# Patient Record
Sex: MALE | Race: WHITE | NOT HISPANIC OR LATINO | Employment: OTHER | URBAN - METROPOLITAN AREA
[De-identification: names, ages, dates, MRNs, and addresses within clinical notes are randomized per-mention and may not be internally consistent; named-entity substitution may affect disease eponyms.]

---

## 2018-12-10 ENCOUNTER — TELEPHONE (OUTPATIENT)
Dept: FAMILY MEDICINE | Facility: CLINIC | Age: 53
End: 2018-12-10

## 2018-12-10 NOTE — TELEPHONE ENCOUNTER
----- Message from Rosa Wilson sent at 12/10/2018  1:09 PM CST -----  Contact: Franklin. Mother  Type: Needs Medical Advice    Who Called:  Franklin-mother  Best Call Back Number: [phone]  Additional Information: Would like a call back about her son getting in earlier  Because of his condition would like to speak to a Nurse. Thanks.

## 2019-01-16 ENCOUNTER — LAB VISIT (OUTPATIENT)
Dept: LAB | Facility: HOSPITAL | Age: 54
End: 2019-01-16
Attending: PSYCHIATRY & NEUROLOGY
Payer: MEDICARE

## 2019-01-16 DIAGNOSIS — Z51.81 ENCOUNTER FOR THERAPEUTIC DRUG MONITORING: ICD-10-CM

## 2019-01-16 DIAGNOSIS — Z79.899 NEED FOR PROPHYLACTIC CHEMOTHERAPY: Primary | ICD-10-CM

## 2019-01-16 LAB
ALBUMIN SERPL BCP-MCNC: 3.6 G/DL
ALP SERPL-CCNC: 130 U/L
ALT SERPL W/O P-5'-P-CCNC: 13 U/L
ANION GAP SERPL CALC-SCNC: 8 MMOL/L
AST SERPL-CCNC: 19 U/L
BASOPHILS # BLD AUTO: 0.1 K/UL
BASOPHILS NFR BLD: 0.9 %
BILIRUB SERPL-MCNC: 0.3 MG/DL
BUN SERPL-MCNC: 6 MG/DL
CALCIUM SERPL-MCNC: 9.1 MG/DL
CHLORIDE SERPL-SCNC: 100 MMOL/L
CHOLEST SERPL-MCNC: 186 MG/DL
CHOLEST/HDLC SERPL: 4.9 {RATIO}
CO2 SERPL-SCNC: 28 MMOL/L
CREAT SERPL-MCNC: 0.8 MG/DL
DIFFERENTIAL METHOD: ABNORMAL
EOSINOPHIL # BLD AUTO: 0.4 K/UL
EOSINOPHIL NFR BLD: 3.4 %
ERYTHROCYTE [DISTWIDTH] IN BLOOD BY AUTOMATED COUNT: 17.2 %
EST. GFR  (AFRICAN AMERICAN): >60 ML/MIN/1.73 M^2
EST. GFR  (NON AFRICAN AMERICAN): >60 ML/MIN/1.73 M^2
ESTIMATED AVG GLUCOSE: 114 MG/DL
GLUCOSE SERPL-MCNC: 97 MG/DL
HBA1C MFR BLD HPLC: 5.6 %
HCT VFR BLD AUTO: 39.9 %
HDLC SERPL-MCNC: 38 MG/DL
HDLC SERPL: 20.4 %
HGB BLD-MCNC: 12.9 G/DL
IMM GRANULOCYTES # BLD AUTO: 0.04 K/UL
IMM GRANULOCYTES NFR BLD AUTO: 0.4 %
LDLC SERPL CALC-MCNC: 127.4 MG/DL
LYMPHOCYTES # BLD AUTO: 2.8 K/UL
LYMPHOCYTES NFR BLD: 25.9 %
MCH RBC QN AUTO: 27.9 PG
MCHC RBC AUTO-ENTMCNC: 32.3 G/DL
MCV RBC AUTO: 86 FL
MONOCYTES # BLD AUTO: 1 K/UL
MONOCYTES NFR BLD: 9.3 %
NEUTROPHILS # BLD AUTO: 6.4 K/UL
NEUTROPHILS NFR BLD: 60.1 %
NONHDLC SERPL-MCNC: 148 MG/DL
NRBC BLD-RTO: 0 /100 WBC
PLATELET # BLD AUTO: 508 K/UL
PMV BLD AUTO: 9.4 FL
POTASSIUM SERPL-SCNC: 4.5 MMOL/L
PROT SERPL-MCNC: 8 G/DL
RBC # BLD AUTO: 4.63 M/UL
SODIUM SERPL-SCNC: 136 MMOL/L
TRIGL SERPL-MCNC: 103 MG/DL
TSH SERPL DL<=0.005 MIU/L-ACNC: 2.87 UIU/ML
WBC # BLD AUTO: 10.67 K/UL

## 2019-01-16 PROCEDURE — 80183 DRUG SCRN QUANT OXCARBAZEPIN: CPT

## 2019-01-16 PROCEDURE — 80061 LIPID PANEL: CPT

## 2019-01-16 PROCEDURE — 84443 ASSAY THYROID STIM HORMONE: CPT

## 2019-01-16 PROCEDURE — 83036 HEMOGLOBIN GLYCOSYLATED A1C: CPT

## 2019-01-16 PROCEDURE — 80053 COMPREHEN METABOLIC PANEL: CPT

## 2019-01-16 PROCEDURE — 85025 COMPLETE CBC W/AUTO DIFF WBC: CPT

## 2019-01-16 PROCEDURE — 36415 COLL VENOUS BLD VENIPUNCTURE: CPT | Mod: PO

## 2019-01-19 LAB — OXCARBAZEPINE METABOLITE: 19 MCG/ML

## 2019-02-07 ENCOUNTER — OFFICE VISIT (OUTPATIENT)
Dept: FAMILY MEDICINE | Facility: CLINIC | Age: 54
End: 2019-02-07
Payer: MEDICARE

## 2019-02-07 VITALS
WEIGHT: 273.81 LBS | HEART RATE: 111 BPM | OXYGEN SATURATION: 96 % | DIASTOLIC BLOOD PRESSURE: 80 MMHG | TEMPERATURE: 98 F | SYSTOLIC BLOOD PRESSURE: 136 MMHG | BODY MASS INDEX: 37.09 KG/M2 | HEIGHT: 72 IN

## 2019-02-07 DIAGNOSIS — R35.0 URINARY FREQUENCY: ICD-10-CM

## 2019-02-07 DIAGNOSIS — F20.9 SCHIZOPHRENIA, UNSPECIFIED TYPE: Primary | ICD-10-CM

## 2019-02-07 DIAGNOSIS — G47.33 OSA ON CPAP: ICD-10-CM

## 2019-02-07 DIAGNOSIS — D49.89: ICD-10-CM

## 2019-02-07 DIAGNOSIS — I25.10 CORONARY ARTERY DISEASE, ANGINA PRESENCE UNSPECIFIED, UNSPECIFIED VESSEL OR LESION TYPE, UNSPECIFIED WHETHER NATIVE OR TRANSPLANTED HEART: ICD-10-CM

## 2019-02-07 DIAGNOSIS — Z23 NEED FOR PNEUMOCOCCAL VACCINATION: ICD-10-CM

## 2019-02-07 DIAGNOSIS — R79.89 ELEVATED PLATELET COUNT: ICD-10-CM

## 2019-02-07 DIAGNOSIS — Z79.899 HIGH RISK MEDICATIONS (NOT ANTICOAGULANTS) LONG-TERM USE: ICD-10-CM

## 2019-02-07 DIAGNOSIS — J44.9 CHRONIC OBSTRUCTIVE PULMONARY DISEASE, UNSPECIFIED COPD TYPE: ICD-10-CM

## 2019-02-07 PROCEDURE — G0008 FLU VACCINE (QUAD) GREATER THAN OR EQUAL TO 3YO PRESERVATIVE FREE IM: ICD-10-PCS | Mod: HCNC,S$GLB,, | Performed by: FAMILY MEDICINE

## 2019-02-07 PROCEDURE — 3008F BODY MASS INDEX DOCD: CPT | Mod: HCNC,CPTII,S$GLB, | Performed by: FAMILY MEDICINE

## 2019-02-07 PROCEDURE — G0008 ADMIN INFLUENZA VIRUS VAC: HCPCS | Mod: HCNC,S$GLB,, | Performed by: FAMILY MEDICINE

## 2019-02-07 PROCEDURE — 90686 IIV4 VACC NO PRSV 0.5 ML IM: CPT | Mod: HCNC,S$GLB,, | Performed by: FAMILY MEDICINE

## 2019-02-07 PROCEDURE — 99999 PR PBB SHADOW E&M-EST. PATIENT-LVL IV: CPT | Mod: PBBFAC,HCNC,, | Performed by: FAMILY MEDICINE

## 2019-02-07 PROCEDURE — G0009 PNEUMOCOCCAL POLYSACCHARIDE VACCINE 23-VALENT =>2YO SQ IM: ICD-10-PCS | Mod: HCNC,S$GLB,, | Performed by: FAMILY MEDICINE

## 2019-02-07 PROCEDURE — 3008F PR BODY MASS INDEX (BMI) DOCUMENTED: ICD-10-PCS | Mod: HCNC,CPTII,S$GLB, | Performed by: FAMILY MEDICINE

## 2019-02-07 PROCEDURE — 90732 PNEUMOCOCCAL POLYSACCHARIDE VACCINE 23-VALENT =>2YO SQ IM: ICD-10-PCS | Mod: HCNC,S$GLB,, | Performed by: FAMILY MEDICINE

## 2019-02-07 PROCEDURE — 99204 OFFICE O/P NEW MOD 45 MIN: CPT | Mod: 25,HCNC,S$GLB, | Performed by: FAMILY MEDICINE

## 2019-02-07 PROCEDURE — 90732 PPSV23 VACC 2 YRS+ SUBQ/IM: CPT | Mod: HCNC,S$GLB,, | Performed by: FAMILY MEDICINE

## 2019-02-07 PROCEDURE — 90686 FLU VACCINE (QUAD) GREATER THAN OR EQUAL TO 3YO PRESERVATIVE FREE IM: ICD-10-PCS | Mod: HCNC,S$GLB,, | Performed by: FAMILY MEDICINE

## 2019-02-07 PROCEDURE — 99999 PR PBB SHADOW E&M-EST. PATIENT-LVL IV: ICD-10-PCS | Mod: PBBFAC,HCNC,, | Performed by: FAMILY MEDICINE

## 2019-02-07 PROCEDURE — G0009 ADMIN PNEUMOCOCCAL VACCINE: HCPCS | Mod: HCNC,S$GLB,, | Performed by: FAMILY MEDICINE

## 2019-02-07 PROCEDURE — 99204 PR OFFICE/OUTPT VISIT, NEW, LEVL IV, 45-59 MIN: ICD-10-PCS | Mod: 25,HCNC,S$GLB, | Performed by: FAMILY MEDICINE

## 2019-02-07 RX ORDER — NITROGLYCERIN 0.4 MG/1
0.4 TABLET SUBLINGUAL EVERY 5 MIN PRN
Qty: 9 TABLET | Refills: 3 | Status: SHIPPED | OUTPATIENT
Start: 2019-02-07 | End: 2020-01-14 | Stop reason: SDUPTHER

## 2019-02-07 RX ORDER — METOPROLOL SUCCINATE 25 MG/1
25 TABLET, EXTENDED RELEASE ORAL DAILY
Qty: 90 TABLET | Refills: 1 | Status: SHIPPED | OUTPATIENT
Start: 2019-02-07 | End: 2019-08-21 | Stop reason: SDUPTHER

## 2019-02-07 NOTE — PROGRESS NOTES
Subjective:       Patient ID: Ryan Crane is a 53 y.o. male.    Chief Complaint: Establish Care      Ryan Crane is in the office to establish care. Accompanied by his mom. Moved to the area from [place].     HPI  Medical history difficult to elicit. No records available. Pt states that mental health currently under care of community provider. He's not totally clear on all of his current medications.  Past Medical History:   Diagnosis Date    Acute angina     COPD (chronic obstructive pulmonary disease)     Hypertension     MI, old     PIPER on CPAP     Schizophrenia     Stroke      No medical records at time of visit. Pt reported hx is disjointed, mentions medications for BP, joint pain, mood, thyroid.     Current Outpatient Medications:     naproxen (NAPROSYN) 500 MG tablet, Take 1 tablet (500 mg total) by mouth 2 (two) times daily as needed (pain)., Disp: 15 tablet, Rfl: 0    naproxen sodium (ANAPROX) 550 MG tablet, Take 1 tablet (550 mg total) by mouth 2 (two) times daily with meals., Disp: 20 tablet, Rfl: 0    neomycin-polymyxin-hydrocortisone (CORTISPORIN) 3.5-10,000-1 mg/mL-unit/mL-% otic suspension, Place 4 drops into both ears 3 (three) times daily., Disp: 10 mL, Rfl: 0    The 10-year ASCVD risk score (Coloma DC Jr., et al., 2013) is: 10.8%    Values used to calculate the score:      Age: 53 years      Sex: Male      Is Non- : No      Diabetic: No      Tobacco smoker: Yes      Systolic Blood Pressure: 136 mmHg      Is BP treated: No      HDL Cholesterol: 38 mg/dL      Total Cholesterol: 186 mg/dL   Resume asa. Records release re: statin.    Lab Results   Component Value Date    HGBA1C 5.6 01/16/2019     Lab Results   Component Value Date    LDLCALC 127.4 01/16/2019    CREATININE 0.8 01/16/2019   Labs 1/2019 rev. Discussed repeat cbc with iron/tibc.     Review of Systems   HENT: Negative for congestion and postnasal drip.    Respiratory: Positive for cough. Negative  for shortness of breath.    Cardiovascular: Negative for chest pain (occ) and leg swelling.   Gastrointestinal: Negative for blood in stool, constipation and diarrhea.        No gerd c/o.   Genitourinary: Positive for frequency. Negative for difficulty urinating and hematuria.   Musculoskeletal: Negative for arthralgias and myalgias.   Neurological: Negative for dizziness and headaches.   Psychiatric/Behavioral: Positive for dysphoric mood. Negative for sleep disturbance.       Objective:      Physical Exam   Constitutional: He is oriented to person, place, and time. He appears well-developed and well-nourished.   HENT:   Head: Normocephalic and atraumatic.   Mouth/Throat: Oropharynx is clear and moist.   Eyes: Conjunctivae and EOM are normal. Pupils are equal, round, and reactive to light. No scleral icterus.   Neck: Normal range of motion. Neck supple. No thyromegaly present.   Cardiovascular: Normal rate, regular rhythm and normal heart sounds.   No murmur heard.  Pulmonary/Chest: Effort normal and breath sounds normal. No respiratory distress. He has no wheezes. He has no rales.   Abdominal: Soft. There is no guarding.   Exam limited by habitus.   Musculoskeletal: Normal range of motion. He exhibits no edema.   Neurological: He is alert and oriented to person, place, and time. No cranial nerve deficit.   Skin: Skin is warm and dry.   Psychiatric: He has a normal mood and affect.   Nursing note and vitals reviewed.        Screening recommendations appropriate to age and health status were reviewed.    Assessment & Plan:    Schizophrenia, unspecified type  -     Ambulatory referral to Psychiatry  Patient established with outside provider. He's aware that they'll have to confirm provider availability with ins, and cont f/u and meds in the interim.  Chronic obstructive pulmonary disease, unspecified COPD type  -     Ambulatory referral to Pulmonology  Records release pending. Pt recalls inhalers, but no specific  type.  PIPER on CPAP  Not using as it's in his mom's attic. Recommended he (clean) and resume.  Coronary artery disease, angina presence unspecified, unspecified vessel or lesion type, unspecified whether native or transplanted heart  -     Ambulatory referral to Cardiology  Records release prior. Resume toprol and asa.   Tumor, foot  -     Ambulatory referral to Podiatry  For review.  Elevated platelet count  -     CBC auto differential; Future; Expected date: 02/07/2019  -     Ferritin; Future; Expected date: 02/07/2019  -     Iron and TIBC; Future; Expected date: 02/07/2019  Incidental finding on most recent study. Recheck.  Urinary frequency  -     Prostate Specific Antigen, Diagnostic; Future; Expected date: 02/07/2019  Records release pending.  High risk medications (not anticoagulants) long-term use  -     Vitamin B12; Future; Expected date: 02/07/2019  -     Folate; Future; Expected date: 02/07/2019  Need for pneumococcal vaccination  -     (In Office Administered) Pneumococcal Polysaccharide Vaccine (23 Valent) (SQ/IM)  Other orders  -     metoprolol succinate (TOPROL-XL) 25 MG 24 hr tablet; Take 1 tablet (25 mg total) by mouth once daily.  Dispense: 90 tablet; Refill: 1  -     nitroGLYCERIN (NITROSTAT) 0.4 MG SL tablet; Place 1 tablet (0.4 mg total) under the tongue every 5 (five) minutes as needed for Chest pain.  Dispense: 9 tablet; Refill: 3  -     Influenza - Quadrivalent (3 years & older) (PF)

## 2019-02-08 DIAGNOSIS — Z12.11 COLON CANCER SCREENING: ICD-10-CM

## 2019-02-25 ENCOUNTER — OFFICE VISIT (OUTPATIENT)
Dept: PODIATRY | Facility: CLINIC | Age: 54
End: 2019-02-25
Payer: MEDICARE

## 2019-02-25 VITALS
HEIGHT: 72 IN | WEIGHT: 273.81 LBS | DIASTOLIC BLOOD PRESSURE: 99 MMHG | HEART RATE: 81 BPM | SYSTOLIC BLOOD PRESSURE: 192 MMHG | BODY MASS INDEX: 37.09 KG/M2

## 2019-02-25 DIAGNOSIS — R23.4 FISSURE IN SKIN OF FOOT: Primary | ICD-10-CM

## 2019-02-25 DIAGNOSIS — T81.30XA DEHISCENCE OF WOUND: ICD-10-CM

## 2019-02-25 DIAGNOSIS — M79.671 INTRACTABLE RIGHT HEEL PAIN: ICD-10-CM

## 2019-02-25 PROCEDURE — 99203 OFFICE O/P NEW LOW 30 MIN: CPT | Mod: HCNC,S$GLB,, | Performed by: PODIATRIST

## 2019-02-25 PROCEDURE — 3008F BODY MASS INDEX DOCD: CPT | Mod: HCNC,CPTII,S$GLB, | Performed by: PODIATRIST

## 2019-02-25 PROCEDURE — 99203 PR OFFICE/OUTPT VISIT, NEW, LEVL III, 30-44 MIN: ICD-10-PCS | Mod: HCNC,S$GLB,, | Performed by: PODIATRIST

## 2019-02-25 PROCEDURE — 99999 PR PBB SHADOW E&M-EST. PATIENT-LVL III: ICD-10-PCS | Mod: PBBFAC,HCNC,, | Performed by: PODIATRIST

## 2019-02-25 PROCEDURE — 99999 PR PBB SHADOW E&M-EST. PATIENT-LVL III: CPT | Mod: PBBFAC,HCNC,, | Performed by: PODIATRIST

## 2019-02-25 PROCEDURE — 3008F PR BODY MASS INDEX (BMI) DOCUMENTED: ICD-10-PCS | Mod: HCNC,CPTII,S$GLB, | Performed by: PODIATRIST

## 2019-02-25 NOTE — LETTER
March 8, 2019      Laura Becerril MD  [address]  [place] [address]           [facility]  [address] [facility] [address] [place] [address]  Phone: [phone]          Patient: Ryan Crane   MR Number: [id number]   YOB: 1965   Date of Visit: 2/25/2019       Dear Dr. Laura Becerril:    Thank you for referring Ryan Crane to me for evaluation. Attached you will find relevant portions of my assessment and plan of care.    If you have questions, please do not hesitate to call me. I look forward to following Ryan Crane along with you.    Sincerely,    Eloy Forbes  CC:  No Recipients    If you would like to receive this communication electronically, please contact [email or web address] or [phone] to request more information on [employer] Link access.    For providers and/or their staff who would like to refer a patient to [facility], please contact us through our one-stop-shop provider referral line, [facility], at [phone].    If you feel you have received this communication in error or would no longer like to receive these types of communications, please e-mail [email or web address]

## 2019-03-04 ENCOUNTER — OFFICE VISIT (OUTPATIENT)
Dept: PODIATRY | Facility: CLINIC | Age: 54
End: 2019-03-04
Payer: MEDICARE

## 2019-03-04 VITALS
SYSTOLIC BLOOD PRESSURE: 153 MMHG | BODY MASS INDEX: 37.09 KG/M2 | WEIGHT: 273.81 LBS | DIASTOLIC BLOOD PRESSURE: 87 MMHG | HEART RATE: 82 BPM | HEIGHT: 72 IN

## 2019-03-04 DIAGNOSIS — M79.671 INTRACTABLE RIGHT HEEL PAIN: ICD-10-CM

## 2019-03-04 DIAGNOSIS — T81.30XA DEHISCENCE OF WOUND: ICD-10-CM

## 2019-03-04 DIAGNOSIS — R23.4 FISSURE IN SKIN OF FOOT: Primary | ICD-10-CM

## 2019-03-04 PROCEDURE — 99213 PR OFFICE/OUTPT VISIT, EST, LEVL III, 20-29 MIN: ICD-10-PCS | Mod: 25,HCNC,S$GLB, | Performed by: PODIATRIST

## 2019-03-04 PROCEDURE — 97597 PR DEBRIDEMENT OPEN WOUND 20 SQ CM<: ICD-10-PCS | Mod: HCNC,S$GLB,, | Performed by: PODIATRIST

## 2019-03-04 PROCEDURE — 99999 PR PBB SHADOW E&M-EST. PATIENT-LVL III: ICD-10-PCS | Mod: PBBFAC,HCNC,, | Performed by: PODIATRIST

## 2019-03-04 PROCEDURE — 3008F PR BODY MASS INDEX (BMI) DOCUMENTED: ICD-10-PCS | Mod: HCNC,CPTII,S$GLB, | Performed by: PODIATRIST

## 2019-03-04 PROCEDURE — 99999 PR PBB SHADOW E&M-EST. PATIENT-LVL III: CPT | Mod: PBBFAC,HCNC,, | Performed by: PODIATRIST

## 2019-03-04 PROCEDURE — 3008F BODY MASS INDEX DOCD: CPT | Mod: HCNC,CPTII,S$GLB, | Performed by: PODIATRIST

## 2019-03-04 PROCEDURE — 99213 OFFICE O/P EST LOW 20 MIN: CPT | Mod: 25,HCNC,S$GLB, | Performed by: PODIATRIST

## 2019-03-04 PROCEDURE — 97597 DBRDMT OPN WND 1ST 20 CM/<: CPT | Mod: HCNC,S$GLB,, | Performed by: PODIATRIST

## 2019-03-04 RX ORDER — TRAMADOL HYDROCHLORIDE 50 MG/1
50 TABLET ORAL EVERY 8 HOURS PRN
Qty: 90 TABLET | Refills: 0 | Status: SHIPPED | OUTPATIENT
Start: 2019-03-04 | End: 2019-04-03

## 2019-03-18 ENCOUNTER — TELEPHONE (OUTPATIENT)
Dept: PODIATRY | Facility: CLINIC | Age: 54
End: 2019-03-18

## 2019-03-18 ENCOUNTER — OFFICE VISIT (OUTPATIENT)
Dept: PODIATRY | Facility: CLINIC | Age: 54
End: 2019-03-18
Payer: MEDICARE

## 2019-03-18 VITALS
BODY MASS INDEX: 38.58 KG/M2 | WEIGHT: 275.56 LBS | HEART RATE: 96 BPM | HEIGHT: 71 IN | DIASTOLIC BLOOD PRESSURE: 83 MMHG | SYSTOLIC BLOOD PRESSURE: 149 MMHG

## 2019-03-18 DIAGNOSIS — R23.4 FISSURE IN SKIN OF FOOT: ICD-10-CM

## 2019-03-18 DIAGNOSIS — M79.671 PAIN OF RIGHT HEEL: ICD-10-CM

## 2019-03-18 DIAGNOSIS — T81.30XA DEHISCENCE OF WOUND: Primary | ICD-10-CM

## 2019-03-18 PROCEDURE — 99213 OFFICE O/P EST LOW 20 MIN: CPT | Mod: 25,HCNC,S$GLB, | Performed by: PODIATRIST

## 2019-03-18 PROCEDURE — 97597 PR DEBRIDEMENT OPEN WOUND 20 SQ CM<: ICD-10-PCS | Mod: HCNC,S$GLB,, | Performed by: PODIATRIST

## 2019-03-18 PROCEDURE — 97597 DBRDMT OPN WND 1ST 20 CM/<: CPT | Mod: HCNC,S$GLB,, | Performed by: PODIATRIST

## 2019-03-18 PROCEDURE — 99213 PR OFFICE/OUTPT VISIT, EST, LEVL III, 20-29 MIN: ICD-10-PCS | Mod: 25,HCNC,S$GLB, | Performed by: PODIATRIST

## 2019-03-18 PROCEDURE — 3008F BODY MASS INDEX DOCD: CPT | Mod: HCNC,CPTII,S$GLB, | Performed by: PODIATRIST

## 2019-03-18 PROCEDURE — 3008F PR BODY MASS INDEX (BMI) DOCUMENTED: ICD-10-PCS | Mod: HCNC,CPTII,S$GLB, | Performed by: PODIATRIST

## 2019-03-18 PROCEDURE — 99999 PR PBB SHADOW E&M-EST. PATIENT-LVL III: CPT | Mod: PBBFAC,HCNC,, | Performed by: PODIATRIST

## 2019-03-18 PROCEDURE — 99999 PR PBB SHADOW E&M-EST. PATIENT-LVL III: ICD-10-PCS | Mod: PBBFAC,HCNC,, | Performed by: PODIATRIST

## 2019-03-18 NOTE — TELEPHONE ENCOUNTER
"Patient requesting a prescription for a "downloading" boot.  Stated that he can come pick it up when ready.  "

## 2019-03-18 NOTE — PROGRESS NOTES
Subjective:      Patient ID: Ryan Crane is a 53 y.o. male.    Chief Complaint: Foot Ulcer (right foot wound care  PCP  Laura Becerril  2/7/19)      HPI:  Ryan Crane is a 53 y.o. male who presents to clinic with a chief complaint of right heel fissure wound.  Patient reports no longer having pain since taking tramadol unless he steps the wrong way.  He states that he lost his original prescription for his offloading shoe and requests another prescription for it.  He relates having called the MRI facility in [place] and was told that he can take of his results at any time but has not made the trip to get them.  He also informs of increased walking and decreased wound care since his last visit.  Patient denies any pain in his heel today.  Patient denies any other pedal complaints at this time.    PCP:  Laura Becerril MD  Date last seen:  2/7/19    Review of Systems   Constitutional: Negative for appetite change, fever, chills, fatigue and unexpected weight change.   Cardiovascular: Negative for chest pain, claudication, cyanosis, and leg swelling.  Musculoskeletal: Negative for back pain, arthritis, joint pain, joint swelling, myalgias, and stiffness.   Skin: Negative for nail bed changes, discoloration, rash, itching, suspicious lesion, and unusual hair distribution.  Positive for poor wound healing.  Neurological: Negative for loss of balance, sensory change, paresthesias, and numbness.   Hematological: Negative for adenopathy, bleeding, and bruising easily.   Psychiatric/Behavioral: The patient is not nervous/anxious.  Negative for altered mental status.    Hemoglobin A1C   Date Value Ref Range Status   01/16/2019 5.6 4.0 - 5.6 % Final     Comment:     ADA Screening Guidelines:  5.7-6.4%  Consistent with prediabetes  >or=6.5%  Consistent with diabetes  High levels of fetal hemoglobin interfere with the HbA1C  assay. Heterozygous hemoglobin variants (HbS, HgC, etc)do  not significantly interfere  "with this assay.   However, presence of multiple variants may affect accuracy.         Past Medical History:   Diagnosis Date    Acute angina     COPD (chronic obstructive pulmonary disease)     Hypertension     MI, old     PIPER on CPAP     Schizophrenia     Stroke      Past Surgical History:   Procedure Laterality Date    APPENDECTOMY      SHOULDER ARTHROSCOPY Left      History reviewed. No pertinent family history.  Social History     Socioeconomic History    Marital status:      Spouse name: None    Number of children: None    Years of education: None    Highest education level: None   Social Needs    Financial resource strain: None    Food insecurity - worry: None    Food insecurity - inability: None    Transportation needs - medical: None    Transportation needs - non-medical: None   Occupational History    None   Tobacco Use    Smoking status: Current Every Day Smoker     Packs/day: 1.00     Years: 45.00     Pack years: 45.00    Smokeless tobacco: Never Used   Substance and Sexual Activity    Alcohol use: No     Frequency: Never    Drug use: Yes     Types: Marijuana    Sexual activity: None   Other Topics Concern    None   Social History Narrative    None           Objective:        BP (!) 149/83 (BP Location: Right arm, Patient Position: Sitting)   Pulse 96   Ht 5' 11" (1.803 m)   Wt 125 kg (275 lb 9.2 oz)   BMI 38.43 kg/m²     Physical Exam   Constitutional: Patient is oriented to person, place, and time. Patient appears well-developed and well-nourished. No acute distress.     Psychiatric: Patient has a normal mood and affect. Patient's speech is normal and behavior is normal. Judgment is normal. Cognition and memory are normal.     Right pedal exam was performed today.  Vascular: Vascular: Pedal pulses palpable 2/4 DP & PT.  CFT is = 3 seconds to the hallux.  Skin temperature is warm to cool proximal tibia to distal toes without localized increase in calor noted.  No " erythema, edema, or ecchymosis noted to the foot or ankle.  Hair growth present distally to the LE.     Musculoskeletal: Ankle and pedal joint ROM are decreased. Ankle joint dorsiflexion is restricted with the knee extended and flexed per Silfverskiold exam.  Muscle strength is 5/5 for all LE muscle groups tested.  No palpable soft tissue mass appreciable to the right plantar heel.    Neurological:  Epicritic sensation is grossly Intact to the foot.    [place] STR is negative to right lower extremity.   Tenderness to palpation of right plantar heel fissures/wounds.     Dermatological: Toenails 1-5 right are WNL in length and thickness.  Webspaces 1-4 right are clean, dry, and intact.  Skin turgor is supple.   Plantar lateral right heel fissure wound is healed.  Fissure wound noted to the plantar medial right heel demonstrates involuted skin edges with hyperkeratotic skin edges measuring 1.8 cm x 0.2 cm x 0.4 cm.  Wounds are macerated without dot signs of infection present with wound bases 100% fibrotic extending down into the dermis.     Nursing note and vitals reviewed.        Assessment:       Encounter Diagnoses   Name Primary?    Dehiscence of wound - Right Foot Yes    Fissure in skin of foot - Right Foot     Pain of right heel - Right Foot          Plan:       Ryan was seen today for foot ulcer.    Diagnoses and all orders for this visit:    Dehiscence of wound - Right Foot  -     ORTHOTIC DEVICE (DME)    Fissure in skin of foot - Right Foot  -     ORTHOTIC DEVICE (DME)    Pain of right heel - Right Foot  -     ORTHOTIC DEVICE (DME)      I counseled the patient on his conditions, their implications and medical management.    - Reviewed with patient wound care instructions and need for further imaging if he does not bring medical records to next visit.    - Electronic prescription for a Darco heel offloading wedge shoe was given to patient along with list of medical suppliers in the area.    - Advised  patient to reduce ambulation still even though pain has significantly improved with tramadol.            - With patient's permission, cleansed right plantar heel wound with normal saline and performed sharp, selective debridement of devitalized, hyperkeratotic tissue and biofilm, < 20 cm sq., extending down to the level of dermis via 3 mm dermal curette to patient's tolerance without incident.  Applied betadine, patted area dry, applied large felt offloading pad with antibiotic cream an aperture, and covered with Aquacel foam dressing.    Patient was given the following recommendations and instructions:  Patient Instructions   - Change dressing every 2-3 days, clean heel fissures with betadine, pat area dry, apply felt offloading pad, apply antibiotic and antifungal creams in aperture, and cover with foam bandage.    - Don't get dressing soiled or wet.    - Elevate foot as much as possible throughout the day.    - Wear supportive/accommodative shoes at all times when ambulating.    - Rest/reduce ambulation to minimal activities of daily living.    - Take over-the-counter anti-inflammatories as needed and tramadol as prescribed for pain.    - Bring prescription for Darco heel offloading wedge shoe to medical supplier and wear shoe at all times when ambulating.    - Acquire MRI results and any other documents regarding foot condition and bring them to next appointment.    - Notify clinic immediately of any new or worsening conditions.    - Follow up in 2 weeks for wound care.          Li Kathleen DPM        Dictation was performed using M*Modal Fluency.  Transcription errors may be present.

## 2019-03-19 NOTE — TELEPHONE ENCOUNTER
Per Dr Kathleen:  Patient informed that the prescription that was given to him yesterday should be sufficient.  Tried explaining to patient that if the place he went to has  paperwork explaining that they need something else to please have them send it, but patient broke in - stated ok and hung up on me.        Dr Kathleen shown the shoe we have and it is not the one needed.    Li Kathleen, KEEGAN Del Castillo LPN   Caller: Unspecified (Yesterday,  5:25 PM)             Please have him return for nursing visit to get heel offloading shoe.  Thank you!

## 2019-03-21 ENCOUNTER — OFFICE VISIT (OUTPATIENT)
Dept: SPINE | Facility: CLINIC | Age: 54
End: 2019-03-21
Payer: MEDICARE

## 2019-03-21 VITALS
SYSTOLIC BLOOD PRESSURE: 179 MMHG | WEIGHT: 275.56 LBS | HEIGHT: 71 IN | HEART RATE: 78 BPM | DIASTOLIC BLOOD PRESSURE: 88 MMHG | BODY MASS INDEX: 38.58 KG/M2

## 2019-03-21 DIAGNOSIS — E66.9 OBESITY, UNSPECIFIED CLASSIFICATION, UNSPECIFIED OBESITY TYPE, UNSPECIFIED WHETHER SERIOUS COMORBIDITY PRESENT: ICD-10-CM

## 2019-03-21 DIAGNOSIS — M54.50 ACUTE MIDLINE LOW BACK PAIN WITHOUT SCIATICA: Primary | ICD-10-CM

## 2019-03-21 PROBLEM — T81.30XA DEHISCENCE OF WOUND: Status: ACTIVE | Noted: 2019-03-21

## 2019-03-21 PROBLEM — M79.671 INTRACTABLE RIGHT HEEL PAIN: Status: ACTIVE | Noted: 2019-03-21

## 2019-03-21 PROBLEM — R23.4 FISSURE IN SKIN OF FOOT: Status: ACTIVE | Noted: 2019-03-21

## 2019-03-21 PROCEDURE — 99203 OFFICE O/P NEW LOW 30 MIN: CPT | Mod: HCNC,S$GLB,, | Performed by: PHYSICIAN ASSISTANT

## 2019-03-21 PROCEDURE — 3008F BODY MASS INDEX DOCD: CPT | Mod: HCNC,CPTII,S$GLB, | Performed by: PHYSICIAN ASSISTANT

## 2019-03-21 PROCEDURE — 3008F PR BODY MASS INDEX (BMI) DOCUMENTED: ICD-10-PCS | Mod: HCNC,CPTII,S$GLB, | Performed by: PHYSICIAN ASSISTANT

## 2019-03-21 PROCEDURE — 99999 PR PBB SHADOW E&M-EST. PATIENT-LVL III: CPT | Mod: PBBFAC,HCNC,, | Performed by: PHYSICIAN ASSISTANT

## 2019-03-21 PROCEDURE — 99203 PR OFFICE/OUTPT VISIT, NEW, LEVL III, 30-44 MIN: ICD-10-PCS | Mod: HCNC,S$GLB,, | Performed by: PHYSICIAN ASSISTANT

## 2019-03-21 PROCEDURE — 99999 PR PBB SHADOW E&M-EST. PATIENT-LVL III: ICD-10-PCS | Mod: PBBFAC,HCNC,, | Performed by: PHYSICIAN ASSISTANT

## 2019-03-21 RX ORDER — METHOCARBAMOL 750 MG/1
750 TABLET, FILM COATED ORAL EVERY 8 HOURS PRN
Qty: 40 TABLET | Refills: 0 | Status: SHIPPED | OUTPATIENT
Start: 2019-03-21 | End: 2019-05-07

## 2019-03-22 NOTE — PROGRESS NOTES
"Subjective:      Patient ID: Ryan Crane is a 53 y.o. male.    Chief Complaint: Foot Ulcer (right foot   PCP  Laura Becerril  2/7/19)      HPI:  Ryan Crane is a 53 y.o. male who presents to clinic with a chief complaint of wounds on the bottom of his right heel.  Patient reports having no reduction pain with local wound care and admits to not taking over-the-counter pain medications/anti-inflammatories.  He is requesting narcotics for his pain.  Patient also reports not getting offloading shoe or medical records.  He still describes heel pain as "hurt" and rates his pain as an 8/10 on the pain scale.  Patient denies any other pedal complaints at this time.    PCP:  Laura Becerril MD  Date last seen:  2/7/19    Review of Systems   Constitutional: Negative for appetite change, fever, chills, fatigue and unexpected weight change.   Cardiovascular: Negative for chest pain, claudication, cyanosis, and leg swelling.  Musculoskeletal: Negative for back pain, arthritis, joint pain, joint swelling, myalgias, and stiffness.   Skin: Negative for nail bed changes, discoloration, rash, itching, suspicious lesion, and unusual hair distribution.  Positive for poor wound healing.  Neurological: Negative for loss of balance, sensory change, paresthesias, and numbness.  Positive for pain.  Hematological: Negative for adenopathy, bleeding, and bruising easily.   Psychiatric/Behavioral: The patient is not nervous/anxious.  Negative for altered mental status.    Hemoglobin A1C   Date Value Ref Range Status   01/16/2019 5.6 4.0 - 5.6 % Final     Comment:     ADA Screening Guidelines:  5.7-6.4%  Consistent with prediabetes  >or=6.5%  Consistent with diabetes  High levels of fetal hemoglobin interfere with the HbA1C  assay. Heterozygous hemoglobin variants (HbS, HgC, etc)do  not significantly interfere with this assay.   However, presence of multiple variants may affect accuracy.         Past Medical History:   Diagnosis Date "    Acute angina     COPD (chronic obstructive pulmonary disease)     Hypertension     MI, old     PIPER on CPAP     Schizophrenia     Stroke      Past Surgical History:   Procedure Laterality Date    APPENDECTOMY      SHOULDER ARTHROSCOPY Left      History reviewed. No pertinent family history.  Social History     Socioeconomic History    Marital status:      Spouse name: None    Number of children: None    Years of education: None    Highest education level: None   Social Needs    Financial resource strain: None    Food insecurity - worry: None    Food insecurity - inability: None    Transportation needs - medical: None    Transportation needs - non-medical: None   Occupational History    None   Tobacco Use    Smoking status: Current Every Day Smoker     Packs/day: 1.00     Years: 45.00     Pack years: 45.00    Smokeless tobacco: Never Used   Substance and Sexual Activity    Alcohol use: No     Frequency: Never    Drug use: Yes     Types: Marijuana    Sexual activity: None   Other Topics Concern    None   Social History Narrative    None           Objective:        BP (!) 153/87 (BP Location: Right arm, Patient Position: Sitting)   Pulse 82   Ht 6' (1.829 m)   Wt 124.2 kg (273 lb 13 oz)   BMI 37.14 kg/m²     Physical Exam   Constitutional: Patient is oriented to person, place, and time. Patient appears well-developed and well-nourished. No acute distress.     Psychiatric: Patient has a normal mood and affect. Patient's speech is normal and behavior is normal. Judgment is normal. Cognition and memory are normal.     Right pedal exam was performed today.  Vascular: Pedal pulses palpable 2/4 DP & PT.  CFT is = 3 seconds to the hallux.  Skin temperature is warm to cool proximal tibia to distal toes without localized increase in calor noted.  No erythema, edema, or ecchymosis noted to the foot or ankle.  Hair growth present distally to the LE.     Musculoskeletal: Ankle and pedal joint  ROM are decreased. Ankle joint dorsiflexion is restricted with the knee extended and flexed per Silfverskiold exam.  Muscle strength is 5/5 for all LE muscle groups tested.  No palpable soft tissue mass appreciable to the right plantar heel.    Neurological:  Epicritic sensation is grossly Intact to the foot.   Achilles DTR and [place] STR are Intact to right lower extremity.   Tenderness to palpation of right plantar heel fissures/wounds.    Dermatological: Toenails 1-5 right are WNL in length and thickness.  Webspaces 1-4 right are clean, dry, and intact.  Skin turgor is supple.  Fissure wounds noted to the plantar right heel demonstrating involuted skin edges with hyperkeratotic skin edges with lateral wound in T shape measuring approximately 0.6 cm x 0.9 cm x 0.4 cm and medial wound in linear oblique orientation measuring 1.5 cm x 0.3 cm x 0.4 cm.  Wounds are macerated without dot signs of infection present with wound bases 100% fibrotic extending down into the dermis.  No malodor noted to the wounds.    Nursing note and vitals reviewed.        Assessment:       Encounter Diagnoses   Name Primary?    Fissure in skin of foot - Right Foot Yes    Dehiscence of wound - Right Foot     Intractable right heel pain - Right Foot          Plan:       Ryan was seen today for foot ulcer.    Diagnoses and all orders for this visit:    Fissure in skin of foot - Right Foot  -     traMADol (ULTRAM) 50 mg tablet; Take 1 tablet (50 mg total) by mouth every 8 (eight) hours as needed for Pain.    Dehiscence of wound - Right Foot    Intractable right heel pain - Right Foot  -     traMADol (ULTRAM) 50 mg tablet; Take 1 tablet (50 mg total) by mouth every 8 (eight) hours as needed for Pain.      I counseled the patient on his conditions, their implications and medical management.    - Reviewed with patient proper foot care, supportive, accommodative shoe gear, and PRICE therapy.    - With patient's permission, cleansed right  plantar heel wounds with normal saline and performed sharp, selective debridement of devitalized, hyperkeratotic tissue and biofilm, < 20 cm sq., extending down to the level of dermis via 3 mm dermal curette to patient's tolerance without incident.  Applied betadine, patted area dry, applied large felt offloading pad with antibiotic cream an aperture, and covered with Aquacel foam dressing.    - Advised patient to get Darco heel offloading wedge shoe.    - Patient defers imaging studies as he reports they have already been done and doesn't want to repeat them when he can get the results for next visit.    - Informed patient that I will not prescribe stronger narcotics for a soft tissue condition but would refer him to pain management or defer pain medication treatment to primary care if he felt he needed them.  He deferred referral to pain management and was amenable to tramadol prescription.    Patient was given the following recommendations and instructions:  Patient Instructions   - Change dressing every 2-3 days, clean heel fissures with betadine, pat area dry, apply felt offloading pad, apply antibiotic and antifungal creams in aperture, and cover with foam bandage.    - Don't get dressing soiled or wet.    - Elevate foot as much as possible throughout the day.    - Wear supportive/accommodative shoes at all times when ambulating.    - Rest/reduce ambulation to minimal activities of daily living.    - Take over-the-counter anti-inflammatories as needed and tramadol as prescribed for pain.    - Bring prescription for Darco heel offloading wedge shoe to medical supplier and wear shoe at all times when ambulating.    - Acquire MRI results and any other documents regarding foot condition and bring them to next appointment.    - Notify clinic immediately of any new or worsening conditions.    - Follow up in 2 weeks for wound care.          Li Kathleen DPM        Dictation was performed using M*Modal Fluency.   Transcription errors may be present.

## 2019-03-22 NOTE — PATIENT INSTRUCTIONS
- Change dressing every 2-3 days, clean heel fissures with betadine, pat area dry, apply felt offloading pad, apply antibiotic and antifungal creams in aperture, and cover with foam bandage.    - Don't get dressing soiled or wet.    - Elevate foot as much as possible throughout the day.    - Wear supportive/accommodative shoes at all times when ambulating.    - Rest/reduce ambulation to minimal activities of daily living.    - Take over-the-counter anti-inflammatories as needed and tramadol as prescribed for pain.    - Bring prescription for Darco heel offloading wedge shoe to medical supplier and wear shoe at all times when ambulating.    - Acquire MRI results and any other documents regarding foot condition and bring them to next appointment.    - Notify clinic immediately of any new or worsening conditions.    - Follow up in 2 weeks for wound care.

## 2019-03-22 NOTE — PATIENT INSTRUCTIONS
- Change dressing every 2-3 days, clean heel fissures with betadine, pat area dry, apply felt offloading pad, apply antibiotic and antifungal creams in aperture, and cover with foam bandage.    - Don't get dressing soiled or wet.    - Elevate foot as much as possible throughout the day.    - Wear supportive/accommodative shoes at all times when ambulating.    - Rest/reduce ambulation to minimal activities of daily living.    - Take over-the-counter anti-inflammatories as needed for pain.    - Bring prescription for Darco heel offloading wedge shoe to medical supplier and wear shoe at all times when ambulating.    - Acquire MRI results and any other documents regarding foot condition and bring them to next appointment.    - Notify clinic immediately of any new or worsening conditions.    - Follow up in 1 week for wound care.

## 2019-03-22 NOTE — PROGRESS NOTES
Neurosurgery History & Physical    Patient ID: Ryan Crane is a 53 y.o. male.    Chief Complaint   Patient presents with    Low-back Pain     He has had low back pain for 3 weeks. Pain is constant. Pain goes from his mid back down to both buttocks. Nothing helps the pain. Movement makes the pain worse.       Review of Systems   Constitutional: Negative for activity change, chills, fatigue and unexpected weight change.   HENT: Negative for hearing loss, tinnitus, trouble swallowing and voice change.    Eyes: Negative for visual disturbance.   Respiratory: Negative for apnea, chest tightness and shortness of breath.    Cardiovascular: Negative for chest pain and palpitations.   Gastrointestinal: Negative for abdominal pain, constipation, diarrhea, nausea and vomiting.   Genitourinary: Negative for difficulty urinating, dysuria and frequency.   Musculoskeletal: Positive for back pain. Negative for gait problem, neck pain and neck stiffness.   Skin: Negative for wound.   Neurological: Negative for dizziness, tremors, seizures, facial asymmetry, speech difficulty, weakness, light-headedness, numbness and headaches.   Psychiatric/Behavioral: Negative for confusion and decreased concentration.       Past Medical History:   Diagnosis Date    Acute angina     COPD (chronic obstructive pulmonary disease)     Hypertension     MI, old     PIPER on CPAP     Schizophrenia     Stroke      Social History     Socioeconomic History    Marital status:      Spouse name: Not on file    Number of children: Not on file    Years of education: Not on file    Highest education level: Not on file   Social Needs    Financial resource strain: Not on file    Food insecurity - worry: Not on file    Food insecurity - inability: Not on file    Transportation needs - medical: Not on file    Transportation needs - non-medical: Not on file   Occupational History    Not on file   Tobacco Use    Smoking status: Current  "Every Day Smoker     Packs/day: 1.00     Years: 45.00     Pack years: 45.00    Smokeless tobacco: Never Used   Substance and Sexual Activity    Alcohol use: No     Frequency: Never    Drug use: Yes     Types: Marijuana    Sexual activity: Not on file   Other Topics Concern    Not on file   Social History Narrative    Not on file     No family history on file.  Review of patient's allergies indicates:   Allergen Reactions    Alcohol     Keflex [cephalexin]        Current Outpatient Medications:     DULoxetine (CYMBALTA) 60 MG capsule, Take 60 mg by mouth 2 (two) times daily., Disp: , Rfl:     ketorolac (TORADOL) 10 mg tablet, Take 1 tablet (10 mg total) by mouth before meals as needed for Pain., Disp: 15 tablet, Rfl: 0    methocarbamol (ROBAXIN) 750 MG Tab, Take 1 tablet (750 mg total) by mouth every 8 (eight) hours as needed (muscle spasms)., Disp: 40 tablet, Rfl: 0    metoprolol succinate (TOPROL-XL) 25 MG 24 hr tablet, Take 1 tablet (25 mg total) by mouth once daily., Disp: 90 tablet, Rfl: 1    nitroGLYCERIN (NITROSTAT) 0.4 MG SL tablet, Place 1 tablet (0.4 mg total) under the tongue every 5 (five) minutes as needed for Chest pain., Disp: 9 tablet, Rfl: 3    OXcarbazepine (TRILEPTAL) 600 MG Tab, Take 600 mg by mouth 3 (three) times daily., Disp: , Rfl:     traMADol (ULTRAM) 50 mg tablet, Take 1 tablet (50 mg total) by mouth every 8 (eight) hours as needed for Pain., Disp: 90 tablet, Rfl: 0    ziprasidone (GEODON) 80 MG capsule, Take 160 mg by mouth once daily. At night, Disp: , Rfl:     neomycin-polymyxin-hydrocortisone (CORTISPORIN) 3.5-10,000-1 mg/mL-unit/mL-% otic suspension, Place 4 drops into both ears 3 (three) times daily., Disp: 10 mL, Rfl: 0    Vitals:    03/21/19 1336   BP: (!) 179/88   BP Location: Right arm   Patient Position: Sitting   BP Method: Large (Automatic)   Pulse: 78   Weight: 125 kg (275 lb 9.2 oz)   Height: 5' 11" (1.803 m)       Physical Exam   Constitutional: He is " oriented to person, place, and time. He appears well-developed and well-nourished.   HENT:   Head: Normocephalic and atraumatic.   Eyes: Pupils are equal, round, and reactive to light.   Neck: Normal range of motion. Neck supple.   Cardiovascular: Normal rate.   Pulmonary/Chest: Effort normal.   Abdominal: He exhibits no distension.   Musculoskeletal: Normal range of motion. He exhibits no edema.   Neurological: He is alert and oriented to person, place, and time. He has a normal Finger-Nose-Finger Test, a normal Heel to Shin Test, a normal Romberg Test and a normal Tandem Gait Test. Gait normal.   Reflex Scores:       Tricep reflexes are 2+ on the right side and 2+ on the left side.       Bicep reflexes are 2+ on the right side and 2+ on the left side.       Brachioradialis reflexes are 2+ on the right side and 2+ on the left side.       Patellar reflexes are 2+ on the right side and 2+ on the left side.       Achilles reflexes are 2+ on the right side and 2+ on the left side.  Skin: Skin is warm and dry.   Psychiatric: He has a normal mood and affect. His speech is normal and behavior is normal. Judgment and thought content normal.   Nursing note and vitals reviewed.      Neurologic Exam     Mental Status   Oriented to person, place, and time.   Oriented to person.   Oriented to place.   Oriented to time.   Follows 3 step commands.   Attention: normal. Concentration: normal.   Speech: speech is normal   Level of consciousness: alert  Knowledge: consistent with education.   Able to name object. Able to read. Able to repeat. Able to write. Normal comprehension.     Cranial Nerves     CN II   Visual acuity: normal  Right visual field deficit: none  Left visual field deficit: none     CN III, IV, VI   Pupils are equal, round, and reactive to light.  Right pupil: Size: 3 mm. Shape: regular. Reactivity: brisk. Consensual response: intact.   Left pupil: Size: 3 mm. Shape: regular. Reactivity: brisk. Consensual response:  intact.   CN III: no CN III palsy  CN VI: no CN VI palsy  Nystagmus: none   Diplopia: none  Ophthalmoparesis: none  Conjugate gaze: present    CN V   Right facial sensation deficit: none  Left facial sensation deficit: none    CN VII   Right facial weakness: none  Left facial weakness: none    CN VIII   Hearing: intact    CN IX, X   CN IX normal.   CN X normal.     CN XI   Right sternocleidomastoid strength: normal  Left sternocleidomastoid strength: normal  Right trapezius strength: normal  Left trapezius strength: normal    CN XII   Fasciculations: absent  Tongue deviation: none    Motor Exam   Muscle bulk: normal  Overall muscle tone: normal  Right arm pronator drift: absent  Left arm pronator drift: absent    Strength   Right neck flexion: 5/5  Left neck flexion: 5/5  Right neck extension: 5/5  Left neck extension: 5/5  Right deltoid: 5/5  Left deltoid: 5/5  Right biceps: 5/5  Left biceps: 5/5  Right triceps: 5/5  Left triceps: 5/5  Right wrist flexion: 5/5  Left wrist flexion: 5/5  Right wrist extension: 5/5  Left wrist extension: 5/5  Right interossei: 5/5  Left interossei: 5/5  Right abdominals: 5/5  Left abdominals: 5/5  Right iliopsoas: 5/5  Left iliopsoas: 5/5  Right quadriceps: 5/5  Left quadriceps: 5/5  Right hamstrin/5  Left hamstrin/5  Right glutei: 5/5  Left glutei: 5/5  Right anterior tibial: 5/5  Left anterior tibial: 5/5  Right posterior tibial: 5/5  Left posterior tibial: 5/5  Right peroneal: 5/5  Left peroneal: 5/5  Right gastroc: 5/5  Left gastroc: 5/5    Sensory Exam   Right arm light touch: normal  Left arm light touch: normal  Right leg light touch: normal  Left leg light touch: normal  Right arm vibration: normal  Left arm vibration: normal  Right arm pinprick: normal  Left arm pinprick: normal    Gait, Coordination, and Reflexes     Gait  Gait: normal    Coordination   Romberg: negative  Finger to nose coordination: normal  Heel to shin coordination: normal  Tandem walking  coordination: normal    Tremor   Resting tremor: absent  Intention tremor: absent  Action tremor: absent    Reflexes   Right brachioradialis: 2+  Left brachioradialis: 2+  Right biceps: 2+  Left biceps: 2+  Right triceps: 2+  Left triceps: 2+  Right patellar: 2+  Left patellar: 2+  Right achilles: 2+  Left achilles: 2+  Right Long: absent  Left Long: absent  Right ankle clonus: absent  Left ankle clonus: absent      Provider dictation:  53-year-old  female smoker with COPD, hypertension, schizophrenia is self-referred for evaluation of back pain.  He has a history of intermittent lower back pain over the last 30 years with each episode lasting approximately 1 week at a time.  He bent over to tie his shoe approximately 2-3 weeks ago and developed pain immediately.  Pain is felt along the axis of the entire lumbar region midline.  He occasionally has bilateral buttock pain.  He denies radicular leg pain, numbness, tingling.  Symptoms increased with movement.  He is taking Robaxin ago these powder for pain control.  He has not had physical therapy or steroid injection.  Oswestry score: 64%.  PHQ:  22.    He is an obese male.  He has 5/5 strength, 2+ muscle stretch reflexes, no sensory deficits.    He has not had any imaging.    Mr. Davis has acute exacerbation of chronic intermittent back pain without radiculopathy.  Pain is most likely myofascial and influenced by his obesity as well as sedentary lifestyle.  He has had some foot pain which keeps him sedentary.  I recommend physical therapy and we will refer him to [facility].  I am also prescribing robaxin to help with spasms.  Follow up if no improvement.    Visit Diagnosis:  Acute midline low back pain without sciatica  -     methocarbamol (ROBAXIN) 750 MG Tab; Take 1 tablet (750 mg total) by mouth every 8 (eight) hours as needed (muscle spasms).  Dispense: 40 tablet; Refill: 0  -     Ambulatory Referral to Physical/Occupational  Therapy    Obesity, unspecified classification, unspecified obesity type, unspecified whether serious comorbidity present  -     methocarbamol (ROBAXIN) 750 MG Tab; Take 1 tablet (750 mg total) by mouth every 8 (eight) hours as needed (muscle spasms).  Dispense: 40 tablet; Refill: 0  -     Ambulatory Referral to Physical/Occupational Therapy        Total time spent counseling greater than fifty percent of total visit time.  Counseling included discussion regarding imaging findings, diagnosis possibilities, treatment options, risks and benefits.   The patient had many questions regarding the options and long-term effects.

## 2019-03-22 NOTE — PROGRESS NOTES
"Subjective:      Patient ID: Ryan Crane is a 53 y.o. male.    Chief Complaint: Foot Problem (tumor  right bottom foot  PCP  Laura Becerril  2/7/19)      HPI:  Ryan Crane is a 53 y.o. male who presents to clinic with a chief complaint of wounds on the bottom of his right heel.  Patient reports having had a tumor removed on the bottom is foot with subsequent wound dehiscence.  He states that procedure was done when he lived in [place].  Patient relates he has lived here for over a year and a half that procedure took place about 2 years ago. He informs of having an MRI repeated at a facility in [place] several months ago due to concerned that tumor may have recurred but did not bring any documentation regarding such within to visit today.  Patient describes pain as "hurt" and rates it as 8/10 on the pain scale.  The only treatment he has formed was reducing ambulation and walking on his forefoot when possible.  He denies taking any over-the-counter pain medications or performing any local wound care to the heel wounds.  Patient denies any other pedal complaints at this time.    PCP:  Laura Becerril MD  Date last seen:  2/7/19    Review of Systems   Constitutional: Negative for appetite change, fever, chills, fatigue and unexpected weight change.   Respiratory: Negative for cough, wheezing, and shortness of breath.   Cardiovascular: Negative for chest pain, claudication, cyanosis, and leg swelling.  Endocrine:  Negative for intolerance to cold, intolerance to heat, polydipsia, polyphagia, and polyuria.    Gastrointestinal: Negative for nausea, vomiting, diarrhea, and constipation.   Musculoskeletal: Negative for back pain, arthritis, joint pain, joint swelling, myalgias, and stiffness.   Skin: Negative for nail bed changes, discoloration, rash, itching, suspicious lesion, and unusual hair distribution.  Positive for poor wound healing.  Neurological: Negative for loss of balance, sensory change, " paresthesias, and numbness.  Positive for pain.  Hematological: Negative for adenopathy, bleeding, and bruising easily.   Psychiatric/Behavioral: The patient is not nervous/anxious.  Negative for altered mental status.    Hemoglobin A1C   Date Value Ref Range Status   01/16/2019 5.6 4.0 - 5.6 % Final     Comment:     ADA Screening Guidelines:  5.7-6.4%  Consistent with prediabetes  >or=6.5%  Consistent with diabetes  High levels of fetal hemoglobin interfere with the HbA1C  assay. Heterozygous hemoglobin variants (HbS, HgC, etc)do  not significantly interfere with this assay.   However, presence of multiple variants may affect accuracy.         Past Medical History:   Diagnosis Date    Acute angina     COPD (chronic obstructive pulmonary disease)     Hypertension     MI, old     PIPER on CPAP     Schizophrenia     Stroke      Past Surgical History:   Procedure Laterality Date    APPENDECTOMY      SHOULDER ARTHROSCOPY Left      History reviewed. No pertinent family history.  Social History     Socioeconomic History    Marital status:      Spouse name: None    Number of children: None    Years of education: None    Highest education level: None   Social Needs    Financial resource strain: None    Food insecurity - worry: None    Food insecurity - inability: None    Transportation needs - medical: None    Transportation needs - non-medical: None   Occupational History    None   Tobacco Use    Smoking status: Current Every Day Smoker     Packs/day: 1.00     Years: 45.00     Pack years: 45.00    Smokeless tobacco: Never Used   Substance and Sexual Activity    Alcohol use: No     Frequency: Never    Drug use: Yes     Types: Marijuana    Sexual activity: None   Other Topics Concern    None   Social History Narrative    None           Objective:        BP (!) 192/99 (BP Location: Right arm, Patient Position: Sitting)   Pulse 81   Ht 6' (1.829 m)   Wt 124.2 kg (273 lb 13 oz)   BMI 37.14  kg/m²     Physical Exam   Constitutional: Patient is oriented to person, place, and time. Patient appears well-developed and well-nourished. No acute distress.     Psychiatric: Patient has a normal mood and affect. Patient's speech is normal and behavior is normal. Judgment is normal. Cognition and memory are normal.     Right pedal exam was performed today.  Vascular: Pedal pulses palpable 2/4 DP & PT.  CFT is = 3 seconds to the hallux.  Skin temperature is warm to cool proximal tibia to distal toes without localized increase in calor noted.  No erythema, edema, or ecchymosis noted to the foot or ankle.  Hair growth present distally to the LE.     Musculoskeletal: Ankle joint ROM is decreased. Subtalar joint ROM is decreased.  Midtarsal joint ROM is decreased.  1st ray ROM is within normal limits.  1st  MTPJ ROM is decreased.  Ankle joint dorsiflexion is restricted with the knee extended and flexed per [place] exam.    Muscle strength is 5/5 for all LE muscle groups tested.  No palpable soft tissue mass appreciable to the right plantar heel.    Neurological: Protective sensation is intact to 10/10 sites on the right foot via [place] monofilament.    Proprioception is Intact to the foot.  Vibratory sensation is Intact to the foot.   Light touch sensation is Intact to the foot.   Achilles DTR and [place]add[place] STR are Intact to right lower extremity.   Negative Babinski sign right lower extremity.  Negative clonus sign right lower extremity.  Tenderness to palpation of right plantar heel fissures/wounds.    Dermatological: Toenails 1-5 right are WNL in length and thickness.  Webspaces 1-4 right are clean, dry, and intact.  Skin turgor is supple.  Fissure wounds noted to the plantar right heel demonstrating involuted skin edges with hyperkeratotic skin edges with lateral wound in T shape measuring approximately 2.0 cm x 2.3 cm x 0.6 cm and medial wound in linear oblique orientation measuring 2.4 cm x 0.4 cm  x 0.6 cm.  Wounds are macerated without dot signs of infection present.  Fungal malodor noted to the foot.    Nursing note and vitals reviewed.        Assessment:       Encounter Diagnoses   Name Primary?    Fissure in skin of foot - Right Foot Yes    Dehiscence of wound - Right Foot     Intractable right heel pain - Right Foot          Plan:       Ryan was seen today for foot problem.    Diagnoses and all orders for this visit:    Fissure in skin of foot - Right Foot    Dehiscence of wound - Right Foot    Intractable right heel pain - Right Foot      I counseled the patient on his conditions, their implications and medical management.    - Educated patient on proper foot care, supportive, accommodative shoe gear, and PRICE therapy.    - With patient's permission, cleansed right plantar heel wounds with normal saline and performed sharp, selective debridement of devitalized, hyperkeratotic tissue and biofilm, < 20 cm sq., extending down to the level of dermis via 3 mm dermal curette to patient's tolerance without incident.  Applied betadine, patted area dry, applied large felt offloading pad with antibiotic cream an aperture, and covered with Aquacel foam dressing.    - Prescribed Darco heel offloading wedge shoe (handwritten prescription per patient's request).    Patient was given the following recommendations and instructions:  Patient Instructions   - Change dressing every 2-3 days, clean heel fissures with betadine, pat area dry, apply felt offloading pad, apply antibiotic and antifungal creams in aperture, and cover with foam bandage.    - Don't get dressing soiled or wet.    - Elevate foot as much as possible throughout the day.    - Wear supportive/accommodative shoes at all times when ambulating.    - Rest/reduce ambulation to minimal activities of daily living.    - Take over-the-counter anti-inflammatories as needed for pain.    - Bring prescription for Darco heel offloading wedge shoe to medical  supplier and wear shoe at all times when ambulating.    - Acquire MRI results and any other documents regarding foot condition and bring them to next appointment.    - Notify clinic immediately of any new or worsening conditions.    - Follow up in 1 week for wound care.          Li Kathleen DPM        Dictation was performed using M*Modal Fluency.  Transcription errors may be present.

## 2019-03-29 ENCOUNTER — CLINICAL SUPPORT (OUTPATIENT)
Dept: REHABILITATION | Facility: HOSPITAL | Age: 54
End: 2019-03-29
Payer: MEDICARE

## 2019-03-29 DIAGNOSIS — S39.012D LUMBAR STRAIN, SUBSEQUENT ENCOUNTER: ICD-10-CM

## 2019-03-29 PROCEDURE — 97161 PT EVAL LOW COMPLEX 20 MIN: CPT | Mod: HCNC,PO

## 2019-03-29 PROCEDURE — G8991 OTHER PT/OT GOAL STATUS: HCPCS | Mod: CK,HCNC,PO

## 2019-03-29 PROCEDURE — 97110 THERAPEUTIC EXERCISES: CPT | Mod: HCNC,PO

## 2019-03-29 PROCEDURE — G8990 OTHER PT/OT CURRENT STATUS: HCPCS | Mod: CK,HCNC,PO

## 2019-03-30 PROBLEM — S39.012D LUMBAR STRAIN, SUBSEQUENT ENCOUNTER: Status: ACTIVE | Noted: 2019-03-30

## 2019-03-31 NOTE — PLAN OF CARE
"[facility]  Physical Therapy Initial Evaluation    Name: Ryan Crane  Clinic Number: [id number]    Therapy Diagnosis:   Encounter Diagnosis   Name Primary?    Lumbar strain, subsequent encounter      Physician: Nora Nicholas PA-C    Physician Orders: PT Eval and Treat   Medical Diagnosis from Referral: M54.5 (ICD-10-CM) - Acute midline low back pain without sciatica E66.9 (ICD-10-CM) - Obesity, unspecified classification, unspecified obesity type, unspecified whether serious comorbidity present   Evaluation Date: 3/29/2019  Authorization Period Expiration: 03/28/2020  Plan of Care Expiration: 04/26/19  Visit # / Visits authorized: 1 / 12    Time In: 1100  Time Out: 1150  Total Billable Time: 45 minutes    Precautions:Chronic ulcer @ RIGHT heel from removal of cancer 3 years ago,  Acute angina, COPD, HNT, Schizophrenia,     Subjective   Date of onset: Early March, 28[address]  History of current condition - Ryan reports: Mid-line lumbar / lower thoracic back pain began after bending over to tie his shoes. He states that "nothing helps" pain and bending forward increases pain.   Pt denies long tract signs     Past Medical History:   Diagnosis Date    Acute angina     COPD (chronic obstructive pulmonary disease)     Hypertension     MI, old     PIPER on CPAP     Schizophrenia     Stroke      Ryan Crane  has a past surgical history that includes Appendectomy and Shoulder arthroscopy (Left).    Ryan has a current medication list which includes the following prescription(s): duloxetine, ketorolac, methocarbamol, metoprolol succinate, neomycin-polymyxin-hydrocortisone, nitroglycerin, oxcarbazepine, tramadol, and ziprasidone.    Review of patient's allergies indicates:   Allergen Reactions    Alcohol     Keflex [cephalexin]         Imaging, none:     Prior Therapy: None  Occupation: Retired   Prior Level of Function: Pt on disability due, limited by heel " "wound  Current Level of Function: Difficulty rising from sitting, bending , stairs (17 steps at his home)    Pain:  Current 9/10, worst 10/10, best 6/10   Location: bilateral back   Description: Stabbing  Aggravating Factors: Bending, Lifting and Getting out of bed/chair  Easing Factors: nothing    Pts goals: Resolve back pain    Objective     Posture: Slight EXTENSION bias with nearly level pelvis    ROM:   Lumbar    ACTIVE    Flexion 80 degrees "Hurts"   Extension 10 degrees "Hurts"   Side bend 30 degrees bilaterally No pain     Strength:     5/5 throughout LE's    Gait/Balance: No deficit in gait    Neuro: Intact gross touch sensation and equal reflexes,(+1)    Special Tests: Slight EXTENSION bias "feels OK"    Palpation: Unremarkable for spasm      CMS Impairment/Limitation/Restriction for FOTO Lumbar Survey    Therapist reviewed FOTO scores for Ryan Crane on 3/29/2019.   FOTO documents entered into [employer] - see Media section.    Limitation Score: 52%  Category: Carrying    Current : CK = at least 40% but < 60% impaired, limited or restricted  Goal: CK = at least 40% but < 60% impaired, limited or restricted         TREATMENT   Treatment Time In: 1115  Treatment Time Out: 1145  Total Treatment time separate from Evaluation: 30 minutes    Ryan received therapeutic exercises to develop strength, ROM, posture and core stabilization for 30 minutes including:  LE stretch  Trunk EXTENSION on machine @10 & 20# 2 x 10 each  UE pull down and pull back @ 7# 2 x 10 each  High bike (seat # 12) x 5 minutes  Trunk FLEXION with straight back bend, sit to stand     Home Exercises and Patient Education Provided  Education provided:   - Sleep position in side-lying and sitting in straight back chair with hips higher than knees    Written Home Exercises Provided: deferred.  Exercises were reviewed and Ryan was able to demonstrate them prior to the end of the session.  Ryan demonstrated good  understanding of the " education provided.     See EMR under deferred by patient for exercises provided 3/29/2019.    Assessment   Ryan is a 53 y.o. male referred to outpatient Physical Therapy with a medical diagnosis of M54.5 (ICD-10-CM) - Acute midline low back pain without sciatica E66.9 (ICD-10-CM) - Obesity, unspecified classification, unspecified obesity type, unspecified whether serious comorbidity present   . Pt presents with mid-line lumbar pain    Pt prognosis is Excellent.   Pt will benefit from skilled outpatient Physical Therapy to address the deficits stated above and in the chart below, provide pt/family education, and to maximize pt's level of independence.     Plan of care discussed with patient: Yes  Pt's spiritual, cultural and educational needs considered and patient is agreeable to the plan of care and goals as stated below:     Anticipated Barriers for therapy: None    Medical Necessity is demonstrated by the following  History  Co-morbidities and personal factors that may impact the plan of care Co-morbidities:   history of cancer and HTN    Personal Factors:   coping style     low   Examination  Body Structures and Functions, activity limitations and participation restrictions that may impact the plan of care Body Regions:   back  lower extremities    Body Systems:    ROM  strength    Participation Restrictions:   Unable to use leg press machine due to heel ulcer    Activity limitations:   Learning and applying knowledge  no deficits    General Tasks and Commands  no deficits    Communication  no deficits    Mobility  lifting and carrying objects    Self care  no deficits    Domestic Life  doing house work (cleaning house, washing dishes, laundry)    Interactions/Relationships  no deficits    Life Areas  no deficits    Community and Social Life  no deficits         low   Clinical Presentation stable and uncomplicated low   Decision Making/ Complexity Score: low     Goals:  Short Term Goals: 2 weeks   Improve  posture   Increase postural awareness and body mechanics  Decrease pain to 3-5/10   Long Term Goals: 4 weeks   Return to ADL with proper body mechanics in ADL   Pain @ 0-3/10      Plan   Plan of care Certification: 3/29/2019 to 04/26/19.    Outpatient Physical Therapy 3 times weekly for 4 weeks to include the following interventions: Neuromuscular Re-ed, Therapeutic Activites, Therapeutic Exercise and Ultrasound.     Saulo Adams, PT

## 2019-04-01 ENCOUNTER — OFFICE VISIT (OUTPATIENT)
Dept: PODIATRY | Facility: CLINIC | Age: 54
End: 2019-04-01
Payer: MEDICARE

## 2019-04-01 VITALS
HEART RATE: 75 BPM | DIASTOLIC BLOOD PRESSURE: 80 MMHG | WEIGHT: 275.56 LBS | SYSTOLIC BLOOD PRESSURE: 155 MMHG | HEIGHT: 71 IN | BODY MASS INDEX: 38.58 KG/M2

## 2019-04-01 DIAGNOSIS — R23.4 FISSURE IN SKIN OF FOOT: ICD-10-CM

## 2019-04-01 DIAGNOSIS — M79.671 INTRACTABLE RIGHT HEEL PAIN: Primary | ICD-10-CM

## 2019-04-01 DIAGNOSIS — T81.30XA DEHISCENCE OF WOUND: ICD-10-CM

## 2019-04-01 PROCEDURE — 97597 PR DEBRIDEMENT OPEN WOUND 20 SQ CM<: ICD-10-PCS | Mod: HCNC,S$GLB,, | Performed by: PODIATRIST

## 2019-04-01 PROCEDURE — 99213 OFFICE O/P EST LOW 20 MIN: CPT | Mod: 25,HCNC,S$GLB, | Performed by: PODIATRIST

## 2019-04-01 PROCEDURE — 99999 PR PBB SHADOW E&M-EST. PATIENT-LVL III: ICD-10-PCS | Mod: PBBFAC,HCNC,, | Performed by: PODIATRIST

## 2019-04-01 PROCEDURE — 97597 DBRDMT OPN WND 1ST 20 CM/<: CPT | Mod: HCNC,S$GLB,, | Performed by: PODIATRIST

## 2019-04-01 PROCEDURE — 3008F PR BODY MASS INDEX (BMI) DOCUMENTED: ICD-10-PCS | Mod: HCNC,CPTII,S$GLB, | Performed by: PODIATRIST

## 2019-04-01 PROCEDURE — 3008F BODY MASS INDEX DOCD: CPT | Mod: HCNC,CPTII,S$GLB, | Performed by: PODIATRIST

## 2019-04-01 PROCEDURE — 99213 PR OFFICE/OUTPT VISIT, EST, LEVL III, 20-29 MIN: ICD-10-PCS | Mod: 25,HCNC,S$GLB, | Performed by: PODIATRIST

## 2019-04-01 PROCEDURE — 99999 PR PBB SHADOW E&M-EST. PATIENT-LVL III: CPT | Mod: PBBFAC,HCNC,, | Performed by: PODIATRIST

## 2019-04-01 NOTE — PROGRESS NOTES
Subjective:      Patient ID: Ryan Crane is a 53 y.o. male.    Chief Complaint: Follow-up (right foot ulcer  PCP  Laura Becerril  2/7/19)      HPI:  Ryan Crane is a 53 y.o. male who presents to clinic with a chief complaint of right heel fissure wound.  Patient reports pain as 8/10 on the pain scale.  He states that he has not taken the time to  the MRI results or get the offloading shoe.  He requests referral to pain management.  Patient is admittedly non-compliant.  Patient denies any other pedal complaints at this time.    PCP:  Laura Becerril MD  Date last seen:  2/7/19    Review of Systems   Constitutional: Negative for appetite change, fever, chills, fatigue and unexpected weight change.   Cardiovascular: Negative for chest pain, claudication, cyanosis, and leg swelling.  Musculoskeletal: Negative for back pain, arthritis, joint pain, joint swelling, myalgias, and stiffness.   Skin: Negative for nail bed changes, discoloration, rash, itching, suspicious lesion, and unusual hair distribution.  Positive for poor wound healing.  Neurological: Negative for loss of balance, sensory change, paresthesias, and numbness.   Hematological: Negative for adenopathy, bleeding, and bruising easily.   Psychiatric/Behavioral: The patient is not nervous/anxious.  Negative for altered mental status.    Hemoglobin A1C   Date Value Ref Range Status   01/16/2019 5.6 4.0 - 5.6 % Final     Comment:     ADA Screening Guidelines:  5.7-6.4%  Consistent with prediabetes  >or=6.5%  Consistent with diabetes  High levels of fetal hemoglobin interfere with the HbA1C  assay. Heterozygous hemoglobin variants (HbS, HgC, etc)do  not significantly interfere with this assay.   However, presence of multiple variants may affect accuracy.         Past Medical History:   Diagnosis Date    Acute angina     COPD (chronic obstructive pulmonary disease)     Hypertension     MI, old     PIPER on CPAP     Schizophrenia      "Stroke      Past Surgical History:   Procedure Laterality Date    APPENDECTOMY      SHOULDER ARTHROSCOPY Left      History reviewed. No pertinent family history.  Social History     Socioeconomic History    Marital status:      Spouse name: Not on file    Number of children: Not on file    Years of education: Not on file    Highest education level: Not on file   Occupational History    Not on file   Social Needs    Financial resource strain: Not on file    Food insecurity:     Worry: Not on file     Inability: Not on file    Transportation needs:     Medical: Not on file     Non-medical: Not on file   Tobacco Use    Smoking status: Current Every Day Smoker     Packs/day: 1.00     Years: 45.00     Pack years: 45.00    Smokeless tobacco: Never Used   Substance and Sexual Activity    Alcohol use: No     Frequency: Never    Drug use: Yes     Types: Marijuana    Sexual activity: Not on file   Lifestyle    Physical activity:     Days per week: Not on file     Minutes per session: Not on file    Stress: Not on file   Relationships    Social connections:     Talks on phone: Not on file     Gets together: Not on file     Attends Buddhist service: Not on file     Active member of club or organization: Not on file     Attends meetings of clubs or organizations: Not on file     Relationship status: Not on file   Other Topics Concern    Not on file   Social History Narrative    Not on file           Objective:        BP (!) 155/80 (BP Location: Left arm, Patient Position: Sitting)   Pulse 75   Ht 5' 11" (1.803 m)   Wt 125 kg (275 lb 9.2 oz)   BMI 38.43 kg/m²     Physical Exam   Constitutional: Patient is oriented to person, place, and time. Patient appears well-developed and well-nourished. No acute distress.     Psychiatric: Patient has a normal mood and affect. Patient's speech is normal and behavior is normal. Judgment is normal. Cognition and memory are normal.     Right pedal exam was " performed today.  Vascular: Vascular: Pedal pulses palpable 2/4 DP & PT.  CFT is = 3 seconds to the hallux.  Skin temperature is warm to cool proximal tibia to distal toes without localized increase in calor noted.  No erythema, edema, or ecchymosis noted to the foot or ankle.  Hair growth present distally to the LE.     Musculoskeletal: Ankle and pedal joint ROM are decreased. Ankle joint dorsiflexion is restricted with the knee extended and flexed per [place] exam.  Muscle strength is 5/5 for all LE muscle groups tested.  No palpable soft tissue mass appreciable to the right plantar heel.    Neurological:  Epicritic sensation is grossly Intact to the foot.    [place] STR is negative to right lower extremity.   Tenderness to palpation of right plantar heel fissures/wounds.     Dermatological: Toenails 1-5 right are WNL in length and thickness.  Webspaces 1-4 right are clean, dry, and intact.  Skin turgor is supple.   Plantar lateral right heel fissure wound is healed.  Fissure wound noted to the plantar medial right heel demonstrates involuted skin edges with hyperkeratotic skin edges measuring 0.9 cm x 0.2 cm x 0.3 cm.  Wounds are macerated without dot signs of infection present with wound bases 100% fibrotic extending down into the dermis.     Nursing note and vitals reviewed.        Assessment:       Encounter Diagnoses   Name Primary?    Intractable right heel pain - Right Foot Yes    Dehiscence of wound - Right Foot     Fissure in skin of foot - Right Foot          Plan:       Ryan was seen today for follow-up.    Diagnoses and all orders for this visit:    Intractable right heel pain - Right Foot  -     Ambulatory Referral to Pain Clinic    Dehiscence of wound - Right Foot    Fissure in skin of foot - Right Foot      I counseled the patient on his conditions, their implications and medical management.    - Reiterated to patient need for compliance with wound care, obtaining MRI records, and  getting Darco heel offloading wedge shoe.    - Advised patient to reduce ambulation still even though pain has significantly improved with tramadol.          - With patient's permission, cleansed right plantar heel wound with normal saline and performed sharp, selective debridement of devitalized, hyperkeratotic tissue and biofilm, < 20 cm sq., extending down to the level of dermis via 3 mm dermal curette to patient's tolerance without incident.  Applied betadine, patted area dry, applied large felt offloading pad with antibiotic cream an aperture, and covered with Aquacel foam dressing.    - Per patient's request, referred patient to pain management.  Will no longer prescribe pain medicine to patient as he exhibits drug seeking/addictive behavior.    Patient was given the following recommendations and instructions:  Patient Instructions   - Change dressing every 2-3 days, clean heel fissures with betadine, pat area dry, apply felt offloading pad, apply antibiotic and antifungal creams in aperture, and cover with foam bandage.    - Don't get dressing soiled or wet.    - Elevate foot as much as possible throughout the day.    - Wear supportive/accommodative shoes at all times when ambulating.    - Rest/reduce ambulation to minimal activities of daily living.    - Take over-the-counter anti-inflammatories as needed and tramadol as prescribed for pain.    - Bring prescription for Darco heel offloading wedge shoe to medical supplier and wear shoe at all times when ambulating.    - Acquire MRI results and any other documents regarding foot condition and bring them to next appointment.    - Schedule appointment with pain management.    - Notify clinic immediately of any new or worsening conditions.    - Follow up in 2 weeks for wound care.          Li Kathleen DPM        Dictation was performed using M*Modal Fluency.  Transcription errors may be present.

## 2019-04-07 NOTE — PATIENT INSTRUCTIONS
- Change dressing every 2-3 days, clean heel fissures with betadine, pat area dry, apply felt offloading pad, apply antibiotic and antifungal creams in aperture, and cover with foam bandage.    - Don't get dressing soiled or wet.    - Elevate foot as much as possible throughout the day.    - Wear supportive/accommodative shoes at all times when ambulating.    - Rest/reduce ambulation to minimal activities of daily living.    - Take over-the-counter anti-inflammatories as needed and tramadol as prescribed for pain.    - Bring prescription for Darco heel offloading wedge shoe to medical supplier and wear shoe at all times when ambulating.    - Acquire MRI results and any other documents regarding foot condition and bring them to next appointment.    - Schedule appointment with pain management.    - Notify clinic immediately of any new or worsening conditions.    - Follow up in 2 weeks for wound care.

## 2019-04-15 ENCOUNTER — OFFICE VISIT (OUTPATIENT)
Dept: PODIATRY | Facility: CLINIC | Age: 54
End: 2019-04-15
Payer: MEDICARE

## 2019-04-15 VITALS — HEIGHT: 71 IN | BODY MASS INDEX: 38.5 KG/M2 | RESPIRATION RATE: 20 BRPM | WEIGHT: 275 LBS

## 2019-04-15 DIAGNOSIS — T81.30XA DEHISCENCE OF WOUND: ICD-10-CM

## 2019-04-15 DIAGNOSIS — M79.671 INTRACTABLE RIGHT HEEL PAIN: Primary | ICD-10-CM

## 2019-04-15 DIAGNOSIS — R23.4 FISSURE IN SKIN OF FOOT: ICD-10-CM

## 2019-04-15 PROCEDURE — 99213 PR OFFICE/OUTPT VISIT, EST, LEVL III, 20-29 MIN: ICD-10-PCS | Mod: HCNC,25,S$GLB, | Performed by: PODIATRIST

## 2019-04-15 PROCEDURE — 97597 PR DEBRIDEMENT OPEN WOUND 20 SQ CM<: ICD-10-PCS | Mod: HCNC,S$GLB,, | Performed by: PODIATRIST

## 2019-04-15 PROCEDURE — 3008F PR BODY MASS INDEX (BMI) DOCUMENTED: ICD-10-PCS | Mod: HCNC,CPTII,S$GLB, | Performed by: PODIATRIST

## 2019-04-15 PROCEDURE — 99999 PR PBB SHADOW E&M-EST. PATIENT-LVL III: CPT | Mod: PBBFAC,HCNC,, | Performed by: PODIATRIST

## 2019-04-15 PROCEDURE — 97597 DBRDMT OPN WND 1ST 20 CM/<: CPT | Mod: HCNC,S$GLB,, | Performed by: PODIATRIST

## 2019-04-15 PROCEDURE — 99999 PR PBB SHADOW E&M-EST. PATIENT-LVL III: ICD-10-PCS | Mod: PBBFAC,HCNC,, | Performed by: PODIATRIST

## 2019-04-15 PROCEDURE — 99213 OFFICE O/P EST LOW 20 MIN: CPT | Mod: HCNC,25,S$GLB, | Performed by: PODIATRIST

## 2019-04-15 PROCEDURE — 3008F BODY MASS INDEX DOCD: CPT | Mod: HCNC,CPTII,S$GLB, | Performed by: PODIATRIST

## 2019-04-18 ENCOUNTER — TELEPHONE (OUTPATIENT)
Dept: PODIATRY | Facility: CLINIC | Age: 54
End: 2019-04-18

## 2019-04-18 NOTE — TELEPHONE ENCOUNTER
----- Message from Pooja Kendrick sent at 4/18/2019  1:44 PM CDT -----  Contact: Victoria with [employer]  Victoria with [employer] is calling regarding Darco offloading heel. Victoria is sending over a order for a walking boot she would like the doctor to sign for due to the insurance will not cover the Darco offloading heel. Please call Victoria at [phone]. Thanks!

## 2019-04-22 ENCOUNTER — OFFICE VISIT (OUTPATIENT)
Dept: PODIATRY | Facility: CLINIC | Age: 54
End: 2019-04-22
Payer: MEDICARE

## 2019-04-22 VITALS
SYSTOLIC BLOOD PRESSURE: 147 MMHG | WEIGHT: 274.94 LBS | HEART RATE: 77 BPM | HEIGHT: 71 IN | BODY MASS INDEX: 38.49 KG/M2 | DIASTOLIC BLOOD PRESSURE: 86 MMHG

## 2019-04-22 DIAGNOSIS — R23.4 FISSURE IN SKIN OF FOOT: ICD-10-CM

## 2019-04-22 DIAGNOSIS — M79.671 INTRACTABLE RIGHT HEEL PAIN: ICD-10-CM

## 2019-04-22 DIAGNOSIS — T81.30XA DEHISCENCE OF WOUND: ICD-10-CM

## 2019-04-22 DIAGNOSIS — R68.89 SUSPECTED SOFT TISSUE INFECTION: ICD-10-CM

## 2019-04-22 PROCEDURE — 13160 SEC CLSR SURG WND/DEHSN XTN: CPT | Mod: HCNC,RT,S$GLB, | Performed by: PODIATRIST

## 2019-04-22 PROCEDURE — 99999 PR PBB SHADOW E&M-EST. PATIENT-LVL III: ICD-10-PCS | Mod: PBBFAC,HCNC,, | Performed by: PODIATRIST

## 2019-04-22 PROCEDURE — 99213 PR OFFICE/OUTPT VISIT, EST, LEVL III, 20-29 MIN: ICD-10-PCS | Mod: HCNC,25,S$GLB, | Performed by: PODIATRIST

## 2019-04-22 PROCEDURE — 87070 CULTURE OTHR SPECIMN AEROBIC: CPT | Mod: HCNC

## 2019-04-22 PROCEDURE — 87075 CULTR BACTERIA EXCEPT BLOOD: CPT | Mod: HCNC

## 2019-04-22 PROCEDURE — 99213 OFFICE O/P EST LOW 20 MIN: CPT | Mod: HCNC,25,S$GLB, | Performed by: PODIATRIST

## 2019-04-22 PROCEDURE — 3008F BODY MASS INDEX DOCD: CPT | Mod: HCNC,CPTII,S$GLB, | Performed by: PODIATRIST

## 2019-04-22 PROCEDURE — 99999 PR PBB SHADOW E&M-EST. PATIENT-LVL III: CPT | Mod: PBBFAC,HCNC,, | Performed by: PODIATRIST

## 2019-04-22 PROCEDURE — 3008F PR BODY MASS INDEX (BMI) DOCUMENTED: ICD-10-PCS | Mod: HCNC,CPTII,S$GLB, | Performed by: PODIATRIST

## 2019-04-22 PROCEDURE — 13160 PR SECD CLOS SURG WND EXTEN/COMPLIC: ICD-10-PCS | Mod: HCNC,RT,S$GLB, | Performed by: PODIATRIST

## 2019-04-22 RX ORDER — SULFAMETHOXAZOLE AND TRIMETHOPRIM 800; 160 MG/1; MG/1
1 TABLET ORAL 2 TIMES DAILY
Qty: 28 TABLET | Refills: 0 | Status: SHIPPED | OUTPATIENT
Start: 2019-04-22 | End: 2019-04-29

## 2019-04-22 RX ORDER — TRAMADOL HYDROCHLORIDE 50 MG/1
50 TABLET ORAL EVERY 8 HOURS PRN
Qty: 90 TABLET | Refills: 0 | Status: SHIPPED | OUTPATIENT
Start: 2019-04-22 | End: 2019-05-22

## 2019-04-22 NOTE — PATIENT INSTRUCTIONS
- Keep dressing clean, dry, and intact.  Do not change or get dressing soiled or wet.    - Elevate foot as much as possible throughout the day.    - Rest/reduce ambulation to minimal activities of daily living.    - Take over-the-counter anti-inflammatories as needed and tramadol as prescribed for pain.    - Wear Darco heel offloading wedge shoe at all times when ambulating.    - Schedule appointment with pain management.    - Notify clinic immediately of any new or worsening conditions or if dressing becomes soiled or wet.    - Follow up in 1 week for wound care.

## 2019-04-22 NOTE — PROGRESS NOTES
Subjective:      Patient ID: Ryan Crane is a 53 y.o. male.    Chief Complaint: Foot Problem (revision of scar/wound right foot/heel   PCP Laura Becerril  2/7/19)      HPI:  Ryan Crane is a 53 y.o. male who presents to clinic with a chief complaint of right heel fissure wound.  Patient reports pain as 8/10 on the pain scale.  He states that he has brought the MRI results but has not gotten the offloading shoe.  He admits to missing his appointment with pain management.  Patient is admittedly non-compliant.  Patient denies any other pedal complaints at this time.    PCP:  Laura Becerril MD  Date last seen:  2/7/19    Review of Systems   Constitutional: Negative for appetite change, fever, chills, fatigue and unexpected weight change.   Cardiovascular: Negative for chest pain, claudication, cyanosis, and leg swelling.  Musculoskeletal: Negative for back pain, arthritis, joint pain, joint swelling, myalgias, and stiffness.   Skin: Negative for nail bed changes, discoloration, rash, itching, suspicious lesion, and unusual hair distribution.  Positive for poor wound healing.  Neurological: Negative for loss of balance, sensory change, paresthesias, and numbness.   Hematological: Negative for adenopathy, bleeding, and bruising easily.   Psychiatric/Behavioral: The patient is not nervous/anxious.  Negative for altered mental status.    Hemoglobin A1C   Date Value Ref Range Status   01/16/2019 5.6 4.0 - 5.6 % Final     Comment:     ADA Screening Guidelines:  5.7-6.4%  Consistent with prediabetes  >or=6.5%  Consistent with diabetes  High levels of fetal hemoglobin interfere with the HbA1C  assay. Heterozygous hemoglobin variants (HbS, HgC, etc)do  not significantly interfere with this assay.   However, presence of multiple variants may affect accuracy.         Past Medical History:   Diagnosis Date    Acute angina     COPD (chronic obstructive pulmonary disease)     Hypertension     MI, old     PIPER on  "CPAP     Schizophrenia     Stroke      Past Surgical History:   Procedure Laterality Date    APPENDECTOMY      SHOULDER ARTHROSCOPY Left      History reviewed. No pertinent family history.  Social History     Socioeconomic History    Marital status:      Spouse name: Not on file    Number of children: Not on file    Years of education: Not on file    Highest education level: Not on file   Occupational History    Not on file   Social Needs    Financial resource strain: Not on file    Food insecurity:     Worry: Not on file     Inability: Not on file    Transportation needs:     Medical: Not on file     Non-medical: Not on file   Tobacco Use    Smoking status: Current Every Day Smoker     Packs/day: 1.00     Years: 45.00     Pack years: 45.00    Smokeless tobacco: Never Used   Substance and Sexual Activity    Alcohol use: No     Frequency: Never    Drug use: Yes     Types: Marijuana    Sexual activity: Not on file   Lifestyle    Physical activity:     Days per week: Not on file     Minutes per session: Not on file    Stress: Not on file   Relationships    Social connections:     Talks on phone: Not on file     Gets together: Not on file     Attends Mu-[employer] service: Not on file     Active member of club or organization: Not on file     Attends meetings of clubs or organizations: Not on file     Relationship status: Not on file   Other Topics Concern    Not on file   Social History Narrative    Not on file           Objective:        BP (!) 147/86 (BP Location: Left arm, Patient Position: Sitting)   Pulse 77   Ht 5' 11" (1.803 m)   Wt 124.7 kg (274 lb 14.6 oz)   BMI 38.34 kg/m²     Physical Exam   Constitutional: Patient is oriented to person, place, and time. Patient appears well-developed and well-nourished. No acute distress.     Psychiatric: Patient has a normal mood and affect. Patient's speech is normal and behavior is normal. Judgment is normal. Cognition and memory are normal. "     Right pedal exam was performed today.  Vascular: Vascular: Pedal pulses palpable 2/4 DP & PT.  CFT is = 3 seconds to the hallux.  Skin temperature is warm to cool proximal tibia to distal toes without localized increase in calor noted.  No erythema, edema, or ecchymosis noted to the foot or ankle.  Hair growth present distally to the LE.     Musculoskeletal: Ankle and pedal joint ROM are decreased. Ankle joint dorsiflexion is restricted with the knee extended and flexed per Silfverskiold exam.  Muscle strength is 5/5 for all LE muscle groups tested.  No palpable soft tissue mass appreciable to the right plantar heel.    Neurological:  Epicritic sensation is grossly Intact to the foot.    Oppenheim STR is negative to right lower extremity.   Tenderness to palpation of right plantar heel fissure/wound.     Dermatological: Toenails 1-5 right are WNL in length and thickness.  Webspaces 1-4 right are clean, dry, and intact.  Skin turgor is supple.   Plantar lateral right heel fissure wound is healed.  Fissure wound noted to the plantar medial right heel demonstrates involuted skin edges with hyperkeratotic skin edges measuring 1.7 cm x 0.3 cm x 0.6 cm.  Wound is macerated without dot signs of infection present with wound bases 100% fibrotic extending down into the dermis.     Nursing note and vitals reviewed.        Assessment:       Encounter Diagnoses   Name Primary?    Intractable right heel pain - Right Foot     Dehiscence of wound - Right Foot     Fissure in skin of foot - Right Foot     Suspected soft tissue infection          Plan:       Ryan was seen today for foot problem.    Diagnoses and all orders for this visit:    Intractable right heel pain - Right Foot  -     sulfamethoxazole-trimethoprim 800-160mg (BACTRIM DS) 800-160 mg Tab; Take 1 tablet by mouth 2 (two) times daily. for 14 days  -     Aerobic culture  -     Culture, Anaerobic  -     traMADol (ULTRAM) 50 mg tablet; Take 1 tablet (50 mg  total) by mouth every 8 (eight) hours as needed for Pain.    Dehiscence of wound - Right Foot  -     sulfamethoxazole-trimethoprim 800-160mg (BACTRIM DS) 800-160 mg Tab; Take 1 tablet by mouth 2 (two) times daily. for 14 days  -     Aerobic culture  -     Culture, Anaerobic  -     traMADol (ULTRAM) 50 mg tablet; Take 1 tablet (50 mg total) by mouth every 8 (eight) hours as needed for Pain.    Fissure in skin of foot - Right Foot  -     sulfamethoxazole-trimethoprim 800-160mg (BACTRIM DS) 800-160 mg Tab; Take 1 tablet by mouth 2 (two) times daily. for 14 days  -     Aerobic culture  -     Culture, Anaerobic  -     traMADol (ULTRAM) 50 mg tablet; Take 1 tablet (50 mg total) by mouth every 8 (eight) hours as needed for Pain.    Suspected soft tissue infection  -     Aerobic culture  -     Culture, Anaerobic  -     traMADol (ULTRAM) 50 mg tablet; Take 1 tablet (50 mg total) by mouth every 8 (eight) hours as needed for Pain.      I counseled the patient on his conditions, their implications and medical management.    - Reviewed long discussion with patient regarding the procedure in detail. Patient understands all risks, potential complications, and alternatives, including, but not limited to those listed on the consent form. All questions were answered. No guarantees given or implied as to outcome. Informed verbal and written consent was obtained. Consent forms read, signed, witnessed. Patient ready for surgery.    - cleansed right plantar heel with hibiclens, patted area dry with sterile 4x4 gauze, applied topical refrigerant, administered 9 cc of lidocaine with epinephrine intradermally and deep to heel wound, in a peripheral block fashion.  Cleansed the foot again with hibiclens, patted area dry, excised wound in elliptical fashion and removed wound borders from surgical field en toto but did not sent for further evaluation per patient's request, obtained wound swab culture, flushed wound with copious amounts of  normal saline, patted area dry, obtained dermal and epidermal closure via reapproximating skin edges with 3-0 monocryl with 7 sutures in a deep horizontal mattress technique with resultant wound length measuring 2.0 cm.    - Reiterated to patient need for compliance with keeping dressing clean, dry, and intact and wearing Darco heel offloading wedge shoe at all times when ambulating.    - Advised patient to reduce ambulation still even though pain has significantly improved with tramadol.    Patient was given the following recommendations and instructions:  Patient Instructions   - Keep dressing clean, dry, and intact.  Do not change or get dressing soiled or wet.    - Elevate foot as much as possible throughout the day.    - Rest/reduce ambulation to minimal activities of daily living.    - Take over-the-counter anti-inflammatories as needed and tramadol as prescribed for pain.    - Wear Darco heel offloading wedge shoe at all times when ambulating.    - Schedule appointment with pain management.    - Notify clinic immediately of any new or worsening conditions or if dressing becomes soiled or wet.    - Follow up in 1 week for wound care.        Li Kathleen DPM        Dictation was performed using M*Modal Fluency.  Transcription errors may be present.

## 2019-04-22 NOTE — PATIENT INSTRUCTIONS
- Change dressing every 2-3 days, clean heel fissures with betadine, pat area dry, apply felt offloading pad, apply antibiotic and antifungal creams in aperture, and cover with foam bandage.    - Don't get dressing soiled or wet.    - Elevate foot as much as possible throughout the day.    - Wear supportive/accommodative shoes at all times when ambulating.    - Rest/reduce ambulation to minimal activities of daily living.    - Take over-the-counter anti-inflammatories as needed and tramadol as prescribed for pain.    - Bring prescription for Darco heel offloading wedge shoe to medical supplier and wear shoe at all times when ambulating.    - Get heel offloading wedge healing shoe.    - Schedule appointment with pain management.    - Notify clinic immediately of any new or worsening conditions.    - Follow up in 1 week for scar revision and wound closure.

## 2019-04-22 NOTE — PROGRESS NOTES
Subjective:      Patient ID: Ryan Crane is a 53 y.o. male.    Chief Complaint: Wound Care (right foot wound care /// PCP: Jone seen 2/7/19 No DM)      HPI:  Ryan Crane is a 53 y.o. male who presents to clinic with a chief complaint of right heel fissure wound.  Patient reports pain as 8/10 on the pain scale.  He states that he has brought the MRI results but has not gotten the offloading shoe.  He admits to missing his appointment with pain management.  Patient is admittedly non-compliant.  Patient denies any other pedal complaints at this time.    PCP:  Laura Becerril MD  Date last seen:  2/7/19    Review of Systems   Constitutional: Negative for appetite change, fever, chills, fatigue and unexpected weight change.   Cardiovascular: Negative for chest pain, claudication, cyanosis, and leg swelling.  Musculoskeletal: Negative for back pain, arthritis, joint pain, joint swelling, myalgias, and stiffness.   Skin: Negative for nail bed changes, discoloration, rash, itching, suspicious lesion, and unusual hair distribution.  Positive for poor wound healing.  Neurological: Negative for loss of balance, sensory change, paresthesias, and numbness.   Hematological: Negative for adenopathy, bleeding, and bruising easily.   Psychiatric/Behavioral: The patient is not nervous/anxious.  Negative for altered mental status.    Hemoglobin A1C   Date Value Ref Range Status   01/16/2019 5.6 4.0 - 5.6 % Final     Comment:     ADA Screening Guidelines:  5.7-6.4%  Consistent with prediabetes  >or=6.5%  Consistent with diabetes  High levels of fetal hemoglobin interfere with the HbA1C  assay. Heterozygous hemoglobin variants (HbS, HgC, etc)do  not significantly interfere with this assay.   However, presence of multiple variants may affect accuracy.         Past Medical History:   Diagnosis Date    Acute angina     COPD (chronic obstructive pulmonary disease)     Hypertension     MI, old     PIPER on CPAP      "Schizophrenia     Stroke      Past Surgical History:   Procedure Laterality Date    APPENDECTOMY      SHOULDER ARTHROSCOPY Left      History reviewed. No pertinent family history.  Social History     Socioeconomic History    Marital status:      Spouse name: Not on file    Number of children: Not on file    Years of education: Not on file    Highest education level: Not on file   Occupational History    Not on file   Social Needs    Financial resource strain: Not on file    Food insecurity:     Worry: Not on file     Inability: Not on file    Transportation needs:     Medical: Not on file     Non-medical: Not on file   Tobacco Use    Smoking status: Current Every Day Smoker     Packs/day: 1.00     Years: 45.00     Pack years: 45.00    Smokeless tobacco: Never Used   Substance and Sexual Activity    Alcohol use: No     Frequency: Never    Drug use: Yes     Types: Marijuana    Sexual activity: Not on file   Lifestyle    Physical activity:     Days per week: Not on file     Minutes per session: Not on file    Stress: Not on file   Relationships    Social connections:     Talks on phone: Not on file     Gets together: Not on file     Attends Christianity service: Not on file     Active member of club or organization: Not on file     Attends meetings of clubs or organizations: Not on file     Relationship status: Not on file   Other Topics Concern    Not on file   Social History Narrative    Not on file           Objective:        Resp 20   Ht 5' 11" (1.803 m)   Wt 124.7 kg (275 lb)   BMI 38.35 kg/m²     Physical Exam   Constitutional: Patient is oriented to person, place, and time. Patient appears well-developed and well-nourished. No acute distress.     Psychiatric: Patient has a normal mood and affect. Patient's speech is normal and behavior is normal. Judgment is normal. Cognition and memory are normal.     Right pedal exam was performed today.  Vascular: Vascular: Pedal pulses palpable " 2/4 DP & PT.  CFT is = 3 seconds to the hallux.  Skin temperature is warm to cool proximal tibia to distal toes without localized increase in calor noted.  No erythema, edema, or ecchymosis noted to the foot or ankle.  Hair growth present distally to the LE.     Musculoskeletal: Ankle and pedal joint ROM are decreased. Ankle joint dorsiflexion is restricted with the knee extended and flexed per Silf[place]old exam.  Muscle strength is 5/5 for all LE muscle groups tested.  No palpable soft tissue mass appreciable to the right plantar heel.    Neurological:  Epicritic sensation is grossly Intact to the foot.    Oppenheim STR is negative to right lower extremity.   Tenderness to palpation of right plantar heel fissure/wound.     Dermatological: Toenails 1-5 right are WNL in length and thickness.  Webspaces 1-4 right are clean, dry, and intact.  Skin turgor is supple.   Plantar lateral right heel fissure wound is healed.  Fissure wound noted to the plantar medial right heel demonstrates involuted skin edges with hyperkeratotic skin edges measuring 0.9 cm x 0.4 cm x 0.7 cm.  Wound is macerated without dot signs of infection present with wound bases 100% fibrotic extending down into the dermis.     Nursing note and vitals reviewed.        Assessment:       Encounter Diagnoses   Name Primary?    Intractable right heel pain - Right Foot Yes    Dehiscence of wound - Right Foot     Fissure in skin of foot - Right Foot          Plan:       Ryan was seen today for wound care.    Diagnoses and all orders for this visit:    Intractable right heel pain - Right Foot    Dehiscence of wound - Right Foot    Fissure in skin of foot - Right Foot      I counseled the patient on his conditions, their implications and medical management.    - Reviewed with patient MRI results from 8/3/18 - no lesions seen.    - Reiterated to patient need for compliance with wound care and getting Darco heel offloading wedge shoe.    - Advised patient  to reduce ambulation still even though pain has significantly improved with tramadol.          - With patient's permission, cleansed right plantar heel wound with normal saline and performed sharp, selective debridement of devitalized, hyperkeratotic tissue and biofilm, < 20 cm sq., extending down to the level of dermis via 3 mm dermal curette to patient's tolerance without incident.  Applied betadine, patted area dry, applied large felt offloading pad with antibiotic cream an aperture, and covered with Aquacel foam dressing.          - Per patient's request, scheduled patient for wound revision/closure but advised him that procedure would not be performed if he hasn't obtained offloading shoe as instructed prior to next visit.    Patient was given the following recommendations and instructions:  Patient Instructions   - Change dressing every 2-3 days, clean heel fissures with betadine, pat area dry, apply felt offloading pad, apply antibiotic and antifungal creams in aperture, and cover with foam bandage.    - Don't get dressing soiled or wet.    - Elevate foot as much as possible throughout the day.    - Wear supportive/accommodative shoes at all times when ambulating.    - Rest/reduce ambulation to minimal activities of daily living.    - Take over-the-counter anti-inflammatories as needed and tramadol as prescribed for pain.    - Bring prescription for Darco heel offloading wedge shoe to medical supplier and wear shoe at all times when ambulating.    - Get heel offloading wedge healing shoe.    - Schedule appointment with pain management.    - Notify clinic immediately of any new or worsening conditions.    - Follow up in 1 week for scar revision and wound closure.          Li Kathleen DPM        Dictation was performed using M*Modal Fluency.  Transcription errors may be present.

## 2019-04-25 LAB — BACTERIA SPEC AEROBE CULT: NORMAL

## 2019-04-29 ENCOUNTER — OFFICE VISIT (OUTPATIENT)
Dept: PODIATRY | Facility: CLINIC | Age: 54
End: 2019-04-29
Payer: MEDICARE

## 2019-04-29 VITALS
WEIGHT: 274 LBS | DIASTOLIC BLOOD PRESSURE: 76 MMHG | SYSTOLIC BLOOD PRESSURE: 147 MMHG | HEIGHT: 71 IN | HEART RATE: 76 BPM | BODY MASS INDEX: 38.36 KG/M2

## 2019-04-29 DIAGNOSIS — M79.671 INTRACTABLE RIGHT HEEL PAIN: ICD-10-CM

## 2019-04-29 DIAGNOSIS — Z98.890 POSTOPERATIVE STATE: Primary | ICD-10-CM

## 2019-04-29 DIAGNOSIS — T81.30XA DEHISCENCE OF WOUND: ICD-10-CM

## 2019-04-29 LAB — BACTERIA SPEC ANAEROBE CULT: NORMAL

## 2019-04-29 PROCEDURE — 99024 POSTOP FOLLOW-UP VISIT: CPT | Mod: HCNC,S$GLB,, | Performed by: PODIATRIST

## 2019-04-29 PROCEDURE — 99999 PR PBB SHADOW E&M-EST. PATIENT-LVL III: CPT | Mod: PBBFAC,HCNC,, | Performed by: PODIATRIST

## 2019-04-29 PROCEDURE — 99024 PR POST-OP FOLLOW-UP VISIT: ICD-10-PCS | Mod: HCNC,S$GLB,, | Performed by: PODIATRIST

## 2019-04-29 PROCEDURE — 99999 PR PBB SHADOW E&M-EST. PATIENT-LVL III: ICD-10-PCS | Mod: PBBFAC,HCNC,, | Performed by: PODIATRIST

## 2019-04-29 RX ORDER — BUSPIRONE HYDROCHLORIDE 5 MG/1
TABLET ORAL
Refills: 4 | COMMUNITY
Start: 2019-04-26 | End: 2019-11-09 | Stop reason: CLARIF

## 2019-05-05 PROBLEM — Z98.890 POSTOPERATIVE STATE: Status: ACTIVE | Noted: 2019-05-05

## 2019-05-06 NOTE — PROGRESS NOTES
Subjective:      Patient ID: Ryan Crane is a 53 y.o. male.    Chief Complaint: Follow-up (right foot revision/ PCP: Dr Laura Becerril 2/7/19)      HPI:  yRan Crane is a 53 y.o. male who presents to clinic with a chief complaint of right heel fissure wound.  Patient reports pain as 8/10 on the pain scale.  He states that he has been wearing the offloading shoe.  He admits to rescheduling his appointment with pain management to a different doctor in an attempt to get narcotics.  Patient is states that he had to take off the coban as it became too tight and applied an ACE wrap.  Patient denies any other pedal complaints at this time.    PCP:  Laura Becerril MD  Date last seen:  2/7/19    Review of Systems   Constitutional: Negative for appetite change, fever, chills, fatigue and unexpected weight change.   Cardiovascular: Negative for chest pain, claudication, cyanosis, and leg swelling.  Musculoskeletal: Negative for back pain, arthritis, joint pain, joint swelling, myalgias, and stiffness.   Skin: Negative for nail bed changes, discoloration, rash, itching, suspicious lesion, and unusual hair distribution.  Positive for poor wound healing.  Neurological: Negative for loss of balance, sensory change, paresthesias, and numbness.   Hematological: Negative for adenopathy, bleeding, and bruising easily.   Psychiatric/Behavioral: The patient is not nervous/anxious.  Negative for altered mental status.    Hemoglobin A1C   Date Value Ref Range Status   01/16/2019 5.6 4.0 - 5.6 % Final     Comment:     ADA Screening Guidelines:  5.7-6.4%  Consistent with prediabetes  >or=6.5%  Consistent with diabetes  High levels of fetal hemoglobin interfere with the HbA1C  assay. Heterozygous hemoglobin variants (HbS, HgC, etc)do  not significantly interfere with this assay.   However, presence of multiple variants may affect accuracy.         Past Medical History:   Diagnosis Date    Acute angina     COPD (chronic  "obstructive pulmonary disease)     Hypertension     MI, old     PIPER on CPAP     Schizophrenia     Stroke      Past Surgical History:   Procedure Laterality Date    APPENDECTOMY      SHOULDER ARTHROSCOPY Left      History reviewed. No pertinent family history.  Social History     Socioeconomic History    Marital status:      Spouse name: Not on file    Number of children: Not on file    Years of education: Not on file    Highest education level: Not on file   Occupational History    Not on file   Social Needs    Financial resource strain: Not on file    Food insecurity:     Worry: Not on file     Inability: Not on file    Transportation needs:     Medical: Not on file     Non-medical: Not on file   Tobacco Use    Smoking status: Current Every Day Smoker     Packs/day: 1.00     Years: 45.00     Pack years: 45.00    Smokeless tobacco: Never Used   Substance and Sexual Activity    Alcohol use: No     Frequency: Never    Drug use: Yes     Types: Marijuana    Sexual activity: Not on file   Lifestyle    Physical activity:     Days per week: Not on file     Minutes per session: Not on file    Stress: Not on file   Relationships    Social connections:     Talks on phone: Not on file     Gets together: Not on file     Attends [employer] service: Not on file     Active member of club or organization: Not on file     Attends meetings of clubs or organizations: Not on file     Relationship status: Not on file   Other Topics Concern    Not on file   Social History Narrative    Not on file           Objective:        BP (!) 147/76   Pulse 76   Ht 5' 11" (1.803 m)   Wt 124.3 kg (274 lb)   BMI 38.22 kg/m²     Physical Exam   Constitutional: Patient is oriented to person, place, and time. Patient appears well-developed and well-nourished. No acute distress.     Psychiatric: Patient has a normal mood and affect. Patient's speech is normal and behavior is normal. Judgment is normal. Cognition and " memory are normal.     Right pedal exam was performed today.  Vascular: Vascular: Pedal pulses palpable 2/4 DP & PT.  CFT is = 3 seconds to the hallux.  Skin temperature is warm to cool proximal tibia to distal toes without localized increase in calor noted.  No erythema, edema, or ecchymosis noted to the foot or ankle.  Hair growth present distally to the LE.     Musculoskeletal: Ankle and pedal joint ROM are decreased. Ankle joint dorsiflexion is restricted with the knee extended and flexed per Silfverskiold exam.  Muscle strength is 5/5 for all LE muscle groups tested.  No palpable soft tissue mass appreciable to the right plantar heel.    Neurological:  Epicritic sensation is grossly Intact to the foot.    Oppenheim STR is negative to right lower extremity.   Tenderness to palpation of right plantar heel fissure/wound.     Dermatological: Toenails 1-5 right are WNL in length and thickness.  Webspaces 1-4 right are clean, dry, and intact.  Skin turgor is supple.   Plantar right heel surgical site noted to have sutures intact with skin edges well approximated but not intact.  No dot signs of infection appreciable.     Nursing note and vitals reviewed.        Assessment:       Encounter Diagnoses   Name Primary?    Postoperative state - Right Foot Yes    Dehiscence of wound - Right Foot     Intractable right heel pain - Right Foot          Plan:       Ryan was seen today for follow-up.    Diagnoses and all orders for this visit:    Postoperative state - Right Foot    Dehiscence of wound - Right Foot    Intractable right heel pain - Right Foot      I counseled the patient on his conditions, their implications and medical management.    - Reviewed with patient importance of reduced weight-bearing with no weight on right heel and keeping dressing clean, dry, and intact.  Patient verbalized all understanding.    - Cleansed right plantar heel with betadine, patted area dry with sterile 4x4 gauze, applied topical  antibiotic cream, fabricated and applied felt offloading pad, and covered foot with kerlix and ACE wrap.    Patient was given the following recommendations and instructions:  Patient Instructions   - Keep dressing clean, dry, and intact.    - Notify clinic if any new or worsening condition arises.    - Follow up in 1 week for dressing change.        Li Kathleen DPM        Dictation was performed using M*Modal Fluency.  Transcription errors may be present.

## 2019-05-06 NOTE — PATIENT INSTRUCTIONS
- Keep dressing clean, dry, and intact.    - Notify clinic if any new or worsening condition arises.    - Follow up in 1 week for dressing change.

## 2019-05-07 ENCOUNTER — OFFICE VISIT (OUTPATIENT)
Dept: PODIATRY | Facility: CLINIC | Age: 54
End: 2019-05-07
Payer: MEDICARE

## 2019-05-07 VITALS
DIASTOLIC BLOOD PRESSURE: 84 MMHG | WEIGHT: 277.56 LBS | RESPIRATION RATE: 14 BRPM | HEART RATE: 83 BPM | SYSTOLIC BLOOD PRESSURE: 145 MMHG | BODY MASS INDEX: 38.86 KG/M2 | HEIGHT: 71 IN

## 2019-05-07 DIAGNOSIS — Z98.890 POSTOPERATIVE STATE: Primary | ICD-10-CM

## 2019-05-07 DIAGNOSIS — M79.671 INTRACTABLE RIGHT HEEL PAIN: ICD-10-CM

## 2019-05-07 PROCEDURE — 99024 PR POST-OP FOLLOW-UP VISIT: ICD-10-PCS | Mod: HCNC,S$GLB,, | Performed by: PODIATRIST

## 2019-05-07 PROCEDURE — 99999 PR PBB SHADOW E&M-EST. PATIENT-LVL III: ICD-10-PCS | Mod: PBBFAC,HCNC,, | Performed by: PODIATRIST

## 2019-05-07 PROCEDURE — 99024 POSTOP FOLLOW-UP VISIT: CPT | Mod: HCNC,S$GLB,, | Performed by: PODIATRIST

## 2019-05-07 PROCEDURE — 99999 PR PBB SHADOW E&M-EST. PATIENT-LVL III: CPT | Mod: PBBFAC,HCNC,, | Performed by: PODIATRIST

## 2019-05-13 ENCOUNTER — OFFICE VISIT (OUTPATIENT)
Dept: PAIN MEDICINE | Facility: CLINIC | Age: 54
End: 2019-05-13
Payer: MEDICARE

## 2019-05-13 VITALS
BODY MASS INDEX: 38.78 KG/M2 | DIASTOLIC BLOOD PRESSURE: 76 MMHG | WEIGHT: 277 LBS | HEIGHT: 71 IN | SYSTOLIC BLOOD PRESSURE: 162 MMHG | HEART RATE: 82 BPM

## 2019-05-13 DIAGNOSIS — M79.671 INTRACTABLE RIGHT HEEL PAIN: Primary | ICD-10-CM

## 2019-05-13 PROCEDURE — 3008F PR BODY MASS INDEX (BMI) DOCUMENTED: ICD-10-PCS | Mod: HCNC,CPTII,S$GLB, | Performed by: ANESTHESIOLOGY

## 2019-05-13 PROCEDURE — 99999 PR PBB SHADOW E&M-EST. PATIENT-LVL III: ICD-10-PCS | Mod: PBBFAC,HCNC,, | Performed by: ANESTHESIOLOGY

## 2019-05-13 PROCEDURE — 99202 OFFICE O/P NEW SF 15 MIN: CPT | Mod: HCNC,S$GLB,, | Performed by: ANESTHESIOLOGY

## 2019-05-13 PROCEDURE — 3008F BODY MASS INDEX DOCD: CPT | Mod: HCNC,CPTII,S$GLB, | Performed by: ANESTHESIOLOGY

## 2019-05-13 PROCEDURE — 99202 PR OFFICE/OUTPT VISIT, NEW, LEVL II, 15-29 MIN: ICD-10-PCS | Mod: HCNC,S$GLB,, | Performed by: ANESTHESIOLOGY

## 2019-05-13 PROCEDURE — 99999 PR PBB SHADOW E&M-EST. PATIENT-LVL III: CPT | Mod: PBBFAC,HCNC,, | Performed by: ANESTHESIOLOGY

## 2019-05-13 NOTE — PROGRESS NOTES
[facility] New Patient Evaluation    Referred by: Dr. jeronimo  Reason for referral: foot pain    CC:   Chief Complaint   Patient presents with    Foot Pain     right      No flowsheet data found.    HPI:   Ryan Crane is a 53 y.o. male who complains of foot pain    Onset: 3 years  Inciting Event: right heel fissure wound  Progression: since onset, pain is stable  Current Pain Score: 8/10  Timing: constant  Quality: Sharp  Radiation: no  Associated numbness or weakness: no weakness  Exacerbated by: nothing in particular  Allievated by: nothing  Is Pain Level Acceptable?: No    Previous Therapies:  PT/OT:   HEP:   Interventions:   Surgery:  Medications:   - NSAIDS:   - MSK Relaxants:   - TCAs:   - SNRIs:   - Topicals:   - Anticonvulsants:  - Opioids:     Current Pain Medications:  1. Tramadol 50mg po q8h prn    History:    Current Outpatient Medications:     busPIRone (BUSPAR) 5 MG Tab, TK 1 T PO TID, Disp: , Rfl: 4    metoprolol succinate (TOPROL-XL) 25 MG 24 hr tablet, Take 1 tablet (25 mg total) by mouth once daily., Disp: 90 tablet, Rfl: 1    nitroGLYCERIN (NITROSTAT) 0.4 MG SL tablet, Place 1 tablet (0.4 mg total) under the tongue every 5 (five) minutes as needed for Chest pain., Disp: 9 tablet, Rfl: 3    OXcarbazepine (TRILEPTAL) 600 MG Tab, Take 600 mg by mouth 3 (three) times daily., Disp: , Rfl:     traMADol (ULTRAM) 50 mg tablet, Take 1 tablet (50 mg total) by mouth every 8 (eight) hours as needed for Pain., Disp: 90 tablet, Rfl: 0    ziprasidone (GEODON) 80 MG capsule, Take 160 mg by mouth once daily. At night, Disp: , Rfl:     Past Medical History:   Diagnosis Date    Acute angina     COPD (chronic obstructive pulmonary disease)     Hypertension     MI, old     PIPER on CPAP     Schizophrenia     Stroke        Past Surgical History:   Procedure Laterality Date    APPENDECTOMY      SHOULDER ARTHROSCOPY Left        History reviewed. No pertinent family history.    Social History  "    Socioeconomic History    Marital status:      Spouse name: Not on file    Number of children: Not on file    Years of education: Not on file    Highest education level: Not on file   Occupational History    Not on file   Social Needs    Financial resource strain: Not on file    Food insecurity:     Worry: Not on file     Inability: Not on file    Transportation needs:     Medical: Not on file     Non-medical: Not on file   Tobacco Use    Smoking status: Current Every Day Smoker     Packs/day: 1.00     Years: 45.00     Pack years: 45.00    Smokeless tobacco: Never Used   Substance and Sexual Activity    Alcohol use: No     Frequency: Never    Drug use: Yes     Types: Marijuana    Sexual activity: Not on file   Lifestyle    Physical activity:     Days per week: Not on file     Minutes per session: Not on file    Stress: Not on file   Relationships    Social connections:     Talks on phone: Not on file     Gets together: Not on file     Attends Hoahaoism service: Not on file     Active member of club or organization: Not on file     Attends meetings of clubs or organizations: Not on file     Relationship status: Not on file   Other Topics Concern    Not on file   Social History Narrative    Not on file       Review of patient's allergies indicates:   Allergen Reactions    Alcohol     Keflex [cephalexin]        Review of Systems:  General ROS: negative for - fever  Cardiovascular ROS: negative for - chest pain  Dermatological ROS: negative for rash    Physical Exam:  Vitals:    05/13/19 1334   BP: (!) 162/76   Pulse: 82   Weight: 125.6 kg (277 lb)   Height: 5' 11" (1.803 m)   PainSc:   8   PainLoc: Foot     Body mass index is 38.63 kg/m².     Gen: NAD  Gait: gait intact  Psych:  Mood appropriate for given condition  HEENT: eyes anicteric   GI: Abd soft  CV: RRR  Lungs: breathing unlabored   Skin: intact    Imaging:  none    Labs:  BMP  Lab Results   Component Value Date     " 01/16/2019    K 4.5 01/16/2019     01/16/2019    CO2 28 01/16/2019    BUN 6 01/16/2019    CREATININE 0.8 01/16/2019    CALCIUM 9.1 01/16/2019    ANIONGAP 8 01/16/2019    ESTGFRAFRICA >60.0 01/16/2019    EGFRNONAA >60.0 01/16/2019     Lab Results   Component Value Date    ALT 13 01/16/2019    AST 19 01/16/2019    ALKPHOS 130 01/16/2019    BILITOT 0.3 01/16/2019       Assessment:  Problem List Items Addressed This Visit        Orthopedic    Intractable right heel pain - Primary          Treatment Plan:  53 y.o. year old male with PMH COPD, PIPER on CPAP presents to the office with right heel pain.  He has had pain for the past 3 years secondary to right heel fissure wound.  He follow with Dr. Frannie tijerina who has been taking care of his wound.  He is currently taking tramadol 50mg po q8h prn and he says that it is not working to control his pain.  Per note review, on 4/7/19 'patient is admittedly non-compliant' presumably to treatment plan.  On that day also concern for 'patient as he exhibits drug seeking/addictive behavior'.  I discussed with patient that opioids are not indicated for chronic, long term pain control as the risks of dependence, addiction, medication interactions, oversedation, and respiratory depression outweigh any benefit.  He told me that he already has breathing problems, so I told him that's exactly why they are a poor choice for chronic use.  He says he has tried gabapentin but that he can't tolerate it.  He has tried NSAIDs in the past.  He has not tried lyrica.  He has not tried amitriptyline.  He can certainly trial either lyrica or amitriptyline as adjunctive medication to help with his pain.    : Reviewed and consistent with medication use as prescribed.    Orlando Dumont M.D.  Interventional Pain Medicine / Anesthesiology

## 2019-05-13 NOTE — LETTER
May 13, 2019      Li Kathleen DPM  [address] [facility] [address] [place] [address]           [facility]  [address] [facility] [address] [place] [address]  Phone: [phone]  Fax: [phone]          Patient: Ryan Crane   MR Number: [id number]   YOB: 1965   Date of Visit: 5/13/2019       Dear Dr. Li Kathleen:    Thank you for referring Ryan Crane to me for evaluation. Attached you will find relevant portions of my assessment and plan of care.    If you have questions, please do not hesitate to call me. I look forward to following Ryan Crane along with you.    Sincerely,    Orlando Dumont MD    Enclosure  CC:  No Recipients    If you would like to receive this communication electronically, please contact [email or web address] or [phone] to request more information on [employer] access.    For providers and/or their staff who would like to refer a patient to [facility], please contact us through our one-stop-shop provider referral line, [facility], at [phone].    If you feel you have received this communication in error or would no longer like to receive these types of communications, please e-mail [email or web address]

## 2019-05-14 ENCOUNTER — OFFICE VISIT (OUTPATIENT)
Dept: PODIATRY | Facility: CLINIC | Age: 54
End: 2019-05-14
Payer: MEDICARE

## 2019-05-14 VITALS
HEIGHT: 71 IN | DIASTOLIC BLOOD PRESSURE: 74 MMHG | RESPIRATION RATE: 13 BRPM | HEART RATE: 80 BPM | SYSTOLIC BLOOD PRESSURE: 126 MMHG | WEIGHT: 275.56 LBS | BODY MASS INDEX: 38.58 KG/M2

## 2019-05-14 DIAGNOSIS — R46.0 POOR HYGIENE: ICD-10-CM

## 2019-05-14 DIAGNOSIS — R23.4 FISSURE IN SKIN OF FOOT: Primary | ICD-10-CM

## 2019-05-14 DIAGNOSIS — M79.671 PAIN OF RIGHT HEEL: ICD-10-CM

## 2019-05-14 DIAGNOSIS — R68.89 SUSPECTED SOFT TISSUE INFECTION: ICD-10-CM

## 2019-05-14 DIAGNOSIS — B35.3 TINEA PEDIS OF RIGHT FOOT: ICD-10-CM

## 2019-05-14 PROCEDURE — 97597 PR DEBRIDEMENT OPEN WOUND 20 SQ CM<: ICD-10-PCS | Mod: 79,HCNC,S$GLB, | Performed by: PODIATRIST

## 2019-05-14 PROCEDURE — 97597 DBRDMT OPN WND 1ST 20 CM/<: CPT | Mod: 79,HCNC,S$GLB, | Performed by: PODIATRIST

## 2019-05-14 PROCEDURE — 99024 PR POST-OP FOLLOW-UP VISIT: ICD-10-PCS | Mod: HCNC,S$GLB,, | Performed by: PODIATRIST

## 2019-05-14 PROCEDURE — 3008F PR BODY MASS INDEX (BMI) DOCUMENTED: ICD-10-PCS | Mod: HCNC,CPTII,S$GLB, | Performed by: PODIATRIST

## 2019-05-14 PROCEDURE — 99999 PR PBB SHADOW E&M-EST. PATIENT-LVL III: CPT | Mod: PBBFAC,HCNC,, | Performed by: PODIATRIST

## 2019-05-14 PROCEDURE — 99024 POSTOP FOLLOW-UP VISIT: CPT | Mod: HCNC,S$GLB,, | Performed by: PODIATRIST

## 2019-05-14 PROCEDURE — 3008F BODY MASS INDEX DOCD: CPT | Mod: HCNC,CPTII,S$GLB, | Performed by: PODIATRIST

## 2019-05-14 PROCEDURE — 99999 PR PBB SHADOW E&M-EST. PATIENT-LVL III: ICD-10-PCS | Mod: PBBFAC,HCNC,, | Performed by: PODIATRIST

## 2019-05-14 RX ORDER — TERBINAFINE HYDROCHLORIDE 250 MG/1
250 TABLET ORAL DAILY
Qty: 30 TABLET | Refills: 0 | Status: SHIPPED | OUTPATIENT
Start: 2019-05-14 | End: 2019-06-13

## 2019-05-14 NOTE — PROGRESS NOTES
"Subjective:      Patient ID: Ryan Crane is a 53 y.o. male.    Chief Complaint: Post-op Evaluation (right foot )      HPI:  Ryan Crane is a 53 y.o. male who presents to clinic with a chief complaint of right heel fissure wound.  Patient reports pain as 8/10 on the pain scale.  He states that he has been wearing the offloading shoe.  He admits seeing pain management but wanting a doctor that writes for hard narcotics and is looking for another physician.  He is not interested in Dr. Dumont's recommendations.  He admittedly has been weight-bearing excessively and has been going out to places on dates dancing.  He continues to claim excessive pain but doesn't flinch during debridements and continues to inhibit his own healing with "painful" weight-bearing.  He is overtly drug-seeking with suspicion of distributing due to patient informing he has at least 3 phones and his daughter having 6 phones.  Patient denies any other pedal complaints at this time.    PCP:  Laura Becerril MD  Date last seen:  2/7/19    Review of Systems   Constitutional: Negative for appetite change, fever, chills, fatigue and unexpected weight change.   Cardiovascular: Negative for chest pain, claudication, cyanosis, and leg swelling.  Musculoskeletal: Negative for back pain, arthritis, joint pain, joint swelling, myalgias, and stiffness.   Skin: Negative for nail bed changes, discoloration, rash, itching, suspicious lesion, and unusual hair distribution.  Positive for poor wound healing.  Neurological: Negative for loss of balance, sensory change, paresthesias, and numbness. Positive for pain.  Hematological: Negative for adenopathy, bleeding, and bruising easily.   Psychiatric/Behavioral: The patient is not nervous/anxious.  Negative for altered mental status.    Hemoglobin A1C   Date Value Ref Range Status   01/16/2019 5.6 4.0 - 5.6 % Final     Comment:     ADA Screening Guidelines:  5.7-6.4%  Consistent with " prediabetes  >or=6.5%  Consistent with diabetes  High levels of fetal hemoglobin interfere with the HbA1C  assay. Heterozygous hemoglobin variants (HbS, HgC, etc)do  not significantly interfere with this assay.   However, presence of multiple variants may affect accuracy.         Past Medical History:   Diagnosis Date    Acute angina     COPD (chronic obstructive pulmonary disease)     Hypertension     MI, old     PIPER on CPAP     Schizophrenia     Stroke      Past Surgical History:   Procedure Laterality Date    APPENDECTOMY      SHOULDER ARTHROSCOPY Left      History reviewed. No pertinent family history.  Social History     Socioeconomic History    Marital status:      Spouse name: Not on file    Number of children: Not on file    Years of education: Not on file    Highest education level: Not on file   Occupational History    Not on file   Social Needs    Financial resource strain: Not on file    Food insecurity:     Worry: Not on file     Inability: Not on file    Transportation needs:     Medical: Not on file     Non-medical: Not on file   Tobacco Use    Smoking status: Current Every Day Smoker     Packs/day: 1.00     Years: 45.00     Pack years: 45.00    Smokeless tobacco: Never Used   Substance and Sexual Activity    Alcohol use: No     Frequency: Never    Drug use: Yes     Types: Marijuana    Sexual activity: Not on file   Lifestyle    Physical activity:     Days per week: Not on file     Minutes per session: Not on file    Stress: Not on file   Relationships    Social connections:     Talks on phone: Not on file     Gets together: Not on file     Attends Yazidi service: Not on file     Active member of club or organization: Not on file     Attends meetings of clubs or organizations: Not on file     Relationship status: Not on file   Other Topics Concern    Not on file   Social History Narrative    Not on file           Objective:        /74   Pulse 80   Resp 13   " Ht 5' 11" (1.803 m)   Wt 125 kg (275 lb 9.2 oz)   BMI 38.43 kg/m²     Physical Exam   Constitutional: Patient is oriented to person, place, and time. Patient appears well-developed and well-nourished. No acute distress.   Severe poor hygiene.    Psychiatric: Patient has a normal mood and affect. Patient's speech is normal and behavior is normal. Judgment is normal. Cognition and memory are normal. Exhibits drug-seeking behavior.    Right pedal exam was performed today.  Vascular: Vascular: Pedal pulses palpable 2/4 DP & PT.  CFT is = 3 seconds to the hallux.  Skin temperature is warm to cool proximal tibia to distal toes without localized increase in calor noted.  +1/4 pitting edema noted to the LE.  No ecchymosis noted to the foot or ankle.  Hair growth present distally to the LE.     Musculoskeletal: Ankle and pedal joint ROM are decreased. Ankle joint dorsiflexion is restricted with the knee extended and flexed per Silfverskiold exam.  Muscle strength is 5/5 for all LE muscle groups tested.  No palpable soft tissue mass appreciable to the right plantar heel.    Neurological:  Epicritic sensation is grossly Intact to the foot.    [place] STR is negative to right lower extremity.   Tenderness to palpation of right plantar heel surgical site and reopened wound.     Dermatological: Toenails 1-5 right are WNL in length and thickness.  Webspaces 1-4 right are clean, dry, and intact.  Skin turgor is supple.   Plantar right heel surgical site noted to have sutures popped with skin edges well approximated but not coapted.  Minimal gapping at surgical site noted.  Plantar lateral heel wound has reopened as well measuring 0.3 cm x 0.2 cm x 0.2 cm extending down to dermis with 100% fibrotic wound base covered in biofilm with macerated, hyperkeratotic rim.  Erythema and edema with dot fungal/spoiled milk malodor noted to RLE.    Nursing note and vitals reviewed.        Assessment:       Encounter Diagnoses   Name " Primary?    Fissure in skin of foot - Right Foot Yes    Suspected soft tissue infection     Tinea pedis of right foot     Pain of right heel - Right Foot     Poor hygiene          Plan:       Ryan was seen today for post-op evaluation.    Diagnoses and all orders for this visit:    Fissure in skin of foot - Right Foot  -     terbinafine HCl (LAMISIL) 250 mg tablet; Take 1 tablet (250 mg total) by mouth once daily.    Suspected soft tissue infection  -     terbinafine HCl (LAMISIL) 250 mg tablet; Take 1 tablet (250 mg total) by mouth once daily.    Tinea pedis of right foot  -     terbinafine HCl (LAMISIL) 250 mg tablet; Take 1 tablet (250 mg total) by mouth once daily.    Pain of right heel - Right Foot  -     terbinafine HCl (LAMISIL) 250 mg tablet; Take 1 tablet (250 mg total) by mouth once daily.    Poor hygiene      I counseled the patient on his conditions, their implications and medical management.    - Reviewed with patient importance of reduced weight-bearing with no weight on right heel, wearing offloading heel shoe at all times when ambulating, and keeping dressing clean, dry, and intact.  Patient verbalized all understanding.    - Recommended cast or other assistive ambulatory device to reduce weight-bearing on heel.  Patient refused.    - With patient's permission, cleansed right plantar heel with betadine, patted area dry with sterile 4x4 gauze, performed sharp, selective debridement of devitalized tissue and biofilm from plantar lateral heel wound, < 20 cm sq., extending down to the level of dermis via 3 mm dermal curette to patient's tolerance without incident, removed sutures from surgical wound, applied topical antibiotic cream, fabricated and applied felt offloading pad, and covered foot with kerlix and ACE wrap.    Patient was given the following recommendations and instructions:  Patient Instructions   - Keep dressing clean, dry, and intact.    - Notify clinic if any new or worsening  condition arises.    - Follow up in 1 week for dressing change.        Li Kathleen DPM        Dictation was performed using M*Modal Fluency.  Transcription errors may be present.

## 2019-05-20 NOTE — PATIENT INSTRUCTIONS
- Keep dressing clean, dry, and intact.    - Notify clinic if any new or worsening condition arises.    - Follow up in 1 week for dressing change and possible suture removal.

## 2019-05-20 NOTE — PROGRESS NOTES
Subjective:      Patient ID: Ryan Crane is a 53 y.o. male.    Chief Complaint: Post-op Evaluation      HPI:  Ryan Crane is a 53 y.o. male who presents to clinic with a chief complaint of right heel fissure wound.  Patient reports pain as 8/10 on the pain scale.  He states that he has been wearing the offloading shoe.  He admits to rescheduling his appointment with pain management to a different doctor in an attempt to get narcotics.  Patient relates no problems with previous dressing but admits to walking on it more.  Patient denies any other pedal complaints at this time.    PCP:  Laura Becerril MD  Date last seen:  2/7/19    Review of Systems   Constitutional: Negative for appetite change, fever, chills, fatigue and unexpected weight change.   Cardiovascular: Negative for chest pain, claudication, cyanosis, and leg swelling.  Musculoskeletal: Negative for back pain, arthritis, joint pain, joint swelling, myalgias, and stiffness.   Skin: Negative for nail bed changes, discoloration, rash, itching, suspicious lesion, and unusual hair distribution.  Positive for poor wound healing.  Neurological: Negative for loss of balance, sensory change, paresthesias, and numbness.   Hematological: Negative for adenopathy, bleeding, and bruising easily.   Psychiatric/Behavioral: The patient is not nervous/anxious.  Negative for altered mental status.    Hemoglobin A1C   Date Value Ref Range Status   01/16/2019 5.6 4.0 - 5.6 % Final     Comment:     ADA Screening Guidelines:  5.7-6.4%  Consistent with prediabetes  >or=6.5%  Consistent with diabetes  High levels of fetal hemoglobin interfere with the HbA1C  assay. Heterozygous hemoglobin variants (HbS, HgC, etc)do  not significantly interfere with this assay.   However, presence of multiple variants may affect accuracy.         Past Medical History:   Diagnosis Date    Acute angina     COPD (chronic obstructive pulmonary disease)     Hypertension     MI, old  "    PIPER on CPAP     Schizophrenia     Stroke      Past Surgical History:   Procedure Laterality Date    APPENDECTOMY      SHOULDER ARTHROSCOPY Left      History reviewed. No pertinent family history.  Social History     Socioeconomic History    Marital status:      Spouse name: Not on file    Number of children: Not on file    Years of education: Not on file    Highest education level: Not on file   Occupational History    Not on file   Social Needs    Financial resource strain: Not on file    Food insecurity:     Worry: Not on file     Inability: Not on file    Transportation needs:     Medical: Not on file     Non-medical: Not on file   Tobacco Use    Smoking status: Current Every Day Smoker     Packs/day: 1.00     Years: 45.00     Pack years: 45.00    Smokeless tobacco: Never Used   Substance and Sexual Activity    Alcohol use: No     Frequency: Never    Drug use: Yes     Types: Marijuana    Sexual activity: Not on file   Lifestyle    Physical activity:     Days per week: Not on file     Minutes per session: Not on file    Stress: Not on file   Relationships    Social connections:     Talks on phone: Not on file     Gets together: Not on file     Attends Confucianism service: Not on file     Active member of club or organization: Not on file     Attends meetings of clubs or organizations: Not on file     Relationship status: Not on file   Other Topics Concern    Not on file   Social History Narrative    Not on file           Objective:        BP (!) 145/84   Pulse 83   Resp 14   Ht 5' 11" (1.803 m)   Wt 125.9 kg (277 lb 9 oz)   BMI 38.71 kg/m²     Physical Exam   Constitutional: Patient is oriented to person, place, and time. Patient appears well-developed and well-nourished. No acute distress.     Psychiatric: Patient has a normal mood and affect. Patient's speech is normal and behavior is normal. Judgment is normal. Cognition and memory are normal.     Right pedal exam was " performed today.  Vascular: Vascular: Pedal pulses palpable 2/4 DP & PT.  CFT is = 3 seconds to the hallux.  Skin temperature is warm to cool proximal tibia to distal toes without localized increase in calor noted.  No erythema, edema, or ecchymosis noted to the foot or ankle.  Hair growth present distally to the LE.     Musculoskeletal: Ankle and pedal joint ROM are decreased. Ankle joint dorsiflexion is restricted with the knee extended and flexed per Silfverskiold exam.  Muscle strength is 5/5 for all LE muscle groups tested.  No palpable soft tissue mass appreciable to the right plantar heel.    Neurological:  Epicritic sensation is grossly Intact to the foot.    [place] STR is negative to right lower extremity.   Tenderness to palpation of right plantar heel fissure/wound.     Dermatological: Toenails 1-5 right are WNL in length and thickness.  Webspaces 1-4 right are clean, dry, and intact.  Skin turgor is supple.   Plantar right heel surgical site noted to have sutures intact with skin edges well approximated but not intact.  No dot signs of infection appreciable.     Nursing note and vitals reviewed.        Assessment:       Encounter Diagnoses   Name Primary?    Postoperative state - Right Foot Yes    Intractable right heel pain - Right Foot          Plan:       Ryan was seen today for post-op evaluation.    Diagnoses and all orders for this visit:    Postoperative state - Right Foot    Intractable right heel pain - Right Foot      I counseled the patient on his conditions, their implications and medical management.    - Reviewed with patient importance of reduced weight-bearing with no weight on right heel and keeping dressing clean, dry, and intact.  Patient verbalized all understanding.    - Cleansed right plantar heel with betadine, patted area dry with sterile 4x4 gauze, applied topical antibiotic cream, fabricated and applied felt offloading pad, and covered foot with kerlix and ACE  wrap.    Patient was given the following recommendations and instructions:  Patient Instructions   - Keep dressing clean, dry, and intact.    - Notify clinic if any new or worsening condition arises.    - Follow up in 1 week for dressing change and possible suture removal.        Li Kathleen DPM        Dictation was performed using M*Modal Fluency.  Transcription errors may be present.

## 2019-05-21 ENCOUNTER — OFFICE VISIT (OUTPATIENT)
Dept: PODIATRY | Facility: CLINIC | Age: 54
End: 2019-05-21
Payer: MEDICARE

## 2019-05-21 VITALS — BODY MASS INDEX: 38.5 KG/M2 | RESPIRATION RATE: 20 BRPM | WEIGHT: 275 LBS | HEIGHT: 71 IN

## 2019-05-21 DIAGNOSIS — B35.3 TINEA PEDIS OF RIGHT FOOT: ICD-10-CM

## 2019-05-21 DIAGNOSIS — T81.31XA DEHISCENCE OF SURGICAL WOUND, INITIAL ENCOUNTER: Primary | ICD-10-CM

## 2019-05-21 DIAGNOSIS — R23.4 FISSURE IN SKIN OF FOOT: ICD-10-CM

## 2019-05-21 DIAGNOSIS — M79.671 PAIN OF RIGHT HEEL: ICD-10-CM

## 2019-05-21 DIAGNOSIS — R68.89 SUSPECTED SOFT TISSUE INFECTION: ICD-10-CM

## 2019-05-21 PROCEDURE — 3008F PR BODY MASS INDEX (BMI) DOCUMENTED: ICD-10-PCS | Mod: HCNC,CPTII,S$GLB, | Performed by: PODIATRIST

## 2019-05-21 PROCEDURE — 3008F BODY MASS INDEX DOCD: CPT | Mod: HCNC,CPTII,S$GLB, | Performed by: PODIATRIST

## 2019-05-21 PROCEDURE — 99999 PR PBB SHADOW E&M-EST. PATIENT-LVL III: CPT | Mod: PBBFAC,HCNC,, | Performed by: PODIATRIST

## 2019-05-21 PROCEDURE — 97597 PR DEBRIDEMENT OPEN WOUND 20 SQ CM<: ICD-10-PCS | Mod: HCNC,79,S$GLB, | Performed by: PODIATRIST

## 2019-05-21 PROCEDURE — 99999 PR PBB SHADOW E&M-EST. PATIENT-LVL III: ICD-10-PCS | Mod: PBBFAC,HCNC,, | Performed by: PODIATRIST

## 2019-05-21 PROCEDURE — 97597 DBRDMT OPN WND 1ST 20 CM/<: CPT | Mod: HCNC,79,S$GLB, | Performed by: PODIATRIST

## 2019-05-21 PROCEDURE — 99213 OFFICE O/P EST LOW 20 MIN: CPT | Mod: HCNC,25,S$GLB, | Performed by: PODIATRIST

## 2019-05-21 PROCEDURE — 99213 PR OFFICE/OUTPT VISIT, EST, LEVL III, 20-29 MIN: ICD-10-PCS | Mod: HCNC,25,S$GLB, | Performed by: PODIATRIST

## 2019-05-21 NOTE — PATIENT INSTRUCTIONS
- Keep right foot dressing clean, dry, and intact.    - Take medication as prescribed along with probiotics as directed.    - Notify clinic if any new or worsening condition arises.    - Follow up in 1 week for wound care.

## 2019-05-21 NOTE — PROGRESS NOTES
Subjective:      Patient ID: Ryan Crane is a 53 y.o. male.    Chief Complaint: Post-op Evaluation (3 week possible suture removal today) and Other Misc (PCP: Jone seen 2/7/19)      HPI:  Ryan Crane is a 53 y.o. male who presents to clinic with a chief complaint of right heel fissure wound.  Patient reports pain as 8/10 on the pain scale.  He states that he has been wearing the offloading shoe and staying off his foot as much as possible but did walk around the store a lot while grocery shopping for him and his mother over the weekend.  He relates taking oral antifungal once daily as prescribed.  Patient denies any other pedal complaints at this time.    PCP:  Laura Becerril MD  Date last seen:  2/7/19    Review of Systems   Constitutional: Negative for appetite change, fever, chills, fatigue and unexpected weight change.   Cardiovascular: Negative for chest pain, claudication, cyanosis, and leg swelling.  Musculoskeletal: Negative for back pain, arthritis, joint pain, joint swelling, myalgias, and stiffness.   Skin: Negative for nail bed changes, discoloration, rash, itching, suspicious lesion, and unusual hair distribution.  Positive for poor wound healing.  Neurological: Negative for loss of balance, sensory change, paresthesias, and numbness.   Hematological: Negative for adenopathy, bleeding, and bruising easily.   Psychiatric/Behavioral: The patient is not nervous/anxious.  Negative for altered mental status.    Hemoglobin A1C   Date Value Ref Range Status   01/16/2019 5.6 4.0 - 5.6 % Final     Comment:     ADA Screening Guidelines:  5.7-6.4%  Consistent with prediabetes  >or=6.5%  Consistent with diabetes  High levels of fetal hemoglobin interfere with the HbA1C  assay. Heterozygous hemoglobin variants (HbS, HgC, etc)do  not significantly interfere with this assay.   However, presence of multiple variants may affect accuracy.         Past Medical History:   Diagnosis Date    Acute angina   "   COPD (chronic obstructive pulmonary disease)     Hypertension     MI, old     PIPER on CPAP     Schizophrenia     Stroke      Past Surgical History:   Procedure Laterality Date    APPENDECTOMY      SHOULDER ARTHROSCOPY Left      History reviewed. No pertinent family history.  Social History     Socioeconomic History    Marital status:      Spouse name: Not on file    Number of children: Not on file    Years of education: Not on file    Highest education level: Not on file   Occupational History    Not on file   Social Needs    Financial resource strain: Not on file    Food insecurity:     Worry: Not on file     Inability: Not on file    Transportation needs:     Medical: Not on file     Non-medical: Not on file   Tobacco Use    Smoking status: Current Every Day Smoker     Packs/day: 1.00     Years: 45.00     Pack years: 45.00    Smokeless tobacco: Never Used   Substance and Sexual Activity    Alcohol use: No     Frequency: Never    Drug use: Yes     Types: Marijuana    Sexual activity: Not on file   Lifestyle    Physical activity:     Days per week: Not on file     Minutes per session: Not on file    Stress: Not on file   Relationships    Social connections:     Talks on phone: Not on file     Gets together: Not on file     Attends Uats[employer] service: Not on file     Active member of club or organization: Not on file     Attends meetings of clubs or organizations: Not on file     Relationship status: Not on file   Other Topics Concern    Not on file   Social History Narrative    Not on file           Objective:        Resp 20   Ht 5' 11" (1.803 m)   Wt 124.7 kg (275 lb)   BMI 38.35 kg/m²     Physical Exam   Constitutional: Patient is oriented to person, place, and time. Patient appears well-developed and well-nourished. No acute distress.  Severe lack of personal hygiene with pungent malodor.    Psychiatric: Patient has a normal mood and affect. Patient's speech is normal and " behavior is normal. Judgment is normal. Cognition and memory are normal.     Right pedal exam was performed today.  Vascular: Vascular: Pedal pulses palpable 2/4 DP & PT.  CFT is = 3 seconds to the hallux.  Skin temperature is warm to cool proximal tibia to distal toes without localized increase in calor noted.  No erythema, edema, or ecchymosis noted to the foot or ankle.  Hair growth present distally to the LE.     Musculoskeletal: Ankle and pedal joint ROM are decreased. Ankle joint dorsiflexion is restricted with the knee extended and flexed per Silfverskiold exam.  Muscle strength is 5/5 for all LE muscle groups tested.  No palpable soft tissue mass appreciable to the right plantar heel.    Neurological:  Epicritic sensation is grossly Intact to the foot.    Oppenheim STR is negative to right lower extremity.   Tenderness to palpation of right plantar heel fissure/wound.     Dermatological: Toenails 1-5 right are WNL in length and thickness.  Webspaces 1-4 right are clean, dry, and intact.  Skin turgor is supple.   Plantar medial right heel surgical site noted to have dehisced, measures 0.2 cm x 1.1 cm x 0.3 cm, and has 100% fibrotic wound base extending down to dermis without drainage but is macerated.  Plantar lateral right heel wound is superficial extending down to superficial dermis with 100% fibrotic wound base, measuring 0.2 cm x 0.3 cm x 0.2 cm with maceration present without drainage.  No dot signs of bacterial infection appreciable.     Nursing note and vitals reviewed.        Assessment:       Encounter Diagnoses   Name Primary?    Dehiscence of surgical wound, initial encounter - Right Foot Yes    Fissure in skin of foot - Right Foot     Suspected soft tissue infection - Right Foot     Tinea pedis of right foot - Right Foot     Pain of right heel - Right Foot          Plan:       Ryan was seen today for post-op evaluation and other misc.    Diagnoses and all orders for this  visit:    Dehiscence of surgical wound, initial encounter - Right Foot    Fissure in skin of foot - Right Foot    Suspected soft tissue infection - Right Foot    Tinea pedis of right foot - Right Foot    Pain of right heel - Right Foot      I counseled the patient on his conditions, their implications and medical management.    - Reviewed with patient importance of reduced weight-bearing with no weight on right heel and keeping dressing clean, dry, and intact.  He is to continue using heel offloading shoe.  Patient verbalized all understanding.    - Advised patient to continue taking oral antifungal as prescribed.    - Strong suspicion for non compliance due to continued wound breakdown and severe malodor.    - With patient's permission, cleansed right plantar heel wounds with normal saline, performed sharp, selective debridement of fibrotic tissue down to the level of dermis, < 20 cm sq., via 3 mm dermal curette, applied Tabitha and Aquacel foam bandage, and covered foot with kerlix and ACE wrap.    Patient was given the following recommendations and instructions:  Patient Instructions   - Keep right foot dressing clean, dry, and intact.    - Take medication as prescribed along with probiotics as directed.    - Notify clinic if any new or worsening condition arises.    - Follow up in 1 week for wound care.        Li Kathleen DPM        Dictation was performed using M*Modal Fluency.  Transcription errors may be present.

## 2019-05-26 PROBLEM — R46.0 POOR HYGIENE: Status: ACTIVE | Noted: 2019-05-26

## 2019-06-03 ENCOUNTER — OFFICE VISIT (OUTPATIENT)
Dept: PODIATRY | Facility: CLINIC | Age: 54
End: 2019-06-03
Payer: MEDICARE

## 2019-06-03 VITALS
HEIGHT: 71 IN | HEART RATE: 77 BPM | BODY MASS INDEX: 38.49 KG/M2 | SYSTOLIC BLOOD PRESSURE: 132 MMHG | WEIGHT: 274.94 LBS | DIASTOLIC BLOOD PRESSURE: 72 MMHG

## 2019-06-03 DIAGNOSIS — L98.8 SKIN MACERATION: ICD-10-CM

## 2019-06-03 DIAGNOSIS — M79.671 PAIN OF RIGHT HEEL: ICD-10-CM

## 2019-06-03 DIAGNOSIS — B35.3 TINEA PEDIS OF RIGHT FOOT: ICD-10-CM

## 2019-06-03 DIAGNOSIS — T81.31XA DEHISCENCE OF SURGICAL WOUND, INITIAL ENCOUNTER: Primary | ICD-10-CM

## 2019-06-03 DIAGNOSIS — R68.89 SUSPECTED SOFT TISSUE INFECTION: ICD-10-CM

## 2019-06-03 PROCEDURE — 3008F BODY MASS INDEX DOCD: CPT | Mod: HCNC,CPTII,S$GLB, | Performed by: PODIATRIST

## 2019-06-03 PROCEDURE — 99024 PR POST-OP FOLLOW-UP VISIT: ICD-10-PCS | Mod: HCNC,S$GLB,, | Performed by: PODIATRIST

## 2019-06-03 PROCEDURE — 99999 PR PBB SHADOW E&M-EST. PATIENT-LVL III: ICD-10-PCS | Mod: PBBFAC,HCNC,, | Performed by: PODIATRIST

## 2019-06-03 PROCEDURE — 3008F PR BODY MASS INDEX (BMI) DOCUMENTED: ICD-10-PCS | Mod: HCNC,CPTII,S$GLB, | Performed by: PODIATRIST

## 2019-06-03 PROCEDURE — 87075 CULTR BACTERIA EXCEPT BLOOD: CPT | Mod: HCNC

## 2019-06-03 PROCEDURE — 87070 CULTURE OTHR SPECIMN AEROBIC: CPT | Mod: HCNC

## 2019-06-03 PROCEDURE — 99999 PR PBB SHADOW E&M-EST. PATIENT-LVL III: CPT | Mod: PBBFAC,HCNC,, | Performed by: PODIATRIST

## 2019-06-03 PROCEDURE — 99024 POSTOP FOLLOW-UP VISIT: CPT | Mod: HCNC,S$GLB,, | Performed by: PODIATRIST

## 2019-06-03 PROCEDURE — 87076 CULTURE ANAEROBE IDENT EACH: CPT | Mod: HCNC

## 2019-06-03 RX ORDER — TRAMADOL HYDROCHLORIDE 50 MG/1
50 TABLET ORAL EVERY 8 HOURS PRN
Qty: 90 TABLET | Refills: 0 | Status: SHIPPED | OUTPATIENT
Start: 2019-06-03 | End: 2019-07-03

## 2019-06-03 NOTE — PROGRESS NOTES
Subjective:      Patient ID: Ryan Crane is a 53 y.o. male.    Chief Complaint: Post-op Evaluation (Right foot (heel))      HPI:  Ryan Crane is a 53 y.o. male who presents to clinic with a chief complaint of right heel fissure wound.  Patient reports pain as 8/10 on the pain scale.  He states that he has been wearing the offloading shoe and staying off his foot as much as possible.  He reports not making his appointment last week due to his truck breaking down.  He states that his bandage came off a couple of days after his missed appointment but has been covering it with a bandaid while taking showers yet it was still getting wet.  He relates taking oral antifungal once daily as prescribed.  Patient denies any other pedal complaints at this time.    PCP:  Laura Becerril MD  Date last seen:  2/7/19    Review of Systems   Constitutional: Negative for appetite change, fever, chills, fatigue and unexpected weight change.   Cardiovascular: Negative for chest pain, claudication, cyanosis, and leg swelling.  Musculoskeletal: Negative for back pain, arthritis, joint pain, joint swelling, myalgias, and stiffness.   Skin: Negative for nail bed changes, discoloration, rash, itching, suspicious lesion, and unusual hair distribution.  Positive for poor wound healing.  Neurological: Negative for loss of balance, sensory change, paresthesias, and numbness.   Hematological: Negative for adenopathy, bleeding, and bruising easily.   Psychiatric/Behavioral: The patient is not nervous/anxious.  Negative for altered mental status.    Hemoglobin A1C   Date Value Ref Range Status   01/16/2019 5.6 4.0 - 5.6 % Final     Comment:     ADA Screening Guidelines:  5.7-6.4%  Consistent with prediabetes  >or=6.5%  Consistent with diabetes  High levels of fetal hemoglobin interfere with the HbA1C  assay. Heterozygous hemoglobin variants (HbS, HgC, etc)do  not significantly interfere with this assay.   However, presence of  "multiple variants may affect accuracy.         Past Medical History:   Diagnosis Date    Acute angina     COPD (chronic obstructive pulmonary disease)     Hypertension     MI, old     PIPER on CPAP     Schizophrenia     Stroke      Past Surgical History:   Procedure Laterality Date    APPENDECTOMY      SHOULDER ARTHROSCOPY Left      History reviewed. No pertinent family history.  Social History     Socioeconomic History    Marital status:      Spouse name: Not on file    Number of children: Not on file    Years of education: Not on file    Highest education level: Not on file   Occupational History    Not on file   Social Needs    Financial resource strain: Not on file    Food insecurity:     Worry: Not on file     Inability: Not on file    Transportation needs:     Medical: Not on file     Non-medical: Not on file   Tobacco Use    Smoking status: Current Every Day Smoker     Packs/day: 1.00     Years: 45.00     Pack years: 45.00    Smokeless tobacco: Never Used   Substance and Sexual Activity    Alcohol use: No     Frequency: Never    Drug use: Yes     Types: Marijuana    Sexual activity: Not on file   Lifestyle    Physical activity:     Days per week: Not on file     Minutes per session: Not on file    Stress: Not on file   Relationships    Social connections:     Talks on phone: Not on file     Gets together: Not on file     Attends Anabaptist service: Not on file     Active member of club or organization: Not on file     Attends meetings of clubs or organizations: Not on file     Relationship status: Not on file   Other Topics Concern    Not on file   Social History Narrative    Not on file           Objective:        /72 (BP Location: Left arm, Patient Position: Sitting, BP Method: Large (Automatic))   Pulse 77   Ht 5' 11" (1.803 m)   Wt 124.7 kg (274 lb 14.6 oz)   BMI 38.34 kg/m²     Physical Exam   Constitutional: Patient is oriented to person, place, and time. Patient " appears well-developed and well-nourished. No acute distress.  Severe lack of personal hygiene with pungent malodor.    Psychiatric: Patient has a normal mood and affect. Patient's speech is normal and behavior is normal. Judgment is normal. Cognition and memory are normal.     Right pedal exam was performed today.  Vascular: Vascular: Pedal pulses palpable 2/4 DP & PT.  CFT is = 3 seconds to the hallux.  Skin temperature is warm to cool proximal tibia to distal toes without localized increase in calor noted.  Localized erythema and edema noted periwound plantar heel.  No ecchymosis noted to the foot or ankle.  Hair growth present distally to the LE.     Musculoskeletal: Ankle and pedal joint ROM are decreased. Ankle joint dorsiflexion is restricted with the knee extended and flexed per Silfverskiold exam.  Muscle strength is 5/5 for all LE muscle groups tested.  No palpable soft tissue mass appreciable to the right plantar heel.    Neurological:  Epicritic sensation is grossly Intact to the foot.    O[place]heim STR is negative to right lower extremity.   Tenderness to palpation of right plantar heel fissure/wound.     Dermatological: Toenails 1-5 right are WNL in length and thickness.  Webspaces 1-4 right are clean, dry, and intact.  Skin turgor is supple.   Plantar medial right heel surgical site noted to have dehisced, measures 2.3 cm x 0.4 cm x 0.6 cm, and has 100% fibrotic wound base extending down to dermis with minimal purulent drainage and is macerated.  Plantar lateral right heel wound has healed.       Nursing note and vitals reviewed.        Assessment:       Encounter Diagnoses   Name Primary?    Dehiscence of surgical wound, initial encounter - Right Foot Yes    Suspected soft tissue infection - Right Foot     Tinea pedis of right foot - Right Foot     Skin maceration - Right Foot     Pain of right heel - Right Foot          Plan:       Ryan was seen today for post-op evaluation.    Diagnoses and  all orders for this visit:    Dehiscence of surgical wound, initial encounter - Right Foot  -     traMADol (ULTRAM) 50 mg tablet; Take 1 tablet (50 mg total) by mouth every 8 (eight) hours as needed for Pain.  -     Aerobic culture  -     Culture, Anaerobic    Suspected soft tissue infection - Right Foot  -     traMADol (ULTRAM) 50 mg tablet; Take 1 tablet (50 mg total) by mouth every 8 (eight) hours as needed for Pain.  -     Aerobic culture  -     Culture, Anaerobic    Tinea pedis of right foot - Right Foot  -     traMADol (ULTRAM) 50 mg tablet; Take 1 tablet (50 mg total) by mouth every 8 (eight) hours as needed for Pain.  -     Aerobic culture  -     Culture, Anaerobic    Skin maceration - Right Foot  -     traMADol (ULTRAM) 50 mg tablet; Take 1 tablet (50 mg total) by mouth every 8 (eight) hours as needed for Pain.  -     Aerobic culture  -     Culture, Anaerobic    Pain of right heel - Right Foot  -     traMADol (ULTRAM) 50 mg tablet; Take 1 tablet (50 mg total) by mouth every 8 (eight) hours as needed for Pain.  -     Aerobic culture  -     Culture, Anaerobic      I counseled the patient on his conditions, their implications and medical management.    - Reviewed with patient importance of reduced weight-bearing with no weight on right heel and keeping dressing clean, dry, and intact.  He is to continue using heel offloading shoe.  Patient verbalized all understanding.    - Advised patient to continue taking oral antifungal as prescribed.    - Strong suspicion for non compliance due to continued wound breakdown and severe malodor.    - With patient's permission, obtained wound culture of drainage, cleansed right plantar heel wound with normal saline, performed mechanical debridement of biofilm down to the level of dermis, < 20 cm sq., via saline soaked gauze, applied Tabitha and Aquacel foam bandage, and covered foot with kerlix and ACE wrap.    - Will notify patient of culture results.    Patient was given the  following recommendations and instructions:  Patient Instructions   - Keep right foot dressing clean, dry, and intact.    - Take medication as prescribed along with probiotics as directed.    - Notify clinic if any new or worsening condition arises.    - Follow up in 1 week for wound care.          Li Kathleen DPM        Dictation was performed using M*Modal Fluency.  Transcription errors may be present.

## 2019-06-06 LAB — BACTERIA SPEC AEROBE CULT: NORMAL

## 2019-06-10 ENCOUNTER — OFFICE VISIT (OUTPATIENT)
Dept: PODIATRY | Facility: CLINIC | Age: 54
End: 2019-06-10
Payer: MEDICARE

## 2019-06-10 VITALS
SYSTOLIC BLOOD PRESSURE: 154 MMHG | BODY MASS INDEX: 38.7 KG/M2 | DIASTOLIC BLOOD PRESSURE: 76 MMHG | WEIGHT: 276.44 LBS | HEIGHT: 71 IN | HEART RATE: 80 BPM

## 2019-06-10 DIAGNOSIS — M79.671 PAIN OF RIGHT HEEL: ICD-10-CM

## 2019-06-10 DIAGNOSIS — R68.89 SUSPECTED SOFT TISSUE INFECTION: ICD-10-CM

## 2019-06-10 DIAGNOSIS — L98.8 SKIN MACERATION: ICD-10-CM

## 2019-06-10 DIAGNOSIS — R46.0 POOR HYGIENE: ICD-10-CM

## 2019-06-10 DIAGNOSIS — B35.3 TINEA PEDIS OF RIGHT FOOT: ICD-10-CM

## 2019-06-10 DIAGNOSIS — T81.31XA DEHISCENCE OF SURGICAL WOUND, INITIAL ENCOUNTER: Primary | ICD-10-CM

## 2019-06-10 LAB — BACTERIA SPEC ANAEROBE CULT: NORMAL

## 2019-06-10 PROCEDURE — 3008F BODY MASS INDEX DOCD: CPT | Mod: HCNC,CPTII,S$GLB, | Performed by: PODIATRIST

## 2019-06-10 PROCEDURE — 99999 PR PBB SHADOW E&M-EST. PATIENT-LVL III: ICD-10-PCS | Mod: PBBFAC,HCNC,, | Performed by: PODIATRIST

## 2019-06-10 PROCEDURE — 99213 OFFICE O/P EST LOW 20 MIN: CPT | Mod: HCNC,24,S$GLB, | Performed by: PODIATRIST

## 2019-06-10 PROCEDURE — 99213 PR OFFICE/OUTPT VISIT, EST, LEVL III, 20-29 MIN: ICD-10-PCS | Mod: HCNC,24,S$GLB, | Performed by: PODIATRIST

## 2019-06-10 PROCEDURE — 99999 PR PBB SHADOW E&M-EST. PATIENT-LVL III: CPT | Mod: PBBFAC,HCNC,, | Performed by: PODIATRIST

## 2019-06-10 PROCEDURE — 3008F PR BODY MASS INDEX (BMI) DOCUMENTED: ICD-10-PCS | Mod: HCNC,CPTII,S$GLB, | Performed by: PODIATRIST

## 2019-06-10 NOTE — PROGRESS NOTES
Subjective:      Patient ID: Ryan Crane is a 53 y.o. male.    Chief Complaint: Wound Check (1 wk f/u R ft wound,  PCP Dr Becerril 2/2019)      HPI:  Ryan Crane is a 53 y.o. male who presents to clinic with a chief complaint of right heel fissure wound.  Patient reports pain as 7/10 on the pain scale.  He states that he has been wearing the offloading shoe and staying off his foot as much as possible.  He reports keeping his dressing on since last visit.  Patient denies any other pedal complaints at this time.    PCP:  Laura Becerril MD  Date last seen:  2/7/19    Review of Systems   Constitutional: Negative for appetite change, fever, chills, fatigue and unexpected weight change.   Cardiovascular: Negative for chest pain, claudication, cyanosis, and leg swelling.  Musculoskeletal: Negative for back pain, arthritis, joint pain, joint swelling, myalgias, and stiffness.   Skin: Negative for nail bed changes, discoloration, rash, itching, suspicious lesion, and unusual hair distribution.  Positive for poor wound healing.  Neurological: Negative for loss of balance, sensory change, paresthesias, and numbness.   Hematological: Negative for adenopathy, bleeding, and bruising easily.   Psychiatric/Behavioral: The patient is not nervous/anxious.  Negative for altered mental status.    Hemoglobin A1C   Date Value Ref Range Status   01/16/2019 5.6 4.0 - 5.6 % Final     Comment:     ADA Screening Guidelines:  5.7-6.4%  Consistent with prediabetes  >or=6.5%  Consistent with diabetes  High levels of fetal hemoglobin interfere with the HbA1C  assay. Heterozygous hemoglobin variants (HbS, HgC, etc)do  not significantly interfere with this assay.   However, presence of multiple variants may affect accuracy.         Past Medical History:   Diagnosis Date    Acute angina     COPD (chronic obstructive pulmonary disease)     Hypertension     MI, old     PIPER on CPAP     Schizophrenia     Stroke      Past Surgical  "History:   Procedure Laterality Date    APPENDECTOMY      SHOULDER ARTHROSCOPY Left      History reviewed. No pertinent family history.  Social History     Socioeconomic History    Marital status:      Spouse name: Not on file    Number of children: Not on file    Years of education: Not on file    Highest education level: Not on file   Occupational History    Not on file   Social Needs    Financial resource strain: Not on file    Food insecurity:     Worry: Not on file     Inability: Not on file    Transportation needs:     Medical: Not on file     Non-medical: Not on file   Tobacco Use    Smoking status: Current Every Day Smoker     Packs/day: 1.00     Years: 45.00     Pack years: 45.00    Smokeless tobacco: Never Used   Substance and Sexual Activity    Alcohol use: No     Frequency: Never    Drug use: Yes     Types: Marijuana    Sexual activity: Not on file   Lifestyle    Physical activity:     Days per week: Not on file     Minutes per session: Not on file    Stress: Not on file   Relationships    Social connections:     Talks on phone: Not on file     Gets together: Not on file     Attends Tenriism service: Not on file     Active member of club or organization: Not on file     Attends meetings of clubs or organizations: Not on file     Relationship status: Not on file   Other Topics Concern    Not on file   Social History Narrative    Not on file           Objective:        BP (!) 154/76   Pulse 80   Ht 5' 11" (1.803 m)   Wt 125.4 kg (276 lb 7.3 oz)   BMI 38.56 kg/m²     Physical Exam   Constitutional: Patient is oriented to person, place, and time. Patient appears well-developed and well-nourished. No acute distress.  Severe lack of personal hygiene with pungent malodor.    Psychiatric: Patient has a normal mood and affect. Patient's speech is normal and behavior is normal. Judgment is normal. Cognition and memory are normal.     Right pedal exam was performed today.  Vascular: " Vascular: Pedal pulses palpable 2/4 DP & PT.  CFT is = 3 seconds to the hallux.  Skin temperature is warm to cool proximal tibia to distal toes without localized increase in calor noted.  Localized erythema and edema noted periwound plantar heel.  No ecchymosis noted to the foot or ankle.  Hair growth present distally to the LE.     Musculoskeletal: Ankle and pedal joint ROM are decreased. Ankle joint dorsiflexion is restricted with the knee extended and flexed per Silfverskiold exam.  Muscle strength is 5/5 for all LE muscle groups tested.  No palpable soft tissue mass appreciable to the right plantar heel.    Neurological:  Epicritic sensation is grossly Intact to the foot.    [place] STR is negative to right lower extremity.   Tenderness to palpation of right plantar heel fissure/wound.     Dermatological: Toenails 1-5 right are WNL in length and thickness.  Webspaces 1-4 right are clean, dry, and intact.  Skin turgor is supple.   Plantar medial right heel surgical site noted to have dehisced, measures 0.6 cm x 0.4 cm x 0.4 cm, and has 10% fibrotic and 90% granular wound base extending down to dermis with minimal serous drainage and is macerated.  Severe, dot malodor present.  Plantar lateral right heel wound has healed.       Nursing note and vitals reviewed.        Assessment:       Encounter Diagnoses   Name Primary?    Dehiscence of surgical wound, initial encounter - Right Foot Yes    Suspected soft tissue infection - Right Foot     Tinea pedis of right foot - Right Foot     Skin maceration - Right Foot     Pain of right heel - Right Foot     Poor hygiene          Plan:       Ryan was seen today for wound check.    Diagnoses and all orders for this visit:    Dehiscence of surgical wound, initial encounter - Right Foot    Suspected soft tissue infection - Right Foot    Tinea pedis of right foot - Right Foot    Skin maceration - Right Foot    Pain of right heel - Right Foot    Poor hygiene      I  counseled the patient on his conditions, their implications and medical management.    - Reviewed with patient importance of reduced weight-bearing with no weight on right heel and keeping dressing clean, dry, and intact.  He is to continue using heel offloading shoe.  Patient verbalized all understanding.    - Advised patient to continue taking oral antifungal as prescribed.    - Strong suspicion for non compliance due to continued wound breakdown and severe malodor.    - With patient's permission, obtained wound culture of drainage, cleansed right plantar heel wound with normal saline, performed mechanical debridement of biofilm down to the level of dermis, < 20 cm sq., via saline soaked gauze, applied Tabitha and Aquacel foam bandage, and covered foot with kerlix and ACE wrap.    Patient was given the following recommendations and instructions:  Patient Instructions   - Keep right foot dressing clean, dry, and intact.    - Take medication as prescribed along with probiotics as directed.    - Notify clinic if any new or worsening condition arises.    - Follow up in 1 week for wound care.          Li Kathleen DPM        Dictation was performed using M*Modal Fluency.  Transcription errors may be present.

## 2019-06-18 ENCOUNTER — OFFICE VISIT (OUTPATIENT)
Dept: PODIATRY | Facility: CLINIC | Age: 54
End: 2019-06-18
Payer: MEDICARE

## 2019-06-18 VITALS
SYSTOLIC BLOOD PRESSURE: 153 MMHG | HEIGHT: 71 IN | WEIGHT: 276.44 LBS | BODY MASS INDEX: 38.7 KG/M2 | HEART RATE: 87 BPM | DIASTOLIC BLOOD PRESSURE: 86 MMHG

## 2019-06-18 DIAGNOSIS — B35.3 TINEA PEDIS OF RIGHT FOOT: ICD-10-CM

## 2019-06-18 DIAGNOSIS — L98.8 SKIN MACERATION: ICD-10-CM

## 2019-06-18 DIAGNOSIS — R68.89 SUSPECTED SOFT TISSUE INFECTION: ICD-10-CM

## 2019-06-18 DIAGNOSIS — T81.31XA DEHISCENCE OF SURGICAL WOUND, INITIAL ENCOUNTER: Primary | ICD-10-CM

## 2019-06-18 DIAGNOSIS — M79.671 PAIN OF RIGHT HEEL: ICD-10-CM

## 2019-06-18 PROCEDURE — 99999 PR PBB SHADOW E&M-EST. PATIENT-LVL III: ICD-10-PCS | Mod: PBBFAC,HCNC,, | Performed by: PODIATRIST

## 2019-06-18 PROCEDURE — 97597 DBRDMT OPN WND 1ST 20 CM/<: CPT | Mod: 79,HCNC,S$GLB, | Performed by: PODIATRIST

## 2019-06-18 PROCEDURE — 3008F PR BODY MASS INDEX (BMI) DOCUMENTED: ICD-10-PCS | Mod: HCNC,CPTII,S$GLB, | Performed by: PODIATRIST

## 2019-06-18 PROCEDURE — 99999 PR PBB SHADOW E&M-EST. PATIENT-LVL III: CPT | Mod: PBBFAC,HCNC,, | Performed by: PODIATRIST

## 2019-06-18 PROCEDURE — 99213 OFFICE O/P EST LOW 20 MIN: CPT | Mod: HCNC,25,S$GLB, | Performed by: PODIATRIST

## 2019-06-18 PROCEDURE — 99213 PR OFFICE/OUTPT VISIT, EST, LEVL III, 20-29 MIN: ICD-10-PCS | Mod: HCNC,25,S$GLB, | Performed by: PODIATRIST

## 2019-06-18 PROCEDURE — 3008F BODY MASS INDEX DOCD: CPT | Mod: HCNC,CPTII,S$GLB, | Performed by: PODIATRIST

## 2019-06-18 PROCEDURE — 97597 PR DEBRIDEMENT OPEN WOUND 20 SQ CM<: ICD-10-PCS | Mod: 79,HCNC,S$GLB, | Performed by: PODIATRIST

## 2019-06-18 NOTE — PATIENT INSTRUCTIONS
- Keep right foot dressing clean, dry, and intact.    - Wear offloading shoe on right foot at all times when ambulating.    - Take probiotics as directed.    - Notify clinic if any new or worsening condition arises.    - Follow up in 2 weeks for wound care.

## 2019-07-01 NOTE — PROGRESS NOTES
Subjective:      Patient ID: Ryan Crane is a 53 y.o. male.    Chief Complaint: Follow-up (1 wk wound ck - right heel) and Other Misc (PCP:  Dr Becerril 2/2019)      HPI:  Ryan Crane is a 53 y.o. male who presents to clinic with a chief complaint of right heel fissure wound.  Patient reports pain as 7/10 on the pain scale.  He states that he has been wearing the offloading shoe and staying off his foot as much as possible.  He reports keeping his dressing on since last visit.  He denies any changes since last visit.   Patient denies any other pedal complaints at this time.    PCP:  Laura Becerril MD  Date last seen:  2/7/19    Review of Systems   Constitutional: Negative for appetite change, fever, chills, fatigue and unexpected weight change.   Cardiovascular: Negative for chest pain, claudication, cyanosis, and leg swelling.  Musculoskeletal: Negative for back pain, arthritis, joint pain, joint swelling, myalgias, and stiffness.   Skin: Negative for nail bed changes, discoloration, rash, itching, suspicious lesion, and unusual hair distribution.  Positive for poor wound healing.  Neurological: Negative for loss of balance, sensory change, paresthesias, and numbness.   Hematological: Negative for adenopathy, bleeding, and bruising easily.   Psychiatric/Behavioral: The patient is not nervous/anxious.  Negative for altered mental status.    Hemoglobin A1C   Date Value Ref Range Status   01/16/2019 5.6 4.0 - 5.6 % Final     Comment:     ADA Screening Guidelines:  5.7-6.4%  Consistent with prediabetes  >or=6.5%  Consistent with diabetes  High levels of fetal hemoglobin interfere with the HbA1C  assay. Heterozygous hemoglobin variants (HbS, HgC, etc)do  not significantly interfere with this assay.   However, presence of multiple variants may affect accuracy.         Past Medical History:   Diagnosis Date    Acute angina     COPD (chronic obstructive pulmonary disease)     Hypertension     MI, old   "   PIPER on CPAP     Schizophrenia     Stroke      Past Surgical History:   Procedure Laterality Date    APPENDECTOMY      SHOULDER ARTHROSCOPY Left      History reviewed. No pertinent family history.  Social History     Socioeconomic History    Marital status:      Spouse name: Not on file    Number of children: Not on file    Years of education: Not on file    Highest education level: Not on file   Occupational History    Not on file   Social Needs    Financial resource strain: Not on file    Food insecurity:     Worry: Not on file     Inability: Not on file    Transportation needs:     Medical: Not on file     Non-medical: Not on file   Tobacco Use    Smoking status: Current Every Day Smoker     Packs/day: 1.00     Years: 45.00     Pack years: 45.00    Smokeless tobacco: Never Used   Substance and Sexual Activity    Alcohol use: No     Frequency: Never    Drug use: Yes     Types: Marijuana    Sexual activity: Not on file   Lifestyle    Physical activity:     Days per week: Not on file     Minutes per session: Not on file    Stress: Not on file   Relationships    Social connections:     Talks on phone: Not on file     Gets together: Not on file     Attends Gnosticism service: Not on file     Active member of club or organization: Not on file     Attends meetings of clubs or organizations: Not on file     Relationship status: Not on file   Other Topics Concern    Not on file   Social History Narrative    Not on file           Objective:        BP (!) 153/86 Comment: Pt advised to contact/see PCP w/in 2-4 wks  Pulse 87   Ht 5' 11" (1.803 m)   Wt 125.4 kg (276 lb 7.3 oz)   BMI 38.56 kg/m²     Physical Exam   Constitutional: Patient is oriented to person, place, and time. Patient appears well-developed and well-nourished. No acute distress.  Severe lack of personal hygiene with pungent malodor.    Psychiatric: Patient has a normal mood and affect. Patient's speech is normal and behavior " is normal. Judgment is normal. Cognition and memory are normal.     Right pedal exam was performed today.  Vascular: Vascular: Pedal pulses palpable 2/4 DP & PT.  CFT is = 3 seconds to the hallux.  Skin temperature is warm to cool proximal tibia to distal toes without localized increase in calor noted.  Localized erythema and edema noted periwound plantar heel.  No ecchymosis noted to the foot or ankle.  Hair growth present distally to the LE.     Musculoskeletal: Ankle and pedal joint ROM are decreased. Ankle joint dorsiflexion is restricted with the knee extended and flexed per Silfverskiold exam.  Muscle strength is 5/5 for all LE muscle groups tested.  No palpable soft tissue mass appreciable to the right plantar heel.    Neurological:  Epicritic sensation is grossly Intact to the foot.    Oppenheim STR is negative to right lower extremity.   Tenderness to palpation of right plantar heel fissure/wound.     Dermatological: Toenails 1-5 right are WNL in length and thickness.  Webspaces 1-4 right are clean, dry, and intact.  Skin turgor is supple.   Plantar medial right heel surgical site noted to have dehisced, measures 2.3 cm x 1.9 cm x 0.2 cm, and has 10% fibrotic and 90% granular wound base extending down to dermis with minimal serous drainage and is macerated.  Severe, dot malodor present.  Plantar lateral right heel wound has healed.       Nursing note and vitals reviewed.        Assessment:       Encounter Diagnoses   Name Primary?    Dehiscence of surgical wound, initial encounter - Right Foot Yes    Suspected soft tissue infection - Right Foot     Tinea pedis of right foot - Right Foot     Skin maceration - Right Foot     Pain of right heel - Right Foot          Plan:       Ryan was seen today for follow-up and other misc.    Diagnoses and all orders for this visit:    Dehiscence of surgical wound, initial encounter - Right Foot    Suspected soft tissue infection - Right Foot    Tinea pedis of  right foot - Right Foot    Skin maceration - Right Foot    Pain of right heel - Right Foot      I counseled the patient on his conditions, their implications and medical management.    - Reviewed with patient importance of reduced weight-bearing with no weight on right heel and keeping dressing clean, dry, and intact.  He is to continue using heel offloading shoe.  Patient verbalized all understanding.    - Advised patient to continue taking oral antifungal as prescribed.    - Strong suspicion for non compliance due to continued wound breakdown and severe malodor.    - With patient's permission, obtained wound culture of drainage, cleansed right plantar heel wound with normal saline, performed mechanical debridement of biofilm down to the level of dermis, < 20 cm sq., via saline soaked gauze, applied Tabitha and Aquacel foam bandage, and covered foot with kerlix and ACE wrap.    - Consider subsequent revision in the OR at next visit.    Patient was given the following recommendations and instructions:  Patient Instructions   - Keep right foot dressing clean, dry, and intact.    - Wear offloading shoe on right foot at all times when ambulating.    - Take probiotics as directed.    - Notify clinic if any new or worsening condition arises.    - Follow up in 2 weeks for wound care.          Li Kathleen DPM        Dictation was performed using M*Modal Fluency.  Transcription errors may be present.

## 2019-07-02 ENCOUNTER — OFFICE VISIT (OUTPATIENT)
Dept: PODIATRY | Facility: CLINIC | Age: 54
End: 2019-07-02
Payer: MEDICARE

## 2019-07-02 VITALS
WEIGHT: 265.19 LBS | HEIGHT: 71 IN | BODY MASS INDEX: 37.13 KG/M2 | DIASTOLIC BLOOD PRESSURE: 81 MMHG | SYSTOLIC BLOOD PRESSURE: 138 MMHG | HEART RATE: 83 BPM

## 2019-07-02 DIAGNOSIS — T81.31XD DEHISCENCE OF SURGICAL WOUND, SUBSEQUENT ENCOUNTER: Primary | ICD-10-CM

## 2019-07-02 DIAGNOSIS — R46.0 POOR HYGIENE: ICD-10-CM

## 2019-07-02 DIAGNOSIS — M79.671 PAIN OF RIGHT HEEL: ICD-10-CM

## 2019-07-02 PROCEDURE — 3008F PR BODY MASS INDEX (BMI) DOCUMENTED: ICD-10-PCS | Mod: HCNC,CPTII,S$GLB, | Performed by: PODIATRIST

## 2019-07-02 PROCEDURE — 3008F BODY MASS INDEX DOCD: CPT | Mod: HCNC,CPTII,S$GLB, | Performed by: PODIATRIST

## 2019-07-02 PROCEDURE — 99999 PR PBB SHADOW E&M-EST. PATIENT-LVL III: CPT | Mod: PBBFAC,HCNC,, | Performed by: PODIATRIST

## 2019-07-02 PROCEDURE — 99024 PR POST-OP FOLLOW-UP VISIT: ICD-10-PCS | Mod: HCNC,S$GLB,, | Performed by: PODIATRIST

## 2019-07-02 PROCEDURE — 99999 PR PBB SHADOW E&M-EST. PATIENT-LVL III: ICD-10-PCS | Mod: PBBFAC,HCNC,, | Performed by: PODIATRIST

## 2019-07-02 PROCEDURE — 99024 POSTOP FOLLOW-UP VISIT: CPT | Mod: HCNC,S$GLB,, | Performed by: PODIATRIST

## 2019-07-15 NOTE — PATIENT INSTRUCTIONS
- Keep right foot dressing clean, dry, and intact.  If dressing becomes soiled or wet, remove dressing, cleanse with betadine, and cover with occlusive bandaid.    - Wear offloading shoe on right foot at all times when ambulating.    - Take probiotics as directed.    - Notify clinic if any new or worsening condition arises.    - Follow up in 2 weeks for wound care.

## 2019-07-15 NOTE — PROGRESS NOTES
Subjective:      Patient ID: Ryan Crane is a 53 y.o. male.    Chief Complaint: Wound Check (2wk recheck PCP Dr Becerril 2/2019 A1C 5.6 1/2019)      HPI:  Ryan Crane is a 53 y.o. male who presents to clinic with a chief complaint of right heel fissure wound.  Patient reports pain as 7/10 on the pain scale.  He states that he has been wearing the offloading shoe and staying off his foot as much as possible.  He reports keeping his dressing on since last visit.  Patient is accompanied by his mother today, whom relates helping her son take better care of himself and his wound to prevent further breakdown and need for surgery.  She informs he has been noncompliant with care recommendations.  He denies any changes since last visit.   Patient denies any other pedal complaints at this time.    PCP:  Laura Becerril MD  Date last seen:  2/7/19    Review of Systems   Constitutional: Negative for appetite change, fever, chills, fatigue and unexpected weight change.   Cardiovascular: Negative for chest pain, claudication, cyanosis, and leg swelling.  Musculoskeletal: Negative for back pain, arthritis, joint pain, joint swelling, myalgias, and stiffness.   Skin: Negative for nail bed changes, discoloration, rash, itching, suspicious lesion, and unusual hair distribution.  Positive for poor wound healing.  Neurological: Negative for loss of balance, sensory change, paresthesias, and numbness.   Hematological: Negative for adenopathy, bleeding, and bruising easily.   Psychiatric/Behavioral: The patient is not nervous/anxious.  Negative for altered mental status.    Hemoglobin A1C   Date Value Ref Range Status   01/16/2019 5.6 4.0 - 5.6 % Final     Comment:     ADA Screening Guidelines:  5.7-6.4%  Consistent with prediabetes  >or=6.5%  Consistent with diabetes  High levels of fetal hemoglobin interfere with the HbA1C  assay. Heterozygous hemoglobin variants (HbS, HgC, etc)do  not significantly interfere with this  "assay.   However, presence of multiple variants may affect accuracy.         Past Medical History:   Diagnosis Date    Acute angina     COPD (chronic obstructive pulmonary disease)     Hypertension     MI, old     PIPER on CPAP     Schizophrenia     Stroke      Past Surgical History:   Procedure Laterality Date    APPENDECTOMY      SHOULDER ARTHROSCOPY Left      History reviewed. No pertinent family history.  Social History     Socioeconomic History    Marital status:      Spouse name: Not on file    Number of children: Not on file    Years of education: Not on file    Highest education level: Not on file   Occupational History    Not on file   Social Needs    Financial resource strain: Not on file    Food insecurity:     Worry: Not on file     Inability: Not on file    Transportation needs:     Medical: Not on file     Non-medical: Not on file   Tobacco Use    Smoking status: Current Every Day Smoker     Packs/day: 1.00     Years: 45.00     Pack years: 45.00    Smokeless tobacco: Never Used   Substance and Sexual Activity    Alcohol use: No     Frequency: Never    Drug use: Yes     Types: Marijuana    Sexual activity: Not on file   Lifestyle    Physical activity:     Days per week: Not on file     Minutes per session: Not on file    Stress: Not on file   Relationships    Social connections:     Talks on phone: Not on file     Gets together: Not on file     Attends Catholic service: Not on file     Active member of club or organization: Not on file     Attends meetings of clubs or organizations: Not on file     Relationship status: Not on file   Other Topics Concern    Not on file   Social History Narrative    Not on file           Objective:        /81   Pulse 83   Ht 5' 11" (1.803 m)   Wt 120.3 kg (265 lb 3.4 oz)   BMI 36.99 kg/m²     Physical Exam   Constitutional: Patient is oriented to person, place, and time. Patient appears well-developed and well-nourished. No acute " distress.  Poor personal hygiene - improved some since last visit.    Psychiatric: Patient has a normal mood and affect. Patient's speech is normal and behavior is normal. Judgment is normal. Cognition and memory are normal.     Right pedal exam was performed today.  Vascular: Vascular: Pedal pulses palpable 2/4 DP & PT.  CFT is = 3 seconds to the hallux.  Skin temperature is warm to cool proximal tibia to distal toes without localized increase in calor noted.  Localized erythema and edema noted periwound plantar heel.  No ecchymosis noted to the foot or ankle.  Hair growth present distally to the LE.     Musculoskeletal: Ankle and pedal joint ROM are decreased. Ankle joint dorsiflexion is restricted with the knee extended and flexed per Silfverskiold exam.  Muscle strength is 5/5 for all LE muscle groups tested.  No palpable soft tissue mass appreciable to the right plantar heel.    Neurological:  Epicritic sensation is grossly Intact to the foot.    Oppenheim STR is negative to right lower extremity.   Tenderness to palpation of right plantar heel fissure/wound.     Dermatological: Toenails 1-5 right are WNL in length and thickness.  Webspaces 1-4 right are clean, dry, and intact.  Skin turgor is supple.   Plantar medial right heel surgical site noted to have dehisced, measures 0.9 cm x 0.1 cm x 0.2 cm, and has 30% fibrotic and 70% granular wound base extending down to dermis with minimal serous drainage and is macerated.  Plantar lateral right heel wound has healed.       Nursing note and vitals reviewed.        Assessment:       Encounter Diagnoses   Name Primary?    Dehiscence of surgical wound, subsequent encounter Yes    Pain of right heel - Right Foot     Poor hygiene          Plan:       Ryan was seen today for wound check.    Diagnoses and all orders for this visit:    Dehiscence of surgical wound, subsequent encounter    Pain of right heel - Right Foot    Poor hygiene      I counseled the patient on  his conditions, their implications and medical management.    - Reviewed with patient importance of reduced weight-bearing with no weight on right heel and keeping dressing clean, dry, and intact.  He is to continue using heel offloading shoe.  Patient verbalized all understanding.    - Advised patient to continue taking medications as prescribed.      - With patient's permission, cleansed right plantar heel wound with normal saline, performed sharp, selective debridement of devitalized fibrotic tissue and biofilm down to the level of dermis, < 20 cm sq., via 3 mm dermal curette, applied antibiotic cream and Aquacel foam bandage, and covered foot with kerlix and ACE wrap.    Patient was given the following recommendations and instructions:  Patient Instructions   - Keep right foot dressing clean, dry, and intact.  If dressing becomes soiled or wet, remove dressing, cleanse with betadine, and cover with occlusive bandaid.    - Wear offloading shoe on right foot at all times when ambulating.    - Take probiotics as directed.    - Notify clinic if any new or worsening condition arises.    - Follow up in 2 weeks for wound care.          Li Kathleen DPM        Dictation was performed using M*Modal Fluency.  Transcription errors may be present.

## 2019-07-16 ENCOUNTER — OFFICE VISIT (OUTPATIENT)
Dept: PODIATRY | Facility: CLINIC | Age: 54
End: 2019-07-16
Payer: MEDICARE

## 2019-07-16 VITALS
WEIGHT: 265.19 LBS | HEIGHT: 71 IN | HEART RATE: 77 BPM | DIASTOLIC BLOOD PRESSURE: 74 MMHG | BODY MASS INDEX: 37.13 KG/M2 | SYSTOLIC BLOOD PRESSURE: 135 MMHG

## 2019-07-16 DIAGNOSIS — M79.671 PAIN OF RIGHT HEEL: ICD-10-CM

## 2019-07-16 DIAGNOSIS — R23.4 FISSURE IN SKIN OF FOOT: Primary | ICD-10-CM

## 2019-07-16 PROCEDURE — 99999 PR PBB SHADOW E&M-EST. PATIENT-LVL III: CPT | Mod: PBBFAC,HCNC,, | Performed by: PODIATRIST

## 2019-07-16 PROCEDURE — 3008F PR BODY MASS INDEX (BMI) DOCUMENTED: ICD-10-PCS | Mod: HCNC,CPTII,S$GLB, | Performed by: PODIATRIST

## 2019-07-16 PROCEDURE — 99999 PR PBB SHADOW E&M-EST. PATIENT-LVL III: ICD-10-PCS | Mod: PBBFAC,HCNC,, | Performed by: PODIATRIST

## 2019-07-16 PROCEDURE — 99024 POSTOP FOLLOW-UP VISIT: CPT | Mod: HCNC,S$GLB,, | Performed by: PODIATRIST

## 2019-07-16 PROCEDURE — 3008F BODY MASS INDEX DOCD: CPT | Mod: HCNC,CPTII,S$GLB, | Performed by: PODIATRIST

## 2019-07-16 PROCEDURE — 99024 PR POST-OP FOLLOW-UP VISIT: ICD-10-PCS | Mod: HCNC,S$GLB,, | Performed by: PODIATRIST

## 2019-07-16 NOTE — PATIENT INSTRUCTIONS
- Cleanse heel with betadine, pat area dry, and apply steri-strips daily for the next 2 weeks.    - Notify clinic if any new or worsening condition arises.    - Follow up in 2 weeks for heel fissure.

## 2019-07-22 NOTE — PROGRESS NOTES
Subjective:      Patient ID: Ryan Crane is a 53 y.o. male.    Chief Complaint: Heel Pain (2wk re check R heel, PCP Dr Becerril 2/2019 A1C 5.6 1/2019)      HPI:  Ryan Crane is a 53 y.o. male who presents to clinic with a chief complaint of right heel fissure wound.  Patient reports pain as 7/10 on the pain scale.  He states that he has been wearing the offloading shoe and staying off his foot as much as possible.  He reports keeping his dressing on since last visit.  He denies any changes since last visit.   Patient denies any other pedal complaints at this time.     PCP:  Laura Becerril MD  Date last seen:  2/7/19     Review of Systems   Constitutional: Negative for appetite change, fever, chills, fatigue and unexpected weight change.   Cardiovascular: Negative for chest pain, claudication, cyanosis, and leg swelling.  Musculoskeletal: Negative for back pain, arthritis, joint pain, joint swelling, myalgias, and stiffness.   Skin: Negative for nail bed changes, discoloration, rash, itching, suspicious lesion, and unusual hair distribution.  Positive for poor wound healing.  Neurological: Negative for loss of balance, sensory change, paresthesias, and numbness.   Hematological: Negative for adenopathy, bleeding, and bruising easily.   Psychiatric/Behavioral: The patient is not nervous/anxious.  Negative for altered mental status.    Hemoglobin A1C   Date Value Ref Range Status   01/16/2019 5.6 4.0 - 5.6 % Final     Comment:     ADA Screening Guidelines:  5.7-6.4%  Consistent with prediabetes  >or=6.5%  Consistent with diabetes  High levels of fetal hemoglobin interfere with the HbA1C  assay. Heterozygous hemoglobin variants (HbS, HgC, etc)do  not significantly interfere with this assay.   However, presence of multiple variants may affect accuracy.         Past Medical History:   Diagnosis Date    Acute angina     COPD (chronic obstructive pulmonary disease)     Hypertension     MI, old     PIPER on  "CPAP     Schizophrenia     Stroke      Past Surgical History:   Procedure Laterality Date    APPENDECTOMY      SHOULDER ARTHROSCOPY Left      History reviewed. No pertinent family history.  Social History     Socioeconomic History    Marital status:      Spouse name: Not on file    Number of children: Not on file    Years of education: Not on file    Highest education level: Not on file   Occupational History    Not on file   Social Needs    Financial resource strain: Not on file    Food insecurity:     Worry: Not on file     Inability: Not on file    Transportation needs:     Medical: Not on file     Non-medical: Not on file   Tobacco Use    Smoking status: Current Every Day Smoker     Packs/day: 1.00     Years: 45.00     Pack years: 45.00    Smokeless tobacco: Never Used   Substance and Sexual Activity    Alcohol use: No     Frequency: Never    Drug use: Yes     Types: Marijuana    Sexual activity: Not on file   Lifestyle    Physical activity:     Days per week: Not on file     Minutes per session: Not on file    Stress: Not on file   Relationships    Social connections:     Talks on phone: Not on file     Gets together: Not on file     Attends Yarsani service: Not on file     Active member of club or organization: Not on file     Attends meetings of clubs or organizations: Not on file     Relationship status: Not on file   Other Topics Concern    Not on file   Social History Narrative    Not on file           Objective:        /74   Pulse 77   Ht 5' 11" (1.803 m)   Wt 120.3 kg (265 lb 3.4 oz)   BMI 36.99 kg/m²     Physical Exam   Constitutional: Patient is oriented to person, place, and time. Patient appears well-developed and well-nourished. No acute distress.     Psychiatric: Patient has a normal mood and affect. Patient's speech is normal and behavior is normal. Judgment is normal. Cognition and memory are normal.     Right pedal exam was performed today.  Vascular: " Vascular: Pedal pulses palpable 2/4 DP & PT.  CFT is = 3 seconds to the hallux.  Skin temperature is warm to cool proximal tibia to distal toes without localized increase in calor noted.  Localized erythema and edema noted periwound plantar heel.  No ecchymosis noted to the foot or ankle.  Hair growth present distally to the LE.     Musculoskeletal: Ankle and pedal joint ROM are decreased. Ankle joint dorsiflexion is restricted with the knee extended and flexed per Silfverskiold exam.  Muscle strength is 5/5 for all LE muscle groups tested.  No palpable soft tissue mass appreciable to the right plantar heel.    Neurological:  Epicritic sensation is grossly Intact to the foot.    Oppenheim STR is negative to right lower extremity.   Tenderness to palpation of right plantar heel fissure/wound.     Dermatological: Toenails 1-5 right are WNL in length and thickness.  Webspaces 1-4 right are clean, dry, and intact.  Skin turgor is supple.   Plantar right heel wounds are healed with superficial fissure at previous medial wound site.    Nursing note and vitals reviewed.          Assessment:       Encounter Diagnoses   Name Primary?    Fissure in skin of foot - Right Foot Yes    Pain of right heel - Right Foot          Plan:       Ryan was seen today for heel pain.    Diagnoses and all orders for this visit:    Fissure in skin of foot - Right Foot    Pain of right heel - Right Foot      I counseled the patient on his conditions, their implications and medical management.    - No debridement performed.  Fissure cleansed with betadine.  Mastisol and steri-strips applied to reduce risk of recurrent breakdown and covered with Aquacel foam.    Patient was given the following recommendations and instructions:  Patient Instructions   - Cleanse heel with betadine, pat area dry, and apply steri-strips daily for the next 2 weeks.    - Notify clinic if any new or worsening condition arises.    - Follow up in 2 weeks for heel  fissuchanning.        Li Kathleen DPM        Dictation was performed using M*Modal Fluency.  Transcription errors may be present.

## 2019-07-30 ENCOUNTER — OFFICE VISIT (OUTPATIENT)
Dept: PODIATRY | Facility: CLINIC | Age: 54
End: 2019-07-30
Payer: MEDICARE

## 2019-07-30 VITALS
WEIGHT: 265 LBS | BODY MASS INDEX: 37.1 KG/M2 | HEART RATE: 91 BPM | HEIGHT: 71 IN | DIASTOLIC BLOOD PRESSURE: 85 MMHG | SYSTOLIC BLOOD PRESSURE: 140 MMHG

## 2019-07-30 DIAGNOSIS — R46.0 POOR HYGIENE: ICD-10-CM

## 2019-07-30 DIAGNOSIS — R23.4 FISSURE IN SKIN OF FOOT: Primary | ICD-10-CM

## 2019-07-30 DIAGNOSIS — M79.671 PAIN OF RIGHT HEEL: ICD-10-CM

## 2019-07-30 PROCEDURE — 99999 PR PBB SHADOW E&M-EST. PATIENT-LVL III: ICD-10-PCS | Mod: PBBFAC,HCNC,, | Performed by: PODIATRIST

## 2019-07-30 PROCEDURE — 3008F PR BODY MASS INDEX (BMI) DOCUMENTED: ICD-10-PCS | Mod: HCNC,CPTII,S$GLB, | Performed by: PODIATRIST

## 2019-07-30 PROCEDURE — 99213 OFFICE O/P EST LOW 20 MIN: CPT | Mod: HCNC,S$GLB,, | Performed by: PODIATRIST

## 2019-07-30 PROCEDURE — 99213 PR OFFICE/OUTPT VISIT, EST, LEVL III, 20-29 MIN: ICD-10-PCS | Mod: HCNC,S$GLB,, | Performed by: PODIATRIST

## 2019-07-30 PROCEDURE — 3008F BODY MASS INDEX DOCD: CPT | Mod: HCNC,CPTII,S$GLB, | Performed by: PODIATRIST

## 2019-07-30 PROCEDURE — 99999 PR PBB SHADOW E&M-EST. PATIENT-LVL III: CPT | Mod: PBBFAC,HCNC,, | Performed by: PODIATRIST

## 2019-08-11 NOTE — PROGRESS NOTES
Subjective:      Patient ID: Ryan Crane is a 53 y.o. male.    Chief Complaint: Wound Check (2wk f/u R heel wound, PCP DR Becerril 2/2019 A1c 5.6 1/2019)      HPI:  Ryan Crane is a 53 y.o. male who presents to clinic with a chief complaint of right heel fissure wound.  Patient reports pain as 7/10 on the pain scale.  He states that he has been wearing the offloading shoe and staying off his foot as much as possible.  He reports getting foot wet a lot but changing dressing when able with one account being the next day after getting foot wet.  He denies any changes since last visit.   Patient denies any other pedal complaints at this time.     PCP:  Laura Becerril MD  Date last seen:  2/7/19     Review of Systems   Constitutional: Negative for appetite change, fever, chills, fatigue and unexpected weight change.   Cardiovascular: Negative for chest pain, claudication, cyanosis, and leg swelling.  Musculoskeletal: Negative for back pain, arthritis, joint pain, joint swelling, myalgias, and stiffness.   Skin: Negative for nail bed changes, discoloration, rash, itching, suspicious lesion, and unusual hair distribution.  Positive for poor wound healing.  Neurological: Negative for loss of balance, sensory change, paresthesias, and numbness.  Positive for pain.  Hematological: Negative for adenopathy, bleeding, and bruising easily.   Psychiatric/Behavioral: The patient is not nervous/anxious.  Negative for altered mental status.    Hemoglobin A1C   Date Value Ref Range Status   01/16/2019 5.6 4.0 - 5.6 % Final     Comment:     ADA Screening Guidelines:  5.7-6.4%  Consistent with prediabetes  >or=6.5%  Consistent with diabetes  High levels of fetal hemoglobin interfere with the HbA1C  assay. Heterozygous hemoglobin variants (HbS, HgC, etc)do  not significantly interfere with this assay.   However, presence of multiple variants may affect accuracy.         Past Medical History:   Diagnosis Date    Acute  "angina     COPD (chronic obstructive pulmonary disease)     Hypertension     MI, old     PIPER on CPAP     Schizophrenia     Stroke      Past Surgical History:   Procedure Laterality Date    APPENDECTOMY      SHOULDER ARTHROSCOPY Left      History reviewed. No pertinent family history.  Social History     Socioeconomic History    Marital status:      Spouse name: Not on file    Number of children: Not on file    Years of education: Not on file    Highest education level: Not on file   Occupational History    Not on file   Social Needs    Financial resource strain: Not on file    Food insecurity:     Worry: Not on file     Inability: Not on file    Transportation needs:     Medical: Not on file     Non-medical: Not on file   Tobacco Use    Smoking status: Current Every Day Smoker     Packs/day: 1.00     Years: 45.00     Pack years: 45.00    Smokeless tobacco: Never Used   Substance and Sexual Activity    Alcohol use: No     Frequency: Never    Drug use: Yes     Types: Marijuana    Sexual activity: Not on file   Lifestyle    Physical activity:     Days per week: Not on file     Minutes per session: Not on file    Stress: Not on file   Relationships    Social connections:     Talks on phone: Not on file     Gets together: Not on file     Attends Scientologist service: Not on file     Active member of club or organization: Not on file     Attends meetings of clubs or organizations: Not on file     Relationship status: Not on file   Other Topics Concern    Not on file   Social History Narrative    Not on file           Objective:        BP (!) 140/85   Pulse 91   Ht 5' 11" (1.803 m)   Wt 120.2 kg (265 lb)   BMI 36.96 kg/m²     Physical Exam   Constitutional: Patient is oriented to person, place, and time. Patient appears well-developed and well-nourished.  Strong malodor noted from patient due to lack of personal hygiene.  No acute distress.     Psychiatric: Patient has a normal mood and " affect. Patient's speech is normal and behavior is normal. Judgment is normal. Cognition and memory are normal.     Right pedal exam was performed today.  Vascular: Vascular: Pedal pulses palpable 2/4 DP & PT.  CFT is = 3 seconds to the hallux.  Skin temperature is warm to cool proximal tibia to distal toes without localized increase in calor noted.  Localized erythema and edema noted periwound plantar heel.  No ecchymosis noted to the foot or ankle.  Hair growth present distally to the LE.     Musculoskeletal: Ankle and pedal joint ROM are decreased. Ankle joint dorsiflexion is restricted with the knee extended and flexed per Silfverskiold exam.  Muscle strength is 5/5 for all LE muscle groups tested.  No palpable soft tissue mass appreciable to the right plantar heel.    Neurological:  Epicritic sensation is grossly Intact to the foot.    Oppenheim STR is negative to right lower extremity.   Tenderness to palpation of right plantar heel fissure.     Dermatological: Toenails 1-5 right are WNL in length and thickness.  Webspaces 1-4 right are clean, dry, and intact.  Skin turgor is supple.   Plantar right heel fissure has reopened, measuring 0.2 cm x 0.4 cm x 0.5 cm deep with macerated skin edges without dot signs of infection present.    Nursing note and vitals reviewed.          Assessment:       Encounter Diagnoses   Name Primary?    Fissure in skin of foot - Right Foot Yes    Pain of right heel - Right Foot     Poor hygiene          Plan:       Ryan was seen today for wound check.    Diagnoses and all orders for this visit:    Fissure in skin of foot - Right Foot    Pain of right heel - Right Foot    Poor hygiene      I counseled the patient on his conditions, their implications and medical management.    - No debridement performed.  Fissure cleansed with betadine.  Mastisol and steri-strips applied to reduce risk of recurrent breakdown and covered with Aquacel foam.    Patient was given the following  recommendations and instructions:  Patient Instructions   - After shower or whenever dressing becomes soiled or wet, remove dressing, cleanse heel with betadine, pat area dry, and apply steri-strips daily for the next 2 weeks.    - Notify clinic if any new or worsening condition arises.    - Follow up in 2 weeks for heel fissure.        Li Kathleen DPM        Dictation was performed using M*Modal Fluency.  Transcription errors may be present.

## 2019-08-12 NOTE — PATIENT INSTRUCTIONS
- After shower or whenever dressing becomes soiled or wet, remove dressing, cleanse heel with betadine, pat area dry, and apply steri-strips daily for the next 2 weeks.    - Notify clinic if any new or worsening condition arises.    - Follow up in 2 weeks for heel fissure.

## 2019-08-13 ENCOUNTER — OFFICE VISIT (OUTPATIENT)
Dept: PODIATRY | Facility: CLINIC | Age: 54
End: 2019-08-13
Payer: MEDICARE

## 2019-08-13 VITALS
WEIGHT: 265 LBS | HEART RATE: 86 BPM | DIASTOLIC BLOOD PRESSURE: 84 MMHG | SYSTOLIC BLOOD PRESSURE: 151 MMHG | HEIGHT: 71 IN | BODY MASS INDEX: 37.1 KG/M2

## 2019-08-13 DIAGNOSIS — R46.0 POOR HYGIENE: ICD-10-CM

## 2019-08-13 DIAGNOSIS — R23.4 FISSURE IN SKIN OF FOOT: Primary | ICD-10-CM

## 2019-08-13 DIAGNOSIS — M79.671 PAIN OF RIGHT HEEL: ICD-10-CM

## 2019-08-13 PROCEDURE — 97597 PR DEBRIDEMENT OPEN WOUND 20 SQ CM<: ICD-10-PCS | Mod: HCNC,S$GLB,, | Performed by: PODIATRIST

## 2019-08-13 PROCEDURE — 3008F PR BODY MASS INDEX (BMI) DOCUMENTED: ICD-10-PCS | Mod: HCNC,CPTII,S$GLB, | Performed by: PODIATRIST

## 2019-08-13 PROCEDURE — 99213 PR OFFICE/OUTPT VISIT, EST, LEVL III, 20-29 MIN: ICD-10-PCS | Mod: 25,HCNC,S$GLB, | Performed by: PODIATRIST

## 2019-08-13 PROCEDURE — 99213 OFFICE O/P EST LOW 20 MIN: CPT | Mod: 25,HCNC,S$GLB, | Performed by: PODIATRIST

## 2019-08-13 PROCEDURE — 3008F BODY MASS INDEX DOCD: CPT | Mod: HCNC,CPTII,S$GLB, | Performed by: PODIATRIST

## 2019-08-13 PROCEDURE — 99999 PR PBB SHADOW E&M-EST. PATIENT-LVL III: ICD-10-PCS | Mod: PBBFAC,HCNC,, | Performed by: PODIATRIST

## 2019-08-13 PROCEDURE — 97597 DBRDMT OPN WND 1ST 20 CM/<: CPT | Mod: HCNC,S$GLB,, | Performed by: PODIATRIST

## 2019-08-13 PROCEDURE — 99999 PR PBB SHADOW E&M-EST. PATIENT-LVL III: CPT | Mod: PBBFAC,HCNC,, | Performed by: PODIATRIST

## 2019-08-17 NOTE — PROGRESS NOTES
Subjective:      Patient ID: Ryan Crane is a 54 y.o. male.    Chief Complaint: Wound Check (2wk Dr Becerril  5.6 1/2019)      HPI:  Ryan Crane is a 53 y.o. male who presents to clinic with a chief complaint of right heel fissure wound.  Patient reports pain as 7/10 on the pain scale.  He states that he has been wearing the offloading shoe.  He has been weight-bearing and getting dressing wet without regard to treatment recommendations.  He denies any changes since last visit.   Patient denies any other pedal complaints at this time.     PCP:  Laura Becerril MD  Date last seen:  2/7/19     Review of Systems   Constitutional: Negative for appetite change, fever, chills, fatigue and unexpected weight change.   Cardiovascular: Negative for chest pain, claudication, cyanosis, and leg swelling.  Musculoskeletal: Negative for back pain, arthritis, joint pain, joint swelling, myalgias, and stiffness.   Skin: Negative for nail bed changes, discoloration, rash, itching, suspicious lesion, and unusual hair distribution.  Positive for poor wound healing.  Neurological: Negative for loss of balance, sensory change, paresthesias, and numbness.  Positive for pain.  Hematological: Negative for adenopathy, bleeding, and bruising easily.   Psychiatric/Behavioral: The patient is not nervous/anxious.  Negative for altered mental status.    Hemoglobin A1C   Date Value Ref Range Status   01/16/2019 5.6 4.0 - 5.6 % Final     Comment:     ADA Screening Guidelines:  5.7-6.4%  Consistent with prediabetes  >or=6.5%  Consistent with diabetes  High levels of fetal hemoglobin interfere with the HbA1C  assay. Heterozygous hemoglobin variants (HbS, HgC, etc)do  not significantly interfere with this assay.   However, presence of multiple variants may affect accuracy.         Past Medical History:   Diagnosis Date    Acute angina     COPD (chronic obstructive pulmonary disease)     Hypertension     MI, old     PIPER on CPAP      "Schizophrenia     Stroke      Past Surgical History:   Procedure Laterality Date    APPENDECTOMY      SHOULDER ARTHROSCOPY Left      History reviewed. No pertinent family history.  Social History     Socioeconomic History    Marital status:      Spouse name: Not on file    Number of children: Not on file    Years of education: Not on file    Highest education level: Not on file   Occupational History    Not on file   Social Needs    Financial resource strain: Not on file    Food insecurity:     Worry: Not on file     Inability: Not on file    Transportation needs:     Medical: Not on file     Non-medical: Not on file   Tobacco Use    Smoking status: Current Every Day Smoker     Packs/day: 1.00     Years: 45.00     Pack years: 45.00    Smokeless tobacco: Never Used   Substance and Sexual Activity    Alcohol use: No     Frequency: Never    Drug use: Yes     Types: Marijuana    Sexual activity: Not on file   Lifestyle    Physical activity:     Days per week: Not on file     Minutes per session: Not on file    Stress: Not on file   Relationships    Social connections:     Talks on phone: Not on file     Gets together: Not on file     Attends Mandaen service: Not on file     Active member of club or organization: Not on file     Attends meetings of clubs or organizations: Not on file     Relationship status: Not on file   Other Topics Concern    Not on file   Social History Narrative    Not on file           Objective:        BP (!) 151/84   Pulse 86   Ht 5' 11" (1.803 m)   Wt 120.2 kg (265 lb)   BMI 36.96 kg/m²     Physical Exam   Constitutional: Patient is oriented to person, place, and time. Patient appears well-developed and well-nourished.  Strong malodor noted from patient due to lack of personal hygiene.  No acute distress.     Psychiatric: Patient has a normal mood and affect. Patient's speech is normal and behavior is normal. Judgment is normal. Cognition and memory are normal. "     Right pedal exam was performed today.  Vascular: Vascular: Pedal pulses palpable 2/4 DP & PT.  CFT is = 3 seconds to the hallux.  Skin temperature is warm to cool proximal tibia to distal toes without localized increase in calor noted.  Localized erythema and edema noted periwound plantar heel.  No ecchymosis noted to the foot or ankle.  Hair growth present distally to the LE.     Musculoskeletal: Ankle and pedal joint ROM are decreased. Ankle joint dorsiflexion is restricted with the knee extended and flexed per Silfverskiold exam.  Muscle strength is 5/5 for all LE muscle groups tested.  No palpable soft tissue mass appreciable to the right plantar heel.    Neurological:  Epicritic sensation is grossly Intact to the foot.    Oppenheim STR is negative to right lower extremity.   Tenderness to palpation of right plantar heel fissure.     Dermatological: Toenails 1-5 right are WNL in length and thickness.  Webspaces 1-4 right are clean, dry, and intact.  Skin turgor is supple.   Plantar right heel fissure has reopened, measuring 0.2 cm x 0.4 cm x 0.4 cm deep with macerated skin edges without dot signs of infection present.    Nursing note and vitals reviewed.          Assessment:       Encounter Diagnoses   Name Primary?    Fissure in skin of foot - Right Foot Yes    Pain of right heel - Right Foot     Poor hygiene          Plan:       Ryan was seen today for wound check.    Diagnoses and all orders for this visit:    Fissure in skin of foot - Right Foot    Pain of right heel - Right Foot    Poor hygiene      I counseled the patient on his conditions, their implications and medical management.    - With patient's permission, cleansed right plantar heel wound with normal saline and performed sharp, selective debridement of devitalized tissue and biofilm, < 20 cm sq., extending down to the level of dermis via 3 mm dermal curette to patient's tolerance without incident.  Placed Tabitha within fissure.  Applied  mastisol and steri-strips to reduce risk of recurrent breakdown and covered with Aquacel foam.    Patient was given the following recommendations and instructions:  Patient Instructions   - Keep dressing clean, dry, and intact.    - Notify clinic if any new or worsening condition arises.    - Follow up in 2 weeks for heel fissure.        Li Kathleen DPM        Dictation was performed using M*Modal Fluency.  Transcription errors may be present.

## 2019-08-21 RX ORDER — METOPROLOL SUCCINATE 25 MG/1
TABLET, EXTENDED RELEASE ORAL
Qty: 90 TABLET | Refills: 1 | Status: SHIPPED | OUTPATIENT
Start: 2019-08-21 | End: 2019-11-09 | Stop reason: SDUPTHER

## 2019-08-26 NOTE — PATIENT INSTRUCTIONS
- Keep dressing clean, dry, and intact.    - Notify clinic if any new or worsening condition arises.    - Follow up in 2 weeks for heel fissure.

## 2019-08-27 ENCOUNTER — OFFICE VISIT (OUTPATIENT)
Dept: PODIATRY | Facility: CLINIC | Age: 54
End: 2019-08-27
Payer: MEDICARE

## 2019-08-27 ENCOUNTER — TELEPHONE (OUTPATIENT)
Dept: PODIATRY | Facility: CLINIC | Age: 54
End: 2019-08-27

## 2019-08-27 VITALS — BODY MASS INDEX: 36.96 KG/M2 | HEIGHT: 71 IN | SYSTOLIC BLOOD PRESSURE: 149 MMHG | DIASTOLIC BLOOD PRESSURE: 83 MMHG

## 2019-08-27 DIAGNOSIS — R46.0 POOR HYGIENE: ICD-10-CM

## 2019-08-27 DIAGNOSIS — R23.4 FISSURE IN SKIN OF FOOT: Primary | ICD-10-CM

## 2019-08-27 DIAGNOSIS — M79.671 PAIN OF RIGHT HEEL: ICD-10-CM

## 2019-08-27 PROCEDURE — 3008F BODY MASS INDEX DOCD: CPT | Mod: HCNC,CPTII,S$GLB, | Performed by: PODIATRIST

## 2019-08-27 PROCEDURE — 99999 PR PBB SHADOW E&M-EST. PATIENT-LVL IV: CPT | Mod: PBBFAC,HCNC,, | Performed by: PODIATRIST

## 2019-08-27 PROCEDURE — 3008F PR BODY MASS INDEX (BMI) DOCUMENTED: ICD-10-PCS | Mod: HCNC,CPTII,S$GLB, | Performed by: PODIATRIST

## 2019-08-27 PROCEDURE — 99214 OFFICE O/P EST MOD 30 MIN: CPT | Mod: 57,HCNC,S$GLB, | Performed by: PODIATRIST

## 2019-08-27 PROCEDURE — 99999 PR PBB SHADOW E&M-EST. PATIENT-LVL IV: ICD-10-PCS | Mod: PBBFAC,HCNC,, | Performed by: PODIATRIST

## 2019-08-27 PROCEDURE — 99214 PR OFFICE/OUTPT VISIT, EST, LEVL IV, 30-39 MIN: ICD-10-PCS | Mod: 57,HCNC,S$GLB, | Performed by: PODIATRIST

## 2019-08-27 NOTE — TELEPHONE ENCOUNTER
----- Message from Karen Caruso sent at 8/27/2019  4:47 PM CDT -----  Contact: self  Type:  Patient Returning Call    Who Called:pt  Who Left Message for Patient:n/a  Does the patient know what this is regarding?:no  Would the patient rather a call back or a response via [facility]? Call back  Best Call Back Number:[phone]  Additional Information: none    Thanks,  Karen Caruso

## 2019-08-27 NOTE — PATIENT INSTRUCTIONS
- Change bandage daily after shower by removing bandage, cleansing area with betadine or hibiclens, patting area dry, and applying occlusive bandaid.    - Obtain history and physical for medical clearance prior to surgery.    - Stop taking aspirin at least 3 days prior to surgery.    - Bring CAM boot with you for surgery.    - Need someone with you on day of surgery to drive you home as you will not be able to feel your foot.

## 2019-08-28 ENCOUNTER — TELEPHONE (OUTPATIENT)
Dept: FAMILY MEDICINE | Facility: CLINIC | Age: 54
End: 2019-08-28

## 2019-08-28 NOTE — TELEPHONE ENCOUNTER
----- Message from Ana Lora sent at 8/28/2019 12:43 PM CDT -----  Contact: pt [phone]  Patient called he needs to have a pre op appt he is scheduled to have surgery 9/9/19 on his Right foot. The next appt for you was 9/10/19 will you be able to fit him in sooner.

## 2019-08-29 ENCOUNTER — OFFICE VISIT (OUTPATIENT)
Dept: FAMILY MEDICINE | Facility: CLINIC | Age: 54
End: 2019-08-29
Payer: MEDICARE

## 2019-08-29 ENCOUNTER — TELEPHONE (OUTPATIENT)
Dept: PODIATRY | Facility: CLINIC | Age: 54
End: 2019-08-29

## 2019-08-29 ENCOUNTER — LAB VISIT (OUTPATIENT)
Dept: LAB | Facility: HOSPITAL | Age: 54
End: 2019-08-29
Attending: FAMILY MEDICINE
Payer: MEDICARE

## 2019-08-29 VITALS
TEMPERATURE: 99 F | HEIGHT: 71 IN | RESPIRATION RATE: 19 BRPM | WEIGHT: 259.56 LBS | DIASTOLIC BLOOD PRESSURE: 72 MMHG | OXYGEN SATURATION: 97 % | HEART RATE: 87 BPM | BODY MASS INDEX: 36.34 KG/M2 | SYSTOLIC BLOOD PRESSURE: 132 MMHG

## 2019-08-29 DIAGNOSIS — Z79.899 ENCOUNTER FOR LONG-TERM (CURRENT) USE OF HIGH-RISK MEDICATION: ICD-10-CM

## 2019-08-29 DIAGNOSIS — R35.0 URINARY FREQUENCY: ICD-10-CM

## 2019-08-29 DIAGNOSIS — Z01.818 PREOP EXAMINATION: Primary | ICD-10-CM

## 2019-08-29 DIAGNOSIS — Z12.11 SPECIAL SCREENING FOR MALIGNANT NEOPLASMS, COLON: ICD-10-CM

## 2019-08-29 DIAGNOSIS — J44.9 CHRONIC OBSTRUCTIVE PULMONARY DISEASE, UNSPECIFIED COPD TYPE: ICD-10-CM

## 2019-08-29 DIAGNOSIS — F20.9 SCHIZOPHRENIA, UNSPECIFIED TYPE: ICD-10-CM

## 2019-08-29 DIAGNOSIS — G89.29 CHRONIC NONINTRACTABLE HEADACHE, UNSPECIFIED HEADACHE TYPE: ICD-10-CM

## 2019-08-29 DIAGNOSIS — E66.9 OBESITY, UNSPECIFIED CLASSIFICATION, UNSPECIFIED OBESITY TYPE, UNSPECIFIED WHETHER SERIOUS COMORBIDITY PRESENT: ICD-10-CM

## 2019-08-29 DIAGNOSIS — R51.9 CHRONIC NONINTRACTABLE HEADACHE, UNSPECIFIED HEADACHE TYPE: ICD-10-CM

## 2019-08-29 DIAGNOSIS — Z00.00 ROUTINE HEALTH MAINTENANCE: ICD-10-CM

## 2019-08-29 DIAGNOSIS — R79.89 ELEVATED PLATELET COUNT: ICD-10-CM

## 2019-08-29 DIAGNOSIS — Z79.899 HIGH RISK MEDICATIONS (NOT ANTICOAGULANTS) LONG-TERM USE: ICD-10-CM

## 2019-08-29 LAB
ALBUMIN SERPL BCP-MCNC: 3.8 G/DL (ref 3.5–5.2)
ALP SERPL-CCNC: 103 U/L (ref 55–135)
ALT SERPL W/O P-5'-P-CCNC: 10 U/L (ref 10–44)
ANION GAP SERPL CALC-SCNC: 10 MMOL/L (ref 8–16)
AST SERPL-CCNC: 15 U/L (ref 10–40)
BASOPHILS # BLD AUTO: 0.06 K/UL (ref 0–0.2)
BASOPHILS NFR BLD: 0.6 % (ref 0–1.9)
BILIRUB SERPL-MCNC: 0.3 MG/DL (ref 0.1–1)
BUN SERPL-MCNC: 3 MG/DL (ref 6–20)
CALCIUM SERPL-MCNC: 9.1 MG/DL (ref 8.7–10.5)
CHLORIDE SERPL-SCNC: 92 MMOL/L (ref 95–110)
CHOLEST SERPL-MCNC: 142 MG/DL (ref 120–199)
CHOLEST/HDLC SERPL: 3.9 {RATIO} (ref 2–5)
CO2 SERPL-SCNC: 27 MMOL/L (ref 23–29)
COMPLEXED PSA SERPL-MCNC: 2 NG/ML (ref 0–4)
CREAT SERPL-MCNC: 0.8 MG/DL (ref 0.5–1.4)
DIFFERENTIAL METHOD: ABNORMAL
EOSINOPHIL # BLD AUTO: 0.1 K/UL (ref 0–0.5)
EOSINOPHIL NFR BLD: 1.1 % (ref 0–8)
ERYTHROCYTE [DISTWIDTH] IN BLOOD BY AUTOMATED COUNT: 19.5 % (ref 11.5–14.5)
EST. GFR  (AFRICAN AMERICAN): >60 ML/MIN/1.73 M^2
EST. GFR  (NON AFRICAN AMERICAN): >60 ML/MIN/1.73 M^2
ESTIMATED AVG GLUCOSE: 126 MG/DL (ref 68–131)
FERRITIN SERPL-MCNC: 8 NG/ML (ref 20–300)
FOLATE SERPL-MCNC: 4.3 NG/ML (ref 4–24)
GLUCOSE SERPL-MCNC: 112 MG/DL (ref 70–110)
HBA1C MFR BLD HPLC: 6 % (ref 4–5.6)
HCT VFR BLD AUTO: 37.4 % (ref 40–54)
HDLC SERPL-MCNC: 36 MG/DL (ref 40–75)
HDLC SERPL: 25.4 % (ref 20–50)
HGB BLD-MCNC: 12.2 G/DL (ref 14–18)
IMM GRANULOCYTES # BLD AUTO: 0.05 K/UL (ref 0–0.04)
IMM GRANULOCYTES NFR BLD AUTO: 0.5 % (ref 0–0.5)
IRON SERPL-MCNC: 16 UG/DL (ref 45–160)
LDLC SERPL CALC-MCNC: 89.4 MG/DL (ref 63–159)
LYMPHOCYTES # BLD AUTO: 2.4 K/UL (ref 1–4.8)
LYMPHOCYTES NFR BLD: 22.8 % (ref 18–48)
MCH RBC QN AUTO: 25.2 PG (ref 27–31)
MCHC RBC AUTO-ENTMCNC: 32.6 G/DL (ref 32–36)
MCV RBC AUTO: 77 FL (ref 82–98)
MONOCYTES # BLD AUTO: 0.8 K/UL (ref 0.3–1)
MONOCYTES NFR BLD: 7.6 % (ref 4–15)
NEUTROPHILS # BLD AUTO: 7.2 K/UL (ref 1.8–7.7)
NEUTROPHILS NFR BLD: 67.4 % (ref 38–73)
NONHDLC SERPL-MCNC: 106 MG/DL
NRBC BLD-RTO: 0 /100 WBC
PLATELET # BLD AUTO: 528 K/UL (ref 150–350)
PMV BLD AUTO: 9.8 FL (ref 9.2–12.9)
POTASSIUM SERPL-SCNC: 3.6 MMOL/L (ref 3.5–5.1)
PROT SERPL-MCNC: 7.5 G/DL (ref 6–8.4)
RBC # BLD AUTO: 4.85 M/UL (ref 4.6–6.2)
SATURATED IRON: 4 % (ref 20–50)
SODIUM SERPL-SCNC: 129 MMOL/L (ref 136–145)
TOTAL IRON BINDING CAPACITY: 451 UG/DL (ref 250–450)
TRANSFERRIN SERPL-MCNC: 305 MG/DL (ref 200–375)
TRIGL SERPL-MCNC: 83 MG/DL (ref 30–150)
TSH SERPL DL<=0.005 MIU/L-ACNC: 1.31 UIU/ML (ref 0.4–4)
VIT B12 SERPL-MCNC: <146 PG/ML (ref 210–950)
WBC # BLD AUTO: 10.68 K/UL (ref 3.9–12.7)

## 2019-08-29 PROCEDURE — 80183 DRUG SCRN QUANT OXCARBAZEPIN: CPT | Mod: HCNC

## 2019-08-29 PROCEDURE — 82746 ASSAY OF FOLIC ACID SERUM: CPT | Mod: HCNC

## 2019-08-29 PROCEDURE — 80061 LIPID PANEL: CPT | Mod: HCNC

## 2019-08-29 PROCEDURE — 82607 VITAMIN B-12: CPT | Mod: HCNC

## 2019-08-29 PROCEDURE — 99999 PR PBB SHADOW E&M-EST. PATIENT-LVL V: ICD-10-PCS | Mod: PBBFAC,HCNC,, | Performed by: FAMILY MEDICINE

## 2019-08-29 PROCEDURE — 3008F BODY MASS INDEX DOCD: CPT | Mod: HCNC,CPTII,S$GLB, | Performed by: FAMILY MEDICINE

## 2019-08-29 PROCEDURE — 85025 COMPLETE CBC W/AUTO DIFF WBC: CPT | Mod: HCNC

## 2019-08-29 PROCEDURE — 83540 ASSAY OF IRON: CPT | Mod: HCNC

## 2019-08-29 PROCEDURE — 3008F PR BODY MASS INDEX (BMI) DOCUMENTED: ICD-10-PCS | Mod: HCNC,CPTII,S$GLB, | Performed by: FAMILY MEDICINE

## 2019-08-29 PROCEDURE — 84443 ASSAY THYROID STIM HORMONE: CPT | Mod: HCNC

## 2019-08-29 PROCEDURE — 82728 ASSAY OF FERRITIN: CPT | Mod: HCNC

## 2019-08-29 PROCEDURE — 99999 PR PBB SHADOW E&M-EST. PATIENT-LVL V: CPT | Mod: PBBFAC,HCNC,, | Performed by: FAMILY MEDICINE

## 2019-08-29 PROCEDURE — 99214 PR OFFICE/OUTPT VISIT, EST, LEVL IV, 30-39 MIN: ICD-10-PCS | Mod: HCNC,S$GLB,, | Performed by: FAMILY MEDICINE

## 2019-08-29 PROCEDURE — 84153 ASSAY OF PSA TOTAL: CPT | Mod: HCNC

## 2019-08-29 PROCEDURE — 83036 HEMOGLOBIN GLYCOSYLATED A1C: CPT | Mod: HCNC

## 2019-08-29 PROCEDURE — 99214 OFFICE O/P EST MOD 30 MIN: CPT | Mod: HCNC,S$GLB,, | Performed by: FAMILY MEDICINE

## 2019-08-29 PROCEDURE — 80053 COMPREHEN METABOLIC PANEL: CPT | Mod: HCNC

## 2019-08-29 RX ORDER — TADALAFIL 10 MG/1
10 TABLET ORAL DAILY PRN
Qty: 30 TABLET | Refills: 0 | Status: SHIPPED | OUTPATIENT
Start: 2019-08-29 | End: 2019-12-13

## 2019-08-29 RX ORDER — METHOCARBAMOL 500 MG/1
500 TABLET, FILM COATED ORAL EVERY 8 HOURS PRN
Qty: 30 TABLET | Refills: 0 | Status: SHIPPED | OUTPATIENT
Start: 2019-08-29 | End: 2019-12-13

## 2019-08-29 RX ORDER — ASPIRIN 81 MG/1
81 TABLET ORAL DAILY
COMMUNITY
End: 2019-11-09 | Stop reason: CLARIF

## 2019-08-29 NOTE — TELEPHONE ENCOUNTER
Pt notified that message received from PCP. Cardio appt already scheduled. Pt advised if not cleared he would be notified, but until then to keep current schedule date. Pt verbalized understanding.

## 2019-08-29 NOTE — PROGRESS NOTES
Subjective:       Patient ID: Ryan Crane is a 54 y.o. male.    Chief Complaint: Pre-op Exam      Ryan Crane is in the office for preop exam. Scheduled sept 9 for R foot surgery with Dr Kathleen.     [facility]  Medical hx reviewed. No updates.  Past Medical History:   Diagnosis Date    Acute angina     COPD (chronic obstructive pulmonary disease)     Hypertension     MI, old     PIPER on CPAP     Schizophrenia     Stroke      Requests refill of medication for his HA. Currently taking goodys twice daily for HA relief. Occipital HA, near-daily, reports pain ~7-9/10. No diurnal variation. May wake up with HA.   Recalls that before moving to the area, he would see cards at least once/year for known CAD. States that he had a blockage that was not significant enough for stenting.    Has taken 2 ntg over the past 6mos since LOV. None within the past 2 mos.  Requests refill of cialis for prn. Aware of avoidance with concurrent use of ntg.    Current Outpatient Medications:     busPIRone (BUSPAR) 5 MG Tab, TK 1 T PO TID, Disp: , Rfl: 4    DULoxetine (CYMBALTA) 30 MG capsule, Take 30 mg by mouth once daily., Disp: , Rfl:     metoprolol succinate (TOPROL-XL) 25 MG 24 hr tablet, TAKE 1 TABLET(25 MG) BY MOUTH EVERY DAY, Disp: 90 tablet, Rfl: 1    nitroGLYCERIN (NITROSTAT) 0.4 MG SL tablet, Place 1 tablet (0.4 mg total) under the tongue every 5 (five) minutes as needed for Chest pain., Disp: 9 tablet, Rfl: 3    OXcarbazepine (TRILEPTAL) 600 MG Tab, Take 600 mg by mouth 3 (three) times daily., Disp: , Rfl:     ziprasidone (GEODON) 80 MG capsule, Take 160 mg by mouth once daily. At night, Disp: , Rfl:     The 10-year ASCVD risk score (Lilli DC Jr., et al., 2013) is: 10.8%    Values used to calculate the score:      Age: 54 years      Sex: Male      Is Non- : No      Diabetic: No      Tobacco smoker: Yes      Systolic Blood Pressure: 132 mmHg      Is BP treated: No      HDL Cholesterol: 38  mg/dL      Total Cholesterol: 186 mg/dL     Lab Results   Component Value Date    HGBA1C 5.6 01/16/2019     Lab Results   Component Value Date    LDLCALC 127.4 01/16/2019    CREATININE 0.8 01/16/2019   labs 2019 rev.    Review of Systems   Constitutional: Negative for fatigue and unexpected weight change.   HENT: Negative for congestion and postnasal drip.    Eyes: Negative for photophobia and visual disturbance.   Respiratory: Positive for cough. Negative for shortness of breath.    Cardiovascular: Negative for chest pain (occ) and leg swelling.   Gastrointestinal: Negative for blood in stool, constipation and diarrhea.        No gerd c/o.   Genitourinary: Positive for frequency. Negative for difficulty urinating and hematuria.   Musculoskeletal: Positive for arthralgias (generalized arthritis) and myalgias (foot pain).   Neurological: Positive for headaches. Negative for dizziness, tremors and light-headedness.   Psychiatric/Behavioral: Negative for dysphoric mood and sleep disturbance.           Objective:      Physical Exam   Constitutional: He is oriented to person, place, and time. He appears well-developed and well-nourished.   HENT:   Head: Normocephalic and atraumatic.   Mouth/Throat: Oropharynx is clear and moist.   Eyes: Pupils are equal, round, and reactive to light. Conjunctivae and EOM are normal. No scleral icterus.   Neck: Normal range of motion. Neck supple. No thyromegaly present.   Cardiovascular: Normal rate and regular rhythm.   Pulmonary/Chest: Effort normal and breath sounds normal. No respiratory distress. He has no wheezes. He has no rales.   Abdominal: Soft. There is no guarding.   Exam limited by habitus.   Musculoskeletal: Normal range of motion. He exhibits no edema.   R foot in dressing with boot   Neurological: He is alert and oriented to person, place, and time. No cranial nerve deficit.   Skin: Skin is warm and dry.   Psychiatric: His affect is blunt. His speech is delayed. He is  slowed.   Unkempt    Nursing note and vitals reviewed.          Screening recommendations appropriate to age and health status were reviewed.    Assessment & Plan:    Preop examination  -     Ambulatory referral to Optometry  -     Ambulatory referral to Cardiology  Update routine lab, incl orders from outside psych.  Pt note due for eye exam.  By hx, pt reports CAD with blockages of unknown %. He recalls last eval 1 year ago. Will need records from prev phys and cards for preop clearance.  Advised pt on importance of tobacco cessation in light of nonhealing wound concerns. He expressed understanding.  Obesity, unspecified classification, unspecified obesity type, unspecified whether serious comorbidity present  -     HEMOGLOBIN A1C; Future; Expected date: 08/29/2019  -     TSH; Future; Expected date: 08/29/2019  -     LIPID PANEL; Future; Expected date: 08/29/2019  -     CBC W/ AUTO DIFFERENTIAL; Future; Expected date: 08/29/2019  -     COMPREHENSIVE METABOLIC PANEL; Future; Expected date: 08/29/2019  -     OXCARBAZEPINE METABOLITE (MHC); Future; Expected date: 08/29/2019  Encouraged healthy eating, exercise as able.  Chronic obstructive pulmonary disease, unspecified COPD type  -     Ambulatory referral to Pulmonology  Pt notes relatively asymp, requests f/u with pulm.   Schizophrenia, unspecified type  -     HEMOGLOBIN A1C; Future; Expected date: 08/29/2019  -     TSH; Future; Expected date: 08/29/2019  -     LIPID PANEL; Future; Expected date: 08/29/2019  -     CBC W/ AUTO DIFFERENTIAL; Future; Expected date: 08/29/2019  -     COMPREHENSIVE METABOLIC PANEL; Future; Expected date: 08/29/2019  -     OXCARBAZEPINE METABOLITE (MHC); Future; Expected date: 08/29/2019  Per outside psych.  Special screening for malignant neoplasms, colon  -     Fecal Immunochemical Test (iFOBT); Future; Expected date: 08/29/2019  -     HEMOGLOBIN A1C; Future; Expected date: 08/29/2019  -     TSH; Future; Expected date: 08/29/2019  -      LIPID PANEL; Future; Expected date: 08/29/2019  -     CBC W/ AUTO DIFFERENTIAL; Future; Expected date: 08/29/2019  -     COMPREHENSIVE METABOLIC PANEL; Future; Expected date: 08/29/2019  -     OXCARBAZEPINE METABOLITE (MHC); Future; Expected date: 08/29/2019    Chronic nonintractable headache, unspecified headache type  -     methocarbamol (ROBAXIN) 500 MG Tab; Take 1 tablet (500 mg total) by mouth every 8 (eight) hours as needed (muscle spasms).  Dispense: 30 tablet; Refill: 0  Recommend he stop asa/goodys/etc at least 7 days prior to planned procedure. Trial robaxin in lieu of otcs for his HA. Update eye exam. Review further at f/u.  Routine health maintenance  -     tadalafil (CIALIS) 10 MG tablet; Take 1 tablet (10 mg total) by mouth daily as needed for Erectile Dysfunction.  Dispense: 30 tablet; Refill: 0  Side effects and precautions of medication use reviewed with patient, expressed understanding. No questions or concerns.   Encounter for long-term (current) use of high-risk medication  -     HEMOGLOBIN A1C; Future; Expected date: 08/29/2019  -     TSH; Future; Expected date: 08/29/2019  -     LIPID PANEL; Future; Expected date: 08/29/2019  -     CBC W/ AUTO DIFFERENTIAL; Future; Expected date: 08/29/2019  -     COMPREHENSIVE METABOLIC PANEL; Future; Expected date: 08/29/2019  -     OXCARBAZEPINE METABOLITE (MHC); Future; Expected date: 08/29/2019

## 2019-08-29 NOTE — TELEPHONE ENCOUNTER
----- Message from Laura Becerril MD sent at 8/29/2019 12:52 PM CDT -----  Pt seen for preop. Will needs cards clearance as he reports CAD hx. Thanks, LSE

## 2019-08-30 ENCOUNTER — LAB VISIT (OUTPATIENT)
Dept: LAB | Facility: HOSPITAL | Age: 54
End: 2019-08-30
Attending: FAMILY MEDICINE
Payer: MEDICARE

## 2019-08-30 DIAGNOSIS — Z12.11 SPECIAL SCREENING FOR MALIGNANT NEOPLASMS, COLON: ICD-10-CM

## 2019-08-30 PROCEDURE — 82274 ASSAY TEST FOR BLOOD FECAL: CPT | Mod: HCNC

## 2019-08-31 LAB — OXCARBAZEPINE METABOLITE: 23 MCG/ML (ref 3–35)

## 2019-09-04 ENCOUNTER — OFFICE VISIT (OUTPATIENT)
Dept: CARDIOLOGY | Facility: CLINIC | Age: 54
End: 2019-09-04
Payer: MEDICARE

## 2019-09-04 VITALS
SYSTOLIC BLOOD PRESSURE: 148 MMHG | HEART RATE: 77 BPM | BODY MASS INDEX: 36.48 KG/M2 | HEIGHT: 71 IN | DIASTOLIC BLOOD PRESSURE: 82 MMHG | WEIGHT: 260.56 LBS

## 2019-09-04 DIAGNOSIS — J44.9 CHRONIC OBSTRUCTIVE PULMONARY DISEASE, UNSPECIFIED COPD TYPE: ICD-10-CM

## 2019-09-04 DIAGNOSIS — F20.9 SCHIZOPHRENIA, UNSPECIFIED TYPE: ICD-10-CM

## 2019-09-04 DIAGNOSIS — G47.33 OSA ON CPAP: ICD-10-CM

## 2019-09-04 DIAGNOSIS — Z01.810 PREOP CARDIOVASCULAR EXAM: ICD-10-CM

## 2019-09-04 DIAGNOSIS — I25.10 CAD IN NATIVE ARTERY: ICD-10-CM

## 2019-09-04 DIAGNOSIS — T81.30XA DEHISCENCE OF WOUND: ICD-10-CM

## 2019-09-04 PROCEDURE — 3008F BODY MASS INDEX DOCD: CPT | Mod: HCNC,CPTII,S$GLB, | Performed by: INTERNAL MEDICINE

## 2019-09-04 PROCEDURE — 3008F PR BODY MASS INDEX (BMI) DOCUMENTED: ICD-10-PCS | Mod: HCNC,CPTII,S$GLB, | Performed by: INTERNAL MEDICINE

## 2019-09-04 PROCEDURE — 99499 RISK ADDL DX/OHS AUDIT: ICD-10-PCS | Mod: HCNC,S$GLB,, | Performed by: INTERNAL MEDICINE

## 2019-09-04 PROCEDURE — 99499 UNLISTED E&M SERVICE: CPT | Mod: HCNC,S$GLB,, | Performed by: INTERNAL MEDICINE

## 2019-09-04 PROCEDURE — 99999 PR PBB SHADOW E&M-EST. PATIENT-LVL III: CPT | Mod: PBBFAC,HCNC,, | Performed by: INTERNAL MEDICINE

## 2019-09-04 PROCEDURE — 99204 OFFICE O/P NEW MOD 45 MIN: CPT | Mod: HCNC,S$GLB,, | Performed by: INTERNAL MEDICINE

## 2019-09-04 PROCEDURE — 99204 PR OFFICE/OUTPT VISIT, NEW, LEVL IV, 45-59 MIN: ICD-10-PCS | Mod: HCNC,S$GLB,, | Performed by: INTERNAL MEDICINE

## 2019-09-04 PROCEDURE — 93000 EKG 12-LEAD: ICD-10-PCS | Mod: HCNC,S$GLB,, | Performed by: INTERNAL MEDICINE

## 2019-09-04 PROCEDURE — 99999 PR PBB SHADOW E&M-EST. PATIENT-LVL III: ICD-10-PCS | Mod: PBBFAC,HCNC,, | Performed by: INTERNAL MEDICINE

## 2019-09-04 PROCEDURE — 93000 ELECTROCARDIOGRAM COMPLETE: CPT | Mod: HCNC,S$GLB,, | Performed by: INTERNAL MEDICINE

## 2019-09-04 NOTE — PROGRESS NOTES
"Subjective:    Patient ID:  Ryan Crane is a 54 y.o. male who presents for evaluation of Hypertension; Hyperlipidemia; Peripheral Vascular Disease (cardiac clearance right foot surgery); COPD; Cerebrovascular Accident; and Schizophrenia      Pt here for pre-op evaluation for foot surgery. 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 He has been seeing a cardiologist in [facility] since 2005. He assumed he had had a heart attack but on close questioning he says he was never told that. Apparently he went to the hospital with chest pains and had an angiogram showing some blockage but not significant enough to require intervention. He has had subsequent normal stress tests and says had a 2nd angiogram in the "teens" showing moderate 60% blockages - again no need for intervention. Says last nuclear stress was ok about 1 1/2 years ago. He has not had any recent chest pains. He has COPD and continues to smoke.      Review of Systems   Constitution: Negative for weight gain and weight loss.   HENT: Negative.    Eyes: Negative.    Cardiovascular: Negative for chest pain, claudication, cyanosis, irregular heartbeat, leg swelling, near-syncope, orthopnea (no PND), palpitations and syncope.   Respiratory: Positive for shortness of breath. Negative for cough, hemoptysis and snoring.    Endocrine: Negative.    Skin: Negative.    Musculoskeletal: Negative for joint pain, muscle cramps, muscle weakness and myalgias.   Gastrointestinal: Negative for diarrhea, hematemesis, nausea and vomiting.   Genitourinary: Negative.    Neurological: Negative for dizziness, focal weakness, light-headedness, loss of balance, numbness, paresthesias and seizures.   Psychiatric/Behavioral: Negative.         Objective:    Physical Exam   Constitutional: He is oriented to person, place, and time. He appears well-developed and well-nourished. " "  HENT:   Mouth/Throat: Oropharynx is clear and moist.   Eyes: Pupils are equal, round, and reactive to light.   Neck: Normal range of motion. No thyromegaly present.   Cardiovascular: Normal rate, regular rhythm, S1 normal, S2 normal, normal heart sounds, intact distal pulses and normal pulses.  No extrasystoles are present. PMI is not displaced. Exam reveals no friction rub.   No murmur heard.  Pulmonary/Chest: Effort normal and breath sounds normal. He has no wheezes. He has no rales. He exhibits no tenderness.   Abdominal: Soft. Bowel sounds are normal. He exhibits no distension and no mass. There is no tenderness.   Musculoskeletal: Normal range of motion. He exhibits no edema.   Neurological: He is alert and oriented to person, place, and time.   Skin: Skin is warm and dry.   Vitals reviewed.      Test(s) Reviewed  I have reviewed the following in detail:  [] Stress test   [] Angiography   [] Echocardiogram   [x] Labs   [x] Other:  ECG       Assessment:       1. Preop cardiovascular exam    2. CAD in native artery - moderate 3V 60% by Pt history; last angiogram in the "teens"    3. PIPER on CPAP    4. Chronic obstructive pulmonary disease, unspecified COPD type    5. Schizophrenia, unspecified type    6. Dehiscence of wound - Right Foot         Plan:       Pt with moderate CAD by his history of previous angiograms. ECG unremarkable.  Will check Echo for overall LV function  Will request records from previous cardiologist  Would not consider Pt at high risk for outpatient surgical procedure on foot.    "

## 2019-09-04 NOTE — LETTER
September 4, 2019      Laura Becerril MD  [address]  [place] [address]           [facility]  [address] [facility] [address] [place] [address]  Phone: [phone]          Patient: Ryan Crane   MR Number: [id number]   YOB: 1965   Date of Visit: 9/4/2019       Dear Dr. Laura Becerril:    Thank you for referring Ryan Crane to me for evaluation. Attached you will find relevant portions of my assessment and plan of care.    If you have questions, please do not hesitate to call me. I look forward to following Ryan Crane along with you.    Sincerely,    Huan Merritt MD    Enclosure  CC:  No Recipients    If you would like to receive this communication electronically, please contact [email or web address] or [phone] to request more information on [employer] access.    For providers and/or their staff who would like to refer a patient to [facility], please contact us through our one-stop-shop provider referral line, Cleo Estrada, at [phone].    If you feel you have received this communication in error or would no longer like to receive these types of communications, please e-mail [email or web address]

## 2019-09-05 ENCOUNTER — CLINICAL SUPPORT (OUTPATIENT)
Dept: CARDIOLOGY | Facility: CLINIC | Age: 54
End: 2019-09-05
Attending: INTERNAL MEDICINE
Payer: MEDICARE

## 2019-09-05 VITALS — WEIGHT: 260 LBS | BODY MASS INDEX: 36.4 KG/M2 | HEIGHT: 71 IN

## 2019-09-05 DIAGNOSIS — I25.10 CAD IN NATIVE ARTERY: ICD-10-CM

## 2019-09-05 DIAGNOSIS — T81.30XA DEHISCENCE OF WOUND: ICD-10-CM

## 2019-09-05 DIAGNOSIS — J44.9 CHRONIC OBSTRUCTIVE PULMONARY DISEASE, UNSPECIFIED COPD TYPE: ICD-10-CM

## 2019-09-05 DIAGNOSIS — F20.9 SCHIZOPHRENIA, UNSPECIFIED TYPE: ICD-10-CM

## 2019-09-05 DIAGNOSIS — Z01.810 PREOP CARDIOVASCULAR EXAM: ICD-10-CM

## 2019-09-05 DIAGNOSIS — G47.33 OSA ON CPAP: ICD-10-CM

## 2019-09-05 LAB — HEMOCCULT STL QL IA: NEGATIVE

## 2019-09-05 PROCEDURE — 99999 PR PBB SHADOW E&M-EST. PATIENT-LVL I: ICD-10-PCS | Mod: PBBFAC,HCNC,,

## 2019-09-05 PROCEDURE — 93306 TTE W/DOPPLER COMPLETE: CPT | Mod: HCNC,S$GLB,, | Performed by: INTERNAL MEDICINE

## 2019-09-05 PROCEDURE — 99999 PR PBB SHADOW E&M-EST. PATIENT-LVL I: CPT | Mod: PBBFAC,HCNC,,

## 2019-09-05 PROCEDURE — 93306 ECHO (CUPID ONLY): ICD-10-PCS | Mod: HCNC,S$GLB,, | Performed by: INTERNAL MEDICINE

## 2019-09-06 ENCOUNTER — TELEPHONE (OUTPATIENT)
Dept: FAMILY MEDICINE | Facility: CLINIC | Age: 54
End: 2019-09-06

## 2019-09-06 ENCOUNTER — ANESTHESIA EVENT (OUTPATIENT)
Dept: SURGERY | Facility: HOSPITAL | Age: 54
End: 2019-09-06
Payer: MEDICARE

## 2019-09-06 ENCOUNTER — TELEPHONE (OUTPATIENT)
Dept: SURGERY | Facility: HOSPITAL | Age: 54
End: 2019-09-06

## 2019-09-06 ENCOUNTER — TELEPHONE (OUTPATIENT)
Dept: CARDIOLOGY | Facility: CLINIC | Age: 54
End: 2019-09-06

## 2019-09-06 DIAGNOSIS — R71.8 MICROCYTOSIS: ICD-10-CM

## 2019-09-06 DIAGNOSIS — R80.9 PROTEINURIA, UNSPECIFIED TYPE: ICD-10-CM

## 2019-09-06 DIAGNOSIS — D75.839 THROMBOCYTOSIS: ICD-10-CM

## 2019-09-06 DIAGNOSIS — D64.9 ANEMIA, UNSPECIFIED TYPE: Primary | ICD-10-CM

## 2019-09-06 DIAGNOSIS — E53.8 B12 DEFICIENCY: ICD-10-CM

## 2019-09-06 LAB
ASCENDING AORTA: 3.09 CM
AV MEAN GRADIENT: 21 MMHG
AV PEAK GRADIENT: 36 MMHG
BSA FOR ECHO PROCEDURE: 2.43 M2
CV ECHO LV RWT: 0.46 CM
DOP CALC AO PEAK VEL: 3.02 M/S
DOP CALC AO VTI: 52.44 CM
DOP CALC LVOT AREA: 4.6 CM2
DOP CALC LVOT DIAMETER: 2.43 CM
E WAVE DECELERATION TIME: 358.28 MSEC
E/A RATIO: 0.64
E/E' RATIO: 22.8 M/S
ECHO LV POSTERIOR WALL: 1.23 CM (ref 0.6–1.1)
FRACTIONAL SHORTENING: 44 % (ref 28–44)
INTERVENTRICULAR SEPTUM: 1.33 CM (ref 0.6–1.1)
IVRT: 0.12 MSEC
LA MAJOR: 5.87 CM
LA MINOR: 5.7 CM
LA WIDTH: 4.05 CM
LEFT ATRIUM SIZE: 4.95 CM
LEFT ATRIUM VOLUME INDEX: 41.8 ML/M2
LEFT ATRIUM VOLUME: 98.56 CM3
LEFT INTERNAL DIMENSION IN SYSTOLE: 3.01 CM (ref 2.1–4)
LEFT VENTRICLE DIASTOLIC VOLUME INDEX: 58.36 ML/M2
LEFT VENTRICLE DIASTOLIC VOLUME: 137.6 ML
LEFT VENTRICLE MASS INDEX: 121 G/M2
LEFT VENTRICLE SYSTOLIC VOLUME INDEX: 15 ML/M2
LEFT VENTRICLE SYSTOLIC VOLUME: 35.43 ML
LEFT VENTRICULAR INTERNAL DIMENSION IN DIASTOLE: 5.34 CM (ref 3.5–6)
LEFT VENTRICULAR MASS: 284.12 G
LV LATERAL E/E' RATIO: 22.8 M/S
LV SEPTAL E/E' RATIO: 22.8 M/S
MV PEAK A VEL: 1.79 M/S
MV PEAK E VEL: 1.14 M/S
PISA TR MAX VEL: 2.41 M/S
PULM VEIN S/D RATIO: 1.77
PV PEAK D VEL: 0.39 M/S
PV PEAK S VEL: 0.69 M/S
RA MAJOR: 3.95 CM
RA PRESSURE: 3 MMHG
RA WIDTH: 2.64 CM
RIGHT VENTRICULAR END-DIASTOLIC DIMENSION: 3.74 CM
SINUS: 3.38 CM
STJ: 3.05 CM
TDI LATERAL: 0.05 M/S
TDI SEPTAL: 0.05 M/S
TDI: 0.05 M/S
TR MAX PG: 23 MMHG
TRICUSPID ANNULAR PLANE SYSTOLIC EXCURSION: 2.52 CM
TV REST PULMONARY ARTERY PRESSURE: 26 MMHG

## 2019-09-06 RX ORDER — CYANOCOBALAMIN 1000 UG/ML
1000 INJECTION, SOLUTION INTRAMUSCULAR; SUBCUTANEOUS WEEKLY
Status: CANCELLED | OUTPATIENT
Start: 2019-09-06

## 2019-09-06 RX ORDER — FERROUS GLUCONATE 324(37.5)
324 TABLET ORAL 2 TIMES DAILY WITH MEALS
Qty: 60 TABLET | Refills: 2 | COMMUNITY
Start: 2019-09-06 | End: 2019-12-13

## 2019-09-06 NOTE — TELEPHONE ENCOUNTER
Labs reviewed:  Iron deficiency - start iron supplement (rx) daily.  Anemia with elevated platelets - likely related to above, but will plan recheck with hematology.  B12 deficiency - start B12 1000mcg injections weekly x 1mo, then recheck lab. Also, start oral b12 (otc) 1000mcg daily.  Urine with elev protein. Repeat study.

## 2019-09-06 NOTE — TELEPHONE ENCOUNTER
Spoke with pt, advised on test results, aware of medication start, pt would like to have nurse give weekly b12 injection X 1month, will send rx to pharmacy  Pt has an appt 9-12 with PCP to further discuss

## 2019-09-06 NOTE — TELEPHONE ENCOUNTER
----- Message from Rene Sam sent at 9/6/2019  3:52 PM CDT -----  Contact: patient   Type:  Patient Returning Call    Who Called: patient  Who Left Message for Patient:  Ginger   Does the patient know what this is regarding?:  n/a  Best Call Back Number:  [phone] (home)

## 2019-09-06 NOTE — TELEPHONE ENCOUNTER
When doing pre op call with patient, he is currently taking 81 mg aspirin daily. He states he was not instructed to stop this medicine in preparation for this surgery. If this will affect his procedure, please contact the patient. Thank you

## 2019-09-06 NOTE — TELEPHONE ENCOUNTER
Called patient left message that he is cleared for surgery but will still need to request his previous cardiology records and have a follow up to discuss echo.

## 2019-09-06 NOTE — TELEPHONE ENCOUNTER
----- Message from Cara Cline sent at 9/6/2019  2:07 PM CDT -----  Type:  Patient Returning Call    Who Called:  Patient  Who Left Message for Patient:  Ginger Wood  Does the patient know what this is regarding?: NA  Best Call Back Number:  [phone]  Additional Information:

## 2019-09-09 ENCOUNTER — ANESTHESIA (OUTPATIENT)
Dept: SURGERY | Facility: HOSPITAL | Age: 54
End: 2019-09-09
Payer: MEDICARE

## 2019-09-09 ENCOUNTER — HOSPITAL ENCOUNTER (OUTPATIENT)
Facility: HOSPITAL | Age: 54
Discharge: HOME | End: 2019-09-09
Attending: PODIATRIST | Admitting: PODIATRIST
Payer: MEDICARE

## 2019-09-09 DIAGNOSIS — R23.4 FISSURE IN SKIN OF FOOT: Primary | ICD-10-CM

## 2019-09-09 DIAGNOSIS — M79.671 PAIN OF RIGHT HEEL: ICD-10-CM

## 2019-09-09 DIAGNOSIS — R46.0 POOR HYGIENE: ICD-10-CM

## 2019-09-09 PROCEDURE — 12041 INTMD RPR N-HF/GENIT 2.5CM/<: CPT | Mod: HCNC,,, | Performed by: PODIATRIST

## 2019-09-09 PROCEDURE — 64447 NJX AA&/STRD FEMORAL NRV IMG: CPT | Mod: 59,RT,, | Performed by: ANESTHESIOLOGY

## 2019-09-09 PROCEDURE — 36000706: Mod: HCNC,PO | Performed by: PODIATRIST

## 2019-09-09 PROCEDURE — 37000009 HC ANESTHESIA EA ADD 15 MINS: Mod: HCNC,PO | Performed by: PODIATRIST

## 2019-09-09 PROCEDURE — 27200750 HC INSULATED NEEDLE/ STIMUPLEX: Mod: HCNC,PO | Performed by: ANESTHESIOLOGY

## 2019-09-09 PROCEDURE — 76942 PR U/S GUIDANCE FOR NEEDLE GUIDANCE: ICD-10-PCS | Mod: 26,,, | Performed by: ANESTHESIOLOGY

## 2019-09-09 PROCEDURE — 25000003 PHARM REV CODE 250: Mod: HCNC,PO | Performed by: NURSE ANESTHETIST, CERTIFIED REGISTERED

## 2019-09-09 PROCEDURE — 63600175 PHARM REV CODE 636 W HCPCS: Mod: HCNC,PO | Performed by: ANESTHESIOLOGY

## 2019-09-09 PROCEDURE — 88305 TISSUE SPECIMEN TO PATHOLOGY - SURGERY: ICD-10-PCS | Mod: 26,HCNC,, | Performed by: PATHOLOGY

## 2019-09-09 PROCEDURE — 64447 NJX AA&/STRD FEMORAL NRV IMG: CPT | Mod: HCNC,PO,RT | Performed by: ANESTHESIOLOGY

## 2019-09-09 PROCEDURE — D9220A PRA ANESTHESIA: Mod: HCNC,ANES,, | Performed by: ANESTHESIOLOGY

## 2019-09-09 PROCEDURE — 63600175 PHARM REV CODE 636 W HCPCS: Mod: HCNC,PO | Performed by: NURSE ANESTHETIST, CERTIFIED REGISTERED

## 2019-09-09 PROCEDURE — 64450 NJX AA&/STRD OTHER PN/BRANCH: CPT | Mod: 59,RT,, | Performed by: ANESTHESIOLOGY

## 2019-09-09 PROCEDURE — 71000033 HC RECOVERY, INTIAL HOUR: Mod: HCNC,PO | Performed by: PODIATRIST

## 2019-09-09 PROCEDURE — 37000008 HC ANESTHESIA 1ST 15 MINUTES: Mod: HCNC,PO | Performed by: PODIATRIST

## 2019-09-09 PROCEDURE — 64447 PR NERVE BLOCK INJ, ANES/STEROID, FEMORAL, INCL IMAG GUIDANCE: ICD-10-PCS | Mod: 59,RT,, | Performed by: ANESTHESIOLOGY

## 2019-09-09 PROCEDURE — 12041 PR LAYR CLOS WND REST BODY <2.5 CM: ICD-10-PCS | Mod: HCNC,,, | Performed by: PODIATRIST

## 2019-09-09 PROCEDURE — 36000707: Mod: HCNC,PO | Performed by: PODIATRIST

## 2019-09-09 PROCEDURE — 25000003 PHARM REV CODE 250: Mod: HCNC,PO | Performed by: PODIATRIST

## 2019-09-09 PROCEDURE — 71000015 HC POSTOP RECOV 1ST HR: Mod: HCNC,PO | Performed by: PODIATRIST

## 2019-09-09 PROCEDURE — D9220A PRA ANESTHESIA: ICD-10-PCS | Mod: HCNC,ANES,, | Performed by: ANESTHESIOLOGY

## 2019-09-09 PROCEDURE — 00400 ANES INTEGUMENTARY SYS NOS: CPT | Mod: HCNC,PO | Performed by: PODIATRIST

## 2019-09-09 PROCEDURE — 76942 ECHO GUIDE FOR BIOPSY: CPT | Mod: 26,,, | Performed by: ANESTHESIOLOGY

## 2019-09-09 PROCEDURE — 88305 TISSUE EXAM BY PATHOLOGIST: CPT | Mod: HCNC | Performed by: PATHOLOGY

## 2019-09-09 PROCEDURE — S0020 INJECTION, BUPIVICAINE HYDRO: HCPCS | Mod: HCNC,PO | Performed by: PODIATRIST

## 2019-09-09 PROCEDURE — 64450 PR NERVE BLOCK INJ, ANES/STEROID, OTHER PERIPHERAL: ICD-10-PCS | Mod: 59,RT,, | Performed by: ANESTHESIOLOGY

## 2019-09-09 RX ORDER — LIDOCAINE HYDROCHLORIDE 20 MG/ML
INJECTION, SOLUTION EPIDURAL; INFILTRATION; INTRACAUDAL; PERINEURAL
Status: DISCONTINUED | OUTPATIENT
Start: 2019-09-09 | End: 2019-09-09 | Stop reason: HOSPADM

## 2019-09-09 RX ORDER — FENTANYL CITRATE 50 UG/ML
25 INJECTION, SOLUTION INTRAMUSCULAR; INTRAVENOUS EVERY 5 MIN PRN
Status: DISCONTINUED | OUTPATIENT
Start: 2019-09-09 | End: 2019-09-09 | Stop reason: HOSPADM

## 2019-09-09 RX ORDER — MIDAZOLAM HYDROCHLORIDE 1 MG/ML
0.5 INJECTION INTRAMUSCULAR; INTRAVENOUS
Status: DISCONTINUED | OUTPATIENT
Start: 2019-09-09 | End: 2019-09-09 | Stop reason: HOSPADM

## 2019-09-09 RX ORDER — SODIUM CHLORIDE, SODIUM LACTATE, POTASSIUM CHLORIDE, CALCIUM CHLORIDE 600; 310; 30; 20 MG/100ML; MG/100ML; MG/100ML; MG/100ML
INJECTION, SOLUTION INTRAVENOUS CONTINUOUS
Status: DISCONTINUED | OUTPATIENT
Start: 2019-09-09 | End: 2019-09-09 | Stop reason: HOSPADM

## 2019-09-09 RX ORDER — HYDROMORPHONE HYDROCHLORIDE 2 MG/ML
0.2 INJECTION, SOLUTION INTRAMUSCULAR; INTRAVENOUS; SUBCUTANEOUS EVERY 5 MIN PRN
Status: DISCONTINUED | OUTPATIENT
Start: 2019-09-09 | End: 2019-09-09 | Stop reason: HOSPADM

## 2019-09-09 RX ORDER — ROPIVACAINE HYDROCHLORIDE 5 MG/ML
INJECTION, SOLUTION EPIDURAL; INFILTRATION; PERINEURAL
Status: COMPLETED | OUTPATIENT
Start: 2019-09-09 | End: 2019-09-09

## 2019-09-09 RX ORDER — LIDOCAINE HYDROCHLORIDE 10 MG/ML
INJECTION, SOLUTION INTRAVENOUS
Status: DISCONTINUED | OUTPATIENT
Start: 2019-09-09 | End: 2019-09-09

## 2019-09-09 RX ORDER — SODIUM CHLORIDE 0.9 G/100ML
IRRIGANT IRRIGATION
Status: DISCONTINUED | OUTPATIENT
Start: 2019-09-09 | End: 2019-09-09 | Stop reason: HOSPADM

## 2019-09-09 RX ORDER — DEXAMETHASONE SODIUM PHOSPHATE 4 MG/ML
INJECTION, SOLUTION INTRA-ARTICULAR; INTRALESIONAL; INTRAMUSCULAR; INTRAVENOUS; SOFT TISSUE
Status: DISCONTINUED | OUTPATIENT
Start: 2019-09-09 | End: 2019-09-09

## 2019-09-09 RX ORDER — OXYCODONE HYDROCHLORIDE 5 MG/1
5 TABLET ORAL
Status: DISCONTINUED | OUTPATIENT
Start: 2019-09-09 | End: 2019-09-09 | Stop reason: HOSPADM

## 2019-09-09 RX ORDER — MIDAZOLAM HYDROCHLORIDE 1 MG/ML
INJECTION, SOLUTION INTRAMUSCULAR; INTRAVENOUS
Status: DISCONTINUED | OUTPATIENT
Start: 2019-09-09 | End: 2019-09-09

## 2019-09-09 RX ORDER — BUPIVACAINE HYDROCHLORIDE 5 MG/ML
INJECTION, SOLUTION EPIDURAL; INTRACAUDAL
Status: DISCONTINUED | OUTPATIENT
Start: 2019-09-09 | End: 2019-09-09 | Stop reason: HOSPADM

## 2019-09-09 RX ORDER — BACITRACIN 50000 [IU]/1
INJECTION, POWDER, FOR SOLUTION INTRAMUSCULAR
Status: DISCONTINUED | OUTPATIENT
Start: 2019-09-09 | End: 2019-09-09 | Stop reason: HOSPADM

## 2019-09-09 RX ORDER — LIDOCAINE HYDROCHLORIDE 10 MG/ML
1 INJECTION, SOLUTION EPIDURAL; INFILTRATION; INTRACAUDAL; PERINEURAL ONCE
Status: DISCONTINUED | OUTPATIENT
Start: 2019-09-09 | End: 2019-09-09 | Stop reason: HOSPADM

## 2019-09-09 RX ORDER — IBUPROFEN 600 MG/1
600 TABLET ORAL EVERY 6 HOURS PRN
Status: DISCONTINUED | OUTPATIENT
Start: 2019-09-09 | End: 2019-09-09 | Stop reason: HOSPADM

## 2019-09-09 RX ORDER — BACITRACIN ZINC 500 UNIT/G
OINTMENT (GRAM) TOPICAL
Status: DISCONTINUED | OUTPATIENT
Start: 2019-09-09 | End: 2019-09-09 | Stop reason: HOSPADM

## 2019-09-09 RX ORDER — HYDROCODONE BITARTRATE AND ACETAMINOPHEN 5; 325 MG/1; MG/1
1 TABLET ORAL EVERY 4 HOURS PRN
Status: DISCONTINUED | OUTPATIENT
Start: 2019-09-09 | End: 2019-09-09 | Stop reason: HOSPADM

## 2019-09-09 RX ORDER — PROPOFOL 10 MG/ML
VIAL (ML) INTRAVENOUS CONTINUOUS PRN
Status: DISCONTINUED | OUTPATIENT
Start: 2019-09-09 | End: 2019-09-09

## 2019-09-09 RX ORDER — PROPOFOL 10 MG/ML
VIAL (ML) INTRAVENOUS
Status: DISCONTINUED | OUTPATIENT
Start: 2019-09-09 | End: 2019-09-09

## 2019-09-09 RX ORDER — ONDANSETRON 8 MG/1
8 TABLET, ORALLY DISINTEGRATING ORAL EVERY 8 HOURS PRN
Status: DISCONTINUED | OUTPATIENT
Start: 2019-09-09 | End: 2019-09-09 | Stop reason: HOSPADM

## 2019-09-09 RX ORDER — ACETAMINOPHEN 10 MG/ML
INJECTION, SOLUTION INTRAVENOUS
Status: DISCONTINUED | OUTPATIENT
Start: 2019-09-09 | End: 2019-09-09

## 2019-09-09 RX ORDER — ONDANSETRON 2 MG/ML
INJECTION INTRAMUSCULAR; INTRAVENOUS
Status: DISCONTINUED | OUTPATIENT
Start: 2019-09-09 | End: 2019-09-09

## 2019-09-09 RX ORDER — FENTANYL CITRATE 50 UG/ML
INJECTION, SOLUTION INTRAMUSCULAR; INTRAVENOUS
Status: DISCONTINUED | OUTPATIENT
Start: 2019-09-09 | End: 2019-09-09

## 2019-09-09 RX ADMIN — PROPOFOL 40 MG: 10 INJECTION, EMULSION INTRAVENOUS at 12:09

## 2019-09-09 RX ADMIN — MIDAZOLAM HYDROCHLORIDE 2 MG: 1 INJECTION, SOLUTION INTRAMUSCULAR; INTRAVENOUS at 12:09

## 2019-09-09 RX ADMIN — ROPIVACAINE HYDROCHLORIDE 25 ML: 5 INJECTION, SOLUTION EPIDURAL; INFILTRATION; PERINEURAL at 11:09

## 2019-09-09 RX ADMIN — LIDOCAINE HYDROCHLORIDE 50 MG: 10 INJECTION, SOLUTION INTRAVENOUS at 12:09

## 2019-09-09 RX ADMIN — MIDAZOLAM HYDROCHLORIDE 1 MG: 1 INJECTION, SOLUTION INTRAMUSCULAR; INTRAVENOUS at 11:09

## 2019-09-09 RX ADMIN — SODIUM CHLORIDE, SODIUM LACTATE, POTASSIUM CHLORIDE, AND CALCIUM CHLORIDE: .6; .31; .03; .02 INJECTION, SOLUTION INTRAVENOUS at 11:09

## 2019-09-09 RX ADMIN — FENTANYL CITRATE 50 MCG: 50 INJECTION INTRAMUSCULAR; INTRAVENOUS at 11:09

## 2019-09-09 RX ADMIN — ACETAMINOPHEN 1000 MG: 10 INJECTION, SOLUTION INTRAVENOUS at 01:09

## 2019-09-09 RX ADMIN — ONDANSETRON 4 MG: 2 INJECTION, SOLUTION INTRAMUSCULAR; INTRAVENOUS at 01:09

## 2019-09-09 RX ADMIN — PROPOFOL 30 MG: 10 INJECTION, EMULSION INTRAVENOUS at 01:09

## 2019-09-09 RX ADMIN — FENTANYL CITRATE 50 MCG: 50 INJECTION, SOLUTION INTRAMUSCULAR; INTRAVENOUS at 01:09

## 2019-09-09 RX ADMIN — PROPOFOL 75 MCG/KG/MIN: 10 INJECTION, EMULSION INTRAVENOUS at 12:09

## 2019-09-09 RX ADMIN — FENTANYL CITRATE 50 MCG: 50 INJECTION, SOLUTION INTRAMUSCULAR; INTRAVENOUS at 12:09

## 2019-09-09 RX ADMIN — DEXAMETHASONE SODIUM PHOSPHATE 8 MG: 4 INJECTION, SOLUTION INTRAMUSCULAR; INTRAVENOUS at 11:09

## 2019-09-09 RX ADMIN — ROPIVACAINE HYDROCHLORIDE 15 ML: 5 INJECTION, SOLUTION EPIDURAL; INFILTRATION; PERINEURAL at 11:09

## 2019-09-09 NOTE — DISCHARGE INSTRUCTIONS
FOOT SURGERY  After surgery:    DOS:   Keep leg elevated for first 48-72 hours.   Keep ice pack on the foot for 48-72 hours   Ice on for 25 minutes of each hour while awake   Keep dressing clean and dry   Advanced diet as tolerated.    Check circulation frequently in toes by pressing down on toenail. Nail should turn white and then pink when released.   May walk on foot to go to the bathroom only   Wear surgical shoe. May remove shoe to sleep.   Resume home medications_________________    DONT:   Do not remove your dressing   Do not get dressing wet.   No driving for 48 hours or while taking narcotic pain medications   DO NOT TAKE ADDITIONAL TYLENOL/ACETAMINOPHEN WHILE TAKING NARCOTIC PAIN MEDICATION THAT CONTAINS TYLENOL/ACETAMINOPHEN.    CALL PHYSICIAN FOR:   Pain, burning, or numbness of the toes not relieved by elevation of the leg.   Pale or cold toes; bluish nail beds.   Redness, swelling, or bleeding.   Fever> 101   Drainage (pus) from the puncture sites   Pain unrelieved by pain medication  KEEP SCHEDULED APPOINTMENT  FOR EMERGENCIES CONTACT ________________AT___________________        Discharge Instructions: After Your Surgery  Youve just had surgery. During surgery, you were given medicine called anesthesia to keep you relaxed and free of pain. After surgery, you may have some pain or nausea. This is common. Here are some tips for feeling better and getting well after surgery.     Stay on schedule with your medicine.   Going home  Your healthcare provider will show you how to take care of yourself when you go home. He or she will also answer your questions. Have an adult family member or friend drive you home. For the first 24 hours after your surgery:  · Do not drive or use heavy equipment.  · Do not make important decisions or sign legal papers.  · Do not drink alcohol.  · Have someone stay with you, if needed. He or she can watch for problems and help keep you safe.  Be sure  to go to all follow-up visits with your healthcare provider. And rest after your surgery for as long as your healthcare provider tells you to.  Coping with pain  If you have pain after surgery, pain medicine will help you feel better. Take it as told, before pain becomes severe. Also, ask your healthcare provider or pharmacist about other ways to control pain. This might be with heat, ice, or relaxation. And follow any other instructions your surgeon or nurse gives you.  Tips for taking pain medicine  To get the best relief possible, remember these points:  · Pain medicines can upset your stomach. Taking them with a little food may help.  · Most pain relievers taken by mouth need at least 20 to 30 minutes to start to work.  · Taking medicine on a schedule can help you remember to take it. Try to time your medicine so that you can take it before starting an activity. This might be before you get dressed, go for a walk, or sit down for dinner.  · Constipation is a common side effect of pain medicines. Call your healthcare provider before taking any medicines such as laxatives or stool softeners to help ease constipation. Also ask if you should skip any foods. Drinking lots of fluids and eating foods such as fruits and vegetables that are high in fiber can also help. Remember, do not take laxatives unless your surgeon has prescribed them.  · Drinking alcohol and taking pain medicine can cause dizziness and slow your breathing. It can even be deadly. Do not drink alcohol while taking pain medicine.  · Pain medicine can make you react more slowly to things. Do not drive or run machinery while taking pain medicine.  Your healthcare provider may tell you to take acetaminophen to help ease your pain. Ask him or her how much you are supposed to take each day. Acetaminophen or other pain relievers may interact with your prescription medicines or other over-the-counter (OTC) medicines. Some prescription medicines have  acetaminophen and other ingredients. Using both prescription and OTC acetaminophen for pain can cause you to overdose. Read the labels on your OTC medicines with care. This will help you to clearly know the list of ingredients, how much to take, and any warnings. It may also help you not take too much acetaminophen. If you have questions or do not understand the information, ask your pharmacist or healthcare provider to explain it to you before you take the OTC medicine.  Managing nausea  Some people have an upset stomach after surgery. This is often because of anesthesia, pain, or pain medicine, or the stress of surgery. These tips will help you handle nausea and eat healthy foods as you get better. If you were on a special food plan before surgery, ask your healthcare provider if you should follow it while you get better. These tips may help:  · Do not push yourself to eat. Your body will tell you when to eat and how much.  · Start off with clear liquids and soup. They are easier to digest.  · Next try semi-solid foods, such as mashed potatoes, applesauce, and gelatin, as you feel ready.  · Slowly move to solid foods. Dont eat fatty, rich, or spicy foods at first.  · Do not force yourself to have 3 large meals a day. Instead eat smaller amounts more often.  · Take pain medicines with a small amount of solid food, such as crackers or toast, to avoid nausea.     Call your surgeon if  · You still have pain an hour after taking medicine. The medicine may not be strong enough.  · You feel too sleepy, dizzy, or groggy. The medicine may be too strong.  · You have side effects like nausea, vomiting, or skin changes, such as rash, itching, or hives.       If you have obstructive sleep apnea  You were given anesthesia medicine during surgery to keep you comfortable and free of pain. After surgery, you may have more apnea spells because of this medicine and other medicines you were given. The spells may last longer than  usual.   At home:  · Keep using the continuous positive airway pressure (CPAP) device when you sleep. Unless your healthcare provider tells you not to, use it when you sleep, day or night. CPAP is a common device used to treat obstructive sleep apnea.  · Talk with your provider before taking any pain medicine, muscle relaxants, or sedatives. Your provider will tell you about the possible dangers of taking these medicines.  Date Last Reviewed: 12/1/2016 © 2000-2017 [employer]. [address] [place] [id number]. All rights reserved. This information is not intended as a substitute for professional medical care. Always follow your healthcare professional's instructions.

## 2019-09-09 NOTE — PROGRESS NOTES
Subjective:      Patient ID: Ryan Crane is a 54 y.o. male.    Chief Complaint: Wound Check (2wk re check Dr Becerril 2/2019 5.6 1/2019)      HPI:  Ryan Crane is a 53 y.o. male who presents to clinic with a chief complaint of right heel fissure wound.  Patient reports pain as 7/10 on the pain scale.  He states that he has been wearing the offloading shoe.  He still has been weight-bearing and getting dressing wet without regard to treatment recommendations.  He denies any changes since last visit but wants to get wound surgically repaired.   Patient denies any other pedal complaints at this time.     PCP:  Laura Becerril MD  Date last seen:  8/29/19     Review of Systems   Constitutional: Negative for appetite change, fever, chills, fatigue and unexpected weight change.   Cardiovascular: Negative for chest pain, claudication, cyanosis, and leg swelling.  Musculoskeletal: Negative for back pain, arthritis, joint pain, joint swelling, myalgias, and stiffness.   Skin: Negative for nail bed changes, discoloration, rash, itching, suspicious lesion, and unusual hair distribution.  Positive for poor wound healing.  Neurological: Negative for loss of balance, sensory change, paresthesias, and numbness.  Positive for pain.  Hematological: Negative for adenopathy, bleeding, and bruising easily.   Psychiatric/Behavioral: The patient is not nervous/anxious.  Negative for altered mental status.    Hemoglobin A1C   Date Value Ref Range Status   08/29/2019 6.0 (H) 4.0 - 5.6 % Final     Comment:     ADA Screening Guidelines:  5.7-6.4%  Consistent with prediabetes  >or=6.5%  Consistent with diabetes  High levels of fetal hemoglobin interfere with the HbA1C  assay. Heterozygous hemoglobin variants (HbS, HgC, etc)do  not significantly interfere with this assay.   However, presence of multiple variants may affect accuracy.     01/16/2019 5.6 4.0 - 5.6 % Final     Comment:     ADA Screening Guidelines:  5.7-6.4%   Consistent with prediabetes  >or=6.5%  Consistent with diabetes  High levels of fetal hemoglobin interfere with the HbA1C  assay. Heterozygous hemoglobin variants (HbS, HgC, etc)do  not significantly interfere with this assay.   However, presence of multiple variants may affect accuracy.         Past Medical History:   Diagnosis Date    Acute angina     Asthma     COPD (chronic obstructive pulmonary disease)     Hypertension     MI, old     PIPER on CPAP     Schizophrenia     Stroke      Past Surgical History:   Procedure Laterality Date    APPENDECTOMY      SHOULDER ARTHROSCOPY Left      History reviewed. No pertinent family history.  Social History     Socioeconomic History    Marital status:      Spouse name: Not on file    Number of children: Not on file    Years of education: Not on file    Highest education level: Not on file   Occupational History    Not on file   Social Needs    Financial resource strain: Not on file    Food insecurity:     Worry: Not on file     Inability: Not on file    Transportation needs:     Medical: Not on file     Non-medical: Not on file   Tobacco Use    Smoking status: Current Every Day Smoker     Packs/day: 1.00     Years: 45.00     Pack years: 45.00    Smokeless tobacco: Never Used   Substance and Sexual Activity    Alcohol use: No     Frequency: Never    Drug use: Yes     Types: Marijuana    Sexual activity: Not on file   Lifestyle    Physical activity:     Days per week: Not on file     Minutes per session: Not on file    Stress: Not on file   Relationships    Social connections:     Talks on phone: Not on file     Gets together: Not on file     Attends Je[employer]'s witness service: Not on file     Active member of club or organization: Not on file     Attends meetings of clubs or organizations: Not on file     Relationship status: Not on file   Other Topics Concern    Not on file   Social History Narrative    Not on file           Objective:        BP (!)  "149/83    5' 11" (1.803 m)   BMI 36.96 kg/m²     Physical Exam   Constitutional: Patient is oriented to person, place, and time. Patient appears well-developed and well-nourished.  Strong malodor noted from patient due to lack of personal hygiene.  No acute distress.     Psychiatric: Patient has a normal mood and affect. Patient's speech is normal and behavior is normal. Judgment is normal. Cognition and memory are normal.     Right pedal exam was performed today.  Vascular: Vascular: Pedal pulses palpable 2/4 DP & PT.  CFT is = 3 seconds to the hallux.  Skin temperature is warm to cool proximal tibia to distal toes without localized increase in calor noted.  Localized erythema and edema noted periwound plantar heel.  No ecchymosis noted to the foot or ankle.  Hair growth present distally to the LE.     Musculoskeletal: Ankle and pedal joint ROM are decreased. Ankle joint dorsiflexion is restricted with the knee extended and flexed per Silfverskiold exam.  Muscle strength is 5/5 for all LE muscle groups tested.  No palpable soft tissue mass appreciable to the right plantar heel.    Neurological:  Epicritic sensation is grossly Intact to the foot.    Oppenheim STR is negative to right lower extremity.   Tenderness to palpation of right plantar heel fissure.     Dermatological: Toenails 1-5 right are WNL in length and thickness.  Webspaces 1-4 right are clean, dry, and intact.  Skin turgor is supple.   Plantar right heel fissure has enlarged, measuring 1.2 cm x 0.4 cm x 1.0 cm deep with macerated skin edges without dot signs of infection present.    Nursing note and vitals reviewed.        Assessment:       Encounter Diagnoses   Name Primary?    Fissure in skin of foot - Right Foot Yes    Pain of right heel - Right Foot     Poor hygiene          Plan:       Ryan was seen today for wound check.    Diagnoses and all orders for this visit:    Fissure in skin of foot - Right Foot  -     Case Request Operating " Room: CLOSURE, WOUND    Pain of right heel - Right Foot  -     Case Request Operating Room: CLOSURE, WOUND    Poor hygiene  -     Case Request Operating Room: CLOSURE, WOUND      I counseled the patient on his conditions, their implications and medical management.    - With patient's permission, cleansed right plantar heel wound with normal saline and performed sharp, selective debridement of devitalized tissue and biofilm, < 20 cm sq., extending down to the level of dermis via 3 mm dermal curette to patient's tolerance without incident.  Applied betadine and covered with large bandaid.    - Long discussion with patient regarding wound revision with primary closure of right heel wound in detail performed. Patient verbalized understanding all risks, potential complications, and alternatives, including, but not limited to those listed on the consent form. All questions were answered. No guarantees given or implied as to outcome. Informed verbal and written consent was obtained. Consent forms read, signed, witnessed. Patient to stop taking aspirin at least 3 days prior to surgery and to obtain medical clearance for surgery.    Patient was given the following recommendations and instructions:  Patient Instructions   - Change bandage daily after shower by removing bandage, cleansing area with betadine or hibiclens, patting area dry, and applying occlusive bandaid.    - Obtain history and physical for medical clearance prior to surgery.    - Stop taking aspirin at least 3 days prior to surgery.    - Bring CAM boot with you for surgery.    - Need someone with you on day of surgery to drive you home as you will not be able to feel your foot.        Li Kathleen DPM        Dictation was performed using M*Modal Fluency.  Transcription errors may be present.

## 2019-09-09 NOTE — OP NOTE
Surgeon: Li Kathleen DPM  Assist: none  Pre op Dx: Fissure in skin of right foot, Pain in right foot, poor hygiene  Post op Dx: same  Procedure: Wound revision with closure  Anesthesia: MAC w/ local   Hemostasis: right ankle tourniquet  EBL: 1 cc  Material: 3-0 vicryl, 3-0 nylon  Injections: 16 cc of 1:1 mix of 2% lidocaine plain and 0.5% marcaine plain  Implants: none  Pathology: thick dermal fibrosis  Complications: new wound plantar laterally, reopening of previously healed plantar central wound connecting to main plantar medial wound  Condition: VSS, NAD, A&Ox3    Operative Report:  Patient was seen in the pre-operative setting resting comfortably on stretcher.  We reviewed planned procedure with pertinent risks and benefits along with alternative conservative and surgical treatment options with no 100% guarantees made or implied.  Patient verbalized all understanding of the discussed above and consented to wound revision with closure of right foot wound.  Written consent was obtained.  Patient's operative side was marked with an X and verified with the patient as the correct limb for planned operation.    Patient was brought back into the operating room and placed on the operating table.  Following initiation of MAC anesthesia, nursing applied ankle tourniquet and prepped and draped the right foot and ankle in the usual aseptic fashion.  After foot was scrubbed, new wound noted to the plantar lateral foot with appearance of abrasion measuring 0.4 cm x 0.2 cm x 0.1 cm and will be referred to now as the plantar lateral wound.  Additionally, previously healed wound lateral to the large plantar medial wound was noted to have reopened measuring 0.6 cm x 0.1 cm x 0.3 cm and will be referred to now as the plantar central wound.  Medial wound measures 1.3 cm x 0.4 cm x 1.0 cm with skin flap present.  When surgeon ready, room was silenced and timeout was performed and agreed upon.  Extremity was elevated for 90  seconds prior to tourniquet inflation.      Attention was directed to the plantar right heel where local anesthesia was administered consisting of 16 cc of 1:1 mix of 2% lidocaine plain and 0.5% marcaine plain intradermally and subcutaneously deep to plantar central and medial wounds.  Anesthesia was tested.  Elliptical incision was placed incasing medial wound.  Thick dermal fibrosis was noted.  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  Wound edges of the plantar central wound were revised.  Plantar central and medial wounds were noted communicate.  Undermining of communicating wounds was achieved via tenotomy scissors to obtain enough laxity to reapproximate skin edges.  Plantar central and medial wounds were flushed with copious amounts of bacitracin polymixin B saline solution.  Layered closure was achieved via 3-0 vicryl for subcutaneous tissue and 3-0 nylon for skin.  Foot was cleansed and dried.  Wounds were dressed with bacitracin and xeroform including plantar lateral wound.  Outer dressing was applied consisting of 4 x 4 gauze, kerlix, cast padding, and ACE wrap.  Tourniquet was deflated, and prompt hyperemic response was appreciable to all 5 toes.  Right foot was placed in CAM boot.  Patient tolerated procedure well.    Once patient ready per anesthesiology, patient was transported to PACU with VSS and NVS intact.  Patient was given instructions to be limited weight-bearing to the right foot with reduced ADLs and to keep dressing clean, dry, and intact.  He was given written post-operative instructions and discharged to home per anesthesia protocol with follow up appointment in 2 weeks for dressing change and possible suture removal.        Li Kathleen DPM

## 2019-09-09 NOTE — ANESTHESIA PROCEDURE NOTES
Peripheral Block    Patient location during procedure: pre-op   Block not for primary anesthetic.  Reason for block: at surgeon's request and post-op pain management   Post-op Pain Location: Right foot  Start time: 9/9/2019 11:43 AM  Timeout: 9/9/2019 11:42 AM   End time: 9/9/2019 11:48 AM    Staffing  Authorizing Provider: Sheela Nuno MD  Performing Provider: Sheela Nuno MD    Preanesthetic Checklist  Completed: patient identified, site marked, surgical consent, pre-op evaluation, timeout performed, IV checked, risks and benefits discussed and monitors and equipment checked  Peripheral Block  Patient position: supine  Prep: ChloraPrep  Patient monitoring: heart rate, cardiac monitor, continuous pulse ox, continuous capnometry and frequent blood pressure checks  Block type: popliteal  Laterality: right  Injection technique: single shot  Needle  Needle type: Stimuplex   Needle gauge: 21 G  Needle length: 4 in  Needle localization: anatomical landmarks and ultrasound guidance   -ultrasound image captured on disc.  Assessment  Injection assessment: negative aspiration, negative parasthesia and local visualized surrounding nerve  Paresthesia pain: none  Heart rate change: no  Slow fractionated injection: yes  Additional Notes  VSS.  [facility] RN monitoring vitals throughout procedure.  Patient tolerated procedure well.     Decadron 8 mg

## 2019-09-09 NOTE — ANESTHESIA PROCEDURE NOTES
Peripheral Block    Patient location during procedure: pre-op   Block not for primary anesthetic.  Reason for block: at surgeon's request and post-op pain management   Post-op Pain Location: Right foot  Start time: 9/9/2019 11:50 AM  Timeout: 9/9/2019 11:49 AM   End time: 9/9/2019 11:52 AM    Staffing  Authorizing Provider: Sheela Nuno MD  Performing Provider: Sheela Nuno MD    Preanesthetic Checklist  Completed: patient identified, site marked, surgical consent, pre-op evaluation, timeout performed, IV checked, risks and benefits discussed and monitors and equipment checked  Peripheral Block  Patient position: supine  Prep: ChloraPrep  Patient monitoring: heart rate, cardiac monitor, continuous pulse ox, continuous capnometry and frequent blood pressure checks  Block type: adductor canal  Laterality: right  Injection technique: single shot  Needle  Needle type: Stimuplex   Needle gauge: 21 G  Needle length: 4 in  Needle localization: anatomical landmarks and ultrasound guidance   -ultrasound image captured on disc.  Assessment  Injection assessment: negative aspiration, negative parasthesia and local visualized surrounding nerve  Paresthesia pain: none  Heart rate change: no  Slow fractionated injection: yes  Additional Notes  VSS.  [facility] RN monitoring vitals throughout procedure.  Patient tolerated procedure well.

## 2019-09-09 NOTE — ANESTHESIA PREPROCEDURE EVALUATION
09/09/2019  Ryan Crane is a 54 y.o., male.    Anesthesia Evaluation    I have reviewed the Patient Summary Reports.    I have reviewed the Nursing Notes.      Review of Systems  Anesthesia Hx:  No problems with previous Anesthesia    Social:  Smoker    Cardiovascular:   Hypertension Past MI CAD   Angina    Pulmonary:   COPD, moderate Asthma Sleep Apnea    Neurological:   CVA Neuromuscular Disease,    Psych:   Psychiatric History          Physical Exam  General:  Well nourished, Obesity    Airway/Jaw/Neck:  Airway Findings: Mouth Opening: Normal Tongue: Normal  General Airway Assessment: Adult  Mallampati: II  TM Distance: Normal, at least 6 cm  Jaw/Neck Findings:  Neck ROM: Normal ROM     Eyes/Ears/Nose:  Eyes/Ears/Nose Findings:    Dental:  Dental Findings: Periodontal disease, Severe   Chest/Lungs:  Chest/Lungs Findings: Normal Respiratory Rate     Heart/Vascular:  Heart Findings: Rate: Normal  Rhythm: Regular Rhythm        Mental Status:  Mental Status Findings:  Cooperative, Alert and Oriented         Anesthesia Plan  Type of Anesthesia, risks & benefits discussed:  Anesthesia Type:  regional  Patient's Preference: regional  Intra-op Monitoring Plan: standard ASA monitors  Intra-op Monitoring Plan Comments:   Post Op Pain Control Plan: multimodal analgesia, peripheral nerve block and per primary service following discharge from PACU  Post Op Pain Control Plan Comments:   Induction:    Beta Blocker:  Patient is on a Beta-Blocker and has received one dose within the past 24 hours (No further documentation required).       Informed Consent: Patient understands risks and agrees with Anesthesia plan.  Questions answered. Anesthesia consent signed with patient.  ASA Score: 4     Day of Surgery Review of History & Physical:    H&P update referred to the surgeon.         Ready For Surgery From Anesthesia  Perspective.

## 2019-09-09 NOTE — H&P (VIEW-ONLY)
Subjective:      Patient ID: Ryan Crane is a 54 y.o. male.    Chief Complaint: Wound Check (2wk re check Dr Becerril 2/2019 5.6 1/2019)      HPI:  Ryan Crane is a 53 y.o. male who presents to clinic with a chief complaint of right heel fissure wound.  Patient reports pain as 7/10 on the pain scale.  He states that he has been wearing the offloading shoe.  He still has been weight-bearing and getting dressing wet without regard to treatment recommendations.  He denies any changes since last visit but wants to get wound surgically repaired.   Patient denies any other pedal complaints at this time.     PCP:  Laura Becerril MD  Date last seen:  8/29/19     Review of Systems   Constitutional: Negative for appetite change, fever, chills, fatigue and unexpected weight change.   Cardiovascular: Negative for chest pain, claudication, cyanosis, and leg swelling.  Musculoskeletal: Negative for back pain, arthritis, joint pain, joint swelling, myalgias, and stiffness.   Skin: Negative for nail bed changes, discoloration, rash, itching, suspicious lesion, and unusual hair distribution.  Positive for poor wound healing.  Neurological: Negative for loss of balance, sensory change, paresthesias, and numbness.  Positive for pain.  Hematological: Negative for adenopathy, bleeding, and bruising easily.   Psychiatric/Behavioral: The patient is not nervous/anxious.  Negative for altered mental status.    Hemoglobin A1C   Date Value Ref Range Status   08/29/2019 6.0 (H) 4.0 - 5.6 % Final     Comment:     ADA Screening Guidelines:  5.7-6.4%  Consistent with prediabetes  >or=6.5%  Consistent with diabetes  High levels of fetal hemoglobin interfere with the HbA1C  assay. Heterozygous hemoglobin variants (HbS, HgC, etc)do  not significantly interfere with this assay.   However, presence of multiple variants may affect accuracy.     01/16/2019 5.6 4.0 - 5.6 % Final     Comment:     ADA Screening Guidelines:  5.7-6.4%   Consistent with prediabetes  >or=6.5%  Consistent with diabetes  High levels of fetal hemoglobin interfere with the HbA1C  assay. Heterozygous hemoglobin variants (HbS, HgC, etc)do  not significantly interfere with this assay.   However, presence of multiple variants may affect accuracy.         Past Medical History:   Diagnosis Date    Acute angina     Asthma     COPD (chronic obstructive pulmonary disease)     Hypertension     MI, old     PIPER on CPAP     Schizophrenia     Stroke      Past Surgical History:   Procedure Laterality Date    APPENDECTOMY      SHOULDER ARTHROSCOPY Left      History reviewed. No pertinent family history.  Social History     Socioeconomic History    Marital status:      Spouse name: Not on file    Number of children: Not on file    Years of education: Not on file    Highest education level: Not on file   Occupational History    Not on file   Social Needs    Financial resource strain: Not on file    Food insecurity:     Worry: Not on file     Inability: Not on file    Transportation needs:     Medical: Not on file     Non-medical: Not on file   Tobacco Use    Smoking status: Current Every Day Smoker     Packs/day: 1.00     Years: 45.00     Pack years: 45.00    Smokeless tobacco: Never Used   Substance and Sexual Activity    Alcohol use: No     Frequency: Never    Drug use: Yes     Types: Marijuana    Sexual activity: Not on file   Lifestyle    Physical activity:     Days per week: Not on file     Minutes per session: Not on file    Stress: Not on file   Relationships    Social connections:     Talks on phone: Not on file     Gets together: Not on file     Attends Orthodoxy service: Not on file     Active member of club or organization: Not on file     Attends meetings of clubs or organizations: Not on file     Relationship status: Not on file   Other Topics Concern    Not on file   Social History Narrative    Not on file           Objective:        BP (!)  "149/83    5' 11" (1.803 m)   BMI 36.96 kg/m²     Physical Exam   Constitutional: Patient is oriented to person, place, and time. Patient appears well-developed and well-nourished.  Strong malodor noted from patient due to lack of personal hygiene.  No acute distress.     Psychiatric: Patient has a normal mood and affect. Patient's speech is normal and behavior is normal. Judgment is normal. Cognition and memory are normal.     Right pedal exam was performed today.  Vascular: Vascular: Pedal pulses palpable 2/4 DP & PT.  CFT is = 3 seconds to the hallux.  Skin temperature is warm to cool proximal tibia to distal toes without localized increase in calor noted.  Localized erythema and edema noted periwound plantar heel.  No ecchymosis noted to the foot or ankle.  Hair growth present distally to the LE.     Musculoskeletal: Ankle and pedal joint ROM are decreased. Ankle joint dorsiflexion is restricted with the knee extended and flexed per Silfverskiold exam.  Muscle strength is 5/5 for all LE muscle groups tested.  No palpable soft tissue mass appreciable to the right plantar heel.    Neurological:  Epicritic sensation is grossly Intact to the foot.    [place] STR is negative to right lower extremity.   Tenderness to palpation of right plantar heel fissure.     Dermatological: Toenails 1-5 right are WNL in length and thickness.  Webspaces 1-4 right are clean, dry, and intact.  Skin turgor is supple.   Plantar right heel fissure has enlarged, measuring 1.2 cm x 0.4 cm x 1.0 cm deep with macerated skin edges without dot signs of infection present.    Nursing note and vitals reviewed.        Assessment:       Encounter Diagnoses   Name Primary?    Fissure in skin of foot - Right Foot Yes    Pain of right heel - Right Foot     Poor hygiene          Plan:       Ryan was seen today for wound check.    Diagnoses and all orders for this visit:    Fissure in skin of foot - Right Foot  -     Case Request Operating " Room: CLOSURE, WOUND    Pain of right heel - Right Foot  -     Case Request Operating Room: CLOSURE, WOUND    Poor hygiene  -     Case Request Operating Room: CLOSURE, WOUND      I counseled the patient on his conditions, their implications and medical management.    - With patient's permission, cleansed right plantar heel wound with normal saline and performed sharp, selective debridement of devitalized tissue and biofilm, < 20 cm sq., extending down to the level of dermis via 3 mm dermal curette to patient's tolerance without incident.  Applied betadine and covered with large bandaid.    - Long discussion with patient regarding wound revision with primary closure of right heel wound in detail performed. Patient verbalized understanding all risks, potential complications, and alternatives, including, but not limited to those listed on the consent form. All questions were answered. No guarantees given or implied as to outcome. Informed verbal and written consent was obtained. Consent forms read, signed, witnessed. Patient to stop taking aspirin at least 3 days prior to surgery and to obtain medical clearance for surgery.    Patient was given the following recommendations and instructions:  Patient Instructions   - Change bandage daily after shower by removing bandage, cleansing area with betadine or hibiclens, patting area dry, and applying occlusive bandaid.    - Obtain history and physical for medical clearance prior to surgery.    - Stop taking aspirin at least 3 days prior to surgery.    - Bring CAM boot with you for surgery.    - Need someone with you on day of surgery to drive you home as you will not be able to feel your foot.        Li Kathleen DPM        Dictation was performed using M*Modal Fluency.  Transcription errors may be present.

## 2019-09-09 NOTE — PLAN OF CARE
ADULT CRUTCHES OBTAINED AND PROVIDED PER VERBAL TELEPHONE ORDER DR. BOYD TO ASSOCIATE RN, JYOTSNA.  SET AT 5'10''.  PT INSTRUCTED ON USAGE.  ABLE TO DEMONSTRATE USE.    ORTHO BOOT APPLIED TO RIGHT FOOT PER ORDER PRIOR TO DISCHARGE

## 2019-09-09 NOTE — TRANSFER OF CARE
"Anesthesia Transfer of Care Note    Patient: Ryan Crane    Procedure(s) Performed: Procedure(s) (LRB):  CLOSURE, WOUND (Right)    Patient location: PACU    Anesthesia Type: MAC    Transport from OR: Transported from OR on 2-3 L/min O2 by NC with adequate spontaneous ventilation    Post pain: adequate analgesia    Post assessment: no apparent anesthetic complications and tolerated procedure well    Post vital signs: stable    Level of consciousness: awake, alert and oriented    Nausea/Vomiting: no nausea/vomiting    Complications: none    Transfer of care protocol was followed      Last vitals:   Visit Vitals  /63 (BP Location: Right arm, Patient Position: Lying)   Pulse 70   Temp 36.1 °C (97 °F) (Skin)   Resp 16   Ht 5' 11" (1.803 m)   Wt 117.9 kg (260 lb)   SpO2 97%   BMI 36.26 kg/m²     "

## 2019-09-09 NOTE — PLAN OF CARE
"Pt. States unable to remove nipple rings and "earnestine radha ring". Cauterization risks explained to pt. Dr Nuno also spoke to pt. Re: risks. Pt. Verbalized understanding and stated they cannot come out.  "

## 2019-09-09 NOTE — BRIEF OP NOTE
[facility]  Brief Operative Note     SUMMARY     Surgery Date: 9/9/2019     Surgeon(s) and Role:     * Li Kathleen DPM - Primary    Assisting Surgeon: None    Pre-op Diagnosis:  Fissure in skin of foot [R23.4]  Pain of right heel [M79.671]  Poor hygiene [R46.0]    Post-op Diagnosis:  Post-Op Diagnosis Codes:     * Fissure in skin of foot [R23.4]     * Pain of right heel [M79.671]     * Poor hygiene [R46.0]    Procedure(s) (LRB):  CLOSURE, WOUND (Right)    Anesthesia: Local MAC    Description of the findings of the procedure: Thick dermal fibrosis present underlying open wound and tracking of previous lateral ulcer that has busted open with large ulcer medially.    Findings/Key Components: Hair present in wound.    Estimated Blood Loss: 1 mL; No values recorded between 9/9/2019  1:07 PM and 9/9/2019  1:53 PM *         Specimens:   Specimen (12h ago, onward)    Start     Ordered    09/09/19 1326  Specimen to Pathology - Surgery  Once     Comments:  Pre-op Diagnosis: Fissure in skin of foot [R23.4]Pain of right heel [M79.671]Poor hygiene [R46.0]Post-op Diagnosis: sameProcedure(s):CLOSURE, WOUND Number of specimens: 1Name of specimens: 1.NON-HEALING WOUND RIGHT FOOT     Start Status     09/09/19 1326 Collected (09/09/19 1329) Order ID: [id number]       09/09/19 1326          Discharge Note    SUMMARY     Admit Date: 9/9/2019    Discharge Date and Time:  09/09/2019 1:55 PM    Hospital Course (synopsis of major diagnoses, care, treatment, and services provided during the course of the hospital stay): Patient admitted for surgery for wound revision with primary closure right heel. Patient consented for surgery and agreed to marked limb as correct limb.  Wounds have busted open after surgical prep.  Local anesthestic infiltrated in periwound region of plantar heel.  Excised wound edges for both the medial and lateral ulcers including thick dermal fibrosis, which was sent to pathology for further  evaluation due to history of tumor in heel.  Flushed wounds with copious amounts of antibiotic saline solution.  Obtained layered closure and dressed wounds with abx ointment, non adherent and DSD.  Prompt hyperemic response noted to all 5 toes of the right foot after tourniquet deflated.  Patient tolerated procedure well.    Final Diagnosis: Post-Op Diagnosis Codes:     * Fissure in skin of foot [R23.4]     * Pain of right heel [M79.671]     * Poor hygiene [R46.0]    Disposition: Home or Self Care    Follow Up/Patient Instructions:     Medications:  Reconciled Home Medications:      Medication List      ASK your doctor about these medications    aspirin 81 MG EC tablet  Commonly known as:  ECOTRIN  Take 81 mg by mouth once daily.     busPIRone 5 MG Tab  Commonly known as:  BUSPAR  TK 1 T PO TID     DULoxetine 30 MG capsule  Commonly known as:  CYMBALTA  Take 30 mg by mouth once daily.     ferrous gluconate 324 mg (37.5 mg iron) Tab tablet  Take 1 tablet (324 mg total) by mouth 2 (two) times daily with meals.     methocarbamol 500 MG Tab  Commonly known as:  ROBAXIN  Take 1 tablet (500 mg total) by mouth every 8 (eight) hours as needed (muscle spasms).     metoprolol succinate 25 MG 24 hr tablet  Commonly known as:  TOPROL-XL  TAKE 1 TABLET(25 MG) BY MOUTH EVERY DAY     nitroGLYCERIN 0.4 MG SL tablet  Commonly known as:  NITROSTAT  Place 1 tablet (0.4 mg total) under the tongue every 5 (five) minutes as needed for Chest pain.     OXcarbazepine 600 MG Tab  Commonly known as:  TRILEPTAL  Take 600 mg by mouth 3 (three) times daily.     tadalafil 10 MG tablet  Commonly known as:  CIALIS  Take 1 tablet (10 mg total) by mouth daily as needed for Erectile Dysfunction.     ziprasidone 80 MG capsule  Commonly known as:  GEODON  Take 160 mg by mouth once daily. At night          Discharge Procedure Orders   Diet general     Keep surgical extremity elevated     Ice to affected area   Order Comments: Ice behind right knee no  more than 20 minutes/hour     Lifting restrictions   Order Comments: No lifting more than 20 lbs.     Other restrictions (specify):   Order Comments: Keep dressing clean, dry, and intact.     Call MD for:  temperature >100.4     Call MD for:  persistent nausea and vomiting     Call MD for:  severe uncontrolled pain     Call MD for:  difficulty breathing, headache or visual disturbances     Call MD for:  redness, tenderness, or signs of infection (pain, swelling, redness, odor or green/yellow discharge around incision site)     Call MD for:  hives     Call MD for:  persistent dizziness or light-headedness     Leave dressing on - Keep it clean, dry, and intact until clinic visit     Weight bearing restrictions (specify)   Order Comments: Reduced weight-bearing to right foot.     Follow-up Information     Li Kathleen DPM. Go in 2 weeks.    Specialty:  Podiatry  Why:  For suture removal, For wound re-check  Contact information:  [address] [facility] [address]  [place] [address]  [phone]

## 2019-09-10 VITALS
OXYGEN SATURATION: 97 % | WEIGHT: 260 LBS | HEART RATE: 70 BPM | TEMPERATURE: 98 F | HEIGHT: 71 IN | DIASTOLIC BLOOD PRESSURE: 78 MMHG | BODY MASS INDEX: 36.4 KG/M2 | RESPIRATION RATE: 18 BRPM | SYSTOLIC BLOOD PRESSURE: 160 MMHG

## 2019-09-10 NOTE — ANESTHESIA POSTPROCEDURE EVALUATION
Anesthesia Post Evaluation    Patient: Ryan Crane    Procedure(s) Performed: Procedure(s) (LRB):  CLOSURE, WOUND (Right)    Final Anesthesia Type: regional  Patient location during evaluation: PACU  Patient participation: Yes- Able to Participate  Level of consciousness: awake and alert, oriented and awake  Post-procedure vital signs: reviewed and stable  Pain management: adequate  Airway patency: patent  PONV status at discharge: No PONV  Anesthetic complications: no      Cardiovascular status: blood pressure returned to baseline and hemodynamically stable  Respiratory status: unassisted, spontaneous ventilation and room air  Hydration status: euvolemic  Follow-up not needed.          Vitals Value Taken Time   /78 9/9/2019  2:30 PM   Temp 36.7 °C (98 °F) 9/9/2019  1:45 PM   Pulse 70 9/9/2019  2:30 PM   Resp 18 9/9/2019  2:30 PM   SpO2 97 % 9/9/2019  2:30 PM         Event Time     Out of Recovery 14:35:00          Pain/Hai Score: Pain Rating Prior to Med Admin: 7 (9/9/2019 11:43 AM)  Hai Score: 10 (9/9/2019  2:30 PM)

## 2019-09-11 ENCOUNTER — TELEPHONE (OUTPATIENT)
Dept: PODIATRY | Facility: CLINIC | Age: 54
End: 2019-09-11

## 2019-09-11 ENCOUNTER — TELEPHONE (OUTPATIENT)
Dept: FAMILY MEDICINE | Facility: CLINIC | Age: 54
End: 2019-09-11

## 2019-09-11 DIAGNOSIS — G89.18 ACUTE POST-OPERATIVE PAIN: Primary | ICD-10-CM

## 2019-09-11 RX ORDER — TRAMADOL HYDROCHLORIDE 50 MG/1
50 TABLET ORAL EVERY 6 HOURS PRN
Qty: 20 TABLET | Refills: 0 | Status: SHIPPED | OUTPATIENT
Start: 2019-09-11 | End: 2019-09-16 | Stop reason: SDUPTHER

## 2019-09-11 NOTE — TELEPHONE ENCOUNTER
Spoke with pt, advised b12 will be given at the clinic,  Advised iron is OTC, voiced understanding

## 2019-09-11 NOTE — TELEPHONE ENCOUNTER
Patient called stating the nerve block from surgery has worn off and he is having severe pain in his foot. Please advise.

## 2019-09-11 NOTE — TELEPHONE ENCOUNTER
----- Message from Nora Massey sent at 9/11/2019 10:37 AM CDT -----  Contact: patient  Type: Needs Medical Advice    Who Called:  patient  Best Call Back Number:   Additional Information: Requesting a call back from Nurse, regarding Rx refill for B12 shot and Iron Pills

## 2019-09-12 ENCOUNTER — OFFICE VISIT (OUTPATIENT)
Dept: FAMILY MEDICINE | Facility: CLINIC | Age: 54
End: 2019-09-12
Payer: MEDICARE

## 2019-09-12 VITALS
BODY MASS INDEX: 36.4 KG/M2 | DIASTOLIC BLOOD PRESSURE: 78 MMHG | TEMPERATURE: 98 F | HEART RATE: 94 BPM | SYSTOLIC BLOOD PRESSURE: 128 MMHG | WEIGHT: 260 LBS | HEIGHT: 71 IN

## 2019-09-12 DIAGNOSIS — R73.03 PREDIABETES: Primary | ICD-10-CM

## 2019-09-12 DIAGNOSIS — E53.8 B12 DEFICIENCY: ICD-10-CM

## 2019-09-12 PROCEDURE — 3008F BODY MASS INDEX DOCD: CPT | Mod: HCNC,CPTII,S$GLB, | Performed by: FAMILY MEDICINE

## 2019-09-12 PROCEDURE — 99213 PR OFFICE/OUTPT VISIT, EST, LEVL III, 20-29 MIN: ICD-10-PCS | Mod: HCNC,25,S$GLB, | Performed by: FAMILY MEDICINE

## 2019-09-12 PROCEDURE — 99999 PR PBB SHADOW E&M-EST. PATIENT-LVL III: CPT | Mod: PBBFAC,HCNC,, | Performed by: FAMILY MEDICINE

## 2019-09-12 PROCEDURE — 99999 PR PBB SHADOW E&M-EST. PATIENT-LVL III: ICD-10-PCS | Mod: PBBFAC,HCNC,, | Performed by: FAMILY MEDICINE

## 2019-09-12 PROCEDURE — 99213 OFFICE O/P EST LOW 20 MIN: CPT | Mod: HCNC,25,S$GLB, | Performed by: FAMILY MEDICINE

## 2019-09-12 PROCEDURE — 96372 THER/PROPH/DIAG INJ SC/IM: CPT | Mod: HCNC,S$GLB,, | Performed by: FAMILY MEDICINE

## 2019-09-12 PROCEDURE — 3008F PR BODY MASS INDEX (BMI) DOCUMENTED: ICD-10-PCS | Mod: HCNC,CPTII,S$GLB, | Performed by: FAMILY MEDICINE

## 2019-09-12 PROCEDURE — 96372 PR INJECTION,THERAP/PROPH/DIAG2ST, IM OR SUBCUT: ICD-10-PCS | Mod: HCNC,S$GLB,, | Performed by: FAMILY MEDICINE

## 2019-09-12 RX ORDER — CYANOCOBALAMIN 1000 UG/ML
100 INJECTION, SOLUTION INTRAMUSCULAR; SUBCUTANEOUS ONCE
Status: COMPLETED | OUTPATIENT
Start: 2019-09-12 | End: 2019-09-12

## 2019-09-12 RX ORDER — METFORMIN HYDROCHLORIDE 500 MG/1
500 TABLET, EXTENDED RELEASE ORAL
Qty: 90 TABLET | Refills: 0 | Status: SHIPPED | OUTPATIENT
Start: 2019-09-12 | End: 2019-12-08 | Stop reason: SDUPTHER

## 2019-09-12 RX ADMIN — CYANOCOBALAMIN 100 MCG: 1000 INJECTION, SOLUTION INTRAMUSCULAR; SUBCUTANEOUS at 07:09

## 2019-09-12 NOTE — PROGRESS NOTES
"Subjective:       Patient ID: Ryan Crane is a 54 y.o. male.    Chief Complaint: Diabetes and Injections (b12)    HPI  Patient in the office for review.  He was told by his psychiatrist to come in and discuss elev BS. Denies sx of hypoglycemia.   He is very tired today - s/p foot surg.   Also, needs B12 inj.     Review of Systems:  Review of Systems   Constitutional: Positive for fatigue.   HENT: Negative for congestion and postnasal drip.    Respiratory: Positive for cough. Negative for shortness of breath.    Cardiovascular: Negative for chest pain (occ) and leg swelling.   Gastrointestinal: Negative for blood in stool, constipation and diarrhea.        No gerd c/o.   Genitourinary: Negative for difficulty urinating, frequency and hematuria.   Musculoskeletal: Negative for arthralgias and myalgias.   Neurological: Negative for dizziness and headaches.   Psychiatric/Behavioral: Positive for dysphoric mood. Negative for sleep disturbance.       Objective:     Vitals:    09/12/19 0703   BP: 128/78   Pulse: 94   Temp: 98.3 °F (36.8 °C)   TempSrc: Oral   Weight: 117.9 kg (260 lb)   Height: 5' 11" (1.803 m)          Physical Exam   Constitutional: He is oriented to person, place, and time. He appears well-developed and well-nourished. He is easily aroused. No distress.   HENT:   Head: Normocephalic and atraumatic.   Eyes: Conjunctivae are normal. Right eye exhibits no discharge. Left eye exhibits no discharge. No scleral icterus.   Cardiovascular: Normal rate.   Pulmonary/Chest: Effort normal. No respiratory distress.   Neurological: He is oriented to person, place, and time and easily aroused. No cranial nerve deficit.   Skin: Skin is warm and dry.   Psychiatric: He has a normal mood and affect. His speech is delayed. He is slowed.   Nursing note and vitals reviewed.        Assessment & Plan:  Prediabetes  -     metFORMIN (GLUCOPHAGE-XR) 500 MG 24 hr tablet; Take 1 tablet (500 mg total) by mouth daily with " breakfast.  Dispense: 90 tablet; Refill: 0  -     Comprehensive metabolic panel; Future; Expected date: 09/12/2019  -     Hemoglobin A1c; Future; Expected date: 09/12/2019  Side effects and precautions of medication use reviewed with patient, expressed understanding. No questions or concerns.   His mom is diabetic. Discussed allowing her to help with his diet as she manages meal prep.  B12 deficiency  -     cyanocobalamin injection 100 mcg  inj weekly x 4 weeks, then update lab.

## 2019-09-16 ENCOUNTER — OFFICE VISIT (OUTPATIENT)
Dept: PODIATRY | Facility: CLINIC | Age: 54
End: 2019-09-16
Payer: MEDICARE

## 2019-09-16 ENCOUNTER — HOSPITAL ENCOUNTER (OUTPATIENT)
Dept: RADIOLOGY | Facility: HOSPITAL | Age: 54
Discharge: HOME | End: 2019-09-16
Attending: PODIATRIST
Payer: MEDICARE

## 2019-09-16 ENCOUNTER — TELEPHONE (OUTPATIENT)
Dept: PODIATRY | Facility: CLINIC | Age: 54
End: 2019-09-16

## 2019-09-16 ENCOUNTER — TELEPHONE (OUTPATIENT)
Dept: HEMATOLOGY/ONCOLOGY | Facility: CLINIC | Age: 54
End: 2019-09-16

## 2019-09-16 VITALS — HEIGHT: 71 IN | WEIGHT: 260 LBS | BODY MASS INDEX: 36.4 KG/M2

## 2019-09-16 DIAGNOSIS — S99.921A INJURY OF RIGHT FOOT, INITIAL ENCOUNTER: Primary | ICD-10-CM

## 2019-09-16 DIAGNOSIS — G89.18 ACUTE POST-OPERATIVE PAIN: ICD-10-CM

## 2019-09-16 DIAGNOSIS — S99.921A INJURY OF RIGHT FOOT, INITIAL ENCOUNTER: ICD-10-CM

## 2019-09-16 PROCEDURE — 99999 PR PBB SHADOW E&M-EST. PATIENT-LVL III: CPT | Mod: PBBFAC,HCNC,, | Performed by: PODIATRIST

## 2019-09-16 PROCEDURE — 73630 XR FOOT COMPLETE 3 VIEW RIGHT: ICD-10-PCS | Mod: 26,HCNC,RT, | Performed by: RADIOLOGY

## 2019-09-16 PROCEDURE — 99024 POSTOP FOLLOW-UP VISIT: CPT | Mod: HCNC,S$GLB,, | Performed by: PODIATRIST

## 2019-09-16 PROCEDURE — 99024 PR POST-OP FOLLOW-UP VISIT: ICD-10-PCS | Mod: HCNC,S$GLB,, | Performed by: PODIATRIST

## 2019-09-16 PROCEDURE — 73630 X-RAY EXAM OF FOOT: CPT | Mod: TC,HCNC,PO,RT

## 2019-09-16 PROCEDURE — 73630 X-RAY EXAM OF FOOT: CPT | Mod: 26,HCNC,RT, | Performed by: RADIOLOGY

## 2019-09-16 PROCEDURE — 99999 PR PBB SHADOW E&M-EST. PATIENT-LVL III: ICD-10-PCS | Mod: PBBFAC,HCNC,, | Performed by: PODIATRIST

## 2019-09-16 RX ORDER — TRAMADOL HYDROCHLORIDE 50 MG/1
50 TABLET ORAL EVERY 4 HOURS PRN
Qty: 30 TABLET | Refills: 0 | Status: SHIPPED | OUTPATIENT
Start: 2019-09-16 | End: 2019-09-21

## 2019-09-16 NOTE — TELEPHONE ENCOUNTER
----- Message from Analisa Cornelius sent at 9/16/2019  2:58 PM CDT -----  Type:  RX Refill Request    Who Called:  Self   Refill or New Rx:    RX Name and Strength:  Tramadol   How is the patient currently taking it? (ex. 1XDay):  Is this a 30 day or 90 day RX:    Preferred Pharmacy with phone number:  [facility] on file  Local or Mail Order:  Local   Ordering Provider:  Dr Frannie Tapia Call Back Number:  [phone]   Additional Information:  Patient requesting new rx  tramadol for foot pain.

## 2019-09-16 NOTE — PATIENT INSTRUCTIONS
- Don't bear weight on right foot.    - Use crutches for ambulatory assistance.    - Wear CAM boot at all times when ambulating to prevent further trauma.    - Rest/reduce ambulation.    - Ice behind knee for no more than 20 minutes/hour to reduce pain and swelling.    - Keep dressing clean, dry, and intact.    - Elevate foot above the level of the knee as much as possible throughout the day to reduce pain and swelling.    - Take medications as prescribed.    - Notify clinic if any new or worsening condition arises.

## 2019-09-16 NOTE — TELEPHONE ENCOUNTER
----- Message from Brenda Soto sent at 9/16/2019  4:25 PM CDT -----  Contact: patient  Type:  Patient Returning Call    Who Called:  patient  Who Left Message for Patient:  jagjit  Does the patient know what this is regarding?:  yes  Best Call Back Number:  [phone]    Additional Information:  n/a

## 2019-09-17 ENCOUNTER — TELEPHONE (OUTPATIENT)
Dept: PODIATRY | Facility: CLINIC | Age: 54
End: 2019-09-17

## 2019-09-17 NOTE — TELEPHONE ENCOUNTER
----- Message from Rene Sam sent at 9/17/2019  9:33 AM CDT -----  Contact: patient   Type:  Patient Returning Call    Who Called: patient   Who Left Message for Patient:  Diana   Does the patient know what this is regarding?:  yes  Best Call Back Number:  [phone] (home)

## 2019-09-17 NOTE — TELEPHONE ENCOUNTER
----- Message from Latha Catalan sent at 9/17/2019  8:44 AM CDT -----  Contact: Patient  Type:  Patient Returning Call    Who Called:  Patient  Who Left Message for Patient:  Diana  Does the patient know what this is regarding?: maybe Xrays   Best Call Back Number:    Additional Information:  Call to pod. No answer.

## 2019-09-17 NOTE — TELEPHONE ENCOUNTER
Patient given results and he verbalized understanding.    KEEGAN Jacobsen Staff             Please call the patient regarding his result.  No fractures seen.  Diagnosis is soft tissue contusion.  Continue current treatment plan.

## 2019-09-19 ENCOUNTER — CLINICAL SUPPORT (OUTPATIENT)
Dept: FAMILY MEDICINE | Facility: CLINIC | Age: 54
End: 2019-09-19
Payer: MEDICARE

## 2019-09-19 ENCOUNTER — TELEPHONE (OUTPATIENT)
Dept: FAMILY MEDICINE | Facility: CLINIC | Age: 54
End: 2019-09-19

## 2019-09-19 DIAGNOSIS — E53.8 B12 DEFICIENCY: Primary | ICD-10-CM

## 2019-09-19 PROCEDURE — 96372 PR INJECTION,THERAP/PROPH/DIAG2ST, IM OR SUBCUT: ICD-10-PCS | Mod: HCNC,S$GLB,, | Performed by: FAMILY MEDICINE

## 2019-09-19 PROCEDURE — 99499 UNLISTED E&M SERVICE: CPT | Mod: HCNC,S$GLB,, | Performed by: FAMILY MEDICINE

## 2019-09-19 PROCEDURE — 96372 THER/PROPH/DIAG INJ SC/IM: CPT | Mod: HCNC,S$GLB,, | Performed by: FAMILY MEDICINE

## 2019-09-19 PROCEDURE — 99499 NO LOS: ICD-10-PCS | Mod: HCNC,S$GLB,, | Performed by: FAMILY MEDICINE

## 2019-09-19 RX ORDER — CYANOCOBALAMIN 1000 UG/ML
1000 INJECTION, SOLUTION INTRAMUSCULAR; SUBCUTANEOUS
Status: SHIPPED | OUTPATIENT
Start: 2019-09-19 | End: 2019-10-17

## 2019-09-19 RX ADMIN — CYANOCOBALAMIN 1000 MCG: 1000 INJECTION, SOLUTION INTRAMUSCULAR; SUBCUTANEOUS at 09:09

## 2019-09-19 NOTE — TELEPHONE ENCOUNTER
"Pt here for B12 injection #2.   Last clinic noted states  " cyanocobalamin injection 100 mcg  inj weekly x 4 weeks, then update lab."    Please confirm dose and place order for b12 x 3. Also, please place order for injection given on 9/12/19 that was originally put in as 100mcg and we will document correctly. Pt is scheduled for last 2 doses on 9/26/19 and 10/3/19.  "

## 2019-09-23 NOTE — PROGRESS NOTES
Pt here for B12 IM injection. Time out verified. Vitamin B12 1000 mcg administered L deltoid. Pt tolerated well.

## 2019-09-23 NOTE — PROGRESS NOTES
Subjective:      Patient ID: Ryan Crane is a 54 y.o. male.    Chief Complaint: Other Misc (post op Dr Becerril 9/2019 6.0 8/2019 )    HPI:  Ryan Crane is a 53 y.o. male who presents to clinic with a chief complaint of right heel wound s/p wound revision with closure.  Patient reports pain as 9/10 on the pain scale.  He states that he has been following postoperative recommendations.  He relates experiencing pain once nerve block wore off a couple days postoperatively is concerned that he may have injured his foot when he fell in PACU while getting dressed.  He denies getting the bandage wet or changing it, although the bandage is disheveled.  Patient denies any other pedal complaints at this time.    PCP:  Laura Becerril MD  Date last seen:  8/29/19    Review of Systems   Constitutional: Negative for appetite change, fever, chills, fatigue and unexpected weight change.   Cardiovascular: Negative for chest pain, claudication, cyanosis, and leg swelling.  Musculoskeletal: Negative for back pain, arthritis, joint pain, joint swelling, myalgias, and stiffness.   Skin: Negative for nail bed changes, discoloration, rash, itching, suspicious lesion, and unusual hair distribution.  Positive for poor wound healing.  Neurological: Negative for loss of balance, sensory change, paresthesias, and numbness.  Positive for pain.  Hematological: Negative for adenopathy, bleeding, and bruising easily.   Psychiatric/Behavioral: The patient is not nervous/anxious.  Negative for altered mental status.    Hemoglobin A1C   Date Value Ref Range Status   08/29/2019 6.0 (H) 4.0 - 5.6 % Final     Comment:     ADA Screening Guidelines:  5.7-6.4%  Consistent with prediabetes  >or=6.5%  Consistent with diabetes  High levels of fetal hemoglobin interfere with the HbA1C  assay. Heterozygous hemoglobin variants (HbS, HgC, etc)do  not significantly interfere with this assay.   However, presence of multiple variants may affect  accuracy.     01/16/2019 5.6 4.0 - 5.6 % Final     Comment:     ADA Screening Guidelines:  5.7-6.4%  Consistent with prediabetes  >or=6.5%  Consistent with diabetes  High levels of fetal hemoglobin interfere with the HbA1C  assay. Heterozygous hemoglobin variants (HbS, HgC, etc)do  not significantly interfere with this assay.   However, presence of multiple variants may affect accuracy.         Past Medical History:   Diagnosis Date    Acute angina     Asthma     COPD (chronic obstructive pulmonary disease)     Hypertension     MI, old     PIPER on CPAP     Schizophrenia     Stroke      Past Surgical History:   Procedure Laterality Date    APPENDECTOMY      CLOSURE, WOUND Right 9/9/2019    Performed by Li Kathleen D[facility] at [facility]    SHOULDER ARTHROSCOPY Left      History reviewed. No pertinent family history.  Social History     Socioeconomic History    Marital status:      Spouse name: Not on file    Number of children: Not on file    Years of education: Not on file    Highest education level: Not on file   Occupational History    Not on file   Social Needs    Financial resource strain: Not on file    Food insecurity:     Worry: Not on file     Inability: Not on file    Transportation needs:     Medical: Not on file     Non-medical: Not on file   Tobacco Use    Smoking status: Current Every Day Smoker     Packs/day: 1.00     Years: 45.00     Pack years: 45.00    Smokeless tobacco: Never Used   Substance and Sexual Activity    Alcohol use: No     Frequency: Never    Drug use: Yes     Types: Marijuana    Sexual activity: Not on file   Lifestyle    Physical activity:     Days per week: Not on file     Minutes per session: Not on file    Stress: Not on file   Relationships    Social connections:     Talks on phone: Not on file     Gets together: Not on file     Attends Muslim service: Not on file     Active member of club or organization: Not on file     Attends meetings of clubs  "or organizations: Not on file     Relationship status: Not on file   Other Topics Concern    Not on file   Social History Narrative    Not on file           Objective:        Ht 5' 11" (1.803 m)   Wt 117.9 kg (260 lb)   BMI 36.26 kg/m²     Physical Exam   Constitutional: Patient is oriented to person, place, and time. Patient appears well-developed and well-nourished.  Strong malodor noted from patient due to lack of personal hygiene.  No acute distress.     Psychiatric: Patient has a normal mood and affect. Patient's speech is normal and behavior is normal. Judgment is normal. Cognition and memory are normal.     Right pedal exam was performed today.  Vascular: Vascular: Pedal pulses palpable 2/4 DP & PT.  CFT is = 3 seconds to the hallux.  Skin temperature is warm to cool proximal tibia to distal toes without localized increase in calor noted.  Localized erythema and edema noted periwound plantar heel.  Ecchymosis noted to the toes and distal forefoot dorsally.  Hair growth present distally to the LE.     Musculoskeletal: Ankle and pedal joint ROM are decreased. Ankle joint dorsiflexion is restricted with the knee extended and flexed per Silfverskiold exam.  Muscle strength is 5/5 for all LE muscle groups tested.  No palpable soft tissue mass appreciable to the right plantar heel.    Neurological:  Epicritic sensation is grossly Intact to the foot.    Oppenheim STR is negative to right lower extremity.   Tenderness to palpation of right plantar heel fissure.     Dermatological: Toenails 1-5 right are WNL in length and thickness.  Webspaces 1-4 right are clean, dry, and intact.  Skin turgor is supple.   Plantar right heel surgical wound edges appear well approximated with sutures intact but not well coapted.  Bandages soak with sanguinous drainage without dto signs of infection noted to the bandage or wound.    Nursing note and vitals reviewed.        Assessment:       Encounter Diagnoses   Name Primary?    " Injury of right foot, initial encounter Yes    Acute post-operative pain          Plan:       Ryan was seen today for other misc.    Diagnoses and all orders for this visit:    Injury of right foot, initial encounter  -     X-Ray Foot Complete Right; Future  -     traMADol (ULTRAM) 50 mg tablet; Take 1 tablet (50 mg total) by mouth every 4 (four) hours as needed for Pain (acute post-op pain).    Acute post-operative pain  -     X-Ray Foot Complete Right; Future  -     traMADol (ULTRAM) 50 mg tablet; Take 1 tablet (50 mg total) by mouth every 4 (four) hours as needed for Pain (acute post-op pain).      I counseled the patient on his conditions, their implications and medical management.    - With patient's permission, cleansed right plantar heel wound with normal saline and performed dressing change by applying betadine and covered with Aquacel foam and Tubigrip stocking.    - Ordered and reviewed xrays with patient.  No fractures seen.  Swelling and bruising appear to be from injury with resolving contusion.    Patient was given the following recommendations and instructions:  Patient Instructions   - Don't bear weight on right foot.    - Use crutches for ambulatory assistance.    - Wear CAM boot at all times when ambulating to prevent further trauma.    - Rest/reduce ambulation.    - Ice behind knee for no more than 20 minutes/hour to reduce pain and swelling.    - Keep dressing clean, dry, and intact.    - Elevate foot above the level of the knee as much as possible throughout the day to reduce pain and swelling.    - Take medications as prescribed.    - Notify clinic if any new or worsening condition arises.        Li Kathleen DPM        Dictation was performed using M*Modal Fluency.  Transcription errors may be present.

## 2019-09-26 ENCOUNTER — CLINICAL SUPPORT (OUTPATIENT)
Dept: FAMILY MEDICINE | Facility: CLINIC | Age: 54
End: 2019-09-26
Payer: MEDICARE

## 2019-09-26 PROCEDURE — 96372 THER/PROPH/DIAG INJ SC/IM: CPT | Mod: HCNC,S$GLB,, | Performed by: FAMILY MEDICINE

## 2019-09-26 PROCEDURE — 96372 PR INJECTION,THERAP/PROPH/DIAG2ST, IM OR SUBCUT: ICD-10-PCS | Mod: HCNC,S$GLB,, | Performed by: FAMILY MEDICINE

## 2019-09-26 RX ADMIN — CYANOCOBALAMIN 1000 MCG: 1000 INJECTION, SOLUTION INTRAMUSCULAR; SUBCUTANEOUS at 08:09

## 2019-09-26 NOTE — PROGRESS NOTES
2 pt identifiers used, pt presented to clinic for B12 inj  Verified by Hector Mendez LPN  Site cleansed with alcohol prep pad and 2x2 gauze.   Inj administered, band-aid applied.   Pt tolerated well.

## 2019-09-30 ENCOUNTER — OFFICE VISIT (OUTPATIENT)
Dept: PODIATRY | Facility: CLINIC | Age: 54
End: 2019-09-30
Payer: MEDICARE

## 2019-09-30 VITALS — HEIGHT: 71 IN | WEIGHT: 260 LBS | BODY MASS INDEX: 36.4 KG/M2

## 2019-09-30 DIAGNOSIS — R46.0 POOR HYGIENE: ICD-10-CM

## 2019-09-30 DIAGNOSIS — G89.18 ACUTE POST-OPERATIVE PAIN: ICD-10-CM

## 2019-09-30 DIAGNOSIS — T81.31XA DEHISCENCE OF SURGICAL WOUND, INITIAL ENCOUNTER: ICD-10-CM

## 2019-09-30 DIAGNOSIS — S99.921S INJURY OF RIGHT FOOT, SEQUELA: Primary | ICD-10-CM

## 2019-09-30 DIAGNOSIS — M79.671 PAIN OF RIGHT HEEL: ICD-10-CM

## 2019-09-30 PROCEDURE — 99024 PR POST-OP FOLLOW-UP VISIT: ICD-10-PCS | Mod: HCNC,S$GLB,, | Performed by: PODIATRIST

## 2019-09-30 PROCEDURE — 99999 PR PBB SHADOW E&M-EST. PATIENT-LVL III: CPT | Mod: PBBFAC,HCNC,, | Performed by: PODIATRIST

## 2019-09-30 PROCEDURE — 99999 PR PBB SHADOW E&M-EST. PATIENT-LVL III: ICD-10-PCS | Mod: PBBFAC,HCNC,, | Performed by: PODIATRIST

## 2019-09-30 PROCEDURE — 99024 POSTOP FOLLOW-UP VISIT: CPT | Mod: HCNC,S$GLB,, | Performed by: PODIATRIST

## 2019-10-03 ENCOUNTER — TELEPHONE (OUTPATIENT)
Dept: HEMATOLOGY/ONCOLOGY | Facility: CLINIC | Age: 54
End: 2019-10-03

## 2019-10-03 ENCOUNTER — CLINICAL SUPPORT (OUTPATIENT)
Dept: FAMILY MEDICINE | Facility: CLINIC | Age: 54
End: 2019-10-03
Payer: MEDICARE

## 2019-10-03 DIAGNOSIS — D50.0 ANEMIA DUE TO CHRONIC BLOOD LOSS: Primary | ICD-10-CM

## 2019-10-03 DIAGNOSIS — E53.8 B12 DEFICIENCY: Primary | ICD-10-CM

## 2019-10-03 PROCEDURE — 96372 THER/PROPH/DIAG INJ SC/IM: CPT | Mod: HCNC,S$GLB,, | Performed by: FAMILY MEDICINE

## 2019-10-03 PROCEDURE — 96372 PR INJECTION,THERAP/PROPH/DIAG2ST, IM OR SUBCUT: ICD-10-PCS | Mod: HCNC,S$GLB,, | Performed by: FAMILY MEDICINE

## 2019-10-03 RX ADMIN — CYANOCOBALAMIN 1000 MCG: 1000 INJECTION, SOLUTION INTRAMUSCULAR; SUBCUTANEOUS at 08:10

## 2019-10-03 NOTE — TELEPHONE ENCOUNTER
Spoke with patient to request that he arrive 1 hour earlier than his appointment for labs to be drawn on the 1st floor in infusion center.  Patient agreed to the lab appointment.  Patient verbalized understanding and appreciation for times and dates of his appointments.

## 2019-10-07 NOTE — PROGRESS NOTES
Subjective:      Patient ID: Ryan Crane is a 54 y.o. male.    Chief Complaint: Follow-up (post op dr Becerril 2019 6.0 8/2019 )    HPI:  Ryan Crane is a 53 y.o. male who presents to clinic with a chief complaint of right heel wound s/p wound revision with closure.  Patient reports pain as 8/10 on the pain scale.  He states that he has been following postoperative recommendations.  He relates falling yesterday and experiencing severe increase in pain to his right foot.  He informs that his mother took down his dressing to evaluate his pain and removed 1 stitch.  Patient denies any other pedal complaints at this time.    PCP:  Laura Becerril MD  Date last seen:  9/12/19    Review of Systems   Constitutional: Negative for appetite change, fever, chills, fatigue and unexpected weight change.   Cardiovascular: Negative for chest pain, claudication, cyanosis, and leg swelling.  Musculoskeletal: Negative for back pain, arthritis, joint pain, joint swelling, myalgias, and stiffness.   Skin: Negative for nail bed changes, discoloration, rash, itching, suspicious lesion, and unusual hair distribution.  Positive for poor wound healing.  Neurological: Negative for loss of balance, sensory change, paresthesias, and numbness.  Positive for pain.  Hematological: Negative for adenopathy, bleeding, and bruising easily.   Psychiatric/Behavioral: The patient is not nervous/anxious.  Negative for altered mental status.    Hemoglobin A1C   Date Value Ref Range Status   08/29/2019 6.0 (H) 4.0 - 5.6 % Final     Comment:     ADA Screening Guidelines:  5.7-6.4%  Consistent with prediabetes  >or=6.5%  Consistent with diabetes  High levels of fetal hemoglobin interfere with the HbA1C  assay. Heterozygous hemoglobin variants (HbS, HgC, etc)do  not significantly interfere with this assay.   However, presence of multiple variants may affect accuracy.     01/16/2019 5.6 4.0 - 5.6 % Final     Comment:     ADA Screening  Guidelines:  5.7-6.4%  Consistent with prediabetes  >or=6.5%  Consistent with diabetes  High levels of fetal hemoglobin interfere with the HbA1C  assay. Heterozygous hemoglobin variants (HbS, HgC, etc)do  not significantly interfere with this assay.   However, presence of multiple variants may affect accuracy.         Past Medical History:   Diagnosis Date    Acute angina     Asthma     COPD (chronic obstructive pulmonary disease)     Hypertension     MI, old     PIPER on CPAP     Schizophrenia     Stroke      Past Surgical History:   Procedure Laterality Date    APPENDECTOMY      CLOSURE OF WOUND Right 9/9/2019    Procedure: CLOSURE, WOUND;  Surgeon: Li Kathleen DPM;  Location: [facility];  Service: Podiatry;  Laterality: Right;    SHOULDER ARTHROSCOPY Left      History reviewed. No pertinent family history.  Social History     Socioeconomic History    Marital status:      Spouse name: Not on file    Number of children: Not on file    Years of education: Not on file    Highest education level: Not on file   Occupational History    Not on file   Social Needs    Financial resource strain: Not on file    Food insecurity:     Worry: Not on file     Inability: Not on file    Transportation needs:     Medical: Not on file     Non-medical: Not on file   Tobacco Use    Smoking status: Current Every Day Smoker     Packs/day: 1.00     Years: 45.00     Pack years: 45.00    Smokeless tobacco: Never Used   Substance and Sexual Activity    Alcohol use: No     Frequency: Never    Drug use: Yes     Types: Marijuana    Sexual activity: Not on file   Lifestyle    Physical activity:     Days per week: Not on file     Minutes per session: Not on file    Stress: Not on file   Relationships    Social connections:     Talks on phone: Not on file     Gets together: Not on file     Attends Zoroastrian service: Not on file     Active member of club or organization: Not on file     Attends meetings of clubs or  "organizations: Not on file     Relationship status: Not on file   Other Topics Concern    Not on file   Social History Narrative    Not on file           Objective:        Ht 5' 11" (1.803 m)   Wt 117.9 kg (260 lb)   BMI 36.26 kg/m²     Physical Exam   Constitutional: Patient is oriented to person, place, and time. Patient appears well-developed and well-nourished.  Strong malodor noted from patient due to lack of personal hygiene.  No acute distress.     Psychiatric: Patient has a normal mood and affect. Patient's speech is normal and behavior is normal. Judgment is normal. Cognition and memory are normal.     Right pedal exam was performed today.  Vascular: Vascular: Pedal pulses palpable 2/4 DP & PT.  CFT is = 3 seconds to the hallux.  Skin temperature is warm to cool proximal tibia to distal toes without localized increase in calor noted.  Localized erythema and edema noted periwound plantar heel.  Ecchymosis noted to the toes and distal forefoot dorsally.  Hair growth present distally to the LE.     Musculoskeletal: Ankle and pedal joint ROM are decreased. Ankle joint dorsiflexion is restricted with the knee extended and flexed per Silfverskiold exam.  Muscle strength is 5/5 for all LE muscle groups tested.  No palpable soft tissue mass appreciable to the right plantar heel.    Neurological:  Epicritic sensation is grossly Intact to the foot.    Oppenheim STR is negative to right lower extremity.   Tenderness to palpation of right plantar heel wound.     Dermatological: Toenails 1-5 right are WNL in length and thickness.  Webspaces 1-4 right are clean, dry, and intact.  Skin turgor is supple.   Plantar right heel surgical wound edges appear well approximated with sutures intact but central wound is not well coapted.  Central wound is open, measuring 0.5 cm x 0.2 cm x 0.3 cm deep with vicryl suture protruding through dehisced surgical wound without dot signs of infection present.    Nursing note and vitals " reviewed.        Assessment:       Encounter Diagnoses   Name Primary?    Injury of right foot, sequela Yes    Dehiscence of surgical wound, initial encounter - Right Foot     Acute post-operative pain     Pain of right heel - Right Foot     Poor hygiene          Plan:       Ryan was seen today for follow-up.    Diagnoses and all orders for this visit:    Injury of right foot, sequela    Dehiscence of surgical wound, initial encounter - Right Foot    Acute post-operative pain    Pain of right heel - Right Foot    Poor hygiene      I counseled the patient on his conditions, their implications and medical management.    - With patient's permission, cleansed right plantar heel wound with normal saline, performed suture removal of all remaining sutures, changed dressing by applying betadine and covered with Aquacel foam and Tubigrip stocking.    Patient was given the following recommendations and instructions:  Patient Instructions   - Keep dressing clean, dry, and intact.    - Notify clinic if any new or worsening condition arises.    - Follow up in 2 weeks for heel fissure.        Li Kathleen DPM        Dictation was performed using M*Modal Fluency.  Transcription errors may be present.

## 2019-10-09 ENCOUNTER — INITIAL CONSULT (OUTPATIENT)
Dept: HEMATOLOGY/ONCOLOGY | Facility: CLINIC | Age: 54
End: 2019-10-09
Payer: MEDICARE

## 2019-10-09 ENCOUNTER — LAB VISIT (OUTPATIENT)
Dept: LAB | Facility: HOSPITAL | Age: 54
End: 2019-10-09
Attending: INTERNAL MEDICINE
Payer: MEDICARE

## 2019-10-09 VITALS
HEIGHT: 71 IN | TEMPERATURE: 99 F | WEIGHT: 260.81 LBS | SYSTOLIC BLOOD PRESSURE: 141 MMHG | BODY MASS INDEX: 36.51 KG/M2 | DIASTOLIC BLOOD PRESSURE: 78 MMHG | RESPIRATION RATE: 20 BRPM | HEART RATE: 67 BPM | OXYGEN SATURATION: 97 %

## 2019-10-09 DIAGNOSIS — D50.0 ANEMIA DUE TO CHRONIC BLOOD LOSS: ICD-10-CM

## 2019-10-09 DIAGNOSIS — I10 ESSENTIAL HYPERTENSION: ICD-10-CM

## 2019-10-09 DIAGNOSIS — Z71.9 ENCOUNTER FOR CONSULTATION: ICD-10-CM

## 2019-10-09 DIAGNOSIS — D50.9 MICROCYTIC ANEMIA: ICD-10-CM

## 2019-10-09 LAB
ALBUMIN SERPL BCP-MCNC: 4.4 G/DL (ref 3.5–5.2)
ALP SERPL-CCNC: 96 U/L (ref 38–145)
ALT SERPL W/O P-5'-P-CCNC: 13 U/L (ref 10–44)
ANION GAP SERPL CALC-SCNC: 11 MMOL/L (ref 8–16)
AST SERPL-CCNC: 21 U/L (ref 17–59)
BASOPHILS # BLD AUTO: 0.12 K/UL (ref 0–0.2)
BASOPHILS NFR BLD: 1 % (ref 0–1.9)
BILIRUB SERPL-MCNC: 0.3 MG/DL (ref 0.2–1.3)
BUN SERPL-MCNC: 5 MG/DL (ref 9–21)
CALCIUM SERPL-MCNC: 8.8 MG/DL (ref 8.4–10.2)
CHLORIDE SERPL-SCNC: 95 MMOL/L (ref 95–110)
CO2 SERPL-SCNC: 28 MMOL/L (ref 22–31)
CREAT SERPL-MCNC: 0.64 MG/DL (ref 0.5–1.4)
DIFFERENTIAL METHOD: ABNORMAL
EOSINOPHIL # BLD AUTO: 0.3 K/UL (ref 0–0.5)
EOSINOPHIL NFR BLD: 2.2 % (ref 0–8)
ERYTHROCYTE [DISTWIDTH] IN BLOOD BY AUTOMATED COUNT: 18.3 % (ref 11.5–14.5)
EST. GFR  (AFRICAN AMERICAN): >60 ML/MIN/1.73 M^2
EST. GFR  (NON AFRICAN AMERICAN): >60 ML/MIN/1.73 M^2
FERRITIN SERPL-MCNC: 7 NG/ML (ref 18–464)
GLUCOSE SERPL-MCNC: 108 MG/DL (ref 70–110)
HCT VFR BLD AUTO: 36.3 % (ref 40–54)
HGB BLD-MCNC: 11.8 G/DL (ref 14–18)
IMM GRANULOCYTES # BLD AUTO: 0.04 K/UL (ref 0–0.04)
IMM GRANULOCYTES NFR BLD AUTO: 0.3 % (ref 0–0.5)
IRON SATN MFR SERPL: 8 % (ref 20–50)
IRON SERPL-MCNC: 34 UG/DL (ref 45–160)
LYMPHOCYTES # BLD AUTO: 3.3 K/UL (ref 1–4.8)
LYMPHOCYTES NFR BLD: 26.5 % (ref 18–48)
MCH RBC QN AUTO: 25.4 PG (ref 27–31)
MCHC RBC AUTO-ENTMCNC: 32.5 G/DL (ref 32–36)
MCV RBC AUTO: 78 FL (ref 82–98)
MONOCYTES # BLD AUTO: 1.3 K/UL (ref 0.3–1)
MONOCYTES NFR BLD: 10.7 % (ref 4–15)
NEUTROPHILS # BLD AUTO: 7.3 K/UL (ref 1.8–7.7)
NEUTROPHILS NFR BLD: 59.3 % (ref 38–73)
NRBC BLD-RTO: 0 /100 WBC
PLATELET # BLD AUTO: 451 K/UL (ref 150–350)
PMV BLD AUTO: 9.5 FL (ref 9.2–12.9)
POTASSIUM SERPL-SCNC: 3.5 MMOL/L (ref 3.5–5.1)
PROT SERPL-MCNC: 7.7 G/DL (ref 6–8.4)
RBC # BLD AUTO: 4.65 M/UL (ref 4.6–6.2)
SODIUM SERPL-SCNC: 134 MMOL/L (ref 136–145)
TOTAL IRON BINDING CAPACITY: 418 UG/DL (ref 261–462)
WBC # BLD AUTO: 12.29 K/UL (ref 3.9–12.7)

## 2019-10-09 PROCEDURE — 85025 COMPLETE CBC W/AUTO DIFF WBC: CPT | Mod: PN

## 2019-10-09 PROCEDURE — 83540 ASSAY OF IRON: CPT

## 2019-10-09 PROCEDURE — 36415 COLL VENOUS BLD VENIPUNCTURE: CPT | Mod: HCNC,PN

## 2019-10-09 PROCEDURE — 99499 UNLISTED E&M SERVICE: CPT | Mod: HCNC,S$GLB,, | Performed by: INTERNAL MEDICINE

## 2019-10-09 PROCEDURE — 99499 RISK ADDL DX/OHS AUDIT: ICD-10-PCS | Mod: HCNC,S$GLB,, | Performed by: INTERNAL MEDICINE

## 2019-10-09 PROCEDURE — 82728 ASSAY OF FERRITIN: CPT

## 2019-10-09 PROCEDURE — 80053 COMPREHEN METABOLIC PANEL: CPT

## 2019-10-09 PROCEDURE — 99204 PR OFFICE/OUTPT VISIT, NEW, LEVL IV, 45-59 MIN: ICD-10-PCS | Mod: HCNC,S$GLB,, | Performed by: INTERNAL MEDICINE

## 2019-10-09 PROCEDURE — 99999 PR PBB SHADOW E&M-EST. PATIENT-LVL III: CPT | Mod: PBBFAC,HCNC,, | Performed by: INTERNAL MEDICINE

## 2019-10-09 PROCEDURE — 82728 ASSAY OF FERRITIN: CPT | Mod: PN

## 2019-10-09 PROCEDURE — 3008F BODY MASS INDEX DOCD: CPT | Mod: HCNC,CPTII,S$GLB, | Performed by: INTERNAL MEDICINE

## 2019-10-09 PROCEDURE — 3008F PR BODY MASS INDEX (BMI) DOCUMENTED: ICD-10-PCS | Mod: HCNC,CPTII,S$GLB, | Performed by: INTERNAL MEDICINE

## 2019-10-09 PROCEDURE — 99999 PR PBB SHADOW E&M-EST. PATIENT-LVL III: ICD-10-PCS | Mod: PBBFAC,HCNC,, | Performed by: INTERNAL MEDICINE

## 2019-10-09 PROCEDURE — 85025 COMPLETE CBC W/AUTO DIFF WBC: CPT

## 2019-10-09 PROCEDURE — 80053 COMPREHEN METABOLIC PANEL: CPT | Mod: PN

## 2019-10-09 PROCEDURE — 83540 ASSAY OF IRON: CPT | Mod: PN

## 2019-10-09 PROCEDURE — 99204 OFFICE O/P NEW MOD 45 MIN: CPT | Mod: HCNC,S$GLB,, | Performed by: INTERNAL MEDICINE

## 2019-10-09 RX ORDER — LANOLIN ALCOHOL/MO/W.PET/CERES
1000 CREAM (GRAM) TOPICAL DAILY
COMMUNITY
End: 2020-08-24

## 2019-10-09 NOTE — LETTER
October 9, 2019      Laura Becerril MD  [address]  [facility] [address]           [facility] [place] [address] [facility] [address]  [facility] [address]  Phone: [phone]  Fax: [phone]          Patient: Ryan Crane   MR Number: [id number]   YOB: 1965   Date of Visit: 10/9/2019       Dear Dr. Laura Becerril:    Thank you for referring Ryan Crane to me for evaluation. Attached you will find relevant portions of my assessment and plan of care.    If you have questions, please do not hesitate to call me. I look forward to following Ryan Crane along with you.    Sincerely,    Franki Becerril RN    Enclosure  CC:  No Recipients    If you would like to receive this communication electronically, please contact [email or web address] or [phone] to request more information on [employer] Link access.    For providers and/or their staff who would like to refer a patient to [facility], please contact us through our one-stop-shop provider referral line, [facility], at [phone].    If you feel you have received this communication in error or would no longer like to receive these types of communications, please e-mail [email or web address]

## 2019-10-09 NOTE — PROGRESS NOTES
HPI      54 years old male referred to [facility] for evaluation of microcytic anemia and thrombocytosis.  Patient complaining of fatigue malaise.  Patient recently started vitamin B12 as well as iron supplement by primary care physicians.    Patient denies any family history of blood disorders.  Patient is active smoker.    Patient denies chest pain shortness breath.  Patient denies abdominal pain nausea vomiting or diarrhea.    Past Medical History:   Diagnosis Date    Acute angina     Asthma     COPD (chronic obstructive pulmonary disease)     Hypertension     MI, old     PIPER on CPAP     Schizophrenia     Stroke      Social History     Socioeconomic History    Marital status:      Spouse name: Not on file    Number of children: Not on file    Years of education: Not on file    Highest education level: Not on file   Occupational History    Not on file   Social Needs    Financial resource strain: Not on file    Food insecurity:     Worry: Not on file     Inability: Not on file    Transportation needs:     Medical: Not on file     Non-medical: Not on file   Tobacco Use    Smoking status: Current Every Day Smoker     Packs/day: 1.00     Years: 45.00     Pack years: 45.00    Smokeless tobacco: Never Used   Substance and Sexual Activity    Alcohol use: No     Frequency: Never    Drug use: Yes     Types: Marijuana    Sexual activity: Not on file   Lifestyle    Physical activity:     Days per week: Not on file     Minutes per session: Not on file    Stress: Not on file   Relationships    Social connections:     Talks on phone: Not on file     Gets together: Not on file     Attends Adventism service: Not on file     Active member of club or organization: Not on file     Attends meetings of clubs or organizations: Not on file     Relationship status: Not on file   Other Topics Concern    Not on file   Social History Narrative    Not on file         Subjective      Review of  Systems   Constitutional:  Fatigue  HENT: Negative for mouth sores.   Eyes: Negative for visual disturbance.   Respiratory: Negative for cough and shortness of breath.   Cardiovascular: Negative for chest pain.   Gastrointestinal: Negative for diarrhea.   Genitourinary: Negative for frequency.   Musculoskeletal: Negative for back pain.   Skin: Negative for rash.   Neurological: Negative for headaches.   Hematological: Negative for adenopathy.   Psychiatric/Behavioral: The patient is not nervous/anxious.   All other systems reviewed and are negative.     Objective    Physical Exam   Vitals:    10/09/19 1056   BP: (!) 141/78   Pulse: 67   Resp: 20   Temp: 98.7 °F (37.1 °C)         Constitutional:  Awake alert  HENT:  Normocephalic atraumatic pupils equal round reactive to light extraocular muscle intact  Cardiovascular:  Normal rate   Pulmonary/Chest:  Clear to auscultate   Abdominal:  Soft nontender nondistended bowel sounds x4   Musculoskeletal: Normal range of motion. Gait is normal  No clubbing, cyanosis     Lymphadenopathy:  No evidence lymphadenopathy  Neurological:  Cranial nerves 2-12 grossly intact sensorimotor grossly intact no focal deficit  Skin:  Warm dry rash no lesions   Psychiatric:  Normal affect  Vitals reviewed.     CMP  Sodium   Date Value Ref Range Status   08/29/2019 129 (L) 136 - 145 mmol/L Final     Potassium   Date Value Ref Range Status   08/29/2019 3.6 3.5 - 5.1 mmol/L Final     Chloride   Date Value Ref Range Status   08/29/2019 92 (L) 95 - 110 mmol/L Final     CO2   Date Value Ref Range Status   08/29/2019 27 23 - 29 mmol/L Final     Glucose   Date Value Ref Range Status   08/29/2019 112 (H) 70 - 110 mg/dL Final     BUN, Bld   Date Value Ref Range Status   08/29/2019 3 (L) 6 - 20 mg/dL Final     Creatinine   Date Value Ref Range Status   08/29/2019 0.8 0.5 - 1.4 mg/dL Final     Calcium   Date Value Ref Range Status   08/29/2019 9.1 8.7 - 10.5 mg/dL Final     Total Protein   Date Value Ref  Range Status   08/29/2019 7.5 6.0 - 8.4 g/dL Final     Albumin   Date Value Ref Range Status   08/29/2019 3.8 3.5 - 5.2 g/dL Final     Total Bilirubin   Date Value Ref Range Status   08/29/2019 0.3 0.1 - 1.0 mg/dL Final     Comment:     For infants and newborns, interpretation of results should be based  on gestational age, weight and in agreement with clinical  observations.  Premature Infant recommended reference ranges:  Up to 24 hours.............<8.0 mg/dL  Up to 48 hours............<12.0 mg/dL  3-5 days..................<15.0 mg/dL  6-29 days.................<15.0 mg/dL       Alkaline Phosphatase   Date Value Ref Range Status   08/29/2019 103 55 - 135 U/L Final     AST   Date Value Ref Range Status   08/29/2019 15 10 - 40 U/L Final     ALT   Date Value Ref Range Status   08/29/2019 10 10 - 44 U/L Final     Anion Gap   Date Value Ref Range Status   08/29/2019 10 8 - 16 mmol/L Final     eGFR if    Date Value Ref Range Status   08/29/2019 >60.0 >60 mL/min/1.73 m^2 Final     eGFR if non    Date Value Ref Range Status   08/29/2019 >60.0 >60 mL/min/1.73 m^2 Final     Comment:     Calculation used to obtain the estimated glomerular filtration  rate (eGFR) is the CKD-EPI equation.        Lab Results   Component Value Date    WBC 10.68 08/29/2019    HGB 12.2 (L) 08/29/2019    HCT 37.4 (L) 08/29/2019    MCV 77 (L) 08/29/2019     (H) 08/29/2019         Assessment    Iron deficiency    Microcytosis anemia    Today's iron blood work has been pending    Tobacco use    Hypertension    Plan    RTC 2 weeks with above results    Hypertension follow-up primary care    Tobacco use patient wishes to stop using tobacco or followed by primary care physician's    GI referral for scope        There are no diagnoses linked to this encounter.

## 2019-10-10 ENCOUNTER — OFFICE VISIT (OUTPATIENT)
Dept: CARDIOLOGY | Facility: CLINIC | Age: 54
End: 2019-10-10
Payer: MEDICARE

## 2019-10-10 VITALS
WEIGHT: 258.38 LBS | HEIGHT: 71 IN | HEART RATE: 98 BPM | SYSTOLIC BLOOD PRESSURE: 138 MMHG | BODY MASS INDEX: 36.17 KG/M2 | DIASTOLIC BLOOD PRESSURE: 82 MMHG

## 2019-10-10 DIAGNOSIS — J44.9 CHRONIC OBSTRUCTIVE PULMONARY DISEASE, UNSPECIFIED COPD TYPE: ICD-10-CM

## 2019-10-10 DIAGNOSIS — I10 ESSENTIAL HYPERTENSION: ICD-10-CM

## 2019-10-10 DIAGNOSIS — I25.10 CAD IN NATIVE ARTERY: ICD-10-CM

## 2019-10-10 PROCEDURE — 99214 OFFICE O/P EST MOD 30 MIN: CPT | Mod: HCNC,S$GLB,, | Performed by: INTERNAL MEDICINE

## 2019-10-10 PROCEDURE — 99214 PR OFFICE/OUTPT VISIT, EST, LEVL IV, 30-39 MIN: ICD-10-PCS | Mod: HCNC,S$GLB,, | Performed by: INTERNAL MEDICINE

## 2019-10-10 PROCEDURE — 3008F PR BODY MASS INDEX (BMI) DOCUMENTED: ICD-10-PCS | Mod: HCNC,CPTII,S$GLB, | Performed by: INTERNAL MEDICINE

## 2019-10-10 PROCEDURE — 99499 RISK ADDL DX/OHS AUDIT: ICD-10-PCS | Mod: HCNC,S$GLB,, | Performed by: INTERNAL MEDICINE

## 2019-10-10 PROCEDURE — 3008F BODY MASS INDEX DOCD: CPT | Mod: HCNC,CPTII,S$GLB, | Performed by: INTERNAL MEDICINE

## 2019-10-10 PROCEDURE — 99999 PR PBB SHADOW E&M-EST. PATIENT-LVL III: CPT | Mod: PBBFAC,HCNC,, | Performed by: INTERNAL MEDICINE

## 2019-10-10 PROCEDURE — 99499 UNLISTED E&M SERVICE: CPT | Mod: HCNC,S$GLB,, | Performed by: INTERNAL MEDICINE

## 2019-10-10 PROCEDURE — 99999 PR PBB SHADOW E&M-EST. PATIENT-LVL III: ICD-10-PCS | Mod: PBBFAC,HCNC,, | Performed by: INTERNAL MEDICINE

## 2019-10-10 NOTE — PROGRESS NOTES
"Subjective:    Patient ID:  Ryan Crane is a 54 y.o. male who presents for follow-up of Coronary Artery Disease (follow up); Shortness of Breath; COPD; and Hypertension      Pt seen last month for pre-op evaluation for foot surgery. 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 He has been seeing a cardiologist in [facility] since 2005. He assumed he had had a heart attack but on close questioning he says he was never told that. Apparently he went to the hospital with chest pains and had an angiogram showing some blockage but not significant enough to require intervention. He has had subsequent normal stress tests and says had a 2nd angiogram in the "teens" showing moderate 60% blockages - again no need for intervention. He had his surgery and dis well from cardiac point. We ordered Echo to evaluate overall LV function. It showed very hyperdynamic EF >80% with elevated flow velocities in LVOT and across the AV most likely due to increased flow from the hyperdynamic state.       Review of Systems   Cardiovascular: Negative for syncope.   Respiratory: Positive for shortness of breath.    Musculoskeletal:        Foot pain from the surgical procedure        Objective:    Physical Exam   Constitutional: He is oriented to person, place, and time. He appears well-developed and well-nourished.   HENT:   Mouth/Throat: Oropharynx is clear and moist.   Eyes: Pupils are equal, round, and reactive to light.   Neck: Normal range of motion. No thyromegaly present.   Cardiovascular: Normal rate, regular rhythm, S1 normal, S2 normal, intact distal pulses and normal pulses.  No extrasystoles are present. PMI is not displaced. Exam reveals no friction rub.   Murmur heard.   Medium-pitched systolic murmur is present with a grade of 1/6 at the upper right sternal border and upper left sternal border.  Pulmonary/Chest: Effort normal. " "He has decreased breath sounds. He has no wheezes. He has no rales. He exhibits no tenderness.   Abdominal: Soft. Bowel sounds are normal. He exhibits no distension and no mass. There is no tenderness.   Musculoskeletal: Normal range of motion. He exhibits no edema.   Neurological: He is alert and oriented to person, place, and time.   Skin: Skin is warm and dry.   Vitals reviewed.        Assessment:       1. CAD in native artery - moderate 3V 60% by Pt history; last angiogram in the "teens"    2. Essential hypertension    3. Chronic obstructive pulmonary disease, unspecified COPD type         Plan:       We discussed his Echo findings  Will plan on repeat Echo in 3-4 months  Will try to get his old cardiology records  "

## 2019-10-11 NOTE — PROGRESS NOTES
Patient, Ryan Crane (MRN #[id number]), presented with a recorded BMI of 36.37 kg/m^2 and a documented comorbidity(s):  - Hypertension  to which the severe obesity is a contributing factor. This is consistent with the definition of severe obesity (BMI 35.0-39.9) with comorbidity (ICD-10 E66.01, Z68.35). The patient's severe obesity was monitored, evaluated, addressed and/or treated. This addendum to the medical record is made on 10/11/2019.

## 2019-10-14 ENCOUNTER — OFFICE VISIT (OUTPATIENT)
Dept: PODIATRY | Facility: CLINIC | Age: 54
End: 2019-10-14
Attending: PODIATRIST
Payer: MEDICARE

## 2019-10-14 ENCOUNTER — HOSPITAL ENCOUNTER (OUTPATIENT)
Dept: RADIOLOGY | Facility: HOSPITAL | Age: 54
Discharge: HOME | End: 2019-10-14
Attending: PODIATRIST
Payer: MEDICARE

## 2019-10-14 VITALS
HEART RATE: 86 BPM | DIASTOLIC BLOOD PRESSURE: 85 MMHG | BODY MASS INDEX: 36.12 KG/M2 | HEIGHT: 71 IN | SYSTOLIC BLOOD PRESSURE: 155 MMHG | WEIGHT: 258 LBS

## 2019-10-14 DIAGNOSIS — Z98.890 POSTOPERATIVE STATE: ICD-10-CM

## 2019-10-14 DIAGNOSIS — T81.31XA DEHISCENCE OF SURGICAL WOUND, INITIAL ENCOUNTER: ICD-10-CM

## 2019-10-14 DIAGNOSIS — R68.89 SUSPECTED SOFT TISSUE INFECTION: ICD-10-CM

## 2019-10-14 DIAGNOSIS — M79.671 PAIN OF RIGHT HEEL: ICD-10-CM

## 2019-10-14 DIAGNOSIS — S99.921S INJURY OF RIGHT FOOT, SEQUELA: Primary | ICD-10-CM

## 2019-10-14 DIAGNOSIS — S99.921S INJURY OF RIGHT FOOT, SEQUELA: ICD-10-CM

## 2019-10-14 PROCEDURE — 73630 X-RAY EXAM OF FOOT: CPT | Mod: TC,HCNC,PO,RT

## 2019-10-14 PROCEDURE — 73630 X-RAY EXAM OF FOOT: CPT | Mod: 26,HCNC,RT, | Performed by: RADIOLOGY

## 2019-10-14 PROCEDURE — 87070 CULTURE OTHR SPECIMN AEROBIC: CPT | Mod: HCNC

## 2019-10-14 PROCEDURE — 99213 OFFICE O/P EST LOW 20 MIN: CPT | Mod: HCNC,S$GLB,, | Performed by: PODIATRIST

## 2019-10-14 PROCEDURE — 87077 CULTURE AEROBIC IDENTIFY: CPT | Mod: HCNC

## 2019-10-14 PROCEDURE — 99999 PR PBB SHADOW E&M-EST. PATIENT-LVL III: ICD-10-PCS | Mod: PBBFAC,HCNC,, | Performed by: PODIATRIST

## 2019-10-14 PROCEDURE — 99999 PR PBB SHADOW E&M-EST. PATIENT-LVL III: CPT | Mod: PBBFAC,HCNC,, | Performed by: PODIATRIST

## 2019-10-14 PROCEDURE — 87186 SC STD MICRODIL/AGAR DIL: CPT | Mod: HCNC

## 2019-10-14 PROCEDURE — 3008F PR BODY MASS INDEX (BMI) DOCUMENTED: ICD-10-PCS | Mod: HCNC,CPTII,S$GLB, | Performed by: PODIATRIST

## 2019-10-14 PROCEDURE — 99213 PR OFFICE/OUTPT VISIT, EST, LEVL III, 20-29 MIN: ICD-10-PCS | Mod: HCNC,S$GLB,, | Performed by: PODIATRIST

## 2019-10-14 PROCEDURE — 87075 CULTR BACTERIA EXCEPT BLOOD: CPT | Mod: HCNC

## 2019-10-14 PROCEDURE — 3008F BODY MASS INDEX DOCD: CPT | Mod: HCNC,CPTII,S$GLB, | Performed by: PODIATRIST

## 2019-10-14 PROCEDURE — 73630 XR FOOT COMPLETE 3 VIEW RIGHT: ICD-10-PCS | Mod: 26,HCNC,RT, | Performed by: RADIOLOGY

## 2019-10-14 PROCEDURE — 87184 SC STD DISK METHOD PER PLATE: CPT | Mod: HCNC

## 2019-10-14 RX ORDER — TRAMADOL HYDROCHLORIDE 50 MG/1
50 TABLET ORAL EVERY 6 HOURS
Qty: 120 TABLET | Refills: 0 | Status: ON HOLD | OUTPATIENT
Start: 2019-10-14 | End: 2019-11-11 | Stop reason: HOSPADM

## 2019-10-14 NOTE — PATIENT INSTRUCTIONS
- Keep dressing clean, dry, and intact.    - Notify clinic if any new or worsening condition arises.    - Follow up in 1 week for wound care.

## 2019-10-16 ENCOUNTER — OFFICE VISIT (OUTPATIENT)
Dept: HEMATOLOGY/ONCOLOGY | Facility: CLINIC | Age: 54
End: 2019-10-16
Payer: MEDICARE

## 2019-10-16 VITALS
WEIGHT: 257.25 LBS | SYSTOLIC BLOOD PRESSURE: 167 MMHG | OXYGEN SATURATION: 97 % | HEIGHT: 65 IN | TEMPERATURE: 98 F | HEART RATE: 100 BPM | DIASTOLIC BLOOD PRESSURE: 78 MMHG | RESPIRATION RATE: 18 BRPM | BODY MASS INDEX: 42.86 KG/M2

## 2019-10-16 DIAGNOSIS — D50.9 MICROCYTIC ANEMIA: Primary | ICD-10-CM

## 2019-10-16 DIAGNOSIS — R46.0 POOR HYGIENE: ICD-10-CM

## 2019-10-16 DIAGNOSIS — F20.9 SCHIZOPHRENIA, UNSPECIFIED TYPE: ICD-10-CM

## 2019-10-16 DIAGNOSIS — D50.9 IRON DEFICIENCY ANEMIA, UNSPECIFIED IRON DEFICIENCY ANEMIA TYPE: Primary | ICD-10-CM

## 2019-10-16 DIAGNOSIS — I10 ESSENTIAL HYPERTENSION: ICD-10-CM

## 2019-10-16 PROCEDURE — 3008F PR BODY MASS INDEX (BMI) DOCUMENTED: ICD-10-PCS | Mod: HCNC,CPTII,S$GLB, | Performed by: INTERNAL MEDICINE

## 2019-10-16 PROCEDURE — 99999 PR PBB SHADOW E&M-EST. PATIENT-LVL IV: ICD-10-PCS | Mod: PBBFAC,HCNC,, | Performed by: INTERNAL MEDICINE

## 2019-10-16 PROCEDURE — 99214 OFFICE O/P EST MOD 30 MIN: CPT | Mod: HCNC,S$GLB,, | Performed by: INTERNAL MEDICINE

## 2019-10-16 PROCEDURE — 3008F BODY MASS INDEX DOCD: CPT | Mod: HCNC,CPTII,S$GLB, | Performed by: INTERNAL MEDICINE

## 2019-10-16 PROCEDURE — 99499 UNLISTED E&M SERVICE: CPT | Mod: HCNC,S$GLB,, | Performed by: INTERNAL MEDICINE

## 2019-10-16 PROCEDURE — 99999 PR PBB SHADOW E&M-EST. PATIENT-LVL IV: CPT | Mod: PBBFAC,HCNC,, | Performed by: INTERNAL MEDICINE

## 2019-10-16 PROCEDURE — 99499 RISK ADDL DX/OHS AUDIT: ICD-10-PCS | Mod: HCNC,S$GLB,, | Performed by: INTERNAL MEDICINE

## 2019-10-16 PROCEDURE — 99214 PR OFFICE/OUTPT VISIT, EST, LEVL IV, 30-39 MIN: ICD-10-PCS | Mod: HCNC,S$GLB,, | Performed by: INTERNAL MEDICINE

## 2019-10-16 NOTE — PROGRESS NOTES
HPI      54 years old male referred to H[facility]ology Clinic for evaluation of microcytic anemia and thrombocytosis.  Patient complaining of fatigue malaise.  Patient recently started vitamin B12 as well as iron supplement by primary care physicians.    Patient denies any family history of blood disorders.  Patient is active smoker.    10/16/2019:  Patient here to follow-up to review blood work.  No complaints.  Patient states that he is taking iron pill every day empty stomach with orange juice since last visit.  Latest blood work obtained on 10/09/2019 shortly after last visit.    Past Medical History:   Diagnosis Date    Acute angina     Asthma     COPD (chronic obstructive pulmonary disease)     Hypertension     MI, old     PIPER on CPAP     Schizophrenia     Stroke      Social History     Socioeconomic History    Marital status:      Spouse name: Not on file    Number of children: Not on file    Years of education: Not on file    Highest education level: Not on file   Occupational History    Not on file   Social Needs    Financial resource strain: Not on file    Food insecurity:     Worry: Not on file     Inability: Not on file    Transportation needs:     Medical: Not on file     Non-medical: Not on file   Tobacco Use    Smoking status: Current Every Day Smoker     Packs/day: 1.00     Years: 45.00     Pack years: 45.00    Smokeless tobacco: Never Used   Substance and Sexual Activity    Alcohol use: No     Frequency: Never    Drug use: Not Currently     Types: Marijuana    Sexual activity: Yes     Partners: Female   Lifestyle    Physical activity:     Days per week: Not on file     Minutes per session: Not on file    Stress: Not on file   Relationships    Social connections:     Talks on phone: Not on file     Gets together: Not on file     Attends Mandaen service: Not on file     Active member of club or organization: Not on file     Attends meetings of clubs or organizations:  Not on file     Relationship status: Not on file   Other Topics Concern    Not on file   Social History Narrative    Not on file         Subjective      Review of Systems   Constitutional:  Fatigue  HENT: Negative for mouth sores.   Eyes: Negative for visual disturbance.   Respiratory: Negative for cough and shortness of breath.   Cardiovascular: Negative for chest pain.   Gastrointestinal: Negative for diarrhea.   Genitourinary: Negative for frequency.   Musculoskeletal: Negative for back pain.   Skin: Negative for rash.   Neurological: Negative for headaches.   Hematological: Negative for adenopathy.   Psychiatric/Behavioral: The patient is not nervous/anxious.   All other systems reviewed and are negative.     Objective    Physical Exam     Vitals:    10/16/19 0812   BP: (!) 167/78   Pulse: (!) 111   Resp: 18   Temp: 98.3 °F (36.8 °C)         Constitutional:  Awake alert  HENT:  Normocephalic atraumatic pupils equal round reactive to light extraocular muscle intact  Cardiovascular:  Tachycardia   Pulmonary/Chest:  Clear to auscultate   Abdominal:  Soft nontender nondistended bowel sounds x4   Musculoskeletal: Normal range of motion. Gait is normal  No clubbing, cyanosis     Lymphadenopathy:  No evidence lymphadenopathy  Neurological:  Cranial nerves 2-12 grossly intact sensorimotor grossly intact no focal deficit  Skin:  Warm dry rash no lesions   Psychiatric:  Normal affect  Vitals reviewed.     CMP  Sodium   Date Value Ref Range Status   10/09/2019 134 (L) 136 - 145 mmol/L Final     Potassium   Date Value Ref Range Status   10/09/2019 3.5 3.5 - 5.1 mmol/L Final     Chloride   Date Value Ref Range Status   10/09/2019 95 95 - 110 mmol/L Final     CO2   Date Value Ref Range Status   10/09/2019 28 22 - 31 mmol/L Final     Glucose   Date Value Ref Range Status   10/09/2019 108 70 - 110 mg/dL Final     Comment:     The ADA recommends the following guidelines for fasting glucose:  Normal:       less than 100  mg/dL  Prediabetes:  100 mg/dL to 125 mg/dL  Diabetes:     126 mg/dL or higher       BUN, Bld   Date Value Ref Range Status   10/09/2019 5 (L) 9 - 21 mg/dL Final     Creatinine   Date Value Ref Range Status   10/09/2019 0.64 0.50 - 1.40 mg/dL Final     Calcium   Date Value Ref Range Status   10/09/2019 8.8 8.4 - 10.2 mg/dL Final     Total Protein   Date Value Ref Range Status   10/09/2019 7.7 6.0 - 8.4 g/dL Final     Albumin   Date Value Ref Range Status   10/09/2019 4.4 3.5 - 5.2 g/dL Final     Total Bilirubin   Date Value Ref Range Status   10/09/2019 0.3 0.2 - 1.3 mg/dL Final     Alkaline Phosphatase   Date Value Ref Range Status   10/09/2019 96 38 - 145 U/L Final     AST   Date Value Ref Range Status   10/09/2019 21 17 - 59 U/L Final     ALT   Date Value Ref Range Status   10/09/2019 13 10 - 44 U/L Final     Anion Gap   Date Value Ref Range Status   10/09/2019 11 8 - 16 mmol/L Final     eGFR if    Date Value Ref Range Status   10/09/2019 >60 >60 mL/min/1.73 m^2 Final     eGFR if non    Date Value Ref Range Status   10/09/2019 >60 >60 mL/min/1.73 m^2 Final     Comment:     Calculation used to obtain the estimated glomerular filtration  rate (eGFR) is the CKD-EPI equation.        Lab Results   Component Value Date    WBC 12.29 10/09/2019    HGB 11.8 (L) 10/09/2019    HCT 36.3 (L) 10/09/2019    MCV 78 (L) 10/09/2019     (H) 10/09/2019     10/09/2019:  Ferritin 7, iron 34, TIBC 418, iron saturation 8%      Assessment    Iron deficiency - on oral iron supplements once daily.  Patient states that he suffered mild side effect from oral iron supplement such as nausea.  But tolerable.    Microcytosis     Tobacco use    Hypertension    No overt bleeding, patient has not seen by GI    Plan    Continue Oral iron supplement daily is recommend, if unable to tolerate oral iron supplement will consider IV iron replacement.-will recheck iron status on next visit 4 weeks from now.    Patient  is also taking metformin but there is no documentation of diabetes.  Will check vitamin B12 levels.    Hypertension follow-up primary care    Recheck heart rate - 100heart rate patient had caffeine this morning    Tobacco use patient wishes to stop using tobacco or followed by primary care physician's    GI referral for scope        There are no diagnoses linked to this encounter.

## 2019-10-18 DIAGNOSIS — M79.671 PAIN OF RIGHT HEEL: ICD-10-CM

## 2019-10-18 DIAGNOSIS — L08.9 SOFT TISSUE INFECTION OF FOOT: Primary | ICD-10-CM

## 2019-10-18 DIAGNOSIS — T81.31XD DEHISCENCE OF SURGICAL WOUND, SUBSEQUENT ENCOUNTER: ICD-10-CM

## 2019-10-18 LAB
BACTERIA SPEC AEROBE CULT: ABNORMAL
BACTERIA SPEC ANAEROBE CULT: NORMAL

## 2019-10-18 RX ORDER — CIPROFLOXACIN 500 MG/1
500 TABLET ORAL EVERY 12 HOURS
Qty: 28 TABLET | Refills: 0 | Status: SHIPPED | OUTPATIENT
Start: 2019-10-18 | End: 2019-11-01

## 2019-10-21 ENCOUNTER — OFFICE VISIT (OUTPATIENT)
Dept: PODIATRY | Facility: CLINIC | Age: 54
End: 2019-10-21
Payer: MEDICARE

## 2019-10-21 VITALS
HEIGHT: 65 IN | BODY MASS INDEX: 42.82 KG/M2 | DIASTOLIC BLOOD PRESSURE: 90 MMHG | SYSTOLIC BLOOD PRESSURE: 185 MMHG | HEART RATE: 86 BPM | WEIGHT: 257 LBS

## 2019-10-21 DIAGNOSIS — T81.31XD DEHISCENCE OF SURGICAL WOUND, SUBSEQUENT ENCOUNTER: Primary | ICD-10-CM

## 2019-10-21 DIAGNOSIS — L08.9 SOFT TISSUE INFECTION OF FOOT: ICD-10-CM

## 2019-10-21 DIAGNOSIS — R46.0 POOR HYGIENE: ICD-10-CM

## 2019-10-21 DIAGNOSIS — M79.671 PAIN OF RIGHT HEEL: ICD-10-CM

## 2019-10-21 PROBLEM — Z98.890 POSTOPERATIVE STATE: Status: ACTIVE | Noted: 2019-10-21

## 2019-10-21 PROCEDURE — 97597 DBRDMT OPN WND 1ST 20 CM/<: CPT | Mod: HCNC,S$GLB,, | Performed by: PODIATRIST

## 2019-10-21 PROCEDURE — 3008F PR BODY MASS INDEX (BMI) DOCUMENTED: ICD-10-PCS | Mod: HCNC,CPTII,S$GLB, | Performed by: PODIATRIST

## 2019-10-21 PROCEDURE — 99213 OFFICE O/P EST LOW 20 MIN: CPT | Mod: 25,HCNC,S$GLB, | Performed by: PODIATRIST

## 2019-10-21 PROCEDURE — 99999 PR PBB SHADOW E&M-EST. PATIENT-LVL III: ICD-10-PCS | Mod: PBBFAC,HCNC,, | Performed by: PODIATRIST

## 2019-10-21 PROCEDURE — 99213 PR OFFICE/OUTPT VISIT, EST, LEVL III, 20-29 MIN: ICD-10-PCS | Mod: 25,HCNC,S$GLB, | Performed by: PODIATRIST

## 2019-10-21 PROCEDURE — 97597 PR DEBRIDEMENT OPEN WOUND 20 SQ CM<: ICD-10-PCS | Mod: HCNC,S$GLB,, | Performed by: PODIATRIST

## 2019-10-21 PROCEDURE — 3008F BODY MASS INDEX DOCD: CPT | Mod: HCNC,CPTII,S$GLB, | Performed by: PODIATRIST

## 2019-10-21 PROCEDURE — 99999 PR PBB SHADOW E&M-EST. PATIENT-LVL III: CPT | Mod: PBBFAC,HCNC,, | Performed by: PODIATRIST

## 2019-10-21 NOTE — PROGRESS NOTES
Subjective:      Patient ID: Ryan Crane is a 54 y.o. male.    Chief Complaint: Wound Check (Dr Becerril 2019 6.0 8/2019)    HPI:  Ryan Crane is a 53 y.o. male who presents to clinic with a chief complaint of right heel wound s/p wound revision with closure.  Patient reports pain as 9/10 on the pain scale.  He states that he has been following postoperative recommendations.  Patient denies any other pedal complaints at this time.    PCP:  Laura Becerril MD  Date last seen:  9/12/19    Review of Systems   Constitutional: Negative for appetite change, fever, chills, fatigue and unexpected weight change.   Cardiovascular: Negative for chest pain, claudication, cyanosis, and leg swelling.  Musculoskeletal: Negative for back pain, arthritis, joint pain, joint swelling, myalgias, and stiffness.   Skin: Negative for nail bed changes, discoloration, rash, itching, suspicious lesion, and unusual hair distribution.  Positive for poor wound healing.  Neurological: Negative for loss of balance, sensory change, paresthesias, and numbness.  Positive for pain.  Hematological: Negative for adenopathy, bleeding, and bruising easily.   Psychiatric/Behavioral: The patient is not nervous/anxious.  Negative for altered mental status.    Hemoglobin A1C   Date Value Ref Range Status   08/29/2019 6.0 (H) 4.0 - 5.6 % Final     Comment:     ADA Screening Guidelines:  5.7-6.4%  Consistent with prediabetes  >or=6.5%  Consistent with diabetes  High levels of fetal hemoglobin interfere with the HbA1C  assay. Heterozygous hemoglobin variants (HbS, HgC, etc)do  not significantly interfere with this assay.   However, presence of multiple variants may affect accuracy.     01/16/2019 5.6 4.0 - 5.6 % Final     Comment:     ADA Screening Guidelines:  5.7-6.4%  Consistent with prediabetes  >or=6.5%  Consistent with diabetes  High levels of fetal hemoglobin interfere with the HbA1C  assay. Heterozygous hemoglobin variants (HbS, HgC,  etc)do  not significantly interfere with this assay.   However, presence of multiple variants may affect accuracy.         Past Medical History:   Diagnosis Date    Acute angina     Asthma     COPD (chronic obstructive pulmonary disease)     Hypertension     MI, old     PIPER on CPAP     Schizophrenia     Stroke      Past Surgical History:   Procedure Laterality Date    APPENDECTOMY      CLOSURE OF WOUND Right 9/9/2019    Procedure: CLOSURE, WOUND;  Surgeon: Li Kathleen DPM;  Location: [facility];  Service: Podiatry;  Laterality: Right;    SHOULDER ARTHROSCOPY Left      Family History   Problem Relation Age of Onset    Melanoma Mother     Diabetes Mother     Hypertension Mother     Stroke Father     Diabetes Father     Hypertension Father     Colon cancer Father     Cervical cancer Sister     Cirrhosis Brother     Hypertension Brother     Lung cancer Maternal Grandmother     Prostate cancer Maternal Grandfather      Social History     Socioeconomic History    Marital status:      Spouse name: Not on file    Number of children: Not on file    Years of education: Not on file    Highest education level: Not on file   Occupational History    Not on file   Social Needs    Financial resource strain: Not on file    Food insecurity:     Worry: Not on file     Inability: Not on file    Transportation needs:     Medical: Not on file     Non-medical: Not on file   Tobacco Use    Smoking status: Current Every Day Smoker     Packs/day: 1.00     Years: 45.00     Pack years: 45.00    Smokeless tobacco: Never Used   Substance and Sexual Activity    Alcohol use: No     Frequency: Never    Drug use: Not Currently     Types: Marijuana    Sexual activity: Yes     Partners: Female   Lifestyle    Physical activity:     Days per week: Not on file     Minutes per session: Not on file    Stress: Not on file   Relationships    Social connections:     Talks on phone: Not on file     Gets together:  "Not on file     Attends Shinto service: Not on file     Active member of club or organization: Not on file     Attends meetings of clubs or organizations: Not on file     Relationship status: Not on file   Other Topics Concern    Not on file   Social History Narrative    Not on file           Objective:        BP (!) 185/90   Pulse 86   Ht 5' 5" (1.651 m)   Wt 116.6 kg (257 lb)   BMI 42.77 kg/m²     Physical Exam   Constitutional: Patient is oriented to person, place, and time. Patient appears well-developed and well-nourished.  Strong malodor noted from patient due to lack of personal hygiene.  No acute distress.     Psychiatric: Patient has a normal mood and affect. Patient's speech is normal and behavior is normal. Judgment is normal. Cognition and memory are normal.     Right pedal exam was performed today.  Vascular: Vascular: Pedal pulses palpable 2/4 DP & PT.  CFT is = 3 seconds to the hallux.  Skin temperature is warm to cool proximal tibia to distal toes without localized increase in calor noted.  Localized erythema and edema noted periwound plantar heel.  Ecchymosis noted to the toes and distal forefoot dorsally.  Hair growth present distally to the LE.     Musculoskeletal: Ankle and pedal joint ROM are decreased. Ankle joint dorsiflexion is restricted with the knee extended and flexed per Silfverskiold exam.  Muscle strength is 5/5 for all LE muscle groups tested.  No palpable soft tissue mass appreciable to the right plantar heel.    Neurological:  Epicritic sensation is grossly Intact to the foot.    [place] STR is negative to right lower extremity.   Tenderness to palpation of right plantar heel wound.     Dermatological: Toenails 1-5 right are WNL in length and thickness.  Webspaces 1-4 right are clean, dry, and intact.  Skin turgor is supple.   Plantar right heel surgical wound has dehisced with sutures busted and protruding through wound with wound base measuring 1.5 cm x 0.4 cm x 0.2 cm " extending down to deep dermis without dot signs of infection present.  See images below.            Nursing note and vitals reviewed.        Assessment:       Encounter Diagnoses   Name Primary?    Dehiscence of surgical wound, subsequent encounter Yes    Soft tissue infection of foot     Pain of right heel - Right Foot     Poor hygiene          Plan:       Ryan was seen today for wound check.    Diagnoses and all orders for this visit:    Dehiscence of surgical wound, subsequent encounter    Soft tissue infection of foot    Pain of right heel - Right Foot    Poor hygiene      I counseled the patient on his conditions, their implications and medical management.    - With patient's permission, cleansed right plantar heel wound with Betadine and performed sharp, selective debridement of devitalized tissue and biofilm, < 20 cm sq., extending down to the level of dermis via 3 mm dermal curette to patient's tolerance without incident.  Applied offloading aperture pad and MediHoney.  Covered with Aquacel foam and Tubigrip stocking.  Instructed patient to repeat wound care as performed today every 2 days.    - Educated patient on proper diabetic foot care and supportive, accommodative shoe gear.    Patient was given the following recommendations and instructions:  Patient Instructions   - Perform wound care daily after shower/bath and whenever dressing becomes soiled or wet via removing dressing, cleansing with betadine, patting area dry, applying aperture pad and MediHoney, and covering with bandage.    - Take medications as prescribed.    - Notify clinic if any new or worsening condition arises.    - Follow up in 1 week for wound care.        Li Kathleen DPM        Dictation was performed using M*Modal Fluency.  Transcription errors may be present.

## 2019-10-21 NOTE — PROGRESS NOTES
Subjective:      Patient ID: Ryan Crane is a 54 y.o. male.    Chief Complaint: Wound Check (2 wk R foot, busted open on Saturday,  Dr Becerril 9/2019 6.0 8/2019 )    HPI:  Ryan Crane is a 53 y.o. male who presents to clinic with a chief complaint of right heel wound s/p wound revision with closure.  Patient reports pain as 9/10 on the pain scale.  He states that he has been following postoperative recommendations.  Patient denies any other pedal complaints at this time.    PCP:  Laura Becerril MD  Date last seen:  9/12/19    Review of Systems   Constitutional: Negative for appetite change, fever, chills, fatigue and unexpected weight change.   Cardiovascular: Negative for chest pain, claudication, cyanosis, and leg swelling.  Musculoskeletal: Negative for back pain, arthritis, joint pain, joint swelling, myalgias, and stiffness.   Skin: Negative for nail bed changes, discoloration, rash, itching, suspicious lesion, and unusual hair distribution.  Positive for poor wound healing.  Neurological: Negative for loss of balance, sensory change, paresthesias, and numbness.  Positive for pain.  Hematological: Negative for adenopathy, bleeding, and bruising easily.   Psychiatric/Behavioral: The patient is not nervous/anxious.  Negative for altered mental status.    Hemoglobin A1C   Date Value Ref Range Status   08/29/2019 6.0 (H) 4.0 - 5.6 % Final     Comment:     ADA Screening Guidelines:  5.7-6.4%  Consistent with prediabetes  >or=6.5%  Consistent with diabetes  High levels of fetal hemoglobin interfere with the HbA1C  assay. Heterozygous hemoglobin variants (HbS, HgC, etc)do  not significantly interfere with this assay.   However, presence of multiple variants may affect accuracy.     01/16/2019 5.6 4.0 - 5.6 % Final     Comment:     ADA Screening Guidelines:  5.7-6.4%  Consistent with prediabetes  >or=6.5%  Consistent with diabetes  High levels of fetal hemoglobin interfere with the HbA1C  assay.  Heterozygous hemoglobin variants (HbS, HgC, etc)do  not significantly interfere with this assay.   However, presence of multiple variants may affect accuracy.         Past Medical History:   Diagnosis Date    Acute angina     Asthma     COPD (chronic obstructive pulmonary disease)     Hypertension     MI, old     PIPER on CPAP     Schizophrenia     Stroke      Past Surgical History:   Procedure Laterality Date    APPENDECTOMY      CLOSURE OF WOUND Right 9/9/2019    Procedure: CLOSURE, WOUND;  Surgeon: Li Kathleen DPM;  Location[facility];  Service: Podiatry;  Laterality: Right;    SHOULDER ARTHROSCOPY Left      Family History   Problem Relation Age of Onset    Melanoma Mother     Diabetes Mother     Hypertension Mother     Stroke Father     Diabetes Father     Hypertension Father     Colon cancer Father     Cervical cancer Sister     Cirrhosis Brother     Hypertension Brother     Lung cancer Maternal Grandmother     Prostate cancer Maternal Grandfather      Social History     Socioeconomic History    Marital status:      Spouse name: Not on file    Number of children: Not on file    Years of education: Not on file    Highest education level: Not on file   Occupational History    Not on file   Social Needs    Financial resource strain: Not on file    Food insecurity:     Worry: Not on file     Inability: Not on file    Transportation needs:     Medical: Not on file     Non-medical: Not on file   Tobacco Use    Smoking status: Current Every Day Smoker     Packs/day: 1.00     Years: 45.00     Pack years: 45.00    Smokeless tobacco: Never Used   Substance and Sexual Activity    Alcohol use: No     Frequency: Never    Drug use: Not Currently     Types: Marijuana    Sexual activity: Yes     Partners: Female   Lifestyle    Physical activity:     Days per week: Not on file     Minutes per session: Not on file    Stress: Not on file   Relationships    Social connections:      "Talks on phone: Not on file     Gets together: Not on file     Attends Jewish service: Not on file     Active member of club or organization: Not on file     Attends meetings of clubs or organizations: Not on file     Relationship status: Not on file   Other Topics Concern    Not on file   Social History Narrative    Not on file           Objective:        BP (!) 155/85   Pulse 86   Ht 5' 11" (1.803 m)   Wt 117 kg (258 lb)   BMI 35.98 kg/m²     Physical Exam   Constitutional: Patient is oriented to person, place, and time. Patient appears well-developed and well-nourished.  Strong malodor noted from patient due to lack of personal hygiene.  No acute distress.     Psychiatric: Patient has a normal mood and affect. Patient's speech is normal and behavior is normal. Judgment is normal. Cognition and memory are normal.     Right pedal exam was performed today.  Vascular: Vascular: Pedal pulses palpable 2/4 DP & PT.  CFT is = 3 seconds to the hallux.  Skin temperature is warm to cool proximal tibia to distal toes without localized increase in calor noted.  Localized erythema and edema noted periwound plantar heel.  Ecchymosis noted to the toes and distal forefoot dorsally.  Hair growth present distally to the LE.     Musculoskeletal: Ankle and pedal joint ROM are decreased. Ankle joint dorsiflexion is restricted with the knee extended and flexed per Silfverskiold exam.  Muscle strength is 5/5 for all LE muscle groups tested.  No palpable soft tissue mass appreciable to the right plantar heel.    Neurological:  Epicritic sensation is grossly Intact to the foot.    Oppenheim STR is negative to right lower extremity.   Tenderness to palpation of right plantar heel wound.     Dermatological: Toenails 1-5 right are WNL in length and thickness.  Webspaces 1-4 right are clean, dry, and intact.  Skin turgor is supple.   Plantar right heel surgical wound edges appear well approximated with sutures intact but central " wound is not well coapted.  Central wound is open, measuring 1.8 cm x 1.9 cm x 0.3 cm deep with vicryl suture protruding through dehisced surgical wound without dot signs of infection present.    Nursing note and vitals reviewed.        Assessment:       Encounter Diagnoses   Name Primary?    Injury of right foot, sequela Yes    Suspected soft tissue infection - Right Foot     Dehiscence of surgical wound, initial encounter - Right Foot     Pain of right heel - Right Foot     Postoperative state - Right Foot          Plan:       Ryan was seen today for wound check.    Diagnoses and all orders for this visit:    Injury of right foot, sequela  -     traMADol (ULTRAM) 50 mg tablet; Take 1 tablet (50 mg total) by mouth every 6 (six) hours.  -     X-Ray Foot Complete Right; Future  -     Aerobic culture  -     Culture, Anaerobic    Suspected soft tissue infection - Right Foot  -     traMADol (ULTRAM) 50 mg tablet; Take 1 tablet (50 mg total) by mouth every 6 (six) hours.  -     X-Ray Foot Complete Right; Future  -     Aerobic culture  -     Culture, Anaerobic    Dehiscence of surgical wound, initial encounter - Right Foot  -     traMADol (ULTRAM) 50 mg tablet; Take 1 tablet (50 mg total) by mouth every 6 (six) hours.  -     X-Ray Foot Complete Right; Future  -     Aerobic culture  -     Culture, Anaerobic    Pain of right heel - Right Foot  -     traMADol (ULTRAM) 50 mg tablet; Take 1 tablet (50 mg total) by mouth every 6 (six) hours.  -     X-Ray Foot Complete Right; Future  -     Aerobic culture  -     Culture, Anaerobic    Postoperative state - Right Foot  -     traMADol (ULTRAM) 50 mg tablet; Take 1 tablet (50 mg total) by mouth every 6 (six) hours.  -     X-Ray Foot Complete Right; Future  -     Aerobic culture  -     Culture, Anaerobic      I counseled the patient on his conditions, their implications and medical management.    - With patient's permission, cleansed right plantar heel wound with normal  saline, obtained wound culture, changed dressing by applying betadine and covered with Aquacel foam and Tubigrip stocking.    Patient was given the following recommendations and instructions:  Patient Instructions   - Keep dressing clean, dry, and intact.    - Notify clinic if any new or worsening condition arises.    - Follow up in 1 week for wound care.        Li Kathleen DPM        Dictation was performed using M*Modal Fluency.  Transcription errors may be present.

## 2019-10-22 NOTE — PROGRESS NOTES
Patient, Ryan Crane (MRN #[id number]), presented with a recorded BMI of 42.81 kg/m^2 consistent with the definition of morbid obesity (ICD-10 E66.01). The patient's morbid obesity was monitored, evaluated, addressed and/or treated. This addendum to the medical record is made on 10/22/2019.

## 2019-10-28 ENCOUNTER — OFFICE VISIT (OUTPATIENT)
Dept: GASTROENTEROLOGY | Facility: CLINIC | Age: 54
End: 2019-10-28
Payer: MEDICARE

## 2019-10-28 VITALS
WEIGHT: 257.5 LBS | SYSTOLIC BLOOD PRESSURE: 157 MMHG | DIASTOLIC BLOOD PRESSURE: 91 MMHG | BODY MASS INDEX: 42.9 KG/M2 | RESPIRATION RATE: 18 BRPM | HEART RATE: 86 BPM | HEIGHT: 65 IN

## 2019-10-28 DIAGNOSIS — R79.89 LOW VITAMIN B12 LEVEL: ICD-10-CM

## 2019-10-28 DIAGNOSIS — R03.0 ELEVATED BLOOD PRESSURE READING: ICD-10-CM

## 2019-10-28 DIAGNOSIS — D50.9 IRON DEFICIENCY ANEMIA, UNSPECIFIED IRON DEFICIENCY ANEMIA TYPE: Primary | ICD-10-CM

## 2019-10-28 DIAGNOSIS — K92.1 BLACK STOOL: ICD-10-CM

## 2019-10-28 DIAGNOSIS — R07.9 NONSPECIFIC CHEST PAIN: ICD-10-CM

## 2019-10-28 DIAGNOSIS — Z80.0 FAMILY HISTORY OF COLON CANCER: ICD-10-CM

## 2019-10-28 PROBLEM — L08.9 SOFT TISSUE INFECTION OF FOOT: Status: ACTIVE | Noted: 2019-10-28

## 2019-10-28 PROBLEM — T81.31XD: Status: ACTIVE | Noted: 2019-05-21

## 2019-10-28 PROCEDURE — 99214 PR OFFICE/OUTPT VISIT, EST, LEVL IV, 30-39 MIN: ICD-10-PCS | Mod: HCNC,S$GLB,, | Performed by: NURSE PRACTITIONER

## 2019-10-28 PROCEDURE — 99214 OFFICE O/P EST MOD 30 MIN: CPT | Mod: HCNC,S$GLB,, | Performed by: NURSE PRACTITIONER

## 2019-10-28 PROCEDURE — 99999 PR PBB SHADOW E&M-EST. PATIENT-LVL IV: ICD-10-PCS | Mod: PBBFAC,HCNC,, | Performed by: NURSE PRACTITIONER

## 2019-10-28 PROCEDURE — 3008F PR BODY MASS INDEX (BMI) DOCUMENTED: ICD-10-PCS | Mod: HCNC,CPTII,S$GLB, | Performed by: NURSE PRACTITIONER

## 2019-10-28 PROCEDURE — 99999 PR PBB SHADOW E&M-EST. PATIENT-LVL IV: CPT | Mod: PBBFAC,HCNC,, | Performed by: NURSE PRACTITIONER

## 2019-10-28 PROCEDURE — 3008F BODY MASS INDEX DOCD: CPT | Mod: HCNC,CPTII,S$GLB, | Performed by: NURSE PRACTITIONER

## 2019-10-28 NOTE — PATIENT INSTRUCTIONS
- Perform wound care daily after shower/bath and whenever dressing becomes soiled or wet via removing dressing, cleansing with betadine, patting area dry, applying aperture pad and MediHoney, and covering with bandage.    - Take medications as prescribed.    - Notify clinic if any new or worsening condition arises.    - Follow up in 1 week for wound care.

## 2019-10-28 NOTE — LETTER
October 28, 2019      Lex Kathleen MD  [address] [facility]  [address]  [facility] [address]           [facility]  [address] [place] [address]  Phone: [phone]          Patient: Ryan Crane   MR Number: [id number]   YOB: 1965   Date of Visit: 10/28/2019       Dear Dr. Lex Kathleen:    Thank you for referring Ryan Crane to me for evaluation. Attached you will find relevant portions of my assessment and plan of care.    If you have questions, please do not hesitate to call me. I look forward to following Ryan Crane along with you.    Sincerely,    Kaylyn Antunez, [facility]    Enclosure  CC:  No Recipients    If you would like to receive this communication electronically, please contact [email or web address] or [phone] to request more information on [facility] access.    For providers and/or their staff who would like to refer a patient to [facility], please contact us through our one-stop-shop provider referral line, Cleo Estrada, at [phone].    If you feel you have received this communication in error or would no longer like to receive these types of communications, please e-mail [email or web address]

## 2019-10-28 NOTE — PATIENT INSTRUCTIONS

## 2019-10-28 NOTE — PROGRESS NOTES
Subjective:       Patient ID: Ryan Crane is a 54 y.o. male Body mass index is 42.85 kg/m².    Chief Complaint: Anemia    This patient is new to me.  Referring Provider: Dr. Lex Kathleen for iron deficiency anemia.     Patient is here with a family member, whom assisted with history.    Anemia   Presents for initial (initial to me; patient has been seeing PCP & hematology, Dr. Kathleen, for it; anemia noted at least since 1/2019 when reviewing blood work) visit. Symptoms include weight loss (trying to lose weight with dieting (watching what he is eating); lost about 15-20 lbs since ~3/2019; reports been trying to lose weight for the past 2 years since he moved to [place] from [place]). There has been no abdominal pain, anorexia, bruising/bleeding easily, fever or malaise/fatigue. Signs of blood loss that are present include melena (black stool since he started taking iron pills). Signs of blood loss that are not present include hematemesis and hematochezia. Past treatments include oral vitamin B12 and oral iron supplements. Past medical history includes cancer (reports right foot surgery was to remove a tumor), heart failure and recent surgery (2 right foot surgeries (last was 3 months ago)). There is no history of chronic liver disease, chronic renal disease, clotting disorder, hypothyroidism, inflammatory bowel disease or recent illness. (History of COPD & smoker) Procedure history includes FOBT (8/30/19 iFOBT negative). There is no past history of colonoscopy or EGD.     Review of Systems   Constitutional: Positive for weight loss (trying to lose weight with dieting (watching what he is eating); lost about 15-20 lbs since ~3/2019; reports been trying to lose weight for the past 2 years since he moved to [place] from [place]). Negative for appetite change, chills, fatigue, fever and malaise/fatigue.   HENT: Negative for sore throat and trouble swallowing.    Respiratory: Negative for cough,  choking and shortness of breath.    Cardiovascular: Positive for chest pain (occasional; chronic intermittent since he had a heart attack; seeing cardiologist; denies currently).   Gastrointestinal: Positive for melena (black stool since he started taking iron pills). Negative for abdominal pain, anal bleeding, anorexia, blood in stool, constipation, diarrhea, hematemesis, hematochezia, nausea, rectal pain and vomiting.   Genitourinary: Negative for difficulty urinating, dysuria, flank pain and hematuria.   Neurological: Negative for weakness.   Hematological: Does not bruise/bleed easily.       Past Medical History:   Diagnosis Date    Acute angina     Asthma     COPD (chronic obstructive pulmonary disease)     Hypertension     MI, old     PIPER on CPAP     Schizophrenia     Stroke      Past Surgical History:   Procedure Laterality Date    APPENDECTOMY      CLOSURE OF WOUND Right 9/9/2019    Procedure: CLOSURE, WOUND;  Surgeon: Li Kathleen DPM;  Location: [facility];  Service: Podiatry;  Laterality: Right;    SHOULDER ARTHROSCOPY Left      Family History   Problem Relation Age of Onset    Melanoma Mother     Diabetes Mother     Hypertension Mother     Stroke Father     Diabetes Father     Hypertension Father     Colon cancer Father         unsure of age of diagnosis    Cervical cancer Sister     Cirrhosis Brother     Hypertension Brother     Lung cancer Maternal Grandmother     Prostate cancer Maternal Grandfather     Crohn's disease Neg Hx     Ulcerative colitis Neg Hx     Stomach cancer Neg Hx     Esophageal cancer Neg Hx      Wt Readings from Last 30 Encounters:   10/28/19 116.8 kg (257 lb 8 oz)   10/21/19 116.6 kg (257 lb)   10/16/19 116.7 kg (257 lb 4.4 oz)   10/14/19 117 kg (258 lb)   10/10/19 117.2 kg (258 lb 6.1 oz)   10/09/19 118.3 kg (260 lb 12.9 oz)   09/30/19 117.9 kg (260 lb)   09/16/19 117.9 kg (260 lb)   09/12/19 117.9 kg (260 lb)   09/09/19 117.9 kg (260 lb)   09/05/19  117.9 kg (260 lb)   09/04/19 118.2 kg (260 lb 9.3 oz)   08/29/19 117.8 kg (259 lb 9.5 oz)   08/13/19 120.2 kg (265 lb)   07/30/19 120.2 kg (265 lb)   07/16/19 120.3 kg (265 lb 3.4 oz)   07/02/19 120.3 kg (265 lb 3.4 oz)   06/18/19 125.4 kg (276 lb 7.3 oz)   06/10/19 125.4 kg (276 lb 7.3 oz)   06/03/19 124.7 kg (274 lb 14.6 oz)   05/21/19 124.7 kg (275 lb)   05/14/19 125 kg (275 lb 9.2 oz)   05/13/19 125.6 kg (277 lb)   05/07/19 125.9 kg (277 lb 9 oz)   04/29/19 124.3 kg (274 lb)   04/22/19 124.7 kg (274 lb 14.6 oz)   04/15/19 124.7 kg (275 lb)   04/01/19 125 kg (275 lb 9.2 oz)   03/21/19 125 kg (275 lb 9.2 oz)   03/18/19 125 kg (275 lb 9.2 oz)     Lab Results   Component Value Date    WBC 12.29 10/09/2019    HGB 11.8 (L) 10/09/2019    HCT 36.3 (L) 10/09/2019    MCV 78 (L) 10/09/2019     (H) 10/09/2019     Lab Results   Component Value Date    IRON 34 (L) 10/09/2019    TIBC 418 10/09/2019    FERRITIN 7 (L) 10/09/2019     Lab Results   Component Value Date    WJKXMSTR14 <146 (L) 08/29/2019     Lab Results   Component Value Date    FOLATE 4.3 08/29/2019     CMP  Sodium   Date Value Ref Range Status   10/09/2019 134 (L) 136 - 145 mmol/L Final     Potassium   Date Value Ref Range Status   10/09/2019 3.5 3.5 - 5.1 mmol/L Final     Chloride   Date Value Ref Range Status   10/09/2019 95 95 - 110 mmol/L Final     CO2   Date Value Ref Range Status   10/09/2019 28 22 - 31 mmol/L Final     Glucose   Date Value Ref Range Status   10/09/2019 108 70 - 110 mg/dL Final     Comment:     The ADA recommends the following guidelines for fasting glucose:  Normal:       less than 100 mg/dL  Prediabetes:  100 mg/dL to 125 mg/dL  Diabetes:     126 mg/dL or higher       BUN, Bld   Date Value Ref Range Status   10/09/2019 5 (L) 9 - 21 mg/dL Final     Creatinine   Date Value Ref Range Status   10/09/2019 0.64 0.50 - 1.40 mg/dL Final     Calcium   Date Value Ref Range Status   10/09/2019 8.8 8.4 - 10.2 mg/dL Final     Total Protein    Date Value Ref Range Status   10/09/2019 7.7 6.0 - 8.4 g/dL Final     Albumin   Date Value Ref Range Status   10/09/2019 4.4 3.5 - 5.2 g/dL Final     Total Bilirubin   Date Value Ref Range Status   10/09/2019 0.3 0.2 - 1.3 mg/dL Final     Alkaline Phosphatase   Date Value Ref Range Status   10/09/2019 96 38 - 145 U/L Final     AST   Date Value Ref Range Status   10/09/2019 21 17 - 59 U/L Final     ALT   Date Value Ref Range Status   10/09/2019 13 10 - 44 U/L Final     Anion Gap   Date Value Ref Range Status   10/09/2019 11 8 - 16 mmol/L Final     eGFR if    Date Value Ref Range Status   10/09/2019 >60 >60 mL/min/1.73 m^2 Final     eGFR if non    Date Value Ref Range Status   10/09/2019 >60 >60 mL/min/1.73 m^2 Final     Comment:     Calculation used to obtain the estimated glomerular filtration  rate (eGFR) is the CKD-EPI equation.        Lab Results   Component Value Date    TSH 1.307 08/29/2019     Objective:      Physical Exam   Constitutional: He is oriented to person, place, and time. He appears well-developed and well-nourished. No distress.   HENT:   Mouth/Throat: Oropharynx is clear and moist and mucous membranes are normal. No oral lesions. No oropharyngeal exudate.   Eyes: Pupils are equal, round, and reactive to light. Conjunctivae are normal. No scleral icterus.   Pulmonary/Chest: Effort normal and breath sounds normal. No respiratory distress. He has no wheezes.   Abdominal: Soft. Normal appearance and bowel sounds are normal. He exhibits no distension, no abdominal bruit and no mass. There is no tenderness. There is no rigidity, no rebound, no guarding, no tenderness at McBurney's point and negative Swanson's sign.   Neurological: He is alert and oriented to person, place, and time.   Skin: Skin is warm and dry. No rash noted. He is not diaphoretic. No erythema. No pallor.   Non-jaundiced   Psychiatric: He has a normal mood and affect. His behavior is normal. Judgment  and thought content normal.   Nursing note and vitals reviewed.      Assessment:       1. Iron deficiency anemia, unspecified iron deficiency anemia type    2. Low vitamin B12 level    3. Family history of colon cancer    4. Black stool    5. Nonspecific chest pain    6. Elevated blood pressure reading        Plan:       Iron deficiency anemia, unspecified iron deficiency anemia type  - schedule EGD, discussed procedure with patient, including risks and benefits, patient verbalized understanding  - schedule Colonoscopy, discussed procedure with the patient, including risks and benefits, patient verbalized understanding  - discussed with patient the different ways that anemia occurs: blood loss (such as from the gi tract), the body is not making enough, or the body is breaking down the rbcs too quickly; recommend EGD and colonoscopy to further evaluate gi tract for possible blood loss and pending results of endoscopies, possible UGI with Small Bowel Follow Through/video capsule study  -follow-up with PCP and hematology, Dr. Kathleen, for continued evaluation and management    Low vitamin B12 level  Recommend follow-up with Primary Care Provider & hematology for continued evaluation and management.    Family history of colon cancer  - schedule Colonoscopy, discussed procedure with the patient, including risks and benefits, patient verbalized understanding    Black stool  - schedule EGD, discussed procedure with patient, including risks and benefits, patient verbalized understanding  - may be related to iron supplement use  - avoid/minimize use of NSAIDs- since they can cause GI upset, bleeding and/or ulcers. If NSAID must be taken, recommend take with food.    Nonspecific chest pain  - follow-up with PCP &/or cardiologist for continued evaluation and management  - if experiencing symptoms of headache, chest pain, shortness of breath, and/or blurred vision, recommend going to ER for further evaluation and  management    Elevated blood pressure reading  - requested staff to repeat BP  - instructed patient to monitor blood pressure at home and follow-up with PCP &/or cardiologist  - If blood pressure remains elevated and/or experiencing symptoms of headache, chest pain, shortness of breath, and/or blurred vision, recommend going to ER for further evaluation and management    Follow up in about 1 month (around 11/28/2019), or if symptoms worsen or fail to improve.      If no improvement in symptoms or symptoms worsen, call/follow-up at clinic or go to ER.

## 2019-10-31 ENCOUNTER — TELEPHONE (OUTPATIENT)
Dept: GASTROENTEROLOGY | Facility: CLINIC | Age: 54
End: 2019-10-31

## 2019-11-06 ENCOUNTER — LAB VISIT (OUTPATIENT)
Dept: LAB | Facility: HOSPITAL | Age: 54
End: 2019-11-06
Attending: INTERNAL MEDICINE
Payer: MEDICARE

## 2019-11-06 DIAGNOSIS — D50.9 MICROCYTIC ANEMIA: ICD-10-CM

## 2019-11-06 LAB
FERRITIN SERPL-MCNC: 51 NG/ML (ref 20–300)
IRON SERPL-MCNC: 256 UG/DL (ref 45–160)
PATH REV BLD -IMP: NORMAL
SATURATED IRON: 64 % (ref 20–50)
TOTAL IRON BINDING CAPACITY: 397 UG/DL (ref 250–450)
TRANSFERRIN SERPL-MCNC: 268 MG/DL (ref 200–375)
VIT B12 SERPL-MCNC: 351 PG/ML (ref 210–950)

## 2019-11-06 PROCEDURE — 36415 COLL VENOUS BLD VENIPUNCTURE: CPT | Mod: HCNC,PN

## 2019-11-06 PROCEDURE — 85025 COMPLETE CBC W/AUTO DIFF WBC: CPT | Mod: HCNC

## 2019-11-06 PROCEDURE — 82607 VITAMIN B-12: CPT | Mod: HCNC

## 2019-11-06 PROCEDURE — 83540 ASSAY OF IRON: CPT | Mod: HCNC

## 2019-11-06 PROCEDURE — 82728 ASSAY OF FERRITIN: CPT | Mod: HCNC

## 2019-11-07 ENCOUNTER — OFFICE VISIT (OUTPATIENT)
Dept: PODIATRY | Facility: CLINIC | Age: 54
End: 2019-11-07
Payer: MEDICARE

## 2019-11-07 VITALS
BODY MASS INDEX: 42.82 KG/M2 | DIASTOLIC BLOOD PRESSURE: 90 MMHG | HEIGHT: 65 IN | HEART RATE: 77 BPM | SYSTOLIC BLOOD PRESSURE: 174 MMHG | WEIGHT: 257 LBS

## 2019-11-07 DIAGNOSIS — M79.671 PAIN OF RIGHT HEEL: ICD-10-CM

## 2019-11-07 DIAGNOSIS — T81.31XD DEHISCENCE OF SURGICAL WOUND, SUBSEQUENT ENCOUNTER: Primary | ICD-10-CM

## 2019-11-07 DIAGNOSIS — R46.0 POOR HYGIENE: ICD-10-CM

## 2019-11-07 LAB
ANISOCYTOSIS BLD QL SMEAR: SLIGHT
BASOPHILS # BLD AUTO: 0.11 K/UL (ref 0–0.2)
BASOPHILS NFR BLD: 0.9 % (ref 0–1.9)
DIFFERENTIAL METHOD: ABNORMAL
EOSINOPHIL # BLD AUTO: 0.2 K/UL (ref 0–0.5)
EOSINOPHIL NFR BLD: 1.9 % (ref 0–8)
ERYTHROCYTE [DISTWIDTH] IN BLOOD BY AUTOMATED COUNT: 21.3 % (ref 11.5–14.5)
HCT VFR BLD AUTO: 40.3 % (ref 40–54)
HGB BLD-MCNC: 13.4 G/DL (ref 14–18)
IMM GRANULOCYTES # BLD AUTO: ABNORMAL K/UL (ref 0–0.04)
IMM GRANULOCYTES NFR BLD AUTO: ABNORMAL % (ref 0–0.5)
LYMPHOCYTES # BLD AUTO: 2.6 K/UL (ref 1–4.8)
LYMPHOCYTES NFR BLD: 21.7 % (ref 18–48)
MCH RBC QN AUTO: 26.8 PG (ref 27–31)
MCHC RBC AUTO-ENTMCNC: 33.3 G/DL (ref 32–36)
MCV RBC AUTO: 81 FL (ref 82–98)
MONOCYTES # BLD AUTO: 1.6 K/UL (ref 0.3–1)
MONOCYTES NFR BLD: 12.9 % (ref 4–15)
NEUTROPHILS # BLD AUTO: 7.6 K/UL (ref 1.8–7.7)
NEUTROPHILS NFR BLD: 62.6 % (ref 38–73)
NRBC BLD-RTO: ABNORMAL /100 WBC
OVALOCYTES BLD QL SMEAR: ABNORMAL
PLATELET # BLD AUTO: 438 K/UL (ref 150–350)
PLATELET BLD QL SMEAR: ABNORMAL
PMV BLD AUTO: 10.9 FL (ref 9.2–12.9)
POIKILOCYTOSIS BLD QL SMEAR: SLIGHT
RBC # BLD AUTO: 5 M/UL (ref 4.6–6.2)
WBC # BLD AUTO: 12.14 K/UL (ref 3.9–12.7)

## 2019-11-07 PROCEDURE — 99999 PR PBB SHADOW E&M-EST. PATIENT-LVL III: ICD-10-PCS | Mod: PBBFAC,HCNC,, | Performed by: PODIATRIST

## 2019-11-07 PROCEDURE — 99213 OFFICE O/P EST LOW 20 MIN: CPT | Mod: HCNC,S$GLB,, | Performed by: PODIATRIST

## 2019-11-07 PROCEDURE — 99999 PR PBB SHADOW E&M-EST. PATIENT-LVL III: CPT | Mod: PBBFAC,HCNC,, | Performed by: PODIATRIST

## 2019-11-07 PROCEDURE — 99213 PR OFFICE/OUTPT VISIT, EST, LEVL III, 20-29 MIN: ICD-10-PCS | Mod: HCNC,S$GLB,, | Performed by: PODIATRIST

## 2019-11-07 PROCEDURE — 3008F BODY MASS INDEX DOCD: CPT | Mod: HCNC,CPTII,S$GLB, | Performed by: PODIATRIST

## 2019-11-07 PROCEDURE — 3008F PR BODY MASS INDEX (BMI) DOCUMENTED: ICD-10-PCS | Mod: HCNC,CPTII,S$GLB, | Performed by: PODIATRIST

## 2019-11-09 PROBLEM — N50.89 SCROTAL ERYTHEMA: Status: ACTIVE | Noted: 2019-11-09

## 2019-11-10 PROBLEM — N45.2 ORCHITIS: Status: ACTIVE | Noted: 2019-11-09

## 2019-11-10 PROBLEM — F17.200 CURRENT SMOKER ON SOME DAYS: Status: ACTIVE | Noted: 2019-11-10

## 2019-11-10 PROBLEM — E87.1 HYPONATREMIA: Status: ACTIVE | Noted: 2019-11-10

## 2019-11-11 PROBLEM — B37.2 CANDIDAL INTERTRIGO: Status: ACTIVE | Noted: 2019-11-11

## 2019-11-12 ENCOUNTER — TELEPHONE (OUTPATIENT)
Dept: HEMATOLOGY/ONCOLOGY | Facility: CLINIC | Age: 54
End: 2019-11-12

## 2019-11-12 NOTE — TELEPHONE ENCOUNTER
Attempted to contact pt regarding message to cancel appt for tomorrow. Appt cancelled per request. Left message with contact number.

## 2019-11-12 NOTE — TELEPHONE ENCOUNTER
----- Message from Pooja Kendrick sent at 11/12/2019  9:51 AM CST -----  Contact: sself  Type:  Sooner Apoointment Request    Caller is requesting a sooner appointment.  Caller declined first available appointment listed below.  Caller will not accept being placed on the waitlist and is requesting a message be sent to doctor.    Name of Caller:  self  When is the first available appointment?  11/13/19   Symptoms:  anemia  Best Call Back Number:  [phone] (home)   Additional Information:  Patient needs to reschedule appointment scheduled for tomorrow 11/13/19 at 8:20. Please call patient. Thanks!

## 2019-11-12 NOTE — TELEPHONE ENCOUNTER
----- Message from Danae Sanchez sent at 11/12/2019  2:24 PM CST -----  Contact: self  Patient needs to reschedule his appt for tomorrow at 8AM, call back at [phone].  Thanks

## 2019-11-13 NOTE — TELEPHONE ENCOUNTER
Patient requested a new appointment to next week related to transportation issues.  An appointment was made for 11/20/19 at 8:40.  Patient verbalized understanding and appreciation for his new appt.

## 2019-11-15 ENCOUNTER — OFFICE VISIT (OUTPATIENT)
Dept: FAMILY MEDICINE | Facility: CLINIC | Age: 54
End: 2019-11-15
Payer: MEDICARE

## 2019-11-15 VITALS
TEMPERATURE: 98 F | OXYGEN SATURATION: 99 % | WEIGHT: 261.44 LBS | RESPIRATION RATE: 18 BRPM | DIASTOLIC BLOOD PRESSURE: 84 MMHG | SYSTOLIC BLOOD PRESSURE: 136 MMHG | BODY MASS INDEX: 36.6 KG/M2 | HEIGHT: 71 IN | HEART RATE: 75 BPM

## 2019-11-15 DIAGNOSIS — R09.81 CHRONIC NASAL CONGESTION: ICD-10-CM

## 2019-11-15 DIAGNOSIS — N50.89 SCROTAL ERYTHEMA: Primary | ICD-10-CM

## 2019-11-15 DIAGNOSIS — N45.2 ORCHITIS: ICD-10-CM

## 2019-11-15 DIAGNOSIS — J44.9 CHRONIC OBSTRUCTIVE PULMONARY DISEASE, UNSPECIFIED COPD TYPE: ICD-10-CM

## 2019-11-15 PROBLEM — R68.89 SUSPECTED SOFT TISSUE INFECTION: Status: RESOLVED | Noted: 2019-05-21 | Resolved: 2019-11-15

## 2019-11-15 PROBLEM — L08.9 SOFT TISSUE INFECTION OF FOOT: Status: RESOLVED | Noted: 2019-10-28 | Resolved: 2019-11-15

## 2019-11-15 PROBLEM — L98.8 SKIN MACERATION: Status: RESOLVED | Noted: 2019-06-03 | Resolved: 2019-11-15

## 2019-11-15 PROBLEM — E87.1 HYPONATREMIA: Status: RESOLVED | Noted: 2019-11-10 | Resolved: 2019-11-15

## 2019-11-15 PROBLEM — Z71.9 ENCOUNTER FOR CONSULTATION: Status: RESOLVED | Noted: 2019-10-09 | Resolved: 2019-11-15

## 2019-11-15 PROBLEM — Z98.890 POSTOPERATIVE STATE: Status: RESOLVED | Noted: 2019-10-21 | Resolved: 2019-11-15

## 2019-11-15 PROBLEM — F17.200 CURRENT SMOKER ON SOME DAYS: Status: RESOLVED | Noted: 2019-11-10 | Resolved: 2019-11-15

## 2019-11-15 PROBLEM — G89.18 ACUTE POST-OPERATIVE PAIN: Status: RESOLVED | Noted: 2019-09-16 | Resolved: 2019-11-15

## 2019-11-15 PROBLEM — S39.012D LUMBAR STRAIN, SUBSEQUENT ENCOUNTER: Status: RESOLVED | Noted: 2019-03-30 | Resolved: 2019-11-15

## 2019-11-15 PROCEDURE — 99999 PR PBB SHADOW E&M-EST. PATIENT-LVL III: ICD-10-PCS | Mod: PBBFAC,HCNC,, | Performed by: FAMILY MEDICINE

## 2019-11-15 PROCEDURE — 3008F PR BODY MASS INDEX (BMI) DOCUMENTED: ICD-10-PCS | Mod: HCNC,CPTII,S$GLB, | Performed by: FAMILY MEDICINE

## 2019-11-15 PROCEDURE — 99214 OFFICE O/P EST MOD 30 MIN: CPT | Mod: HCNC,S$GLB,, | Performed by: FAMILY MEDICINE

## 2019-11-15 PROCEDURE — 99214 PR OFFICE/OUTPT VISIT, EST, LEVL IV, 30-39 MIN: ICD-10-PCS | Mod: HCNC,S$GLB,, | Performed by: FAMILY MEDICINE

## 2019-11-15 PROCEDURE — 99999 PR PBB SHADOW E&M-EST. PATIENT-LVL III: CPT | Mod: PBBFAC,HCNC,, | Performed by: FAMILY MEDICINE

## 2019-11-15 PROCEDURE — 3008F BODY MASS INDEX DOCD: CPT | Mod: HCNC,CPTII,S$GLB, | Performed by: FAMILY MEDICINE

## 2019-11-15 RX ORDER — EPINEPHRINE 0.3 MG/.3ML
1 INJECTION SUBCUTANEOUS ONCE
Qty: 0.3 ML | Refills: 0 | Status: SHIPPED | OUTPATIENT
Start: 2019-11-15 | End: 2019-12-13 | Stop reason: CLARIF

## 2019-11-15 RX ORDER — LEVOFLOXACIN 750 MG/1
750 TABLET ORAL DAILY
Qty: 5 TABLET | Refills: 0 | Status: SHIPPED | OUTPATIENT
Start: 2019-11-15 | End: 2019-12-13

## 2019-11-15 NOTE — PROGRESS NOTES
Subjective:       Patient ID: Ryan Crane is a 54 y.o. male.    Chief Complaint: Hospital Follow Up      Ryan Crane is in the office for hosp f/u.     [facility]  Hospital records reviewed.   Pt with inpatient f/u and abx per ID for scrotal erythema of unk etiology. Labs were wnl. bcx remained negative. Pt to complete levaquin tomorrow for 5 days outpt. Does not yet have f/u with urology and notes that he also needs to schedule with pulm.  Past Medical History:   Diagnosis Date    Acute angina     Asthma     COPD (chronic obstructive pulmonary disease)     Hypertension     MI, old     PIPER on CPAP     Schizophrenia     Stroke      Having some nasal congestion.    Current Outpatient Medications:     acetaminophen (TYLENOL) 325 MG tablet, Take 2 tablets (650 mg total) by mouth every 4 (four) hours as needed., Disp: , Rfl: 0    aspirin (ECOTRIN) 325 MG EC tablet, Take 325 mg by mouth once daily., Disp: , Rfl:     busPIRone (BUSPAR) 10 MG tablet, Take 10 mg by mouth 3 (three) times daily., Disp: , Rfl:     cyanocobalamin (VITAMIN B-12) 1000 MCG tablet, Take 1,000 mcg by mouth. , Disp: , Rfl:     DULoxetine (CYMBALTA) 30 MG capsule, Take 30 mg by mouth once daily., Disp: , Rfl:     ferrous gluconate 324 mg (37.5 mg iron) Tab tablet, Take 1 tablet (324 mg total) by mouth 2 (two) times daily with meals., Disp: 60 tablet, Rfl: 2    levoFLOXacin (LEVAQUIN) 750 MG tablet, Take 1 tablet (750 mg total) by mouth once daily., Disp: 5 tablet, Rfl: 0    metFORMIN (GLUCOPHAGE-XR) 500 MG 24 hr tablet, Take 1 tablet (500 mg total) by mouth daily with breakfast., Disp: 90 tablet, Rfl: 0    metoprolol succinate (TOPROL-XL) 25 MG 24 hr tablet, Take 25 mg by mouth once daily., Disp: , Rfl:     miconazole (MICOTIN) 2 % cream, Apply topically 2 (two) times daily. Apply to skin folds in groin/ anal area. for 7 days, Disp: , Rfl: 0    nitroGLYCERIN (NITROSTAT) 0.4 MG SL tablet, Place 1 tablet (0.4 mg total) under  the tongue every 5 (five) minutes as needed for Chest pain., Disp: 9 tablet, Rfl: 3    OXcarbazepine (TRILEPTAL) 600 MG Tab, Take 600 mg by mouth 3 (three) times daily., Disp: , Rfl:     polyethylene glycol (GLYCOLAX) 17 gram PwPk, Take 17 g by mouth once daily., Disp: 30 each, Rfl: 0    tadalafil (CIALIS) 10 MG tablet, Take 1 tablet (10 mg total) by mouth daily as needed for Erectile Dysfunction., Disp: 30 tablet, Rfl: 0    ziprasidone (GEODON) 80 MG capsule, Take 160 mg by mouth every evening. , Disp: , Rfl:     methocarbamol (ROBAXIN) 500 MG Tab, Take 1 tablet (500 mg total) by mouth every 8 (eight) hours as needed (muscle spasms). (Patient not taking: Reported on 11/15/2019), Disp: 30 tablet, Rfl: 0    The 10-year ASCVD risk score (Lilli SHAVER Jr., et al., 2013) is: 10%    Values used to calculate the score:      Age: 54 years      Sex: Male      Is Non- : No      Diabetic: No      Tobacco smoker: Yes      Systolic Blood Pressure: 136 mmHg      Is BP treated: Yes      HDL Cholesterol: 36 mg/dL      Total Cholesterol: 142 mg/dL     Lab Results   Component Value Date    HGBA1C 6.0 (H) 08/29/2019    HGBA1C 5.6 01/16/2019     Lab Results   Component Value Date    LDLCALC 89.4 08/29/2019    CREATININE 0.62 11/11/2019   hosp labs reviewed.    Review of Systems   Constitutional: Negative for fatigue and fever.   HENT: Positive for congestion and rhinorrhea. Negative for postnasal drip.    Respiratory: Positive for cough. Negative for shortness of breath.    Cardiovascular: Negative for chest pain (occ) and leg swelling.   Gastrointestinal: Negative for blood in stool, constipation and diarrhea.        No gerd c/o.   Genitourinary: Positive for scrotal swelling. Negative for difficulty urinating, discharge, dysuria, frequency, genital sores, hematuria, penile pain, penile swelling and testicular pain.   Musculoskeletal: Negative for arthralgias and myalgias.   Neurological: Negative for  dizziness and headaches.   Psychiatric/Behavioral: Positive for dysphoric mood. Negative for sleep disturbance.           Objective:      Physical Exam   Constitutional: He is oriented to person, place, and time. He appears well-developed and well-nourished. No distress.   HENT:   Head: Normocephalic and atraumatic.   Mouth/Throat: Oropharynx is clear and moist.   Eyes: Conjunctivae are normal. Right eye exhibits no discharge. Left eye exhibits no discharge. No scleral icterus.   Neck: Normal range of motion. Neck supple. No thyromegaly present.   Cardiovascular: Normal rate and regular rhythm.   Pulmonary/Chest: Effort normal and breath sounds normal. No respiratory distress.   Genitourinary: Right testis shows no swelling. Left testis shows swelling.   Genitourinary Comments: Large penile piercing and tattoos noted.   L scrotal erythema without tenderness or fluctuance.   Musculoskeletal: Normal range of motion. He exhibits no edema.   Neurological: He is alert and oriented to person, place, and time. No cranial nerve deficit.   Skin: Skin is warm and dry.   Psychiatric: He has a normal mood and affect. His behavior is normal.   Nursing note and vitals reviewed.          Screening recommendations appropriate to age and health status were reviewed.    Assessment & Plan:    Scrotal erythema  -     levoFLOXacin (LEVAQUIN) 750 MG tablet; Take 1 tablet (750 mg total) by mouth once daily.  Dispense: 5 tablet; Refill: 0  Extend abx an additional 5 days given persistent erythema and swelling noted on exam today. Schedule f/u with uro for review.  Chronic nasal congestion  Reviewed otcs - claritin and flonase.   Chronic obstructive pulmonary disease, unspecified COPD type  -     fluticasone-umeclidin-vilanter (TRELEGY ELLIPTA) 100-62.5-25 mcg DsDv; Inhale 1 puff into the lungs once daily.  Dispense: 60 each; Refill: 11  -     ipratropium-albuterol (COMBIVENT)  mcg/actuation inhaler; Inhale 1 puff into the lungs every  6 (six) hours as needed for Wheezing or Shortness of Breath. Rescue  Dispense: 4 g; Refill: 11  Restart inhalers for copd. Schedule f/u with pulm. Reviewed inhalers with pt.  Orchitis  -     levoFLOXacin (LEVAQUIN) 750 MG tablet; Take 1 tablet (750 mg total) by mouth once daily.  Dispense: 5 tablet; Refill: 0    Other orders  -     EPINEPHrine (EPIPEN) 0.3 mg/0.3 mL AtIn; Inject 0.3 mLs (0.3 mg total) into the muscle once. for 1 dose  Dispense: 0.3 mL; Refill: 0  Pt requests refill.

## 2019-11-18 DIAGNOSIS — M79.671 PAIN OF RIGHT HEEL: ICD-10-CM

## 2019-11-18 DIAGNOSIS — B35.3 TINEA PEDIS OF RIGHT FOOT: ICD-10-CM

## 2019-11-18 DIAGNOSIS — T81.31XA DEHISCENCE OF SURGICAL WOUND, INITIAL ENCOUNTER: ICD-10-CM

## 2019-11-18 DIAGNOSIS — L98.8 SKIN MACERATION: ICD-10-CM

## 2019-11-18 DIAGNOSIS — R68.89 SUSPECTED SOFT TISSUE INFECTION: ICD-10-CM

## 2019-11-18 RX ORDER — TRAMADOL HYDROCHLORIDE 50 MG/1
TABLET ORAL
Qty: 90 TABLET | Refills: 0 | OUTPATIENT
Start: 2019-11-18

## 2019-11-18 NOTE — PROGRESS NOTES
Subjective:      Patient ID: Ryan Crane is a 54 y.o. male.    Chief Complaint: Wound Check (2wk Dr Becerril 9/2019 6.0 8/2019 )    HPI:  Ryan Crane is a 53 y.o. male who presents to clinic with a chief complaint of right heel wound s/p wound revision with closure.  Patient reports pain as 9/10 on the pain scale.  He states that he has been following postoperative recommendations.  Patient denies any other pedal complaints at this time.    PCP:  Laura Becerril MD  Date last seen:  9/12/19    Review of Systems   Constitutional: Negative for appetite change, fever, chills, fatigue and unexpected weight change.   Cardiovascular: Negative for chest pain, claudication, cyanosis, and leg swelling.  Musculoskeletal: Negative for back pain, arthritis, joint pain, joint swelling, myalgias, and stiffness.   Skin: Negative for nail bed changes, discoloration, rash, itching, suspicious lesion, and unusual hair distribution.  Positive for poor wound healing.  Neurological: Negative for loss of balance, sensory change, paresthesias, and numbness.  Positive for pain.  Hematological: Negative for adenopathy, bleeding, and bruising easily.   Psychiatric/Behavioral: The patient is not nervous/anxious.  Negative for altered mental status.    Hemoglobin A1C   Date Value Ref Range Status   08/29/2019 6.0 (H) 4.0 - 5.6 % Final     Comment:     ADA Screening Guidelines:  5.7-6.4%  Consistent with prediabetes  >or=6.5%  Consistent with diabetes  High levels of fetal hemoglobin interfere with the HbA1C  assay. Heterozygous hemoglobin variants (HbS, HgC, etc)do  not significantly interfere with this assay.   However, presence of multiple variants may affect accuracy.     01/16/2019 5.6 4.0 - 5.6 % Final     Comment:     ADA Screening Guidelines:  5.7-6.4%  Consistent with prediabetes  >or=6.5%  Consistent with diabetes  High levels of fetal hemoglobin interfere with the HbA1C  assay. Heterozygous hemoglobin variants (HbS,  HgC, etc)do  not significantly interfere with this assay.   However, presence of multiple variants may affect accuracy.         Past Medical History:   Diagnosis Date    Acute angina     Asthma     COPD (chronic obstructive pulmonary disease)     Hypertension     MI, old     PIPER on CPAP     Schizophrenia     Stroke      Past Surgical History:   Procedure Laterality Date    APPENDECTOMY      CLOSURE OF WOUND Right 9/9/2019    Procedure: CLOSURE, WOUND;  Surgeon: Li Kathleen DPM;  Location: [facility];  Service: Podiatry;  Laterality: Right;    SHOULDER ARTHROSCOPY Left      Family History   Problem Relation Age of Onset    Melanoma Mother     Diabetes Mother     Hypertension Mother     Stroke Father     Diabetes Father     Hypertension Father     Colon cancer Father         unsure of age of diagnosis    Cervical cancer Sister     Cirrhosis Brother     Hypertension Brother     Lung cancer Maternal Grandmother     Prostate cancer Maternal Grandfather     Crohn's disease Neg Hx     Ulcerative colitis Neg Hx     Stomach cancer Neg Hx     Esophageal cancer Neg Hx      Social History     Socioeconomic History    Marital status:      Spouse name: Not on file    Number of children: Not on file    Years of education: Not on file    Highest education level: Not on file   Occupational History    Not on file   Social Needs    Financial resource strain: Not on file    Food insecurity:     Worry: Not on file     Inability: Not on file    Transportation needs:     Medical: Not on file     Non-medical: Not on file   Tobacco Use    Smoking status: Current Every Day Smoker     Packs/day: 1.00     Years: 45.00     Pack years: 45.00    Smokeless tobacco: Never Used   Substance and Sexual Activity    Alcohol use: No     Frequency: Never    Drug use: Not Currently     Types: Marijuana    Sexual activity: Yes     Partners: Female   Lifestyle    Physical activity:     Days per week: Not on  "file     Minutes per session: Not on file    Stress: Not on file   Relationships    Social connections:     Talks on phone: Not on file     Gets together: Not on file     Attends [employer] service: Not on file     Active member of club or organization: Not on file     Attends meetings of clubs or organizations: Not on file     Relationship status: Not on file   Other Topics Concern    Not on file   Social History Narrative    Not on file           Objective:        BP (!) 174/90   Pulse 77   Ht 5' 5" (1.651 m)   Wt 116.6 kg (257 lb)   BMI 42.77 kg/m²     Physical Exam   Constitutional: Patient is oriented to person, place, and time. Patient appears well-developed and well-nourished.  Strong malodor noted from patient due to lack of personal hygiene.  No acute distress.     Psychiatric: Patient has a normal mood and affect. Patient's speech is normal and behavior is normal. Judgment is normal. Cognition and memory are normal.     Right pedal exam was performed today.  Vascular: Vascular: Pedal pulses palpable 2/4 DP & PT.  CFT is = 3 seconds to the hallux.  Skin temperature is warm to cool proximal tibia to distal toes without localized increase in calor noted.  Localized erythema and edema noted periwound plantar heel.  Ecchymosis noted to the toes and distal forefoot dorsally.  Hair growth present distally to the LE.     Musculoskeletal: Ankle and pedal joint ROM are decreased. Ankle joint dorsiflexion is restricted with the knee extended and flexed per Silfverskiold exam.  Muscle strength is 5/5 for all LE muscle groups tested.  No palpable soft tissue mass appreciable to the right plantar heel.    Neurological:  Epicritic sensation is grossly Intact to the foot.    [place] STR is negative to right lower extremity.   Tenderness to palpation of right plantar heel wound.     Dermatological: Toenails 1-5 right are WNL in length and thickness.  Webspaces 1-4 right are clean, dry, and intact.  Skin turgor is " supple.   Plantar right heel surgical wound measures 0.2 cm x 014 cm x 0.1 cm extending down to superficial dermis without dot signs of infection present.      Nursing note and vitals reviewed.        Assessment:       Encounter Diagnoses   Name Primary?    Dehiscence of surgical wound, subsequent encounter Yes    Pain of right heel - Right Foot     Poor hygiene          Plan:       Ryan was seen today for wound check.    Diagnoses and all orders for this visit:    Dehiscence of surgical wound, subsequent encounter    Pain of right heel - Right Foot    Poor hygiene      I counseled the patient on his conditions, their implications and medical management.    - With patient's permission, cleansed right plantar heel wound with Betadine and applied offloading aperture pad and MediHoney.  Covered with Aquacel foam and Tubigrip stocking.  Instructed patient to repeat wound care as performed today every 2 days.    - Educated patient on proper diabetic foot care and supportive, accommodative shoe gear.    Patient was given the following recommendations and instructions:  Patient Instructions   - Perform wound care daily after shower/bath and whenever dressing becomes soiled or wet via removing dressing, cleansing with betadine, patting area dry, applying aperture pad and MediHoney, and covering with bandage.    - Take medications as prescribed.    - Notify clinic if any new or worsening condition arises.        Li Kathleen DPM        Dictation was performed using M*Modal Fluency.  Transcription errors may be present.

## 2019-11-18 NOTE — PATIENT INSTRUCTIONS
- Perform wound care daily after shower/bath and whenever dressing becomes soiled or wet via removing dressing, cleansing with betadine, patting area dry, applying aperture pad and MediHoney, and covering with bandage.    - Take medications as prescribed.    - Notify clinic if any new or worsening condition arises.

## 2019-11-20 ENCOUNTER — TELEPHONE (OUTPATIENT)
Dept: HEMATOLOGY/ONCOLOGY | Facility: CLINIC | Age: 54
End: 2019-11-20

## 2019-11-20 NOTE — TELEPHONE ENCOUNTER
Spoke with pt. Pt's appt r/s to 11/29/19 @ 0900 with Dr Kathleen. Pt has a stuffy nose and no fever per Pt. Pt notified to let office know if he starts with fever or gets worse. Pt verbalized understanding.

## 2019-11-20 NOTE — TELEPHONE ENCOUNTER
----- Message from Lavern Hess sent at 11/20/2019  8:26 AM CST -----  Contact: Ryan bhatti  Type: Needs Medical Advice    Who Called:  Ryan Tapia Call Back Number: [phone]  Additional Information: Pls call pt back to reschedule his appt.

## 2019-11-21 ENCOUNTER — OFFICE VISIT (OUTPATIENT)
Dept: PODIATRY | Facility: CLINIC | Age: 54
End: 2019-11-21
Payer: MEDICARE

## 2019-11-21 VITALS
HEIGHT: 71 IN | HEART RATE: 81 BPM | BODY MASS INDEX: 36.54 KG/M2 | WEIGHT: 261 LBS | SYSTOLIC BLOOD PRESSURE: 174 MMHG | DIASTOLIC BLOOD PRESSURE: 54 MMHG

## 2019-11-21 DIAGNOSIS — I73.9 PVD (PERIPHERAL VASCULAR DISEASE): ICD-10-CM

## 2019-11-21 DIAGNOSIS — M21.42 PES PLANUS OF BOTH FEET: ICD-10-CM

## 2019-11-21 DIAGNOSIS — B35.1 ONYCHOMYCOSIS DUE TO DERMATOPHYTE: Primary | ICD-10-CM

## 2019-11-21 DIAGNOSIS — E11.49 TYPE II DIABETES MELLITUS WITH NEUROLOGICAL MANIFESTATIONS: ICD-10-CM

## 2019-11-21 DIAGNOSIS — Z86.31 HX OF DIABETIC FOOT ULCER: ICD-10-CM

## 2019-11-21 DIAGNOSIS — M21.6X1 EQUINUS DEFORMITY OF BOTH FEET: ICD-10-CM

## 2019-11-21 DIAGNOSIS — M21.6X2 EQUINUS DEFORMITY OF BOTH FEET: ICD-10-CM

## 2019-11-21 DIAGNOSIS — M20.41 HAMMER TOES OF BOTH FEET: ICD-10-CM

## 2019-11-21 DIAGNOSIS — M79.671 INTRACTABLE RIGHT HEEL PAIN: ICD-10-CM

## 2019-11-21 DIAGNOSIS — M20.42 HAMMER TOES OF BOTH FEET: ICD-10-CM

## 2019-11-21 DIAGNOSIS — M21.41 PES PLANUS OF BOTH FEET: ICD-10-CM

## 2019-11-21 DIAGNOSIS — E08.42 DIABETIC POLYNEUROPATHY ASSOCIATED WITH DIABETES MELLITUS DUE TO UNDERLYING CONDITION: ICD-10-CM

## 2019-11-21 PROBLEM — M21.40 FLAT FOOT: Status: ACTIVE | Noted: 2019-11-21

## 2019-11-21 PROCEDURE — 99999 PR PBB SHADOW E&M-EST. PATIENT-LVL III: CPT | Mod: PBBFAC,HCNC,, | Performed by: PODIATRIST

## 2019-11-21 PROCEDURE — 3008F BODY MASS INDEX DOCD: CPT | Mod: HCNC,CPTII,S$GLB, | Performed by: PODIATRIST

## 2019-11-21 PROCEDURE — 99999 PR PBB SHADOW E&M-EST. PATIENT-LVL III: ICD-10-PCS | Mod: PBBFAC,HCNC,, | Performed by: PODIATRIST

## 2019-11-21 PROCEDURE — 3008F PR BODY MASS INDEX (BMI) DOCUMENTED: ICD-10-PCS | Mod: HCNC,CPTII,S$GLB, | Performed by: PODIATRIST

## 2019-11-21 PROCEDURE — 3044F PR MOST RECENT HEMOGLOBIN A1C LEVEL <7.0%: ICD-10-PCS | Mod: HCNC,CPTII,S$GLB, | Performed by: PODIATRIST

## 2019-11-21 PROCEDURE — 11721 DEBRIDE NAIL 6 OR MORE: CPT | Mod: Q9,HCNC,S$GLB, | Performed by: PODIATRIST

## 2019-11-21 PROCEDURE — 3044F HG A1C LEVEL LT 7.0%: CPT | Mod: HCNC,CPTII,S$GLB, | Performed by: PODIATRIST

## 2019-11-21 PROCEDURE — 99499 RISK ADDL DX/OHS AUDIT: ICD-10-PCS | Mod: HCNC,S$GLB,, | Performed by: PODIATRIST

## 2019-11-21 PROCEDURE — 99214 OFFICE O/P EST MOD 30 MIN: CPT | Mod: 25,HCNC,S$GLB, | Performed by: PODIATRIST

## 2019-11-21 PROCEDURE — 99214 PR OFFICE/OUTPT VISIT, EST, LEVL IV, 30-39 MIN: ICD-10-PCS | Mod: 25,HCNC,S$GLB, | Performed by: PODIATRIST

## 2019-11-21 PROCEDURE — 11721 PR DEBRIDEMENT OF NAILS, 6 OR MORE: ICD-10-PCS | Mod: Q9,HCNC,S$GLB, | Performed by: PODIATRIST

## 2019-11-21 PROCEDURE — 99499 UNLISTED E&M SERVICE: CPT | Mod: HCNC,S$GLB,, | Performed by: PODIATRIST

## 2019-11-26 NOTE — PATIENT INSTRUCTIONS
- Apply aquacel foam bandage to right heel.  Change every 3 days.    - Check feet daily for wounds and areas of irritation.    - Keep regularly scheduled appointments with primary care, ophthalmology, and podiatry.    - Maintain blood glucose control via taking medications as prescribed, diabetic diet, and exercise.    - Apply lotion to feet and ankles daily but not between toes.    - Keep feet clean and dry, especially between toes.    - Elevate feet as much as possible throughout the day.    - Wear supportive/accommodative shoes at all times when ambulating.    - Supplement with alpha lipoic acid and vitamin B complex to reduce and repair nerve damage.    - Take medications as prescribed.    - Notify clinic if any new or worsening condition arises.

## 2019-11-26 NOTE — PROGRESS NOTES
Subjective:      Patient ID: Ryan Crane is a 54 y.o. male.    Chief Complaint: Wound Check (Dr Becerril 2019 6.0 8/2019 )    HPI:  Ryan Crane is a 53 y.o. male who presents to clinic with a chief complaint of right heel wound s/p wound revision with closure.  Patient reports pain is 7/10 on the pain scale.  He states that he has been following postoperative recommendations until last night when he busted his surgical shoe - requesting new surgical shoe.  He does inform of recent hospitalization for orchitis.  Patient denies any other pedal complaints at this time.    PCP:  Laura Becerril MD  Date last seen:  11/15/19    Review of Systems   Constitutional: Negative for appetite change, fever, chills, fatigue and unexpected weight change.   Cardiovascular: Negative for chest pain, claudication, cyanosis.  Positive for leg swelling.  Musculoskeletal: Negative for back pain, arthritis, joint pain, joint swelling, myalgias, and stiffness.   Skin: Negative for nail bed changes, discoloration, rash, itching, suspicious lesion, and unusual hair distribution.  Positive for poor wound healing.  Neurological: Negative for loss of balance, sensory change, paresthesias, and numbness.  Positive for pain.  Hematological: Negative for adenopathy, bleeding, and bruising easily.   Psychiatric/Behavioral: The patient is not nervous/anxious.  Negative for altered mental status.    Hemoglobin A1C   Date Value Ref Range Status   08/29/2019 6.0 (H) 4.0 - 5.6 % Final     Comment:     ADA Screening Guidelines:  5.7-6.4%  Consistent with prediabetes  >or=6.5%  Consistent with diabetes  High levels of fetal hemoglobin interfere with the HbA1C  assay. Heterozygous hemoglobin variants (HbS, HgC, etc)do  not significantly interfere with this assay.   However, presence of multiple variants may affect accuracy.     01/16/2019 5.6 4.0 - 5.6 % Final     Comment:     ADA Screening Guidelines:  5.7-6.4%  Consistent with  prediabetes  >or=6.5%  Consistent with diabetes  High levels of fetal hemoglobin interfere with the HbA1C  assay. Heterozygous hemoglobin variants (HbS, HgC, etc)do  not significantly interfere with this assay.   However, presence of multiple variants may affect accuracy.         Past Medical History:   Diagnosis Date    Acute angina     Asthma     COPD (chronic obstructive pulmonary disease)     Hypertension     MI, old     PIPER on CPAP     Schizophrenia     Stroke     Type II diabetes mellitus with neurological manifestations 11/21/2019     Past Surgical History:   Procedure Laterality Date    APPENDECTOMY      CLOSURE OF WOUND Right 9/9/2019    Procedure: CLOSURE, WOUND;  Surgeon: Li Kathleen DPM;  Location: [facility];  Service: Podiatry;  Laterality: Right;    SHOULDER ARTHROSCOPY Left      Family History   Problem Relation Age of Onset    Melanoma Mother     Diabetes Mother     Hypertension Mother     Stroke Father     Diabetes Father     Hypertension Father     Colon cancer Father         unsure of age of diagnosis    Cervical cancer Sister     Cirrhosis Brother     Hypertension Brother     Lung cancer Maternal Grandmother     Prostate cancer Maternal Grandfather     Crohn's disease Neg Hx     Ulcerative colitis Neg Hx     Stomach cancer Neg Hx     Esophageal cancer Neg Hx      Social History     Socioeconomic History    Marital status:      Spouse name: Not on file    Number of children: Not on file    Years of education: Not on file    Highest education level: Not on file   Occupational History    Not on file   Social Needs    Financial resource strain: Not on file    Food insecurity:     Worry: Not on file     Inability: Not on file    Transportation needs:     Medical: Not on file     Non-medical: Not on file   Tobacco Use    Smoking status: Current Every Day Smoker     Packs/day: 1.00     Years: 45.00     Pack years: 45.00    Smokeless tobacco: Never Used  "  Substance and Sexual Activity    Alcohol use: No     Frequency: Never    Drug use: Not Currently     Types: Marijuana    Sexual activity: Yes     Partners: Female   Lifestyle    Physical activity:     Days per week: Not on file     Minutes per session: Not on file    Stress: Not on file   Relationships    Social connections:     Talks on phone: Not on file     Gets together: Not on file     Attends Mosque service: Not on file     Active member of club or organization: Not on file     Attends meetings of clubs or organizations: Not on file     Relationship status: Not on file   Other Topics Concern    Not on file   Social History Narrative    Not on file           Objective:        BP (!) 174/54   Pulse 81   Ht 5' 11" (1.803 m)   Wt 118.4 kg (261 lb)   BMI 36.40 kg/m²     Physical Exam   Constitutional: Patient is oriented to person, place, and time. Patient appears well-developed and well-nourished.  Strong malodor noted from patient due to lack of personal hygiene.  No acute distress.     Psychiatric: Patient has a normal mood and affect. Patient's speech is normal and behavior is normal. Judgment is normal. Cognition and memory are normal.     Bilateral pedal exam was performed today.  Vascular: Vascular: Pedal pulses palpable 2/4 DP & PT.  CFT is = 3 seconds to the hallux.  Skin temperature is warm to cool proximal tibia to distal toes without localized increase in calor noted.  Localized erythema, ecchymosis, and edema noted to right plantar heel without residual ulceration.  Hair growth present distally to the LE.     Musculoskeletal: Ankle and pedal joint ROM are decreased. Ankle joint dorsiflexion is restricted with the knee extended and flexed per Silfverskiold exam.  Muscle strength is 5/5 for all LE muscle groups tested.  Flat foot type present.  Flexion contracture of lesser digits noted.    Neurological:  Epicritic sensation is slightly dimiished to the foot.    [place] STR is " negative to the lower extremity.   Tenderness to palpation of right plantar heel noted.     Dermatological: Toenails 1-5 bilateral are elongated and distally thickened, dystrophic, brittle, discolored, and mycotic with subungal debris present.  Webspaces 1-4 bilateral are clean, dry, and intact.  Skin turgor is increased.  Skin texture is dry and flaky.   Previous plantar right heel is healed with hematoma formation appreciable.      Nursing note and vitals reviewed.        Assessment:       Encounter Diagnoses   Name Primary?    Onychomycosis due to dermatophyte Yes    Intractable right heel pain - Right Foot     Hx of diabetic foot ulcer     Equinus deformity of both feet     Hammer toes of both feet     Pes planus of both feet     Type II diabetes mellitus with neurological manifestations     Diabetic polyneuropathy associated with diabetes mellitus due to underlying condition     PVD (peripheral vascular disease)          Plan:       Ryan was seen today for wound check.    Diagnoses and all orders for this visit:    Onychomycosis due to dermatophyte    Intractable right heel pain - Right Foot  -     Ambulatory Referral to Pain Clinic    Hx of diabetic foot ulcer  -     DIABETIC SHOES FOR HOME USE  -     Ambulatory Referral to Pain Clinic    Equinus deformity of both feet  -     DIABETIC SHOES FOR HOME USE    Hammer toes of both feet  -     DIABETIC SHOES FOR HOME USE    Pes planus of both feet  -     DIABETIC SHOES FOR HOME USE    Type II diabetes mellitus with neurological manifestations  -     DIABETIC SHOES FOR HOME USE  -     Ambulatory Referral to Pain Clinic    Diabetic polyneuropathy associated with diabetes mellitus due to underlying condition  -     DIABETIC SHOES FOR HOME USE  -     Ambulatory Referral to Pain Clinic    PVD (peripheral vascular disease)  -     DIABETIC SHOES FOR HOME USE      I counseled the patient on his conditions, their implications and medical management.    - Shoe  inspection. Diabetic Foot Education. Patient reminded of the importance of good nutrition and blood sugar control to help prevent podiatric complications of diabetes. Patient instructed on proper foot hygeine. We discussed wearing proper shoe gear, daily foot inspections, never walking without protective shoe gear, never putting sharp instruments to feet, routine podiatric nail visits every 2-3 months.     - With the patient's permission, toenails 1, 2, 3, 4, and 5 of the right foot and 1, 2, 3, 4, and 5 of the left foot were debrided in length and thickness via nail nippers and electric  to patient's tolerance without incident.    - With patient's permission, cleansed right heel with alcohol swab and applied aquacel foam bandage.    - Fit and dispensed to patient one surgical shoe to be worn on the right foot at all times when ambulating until able to get diabetic shoes.    Patient was given the following recommendations and instructions:  Patient Instructions   - Apply aquacel foam bandage to right heel.  Change every 3 days.    - Check feet daily for wounds and areas of irritation.    - Keep regularly scheduled appointments with primary care, ophthalmology, and podiatry.    - Maintain blood glucose control via taking medications as prescribed, diabetic diet, and exercise.    - Apply lotion to feet and ankles daily but not between toes.    - Keep feet clean and dry, especially between toes.    - Elevate feet as much as possible throughout the day.    - Wear supportive/accommodative shoes at all times when ambulating.    - Supplement with alpha lipoic acid and vitamin B complex to reduce and repair nerve damage.    - Take medications as prescribed.    - Notify clinic if any new or worsening condition arises.        Li Kathleen DPM        Dictation was performed using M*Modal Fluency.  Transcription errors may be present.

## 2019-12-03 ENCOUNTER — TELEPHONE (OUTPATIENT)
Dept: FAMILY MEDICINE | Facility: CLINIC | Age: 54
End: 2019-12-03

## 2019-12-03 NOTE — TELEPHONE ENCOUNTER
----- Message from Margy Ennis sent at 12/3/2019  2:36 PM CST -----  Contact: Elena([facility])  Says she faxed over a form for Diabetic Shoes 11/27/19    Wanted to make sure it was received or should she fax it again    Needs it filled out by 12/6/19 and sent back to her    Please advise, can be reached at [phone]

## 2019-12-08 DIAGNOSIS — R73.03 PREDIABETES: ICD-10-CM

## 2019-12-08 NOTE — PROGRESS NOTES
HPI      54 years old male referred to Hematology Clinic for evaluation of microcytic anemia and thrombocytosis.  Patient had fatigue and malaise was started on vitamin and iron supplement by primary care doctor.    Patient denies any family history of blood disorders.  Patient is active smoker.    10/16/2019:  Patient here to follow-up to review blood work.  No complaints.  Patient states that he is taking iron pill every day empty stomach with orange juice since last visit.  Latest blood work obtained on 10/09/2019 shortly after last visit.    12/09/2019:  Hematology follow-up.  No complaints.    Past Medical History:   Diagnosis Date    Acute angina     Asthma     COPD (chronic obstructive pulmonary disease)     Hypertension     MI, old     PIPER on CPAP     Schizophrenia     Stroke     Type II diabetes mellitus with neurological manifestations 11/21/2019     Social History     Socioeconomic History    Marital status:      Spouse name: Not on file    Number of children: Not on file    Years of education: Not on file    Highest education level: Not on file   Occupational History    Not on file   Social Needs    Financial resource strain: Not on file    Food insecurity:     Worry: Not on file     Inability: Not on file    Transportation needs:     Medical: Not on file     Non-medical: Not on file   Tobacco Use    Smoking status: Current Every Day Smoker     Packs/day: 1.00     Years: 45.00     Pack years: 45.00    Smokeless tobacco: Never Used   Substance and Sexual Activity    Alcohol use: No     Frequency: Never    Drug use: Not Currently     Types: Marijuana    Sexual activity: Yes     Partners: Female   Lifestyle    Physical activity:     Days per week: Not on file     Minutes per session: Not on file    Stress: Not on file   Relationships    Social connections:     Talks on phone: Not on file     Gets together: Not on file     Attends Quaker service: Not on file     Active  member of club or organization: Not on file     Attends meetings of clubs or organizations: Not on file     Relationship status: Not on file   Other Topics Concern    Not on file   Social History Narrative    Not on file       Objective    Physical Exam     Vitals:    12/09/19 0839   BP: (!) 198/90   Pulse:    Resp:    Temp:          Constitutional:  Awake alert  HENT:  Normocephalic atraumatic pupils equal round reactive to light extraocular muscle intact  Cardiovascular:  Tachycardia   Pulmonary/Chest:  Clear to auscultate   Abdominal:  Soft nontender nondistended bowel sounds x4   Musculoskeletal: Normal range of motion. Gait is normal  No clubbing, cyanosis     Lymphadenopathy:  No evidence lymphadenopathy  Neurological:  Cranial nerves 2-12 grossly intact sensorimotor grossly intact no focal deficit  Skin:  Warm dry rash no lesions   Psychiatric:  Normal affect  Vitals reviewed.     CMP  Sodium   Date Value Ref Range Status   11/11/2019 135 (L) 136 - 145 mmol/L Final     Potassium   Date Value Ref Range Status   11/11/2019 3.7 3.5 - 5.1 mmol/L Final     Chloride   Date Value Ref Range Status   11/11/2019 101 95 - 110 mmol/L Final     CO2   Date Value Ref Range Status   11/11/2019 24 22 - 31 mmol/L Final     Glucose   Date Value Ref Range Status   11/11/2019 94 70 - 110 mg/dL Final     Comment:     The ADA recommends the following guidelines for fasting glucose:  Normal:       less than 100 mg/dL  Prediabetes:  100 mg/dL to 125 mg/dL  Diabetes:     126 mg/dL or higher       BUN, Bld   Date Value Ref Range Status   11/11/2019 5 (L) 9 - 21 mg/dL Final     Creatinine   Date Value Ref Range Status   11/11/2019 0.62 0.50 - 1.40 mg/dL Final     Calcium   Date Value Ref Range Status   11/11/2019 8.7 8.4 - 10.2 mg/dL Final     Total Protein   Date Value Ref Range Status   11/09/2019 7.8 6.0 - 8.4 g/dL Final     Albumin   Date Value Ref Range Status   11/09/2019 4.3 3.5 - 5.2 g/dL Final     Total Bilirubin   Date Value  Ref Range Status   11/09/2019 0.4 0.2 - 1.3 mg/dL Final     Alkaline Phosphatase   Date Value Ref Range Status   11/09/2019 85 38 - 145 U/L Final     AST   Date Value Ref Range Status   11/09/2019 20 17 - 59 U/L Final     ALT   Date Value Ref Range Status   11/09/2019 18 10 - 44 U/L Final     Anion Gap   Date Value Ref Range Status   11/11/2019 10 8 - 16 mmol/L Final     eGFR if    Date Value Ref Range Status   11/11/2019 >60 >60 mL/min/1.73 m^2 Final     eGFR if non    Date Value Ref Range Status   11/11/2019 >60 >60 mL/min/1.73 m^2 Final     Comment:     Calculation used to obtain the estimated glomerular filtration  rate (eGFR) is the CKD-EPI equation.        Lab Results   Component Value Date    WBC 12.27 11/11/2019    HGB 12.6 (L) 11/11/2019    HCT 38.7 (L) 11/11/2019    MCV 81 (L) 11/11/2019     (H) 11/11/2019     10/09/2019:  Ferritin 7, iron 34, TIBC 418, iron saturation 8%    11/06/2019:  Iron 256, TIBC 397, iron saturation 64% transferrin 268, ferritin 51.      Assessment    Iron deficiency - on oral iron supplements once daily.  Normalization of iron 11/06/2019.  Iron panel.    Tobacco use    Hypertension uncontrolled    Denies overt bleeding, patient has not seen by GI    Plan    Discontinue iron.  Recheck iron panel 8 weeks from now.    Adequate vitamin B12 level continue monitor    Hypertension follow-up primary care will recheck BP before he discharge today.  I have offered patient to have emergency room visit to control blood pressure patient declined.  Patient states that he will rather talk to his primary care the heart doctors about it.  Patient will make phone call today to discuss medication regarding his blood pressure.    Tobacco use patient wishes to stop using tobacco or followed by primary care physician's    Primary care physician to follow all other medical events.      There are no diagnoses linked to this encounter.

## 2019-12-09 ENCOUNTER — OFFICE VISIT (OUTPATIENT)
Dept: PODIATRY | Facility: CLINIC | Age: 54
End: 2019-12-09
Payer: MEDICARE

## 2019-12-09 ENCOUNTER — OFFICE VISIT (OUTPATIENT)
Dept: HEMATOLOGY/ONCOLOGY | Facility: CLINIC | Age: 54
End: 2019-12-09
Payer: MEDICARE

## 2019-12-09 VITALS
HEART RATE: 79 BPM | OXYGEN SATURATION: 98 % | DIASTOLIC BLOOD PRESSURE: 88 MMHG | TEMPERATURE: 98 F | BODY MASS INDEX: 35.94 KG/M2 | WEIGHT: 256.75 LBS | SYSTOLIC BLOOD PRESSURE: 186 MMHG | HEIGHT: 71 IN | RESPIRATION RATE: 20 BRPM

## 2019-12-09 VITALS
BODY MASS INDEX: 35.84 KG/M2 | SYSTOLIC BLOOD PRESSURE: 169 MMHG | HEIGHT: 71 IN | HEART RATE: 78 BPM | DIASTOLIC BLOOD PRESSURE: 83 MMHG | WEIGHT: 256 LBS

## 2019-12-09 DIAGNOSIS — M21.41 PES PLANUS OF BOTH FEET: ICD-10-CM

## 2019-12-09 DIAGNOSIS — D50.9 MICROCYTIC ANEMIA: ICD-10-CM

## 2019-12-09 DIAGNOSIS — D50.9 IRON DEFICIENCY ANEMIA, UNSPECIFIED IRON DEFICIENCY ANEMIA TYPE: Primary | ICD-10-CM

## 2019-12-09 DIAGNOSIS — M21.6X1 EQUINUS DEFORMITY OF BOTH FEET: ICD-10-CM

## 2019-12-09 DIAGNOSIS — E11.49 TYPE II DIABETES MELLITUS WITH NEUROLOGICAL MANIFESTATIONS: ICD-10-CM

## 2019-12-09 DIAGNOSIS — I73.9 PVD (PERIPHERAL VASCULAR DISEASE): ICD-10-CM

## 2019-12-09 DIAGNOSIS — M21.42 PES PLANUS OF BOTH FEET: ICD-10-CM

## 2019-12-09 DIAGNOSIS — M20.41 HAMMER TOES OF BOTH FEET: ICD-10-CM

## 2019-12-09 DIAGNOSIS — Z86.31 HX OF DIABETIC FOOT ULCER: Primary | ICD-10-CM

## 2019-12-09 DIAGNOSIS — M21.6X2 EQUINUS DEFORMITY OF BOTH FEET: ICD-10-CM

## 2019-12-09 DIAGNOSIS — E08.42 DIABETIC POLYNEUROPATHY ASSOCIATED WITH DIABETES MELLITUS DUE TO UNDERLYING CONDITION: ICD-10-CM

## 2019-12-09 DIAGNOSIS — M79.671 INTRACTABLE RIGHT HEEL PAIN: ICD-10-CM

## 2019-12-09 DIAGNOSIS — M20.42 HAMMER TOES OF BOTH FEET: ICD-10-CM

## 2019-12-09 DIAGNOSIS — I10 ESSENTIAL HYPERTENSION: ICD-10-CM

## 2019-12-09 PROCEDURE — 99213 PR OFFICE/OUTPT VISIT, EST, LEVL III, 20-29 MIN: ICD-10-PCS | Mod: HCNC,S$GLB,, | Performed by: PODIATRIST

## 2019-12-09 PROCEDURE — 3008F BODY MASS INDEX DOCD: CPT | Mod: HCNC,CPTII,S$GLB, | Performed by: INTERNAL MEDICINE

## 2019-12-09 PROCEDURE — 3044F HG A1C LEVEL LT 7.0%: CPT | Mod: HCNC,CPTII,S$GLB, | Performed by: PODIATRIST

## 2019-12-09 PROCEDURE — 3008F PR BODY MASS INDEX (BMI) DOCUMENTED: ICD-10-PCS | Mod: HCNC,CPTII,S$GLB, | Performed by: INTERNAL MEDICINE

## 2019-12-09 PROCEDURE — 99999 PR PBB SHADOW E&M-EST. PATIENT-LVL III: ICD-10-PCS | Mod: PBBFAC,HCNC,, | Performed by: PODIATRIST

## 2019-12-09 PROCEDURE — 99999 PR PBB SHADOW E&M-EST. PATIENT-LVL IV: ICD-10-PCS | Mod: PBBFAC,HCNC,, | Performed by: INTERNAL MEDICINE

## 2019-12-09 PROCEDURE — 99214 PR OFFICE/OUTPT VISIT, EST, LEVL IV, 30-39 MIN: ICD-10-PCS | Mod: HCNC,S$GLB,, | Performed by: INTERNAL MEDICINE

## 2019-12-09 PROCEDURE — 99213 OFFICE O/P EST LOW 20 MIN: CPT | Mod: HCNC,S$GLB,, | Performed by: PODIATRIST

## 2019-12-09 PROCEDURE — 3008F BODY MASS INDEX DOCD: CPT | Mod: HCNC,CPTII,S$GLB, | Performed by: PODIATRIST

## 2019-12-09 PROCEDURE — 3044F PR MOST RECENT HEMOGLOBIN A1C LEVEL <7.0%: ICD-10-PCS | Mod: HCNC,CPTII,S$GLB, | Performed by: PODIATRIST

## 2019-12-09 PROCEDURE — 99214 OFFICE O/P EST MOD 30 MIN: CPT | Mod: HCNC,S$GLB,, | Performed by: INTERNAL MEDICINE

## 2019-12-09 PROCEDURE — 3008F PR BODY MASS INDEX (BMI) DOCUMENTED: ICD-10-PCS | Mod: HCNC,CPTII,S$GLB, | Performed by: PODIATRIST

## 2019-12-09 PROCEDURE — 99999 PR PBB SHADOW E&M-EST. PATIENT-LVL III: CPT | Mod: PBBFAC,HCNC,, | Performed by: PODIATRIST

## 2019-12-09 PROCEDURE — 99999 PR PBB SHADOW E&M-EST. PATIENT-LVL IV: CPT | Mod: PBBFAC,HCNC,, | Performed by: INTERNAL MEDICINE

## 2019-12-09 RX ORDER — METFORMIN HYDROCHLORIDE 500 MG/1
TABLET, EXTENDED RELEASE ORAL
Qty: 90 TABLET | Refills: 0 | Status: SHIPPED | OUTPATIENT
Start: 2019-12-09 | End: 2020-03-11

## 2019-12-10 ENCOUNTER — ANESTHESIA EVENT (OUTPATIENT)
Dept: ENDOSCOPY | Facility: HOSPITAL | Age: 54
End: 2019-12-10
Payer: MEDICARE

## 2019-12-10 ENCOUNTER — ANESTHESIA (OUTPATIENT)
Dept: ENDOSCOPY | Facility: HOSPITAL | Age: 54
End: 2019-12-10
Payer: MEDICARE

## 2019-12-10 ENCOUNTER — HOSPITAL ENCOUNTER (OUTPATIENT)
Facility: HOSPITAL | Age: 54
Discharge: HOME | End: 2019-12-10
Attending: INTERNAL MEDICINE | Admitting: INTERNAL MEDICINE
Payer: MEDICARE

## 2019-12-10 DIAGNOSIS — D50.9 IDA (IRON DEFICIENCY ANEMIA): ICD-10-CM

## 2019-12-10 LAB — GLUCOSE SERPL-MCNC: 103 MG/DL (ref 70–110)

## 2019-12-10 PROCEDURE — D9220A PRA ANESTHESIA: ICD-10-PCS | Mod: HCNC,CRNA,, | Performed by: NURSE ANESTHETIST, CERTIFIED REGISTERED

## 2019-12-10 PROCEDURE — D9220A PRA ANESTHESIA: Mod: HCNC,ANES,, | Performed by: ANESTHESIOLOGY

## 2019-12-10 PROCEDURE — 45385 COLONOSCOPY W/LESION REMOVAL: CPT | Mod: HCNC,,, | Performed by: INTERNAL MEDICINE

## 2019-12-10 PROCEDURE — 25000003 PHARM REV CODE 250: Mod: HCNC,PO | Performed by: NURSE ANESTHETIST, CERTIFIED REGISTERED

## 2019-12-10 PROCEDURE — 63600175 PHARM REV CODE 636 W HCPCS: Mod: HCNC,PO | Performed by: INTERNAL MEDICINE

## 2019-12-10 PROCEDURE — 37000009 HC ANESTHESIA EA ADD 15 MINS: Mod: HCNC,PO | Performed by: INTERNAL MEDICINE

## 2019-12-10 PROCEDURE — 00813 ANES UPR LWR GI NDSC PX: CPT | Mod: HCNC,PO | Performed by: INTERNAL MEDICINE

## 2019-12-10 PROCEDURE — 63600175 PHARM REV CODE 636 W HCPCS: Mod: HCNC,PO | Performed by: NURSE ANESTHETIST, CERTIFIED REGISTERED

## 2019-12-10 PROCEDURE — 27201089 HC SNARE, DISP (ANY): Mod: HCNC,PO | Performed by: INTERNAL MEDICINE

## 2019-12-10 PROCEDURE — 88305 TISSUE EXAM BY PATHOLOGIST: CPT | Mod: HCNC | Performed by: PATHOLOGY

## 2019-12-10 PROCEDURE — D9220A PRA ANESTHESIA: Mod: HCNC,CRNA,, | Performed by: NURSE ANESTHETIST, CERTIFIED REGISTERED

## 2019-12-10 PROCEDURE — 88305 TISSUE EXAM BY PATHOLOGIST: ICD-10-PCS | Mod: 26,HCNC,, | Performed by: PATHOLOGY

## 2019-12-10 PROCEDURE — 43239 PR EGD, FLEX, W/BIOPSY, SGL/MULTI: ICD-10-PCS | Mod: HCNC,51,, | Performed by: INTERNAL MEDICINE

## 2019-12-10 PROCEDURE — 43239 EGD BIOPSY SINGLE/MULTIPLE: CPT | Mod: HCNC,51,, | Performed by: INTERNAL MEDICINE

## 2019-12-10 PROCEDURE — 88305 TISSUE EXAM BY PATHOLOGIST: CPT | Mod: 26,HCNC,, | Performed by: PATHOLOGY

## 2019-12-10 PROCEDURE — D9220A PRA ANESTHESIA: ICD-10-PCS | Mod: HCNC,ANES,, | Performed by: ANESTHESIOLOGY

## 2019-12-10 PROCEDURE — 45385 COLONOSCOPY W/LESION REMOVAL: CPT | Mod: HCNC,PO | Performed by: INTERNAL MEDICINE

## 2019-12-10 PROCEDURE — 45385 PR COLONOSCOPY,REMV LESN,SNARE: ICD-10-PCS | Mod: HCNC,,, | Performed by: INTERNAL MEDICINE

## 2019-12-10 PROCEDURE — 37000008 HC ANESTHESIA 1ST 15 MINUTES: Mod: HCNC,PO | Performed by: INTERNAL MEDICINE

## 2019-12-10 PROCEDURE — 43239 EGD BIOPSY SINGLE/MULTIPLE: CPT | Mod: HCNC,PO | Performed by: INTERNAL MEDICINE

## 2019-12-10 PROCEDURE — 27201012 HC FORCEPS, HOT/COLD, DISP: Mod: HCNC,PO | Performed by: INTERNAL MEDICINE

## 2019-12-10 RX ORDER — KETAMINE HYDROCHLORIDE 100 MG/ML
INJECTION, SOLUTION INTRAMUSCULAR; INTRAVENOUS
Status: DISCONTINUED | OUTPATIENT
Start: 2019-12-10 | End: 2019-12-10

## 2019-12-10 RX ORDER — LIDOCAINE HCL/PF 100 MG/5ML
SYRINGE (ML) INTRAVENOUS
Status: DISCONTINUED | OUTPATIENT
Start: 2019-12-10 | End: 2019-12-10

## 2019-12-10 RX ORDER — SODIUM CHLORIDE 0.9 % (FLUSH) 0.9 %
10 SYRINGE (ML) INJECTION
Status: DISCONTINUED | OUTPATIENT
Start: 2019-12-10 | End: 2019-12-10 | Stop reason: HOSPADM

## 2019-12-10 RX ORDER — SODIUM CHLORIDE, SODIUM LACTATE, POTASSIUM CHLORIDE, CALCIUM CHLORIDE 600; 310; 30; 20 MG/100ML; MG/100ML; MG/100ML; MG/100ML
INJECTION, SOLUTION INTRAVENOUS CONTINUOUS
Status: DISCONTINUED | OUTPATIENT
Start: 2019-12-10 | End: 2019-12-10 | Stop reason: HOSPADM

## 2019-12-10 RX ORDER — PROPOFOL 10 MG/ML
VIAL (ML) INTRAVENOUS
Status: DISCONTINUED | OUTPATIENT
Start: 2019-12-10 | End: 2019-12-10

## 2019-12-10 RX ADMIN — KETAMINE HYDROCHLORIDE 25 MG: 100 INJECTION, SOLUTION, CONCENTRATE INTRAMUSCULAR; INTRAVENOUS at 11:12

## 2019-12-10 RX ADMIN — PROPOFOL 50 MG: 10 INJECTION, EMULSION INTRAVENOUS at 11:12

## 2019-12-10 RX ADMIN — SODIUM CHLORIDE, SODIUM LACTATE, POTASSIUM CHLORIDE, AND CALCIUM CHLORIDE: .6; .31; .03; .02 INJECTION, SOLUTION INTRAVENOUS at 10:12

## 2019-12-10 RX ADMIN — PROPOFOL 30 MG: 10 INJECTION, EMULSION INTRAVENOUS at 11:12

## 2019-12-10 RX ADMIN — LIDOCAINE HYDROCHLORIDE 100 MG: 20 INJECTION, SOLUTION INTRAVENOUS at 11:12

## 2019-12-10 RX ADMIN — PROPOFOL 150 MG: 10 INJECTION, EMULSION INTRAVENOUS at 11:12

## 2019-12-10 NOTE — PLAN OF CARE
Discharge instructions reviewed with pt and family. Understanding verbalized. No complaints of pain reported. Pt able to tolerate po intake. To be transported to car by RN.

## 2019-12-10 NOTE — ANESTHESIA PREPROCEDURE EVALUATION
"                                                                                                             12/10/2019  Ryan Crane is a 54 y.o., male.    Anesthesia Evaluation    I have reviewed the Patient Summary Reports.    I have reviewed the Nursing Notes.      Review of Systems  Anesthesia Hx:  No problems with previous Anesthesia    Social:  Smoker    Cardiovascular:   Hypertension, well controlled Past MI CAD asymptomatic  Hx of "heart attack in the 90's".  Stable at present   Pulmonary:   COPD, moderate Sleep Apnea    Endocrine:   Diabetes, well controlled, type 2    Psych:   Psychiatric History Hx of schizophrenia         Physical Exam  General:  Obesity    Airway/Jaw/Neck:  Airway Findings: Mallampati: II                Anesthesia Plan  Type of Anesthesia, risks & benefits discussed:  Anesthesia Type:  general  Patient's Preference:   Intra-op Monitoring Plan:   Intra-op Monitoring Plan Comments:   Post Op Pain Control Plan:   Post Op Pain Control Plan Comments:   Induction:   IV  Beta Blocker:  Patient is not currently on a Beta-Blocker (No further documentation required).       Informed Consent: Patient understands risks and agrees with Anesthesia plan.  Questions answered. Anesthesia consent signed with patient.  ASA Score: 3     Day of Surgery Review of History & Physical:    H&P update referred to the surgeon.         Ready For Surgery From Anesthesia Perspective.       "

## 2019-12-10 NOTE — [employer] PATIENT INSTRUCTIONS
Discharge Summary/Instructions after an Endoscopic Procedure  Patient Name: Ryan Crane  Patient MRN: [id number]  Patient YOB: 1965  Tuesday, December 10, 2019  Ryan Lucas MD  RESTRICTIONS:  During your procedure today, you received medications for sedation.  These   medications may affect your judgment, balance and coordination.  Therefore,   for 24 hours, you have the following restrictions:   - DO NOT drive a car, operate machinery, make legal/financial decisions,   sign important papers or drink alcohol.    ACTIVITY:  Today: no heavy lifting, straining or running due to procedural   sedation/anesthesia.  The following day: return to full activity including work.  DIET:  Eat and drink normally unless instructed otherwise.     TREATMENT FOR COMMON SIDE EFFECTS:  - Mild abdominal pain, nausea, belching, bloating or excessive gas:  rest,   eat lightly and use a heating pad.  - Sore Throat: treat with throat lozenges and/or gargle with warm salt   water.  - Because air was used during the procedure, expelling large amounts of air   from your rectum or belching is normal.  - If a bowel prep was taken, you may not have a bowel movement for 1-3 days.    This is normal.  SYMPTOMS TO WATCH FOR AND REPORT TO YOUR PHYSICIAN:  1. Abdominal pain or bloating, other than gas cramps.  2. Chest pain.  3. Back pain.  4. Signs of infection such as: chills or fever occurring within 24 hours   after the procedure.  5. Rectal bleeding, which would show as bright red, maroon, or black stools.   (A tablespoon of blood from the rectum is not serious, especially if   hemorrhoids are present.)  6. Vomiting.  7. Weakness or dizziness.  GO DIRECTLY TO THE NEAREST EMERGENCY ROOM IF YOU HAVE ANY OF THE FOLLOWING:      Difficulty breathing              Chills and/or fever over 101 F   Persistent vomiting and/or vomiting blood   Severe abdominal pain   Severe chest pain   Black, tarry stools   Bleeding- more than one  tablespoon   Any other symptom or condition that you feel may need urgent attention  Your doctor recommends these additional instructions:  If any biopsies were taken, your doctors clinic will contact you in 1 to 2   weeks with any results.  We are waiting for your pathology results.   Continue your present medications.   You are being discharged to home.  For questions, problems or results please call your physician - Ryan Lucas MD at Work:  [phone].  EMERGENCY PHONE NUMBER: [phone], LAB RESULTS: [phone]  IF A COMPLICATION OR EMERGENCY SITUATION ARISES AND YOU ARE UNABLE TO REACH   YOUR PHYSICIAN - GO DIRECTLY TO THE EMERGENCY ROOM.  ___________________________________________  Nurse Signature  ___________________________________________  Patient/Designated Responsible Party Signature  Ryan Lucas MD  12/10/2019 11:33:46 AM  This report has been verified and signed electronically.  [employer]

## 2019-12-10 NOTE — PR[employer]ATION PATIENT INSTRUCTIONS
Discharge Summary/Instructions after an Endoscopic Procedure  Patient Name: Ryan Crane  Patient MRN: [id number]  Patient YOB: 1965  Tuesday, December 10, 2019  Ryan Lucas MD  RESTRICTIONS:  During your procedure today, you received medications for sedation.  These   medications may affect your judgment, balance and coordination.  Therefore,   for 24 hours, you have the following restrictions:   - DO NOT drive a car, operate machinery, make legal/financial decisions,   sign important papers or drink alcohol.    ACTIVITY:  Today: no heavy lifting, straining or running due to procedural   sedation/anesthesia.  The following day: return to full activity including work.  DIET:  Eat and drink normally unless instructed otherwise.     TREATMENT FOR COMMON SIDE EFFECTS:  - Mild abdominal pain, nausea, belching, bloating or excessive gas:  rest,   eat lightly and use a heating pad.  - Sore Throat: treat with throat lozenges and/or gargle with warm salt   water.  - Because air was used during the procedure, expelling large amounts of air   from your rectum or belching is normal.  - If a bowel prep was taken, you may not have a bowel movement for 1-3 days.    This is normal.  SYMPTOMS TO WATCH FOR AND REPORT TO YOUR PHYSICIAN:  1. Abdominal pain or bloating, other than gas cramps.  2. Chest pain.  3. Back pain.  4. Signs of infection such as: chills or fever occurring within 24 hours   after the procedure.  5. Rectal bleeding, which would show as bright red, maroon, or black stools.   (A tablespoon of blood from the rectum is not serious, especially if   hemorrhoids are present.)  6. Vomiting.  7. Weakness or dizziness.  GO DIRECTLY TO THE NEAREST EMERGENCY ROOM IF YOU HAVE ANY OF THE FOLLOWING:      Difficulty breathing              Chills and/or fever over 101 F   Persistent vomiting and/or vomiting blood   Severe abdominal pain   Severe chest pain   Black, tarry stools   Bleeding- more than one  tablespoon   Any other symptom or condition that you feel may need urgent attention  Your doctor recommends these additional instructions:  If any biopsies were taken, your doctors clinic will contact you in 1 to 2   weeks with any results.  We are waiting for your pathology results.   Your physician has recommended a repeat colonoscopy in three years for   surveillance based on pathology results.   You are being discharged to home.  For questions, problems or results please call your physician - Ryan Lucas MD at Work:  [phone].  EMERGENCY PHONE NUMBER: [phone], LAB RESULTS: [phone]  IF A COMPLICATION OR EMERGENCY SITUATION ARISES AND YOU ARE UNABLE TO REACH   YOUR PHYSICIAN - GO DIRECTLY TO THE EMERGENCY ROOM.  ___________________________________________  Nurse Signature  ___________________________________________  Patient/Designated Responsible Party Signature  Ryan Lucas MD  12/10/2019 12:04:45 PM  This report has been verified and signed electronically.  [employer]

## 2019-12-10 NOTE — TRANSFER OF CARE
"Anesthesia Transfer of Care Note    Patient: Ryan Crane    Procedure(s) Performed: Procedure(s) (LRB):  EGD (ESOPHAGOGASTRODUODENOSCOPY) (N/A)  COLONOSCOPY (N/A)    Patient location: PACU    Anesthesia Type: general    Transport from OR: Transported from OR on room air with adequate spontaneous ventilation    Post pain: adequate analgesia    Post assessment: no apparent anesthetic complications and tolerated procedure well    Post vital signs: stable    Level of consciousness: awake    Nausea/Vomiting: no nausea/vomiting    Complications: none    Transfer of care protocol was followed      Last vitals:   Visit Vitals  BP (!) 168/81 (BP Location: Right arm, Patient Position: Sitting)   Pulse 86   Temp 36.9 °C (98.4 °F) (Skin)   Resp 18   Ht 5' 11" (1.803 m)   Wt 116.1 kg (256 lb)   SpO2 97%   BMI 35.70 kg/m²     "

## 2019-12-10 NOTE — H&P
History & Physical - Short Stay  Gastroenterology      SUBJECTIVE:     Procedure: Colonoscopy and EGD    Chief Complaint/Indication for Procedure: Iron Deficiency Anemia    PTA Medications   Medication Sig    aspirin (ECOTRIN) 325 MG EC tablet Take 325 mg by mouth once daily.    busPIRone (BUSPAR) 10 MG tablet Take 10 mg by mouth 3 (three) times daily.    cyanocobalamin (VITAMIN B-12) 1000 MCG tablet Take 1,000 mcg by mouth.     DULoxetine (CYMBALTA) 30 MG capsule Take 30 mg by mouth once daily.    fluticasone-umeclidin-vilanter (TRELEGY ELLIPTA) 100-62.5-25 mcg DsDv Inhale 1 puff into the lungs once daily.    ipratropium-albuterol (COMBIVENT)  mcg/actuation inhaler Inhale 1 puff into the lungs every 6 (six) hours as needed for Wheezing or Shortness of Breath. Rescue    metFORMIN (GLUCOPHAGE-XR) 500 MG 24 hr tablet TAKE 1 TABLET(500 MG) BY MOUTH DAILY WITH BREAKFAST    metoprolol succinate (TOPROL-XL) 25 MG 24 hr tablet Take 25 mg by mouth once daily.    miconazole (MICOTIN) 2 % cream Apply topically 2 (two) times daily. Apply to skin folds in groin/ anal area. for 7 days    nitroGLYCERIN (NITROSTAT) 0.4 MG SL tablet Place 1 tablet (0.4 mg total) under the tongue every 5 (five) minutes as needed for Chest pain.    OXcarbazepine (TRILEPTAL) 600 MG Tab Take 600 mg by mouth 3 (three) times daily.    ziprasidone (GEODON) 80 MG capsule Take 160 mg by mouth every evening.     acetaminophen (TYLENOL) 325 MG tablet Take 2 tablets (650 mg total) by mouth every 4 (four) hours as needed. (Patient not taking: Reported on 12/9/2019)    EPINEPHrine (EPIPEN) 0.3 mg/0.3 mL AtIn Inject 0.3 mLs (0.3 mg total) into the muscle once. for 1 dose    ferrous gluconate 324 mg (37.5 mg iron) Tab tablet Take 1 tablet (324 mg total) by mouth 2 (two) times daily with meals. (Patient not taking: Reported on 12/9/2019)    levoFLOXacin (LEVAQUIN) 750 MG tablet Take 1 tablet (750 mg total) by mouth once daily. (Patient not  taking: Reported on 12/9/2019)    methocarbamol (ROBAXIN) 500 MG Tab Take 1 tablet (500 mg total) by mouth every 8 (eight) hours as needed (muscle spasms). (Patient not taking: Reported on 12/9/2019)    polyethylene glycol (GLYCOLAX) 17 gram PwPk Take 17 g by mouth once daily. (Patient not taking: Reported on 12/9/2019)    tadalafil (CIALIS) 10 MG tablet Take 1 tablet (10 mg total) by mouth daily as needed for Erectile Dysfunction.       Review of patient's allergies indicates:   Allergen Reactions    Alcohol     Keflex [cephalexin]         Past Medical History:   Diagnosis Date    Acute angina     Asthma     COPD (chronic obstructive pulmonary disease)     Hypertension     MI, old     PIPER on CPAP     Schizophrenia     Stroke     Type II diabetes mellitus with neurological manifestations 11/21/2019     Past Surgical History:   Procedure Laterality Date    APPENDECTOMY      CLOSURE OF WOUND Right 9/9/2019    Procedure: CLOSURE, WOUND;  Surgeon: Li Kathleen DPM;  Location: [facility] OR;  Service: Podiatry;  Laterality: Right;    SHOULDER ARTHROSCOPY Left      Family History   Problem Relation Age of Onset    Melanoma Mother     Diabetes Mother     Hypertension Mother     Stroke Father     Diabetes Father     Hypertension Father     Colon cancer Father         unsure of age of diagnosis    Cancer Father         colon cancer    Cervical cancer Sister     Cirrhosis Brother     Hypertension Brother     Lung cancer Maternal Grandmother     Prostate cancer Maternal Grandfather     Crohn's disease Neg Hx     Ulcerative colitis Neg Hx     Stomach cancer Neg Hx     Esophageal cancer Neg Hx      Social History     Tobacco Use    Smoking status: Current Every Day Smoker     Packs/day: 1.00     Years: 45.00     Pack years: 45.00    Smokeless tobacco: Never Used   Substance Use Topics    Alcohol use: No     Frequency: Never    Drug use: Not Currently     Types: Marijuana         OBJECTIVE:      Vital Signs (Most Recent)  Temp: 98.4 °F (36.9 °C) (12/10/19 1035)  Pulse: 86 (12/10/19 1035)  Resp: 18 (12/10/19 1035)  BP: (!) 168/81 (12/10/19 1035)  SpO2: 97 % (12/10/19 1035)    Physical Exam:                                                       GENERAL:  Comfortable, in no acute distress.                                 HEENT EXAM:  Nonicteric.  No adenopathy.  Oropharynx is clear.               NECK:  Supple.                                                               LUNGS:  Clear.                                                               CARDIAC:  Regular rate and rhythm.  S1, S2.  No murmur.                      ABDOMEN:  Soft, positive bowel sounds, nontender.  No hepatosplenomegaly or masses.  No rebound or guarding.                                             EXTREMITIES:  No edema.     MENTAL STATUS:  Normal, alert and oriented.      ASSESSMENT/PLAN:     Assessment: Iron Deficiency Anemia    Plan: Colonoscopy and EGD    Anesthesia Plan: General    ASA Grade: ASA 2 - Patient with mild systemic disease with no functional limitations    MALLAMPATI SCORE:  I (soft palate, uvula, fauces, and tonsillar pillars visible)

## 2019-12-10 NOTE — ANESTHESIA POSTPROCEDURE EVALUATION
Anesthesia Post Evaluation    Patient: Ryan Crane    Procedure(s) Performed: Procedure(s) (LRB):  EGD (ESOPHAGOGASTRODUODENOSCOPY) (N/A)  COLONOSCOPY (N/A)    Final Anesthesia Type: general    Patient location during evaluation: PACU  Patient participation: Yes- Able to Participate  Level of consciousness: awake and alert, oriented and awake  Post-procedure vital signs: reviewed and stable  Pain management: adequate  Airway patency: patent    PONV status at discharge: No PONV  Anesthetic complications: no      Cardiovascular status: blood pressure returned to baseline and hemodynamically stable  Respiratory status: unassisted, spontaneous ventilation and room air  Hydration status: euvolemic  Follow-up not needed.          Vitals Value Taken Time   /74 12/10/2019 12:30 PM   Temp 36.7 °C (98.1 °F) 12/10/2019 12:10 PM   Pulse 88 12/10/2019 12:30 PM   Resp 16 12/10/2019 12:30 PM   SpO2 95 % 12/10/2019 12:30 PM         Event Time     Out of Recovery 12:42:00          Pain/Hai Score: Hai Score: 10 (12/10/2019 12:42 PM)

## 2019-12-10 NOTE — OP NOTE
Discharge Note  Short Stay      SUMMARY     Admit Date: 12/10/2019    Attending Physician: Ryan Lucas MD     Discharge Physician: Ryan Lucas MD    Discharge Date: 12/10/2019 12:06 PM    Final Diagnosis: Iron deficiency anemia, unspecified iron deficiency anemia type [D50.9]  Family hx of colon cancer [Z80.0]    Disposition: HOME OR SELF CARE    Patient Instructions:   Current Discharge Medication List      CONTINUE these medications which have NOT CHANGED    Details   aspirin (ECOTRIN) 325 MG EC tablet Take 325 mg by mouth once daily.      busPIRone (BUSPAR) 10 MG tablet Take 10 mg by mouth 3 (three) times daily.      cyanocobalamin (VITAMIN B-12) 1000 MCG tablet Take 1,000 mcg by mouth.       DULoxetine (CYMBALTA) 30 MG capsule Take 30 mg by mouth once daily.      fluticasone-umeclidin-vilanter (TRELEGY ELLIPTA) 100-62.5-25 mcg DsDv Inhale 1 puff into the lungs once daily.  Qty: 60 each, Refills: 11    Associated Diagnoses: Chronic obstructive pulmonary disease, unspecified COPD type      ipratropium-albuterol (COMBIVENT)  mcg/actuation inhaler Inhale 1 puff into the lungs every 6 (six) hours as needed for Wheezing or Shortness of Breath. Rescue  Qty: 4 g, Refills: 11    Associated Diagnoses: Chronic obstructive pulmonary disease, unspecified COPD type      metFORMIN (GLUCOPHAGE-XR) 500 MG 24 hr tablet TAKE 1 TABLET(500 MG) BY MOUTH DAILY WITH BREAKFAST  Qty: 90 tablet, Refills: 0    Associated Diagnoses: Prediabetes      metoprolol succinate (TOPROL-XL) 25 MG 24 hr tablet Take 25 mg by mouth once daily.      miconazole (MICOTIN) 2 % cream Apply topically 2 (two) times daily. Apply to skin folds in groin/ anal area. for 7 days  Refills: 0      nitroGLYCERIN (NITROSTAT) 0.4 MG SL tablet Place 1 tablet (0.4 mg total) under the tongue every 5 (five) minutes as needed for Chest pain.  Qty: 9 tablet, Refills: 3      OXcarbazepine (TRILEPTAL) 600 MG Tab Take 600 mg by mouth 3 (three) times daily.       ziprasidone (GEODON) 80 MG capsule Take 160 mg by mouth every evening.       acetaminophen (TYLENOL) 325 MG tablet Take 2 tablets (650 mg total) by mouth every 4 (four) hours as needed.  Refills: 0      EPINEPHrine (EPIPEN) 0.3 mg/0.3 mL AtIn Inject 0.3 mLs (0.3 mg total) into the muscle once. for 1 dose  Qty: 0.3 mL, Refills: 0      ferrous gluconate 324 mg (37.5 mg iron) Tab tablet Take 1 tablet (324 mg total) by mouth 2 (two) times daily with meals.  Qty: 60 tablet, Refills: 2      levoFLOXacin (LEVAQUIN) 750 MG tablet Take 1 tablet (750 mg total) by mouth once daily.  Qty: 5 tablet, Refills: 0    Associated Diagnoses: Scrotal erythema; Orchitis      methocarbamol (ROBAXIN) 500 MG Tab Take 1 tablet (500 mg total) by mouth every 8 (eight) hours as needed (muscle spasms).  Qty: 30 tablet, Refills: 0    Associated Diagnoses: Chronic nonintractable headache, unspecified headache type      polyethylene glycol (GLYCOLAX) 17 gram PwPk Take 17 g by mouth once daily.  Qty: 30 each, Refills: 0      tadalafil (CIALIS) 10 MG tablet Take 1 tablet (10 mg total) by mouth daily as needed for Erectile Dysfunction.  Qty: 30 tablet, Refills: 0    Associated Diagnoses: Routine health maintenance             Discharge Procedure Orders (must include Diet, Follow-up, Activity)    Follow Up:  Follow up with PCP as previously scheduled  Resume routine diet.  Activity as tolerated.    No driving day of procedure.

## 2019-12-11 VITALS
HEART RATE: 88 BPM | TEMPERATURE: 98 F | BODY MASS INDEX: 35.84 KG/M2 | SYSTOLIC BLOOD PRESSURE: 137 MMHG | DIASTOLIC BLOOD PRESSURE: 74 MMHG | RESPIRATION RATE: 16 BRPM | OXYGEN SATURATION: 95 % | HEIGHT: 71 IN | WEIGHT: 256 LBS

## 2019-12-13 ENCOUNTER — TELEPHONE (OUTPATIENT)
Dept: GASTROENTEROLOGY | Facility: CLINIC | Age: 54
End: 2019-12-13

## 2019-12-13 ENCOUNTER — NURSE TRIAGE (OUTPATIENT)
Dept: ADMINISTRATIVE | Facility: CLINIC | Age: 54
End: 2019-12-13

## 2019-12-13 PROBLEM — K92.2 GASTROINTESTINAL HEMORRHAGE: Status: ACTIVE | Noted: 2019-12-13

## 2019-12-13 PROBLEM — K92.2 ACUTE LOWER GI BLEEDING: Status: ACTIVE | Noted: 2019-12-13

## 2019-12-13 NOTE — TELEPHONE ENCOUNTER
----- Message from Princess SAEED Bennett sent at 12/13/2019  8:57 AM CST -----  Contact: Patient  Type: Needs Medical Advice    Who Called:  Patient  Best Call Back Number:   Additional Information: Requesting a call back in regards to patient had 2 bowels movements this moring and they were nothing but blood. Please call to advise.

## 2019-12-13 NOTE — TELEPHONE ENCOUNTER
Had two bm's this am and nothing but pure blood and large blood clots, colonoscopy on Tuesday.  Had a regular bm yesterday.    Reason for Disposition   SEVERE rectal bleeding (large blood clots; on and off, or constant bleeding)    Additional Information   Negative: Passed out (i.e., fainted, collapsed and was not responding)   Negative: Shock suspected (e.g., cold/pale/clammy skin, too weak to stand, low BP, rapid pulse)   Negative: Vomiting red blood or black (coffee ground) material   Negative: Sounds like a life-threatening emergency to the triager   Negative: Diarrhea is the main symptom   Negative: Rectal symptoms   Negative: SEVERE dizziness (e.g., unable to stand, requires support to walk, feels like passing out now)   Negative: MODERATE rectal bleeding (small blood clots, passing blood without stool, or toilet water turns red) more than once a day   Negative: Bloody, black, or tarry bowel movements   Negative: High-risk adult (e.g., prior surgery on aorta, abdominal aortic aneurysm)   Negative: Rectal foreign body (inserted or swallowed)   Negative: SEVERE abdominal pain (e.g., excruciating)   Negative: Constant abdominal pain lasting > 2 hours   Negative: Pale skin (pallor) of new onset or worsening   Negative: Patient sounds very sick or weak to the triager    Protocols used: RECTAL BLEEDING-A-OH

## 2019-12-14 PROBLEM — F17.200 TOBACCO USE DISORDER: Status: ACTIVE | Noted: 2019-12-14

## 2019-12-14 PROBLEM — D62 ACUTE BLOOD LOSS ANEMIA: Status: ACTIVE | Noted: 2019-12-14

## 2019-12-16 NOTE — PROGRESS NOTES
Subjective:      Patient ID: Ryan Crane is a 54 y.o. male.    Chief Complaint: Wound Check (Dr becerril 2019 6.0 8/2019 )    HPI:  Ryan Crane is a 53 y.o. male who presents to clinic with a chief complaint of right heel wound s/p wound revision with closure.  Patient reports pain is 7/10 on the pain scale.  He states that he has been following postoperative recommendations.  He reports of busting a hole in the toe box of his left sneaker and is planning on getting a new pair shoes after today's visit.  Patient denies any other pedal complaints at this time.    PCP:  Laura Becerril MD  Date last seen:  11/15/19    Review of Systems   Constitutional: Negative for appetite change, fever, chills, fatigue and unexpected weight change.   Cardiovascular: Negative for chest pain, claudication, cyanosis.  Positive for leg swelling.  Musculoskeletal: Negative for back pain, arthritis, joint pain, joint swelling, myalgias, and stiffness.   Skin: Negative for nail bed changes, discoloration, rash, itching, suspicious lesion, and unusual hair distribution.  Positive for poor wound healing.  Neurological: Negative for loss of balance, sensory change, paresthesias, and numbness.  Positive for pain.  Hematological: Negative for adenopathy, bleeding, and bruising easily.   Psychiatric/Behavioral: The patient is not nervous/anxious.  Negative for altered mental status.    Hemoglobin A1C   Date Value Ref Range Status   12/13/2019 5.7 (H) 0.0 - 5.6 % Final     Comment:     Reference Interval:  5.0 - 5.6 Normal   5.7 - 6.4 High Risk   > 6.5 Diabetic    Hgb A1c results are standardized based on the (NGSP) National   Glycohemoglobin Standardization Program.    Hemoglobin A1C levels are related to mean serum/plasma glucose   during the preceding 2-3 months.        08/29/2019 6.0 (H) 4.0 - 5.6 % Final     Comment:     ADA Screening Guidelines:  5.7-6.4%  Consistent with prediabetes  >or=6.5%  Consistent with diabetes  High  levels of fetal hemoglobin interfere with the HbA1C  assay. Heterozygous hemoglobin variants (HbS, HgC, etc)do  not significantly interfere with this assay.   However, presence of multiple variants may affect accuracy.     01/16/2019 5.6 4.0 - 5.6 % Final     Comment:     ADA Screening Guidelines:  5.7-6.4%  Consistent with prediabetes  >or=6.5%  Consistent with diabetes  High levels of fetal hemoglobin interfere with the HbA1C  assay. Heterozygous hemoglobin variants (HbS, HgC, etc)do  not significantly interfere with this assay.   However, presence of multiple variants may affect accuracy.         Past Medical History:   Diagnosis Date    Acute angina     Asthma     COPD (chronic obstructive pulmonary disease)     Hypertension     MI, old     PIPER on CPAP     Schizophrenia     Stroke     Type II diabetes mellitus with neurological manifestations 11/21/2019     Past Surgical History:   Procedure Laterality Date    APPENDECTOMY      CLOSURE OF WOUND Right 9/9/2019    Procedure: CLOSURE, WOUND;  Surgeon: Li Kathleen DPM;  Location: [facility] OR;  Service: Podiatry;  Laterality: Right;    COLONOSCOPY N/A 12/10/2019    Procedure: COLONOSCOPY;  Surgeon: Ryan Lucas MD;  Location: [facility] ENDO;  Service: Endoscopy;  Laterality: N/A;    ESOPHAGOGASTRODUODENOSCOPY N/A 12/10/2019    Procedure: EGD (ESOPHAGOGASTRODUODENOSCOPY);  Surgeon: Ryan Lucas MD;  Location: [facility] ENDO;  Service: Endoscopy;  Laterality: N/A;    SHOULDER ARTHROSCOPY Left      Family History   Problem Relation Age of Onset    Melanoma Mother     Diabetes Mother     Hypertension Mother     Stroke Father     Diabetes Father     Hypertension Father     Colon cancer Father         unsure of age of diagnosis    Cancer Father         colon cancer    Cervical cancer Sister     Cirrhosis Brother     Hypertension Brother     Lung cancer Maternal Grandmother     Prostate cancer Maternal Grandfather     Crohn's disease Neg Hx   "   Ulcerative colitis Neg Hx     Stomach cancer Neg Hx     Esophageal cancer Neg Hx      Social History     Socioeconomic History    Marital status:      Spouse name: Not on file    Number of children: Not on file    Years of education: Not on file    Highest education level: Not on file   Occupational History    Not on file   Social Needs    Financial resource strain: Not on file    Food insecurity:     Worry: Not on file     Inability: Not on file    Transportation needs:     Medical: Not on file     Non-medical: Not on file   Tobacco Use    Smoking status: Current Every Day Smoker     Packs/day: 1.00     Years: 45.00     Pack years: 45.00    Smokeless tobacco: Never Used   Substance and Sexual Activity    Alcohol use: No     Frequency: Never    Drug use: Not Currently     Types: Marijuana    Sexual activity: Yes     Partners: Female   Lifestyle    Physical activity:     Days per week: Not on file     Minutes per session: Not on file    Stress: Not on file   Relationships    Social connections:     Talks on phone: Not on file     Gets together: Not on file     Attends Jain service: Not on file     Active member of club or organization: Not on file     Attends meetings of clubs or organizations: Not on file     Relationship status: Not on file   Other Topics Concern    Not on file   Social History Narrative    Not on file           Objective:        BP (!) 169/83   Pulse 78   Ht 5' 11" (1.803 m)   Wt 116.1 kg (256 lb)   BMI 35.70 kg/m²     Physical Exam   Constitutional: Patient is oriented to person, place, and time. Patient appears well-developed and well-nourished.  Strong malodor noted from patient due to lack of personal hygiene.  No acute distress.     Psychiatric: Patient has a normal mood and affect. Patient's speech is normal and behavior is normal. Judgment is normal. Cognition and memory are normal.     Right pedal exam was performed today.  Vascular: Vascular: Pedal " pulses palpable 2/4 DP & PT.  CFT is = 3 seconds to the hallux.  Skin temperature is warm to cool proximal tibia to distal toes without localized increase in calor noted.  No erythema, ecchymosis, and edema noted to right plantar heel.  Hair growth present distally to the LE.     Musculoskeletal: Ankle and pedal joints ROM are decreased. Ankle joint dorsiflexion is restricted with the knee extended and flexed per Silfverskiold exam.  Muscle strength is 5/5 for all LE muscle groups tested.  Flat foot type present.  Flexion contracture of lesser digits noted.    Neurological:  Epicritic sensation is slightly dimiished to the foot.    [place] STR is negative to the lower extremity.   Tenderness to palpation of right plantar heel noted.     Dermatological: Toenails 1-5 are elongated and distally thickened, dystrophic, brittle, discolored, and mycotic with subungal debris present.  Webspaces 1-4 are clean, dry, and intact.  Skin turgor is increased.  Skin texture is dry and flaky.   Previous plantar right heel is healed without residual hematoma formation appreciable.      Nursing note and vitals reviewed.        Assessment:       Encounter Diagnoses   Name Primary?    Hx of diabetic foot ulcer Yes    Equinus deformity of both feet     Hammer toes of both feet     Pes planus of both feet     Intractable right heel pain - Right Foot     Diabetic polyneuropathy associated with diabetes mellitus due to underlying condition     Type II diabetes mellitus with neurological manifestations     PVD (peripheral vascular disease)          Plan:       Ryan was seen today for wound check.    Diagnoses and all orders for this visit:    Hx of diabetic foot ulcer    Equinus deformity of both feet    Hammer toes of both feet    Pes planus of both feet    Intractable right heel pain - Right Foot    Diabetic polyneuropathy associated with diabetes mellitus due to underlying condition    Type II diabetes mellitus with  neurological manifestations    PVD (peripheral vascular disease)      I counseled the patient on his conditions, their implications and medical management.    - Educated patient on proper diabetic foot care and supportive, accommodative shoe gear.    - With patient's permission, cleansed right heel with alcohol swab and applied aquacel foam bandage.  Instructed patient to continue applying Aquacel foam or Acosta foam to right heel for protection until he is able to get his diabetic shoes.    - Advised patient to transition to diabetic shoes once he receives them.    Patient was given the following recommendations and instructions:  Patient Instructions   - Apply aquacel foam bandage to right heel.  Change every 3 days.    - Check feet daily for wounds and areas of irritation.    - Keep regularly scheduled appointments with primary care, ophthalmology, and podiatry.    - Maintain blood glucose control via taking medications as prescribed, diabetic diet, and exercise.    - Apply lotion to feet and ankles daily but not between toes.    - Keep feet clean and dry, especially between toes.    - Elevate feet as much as possible throughout the day.    - Wear supportive/accommodative shoes at all times when ambulating.    - Supplement with alpha lipoic acid and vitamin B complex to reduce and repair nerve damage.    - Take medications as prescribed.    - Notify clinic if any new or worsening condition arises.        Li Kathleen DPM        Dictation was performed using M*Modal Fluency.  Transcription errors may be present.

## 2019-12-18 ENCOUNTER — TELEPHONE (OUTPATIENT)
Dept: PAIN MEDICINE | Facility: CLINIC | Age: 54
End: 2019-12-18

## 2019-12-18 LAB
COMMENT: NORMAL
FINAL PATHOLOGIC DIAGNOSIS: NORMAL
GROSS: NORMAL

## 2019-12-18 NOTE — TELEPHONE ENCOUNTER
Spoke with pt about the pain in his foot. Pt states he was in the hospital over the weekend and went for a hospital f/u this morning. The Dr he saw (Dr. LEATHA Aranda) wrote him a prescription for lyrica, tramadol and one more but he didn't know the name. Pt states his foot is ok for now and he does not need a prescription for pain at this time.   Pt can not take gabapentin because it make him sick to his stomach.   Pt was told to contact the office if the needed anything else. Pt verbalized understanding.

## 2019-12-18 NOTE — TELEPHONE ENCOUNTER
Spoke with patient and explained that he does not need to be seen in clinic with Dr Dumont as his recommendations from previous appointment have not changed. Patient states that he does not want narcotics for his pain. Advised patient that a message will be sent to Dr Kathleen and he should follow up with her for further eval.

## 2019-12-20 DIAGNOSIS — E11.9 TYPE 2 DIABETES MELLITUS WITHOUT COMPLICATION: ICD-10-CM

## 2019-12-20 DIAGNOSIS — E11.9 TYPE 2 DIABETES MELLITUS WITHOUT COMPLICATION, UNSPECIFIED WHETHER LONG TERM INSULIN USE: ICD-10-CM

## 2019-12-31 ENCOUNTER — PATIENT OUTREACH (OUTPATIENT)
Dept: ADMINISTRATIVE | Facility: HOSPITAL | Age: 54
End: 2019-12-31

## 2019-12-31 NOTE — LETTER
December 31, 2019    Ryan Crane  [address] [facility] [address]             [facility]  [address]  [facility] [address]  Phone: [phone] Dear Mr. Crane:    [facility] is committed to your overall health.  To help you get the most out of each of your visits, we will review your information to make sure you are up to date on all of your recommended tests and/or procedures.      Dr. Laura Becerril MD has found that your chart shows you may be due for     Health Maintenance Due   Topic    Foot Exam     Eye Exam     Urine Microalbumin     High Dose Statin        If you have had any of the above done at another facility, please bring the records or information with you so that your record at [facility] will be complete.  If you would like to schedule any of these, please contact me.    If you are currently taking medication, please bring it with you to your appointment for review.        MD Aretha Chaudhry LPN Clinical Care Coordinator  [facility]  [address] [facility] [address]  [place] [address]  P: [phone]  F: [phone]

## 2020-01-09 ENCOUNTER — OFFICE VISIT (OUTPATIENT)
Dept: PODIATRY | Facility: CLINIC | Age: 55
End: 2020-01-09
Payer: MEDICARE

## 2020-01-09 VITALS — WEIGHT: 254 LBS | BODY MASS INDEX: 35.56 KG/M2 | HEIGHT: 71 IN

## 2020-01-09 DIAGNOSIS — M79.671 PAIN OF RIGHT HEEL: ICD-10-CM

## 2020-01-09 DIAGNOSIS — I73.9 PVD (PERIPHERAL VASCULAR DISEASE): ICD-10-CM

## 2020-01-09 DIAGNOSIS — M21.42 PES PLANUS OF BOTH FEET: ICD-10-CM

## 2020-01-09 DIAGNOSIS — M21.6X1 EQUINUS DEFORMITY OF BOTH FEET: ICD-10-CM

## 2020-01-09 DIAGNOSIS — M20.41 HAMMER TOES OF BOTH FEET: ICD-10-CM

## 2020-01-09 DIAGNOSIS — M20.42 HAMMER TOES OF BOTH FEET: ICD-10-CM

## 2020-01-09 DIAGNOSIS — M21.41 PES PLANUS OF BOTH FEET: ICD-10-CM

## 2020-01-09 DIAGNOSIS — M21.6X2 EQUINUS DEFORMITY OF BOTH FEET: ICD-10-CM

## 2020-01-09 DIAGNOSIS — E11.49 TYPE II DIABETES MELLITUS WITH NEUROLOGICAL MANIFESTATIONS: ICD-10-CM

## 2020-01-09 DIAGNOSIS — Z86.31 HX OF DIABETIC FOOT ULCER: ICD-10-CM

## 2020-01-09 DIAGNOSIS — R46.0 POOR HYGIENE: ICD-10-CM

## 2020-01-09 DIAGNOSIS — E08.42 DIABETIC POLYNEUROPATHY ASSOCIATED WITH DIABETES MELLITUS DUE TO UNDERLYING CONDITION: ICD-10-CM

## 2020-01-09 DIAGNOSIS — B35.1 ONYCHOMYCOSIS DUE TO DERMATOPHYTE: Primary | ICD-10-CM

## 2020-01-09 PROCEDURE — 3008F PR BODY MASS INDEX (BMI) DOCUMENTED: ICD-10-PCS | Mod: HCNC,CPTII,S$GLB, | Performed by: PODIATRIST

## 2020-01-09 PROCEDURE — 99213 OFFICE O/P EST LOW 20 MIN: CPT | Mod: 25,HCNC,S$GLB, | Performed by: PODIATRIST

## 2020-01-09 PROCEDURE — 11721 PR DEBRIDEMENT OF NAILS, 6 OR MORE: ICD-10-PCS | Mod: Q9,HCNC,S$GLB, | Performed by: PODIATRIST

## 2020-01-09 PROCEDURE — 99999 PR PBB SHADOW E&M-EST. PATIENT-LVL III: CPT | Mod: PBBFAC,HCNC,, | Performed by: PODIATRIST

## 2020-01-09 PROCEDURE — 3008F BODY MASS INDEX DOCD: CPT | Mod: HCNC,CPTII,S$GLB, | Performed by: PODIATRIST

## 2020-01-09 PROCEDURE — 11721 DEBRIDE NAIL 6 OR MORE: CPT | Mod: Q9,HCNC,S$GLB, | Performed by: PODIATRIST

## 2020-01-09 PROCEDURE — 99499 RISK ADDL DX/OHS AUDIT: ICD-10-PCS | Mod: HCNC,S$GLB,, | Performed by: PODIATRIST

## 2020-01-09 PROCEDURE — 3044F HG A1C LEVEL LT 7.0%: CPT | Mod: HCNC,CPTII,S$GLB, | Performed by: PODIATRIST

## 2020-01-09 PROCEDURE — 99499 UNLISTED E&M SERVICE: CPT | Mod: HCNC,S$GLB,, | Performed by: PODIATRIST

## 2020-01-09 PROCEDURE — 3044F PR MOST RECENT HEMOGLOBIN A1C LEVEL <7.0%: ICD-10-PCS | Mod: HCNC,CPTII,S$GLB, | Performed by: PODIATRIST

## 2020-01-09 PROCEDURE — 99213 PR OFFICE/OUTPT VISIT, EST, LEVL III, 20-29 MIN: ICD-10-PCS | Mod: 25,HCNC,S$GLB, | Performed by: PODIATRIST

## 2020-01-09 PROCEDURE — 99999 PR PBB SHADOW E&M-EST. PATIENT-LVL III: ICD-10-PCS | Mod: PBBFAC,HCNC,, | Performed by: PODIATRIST

## 2020-01-14 ENCOUNTER — OFFICE VISIT (OUTPATIENT)
Dept: FAMILY MEDICINE | Facility: CLINIC | Age: 55
End: 2020-01-14
Payer: MEDICARE

## 2020-01-14 VITALS
HEART RATE: 71 BPM | HEIGHT: 71 IN | DIASTOLIC BLOOD PRESSURE: 86 MMHG | RESPIRATION RATE: 19 BRPM | BODY MASS INDEX: 36.37 KG/M2 | SYSTOLIC BLOOD PRESSURE: 138 MMHG | OXYGEN SATURATION: 99 % | WEIGHT: 259.81 LBS | TEMPERATURE: 98 F

## 2020-01-14 DIAGNOSIS — K92.2 ACUTE LOWER GI BLEEDING: ICD-10-CM

## 2020-01-14 DIAGNOSIS — E11.49 TYPE II DIABETES MELLITUS WITH NEUROLOGICAL MANIFESTATIONS: ICD-10-CM

## 2020-01-14 DIAGNOSIS — F20.9 SCHIZOPHRENIA, UNSPECIFIED TYPE: ICD-10-CM

## 2020-01-14 DIAGNOSIS — J44.9 CHRONIC OBSTRUCTIVE PULMONARY DISEASE, UNSPECIFIED COPD TYPE: ICD-10-CM

## 2020-01-14 DIAGNOSIS — I25.10 CAD IN NATIVE ARTERY: ICD-10-CM

## 2020-01-14 DIAGNOSIS — D64.9 ANEMIA, UNSPECIFIED TYPE: Primary | ICD-10-CM

## 2020-01-14 PROCEDURE — 99999 PR PBB SHADOW E&M-EST. PATIENT-LVL III: ICD-10-PCS | Mod: PBBFAC,HCNC,, | Performed by: FAMILY MEDICINE

## 2020-01-14 PROCEDURE — 3008F BODY MASS INDEX DOCD: CPT | Mod: HCNC,CPTII,S$GLB, | Performed by: FAMILY MEDICINE

## 2020-01-14 PROCEDURE — 99214 PR OFFICE/OUTPT VISIT, EST, LEVL IV, 30-39 MIN: ICD-10-PCS | Mod: HCNC,S$GLB,, | Performed by: FAMILY MEDICINE

## 2020-01-14 PROCEDURE — 99999 PR PBB SHADOW E&M-EST. PATIENT-LVL III: CPT | Mod: PBBFAC,HCNC,, | Performed by: FAMILY MEDICINE

## 2020-01-14 PROCEDURE — 99214 OFFICE O/P EST MOD 30 MIN: CPT | Mod: HCNC,S$GLB,, | Performed by: FAMILY MEDICINE

## 2020-01-14 PROCEDURE — 3044F PR MOST RECENT HEMOGLOBIN A1C LEVEL <7.0%: ICD-10-PCS | Mod: HCNC,CPTII,S$GLB, | Performed by: FAMILY MEDICINE

## 2020-01-14 PROCEDURE — 3079F DIAST BP 80-89 MM HG: CPT | Mod: HCNC,CPTII,S$GLB, | Performed by: FAMILY MEDICINE

## 2020-01-14 PROCEDURE — 3075F PR MOST RECENT SYSTOLIC BLOOD PRESS GE 130-139MM HG: ICD-10-PCS | Mod: HCNC,CPTII,S$GLB, | Performed by: FAMILY MEDICINE

## 2020-01-14 PROCEDURE — 3044F HG A1C LEVEL LT 7.0%: CPT | Mod: HCNC,CPTII,S$GLB, | Performed by: FAMILY MEDICINE

## 2020-01-14 PROCEDURE — 3075F SYST BP GE 130 - 139MM HG: CPT | Mod: HCNC,CPTII,S$GLB, | Performed by: FAMILY MEDICINE

## 2020-01-14 PROCEDURE — 99499 RISK ADDL DX/OHS AUDIT: ICD-10-PCS | Mod: HCNC,S$GLB,, | Performed by: FAMILY MEDICINE

## 2020-01-14 PROCEDURE — 99499 UNLISTED E&M SERVICE: CPT | Mod: HCNC,S$GLB,, | Performed by: FAMILY MEDICINE

## 2020-01-14 PROCEDURE — 3008F PR BODY MASS INDEX (BMI) DOCUMENTED: ICD-10-PCS | Mod: HCNC,CPTII,S$GLB, | Performed by: FAMILY MEDICINE

## 2020-01-14 PROCEDURE — 3079F PR MOST RECENT DIASTOLIC BLOOD PRESSURE 80-89 MM HG: ICD-10-PCS | Mod: HCNC,CPTII,S$GLB, | Performed by: FAMILY MEDICINE

## 2020-01-14 RX ORDER — NITROGLYCERIN 0.4 MG/1
0.4 TABLET SUBLINGUAL EVERY 5 MIN PRN
Qty: 9 TABLET | Refills: 3 | Status: SHIPPED | OUTPATIENT
Start: 2020-01-14 | End: 2020-12-11 | Stop reason: SDUPTHER

## 2020-01-14 RX ORDER — ALBUTEROL SULFATE 90 UG/1
2 AEROSOL, METERED RESPIRATORY (INHALATION) EVERY 6 HOURS PRN
Qty: 18 G | Refills: 11 | Status: SHIPPED | OUTPATIENT
Start: 2020-01-14 | End: 2021-06-03 | Stop reason: SDUPTHER

## 2020-01-14 NOTE — PROGRESS NOTES
Subjective:       Patient ID: Ryan Crane is a 54 y.o. male.    Chief Complaint: Hospital Follow Up      Ryan Crane is in the office for hospital f/u.    HPI  Had hosp admission for GI bleed following egd/cscope. Noted for multiple ulcers that were clipped. Hemostasis achieved. Transfusion not needed. Now on normal diet, no issues with bowels. No abdominal pain, cramping, or gerd.  Past Medical History:   Diagnosis Date    Acute angina     Asthma     COPD (chronic obstructive pulmonary disease)     Hypertension     MI, old     PIPER on CPAP     Schizophrenia     Stroke     Type II diabetes mellitus with neurological manifestations 11/21/2019     He reports that his copd is ok on current regimen. Has had issues navigating the inhalers, and we discussed bringing them to clinic or pharmacy for teaching.   Chart reviewed. Patient has not yet seen urology for review of scrotal swelling. Reports asymp today.      Current Outpatient Medications:     aspirin (ECOTRIN) 325 MG EC tablet, Take 1 tablet (325 mg total) by mouth once daily., Disp: , Rfl: 0    busPIRone (BUSPAR) 15 MG tablet, Take 15 mg by mouth 3 (three) times daily. , Disp: , Rfl:     cyanocobalamin (VITAMIN B-12) 1000 MCG tablet, Take 1,000 mcg by mouth once daily. , Disp: , Rfl:     DULoxetine 60 mg CDRS, Take 60 mg by mouth once daily. , Disp: , Rfl:     EPINEPHrine 0.3 mg/0.3 mL Syrg, Inject 0.3 mg into the muscle as needed for Anaphylaxis., Disp: , Rfl:     fluticasone-umeclidin-vilanter (TRELEGY ELLIPTA) 100-62.5-25 mcg DsDv, Inhale 1 puff into the lungs once daily., Disp: 60 each, Rfl: 11    ipratropium-albuterol (COMBIVENT)  mcg/actuation inhaler, Inhale 1 puff into the lungs every 6 (six) hours as needed for Wheezing or Shortness of Breath. Rescue, Disp: 4 g, Rfl: 11    metFORMIN (GLUCOPHAGE-XR) 500 MG 24 hr tablet, TAKE 1 TABLET(500 MG) BY MOUTH DAILY WITH BREAKFAST (Patient taking differently: Take 500 mg by mouth  every morning. ), Disp: 90 tablet, Rfl: 0    metoprolol succinate (TOPROL-XL) 50 MG 24 hr tablet, Take 1 tablet (50 mg total) by mouth once daily., Disp: 90 tablet, Rfl: 3    miconazole NITRATE 2 % (MICOTIN) 2 % top powder, Apply 1 g topically once daily., Disp: , Rfl:     nicotine (NICODERM CQ) 14 mg/24 hr, Place 1 patch onto the skin once daily., Disp: 30 patch, Rfl: 0    nicotine (NICODERM CQ) 21 mg/24 hr, Place 1 patch onto the skin once daily., Disp: 28 patch, Rfl: 3    nitroGLYCERIN (NITROSTAT) 0.4 MG SL tablet, Place 1 tablet (0.4 mg total) under the tongue every 5 (five) minutes as needed for Chest pain., Disp: 9 tablet, Rfl: 3    OXcarbazepine (TRILEPTAL) 600 MG Tab, Take 600 mg by mouth 3 (three) times daily., Disp: , Rfl:     pregabalin (LYRICA) 50 MG capsule, Take 1 capsule (50 mg total) by mouth 3 (three) times daily., Disp: 90 capsule, Rfl: 6    traMADol (ULTRAM) 50 mg tablet, Take 1 tablet (50 mg total) by mouth every 6 (six) hours., Disp: 40 tablet, Rfl: 0    vit B complex 100 combo no.2 (B-100 COMPLEX) 100 mg TbSR, Take 1 tablet by mouth once daily., Disp: , Rfl:     ziprasidone (GEODON) 80 MG capsule, Take 160 mg by mouth every evening. , Disp: , Rfl:     The 10-year ASCVD risk score (Lilli SIVAKUMAR Jr., et al., 2013) is: 18.8%    Values used to calculate the score:      Age: 54 years      Sex: Male      Is Non- : No      Diabetic: Yes      Tobacco smoker: Yes      Systolic Blood Pressure: 138 mmHg      Is BP treated: Yes      HDL Cholesterol: 36 mg/dL      Total Cholesterol: 142 mg/dL     Lab Results   Component Value Date    HGBA1C 5.7 (H) 12/13/2019    HGBA1C 6.0 (H) 08/29/2019    HGBA1C 5.6 01/16/2019     Lab Results   Component Value Date    LDLCALC 89.4 08/29/2019    CREATININE 0.56 12/14/2019   labs 2019 rev.    Review of Systems   Constitutional: Negative for fatigue and fever.   HENT: Negative for congestion, postnasal drip and rhinorrhea.    Respiratory:  Positive for cough. Negative for shortness of breath.    Cardiovascular: Negative for chest pain (occ) and leg swelling.        Uses nitro prn.    Gastrointestinal: Negative for blood in stool, constipation and diarrhea.        No gerd c/o.   Genitourinary: Negative for difficulty urinating, scrotal swelling and testicular pain.   Musculoskeletal: Negative for arthralgias and myalgias.   Neurological: Negative for dizziness and headaches.   Psychiatric/Behavioral: Positive for dysphoric mood. Negative for sleep disturbance.           Objective:      Physical Exam   Constitutional: He is oriented to person, place, and time. He appears well-developed and well-nourished. No distress.   HENT:   Head: Normocephalic and atraumatic.   Eyes: Conjunctivae are normal. Right eye exhibits no discharge. Left eye exhibits no discharge. No scleral icterus.   Cardiovascular: Normal rate and regular rhythm.   Pulmonary/Chest: Effort normal and breath sounds normal. No respiratory distress.   Neurological: He is alert and oriented to person, place, and time. No cranial nerve deficit.   Skin: Skin is warm and dry.   Psychiatric: He has a normal mood and affect. His behavior is normal.   Nursing note and vitals reviewed.          Screening recommendations appropriate to age and health status were reviewed.    Assessment & Plan:    Anemia, unspecified type  -     CBC Without Differential; Future; Expected date: 01/14/2020  -     Comprehensive metabolic panel; Future; Expected date: 01/14/2020  Improved during hosp stay. Will recheck for monitoring, 3mos. Pt notes no current blood loss vis.  Type II diabetes mellitus with neurological manifestations  Stable.  Schizophrenia, unspecified type  Per psych, pt states doing well.  Acute lower GI bleeding  Appears resolved. Keep f/u with GI for monitoring.   Chronic obstructive pulmonary disease, unspecified COPD type  -     albuterol (VENTOLIN HFA) 90 mcg/actuation inhaler; Inhale 2 puffs into  "the lungs every 6 (six) hours as needed for Wheezing. Rescue  Dispense: 18 g; Refill: 11  Cont trelegy, pt requests albuterol instead of combivent. Schedule f/u with pulm to review as he reports he is overdue.  CAD in native artery - moderate 3V 60% by Pt history; last angiogram in the "teens"  -     nitroGLYCERIN (NITROSTAT) 0.4 MG SL tablet; Place 1 tablet (0.4 mg total) under the tongue every 5 (five) minutes as needed for Chest pain.  Dispense: 9 tablet; Refill: 3  Upcoming review with cards/lecce. Of note, patient has used a few tablets of ntg since LOV. Discussed importance of sx monitoring, early tx, notification to cards if continues before f/u. Pt expressed understanding.    "

## 2020-01-20 NOTE — PATIENT INSTRUCTIONS
- Schedule appointment for diabetic shoes and inserts.    - Check feet daily for wounds and areas of irritation.    - Keep regularly scheduled appointments with primary care, ophthalmology, and podiatry.    - Maintain blood glucose control via taking medications as prescribed, diabetic diet, and exercise.    - Apply lotion to feet and ankles daily but not between toes.    - Keep feet clean and dry, especially between toes.    - Elevate feet as much as possible throughout the day.    - Wear supportive/accommodative shoes at all times when ambulating.    - Supplement with alpha lipoic acid and vitamin B complex to reduce and repair nerve damage.    - Take medications as prescribed.    - Notify clinic if any new or worsening condition arises.

## 2020-01-20 NOTE — PROGRESS NOTES
Subjective:      Patient ID: Ryan Crane is a 54 y.o. male.    Chief Complaint: Nail Care (Dr Becerril 2019 5.7 12/2019 ) and Foot Pain (R foot )    HPI:  Ryan is a 54 y.o. male who presents to the clinic for evaluation and treatment of high risk feet. Ryan has a past medical history of Acute angina, Asthma, COPD (chronic obstructive pulmonary disease), Hypertension, MI, old, PIPER on CPAP, Schizophrenia, Stroke, and Type II diabetes mellitus with neurological manifestations (11/21/2019). The patient's chief complaint is long, thick toenails and right heel pain. This patient has documented high risk feet requiring routine maintenance secondary to diabetes mellitis and those secondary complications of diabetes, as mentioned..  He reports not getting diabetic shoes yet as he doesn't want to pay for them and have his insurance reimburse him later; however, he states he has bought 2 pairs of $150 sneakers, which is more than what he claims the medical supply company is requiring him to pay up front for his diabetic shoes and inserts.  He relates having right heel pain, which he rates as 3/10 on the pain scale but denies checking to see if there is still a wound.  He informs of getting engaged over the holidays.  Patient denies any other pedal complaints at this time.    PCP: Laura Becerril MD    Date Last Seen by PCP: 11/15/19    Current shoe gear:  Affected Foot: Casual shoes     Unaffected Foot: Casual shoes    Review of Systems   Constitutional: Negative for appetite change, fever, chills, fatigue and unexpected weight change.   Cardiovascular: Negative for chest pain, claudication, cyanosis.  Positive for leg swelling.  Musculoskeletal: Negative for back pain, arthritis, joint pain, joint swelling, myalgias, and stiffness.   Skin: Negative for rash, itching, suspicious lesion, poor wound healing, unusual hair distribution.  Positive for nail bed changes, discoloration,.  Neurological: Negative for loss of  balance, sensory change, paresthesias, and numbness.  Positive for pain.  Hematological: Negative for adenopathy, bleeding, and bruising easily.   Psychiatric/Behavioral: The patient is not nervous/anxious.  Negative for altered mental status.    Hemoglobin A1C   Date Value Ref Range Status   12/13/2019 5.7 (H) 0.0 - 5.6 % Final     Comment:     Reference Interval:  5.0 - 5.6 Normal   5.7 - 6.4 High Risk   > 6.5 Diabetic    Hgb A1c results are standardized based on the (NGSP) National   Glycohemoglobin Standardization Program.    Hemoglobin A1C levels are related to mean serum/plasma glucose   during the preceding 2-3 months.        08/29/2019 6.0 (H) 4.0 - 5.6 % Final     Comment:     ADA Screening Guidelines:  5.7-6.4%  Consistent with prediabetes  >or=6.5%  Consistent with diabetes  High levels of fetal hemoglobin interfere with the HbA1C  assay. Heterozygous hemoglobin variants (HbS, HgC, etc)do  not significantly interfere with this assay.   However, presence of multiple variants may affect accuracy.     01/16/2019 5.6 4.0 - 5.6 % Final     Comment:     ADA Screening Guidelines:  5.7-6.4%  Consistent with prediabetes  >or=6.5%  Consistent with diabetes  High levels of fetal hemoglobin interfere with the HbA1C  assay. Heterozygous hemoglobin variants (HbS, HgC, etc)do  not significantly interfere with this assay.   However, presence of multiple variants may affect accuracy.         Past Medical History:   Diagnosis Date    Acute angina     Asthma     COPD (chronic obstructive pulmonary disease)     Hypertension     MI, old     PIPER on CPAP     Schizophrenia     Stroke     Type II diabetes mellitus with neurological manifestations 11/21/2019     Past Surgical History:   Procedure Laterality Date    APPENDECTOMY      CLOSURE OF WOUND Right 9/9/2019    Procedure: CLOSURE, WOUND;  Surgeon: Li Kathleen DPM;  Location[facility];  Service: Podiatry;  Laterality: Right;    COLONOSCOPY N/A 12/10/2019     Procedure: COLONOSCOPY;  Surgeon: Ryan Lucas MD;  Location: [facility];  Service: Endoscopy;  Laterality: N/A;    COLONOSCOPY N/A 12/13/2019    Procedure: COLONOSCOPY;  Surgeon: Ryan Lucas MD;  Location: [facility];  Service: Endoscopy;  Laterality: N/A;    ESOPHAGOGASTRODUODENOSCOPY N/A 12/10/2019    Procedure: EGD (ESOPHAGOGASTRODUODENOSCOPY);  Surgeon: Ryan Lucas MD;  Location: [facility];  Service: Endoscopy;  Laterality: N/A;    SHOULDER ARTHROSCOPY Left      Family History   Problem Relation Age of Onset    Melanoma Mother     Diabetes Mother     Hypertension Mother     Stroke Father     Diabetes Father     Hypertension Father     Colon cancer Father         unsure of age of diagnosis    Cancer Father         colon cancer    Cervical cancer Sister     Cirrhosis Brother     Hypertension Brother     Lung cancer Maternal Grandmother     Prostate cancer Maternal Grandfather     Crohn's disease Neg Hx     Ulcerative colitis Neg Hx     Stomach cancer Neg Hx     Esophageal cancer Neg Hx      Social History     Socioeconomic History    Marital status:      Spouse name: Not on file    Number of children: Not on file    Years of education: Not on file    Highest education level: Not on file   Occupational History    Not on file   Social Needs    Financial resource strain: Not on file    Food insecurity:     Worry: Not on file     Inability: Not on file    Transportation needs:     Medical: Not on file     Non-medical: Not on file   Tobacco Use    Smoking status: Current Every Day Smoker     Packs/day: 1.00     Years: 45.00     Pack years: 45.00    Smokeless tobacco: Never Used   Substance and Sexual Activity    Alcohol use: No     Frequency: Never    Drug use: Not Currently     Types: Marijuana    Sexual activity: Yes     Partners: Female   Lifestyle    Physical activity:     Days per week: Not on file     Minutes per session: Not on file    Stress: Not  "on file   Relationships    Social connections:     Talks on phone: Not on file     Gets together: Not on file     Attends Alevism service: Not on file     Active member of club or organization: Not on file     Attends meetings of clubs or organizations: Not on file     Relationship status: Not on file   Other Topics Concern    Not on file   Social History Narrative    Not on file           Objective:        Ht 5' 11" (1.803 m)   Wt 115.2 kg (254 lb)   BMI 35.43 kg/m²     Physical Exam   Constitutional: Patient is oriented to person, place, and time. Patient appears well-developed and well-nourished.  Strong malodor noted from patient due to lack of personal hygiene.  No acute distress.     Psychiatric: Patient has a normal mood and affect. Patient's speech is normal and behavior is normal. Judgment is normal. Cognition and memory are normal.     Bilateral pedal exam was performed today.  Vascular: Vascular: Pedal pulses palpable 2/4 DP & PT.  CFT is = 3 seconds to the hallux.  Skin temperature is warm to cool proximal tibia to distal toes without localized increase in calor noted.  No erythema and ecchymosis noted to the LE.  Nonpitting edema noted to the LE.  Hair growth present distally to the LE.     Musculoskeletal: Ankle and pedal joints ROM are decreased. Ankle joint dorsiflexion is restricted with the knee extended and flexed per Silfverskiold exam.  Muscle strength is 5/5 for all LE muscle groups tested.  Flat foot type present.  Flexion contracture of lesser digits noted.    Neurological:  Epicritic sensation is slightly dimiished to the foot.  Chaddock STR is intact to the LE.  Tenderness to palpation of right plantar heel noted.     Dermatological: Toenails 1-5 are elongated and distally thickened, dystrophic, brittle, discolored, and mycotic with subungal debris present.  Webspaces 1-4 are intact with dot and fungal debris present.  Skin turgor is increased.  Skin texture is dry and flaky.   " Previous plantar right heel is healed.      Nursing note and vitals reviewed.        Assessment:       Encounter Diagnoses   Name Primary?    Onychomycosis due to dermatophyte Yes    Pain of right heel     Hammer toes of both feet     Equinus deformity of both feet     Pes planus of both feet     Hx of diabetic foot ulcer     Diabetic polyneuropathy associated with diabetes mellitus due to underlying condition     Type II diabetes mellitus with neurological manifestations     PVD (peripheral vascular disease)     Poor hygiene          Plan:       Ryan was seen today for nail care and foot pain.    Diagnoses and all orders for this visit:    Onychomycosis due to dermatophyte    Pain of right heel    Hammer toes of both feet    Equinus deformity of both feet    Pes planus of both feet    Hx of diabetic foot ulcer    Diabetic polyneuropathy associated with diabetes mellitus due to underlying condition    Type II diabetes mellitus with neurological manifestations    PVD (peripheral vascular disease)    Poor hygiene      I counseled the patient on his conditions, their implications and medical management.    - Shoe inspection. Diabetic Foot Education. Patient reminded of the importance of good nutrition and blood sugar control to help prevent podiatric complications of diabetes. Patient instructed on proper foot hygeine. We discussed wearing proper shoe gear, daily foot inspections, never walking without protective shoe gear, never putting sharp instruments to feet, routine podiatric nail visits every 2-3 months.     - With the patient's permission, toenails 1, 2, 3, 4, and 5 of the right foot and 1, 2, 3, 4, and 5 of the left foot were debrided in length and thickness via nail nippers and electric  to patient's tolerance without incident.  Cleansed B/L foot with cleansing cloths to remove sloughing skin, dot debris, and fungal debris.    - Advised patient to schedule appointment for diabetic shoes  and inserts.    Patient was given the following recommendations and instructions:  Patient Instructions   - Schedule appointment for diabetic shoes and inserts.    - Check feet daily for wounds and areas of irritation.    - Keep regularly scheduled appointments with primary care, ophthalmology, and podiatry.    - Maintain blood glucose control via taking medications as prescribed, diabetic diet, and exercise.    - Apply lotion to feet and ankles daily but not between toes.    - Keep feet clean and dry, especially between toes.    - Elevate feet as much as possible throughout the day.    - Wear supportive/accommodative shoes at all times when ambulating.    - Supplement with alpha lipoic acid and vitamin B complex to reduce and repair nerve damage.    - Take medications as prescribed.    - Notify clinic if any new or worsening condition arises.        Li Kathleen DPM        Dictation was performed using M*Modal Fluency.  Transcription errors may be present.

## 2020-01-20 NOTE — PROGRESS NOTES
Patient, Ryan Crane (MRN #[id number]), presented with a recorded BMI of 35.43 kg/m^2 and a documented comorbidity(s):  - Diabetes Mellitus Type 2  to which the severe obesity is a contributing factor. This is consistent with the definition of severe obesity (BMI 35.0-39.9) with comorbidity (ICD-10 E66.01, Z68.35). The patient's severe obesity was monitored, evaluated, addressed and/or treated. This addendum to the medical record is made on 01/20/2020.

## 2020-01-28 ENCOUNTER — PATIENT OUTREACH (OUTPATIENT)
Dept: ADMINISTRATIVE | Facility: OTHER | Age: 55
End: 2020-01-28

## 2020-01-30 ENCOUNTER — OFFICE VISIT (OUTPATIENT)
Dept: UROLOGY | Facility: CLINIC | Age: 55
End: 2020-01-30
Payer: MEDICARE

## 2020-01-30 VITALS
HEIGHT: 71 IN | WEIGHT: 265 LBS | BODY MASS INDEX: 37.1 KG/M2 | DIASTOLIC BLOOD PRESSURE: 82 MMHG | HEART RATE: 84 BPM | SYSTOLIC BLOOD PRESSURE: 159 MMHG

## 2020-01-30 DIAGNOSIS — E87.70: ICD-10-CM

## 2020-01-30 DIAGNOSIS — N52.01 ERECTILE DYSFUNCTION DUE TO ARTERIAL INSUFFICIENCY: Primary | ICD-10-CM

## 2020-01-30 PROCEDURE — 3079F PR MOST RECENT DIASTOLIC BLOOD PRESSURE 80-89 MM HG: ICD-10-PCS | Mod: HCNC,CPTII,S$GLB, | Performed by: UROLOGY

## 2020-01-30 PROCEDURE — 3077F SYST BP >= 140 MM HG: CPT | Mod: HCNC,CPTII,S$GLB, | Performed by: UROLOGY

## 2020-01-30 PROCEDURE — 99999 PR PBB SHADOW E&M-EST. PATIENT-LVL III: ICD-10-PCS | Mod: PBBFAC,HCNC,, | Performed by: UROLOGY

## 2020-01-30 PROCEDURE — 99204 PR OFFICE/OUTPT VISIT, NEW, LEVL IV, 45-59 MIN: ICD-10-PCS | Mod: HCNC,S$GLB,, | Performed by: UROLOGY

## 2020-01-30 PROCEDURE — 99999 PR PBB SHADOW E&M-EST. PATIENT-LVL III: CPT | Mod: PBBFAC,HCNC,, | Performed by: UROLOGY

## 2020-01-30 PROCEDURE — 99204 OFFICE O/P NEW MOD 45 MIN: CPT | Mod: HCNC,S$GLB,, | Performed by: UROLOGY

## 2020-01-30 PROCEDURE — 3079F DIAST BP 80-89 MM HG: CPT | Mod: HCNC,CPTII,S$GLB, | Performed by: UROLOGY

## 2020-01-30 PROCEDURE — 3077F PR MOST RECENT SYSTOLIC BLOOD PRESSURE >= 140 MM HG: ICD-10-PCS | Mod: HCNC,CPTII,S$GLB, | Performed by: UROLOGY

## 2020-01-30 PROCEDURE — 3008F BODY MASS INDEX DOCD: CPT | Mod: HCNC,CPTII,S$GLB, | Performed by: UROLOGY

## 2020-01-30 PROCEDURE — 3008F PR BODY MASS INDEX (BMI) DOCUMENTED: ICD-10-PCS | Mod: HCNC,CPTII,S$GLB, | Performed by: UROLOGY

## 2020-01-30 NOTE — PROGRESS NOTES
[facility] [place]  1 30 20    Cc erectile dysfunction    Age 54, says his sexual function is poor, and needs treatment.     Also has nocturia x 3-6, and voids often during the day. Pt comes in holding a portable container with a straw coming out of it. He says he fills it in the morning at 6:45 with diet coke and drinks it all day, and at 5 pm it is empty and he fills it again, but this second time with tap water. This second filling lasts him for the evening and through the night. He says he tries to remain hydrated. Says he has been scolded before by physicians for drinking so much fluid daily (6 liters).    In nov 2019 had a pain in the testicles and went to the ER, there they told him he had an infection and kept him 3 days on iv antibiotics. Now he feels fine with that.     No urgency or incontinence.     PMH    Surgical:  has a past surgical history that includes Appendectomy; Shoulder arthroscopy (Left); Closure of wound (Right, 9/9/2019); Esophagogastroduodenoscopy (N/A, 12/10/2019); Colonoscopy (N/A, 12/10/2019); and Colonoscopy (N/A, 12/13/2019). penile perforation (piercing)    Medical:  has a past medical history of Acute angina, Asthma, COPD (chronic obstructive pulmonary disease), Hypertension, MI, old, PIPER on CPAP, Schizophrenia, Stroke, and Type II diabetes mellitus with neurological manifestations. smoker.    Familial: no fh of renal disease    Social: pt , planning to get  soon. Is disabled from psychiatric disorder. Is from [place], but moved to [place] and lived there 13 years, but in 2018 he returned here. boasts of having 70 tattoos and multiple piercings in his body. Smoker.    Meds:   Current Outpatient Medications on File Prior to Visit   Medication Sig Dispense Refill    albuterol (VENTOLIN HFA) 90 mcg/actuation inhaler Inhale 2 puffs into the lungs every 18 g 11    aspirin (ECOTRIN) 325 MG EC tablet Take 1 tablet (325 mg total) by mo  0    busPIRone (BUSPAR) 15  MG tablet Take 15 mg by mouth 3 (th      cyanocobalamin (VITAMIN B-12) 1000 MCG tablet Take 1,000 mcg by mouth once daily.       DULoxetine 60 mg CDRS Take 60        EPINEPHrine 0.3 mg/0.3  Inject 0.3 mg into the       fluticasone-umeclidin-vilanter (TRELEGY ELLIPTA) 1 Inhale 1 puff into the lungs once daily. 60 each 11    metFORMIN (GLUCOPHAGE-XR) 500 MG 24 hr tablet TAKE 1 TABLET(500 MG) BY MO 90 tablet 0    metoprolol succinate (TOPROL-XL) 50 MG 24 Take 1 tablet (50 mg total) by mouth once  90 tablet 3    nicotine (NICODERM CQ) 21 mg/24 hr Place 1 patch onto the skin once daily. 28 patch 3    OXcarbazepine (TRILEPTAL) 600 MG Tab Take 600 mg by mouth 3 (th      pregabalin (LYRICA) 50 MG capsule Take 1 capsule (50 mg total) by mout. 90 capsule 6    traMADol (ULTRAM) 50 mg tablet TAKE 1 TABLET(50 MG) BY M 40 tablet 1    vit B complex 100 combo no.2 (B-100 COMPLEX) 100  Take 1 tablet by mouth once daily.      ziprasidone (GEODON) 80  Take 160       ipratropium-albuterol (COMBIVENT)  mcg/actuation inhaler Inhale 1 puff into the lungs every 6 (six) hours  4 g 11    miconazole NITRATE 2 % (MICOTIN) 2 % top powder Apply 1 g topically once daily.      nitroGLYCERIN (NITROSTAT Place 1 tablet  9 tablet 3     REVIEW OF SYSTEMS  GENERAL: Has headaches, no dizzy spells.   HEENT: vision preserved. Sinuses: No complaints.   CARDIOPULMONARY: has swelling of the legs; occasional chest pain. Has shortness of breath from asthma.   GASTROINTESTINAL: has heartburn. Denies diarrhea; denies constipation, no blood or mucus in stools.   GENITOURINARY: Denies dysuria, bleeding or incontinence.   MUSCULOSKELETAL: has arthritic complaints such as pain or stiffness.   PSYCHIATRIC: has history of depression and anxiety.   ENDOCRINOLOGIC: No reports of sweating, cold or heat intolerance. .   NEUROLOGICAL: No dizziness, syncope, paralysis  LYMPHATICS: No enlarged nodes. No history of splenectomy.  ==    Pt alert, oriented,  "no distress  Body with multiple piercings and multiple tattoos.   HEENT: wnl.  Neck: supple, no JVD, no lymphadenopathy  Chest: CV NSR  Lungs: normal chest expansion, no labored breathing  Abdomen flat, nontender, no organomegaly, no masses.  No hernias  Penis circumcised, portrays a tattoo on one side representing a ruler; on the opposite side has a tattoo that reads "your name". There is a large foreign body represented by a circular piercing. The stainless steel object is a ring that penetrates the penis at the meatus and comes out ventrally through a purposeful urethrocutaneous fistula.   Testes nl, epi nl, scrotum nl  VELMA: declines  Extremities: some bilateral edema, peripheral pulses nl  Neuro: preserved    IMP    Erectile dysfunction. Giving him PDE5 inhibitors. Pt removes the stainless steel ring during lovemaking.  Urinary frequency, nocturia. Overhydration.         "

## 2020-01-30 NOTE — LETTER
January 30, 2020      Laura Becerril MD  [address]  [place] [address]           [facility]  [address] [place] [address]  Phone: [phone]  Fax: [phone]          Patient: Ryan Crane   MR Number: [id number]   YOB: 1965   Date of Visit: 1/30/2020       Dear Dr. Laura Becerril:    Thank you for referring Ryan Crane to me for evaluation. Attached you will find relevant portions of my assessment and plan of care.    If you have questions, please do not hesitate to call me. I look forward to following Ryan Crane along with you.    Sincerely,    Kuldip Elias MD    Enclosure  CC:  No Recipients    If you would like to receive this communication electronically, please contact [email or web address] or [phone] to request more information on [facility] [employer] access.    For providers and/or their staff who would like to refer a patient to [facility], please contact us through our one-stop-shop provider referral line, [facility], at [phone].    If you feel you have received this communication in error or would no longer like to receive these types of communications, please e-mail [email or web address]

## 2020-02-03 ENCOUNTER — LAB VISIT (OUTPATIENT)
Dept: LAB | Facility: HOSPITAL | Age: 55
End: 2020-02-03
Payer: MEDICARE

## 2020-02-03 ENCOUNTER — PATIENT OUTREACH (OUTPATIENT)
Dept: ADMINISTRATIVE | Facility: HOSPITAL | Age: 55
End: 2020-02-03

## 2020-02-03 DIAGNOSIS — D50.9 IRON DEFICIENCY ANEMIA, UNSPECIFIED IRON DEFICIENCY ANEMIA TYPE: ICD-10-CM

## 2020-02-03 LAB
BASOPHILS # BLD AUTO: 0.07 K/UL (ref 0–0.2)
BASOPHILS NFR BLD: 0.5 % (ref 0–1.9)
DIFFERENTIAL METHOD: ABNORMAL
EOSINOPHIL # BLD AUTO: 0.2 K/UL (ref 0–0.5)
EOSINOPHIL NFR BLD: 1.5 % (ref 0–8)
ERYTHROCYTE [DISTWIDTH] IN BLOOD BY AUTOMATED COUNT: 16.5 % (ref 11.5–14.5)
HCT VFR BLD AUTO: 28.8 % (ref 40–54)
HGB BLD-MCNC: 9.4 G/DL (ref 14–18)
LYMPHOCYTES # BLD AUTO: 2.6 K/UL (ref 1–4.8)
LYMPHOCYTES NFR BLD: 18.5 % (ref 18–48)
MCH RBC QN AUTO: 24.1 PG (ref 27–31)
MCHC RBC AUTO-ENTMCNC: 32.6 G/DL (ref 32–36)
MCV RBC AUTO: 74 FL (ref 82–98)
MONOCYTES # BLD AUTO: 1.7 K/UL (ref 0.3–1)
MONOCYTES NFR BLD: 11.8 % (ref 4–15)
NEUTROPHILS # BLD AUTO: 9.5 K/UL (ref 1.8–7.7)
NEUTROPHILS NFR BLD: 67.7 % (ref 38–73)
PLATELET # BLD AUTO: 482 K/UL (ref 150–350)
PMV BLD AUTO: 11.2 FL (ref 9.2–12.9)
RBC # BLD AUTO: 3.9 M/UL (ref 4.6–6.2)
WBC # BLD AUTO: 14.08 K/UL (ref 3.9–12.7)

## 2020-02-03 PROCEDURE — 82728 ASSAY OF FERRITIN: CPT | Mod: HCNC

## 2020-02-03 PROCEDURE — 36415 COLL VENOUS BLD VENIPUNCTURE: CPT | Mod: HCNC,PN

## 2020-02-03 PROCEDURE — 83540 ASSAY OF IRON: CPT | Mod: HCNC

## 2020-02-03 PROCEDURE — 85025 COMPLETE CBC W/AUTO DIFF WBC: CPT | Mod: HCNC,PO

## 2020-02-04 LAB
FERRITIN SERPL-MCNC: 9 NG/ML (ref 20–300)
IRON SERPL-MCNC: 25 UG/DL (ref 45–160)
SATURATED IRON: 6 % (ref 20–50)
TOTAL IRON BINDING CAPACITY: 454 UG/DL (ref 250–450)
TRANSFERRIN SERPL-MCNC: 307 MG/DL (ref 200–375)

## 2020-02-05 ENCOUNTER — PATIENT MESSAGE (OUTPATIENT)
Dept: HEMATOLOGY/ONCOLOGY | Facility: CLINIC | Age: 55
End: 2020-02-05

## 2020-02-05 ENCOUNTER — TELEPHONE (OUTPATIENT)
Dept: CARDIOLOGY | Facility: CLINIC | Age: 55
End: 2020-02-05

## 2020-02-05 ENCOUNTER — HOSPITAL ENCOUNTER (OUTPATIENT)
Dept: RADIOLOGY | Facility: HOSPITAL | Age: 55
Discharge: HOME | End: 2020-02-05
Attending: INTERNAL MEDICINE
Payer: MEDICARE

## 2020-02-05 ENCOUNTER — OFFICE VISIT (OUTPATIENT)
Dept: HEMATOLOGY/ONCOLOGY | Facility: CLINIC | Age: 55
End: 2020-02-05
Payer: MEDICARE

## 2020-02-05 VITALS
HEIGHT: 71 IN | SYSTOLIC BLOOD PRESSURE: 140 MMHG | DIASTOLIC BLOOD PRESSURE: 74 MMHG | WEIGHT: 257.19 LBS | RESPIRATION RATE: 20 BRPM | TEMPERATURE: 98 F | BODY MASS INDEX: 36.01 KG/M2 | HEART RATE: 81 BPM

## 2020-02-05 DIAGNOSIS — F20.9 SCHIZOPHRENIA, UNSPECIFIED TYPE: ICD-10-CM

## 2020-02-05 DIAGNOSIS — I10 ESSENTIAL HYPERTENSION: ICD-10-CM

## 2020-02-05 DIAGNOSIS — D50.9 MICROCYTIC ANEMIA: ICD-10-CM

## 2020-02-05 DIAGNOSIS — J44.9 CHRONIC OBSTRUCTIVE PULMONARY DISEASE, UNSPECIFIED COPD TYPE: ICD-10-CM

## 2020-02-05 DIAGNOSIS — R46.0 POOR HYGIENE: ICD-10-CM

## 2020-02-05 DIAGNOSIS — D50.9 IRON DEFICIENCY ANEMIA, UNSPECIFIED IRON DEFICIENCY ANEMIA TYPE: Primary | ICD-10-CM

## 2020-02-05 PROCEDURE — 3078F PR MOST RECENT DIASTOLIC BLOOD PRESSURE < 80 MM HG: ICD-10-PCS | Mod: HCNC,CPTII,S$GLB, | Performed by: INTERNAL MEDICINE

## 2020-02-05 PROCEDURE — 3078F DIAST BP <80 MM HG: CPT | Mod: HCNC,CPTII,S$GLB, | Performed by: INTERNAL MEDICINE

## 2020-02-05 PROCEDURE — 99214 PR OFFICE/OUTPT VISIT, EST, LEVL IV, 30-39 MIN: ICD-10-PCS | Mod: HCNC,S$GLB,, | Performed by: INTERNAL MEDICINE

## 2020-02-05 PROCEDURE — 99214 OFFICE O/P EST MOD 30 MIN: CPT | Mod: HCNC,S$GLB,, | Performed by: INTERNAL MEDICINE

## 2020-02-05 PROCEDURE — 71046 X-RAY EXAM CHEST 2 VIEWS: CPT | Mod: 26,HCNC,, | Performed by: RADIOLOGY

## 2020-02-05 PROCEDURE — 99999 PR PBB SHADOW E&M-EST. PATIENT-LVL III: ICD-10-PCS | Mod: PBBFAC,HCNC,, | Performed by: INTERNAL MEDICINE

## 2020-02-05 PROCEDURE — 99999 PR PBB SHADOW E&M-EST. PATIENT-LVL III: CPT | Mod: PBBFAC,HCNC,, | Performed by: INTERNAL MEDICINE

## 2020-02-05 PROCEDURE — 3077F PR MOST RECENT SYSTOLIC BLOOD PRESSURE >= 140 MM HG: ICD-10-PCS | Mod: HCNC,CPTII,S$GLB, | Performed by: INTERNAL MEDICINE

## 2020-02-05 PROCEDURE — 3008F BODY MASS INDEX DOCD: CPT | Mod: HCNC,CPTII,S$GLB, | Performed by: INTERNAL MEDICINE

## 2020-02-05 PROCEDURE — 71046 X-RAY EXAM CHEST 2 VIEWS: CPT | Mod: TC,HCNC,FY,PO

## 2020-02-05 PROCEDURE — 71046 XR CHEST PA AND LATERAL: ICD-10-PCS | Mod: 26,HCNC,, | Performed by: RADIOLOGY

## 2020-02-05 PROCEDURE — 3077F SYST BP >= 140 MM HG: CPT | Mod: HCNC,CPTII,S$GLB, | Performed by: INTERNAL MEDICINE

## 2020-02-05 PROCEDURE — 3008F PR BODY MASS INDEX (BMI) DOCUMENTED: ICD-10-PCS | Mod: HCNC,CPTII,S$GLB, | Performed by: INTERNAL MEDICINE

## 2020-02-05 NOTE — PROGRESS NOTES
HPI      54 years old male referred to Hematology Clinic for evaluation of microcytic anemia and thrombocytosis.  Patient had fatigue and malaise was started on vitamin and iron supplement by primary care doctor.    Patient denies any family history of blood disorders.  Patient is active smoker.    10/16/2019:  Patient here to follow-up to review blood work.  No complaints.  Patient states that he is taking iron pill every day empty stomach with orange juice since last visit.  Latest blood work obtained on 10/09/2019 shortly after last visit.    December patient had a colonoscopy notice of active ulceration in bleeding which was controlled by hemostasis clip.  Specimen sent for analysis.    02/05/2020:  No acute events      Past Medical History:   Diagnosis Date    Acute angina     Asthma     COPD (chronic obstructive pulmonary disease)     Hypertension     MI, old     PIPER on CPAP     Schizophrenia     Smoker unmotivated to quit     Stroke 2005    Type II diabetes mellitus with neurological manifestations 11/21/2019     Social History     Socioeconomic History    Marital status:      Spouse name: Not on file    Number of children: Not on file    Years of education: Not on file    Highest education level: Not on file   Occupational History    Occupation: disabled (mental)     Comment: since 2000   Social Needs    Financial resource strain: Not on file    Food insecurity:     Worry: Not on file     Inability: Not on file    Transportation needs:     Medical: Not on file     Non-medical: Not on file   Tobacco Use    Smoking status: Current Every Day Smoker     Packs/day: 1.00     Years: 45.00     Pack years: 45.00    Smokeless tobacco: Never Used   Substance and Sexual Activity    Alcohol use: No     Frequency: Never    Drug use: Not Currently     Types: Marijuana    Sexual activity: Yes     Partners: Female   Lifestyle    Physical activity:     Days per week: Not on file     Minutes  per session: Not on file    Stress: Not on file   Relationships    Social connections:     Talks on phone: Not on file     Gets together: Not on file     Attends Mu-ism service: Not on file     Active member of club or organization: Not on file     Attends meetings of clubs or organizations: Not on file     Relationship status: Not on file   Other Topics Concern    Not on file   Social History Narrative    Not on file       Objective    Physical Exam     Vitals:    02/05/20 0900   BP: (!) 140/74   Pulse: 81   Resp: 20   Temp: 98.3 °F (36.8 °C)         Constitutional:  Awake alert  HENT:  Normocephalic atraumatic pupils equal round reactive to light extraocular muscle intact  Cardiovascular:  Tachycardia   Pulmonary/Chest:  Clear to auscultate   Abdominal:  Soft nontender nondistended bowel sounds x4   Musculoskeletal: Normal range of motion. Gait is normal  No clubbing, cyanosis     Lymphadenopathy:  No evidence lymphadenopathy  Neurological:  Cranial nerves 2-12 grossly intact sensorimotor grossly intact no focal deficit  Skin:  Warm dry rash no lesions   Psychiatric:  Normal affect  Vitals reviewed.     CMP  Sodium   Date Value Ref Range Status   12/14/2019 128 (L) 136 - 145 mmol/L Final     Potassium   Date Value Ref Range Status   12/14/2019 3.5 3.5 - 5.1 mmol/L Final     Chloride   Date Value Ref Range Status   12/14/2019 92 (L) 95 - 110 mmol/L Final     CO2   Date Value Ref Range Status   12/14/2019 29 22 - 31 mmol/L Final     Glucose   Date Value Ref Range Status   12/14/2019 96 70 - 110 mg/dL Final     Comment:     The ADA recommends the following guidelines for fasting glucose:  Normal:       less than 100 mg/dL  Prediabetes:  100 mg/dL to 125 mg/dL  Diabetes:     126 mg/dL or higher       BUN, Bld   Date Value Ref Range Status   12/14/2019 3 (L) 9 - 21 mg/dL Final     Creatinine   Date Value Ref Range Status   12/14/2019 0.56 0.50 - 1.40 mg/dL Final     Calcium   Date Value Ref Range Status    12/14/2019 8.2 (L) 8.4 - 10.2 mg/dL Final     Total Protein   Date Value Ref Range Status   12/13/2019 7.3 6.0 - 8.4 g/dL Final     Albumin   Date Value Ref Range Status   12/13/2019 4.1 3.5 - 5.2 g/dL Final     Total Bilirubin   Date Value Ref Range Status   12/13/2019 0.3 0.2 - 1.3 mg/dL Final     Alkaline Phosphatase   Date Value Ref Range Status   12/13/2019 77 38 - 145 U/L Final     AST   Date Value Ref Range Status   12/13/2019 19 17 - 59 U/L Final     ALT   Date Value Ref Range Status   12/13/2019 19 10 - 44 U/L Final     Anion Gap   Date Value Ref Range Status   12/14/2019 7 (L) 8 - 16 mmol/L Final     eGFR if    Date Value Ref Range Status   12/14/2019 >60 >60 mL/min/1.73 m^2 Final     eGFR if non    Date Value Ref Range Status   12/14/2019 >60 >60 mL/min/1.73 m^2 Final     Comment:     Calculation used to obtain the estimated glomerular filtration  rate (eGFR) is the CKD-EPI equation.        Lab Results   Component Value Date    WBC 14.08 (H) 02/03/2020    HGB 9.4 (L) 02/03/2020    HCT 28.8 (L) 02/03/2020    MCV 74 (L) 02/03/2020     (H) 02/03/2020     10/09/2019:  Ferritin 7, iron 34, TIBC 418, iron saturation 8%    11/06/2019:  Iron 256, TIBC 397, iron saturation 64% transferrin 268, ferritin 51.    02/03/2020:  Iron 25, TIBC 454, transferrin 307, iron saturation 6.  Ferritin 9    Assessment    Relapse of iron deficiency anemia.    Leukocytosis likely reactive    12/13/2019 patient had colonoscopy which showed active bleeding near hepato flexure a hemostatic clip will placed.  Follow-up 3 years was recommended at the time    Plan    Will per for IV iron replacement Venofer 200 mg IV with premedication of Tylenol and Benadryl weekly x4.  Patient will return to hematology clinic approximately 5 weeks from now for repeat studies.    Adequate vitamin B12 level continue monitor    Hypertension     Tobacco use patient wishes to stop using tobacco or followed by primary  care physician's    Primary care physician to follow all other medical events.    GI to follow    M*Modal dictations      There are no diagnoses linked to this encounter.

## 2020-02-05 NOTE — TELEPHONE ENCOUNTER
----- Message from Brenda Soto sent at 2/5/2020 11:06 AM CST -----  Contact: Dominique/Daughter  Type: Needs Medical Advice    Who Called:  Dominique  Best Call Back Number: [phone]  Additional Information: Patient needs help scheduling echo before 2/10/20 at there apt.  Please call to advise. Dominique is heading up to the office now to try and speak with nurse.  Thanks!

## 2020-02-07 ENCOUNTER — PATIENT MESSAGE (OUTPATIENT)
Dept: HEMATOLOGY/ONCOLOGY | Facility: CLINIC | Age: 55
End: 2020-02-07

## 2020-02-10 ENCOUNTER — TELEPHONE (OUTPATIENT)
Dept: FAMILY MEDICINE | Facility: CLINIC | Age: 55
End: 2020-02-10

## 2020-02-10 NOTE — TELEPHONE ENCOUNTER
----- Message from Rakel Gudino sent at 2/10/2020  9:30 AM CST -----  Contact: Pt  Type:  Sooner Apoointment Request    Caller is requesting a sooner appointment.  Caller declined first available appointment listed below.  Caller will not accept being placed on the waitlist and is requesting a message be sent to doctor.    Name of Caller: Pt  When is the first available appointment?  3/2  Symptoms:  Ear swollen, pt stated that it hurts really bad and con not put any pressure on it.  Best Call Back Number:    Additional Information:  Pt did not want to schedule later appt for 3/2 bc he wants to be seen sooner bc of pain. Please call back and assist. Thank you.

## 2020-02-10 NOTE — TELEPHONE ENCOUNTER
Spoke with patient and he states that he is having ear pain and it is swollen unable to get qtip in ear. Offered sooner appt. States he does not have ride. Scheduled for Wed.

## 2020-02-12 ENCOUNTER — PATIENT MESSAGE (OUTPATIENT)
Dept: HEMATOLOGY/ONCOLOGY | Facility: CLINIC | Age: 55
End: 2020-02-12

## 2020-02-12 ENCOUNTER — OFFICE VISIT (OUTPATIENT)
Dept: FAMILY MEDICINE | Facility: CLINIC | Age: 55
End: 2020-02-12
Payer: MEDICARE

## 2020-02-12 VITALS
SYSTOLIC BLOOD PRESSURE: 138 MMHG | RESPIRATION RATE: 18 BRPM | OXYGEN SATURATION: 95 % | HEIGHT: 71 IN | BODY MASS INDEX: 36.17 KG/M2 | WEIGHT: 258.38 LBS | HEART RATE: 72 BPM | DIASTOLIC BLOOD PRESSURE: 76 MMHG | TEMPERATURE: 98 F

## 2020-02-12 DIAGNOSIS — H60.391 ACUTE INFECTIVE OTITIS EXTERNA, RIGHT: Primary | ICD-10-CM

## 2020-02-12 DIAGNOSIS — D64.9 ANEMIA, UNSPECIFIED TYPE: ICD-10-CM

## 2020-02-12 DIAGNOSIS — H53.9 VISION CHANGES: ICD-10-CM

## 2020-02-12 DIAGNOSIS — E08.42 DIABETIC POLYNEUROPATHY ASSOCIATED WITH DIABETES MELLITUS DUE TO UNDERLYING CONDITION: ICD-10-CM

## 2020-02-12 PROCEDURE — 99999 PR PBB SHADOW E&M-EST. PATIENT-LVL IV: CPT | Mod: PBBFAC,HCNC,, | Performed by: FAMILY MEDICINE

## 2020-02-12 PROCEDURE — 99214 OFFICE O/P EST MOD 30 MIN: CPT | Mod: HCNC,S$GLB,, | Performed by: FAMILY MEDICINE

## 2020-02-12 PROCEDURE — 3008F PR BODY MASS INDEX (BMI) DOCUMENTED: ICD-10-PCS | Mod: HCNC,CPTII,S$GLB, | Performed by: FAMILY MEDICINE

## 2020-02-12 PROCEDURE — 3044F PR MOST RECENT HEMOGLOBIN A1C LEVEL <7.0%: ICD-10-PCS | Mod: HCNC,CPTII,S$GLB, | Performed by: FAMILY MEDICINE

## 2020-02-12 PROCEDURE — 3078F DIAST BP <80 MM HG: CPT | Mod: HCNC,CPTII,S$GLB, | Performed by: FAMILY MEDICINE

## 2020-02-12 PROCEDURE — 3008F BODY MASS INDEX DOCD: CPT | Mod: HCNC,CPTII,S$GLB, | Performed by: FAMILY MEDICINE

## 2020-02-12 PROCEDURE — 99214 PR OFFICE/OUTPT VISIT, EST, LEVL IV, 30-39 MIN: ICD-10-PCS | Mod: HCNC,S$GLB,, | Performed by: FAMILY MEDICINE

## 2020-02-12 PROCEDURE — 3044F HG A1C LEVEL LT 7.0%: CPT | Mod: HCNC,CPTII,S$GLB, | Performed by: FAMILY MEDICINE

## 2020-02-12 PROCEDURE — 99999 PR PBB SHADOW E&M-EST. PATIENT-LVL IV: ICD-10-PCS | Mod: PBBFAC,HCNC,, | Performed by: FAMILY MEDICINE

## 2020-02-12 PROCEDURE — 3078F PR MOST RECENT DIASTOLIC BLOOD PRESSURE < 80 MM HG: ICD-10-PCS | Mod: HCNC,CPTII,S$GLB, | Performed by: FAMILY MEDICINE

## 2020-02-12 PROCEDURE — 99499 UNLISTED E&M SERVICE: CPT | Mod: HCNC,S$GLB,, | Performed by: FAMILY MEDICINE

## 2020-02-12 PROCEDURE — 99499 RISK ADDL DX/OHS AUDIT: ICD-10-PCS | Mod: HCNC,S$GLB,, | Performed by: FAMILY MEDICINE

## 2020-02-12 PROCEDURE — 3075F SYST BP GE 130 - 139MM HG: CPT | Mod: HCNC,CPTII,S$GLB, | Performed by: FAMILY MEDICINE

## 2020-02-12 PROCEDURE — 3075F PR MOST RECENT SYSTOLIC BLOOD PRESS GE 130-139MM HG: ICD-10-PCS | Mod: HCNC,CPTII,S$GLB, | Performed by: FAMILY MEDICINE

## 2020-02-12 RX ORDER — CIPROFLOXACIN 500 MG/1
500 TABLET ORAL EVERY 12 HOURS
Qty: 10 TABLET | Refills: 0 | Status: SHIPPED | OUTPATIENT
Start: 2020-02-12 | End: 2020-02-24

## 2020-02-12 NOTE — PROGRESS NOTES
"Subjective:       Patient ID: Ryan Crane is a 54 y.o. male.    Chief Complaint: Otalgia (right ear)    HPI  Patient with c/o 3 weeks increasing R ear pain. Afebrile. +drainage, blood and pus from R ear. Saw pulm yesterday who started him on ciprodex drops. Pt notes some relief associated, but still ear is quite tender.  Otherwise feeling well. pulm reviewed/updated his copd meds.  Discussed due for eye exam this year.   No pedal concerns.     Review of Systems:  Review of Systems   Constitutional: Negative for fatigue and fever.   HENT: Positive for ear discharge and ear pain. Negative for congestion, postnasal drip, rhinorrhea, sinus pressure and sore throat.    Respiratory: Positive for cough. Negative for shortness of breath.    Cardiovascular: Negative for chest pain (occ) and leg swelling.        Uses nitro prn.    Gastrointestinal: Negative for blood in stool, constipation and diarrhea.        No gerd c/o.   Genitourinary: Negative for difficulty urinating, scrotal swelling and testicular pain.   Musculoskeletal: Negative for arthralgias and myalgias.   Neurological: Negative for dizziness and headaches.   Psychiatric/Behavioral: Positive for dysphoric mood. Negative for sleep disturbance.       Objective:     Vitals:    02/12/20 1112   BP: 138/76   BP Location: Right arm   Patient Position: Sitting   BP Method: X-Large (Manual)   Pulse: 72   Resp: 18   Temp: 98 °F (36.7 °C)   TempSrc: Oral   SpO2: 95%   Weight: 117.2 kg (258 lb 6.1 oz)   Height: 5' 11" (1.803 m)          Physical Exam   Constitutional: He is oriented to person, place, and time. He appears well-developed and well-nourished. No distress.   HENT:   Head: Normocephalic and atraumatic.   Right Ear: There is tenderness. No drainage. No foreign bodies. A middle ear effusion is present.   Left Ear: Tympanic membrane and ear canal normal. No drainage or tenderness. No foreign bodies.  No middle ear effusion.   Eyes: Conjunctivae are normal. " Right eye exhibits no discharge. Left eye exhibits no discharge. No scleral icterus.   Cardiovascular: Normal rate.   Pulmonary/Chest: Effort normal. No respiratory distress.   Neurological: He is alert and oriented to person, place, and time. No cranial nerve deficit.   Skin: Skin is warm and dry.   Psychiatric: He has a normal mood and affect. His behavior is normal.   Nursing note and vitals reviewed.        Assessment & Plan:  Acute infective otitis externa, right  -     ciprofloxacin HCl (CIPRO) 500 MG tablet; Take 1 tablet (500 mg total) by mouth every 12 (twelve) hours.  Dispense: 10 tablet; Refill: 0  cipro x 5 days. Recall, pt previous had extended course and tolerated without issue. Discussed cont ear drops and reviewed keeping ear dry. ENT review for persistence.   Vision changes  -     Ambulatory referral/consult to Optometry; Future; Expected date: 02/19/2020

## 2020-02-12 NOTE — TELEPHONE ENCOUNTER
Spoke with pt's daughter. She requested a later time on the day her dad has his appt. Pt changed to the afternoon per request.

## 2020-02-13 ENCOUNTER — CLINICAL SUPPORT (OUTPATIENT)
Dept: CARDIOLOGY | Facility: CLINIC | Age: 55
End: 2020-02-13
Attending: INTERNAL MEDICINE
Payer: MEDICARE

## 2020-02-13 VITALS — WEIGHT: 258 LBS | BODY MASS INDEX: 36.12 KG/M2 | HEIGHT: 71 IN

## 2020-02-13 DIAGNOSIS — I25.10 CAD IN NATIVE ARTERY: ICD-10-CM

## 2020-02-13 DIAGNOSIS — J44.9 CHRONIC OBSTRUCTIVE PULMONARY DISEASE, UNSPECIFIED COPD TYPE: ICD-10-CM

## 2020-02-13 DIAGNOSIS — I10 ESSENTIAL HYPERTENSION: ICD-10-CM

## 2020-02-13 PROCEDURE — 93306 ECHO (CUPID ONLY): ICD-10-PCS | Mod: HCNC,S$GLB,, | Performed by: INTERNAL MEDICINE

## 2020-02-13 PROCEDURE — 99999 PR PBB SHADOW E&M-EST. PATIENT-LVL II: CPT | Mod: PBBFAC,HCNC,,

## 2020-02-13 PROCEDURE — 93306 TTE W/DOPPLER COMPLETE: CPT | Mod: HCNC,S$GLB,, | Performed by: INTERNAL MEDICINE

## 2020-02-13 PROCEDURE — 99999 PR PBB SHADOW E&M-EST. PATIENT-LVL II: ICD-10-PCS | Mod: PBBFAC,HCNC,,

## 2020-02-14 ENCOUNTER — INFUSION (OUTPATIENT)
Dept: INFUSION THERAPY | Facility: HOSPITAL | Age: 55
End: 2020-02-14
Attending: INTERNAL MEDICINE
Payer: MEDICARE

## 2020-02-14 ENCOUNTER — PATIENT OUTREACH (OUTPATIENT)
Dept: ADMINISTRATIVE | Facility: OTHER | Age: 55
End: 2020-02-14

## 2020-02-14 VITALS
TEMPERATURE: 98 F | HEART RATE: 71 BPM | BODY MASS INDEX: 36.22 KG/M2 | RESPIRATION RATE: 18 BRPM | WEIGHT: 259.69 LBS | DIASTOLIC BLOOD PRESSURE: 88 MMHG | SYSTOLIC BLOOD PRESSURE: 148 MMHG

## 2020-02-14 DIAGNOSIS — D62 ACUTE BLOOD LOSS ANEMIA: Primary | ICD-10-CM

## 2020-02-14 LAB
ASCENDING AORTA: 2.73 CM
AV INDEX (PROSTH): 0.56
AV MEAN GRADIENT: 17 MMHG
AV PEAK GRADIENT: 34 MMHG
AV VALVE AREA: 2.12 CM2
AV VELOCITY RATIO: 0.53
BSA FOR ECHO PROCEDURE: 2.42 M2
CV ECHO LV RWT: 0.56 CM
DOP CALC AO PEAK VEL: 2.92 M/S
DOP CALC AO VTI: 53.8 CM
DOP CALC LVOT AREA: 3.8 CM2
DOP CALC LVOT DIAMETER: 2.2 CM
DOP CALC LVOT PEAK VEL: 1.54 M/S
DOP CALC LVOT STROKE VOLUME: 114.21 CM3
DOP CALCLVOT PEAK VEL VTI: 30.06 CM
E WAVE DECELERATION TIME: 249.01 MSEC
E/A RATIO: 1.01
E/E' RATIO: 29.8 M/S
ECHO LV POSTERIOR WALL: 1.51 CM (ref 0.6–1.1)
FRACTIONAL SHORTENING: 38 % (ref 28–44)
INTERVENTRICULAR SEPTUM: 1.46 CM (ref 0.6–1.1)
IVRT: 0.08 MSEC
LA MAJOR: 4.89 CM
LA MINOR: 5.79 CM
LA WIDTH: 3.67 CM
LEFT ATRIUM SIZE: 5.27 CM
LEFT ATRIUM VOLUME INDEX: 37.1 ML/M2
LEFT ATRIUM VOLUME: 87.16 CM3
LEFT INTERNAL DIMENSION IN SYSTOLE: 3.35 CM (ref 2.1–4)
LEFT VENTRICLE DIASTOLIC VOLUME INDEX: 59.36 ML/M2
LEFT VENTRICLE DIASTOLIC VOLUME: 139.5 ML
LEFT VENTRICLE MASS INDEX: 151 G/M2
LEFT VENTRICLE SYSTOLIC VOLUME INDEX: 19.5 ML/M2
LEFT VENTRICLE SYSTOLIC VOLUME: 45.91 ML
LEFT VENTRICULAR INTERNAL DIMENSION IN DIASTOLE: 5.37 CM (ref 3.5–6)
LEFT VENTRICULAR MASS: 354.4 G
LV LATERAL E/E' RATIO: 29.8 M/S
LV SEPTAL E/E' RATIO: 29.8 M/S
MV PEAK A VEL: 1.47 M/S
MV PEAK E VEL: 1.49 M/S
PISA TR MAX VEL: 2.65 M/S
PULM VEIN S/D RATIO: 1.43
PV PEAK D VEL: 0.53 M/S
PV PEAK S VEL: 0.76 M/S
RA MAJOR: 4.18 CM
RA PRESSURE: 3 MMHG
RA WIDTH: 3.4 CM
RIGHT VENTRICULAR END-DIASTOLIC DIMENSION: 3.44 CM
RV TISSUE DOPPLER FREE WALL SYSTOLIC VELOCITY 1 (APICAL 4 CHAMBER VIEW): 16.3 CM/S
SINUS: 3.25 CM
STJ: 2.64 CM
TDI LATERAL: 0.05 M/S
TDI SEPTAL: 0.05 M/S
TDI: 0.05 M/S
TR MAX PG: 28 MMHG
TRICUSPID ANNULAR PLANE SYSTOLIC EXCURSION: 2.65 CM
TV REST PULMONARY ARTERY PRESSURE: 31 MMHG

## 2020-02-14 PROCEDURE — 25000003 PHARM REV CODE 250: Mod: HCNC,PN | Performed by: INTERNAL MEDICINE

## 2020-02-14 PROCEDURE — 63600175 PHARM REV CODE 636 W HCPCS: Mod: HCNC,PN | Performed by: INTERNAL MEDICINE

## 2020-02-14 PROCEDURE — 96365 THER/PROPH/DIAG IV INF INIT: CPT | Mod: HCNC,PN

## 2020-02-14 RX ORDER — DIPHENHYDRAMINE HCL 25 MG
25 CAPSULE ORAL
Status: CANCELLED
Start: 2020-02-21

## 2020-02-14 RX ORDER — ACETAMINOPHEN 325 MG/1
650 TABLET ORAL
Status: CANCELLED
Start: 2020-02-21

## 2020-02-14 RX ORDER — HEPARIN 100 UNIT/ML
500 SYRINGE INTRAVENOUS
Status: CANCELLED | OUTPATIENT
Start: 2020-02-14

## 2020-02-14 RX ORDER — ACETAMINOPHEN 325 MG/1
650 TABLET ORAL
Status: COMPLETED | OUTPATIENT
Start: 2020-02-14 | End: 2020-02-14

## 2020-02-14 RX ORDER — SODIUM CHLORIDE 0.9 % (FLUSH) 0.9 %
10 SYRINGE (ML) INJECTION
Status: DISCONTINUED | OUTPATIENT
Start: 2020-02-14 | End: 2020-02-14 | Stop reason: HOSPADM

## 2020-02-14 RX ORDER — DIPHENHYDRAMINE HCL 25 MG
25 CAPSULE ORAL
Status: CANCELLED
Start: 2020-02-14

## 2020-02-14 RX ORDER — ACETAMINOPHEN 325 MG/1
650 TABLET ORAL
Status: CANCELLED
Start: 2020-02-14

## 2020-02-14 RX ORDER — DIPHENHYDRAMINE HCL 25 MG
25 CAPSULE ORAL
Status: COMPLETED | OUTPATIENT
Start: 2020-02-14 | End: 2020-02-14

## 2020-02-14 RX ORDER — SODIUM CHLORIDE 0.9 % (FLUSH) 0.9 %
10 SYRINGE (ML) INJECTION
Status: CANCELLED | OUTPATIENT
Start: 2020-02-21

## 2020-02-14 RX ORDER — HEPARIN 100 UNIT/ML
500 SYRINGE INTRAVENOUS
Status: CANCELLED | OUTPATIENT
Start: 2020-02-21

## 2020-02-14 RX ORDER — SODIUM CHLORIDE 0.9 % (FLUSH) 0.9 %
10 SYRINGE (ML) INJECTION
Status: CANCELLED | OUTPATIENT
Start: 2020-02-14

## 2020-02-14 RX ADMIN — SODIUM CHLORIDE: 900 INJECTION, SOLUTION INTRAVENOUS at 04:02

## 2020-02-14 RX ADMIN — ACETAMINOPHEN 650 MG: 325 TABLET, FILM COATED ORAL at 04:02

## 2020-02-14 RX ADMIN — IRON SUCROSE 200 MG: 20 INJECTION, SOLUTION INTRAVENOUS at 04:02

## 2020-02-14 RX ADMIN — DIPHENHYDRAMINE HYDROCHLORIDE 25 MG: 25 CAPSULE ORAL at 04:02

## 2020-02-17 ENCOUNTER — OFFICE VISIT (OUTPATIENT)
Dept: CARDIOLOGY | Facility: CLINIC | Age: 55
End: 2020-02-17
Payer: MEDICARE

## 2020-02-17 VITALS
WEIGHT: 258.81 LBS | HEART RATE: 94 BPM | HEIGHT: 71 IN | SYSTOLIC BLOOD PRESSURE: 132 MMHG | BODY MASS INDEX: 36.23 KG/M2 | DIASTOLIC BLOOD PRESSURE: 74 MMHG

## 2020-02-17 DIAGNOSIS — J44.9 CHRONIC OBSTRUCTIVE PULMONARY DISEASE, UNSPECIFIED COPD TYPE: ICD-10-CM

## 2020-02-17 DIAGNOSIS — F17.200 TOBACCO USE DISORDER: ICD-10-CM

## 2020-02-17 DIAGNOSIS — I25.10 CAD IN NATIVE ARTERY: Primary | ICD-10-CM

## 2020-02-17 DIAGNOSIS — I73.9 PVD (PERIPHERAL VASCULAR DISEASE): ICD-10-CM

## 2020-02-17 DIAGNOSIS — I10 ESSENTIAL HYPERTENSION: ICD-10-CM

## 2020-02-17 PROCEDURE — 3008F BODY MASS INDEX DOCD: CPT | Mod: HCNC,CPTII,S$GLB, | Performed by: INTERNAL MEDICINE

## 2020-02-17 PROCEDURE — 3078F PR MOST RECENT DIASTOLIC BLOOD PRESSURE < 80 MM HG: ICD-10-PCS | Mod: HCNC,CPTII,S$GLB, | Performed by: INTERNAL MEDICINE

## 2020-02-17 PROCEDURE — 3008F PR BODY MASS INDEX (BMI) DOCUMENTED: ICD-10-PCS | Mod: HCNC,CPTII,S$GLB, | Performed by: INTERNAL MEDICINE

## 2020-02-17 PROCEDURE — 99214 PR OFFICE/OUTPT VISIT, EST, LEVL IV, 30-39 MIN: ICD-10-PCS | Mod: HCNC,S$GLB,, | Performed by: INTERNAL MEDICINE

## 2020-02-17 PROCEDURE — 3078F DIAST BP <80 MM HG: CPT | Mod: HCNC,CPTII,S$GLB, | Performed by: INTERNAL MEDICINE

## 2020-02-17 PROCEDURE — 99999 PR PBB SHADOW E&M-EST. PATIENT-LVL III: CPT | Mod: PBBFAC,HCNC,, | Performed by: INTERNAL MEDICINE

## 2020-02-17 PROCEDURE — 99214 OFFICE O/P EST MOD 30 MIN: CPT | Mod: HCNC,S$GLB,, | Performed by: INTERNAL MEDICINE

## 2020-02-17 PROCEDURE — 3075F PR MOST RECENT SYSTOLIC BLOOD PRESS GE 130-139MM HG: ICD-10-PCS | Mod: HCNC,CPTII,S$GLB, | Performed by: INTERNAL MEDICINE

## 2020-02-17 PROCEDURE — 3075F SYST BP GE 130 - 139MM HG: CPT | Mod: HCNC,CPTII,S$GLB, | Performed by: INTERNAL MEDICINE

## 2020-02-17 PROCEDURE — 99999 PR PBB SHADOW E&M-EST. PATIENT-LVL III: ICD-10-PCS | Mod: PBBFAC,HCNC,, | Performed by: INTERNAL MEDICINE

## 2020-02-17 NOTE — PROGRESS NOTES
Subjective:    Patient ID:  Ryan Crane is a 54 y.o. male who presents for follow-up of Coronary Artery Disease (3 mo f/u with echo); Hypertension; Hyperlipidemia; and COPD      Pt here for 4 months f/u. Since last visit he has no new cardiac complaints. Breathing about the same. Continues to smoke. No chest pains.      Review of Systems   Constitution: Negative for weight gain and weight loss.   HENT: Negative.    Eyes: Negative.    Cardiovascular: Negative for chest pain, claudication, cyanosis, irregular heartbeat, leg swelling, near-syncope, orthopnea (no PND), palpitations and syncope.   Respiratory: Positive for shortness of breath. Negative for cough, hemoptysis and snoring.    Endocrine: Negative.    Skin: Negative.    Musculoskeletal: Negative for joint pain, muscle cramps, muscle weakness and myalgias.   Gastrointestinal: Negative for diarrhea, hematemesis, nausea and vomiting.   Genitourinary: Negative.    Neurological: Negative for dizziness, focal weakness, light-headedness, loss of balance, numbness, paresthesias and seizures.   Psychiatric/Behavioral: Negative.         Objective:    Physical Exam   Constitutional: He is oriented to person, place, and time. He appears well-developed and well-nourished.   HENT:   Mouth/Throat: Oropharynx is clear and moist.   Eyes: Pupils are equal, round, and reactive to light.   Neck: Normal range of motion. No thyromegaly present.   Cardiovascular: Normal rate, regular rhythm, S1 normal, S2 normal, intact distal pulses and normal pulses.  No extrasystoles are present. PMI is not displaced. Exam reveals no friction rub.   Murmur heard.   Medium-pitched systolic murmur is present with a grade of 1/6 at the upper right sternal border.  Pulmonary/Chest: Effort normal. He has decreased breath sounds. He has no wheezes. He has no rales. He exhibits no tenderness.   Abdominal: Soft. Bowel sounds are normal. He exhibits no distension and no mass. There is no  "tenderness.   Musculoskeletal: Normal range of motion. He exhibits no edema.   Neurological: He is alert and oriented to person, place, and time.   Skin: Skin is warm and dry.   Vitals reviewed.      Test(s) Reviewed  I have reviewed the following in detail:  [] Stress test   [] Angiography   [x] Echocardiogram   [] Labs   [] Other:         Assessment:       1. CAD in native artery - moderate 3V 60% by Pt history; last angiogram in the "teens"    2. Essential hypertension    3. PVD (peripheral vascular disease)    4. Chronic obstructive pulmonary disease, unspecified COPD type    5. Tobacco use disorder         Plan:       Pt appears stable  Continue present Rx  We discussed that his echo shows no detrimental change from previous  F/u 1 year with repeat Echo  "

## 2020-02-20 NOTE — TELEPHONE ENCOUNTER
----- Message from Rekha Burgess sent at 2/20/2020  4:22 PM CST -----  Contact: Patient  Type:  RX Refill Request    Who Called:  Patient  Refill or New Rx:  New RX  RX Name and Strength:  traMADol (ULTRAM) 50 mg tablet  How is the patient currently taking it? (ex. 1XDay):  TAKE 1 TABLET(50 MG) BY MOUTH EVERY 6 HOURS  Is this a 30 day or 90 day RX:  40 tablet  Preferred Pharmacy with phone number:    [id number] [address]  [address] [facility] [address]  Phone: [phone] Fax: [phone]  Local or Mail Order:  local  Ordering Provider:  Joesph Mendez MD  Best Call Back Number:  [phone]

## 2020-02-21 ENCOUNTER — INFUSION (OUTPATIENT)
Dept: INFUSION THERAPY | Facility: HOSPITAL | Age: 55
End: 2020-02-21
Attending: INTERNAL MEDICINE
Payer: MEDICARE

## 2020-02-21 ENCOUNTER — PATIENT MESSAGE (OUTPATIENT)
Dept: HEMATOLOGY/ONCOLOGY | Facility: CLINIC | Age: 55
End: 2020-02-21

## 2020-02-21 VITALS
RESPIRATION RATE: 18 BRPM | WEIGHT: 260.13 LBS | SYSTOLIC BLOOD PRESSURE: 137 MMHG | BODY MASS INDEX: 36.42 KG/M2 | HEART RATE: 80 BPM | HEIGHT: 71 IN | TEMPERATURE: 98 F | DIASTOLIC BLOOD PRESSURE: 72 MMHG | OXYGEN SATURATION: 99 %

## 2020-02-21 DIAGNOSIS — D62 ACUTE BLOOD LOSS ANEMIA: Primary | ICD-10-CM

## 2020-02-21 PROCEDURE — 96365 THER/PROPH/DIAG IV INF INIT: CPT | Mod: HCNC,PN

## 2020-02-21 PROCEDURE — 63600175 PHARM REV CODE 636 W HCPCS: Mod: HCNC,PN | Performed by: INTERNAL MEDICINE

## 2020-02-21 PROCEDURE — A4216 STERILE WATER/SALINE, 10 ML: HCPCS | Mod: HCNC,PN | Performed by: INTERNAL MEDICINE

## 2020-02-21 PROCEDURE — 25000003 PHARM REV CODE 250: Mod: HCNC,PN | Performed by: INTERNAL MEDICINE

## 2020-02-21 RX ORDER — DIPHENHYDRAMINE HCL 25 MG
25 CAPSULE ORAL
Status: CANCELLED
Start: 2020-02-28

## 2020-02-21 RX ORDER — ACETAMINOPHEN 325 MG/1
650 TABLET ORAL
Status: CANCELLED
Start: 2020-02-28

## 2020-02-21 RX ORDER — HEPARIN 100 UNIT/ML
500 SYRINGE INTRAVENOUS
Status: CANCELLED | OUTPATIENT
Start: 2020-02-28

## 2020-02-21 RX ORDER — ACETAMINOPHEN 325 MG/1
650 TABLET ORAL
Status: COMPLETED | OUTPATIENT
Start: 2020-02-21 | End: 2020-02-21

## 2020-02-21 RX ORDER — DIPHENHYDRAMINE HCL 25 MG
25 CAPSULE ORAL
Status: COMPLETED | OUTPATIENT
Start: 2020-02-21 | End: 2020-02-21

## 2020-02-21 RX ORDER — TRAMADOL HYDROCHLORIDE 50 MG/1
50 TABLET ORAL EVERY 6 HOURS
Refills: 0 | OUTPATIENT
Start: 2020-02-21

## 2020-02-21 RX ORDER — SODIUM CHLORIDE 0.9 % (FLUSH) 0.9 %
10 SYRINGE (ML) INJECTION
Status: CANCELLED | OUTPATIENT
Start: 2020-02-28

## 2020-02-21 RX ORDER — SODIUM CHLORIDE 0.9 % (FLUSH) 0.9 %
10 SYRINGE (ML) INJECTION
Status: DISCONTINUED | OUTPATIENT
Start: 2020-02-21 | End: 2020-02-21 | Stop reason: HOSPADM

## 2020-02-21 RX ADMIN — Medication 10 ML: at 03:02

## 2020-02-21 RX ADMIN — SODIUM CHLORIDE: 900 INJECTION, SOLUTION INTRAVENOUS at 03:02

## 2020-02-21 RX ADMIN — DIPHENHYDRAMINE HYDROCHLORIDE 25 MG: 25 CAPSULE ORAL at 04:02

## 2020-02-21 RX ADMIN — IRON SUCROSE 200 MG: 20 INJECTION, SOLUTION INTRAVENOUS at 04:02

## 2020-02-21 RX ADMIN — ACETAMINOPHEN 650 MG: 325 TABLET, FILM COATED ORAL at 04:02

## 2020-02-21 NOTE — PLAN OF CARE
Problem: Fall Injury Risk  Goal: Absence of Fall and Fall-Related Injury  Intervention: Promote Injury-Free Environment  Flowsheets (Taken 2/21/2020 1600)  Safety Promotion/Fall Prevention: assistive device/personal item within reach; instructed to call staff for mobility  Environmental Safety Modification: room near unit station; clutter free environment maintained     Problem: Adult Inpatient Plan of Care  Goal: Patient-Specific Goal (Individualization)  Outcome: Ongoing, Progressing  Flowsheets (Taken 2/21/2020 1600)  Individualized Care Needs: recliner,warm blanket, snacks, tv  Anxieties, Fears or Concerns: none  Patient-Specific Goals (Include Timeframe): infection prevention during treatment

## 2020-02-21 NOTE — PLAN OF CARE
Problem: Adult Inpatient Plan of Care  Goal: Plan of Care Review  2/21/2020 1752 by Martita Vidales, RN  Outcome: Ongoing, Progressing  Flowsheets (Taken 2/21/2020 1740)  Plan of Care Reviewed With: patient   Pt tolerated his Venofer infusion well, NAD. Pt education reinforced on potential side effects, what to expect and when to call the physician. Pt given a schedule and reviewed, pt verbalized understanding. Pt ambulated out of the clinic without difficulty accompanied by his daughter.

## 2020-02-24 ENCOUNTER — PATIENT MESSAGE (OUTPATIENT)
Dept: FAMILY MEDICINE | Facility: CLINIC | Age: 55
End: 2020-02-24

## 2020-02-24 ENCOUNTER — OFFICE VISIT (OUTPATIENT)
Dept: FAMILY MEDICINE | Facility: CLINIC | Age: 55
End: 2020-02-24
Payer: MEDICARE

## 2020-02-24 VITALS
OXYGEN SATURATION: 99 % | DIASTOLIC BLOOD PRESSURE: 78 MMHG | WEIGHT: 261.44 LBS | HEART RATE: 66 BPM | SYSTOLIC BLOOD PRESSURE: 142 MMHG | TEMPERATURE: 98 F | HEIGHT: 71 IN | BODY MASS INDEX: 36.6 KG/M2

## 2020-02-24 DIAGNOSIS — L02.419 ABSCESS, AXILLA: Primary | ICD-10-CM

## 2020-02-24 PROCEDURE — 99999 PR PBB SHADOW E&M-EST. PATIENT-LVL III: ICD-10-PCS | Mod: PBBFAC,HCNC,, | Performed by: FAMILY MEDICINE

## 2020-02-24 PROCEDURE — 3078F PR MOST RECENT DIASTOLIC BLOOD PRESSURE < 80 MM HG: ICD-10-PCS | Mod: HCNC,CPTII,S$GLB, | Performed by: FAMILY MEDICINE

## 2020-02-24 PROCEDURE — 99213 PR OFFICE/OUTPT VISIT, EST, LEVL III, 20-29 MIN: ICD-10-PCS | Mod: HCNC,S$GLB,, | Performed by: FAMILY MEDICINE

## 2020-02-24 PROCEDURE — 99999 PR PBB SHADOW E&M-EST. PATIENT-LVL III: CPT | Mod: PBBFAC,HCNC,, | Performed by: FAMILY MEDICINE

## 2020-02-24 PROCEDURE — 3077F SYST BP >= 140 MM HG: CPT | Mod: HCNC,CPTII,S$GLB, | Performed by: FAMILY MEDICINE

## 2020-02-24 PROCEDURE — 99213 OFFICE O/P EST LOW 20 MIN: CPT | Mod: HCNC,S$GLB,, | Performed by: FAMILY MEDICINE

## 2020-02-24 PROCEDURE — 3008F BODY MASS INDEX DOCD: CPT | Mod: HCNC,CPTII,S$GLB, | Performed by: FAMILY MEDICINE

## 2020-02-24 PROCEDURE — 3008F PR BODY MASS INDEX (BMI) DOCUMENTED: ICD-10-PCS | Mod: HCNC,CPTII,S$GLB, | Performed by: FAMILY MEDICINE

## 2020-02-24 PROCEDURE — 3078F DIAST BP <80 MM HG: CPT | Mod: HCNC,CPTII,S$GLB, | Performed by: FAMILY MEDICINE

## 2020-02-24 PROCEDURE — 3077F PR MOST RECENT SYSTOLIC BLOOD PRESSURE >= 140 MM HG: ICD-10-PCS | Mod: HCNC,CPTII,S$GLB, | Performed by: FAMILY MEDICINE

## 2020-02-24 RX ORDER — TRAMADOL HYDROCHLORIDE 50 MG/1
TABLET ORAL
Qty: 40 TABLET | Refills: 1 | Status: SHIPPED | OUTPATIENT
Start: 2020-02-24 | End: 2020-05-05 | Stop reason: SDUPTHER

## 2020-02-24 RX ORDER — SULFAMETHOXAZOLE AND TRIMETHOPRIM 800; 160 MG/1; MG/1
1 TABLET ORAL 2 TIMES DAILY
Qty: 20 TABLET | Refills: 0 | Status: SHIPPED | OUTPATIENT
Start: 2020-02-24 | End: 2020-06-29

## 2020-02-24 RX ORDER — CHLORHEXIDINE GLUCONATE 40 MG/ML
SOLUTION TOPICAL
Qty: 200 ML | Refills: 0 | COMMUNITY
Start: 2020-02-24 | End: 2020-08-24

## 2020-02-24 RX ORDER — DULOXETIN HYDROCHLORIDE 60 MG/1
60 CAPSULE, DELAYED RELEASE ORAL DAILY
COMMUNITY
Start: 2020-02-22 | End: 2021-12-29

## 2020-02-24 NOTE — PROGRESS NOTES
"Subjective:       Patient ID: Ryan Crane is a 54 y.o. male.    Chief Complaint: staph infection (painful infection under both arm pits with drainage)    HPI  Patient with c/o red, painful knot under R arm, with similar, but smaller lesions under the L arm. Afebrile. Notes that the R arm is starting to have some drainage, thick yellow. Areas are tender to touch, but do not limit arm ROM. He requests refill of tramadol to use prn pain.    Review of Systems:  Review of Systems   Constitutional: Negative for activity change and unexpected weight change.   HENT: Negative for hearing loss, rhinorrhea and trouble swallowing.    Eyes: Negative for discharge and visual disturbance.   Respiratory: Negative for chest tightness and wheezing.    Cardiovascular: Negative for chest pain and palpitations.   Gastrointestinal: Negative for blood in stool, constipation, diarrhea and vomiting.   Endocrine: Negative for polydipsia and polyuria.   Genitourinary: Negative for difficulty urinating, hematuria and urgency.   Musculoskeletal: Negative for arthralgias, joint swelling and neck pain.   Skin: Positive for wound. Negative for rash.   Neurological: Negative for weakness and headaches.   Psychiatric/Behavioral: Negative for confusion and dysphoric mood.       Objective:     Vitals:    02/24/20 1107   BP: (!) 142/78   BP Location: Right arm   Patient Position: Sitting   BP Method: Large (Manual)   Pulse: 66   Temp: 97.7 °F (36.5 °C)   TempSrc: Oral   SpO2: 99%   Weight: 118.6 kg (261 lb 7.5 oz)   Height: 5' 11" (1.803 m)          Physical Exam   Constitutional: He is oriented to person, place, and time. He appears well-developed and well-nourished. No distress.   HENT:   Head: Normocephalic and atraumatic.   Eyes: Conjunctivae are normal. Right eye exhibits no discharge. Left eye exhibits no discharge. No scleral icterus.   Cardiovascular: Normal rate.   Pulmonary/Chest: Effort normal. No respiratory distress. "   Neurological: He is alert and oriented to person, place, and time. No cranial nerve deficit.   Skin: Skin is warm and dry.        Psychiatric: He has a normal mood and affect. His behavior is normal.   Nursing note and vitals reviewed.        Assessment & Plan:  Abscess, axilla  -     sulfamethoxazole-trimethoprim 800-160mg (BACTRIM DS) 800-160 mg Tab; Take 1 tablet by mouth 2 (two) times daily.  Dispense: 20 tablet; Refill: 0  -     traMADol (ULTRAM) 50 mg tablet; TAKE 1 TABLET(50 MG) BY MOUTH EVERY 6 HOURS  Dispense: 40 tablet; Refill: 1  -     chlorhexidine (HIBICLENS) 4 % external liquid; Apply topically twice a week.  Dispense: 200 mL; Refill: 0    Side effects and precautions of medication use reviewed with patient, expressed understanding. No questions or concerns.   Discussed with patient, low threshold for ER review given bilateral occurrence and size of R lesion. Start bactrim today with hibiclens scrubs and warm compresses.   Of note, no concern for sepsis at this time as afebrile, not tachy, stable vitals.

## 2020-02-26 ENCOUNTER — HOSPITAL ENCOUNTER (OUTPATIENT)
Dept: RADIOLOGY | Facility: HOSPITAL | Age: 55
Discharge: HOME | End: 2020-02-26
Attending: INTERNAL MEDICINE
Payer: MEDICARE

## 2020-02-26 DIAGNOSIS — R06.02 SHORTNESS OF BREATH: ICD-10-CM

## 2020-02-26 PROCEDURE — 71250 CT THORAX DX C-: CPT | Mod: TC,HCNC,PO

## 2020-02-26 PROCEDURE — 71250 CT THORAX DX C-: CPT | Mod: 26,HCNC,, | Performed by: RADIOLOGY

## 2020-02-26 PROCEDURE — 71250 CT CHEST WITHOUT CONTRAST: ICD-10-PCS | Mod: 26,HCNC,, | Performed by: RADIOLOGY

## 2020-02-28 ENCOUNTER — INFUSION (OUTPATIENT)
Dept: INFUSION THERAPY | Facility: HOSPITAL | Age: 55
End: 2020-02-28
Attending: INTERNAL MEDICINE
Payer: MEDICARE

## 2020-02-28 VITALS
BODY MASS INDEX: 36.42 KG/M2 | HEART RATE: 71 BPM | DIASTOLIC BLOOD PRESSURE: 70 MMHG | RESPIRATION RATE: 19 BRPM | TEMPERATURE: 98 F | WEIGHT: 260.13 LBS | SYSTOLIC BLOOD PRESSURE: 147 MMHG | HEIGHT: 71 IN

## 2020-02-28 DIAGNOSIS — D62 ACUTE BLOOD LOSS ANEMIA: Primary | ICD-10-CM

## 2020-02-28 PROCEDURE — 96365 THER/PROPH/DIAG IV INF INIT: CPT | Mod: HCNC,PN

## 2020-02-28 PROCEDURE — 63600175 PHARM REV CODE 636 W HCPCS: Mod: HCNC,PN | Performed by: INTERNAL MEDICINE

## 2020-02-28 PROCEDURE — A4216 STERILE WATER/SALINE, 10 ML: HCPCS | Mod: HCNC,PN | Performed by: INTERNAL MEDICINE

## 2020-02-28 PROCEDURE — 25000003 PHARM REV CODE 250: Mod: HCNC,PN | Performed by: INTERNAL MEDICINE

## 2020-02-28 RX ORDER — HEPARIN 100 UNIT/ML
500 SYRINGE INTRAVENOUS
Status: CANCELLED | OUTPATIENT
Start: 2020-03-06

## 2020-02-28 RX ORDER — DIPHENHYDRAMINE HCL 25 MG
25 CAPSULE ORAL
Status: CANCELLED
Start: 2020-03-06

## 2020-02-28 RX ORDER — ACETAMINOPHEN 325 MG/1
650 TABLET ORAL
Status: COMPLETED | OUTPATIENT
Start: 2020-02-28 | End: 2020-02-28

## 2020-02-28 RX ORDER — ACETAMINOPHEN 325 MG/1
650 TABLET ORAL
Status: CANCELLED
Start: 2020-03-06

## 2020-02-28 RX ORDER — DIPHENHYDRAMINE HCL 25 MG
25 CAPSULE ORAL
Status: COMPLETED | OUTPATIENT
Start: 2020-02-28 | End: 2020-02-28

## 2020-02-28 RX ORDER — SODIUM CHLORIDE 0.9 % (FLUSH) 0.9 %
10 SYRINGE (ML) INJECTION
Status: DISCONTINUED | OUTPATIENT
Start: 2020-02-28 | End: 2020-02-28 | Stop reason: HOSPADM

## 2020-02-28 RX ORDER — SODIUM CHLORIDE 0.9 % (FLUSH) 0.9 %
10 SYRINGE (ML) INJECTION
Status: CANCELLED | OUTPATIENT
Start: 2020-03-06

## 2020-02-28 RX ADMIN — ACETAMINOPHEN 650 MG: 325 TABLET, FILM COATED ORAL at 03:02

## 2020-02-28 RX ADMIN — DIPHENHYDRAMINE HYDROCHLORIDE 25 MG: 25 CAPSULE ORAL at 03:02

## 2020-02-28 RX ADMIN — SODIUM CHLORIDE: 9 INJECTION, SOLUTION INTRAVENOUS at 03:02

## 2020-02-28 RX ADMIN — IRON SUCROSE 200 MG: 20 INJECTION, SOLUTION INTRAVENOUS at 04:02

## 2020-02-28 RX ADMIN — Medication 10 ML: at 03:02

## 2020-02-28 NOTE — PLAN OF CARE
Problem: Fall Injury Risk  Goal: Absence of Fall and Fall-Related Injury  Outcome: Ongoing, Progressing  Intervention: Identify and Manage Contributors to Fall Injury Risk  Flowsheets (Taken 2/28/2020 1551)  Self-Care Promotion: independence encouraged     Pt in this shift for venofer. Pt c/o pain to his right foot and fatigue. All questions and concerns addressed at this time. Will continue to monitor.

## 2020-02-28 NOTE — PLAN OF CARE
Problem: Adult Inpatient Plan of Care  Goal: Plan of Care Review  Outcome: Ongoing, Progressing     Pt tolerated infusion well this shift. VSS. Pt is safe for discharge.

## 2020-03-01 ENCOUNTER — PATIENT MESSAGE (OUTPATIENT)
Dept: FAMILY MEDICINE | Facility: CLINIC | Age: 55
End: 2020-03-01

## 2020-03-02 NOTE — TELEPHONE ENCOUNTER
They look slightly improved. Schedule with derm re: folliculitis. Continue abx. Let me know when derm appt scheduled as he'll need to continue abx to that point.

## 2020-03-06 ENCOUNTER — INFUSION (OUTPATIENT)
Dept: INFUSION THERAPY | Facility: HOSPITAL | Age: 55
End: 2020-03-06
Attending: INTERNAL MEDICINE
Payer: MEDICARE

## 2020-03-06 VITALS
BODY MASS INDEX: 36.39 KG/M2 | HEIGHT: 71 IN | TEMPERATURE: 98 F | DIASTOLIC BLOOD PRESSURE: 67 MMHG | SYSTOLIC BLOOD PRESSURE: 138 MMHG | HEART RATE: 70 BPM | RESPIRATION RATE: 16 BRPM | WEIGHT: 259.94 LBS

## 2020-03-06 DIAGNOSIS — D62 ACUTE BLOOD LOSS ANEMIA: Primary | ICD-10-CM

## 2020-03-06 PROCEDURE — 25000003 PHARM REV CODE 250: Mod: HCNC,PN | Performed by: INTERNAL MEDICINE

## 2020-03-06 PROCEDURE — 96365 THER/PROPH/DIAG IV INF INIT: CPT | Mod: HCNC,PN

## 2020-03-06 PROCEDURE — 63600175 PHARM REV CODE 636 W HCPCS: Mod: HCNC,PN | Performed by: INTERNAL MEDICINE

## 2020-03-06 RX ORDER — DIPHENHYDRAMINE HCL 25 MG
25 CAPSULE ORAL
Status: COMPLETED | OUTPATIENT
Start: 2020-03-06 | End: 2020-03-06

## 2020-03-06 RX ORDER — ACETAMINOPHEN 325 MG/1
650 TABLET ORAL
Status: COMPLETED | OUTPATIENT
Start: 2020-03-06 | End: 2020-03-06

## 2020-03-06 RX ORDER — ACETAMINOPHEN 325 MG/1
650 TABLET ORAL
Status: CANCELLED
Start: 2020-03-13

## 2020-03-06 RX ORDER — SODIUM CHLORIDE 0.9 % (FLUSH) 0.9 %
10 SYRINGE (ML) INJECTION
Status: CANCELLED | OUTPATIENT
Start: 2020-03-13

## 2020-03-06 RX ORDER — DIPHENHYDRAMINE HCL 25 MG
25 CAPSULE ORAL
Status: CANCELLED
Start: 2020-03-13

## 2020-03-06 RX ORDER — HEPARIN 100 UNIT/ML
500 SYRINGE INTRAVENOUS
Status: CANCELLED | OUTPATIENT
Start: 2020-03-13

## 2020-03-06 RX ADMIN — SODIUM CHLORIDE: 9 INJECTION, SOLUTION INTRAVENOUS at 03:03

## 2020-03-06 RX ADMIN — ACETAMINOPHEN 650 MG: 325 TABLET, FILM COATED ORAL at 03:03

## 2020-03-06 RX ADMIN — DIPHENHYDRAMINE HYDROCHLORIDE 25 MG: 25 CAPSULE ORAL at 03:03

## 2020-03-06 RX ADMIN — IRON SUCROSE 200 MG: 20 INJECTION, SOLUTION INTRAVENOUS at 04:03

## 2020-03-07 NOTE — PLAN OF CARE
Pt tolerated 4th Venofer infuson well.  Pt stayed for 25 minutes for observation after completion of infusion.  No s/s of infusion reaction noted.  Instructed to call MD with any problems.

## 2020-03-09 ENCOUNTER — LAB VISIT (OUTPATIENT)
Dept: LAB | Facility: HOSPITAL | Age: 55
End: 2020-03-09
Attending: INTERNAL MEDICINE
Payer: MEDICARE

## 2020-03-09 DIAGNOSIS — R46.0 POOR HYGIENE: ICD-10-CM

## 2020-03-09 DIAGNOSIS — F20.9 SCHIZOPHRENIA, UNSPECIFIED TYPE: ICD-10-CM

## 2020-03-09 DIAGNOSIS — D50.9 IRON DEFICIENCY ANEMIA, UNSPECIFIED IRON DEFICIENCY ANEMIA TYPE: ICD-10-CM

## 2020-03-09 DIAGNOSIS — D50.9 MICROCYTIC ANEMIA: ICD-10-CM

## 2020-03-09 DIAGNOSIS — I10 ESSENTIAL HYPERTENSION: ICD-10-CM

## 2020-03-09 LAB
ALBUMIN SERPL BCP-MCNC: 3.5 G/DL (ref 3.5–5.2)
ALP SERPL-CCNC: 111 U/L (ref 55–135)
ALT SERPL W/O P-5'-P-CCNC: 11 U/L (ref 10–44)
ANION GAP SERPL CALC-SCNC: 8 MMOL/L (ref 8–16)
AST SERPL-CCNC: 14 U/L (ref 10–40)
BASOPHILS # BLD AUTO: 0.09 K/UL (ref 0–0.2)
BASOPHILS NFR BLD: 0.7 % (ref 0–1.9)
BILIRUB SERPL-MCNC: 0.2 MG/DL (ref 0.1–1)
BUN SERPL-MCNC: 10 MG/DL (ref 6–20)
CALCIUM SERPL-MCNC: 8.7 MG/DL (ref 8.7–10.5)
CHLORIDE SERPL-SCNC: 91 MMOL/L (ref 95–110)
CO2 SERPL-SCNC: 26 MMOL/L (ref 23–29)
CREAT SERPL-MCNC: 0.8 MG/DL (ref 0.5–1.4)
DIFFERENTIAL METHOD: ABNORMAL
EOSINOPHIL # BLD AUTO: 0.3 K/UL (ref 0–0.5)
EOSINOPHIL NFR BLD: 2.4 % (ref 0–8)
ERYTHROCYTE [DISTWIDTH] IN BLOOD BY AUTOMATED COUNT: 20.9 % (ref 11.5–14.5)
EST. GFR  (AFRICAN AMERICAN): >60 ML/MIN/1.73 M^2
EST. GFR  (NON AFRICAN AMERICAN): >60 ML/MIN/1.73 M^2
FERRITIN SERPL-MCNC: 188 NG/ML (ref 20–300)
GLUCOSE SERPL-MCNC: 104 MG/DL (ref 70–110)
HCT VFR BLD AUTO: 33.4 % (ref 40–54)
HGB BLD-MCNC: 11 G/DL (ref 14–18)
IRON SERPL-MCNC: 46 UG/DL (ref 45–160)
LYMPHOCYTES # BLD AUTO: 3.1 K/UL (ref 1–4.8)
LYMPHOCYTES NFR BLD: 24.3 % (ref 18–48)
MCH RBC QN AUTO: 24 PG (ref 27–31)
MCHC RBC AUTO-ENTMCNC: 32.9 G/DL (ref 32–36)
MCV RBC AUTO: 73 FL (ref 82–98)
MONOCYTES # BLD AUTO: 1.4 K/UL (ref 0.3–1)
MONOCYTES NFR BLD: 10.8 % (ref 4–15)
NEUTROPHILS # BLD AUTO: 7.9 K/UL (ref 1.8–7.7)
NEUTROPHILS NFR BLD: 61.8 % (ref 38–73)
PLATELET # BLD AUTO: 541 K/UL (ref 150–350)
PMV BLD AUTO: 10.3 FL (ref 9.2–12.9)
POTASSIUM SERPL-SCNC: 4.4 MMOL/L (ref 3.5–5.1)
PROT SERPL-MCNC: 7.2 G/DL (ref 6–8.4)
RBC # BLD AUTO: 4.59 M/UL (ref 4.6–6.2)
SATURATED IRON: 11 % (ref 20–50)
SODIUM SERPL-SCNC: 125 MMOL/L (ref 136–145)
TOTAL IRON BINDING CAPACITY: 404 UG/DL (ref 250–450)
TRANSFERRIN SERPL-MCNC: 273 MG/DL (ref 200–375)
WBC # BLD AUTO: 12.76 K/UL (ref 3.9–12.7)

## 2020-03-09 PROCEDURE — 80053 COMPREHEN METABOLIC PANEL: CPT | Mod: HCNC

## 2020-03-09 PROCEDURE — 85025 COMPLETE CBC W/AUTO DIFF WBC: CPT | Mod: HCNC,PO

## 2020-03-09 PROCEDURE — 82728 ASSAY OF FERRITIN: CPT | Mod: HCNC

## 2020-03-09 PROCEDURE — 83540 ASSAY OF IRON: CPT | Mod: HCNC

## 2020-03-09 PROCEDURE — 36415 COLL VENOUS BLD VENIPUNCTURE: CPT | Mod: HCNC,PN

## 2020-03-11 ENCOUNTER — OFFICE VISIT (OUTPATIENT)
Dept: HEMATOLOGY/ONCOLOGY | Facility: CLINIC | Age: 55
End: 2020-03-11
Payer: MEDICARE

## 2020-03-11 ENCOUNTER — PATIENT MESSAGE (OUTPATIENT)
Dept: HEMATOLOGY/ONCOLOGY | Facility: CLINIC | Age: 55
End: 2020-03-11

## 2020-03-11 VITALS
OXYGEN SATURATION: 98 % | BODY MASS INDEX: 36.73 KG/M2 | WEIGHT: 262.38 LBS | HEART RATE: 76 BPM | TEMPERATURE: 98 F | DIASTOLIC BLOOD PRESSURE: 82 MMHG | SYSTOLIC BLOOD PRESSURE: 162 MMHG | RESPIRATION RATE: 22 BRPM | HEIGHT: 71 IN

## 2020-03-11 DIAGNOSIS — D50.9 MICROCYTIC ANEMIA: ICD-10-CM

## 2020-03-11 DIAGNOSIS — R46.0 POOR HYGIENE: ICD-10-CM

## 2020-03-11 DIAGNOSIS — I10 ESSENTIAL HYPERTENSION: ICD-10-CM

## 2020-03-11 DIAGNOSIS — D50.9 IRON DEFICIENCY ANEMIA, UNSPECIFIED IRON DEFICIENCY ANEMIA TYPE: Primary | ICD-10-CM

## 2020-03-11 DIAGNOSIS — F20.9 SCHIZOPHRENIA, UNSPECIFIED TYPE: ICD-10-CM

## 2020-03-11 DIAGNOSIS — R73.03 PREDIABETES: ICD-10-CM

## 2020-03-11 DIAGNOSIS — F17.200 TOBACCO USE DISORDER: ICD-10-CM

## 2020-03-11 PROCEDURE — 3079F PR MOST RECENT DIASTOLIC BLOOD PRESSURE 80-89 MM HG: ICD-10-PCS | Mod: HCNC,CPTII,S$GLB, | Performed by: INTERNAL MEDICINE

## 2020-03-11 PROCEDURE — 3077F PR MOST RECENT SYSTOLIC BLOOD PRESSURE >= 140 MM HG: ICD-10-PCS | Mod: HCNC,CPTII,S$GLB, | Performed by: INTERNAL MEDICINE

## 2020-03-11 PROCEDURE — 99214 OFFICE O/P EST MOD 30 MIN: CPT | Mod: HCNC,S$GLB,, | Performed by: INTERNAL MEDICINE

## 2020-03-11 PROCEDURE — 99999 PR PBB SHADOW E&M-EST. PATIENT-LVL III: CPT | Mod: PBBFAC,HCNC,, | Performed by: INTERNAL MEDICINE

## 2020-03-11 PROCEDURE — 99214 PR OFFICE/OUTPT VISIT, EST, LEVL IV, 30-39 MIN: ICD-10-PCS | Mod: HCNC,S$GLB,, | Performed by: INTERNAL MEDICINE

## 2020-03-11 PROCEDURE — 3008F PR BODY MASS INDEX (BMI) DOCUMENTED: ICD-10-PCS | Mod: HCNC,CPTII,S$GLB, | Performed by: INTERNAL MEDICINE

## 2020-03-11 PROCEDURE — 99999 PR PBB SHADOW E&M-EST. PATIENT-LVL III: ICD-10-PCS | Mod: PBBFAC,HCNC,, | Performed by: INTERNAL MEDICINE

## 2020-03-11 PROCEDURE — 3077F SYST BP >= 140 MM HG: CPT | Mod: HCNC,CPTII,S$GLB, | Performed by: INTERNAL MEDICINE

## 2020-03-11 PROCEDURE — 3079F DIAST BP 80-89 MM HG: CPT | Mod: HCNC,CPTII,S$GLB, | Performed by: INTERNAL MEDICINE

## 2020-03-11 PROCEDURE — 3008F BODY MASS INDEX DOCD: CPT | Mod: HCNC,CPTII,S$GLB, | Performed by: INTERNAL MEDICINE

## 2020-03-11 RX ORDER — METFORMIN HYDROCHLORIDE 500 MG/1
TABLET, EXTENDED RELEASE ORAL
Qty: 90 TABLET | Refills: 1 | Status: SHIPPED | OUTPATIENT
Start: 2020-03-11 | End: 2020-09-10 | Stop reason: SDUPTHER

## 2020-03-11 NOTE — PROGRESS NOTES
HPI      54 years old male referred to [facility] for evaluation of microcytic anemia and thrombocytosis.  Patient had fatigue and malaise was started on vitamin and iron supplement by primary care doctor.    Patient denies any family history of blood disorders.  Patient is active smoker.    10/16/2019:  Patient here to follow-up to review blood work.  No complaints.  Patient states that he is taking iron pill every day empty stomach with orange juice since last visit.  Latest blood work obtained on 10/09/2019 shortly after last visit.    December patient had a colonoscopy notice of active ulceration in bleeding which was controlled by hemostasis clip.  Specimen sent for analysis.    02/05/2020:  No acute events    03/11/2020:  No acute events.  Patient here follow-up with IV iron infusion response    Past Medical History:   Diagnosis Date    Acute angina     Asthma     COPD (chronic obstructive pulmonary disease)     Hypertension     MI, old     PIPER on CPAP     Schizophrenia     Smoker unmotivated to quit     Stroke 2005    Type II diabetes mellitus with neurological manifestations 11/21/2019     Social History     Socioeconomic History    Marital status:      Spouse name: Not on file    Number of children: Not on file    Years of education: Not on file    Highest education level: Not on file   Occupational History    Occupation: disabled (mental)     Comment: since 2000   Social Needs    Financial resource strain: Not hard at all    Food insecurity:     Worry: Never true     Inability: Never true    Transportation needs:     Medical: Yes     Non-medical: No   Tobacco Use    Smoking status: Current Every Day Smoker     Packs/day: 1.00     Years: 45.00     Pack years: 45.00     Types: Cigarettes    Smokeless tobacco: Never Used    Tobacco comment: Age Started 14    Substance and Sexual Activity    Alcohol use: No     Frequency: Never     Drinks per session: Patient refused      Binge frequency: Never    Drug use: Not Currently     Types: Marijuana    Sexual activity: Yes     Partners: Female   Lifestyle    Physical activity:     Days per week: 1 day     Minutes per session: 10 min    Stress: To some extent   Relationships    Social connections:     Talks on phone: More than three times a week     Gets together: More than three times a week     Attends Faith service: Not on file     Active member of club or organization: Patient refused     Attends meetings of clubs or organizations: Patient refused     Relationship status:    Other Topics Concern    Not on file   Social History Narrative    Not on file       Objective    Physical Exam     Vitals:    03/11/20 1301   BP: (!) 162/82   Pulse: 76   Resp: (!) 22   Temp: 98.3 °F (36.8 °C)         Constitutional:  Awake alert  HENT:  Normocephalic atraumatic pupils equal round reactive to light extraocular muscle intact  Cardiovascular:  Tachycardia   Pulmonary/Chest:  Clear to auscultate   Abdominal:  Soft nontender nondistended bowel sounds x4   Musculoskeletal: Normal range of motion. Gait is normal  No clubbing, cyanosis     Lymphadenopathy:  No evidence lymphadenopathy  Neurological:  Cranial nerves 2-12 grossly intact sensorimotor grossly intact no focal deficit  Skin:  Warm dry rash no lesions   Psychiatric:  Normal affect  Vitals reviewed.     CMP  Sodium   Date Value Ref Range Status   03/09/2020 125 (L) 136 - 145 mmol/L Final     Potassium   Date Value Ref Range Status   03/09/2020 4.4 3.5 - 5.1 mmol/L Final     Chloride   Date Value Ref Range Status   03/09/2020 91 (L) 95 - 110 mmol/L Final     CO2   Date Value Ref Range Status   03/09/2020 26 23 - 29 mmol/L Final     Glucose   Date Value Ref Range Status   03/09/2020 104 70 - 110 mg/dL Final     BUN, Bld   Date Value Ref Range Status   03/09/2020 10 6 - 20 mg/dL Final     Creatinine   Date Value Ref Range Status   03/09/2020 0.8 0.5 - 1.4 mg/dL Final     Calcium    Date Value Ref Range Status   03/09/2020 8.7 8.7 - 10.5 mg/dL Final     Total Protein   Date Value Ref Range Status   03/09/2020 7.2 6.0 - 8.4 g/dL Final     Albumin   Date Value Ref Range Status   03/09/2020 3.5 3.5 - 5.2 g/dL Final     Total Bilirubin   Date Value Ref Range Status   03/09/2020 0.2 0.1 - 1.0 mg/dL Final     Comment:     For infants and newborns, interpretation of results should be based  on gestational age, weight and in agreement with clinical  observations.  Premature Infant recommended reference ranges:  Up to 24 hours.............<8.0 mg/dL  Up to 48 hours............<12.0 mg/dL  3-5 days..................<15.0 mg/dL  6-29 days.................<15.0 mg/dL       Alkaline Phosphatase   Date Value Ref Range Status   03/09/2020 111 55 - 135 U/L Final     AST   Date Value Ref Range Status   03/09/2020 14 10 - 40 U/L Final     ALT   Date Value Ref Range Status   03/09/2020 11 10 - 44 U/L Final     Anion Gap   Date Value Ref Range Status   03/09/2020 8 8 - 16 mmol/L Final     eGFR if    Date Value Ref Range Status   03/09/2020 >60.0 >60 mL/min/1.73 m^2 Final     eGFR if non    Date Value Ref Range Status   03/09/2020 >60.0 >60 mL/min/1.73 m^2 Final     Comment:     Calculation used to obtain the estimated glomerular filtration  rate (eGFR) is the CKD-EPI equation.        Lab Results   Component Value Date    WBC 12.76 (H) 03/09/2020    HGB 11.0 (L) 03/09/2020    HCT 33.4 (L) 03/09/2020    MCV 73 (L) 03/09/2020     (H) 03/09/2020     10/09/2019:  Ferritin 7, iron 34, TIBC 418, iron saturation 8%    11/06/2019:  Iron 256, TIBC 397, iron saturation 64% transferrin 268, ferritin 51.    02/03/2020:  Iron 25, TIBC 454, transferrin 307, iron saturation 6.  Ferritin 9    03/09/2020:  Ferritin 188, iron 46, transferrin 273, TIBC 404, iron saturation 11%.    Assessment    Relapse of iron deficiency anemia.    Leukocytosis likely reactive    12/13/2019 patient had  colonoscopy which showed active bleeding near hepato flexure a hemostatic clip will placed.  Follow-up 3 years was recommended at the time    Thrombocytosis reactive    Plan    Plan to complete IV iron replacement Venofer 200 mg IV with premedication of Tylenol and Benadryl weekly  then repeat iron panel thereafter.  RTC 6 weeks     Adequate vitamin B12 level continue monitor    Hypertension follow PCP    Tobacco use patient wishes to stop using tobacco or followed by primary care physician's    Primary care physician to follow all other medical events.    M*Modal dictations      There are no diagnoses linked to this encounter.

## 2020-03-13 ENCOUNTER — INFUSION (OUTPATIENT)
Dept: INFUSION THERAPY | Facility: HOSPITAL | Age: 55
End: 2020-03-13
Attending: INTERNAL MEDICINE
Payer: MEDICARE

## 2020-03-13 VITALS
BODY MASS INDEX: 36.73 KG/M2 | SYSTOLIC BLOOD PRESSURE: 145 MMHG | DIASTOLIC BLOOD PRESSURE: 84 MMHG | HEART RATE: 72 BPM | TEMPERATURE: 98 F | RESPIRATION RATE: 16 BRPM | WEIGHT: 262.38 LBS | HEIGHT: 71 IN

## 2020-03-13 DIAGNOSIS — D62 ACUTE BLOOD LOSS ANEMIA: Primary | ICD-10-CM

## 2020-03-13 PROCEDURE — 63600175 PHARM REV CODE 636 W HCPCS: Mod: HCNC,PN | Performed by: INTERNAL MEDICINE

## 2020-03-13 PROCEDURE — 25000003 PHARM REV CODE 250: Mod: HCNC,PN | Performed by: INTERNAL MEDICINE

## 2020-03-13 PROCEDURE — 96365 THER/PROPH/DIAG IV INF INIT: CPT | Mod: HCNC,PN

## 2020-03-13 RX ORDER — ACETAMINOPHEN 325 MG/1
650 TABLET ORAL
Status: COMPLETED | OUTPATIENT
Start: 2020-03-13 | End: 2020-03-13

## 2020-03-13 RX ORDER — ACETAMINOPHEN 325 MG/1
650 TABLET ORAL
Status: CANCELLED
Start: 2020-03-20

## 2020-03-13 RX ORDER — SODIUM CHLORIDE 0.9 % (FLUSH) 0.9 %
10 SYRINGE (ML) INJECTION
Status: CANCELLED | OUTPATIENT
Start: 2020-03-20

## 2020-03-13 RX ORDER — DIPHENHYDRAMINE HCL 25 MG
25 CAPSULE ORAL
Status: CANCELLED
Start: 2020-03-20

## 2020-03-13 RX ORDER — HEPARIN 100 UNIT/ML
500 SYRINGE INTRAVENOUS
Status: CANCELLED | OUTPATIENT
Start: 2020-03-20

## 2020-03-13 RX ORDER — DIPHENHYDRAMINE HCL 25 MG
25 CAPSULE ORAL
Status: COMPLETED | OUTPATIENT
Start: 2020-03-13 | End: 2020-03-13

## 2020-03-13 RX ADMIN — IRON SUCROSE 200 MG: 20 INJECTION, SOLUTION INTRAVENOUS at 04:03

## 2020-03-13 RX ADMIN — ACETAMINOPHEN 650 MG: 325 TABLET, FILM COATED ORAL at 03:03

## 2020-03-13 RX ADMIN — SODIUM CHLORIDE: 9 INJECTION, SOLUTION INTRAVENOUS at 03:03

## 2020-03-13 RX ADMIN — DIPHENHYDRAMINE HYDROCHLORIDE 25 MG: 25 CAPSULE ORAL at 03:03

## 2020-03-13 NOTE — PLAN OF CARE
Pt tolerated Venofer infusion well.  Pt stayed for observation for 25 minutes.  No s/s of infusion reaction noted.  Instructed to call MD with any problems.

## 2020-03-19 ENCOUNTER — PATIENT MESSAGE (OUTPATIENT)
Dept: FAMILY MEDICINE | Facility: CLINIC | Age: 55
End: 2020-03-19

## 2020-03-20 ENCOUNTER — INFUSION (OUTPATIENT)
Dept: INFUSION THERAPY | Facility: HOSPITAL | Age: 55
End: 2020-03-20
Attending: INTERNAL MEDICINE
Payer: MEDICARE

## 2020-03-20 ENCOUNTER — PATIENT MESSAGE (OUTPATIENT)
Dept: HEMATOLOGY/ONCOLOGY | Facility: CLINIC | Age: 55
End: 2020-03-20

## 2020-03-20 ENCOUNTER — PATIENT MESSAGE (OUTPATIENT)
Dept: PAIN MEDICINE | Facility: CLINIC | Age: 55
End: 2020-03-20

## 2020-03-20 VITALS
TEMPERATURE: 98 F | HEART RATE: 92 BPM | SYSTOLIC BLOOD PRESSURE: 126 MMHG | DIASTOLIC BLOOD PRESSURE: 73 MMHG | RESPIRATION RATE: 18 BRPM | BODY MASS INDEX: 36.73 KG/M2 | WEIGHT: 262.38 LBS | HEIGHT: 71 IN

## 2020-03-20 DIAGNOSIS — D62 ACUTE BLOOD LOSS ANEMIA: Primary | ICD-10-CM

## 2020-03-20 PROCEDURE — A4216 STERILE WATER/SALINE, 10 ML: HCPCS | Mod: HCNC,PN | Performed by: INTERNAL MEDICINE

## 2020-03-20 PROCEDURE — 96365 THER/PROPH/DIAG IV INF INIT: CPT | Mod: HCNC,PN

## 2020-03-20 PROCEDURE — 63600175 PHARM REV CODE 636 W HCPCS: Mod: HCNC,PN | Performed by: INTERNAL MEDICINE

## 2020-03-20 PROCEDURE — 25000003 PHARM REV CODE 250: Mod: HCNC,PN | Performed by: INTERNAL MEDICINE

## 2020-03-20 RX ORDER — DIPHENHYDRAMINE HCL 25 MG
25 CAPSULE ORAL
Status: COMPLETED | OUTPATIENT
Start: 2020-03-20 | End: 2020-03-20

## 2020-03-20 RX ORDER — SODIUM CHLORIDE 0.9 % (FLUSH) 0.9 %
10 SYRINGE (ML) INJECTION
Status: DISCONTINUED | OUTPATIENT
Start: 2020-03-20 | End: 2020-03-20 | Stop reason: HOSPADM

## 2020-03-20 RX ORDER — DIPHENHYDRAMINE HCL 25 MG
25 CAPSULE ORAL
Status: CANCELLED
Start: 2020-03-27

## 2020-03-20 RX ORDER — SODIUM CHLORIDE 0.9 % (FLUSH) 0.9 %
10 SYRINGE (ML) INJECTION
Status: CANCELLED | OUTPATIENT
Start: 2020-03-27

## 2020-03-20 RX ORDER — ACETAMINOPHEN 325 MG/1
650 TABLET ORAL
Status: CANCELLED
Start: 2020-03-27

## 2020-03-20 RX ORDER — ACETAMINOPHEN 325 MG/1
650 TABLET ORAL
Status: COMPLETED | OUTPATIENT
Start: 2020-03-20 | End: 2020-03-20

## 2020-03-20 RX ORDER — HEPARIN 100 UNIT/ML
500 SYRINGE INTRAVENOUS
Status: CANCELLED | OUTPATIENT
Start: 2020-03-27

## 2020-03-20 RX ADMIN — Medication 10 ML: at 05:03

## 2020-03-20 RX ADMIN — DIPHENHYDRAMINE HYDROCHLORIDE 25 MG: 25 CAPSULE ORAL at 04:03

## 2020-03-20 RX ADMIN — IRON SUCROSE 200 MG: 20 INJECTION, SOLUTION INTRAVENOUS at 04:03

## 2020-03-20 RX ADMIN — ACETAMINOPHEN 650 MG: 325 TABLET, FILM COATED ORAL at 04:03

## 2020-03-20 RX ADMIN — SODIUM CHLORIDE: 9 INJECTION, SOLUTION INTRAVENOUS at 04:03

## 2020-03-20 NOTE — PLAN OF CARE
Problem: Adult Inpatient Plan of Care  Goal: Patient-Specific Goal (Individualization)  Outcome: Ongoing, Progressing  Flowsheets (Taken 3/20/2020 1700)  Individualized Care Needs: snacks, TV  Anxieties, Fears or Concerns: none  Patient-Specific Goals (Include Timeframe): infection prevention during treatment     Problem: Fatigue  Goal: Improved Activity Tolerance  Outcome: Ongoing, Progressing  Intervention: Promote Energy Conservation  Flowsheets (Taken 3/20/2020 1700)  Fatigue Management: frequent rest breaks encouraged  Sleep/Rest Enhancement: regular sleep/rest pattern promoted  Activity Management: ambulated in noel - L4;up in chair - L3

## 2020-03-20 NOTE — PLAN OF CARE
Problem: Adult Inpatient Plan of Care  Goal: Plan of Care Review  Outcome: Ongoing, Progressing  Flowsheets (Taken 3/20/2020 2231)  Plan of Care Reviewed With: patient   Pt tolerated Venofer infusion well.  No adverse reaction noted.  Pt education reinforced on potential side effects, what to expect, and when to call physician; we also reviewed patient's schedule and understanding verbalized.    IV flushed with NS and D/C per protocol.  Patient ambulated out of clinic in no acute distress.

## 2020-03-27 ENCOUNTER — INFUSION (OUTPATIENT)
Dept: INFUSION THERAPY | Facility: HOSPITAL | Age: 55
End: 2020-03-27
Attending: INTERNAL MEDICINE
Payer: MEDICARE

## 2020-03-27 VITALS
DIASTOLIC BLOOD PRESSURE: 76 MMHG | WEIGHT: 262.56 LBS | HEART RATE: 74 BPM | RESPIRATION RATE: 18 BRPM | BODY MASS INDEX: 36.76 KG/M2 | SYSTOLIC BLOOD PRESSURE: 138 MMHG | HEIGHT: 71 IN | TEMPERATURE: 98 F

## 2020-03-27 DIAGNOSIS — D62 ACUTE BLOOD LOSS ANEMIA: Primary | ICD-10-CM

## 2020-03-27 PROCEDURE — A4216 STERILE WATER/SALINE, 10 ML: HCPCS | Mod: HCNC,PN | Performed by: INTERNAL MEDICINE

## 2020-03-27 PROCEDURE — 63600175 PHARM REV CODE 636 W HCPCS: Mod: HCNC,PN | Performed by: INTERNAL MEDICINE

## 2020-03-27 PROCEDURE — 25000003 PHARM REV CODE 250: Mod: HCNC,PN | Performed by: INTERNAL MEDICINE

## 2020-03-27 PROCEDURE — 96365 THER/PROPH/DIAG IV INF INIT: CPT | Mod: HCNC,PN

## 2020-03-27 RX ORDER — SODIUM CHLORIDE 0.9 % (FLUSH) 0.9 %
10 SYRINGE (ML) INJECTION
Status: CANCELLED | OUTPATIENT
Start: 2020-04-03

## 2020-03-27 RX ORDER — HEPARIN 100 UNIT/ML
500 SYRINGE INTRAVENOUS
Status: DISCONTINUED | OUTPATIENT
Start: 2020-03-27 | End: 2020-03-27 | Stop reason: HOSPADM

## 2020-03-27 RX ORDER — HEPARIN 100 UNIT/ML
500 SYRINGE INTRAVENOUS
Status: CANCELLED | OUTPATIENT
Start: 2020-04-03

## 2020-03-27 RX ORDER — ACETAMINOPHEN 325 MG/1
650 TABLET ORAL
Status: COMPLETED | OUTPATIENT
Start: 2020-03-27 | End: 2020-03-27

## 2020-03-27 RX ORDER — ACETAMINOPHEN 325 MG/1
650 TABLET ORAL
Status: CANCELLED
Start: 2020-04-03

## 2020-03-27 RX ORDER — SODIUM CHLORIDE 0.9 % (FLUSH) 0.9 %
10 SYRINGE (ML) INJECTION
Status: DISCONTINUED | OUTPATIENT
Start: 2020-03-27 | End: 2020-03-27 | Stop reason: HOSPADM

## 2020-03-27 RX ORDER — DIPHENHYDRAMINE HCL 25 MG
25 CAPSULE ORAL
Status: COMPLETED | OUTPATIENT
Start: 2020-03-27 | End: 2020-03-27

## 2020-03-27 RX ORDER — DIPHENHYDRAMINE HCL 25 MG
25 CAPSULE ORAL
Status: CANCELLED
Start: 2020-04-03

## 2020-03-27 RX ADMIN — DIPHENHYDRAMINE HYDROCHLORIDE 25 MG: 25 CAPSULE ORAL at 11:03

## 2020-03-27 RX ADMIN — ACETAMINOPHEN 650 MG: 325 TABLET, FILM COATED ORAL at 11:03

## 2020-03-27 RX ADMIN — Medication 10 ML: at 11:03

## 2020-03-27 RX ADMIN — IRON SUCROSE 200 MG: 20 INJECTION, SOLUTION INTRAVENOUS at 11:03

## 2020-03-27 RX ADMIN — SODIUM CHLORIDE: 9 INJECTION, SOLUTION INTRAVENOUS at 11:03

## 2020-03-27 NOTE — PLAN OF CARE
Tolerated Venofer well, VSS, schedule reviewed with pt, ambulated from infusion center, no distress noted

## 2020-04-03 ENCOUNTER — INFUSION (OUTPATIENT)
Dept: INFUSION THERAPY | Facility: HOSPITAL | Age: 55
End: 2020-04-03
Attending: INTERNAL MEDICINE
Payer: MEDICARE

## 2020-04-03 VITALS
RESPIRATION RATE: 18 BRPM | DIASTOLIC BLOOD PRESSURE: 80 MMHG | TEMPERATURE: 98 F | SYSTOLIC BLOOD PRESSURE: 175 MMHG | HEART RATE: 75 BPM

## 2020-04-03 DIAGNOSIS — D62 ACUTE BLOOD LOSS ANEMIA: Primary | ICD-10-CM

## 2020-04-03 PROCEDURE — 25000003 PHARM REV CODE 250: Mod: HCNC,PN | Performed by: INTERNAL MEDICINE

## 2020-04-03 PROCEDURE — 63600175 PHARM REV CODE 636 W HCPCS: Mod: HCNC,PN | Performed by: INTERNAL MEDICINE

## 2020-04-03 PROCEDURE — 96365 THER/PROPH/DIAG IV INF INIT: CPT | Mod: HCNC,PN

## 2020-04-03 RX ORDER — SODIUM CHLORIDE 0.9 % (FLUSH) 0.9 %
10 SYRINGE (ML) INJECTION
Status: CANCELLED | OUTPATIENT
Start: 2020-04-10

## 2020-04-03 RX ORDER — HEPARIN 100 UNIT/ML
500 SYRINGE INTRAVENOUS
Status: CANCELLED | OUTPATIENT
Start: 2020-04-10

## 2020-04-03 RX ORDER — ACETAMINOPHEN 325 MG/1
650 TABLET ORAL
Status: COMPLETED | OUTPATIENT
Start: 2020-04-03 | End: 2020-04-03

## 2020-04-03 RX ORDER — SODIUM CHLORIDE 0.9 % (FLUSH) 0.9 %
10 SYRINGE (ML) INJECTION
Status: DISCONTINUED | OUTPATIENT
Start: 2020-04-03 | End: 2020-04-03 | Stop reason: HOSPADM

## 2020-04-03 RX ORDER — DIPHENHYDRAMINE HCL 25 MG
25 CAPSULE ORAL
Status: CANCELLED
Start: 2020-04-10

## 2020-04-03 RX ORDER — DIPHENHYDRAMINE HCL 25 MG
25 CAPSULE ORAL
Status: COMPLETED | OUTPATIENT
Start: 2020-04-03 | End: 2020-04-03

## 2020-04-03 RX ORDER — ACETAMINOPHEN 325 MG/1
650 TABLET ORAL
Status: CANCELLED
Start: 2020-04-10

## 2020-04-03 RX ORDER — HEPARIN 100 UNIT/ML
500 SYRINGE INTRAVENOUS
Status: DISCONTINUED | OUTPATIENT
Start: 2020-04-03 | End: 2020-04-03 | Stop reason: HOSPADM

## 2020-04-03 RX ADMIN — SODIUM CHLORIDE: 9 INJECTION, SOLUTION INTRAVENOUS at 10:04

## 2020-04-03 RX ADMIN — ACETAMINOPHEN 650 MG: 325 TABLET, FILM COATED ORAL at 10:04

## 2020-04-03 RX ADMIN — DIPHENHYDRAMINE HYDROCHLORIDE 25 MG: 25 CAPSULE ORAL at 10:04

## 2020-04-03 RX ADMIN — IRON SUCROSE 200 MG: 20 INJECTION, SOLUTION INTRAVENOUS at 10:04

## 2020-04-06 ENCOUNTER — PATIENT MESSAGE (OUTPATIENT)
Dept: HEMATOLOGY/ONCOLOGY | Facility: CLINIC | Age: 55
End: 2020-04-06

## 2020-05-05 ENCOUNTER — PATIENT MESSAGE (OUTPATIENT)
Dept: FAMILY MEDICINE | Facility: CLINIC | Age: 55
End: 2020-05-05

## 2020-05-05 DIAGNOSIS — L02.419 ABSCESS, AXILLA: ICD-10-CM

## 2020-05-06 ENCOUNTER — PATIENT MESSAGE (OUTPATIENT)
Dept: ADMINISTRATIVE | Facility: HOSPITAL | Age: 55
End: 2020-05-06

## 2020-05-06 RX ORDER — TRAMADOL HYDROCHLORIDE 50 MG/1
TABLET ORAL
Qty: 40 TABLET | Refills: 1 | Status: SHIPPED | OUTPATIENT
Start: 2020-05-06 | End: 2020-09-30

## 2020-06-01 RX ORDER — EPINEPHRINE 0.3 MG/.3ML
INJECTION SUBCUTANEOUS
Qty: 2 EACH | Refills: 0 | Status: SHIPPED | OUTPATIENT
Start: 2020-06-01 | End: 2020-10-23

## 2020-06-02 ENCOUNTER — LAB VISIT (OUTPATIENT)
Dept: LAB | Facility: HOSPITAL | Age: 55
End: 2020-06-02
Attending: INTERNAL MEDICINE
Payer: MEDICARE

## 2020-06-02 DIAGNOSIS — D50.9 IRON DEFICIENCY ANEMIA, UNSPECIFIED IRON DEFICIENCY ANEMIA TYPE: ICD-10-CM

## 2020-06-02 DIAGNOSIS — R46.0 POOR HYGIENE: ICD-10-CM

## 2020-06-02 DIAGNOSIS — D50.9 MICROCYTIC ANEMIA: ICD-10-CM

## 2020-06-02 DIAGNOSIS — F20.9 SCHIZOPHRENIA, UNSPECIFIED TYPE: ICD-10-CM

## 2020-06-02 DIAGNOSIS — I10 ESSENTIAL HYPERTENSION: ICD-10-CM

## 2020-06-02 LAB
ALBUMIN SERPL BCP-MCNC: 3.8 G/DL (ref 3.5–5.2)
ALP SERPL-CCNC: 118 U/L (ref 55–135)
ALT SERPL W/O P-5'-P-CCNC: 24 U/L (ref 10–44)
ANION GAP SERPL CALC-SCNC: 9 MMOL/L (ref 8–16)
AST SERPL-CCNC: 26 U/L (ref 10–40)
BASOPHILS # BLD AUTO: 0.06 K/UL (ref 0–0.2)
BASOPHILS NFR BLD: 0.5 % (ref 0–1.9)
BILIRUB SERPL-MCNC: 0.2 MG/DL (ref 0.1–1)
BUN SERPL-MCNC: 7 MG/DL (ref 6–20)
CALCIUM SERPL-MCNC: 9.1 MG/DL (ref 8.7–10.5)
CHLORIDE SERPL-SCNC: 96 MMOL/L (ref 95–110)
CO2 SERPL-SCNC: 28 MMOL/L (ref 23–29)
CREAT SERPL-MCNC: 0.8 MG/DL (ref 0.5–1.4)
DIFFERENTIAL METHOD: ABNORMAL
EOSINOPHIL # BLD AUTO: 0.3 K/UL (ref 0–0.5)
EOSINOPHIL NFR BLD: 2.7 % (ref 0–8)
ERYTHROCYTE [DISTWIDTH] IN BLOOD BY AUTOMATED COUNT: 17.5 % (ref 11.5–14.5)
EST. GFR  (AFRICAN AMERICAN): >60 ML/MIN/1.73 M^2
EST. GFR  (NON AFRICAN AMERICAN): >60 ML/MIN/1.73 M^2
GLUCOSE SERPL-MCNC: 100 MG/DL (ref 70–110)
HCT VFR BLD AUTO: 39.7 % (ref 40–54)
HGB BLD-MCNC: 13.7 G/DL (ref 14–18)
LYMPHOCYTES # BLD AUTO: 3.3 K/UL (ref 1–4.8)
LYMPHOCYTES NFR BLD: 28.3 % (ref 18–48)
MCH RBC QN AUTO: 27.2 PG (ref 27–31)
MCHC RBC AUTO-ENTMCNC: 34.5 G/DL (ref 32–36)
MCV RBC AUTO: 79 FL (ref 82–98)
MONOCYTES # BLD AUTO: 1.4 K/UL (ref 0.3–1)
MONOCYTES NFR BLD: 12.3 % (ref 4–15)
NEUTROPHILS # BLD AUTO: 6.5 K/UL (ref 1.8–7.7)
NEUTROPHILS NFR BLD: 56.2 % (ref 38–73)
PLATELET # BLD AUTO: 428 K/UL (ref 150–350)
PMV BLD AUTO: 10.5 FL (ref 9.2–12.9)
POTASSIUM SERPL-SCNC: 4.4 MMOL/L (ref 3.5–5.1)
PROT SERPL-MCNC: 7.3 G/DL (ref 6–8.4)
RBC # BLD AUTO: 5.04 M/UL (ref 4.6–6.2)
SODIUM SERPL-SCNC: 133 MMOL/L (ref 136–145)
WBC # BLD AUTO: 11.64 K/UL (ref 3.9–12.7)

## 2020-06-02 PROCEDURE — 36415 COLL VENOUS BLD VENIPUNCTURE: CPT | Mod: HCNC,PN

## 2020-06-02 PROCEDURE — 83540 ASSAY OF IRON: CPT | Mod: HCNC

## 2020-06-02 PROCEDURE — 85025 COMPLETE CBC W/AUTO DIFF WBC: CPT | Mod: HCNC,PO

## 2020-06-02 PROCEDURE — 80053 COMPREHEN METABOLIC PANEL: CPT | Mod: HCNC,PO

## 2020-06-02 PROCEDURE — 82728 ASSAY OF FERRITIN: CPT | Mod: HCNC

## 2020-06-03 LAB
FERRITIN SERPL-MCNC: 21 NG/ML (ref 20–300)
IRON SERPL-MCNC: 32 UG/DL (ref 45–160)
SATURATED IRON: 8 % (ref 20–50)
TOTAL IRON BINDING CAPACITY: 406 UG/DL (ref 250–450)
TRANSFERRIN SERPL-MCNC: 274 MG/DL (ref 200–375)

## 2020-06-05 ENCOUNTER — OFFICE VISIT (OUTPATIENT)
Dept: HEMATOLOGY/ONCOLOGY | Facility: CLINIC | Age: 55
End: 2020-06-05
Payer: MEDICARE

## 2020-06-05 DIAGNOSIS — F20.9 SCHIZOPHRENIA, UNSPECIFIED TYPE: ICD-10-CM

## 2020-06-05 DIAGNOSIS — D50.9 MICROCYTIC ANEMIA: ICD-10-CM

## 2020-06-05 DIAGNOSIS — I10 ESSENTIAL HYPERTENSION: ICD-10-CM

## 2020-06-05 DIAGNOSIS — D50.9 IRON DEFICIENCY ANEMIA, UNSPECIFIED IRON DEFICIENCY ANEMIA TYPE: Primary | ICD-10-CM

## 2020-06-05 DIAGNOSIS — F17.200 TOBACCO USE DISORDER: ICD-10-CM

## 2020-06-05 PROCEDURE — 99214 PR OFFICE/OUTPT VISIT, EST, LEVL IV, 30-39 MIN: ICD-10-PCS | Mod: HCNC,95,, | Performed by: INTERNAL MEDICINE

## 2020-06-05 PROCEDURE — 99214 OFFICE O/P EST MOD 30 MIN: CPT | Mod: HCNC,95,, | Performed by: INTERNAL MEDICINE

## 2020-06-05 NOTE — PROGRESS NOTES
HPI      54 years old male referred to [facility] for evaluation of microcytic anemia and thrombocytosis.  Patient had fatigue and malaise was started on vitamin and iron supplement by primary care doctor.    Patient denies any family history of blood disorders.  Patient is active smoker.    10/16/2019:  Patient here to follow-up to review blood work.  No complaints.  Patient states that he is taking iron pill every day empty stomach with orange juice since last visit.  Latest blood work obtained on 10/09/2019 shortly after last visit.    December patient had a colonoscopy notice of active ulceration in bleeding which was controlled by hemostasis clip.  Specimen sent for analysis.    02/05/2020:  No acute events    03/11/2020:  No acute events.  Patient here follow-up with IV iron infusion response\    06/05/2020:  Follow-up iron deficiency anemia    Past Medical History:   Diagnosis Date    Acute angina     Asthma     COPD (chronic obstructive pulmonary disease)     Hypertension     MI, old     PIPER on CPAP     Schizophrenia     Smoker unmotivated to quit     Stroke 2005    Type II diabetes mellitus with neurological manifestations 11/21/2019     Social History     Socioeconomic History    Marital status:      Spouse name: Not on file    Number of children: Not on file    Years of education: Not on file    Highest education level: Not on file   Occupational History    Occupation: disabled (mental)     Comment: since 2000   Social Needs    Financial resource strain: Not hard at all    Food insecurity:     Worry: Never true     Inability: Never true    Transportation needs:     Medical: Yes     Non-medical: No   Tobacco Use    Smoking status: Current Every Day Smoker     Packs/day: 1.00     Years: 45.00     Pack years: 45.00     Types: Cigarettes    Smokeless tobacco: Never Used    Tobacco comment: Age Started 14    Substance and Sexual Activity    Alcohol use: No     Frequency:  Never     Drinks per session: Patient refused     Binge frequency: Never    Drug use: Not Currently     Types: Marijuana    Sexual activity: Yes     Partners: Female   Lifestyle    Physical activity:     Days per week: 1 day     Minutes per session: 10 min    Stress: To some extent   Relationships    Social connections:     Talks on phone: More than three times a week     Gets together: More than three times a week     Attends Mandaeism service: Not on file     Active member of club or organization: Patient refused     Attends meetings of clubs or organizations: Patient refused     Relationship status:    Other Topics Concern    Not on file   Social History Narrative    Not on file       Objective    Physical Exam     Audio video virtual visit    Awake alert no acute distress  Normal speech pattern no respiratory distress  Coordination is intact  No rash no lesions  Normal affect    CMP  Sodium   Date Value Ref Range Status   06/02/2020 133 (L) 136 - 145 mmol/L Final     Potassium   Date Value Ref Range Status   06/02/2020 4.4 3.5 - 5.1 mmol/L Final     Chloride   Date Value Ref Range Status   06/02/2020 96 95 - 110 mmol/L Final     CO2   Date Value Ref Range Status   06/02/2020 28 23 - 29 mmol/L Final     Glucose   Date Value Ref Range Status   06/02/2020 100 70 - 110 mg/dL Final     BUN, Bld   Date Value Ref Range Status   06/02/2020 7 6 - 20 mg/dL Final     Creatinine   Date Value Ref Range Status   06/02/2020 0.8 0.5 - 1.4 mg/dL Final     Calcium   Date Value Ref Range Status   06/02/2020 9.1 8.7 - 10.5 mg/dL Final     Total Protein   Date Value Ref Range Status   06/02/2020 7.3 6.0 - 8.4 g/dL Final     Albumin   Date Value Ref Range Status   06/02/2020 3.8 3.5 - 5.2 g/dL Final     Total Bilirubin   Date Value Ref Range Status   06/02/2020 0.2 0.1 - 1.0 mg/dL Final     Comment:     For infants and newborns, interpretation of results should be based  on gestational age, weight and in agreement with  clinical  observations.  Premature Infant recommended reference ranges:  Up to 24 hours.............<8.0 mg/dL  Up to 48 hours............<12.0 mg/dL  3-5 days..................<15.0 mg/dL  6-29 days.................<15.0 mg/dL       Alkaline Phosphatase   Date Value Ref Range Status   06/02/2020 118 55 - 135 U/L Final     AST   Date Value Ref Range Status   06/02/2020 26 10 - 40 U/L Final     ALT   Date Value Ref Range Status   06/02/2020 24 10 - 44 U/L Final     Anion Gap   Date Value Ref Range Status   06/02/2020 9 8 - 16 mmol/L Final     eGFR if    Date Value Ref Range Status   06/02/2020 >60 >60 mL/min/1.73 m^2 Final     eGFR if non    Date Value Ref Range Status   06/02/2020 >60 >60 mL/min/1.73 m^2 Final     Comment:     Calculation used to obtain the estimated glomerular filtration  rate (eGFR) is the CKD-EPI equation.        Lab Results   Component Value Date    WBC 11.64 06/02/2020    HGB 13.7 (L) 06/02/2020    HCT 39.7 (L) 06/02/2020    MCV 79 (L) 06/02/2020     (H) 06/02/2020     10/09/2019:  Ferritin 7, iron 34, TIBC 418, iron saturation 8%    11/06/2019:  Iron 256, TIBC 397, iron saturation 64% transferrin 268, ferritin 51.    02/03/2020:  Iron 25, TIBC 454, transferrin 307, iron saturation 6.  Ferritin 9    03/09/2020:  Ferritin 188, iron 46, transferrin 273, TIBC 404, iron saturation 11%.    Assessment    Iron deficiency without anemia  > I have instructed patient to start taking oral iron supplement.  Sixty-five elemental iron 1 tab p.o. b.i.d..  I have instructed patient to take iron tablets on empty stomach with orange juice.  Should he is develop any adverse side effect for iron pill such as constipation I have instructed patient to take stool softener to help with elevated symptoms.  Patient voiced understanding.  > I will recheck iron panel 2 months from now with CBC ferritin iron panel.  > should serum iron does not respond to oral treatment will repeat IV  iron treatments    Patient had EGD colonoscopy December 2019.  > GI doctor Dr Lucas  > recommended 3 years follow-up for surveillance.  Patient will follow-up GI accordingly.    Thrombocytosis reactive  > continue monitor    Hypertension tobacco  > primary follow-up    M*Modal dictations      There are no diagnoses linked to this encounter.

## 2020-06-24 ENCOUNTER — OFFICE VISIT (OUTPATIENT)
Dept: OPHTHALMOLOGY | Facility: CLINIC | Age: 55
End: 2020-06-24
Payer: MEDICARE

## 2020-06-24 DIAGNOSIS — E11.9 DIABETES MELLITUS TYPE 2 WITHOUT RETINOPATHY: Primary | ICD-10-CM

## 2020-06-24 DIAGNOSIS — H53.9 VISION CHANGES: ICD-10-CM

## 2020-06-24 DIAGNOSIS — H25.13 AGE-RELATED NUCLEAR CATARACT OF BOTH EYES: ICD-10-CM

## 2020-06-24 DIAGNOSIS — H52.7 REFRACTIVE ERROR: ICD-10-CM

## 2020-06-24 PROCEDURE — 99999 PR PBB SHADOW E&M-EST. PATIENT-LVL IV: CPT | Mod: PBBFAC,HCNC,, | Performed by: OPHTHALMOLOGY

## 2020-06-24 PROCEDURE — 99999 PR PBB SHADOW E&M-EST. PATIENT-LVL IV: ICD-10-PCS | Mod: PBBFAC,HCNC,, | Performed by: OPHTHALMOLOGY

## 2020-06-24 PROCEDURE — 92015 PR REFRACTION: ICD-10-PCS | Mod: HCNC,S$GLB,, | Performed by: OPHTHALMOLOGY

## 2020-06-24 PROCEDURE — 92004 COMPRE OPH EXAM NEW PT 1/>: CPT | Mod: HCNC,S$GLB,, | Performed by: OPHTHALMOLOGY

## 2020-06-24 PROCEDURE — 92004 PR EYE EXAM, NEW PATIENT,COMPREHESV: ICD-10-PCS | Mod: HCNC,S$GLB,, | Performed by: OPHTHALMOLOGY

## 2020-06-24 PROCEDURE — 92015 DETERMINE REFRACTIVE STATE: CPT | Mod: HCNC,S$GLB,, | Performed by: OPHTHALMOLOGY

## 2020-06-24 PROCEDURE — 99499 RISK ADDL DX/OHS AUDIT: ICD-10-PCS | Mod: HCNC,S$GLB,, | Performed by: OPHTHALMOLOGY

## 2020-06-24 PROCEDURE — 99499 UNLISTED E&M SERVICE: CPT | Mod: HCNC,S$GLB,, | Performed by: OPHTHALMOLOGY

## 2020-06-24 NOTE — PATIENT INSTRUCTIONS
Diabetic Retinopathy: Evaluating Your Eyes     Your eye healthcare provider examines your eyes with a slit lamp.   Diabetic retinopathy is a condition that happens when diabetes damages blood vessels in the rear of the eye. It can lead to vision loss. To help catch it early, have a complete dilated eye exam at least once a year. During the exam, the eye healthcare provider will review your medical history, examine your eyes, and check your vision.  Women who are pregnant and have pre-existing type 1 or type 2 diabetes have an increased risk of retinopathy. Women with diabetes should have an eye exam before pregnancy or in the first trimester. They should continue to be monitored every trimester and for 1 year after delivery, depending on the severity of the retinopathy.  Your medical history  Your eye healthcare provider may ask about the following:  · Your diabetes type, history, treatments (such as insulin), and how you monitor your blood sugar level  · Your familys health, including whether any relative has had diabetes or diabetic retinopathy  · Any diseases, surgeries, or other medical procedures youve had  · Any medicines, herbs, or supplements you use, including those you buy over the counter  · Any problems with your vision you may be having, such as blurred or double vision, problems seeing at night, or flashes or floaters   Your eye exam  Your eye healthcare provider uses an eye chart and other tools to check your vision. Then he or she examines your eyes for signs of disease. You are given eye drops to widen (dilate) your pupils. You may have one or more of the following tests:  · Tonometry to measure fluid pressure inside the eye.  · Slit lamp exam to allow the healthcare provider to view the structures of your eye.  · Ultrasound to create an image of the eye using sound waves. Ultrasound may be used if blood is found in the clear gel that fills the eye (vitreous).  · Ocular coherence tomography  (OCT) to create an image of the retina using light waves. This shows if there is fluid leaking into certain parts of the eye. It can also measure the thickness of the retina.  Fluorescein angiography  This test may be done to check the health of the inside lining of the eye (retina). It also checks the tiny blood vessels (capillaries) that carry blood to the retina. During the test:  · Photographs are taken of the retina.  · A dye is then injected into the bloodstream through the arm or hand. The dye travels to the capillaries in the eye.  · More photographs are taken of the retina. The dye causes the capillaries to stand out on the photographs.  You may feel brief nausea during the procedure. For a few hours after the test, your skin, eyes, and urine may appear yellow. Talk with your healthcare provider for more information about this test.   Date Last Reviewed: 6/1/2016  © 1649-4789 [employer]. [address] [place] [address], [place] [address]. All rights reserved. This information is not intended as a substitute for professional medical care. Always follow your healthcare professional's instructions.

## 2020-06-24 NOTE — LETTER
June 24, 2020      Laura Becerril MD  [address]  [place] [address]           [facility]  [address] [place] [address]  Phone: [phone]  Fax: [phone]          Patient: Ryan Crane   MR Number: [id number]   YOB: 1965   Date of Visit: 6/24/2020       Dear Dr. Laura Becerril:    Thank you for referring Ryan Crane to me for evaluation. Attached you will find relevant portions of my assessment and plan of care.    If you have questions, please do not hesitate to call me. I look forward to following Ryan Crane along with you.    Sincerely,    Arnulfo Prince Jr., MD    Enclosure  CC:  No Recipients    If you would like to receive this communication electronically, please contact [email or web address] or [phone] to request more information on [facility] access.    For providers and/or their staff who would like to refer a patient to [facility], please contact us through our one-stop-shop provider referral line, [facility], at [phone].    If you feel you have received this communication in error or would no longer like to receive these types of communications, please e-mail [email or web address]

## 2020-06-24 NOTE — PROGRESS NOTES
HPI     Diabetic Eye Exam      Additional comments: DM eye exam              Comments     DLE: x 3 yrs    Pt states lost rx gls x 3 yrs and only uses OTC readers at this time.   Vision blurry at dist and near. No floaters or flashes.     LBSL: 109 x 1 day fasting  Hemoglobin A1C       Date                     Value               Ref Range             Status                12/13/2019               5.7 (H)             0.0 - 5.6 %           Final                 08/29/2019               6.0 (H)             4.0 - 5.6 %           Final                 01/16/2019               5.6                 4.0 - 5.6 %           Final                      Last edited by Cheryle Quintana on 6/24/2020  8:12 AM. (History)        ROS     Negative for: Constitutional, Gastrointestinal, Neurological, Skin,   Genitourinary, Musculoskeletal, HENT, Endocrine, Cardiovascular, Eyes,   Respiratory, Psychiatric, Allergic/Imm, Heme/Lymph    Last edited by Arnulfo Prince Jr., MD on 6/24/2020  8:47 AM. (History)        Assessment /Plan     For exam results, see Encounter Report.    Diabetes mellitus type 2 without retinopathy  -no retinopathy seen on DFE today  -continue good blood pressure and glucose control  -F/U for yearly DFE  Vision changes  -     Ambulatory referral/consult to Optometry    Age-related nuclear cataract of both eyes  -no decrease in ADLS, no significant glare, and functionality is satisfactory  -counseled on what to expect if the cataracts worsening and how it can effect the vision  -continue to monitor and if vision changes patient can call for another appointment    Refractive error    Rx for glasses given to patient

## 2020-08-04 ENCOUNTER — PATIENT OUTREACH (OUTPATIENT)
Dept: ADMINISTRATIVE | Facility: OTHER | Age: 55
End: 2020-08-04

## 2020-08-04 ENCOUNTER — LAB VISIT (OUTPATIENT)
Dept: LAB | Facility: HOSPITAL | Age: 55
End: 2020-08-04
Attending: INTERNAL MEDICINE
Payer: MEDICARE

## 2020-08-04 DIAGNOSIS — R80.9 PROTEINURIA, UNSPECIFIED TYPE: ICD-10-CM

## 2020-08-04 DIAGNOSIS — D50.9 MICROCYTIC ANEMIA: ICD-10-CM

## 2020-08-04 DIAGNOSIS — D64.9 ANEMIA, UNSPECIFIED TYPE: ICD-10-CM

## 2020-08-04 DIAGNOSIS — D75.839 THROMBOCYTOSIS: ICD-10-CM

## 2020-08-04 DIAGNOSIS — F20.9 SCHIZOPHRENIA, UNSPECIFIED TYPE: ICD-10-CM

## 2020-08-04 DIAGNOSIS — E53.8 B12 DEFICIENCY: ICD-10-CM

## 2020-08-04 DIAGNOSIS — D50.9 IRON DEFICIENCY ANEMIA, UNSPECIFIED IRON DEFICIENCY ANEMIA TYPE: ICD-10-CM

## 2020-08-04 DIAGNOSIS — I10 ESSENTIAL HYPERTENSION: ICD-10-CM

## 2020-08-04 DIAGNOSIS — F17.200 TOBACCO USE DISORDER: ICD-10-CM

## 2020-08-04 DIAGNOSIS — R73.03 PREDIABETES: ICD-10-CM

## 2020-08-04 DIAGNOSIS — R71.8 MICROCYTOSIS: ICD-10-CM

## 2020-08-04 LAB
ALBUMIN SERPL BCP-MCNC: 3.6 G/DL (ref 3.5–5.2)
ALP SERPL-CCNC: 116 U/L (ref 55–135)
ALT SERPL W/O P-5'-P-CCNC: 15 U/L (ref 10–44)
ANION GAP SERPL CALC-SCNC: 9 MMOL/L (ref 8–16)
ANISOCYTOSIS BLD QL SMEAR: SLIGHT
AST SERPL-CCNC: 16 U/L (ref 10–40)
BASOPHILS # BLD AUTO: ABNORMAL K/UL (ref 0–0.2)
BASOPHILS NFR BLD: 0 % (ref 0–1.9)
BILIRUB SERPL-MCNC: 0.2 MG/DL (ref 0.1–1)
BUN SERPL-MCNC: 12 MG/DL (ref 6–20)
CALCIUM SERPL-MCNC: 8.8 MG/DL (ref 8.7–10.5)
CHLORIDE SERPL-SCNC: 100 MMOL/L (ref 95–110)
CO2 SERPL-SCNC: 26 MMOL/L (ref 23–29)
CREAT SERPL-MCNC: 0.9 MG/DL (ref 0.5–1.4)
DIFFERENTIAL METHOD: ABNORMAL
EOSINOPHIL # BLD AUTO: ABNORMAL K/UL (ref 0–0.5)
EOSINOPHIL NFR BLD: 2 % (ref 0–8)
ERYTHROCYTE [DISTWIDTH] IN BLOOD BY AUTOMATED COUNT: 15.5 % (ref 11.5–14.5)
EST. GFR  (AFRICAN AMERICAN): >60 ML/MIN/1.73 M^2
EST. GFR  (NON AFRICAN AMERICAN): >60 ML/MIN/1.73 M^2
FERRITIN SERPL-MCNC: 16 NG/ML (ref 20–300)
GLUCOSE SERPL-MCNC: 89 MG/DL (ref 70–110)
HCT VFR BLD AUTO: 39.6 % (ref 40–54)
HGB BLD-MCNC: 13.6 G/DL (ref 14–18)
IRON SERPL-MCNC: 27 UG/DL (ref 45–160)
LYMPHOCYTES # BLD AUTO: ABNORMAL K/UL (ref 1–4.8)
LYMPHOCYTES NFR BLD: 21 % (ref 18–48)
MCH RBC QN AUTO: 28.3 PG (ref 27–31)
MCHC RBC AUTO-ENTMCNC: 34.3 G/DL (ref 32–36)
MCV RBC AUTO: 83 FL (ref 82–98)
MONOCYTES # BLD AUTO: ABNORMAL K/UL (ref 0.3–1)
MONOCYTES NFR BLD: 8 % (ref 4–15)
NEUTROPHILS # BLD AUTO: ABNORMAL K/UL (ref 1.8–7.7)
NEUTROPHILS NFR BLD: 67 % (ref 38–73)
NEUTS BAND NFR BLD MANUAL: 2 %
PLATELET # BLD AUTO: 430 K/UL (ref 150–350)
PLATELET BLD QL SMEAR: ABNORMAL
PMV BLD AUTO: 10.8 FL (ref 9.2–12.9)
POTASSIUM SERPL-SCNC: 4 MMOL/L (ref 3.5–5.1)
PROT SERPL-MCNC: 7.3 G/DL (ref 6–8.4)
RBC # BLD AUTO: 4.8 M/UL (ref 4.6–6.2)
SATURATED IRON: 7 % (ref 20–50)
SODIUM SERPL-SCNC: 135 MMOL/L (ref 136–145)
TOTAL IRON BINDING CAPACITY: 410 UG/DL (ref 250–450)
TRANSFERRIN SERPL-MCNC: 277 MG/DL (ref 200–375)
WBC # BLD AUTO: 19.8 K/UL (ref 3.9–12.7)

## 2020-08-04 PROCEDURE — 36415 COLL VENOUS BLD VENIPUNCTURE: CPT | Mod: HCNC,PN

## 2020-08-04 PROCEDURE — 85027 COMPLETE CBC AUTOMATED: CPT | Mod: HCNC,PO

## 2020-08-04 PROCEDURE — 82728 ASSAY OF FERRITIN: CPT | Mod: HCNC

## 2020-08-04 PROCEDURE — 85007 BL SMEAR W/DIFF WBC COUNT: CPT | Mod: HCNC,PO

## 2020-08-04 PROCEDURE — 83540 ASSAY OF IRON: CPT | Mod: HCNC

## 2020-08-04 PROCEDURE — 80053 COMPREHEN METABOLIC PANEL: CPT | Mod: HCNC

## 2020-08-04 NOTE — PROGRESS NOTES
Requested updates within Care Everywhere.  Patient's chart was reviewed for overdue DAWN topics.  Immunizations reconciled.    Hemoglobin A1c linked.

## 2020-08-06 NOTE — PROGRESS NOTES
HPI      54 years old male referred to [facility] for evaluation of microcytic anemia and thrombocytosis.  Patient had fatigue and malaise was started on vitamin and iron supplement by primary care doctor.    Patient denies any family history of blood disorders.  Patient is active smoker.    10/16/2019:  Patient here to follow-up to review blood work.  No complaints.  Patient states that he is taking iron pill every day empty stomach with orange juice since last visit.  Latest blood work obtained on 10/09/2019 shortly after last visit.    December patient had a colonoscopy notice of active ulceration in bleeding which was controlled by hemostasis clip.  Specimen sent for analysis.    02/05/2020:  No acute events    03/11/2020:  No acute events.  Patient here follow-up with IV iron infusion response\    06/05/2020:  Follow-up iron deficiency anemia    Past Medical History:   Diagnosis Date    Acute angina     Asthma     COPD (chronic obstructive pulmonary disease)     Hypertension     MI, old     PIPER on CPAP     Schizophrenia     Smoker unmotivated to quit     Stroke 2005    Type II diabetes mellitus with neurological manifestations 11/21/2019     Social History     Socioeconomic History    Marital status:      Spouse name: Not on file    Number of children: Not on file    Years of education: Not on file    Highest education level: Not on file   Occupational History    Occupation: disabled (mental)     Comment: since 2000   Social Needs    Financial resource strain: Not hard at all    Food insecurity     Worry: Never true     Inability: Never true    Transportation needs     Medical: Yes     Non-medical: No   Tobacco Use    Smoking status: Current Every Day Smoker     Packs/day: 1.00     Years: 45.00     Pack years: 45.00     Types: Cigarettes    Smokeless tobacco: Never Used    Tobacco comment: Age Started 14    Substance and Sexual Activity    Alcohol use: No     Frequency:  Never     Drinks per session: Patient refused     Binge frequency: Never    Drug use: Not Currently     Types: Marijuana    Sexual activity: Yes     Partners: Female   Lifestyle    Physical activity     Days per week: 1 day     Minutes per session: 10 min    Stress: To some extent   Relationships    Social connections     Talks on phone: More than three times a week     Gets together: More than three times a week     Attends Zoroastrian service: Not on file     Active member of club or organization: Patient refused     Attends meetings of clubs or organizations: Patient refused     Relationship status:    Other Topics Concern    Not on file   Social History Narrative    Not on file       Objective    Physical Exam     Vitals:    08/07/20 0814   BP: (!) 187/84   Pulse: 73   Resp: 17   Temp: 97.4 °F (36.3 °C)       Awake alert no acute distress  Normocephalic atraumatic  Regular rate  No respiratory distress  Soft nontender nondistended bowel sounds 4  Nonfocal  Distal pulses intact  No rash no lesions  Normal affect      CMP  Sodium   Date Value Ref Range Status   08/04/2020 135 (L) 136 - 145 mmol/L Final     Potassium   Date Value Ref Range Status   08/04/2020 4.0 3.5 - 5.1 mmol/L Final     Chloride   Date Value Ref Range Status   08/04/2020 100 95 - 110 mmol/L Final     CO2   Date Value Ref Range Status   08/04/2020 26 23 - 29 mmol/L Final     Glucose   Date Value Ref Range Status   08/04/2020 89 70 - 110 mg/dL Final     BUN, Bld   Date Value Ref Range Status   08/04/2020 12 6 - 20 mg/dL Final     Creatinine   Date Value Ref Range Status   08/04/2020 0.9 0.5 - 1.4 mg/dL Final     Calcium   Date Value Ref Range Status   08/04/2020 8.8 8.7 - 10.5 mg/dL Final     Total Protein   Date Value Ref Range Status   08/04/2020 7.3 6.0 - 8.4 g/dL Final     Albumin   Date Value Ref Range Status   08/04/2020 3.6 3.5 - 5.2 g/dL Final     Total Bilirubin   Date Value Ref Range Status   08/04/2020 0.2 0.1 - 1.0 mg/dL  Final     Comment:     For infants and newborns, interpretation of results should be based  on gestational age, weight and in agreement with clinical  observations.  Premature Infant recommended reference ranges:  Up to 24 hours.............<8.0 mg/dL  Up to 48 hours............<12.0 mg/dL  3-5 days..................<15.0 mg/dL  6-29 days.................<15.0 mg/dL       Alkaline Phosphatase   Date Value Ref Range Status   08/04/2020 116 55 - 135 U/L Final     AST   Date Value Ref Range Status   08/04/2020 16 10 - 40 U/L Final     ALT   Date Value Ref Range Status   08/04/2020 15 10 - 44 U/L Final     Anion Gap   Date Value Ref Range Status   08/04/2020 9 8 - 16 mmol/L Final     eGFR if    Date Value Ref Range Status   08/04/2020 >60.0 >60 mL/min/1.73 m^2 Final     eGFR if non    Date Value Ref Range Status   08/04/2020 >60.0 >60 mL/min/1.73 m^2 Final     Comment:     Calculation used to obtain the estimated glomerular filtration  rate (eGFR) is the CKD-EPI equation.        Lab Results   Component Value Date    WBC 19.80 (H) 08/04/2020    HGB 13.6 (L) 08/04/2020    HCT 39.6 (L) 08/04/2020    MCV 83 08/04/2020     (H) 08/04/2020     10/09/2019:  Ferritin 7, iron 34, TIBC 418, iron saturation 8%    11/06/2019:  Iron 256, TIBC 397, iron saturation 64% transferrin 268, ferritin 51.    02/03/2020:  Iron 25, TIBC 454, transferrin 307, iron saturation 6.  Ferritin 9    03/09/2020:  Ferritin 188, iron 46, transferrin 273, TIBC 404, iron saturation 11%.    Assessment    Iron deficiency without anemia  > I have instructed patient to start taking oral iron supplement.  Sixty-five elemental iron 1 tab p.o. b.i.d..   > iron panel 2 months from now with CBC ferritin iron panel.  > should serum iron does not respond to oral treatment will repeat IV iron treatments    Elevated WBC  > repeat CBC cmp   > LDH  > peripheral blood smear  > leukemia lymphoma flow  > BCRABL  > bone marrow  CT-guided    Patient had EGD colonoscopy December 2019.  > GI doctor Dr Lucas  > recommended 3 years follow-up for surveillance.  Patient will follow-up GI accordingly.    Thrombocytosis reactive  > continue monitor    Hypertension tobacco  > primary follow-up  > patient states that he does not want to go to ED or urgent care.  He will call Cardiology or primary care doctor regarding the medication modification or adjustment.    Schizophrenia  > follow-up psych    poor hygiene      M*Modal dictations      There are no diagnoses linked to this encounter.

## 2020-08-07 ENCOUNTER — OFFICE VISIT (OUTPATIENT)
Dept: HEMATOLOGY/ONCOLOGY | Facility: CLINIC | Age: 55
End: 2020-08-07
Payer: MEDICARE

## 2020-08-07 VITALS
DIASTOLIC BLOOD PRESSURE: 84 MMHG | WEIGHT: 264.69 LBS | HEART RATE: 73 BPM | SYSTOLIC BLOOD PRESSURE: 187 MMHG | HEIGHT: 71 IN | TEMPERATURE: 97 F | BODY MASS INDEX: 37.06 KG/M2 | RESPIRATION RATE: 17 BRPM | OXYGEN SATURATION: 96 %

## 2020-08-07 DIAGNOSIS — R46.0 POOR HYGIENE: ICD-10-CM

## 2020-08-07 DIAGNOSIS — D72.829 LEUKOCYTOSIS, UNSPECIFIED TYPE: Primary | ICD-10-CM

## 2020-08-07 DIAGNOSIS — I10 ESSENTIAL HYPERTENSION: ICD-10-CM

## 2020-08-07 DIAGNOSIS — F20.9 SCHIZOPHRENIA, UNSPECIFIED TYPE: ICD-10-CM

## 2020-08-07 PROCEDURE — 3079F PR MOST RECENT DIASTOLIC BLOOD PRESSURE 80-89 MM HG: ICD-10-PCS | Mod: HCNC,CPTII,S$GLB, | Performed by: INTERNAL MEDICINE

## 2020-08-07 PROCEDURE — 99999 PR PBB SHADOW E&M-EST. PATIENT-LVL IV: ICD-10-PCS | Mod: PBBFAC,HCNC,, | Performed by: INTERNAL MEDICINE

## 2020-08-07 PROCEDURE — 3008F BODY MASS INDEX DOCD: CPT | Mod: HCNC,CPTII,S$GLB, | Performed by: INTERNAL MEDICINE

## 2020-08-07 PROCEDURE — 3077F PR MOST RECENT SYSTOLIC BLOOD PRESSURE >= 140 MM HG: ICD-10-PCS | Mod: HCNC,CPTII,S$GLB, | Performed by: INTERNAL MEDICINE

## 2020-08-07 PROCEDURE — 99999 PR PBB SHADOW E&M-EST. PATIENT-LVL IV: CPT | Mod: PBBFAC,HCNC,, | Performed by: INTERNAL MEDICINE

## 2020-08-07 PROCEDURE — 99214 OFFICE O/P EST MOD 30 MIN: CPT | Mod: HCNC,S$GLB,, | Performed by: INTERNAL MEDICINE

## 2020-08-07 PROCEDURE — 99499 RISK ADDL DX/OHS AUDIT: ICD-10-PCS | Mod: HCNC,S$GLB,, | Performed by: INTERNAL MEDICINE

## 2020-08-07 PROCEDURE — 99499 UNLISTED E&M SERVICE: CPT | Mod: HCNC,S$GLB,, | Performed by: INTERNAL MEDICINE

## 2020-08-07 PROCEDURE — 3008F PR BODY MASS INDEX (BMI) DOCUMENTED: ICD-10-PCS | Mod: HCNC,CPTII,S$GLB, | Performed by: INTERNAL MEDICINE

## 2020-08-07 PROCEDURE — 3079F DIAST BP 80-89 MM HG: CPT | Mod: HCNC,CPTII,S$GLB, | Performed by: INTERNAL MEDICINE

## 2020-08-07 PROCEDURE — 3077F SYST BP >= 140 MM HG: CPT | Mod: HCNC,CPTII,S$GLB, | Performed by: INTERNAL MEDICINE

## 2020-08-07 PROCEDURE — 99214 PR OFFICE/OUTPT VISIT, EST, LEVL IV, 30-39 MIN: ICD-10-PCS | Mod: HCNC,S$GLB,, | Performed by: INTERNAL MEDICINE

## 2020-08-10 ENCOUNTER — TELEPHONE (OUTPATIENT)
Dept: HEMATOLOGY/ONCOLOGY | Facility: CLINIC | Age: 55
End: 2020-08-10

## 2020-08-10 DIAGNOSIS — D72.829 LEUKOCYTOSIS, UNSPECIFIED TYPE: Primary | ICD-10-CM

## 2020-08-10 DIAGNOSIS — Z03.818 ENCNTR FOR OBS FOR SUSP EXPSR TO OTH BIOLG AGENTS RULED OUT: Primary | ICD-10-CM

## 2020-08-10 NOTE — TELEPHONE ENCOUNTER
Spoke with pt to notify pt that he is scheduled for 8/21/20 for his bmbx and asked if pt could come the clinic to sign his consent on Wednesday 8/12/20 and get all the other information need. Pt verbalized he would come by the clinic. Pt portal message also sent to pt. Pt verbalizied understanding.

## 2020-08-12 ENCOUNTER — PATIENT OUTREACH (OUTPATIENT)
Dept: ADMINISTRATIVE | Facility: HOSPITAL | Age: 55
End: 2020-08-12

## 2020-08-12 NOTE — PROGRESS NOTES
Health Maintenance Due   Topic Date Due    Hepatitis C Screening  1967    Foot Exam  1977    Urine Microalbumin  1977    High Dose Statin  1988    Shingles Vaccine (1 of 2) 2017    Hemoglobin A1c  2020       Chart review completed 2020.  Care Everywhere updates requested and reviewed.  Immunizations reconciled. Media reports reviewed.  Duplicate HM overrides and  orders removed.  Overdue HM topic chart audit and/or requested.  Overdue lab testing linked to upcoming lab appointments if applies.

## 2020-08-13 ENCOUNTER — OFFICE VISIT (OUTPATIENT)
Dept: PODIATRY | Facility: CLINIC | Age: 55
End: 2020-08-13
Payer: MEDICARE

## 2020-08-13 VITALS — WEIGHT: 264 LBS | BODY MASS INDEX: 36.96 KG/M2 | HEIGHT: 71 IN

## 2020-08-13 DIAGNOSIS — M21.41 PES PLANUS OF BOTH FEET: ICD-10-CM

## 2020-08-13 DIAGNOSIS — M20.42 HAMMER TOES OF BOTH FEET: ICD-10-CM

## 2020-08-13 DIAGNOSIS — E11.49 TYPE II DIABETES MELLITUS WITH NEUROLOGICAL MANIFESTATIONS: ICD-10-CM

## 2020-08-13 DIAGNOSIS — Z86.31 HX OF DIABETIC FOOT ULCER: ICD-10-CM

## 2020-08-13 DIAGNOSIS — E08.42 DIABETIC POLYNEUROPATHY ASSOCIATED WITH DIABETES MELLITUS DUE TO UNDERLYING CONDITION: ICD-10-CM

## 2020-08-13 DIAGNOSIS — M21.6X1 EQUINUS DEFORMITY OF BOTH FEET: ICD-10-CM

## 2020-08-13 DIAGNOSIS — M21.6X2 EQUINUS DEFORMITY OF BOTH FEET: ICD-10-CM

## 2020-08-13 DIAGNOSIS — M21.42 PES PLANUS OF BOTH FEET: ICD-10-CM

## 2020-08-13 DIAGNOSIS — M20.41 HAMMER TOES OF BOTH FEET: ICD-10-CM

## 2020-08-13 DIAGNOSIS — I73.9 PVD (PERIPHERAL VASCULAR DISEASE): ICD-10-CM

## 2020-08-13 DIAGNOSIS — B35.1 ONYCHOMYCOSIS DUE TO DERMATOPHYTE: Primary | ICD-10-CM

## 2020-08-13 PROCEDURE — 99999 PR PBB SHADOW E&M-EST. PATIENT-LVL III: ICD-10-PCS | Mod: PBBFAC,HCNC,, | Performed by: PODIATRIST

## 2020-08-13 PROCEDURE — 3008F BODY MASS INDEX DOCD: CPT | Mod: HCNC,CPTII,S$GLB, | Performed by: PODIATRIST

## 2020-08-13 PROCEDURE — 3008F PR BODY MASS INDEX (BMI) DOCUMENTED: ICD-10-PCS | Mod: HCNC,CPTII,S$GLB, | Performed by: PODIATRIST

## 2020-08-13 PROCEDURE — 99999 PR PBB SHADOW E&M-EST. PATIENT-LVL III: CPT | Mod: PBBFAC,HCNC,, | Performed by: PODIATRIST

## 2020-08-13 PROCEDURE — 3044F PR MOST RECENT HEMOGLOBIN A1C LEVEL <7.0%: ICD-10-PCS | Mod: HCNC,CPTII,S$GLB, | Performed by: PODIATRIST

## 2020-08-13 PROCEDURE — 99214 PR OFFICE/OUTPT VISIT, EST, LEVL IV, 30-39 MIN: ICD-10-PCS | Mod: 25,HCNC,S$GLB, | Performed by: PODIATRIST

## 2020-08-13 PROCEDURE — 11721 PR DEBRIDEMENT OF NAILS, 6 OR MORE: ICD-10-PCS | Mod: Q9,HCNC,S$GLB, | Performed by: PODIATRIST

## 2020-08-13 PROCEDURE — 3044F HG A1C LEVEL LT 7.0%: CPT | Mod: HCNC,CPTII,S$GLB, | Performed by: PODIATRIST

## 2020-08-13 PROCEDURE — 99214 OFFICE O/P EST MOD 30 MIN: CPT | Mod: 25,HCNC,S$GLB, | Performed by: PODIATRIST

## 2020-08-13 PROCEDURE — 11721 DEBRIDE NAIL 6 OR MORE: CPT | Mod: Q9,HCNC,S$GLB, | Performed by: PODIATRIST

## 2020-08-18 ENCOUNTER — LAB VISIT (OUTPATIENT)
Dept: FAMILY MEDICINE | Facility: CLINIC | Age: 55
End: 2020-08-18
Payer: MEDICARE

## 2020-08-18 DIAGNOSIS — Z03.818 ENCNTR FOR OBS FOR SUSP EXPSR TO OTH BIOLG AGENTS RULED OUT: ICD-10-CM

## 2020-08-18 PROCEDURE — U0003 INFECTIOUS AGENT DETECTION BY NUCLEIC ACID (DNA OR RNA); SEVERE ACUTE RESPIRATORY SYNDROME CORONAVIRUS 2 (SARS-COV-2) (CORONAVIRUS DISEASE [COVID-19]), AMPLIFIED PROBE TECHNIQUE, MAKING USE OF HIGH THROUGHPUT TECHNOLOGIES AS DESCRIBED BY CMS-2020-01-R: HCPCS | Mod: HCNC

## 2020-08-19 ENCOUNTER — ANESTHESIA EVENT (OUTPATIENT)
Dept: SURGERY | Facility: HOSPITAL | Age: 55
End: 2020-08-19
Payer: MEDICARE

## 2020-08-19 ENCOUNTER — TELEPHONE (OUTPATIENT)
Dept: HEMATOLOGY/ONCOLOGY | Facility: CLINIC | Age: 55
End: 2020-08-19

## 2020-08-19 LAB — SARS-COV-2 RNA RESP QL NAA+PROBE: NOT DETECTED

## 2020-08-19 NOTE — TELEPHONE ENCOUNTER
Spoke with pt. Pt did not understand his lab result for his Covid testing. Pt was notified it was non-detected which means that he was negative. Pt verbalized understanding.

## 2020-08-19 NOTE — TELEPHONE ENCOUNTER
----- Message from Lavern Hess sent at 8/19/2020  9:33 AM CDT -----  Regarding: covid results  Contact: Ryan bhatti  Type: Needs Medical Advice  Who Called:  Ryan  Symptoms (please be specific):  pt doesn't understand his results/says positive and negative no symptoms  Best Call Back Number: [phone]  Additional Information: Pls call pt to adv

## 2020-08-20 NOTE — CONSULTS
HPI      54 years old male referred to [facility]ology Clinic for evaluation of microcytic anemia and thrombocytosis.  Patient had fatigue and malaise was started on vitamin and iron supplement by primary care doctor.    Leukocytosis relapsed reactive versus others      Past Medical History:   Diagnosis Date    Acute angina     Asthma     COPD (chronic obstructive pulmonary disease)     Diabetes mellitus     Digestive disorder     ulcers    General anesthetics causing adverse effect in therapeutic use     Hypertension     MI, old     PIPER on CPAP     Schizophrenia     Smoker unmotivated to quit     Stroke 2005    Thyroid disease     took medicine in the past    Type II diabetes mellitus with neurological manifestations 11/21/2019     Social History     Socioeconomic History    Marital status:      Spouse name: Not on file    Number of children: Not on file    Years of education: Not on file    Highest education level: Not on file   Occupational History    Occupation: disabled (mental)     Comment: since 2000   Social Needs    Financial resource strain: Not hard at all    Food insecurity     Worry: Never true     Inability: Never true    Transportation needs     Medical: Yes     Non-medical: No   Tobacco Use    Smoking status: Current Every Day Smoker     Packs/day: 1.00     Years: 45.00     Pack years: 45.00     Types: Cigarettes    Smokeless tobacco: Never Used    Tobacco comment: Age Started 14    Substance and Sexual Activity    Alcohol use: No     Frequency: Never     Drinks per session: Patient refused     Binge frequency: Never    Drug use: Not Currently     Types: Marijuana    Sexual activity: Yes     Partners: Female   Lifestyle    Physical activity     Days per week: 1 day     Minutes per session: 10 min    Stress: To some extent   Relationships    Social connections     Talks on phone: More than three times a week     Gets together: More than three times a week      Attends Pentecostalism service: Not on file     Active member of club or organization: Patient refused     Attends meetings of clubs or organizations: Patient refused     Relationship status:    Other Topics Concern    Not on file   Social History Narrative    Not on file       Objective    Physical Exam       Awake alert no acute distress  Normocephalic atraumatic  Regular rate  No respiratory distress  Soft nontender nondistended bowel sounds 4  Nonfocal  Distal pulses intact  No rash no lesions  Normal affect      CMP  Sodium   Date Value Ref Range Status   08/04/2020 135 (L) 136 - 145 mmol/L Final     Potassium   Date Value Ref Range Status   08/04/2020 4.0 3.5 - 5.1 mmol/L Final     Chloride   Date Value Ref Range Status   08/04/2020 100 95 - 110 mmol/L Final     CO2   Date Value Ref Range Status   08/04/2020 26 23 - 29 mmol/L Final     Glucose   Date Value Ref Range Status   08/04/2020 89 70 - 110 mg/dL Final     BUN, Bld   Date Value Ref Range Status   08/04/2020 12 6 - 20 mg/dL Final     Creatinine   Date Value Ref Range Status   08/04/2020 0.9 0.5 - 1.4 mg/dL Final     Calcium   Date Value Ref Range Status   08/04/2020 8.8 8.7 - 10.5 mg/dL Final     Total Protein   Date Value Ref Range Status   08/04/2020 7.3 6.0 - 8.4 g/dL Final     Albumin   Date Value Ref Range Status   08/04/2020 3.6 3.5 - 5.2 g/dL Final     Total Bilirubin   Date Value Ref Range Status   08/04/2020 0.2 0.1 - 1.0 mg/dL Final     Comment:     For infants and newborns, interpretation of results should be based  on gestational age, weight and in agreement with clinical  observations.  Premature Infant recommended reference ranges:  Up to 24 hours.............<8.0 mg/dL  Up to 48 hours............<12.0 mg/dL  3-5 days..................<15.0 mg/dL  6-29 days.................<15.0 mg/dL       Alkaline Phosphatase   Date Value Ref Range Status   08/04/2020 116 55 - 135 U/L Final     AST   Date Value Ref Range Status   08/04/2020 16 10 - 40  U/L Final     ALT   Date Value Ref Range Status   08/04/2020 15 10 - 44 U/L Final     Anion Gap   Date Value Ref Range Status   08/04/2020 9 8 - 16 mmol/L Final     eGFR if    Date Value Ref Range Status   08/04/2020 >60.0 >60 mL/min/1.73 m^2 Final     eGFR if non    Date Value Ref Range Status   08/04/2020 >60.0 >60 mL/min/1.73 m^2 Final     Comment:     Calculation used to obtain the estimated glomerular filtration  rate (eGFR) is the CKD-EPI equation.        Lab Results   Component Value Date    WBC 19.80 (H) 08/04/2020    HGB 13.6 (L) 08/04/2020    HCT 39.6 (L) 08/04/2020    MCV 83 08/04/2020     (H) 08/04/2020       Assessment    Iron deficiency without anemia  > currently oral iron supplement  > prior GI follow-up colonoscopy completed 12/2019  > follow-up clinic.  Anticipating IV iron replacement    Elevated WBC reactive suspected however occult malignancy has not been rule  > Bone marrow biopsy   > additional blood workup collected results pending    Hypertension tobacco  > primary follow-up  > patient states that he does not want to go to ED or urgent care.  He will call Cardiology or primary care doctor regarding the medication modification or adjustment.    Schizophrenia  > follow-up psych      M*Modal dictations      There are no diagnoses linked to this encounter.

## 2020-08-21 ENCOUNTER — ANESTHESIA (OUTPATIENT)
Dept: SURGERY | Facility: HOSPITAL | Age: 55
End: 2020-08-21
Payer: MEDICARE

## 2020-08-21 ENCOUNTER — HOSPITAL ENCOUNTER (OUTPATIENT)
Facility: HOSPITAL | Age: 55
Discharge: HOME | End: 2020-08-21
Attending: INTERNAL MEDICINE | Admitting: INTERNAL MEDICINE
Payer: MEDICARE

## 2020-08-21 DIAGNOSIS — D72.829 LEUKOCYTOSIS: ICD-10-CM

## 2020-08-21 DIAGNOSIS — D72.829 LEUKOCYTOSIS, UNSPECIFIED TYPE: Primary | ICD-10-CM

## 2020-08-21 PROCEDURE — 88341 IMHCHEM/IMCYTCHM EA ADD ANTB: CPT | Mod: HCNC | Performed by: PATHOLOGY

## 2020-08-21 PROCEDURE — 88365 INSITU HYBRIDIZATION (FISH): CPT | Mod: 26,HCNC,, | Performed by: PATHOLOGY

## 2020-08-21 PROCEDURE — 88189 FLOWCYTOMETRY/READ 16 & >: CPT | Mod: HCNC,,, | Performed by: PATHOLOGY

## 2020-08-21 PROCEDURE — 37000009 HC ANESTHESIA EA ADD 15 MINS: Mod: HCNC,PO | Performed by: INTERNAL MEDICINE

## 2020-08-21 PROCEDURE — 25000003 PHARM REV CODE 250: Mod: HCNC,PO | Performed by: INTERNAL MEDICINE

## 2020-08-21 PROCEDURE — 88342 IMHCHEM/IMCYTCHM 1ST ANTB: CPT | Mod: HCNC,59 | Performed by: PATHOLOGY

## 2020-08-21 PROCEDURE — 88365 PR  TISSUE HYBRIDIZATION: ICD-10-PCS | Mod: 26,HCNC,, | Performed by: PATHOLOGY

## 2020-08-21 PROCEDURE — 85097 PR  BONE MARROW,SMEAR INTERPRETATION: ICD-10-PCS | Mod: HCNC,,, | Performed by: PATHOLOGY

## 2020-08-21 PROCEDURE — 88299 UNLISTED CYTOGENETIC STUDY: CPT | Mod: HCNC

## 2020-08-21 PROCEDURE — 88313 PR  SPECIAL STAINS,GROUP II: ICD-10-PCS | Mod: 26,HCNC,, | Performed by: PATHOLOGY

## 2020-08-21 PROCEDURE — 38220 DX BONE MARROW ASPIRATIONS: CPT | Mod: HCNC,LT,, | Performed by: INTERNAL MEDICINE

## 2020-08-21 PROCEDURE — 30000890 MAYO MISCELLANEOUS TEST (REFLEX): Mod: HCNC

## 2020-08-21 PROCEDURE — 88189 PR  FLOWCYTOMETRY/READ, 16 & > MARKERS: ICD-10-PCS | Mod: HCNC,,, | Performed by: PATHOLOGY

## 2020-08-21 PROCEDURE — D9220A PRA ANESTHESIA: Mod: HCNC,ANES,, | Performed by: ANESTHESIOLOGY

## 2020-08-21 PROCEDURE — 88237 TISSUE CULTURE BONE MARROW: CPT | Mod: HCNC

## 2020-08-21 PROCEDURE — 88342 CHG IMMUNOCYTOCHEMISTRY: ICD-10-PCS | Mod: 26,59,HCNC, | Performed by: PATHOLOGY

## 2020-08-21 PROCEDURE — 85097 BONE MARROW INTERPRETATION: CPT | Mod: HCNC,,, | Performed by: PATHOLOGY

## 2020-08-21 PROCEDURE — 25000003 PHARM REV CODE 250: Mod: HCNC,PO | Performed by: NURSE ANESTHETIST, CERTIFIED REGISTERED

## 2020-08-21 PROCEDURE — 88313 SPECIAL STAINS GROUP 2: CPT | Mod: 26,HCNC,, | Performed by: PATHOLOGY

## 2020-08-21 PROCEDURE — 88305 TISSUE EXAM BY PATHOLOGIST: CPT | Mod: HCNC | Performed by: PATHOLOGY

## 2020-08-21 PROCEDURE — 71000015 HC POSTOP RECOV 1ST HR: Mod: HCNC,PO | Performed by: INTERNAL MEDICINE

## 2020-08-21 PROCEDURE — 88305 TISSUE EXAM BY PATHOLOGIST: CPT | Mod: 26,HCNC,, | Performed by: PATHOLOGY

## 2020-08-21 PROCEDURE — 36000704 HC OR TIME LEV I 1ST 15 MIN: Mod: HCNC,PO | Performed by: INTERNAL MEDICINE

## 2020-08-21 PROCEDURE — 71000033 HC RECOVERY, INTIAL HOUR: Mod: HCNC,PO | Performed by: INTERNAL MEDICINE

## 2020-08-21 PROCEDURE — 63600175 PHARM REV CODE 636 W HCPCS: Mod: HCNC,PO | Performed by: ANESTHESIOLOGY

## 2020-08-21 PROCEDURE — 37000008 HC ANESTHESIA 1ST 15 MINUTES: Mod: HCNC,PO | Performed by: INTERNAL MEDICINE

## 2020-08-21 PROCEDURE — 88365 INSITU HYBRIDIZATION (FISH): CPT | Mod: HCNC | Performed by: PATHOLOGY

## 2020-08-21 PROCEDURE — 81420 FETAL CHRMOML ANEUPLOIDY: CPT | Mod: HCNC

## 2020-08-21 PROCEDURE — 88184 FLOWCYTOMETRY/ TC 1 MARKER: CPT | Mod: HCNC | Performed by: PATHOLOGY

## 2020-08-21 PROCEDURE — 88341 PR IHC OR ICC EACH ADD'L SINGLE ANTIBODY  STAINPR: ICD-10-PCS | Mod: 26,HCNC,, | Performed by: PATHOLOGY

## 2020-08-21 PROCEDURE — 63600175 PHARM REV CODE 636 W HCPCS: Mod: HCNC,PO | Performed by: NURSE ANESTHETIST, CERTIFIED REGISTERED

## 2020-08-21 PROCEDURE — 88313 SPECIAL STAINS GROUP 2: CPT | Mod: 59,HCNC | Performed by: PATHOLOGY

## 2020-08-21 PROCEDURE — 88342 IMHCHEM/IMCYTCHM 1ST ANTB: CPT | Mod: 26,59,HCNC, | Performed by: PATHOLOGY

## 2020-08-21 PROCEDURE — 88305 TISSUE EXAM BY PATHOLOGIST: ICD-10-PCS | Mod: 26,HCNC,, | Performed by: PATHOLOGY

## 2020-08-21 PROCEDURE — 88185 FLOWCYTOMETRY/TC ADD-ON: CPT | Mod: 59,HCNC | Performed by: PATHOLOGY

## 2020-08-21 PROCEDURE — 88364 INSITU HYBRIDIZATION (FISH): CPT | Mod: HCNC | Performed by: PATHOLOGY

## 2020-08-21 PROCEDURE — D9220A PRA ANESTHESIA: Mod: HCNC,CRNA,, | Performed by: NURSE ANESTHETIST, CERTIFIED REGISTERED

## 2020-08-21 PROCEDURE — D9220A PRA ANESTHESIA: ICD-10-PCS | Mod: HCNC,ANES,, | Performed by: ANESTHESIOLOGY

## 2020-08-21 PROCEDURE — D9220A PRA ANESTHESIA: ICD-10-PCS | Mod: HCNC,CRNA,, | Performed by: NURSE ANESTHETIST, CERTIFIED REGISTERED

## 2020-08-21 PROCEDURE — 38220 PR BONE MARROW ASPIRATION(S); DIAGNOSTIC: ICD-10-PCS | Mod: HCNC,LT,, | Performed by: INTERNAL MEDICINE

## 2020-08-21 PROCEDURE — 36000705 HC OR TIME LEV I EA ADD 15 MIN: Mod: HCNC,PO | Performed by: INTERNAL MEDICINE

## 2020-08-21 PROCEDURE — 88341 IMHCHEM/IMCYTCHM EA ADD ANTB: CPT | Mod: 26,HCNC,, | Performed by: PATHOLOGY

## 2020-08-21 RX ORDER — PROPOFOL 10 MG/ML
VIAL (ML) INTRAVENOUS
Status: DISCONTINUED | OUTPATIENT
Start: 2020-08-21 | End: 2020-08-21

## 2020-08-21 RX ORDER — PROPOFOL 10 MG/ML
VIAL (ML) INTRAVENOUS CONTINUOUS PRN
Status: DISCONTINUED | OUTPATIENT
Start: 2020-08-21 | End: 2020-08-21

## 2020-08-21 RX ORDER — SODIUM CHLORIDE, SODIUM LACTATE, POTASSIUM CHLORIDE, CALCIUM CHLORIDE 600; 310; 30; 20 MG/100ML; MG/100ML; MG/100ML; MG/100ML
INJECTION, SOLUTION INTRAVENOUS CONTINUOUS
Status: DISCONTINUED | OUTPATIENT
Start: 2020-08-21 | End: 2022-10-17

## 2020-08-21 RX ORDER — FENTANYL CITRATE 50 UG/ML
INJECTION, SOLUTION INTRAMUSCULAR; INTRAVENOUS
Status: DISCONTINUED | OUTPATIENT
Start: 2020-08-21 | End: 2020-08-21

## 2020-08-21 RX ORDER — LIDOCAINE HYDROCHLORIDE 10 MG/ML
1 INJECTION, SOLUTION EPIDURAL; INFILTRATION; INTRACAUDAL; PERINEURAL ONCE
Status: DISCONTINUED | OUTPATIENT
Start: 2020-08-21 | End: 2022-10-18

## 2020-08-21 RX ORDER — LIDOCAINE HYDROCHLORIDE 10 MG/ML
INJECTION, SOLUTION EPIDURAL; INFILTRATION; INTRACAUDAL; PERINEURAL
Status: DISCONTINUED | OUTPATIENT
Start: 2020-08-21 | End: 2020-08-21 | Stop reason: HOSPADM

## 2020-08-21 RX ORDER — LABETALOL HYDROCHLORIDE 5 MG/ML
INJECTION, SOLUTION INTRAVENOUS
Status: DISCONTINUED | OUTPATIENT
Start: 2020-08-21 | End: 2020-08-21

## 2020-08-21 RX ORDER — MIDAZOLAM HYDROCHLORIDE 1 MG/ML
INJECTION, SOLUTION INTRAMUSCULAR; INTRAVENOUS
Status: DISCONTINUED | OUTPATIENT
Start: 2020-08-21 | End: 2020-08-21

## 2020-08-21 RX ORDER — LIDOCAINE HYDROCHLORIDE 20 MG/ML
INJECTION INTRAVENOUS
Status: DISCONTINUED | OUTPATIENT
Start: 2020-08-21 | End: 2020-08-21

## 2020-08-21 RX ADMIN — LABETALOL HYDROCHLORIDE 5 MG: 5 INJECTION, SOLUTION INTRAVENOUS at 07:08

## 2020-08-21 RX ADMIN — PROPOFOL 50 MCG/KG/MIN: 10 INJECTION, EMULSION INTRAVENOUS at 07:08

## 2020-08-21 RX ADMIN — MIDAZOLAM 2 MG: 1 INJECTION INTRAMUSCULAR; INTRAVENOUS at 07:08

## 2020-08-21 RX ADMIN — FENTANYL CITRATE 50 MCG: 50 INJECTION, SOLUTION INTRAMUSCULAR; INTRAVENOUS at 07:08

## 2020-08-21 RX ADMIN — PROPOFOL 90 MG: 10 INJECTION, EMULSION INTRAVENOUS at 07:08

## 2020-08-21 RX ADMIN — LIDOCAINE HYDROCHLORIDE 75 MG: 20 INJECTION, SOLUTION INTRAVENOUS at 07:08

## 2020-08-21 RX ADMIN — SODIUM CHLORIDE, SODIUM LACTATE, POTASSIUM CHLORIDE, AND CALCIUM CHLORIDE: .6; .31; .03; .02 INJECTION, SOLUTION INTRAVENOUS at 06:08

## 2020-08-21 NOTE — TRANSFER OF CARE
"Anesthesia Transfer of Care Note    Patient: Ryan Crane    Procedure(s) Performed: Procedure(s) (LRB):  ASPIRATION, BONE MARROW (Left)    Patient location: PACU    Anesthesia Type: MAC    Transport from OR: Transported from OR on room air with adequate spontaneous ventilation    Post pain: adequate analgesia    Post assessment: no apparent anesthetic complications    Post vital signs: stable    Level of consciousness: awake    Nausea/Vomiting: no nausea/vomiting    Complications: none    Transfer of care protocol was followed      Last vitals:   Visit Vitals  BP (!) 175/81 (BP Location: Left arm, Patient Position: Lying)   Pulse 82   Temp 36.8 °C (98.2 °F) (Skin)   Resp 16   Ht 5' 11" (1.803 m)   Wt 120.2 kg (265 lb)   SpO2 (!) 93%   BMI 36.96 kg/m²     "

## 2020-08-21 NOTE — ANESTHESIA POSTPROCEDURE EVALUATION
Anesthesia Post Evaluation    Patient: Ryan Crane    Procedure(s) Performed: Procedure(s) (LRB):  ASPIRATION, BONE MARROW (Left)    Final Anesthesia Type: MAC    Patient location during evaluation: PACU  Patient participation: Yes- Able to Participate  Level of consciousness: awake and alert, oriented and awake  Post-procedure vital signs: reviewed and stable  Pain management: adequate  Airway patency: patent    PONV status at discharge: No PONV  Anesthetic complications: no      Cardiovascular status: blood pressure returned to baseline and hemodynamically stable  Respiratory status: unassisted, spontaneous ventilation and room air  Hydration status: euvolemic  Follow-up not needed.          Vitals Value Taken Time   /74 08/21/20 0800   Temp 36.8 °C (98.2 °F) 08/21/20 0734   Pulse 80 08/21/20 0800   Resp 17 08/21/20 0800   SpO2 97 % 08/21/20 0800         Event Time   Out of Recovery 08:03:23         Pain/Hai Score: Hai Score: 10 (8/21/2020  7:58 AM)

## 2020-08-21 NOTE — PROCEDURES
54 years old male leukocytosis    Bone marrow biopsy    54 years old male leukocytosis here for bone marrow biopsy.  Consent obtained.     Patient was laid in prone position.  Left biopsy site identified.  Patient was prepped in a surgical fashion and local lidocaine 4 cc was applied.      Biopsy needle placed left iliac crest.  Aspirate obtained for cytometry analysis.      Then biopsy needle was placed again in the left iliac crest for bone marrow biopsy specimen.  Needle was able to anchor into the iliac crest however biopsy reach even needle unable to capture any specimens.  Three different has attempt has been made without success.  Patient is blood pressure rising during the procedure.  Procedure was stopped.  Patient was transferred back to recovery room.  Estimated blood loss 2 cc.    Aftercare instruction given to patient and family members.  For next 24 hr keep the biopsy site clean and dry.  After was may wash with valencia.  Patient may take Tylenol for any discomfort.  Patient will follow up with me 1 week for cytometry results.  Additional blood work has been placed for patient in clinic.  Will review those results.  He then will determine repeat bone marrow biopsy is warranted.

## 2020-08-21 NOTE — DISCHARGE SUMMARY
HPI      54 years old male referred to [facility]ology Clinic for evaluation of microcytic anemia and thrombocytosis.  Patient had fatigue and malaise was started on vitamin and iron supplement by primary care doctor.    Patient denies any family history of blood disorders.  Patient is active smoker.    10/16/2019:  Patient here to follow-up to review blood work.  No complaints.  Patient states that he is taking iron pill every day empty stomach with orange juice since last visit.  Latest blood work obtained on 10/09/2019 shortly after last visit.    Colonoscopy in December shows active ulceration in bleeding which was controlled by hemostasis clip.  Specimen sent for analysis.    Past Medical History:   Diagnosis Date    Acute angina     Asthma     COPD (chronic obstructive pulmonary disease)     Diabetes mellitus     Digestive disorder     ulcers    General anesthetics causing adverse effect in therapeutic use     Hypertension     MI, old     PIPER on CPAP     Schizophrenia     Smoker unmotivated to quit     Stroke 2005    Thyroid disease     took medicine in the past    Type II diabetes mellitus with neurological manifestations 11/21/2019     Social History     Socioeconomic History    Marital status:      Spouse name: Not on file    Number of children: Not on file    Years of education: Not on file    Highest education level: Not on file   Occupational History    Occupation: disabled (mental)     Comment: since 2000   Social Needs    Financial resource strain: Not hard at all    Food insecurity     Worry: Never true     Inability: Never true    Transportation needs     Medical: Yes     Non-medical: No   Tobacco Use    Smoking status: Current Every Day Smoker     Packs/day: 1.00     Years: 45.00     Pack years: 45.00     Types: Cigarettes    Smokeless tobacco: Never Used    Tobacco comment: Age Started 14    Substance and Sexual Activity    Alcohol use: No     Frequency: Never      Drinks per session: Patient refused     Binge frequency: Never    Drug use: Not Currently     Types: Marijuana    Sexual activity: Yes     Partners: Female   Lifestyle    Physical activity     Days per week: 1 day     Minutes per session: 10 min    Stress: To some extent   Relationships    Social connections     Talks on phone: More than three times a week     Gets together: More than three times a week     Attends U[employer] service: Not on file     Active member of club or organization: Patient refused     Attends meetings of clubs or organizations: Patient refused     Relationship status:    Other Topics Concern    Not on file   Social History Narrative    Not on file       Objective    Physical Exam     Vitals:    08/21/20 0634   BP: (!) 195/90   Pulse:    Resp:    Temp:      Awake alert  Normal respiratory effort  Regular rate  Move all 4 extremities  No focal deficit  No rash no lesions  Soft nontender nondistended   deferred      CMP  Sodium   Date Value Ref Range Status   08/04/2020 135 (L) 136 - 145 mmol/L Final     Potassium   Date Value Ref Range Status   08/04/2020 4.0 3.5 - 5.1 mmol/L Final     Chloride   Date Value Ref Range Status   08/04/2020 100 95 - 110 mmol/L Final     CO2   Date Value Ref Range Status   08/04/2020 26 23 - 29 mmol/L Final     Glucose   Date Value Ref Range Status   08/04/2020 89 70 - 110 mg/dL Final     BUN, Bld   Date Value Ref Range Status   08/04/2020 12 6 - 20 mg/dL Final     Creatinine   Date Value Ref Range Status   08/04/2020 0.9 0.5 - 1.4 mg/dL Final     Calcium   Date Value Ref Range Status   08/04/2020 8.8 8.7 - 10.5 mg/dL Final     Total Protein   Date Value Ref Range Status   08/04/2020 7.3 6.0 - 8.4 g/dL Final     Albumin   Date Value Ref Range Status   08/04/2020 3.6 3.5 - 5.2 g/dL Final     Total Bilirubin   Date Value Ref Range Status   08/04/2020 0.2 0.1 - 1.0 mg/dL Final     Comment:     For infants and newborns, interpretation of results should be  based  on gestational age, weight and in agreement with clinical  observations.  Premature Infant recommended reference ranges:  Up to 24 hours.............<8.0 mg/dL  Up to 48 hours............<12.0 mg/dL  3-5 days..................<15.0 mg/dL  6-29 days.................<15.0 mg/dL       Alkaline Phosphatase   Date Value Ref Range Status   08/04/2020 116 55 - 135 U/L Final     AST   Date Value Ref Range Status   08/04/2020 16 10 - 40 U/L Final     ALT   Date Value Ref Range Status   08/04/2020 15 10 - 44 U/L Final     Anion Gap   Date Value Ref Range Status   08/04/2020 9 8 - 16 mmol/L Final     eGFR if    Date Value Ref Range Status   08/04/2020 >60.0 >60 mL/min/1.73 m^2 Final     eGFR if non    Date Value Ref Range Status   08/04/2020 >60.0 >60 mL/min/1.73 m^2 Final     Comment:     Calculation used to obtain the estimated glomerular filtration  rate (eGFR) is the CKD-EPI equation.        Lab Results   Component Value Date    WBC 19.80 (H) 08/04/2020    HGB 13.6 (L) 08/04/2020    HCT 39.6 (L) 08/04/2020    MCV 83 08/04/2020     (H) 08/04/2020     Assessment    Iron deficiency without anemia  > for oral iron supplements.  65 mg tablet p.o. daily  > will follow-up with iron panel out patient       Elevated WBC status post bone marrow biopsy  > leukemia lymphoma flow  > BCRABL  > completed bone marrow biopsy.  Unfortunately only able to obtain a fluid aspirate.  three different passes made for bone marrow specimen unfortunately specimen retrieved a needle unable to capture any specimens.  Due to rising blood pressure during the biopsy procedure was stop.  No other events.  Patient was transferred back to recover room doing very well.  Will wait for flow cytometry results.  May require CT-guided bone marrow biopsy in near future.  will discuss with patient on follow-up clinic visit.    Prior documentation of Schizophrenia  > follow-up psych

## 2020-08-21 NOTE — DISCHARGE INSTRUCTIONS
BONE MARROW ASPIRATION    DO'S   Minimal activity and light meals for 24 hours.   Keep operative site clean and dry for 24 hours.   May remove dressing then shower in 24 hours.   May take Tylenol for discomfort.    Resume all home medications.    DON'T   No driving for 24 hours or while taking narcotic pain medication.     CALL PHYSICIAN FOR:   Bleeding or any signs of infection (redness, draining pus or warm to touch).   Fever greater than 101.   Persistent pain not relieved by pain medications.    RETURN APPOINTMENT AS INSTRUCTED.  CALL [phone]  TO CONTACT DOCTOR

## 2020-08-21 NOTE — ANESTHESIA PREPROCEDURE EVALUATION
"                                                                                                             08/21/2020  Ryan Crane is a 54 y.o., male.    Pre-op Assessment    I have reviewed the Patient Summary Reports.     I have reviewed the Nursing Notes.       Review of Systems  Anesthesia Hx:  No problems with previous Anesthesia    Social:  Smoker    Cardiovascular:   Hypertension, well controlled Past MI CAD asymptomatic  Hx of "heart attack in the 90's".  Stable at present   Pulmonary:   COPD, moderate Sleep Apnea    Hepatic/GI:   GIB s/p colonoscopy with polyp removal   Neurological:   CVA, no residual symptoms    Endocrine:   Diabetes, well controlled, type 2    Psych:   Psychiatric History Hx of schizophrenia         Physical Exam  General:  Obesity    Airway/Jaw/Neck:  Airway Findings: Mouth Opening: Normal Tongue: Normal  General Airway Assessment: Adult  Mallampati: II  Improves to II with phonation.  TM Distance: Normal, at least 6 cm  Jaw/Neck Findings:  Neck ROM: Normal ROM     Eyes/Ears/Nose:  Eyes/Ears/Nose Findings:    Dental:  Dental Findings: In tact   Chest/Lungs:  Chest/Lungs Findings: Normal Respiratory Rate     Heart/Vascular:  Heart Findings: Rate: Normal        Mental Status:  Mental Status Findings:  Cooperative, Alert and Oriented         Anesthesia Plan  Type of Anesthesia, risks & benefits discussed:  Anesthesia Type:  general  Patient's Preference:   Intra-op Monitoring Plan:   Intra-op Monitoring Plan Comments:   Post Op Pain Control Plan:   Post Op Pain Control Plan Comments:   Induction:   IV  Beta Blocker:  Patient is not currently on a Beta-Blocker (No further documentation required).       Informed Consent: Patient understands risks and agrees with Anesthesia plan.  Questions answered. Anesthesia consent signed with patient.  ASA Score: 4     Day of Surgery Review of History & Physical:    H&P update referred to the surgeon.         Ready For Surgery From Anesthesia " Perspective.

## 2020-08-24 ENCOUNTER — OFFICE VISIT (OUTPATIENT)
Dept: FAMILY MEDICINE | Facility: CLINIC | Age: 55
End: 2020-08-24
Payer: MEDICARE

## 2020-08-24 VITALS
SYSTOLIC BLOOD PRESSURE: 154 MMHG | HEIGHT: 71 IN | DIASTOLIC BLOOD PRESSURE: 88 MMHG | WEIGHT: 260.69 LBS | HEART RATE: 104 BPM | OXYGEN SATURATION: 98 % | BODY MASS INDEX: 36.5 KG/M2 | TEMPERATURE: 97 F

## 2020-08-24 VITALS
HEART RATE: 80 BPM | RESPIRATION RATE: 17 BRPM | SYSTOLIC BLOOD PRESSURE: 165 MMHG | TEMPERATURE: 98 F | HEIGHT: 71 IN | OXYGEN SATURATION: 97 % | DIASTOLIC BLOOD PRESSURE: 74 MMHG | BODY MASS INDEX: 37.1 KG/M2 | WEIGHT: 265 LBS

## 2020-08-24 DIAGNOSIS — E11.49 TYPE II DIABETES MELLITUS WITH NEUROLOGICAL MANIFESTATIONS: ICD-10-CM

## 2020-08-24 DIAGNOSIS — Z00.00 ROUTINE HEALTH MAINTENANCE: ICD-10-CM

## 2020-08-24 DIAGNOSIS — I10 ESSENTIAL HYPERTENSION: Primary | ICD-10-CM

## 2020-08-24 DIAGNOSIS — R68.89 ABNORMAL FINDING OF HEAD: ICD-10-CM

## 2020-08-24 DIAGNOSIS — D49.89: ICD-10-CM

## 2020-08-24 PROCEDURE — 99214 OFFICE O/P EST MOD 30 MIN: CPT | Mod: HCNC,S$GLB,, | Performed by: FAMILY MEDICINE

## 2020-08-24 PROCEDURE — 99999 PR PBB SHADOW E&M-EST. PATIENT-LVL V: CPT | Mod: PBBFAC,HCNC,, | Performed by: FAMILY MEDICINE

## 2020-08-24 PROCEDURE — 99999 PR PBB SHADOW E&M-EST. PATIENT-LVL V: ICD-10-PCS | Mod: PBBFAC,HCNC,, | Performed by: FAMILY MEDICINE

## 2020-08-24 PROCEDURE — 3079F DIAST BP 80-89 MM HG: CPT | Mod: HCNC,CPTII,S$GLB, | Performed by: FAMILY MEDICINE

## 2020-08-24 PROCEDURE — 3077F SYST BP >= 140 MM HG: CPT | Mod: HCNC,CPTII,S$GLB, | Performed by: FAMILY MEDICINE

## 2020-08-24 PROCEDURE — 3044F HG A1C LEVEL LT 7.0%: CPT | Mod: HCNC,CPTII,S$GLB, | Performed by: FAMILY MEDICINE

## 2020-08-24 PROCEDURE — 99214 PR OFFICE/OUTPT VISIT, EST, LEVL IV, 30-39 MIN: ICD-10-PCS | Mod: HCNC,S$GLB,, | Performed by: FAMILY MEDICINE

## 2020-08-24 PROCEDURE — 3008F BODY MASS INDEX DOCD: CPT | Mod: HCNC,CPTII,S$GLB, | Performed by: FAMILY MEDICINE

## 2020-08-24 PROCEDURE — 3079F PR MOST RECENT DIASTOLIC BLOOD PRESSURE 80-89 MM HG: ICD-10-PCS | Mod: HCNC,CPTII,S$GLB, | Performed by: FAMILY MEDICINE

## 2020-08-24 PROCEDURE — 3008F PR BODY MASS INDEX (BMI) DOCUMENTED: ICD-10-PCS | Mod: HCNC,CPTII,S$GLB, | Performed by: FAMILY MEDICINE

## 2020-08-24 PROCEDURE — 3044F PR MOST RECENT HEMOGLOBIN A1C LEVEL <7.0%: ICD-10-PCS | Mod: HCNC,CPTII,S$GLB, | Performed by: FAMILY MEDICINE

## 2020-08-24 PROCEDURE — 3077F PR MOST RECENT SYSTOLIC BLOOD PRESSURE >= 140 MM HG: ICD-10-PCS | Mod: HCNC,CPTII,S$GLB, | Performed by: FAMILY MEDICINE

## 2020-08-24 RX ORDER — AMLODIPINE AND VALSARTAN 5; 160 MG/1; MG/1
1 TABLET ORAL DAILY
Qty: 90 TABLET | Refills: 0 | Status: SHIPPED | OUTPATIENT
Start: 2020-08-24 | End: 2020-11-20 | Stop reason: SDUPTHER

## 2020-08-24 NOTE — PROGRESS NOTES
"Subjective:       Patient ID: Ryan Crane is a 55 y.o. male.    Chief Complaint: Hypertension (Pt c/o having high blood pressure and headaches ) and Foot Pain (chronic right foot pain )    HPI  Patient in clinic for review. Historical details difficult to elicit. Records have been requested for more detail.  Notes elevated pressures on home readings, consistent with current in clinic.  Reports HA with elevated pressures, more frontal/eyes.   Also, R foot pain, requests review with pain mgmt re: issues noted prev. States issues with neuropathy, etc.  Also, he recalls he has not established with neurology since moving here re: abnormal findings on previous imaging. Recalls that he was having at least annual imaging for f/u. No previous neuro records on file, will request.     Review of Systems:  Review of Systems   Constitutional: Positive for fatigue.   HENT: Negative for congestion and postnasal drip.    Respiratory: Negative for cough and shortness of breath.    Cardiovascular: Negative for chest pain (occ) and leg swelling.   Gastrointestinal: Negative for blood in stool, constipation and diarrhea.        No gerd c/o.   Genitourinary: Negative for difficulty urinating, frequency and hematuria.   Musculoskeletal: Negative for arthralgias and myalgias.   Neurological: Positive for headaches. Negative for dizziness.   Psychiatric/Behavioral: Positive for dysphoric mood. Negative for sleep disturbance.       Objective:     Vitals:    08/24/20 1019   BP: (!) 160/90   Pulse: 104   Temp: 97 °F (36.1 °C)   TempSrc: Temporal   SpO2: 98%   Weight: 118.2 kg (260 lb 11.1 oz)   Height: 5' 11" (1.803 m)          Physical Exam  Vitals signs and nursing note reviewed.   Constitutional:       General: He is not in acute distress.     Appearance: He is well-developed. He is obese.   HENT:      Head: Normocephalic and atraumatic.   Eyes:      General: No scleral icterus.        Right eye: No discharge.         Left eye: No " discharge.      Conjunctiva/sclera: Conjunctivae normal.   Neck:      Musculoskeletal: Neck supple.      Vascular: No carotid bruit.   Cardiovascular:      Rate and Rhythm: Normal rate and regular rhythm.   Pulmonary:      Effort: Pulmonary effort is normal. No respiratory distress.      Breath sounds: Normal breath sounds.   Skin:     General: Skin is warm and dry.   Neurological:      General: No focal deficit present.      Mental Status: He is alert and oriented to person, place, and time.      Cranial Nerves: No cranial nerve deficit.   Psychiatric:         Mood and Affect: Mood normal.         Behavior: Behavior normal.           Assessment & Plan:  Essential hypertension  Comments:  start exforge daily, discussed home log/goals, dash diet encouraged; bp recheck 2 weeks  Orders:  -     amlodipine-valsartan (EXFORGE) 5-160 mg per tablet; Take 1 tablet by mouth once daily.  Dispense: 90 tablet; Refill: 0    Abnormal finding of head  Comments:  pt reported issues noted on prev imaging  Orders:  -     CT Head Without Contrast; Future; Expected date: 08/24/2020  -     Ambulatory referral/consult to Neurology; Future; Expected date: 08/31/2020    Type II diabetes mellitus with neurological manifestations  -     Hemoglobin A1C; Future; Expected date: 08/24/2020  -     Comprehensive metabolic panel; Future; Expected date: 08/24/2020  -     Microalbumin/creatinine urine ratio; Future  -     Urinalysis; Future  -     Lipid Panel; Future; Expected date: 08/24/2020    Routine health maintenance  -     Hepatitis C Antibody; Future; Expected date: 08/24/2020    Tumor, foot  -     Ambulatory referral/consult to Pain Clinic; Future; Expected date: 08/31/2020

## 2020-08-24 NOTE — PROGRESS NOTES
Subjective:      Patient ID: Ryan Crane is a 55 y.o. male.    Chief Complaint: Nail Care    HPI:  Ryan is a 55 y.o. male who presents to the clinic for evaluation and treatment of high risk feet. Ryan has a past medical history of Acute angina, Asthma, COPD (chronic obstructive pulmonary disease), Diabetes mellitus, Digestive disorder, General anesthetics causing adverse effect in therapeutic use, Hypertension, MI, old, PIPER on CPAP, Schizophrenia, Smoker unmotivated to quit, Stroke (2005), Thyroid disease, and Type II diabetes mellitus with neurological manifestations (11/21/2019). The patient's chief complaint is long, thick toenails and right heel pain. This patient has documented high risk feet requiring routine maintenance secondary to diabetes mellitis and those secondary complications of diabetes, as mentioned..  He reports not getting diabetic shoes yet as he doesn't want to pay for them and have his insurance reimburse him later; however, he states he has bought 2 pairs of $150 sneakers, which is more than what he claims the medical supply company is requiring him to pay up front for his diabetic shoes and inserts.  He relates having right heel pain, which he rates as 3/10 on the pain scale but denies checking to see if there is still a wound.  He informs of getting engaged over the holidays.  Patient denies any other pedal complaints at this time.    PCP: Laura Becerril MD    Date Last Seen by PCP: 2/24/20    Current shoe gear:  Affected Foot: Casual shoes     Unaffected Foot: Casual shoes    Review of Systems   Constitutional: Negative for appetite change, fever, chills, fatigue and unexpected weight change.   Respiratory: Negative for cough, wheezing, shortness of breath.  Cardiovascular: Negative for chest pain, claudication, cyanosis.  Positive for leg swelling.  Musculoskeletal: Negative for back pain, arthritis, joint pain, joint swelling, myalgias, and stiffness.   Skin: Negative for  rash, itching, suspicious lesion, poor wound healing, unusual hair distribution.  Positive for nail bed changes, discoloration,.  Neurological: Negative for loss of balance, sensory change, paresthesias, and numbness.  Positive for pain.  Hematological: Negative for adenopathy, bleeding, and bruising easily.   Psychiatric/Behavioral: The patient is not nervous/anxious.  Negative for altered mental status.    Hemoglobin A1C   Date Value Ref Range Status   12/13/2019 5.7 (H) 0.0 - 5.6 % Final     Comment:     Reference Interval:  5.0 - 5.6 Normal   5.7 - 6.4 High Risk   > 6.5 Diabetic    Hgb A1c results are standardized based on the (NGSP) National   Glycohemoglobin Standardization Program.    Hemoglobin A1C levels are related to mean serum/plasma glucose   during the preceding 2-3 months.        08/29/2019 6.0 (H) 4.0 - 5.6 % Final     Comment:     ADA Screening Guidelines:  5.7-6.4%  Consistent with prediabetes  >or=6.5%  Consistent with diabetes  High levels of fetal hemoglobin interfere with the HbA1C  assay. Heterozygous hemoglobin variants (HbS, HgC, etc)do  not significantly interfere with this assay.   However, presence of multiple variants may affect accuracy.     01/16/2019 5.6 4.0 - 5.6 % Final     Comment:     ADA Screening Guidelines:  5.7-6.4%  Consistent with prediabetes  >or=6.5%  Consistent with diabetes  High levels of fetal hemoglobin interfere with the HbA1C  assay. Heterozygous hemoglobin variants (HbS, HgC, etc)do  not significantly interfere with this assay.   However, presence of multiple variants may affect accuracy.         Past Medical History:   Diagnosis Date    Acute angina     Asthma     COPD (chronic obstructive pulmonary disease)     Diabetes mellitus     Digestive disorder     ulcers    General anesthetics causing adverse effect in therapeutic use     Hypertension     MI, old     PIPER on CPAP     Schizophrenia     Smoker unmotivated to quit     Stroke 2005    Thyroid  disease     took medicine in the past    Type II diabetes mellitus with neurological manifestations 11/21/2019     Past Surgical History:   Procedure Laterality Date    APPENDECTOMY      CLOSURE OF WOUND Right 9/9/2019    Procedure: CLOSURE, WOUND;  Surgeon: Li Kathleen DPM;  Location: [facility] OR;  Service: Podiatry;  Laterality: Right;    COLONOSCOPY N/A 12/10/2019    Procedure: COLONOSCOPY;  Surgeon: Ryan Lucas MD;  Location: [facility] ENDO;  Service: Endoscopy;  Laterality: N/A;    COLONOSCOPY N/A 12/13/2019    Procedure: COLONOSCOPY;  Surgeon: Ryan Lucas MD;  Location: [facility] ENDO;  Service: Endoscopy;  Laterality: N/A;    ESOPHAGOGASTRODUODENOSCOPY N/A 12/10/2019    Procedure: EGD (ESOPHAGOGASTRODUODENOSCOPY);  Surgeon: Ryan Lucas MD;  Location: [facility] ENDO;  Service: Endoscopy;  Laterality: N/A;    SHOULDER ARTHROSCOPY Left      Family History   Problem Relation Age of Onset    Melanoma Mother     Diabetes Mother     Hypertension Mother     Stroke Father     Diabetes Father     Hypertension Father     Colon cancer Father         unsure of age of diagnosis    Cancer Father         colon cancer    Cervical cancer Sister     Cirrhosis Brother     Hypertension Brother     Lung cancer Maternal Grandmother     Prostate cancer Maternal Grandfather     Crohn's disease Neg Hx     Ulcerative colitis Neg Hx     Stomach cancer Neg Hx     Esophageal cancer Neg Hx     Retinal detachment Neg Hx     Strabismus Neg Hx     Macular degeneration Neg Hx     Glaucoma Neg Hx      Social History     Socioeconomic History    Marital status:      Spouse name: Not on file    Number of children: Not on file    Years of education: Not on file    Highest education level: Not on file   Occupational History    Occupation: disabled (mental)     Comment: since 2000   Social Needs    Financial resource strain: Not hard at all    Food insecurity     Worry: Never true     Inability:  "Never true    Transportation needs     Medical: Yes     Non-medical: No   Tobacco Use    Smoking status: Current Every Day Smoker     Packs/day: 1.00     Years: 45.00     Pack years: 45.00     Types: Cigarettes    Smokeless tobacco: Never Used    Tobacco comment: Age Started 14    Substance and Sexual Activity    Alcohol use: No     Frequency: Never     Drinks per session: Patient refused     Binge frequency: Never    Drug use: Not Currently     Types: Marijuana    Sexual activity: Yes     Partners: Female   Lifestyle    Physical activity     Days per week: 1 day     Minutes per session: 10 min    Stress: To some extent   Relationships    Social connections     Talks on phone: More than three times a week     Gets together: More than three times a week     Attends [employer] service: Not on file     Active member of club or organization: Patient refused     Attends meetings of clubs or organizations: Patient refused     Relationship status:    Other Topics Concern    Not on file   Social History Narrative    Not on file           Objective:        Ht 5' 11" (1.803 m)   Wt 119.7 kg (264 lb)   BMI 36.82 kg/m²     Physical Exam   Constitutional: Patient is oriented to person, place, and time. Patient appears well-developed and well-nourished.  Strong malodor noted from patient due to lack of personal hygiene.  No acute distress.     Psychiatric: Patient has a normal mood and affect. Patient's speech is normal and behavior is normal. Judgment is normal. Cognition and memory are normal.     Bilateral pedal exam was performed today.  Vascular: Vascular: Pedal pulses palpable 2/4 DP & PT.  CFT is = 3 seconds to the hallux.  Skin temperature is warm to cool proximal tibia to distal toes without localized increase in calor noted.  No erythema and ecchymosis noted to the LE.  Nonpitting edema noted to the LE.  Hair growth present distally to the LE.     Musculoskeletal: Ankle and pedal joints " ROM are decreased. Ankle joint dorsiflexion is restricted with the knee extended and flexed per Silfverskiold exam.  Muscle strength is 5/5 for all LE muscle groups tested.  Flat foot type present.  Flexion contracture of lesser digits noted.    Neurological:  Epicritic sensation is slightly dimiished to the foot.  Chaddock STR is intact to the LE.  Tenderness to palpation of right plantar heel noted.     Dermatological: Toenails 1-5 are elongated and distally thickened, dystrophic, brittle, discolored, and mycotic with subungal debris present.  Webspaces 1-4 are intact with dot and fungal debris present.  Skin turgor is increased.  Skin texture is dry and flaky.   Previous plantar right heel is healed.      Nursing note and vitals reviewed.        Assessment:       Encounter Diagnoses   Name Primary?    Onychomycosis due to dermatophyte Yes    Hammer toes of both feet     Equinus deformity of both feet     Pes planus of both feet     Hx of diabetic foot ulcer     Diabetic polyneuropathy associated with diabetes mellitus due to underlying condition     Type II diabetes mellitus with neurological manifestations     PVD (peripheral vascular disease)          Plan:       Ryan was seen today for nail care.    Diagnoses and all orders for this visit:    Onychomycosis due to dermatophyte    Hammer toes of both feet  -     DIABETIC SHOES FOR HOME USE    Equinus deformity of both feet  -     DIABETIC SHOES FOR HOME USE    Pes planus of both feet  -     DIABETIC SHOES FOR HOME USE    Hx of diabetic foot ulcer  -     DIABETIC SHOES FOR HOME USE    Diabetic polyneuropathy associated with diabetes mellitus due to underlying condition  -     DIABETIC SHOES FOR HOME USE    Type II diabetes mellitus with neurological manifestations  -     DIABETIC SHOES FOR HOME USE    PVD (peripheral vascular disease)  -     DIABETIC SHOES FOR HOME USE      I counseled the patient on his conditions, their implications and medical  management.    - Shoe inspection. Diabetic Foot Education. Patient reminded of the importance of good nutrition and blood sugar control to help prevent podiatric complications of diabetes. Patient instructed on proper foot hygeine. We discussed wearing proper shoe gear, daily foot inspections, never walking without protective shoe gear, never putting sharp instruments to feet, routine podiatric nail visits every 2-3 months.     - With the patient's permission, toenails 1, 2, 3, 4, and 5 of the right foot and 1, 2, 3, 4, and 5 of the left foot were debrided in length and thickness via nail nippers and electric  to patient's tolerance without incident.  Cleansed B/L foot with cleansing cloths to remove sloughing skin, dot debris, and fungal debris.    - Advised patient to schedule appointment for diabetic shoes and inserts.    Patient was given the following recommendations and instructions:  Patient Instructions   - Check feet daily for wounds and areas of irritation.    - Keep regularly scheduled appointments with primary care, ophthalmology, and podiatry.    - Maintain blood glucose control via taking medications as prescribed, diabetic diet, and exercise.    - Apply moisturizing cream to feet and ankles daily but not between toes.  Look for Cera Ve Moisturizing Cream.    - Keep feet clean and dry, especially between toes.    - Elevate feet as much as possible throughout the day.    - Wear supportive/accommodative shoes at all times when ambulating.    - Supplement with alpha lipoic acid (600 mg once daily by mouth) and vitamin B complex (as directed per package insert) to reduce and repair nerve damage.    - Notify clinic immediately of any new or worsening conditions.          Li Kathleen DPM        Dictation was performed using M*Modal Fluency.  Transcription errors may be present.

## 2020-08-24 NOTE — PATIENT INSTRUCTIONS
- Check feet daily for wounds and areas of irritation.    - Keep regularly scheduled appointments with primary care, ophthalmology, and podiatry.    - Maintain blood glucose control via taking medications as prescribed, diabetic diet, and exercise.    - Apply moisturizing cream to feet and ankles daily but not between toes.  Look for Cera Ve Moisturizing Cream.    - Keep feet clean and dry, especially between toes.    - Elevate feet as much as possible throughout the day.    - Wear supportive/accommodative shoes at all times when ambulating.    - Supplement with alpha lipoic acid (600 mg once daily by mouth) and vitamin B complex (as directed per package insert) to reduce and repair nerve damage.    - Notify clinic immediately of any new or worsening conditions.

## 2020-08-25 LAB
BODY SITE - BONE MARROW: NORMAL
CLINICAL DIAGNOSIS - BONE MARROW: NORMAL
FLOW CYTOMETRY ANTIBODIES ANALYZED - BONE MARROW: NORMAL
FLOW CYTOMETRY COMMENT - BONE MARROW: NORMAL
FLOW CYTOMETRY INTERPRETATION - BONE MARROW: NORMAL

## 2020-08-31 LAB
CHROM BANDING METHOD: NORMAL
CHROMOSOME ANALYSIS BM ADDITIONAL INFORMATION: NORMAL
CHROMOSOME ANALYSIS BM RELEASED BY: NORMAL
CHROMOSOME ANALYSIS BM RESULT SUMMARY: NORMAL
CLINICAL CYTOGENETICIST REVIEW: NORMAL
KARYOTYP MAR: NORMAL
REASON FOR REFERRAL (NARRATIVE): NORMAL
REF LAB TEST METHOD: NORMAL
SPECIMEN SOURCE: NORMAL
SPECIMEN: NORMAL

## 2020-09-01 ENCOUNTER — PATIENT MESSAGE (OUTPATIENT)
Dept: HEMATOLOGY/ONCOLOGY | Facility: CLINIC | Age: 55
End: 2020-09-01

## 2020-09-02 ENCOUNTER — TELEPHONE (OUTPATIENT)
Dept: HEMATOLOGY/ONCOLOGY | Facility: CLINIC | Age: 55
End: 2020-09-02

## 2020-09-02 NOTE — TELEPHONE ENCOUNTER
Attempted to contact pt to notify him that his appt was r/s to the 1:20 time on Friday per Dr Kathleen. Left message with contact number.

## 2020-09-03 ENCOUNTER — TELEPHONE (OUTPATIENT)
Dept: HEMATOLOGY/ONCOLOGY | Facility: CLINIC | Age: 55
End: 2020-09-03

## 2020-09-03 NOTE — TELEPHONE ENCOUNTER
----- Message from Rodri RUIZ Frisard sent at 9/3/2020  2:33 PM CDT -----  Contact: Carlee/Dominique Zuniga IM sent to office.  Dominique called in and stated she was returning call to office from yesterday because she had bad reception.  Dominique's call back number is [phone]

## 2020-09-04 ENCOUNTER — OFFICE VISIT (OUTPATIENT)
Dept: HEMATOLOGY/ONCOLOGY | Facility: CLINIC | Age: 55
End: 2020-09-04
Payer: MEDICARE

## 2020-09-04 ENCOUNTER — LAB VISIT (OUTPATIENT)
Dept: LAB | Facility: HOSPITAL | Age: 55
End: 2020-09-04
Attending: INTERNAL MEDICINE
Payer: MEDICARE

## 2020-09-04 VITALS
HEART RATE: 81 BPM | HEIGHT: 71 IN | BODY MASS INDEX: 36.82 KG/M2 | TEMPERATURE: 97 F | DIASTOLIC BLOOD PRESSURE: 72 MMHG | WEIGHT: 263 LBS | SYSTOLIC BLOOD PRESSURE: 137 MMHG | OXYGEN SATURATION: 98 %

## 2020-09-04 DIAGNOSIS — D50.0 IRON DEFICIENCY ANEMIA DUE TO CHRONIC BLOOD LOSS: ICD-10-CM

## 2020-09-04 DIAGNOSIS — D72.829 LEUKOCYTOSIS, UNSPECIFIED TYPE: ICD-10-CM

## 2020-09-04 DIAGNOSIS — D50.0 IRON DEFICIENCY ANEMIA DUE TO CHRONIC BLOOD LOSS: Primary | ICD-10-CM

## 2020-09-04 DIAGNOSIS — E11.49 TYPE II DIABETES MELLITUS WITH NEUROLOGICAL MANIFESTATIONS: ICD-10-CM

## 2020-09-04 DIAGNOSIS — I10 ESSENTIAL HYPERTENSION: ICD-10-CM

## 2020-09-04 LAB
ALBUMIN SERPL BCP-MCNC: 4.3 G/DL (ref 3.5–5.2)
ALP SERPL-CCNC: 115 U/L (ref 38–145)
ALT SERPL W/O P-5'-P-CCNC: 17 U/L (ref 0–50)
ANION GAP SERPL CALC-SCNC: 5 MMOL/L (ref 8–16)
AST SERPL-CCNC: 23 U/L (ref 17–59)
BASOPHILS # BLD AUTO: 0.13 K/UL (ref 0–0.2)
BASOPHILS NFR BLD: 1 % (ref 0–1.9)
BILIRUB SERPL-MCNC: 0.4 MG/DL (ref 0.2–1.3)
BUN SERPL-MCNC: 10 MG/DL (ref 9–21)
CALCIUM SERPL-MCNC: 9.5 MG/DL (ref 8.4–10.2)
CHLORIDE SERPL-SCNC: 103 MMOL/L (ref 95–110)
CO2 SERPL-SCNC: 27 MMOL/L (ref 22–31)
CREAT SERPL-MCNC: 0.96 MG/DL (ref 0.5–1.4)
DIFFERENTIAL METHOD: ABNORMAL
EOSINOPHIL # BLD AUTO: 0.3 K/UL (ref 0–0.5)
EOSINOPHIL NFR BLD: 2.2 % (ref 0–8)
ERYTHROCYTE [DISTWIDTH] IN BLOOD BY AUTOMATED COUNT: 14.9 % (ref 11.5–14.5)
EST. GFR  (AFRICAN AMERICAN): >60 ML/MIN/1.73 M^2
EST. GFR  (NON AFRICAN AMERICAN): >60 ML/MIN/1.73 M^2
FERRITIN SERPL-MCNC: 14 NG/ML (ref 18–464)
GLUCOSE SERPL-MCNC: 103 MG/DL (ref 70–110)
HCT VFR BLD AUTO: 42.9 % (ref 40–54)
HGB BLD-MCNC: 14.3 G/DL (ref 14–18)
IMM GRANULOCYTES # BLD AUTO: 0.05 K/UL (ref 0–0.04)
IMM GRANULOCYTES NFR BLD AUTO: 0.4 % (ref 0–0.5)
IRON SATN MFR SERPL: 20 % (ref 20–50)
IRON SERPL-MCNC: 74 UG/DL (ref 45–160)
LYMPHOCYTES # BLD AUTO: 3 K/UL (ref 1–4.8)
LYMPHOCYTES NFR BLD: 22.5 % (ref 18–48)
MCH RBC QN AUTO: 28.5 PG (ref 27–31)
MCHC RBC AUTO-ENTMCNC: 33.3 G/DL (ref 32–36)
MCV RBC AUTO: 86 FL (ref 82–98)
MONOCYTES # BLD AUTO: 1.5 K/UL (ref 0.3–1)
MONOCYTES NFR BLD: 11 % (ref 4–15)
NEUTROPHILS # BLD AUTO: 8.5 K/UL (ref 1.8–7.7)
NEUTROPHILS NFR BLD: 62.9 % (ref 38–73)
NRBC BLD-RTO: 0 /100 WBC
PATH REV BLD -IMP: NORMAL
PLATELET # BLD AUTO: 532 K/UL (ref 150–350)
PMV BLD AUTO: 8.9 FL (ref 9.2–12.9)
POTASSIUM SERPL-SCNC: 4.4 MMOL/L (ref 3.5–5.1)
PROT SERPL-MCNC: 8.2 G/DL (ref 6–8.4)
RBC # BLD AUTO: 5.01 M/UL (ref 4.6–6.2)
SODIUM SERPL-SCNC: 135 MMOL/L (ref 136–145)
TOTAL IRON BINDING CAPACITY: 374 UG/DL (ref 261–462)
WBC # BLD AUTO: 13.46 K/UL (ref 3.9–12.7)

## 2020-09-04 PROCEDURE — 3044F HG A1C LEVEL LT 7.0%: CPT | Mod: HCNC,CPTII,S$GLB, | Performed by: INTERNAL MEDICINE

## 2020-09-04 PROCEDURE — 99214 OFFICE O/P EST MOD 30 MIN: CPT | Mod: HCNC,S$GLB,, | Performed by: INTERNAL MEDICINE

## 2020-09-04 PROCEDURE — 3044F PR MOST RECENT HEMOGLOBIN A1C LEVEL <7.0%: ICD-10-PCS | Mod: HCNC,CPTII,S$GLB, | Performed by: INTERNAL MEDICINE

## 2020-09-04 PROCEDURE — 80053 COMPREHEN METABOLIC PANEL: CPT | Mod: PN

## 2020-09-04 PROCEDURE — 82728 ASSAY OF FERRITIN: CPT

## 2020-09-04 PROCEDURE — 3008F PR BODY MASS INDEX (BMI) DOCUMENTED: ICD-10-PCS | Mod: HCNC,CPTII,S$GLB, | Performed by: INTERNAL MEDICINE

## 2020-09-04 PROCEDURE — 3075F SYST BP GE 130 - 139MM HG: CPT | Mod: HCNC,CPTII,S$GLB, | Performed by: INTERNAL MEDICINE

## 2020-09-04 PROCEDURE — 3078F DIAST BP <80 MM HG: CPT | Mod: HCNC,CPTII,S$GLB, | Performed by: INTERNAL MEDICINE

## 2020-09-04 PROCEDURE — 83540 ASSAY OF IRON: CPT | Mod: PN

## 2020-09-04 PROCEDURE — 36415 COLL VENOUS BLD VENIPUNCTURE: CPT | Mod: HCNC,PN

## 2020-09-04 PROCEDURE — 82728 ASSAY OF FERRITIN: CPT | Mod: PN

## 2020-09-04 PROCEDURE — 99999 PR PBB SHADOW E&M-EST. PATIENT-LVL IV: ICD-10-PCS | Mod: PBBFAC,HCNC,, | Performed by: INTERNAL MEDICINE

## 2020-09-04 PROCEDURE — 85025 COMPLETE CBC W/AUTO DIFF WBC: CPT | Mod: PN

## 2020-09-04 PROCEDURE — 3008F BODY MASS INDEX DOCD: CPT | Mod: HCNC,CPTII,S$GLB, | Performed by: INTERNAL MEDICINE

## 2020-09-04 PROCEDURE — 85025 COMPLETE CBC W/AUTO DIFF WBC: CPT

## 2020-09-04 PROCEDURE — 80053 COMPREHEN METABOLIC PANEL: CPT

## 2020-09-04 PROCEDURE — 99999 PR PBB SHADOW E&M-EST. PATIENT-LVL IV: CPT | Mod: PBBFAC,HCNC,, | Performed by: INTERNAL MEDICINE

## 2020-09-04 PROCEDURE — 3075F PR MOST RECENT SYSTOLIC BLOOD PRESS GE 130-139MM HG: ICD-10-PCS | Mod: HCNC,CPTII,S$GLB, | Performed by: INTERNAL MEDICINE

## 2020-09-04 PROCEDURE — 3078F PR MOST RECENT DIASTOLIC BLOOD PRESSURE < 80 MM HG: ICD-10-PCS | Mod: HCNC,CPTII,S$GLB, | Performed by: INTERNAL MEDICINE

## 2020-09-04 PROCEDURE — 83540 ASSAY OF IRON: CPT

## 2020-09-04 PROCEDURE — 99214 PR OFFICE/OUTPT VISIT, EST, LEVL IV, 30-39 MIN: ICD-10-PCS | Mod: HCNC,S$GLB,, | Performed by: INTERNAL MEDICINE

## 2020-09-04 NOTE — PROGRESS NOTES
HPI      55 years old male follow [facility] anemia and thrombocytosis.  Patient had fatigue and malaise was started on vitamin and iron supplement by primary care doctor.    Patient denies any family history of blood disorders.  Patient is active smoker.    Recent hematology follow-up patient has elevated WBC.    Bone marrow biopsy aspirate did not review any malignancies.        Past Medical History:   Diagnosis Date    Acute angina     Asthma     COPD (chronic obstructive pulmonary disease)     Diabetes mellitus     Digestive disorder     ulcers    General anesthetics causing adverse effect in therapeutic use     Hypertension     MI, old     PIPER on CPAP     Schizophrenia     Smoker unmotivated to quit     Stroke 2005    Thyroid disease     took medicine in the past    Type II diabetes mellitus with neurological manifestations 11/21/2019     Social History     Socioeconomic History    Marital status:      Spouse name: Not on file    Number of children: Not on file    Years of education: Not on file    Highest education level: Not on file   Occupational History    Occupation: disabled (mental)     Comment: since 2000   Social Needs    Financial resource strain: Not hard at all    Food insecurity     Worry: Never true     Inability: Never true    Transportation needs     Medical: Yes     Non-medical: No   Tobacco Use    Smoking status: Current Every Day Smoker     Packs/day: 1.00     Years: 45.00     Pack years: 45.00     Types: Cigarettes    Smokeless tobacco: Never Used    Tobacco comment: Age Started 14    Substance and Sexual Activity    Alcohol use: No     Frequency: Never     Drinks per session: Patient refused     Binge frequency: Never    Drug use: Not Currently     Types: Marijuana    Sexual activity: Yes     Partners: Female   Lifestyle    Physical activity     Days per week: 1 day     Minutes per session: 10 min    Stress: To some extent   Relationships     Social connections     Talks on phone: More than three times a week     Gets together: More than three times a week     Attends Orthodox service: Not on file     Active member of club or organization: Patient refused     Attends meetings of clubs or organizations: Patient refused     Relationship status:    Other Topics Concern    Not on file   Social History Narrative    Not on file       Objective    Physical Exam     There were no vitals filed for this visit.    Awake alert no acute distress  Normocephalic atraumatic  Regular rate  No respiratory distress  Soft nontender nondistended bowel sounds 4  Nonfocal  Distal pulses intact  No rash no lesions  Normal affect      CMP  Sodium   Date Value Ref Range Status   08/04/2020 135 (L) 136 - 145 mmol/L Final     Potassium   Date Value Ref Range Status   08/04/2020 4.0 3.5 - 5.1 mmol/L Final     Chloride   Date Value Ref Range Status   08/04/2020 100 95 - 110 mmol/L Final     CO2   Date Value Ref Range Status   08/04/2020 26 23 - 29 mmol/L Final     Glucose   Date Value Ref Range Status   08/04/2020 89 70 - 110 mg/dL Final     BUN, Bld   Date Value Ref Range Status   08/04/2020 12 6 - 20 mg/dL Final     Creatinine   Date Value Ref Range Status   08/04/2020 0.9 0.5 - 1.4 mg/dL Final     Calcium   Date Value Ref Range Status   08/04/2020 8.8 8.7 - 10.5 mg/dL Final     Total Protein   Date Value Ref Range Status   08/04/2020 7.3 6.0 - 8.4 g/dL Final     Albumin   Date Value Ref Range Status   08/04/2020 3.6 3.5 - 5.2 g/dL Final     Total Bilirubin   Date Value Ref Range Status   08/04/2020 0.2 0.1 - 1.0 mg/dL Final     Comment:     For infants and newborns, interpretation of results should be based  on gestational age, weight and in agreement with clinical  observations.  Premature Infant recommended reference ranges:  Up to 24 hours.............<8.0 mg/dL  Up to 48 hours............<12.0 mg/dL  3-5 days..................<15.0 mg/dL  6-29  days.................<15.0 mg/dL       Alkaline Phosphatase   Date Value Ref Range Status   08/04/2020 116 55 - 135 U/L Final     AST   Date Value Ref Range Status   08/04/2020 16 10 - 40 U/L Final     ALT   Date Value Ref Range Status   08/04/2020 15 10 - 44 U/L Final     Anion Gap   Date Value Ref Range Status   08/04/2020 9 8 - 16 mmol/L Final     eGFR if    Date Value Ref Range Status   08/04/2020 >60.0 >60 mL/min/1.73 m^2 Final     eGFR if non    Date Value Ref Range Status   08/04/2020 >60.0 >60 mL/min/1.73 m^2 Final     Comment:     Calculation used to obtain the estimated glomerular filtration  rate (eGFR) is the CKD-EPI equation.        Lab Results   Component Value Date    WBC 19.80 (H) 08/04/2020    HGB 13.6 (L) 08/04/2020    HCT 39.6 (L) 08/04/2020    MCV 83 08/04/2020     (H) 08/04/2020     10/09/2019:  Ferritin 7, iron 34, TIBC 418, iron saturation 8%    11/06/2019:  Iron 256, TIBC 397, iron saturation 64% transferrin 268, ferritin 51.    02/03/2020:  Iron 25, TIBC 454, transferrin 307, iron saturation 6.  Ferritin 9    03/09/2020:  Ferritin 188, iron 46, transferrin 273, TIBC 404, iron saturation 11%.    Assessment    Iron deficiency without anemia  > oral iron supplement.  Sixty-five elemental iron 1 tab p.o. daily  > repeat iron panel in 4 weeks.      Elevated WBC  > repeat CBC cmp today  > if CBC not improved consider repeat bone marrow biopsy.    Patient had EGD colonoscopy December 2019.  > GI doctor Dr Lucas  > recommended 3 years follow-up for surveillance.  Patient will follow-up GI accordingly.    Thrombocytosis reactive  > continue monitor    Hypertension tobacco  > primary follow-up  > patient states that he does not want to go to ED or urgent care.  He will call Cardiology or primary care doctor regarding the medication modification or adjustment.    Schizophrenia  > follow-up psych        M*Modal dictations      There are no diagnoses linked to  this encounter.

## 2020-09-12 LAB — PATH REV BLD -IMP: NORMAL

## 2020-09-18 ENCOUNTER — OFFICE VISIT (OUTPATIENT)
Dept: HEMATOLOGY/ONCOLOGY | Facility: CLINIC | Age: 55
End: 2020-09-18
Payer: MEDICARE

## 2020-09-18 VITALS
SYSTOLIC BLOOD PRESSURE: 164 MMHG | DIASTOLIC BLOOD PRESSURE: 67 MMHG | BODY MASS INDEX: 37.31 KG/M2 | WEIGHT: 267.5 LBS | TEMPERATURE: 98 F | OXYGEN SATURATION: 97 % | HEART RATE: 78 BPM

## 2020-09-18 DIAGNOSIS — D72.823 LEUKEMOID REACTION: ICD-10-CM

## 2020-09-18 DIAGNOSIS — F20.9 SCHIZOPHRENIA, UNSPECIFIED TYPE: Primary | ICD-10-CM

## 2020-09-18 DIAGNOSIS — E61.1 IRON DEFICIENCY: ICD-10-CM

## 2020-09-18 DIAGNOSIS — I10 ESSENTIAL HYPERTENSION: ICD-10-CM

## 2020-09-18 DIAGNOSIS — R46.0 POOR HYGIENE: ICD-10-CM

## 2020-09-18 DIAGNOSIS — E11.49 TYPE II DIABETES MELLITUS WITH NEUROLOGICAL MANIFESTATIONS: ICD-10-CM

## 2020-09-18 LAB — MAYO MISCELLANEOUS RESULT (REF): NORMAL

## 2020-09-18 PROCEDURE — 3078F DIAST BP <80 MM HG: CPT | Mod: HCNC,CPTII,S$GLB, | Performed by: INTERNAL MEDICINE

## 2020-09-18 PROCEDURE — 3077F PR MOST RECENT SYSTOLIC BLOOD PRESSURE >= 140 MM HG: ICD-10-PCS | Mod: HCNC,CPTII,S$GLB, | Performed by: INTERNAL MEDICINE

## 2020-09-18 PROCEDURE — 3008F BODY MASS INDEX DOCD: CPT | Mod: HCNC,CPTII,S$GLB, | Performed by: INTERNAL MEDICINE

## 2020-09-18 PROCEDURE — 99999 PR PBB SHADOW E&M-EST. PATIENT-LVL IV: CPT | Mod: PBBFAC,HCNC,, | Performed by: INTERNAL MEDICINE

## 2020-09-18 PROCEDURE — 99999 PR PBB SHADOW E&M-EST. PATIENT-LVL IV: ICD-10-PCS | Mod: PBBFAC,HCNC,, | Performed by: INTERNAL MEDICINE

## 2020-09-18 PROCEDURE — 3008F PR BODY MASS INDEX (BMI) DOCUMENTED: ICD-10-PCS | Mod: HCNC,CPTII,S$GLB, | Performed by: INTERNAL MEDICINE

## 2020-09-18 PROCEDURE — 3044F HG A1C LEVEL LT 7.0%: CPT | Mod: HCNC,CPTII,S$GLB, | Performed by: INTERNAL MEDICINE

## 2020-09-18 PROCEDURE — 3077F SYST BP >= 140 MM HG: CPT | Mod: HCNC,CPTII,S$GLB, | Performed by: INTERNAL MEDICINE

## 2020-09-18 PROCEDURE — 99214 PR OFFICE/OUTPT VISIT, EST, LEVL IV, 30-39 MIN: ICD-10-PCS | Mod: HCNC,S$GLB,, | Performed by: INTERNAL MEDICINE

## 2020-09-18 PROCEDURE — 3078F PR MOST RECENT DIASTOLIC BLOOD PRESSURE < 80 MM HG: ICD-10-PCS | Mod: HCNC,CPTII,S$GLB, | Performed by: INTERNAL MEDICINE

## 2020-09-18 PROCEDURE — 3044F PR MOST RECENT HEMOGLOBIN A1C LEVEL <7.0%: ICD-10-PCS | Mod: HCNC,CPTII,S$GLB, | Performed by: INTERNAL MEDICINE

## 2020-09-18 PROCEDURE — 99214 OFFICE O/P EST MOD 30 MIN: CPT | Mod: HCNC,S$GLB,, | Performed by: INTERNAL MEDICINE

## 2020-09-18 NOTE — PROGRESS NOTES
HPI      55 years old male follow [facility] anemia and thrombocytosis.  Patient had fatigue and malaise was started on vitamin and iron supplement by primary care doctor.    Patient denies any family history of blood disorders.  Patient is active smoker.    Recent hematology follow-up patient has elevated WBC.    Bone marrow biopsy aspirate did not review any malignancies.        Past Medical History:   Diagnosis Date    Acute angina     Asthma     COPD (chronic obstructive pulmonary disease)     Diabetes mellitus     Digestive disorder     ulcers    General anesthetics causing adverse effect in therapeutic use     Hypertension     MI, old     PIPER on CPAP     Schizophrenia     Smoker unmotivated to quit     Stroke 2005    Thyroid disease     took medicine in the past    Type II diabetes mellitus with neurological manifestations 11/21/2019     Social History     Socioeconomic History    Marital status:      Spouse name: Not on file    Number of children: Not on file    Years of education: Not on file    Highest education level: Not on file   Occupational History    Occupation: disabled (mental)     Comment: since 2000   Social Needs    Financial resource strain: Not hard at all    Food insecurity     Worry: Never true     Inability: Never true    Transportation needs     Medical: Yes     Non-medical: No   Tobacco Use    Smoking status: Current Every Day Smoker     Packs/day: 1.00     Years: 45.00     Pack years: 45.00     Types: Cigarettes    Smokeless tobacco: Never Used    Tobacco comment: Age Started 14    Substance and Sexual Activity    Alcohol use: No     Frequency: Never     Drinks per session: Patient refused     Binge frequency: Never    Drug use: Not Currently     Types: Marijuana    Sexual activity: Yes     Partners: Female   Lifestyle    Physical activity     Days per week: 1 day     Minutes per session: 10 min    Stress: To some extent   Relationships     Social connections     Talks on phone: More than three times a week     Gets together: More than three times a week     Attends Spiritism service: Not on file     Active member of club or organization: Patient refused     Attends meetings of clubs or organizations: Patient refused     Relationship status:    Other Topics Concern    Not on file   Social History Narrative    Not on file       Objective    Physical Exam     Vitals:    09/18/20 1520   BP: (!) 164/67   Pulse: 78   Temp: 98.1 °F (36.7 °C)       Awake alert no acute distress  Normocephalic atraumatic  Regular rate  No respiratory distress  Soft nontender nondistended bowel sounds 4  Nonfocal  Distal pulses intact  No rash no lesions  Normal affect      CMP  Sodium   Date Value Ref Range Status   09/04/2020 135 (L) 136 - 145 mmol/L Final     Potassium   Date Value Ref Range Status   09/04/2020 4.4 3.5 - 5.1 mmol/L Final     Chloride   Date Value Ref Range Status   09/04/2020 103 95 - 110 mmol/L Final     CO2   Date Value Ref Range Status   09/04/2020 27 22 - 31 mmol/L Final     Glucose   Date Value Ref Range Status   09/04/2020 103 70 - 110 mg/dL Final     Comment:     The ADA recommends the following guidelines for fasting glucose:  Normal:       less than 100 mg/dL  Prediabetes:  100 mg/dL to 125 mg/dL  Diabetes:     126 mg/dL or higher       BUN, Bld   Date Value Ref Range Status   09/04/2020 10 9 - 21 mg/dL Final     Creatinine   Date Value Ref Range Status   09/04/2020 0.96 0.50 - 1.40 mg/dL Final     Calcium   Date Value Ref Range Status   09/04/2020 9.5 8.4 - 10.2 mg/dL Final     Total Protein   Date Value Ref Range Status   09/04/2020 8.2 6.0 - 8.4 g/dL Final     Albumin   Date Value Ref Range Status   09/04/2020 4.3 3.5 - 5.2 g/dL Final     Total Bilirubin   Date Value Ref Range Status   09/04/2020 0.4 0.2 - 1.3 mg/dL Final     Alkaline Phosphatase   Date Value Ref Range Status   09/04/2020 115 38 - 145 U/L Final     AST   Date Value Ref  Range Status   09/04/2020 23 17 - 59 U/L Final     ALT   Date Value Ref Range Status   09/04/2020 17 0 - 50 U/L Final     Anion Gap   Date Value Ref Range Status   09/04/2020 5 (L) 8 - 16 mmol/L Final     eGFR if    Date Value Ref Range Status   09/04/2020 >60 >60 mL/min/1.73 m^2 Final     eGFR if non    Date Value Ref Range Status   09/04/2020 >60 >60 mL/min/1.73 m^2 Final     Comment:     Calculation used to obtain the estimated glomerular filtration  rate (eGFR) is the CKD-EPI equation.        Lab Results   Component Value Date    WBC 13.46 (H) 09/04/2020    HGB 14.3 09/04/2020    HCT 42.9 09/04/2020    MCV 86 09/04/2020     (H) 09/04/2020     10/09/2019:  Ferritin 7, iron 34, TIBC 418, iron saturation 8%    11/06/2019:  Iron 256, TIBC 397, iron saturation 64% transferrin 268, ferritin 51.    02/03/2020:  Iron 25, TIBC 454, transferrin 307, iron saturation 6.  Ferritin 9    03/09/2020:  Ferritin 188, iron 46, transferrin 273, TIBC 404, iron saturation 11%.    09/04/2020:  Ferritin 14, iron 74, TIBC 374, iron saturation 20%.      Assessment    Iron deficiency without anemia  > oral iron supplement.  Sixty-five elemental iron 1 tab p.o. daily will consider continue on oral iron supplement.  After completion of repeat iron study if iron improved to normal may discontinue on oral iron supplements.  > repeat iron panel in 4 weeks.      Elevated WBC reactive suspected - improving  > continue monitor.    Patient had EGD colonoscopy December 2019.  > GI doctor Dr Lucas  > recommended 3 years follow-up for surveillance.  Patient will follow-up GI accordingly.    Thrombocytosis reactive  > continue monitor    Hypertension tobacco  > primary follow-up    Schizophrenia  > follow-up psych      Diabetes  > Follow-up primary care physician      M*Modal dictations      There are no diagnoses linked to this encounter.

## 2020-09-21 LAB
COMMENT: NORMAL
FINAL PATHOLOGIC DIAGNOSIS: NORMAL
GROSS: NORMAL
Lab: NORMAL
MICROSCOPIC EXAM: NORMAL
SUPPLEMENTAL DIAGNOSIS: NORMAL

## 2020-09-23 ENCOUNTER — PATIENT OUTREACH (OUTPATIENT)
Dept: ADMINISTRATIVE | Facility: OTHER | Age: 55
End: 2020-09-23

## 2020-09-23 NOTE — PROGRESS NOTES
Health Maintenance Due   Topic Date Due    Shingles Vaccine (1 of 2) 08/22/2017    Hemoglobin A1c  06/13/2020    Influenza Vaccine (1) 08/01/2020     Updates were requested from care everywhere.  Chart was reviewed for overdue Proactive [facility] Encounters ([facility]) topics (CRS, Breast Cancer Screening, Eye exam)  Health Maintenance has been updated.  LINKS immunization registry triggered.  Immunizations were reconciled.  Scheduled HgA1c.

## 2020-09-24 ENCOUNTER — OFFICE VISIT (OUTPATIENT)
Dept: PAIN MEDICINE | Facility: CLINIC | Age: 55
End: 2020-09-24
Payer: MEDICARE

## 2020-09-24 VITALS
RESPIRATION RATE: 20 BRPM | WEIGHT: 264.69 LBS | TEMPERATURE: 98 F | DIASTOLIC BLOOD PRESSURE: 70 MMHG | BODY MASS INDEX: 37.06 KG/M2 | HEIGHT: 71 IN | SYSTOLIC BLOOD PRESSURE: 133 MMHG | OXYGEN SATURATION: 96 % | HEART RATE: 90 BPM

## 2020-09-24 DIAGNOSIS — D49.89: ICD-10-CM

## 2020-09-24 DIAGNOSIS — Z02.89 PAIN MANAGEMENT CONTRACT SIGNED: Primary | ICD-10-CM

## 2020-09-24 PROCEDURE — 3078F DIAST BP <80 MM HG: CPT | Mod: HCNC,CPTII,S$GLB, | Performed by: ANESTHESIOLOGY

## 2020-09-24 PROCEDURE — 99999 PR PBB SHADOW E&M-EST. PATIENT-LVL V: ICD-10-PCS | Mod: PBBFAC,HCNC,, | Performed by: ANESTHESIOLOGY

## 2020-09-24 PROCEDURE — 3008F BODY MASS INDEX DOCD: CPT | Mod: HCNC,CPTII,S$GLB, | Performed by: ANESTHESIOLOGY

## 2020-09-24 PROCEDURE — 3078F PR MOST RECENT DIASTOLIC BLOOD PRESSURE < 80 MM HG: ICD-10-PCS | Mod: HCNC,CPTII,S$GLB, | Performed by: ANESTHESIOLOGY

## 2020-09-24 PROCEDURE — 3008F PR BODY MASS INDEX (BMI) DOCUMENTED: ICD-10-PCS | Mod: HCNC,CPTII,S$GLB, | Performed by: ANESTHESIOLOGY

## 2020-09-24 PROCEDURE — 3075F PR MOST RECENT SYSTOLIC BLOOD PRESS GE 130-139MM HG: ICD-10-PCS | Mod: HCNC,CPTII,S$GLB, | Performed by: ANESTHESIOLOGY

## 2020-09-24 PROCEDURE — 99999 PR PBB SHADOW E&M-EST. PATIENT-LVL V: CPT | Mod: PBBFAC,HCNC,, | Performed by: ANESTHESIOLOGY

## 2020-09-24 PROCEDURE — 80307 DRUG TEST PRSMV CHEM ANLYZR: CPT | Mod: HCNC

## 2020-09-24 PROCEDURE — 99214 PR OFFICE/OUTPT VISIT, EST, LEVL IV, 30-39 MIN: ICD-10-PCS | Mod: HCNC,S$GLB,, | Performed by: ANESTHESIOLOGY

## 2020-09-24 PROCEDURE — 99214 OFFICE O/P EST MOD 30 MIN: CPT | Mod: HCNC,S$GLB,, | Performed by: ANESTHESIOLOGY

## 2020-09-24 PROCEDURE — 3075F SYST BP GE 130 - 139MM HG: CPT | Mod: HCNC,CPTII,S$GLB, | Performed by: ANESTHESIOLOGY

## 2020-09-24 NOTE — PROGRESS NOTES
[facility] Follow Up Evaluation    Referred by: Dr. Kathleen  Reason for referral: foot pain    CC:   Chief Complaint   Patient presents with    Establish Care    Foot Pain      Last 3 PDI Scores 9/24/2020   Pain Disability Index (PDI) 20     Interval HPI 9/24/20: Mr. Crane returns to the office for follow up.  Today he continues to have pain over the bottom of the right heal.  He says it initially began a few years ago when he had a tumor removed by a doctor in [place].  Following that he has had chronic pain of the left heel.  Today his pain is 5/10, constant, throbbing, burning.  His pain is worse with standing and walking and relieved with elevation.      HPI:   Ryan Crane is a 55 y.o. male who complains of foot pain    Onset: 3 years  Inciting Event: right heel fissure wound  Progression: since onset, pain is stable  Current Pain Score: 8/10  Timing: constant  Quality: Sharp  Radiation: no  Associated numbness or weakness: no weakness  Exacerbated by: nothing in particular  Allievated by: nothing  Is Pain Level Acceptable?: No    Previous Therapies:  PT/OT:   HEP:   Interventions:   Surgery:  Medications:   - NSAIDS:   - MSK Relaxants:   - TCAs:   - SNRIs:   - Topicals:   - Anticonvulsants:  - Opioids:     Current Pain Medications:  1. Tramadol 50mg po q8h prn    History:    Current Outpatient Medications:     albuterol (VENTOLIN HFA) 90 mcg/actuation inhaler, Inhale 2 puffs into the lungs every 6 (six) hours as needed for Wheezing. Rescue, Disp: 18 g, Rfl: 11    amlodipine-valsartan (EXFORGE) 5-160 mg per tablet, Take 1 tablet by mouth once daily., Disp: 90 tablet, Rfl: 0    aspirin (ECOTRIN) 325 MG EC tablet, Take 1 tablet (325 mg total) by mouth once daily., Disp: , Rfl: 0    busPIRone (BUSPAR) 15 MG tablet, Take 15 mg by mouth 3 (three) times daily. , Disp: , Rfl:     DULoxetine (CYMBALTA) 60 MG capsule, Take 60 mg by mouth once daily., Disp: , Rfl:     EPINEPHrine (EPIPEN) 0.3  mg/0.3 mL AtIn, INJECT 0.3MLS INTO THE MUSCLE ONCE, Disp: 2 each, Rfl: 0    fluticasone-umeclidin-vilanter (TRELEGY ELLIPTA) 100-62.5-25 mcg DsDv, Inhale 1 puff into the lungs once daily., Disp: 30 each, Rfl: 5    metFORMIN (GLUCOPHAGE-XR) 500 MG ER 24hr tablet, TAKE 1 TABLET(500 MG) BY MOUTH DAILY WITH BREAKFAST, Disp: 90 tablet, Rfl: 1    metoprolol succinate (TOPROL-XL) 50 MG 24 hr tablet, Take 1 tablet (50 mg total) by mouth once daily., Disp: 90 tablet, Rfl: 3    nitroGLYCERIN (NITROSTAT) 0.4 MG SL tablet, Place 1 tablet (0.4 mg total) under the tongue every 5 (five) minutes as needed for Chest pain., Disp: 9 tablet, Rfl: 3    OXcarbazepine (TRILEPTAL) 600 MG Tab, Take 600 mg by mouth 3 (three) times daily., Disp: , Rfl:     pregabalin (LYRICA) 50 MG capsule, TAKE 1 CAPSULE(50 MG) BY MOUTH THREE TIMES DAILY, Disp: 90 capsule, Rfl: 3    tadalafiL (CIALIS) 20 MG Tab, Take 1 tablet (20 mg total) by mouth prior to sexual activity, not to exceed more than 1 tablet in 72 hours., Disp: 10 tablet, Rfl: 11    traMADoL (ULTRAM) 50 mg tablet, TAKE 1 TABLET(50 MG) BY MOUTH EVERY 6 HOURS, Disp: 40 tablet, Rfl: 1    varenicline (CHANTIX STARTING MONTH BOX) 0.5 mg (11)- 1 mg (42) tablet, Take one 0.5mg tab by mouth once daily X3 days,then increase to one 0.5mg tab twice daily X4 days,then increase to one 1mg tab twice daily, Disp: 1 Package, Rfl: 0    varenicline (CHANTIX) 1 mg Tab, Take 1 tablet (1 mg total) by mouth 2 (two) times daily., Disp: 60 tablet, Rfl: 4    ziprasidone (GEODON) 80 MG capsule, Take 160 mg by mouth every evening. , Disp: , Rfl:   No current facility-administered medications for this visit.     Facility-Administered Medications Ordered in Other Visits:     lactated ringers infusion, , Intravenous, Continuous, Sheela Nuno MD, Last Rate: 10 mL/hr at 08/21/20 0655    lidocaine (PF) 10 mg/ml (1%) injection 10 mg, 1 mL, Intradermal, Once, Sheela Nuno MD    Past Medical History:    Diagnosis Date    Acute angina     Asthma     COPD (chronic obstructive pulmonary disease)     Diabetes mellitus     Digestive disorder     ulcers    General anesthetics causing adverse effect in therapeutic use     Hypertension     MI, old     PIPER on CPAP     Schizophrenia     Smoker unmotivated to quit     Stroke 2005    Thyroid disease     took medicine in the past    Type II diabetes mellitus with neurological manifestations 11/21/2019       Past Surgical History:   Procedure Laterality Date    APPENDECTOMY      BONE MARROW ASPIRATION Left 8/21/2020    Procedure: ASPIRATION, BONE MARROW;  Surgeon: Lex Kathleen MD;  Location: [facility] OR;  Service: Oncology;  Laterality: Left;    CLOSURE OF WOUND Right 9/9/2019    Procedure: CLOSURE, WOUND;  Surgeon: Li Kathleen DPM;  Location: [facility] OR;  Service: Podiatry;  Laterality: Right;    COLONOSCOPY N/A 12/10/2019    Procedure: COLONOSCOPY;  Surgeon: Ryan Lucas MD;  Location: [facility];  Service: Endoscopy;  Laterality: N/A;    COLONOSCOPY N/A 12/13/2019    Procedure: COLONOSCOPY;  Surgeon: Ryan Lucas MD;  [facility];  Service: Endoscopy;  Laterality: N/A;    ESOPHAGOGASTRODUODENOSCOPY N/A 12/10/2019    Procedure: EGD (ESOPHAGOGASTRODUODENOSCOPY);  Surgeon: Ryan Lucas MD;  Location: [facility];  Service: Endoscopy;  Laterality: N/A;    SHOULDER ARTHROSCOPY Left        Family History   Problem Relation Age of Onset    Melanoma Mother     Diabetes Mother     Hypertension Mother     Stroke Father     Diabetes Father     Hypertension Father     Colon cancer Father         unsure of age of diagnosis    Cancer Father         colon cancer    Cervical cancer Sister     Cirrhosis Brother     Hypertension Brother     Lung cancer Maternal Grandmother     Prostate cancer Maternal Grandfather     Crohn's disease Neg Hx     Ulcerative colitis Neg Hx     Stomach cancer Neg Hx     Esophageal cancer Neg Hx      Retinal detachment Neg Hx     Strabismus Neg Hx     Macular degeneration Neg Hx     Glaucoma Neg Hx        Social History     Socioeconomic History    Marital status:      Spouse name: Not on file    Number of children: Not on file    Years of education: Not on file    Highest education level: Not on file   Occupational History    Occupation: disabled (mental)     Comment: since 2000   Social Needs    Financial resource strain: Not hard at all    Food insecurity     Worry: Never true     Inability: Never true    Transportation needs     Medical: Yes     Non-medical: No   Tobacco Use    Smoking status: Current Every Day Smoker     Packs/day: 1.00     Years: 45.00     Pack years: 45.00     Types: Cigarettes    Smokeless tobacco: Never Used    Tobacco comment: Age Started 14    Substance and Sexual Activity    Alcohol use: No     Frequency: Never     Drinks per session: Patient refused     Binge frequency: Never    Drug use: Not Currently     Types: Marijuana    Sexual activity: Yes     Partners: Female   Lifestyle    Physical activity     Days per week: 1 day     Minutes per session: 10 min    Stress: To some extent   Relationships    Social connections     Talks on phone: More than three times a week     Gets together: More than three times a week     Attends Bahai service: Not on file     Active member of club or organization: Patient refused     Attends meetings of clubs or organizations: Patient refused     Relationship status:    Other Topics Concern    Not on file   Social History Narrative    Not on file       Review of patient's allergies indicates:   Allergen Reactions    Alcohol Anaphylaxis    Keflex [cephalexin]        Review of Systems:  General ROS: negative for - fever  Cardiovascular ROS: negative for - chest pain  Dermatological ROS: negative for rash    Physical Exam:  Vitals:    09/24/20 1456   BP: 133/70   Pulse: 90   Resp: 20   Temp: 98 °F (36.7 °C)  "  TempSrc: Temporal   SpO2: 96%   Weight: 120.1 kg (264 lb 10.6 oz)   Height: 5' 11" (1.803 m)   PainSc:   6   PainLoc: Foot     Body mass index is 36.91 kg/m².     Gen: NAD  Gait: gait intact  Psych:  Mood appropriate for given condition  HEENT: eyes anicteric   GI: Abd soft  CV: RRR  Lungs: breathing unlabored   Skin: intact, he doesn't appear to have any allodynia or hyperalgesia over the bottom of his foot.  No color change or swelling.  He has tenderness over an eschar and says his previous surgery was over that area.    Imaging:  Xray right foot 10/14/19  FINDINGS:  No acute fracture.  Dorsal and plantar calcaneal spurs.  No malalignment.  Minimal degenerative change 1st MTP joint.    Labs:  BMP  Lab Results   Component Value Date     (L) 09/04/2020    K 4.4 09/04/2020     09/04/2020    CO2 27 09/04/2020    BUN 10 09/04/2020    CREATININE 0.96 09/04/2020    CALCIUM 9.5 09/04/2020    ANIONGAP 5 (L) 09/04/2020    ESTGFRAFRICA >60 09/04/2020    EGFRNONAA >60 09/04/2020     Lab Results   Component Value Date    ALT 17 09/04/2020    AST 23 09/04/2020    ALKPHOS 115 09/04/2020    BILITOT 0.4 09/04/2020       Assessment:  Problem List Items Addressed This Visit     None      Visit Diagnoses     Tumor, foot        Relevant Orders    Ambulatory referral/consult to Orthopedics          Treatment Plan:    55 y.o. year old male with PMH COPD, PIPER on CPAP presents to the office with right heel pain.     9/24/20 - Mr. Crane returns to the office for follow up.  Today he continues to have pain over the bottom of the right heal.  He says it initially began a few years ago when he had a tumor removed by a doctor in [place].  Following that he has had chronic pain of the left heel.  Today his pain is 5/10, constant, throbbing, burning.  His pain is worse with standing and walking and relieved with elevation.  On exam he doesn't appear to have any allodynia or hyperalgesia over the bottom of his foot.  No color " change or swelling.  He has tenderness over an eschar and says his previous surgery was over that area.  I don't see evidence of CRPS.   I previously saw him in 2019 and he hadn't been complainant with podiatry treatment plan.  There was also concerns for drug seeking behavior.  I have reviewed his  today and it appears he has been complaint with prescribed medications.  He also has started lyrica, something he was hesitant to do in the past, and feels like it has somewhat improved his pain.  He says recently he has required 1 tramadol per day that brings his pain down from a 5/10 to a 2/10 and allows him to have increased activity while on these medications.  The patient appears to be meeting the goals of opioid therapy and is dependent on them for functionality and can not perform ADLS without them.  I've agreed to resume his tramadol 1 tab per day prn for his pain.  The risks were discussed including tolerance, addiction, overdose, over-sedation, drug interactions, liver damage, hormone imbalance, over-sedation, respiratory depression, opioid-induced hyperalgesia, and even death.  He will sign a pain contract and UDS today.  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  He will follow up in 1 month.     : Reviewed and consistent with medication use as prescribed.    Orlando Dumont M.D.  Interventional Pain Medicine / Anesthesiology

## 2020-09-24 NOTE — LETTER
September 28, 2020      Laura Becerril MD  [address]  [place] [address]           [facility]  [address] [place] [address]  Phone: [phone]  Fax: [phone]          Patient: Ryan Crane   MR Number: [id number]   YOB: 1965   Date of Visit: 9/24/2020       Dear Dr. Laura Becerril:    Thank you for referring Ryan Crane to me for evaluation. Attached you will find relevant portions of my assessment and plan of care.    If you have questions, please do not hesitate to call me. I look forward to following Ryan Crane along with you.    Sincerely,    Orlando Dumont MD    Enclosure  CC:  No Recipients    If you would like to receive this communication electronically, please contact [email or web address] or [phone] to request more information on [employer] Link access.    For providers and/or their staff who would like to refer a patient to [facility], please contact us through our one-stop-shop provider referral line, [facility], [place], at [phone].    If you feel you have received this communication in error or would no longer like to receive these types of communications, please e-mail [email or web address]

## 2020-09-25 ENCOUNTER — LAB VISIT (OUTPATIENT)
Dept: LAB | Facility: HOSPITAL | Age: 55
End: 2020-09-25
Attending: PSYCHIATRY & NEUROLOGY
Payer: MEDICARE

## 2020-09-25 DIAGNOSIS — Z79.899 ENCOUNTER FOR LONG-TERM (CURRENT) USE OF OTHER MEDICATIONS: Primary | ICD-10-CM

## 2020-09-25 LAB
BASOPHILS # BLD AUTO: 0.15 K/UL (ref 0–0.2)
BASOPHILS NFR BLD: 1.1 % (ref 0–1.9)
DIFFERENTIAL METHOD: ABNORMAL
EOSINOPHIL # BLD AUTO: 0.3 K/UL (ref 0–0.5)
EOSINOPHIL NFR BLD: 2.1 % (ref 0–8)
ERYTHROCYTE [DISTWIDTH] IN BLOOD BY AUTOMATED COUNT: 14.5 % (ref 11.5–14.5)
HCT VFR BLD AUTO: 43.5 % (ref 40–54)
HGB BLD-MCNC: 13.7 G/DL (ref 14–18)
IMM GRANULOCYTES # BLD AUTO: 0.05 K/UL (ref 0–0.04)
IMM GRANULOCYTES NFR BLD AUTO: 0.4 % (ref 0–0.5)
LYMPHOCYTES # BLD AUTO: 3.4 K/UL (ref 1–4.8)
LYMPHOCYTES NFR BLD: 24.3 % (ref 18–48)
MCH RBC QN AUTO: 27.6 PG (ref 27–31)
MCHC RBC AUTO-ENTMCNC: 31.5 G/DL (ref 32–36)
MCV RBC AUTO: 88 FL (ref 82–98)
MONOCYTES # BLD AUTO: 1.1 K/UL (ref 0.3–1)
MONOCYTES NFR BLD: 7.5 % (ref 4–15)
NEUTROPHILS # BLD AUTO: 9.1 K/UL (ref 1.8–7.7)
NEUTROPHILS NFR BLD: 64.6 % (ref 38–73)
NRBC BLD-RTO: 0 /100 WBC
PLATELET # BLD AUTO: 453 K/UL (ref 150–350)
PMV BLD AUTO: 10.2 FL (ref 9.2–12.9)
RBC # BLD AUTO: 4.96 M/UL (ref 4.6–6.2)
WBC # BLD AUTO: 14.01 K/UL (ref 3.9–12.7)

## 2020-09-25 PROCEDURE — 84443 ASSAY THYROID STIM HORMONE: CPT | Mod: HCNC

## 2020-09-25 PROCEDURE — 80061 LIPID PANEL: CPT | Mod: HCNC

## 2020-09-25 PROCEDURE — 80053 COMPREHEN METABOLIC PANEL: CPT | Mod: HCNC

## 2020-09-25 PROCEDURE — 83036 HEMOGLOBIN GLYCOSYLATED A1C: CPT | Mod: HCNC

## 2020-09-25 PROCEDURE — 36415 COLL VENOUS BLD VENIPUNCTURE: CPT | Mod: HCNC,PN

## 2020-09-25 PROCEDURE — 85025 COMPLETE CBC W/AUTO DIFF WBC: CPT | Mod: HCNC

## 2020-09-25 PROCEDURE — 80156 ASSAY CARBAMAZEPINE TOTAL: CPT | Mod: HCNC

## 2020-09-26 LAB
ALBUMIN SERPL BCP-MCNC: 3.8 G/DL (ref 3.5–5.2)
ALP SERPL-CCNC: 106 U/L (ref 55–135)
ALT SERPL W/O P-5'-P-CCNC: 16 U/L (ref 10–44)
ANION GAP SERPL CALC-SCNC: 11 MMOL/L (ref 8–16)
AST SERPL-CCNC: 18 U/L (ref 10–40)
BILIRUB SERPL-MCNC: 0.3 MG/DL (ref 0.1–1)
BUN SERPL-MCNC: 13 MG/DL (ref 6–20)
CALCIUM SERPL-MCNC: 8.9 MG/DL (ref 8.7–10.5)
CARBAMAZEPINE SERPL-MCNC: <1.9 UG/ML (ref 4–12)
CHLORIDE SERPL-SCNC: 99 MMOL/L (ref 95–110)
CHOLEST SERPL-MCNC: 162 MG/DL (ref 120–199)
CHOLEST/HDLC SERPL: 4.3 {RATIO} (ref 2–5)
CO2 SERPL-SCNC: 24 MMOL/L (ref 23–29)
CREAT SERPL-MCNC: 1 MG/DL (ref 0.5–1.4)
EST. GFR  (AFRICAN AMERICAN): >60 ML/MIN/1.73 M^2
EST. GFR  (NON AFRICAN AMERICAN): >60 ML/MIN/1.73 M^2
ESTIMATED AVG GLUCOSE: 108 MG/DL (ref 68–131)
GLUCOSE SERPL-MCNC: 97 MG/DL (ref 70–110)
HBA1C MFR BLD HPLC: 5.4 % (ref 4–5.6)
HDLC SERPL-MCNC: 38 MG/DL (ref 40–75)
HDLC SERPL: 23.5 % (ref 20–50)
LDLC SERPL CALC-MCNC: 101.8 MG/DL (ref 63–159)
NONHDLC SERPL-MCNC: 124 MG/DL
POTASSIUM SERPL-SCNC: 3.8 MMOL/L (ref 3.5–5.1)
PROT SERPL-MCNC: 7.6 G/DL (ref 6–8.4)
SODIUM SERPL-SCNC: 134 MMOL/L (ref 136–145)
TRIGL SERPL-MCNC: 111 MG/DL (ref 30–150)
TSH SERPL DL<=0.005 MIU/L-ACNC: 0.78 UIU/ML (ref 0.4–4)

## 2020-09-29 ENCOUNTER — PATIENT MESSAGE (OUTPATIENT)
Dept: OTHER | Facility: OTHER | Age: 55
End: 2020-09-29

## 2020-09-30 ENCOUNTER — TELEPHONE (OUTPATIENT)
Dept: PAIN MEDICINE | Facility: CLINIC | Age: 55
End: 2020-09-30

## 2020-09-30 DIAGNOSIS — M79.671 CHRONIC PAIN IN RIGHT FOOT: Primary | ICD-10-CM

## 2020-09-30 DIAGNOSIS — G89.29 CHRONIC PAIN IN RIGHT FOOT: Primary | ICD-10-CM

## 2020-09-30 DIAGNOSIS — M79.671 PAIN OF RIGHT HEEL: Primary | ICD-10-CM

## 2020-09-30 LAB

## 2020-09-30 RX ORDER — TRAMADOL HYDROCHLORIDE 50 MG/1
50 TABLET ORAL
Qty: 30 TABLET | Refills: 0 | Status: SHIPPED | OUTPATIENT
Start: 2020-09-30 | End: 2022-01-12 | Stop reason: CLARIF

## 2020-10-06 ENCOUNTER — OFFICE VISIT (OUTPATIENT)
Dept: ORTHOPEDICS | Facility: CLINIC | Age: 55
End: 2020-10-06
Payer: MEDICARE

## 2020-10-06 ENCOUNTER — HOSPITAL ENCOUNTER (OUTPATIENT)
Dept: RADIOLOGY | Facility: HOSPITAL | Age: 55
Discharge: HOME | End: 2020-10-06
Attending: ORTHOPAEDIC SURGERY
Payer: MEDICARE

## 2020-10-06 VITALS
BODY MASS INDEX: 37.04 KG/M2 | DIASTOLIC BLOOD PRESSURE: 70 MMHG | HEART RATE: 62 BPM | SYSTOLIC BLOOD PRESSURE: 149 MMHG | HEIGHT: 71 IN | WEIGHT: 264.56 LBS

## 2020-10-06 DIAGNOSIS — D49.89: ICD-10-CM

## 2020-10-06 DIAGNOSIS — M79.671 PAIN OF RIGHT HEEL: ICD-10-CM

## 2020-10-06 PROCEDURE — 99999 PR PBB SHADOW E&M-EST. PATIENT-LVL IV: CPT | Mod: PBBFAC,HCNC,, | Performed by: ORTHOPAEDIC SURGERY

## 2020-10-06 PROCEDURE — 73630 XR FOOT COMPLETE 3 VIEW RIGHT: ICD-10-PCS | Mod: 26,HCNC,RT, | Performed by: RADIOLOGY

## 2020-10-06 PROCEDURE — 3008F BODY MASS INDEX DOCD: CPT | Mod: HCNC,CPTII,S$GLB, | Performed by: ORTHOPAEDIC SURGERY

## 2020-10-06 PROCEDURE — 73650 X-RAY EXAM OF HEEL: CPT | Mod: TC,HCNC,PO,RT

## 2020-10-06 PROCEDURE — 99999 PR PBB SHADOW E&M-EST. PATIENT-LVL IV: ICD-10-PCS | Mod: PBBFAC,HCNC,, | Performed by: ORTHOPAEDIC SURGERY

## 2020-10-06 PROCEDURE — 99204 OFFICE O/P NEW MOD 45 MIN: CPT | Mod: HCNC,S$GLB,, | Performed by: ORTHOPAEDIC SURGERY

## 2020-10-06 PROCEDURE — 3078F PR MOST RECENT DIASTOLIC BLOOD PRESSURE < 80 MM HG: ICD-10-PCS | Mod: HCNC,CPTII,S$GLB, | Performed by: ORTHOPAEDIC SURGERY

## 2020-10-06 PROCEDURE — 3077F SYST BP >= 140 MM HG: CPT | Mod: HCNC,CPTII,S$GLB, | Performed by: ORTHOPAEDIC SURGERY

## 2020-10-06 PROCEDURE — 73650 X-RAY EXAM OF HEEL: CPT | Mod: 26,59,HCNC,RT | Performed by: RADIOLOGY

## 2020-10-06 PROCEDURE — 3008F PR BODY MASS INDEX (BMI) DOCUMENTED: ICD-10-PCS | Mod: HCNC,CPTII,S$GLB, | Performed by: ORTHOPAEDIC SURGERY

## 2020-10-06 PROCEDURE — 73650 XR CALCANEUS 2 VIEW RIGHT: ICD-10-PCS | Mod: 26,59,HCNC,RT | Performed by: RADIOLOGY

## 2020-10-06 PROCEDURE — 73630 X-RAY EXAM OF FOOT: CPT | Mod: 26,HCNC,RT, | Performed by: RADIOLOGY

## 2020-10-06 PROCEDURE — 73630 X-RAY EXAM OF FOOT: CPT | Mod: TC,HCNC,PO,RT

## 2020-10-06 PROCEDURE — 99204 PR OFFICE/OUTPT VISIT, NEW, LEVL IV, 45-59 MIN: ICD-10-PCS | Mod: HCNC,S$GLB,, | Performed by: ORTHOPAEDIC SURGERY

## 2020-10-06 PROCEDURE — 3078F DIAST BP <80 MM HG: CPT | Mod: HCNC,CPTII,S$GLB, | Performed by: ORTHOPAEDIC SURGERY

## 2020-10-06 PROCEDURE — 3077F PR MOST RECENT SYSTOLIC BLOOD PRESSURE >= 140 MM HG: ICD-10-PCS | Mod: HCNC,CPTII,S$GLB, | Performed by: ORTHOPAEDIC SURGERY

## 2020-10-06 NOTE — LETTER
October 6, 2020      Orlando Dumont MD  [address] [facility] [address] [place] [address]           [facility] [place]  [address] [place] [address]  Phone: [phone]          Patient: Ryan Crane   MR Number: [id number]   YOB: 1965   Date of Visit: 10/6/2020       Dear Dr. Orlando Dumont:    Thank you for referring Ryan Crane to me for evaluation. Attached you will find relevant portions of my assessment and plan of care.    If you have questions, please do not hesitate to call me. I look forward to following Ryan Crane along with you.    Sincerely,    Jean-Pierre Allen MD    Enclosure  CC:  No Recipients    If you would like to receive this communication electronically, please contact [email or web address] or [phone] to request more information on [employer] Link access.    For providers and/or their staff who would like to refer a patient to [facility], please contact us through our one-stop-shop provider referral line, [facility] , at [phone].    If you feel you have received this communication in error or would no longer like to receive these types of communications, please e-mail [email or web address]

## 2020-10-06 NOTE — PROGRESS NOTES
"HPI: Ryan Crane is a 55 y.o. male who was referred to me by Dr. Dumont and was seen in consultation today for a second opinion regarding chronic heel pain. He has multiple medical problems. Including schizophrenia, type 2 diabetes mellitus with peripheral neuropathy, tobacco abuse, and hypertension. His most recent HbA1c was 5.4. He is disabled due to mental illness.   He has surgery for what sounds like a plantar fibroma in [place] about 4 years ago and c/o persistent pain since then. He had surgery with Dr. Frannie dillon podiatrist for a right heel wound debridement of infected ulcer 12/2019. She has been following him and treating him for his high risk feet.   He rates his pain as 5/10 today.     PAST MEDICAL/SURGICAL/FAMILY/SOCIAL/ HISTORY: REVIEWED    ALLERGIES/MEDICATIONS: REVIEWED       Review of Systems:     Constitution: Negative.   HEENT: Negative.   Eyes: Negative.   Cardiovascular: Negative.   Respiratory: Negative.   Endocrine: Negative.   Hematologic/Lymphatic: Negative.   Skin: Negative.   Musculoskeletal: Positive for right heel pain   Gastrointestinal: Negative.   Genitourinary: Negative.   Neurological: Negative.   Psychiatric/Behavioral: Negative.   Allergic/Immunologic: Negative.       PHYSICAL EXAM:  Vitals:    10/06/20 0947   BP: (!) 149/70   Pulse: 62     Ht Readings from Last 1 Encounters:   10/06/20 5' 11" (1.803 m)     Wt Readings from Last 1 Encounters:   10/06/20 120 kg (264 lb 8.8 oz)         GENERAL: Well developed, well nourished, no acute distress. Poor hygiene.   SKIN: Skin is intact. No atrophy, abrasions or lesions are noted.   Neurological: Normal mental status. Appropriate and conversant. Alert and oriented x 3.  GAIT: Walks with a non-antalgic gait.    Right lower extremity compared with LLE:  1+ dorsalis pedis pulse.  Capillary refill < 3 seconds.  Normal range of motion tibiotalar and subtalar joints. Tinea pedis. 5/5 strength EHL, FHL, tibialis anterior, " gastrocsoleus, tibialis posterior and peroneals. Sensation to light touch intact sural, saphenous, superficial peroneal and deep peroneal nerves. No swelling, ecchymosis or deformity. No lymphadenopathy, no masses or tumors palpated.  He has a small callus at the posterior  Plantar heel and two small scars from previous surgery which are mildly tender to palpation. non-tender to palpation   at the calcaneus itself. No evidence of charcot arthropathy.     XRAYS:   2 views of right calcaneus obtained and reviewed today reveal No evidence of fractures or dislocations. No evidence of bone mass or tumor.      ASSESSMENT: Chronic right foot pain  Scar tissue right foot.     PLAN: I reviewed his extensive medical record regarding his foot. I explained to the patient that this is scar tissue in the heel and I do not see anything further that can be done surgically. I recommend he keep his f/u with Dr. Kathleen.

## 2020-10-23 ENCOUNTER — LAB VISIT (OUTPATIENT)
Dept: LAB | Facility: HOSPITAL | Age: 55
End: 2020-10-23
Attending: PEDIATRICS
Payer: MEDICARE

## 2020-10-23 ENCOUNTER — OFFICE VISIT (OUTPATIENT)
Dept: HEMATOLOGY/ONCOLOGY | Facility: CLINIC | Age: 55
End: 2020-10-23
Payer: MEDICARE

## 2020-10-23 VITALS
WEIGHT: 264.56 LBS | HEART RATE: 72 BPM | HEIGHT: 71 IN | DIASTOLIC BLOOD PRESSURE: 78 MMHG | TEMPERATURE: 98 F | SYSTOLIC BLOOD PRESSURE: 153 MMHG | BODY MASS INDEX: 37.04 KG/M2

## 2020-10-23 DIAGNOSIS — I10 ESSENTIAL HYPERTENSION: ICD-10-CM

## 2020-10-23 DIAGNOSIS — D72.823 LEUKEMOID REACTION: ICD-10-CM

## 2020-10-23 DIAGNOSIS — F20.9 SCHIZOPHRENIA, UNSPECIFIED TYPE: ICD-10-CM

## 2020-10-23 DIAGNOSIS — R46.0 POOR HYGIENE: ICD-10-CM

## 2020-10-23 DIAGNOSIS — D50.0 IRON DEFICIENCY ANEMIA DUE TO CHRONIC BLOOD LOSS: Primary | ICD-10-CM

## 2020-10-23 DIAGNOSIS — E11.49 TYPE II DIABETES MELLITUS WITH NEUROLOGICAL MANIFESTATIONS: ICD-10-CM

## 2020-10-23 LAB
BASOPHILS # BLD AUTO: 0.12 K/UL (ref 0–0.2)
BASOPHILS NFR BLD: 0.9 % (ref 0–1.9)
DIFFERENTIAL METHOD: ABNORMAL
EOSINOPHIL # BLD AUTO: 0.3 K/UL (ref 0–0.5)
EOSINOPHIL NFR BLD: 2 % (ref 0–8)
ERYTHROCYTE [DISTWIDTH] IN BLOOD BY AUTOMATED COUNT: 14 % (ref 11.5–14.5)
ESTIMATED AVG GLUCOSE: 117 MG/DL (ref 68–131)
FERRITIN SERPL-MCNC: 8 NG/ML (ref 18–464)
HBA1C MFR BLD: 5.7 % (ref 0–5.6)
HCT VFR BLD AUTO: 39.7 % (ref 40–54)
HGB BLD-MCNC: 12.7 G/DL (ref 14–18)
IMM GRANULOCYTES # BLD AUTO: 0.04 K/UL (ref 0–0.04)
IMM GRANULOCYTES NFR BLD AUTO: 0.3 % (ref 0–0.5)
IRON SATN MFR SERPL: 8 % (ref 20–50)
IRON SERPL-MCNC: 34 UG/DL (ref 45–160)
LYMPHOCYTES # BLD AUTO: 2.8 K/UL (ref 1–4.8)
LYMPHOCYTES NFR BLD: 22.2 % (ref 18–48)
MCH RBC QN AUTO: 27 PG (ref 27–31)
MCHC RBC AUTO-ENTMCNC: 32 G/DL (ref 32–36)
MCV RBC AUTO: 84 FL (ref 82–98)
MONOCYTES # BLD AUTO: 1.1 K/UL (ref 0.3–1)
MONOCYTES NFR BLD: 8.6 % (ref 4–15)
NEUTROPHILS # BLD AUTO: 8.4 K/UL (ref 1.8–7.7)
NEUTROPHILS NFR BLD: 66 % (ref 38–73)
NRBC BLD-RTO: 0 /100 WBC
PLATELET # BLD AUTO: 401 K/UL (ref 150–350)
PMV BLD AUTO: 9.2 FL (ref 9.2–12.9)
RBC # BLD AUTO: 4.71 M/UL (ref 4.6–6.2)
TOTAL IRON BINDING CAPACITY: 407 UG/DL (ref 261–462)
WBC # BLD AUTO: 12.66 K/UL (ref 3.9–12.7)

## 2020-10-23 PROCEDURE — 3008F BODY MASS INDEX DOCD: CPT | Mod: HCNC,CPTII,S$GLB, | Performed by: INTERNAL MEDICINE

## 2020-10-23 PROCEDURE — 83540 ASSAY OF IRON: CPT | Mod: PN

## 2020-10-23 PROCEDURE — 85025 COMPLETE CBC W/AUTO DIFF WBC: CPT | Mod: PN

## 2020-10-23 PROCEDURE — 99214 PR OFFICE/OUTPT VISIT, EST, LEVL IV, 30-39 MIN: ICD-10-PCS | Mod: HCNC,S$GLB,, | Performed by: INTERNAL MEDICINE

## 2020-10-23 PROCEDURE — 83036 HEMOGLOBIN GLYCOSYLATED A1C: CPT | Mod: PN

## 2020-10-23 PROCEDURE — 36415 COLL VENOUS BLD VENIPUNCTURE: CPT | Mod: HCNC,PN

## 2020-10-23 PROCEDURE — 99999 PR PBB SHADOW E&M-EST. PATIENT-LVL IV: CPT | Mod: PBBFAC,HCNC,, | Performed by: INTERNAL MEDICINE

## 2020-10-23 PROCEDURE — 85025 COMPLETE CBC W/AUTO DIFF WBC: CPT

## 2020-10-23 PROCEDURE — 82728 ASSAY OF FERRITIN: CPT | Mod: PN

## 2020-10-23 PROCEDURE — 3077F SYST BP >= 140 MM HG: CPT | Mod: HCNC,CPTII,S$GLB, | Performed by: INTERNAL MEDICINE

## 2020-10-23 PROCEDURE — 3008F PR BODY MASS INDEX (BMI) DOCUMENTED: ICD-10-PCS | Mod: HCNC,CPTII,S$GLB, | Performed by: INTERNAL MEDICINE

## 2020-10-23 PROCEDURE — 3078F DIAST BP <80 MM HG: CPT | Mod: HCNC,CPTII,S$GLB, | Performed by: INTERNAL MEDICINE

## 2020-10-23 PROCEDURE — 82728 ASSAY OF FERRITIN: CPT

## 2020-10-23 PROCEDURE — 3078F PR MOST RECENT DIASTOLIC BLOOD PRESSURE < 80 MM HG: ICD-10-PCS | Mod: HCNC,CPTII,S$GLB, | Performed by: INTERNAL MEDICINE

## 2020-10-23 PROCEDURE — 99214 OFFICE O/P EST MOD 30 MIN: CPT | Mod: HCNC,S$GLB,, | Performed by: INTERNAL MEDICINE

## 2020-10-23 PROCEDURE — 83036 HEMOGLOBIN GLYCOSYLATED A1C: CPT

## 2020-10-23 PROCEDURE — 83540 ASSAY OF IRON: CPT

## 2020-10-23 PROCEDURE — 99999 PR PBB SHADOW E&M-EST. PATIENT-LVL IV: ICD-10-PCS | Mod: PBBFAC,HCNC,, | Performed by: INTERNAL MEDICINE

## 2020-10-23 PROCEDURE — 3077F PR MOST RECENT SYSTOLIC BLOOD PRESSURE >= 140 MM HG: ICD-10-PCS | Mod: HCNC,CPTII,S$GLB, | Performed by: INTERNAL MEDICINE

## 2020-10-23 RX ORDER — ACETAMINOPHEN 325 MG/1
650 TABLET ORAL
Status: CANCELLED
Start: 2020-10-27

## 2020-10-23 RX ORDER — DIPHENHYDRAMINE HCL 25 MG
25 CAPSULE ORAL
Status: CANCELLED
Start: 2020-10-27

## 2020-10-23 RX ORDER — SODIUM CHLORIDE 0.9 % (FLUSH) 0.9 %
10 SYRINGE (ML) INJECTION
Status: CANCELLED | OUTPATIENT
Start: 2020-10-27

## 2020-10-23 RX ORDER — DULOXETIN HYDROCHLORIDE 30 MG/1
CAPSULE, DELAYED RELEASE ORAL
COMMUNITY
Start: 2020-10-09 | End: 2021-12-29

## 2020-10-23 RX ORDER — HEPARIN 100 UNIT/ML
500 SYRINGE INTRAVENOUS
Status: CANCELLED | OUTPATIENT
Start: 2020-10-27

## 2020-10-23 NOTE — Clinical Note
Venofer weekly x4 with premedication Tylenol and Benadryl.  RTC 5 weeks after completion of 4 treatments with CBC iron panel ferritin.

## 2020-10-23 NOTE — PROGRESS NOTES
HPI      55 years old male follow [facility] anemia and thrombocytosis.  Patient had fatigue and malaise was started on vitamin and iron supplement by primary care doctor.    Patient denies any family history of blood disorders.  Patient is active smoker.    Recent hematology follow-up patient has elevated WBC.    Bone marrow biopsy aspirate did not review any malignancies.        Past Medical History:   Diagnosis Date    Acute angina     Asthma     COPD (chronic obstructive pulmonary disease)     Diabetes mellitus     Digestive disorder     ulcers    General anesthetics causing adverse effect in therapeutic use     Hypertension     MI, old     PIPER on CPAP     Schizophrenia     Smoker unmotivated to quit     Stroke 2005    Thyroid disease     took medicine in the past    Type II diabetes mellitus with neurological manifestations 11/21/2019     Social History     Socioeconomic History    Marital status:      Spouse name: Not on file    Number of children: Not on file    Years of education: Not on file    Highest education level: Not on file   Occupational History    Occupation: disabled (mental)     Comment: since 2000   Social Needs    Financial resource strain: Not hard at all    Food insecurity     Worry: Never true     Inability: Never true    Transportation needs     Medical: Yes     Non-medical: No   Tobacco Use    Smoking status: Current Every Day Smoker     Packs/day: 1.00     Years: 45.00     Pack years: 45.00     Types: Cigarettes    Smokeless tobacco: Never Used    Tobacco comment: Age Started 14    Substance and Sexual Activity    Alcohol use: No     Frequency: Never     Drinks per session: Patient refused     Binge frequency: Never    Drug use: Not Currently     Types: Marijuana    Sexual activity: Yes     Partners: Female   Lifestyle    Physical activity     Days per week: 1 day     Minutes per session: 10 min    Stress: To some extent   Relationships     Social connections     Talks on phone: More than three times a week     Gets together: More than three times a week     Attends Hinduism service: Not on file     Active member of club or organization: Patient refused     Attends meetings of clubs or organizations: Patient refused     Relationship status:    Other Topics Concern    Not on file   Social History Narrative    Not on file       Objective    Physical Exam     Vitals:    10/23/20 1502   BP: (!) 153/78   Pulse: 72   Temp: 97.9 °F (36.6 °C)       Awake alert no acute distress  Normocephalic atraumatic  Regular rate  No respiratory distress  Soft nontender nondistended bowel sounds 4  Nonfocal  Distal pulses intact  No rash no lesions  Normal affect      CMP  Sodium   Date Value Ref Range Status   09/25/2020 134 (L) 136 - 145 mmol/L Final     Potassium   Date Value Ref Range Status   09/25/2020 3.8 3.5 - 5.1 mmol/L Final     Chloride   Date Value Ref Range Status   09/25/2020 99 95 - 110 mmol/L Final     CO2   Date Value Ref Range Status   09/25/2020 24 23 - 29 mmol/L Final     Glucose   Date Value Ref Range Status   09/25/2020 97 70 - 110 mg/dL Final     BUN, Bld   Date Value Ref Range Status   09/25/2020 13 6 - 20 mg/dL Final     Creatinine   Date Value Ref Range Status   09/25/2020 1.0 0.5 - 1.4 mg/dL Final     Calcium   Date Value Ref Range Status   09/25/2020 8.9 8.7 - 10.5 mg/dL Final     Total Protein   Date Value Ref Range Status   09/25/2020 7.6 6.0 - 8.4 g/dL Final     Albumin   Date Value Ref Range Status   09/25/2020 3.8 3.5 - 5.2 g/dL Final     Total Bilirubin   Date Value Ref Range Status   09/25/2020 0.3 0.1 - 1.0 mg/dL Final     Comment:     For infants and newborns, interpretation of results should be based  on gestational age, weight and in agreement with clinical  observations.  Premature Infant recommended reference ranges:  Up to 24 hours.............<8.0 mg/dL  Up to 48 hours............<12.0 mg/dL  3-5  days..................<15.0 mg/dL  6-29 days.................<15.0 mg/dL       Alkaline Phosphatase   Date Value Ref Range Status   09/25/2020 106 55 - 135 U/L Final     AST   Date Value Ref Range Status   09/25/2020 18 10 - 40 U/L Final     ALT   Date Value Ref Range Status   09/25/2020 16 10 - 44 U/L Final     Anion Gap   Date Value Ref Range Status   09/25/2020 11 8 - 16 mmol/L Final     eGFR if    Date Value Ref Range Status   09/25/2020 >60.0 >60 mL/min/1.73 m^2 Final     eGFR if non    Date Value Ref Range Status   09/25/2020 >60.0 >60 mL/min/1.73 m^2 Final     Comment:     Calculation used to obtain the estimated glomerular filtration  rate (eGFR) is the CKD-EPI equation.        Lab Results   Component Value Date    WBC 12.66 10/23/2020    HGB 12.7 (L) 10/23/2020    HCT 39.7 (L) 10/23/2020    MCV 84 10/23/2020     (H) 10/23/2020     10/09/2019:  Ferritin 7, iron 34, TIBC 418, iron saturation 8%    11/06/2019:  Iron 256, TIBC 397, iron saturation 64% transferrin 268, ferritin 51.    02/03/2020:  Iron 25, TIBC 454, transferrin 307, iron saturation 6.  Ferritin 9    03/09/2020:  Ferritin 188, iron 46, transferrin 273, TIBC 404, iron saturation 11%.    09/04/2020:  Ferritin 14, iron 74, TIBC 374, iron saturation 20%.      Assessment    Iron deficiency   > short-term IV iron therapy plan Venofer 200 mg weekly x4 premedication Tylenol and Benadryl.  >RTC 5 weeks with cbc iron ferritin    Elevated WBC reactive suspected - improving  > continue monitor.    Patient had EGD colonoscopy December 2019.  > GI doctor Dr Lucas  > recommended 3 years follow-up for surveillance.  Patient will follow-up GI accordingly.    Thrombocytosis reactive  > continue monitor    Hypertension tobacco  > primary follow-up    Schizophrenia  > follow-up psych      Diabetes  > Follow-up primary care physician      M*Modal dictations      There are no diagnoses linked to this encounter.

## 2020-10-28 ENCOUNTER — PATIENT OUTREACH (OUTPATIENT)
Dept: ADMINISTRATIVE | Facility: OTHER | Age: 55
End: 2020-10-28

## 2020-10-28 NOTE — PROGRESS NOTES
Health Maintenance Due   Topic Date Due    Hepatitis C Screening  1965    Foot Exam  08/22/1977    High Dose Statin  08/22/1988    Shingles Vaccine (1 of 2) 08/22/2017    Influenza Vaccine (1) 08/01/2020     Updates were requested from care everywhere.  Chart was reviewed for overdue Proactive [facility] En[facility]ters ([facility]) topics (CRS, Breast Cancer Screening, Eye exam)  Health Maintenance has been updated.  LINKS immunization registry triggered.  LINKS not responding.

## 2020-10-29 NOTE — PROGRESS NOTES
[facility] Follow Up Evaluation    Referred by: Dr. Kathleen  Reason for referral: foot pain    CC:   Chief Complaint   Patient presents with    Foot Burn     right      Last 3 PDI Scores 9/24/2020   Pain Disability Index (PDI) 20     Interval HPI 10/30/20:  Mr. Crane returns to the office for medication follow up.  He continues to have pain about the bottom of the right heal, ongoing [id number] s/p tumor removal  in [place]- he moved to [place] after [place], has recently returned home to assist his mother with caring for his father with alzheimers. Since surgery, he has had chronic pain of the right heel.  His pain averages 5/10, constant, throbbing, burning.  His pain is worse with standing and walking and relieved with elevation.  He reports moderate relief with the combination of lyrica and tramadol.  He is prescribed tramadol 50mg po daily prn, he reports some days he does not need to take the tramadol and other days he takes it twice daily, states he does not take more than twice daily.  He reports he has been out of his last prescription for 5 days.   States he deals with the pain when medication runs out.  He reports he has had epidural injections for back pain in the past, denies history of back surgery. States he has not tried lumbar sympathetic block for treatment of foot pain, would not consider any treatment of pain that includes injections in his back or back surgery, would not consider SCS trial.  He saw our foot and ankle orthopedic surgeon as recommended and no surgical treatment was recommended.  He continues care with podiatry. He is inquiring today about providers who prescribe medical marijuana as he reports it alleviates his headaches.  He states he buys a gram of marijuana and it lasts him about 2 weeks, reports the last time he smoked was about 3 weeks ago.  States he does not drink alcohol because he is allergic to it, he stops breathing.  He reports he is disabled due to  mental illness including schizophrenia and type 2 bipolar disorder.  He reports he sees his psychiatrist every 6 months.       HPI:   Ryan Crane is a 55 y.o. male who complains of foot pain    Onset: 3 years  Inciting Event: right heel fissure wound  Progression: since onset, pain is stable  Current Pain Score: 8/10  Timing: constant  Quality: Sharp  Radiation: no  Associated numbness or weakness: no weakness  Exacerbated by: nothing in particular  Allievated by: nothing  Is Pain Level Acceptable?: No    Previous Therapies:  PT/OT:   HEP:   Interventions:   Surgery:  Medications:   - NSAIDS:   - MSK Relaxants:   - TCAs:   - SNRIs:   - Topicals:   - Anticonvulsants:  - Opioids:     Current Pain Medications:  1. Tramadol 50mg po q8h prn    History:    Current Outpatient Medications:     albuterol (VENTOLIN HFA) 90 mcg/actuation inhaler, Inhale 2 puffs into the lungs every 6 (six) hours as needed for Wheezing. Rescue, Disp: 18 g, Rfl: 11    amlodipine-valsartan (EXFORGE) 5-160 mg per tablet, Take 1 tablet by mouth once daily., Disp: 90 tablet, Rfl: 0    aspirin (ECOTRIN) 325 MG EC tablet, Take 1 tablet (325 mg total) by mouth once daily., Disp: , Rfl: 0    busPIRone (BUSPAR) 15 MG tablet, Take 15 mg by mouth 3 (three) times daily. , Disp: , Rfl:     DULoxetine (CYMBALTA) 30 MG capsule, TK ONE C PO  ONCE D, Disp: , Rfl:     DULoxetine (CYMBALTA) 60 MG capsule, Take 60 mg by mouth once daily., Disp: , Rfl:     EPINEPHrine (EPIPEN) 0.3 mg/0.3 mL AtIn, ADMINISTER 0.3 ML IN THE MUSCLE 1 TIME, Disp: 2 each, Rfl: 0    fluticasone-umeclidin-vilanter (TRELEGY ELLIPTA) 100-62.5-25 mcg DsDv, Inhale 1 puff into the lungs once daily., Disp: 30 each, Rfl: 5    metFORMIN (GLUCOPHAGE-XR) 500 MG ER 24hr tablet, TAKE 1 TABLET(500 MG) BY MOUTH DAILY WITH BREAKFAST, Disp: 90 tablet, Rfl: 1    metoprolol succinate (TOPROL-XL) 50 MG 24 hr tablet, Take 1 tablet (50 mg total) by mouth once daily., Disp: 90 tablet, Rfl: 3     nitroGLYCERIN (NITROSTAT) 0.4 MG SL tablet, Place 1 tablet (0.4 mg total) under the tongue every 5 (five) minutes as needed for Chest pain., Disp: 9 tablet, Rfl: 3    OXcarbazepine (TRILEPTAL) 600 MG Tab, Take 600 mg by mouth 3 (three) times daily., Disp: , Rfl:     pregabalin (LYRICA) 50 MG capsule, TAKE 1 CAPSULE(50 MG) BY MOUTH THREE TIMES DAILY, Disp: 90 capsule, Rfl: 3    tadalafiL (CIALIS) 20 MG Tab, Take 1 tablet (20 mg total) by mouth prior to sexual activity, not to exceed more than 1 tablet in 72 hours., Disp: 10 tablet, Rfl: 11    traMADoL (ULTRAM) 50 mg tablet, Take 1 tablet (50 mg total) by mouth every 24 hours as needed for Pain., Disp: 30 tablet, Rfl: 0    varenicline (CHANTIX STARTING MONTH BOX) 0.5 mg (11)- 1 mg (42) tablet, Take one 0.5mg tab by mouth once daily X3 days,then increase to one 0.5mg tab twice daily X4 days,then increase to one 1mg tab twice daily, Disp: 1 Package, Rfl: 0    varenicline (CHANTIX) 1 mg Tab, Take 1 tablet (1 mg total) by mouth 2 (two) times daily., Disp: 60 tablet, Rfl: 4    ziprasidone (GEODON) 80 MG capsule, Take 160 mg by mouth every evening. , Disp: , Rfl:   No current facility-administered medications for this visit.     Facility-Administered Medications Ordered in Other Visits:     lactated ringers infusion, , Intravenous, Continuous, Sheela Nuno MD, Last Rate: 10 mL/hr at 08/21/20 0655    lidocaine (PF) 10 mg/ml (1%) injection 10 mg, 1 mL, Intradermal, Once, Sheela Nuno MD    Past Medical History:   Diagnosis Date    Acute angina     Asthma     COPD (chronic obstructive pulmonary disease)     Diabetes mellitus     Digestive disorder     ulcers    General anesthetics causing adverse effect in therapeutic use     Hypertension     MI, old     PIPER on CPAP     Schizophrenia     Smoker unmotivated to quit     Stroke 2005    Thyroid disease     took medicine in the past    Type II diabetes mellitus with neurological  manifestations 11/21/2019       Past Surgical History:   Procedure Laterality Date    APPENDECTOMY      BONE MARROW ASPIRATION Left 8/21/2020    Procedure: ASPIRATION, BONE MARROW;  Surgeon: Lex Kathleen MD;  Location: [facility] OR;  Service: Oncology;  Laterality: Left;    CLOSURE OF WOUND Right 9/9/2019    Procedure: CLOSURE, WOUND;  Surgeon: Li Kathleen DPM;  Location: [facility] OR;  Service: Podiatry;  Laterality: Right;    COLONOSCOPY N/A 12/10/2019    Procedure: COLONOSCOPY;  Surgeon: Ryan Lucas MD;  Location: [facility] ENDO;  Service: Endoscopy;  Laterality: N/A;    COLONOSCOPY N/A 12/13/2019    Procedure: COLONOSCOPY;  Surgeon: Ryan Lucas MD;  Location: [facility] ENDO;  Service: Endoscopy;  Laterality: N/A;    ESOPHAGOGASTRODUODENOSCOPY N/A 12/10/2019    Procedure: EGD (ESOPHAGOGASTRODUODENOSCOPY);  Surgeon: Ryan Lucas MD;  Location: [facility] ENDO;  Service: Endoscopy;  Laterality: N/A;    SHOULDER ARTHROSCOPY Left        Family History   Problem Relation Age of Onset    Melanoma Mother     Diabetes Mother     Hypertension Mother     Stroke Father     Diabetes Father     Hypertension Father     Colon cancer Father         unsure of age of diagnosis    Cancer Father         colon cancer    Cervical cancer Sister     Cirrhosis Brother     Hypertension Brother     Lung cancer Maternal Grandmother     Prostate cancer Maternal Grandfather     Crohn's disease Neg Hx     Ulcerative colitis Neg Hx     Stomach cancer Neg Hx     Esophageal cancer Neg Hx     Retinal detachment Neg Hx     Strabismus Neg Hx     Macular degeneration Neg Hx     Glaucoma Neg Hx        Social History     Socioeconomic History    Marital status:      Spouse name: Not on file    Number of children: Not on file    Years of education: Not on file    Highest education level: Not on file   Occupational History    Occupation: disabled (mental)     Comment: since 2000   Social Needs    Financial  "resource strain: Not hard at all    Food insecurity     Worry: Never true     Inability: Never true    Transportation needs     Medical: Yes     Non-medical: No   Tobacco Use    Smoking status: Current Every Day Smoker     Packs/day: 1.00     Years: 45.00     Pack years: 45.00     Types: Cigarettes    Smokeless tobacco: Never Used    Tobacco comment: Age Started 14    Substance and Sexual Activity    Alcohol use: No     Frequency: Never     Drinks per session: Patient refused     Binge frequency: Never    Drug use: Not Currently     Types: Marijuana    Sexual activity: Yes     Partners: Female   Lifestyle    Physical activity     Days per week: 1 day     Minutes per session: 10 min    Stress: To some extent   Relationships    Social connections     Talks on phone: More than three times a week     Gets together: More than three times a week     Attends Mu-ism service: Not on file     Active member of club or organization: Patient refused     Attends meetings of clubs or organizations: Patient refused     Relationship status:    Other Topics Concern    Not on file   Social History Narrative    Not on file       Review of patient's allergies indicates:   Allergen Reactions    Alcohol Anaphylaxis    Keflex [cephalexin]        Review of Systems:  General ROS: negative for - fever  Cardiovascular ROS: negative for - chest pain  Dermatological ROS: negative for rash    Physical Exam:  Vitals:    10/30/20 1017   BP: 120/63   Pulse: (!) 111   Temp: 97.9 °F (36.6 °C)   TempSrc: Temporal   SpO2: 98%   Weight: 118.2 kg (260 lb 7.6 oz)   Height: 5' 11" (1.803 m)   PainSc:   3   PainLoc: Foot     Body mass index is 36.33 kg/m².     Gen: NAD  Gait: gait intact  Psych:  Mood appropriate for given condition  HEENT: eyes anicteric   GI: Abd soft  CV: RRR  Lungs: breathing unlabored   Skin: intact, he doesn't appear to have any allodynia or hyperalgesia over the bottom of his foot.  No color change or " swelling.  He has tenderness over an eschar and says his previous surgery was over that area.    Imaging:  Xray right foot 10/14/19  FINDINGS:  No acute fracture.  Dorsal and plantar calcaneal spurs.  No malalignment.  Minimal degenerative change 1st MTP joint.    Labs:  BMP  Lab Results   Component Value Date     (L) 09/25/2020    K 3.8 09/25/2020    CL 99 09/25/2020    CO2 24 09/25/2020    BUN 13 09/25/2020    CREATININE 1.0 09/25/2020    CALCIUM 8.9 09/25/2020    ANIONGAP 11 09/25/2020    ESTGFRAFRICA >60.0 09/25/2020    EGFRNONAA >60.0 09/25/2020     Lab Results   Component Value Date    ALT 16 09/25/2020    AST 18 09/25/2020    ALKPHOS 106 09/25/2020    BILITOT 0.3 09/25/2020       Assessment:  Problem List Items Addressed This Visit     None          Treatment Plan:    55 y.o. year old male with PMH COPD, PIPER on CPAP presents to the office with right heel pain.     9/24/20 - Mr. Crane returns to the office for follow up.  Today he continues to have pain over the bottom of the right heal.  He says it initially began a few years ago when he had a tumor removed by a doctor in [place].  Following that he has had chronic pain of the left heel.  Today his pain is 5/10, constant, throbbing, burning.  His pain is worse with standing and walking and relieved with elevation.  On exam he doesn't appear to have any allodynia or hyperalgesia over the bottom of his foot.  No color change or swelling.  He has tenderness over an eschar and says his previous surgery was over that area.  I don't see evidence of CRPS.   I previously saw him in 2019 and he hadn't been complainant with podiatry treatment plan.  There was also concerns for drug seeking behavior.  I have reviewed his  today and it appears he has been complaint with prescribed medications.  He also has started lyrica, something he was hesitant to do in the past, and feels like it has somewhat improved his pain.  He says recently he has required 1  tramadol per day that brings his pain down from a 5/10 to a 2/10 and allows him to have increased activity while on these medications.  The patient appears to be meeting the goals of opioid therapy and is dependent on them for functionality and can not perform ADLS without them.  I've agreed to resume his tramadol 1 tab per day prn for his pain.  The risks were discussed including tolerance, addiction, overdose, over-sedation, drug interactions, liver damage, hormone imbalance, over-sedation, respiratory depression, opioid-induced hyperalgesia, and even death.  He will sign a pain contract and UDS today.  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  He will follow up in 1 month.     10/30/20 - Mr. Crane returns to the office for medication follow up.  He continues to have pain about the bottom of the right heal, ongoing [id number] s/p tumor removal  in [place]- he moved to [place] after [place], has recently returned home to assist his mother with caring for his father with alzheimers. Since surgery, he has had chronic pain of the right heel.  His pain averages 5/10, constant, throbbing, burning.  His pain is worse with standing and walking and relieved with elevation.  He reports moderate relief with the combination of lyrica and tramadol.  He is prescribed tramadol 50mg po daily prn, he reports some days he does not need to take the tramadol and other days he takes it twice daily, states he does not take more than twice daily.  He reports he has been out of his last prescription for 5 days.   States he deals with the pain when medication runs out.  He reports he has had epidural injections for back pain in the past, denies history of back surgery. States he has not tried lumbar sympathetic block for treatment of foot pain, would not consider any treatment of pain that includes injections in his  back or back surgery, would not consider SCS trial.  He saw our foot and ankle orthopedic surgeon as recommended and no surgical treatment was recommended.  He continues care with podiatry. He is inquiring today about providers who prescribe medical marijuana as he reports it alleviates his headaches.  He states he buys a gram of marijuana and it lasts him about 2 weeks, reports the last time he smoked was about 3 weeks ago.  States he does not drink alcohol because he is allergic to it, he stops breathing.  He reports he is disabled due to mental illness including schizophrenia and type 2 bipolar disorder.  He reports he sees his psychiatrist every 6 months. On exam, his skin is pink and intact, light scar noted, eschar mid heel, appears to be healing, no erythema, no edema, sensation intact to light touch, no allodynia or hyperalgesia.  I have explained that it is not safe to take tramadol and smoke marijuana, the risks were discussed including overdose, over-sedation, drug interactions, respiratory depression, and even death.  I have provided a list of second opinion for treatment with medical marijuana.      Treatment Plan:   Procedures: none  PT/OT/HEP: none  Medications: continue lyrica as prescribed.  Last refill Tramadol 50mg po qday, 10/2/20 #30.  Labs: Reviewed and medications are appropriately dosed for current hepatorenal function.  UDS obtained in clinic today.  Imaging: No additional recommended at this time.    : Reviewed and consistent with medication use as prescribed.    Attending Physician:  Orlando Dumont M.D.  Interventional Pain Medicine / Anesthesiology

## 2020-10-30 ENCOUNTER — OFFICE VISIT (OUTPATIENT)
Dept: PAIN MEDICINE | Facility: CLINIC | Age: 55
End: 2020-10-30
Payer: MEDICARE

## 2020-10-30 ENCOUNTER — TELEPHONE (OUTPATIENT)
Dept: PAIN MEDICINE | Facility: CLINIC | Age: 55
End: 2020-10-30

## 2020-10-30 VITALS
HEART RATE: 111 BPM | OXYGEN SATURATION: 98 % | TEMPERATURE: 98 F | HEIGHT: 71 IN | DIASTOLIC BLOOD PRESSURE: 63 MMHG | WEIGHT: 260.5 LBS | SYSTOLIC BLOOD PRESSURE: 120 MMHG | BODY MASS INDEX: 36.47 KG/M2

## 2020-10-30 DIAGNOSIS — M79.671 CHRONIC PAIN IN RIGHT FOOT: ICD-10-CM

## 2020-10-30 DIAGNOSIS — G89.29 CHRONIC PAIN IN RIGHT FOOT: ICD-10-CM

## 2020-10-30 DIAGNOSIS — F20.9 SCHIZOPHRENIA, UNSPECIFIED TYPE: ICD-10-CM

## 2020-10-30 DIAGNOSIS — Z02.89 PAIN MEDICATION AGREEMENT: Primary | ICD-10-CM

## 2020-10-30 DIAGNOSIS — M79.671 INTRACTABLE RIGHT HEEL PAIN: ICD-10-CM

## 2020-10-30 PROCEDURE — 80307 DRUG TEST PRSMV CHEM ANLYZR: CPT | Mod: HCNC

## 2020-10-30 PROCEDURE — 99214 PR OFFICE/OUTPT VISIT, EST, LEVL IV, 30-39 MIN: ICD-10-PCS | Mod: HCNC,S$GLB,, | Performed by: NURSE PRACTITIONER

## 2020-10-30 PROCEDURE — 99999 PR PBB SHADOW E&M-EST. PATIENT-LVL III: ICD-10-PCS | Mod: PBBFAC,HCNC,, | Performed by: NURSE PRACTITIONER

## 2020-10-30 PROCEDURE — 99999 PR PBB SHADOW E&M-EST. PATIENT-LVL III: CPT | Mod: PBBFAC,HCNC,, | Performed by: NURSE PRACTITIONER

## 2020-10-30 PROCEDURE — 99214 OFFICE O/P EST MOD 30 MIN: CPT | Mod: HCNC,S$GLB,, | Performed by: NURSE PRACTITIONER

## 2020-11-04 ENCOUNTER — OFFICE VISIT (OUTPATIENT)
Dept: FAMILY MEDICINE | Facility: CLINIC | Age: 55
End: 2020-11-04
Payer: MEDICARE

## 2020-11-04 ENCOUNTER — TELEPHONE (OUTPATIENT)
Dept: INFUSION THERAPY | Facility: HOSPITAL | Age: 55
End: 2020-11-04

## 2020-11-04 ENCOUNTER — PATIENT MESSAGE (OUTPATIENT)
Dept: PODIATRY | Facility: CLINIC | Age: 55
End: 2020-11-04

## 2020-11-04 ENCOUNTER — TELEPHONE (OUTPATIENT)
Dept: PAIN MEDICINE | Facility: CLINIC | Age: 55
End: 2020-11-04

## 2020-11-04 VITALS
HEIGHT: 71 IN | SYSTOLIC BLOOD PRESSURE: 150 MMHG | DIASTOLIC BLOOD PRESSURE: 80 MMHG | HEART RATE: 86 BPM | BODY MASS INDEX: 37.44 KG/M2 | OXYGEN SATURATION: 98 % | TEMPERATURE: 98 F | WEIGHT: 267.44 LBS

## 2020-11-04 DIAGNOSIS — I10 ESSENTIAL HYPERTENSION: ICD-10-CM

## 2020-11-04 DIAGNOSIS — M62.830 MUSCLE SPASM OF BACK: ICD-10-CM

## 2020-11-04 DIAGNOSIS — M54.50 ACUTE LEFT-SIDED LOW BACK PAIN WITHOUT SCIATICA: Primary | ICD-10-CM

## 2020-11-04 LAB
6MAM UR QL: NOT DETECTED
7AMINOCLONAZEPAM UR QL: NOT DETECTED
A-OH ALPRAZ UR QL: NOT DETECTED
ALPRAZ UR QL: NOT DETECTED
AMPHET UR QL SCN: NOT DETECTED
ANNOTATION COMMENT IMP: ABNORMAL
ANNOTATION COMMENT IMP: ABNORMAL
BARBITURATES UR QL: NOT DETECTED
BUPRENORPHINE UR QL: NOT DETECTED
BZE UR QL: NOT DETECTED
CARBOXYTHC UR QL: NOT DETECTED
CARISOPRODOL UR QL: NOT DETECTED
CLONAZEPAM UR QL: NOT DETECTED
CODEINE UR QL: NOT DETECTED
CREAT UR-MCNC: >400 MG/DL (ref 20–400)
DIAZEPAM UR QL: NOT DETECTED
ETHYL GLUCURONIDE UR QL: NOT DETECTED
FENTANYL UR QL: NOT DETECTED
HYDROCODONE UR QL: NOT DETECTED
HYDROMORPHONE UR QL: NOT DETECTED
LORAZEPAM UR QL: NOT DETECTED
MDA UR QL: NOT DETECTED
MDEA UR QL: NOT DETECTED
MDMA UR QL: NOT DETECTED
ME-PHENIDATE UR QL: NOT DETECTED
MEPERIDINE UR QL: NOT DETECTED
METHADONE UR QL: NOT DETECTED
METHAMPHET UR QL: NOT DETECTED
MIDAZOLAM UR QL SCN: NOT DETECTED
MORPHINE UR QL: NOT DETECTED
NORBUPRENORPHINE UR QL CFM: NOT DETECTED
NORDIAZEPAM UR QL: NOT DETECTED
NORFENTANYL UR QL: NOT DETECTED
NORHYDROCODONE UR QL CFM: NOT DETECTED
NOROXYCODONE UR QL CFM: NOT DETECTED
NOROXYMORPHONE: NOT DETECTED
OXAZEPAM UR QL: NOT DETECTED
OXYCODONE UR QL: NOT DETECTED
OXYMORPHONE UR QL: NOT DETECTED
PATHOLOGY STUDY: ABNORMAL
PCP UR QL: NOT DETECTED
PHENTERMINE UR QL: NOT DETECTED
PROPOXYPH UR QL: NOT DETECTED
SERVICE CMNT-IMP: ABNORMAL
TAPENTADOL UR QL SCN: NOT DETECTED
TAPENTADOL-O-SULF: NOT DETECTED
TEMAZEPAM UR QL: NOT DETECTED
TRAMADOL UR QL: PRESENT
ZOLPIDEM UR QL: NOT DETECTED

## 2020-11-04 PROCEDURE — 3077F SYST BP >= 140 MM HG: CPT | Mod: HCNC,CPTII,S$GLB, | Performed by: NURSE PRACTITIONER

## 2020-11-04 PROCEDURE — 99214 PR OFFICE/OUTPT VISIT, EST, LEVL IV, 30-39 MIN: ICD-10-PCS | Mod: HCNC,S$GLB,, | Performed by: NURSE PRACTITIONER

## 2020-11-04 PROCEDURE — 3008F BODY MASS INDEX DOCD: CPT | Mod: HCNC,CPTII,S$GLB, | Performed by: NURSE PRACTITIONER

## 2020-11-04 PROCEDURE — 3008F PR BODY MASS INDEX (BMI) DOCUMENTED: ICD-10-PCS | Mod: HCNC,CPTII,S$GLB, | Performed by: NURSE PRACTITIONER

## 2020-11-04 PROCEDURE — 99999 PR PBB SHADOW E&M-EST. PATIENT-LVL IV: ICD-10-PCS | Mod: PBBFAC,HCNC,, | Performed by: NURSE PRACTITIONER

## 2020-11-04 PROCEDURE — 99999 PR PBB SHADOW E&M-EST. PATIENT-LVL IV: CPT | Mod: PBBFAC,HCNC,, | Performed by: NURSE PRACTITIONER

## 2020-11-04 PROCEDURE — 3079F PR MOST RECENT DIASTOLIC BLOOD PRESSURE 80-89 MM HG: ICD-10-PCS | Mod: HCNC,CPTII,S$GLB, | Performed by: NURSE PRACTITIONER

## 2020-11-04 PROCEDURE — 3079F DIAST BP 80-89 MM HG: CPT | Mod: HCNC,CPTII,S$GLB, | Performed by: NURSE PRACTITIONER

## 2020-11-04 PROCEDURE — 3077F PR MOST RECENT SYSTOLIC BLOOD PRESSURE >= 140 MM HG: ICD-10-PCS | Mod: HCNC,CPTII,S$GLB, | Performed by: NURSE PRACTITIONER

## 2020-11-04 PROCEDURE — 99214 OFFICE O/P EST MOD 30 MIN: CPT | Mod: HCNC,S$GLB,, | Performed by: NURSE PRACTITIONER

## 2020-11-04 RX ORDER — TIZANIDINE 2 MG/1
2 TABLET ORAL NIGHTLY PRN
Qty: 10 TABLET | Refills: 0 | Status: SHIPPED | OUTPATIENT
Start: 2020-11-04 | End: 2020-11-11

## 2020-11-04 NOTE — TELEPHONE ENCOUNTER
----- Message from Beatriz Kaur sent at 11/4/2020 12:42 PM CST -----  Regarding: reschedule appointment  Contact: patient  Patient called to reschedule his appointment he missed this morning.    Phone:  [phone]    Thank you,    Beatriz

## 2020-11-04 NOTE — PROGRESS NOTES
"Subjective:       Patient ID: Ryan Crane is a 55 y.o. male.    Chief Complaint: Back Pain    Patient who is new to me presents for back pain. He states he bent over and tied his shoe and his back "went out." He states its in his lower back and radiates to his upper back. He states he is no longer taking tramadol because he was refused more by his pain management doctor. He was told he should find a doctor who prescribed medicinal marijuana. He states its important that he get out of pain due to having a camping trip for children of his friend. He states he has not taken his blood pressure medication. He denies any fever, bladder or bowel dysfunction, numbness, or tingling.     Back Pain  This is a new problem. The current episode started in the past 7 days. The problem occurs daily. The problem is unchanged. The pain is present in the lumbar spine. Quality: stinging pain. The pain does not radiate. The pain is at a severity of 9/10. The pain is severe. The pain is the same all the time. The symptoms are aggravated by twisting, position and lying down. Stiffness is present all day. Pertinent negatives include no abdominal pain, bladder incontinence, bowel incontinence, chest pain, dysuria, fever, headaches, leg pain, numbness, paresthesias, weakness or weight loss. Risk factors include obesity, lack of exercise and sedentary lifestyle. He has tried NSAIDs, ice, heat, bed rest, home exercises and walking (goodies) for the symptoms. The treatment provided no relief.     Review of Systems   Constitutional: Negative for chills, fatigue, fever and weight loss.   HENT: Negative for congestion, ear pain, sinus pressure and sore throat.    Respiratory: Negative for shortness of breath and wheezing.    Cardiovascular: Negative for chest pain and leg swelling.   Gastrointestinal: Negative for abdominal distention, abdominal pain and bowel incontinence.   Genitourinary: Negative for bladder incontinence, dysuria and " flank pain.   Musculoskeletal: Positive for back pain.   Skin: Negative for color change and pallor.   Neurological: Negative for weakness, light-headedness, numbness, headaches and paresthesias.       Objective:      Physical Exam  Vitals signs and nursing note reviewed.   Constitutional:       Appearance: He is well-developed.   HENT:      Head: Normocephalic and atraumatic.      Right Ear: External ear normal.      Left Ear: External ear normal.   Eyes:      Conjunctiva/sclera: Conjunctivae normal.      Pupils: Pupils are equal, round, and reactive to light.   Neck:      Musculoskeletal: Normal range of motion and neck supple.   Cardiovascular:      Rate and Rhythm: Normal rate and regular rhythm.   Pulmonary:      Effort: Pulmonary effort is normal.      Breath sounds: Normal breath sounds.   Abdominal:      General: Bowel sounds are normal.      Palpations: Abdomen is soft.   Musculoskeletal:      Lumbar back: He exhibits decreased range of motion, tenderness, pain and spasm.        Back:    Skin:     General: Skin is warm and dry.   Neurological:      Mental Status: He is alert and oriented to person, place, and time.         Assessment:       1. Acute left-sided low back pain without sciatica    2. Muscle spasm of back    3. Essential hypertension        Plan:       Ryan was seen today for back pain.    Diagnoses and all orders for this visit:    Acute left-sided low back pain without sciatica  -     tiZANidine (ZANAFLEX) 2 MG tablet; Take 1 tablet (2 mg total) by mouth nightly as needed.    Muscle spasm of back  -     tiZANidine (ZANAFLEX) 2 MG tablet; Take 1 tablet (2 mg total) by mouth nightly as needed.    Essential hypertension  Advised to take blood pressure medication as soon as possible. Serial BPs and follow up in 2 weeks for a nurse visit.     Continue ibuprofen, warm compresses, avoid heavy lifting, avoid prolong sitting and standing. Discussed worsening signs/symptoms and when to return to  clinic or go to ED.   Patient expresses understanding and agrees with treatment plan.

## 2020-11-04 NOTE — TELEPHONE ENCOUNTER
Spoke with Mr Crane on phone, advised to increase water intake as his urine creatinine was elevated on urine drug screen.  UDS consistent.

## 2020-11-11 ENCOUNTER — INFUSION (OUTPATIENT)
Dept: INFUSION THERAPY | Facility: HOSPITAL | Age: 55
End: 2020-11-11
Attending: INTERNAL MEDICINE
Payer: MEDICARE

## 2020-11-11 VITALS
DIASTOLIC BLOOD PRESSURE: 81 MMHG | SYSTOLIC BLOOD PRESSURE: 166 MMHG | TEMPERATURE: 98 F | HEART RATE: 74 BPM | RESPIRATION RATE: 18 BRPM

## 2020-11-11 DIAGNOSIS — D50.0 IRON DEFICIENCY ANEMIA DUE TO CHRONIC BLOOD LOSS: Primary | ICD-10-CM

## 2020-11-11 DIAGNOSIS — D62 ACUTE BLOOD LOSS ANEMIA: ICD-10-CM

## 2020-11-11 PROCEDURE — 96365 THER/PROPH/DIAG IV INF INIT: CPT | Mod: HCNC,PN

## 2020-11-11 PROCEDURE — 63600175 PHARM REV CODE 636 W HCPCS: Mod: HCNC,PN | Performed by: INTERNAL MEDICINE

## 2020-11-11 PROCEDURE — A4216 STERILE WATER/SALINE, 10 ML: HCPCS | Mod: HCNC,PN | Performed by: INTERNAL MEDICINE

## 2020-11-11 PROCEDURE — 25000003 PHARM REV CODE 250: Mod: HCNC,PN | Performed by: INTERNAL MEDICINE

## 2020-11-11 RX ORDER — SODIUM CHLORIDE 0.9 % (FLUSH) 0.9 %
10 SYRINGE (ML) INJECTION
Status: DISCONTINUED | OUTPATIENT
Start: 2020-11-11 | End: 2020-11-11 | Stop reason: HOSPADM

## 2020-11-11 RX ORDER — DIPHENHYDRAMINE HCL 25 MG
25 CAPSULE ORAL
Status: COMPLETED | OUTPATIENT
Start: 2020-11-11 | End: 2020-11-11

## 2020-11-11 RX ORDER — ACETAMINOPHEN 325 MG/1
650 TABLET ORAL
Status: CANCELLED
Start: 2020-11-18

## 2020-11-11 RX ORDER — DIPHENHYDRAMINE HCL 25 MG
25 CAPSULE ORAL
Status: CANCELLED
Start: 2020-11-18

## 2020-11-11 RX ORDER — HEPARIN 100 UNIT/ML
500 SYRINGE INTRAVENOUS
Status: CANCELLED | OUTPATIENT
Start: 2020-11-18

## 2020-11-11 RX ORDER — ACETAMINOPHEN 325 MG/1
650 TABLET ORAL
Status: COMPLETED | OUTPATIENT
Start: 2020-11-11 | End: 2020-11-11

## 2020-11-11 RX ORDER — SODIUM CHLORIDE 0.9 % (FLUSH) 0.9 %
10 SYRINGE (ML) INJECTION
Status: CANCELLED | OUTPATIENT
Start: 2020-11-18

## 2020-11-11 RX ADMIN — Medication 10 ML: at 01:11

## 2020-11-11 RX ADMIN — ACETAMINOPHEN 650 MG: 325 TABLET, FILM COATED ORAL at 01:11

## 2020-11-11 RX ADMIN — DIPHENHYDRAMINE HYDROCHLORIDE 25 MG: 25 CAPSULE ORAL at 01:11

## 2020-11-11 RX ADMIN — SODIUM CHLORIDE: 9 INJECTION, SOLUTION INTRAVENOUS at 01:11

## 2020-11-11 RX ADMIN — IRON SUCROSE 100 MG: 20 INJECTION, SOLUTION INTRAVENOUS at 01:11

## 2020-11-18 ENCOUNTER — INFUSION (OUTPATIENT)
Dept: INFUSION THERAPY | Facility: HOSPITAL | Age: 55
End: 2020-11-18
Attending: INTERNAL MEDICINE
Payer: MEDICARE

## 2020-11-18 VITALS
RESPIRATION RATE: 20 BRPM | SYSTOLIC BLOOD PRESSURE: 149 MMHG | DIASTOLIC BLOOD PRESSURE: 71 MMHG | TEMPERATURE: 97 F | HEART RATE: 89 BPM

## 2020-11-18 DIAGNOSIS — D50.0 IRON DEFICIENCY ANEMIA DUE TO CHRONIC BLOOD LOSS: Primary | ICD-10-CM

## 2020-11-18 DIAGNOSIS — D62 ACUTE BLOOD LOSS ANEMIA: ICD-10-CM

## 2020-11-18 PROCEDURE — 96365 THER/PROPH/DIAG IV INF INIT: CPT | Mod: HCNC,PN

## 2020-11-18 PROCEDURE — 25000003 PHARM REV CODE 250: Mod: HCNC,PN | Performed by: INTERNAL MEDICINE

## 2020-11-18 PROCEDURE — 63600175 PHARM REV CODE 636 W HCPCS: Mod: HCNC,PN | Performed by: INTERNAL MEDICINE

## 2020-11-18 PROCEDURE — A4216 STERILE WATER/SALINE, 10 ML: HCPCS | Mod: HCNC,PN | Performed by: INTERNAL MEDICINE

## 2020-11-18 RX ORDER — DIPHENHYDRAMINE HCL 25 MG
25 CAPSULE ORAL
Status: COMPLETED | OUTPATIENT
Start: 2020-11-18 | End: 2020-11-18

## 2020-11-18 RX ORDER — SODIUM CHLORIDE 0.9 % (FLUSH) 0.9 %
10 SYRINGE (ML) INJECTION
Status: CANCELLED | OUTPATIENT
Start: 2020-11-25

## 2020-11-18 RX ORDER — ACETAMINOPHEN 325 MG/1
650 TABLET ORAL
Status: CANCELLED
Start: 2020-11-25

## 2020-11-18 RX ORDER — HEPARIN 100 UNIT/ML
500 SYRINGE INTRAVENOUS
Status: CANCELLED | OUTPATIENT
Start: 2020-11-25

## 2020-11-18 RX ORDER — ACETAMINOPHEN 325 MG/1
650 TABLET ORAL
Status: COMPLETED | OUTPATIENT
Start: 2020-11-18 | End: 2020-11-18

## 2020-11-18 RX ORDER — SODIUM CHLORIDE 0.9 % (FLUSH) 0.9 %
10 SYRINGE (ML) INJECTION
Status: DISCONTINUED | OUTPATIENT
Start: 2020-11-18 | End: 2020-11-18 | Stop reason: HOSPADM

## 2020-11-18 RX ORDER — DIPHENHYDRAMINE HCL 25 MG
25 CAPSULE ORAL
Status: CANCELLED
Start: 2020-11-25

## 2020-11-18 RX ADMIN — ACETAMINOPHEN 650 MG: 325 TABLET, FILM COATED ORAL at 01:11

## 2020-11-18 RX ADMIN — DIPHENHYDRAMINE HYDROCHLORIDE 25 MG: 25 CAPSULE ORAL at 01:11

## 2020-11-18 RX ADMIN — SODIUM CHLORIDE: 9 INJECTION, SOLUTION INTRAVENOUS at 01:11

## 2020-11-18 RX ADMIN — IRON SUCROSE 100 MG: 20 INJECTION, SOLUTION INTRAVENOUS at 01:11

## 2020-11-18 RX ADMIN — Medication 10 ML: at 01:11

## 2020-11-18 NOTE — PLAN OF CARE
Tolerated Venofer well, 30 minute post infusion VSS, schedule reviewed with pt, ambulated from infusion center, no distress noted

## 2020-11-19 ENCOUNTER — TELEPHONE (OUTPATIENT)
Dept: FAMILY MEDICINE | Facility: CLINIC | Age: 55
End: 2020-11-19

## 2020-11-19 NOTE — TELEPHONE ENCOUNTER
----- Message from Brenda Soto sent at 11/19/2020 11:19 AM CST -----  Contact: patient  Type: Needs Medical Advice  Who Called:  patient  Symptoms (please be specific):  evelyn  How long has patient had these symptoms:  evelyn  Pharmacy name and phone #:  evelyn  Best Call Back Number: [phone]  Additional Information: patients transportation medicaid van did not show up to bring him to his appointment.  Please call to advise and schedule.  T  tHanks!

## 2020-11-20 DIAGNOSIS — I10 ESSENTIAL HYPERTENSION: ICD-10-CM

## 2020-11-20 RX ORDER — AMLODIPINE AND VALSARTAN 5; 160 MG/1; MG/1
1 TABLET ORAL DAILY
Qty: 90 TABLET | Refills: 1 | Status: SHIPPED | OUTPATIENT
Start: 2020-11-20 | End: 2020-12-11

## 2020-11-25 ENCOUNTER — CLINICAL SUPPORT (OUTPATIENT)
Dept: FAMILY MEDICINE | Facility: CLINIC | Age: 55
End: 2020-11-25
Payer: MEDICARE

## 2020-11-25 ENCOUNTER — INFUSION (OUTPATIENT)
Dept: INFUSION THERAPY | Facility: HOSPITAL | Age: 55
End: 2020-11-25
Attending: INTERNAL MEDICINE
Payer: MEDICARE

## 2020-11-25 VITALS
HEART RATE: 70 BPM | DIASTOLIC BLOOD PRESSURE: 70 MMHG | SYSTOLIC BLOOD PRESSURE: 147 MMHG | TEMPERATURE: 98 F | RESPIRATION RATE: 18 BRPM

## 2020-11-25 DIAGNOSIS — Z01.30 BP CHECK: Primary | ICD-10-CM

## 2020-11-25 DIAGNOSIS — D62 ACUTE BLOOD LOSS ANEMIA: ICD-10-CM

## 2020-11-25 DIAGNOSIS — D50.0 IRON DEFICIENCY ANEMIA DUE TO CHRONIC BLOOD LOSS: Primary | ICD-10-CM

## 2020-11-25 PROCEDURE — 99499 NO LOS: ICD-10-PCS | Mod: HCNC,S$GLB,, | Performed by: FAMILY MEDICINE

## 2020-11-25 PROCEDURE — 96365 THER/PROPH/DIAG IV INF INIT: CPT | Mod: HCNC,PN

## 2020-11-25 PROCEDURE — 63600175 PHARM REV CODE 636 W HCPCS: Mod: HCNC,PN | Performed by: INTERNAL MEDICINE

## 2020-11-25 PROCEDURE — 25000003 PHARM REV CODE 250: Mod: HCNC,PN | Performed by: INTERNAL MEDICINE

## 2020-11-25 PROCEDURE — 99499 UNLISTED E&M SERVICE: CPT | Mod: HCNC,S$GLB,, | Performed by: FAMILY MEDICINE

## 2020-11-25 RX ORDER — ACETAMINOPHEN 325 MG/1
650 TABLET ORAL
Status: CANCELLED
Start: 2020-12-02

## 2020-11-25 RX ORDER — HEPARIN 100 UNIT/ML
500 SYRINGE INTRAVENOUS
Status: CANCELLED | OUTPATIENT
Start: 2020-12-02

## 2020-11-25 RX ORDER — ACETAMINOPHEN 325 MG/1
650 TABLET ORAL
Status: COMPLETED | OUTPATIENT
Start: 2020-11-25 | End: 2020-11-25

## 2020-11-25 RX ORDER — SODIUM CHLORIDE 0.9 % (FLUSH) 0.9 %
10 SYRINGE (ML) INJECTION
Status: DISCONTINUED | OUTPATIENT
Start: 2020-11-25 | End: 2020-11-25 | Stop reason: HOSPADM

## 2020-11-25 RX ORDER — DIPHENHYDRAMINE HCL 25 MG
25 CAPSULE ORAL
Status: COMPLETED | OUTPATIENT
Start: 2020-11-25 | End: 2020-11-25

## 2020-11-25 RX ORDER — DIPHENHYDRAMINE HCL 25 MG
25 CAPSULE ORAL
Status: CANCELLED
Start: 2020-12-02

## 2020-11-25 RX ORDER — SODIUM CHLORIDE 0.9 % (FLUSH) 0.9 %
10 SYRINGE (ML) INJECTION
Status: CANCELLED | OUTPATIENT
Start: 2020-12-02

## 2020-11-25 RX ADMIN — IRON SUCROSE 100 MG: 20 INJECTION, SOLUTION INTRAVENOUS at 02:11

## 2020-11-25 RX ADMIN — SODIUM CHLORIDE: 0.9 INJECTION, SOLUTION INTRAVENOUS at 02:11

## 2020-11-25 RX ADMIN — DIPHENHYDRAMINE HYDROCHLORIDE 25 MG: 25 CAPSULE ORAL at 02:11

## 2020-11-25 RX ADMIN — ACETAMINOPHEN 650 MG: 325 TABLET, FILM COATED ORAL at 02:11

## 2020-11-25 NOTE — PROGRESS NOTES
,      Blood pressure reading was: 150/82 Pulse: 63 SpO2: 97% after 15 minutes was 138/80.  Dr. Becerril notified.    Ryan Crane 55 y.o. male is here today for Blood Pressure check.   History of HTN yes.    Review of patient's allergies indicates:   Allergen Reactions    Alcohol Anaphylaxis    Keflex [cephalexin]      Creatinine   Date Value Ref Range Status   09/25/2020 1.0 0.5 - 1.4 mg/dL Final     Sodium   Date Value Ref Range Status   09/25/2020 134 (L) 136 - 145 mmol/L Final     Potassium   Date Value Ref Range Status   09/25/2020 3.8 3.5 - 5.1 mmol/L Final   ]  Patient verifies taking blood pressure medications on a regular basis at the same time of the day.     Current Outpatient Medications:     albuterol (VENTOLIN HFA) 90 mcg/actuation inhaler, Inhale 2 puffs into the lungs every 6 (six) hours as needed for Wheezing. Rescue, Disp: 18 g, Rfl: 11    amlodipine-valsartan (EXFORGE) 5-160 mg per tablet, Take 1 tablet by mouth once daily., Disp: 90 tablet, Rfl: 1    aspirin (ECOTRIN) 325 MG EC tablet, Take 1 tablet (325 mg total) by mouth once daily., Disp: , Rfl: 0    busPIRone (BUSPAR) 15 MG tablet, Take 15 mg by mouth 3 (three) times daily. , Disp: , Rfl:     DULoxetine (CYMBALTA) 30 MG capsule, TK ONE C PO  ONCE D, Disp: , Rfl:     DULoxetine (CYMBALTA) 60 MG capsule, Take 60 mg by mouth once daily., Disp: , Rfl:     EPINEPHrine (EPIPEN) 0.3 mg/0.3 mL AtIn, ADMINISTER 0.3 ML IN THE MUSCLE 1 TIME, Disp: 2 each, Rfl: 0    fluticasone-umeclidin-vilanter (TRELEGY ELLIPTA) 100-62.5-25 mcg DsDv, Inhale 1 puff into the lungs once daily., Disp: 30 each, Rfl: 5    metFORMIN (GLUCOPHAGE-XR) 500 MG ER 24hr tablet, TAKE 1 TABLET(500 MG) BY MOUTH DAILY WITH BREAKFAST, Disp: 90 tablet, Rfl: 1    metoprolol succinate (TOPROL-XL) 50 MG 24 hr tablet, Take 1 tablet (50 mg total) by mouth once daily., Disp: 90 tablet, Rfl: 3    nitroGLYCERIN (NITROSTAT) 0.4 MG SL tablet, Place 1 tablet (0.4 mg total) under  "the tongue every 5 (five) minutes as needed for Chest pain. (Patient not taking: Reported on 11/4/2020), Disp: 9 tablet, Rfl: 3    OXcarbazepine (TRILEPTAL) 600 MG Tab, Take 600 mg by mouth 3 (three) times daily., Disp: , Rfl:     pregabalin (LYRICA) 50 MG capsule, TAKE 1 CAPSULE(50 MG) BY MOUTH THREE TIMES DAILY, Disp: 90 capsule, Rfl: 3    tadalafiL (CIALIS) 20 MG Tab, Take 1 tablet (20 mg total) by mouth prior to sexual activity, not to exceed more than 1 tablet in 72 hours., Disp: 10 tablet, Rfl: 11    tiZANidine (ZANAFLEX) 2 MG tablet, TAKE 1 TABLET(2 MG) BY MOUTH EVERY NIGHT AS NEEDED, Disp: 10 tablet, Rfl: 0    traMADoL (ULTRAM) 50 mg tablet, Take 1 tablet (50 mg total) by mouth every 24 hours as needed for Pain., Disp: 30 tablet, Rfl: 0    varenicline (CHANTIX STARTING MONTH BOX) 0.5 mg (11)- 1 mg (42) tablet, Take one 0.5mg tab by mouth once daily X3 days,then increase to one 0.5mg tab twice daily X4 days,then increase to one 1mg tab twice daily, Disp: 1 Package, Rfl: 0    varenicline (CHANTIX) 1 mg Tab, Take 1 tablet (1 mg total) by mouth 2 (two) times daily., Disp: 60 tablet, Rfl: 4    ziprasidone (GEODON) 80 MG capsule, Take 160 mg by mouth every evening. , Disp: , Rfl:   No current facility-administered medications for this visit.     Facility-Administered Medications Ordered in Other Visits:     lactated ringers infusion, , Intravenous, Continuous, Sheela Nuno MD, Last Rate: 10 mL/hr at 08/21/20 0655    lidocaine (PF) 10 mg/ml (1%) injection 10 mg, 1 mL, Intradermal, Once, Sheela Nuno MD  Does patient have record of home blood pressure readings yes. Readings have been averaging 180/90.   Last dose of blood pressure medication was taken at 6:30 am.  Patient is symptomatic.   Complains of "staying with a headache".    "

## 2020-11-25 NOTE — PLAN OF CARE
Pt tolerated Infusion well today. Vitals remain stable. Reviewed follow-up appointments. All questions were answered, ambulated independently at d/c.

## 2020-12-02 ENCOUNTER — PATIENT OUTREACH (OUTPATIENT)
Dept: ADMINISTRATIVE | Facility: OTHER | Age: 55
End: 2020-12-02

## 2020-12-02 NOTE — PROGRESS NOTES
Health Maintenance Due   Topic Date Due    Shingles Vaccine (1 of 2) 08/22/2017    Influenza Vaccine (1) 08/01/2020     Updates were requested from care everywhere.  Chart was reviewed for overdue Proactive [facility] Encounters (DAWN) topics (CRS, Breast Cancer Screening, Eye exam)  Health Maintenance has been updated.  LINKS immunization registry triggered.  LINKS not responding.

## 2020-12-03 ENCOUNTER — INFUSION (OUTPATIENT)
Dept: INFUSION THERAPY | Facility: HOSPITAL | Age: 55
End: 2020-12-03
Attending: INTERNAL MEDICINE
Payer: MEDICARE

## 2020-12-03 VITALS
RESPIRATION RATE: 18 BRPM | DIASTOLIC BLOOD PRESSURE: 69 MMHG | SYSTOLIC BLOOD PRESSURE: 142 MMHG | HEART RATE: 83 BPM | TEMPERATURE: 98 F

## 2020-12-03 DIAGNOSIS — D62 ACUTE BLOOD LOSS ANEMIA: ICD-10-CM

## 2020-12-03 DIAGNOSIS — D50.0 IRON DEFICIENCY ANEMIA DUE TO CHRONIC BLOOD LOSS: Primary | ICD-10-CM

## 2020-12-03 PROCEDURE — 63600175 PHARM REV CODE 636 W HCPCS: Mod: HCNC,PN | Performed by: INTERNAL MEDICINE

## 2020-12-03 PROCEDURE — 96365 THER/PROPH/DIAG IV INF INIT: CPT | Mod: HCNC,PN

## 2020-12-03 PROCEDURE — 25000003 PHARM REV CODE 250: Mod: HCNC,PN | Performed by: INTERNAL MEDICINE

## 2020-12-03 RX ORDER — SODIUM CHLORIDE 0.9 % (FLUSH) 0.9 %
10 SYRINGE (ML) INJECTION
Status: CANCELLED | OUTPATIENT
Start: 2020-12-09

## 2020-12-03 RX ORDER — HEPARIN 100 UNIT/ML
500 SYRINGE INTRAVENOUS
Status: CANCELLED | OUTPATIENT
Start: 2020-12-09

## 2020-12-03 RX ORDER — HEPARIN 100 UNIT/ML
500 SYRINGE INTRAVENOUS
Status: DISCONTINUED | OUTPATIENT
Start: 2020-12-03 | End: 2020-12-03 | Stop reason: HOSPADM

## 2020-12-03 RX ORDER — SODIUM CHLORIDE 0.9 % (FLUSH) 0.9 %
10 SYRINGE (ML) INJECTION
Status: DISCONTINUED | OUTPATIENT
Start: 2020-12-03 | End: 2020-12-03 | Stop reason: HOSPADM

## 2020-12-03 RX ORDER — DIPHENHYDRAMINE HCL 25 MG
25 CAPSULE ORAL
Status: CANCELLED
Start: 2020-12-09

## 2020-12-03 RX ORDER — DIPHENHYDRAMINE HCL 25 MG
25 CAPSULE ORAL
Status: COMPLETED | OUTPATIENT
Start: 2020-12-03 | End: 2020-12-03

## 2020-12-03 RX ORDER — ACETAMINOPHEN 325 MG/1
650 TABLET ORAL
Status: COMPLETED | OUTPATIENT
Start: 2020-12-03 | End: 2020-12-03

## 2020-12-03 RX ORDER — ACETAMINOPHEN 325 MG/1
650 TABLET ORAL
Status: CANCELLED
Start: 2020-12-09

## 2020-12-03 RX ADMIN — SODIUM CHLORIDE: 9 INJECTION, SOLUTION INTRAVENOUS at 02:12

## 2020-12-03 RX ADMIN — ACETAMINOPHEN 650 MG: 325 TABLET, FILM COATED ORAL at 02:12

## 2020-12-03 RX ADMIN — IRON SUCROSE 100 MG: 20 INJECTION, SOLUTION INTRAVENOUS at 02:12

## 2020-12-03 RX ADMIN — DIPHENHYDRAMINE HYDROCHLORIDE 25 MG: 25 CAPSULE ORAL at 02:12

## 2020-12-03 NOTE — PLAN OF CARE
Tolerated Venofer well, VSS, pt refused to stay x30 minutes post infusion, schedule reviewed with pt, ambulated from infusion center, no distress noted

## 2020-12-08 ENCOUNTER — TELEPHONE (OUTPATIENT)
Dept: ADMINISTRATIVE | Facility: HOSPITAL | Age: 55
End: 2020-12-08

## 2020-12-08 NOTE — TELEPHONE ENCOUNTER
The patient is showing as non-compliant on the Statin Therapy Measure.   Patient does not have current statin listed in medication list.  Please review and advise as statin is needed due to patient being diabetic.

## 2020-12-08 NOTE — TELEPHONE ENCOUNTER
At first visit, he said a previous cardiologist had tried to start them and he was intolerant. Never saw any records re: this. Will review at f/u.

## 2020-12-09 DIAGNOSIS — M54.50 ACUTE LEFT-SIDED LOW BACK PAIN WITHOUT SCIATICA: ICD-10-CM

## 2020-12-09 DIAGNOSIS — M62.830 MUSCLE SPASM OF BACK: ICD-10-CM

## 2020-12-11 ENCOUNTER — OFFICE VISIT (OUTPATIENT)
Dept: FAMILY MEDICINE | Facility: CLINIC | Age: 55
End: 2020-12-11
Payer: MEDICARE

## 2020-12-11 ENCOUNTER — PATIENT MESSAGE (OUTPATIENT)
Dept: OTHER | Facility: OTHER | Age: 55
End: 2020-12-11

## 2020-12-11 VITALS
RESPIRATION RATE: 16 BRPM | HEART RATE: 80 BPM | WEIGHT: 269.5 LBS | TEMPERATURE: 98 F | BODY MASS INDEX: 37.73 KG/M2 | DIASTOLIC BLOOD PRESSURE: 88 MMHG | HEIGHT: 71 IN | SYSTOLIC BLOOD PRESSURE: 162 MMHG

## 2020-12-11 DIAGNOSIS — I25.10 CAD IN NATIVE ARTERY: ICD-10-CM

## 2020-12-11 DIAGNOSIS — I10 ESSENTIAL HYPERTENSION: Primary | ICD-10-CM

## 2020-12-11 DIAGNOSIS — R11.0 NAUSEA: ICD-10-CM
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
## 2020-12-11 PROCEDURE — 3077F SYST BP >= 140 MM HG: CPT | Mod: HCNC,CPTII,S$GLB, | Performed by: FAMILY MEDICINE

## 2020-12-11 PROCEDURE — 3008F PR BODY MASS INDEX (BMI) DOCUMENTED: ICD-10-PCS | Mod: HCNC,CPTII,S$GLB, | Performed by: FAMILY MEDICINE

## 2020-12-11 PROCEDURE — 99999 PR PBB SHADOW E&M-EST. PATIENT-LVL III: ICD-10-PCS | Mod: PBBFAC,HCNC,, | Performed by: FAMILY MEDICINE

## 2020-12-11 PROCEDURE — 3079F PR MOST RECENT DIASTOLIC BLOOD PRESSURE 80-89 MM HG: ICD-10-PCS | Mod: HCNC,CPTII,S$GLB, | Performed by: FAMILY MEDICINE

## 2020-12-11 PROCEDURE — 1125F PR PAIN SEVERITY QUANTIFIED, PAIN PRESENT: ICD-10-PCS | Mod: HCNC,S$GLB,, | Performed by: FAMILY MEDICINE

## 2020-12-11 PROCEDURE — 1125F AMNT PAIN NOTED PAIN PRSNT: CPT | Mod: HCNC,S$GLB,, | Performed by: FAMILY MEDICINE

## 2020-12-11 PROCEDURE — 99214 OFFICE O/P EST MOD 30 MIN: CPT | Mod: HCNC,S$GLB,, | Performed by: FAMILY MEDICINE

## 2020-12-11 PROCEDURE — 3079F DIAST BP 80-89 MM HG: CPT | Mod: HCNC,CPTII,S$GLB, | Performed by: FAMILY MEDICINE

## 2020-12-11 PROCEDURE — 3008F BODY MASS INDEX DOCD: CPT | Mod: HCNC,CPTII,S$GLB, | Performed by: FAMILY MEDICINE

## 2020-12-11 PROCEDURE — 3077F PR MOST RECENT SYSTOLIC BLOOD PRESSURE >= 140 MM HG: ICD-10-PCS | Mod: HCNC,CPTII,S$GLB, | Performed by: FAMILY MEDICINE

## 2020-12-11 PROCEDURE — 99999 PR PBB SHADOW E&M-EST. PATIENT-LVL III: CPT | Mod: PBBFAC,HCNC,, | Performed by: FAMILY MEDICINE

## 2020-12-11 PROCEDURE — 99214 PR OFFICE/OUTPT VISIT, EST, LEVL IV, 30-39 MIN: ICD-10-PCS | Mod: HCNC,S$GLB,, | Performed by: FAMILY MEDICINE

## 2020-12-11 RX ORDER — TIZANIDINE 2 MG/1
TABLET ORAL
Qty: 10 TABLET | Refills: 0 | OUTPATIENT
Start: 2020-12-11

## 2020-12-11 RX ORDER — ONDANSETRON 8 MG/1
8 TABLET, ORALLY DISINTEGRATING ORAL EVERY 12 HOURS PRN
Qty: 20 TABLET | Refills: 1 | Status: SHIPPED | OUTPATIENT
Start: 2020-12-11 | End: 2021-04-05

## 2020-12-11 RX ORDER — NITROGLYCERIN 0.4 MG/1
0.4 TABLET SUBLINGUAL EVERY 5 MIN PRN
Qty: 9 TABLET | Refills: 3 | Status: SHIPPED | OUTPATIENT
Start: 2020-12-11 | End: 2021-03-15

## 2020-12-11 RX ORDER — AMLODIPINE AND VALSARTAN 10; 320 MG/1; MG/1
1 TABLET ORAL DAILY
Qty: 90 TABLET | Refills: 3 | Status: SHIPPED | OUTPATIENT
Start: 2020-12-11 | End: 2021-09-08

## 2020-12-11 NOTE — TELEPHONE ENCOUNTER
----- Message from Claudia Florez sent at 12/11/2020  4:42 PM CST -----  Type:  RX Refill Request    Who Called:  pt  Refill or New Rx:refill  RX Name and Strength:  pregabalin (LYRICA) 50 MG capsule  How is the patient currently taking it? (ex. 1XDay):  3xday  Is this a 30 day or 90 day RX:  90  Preferred Pharmacy with phone number:   [id number] [address] [facility]  [address] [place] [address]  Phone: [phone] Fax: [phone]      Local or Mail Order:  local  Ordering Provider:Dr Jone Tapia Call Back Number: pregabalin (LYRICA) 50 MG capsule  Additional Information:

## 2020-12-11 NOTE — PROGRESS NOTES
"Subjective:       Patient ID: Ryan Crane is a 55 y.o. male.    Chief Complaint: Follow-up and Animal Bite    HPI  Patient seen in clinic for f/u and review.  ER visit at [facility] on 12/3 for cat bite to R hand. Given augmentin x 1 week which he just finished. Tolerated well with exception of some nausea. Wounds have healed without issue.  strength intact and without pain.  Recalls hx of long-standing, intermittent nausea without cause. Requests zofran rx which he has taken previously for relief.  Otherwise feeling ok, but notes chronic issues with his back pain.  Discussed BP elevation. He reports that he does not include salt with his meals. Discussed careful monitoring during holiday eating.    Review of Systems:  Review of Systems   Constitutional: Negative for fatigue and fever.   HENT: Negative for congestion and sore throat.    Respiratory: Negative for cough and shortness of breath.    Cardiovascular: Negative for chest pain and leg swelling.   Gastrointestinal: Negative for abdominal pain and nausea (from abx, resolved).   Musculoskeletal: Positive for back pain (chronic, stable). Negative for gait problem.   Skin: Negative for color change, pallor and wound (R hand wound healed).   Neurological: Negative for weakness and headaches.       Objective:     Vitals:    12/11/20 1414   BP: (!) 162/88   BP Location: Left arm   Patient Position: Sitting   Pulse: 80   Resp: 16   Temp: 97.5 °F (36.4 °C)   TempSrc: Skin   Weight: 122.3 kg (269 lb 8.2 oz)   Height: 5' 11" (1.803 m)          Physical Exam  Vitals signs and nursing note reviewed.   Constitutional:       General: He is not in acute distress.     Appearance: He is well-developed.   HENT:      Head: Normocephalic and atraumatic.   Eyes:      General: No scleral icterus.        Right eye: No discharge.         Left eye: No discharge.      Conjunctiva/sclera: Conjunctivae normal.   Neck:      Musculoskeletal: Neck supple.   Cardiovascular:      Rate " "and Rhythm: Normal rate and regular rhythm.   Pulmonary:      Effort: Pulmonary effort is normal. No respiratory distress.      Breath sounds: Normal breath sounds.   Lymphadenopathy:      Cervical: No cervical adenopathy.   Skin:     General: Skin is warm and dry.   Neurological:      General: No focal deficit present.      Mental Status: He is alert and oriented to person, place, and time.      Cranial Nerves: No cranial nerve deficit.   Psychiatric:         Behavior: Behavior normal.           Assessment & Plan:  Essential hypertension  Comments:  uncontrolled, increase exforge, BP check for f/u  Orders:  -     amlodipine-valsartan (EXFORGE)  mg per tablet; Take 1 tablet by mouth once daily.  Dispense: 90 tablet; Refill: 3    CAD in native artery - moderate 3V 60% by Pt history; last angiogram in the "teens"  -     nitroGLYCERIN (NITROSTAT) 0.4 MG SL tablet; Place 1 tablet (0.4 mg total) under the tongue every 5 (five) minutes as needed for Chest pain.  Dispense: 9 tablet; Refill: 3    Cat bite, subsequent encounter  Comments:  resolved, no issues noted     Nausea  Comments:  sparing use prn, pt aware it can cause constipation with cont use  Orders:  -     ondansetron (ZOFRAN-ODT) 8 MG TbDL; Take 1 tablet (8 mg total) by mouth every 12 (twelve) hours as needed (nausea).  Dispense: 20 tablet; Refill: 1        "

## 2020-12-14 ENCOUNTER — TELEPHONE (OUTPATIENT)
Dept: FAMILY MEDICINE | Facility: CLINIC | Age: 55
End: 2020-12-14

## 2020-12-14 RX ORDER — PREGABALIN 50 MG/1
CAPSULE ORAL
Qty: 90 CAPSULE | Refills: 3 | Status: SHIPPED | OUTPATIENT
Start: 2020-12-14 | End: 2021-06-22 | Stop reason: SDUPTHER

## 2020-12-14 NOTE — TELEPHONE ENCOUNTER
----- Message from Gem Alicia sent at 12/14/2020  9:57 AM CST -----  Type:  RX Refill Request    Who Called:  patient   Refill or New Rx:  refill   RX Name and Strength:  pregabalin (LYRICA) 50 MG capsule  How is the patient currently taking it? (ex. 1XDay):  as directed   Is this a 30 day or 90 day RX:  30  Preferred Pharmacy with phone number:    [facility] #[id number] [facility] [address] [facility]  [address] [facility] [address]  Phone: [phone] Fax: [phone]  Local or Mail Order:  local   Ordering Provider:  solange Tapia Call Back Number: [phone]   Additional Information:  Per patient is completely out, unable to take todays dose-pleases advise-thank you

## 2020-12-23 ENCOUNTER — OFFICE VISIT (OUTPATIENT)
Dept: PODIATRY | Facility: CLINIC | Age: 55
End: 2020-12-23
Payer: MEDICARE

## 2020-12-23 VITALS
WEIGHT: 269.63 LBS | SYSTOLIC BLOOD PRESSURE: 129 MMHG | DIASTOLIC BLOOD PRESSURE: 70 MMHG | BODY MASS INDEX: 37.75 KG/M2 | HEIGHT: 71 IN | HEART RATE: 86 BPM

## 2020-12-23 DIAGNOSIS — M20.42 HAMMER TOES OF BOTH FEET: ICD-10-CM

## 2020-12-23 DIAGNOSIS — R46.0 POOR HYGIENE: ICD-10-CM

## 2020-12-23 DIAGNOSIS — E08.42 DIABETIC POLYNEUROPATHY ASSOCIATED WITH DIABETES MELLITUS DUE TO UNDERLYING CONDITION: ICD-10-CM

## 2020-12-23 DIAGNOSIS — Z86.31 HX OF DIABETIC FOOT ULCER: ICD-10-CM

## 2020-12-23 DIAGNOSIS — M20.41 HAMMER TOES OF BOTH FEET: ICD-10-CM

## 2020-12-23 DIAGNOSIS — M79.671 INTRACTABLE RIGHT HEEL PAIN: ICD-10-CM

## 2020-12-23 DIAGNOSIS — B35.1 ONYCHOMYCOSIS DUE TO DERMATOPHYTE: Primary | ICD-10-CM

## 2020-12-23 DIAGNOSIS — M21.6X2 EQUINUS DEFORMITY OF BOTH FEET: ICD-10-CM

## 2020-12-23 DIAGNOSIS — E11.49 TYPE II DIABETES MELLITUS WITH NEUROLOGICAL MANIFESTATIONS: ICD-10-CM

## 2020-12-23 DIAGNOSIS — M21.41 PES PLANUS OF BOTH FEET: ICD-10-CM

## 2020-12-23 DIAGNOSIS — I73.9 PVD (PERIPHERAL VASCULAR DISEASE): ICD-10-CM

## 2020-12-23 DIAGNOSIS — M21.6X1 EQUINUS DEFORMITY OF BOTH FEET: ICD-10-CM

## 2020-12-23 DIAGNOSIS — M21.42 PES PLANUS OF BOTH FEET: ICD-10-CM

## 2020-12-23 PROCEDURE — 3074F PR MOST RECENT SYSTOLIC BLOOD PRESSURE < 130 MM HG: ICD-10-PCS | Mod: HCNC,CPTII,S$GLB, | Performed by: PODIATRIST

## 2020-12-23 PROCEDURE — 3008F BODY MASS INDEX DOCD: CPT | Mod: HCNC,CPTII,S$GLB, | Performed by: PODIATRIST

## 2020-12-23 PROCEDURE — 3044F PR MOST RECENT HEMOGLOBIN A1C LEVEL <7.0%: ICD-10-PCS | Mod: HCNC,CPTII,S$GLB, | Performed by: PODIATRIST

## 2020-12-23 PROCEDURE — 11721 PR DEBRIDEMENT OF NAILS, 6 OR MORE: ICD-10-PCS | Mod: Q9,HCNC,S$GLB, | Performed by: PODIATRIST

## 2020-12-23 PROCEDURE — 99999 PR PBB SHADOW E&M-EST. PATIENT-LVL IV: CPT | Mod: PBBFAC,HCNC,, | Performed by: PODIATRIST

## 2020-12-23 PROCEDURE — 3008F PR BODY MASS INDEX (BMI) DOCUMENTED: ICD-10-PCS | Mod: HCNC,CPTII,S$GLB, | Performed by: PODIATRIST

## 2020-12-23 PROCEDURE — 99999 PR PBB SHADOW E&M-EST. PATIENT-LVL IV: ICD-10-PCS | Mod: PBBFAC,HCNC,, | Performed by: PODIATRIST

## 2020-12-23 PROCEDURE — 99214 PR OFFICE/OUTPT VISIT, EST, LEVL IV, 30-39 MIN: ICD-10-PCS | Mod: 25,HCNC,S$GLB, | Performed by: PODIATRIST

## 2020-12-23 PROCEDURE — 11721 DEBRIDE NAIL 6 OR MORE: CPT | Mod: Q9,HCNC,S$GLB, | Performed by: PODIATRIST

## 2020-12-23 PROCEDURE — 1125F PR PAIN SEVERITY QUANTIFIED, PAIN PRESENT: ICD-10-PCS | Mod: HCNC,S$GLB,, | Performed by: PODIATRIST

## 2020-12-23 PROCEDURE — 3074F SYST BP LT 130 MM HG: CPT | Mod: HCNC,CPTII,S$GLB, | Performed by: PODIATRIST

## 2020-12-23 PROCEDURE — 99214 OFFICE O/P EST MOD 30 MIN: CPT | Mod: 25,HCNC,S$GLB, | Performed by: PODIATRIST

## 2020-12-23 PROCEDURE — 3078F PR MOST RECENT DIASTOLIC BLOOD PRESSURE < 80 MM HG: ICD-10-PCS | Mod: HCNC,CPTII,S$GLB, | Performed by: PODIATRIST

## 2020-12-23 PROCEDURE — 1125F AMNT PAIN NOTED PAIN PRSNT: CPT | Mod: HCNC,S$GLB,, | Performed by: PODIATRIST

## 2020-12-23 PROCEDURE — 3078F DIAST BP <80 MM HG: CPT | Mod: HCNC,CPTII,S$GLB, | Performed by: PODIATRIST

## 2020-12-23 PROCEDURE — 3044F HG A1C LEVEL LT 7.0%: CPT | Mod: HCNC,CPTII,S$GLB, | Performed by: PODIATRIST

## 2021-01-05 ENCOUNTER — OFFICE VISIT (OUTPATIENT)
Dept: FAMILY MEDICINE | Facility: CLINIC | Age: 56
End: 2021-01-05
Payer: MEDICARE

## 2021-01-05 VITALS
DIASTOLIC BLOOD PRESSURE: 72 MMHG | HEIGHT: 71 IN | WEIGHT: 268.19 LBS | BODY MASS INDEX: 37.55 KG/M2 | TEMPERATURE: 98 F | SYSTOLIC BLOOD PRESSURE: 146 MMHG | HEART RATE: 64 BPM | RESPIRATION RATE: 16 BRPM

## 2021-01-05 DIAGNOSIS — I10 ESSENTIAL HYPERTENSION: Primary | ICD-10-CM

## 2021-01-05 PROCEDURE — 99999 PR PBB SHADOW E&M-EST. PATIENT-LVL IV: CPT | Mod: PBBFAC,HCNC,, | Performed by: FAMILY MEDICINE

## 2021-01-05 PROCEDURE — 3077F PR MOST RECENT SYSTOLIC BLOOD PRESSURE >= 140 MM HG: ICD-10-PCS | Mod: HCNC,CPTII,S$GLB, | Performed by: FAMILY MEDICINE

## 2021-01-05 PROCEDURE — 99999 PR PBB SHADOW E&M-EST. PATIENT-LVL IV: ICD-10-PCS | Mod: PBBFAC,HCNC,, | Performed by: FAMILY MEDICINE

## 2021-01-05 PROCEDURE — 1125F AMNT PAIN NOTED PAIN PRSNT: CPT | Mod: HCNC,S$GLB,, | Performed by: FAMILY MEDICINE

## 2021-01-05 PROCEDURE — 3008F BODY MASS INDEX DOCD: CPT | Mod: HCNC,CPTII,S$GLB, | Performed by: FAMILY MEDICINE

## 2021-01-05 PROCEDURE — 3008F PR BODY MASS INDEX (BMI) DOCUMENTED: ICD-10-PCS | Mod: HCNC,CPTII,S$GLB, | Performed by: FAMILY MEDICINE

## 2021-01-05 PROCEDURE — 99213 OFFICE O/P EST LOW 20 MIN: CPT | Mod: HCNC,S$GLB,, | Performed by: FAMILY MEDICINE

## 2021-01-05 PROCEDURE — 3077F SYST BP >= 140 MM HG: CPT | Mod: HCNC,CPTII,S$GLB, | Performed by: FAMILY MEDICINE

## 2021-01-05 PROCEDURE — 99213 PR OFFICE/OUTPT VISIT, EST, LEVL III, 20-29 MIN: ICD-10-PCS | Mod: HCNC,S$GLB,, | Performed by: FAMILY MEDICINE

## 2021-01-05 PROCEDURE — 3078F PR MOST RECENT DIASTOLIC BLOOD PRESSURE < 80 MM HG: ICD-10-PCS | Mod: HCNC,CPTII,S$GLB, | Performed by: FAMILY MEDICINE

## 2021-01-05 PROCEDURE — 1125F PR PAIN SEVERITY QUANTIFIED, PAIN PRESENT: ICD-10-PCS | Mod: HCNC,S$GLB,, | Performed by: FAMILY MEDICINE

## 2021-01-05 PROCEDURE — 3078F DIAST BP <80 MM HG: CPT | Mod: HCNC,CPTII,S$GLB, | Performed by: FAMILY MEDICINE

## 2021-01-05 RX ORDER — HYDROCHLOROTHIAZIDE 12.5 MG/1
12.5 TABLET ORAL DAILY
Qty: 90 TABLET | Refills: 1 | Status: SHIPPED | OUTPATIENT
Start: 2021-01-05 | End: 2021-07-07

## 2021-01-11 ENCOUNTER — LAB VISIT (OUTPATIENT)
Dept: LAB | Facility: HOSPITAL | Age: 56
End: 2021-01-11
Attending: INTERNAL MEDICINE
Payer: MEDICARE

## 2021-01-11 DIAGNOSIS — F20.9 SCHIZOPHRENIA, UNSPECIFIED TYPE: ICD-10-CM

## 2021-01-11 DIAGNOSIS — D72.823 LEUKEMOID REACTION: ICD-10-CM

## 2021-01-11 DIAGNOSIS — D50.0 IRON DEFICIENCY ANEMIA DUE TO CHRONIC BLOOD LOSS: ICD-10-CM

## 2021-01-11 DIAGNOSIS — I10 ESSENTIAL HYPERTENSION: ICD-10-CM

## 2021-01-11 LAB
BASOPHILS # BLD AUTO: 0.1 K/UL (ref 0–0.2)
BASOPHILS NFR BLD: 0.9 % (ref 0–1.9)
DIFFERENTIAL METHOD: ABNORMAL
EOSINOPHIL # BLD AUTO: 0.1 K/UL (ref 0–0.5)
EOSINOPHIL NFR BLD: 1.1 % (ref 0–8)
ERYTHROCYTE [DISTWIDTH] IN BLOOD BY AUTOMATED COUNT: 15.8 % (ref 11.5–14.5)
FERRITIN SERPL-MCNC: 14 NG/ML (ref 18–464)
HCT VFR BLD AUTO: 43.6 % (ref 40–54)
HGB BLD-MCNC: 14.1 G/DL (ref 14–18)
IMM GRANULOCYTES # BLD AUTO: 0.03 K/UL (ref 0–0.04)
IMM GRANULOCYTES NFR BLD AUTO: 0.3 % (ref 0–0.5)
IRON SATN MFR SERPL: 12 % (ref 20–50)
IRON SERPL-MCNC: 47 UG/DL (ref 45–160)
LYMPHOCYTES # BLD AUTO: 3 K/UL (ref 1–4.8)
LYMPHOCYTES NFR BLD: 25.5 % (ref 18–48)
MCH RBC QN AUTO: 27.2 PG (ref 27–31)
MCHC RBC AUTO-ENTMCNC: 32.3 G/DL (ref 32–36)
MCV RBC AUTO: 84 FL (ref 82–98)
MONOCYTES # BLD AUTO: 1 K/UL (ref 0.3–1)
MONOCYTES NFR BLD: 8.3 % (ref 4–15)
NEUTROPHILS # BLD AUTO: 7.5 K/UL (ref 1.8–7.7)
NEUTROPHILS NFR BLD: 63.9 % (ref 38–73)
NRBC BLD-RTO: 0 /100 WBC
PLATELET # BLD AUTO: 570 K/UL (ref 150–350)
PMV BLD AUTO: 8.9 FL (ref 9.2–12.9)
RBC # BLD AUTO: 5.19 M/UL (ref 4.6–6.2)
TOTAL IRON BINDING CAPACITY: 378 UG/DL (ref 261–462)
WBC # BLD AUTO: 11.66 K/UL (ref 3.9–12.7)

## 2021-01-11 PROCEDURE — 85025 COMPLETE CBC W/AUTO DIFF WBC: CPT

## 2021-01-11 PROCEDURE — 85025 COMPLETE CBC W/AUTO DIFF WBC: CPT | Mod: PN

## 2021-01-11 PROCEDURE — 36415 COLL VENOUS BLD VENIPUNCTURE: CPT | Mod: HCNC,PN

## 2021-01-11 PROCEDURE — 83550 IRON BINDING TEST: CPT

## 2021-01-11 PROCEDURE — 83540 ASSAY OF IRON: CPT | Mod: PN

## 2021-01-11 PROCEDURE — 83540 ASSAY OF IRON: CPT

## 2021-01-11 PROCEDURE — 82728 ASSAY OF FERRITIN: CPT | Mod: PN

## 2021-01-11 PROCEDURE — 82728 ASSAY OF FERRITIN: CPT

## 2021-01-11 PROCEDURE — 83550 IRON BINDING TEST: CPT | Mod: PN

## 2021-01-13 ENCOUNTER — OFFICE VISIT (OUTPATIENT)
Dept: HEMATOLOGY/ONCOLOGY | Facility: CLINIC | Age: 56
End: 2021-01-13
Payer: MEDICARE

## 2021-01-13 VITALS
HEIGHT: 71 IN | WEIGHT: 262.38 LBS | BODY MASS INDEX: 36.73 KG/M2 | RESPIRATION RATE: 18 BRPM | DIASTOLIC BLOOD PRESSURE: 69 MMHG | HEART RATE: 98 BPM | TEMPERATURE: 98 F | OXYGEN SATURATION: 100 % | SYSTOLIC BLOOD PRESSURE: 123 MMHG

## 2021-01-13 DIAGNOSIS — D50.0 IRON DEFICIENCY ANEMIA DUE TO CHRONIC BLOOD LOSS: ICD-10-CM

## 2021-01-13 DIAGNOSIS — E08.42 DIABETIC POLYNEUROPATHY ASSOCIATED WITH DIABETES MELLITUS DUE TO UNDERLYING CONDITION: Primary | ICD-10-CM

## 2021-01-13 DIAGNOSIS — D75.838 REACTIVE THROMBOCYTOSIS: ICD-10-CM

## 2021-01-13 PROCEDURE — 1125F PR PAIN SEVERITY QUANTIFIED, PAIN PRESENT: ICD-10-PCS | Mod: HCNC,S$GLB,, | Performed by: INTERNAL MEDICINE

## 2021-01-13 PROCEDURE — 99499 UNLISTED E&M SERVICE: CPT | Mod: S$GLB,,, | Performed by: INTERNAL MEDICINE

## 2021-01-13 PROCEDURE — 3008F BODY MASS INDEX DOCD: CPT | Mod: HCNC,CPTII,S$GLB, | Performed by: INTERNAL MEDICINE

## 2021-01-13 PROCEDURE — 3078F DIAST BP <80 MM HG: CPT | Mod: HCNC,CPTII,S$GLB, | Performed by: INTERNAL MEDICINE

## 2021-01-13 PROCEDURE — 3044F HG A1C LEVEL LT 7.0%: CPT | Mod: HCNC,CPTII,S$GLB, | Performed by: INTERNAL MEDICINE

## 2021-01-13 PROCEDURE — 99214 PR OFFICE/OUTPT VISIT, EST, LEVL IV, 30-39 MIN: ICD-10-PCS | Mod: HCNC,S$GLB,, | Performed by: INTERNAL MEDICINE

## 2021-01-13 PROCEDURE — 3078F PR MOST RECENT DIASTOLIC BLOOD PRESSURE < 80 MM HG: ICD-10-PCS | Mod: HCNC,CPTII,S$GLB, | Performed by: INTERNAL MEDICINE

## 2021-01-13 PROCEDURE — 1125F AMNT PAIN NOTED PAIN PRSNT: CPT | Mod: HCNC,S$GLB,, | Performed by: INTERNAL MEDICINE

## 2021-01-13 PROCEDURE — 3074F SYST BP LT 130 MM HG: CPT | Mod: HCNC,CPTII,S$GLB, | Performed by: INTERNAL MEDICINE

## 2021-01-13 PROCEDURE — 99999 PR PBB SHADOW E&M-EST. PATIENT-LVL IV: ICD-10-PCS | Mod: PBBFAC,HCNC,, | Performed by: INTERNAL MEDICINE

## 2021-01-13 PROCEDURE — 99214 OFFICE O/P EST MOD 30 MIN: CPT | Mod: HCNC,S$GLB,, | Performed by: INTERNAL MEDICINE

## 2021-01-13 PROCEDURE — 99499 RISK ADDL DX/OHS AUDIT: ICD-10-PCS | Mod: S$GLB,,, | Performed by: INTERNAL MEDICINE

## 2021-01-13 PROCEDURE — 3044F PR MOST RECENT HEMOGLOBIN A1C LEVEL <7.0%: ICD-10-PCS | Mod: HCNC,CPTII,S$GLB, | Performed by: INTERNAL MEDICINE

## 2021-01-13 PROCEDURE — 99999 PR PBB SHADOW E&M-EST. PATIENT-LVL IV: CPT | Mod: PBBFAC,HCNC,, | Performed by: INTERNAL MEDICINE

## 2021-01-13 PROCEDURE — 3008F PR BODY MASS INDEX (BMI) DOCUMENTED: ICD-10-PCS | Mod: HCNC,CPTII,S$GLB, | Performed by: INTERNAL MEDICINE

## 2021-01-13 PROCEDURE — 3074F PR MOST RECENT SYSTOLIC BLOOD PRESSURE < 130 MM HG: ICD-10-PCS | Mod: HCNC,CPTII,S$GLB, | Performed by: INTERNAL MEDICINE

## 2021-01-13 RX ORDER — ACETAMINOPHEN 325 MG/1
650 TABLET ORAL
Status: CANCELLED
Start: 2021-01-13

## 2021-01-13 RX ORDER — HEPARIN 100 UNIT/ML
5 SYRINGE INTRAVENOUS
Status: CANCELLED | OUTPATIENT
Start: 2021-01-13

## 2021-01-13 RX ORDER — SODIUM CHLORIDE 9 MG/ML
INJECTION, SOLUTION INTRAVENOUS CONTINUOUS
Status: CANCELLED | OUTPATIENT
Start: 2021-01-13

## 2021-01-13 RX ORDER — DIPHENHYDRAMINE HCL 25 MG
25 CAPSULE ORAL
Status: CANCELLED
Start: 2021-01-13

## 2021-01-13 RX ORDER — SODIUM CHLORIDE 0.9 % (FLUSH) 0.9 %
10 SYRINGE (ML) INJECTION
Status: CANCELLED | OUTPATIENT
Start: 2021-01-13

## 2021-01-22 ENCOUNTER — INFUSION (OUTPATIENT)
Dept: INFUSION THERAPY | Facility: HOSPITAL | Age: 56
End: 2021-01-22
Attending: INTERNAL MEDICINE
Payer: MEDICARE

## 2021-01-22 VITALS
HEIGHT: 71 IN | RESPIRATION RATE: 18 BRPM | DIASTOLIC BLOOD PRESSURE: 73 MMHG | WEIGHT: 265.88 LBS | BODY MASS INDEX: 37.22 KG/M2 | SYSTOLIC BLOOD PRESSURE: 150 MMHG | TEMPERATURE: 97 F | HEART RATE: 82 BPM

## 2021-01-22 DIAGNOSIS — D50.0 IRON DEFICIENCY ANEMIA DUE TO CHRONIC BLOOD LOSS: Primary | ICD-10-CM

## 2021-01-22 DIAGNOSIS — D62 ACUTE BLOOD LOSS ANEMIA: ICD-10-CM

## 2021-01-22 PROCEDURE — 63600175 PHARM REV CODE 636 W HCPCS: Mod: JG,HCNC,PN | Performed by: INTERNAL MEDICINE

## 2021-01-22 PROCEDURE — 96365 THER/PROPH/DIAG IV INF INIT: CPT | Mod: HCNC,PN

## 2021-01-22 PROCEDURE — A4216 STERILE WATER/SALINE, 10 ML: HCPCS | Mod: HCNC,PN | Performed by: INTERNAL MEDICINE

## 2021-01-22 PROCEDURE — 25000003 PHARM REV CODE 250: Mod: HCNC,PN | Performed by: INTERNAL MEDICINE

## 2021-01-22 RX ORDER — SODIUM CHLORIDE 0.9 % (FLUSH) 0.9 %
10 SYRINGE (ML) INJECTION
Status: DISCONTINUED | OUTPATIENT
Start: 2021-01-22 | End: 2021-01-22 | Stop reason: HOSPADM

## 2021-01-22 RX ORDER — ACETAMINOPHEN 325 MG/1
650 TABLET ORAL
Status: DISCONTINUED | OUTPATIENT
Start: 2021-01-22 | End: 2021-01-22 | Stop reason: HOSPADM

## 2021-01-22 RX ORDER — SODIUM CHLORIDE 9 MG/ML
INJECTION, SOLUTION INTRAVENOUS CONTINUOUS
Status: CANCELLED | OUTPATIENT
Start: 2021-01-29

## 2021-01-22 RX ORDER — DIPHENHYDRAMINE HCL 25 MG
25 CAPSULE ORAL
Status: DISCONTINUED | OUTPATIENT
Start: 2021-01-22 | End: 2021-01-22 | Stop reason: HOSPADM

## 2021-01-22 RX ORDER — HEPARIN 100 UNIT/ML
5 SYRINGE INTRAVENOUS
Status: CANCELLED | OUTPATIENT
Start: 2021-01-29

## 2021-01-22 RX ORDER — ACETAMINOPHEN 325 MG/1
650 TABLET ORAL
Status: CANCELLED
Start: 2021-01-29

## 2021-01-22 RX ORDER — SODIUM CHLORIDE 0.9 % (FLUSH) 0.9 %
10 SYRINGE (ML) INJECTION
Status: CANCELLED | OUTPATIENT
Start: 2021-01-29

## 2021-01-22 RX ORDER — DIPHENHYDRAMINE HCL 25 MG
25 CAPSULE ORAL
Status: CANCELLED
Start: 2021-01-29

## 2021-01-22 RX ORDER — SODIUM CHLORIDE 9 MG/ML
INJECTION, SOLUTION INTRAVENOUS CONTINUOUS
Status: DISCONTINUED | OUTPATIENT
Start: 2021-01-22 | End: 2021-01-22 | Stop reason: HOSPADM

## 2021-01-22 RX ADMIN — FERRIC CARBOXYMALTOSE INJECTION 750 MG: 50 INJECTION, SOLUTION INTRAVENOUS at 03:01

## 2021-01-22 RX ADMIN — Medication 10 ML: at 02:01

## 2021-01-22 RX ADMIN — SODIUM CHLORIDE: 9 INJECTION, SOLUTION INTRAVENOUS at 03:01

## 2021-01-29 ENCOUNTER — INFUSION (OUTPATIENT)
Dept: INFUSION THERAPY | Facility: HOSPITAL | Age: 56
End: 2021-01-29
Attending: INTERNAL MEDICINE
Payer: MEDICARE

## 2021-01-29 VITALS
BODY MASS INDEX: 36.27 KG/M2 | HEIGHT: 71 IN | SYSTOLIC BLOOD PRESSURE: 124 MMHG | WEIGHT: 259.06 LBS | RESPIRATION RATE: 18 BRPM | TEMPERATURE: 98 F | DIASTOLIC BLOOD PRESSURE: 70 MMHG | HEART RATE: 96 BPM

## 2021-01-29 DIAGNOSIS — D62 ACUTE BLOOD LOSS ANEMIA: ICD-10-CM

## 2021-01-29 DIAGNOSIS — D50.0 IRON DEFICIENCY ANEMIA DUE TO CHRONIC BLOOD LOSS: Primary | ICD-10-CM

## 2021-01-29 PROCEDURE — 63600175 PHARM REV CODE 636 W HCPCS: Mod: JG,HCNC,PN | Performed by: INTERNAL MEDICINE

## 2021-01-29 PROCEDURE — 96365 THER/PROPH/DIAG IV INF INIT: CPT | Mod: HCNC,PN

## 2021-01-29 PROCEDURE — 25000003 PHARM REV CODE 250: Mod: HCNC,PN | Performed by: INTERNAL MEDICINE

## 2021-01-29 RX ORDER — HEPARIN 100 UNIT/ML
5 SYRINGE INTRAVENOUS
Status: CANCELLED | OUTPATIENT
Start: 2021-02-05

## 2021-01-29 RX ORDER — SODIUM CHLORIDE 9 MG/ML
INJECTION, SOLUTION INTRAVENOUS CONTINUOUS
Status: CANCELLED | OUTPATIENT
Start: 2021-02-05

## 2021-01-29 RX ORDER — ACETAMINOPHEN 325 MG/1
650 TABLET ORAL
Status: CANCELLED
Start: 2021-02-05

## 2021-01-29 RX ORDER — ACETAMINOPHEN 325 MG/1
650 TABLET ORAL
Status: DISCONTINUED | OUTPATIENT
Start: 2021-01-29 | End: 2021-01-29 | Stop reason: HOSPADM

## 2021-01-29 RX ORDER — SODIUM CHLORIDE 9 MG/ML
INJECTION, SOLUTION INTRAVENOUS CONTINUOUS
Status: DISCONTINUED | OUTPATIENT
Start: 2021-01-29 | End: 2021-01-29 | Stop reason: HOSPADM

## 2021-01-29 RX ORDER — DIPHENHYDRAMINE HCL 25 MG
25 CAPSULE ORAL
Status: DISCONTINUED | OUTPATIENT
Start: 2021-01-29 | End: 2021-01-29 | Stop reason: HOSPADM

## 2021-01-29 RX ORDER — SODIUM CHLORIDE 0.9 % (FLUSH) 0.9 %
10 SYRINGE (ML) INJECTION
Status: CANCELLED | OUTPATIENT
Start: 2021-02-05

## 2021-01-29 RX ORDER — HEPARIN 100 UNIT/ML
5 SYRINGE INTRAVENOUS
Status: DISCONTINUED | OUTPATIENT
Start: 2021-01-29 | End: 2021-01-29 | Stop reason: HOSPADM

## 2021-01-29 RX ORDER — DIPHENHYDRAMINE HCL 25 MG
25 CAPSULE ORAL
Status: CANCELLED
Start: 2021-02-05

## 2021-01-29 RX ORDER — SODIUM CHLORIDE 0.9 % (FLUSH) 0.9 %
10 SYRINGE (ML) INJECTION
Status: DISCONTINUED | OUTPATIENT
Start: 2021-01-29 | End: 2021-01-29 | Stop reason: HOSPADM

## 2021-01-29 RX ADMIN — SODIUM CHLORIDE: 9 INJECTION, SOLUTION INTRAVENOUS at 02:01

## 2021-01-29 RX ADMIN — FERRIC CARBOXYMALTOSE INJECTION 750 MG: 50 INJECTION, SOLUTION INTRAVENOUS at 02:01

## 2021-03-09 ENCOUNTER — TELEPHONE (OUTPATIENT)
Dept: HEMATOLOGY/ONCOLOGY | Facility: CLINIC | Age: 56
End: 2021-03-09

## 2021-03-11 ENCOUNTER — LAB VISIT (OUTPATIENT)
Dept: LAB | Facility: HOSPITAL | Age: 56
End: 2021-03-11
Attending: INTERNAL MEDICINE
Payer: MEDICARE

## 2021-03-11 DIAGNOSIS — D75.838 REACTIVE THROMBOCYTOSIS: ICD-10-CM

## 2021-03-11 DIAGNOSIS — D50.0 IRON DEFICIENCY ANEMIA DUE TO CHRONIC BLOOD LOSS: ICD-10-CM

## 2021-03-11 DIAGNOSIS — E08.42 DIABETIC POLYNEUROPATHY ASSOCIATED WITH DIABETES MELLITUS DUE TO UNDERLYING CONDITION: ICD-10-CM

## 2021-03-11 LAB
BASOPHILS # BLD AUTO: 0.07 K/UL (ref 0–0.2)
BASOPHILS NFR BLD: 0.6 % (ref 0–1.9)
DIFFERENTIAL METHOD: ABNORMAL
EOSINOPHIL # BLD AUTO: 0.1 K/UL (ref 0–0.5)
EOSINOPHIL NFR BLD: 0.5 % (ref 0–8)
ERYTHROCYTE [DISTWIDTH] IN BLOOD BY AUTOMATED COUNT: 15.8 % (ref 11.5–14.5)
HCT VFR BLD AUTO: 47 % (ref 40–54)
HGB BLD-MCNC: 17.4 G/DL (ref 14–18)
IMM GRANULOCYTES # BLD AUTO: 0.06 K/UL (ref 0–0.04)
IMM GRANULOCYTES NFR BLD AUTO: 0.5 % (ref 0–0.5)
LYMPHOCYTES # BLD AUTO: 2.5 K/UL (ref 1–4.8)
LYMPHOCYTES NFR BLD: 21.6 % (ref 18–48)
MCH RBC QN AUTO: 29.9 PG (ref 27–31)
MCHC RBC AUTO-ENTMCNC: 37 G/DL (ref 32–36)
MCV RBC AUTO: 81 FL (ref 82–98)
MONOCYTES # BLD AUTO: 1.5 K/UL (ref 0.3–1)
MONOCYTES NFR BLD: 12.7 % (ref 4–15)
NEUTROPHILS # BLD AUTO: 7.4 K/UL (ref 1.8–7.7)
NEUTROPHILS NFR BLD: 64.1 % (ref 38–73)
NRBC BLD-RTO: 0 /100 WBC
PLATELET # BLD AUTO: 514 K/UL (ref 150–350)
PLATELET BLD QL SMEAR: ABNORMAL
PMV BLD AUTO: 9.9 FL (ref 9.2–12.9)
RBC # BLD AUTO: 5.81 M/UL (ref 4.6–6.2)
WBC # BLD AUTO: 11.62 K/UL (ref 3.9–12.7)

## 2021-03-11 PROCEDURE — 82728 ASSAY OF FERRITIN: CPT | Performed by: INTERNAL MEDICINE

## 2021-03-11 PROCEDURE — 85025 COMPLETE CBC W/AUTO DIFF WBC: CPT | Mod: PO | Performed by: INTERNAL MEDICINE

## 2021-03-11 PROCEDURE — 83540 ASSAY OF IRON: CPT | Performed by: INTERNAL MEDICINE

## 2021-03-11 PROCEDURE — 36415 COLL VENOUS BLD VENIPUNCTURE: CPT | Mod: PN | Performed by: INTERNAL MEDICINE

## 2021-03-12 LAB
FERRITIN SERPL-MCNC: 317 NG/ML (ref 20–300)
IRON SERPL-MCNC: 51 UG/DL (ref 45–160)
SATURATED IRON: 16 % (ref 20–50)
TOTAL IRON BINDING CAPACITY: 329 UG/DL (ref 250–450)
TRANSFERRIN SERPL-MCNC: 222 MG/DL (ref 200–375)

## 2021-03-15 ENCOUNTER — TELEPHONE (OUTPATIENT)
Dept: HEMATOLOGY/ONCOLOGY | Facility: CLINIC | Age: 56
End: 2021-03-15

## 2021-03-15 DIAGNOSIS — I25.10 CAD IN NATIVE ARTERY: ICD-10-CM

## 2021-03-15 RX ORDER — NITROGLYCERIN 0.4 MG/1
TABLET SUBLINGUAL
Qty: 25 TABLET | Refills: 0 | Status: SHIPPED | OUTPATIENT
Start: 2021-03-15 | End: 2021-04-09 | Stop reason: SDUPTHER

## 2021-03-16 ENCOUNTER — TELEPHONE (OUTPATIENT)
Dept: HEMATOLOGY/ONCOLOGY | Facility: CLINIC | Age: 56
End: 2021-03-16

## 2021-03-16 ENCOUNTER — TELEPHONE (OUTPATIENT)
Dept: PODIATRY | Facility: CLINIC | Age: 56
End: 2021-03-16

## 2021-03-16 DIAGNOSIS — D50.0 IRON DEFICIENCY ANEMIA DUE TO CHRONIC BLOOD LOSS: Primary | ICD-10-CM

## 2021-03-21 ENCOUNTER — PATIENT OUTREACH (OUTPATIENT)
Dept: ADMINISTRATIVE | Facility: OTHER | Age: 56
End: 2021-03-21

## 2021-04-14 ENCOUNTER — OFFICE VISIT (OUTPATIENT)
Dept: PODIATRY | Facility: CLINIC | Age: 56
End: 2021-04-14
Payer: MEDICARE

## 2021-04-14 VITALS — BODY MASS INDEX: 36.88 KG/M2 | WEIGHT: 263.44 LBS | HEIGHT: 71 IN

## 2021-04-14 DIAGNOSIS — E11.49 TYPE II DIABETES MELLITUS WITH NEUROLOGICAL MANIFESTATIONS: ICD-10-CM

## 2021-04-14 DIAGNOSIS — E08.42 DIABETIC POLYNEUROPATHY ASSOCIATED WITH DIABETES MELLITUS DUE TO UNDERLYING CONDITION: ICD-10-CM

## 2021-04-14 DIAGNOSIS — M79.671 INTRACTABLE RIGHT HEEL PAIN: ICD-10-CM

## 2021-04-14 DIAGNOSIS — B35.1 ONYCHOMYCOSIS DUE TO DERMATOPHYTE: Primary | ICD-10-CM

## 2021-04-14 DIAGNOSIS — I73.9 PVD (PERIPHERAL VASCULAR DISEASE): ICD-10-CM

## 2021-04-14 PROCEDURE — 11721 DEBRIDE NAIL 6 OR MORE: CPT | Mod: Q9,S$GLB,, | Performed by: PODIATRIST

## 2021-04-14 PROCEDURE — 1125F AMNT PAIN NOTED PAIN PRSNT: CPT | Mod: S$GLB,,, | Performed by: PODIATRIST

## 2021-04-14 PROCEDURE — 3008F PR BODY MASS INDEX (BMI) DOCUMENTED: ICD-10-PCS | Mod: CPTII,S$GLB,, | Performed by: PODIATRIST

## 2021-04-14 PROCEDURE — 11721 PR DEBRIDEMENT OF NAILS, 6 OR MORE: ICD-10-PCS | Mod: Q9,S$GLB,, | Performed by: PODIATRIST

## 2021-04-14 PROCEDURE — 99999 PR PBB SHADOW E&M-EST. PATIENT-LVL IV: ICD-10-PCS | Mod: PBBFAC,,, | Performed by: PODIATRIST

## 2021-04-14 PROCEDURE — 99999 PR PBB SHADOW E&M-EST. PATIENT-LVL IV: CPT | Mod: PBBFAC,,, | Performed by: PODIATRIST

## 2021-04-14 PROCEDURE — 99214 PR OFFICE/OUTPT VISIT, EST, LEVL IV, 30-39 MIN: ICD-10-PCS | Mod: 25,S$GLB,, | Performed by: PODIATRIST

## 2021-04-14 PROCEDURE — 99499 RISK ADDL DX/OHS AUDIT: ICD-10-PCS | Mod: S$GLB,,, | Performed by: PODIATRIST

## 2021-04-14 PROCEDURE — 1125F PR PAIN SEVERITY QUANTIFIED, PAIN PRESENT: ICD-10-PCS | Mod: S$GLB,,, | Performed by: PODIATRIST

## 2021-04-14 PROCEDURE — 99499 UNLISTED E&M SERVICE: CPT | Mod: S$GLB,,, | Performed by: PODIATRIST

## 2021-04-14 PROCEDURE — 3008F BODY MASS INDEX DOCD: CPT | Mod: CPTII,S$GLB,, | Performed by: PODIATRIST

## 2021-04-14 PROCEDURE — 99214 OFFICE O/P EST MOD 30 MIN: CPT | Mod: 25,S$GLB,, | Performed by: PODIATRIST

## 2021-04-14 RX ORDER — TADALAFIL 20 MG/1
20 TABLET ORAL
Qty: 10 TABLET | Refills: 11 | Status: SHIPPED | OUTPATIENT
Start: 2021-04-14 | End: 2022-02-02 | Stop reason: CLARIF

## 2021-04-14 RX ORDER — IBUPROFEN 800 MG/1
TABLET ORAL
COMMUNITY
End: 2022-01-12 | Stop reason: CLARIF

## 2021-04-14 RX ORDER — AMLODIPINE AND VALSARTAN 5; 160 MG/1; MG/1
1 TABLET ORAL DAILY
COMMUNITY
Start: 2021-02-22 | End: 2021-05-29 | Stop reason: SDUPTHER

## 2021-04-14 RX ORDER — DILTIAZEM HYDROCHLORIDE 120 MG/1
CAPSULE, COATED, EXTENDED RELEASE ORAL
COMMUNITY
End: 2021-12-29

## 2021-04-14 RX ORDER — EPINEPHRINE 0.3 MG/.3ML
INJECTION SUBCUTANEOUS
COMMUNITY
End: 2022-01-12 | Stop reason: CLARIF

## 2021-04-15 ENCOUNTER — TELEPHONE (OUTPATIENT)
Dept: FAMILY MEDICINE | Facility: CLINIC | Age: 56
End: 2021-04-15

## 2021-04-15 DIAGNOSIS — R10.9 ABDOMINAL PAIN, UNSPECIFIED ABDOMINAL LOCATION: ICD-10-CM

## 2021-04-20 ENCOUNTER — HOSPITAL ENCOUNTER (OUTPATIENT)
Dept: RADIOLOGY | Facility: HOSPITAL | Age: 56
Discharge: HOME | End: 2021-04-20
Attending: INTERNAL MEDICINE
Payer: MEDICARE

## 2021-04-20 ENCOUNTER — HOSPITAL ENCOUNTER (OUTPATIENT)
Dept: RADIOLOGY | Facility: HOSPITAL | Age: 56
Discharge: HOME | End: 2021-04-20
Attending: FAMILY MEDICINE
Payer: MEDICARE

## 2021-04-20 DIAGNOSIS — R10.9 ABDOMINAL PAIN, UNSPECIFIED ABDOMINAL LOCATION: ICD-10-CM

## 2021-04-20 DIAGNOSIS — R91.1 SOLITARY PULMONARY NODULE: ICD-10-CM

## 2021-04-20 PROCEDURE — 71250 CT THORAX DX C-: CPT | Mod: TC,PO

## 2021-04-20 PROCEDURE — 74177 CT ABD & PELVIS W/CONTRAST: CPT | Mod: TC,PO

## 2021-04-20 PROCEDURE — 71250 CT CHEST WITHOUT CONTRAST: ICD-10-PCS | Mod: 26,,, | Performed by: RADIOLOGY

## 2021-04-20 PROCEDURE — 74177 CT ABD & PELVIS W/CONTRAST: CPT | Mod: 26,,, | Performed by: RADIOLOGY

## 2021-04-20 PROCEDURE — A9698 NON-RAD CONTRAST MATERIALNOC: HCPCS | Mod: PO | Performed by: FAMILY MEDICINE

## 2021-04-20 PROCEDURE — 71250 CT THORAX DX C-: CPT | Mod: 26,,, | Performed by: RADIOLOGY

## 2021-04-20 PROCEDURE — 25500020 PHARM REV CODE 255: Mod: PO | Performed by: FAMILY MEDICINE

## 2021-04-20 PROCEDURE — 74177 CT ABDOMEN PELVIS WITH CONTRAST: ICD-10-PCS | Mod: 26,,, | Performed by: RADIOLOGY

## 2021-04-20 RX ADMIN — IOHEXOL 100 ML: 350 INJECTION, SOLUTION INTRAVENOUS at 03:04

## 2021-04-20 RX ADMIN — IOHEXOL 1000 ML: 12 SOLUTION ORAL at 03:04

## 2021-04-29 ENCOUNTER — PATIENT MESSAGE (OUTPATIENT)
Dept: RESEARCH | Facility: HOSPITAL | Age: 56
End: 2021-04-29

## 2021-04-30 ENCOUNTER — TELEPHONE (OUTPATIENT)
Dept: FAMILY MEDICINE | Facility: CLINIC | Age: 56
End: 2021-04-30

## 2021-05-07 ENCOUNTER — LAB VISIT (OUTPATIENT)
Dept: LAB | Facility: HOSPITAL | Age: 56
End: 2021-05-07
Attending: INTERNAL MEDICINE
Payer: MEDICARE

## 2021-05-07 DIAGNOSIS — D50.0 IRON DEFICIENCY ANEMIA DUE TO CHRONIC BLOOD LOSS: ICD-10-CM

## 2021-05-07 LAB
BASOPHILS # BLD AUTO: 0.07 K/UL (ref 0–0.2)
BASOPHILS NFR BLD: 0.6 % (ref 0–1.9)
DIFFERENTIAL METHOD: ABNORMAL
EOSINOPHIL # BLD AUTO: 0.1 K/UL (ref 0–0.5)
EOSINOPHIL NFR BLD: 0.9 % (ref 0–8)
ERYTHROCYTE [DISTWIDTH] IN BLOOD BY AUTOMATED COUNT: 14.8 % (ref 11.5–14.5)
FERRITIN SERPL-MCNC: 95 NG/ML (ref 18–464)
HCT VFR BLD AUTO: 48.3 % (ref 40–54)
HGB BLD-MCNC: 16.9 G/DL (ref 14–18)
IMM GRANULOCYTES # BLD AUTO: 0.03 K/UL (ref 0–0.04)
IMM GRANULOCYTES NFR BLD AUTO: 0.3 % (ref 0–0.5)
IRON SATN MFR SERPL: 23 % (ref 20–50)
IRON SERPL-MCNC: 67 UG/DL (ref 45–160)
LYMPHOCYTES # BLD AUTO: 2.9 K/UL (ref 1–4.8)
LYMPHOCYTES NFR BLD: 26.1 % (ref 18–48)
MCH RBC QN AUTO: 30.5 PG (ref 27–31)
MCHC RBC AUTO-ENTMCNC: 35 G/DL (ref 32–36)
MCV RBC AUTO: 87 FL (ref 82–98)
MONOCYTES # BLD AUTO: 1 K/UL (ref 0.3–1)
MONOCYTES NFR BLD: 9.3 % (ref 4–15)
NEUTROPHILS # BLD AUTO: 7 K/UL (ref 1.8–7.7)
NEUTROPHILS NFR BLD: 62.8 % (ref 38–73)
NRBC BLD-RTO: 0 /100 WBC
PLATELET # BLD AUTO: 436 K/UL (ref 150–450)
PMV BLD AUTO: 9.2 FL (ref 9.2–12.9)
RBC # BLD AUTO: 5.54 M/UL (ref 4.6–6.2)
TOTAL IRON BINDING CAPACITY: 293 UG/DL (ref 261–462)
WBC # BLD AUTO: 11.13 K/UL (ref 3.9–12.7)

## 2021-05-07 PROCEDURE — 83540 ASSAY OF IRON: CPT | Performed by: INTERNAL MEDICINE

## 2021-05-07 PROCEDURE — 85025 COMPLETE CBC W/AUTO DIFF WBC: CPT | Mod: PN | Performed by: INTERNAL MEDICINE

## 2021-05-07 PROCEDURE — 83550 IRON BINDING TEST: CPT | Mod: PN | Performed by: INTERNAL MEDICINE

## 2021-05-07 PROCEDURE — 36415 COLL VENOUS BLD VENIPUNCTURE: CPT | Mod: PN | Performed by: INTERNAL MEDICINE

## 2021-05-07 PROCEDURE — 82728 ASSAY OF FERRITIN: CPT | Performed by: INTERNAL MEDICINE

## 2021-05-07 PROCEDURE — 83540 ASSAY OF IRON: CPT | Mod: PN | Performed by: INTERNAL MEDICINE

## 2021-05-07 PROCEDURE — 82728 ASSAY OF FERRITIN: CPT | Mod: PN | Performed by: INTERNAL MEDICINE

## 2021-05-07 PROCEDURE — 83550 IRON BINDING TEST: CPT | Performed by: INTERNAL MEDICINE

## 2021-05-07 PROCEDURE — 85025 COMPLETE CBC W/AUTO DIFF WBC: CPT | Performed by: INTERNAL MEDICINE

## 2021-05-10 ENCOUNTER — TELEPHONE (OUTPATIENT)
Dept: HEMATOLOGY/ONCOLOGY | Facility: CLINIC | Age: 56
End: 2021-05-10

## 2021-05-19 ENCOUNTER — OFFICE VISIT (OUTPATIENT)
Dept: HEMATOLOGY/ONCOLOGY | Facility: CLINIC | Age: 56
End: 2021-05-19
Payer: MEDICARE

## 2021-05-19 VITALS
OXYGEN SATURATION: 98 % | BODY MASS INDEX: 34.88 KG/M2 | WEIGHT: 250.13 LBS | HEART RATE: 84 BPM | SYSTOLIC BLOOD PRESSURE: 150 MMHG | DIASTOLIC BLOOD PRESSURE: 76 MMHG | TEMPERATURE: 98 F

## 2021-05-19 DIAGNOSIS — D50.0 IRON DEFICIENCY ANEMIA DUE TO CHRONIC BLOOD LOSS: Primary | ICD-10-CM

## 2021-05-19 PROCEDURE — 3008F PR BODY MASS INDEX (BMI) DOCUMENTED: ICD-10-PCS | Mod: CPTII,S$GLB,, | Performed by: INTERNAL MEDICINE

## 2021-05-19 PROCEDURE — 1125F AMNT PAIN NOTED PAIN PRSNT: CPT | Mod: S$GLB,,, | Performed by: INTERNAL MEDICINE

## 2021-05-19 PROCEDURE — 99999 PR PBB SHADOW E&M-EST. PATIENT-LVL IV: ICD-10-PCS | Mod: PBBFAC,,, | Performed by: INTERNAL MEDICINE

## 2021-05-19 PROCEDURE — 99999 PR PBB SHADOW E&M-EST. PATIENT-LVL IV: CPT | Mod: PBBFAC,,, | Performed by: INTERNAL MEDICINE

## 2021-05-19 PROCEDURE — 99213 OFFICE O/P EST LOW 20 MIN: CPT | Mod: S$GLB,,, | Performed by: INTERNAL MEDICINE

## 2021-05-19 PROCEDURE — 99213 PR OFFICE/OUTPT VISIT, EST, LEVL III, 20-29 MIN: ICD-10-PCS | Mod: S$GLB,,, | Performed by: INTERNAL MEDICINE

## 2021-05-19 PROCEDURE — 3008F BODY MASS INDEX DOCD: CPT | Mod: CPTII,S$GLB,, | Performed by: INTERNAL MEDICINE

## 2021-05-19 PROCEDURE — 1125F PR PAIN SEVERITY QUANTIFIED, PAIN PRESENT: ICD-10-PCS | Mod: S$GLB,,, | Performed by: INTERNAL MEDICINE

## 2021-05-20 ENCOUNTER — PATIENT MESSAGE (OUTPATIENT)
Dept: HEMATOLOGY/ONCOLOGY | Facility: CLINIC | Age: 56
End: 2021-05-20

## 2021-06-15 ENCOUNTER — OFFICE VISIT (OUTPATIENT)
Dept: FAMILY MEDICINE | Facility: CLINIC | Age: 56
End: 2021-06-15
Payer: MEDICARE

## 2021-06-15 ENCOUNTER — TELEPHONE (OUTPATIENT)
Dept: DERMATOLOGY | Facility: CLINIC | Age: 56
End: 2021-06-15

## 2021-06-15 VITALS
HEART RATE: 77 BPM | SYSTOLIC BLOOD PRESSURE: 134 MMHG | RESPIRATION RATE: 24 BRPM | WEIGHT: 247.81 LBS | DIASTOLIC BLOOD PRESSURE: 78 MMHG | OXYGEN SATURATION: 96 % | HEIGHT: 71 IN | BODY MASS INDEX: 34.69 KG/M2

## 2021-06-15 DIAGNOSIS — L98.9 SKIN LESION OF LEFT LEG: Primary | ICD-10-CM

## 2021-06-15 PROCEDURE — 99213 OFFICE O/P EST LOW 20 MIN: CPT | Mod: S$GLB,,, | Performed by: PHYSICIAN ASSISTANT

## 2021-06-15 PROCEDURE — 99999 PR PBB SHADOW E&M-EST. PATIENT-LVL V: ICD-10-PCS | Mod: PBBFAC,,, | Performed by: PHYSICIAN ASSISTANT

## 2021-06-15 PROCEDURE — 3008F PR BODY MASS INDEX (BMI) DOCUMENTED: ICD-10-PCS | Mod: CPTII,S$GLB,, | Performed by: PHYSICIAN ASSISTANT

## 2021-06-15 PROCEDURE — 99999 PR PBB SHADOW E&M-EST. PATIENT-LVL V: CPT | Mod: PBBFAC,,, | Performed by: PHYSICIAN ASSISTANT

## 2021-06-15 PROCEDURE — 1125F PR PAIN SEVERITY QUANTIFIED, PAIN PRESENT: ICD-10-PCS | Mod: S$GLB,,, | Performed by: PHYSICIAN ASSISTANT

## 2021-06-15 PROCEDURE — 1125F AMNT PAIN NOTED PAIN PRSNT: CPT | Mod: S$GLB,,, | Performed by: PHYSICIAN ASSISTANT

## 2021-06-15 PROCEDURE — 3008F BODY MASS INDEX DOCD: CPT | Mod: CPTII,S$GLB,, | Performed by: PHYSICIAN ASSISTANT

## 2021-06-15 PROCEDURE — 99213 PR OFFICE/OUTPT VISIT, EST, LEVL III, 20-29 MIN: ICD-10-PCS | Mod: S$GLB,,, | Performed by: PHYSICIAN ASSISTANT

## 2021-06-18 ENCOUNTER — PATIENT OUTREACH (OUTPATIENT)
Dept: ADMINISTRATIVE | Facility: OTHER | Age: 56
End: 2021-06-18

## 2021-06-18 DIAGNOSIS — E11.9 TYPE 2 DIABETES MELLITUS WITHOUT COMPLICATION, UNSPECIFIED WHETHER LONG TERM INSULIN USE: Primary | ICD-10-CM

## 2021-06-22 ENCOUNTER — OFFICE VISIT (OUTPATIENT)
Dept: CARDIOLOGY | Facility: CLINIC | Age: 56
End: 2021-06-22
Payer: MEDICARE

## 2021-06-22 VITALS
WEIGHT: 248.69 LBS | DIASTOLIC BLOOD PRESSURE: 72 MMHG | SYSTOLIC BLOOD PRESSURE: 144 MMHG | HEIGHT: 71 IN | HEART RATE: 91 BPM | BODY MASS INDEX: 34.81 KG/M2

## 2021-06-22 DIAGNOSIS — I25.10 CAD IN NATIVE ARTERY: Primary | ICD-10-CM

## 2021-06-22 DIAGNOSIS — R09.89 BILATERAL CAROTID BRUITS: ICD-10-CM

## 2021-06-22 DIAGNOSIS — E11.49 TYPE II DIABETES MELLITUS WITH NEUROLOGICAL MANIFESTATIONS: ICD-10-CM

## 2021-06-22 DIAGNOSIS — I20.89 ANGINA OF EFFORT: ICD-10-CM

## 2021-06-22 DIAGNOSIS — I10 ESSENTIAL HYPERTENSION: ICD-10-CM

## 2021-06-22 DIAGNOSIS — F20.9 SCHIZOPHRENIA, UNSPECIFIED TYPE: ICD-10-CM

## 2021-06-22 DIAGNOSIS — F17.200 TOBACCO USE DISORDER: ICD-10-CM

## 2021-06-22 DIAGNOSIS — J44.9 CHRONIC OBSTRUCTIVE PULMONARY DISEASE, UNSPECIFIED COPD TYPE: ICD-10-CM

## 2021-06-22 PROCEDURE — 3008F BODY MASS INDEX DOCD: CPT | Mod: CPTII,S$GLB,, | Performed by: INTERNAL MEDICINE

## 2021-06-22 PROCEDURE — 99214 PR OFFICE/OUTPT VISIT, EST, LEVL IV, 30-39 MIN: ICD-10-PCS | Mod: S$GLB,,, | Performed by: INTERNAL MEDICINE

## 2021-06-22 PROCEDURE — 99999 PR PBB SHADOW E&M-EST. PATIENT-LVL V: ICD-10-PCS | Mod: PBBFAC,,, | Performed by: INTERNAL MEDICINE

## 2021-06-22 PROCEDURE — 99499 RISK ADDL DX/OHS AUDIT: ICD-10-PCS | Mod: S$GLB,,, | Performed by: INTERNAL MEDICINE

## 2021-06-22 PROCEDURE — 99999 PR PBB SHADOW E&M-EST. PATIENT-LVL V: CPT | Mod: PBBFAC,,, | Performed by: INTERNAL MEDICINE

## 2021-06-22 PROCEDURE — 99499 UNLISTED E&M SERVICE: CPT | Mod: S$GLB,,, | Performed by: INTERNAL MEDICINE

## 2021-06-22 PROCEDURE — 3008F PR BODY MASS INDEX (BMI) DOCUMENTED: ICD-10-PCS | Mod: CPTII,S$GLB,, | Performed by: INTERNAL MEDICINE

## 2021-06-22 PROCEDURE — 99214 OFFICE O/P EST MOD 30 MIN: CPT | Mod: S$GLB,,, | Performed by: INTERNAL MEDICINE

## 2021-06-24 RX ORDER — PREGABALIN 50 MG/1
CAPSULE ORAL
Qty: 90 CAPSULE | Refills: 1 | Status: SHIPPED | OUTPATIENT
Start: 2021-06-24 | End: 2021-09-17

## 2021-06-29 ENCOUNTER — TELEPHONE (OUTPATIENT)
Dept: PODIATRY | Facility: CLINIC | Age: 56
End: 2021-06-29

## 2021-07-02 ENCOUNTER — TELEPHONE (OUTPATIENT)
Dept: PODIATRY | Facility: CLINIC | Age: 56
End: 2021-07-02

## 2021-07-07 ENCOUNTER — PATIENT MESSAGE (OUTPATIENT)
Dept: ADMINISTRATIVE | Facility: HOSPITAL | Age: 56
End: 2021-07-07

## 2021-07-08 ENCOUNTER — HOSPITAL ENCOUNTER (OUTPATIENT)
Dept: RADIOLOGY | Facility: HOSPITAL | Age: 56
Discharge: HOME | End: 2021-07-08
Attending: INTERNAL MEDICINE
Payer: MEDICARE

## 2021-07-08 ENCOUNTER — HOSPITAL ENCOUNTER (OUTPATIENT)
Dept: CARDIOLOGY | Facility: HOSPITAL | Age: 56
Discharge: HOME | End: 2021-07-08
Attending: INTERNAL MEDICINE
Payer: MEDICARE

## 2021-07-08 VITALS — HEIGHT: 71 IN | WEIGHT: 248 LBS | BODY MASS INDEX: 34.72 KG/M2

## 2021-07-08 VITALS — HEIGHT: 70 IN | BODY MASS INDEX: 35.5 KG/M2 | WEIGHT: 248 LBS

## 2021-07-08 DIAGNOSIS — F17.200 TOBACCO USE DISORDER: ICD-10-CM

## 2021-07-08 DIAGNOSIS — J44.9 CHRONIC OBSTRUCTIVE PULMONARY DISEASE, UNSPECIFIED COPD TYPE: ICD-10-CM

## 2021-07-08 DIAGNOSIS — I10 ESSENTIAL HYPERTENSION: ICD-10-CM

## 2021-07-08 DIAGNOSIS — I25.10 CAD IN NATIVE ARTERY: ICD-10-CM

## 2021-07-08 DIAGNOSIS — I20.89 ANGINA OF EFFORT: ICD-10-CM

## 2021-07-08 DIAGNOSIS — F20.9 SCHIZOPHRENIA, UNSPECIFIED TYPE: ICD-10-CM

## 2021-07-08 DIAGNOSIS — E11.49 TYPE II DIABETES MELLITUS WITH NEUROLOGICAL MANIFESTATIONS: ICD-10-CM

## 2021-07-08 LAB
ASCENDING AORTA: 2.99 CM
AV INDEX (PROSTH): 0.39
AV MEAN GRADIENT: 17 MMHG
AV PEAK GRADIENT: 34 MMHG
AV VALVE AREA: 1.43 CM2
AV VELOCITY RATIO: 0.35
BSA FOR ECHO PROCEDURE: 2.37 M2
CV ECHO LV RWT: 0.44 CM
CV PHARM DOSE: 0.4 MG
CV STRESS BASE HR: 74 BPM
DIASTOLIC BLOOD PRESSURE: 86 MMHG
DOP CALC AO PEAK VEL: 2.92 M/S
DOP CALC AO VTI: 56.09 CM
DOP CALC LVOT AREA: 3.7 CM2
DOP CALC LVOT DIAMETER: 2.17 CM
DOP CALC LVOT PEAK VEL: 1.01 M/S
DOP CALC LVOT STROKE VOLUME: 80.44 CM3
DOP CALCLVOT PEAK VEL VTI: 21.76 CM
E WAVE DECELERATION TIME: 351.34 MSEC
E/A RATIO: 0.79
E/E' RATIO: 23.4 M/S
ECHO LV POSTERIOR WALL: 1.15 CM (ref 0.6–1.1)
EJECTION FRACTION: 60 %
FRACTIONAL SHORTENING: 39 % (ref 28–44)
INTERVENTRICULAR SEPTUM: 1.47 CM (ref 0.6–1.1)
LA MAJOR: 5.07 CM
LA MINOR: 5.89 CM
LA WIDTH: 4.04 CM
LEFT ATRIUM SIZE: 4.61 CM
LEFT ATRIUM VOLUME INDEX: 37.3 ML/M2
LEFT ATRIUM VOLUME: 86.27 CM3
LEFT CBA DIAS: 11 CM/S
LEFT CBA SYS: 57 CM/S
LEFT CCA DIST DIAS: 12 CM/S
LEFT CCA DIST SYS: 56 CM/S
LEFT CCA MID DIAS: 14 CM/S
LEFT CCA MID SYS: 74 CM/S
LEFT CCA PROX DIAS: 13 CM/S
LEFT CCA PROX SYS: 88 CM/S
LEFT ECA DIAS: 9 CM/S
LEFT ECA SYS: 189 CM/S
LEFT ICA DIST DIAS: 23 CM/S
LEFT ICA DIST SYS: 83 CM/S
LEFT ICA MID DIAS: 20 CM/S
LEFT ICA MID SYS: 71 CM/S
LEFT ICA PROX DIAS: 26 CM/S
LEFT ICA PROX SYS: 73 CM/S
LEFT INTERNAL DIMENSION IN SYSTOLE: 3.18 CM (ref 2.1–4)
LEFT VENTRICLE DIASTOLIC VOLUME INDEX: 56.84 ML/M2
LEFT VENTRICLE DIASTOLIC VOLUME: 131.31 ML
LEFT VENTRICLE MASS INDEX: 123 G/M2
LEFT VENTRICLE SYSTOLIC VOLUME INDEX: 17.5 ML/M2
LEFT VENTRICLE SYSTOLIC VOLUME: 40.37 ML
LEFT VENTRICULAR INTERNAL DIMENSION IN DIASTOLE: 5.23 CM (ref 3.5–6)
LEFT VENTRICULAR MASS: 284.05 G
LEFT VERTEBRAL DIAS: 8 CM/S
LEFT VERTEBRAL SYS: 37 CM/S
LV LATERAL E/E' RATIO: 23.4 M/S
LV SEPTAL E/E' RATIO: 23.4 M/S
MV MEAN GRADIENT: 1 MMHG
MV PEAK A VEL: 1.49 M/S
MV PEAK E VEL: 1.17 M/S
MV PEAK GRADIENT: 9 MMHG
MV STENOSIS PRESSURE HALF TIME: 101.89 MS
MV VALVE AREA P 1/2 METHOD: 2.16 CM2
OHS CV CAROTID RIGHT ICA EDV HIGHEST: 23
OHS CV CAROTID ULTRASOUND LEFT ICA/CCA RATIO: 1.48
OHS CV CAROTID ULTRASOUND RIGHT ICA/CCA RATIO: 1.54
OHS CV CPX 1 MINUTE RECOVERY HEART RATE: 102 BPM
OHS CV CPX 85 PERCENT MAX PREDICTED HEART RATE MALE: 142
OHS CV CPX MAX PREDICTED HEART RATE: 167
OHS CV CPX PATIENT IS FEMALE: 0
OHS CV CPX PATIENT IS MALE: 1
OHS CV CPX PEAK DIASTOLIC BLOOD PRESSURE: 88 MMHG
OHS CV CPX PEAK HEAR RATE: 105 BPM
OHS CV CPX PEAK RATE PRESSURE PRODUCT: NORMAL
OHS CV CPX PEAK SYSTOLIC BLOOD PRESSURE: 183 MMHG
OHS CV CPX PERCENT MAX PREDICTED HEART RATE ACHIEVED: 63
OHS CV CPX RATE PRESSURE PRODUCT PRESENTING: NORMAL
OHS CV PV CAROTID LEFT HIGHEST CCA: 88
OHS CV PV CAROTID LEFT HIGHEST ICA: 83
OHS CV PV CAROTID RIGHT HIGHEST CCA: 70
OHS CV PV CAROTID RIGHT HIGHEST ICA: 83
OHS CV US CAROTID LEFT HIGHEST EDV: 26
PISA TR MAX VEL: 2.22 M/S
RA MAJOR: 4.08 CM
RA PRESSURE: 3 MMHG
RA WIDTH: 3.17 CM
RIGHT CBA DIAS: 10 CM/S
RIGHT CBA SYS: 76 CM/S
RIGHT CCA DIST DIAS: 8 CM/S
RIGHT CCA DIST SYS: 54 CM/S
RIGHT CCA MID DIAS: 10 CM/S
RIGHT CCA MID SYS: 70 CM/S
RIGHT CCA PROX DIAS: 10 CM/S
RIGHT CCA PROX SYS: 49 CM/S
RIGHT ECA DIAS: 14 CM/S
RIGHT ECA SYS: 127 CM/S
RIGHT ICA DIST DIAS: 19 CM/S
RIGHT ICA DIST SYS: 64 CM/S
RIGHT ICA MID DIAS: 20 CM/S
RIGHT ICA MID SYS: 68 CM/S
RIGHT ICA PROX DIAS: 23 CM/S
RIGHT ICA PROX SYS: 83 CM/S
RIGHT VENTRICULAR END-DIASTOLIC DIMENSION: 3.81 CM
RIGHT VERTEBRAL DIAS: 7 CM/S
RIGHT VERTEBRAL SYS: 42 CM/S
RV TISSUE DOPPLER FREE WALL SYSTOLIC VELOCITY 1 (APICAL 4 CHAMBER VIEW): 14.83 CM/S
SINUS: 3.54 CM
STJ: 3.07 CM
SYSTOLIC BLOOD PRESSURE: 169 MMHG
TDI LATERAL: 0.05 M/S
TDI SEPTAL: 0.05 M/S
TDI: 0.05 M/S
TR MAX PG: 20 MMHG
TRICUSPID ANNULAR PLANE SYSTOLIC EXCURSION: 2.38 CM
TV REST PULMONARY ARTERY PRESSURE: 23 MMHG

## 2021-07-08 PROCEDURE — 78452 STRESS TEST WITH MYOCARDIAL PERFUSION (CUPID ONLY): ICD-10-PCS | Mod: 26,,, | Performed by: INTERNAL MEDICINE

## 2021-07-08 PROCEDURE — 93306 TTE W/DOPPLER COMPLETE: CPT | Mod: 26,,, | Performed by: INTERNAL MEDICINE

## 2021-07-08 PROCEDURE — 78452 HT MUSCLE IMAGE SPECT MULT: CPT | Mod: 26,,, | Performed by: INTERNAL MEDICINE

## 2021-07-08 PROCEDURE — 93880 EXTRACRANIAL BILAT STUDY: CPT | Mod: 26,,, | Performed by: INTERNAL MEDICINE

## 2021-07-08 PROCEDURE — 63600175 PHARM REV CODE 636 W HCPCS: Mod: PO | Performed by: INTERNAL MEDICINE

## 2021-07-08 PROCEDURE — 93306 ECHO (CUPID ONLY): ICD-10-PCS | Mod: 26,,, | Performed by: INTERNAL MEDICINE

## 2021-07-08 PROCEDURE — 93016 CV STRESS TEST SUPVJ ONLY: CPT | Mod: ,,, | Performed by: INTERNAL MEDICINE

## 2021-07-08 PROCEDURE — 93018 PR CARDIAC STRESS TST,INTERP/REPT ONLY: ICD-10-PCS | Mod: ,,, | Performed by: INTERNAL MEDICINE

## 2021-07-08 PROCEDURE — 93880 CV US DOPPLER CAROTID (CUPID ONLY): ICD-10-PCS | Mod: 26,,, | Performed by: INTERNAL MEDICINE

## 2021-07-08 PROCEDURE — 93880 EXTRACRANIAL BILAT STUDY: CPT | Mod: PO

## 2021-07-08 PROCEDURE — 93017 CV STRESS TEST TRACING ONLY: CPT | Mod: PO

## 2021-07-08 PROCEDURE — 93018 CV STRESS TEST I&R ONLY: CPT | Mod: ,,, | Performed by: INTERNAL MEDICINE

## 2021-07-08 PROCEDURE — 93016 STRESS TEST WITH MYOCARDIAL PERFUSION (CUPID ONLY): ICD-10-PCS | Mod: ,,, | Performed by: INTERNAL MEDICINE

## 2021-07-08 PROCEDURE — A9502 TC99M TETROFOSMIN: HCPCS | Mod: PO

## 2021-07-08 PROCEDURE — 78452 HT MUSCLE IMAGE SPECT MULT: CPT | Mod: PO

## 2021-07-08 PROCEDURE — 93306 TTE W/DOPPLER COMPLETE: CPT | Mod: PO

## 2021-07-08 RX ORDER — REGADENOSON 0.08 MG/ML
0.4 INJECTION, SOLUTION INTRAVENOUS
Status: COMPLETED | OUTPATIENT
Start: 2021-07-08 | End: 2021-07-08

## 2021-07-08 RX ADMIN — REGADENOSON 0.4 MG: 0.08 INJECTION, SOLUTION INTRAVENOUS at 09:07

## 2021-07-12 ENCOUNTER — TELEPHONE (OUTPATIENT)
Dept: CARDIOLOGY | Facility: CLINIC | Age: 56
End: 2021-07-12

## 2021-08-04 DIAGNOSIS — I25.10 CAD IN NATIVE ARTERY: ICD-10-CM

## 2021-08-05 RX ORDER — NITROGLYCERIN 0.4 MG/1
TABLET SUBLINGUAL
Qty: 25 TABLET | Refills: 0 | Status: SHIPPED | OUTPATIENT
Start: 2021-08-05 | End: 2021-08-06

## 2021-08-10 ENCOUNTER — PATIENT OUTREACH (OUTPATIENT)
Dept: ADMINISTRATIVE | Facility: OTHER | Age: 56
End: 2021-08-10

## 2021-08-11 ENCOUNTER — OFFICE VISIT (OUTPATIENT)
Dept: DERMATOLOGY | Facility: CLINIC | Age: 56
End: 2021-08-11
Payer: MEDICARE

## 2021-08-11 VITALS — BODY MASS INDEX: 35.5 KG/M2 | RESPIRATION RATE: 18 BRPM | HEIGHT: 70 IN | WEIGHT: 248 LBS

## 2021-08-11 DIAGNOSIS — D48.5 NEOPLASM OF UNCERTAIN BEHAVIOR OF SKIN: ICD-10-CM

## 2021-08-11 PROCEDURE — 1125F PR PAIN SEVERITY QUANTIFIED, PAIN PRESENT: ICD-10-PCS | Mod: CPTII,S$GLB,, | Performed by: DERMATOLOGY

## 2021-08-11 PROCEDURE — 88305 TISSUE EXAM BY PATHOLOGIST: ICD-10-PCS | Mod: 26,,, | Performed by: PATHOLOGY

## 2021-08-11 PROCEDURE — 1125F AMNT PAIN NOTED PAIN PRSNT: CPT | Mod: CPTII,S$GLB,, | Performed by: DERMATOLOGY

## 2021-08-11 PROCEDURE — 88305 TISSUE EXAM BY PATHOLOGIST: CPT | Mod: 26,,, | Performed by: PATHOLOGY

## 2021-08-11 PROCEDURE — 88342 IMHCHEM/IMCYTCHM 1ST ANTB: CPT | Mod: 26,,, | Performed by: PATHOLOGY

## 2021-08-11 PROCEDURE — 99499 NO LOS: ICD-10-PCS | Mod: S$GLB,,, | Performed by: DERMATOLOGY

## 2021-08-11 PROCEDURE — 99999 PR PBB SHADOW E&M-EST. PATIENT-LVL III: ICD-10-PCS | Mod: PBBFAC,,, | Performed by: DERMATOLOGY

## 2021-08-11 PROCEDURE — 88342 CHG IMMUNOCYTOCHEMISTRY: ICD-10-PCS | Mod: 26,,, | Performed by: PATHOLOGY

## 2021-08-11 PROCEDURE — 99999 PR PBB SHADOW E&M-EST. PATIENT-LVL III: CPT | Mod: PBBFAC,,, | Performed by: DERMATOLOGY

## 2021-08-11 PROCEDURE — 88342 IMHCHEM/IMCYTCHM 1ST ANTB: CPT | Performed by: PATHOLOGY

## 2021-08-11 PROCEDURE — 11102 PR TANGENTIAL BIOPSY, SKIN, SINGLE LESION: ICD-10-PCS | Mod: S$GLB,,, | Performed by: DERMATOLOGY

## 2021-08-11 PROCEDURE — 1159F MED LIST DOCD IN RCRD: CPT | Mod: CPTII,S$GLB,, | Performed by: DERMATOLOGY

## 2021-08-11 PROCEDURE — 99499 UNLISTED E&M SERVICE: CPT | Mod: S$GLB,,, | Performed by: DERMATOLOGY

## 2021-08-11 PROCEDURE — 11102 TANGNTL BX SKIN SINGLE LES: CPT | Mod: S$GLB,,, | Performed by: DERMATOLOGY

## 2021-08-11 PROCEDURE — 88305 TISSUE EXAM BY PATHOLOGIST: CPT | Performed by: PATHOLOGY

## 2021-08-11 PROCEDURE — 3044F PR MOST RECENT HEMOGLOBIN A1C LEVEL <7.0%: ICD-10-PCS | Mod: CPTII,S$GLB,, | Performed by: DERMATOLOGY

## 2021-08-11 PROCEDURE — 3008F BODY MASS INDEX DOCD: CPT | Mod: CPTII,S$GLB,, | Performed by: DERMATOLOGY

## 2021-08-11 PROCEDURE — 3008F PR BODY MASS INDEX (BMI) DOCUMENTED: ICD-10-PCS | Mod: CPTII,S$GLB,, | Performed by: DERMATOLOGY

## 2021-08-11 PROCEDURE — 3044F HG A1C LEVEL LT 7.0%: CPT | Mod: CPTII,S$GLB,, | Performed by: DERMATOLOGY

## 2021-08-11 PROCEDURE — 1159F PR MEDICATION LIST DOCUMENTED IN MEDICAL RECORD: ICD-10-PCS | Mod: CPTII,S$GLB,, | Performed by: DERMATOLOGY

## 2021-08-20 LAB
FINAL PATHOLOGIC DIAGNOSIS: NORMAL
GROSS: NORMAL
Lab: NORMAL
MICROSCOPIC EXAM: NORMAL

## 2021-09-10 ENCOUNTER — OFFICE VISIT (OUTPATIENT)
Dept: FAMILY MEDICINE | Facility: CLINIC | Age: 56
End: 2021-09-10
Payer: MEDICARE

## 2021-09-10 ENCOUNTER — LAB VISIT (OUTPATIENT)
Dept: LAB | Facility: HOSPITAL | Age: 56
End: 2021-09-10
Attending: INTERNAL MEDICINE
Payer: MEDICARE

## 2021-09-10 VITALS
RESPIRATION RATE: 14 BRPM | SYSTOLIC BLOOD PRESSURE: 134 MMHG | HEART RATE: 84 BPM | HEIGHT: 70 IN | TEMPERATURE: 99 F | WEIGHT: 242.94 LBS | DIASTOLIC BLOOD PRESSURE: 76 MMHG | BODY MASS INDEX: 34.78 KG/M2

## 2021-09-10 DIAGNOSIS — Z79.899 HIGH RISK MEDICATIONS (NOT ANTICOAGULANTS) LONG-TERM USE: ICD-10-CM

## 2021-09-10 DIAGNOSIS — J44.9 CHRONIC OBSTRUCTIVE PULMONARY DISEASE, UNSPECIFIED COPD TYPE: Primary | ICD-10-CM

## 2021-09-10 DIAGNOSIS — J44.9 CHRONIC OBSTRUCTIVE PULMONARY DISEASE, UNSPECIFIED COPD TYPE: ICD-10-CM

## 2021-09-10 DIAGNOSIS — G89.29 CHRONIC INTRACTABLE HEADACHE, UNSPECIFIED HEADACHE TYPE: ICD-10-CM

## 2021-09-10 DIAGNOSIS — D50.0 IRON DEFICIENCY ANEMIA DUE TO CHRONIC BLOOD LOSS: ICD-10-CM

## 2021-09-10 DIAGNOSIS — I10 ESSENTIAL HYPERTENSION: ICD-10-CM

## 2021-09-10 DIAGNOSIS — R51.9 CHRONIC INTRACTABLE HEADACHE, UNSPECIFIED HEADACHE TYPE: ICD-10-CM

## 2021-09-10 LAB
25(OH)D3+25(OH)D2 SERPL-MCNC: 15 NG/ML (ref 30–96)
BASOPHILS # BLD AUTO: 0.08 K/UL (ref 0–0.2)
BASOPHILS NFR BLD: 0.6 % (ref 0–1.9)
DIFFERENTIAL METHOD: ABNORMAL
EOSINOPHIL # BLD AUTO: 0.1 K/UL (ref 0–0.5)
EOSINOPHIL NFR BLD: 1 % (ref 0–8)
ERYTHROCYTE [DISTWIDTH] IN BLOOD BY AUTOMATED COUNT: 14.7 % (ref 11.5–14.5)
FERRITIN SERPL-MCNC: 180 NG/ML (ref 20–300)
HCT VFR BLD AUTO: 46.4 % (ref 40–54)
HGB BLD-MCNC: 16.2 G/DL (ref 14–18)
IMM GRANULOCYTES # BLD AUTO: 0.06 K/UL (ref 0–0.04)
IMM GRANULOCYTES NFR BLD AUTO: 0.5 % (ref 0–0.5)
IRON SERPL-MCNC: 49 UG/DL (ref 45–160)
LYMPHOCYTES # BLD AUTO: 2.3 K/UL (ref 1–4.8)
LYMPHOCYTES NFR BLD: 17.6 % (ref 18–48)
MCH RBC QN AUTO: 31.3 PG (ref 27–31)
MCHC RBC AUTO-ENTMCNC: 34.9 G/DL (ref 32–36)
MCV RBC AUTO: 90 FL (ref 82–98)
MONOCYTES # BLD AUTO: 1.2 K/UL (ref 0.3–1)
MONOCYTES NFR BLD: 9.2 % (ref 4–15)
NEUTROPHILS # BLD AUTO: 9.2 K/UL (ref 1.8–7.7)
NEUTROPHILS NFR BLD: 71.1 % (ref 38–73)
NRBC BLD-RTO: 0 /100 WBC
PLATELET # BLD AUTO: 534 K/UL (ref 150–450)
PMV BLD AUTO: 10.2 FL (ref 9.2–12.9)
RBC # BLD AUTO: 5.17 M/UL (ref 4.6–6.2)
SATURATED IRON: 15 % (ref 20–50)
TOTAL IRON BINDING CAPACITY: 321 UG/DL (ref 250–450)
TRANSFERRIN SERPL-MCNC: 217 MG/DL (ref 200–375)
VIT B12 SERPL-MCNC: <148 PG/ML (ref 210–950)
WBC # BLD AUTO: 12.99 K/UL (ref 3.9–12.7)

## 2021-09-10 PROCEDURE — 99499 UNLISTED E&M SERVICE: CPT | Mod: HCNC,S$GLB,, | Performed by: FAMILY MEDICINE

## 2021-09-10 PROCEDURE — 4010F PR ACE/ARB THEARPY RXD/TAKEN: ICD-10-PCS | Mod: CPTII,S$GLB,, | Performed by: FAMILY MEDICINE

## 2021-09-10 PROCEDURE — 3075F SYST BP GE 130 - 139MM HG: CPT | Mod: CPTII,S$GLB,, | Performed by: FAMILY MEDICINE

## 2021-09-10 PROCEDURE — 36415 COLL VENOUS BLD VENIPUNCTURE: CPT | Mod: PN | Performed by: FAMILY MEDICINE

## 2021-09-10 PROCEDURE — 84466 ASSAY OF TRANSFERRIN: CPT | Performed by: INTERNAL MEDICINE

## 2021-09-10 PROCEDURE — 1159F PR MEDICATION LIST DOCUMENTED IN MEDICAL RECORD: ICD-10-PCS | Mod: CPTII,S$GLB,, | Performed by: FAMILY MEDICINE

## 2021-09-10 PROCEDURE — 3008F BODY MASS INDEX DOCD: CPT | Mod: CPTII,S$GLB,, | Performed by: FAMILY MEDICINE

## 2021-09-10 PROCEDURE — 3044F PR MOST RECENT HEMOGLOBIN A1C LEVEL <7.0%: ICD-10-PCS | Mod: CPTII,S$GLB,, | Performed by: FAMILY MEDICINE

## 2021-09-10 PROCEDURE — 3078F PR MOST RECENT DIASTOLIC BLOOD PRESSURE < 80 MM HG: ICD-10-PCS | Mod: CPTII,S$GLB,, | Performed by: FAMILY MEDICINE

## 2021-09-10 PROCEDURE — 1159F MED LIST DOCD IN RCRD: CPT | Mod: CPTII,S$GLB,, | Performed by: FAMILY MEDICINE

## 2021-09-10 PROCEDURE — 82607 VITAMIN B-12: CPT | Performed by: FAMILY MEDICINE

## 2021-09-10 PROCEDURE — 3075F PR MOST RECENT SYSTOLIC BLOOD PRESS GE 130-139MM HG: ICD-10-PCS | Mod: CPTII,S$GLB,, | Performed by: FAMILY MEDICINE

## 2021-09-10 PROCEDURE — 82306 VITAMIN D 25 HYDROXY: CPT | Performed by: FAMILY MEDICINE

## 2021-09-10 PROCEDURE — 3008F PR BODY MASS INDEX (BMI) DOCUMENTED: ICD-10-PCS | Mod: CPTII,S$GLB,, | Performed by: FAMILY MEDICINE

## 2021-09-10 PROCEDURE — 3078F DIAST BP <80 MM HG: CPT | Mod: CPTII,S$GLB,, | Performed by: FAMILY MEDICINE

## 2021-09-10 PROCEDURE — 82728 ASSAY OF FERRITIN: CPT | Performed by: INTERNAL MEDICINE

## 2021-09-10 PROCEDURE — 99499 RISK ADDL DX/OHS AUDIT: ICD-10-PCS | Mod: HCNC,S$GLB,, | Performed by: FAMILY MEDICINE

## 2021-09-10 PROCEDURE — 85025 COMPLETE CBC W/AUTO DIFF WBC: CPT | Mod: PO | Performed by: INTERNAL MEDICINE

## 2021-09-10 PROCEDURE — 99214 OFFICE O/P EST MOD 30 MIN: CPT | Mod: S$GLB,,, | Performed by: FAMILY MEDICINE

## 2021-09-10 PROCEDURE — 1160F RVW MEDS BY RX/DR IN RCRD: CPT | Mod: CPTII,S$GLB,, | Performed by: FAMILY MEDICINE

## 2021-09-10 PROCEDURE — 99999 PR PBB SHADOW E&M-EST. PATIENT-LVL V: CPT | Mod: PBBFAC,,, | Performed by: FAMILY MEDICINE

## 2021-09-10 PROCEDURE — 3044F HG A1C LEVEL LT 7.0%: CPT | Mod: CPTII,S$GLB,, | Performed by: FAMILY MEDICINE

## 2021-09-10 PROCEDURE — 99999 PR PBB SHADOW E&M-EST. PATIENT-LVL V: ICD-10-PCS | Mod: PBBFAC,,, | Performed by: FAMILY MEDICINE

## 2021-09-10 PROCEDURE — 1160F PR REVIEW ALL MEDS BY PRESCRIBER/CLIN PHARMACIST DOCUMENTED: ICD-10-PCS | Mod: CPTII,S$GLB,, | Performed by: FAMILY MEDICINE

## 2021-09-10 PROCEDURE — 4010F ACE/ARB THERAPY RXD/TAKEN: CPT | Mod: CPTII,S$GLB,, | Performed by: FAMILY MEDICINE

## 2021-09-10 PROCEDURE — 99214 PR OFFICE/OUTPT VISIT, EST, LEVL IV, 30-39 MIN: ICD-10-PCS | Mod: S$GLB,,, | Performed by: FAMILY MEDICINE

## 2021-09-10 RX ORDER — BUDESONIDE 0.5 MG/2ML
0.5 INHALANT ORAL DAILY
Qty: 60 ML | Refills: 1 | Status: SHIPPED | OUTPATIENT
Start: 2021-09-10 | End: 2021-09-10

## 2021-09-10 RX ORDER — ALBUTEROL SULFATE 0.83 MG/ML
2.5 SOLUTION RESPIRATORY (INHALATION) EVERY 6 HOURS PRN
Qty: 1 BOX | Refills: 0 | Status: SHIPPED | OUTPATIENT
Start: 2021-09-10 | End: 2022-09-10

## 2021-09-10 RX ORDER — BUDESONIDE 0.5 MG/2ML
INHALANT ORAL
Qty: 180 ML | Refills: 0 | Status: SHIPPED | OUTPATIENT
Start: 2021-09-10 | End: 2021-09-14

## 2021-09-10 RX ORDER — AZITHROMYCIN 250 MG/1
500 TABLET, FILM COATED ORAL DAILY
COMMUNITY
Start: 2021-09-06 | End: 2021-09-11

## 2021-09-14 ENCOUNTER — TELEPHONE (OUTPATIENT)
Dept: FAMILY MEDICINE | Facility: CLINIC | Age: 56
End: 2021-09-14

## 2021-09-14 ENCOUNTER — PATIENT MESSAGE (OUTPATIENT)
Dept: FAMILY MEDICINE | Facility: CLINIC | Age: 56
End: 2021-09-14

## 2021-09-17 RX ORDER — PREGABALIN 50 MG/1
CAPSULE ORAL
Qty: 90 CAPSULE | Refills: 3 | Status: ON HOLD | OUTPATIENT
Start: 2021-09-17 | End: 2022-02-04

## 2021-09-24 ENCOUNTER — TELEPHONE (OUTPATIENT)
Dept: FAMILY MEDICINE | Facility: CLINIC | Age: 56
End: 2021-09-24

## 2021-09-24 ENCOUNTER — PATIENT MESSAGE (OUTPATIENT)
Dept: FAMILY MEDICINE | Facility: CLINIC | Age: 56
End: 2021-09-24

## 2021-09-24 DIAGNOSIS — R79.89 ABNORMAL CBC: ICD-10-CM

## 2021-09-24 DIAGNOSIS — E55.9 VITAMIN D DEFICIENCY: ICD-10-CM

## 2021-09-24 DIAGNOSIS — E53.8 B12 DEFICIENCY: Primary | ICD-10-CM

## 2021-09-24 RX ORDER — CYANOCOBALAMIN 1000 UG/ML
1000 INJECTION, SOLUTION INTRAMUSCULAR; SUBCUTANEOUS
Status: DISCONTINUED | OUTPATIENT
Start: 2021-10-24 | End: 2022-01-17

## 2021-09-24 RX ORDER — CYANOCOBALAMIN 1000 UG/ML
1000 INJECTION, SOLUTION INTRAMUSCULAR; SUBCUTANEOUS WEEKLY
Status: SHIPPED | OUTPATIENT
Start: 2021-09-24 | End: 2021-10-22

## 2021-09-24 RX ORDER — ERGOCALCIFEROL 1.25 MG/1
50000 CAPSULE ORAL
Qty: 4 CAPSULE | Refills: 3 | Status: SHIPPED | OUTPATIENT
Start: 2021-09-24 | End: 2022-01-12 | Stop reason: CLARIF

## 2021-10-06 DIAGNOSIS — E11.9 TYPE 2 DIABETES MELLITUS WITHOUT COMPLICATION, UNSPECIFIED WHETHER LONG TERM INSULIN USE: ICD-10-CM

## 2021-11-19 ENCOUNTER — TELEPHONE (OUTPATIENT)
Dept: HEMATOLOGY/ONCOLOGY | Facility: CLINIC | Age: 56
End: 2021-11-19
Payer: MEDICARE

## 2021-11-19 DIAGNOSIS — D51.8 OTHER VITAMIN B12 DEFICIENCY ANEMIAS: ICD-10-CM

## 2021-11-19 DIAGNOSIS — D50.0 IRON DEFICIENCY ANEMIA DUE TO CHRONIC BLOOD LOSS: Primary | ICD-10-CM

## 2021-11-19 DIAGNOSIS — D75.838 REACTIVE THROMBOCYTOSIS: ICD-10-CM

## 2021-11-19 DIAGNOSIS — E55.9 VITAMIN D DEFICIENCY: ICD-10-CM

## 2021-12-03 ENCOUNTER — LAB VISIT (OUTPATIENT)
Dept: LAB | Facility: HOSPITAL | Age: 56
End: 2021-12-03
Attending: INTERNAL MEDICINE
Payer: MEDICARE

## 2021-12-03 DIAGNOSIS — E55.9 VITAMIN D DEFICIENCY: ICD-10-CM

## 2021-12-03 DIAGNOSIS — D50.0 IRON DEFICIENCY ANEMIA DUE TO CHRONIC BLOOD LOSS: ICD-10-CM

## 2021-12-03 DIAGNOSIS — D51.8 OTHER VITAMIN B12 DEFICIENCY ANEMIAS: ICD-10-CM

## 2021-12-03 DIAGNOSIS — D75.838 REACTIVE THROMBOCYTOSIS: ICD-10-CM

## 2021-12-03 PROCEDURE — 85025 COMPLETE CBC W/AUTO DIFF WBC: CPT | Mod: HCNC,PO | Performed by: INTERNAL MEDICINE

## 2021-12-03 PROCEDURE — 82306 VITAMIN D 25 HYDROXY: CPT | Mod: HCNC | Performed by: INTERNAL MEDICINE

## 2021-12-03 PROCEDURE — 82607 VITAMIN B-12: CPT | Mod: HCNC | Performed by: INTERNAL MEDICINE

## 2021-12-03 PROCEDURE — 36415 COLL VENOUS BLD VENIPUNCTURE: CPT | Mod: HCNC,PN | Performed by: INTERNAL MEDICINE

## 2021-12-03 PROCEDURE — 84466 ASSAY OF TRANSFERRIN: CPT | Mod: HCNC | Performed by: INTERNAL MEDICINE

## 2021-12-03 PROCEDURE — 82728 ASSAY OF FERRITIN: CPT | Mod: HCNC | Performed by: INTERNAL MEDICINE

## 2021-12-04 DIAGNOSIS — R73.03 PREDIABETES: ICD-10-CM

## 2021-12-04 LAB
25(OH)D3+25(OH)D2 SERPL-MCNC: 10 NG/ML (ref 30–96)
FERRITIN SERPL-MCNC: 140 NG/ML (ref 20–300)
IRON SERPL-MCNC: 76 UG/DL (ref 45–160)
SATURATED IRON: 22 % (ref 20–50)
TOTAL IRON BINDING CAPACITY: 348 UG/DL (ref 250–450)
TRANSFERRIN SERPL-MCNC: 235 MG/DL (ref 200–375)
VIT B12 SERPL-MCNC: <148 PG/ML (ref 210–950)

## 2021-12-06 ENCOUNTER — TELEPHONE (OUTPATIENT)
Dept: HEMATOLOGY/ONCOLOGY | Facility: CLINIC | Age: 56
End: 2021-12-06
Payer: MEDICARE

## 2021-12-06 ENCOUNTER — LAB VISIT (OUTPATIENT)
Dept: LAB | Facility: HOSPITAL | Age: 56
End: 2021-12-06
Payer: MEDICARE

## 2021-12-06 DIAGNOSIS — D50.0 IRON DEFICIENCY ANEMIA DUE TO CHRONIC BLOOD LOSS: Primary | ICD-10-CM

## 2021-12-06 DIAGNOSIS — D75.838 REACTIVE THROMBOCYTOSIS: ICD-10-CM

## 2021-12-06 DIAGNOSIS — D50.0 IRON DEFICIENCY ANEMIA DUE TO CHRONIC BLOOD LOSS: ICD-10-CM

## 2021-12-06 DIAGNOSIS — E55.9 VITAMIN D DEFICIENCY: ICD-10-CM

## 2021-12-06 LAB
BASOPHILS # BLD AUTO: 0.06 K/UL (ref 0–0.2)
BASOPHILS NFR BLD: 0.4 % (ref 0–1.9)
DIFFERENTIAL METHOD: ABNORMAL
EOSINOPHIL # BLD AUTO: 0.1 K/UL (ref 0–0.5)
EOSINOPHIL NFR BLD: 0.5 % (ref 0–8)
ERYTHROCYTE [DISTWIDTH] IN BLOOD BY AUTOMATED COUNT: 15 % (ref 11.5–14.5)
HCT VFR BLD AUTO: 42.6 % (ref 40–54)
HGB BLD-MCNC: 14.8 G/DL (ref 14–18)
IMM GRANULOCYTES # BLD AUTO: 0.08 K/UL (ref 0–0.04)
IMM GRANULOCYTES NFR BLD AUTO: 0.5 % (ref 0–0.5)
LYMPHOCYTES # BLD AUTO: 2.3 K/UL (ref 1–4.8)
LYMPHOCYTES NFR BLD: 14.8 % (ref 18–48)
MCH RBC QN AUTO: 31.5 PG (ref 27–31)
MCHC RBC AUTO-ENTMCNC: 34.7 G/DL (ref 32–36)
MCV RBC AUTO: 91 FL (ref 82–98)
MONOCYTES # BLD AUTO: 1.6 K/UL (ref 0.3–1)
MONOCYTES NFR BLD: 10 % (ref 4–15)
NEUTROPHILS # BLD AUTO: 11.6 K/UL (ref 1.8–7.7)
NEUTROPHILS NFR BLD: 73.8 % (ref 38–73)
NRBC BLD-RTO: 0 /100 WBC
PLATELET # BLD AUTO: 389 K/UL (ref 150–450)
PMV BLD AUTO: 9.4 FL (ref 9.2–12.9)
RBC # BLD AUTO: 4.7 M/UL (ref 4.6–6.2)
WBC # BLD AUTO: 15.76 K/UL (ref 3.9–12.7)

## 2021-12-06 PROCEDURE — 36415 COLL VENOUS BLD VENIPUNCTURE: CPT | Mod: HCNC,PN | Performed by: INTERNAL MEDICINE

## 2021-12-06 PROCEDURE — 83921 ORGANIC ACID SINGLE QUANT: CPT | Mod: HCNC | Performed by: INTERNAL MEDICINE

## 2021-12-06 RX ORDER — METFORMIN HYDROCHLORIDE 500 MG/1
TABLET, EXTENDED RELEASE ORAL
Qty: 90 TABLET | Refills: 0 | Status: ON HOLD | OUTPATIENT
Start: 2021-12-06 | End: 2022-01-14 | Stop reason: HOSPADM

## 2021-12-07 ENCOUNTER — OFFICE VISIT (OUTPATIENT)
Dept: HEMATOLOGY/ONCOLOGY | Facility: CLINIC | Age: 56
End: 2021-12-07
Payer: MEDICARE

## 2021-12-07 ENCOUNTER — PATIENT MESSAGE (OUTPATIENT)
Dept: HEMATOLOGY/ONCOLOGY | Facility: CLINIC | Age: 56
End: 2021-12-07

## 2021-12-07 VITALS
WEIGHT: 247.81 LBS | BODY MASS INDEX: 35.48 KG/M2 | TEMPERATURE: 98 F | HEART RATE: 71 BPM | RESPIRATION RATE: 18 BRPM | DIASTOLIC BLOOD PRESSURE: 75 MMHG | HEIGHT: 70 IN | OXYGEN SATURATION: 98 % | SYSTOLIC BLOOD PRESSURE: 149 MMHG

## 2021-12-07 DIAGNOSIS — D50.0 IRON DEFICIENCY ANEMIA DUE TO CHRONIC BLOOD LOSS: Primary | ICD-10-CM

## 2021-12-07 PROCEDURE — 4010F ACE/ARB THERAPY RXD/TAKEN: CPT | Mod: HCNC,CPTII,S$GLB, | Performed by: INTERNAL MEDICINE

## 2021-12-07 PROCEDURE — 99213 OFFICE O/P EST LOW 20 MIN: CPT | Mod: HCNC,S$GLB,, | Performed by: INTERNAL MEDICINE

## 2021-12-07 PROCEDURE — 99999 PR PBB SHADOW E&M-EST. PATIENT-LVL V: ICD-10-PCS | Mod: PBBFAC,HCNC,, | Performed by: INTERNAL MEDICINE

## 2021-12-07 PROCEDURE — 99999 PR PBB SHADOW E&M-EST. PATIENT-LVL V: CPT | Mod: PBBFAC,HCNC,, | Performed by: INTERNAL MEDICINE

## 2021-12-07 PROCEDURE — 4010F PR ACE/ARB THEARPY RXD/TAKEN: ICD-10-PCS | Mod: HCNC,CPTII,S$GLB, | Performed by: INTERNAL MEDICINE

## 2021-12-07 PROCEDURE — 99213 PR OFFICE/OUTPT VISIT, EST, LEVL III, 20-29 MIN: ICD-10-PCS | Mod: HCNC,S$GLB,, | Performed by: INTERNAL MEDICINE

## 2021-12-07 RX ORDER — LAMOTRIGINE 100 MG/1
150 TABLET ORAL DAILY
COMMUNITY
Start: 2021-11-24 | End: 2022-02-02 | Stop reason: CLARIF

## 2021-12-10 LAB — METHYLMALONATE SERPL-SCNC: 0.31 UMOL/L

## 2021-12-29 ENCOUNTER — OFFICE VISIT (OUTPATIENT)
Dept: FAMILY MEDICINE | Facility: CLINIC | Age: 56
End: 2021-12-29
Payer: MEDICARE

## 2021-12-29 VITALS
WEIGHT: 248.25 LBS | BODY MASS INDEX: 34.75 KG/M2 | SYSTOLIC BLOOD PRESSURE: 156 MMHG | HEIGHT: 71 IN | RESPIRATION RATE: 18 BRPM | DIASTOLIC BLOOD PRESSURE: 92 MMHG | HEART RATE: 80 BPM

## 2021-12-29 DIAGNOSIS — I35.8 AORTIC VALVE SCLEROSIS: ICD-10-CM

## 2021-12-29 DIAGNOSIS — E66.9 CLASS 1 OBESITY WITH SERIOUS COMORBIDITY AND BODY MASS INDEX (BMI) OF 32.0 TO 32.9 IN ADULT, UNSPECIFIED OBESITY TYPE: ICD-10-CM

## 2021-12-29 DIAGNOSIS — F31.9 BIPOLAR DISORDER, CURRENT CONDITION NOT SPECIFIED AS EITHER MANIC OR DEPRESSIVE: ICD-10-CM

## 2021-12-29 DIAGNOSIS — D33.9: ICD-10-CM

## 2021-12-29 DIAGNOSIS — F20.9 SCHIZOPHRENIA, UNSPECIFIED TYPE: ICD-10-CM

## 2021-12-29 DIAGNOSIS — G43.719 INTRACTABLE CHRONIC MIGRAINE WITHOUT AURA AND WITHOUT STATUS MIGRAINOSUS: Primary | ICD-10-CM

## 2021-12-29 DIAGNOSIS — G47.33 OSA ON CPAP: ICD-10-CM

## 2021-12-29 DIAGNOSIS — J44.9 CHRONIC OBSTRUCTIVE PULMONARY DISEASE, UNSPECIFIED COPD TYPE: ICD-10-CM

## 2021-12-29 DIAGNOSIS — E11.49 TYPE II DIABETES MELLITUS WITH NEUROLOGICAL MANIFESTATIONS: ICD-10-CM

## 2021-12-29 DIAGNOSIS — Z72.0 NICOTINE ABUSE: ICD-10-CM

## 2021-12-29 DIAGNOSIS — F41.9 ANXIETY: ICD-10-CM

## 2021-12-29 DIAGNOSIS — I25.10 CAD IN NATIVE ARTERY: ICD-10-CM

## 2021-12-29 DIAGNOSIS — L30.9 ECZEMA OF SCALP: ICD-10-CM

## 2021-12-29 DIAGNOSIS — I10 UNCONTROLLED HYPERTENSION: ICD-10-CM

## 2021-12-29 PROCEDURE — 1159F MED LIST DOCD IN RCRD: CPT | Mod: HCNC,CPTII,S$GLB, | Performed by: INTERNAL MEDICINE

## 2021-12-29 PROCEDURE — 99999 PR PBB SHADOW E&M-EST. PATIENT-LVL IV: ICD-10-PCS | Mod: PBBFAC,HCNC,, | Performed by: INTERNAL MEDICINE

## 2021-12-29 PROCEDURE — 3077F SYST BP >= 140 MM HG: CPT | Mod: HCNC,CPTII,S$GLB, | Performed by: INTERNAL MEDICINE

## 2021-12-29 PROCEDURE — 99215 OFFICE O/P EST HI 40 MIN: CPT | Mod: HCNC,S$GLB,, | Performed by: INTERNAL MEDICINE

## 2021-12-29 PROCEDURE — 3077F PR MOST RECENT SYSTOLIC BLOOD PRESSURE >= 140 MM HG: ICD-10-PCS | Mod: HCNC,CPTII,S$GLB, | Performed by: INTERNAL MEDICINE

## 2021-12-29 PROCEDURE — 3008F PR BODY MASS INDEX (BMI) DOCUMENTED: ICD-10-PCS | Mod: HCNC,CPTII,S$GLB, | Performed by: INTERNAL MEDICINE

## 2021-12-29 PROCEDURE — 99417 PR PROLONGED SVC, OUTPT, W/WO DIRECT PT CONTACT,  EA ADDTL 15 MIN: ICD-10-PCS | Mod: HCNC,S$GLB,, | Performed by: INTERNAL MEDICINE

## 2021-12-29 PROCEDURE — 3044F HG A1C LEVEL LT 7.0%: CPT | Mod: HCNC,CPTII,S$GLB, | Performed by: INTERNAL MEDICINE

## 2021-12-29 PROCEDURE — 99417 PROLNG OP E/M EACH 15 MIN: CPT | Mod: HCNC,S$GLB,, | Performed by: INTERNAL MEDICINE

## 2021-12-29 PROCEDURE — 99215 PR OFFICE/OUTPT VISIT, EST, LEVL V, 40-54 MIN: ICD-10-PCS | Mod: HCNC,S$GLB,, | Performed by: INTERNAL MEDICINE

## 2021-12-29 PROCEDURE — 99999 PR PBB SHADOW E&M-EST. PATIENT-LVL IV: CPT | Mod: PBBFAC,HCNC,, | Performed by: INTERNAL MEDICINE

## 2021-12-29 PROCEDURE — 1159F PR MEDICATION LIST DOCUMENTED IN MEDICAL RECORD: ICD-10-PCS | Mod: HCNC,CPTII,S$GLB, | Performed by: INTERNAL MEDICINE

## 2021-12-29 PROCEDURE — 3080F PR MOST RECENT DIASTOLIC BLOOD PRESSURE >= 90 MM HG: ICD-10-PCS | Mod: HCNC,CPTII,S$GLB, | Performed by: INTERNAL MEDICINE

## 2021-12-29 PROCEDURE — 1160F PR REVIEW ALL MEDS BY PRESCRIBER/CLIN PHARMACIST DOCUMENTED: ICD-10-PCS | Mod: HCNC,CPTII,S$GLB, | Performed by: INTERNAL MEDICINE

## 2021-12-29 PROCEDURE — 3008F BODY MASS INDEX DOCD: CPT | Mod: HCNC,CPTII,S$GLB, | Performed by: INTERNAL MEDICINE

## 2021-12-29 PROCEDURE — 3080F DIAST BP >= 90 MM HG: CPT | Mod: HCNC,CPTII,S$GLB, | Performed by: INTERNAL MEDICINE

## 2021-12-29 PROCEDURE — 3044F PR MOST RECENT HEMOGLOBIN A1C LEVEL <7.0%: ICD-10-PCS | Mod: HCNC,CPTII,S$GLB, | Performed by: INTERNAL MEDICINE

## 2021-12-29 PROCEDURE — 1160F RVW MEDS BY RX/DR IN RCRD: CPT | Mod: HCNC,CPTII,S$GLB, | Performed by: INTERNAL MEDICINE

## 2021-12-29 RX ORDER — KETOCONAZOLE 20 MG/ML
SHAMPOO, SUSPENSION TOPICAL
Qty: 120 ML | Refills: 2 | Status: SHIPPED | OUTPATIENT
Start: 2021-12-30 | End: 2022-09-16

## 2021-12-29 RX ORDER — METOPROLOL SUCCINATE 25 MG/1
25 TABLET, EXTENDED RELEASE ORAL DAILY
Qty: 30 TABLET | Refills: 2 | Status: SHIPPED | OUTPATIENT
Start: 2021-12-29 | End: 2022-01-01

## 2021-12-29 RX ORDER — RIMEGEPANT SULFATE 75 MG/75MG
75 TABLET, ORALLY DISINTEGRATING ORAL ONCE AS NEEDED
Qty: 12 TABLET | Refills: 0 | Status: SHIPPED | OUTPATIENT
Start: 2021-12-29 | End: 2021-12-29

## 2021-12-29 NOTE — PATIENT INSTRUCTIONS
Uncontrolled hypertension; Maintain < 2 Gm Na a day diet, and monitor BP at home; keep a log. Add metoprolol succinate 25 mg today and every morning. To ER if dizziness unimproved. .Maintain < 2 Gm Na a day diet, and monitor BP at home; keep a log.  -     metoprolol succinate (TOPROL-XL) 25 MG 24 hr tablet; Take 1 tablet (25 mg total) by mouth once daily.  Dispense: 30 tablet; Refill: 2  -     Ambulatory referral/consult to Neurology; Future; Expected date: 12/30/2021    Chronic intractable headache, unspecified headache type; stop Goody's powder; no NSAID agents. Also w Migraine HA's. Associated w n/v at times. Tylenol otc for pain   -     Ambulatory referral/consult to Neurology; Future; Expected date: 12/30/2021    Benign CNS tumor  -     Ambulatory referral/consult to Neurology; Future; Expected date: 12/30/2021    Type II diabetes mellitus with neurological manifestations; Maintain a low carb diet; monitor CBG's at home; keep a log for review.     Eczema of scalp  -     ketoconazole (NIZORAL) 2 % shampoo; Apply topically twice a week.  Dispense: 120 mL; Refill: 2    Migraine Headache: nurtec odt 75 mg w HA; may repeat x1 in 48 hrs. Neurology consult.

## 2021-12-29 NOTE — PROGRESS NOTES
Subjective:       Patient ID: Ryan Craen is a 56 y.o. male.    Chief Complaint: Headache (Reoccuring headaches and dizziness. Blurry vision when dizziness occurs)    HPI Seeing patient today for his PCP Dr. Laura Patel     Patient with background history of hypertension and diabetes along with obstructive sleep apnea but not using a CPAP mask.  Off CPAP for 3 years now needs to resume.  To call his pulmonologist about his CPAP and supplies.. Coronary artery disease with COPD and history of asthma. Obesity with BMI 32.81. History of CVA and MI.       Patient with a history of anxiety and bipolar along with a history of schizophrenia.  Presents with complaints of by temporal and occipital headaches hard to get a definite period of time that this has been bothering him.  But has migraines began around 20 years ago averaging about 2-3 per week.  Past medical history includes headaches; gives a history of daily headaches 8-10 migraines per month now; light and sound both not pleasurable to him and 10-14 hours of sleep helps.  Some nausea and vomiting noted.. No aura noted before the headaches.  He notes slight dizziness today; no sensory or motor deficits noted; no speech problems or problems swallowing. He is taking Goody's Powder for around 4 years on a chronic basis.  For preventative agent; he has tried Cymbalta without success, and the potentials there for raising his blood pressure. Patient is also on pregabalin of note.  Will need to be weaned off Goody's Powder, particularly in lieu of his history of coronary artery disease and hypertension; as well as his anxiety; also has been advised to limit his caffeine intake.  His not had success with muscle skeletal relaxant or NSAID agents.  Will need to avoid triptans due to his history of coronary artery disease, hypertension and CVA.  Willing to trying Nurtec for his migraines.  Have also recommended extra-strength Tylenol over-the-counter as well for pain  relief     Would not administer any steroids at this point with patient history of diabetes mellitus as well as anxiety, bipolar disorder, and schizophrenia.  Also with coronary artery disease and hypertension.   For blood pressure management, he is on amlodipine-valsartan 10/320 at 1 a day.  Blood pressure at home has been in the 150s over 90s.  Will add metoprolol succinate 25 mg 1 per day for better blood pressure control.  Here today manually he is 156/92 with pulse 80; resuming CPAP therapy can help as well with better blood pressure control in may help his headaches as well..     Was diagnosed with 2 benign small right-sided brain tumors by patient's account around 4 years ago with an MRI at that time in [place], however since then he claims to have had metal clamps placed in his buttocks which would preclude follow-up MRI head; 09/10/2021 CT the head without contrast has already been ordered by Dr. Becerril and is pending.  Will ask that this be obtained as soon as possible; along with a neurology consult; mother also with a history of brain aneurysm.      History of anxiety/bipolar/schizophrenia:  Psych medications include patient is on BuSpar 15 mg t.i.d. around the clock, on Trileptal generic, and on Geodon generic.  Have recommended that he call his psychiatrist Dr. Christianson with [facility] clinic for further evaluation and treatment; and go over his medications as well.  Have also recommended calling his pulmonologist to obtain his CPAP supplies as not only will this help his blood pressure but may help his headache.  Will ask at neurology consult be obtained as soon as possible as well as CT of his head.     COPD:  Followed by Pulmonary service.  Past medical history also includes asthma.  Has albuterol rescue as both nebulizer and inhaler to use if needed as well as budesonide which he is to take daily at 0.5 mg.     Nicotine abuse:  1 pack per day for 45 years; unmotivated to quit.   "Presently down to 1 pack per 2 days.      Total time 3:43 p.m. through 5:08 p.m..  Extensive time spent with patient obtaining his history and going over various diagnosis and including plan of care.  Medications reviewed addressed and prescribed.  Please see plan of care below for additional diagnosis is addressed    Review of Systems   Constitutional: Negative for appetite change and unexpected weight change.   HENT: Negative for congestion, postnasal drip, rhinorrhea and sinus pressure.         Denies seasonal allergies, or perennial allergies   Eyes: Negative for discharge and itching.   Respiratory: Negative for cough, chest tightness, shortness of breath and wheezing.    Cardiovascular: Negative for chest pain, palpitations and leg swelling.   Gastrointestinal: Negative for abdominal pain, blood in stool, constipation, diarrhea, nausea and vomiting.   Endocrine: Negative for polydipsia, polyphagia and polyuria.   Genitourinary: Negative for dysuria and hematuria.   Musculoskeletal: Negative for arthralgias and myalgias.   Skin: Negative for rash.   Allergic/Immunologic: Negative for environmental allergies and food allergies.   Neurological: Negative for tremors, seizures and headaches.   Hematological: Negative for adenopathy. Does not bruise/bleed easily.   Psychiatric/Behavioral: Positive for dysphoric mood. The patient is not nervous/anxious.         Managed by Dr Christianson psych.       Objective:        Vitals:    12/29/21 1430   BP: (!) 156/92   BP Location: Right arm   Patient Position: Sitting   BP Method: Large (Manual)   Pulse: 80   Resp: 18   Weight: 112.6 kg (248 lb 3.8 oz)   Height: 5' 11" (1.803 m)       BMI Readings from Last 3 Encounters:   12/29/21 34.62 kg/m²   12/07/21 35.56 kg/m²   09/10/21 34.86 kg/m²        Wt Readings from Last 3 Encounters:   01/14/22 0500 106.7 kg (235 lb 3.7 oz)   01/13/22 0100 111 kg (244 lb 11.4 oz)   01/12/22 0746 110.2 kg (243 lb)   01/12/22 0258 110.6 kg (243 lb 13.3 " oz)   01/11/22 1525 112.2 kg (247 lb 5.7 oz)   12/30/21 1002 112.2 kg (247 lb 7.5 oz)   12/29/21 1430 112.6 kg (248 lb 3.8 oz)        BP Readings from Last 3 Encounters:   01/14/22 136/82   12/30/21 (!) 179/83   12/29/21 (!) 156/92        There are no preventive care reminders to display for this patient.     Health Maintenance Due   Topic Date Due    Hepatitis C Screening  Never done    Diabetes Urine Screening  Never done    COVID-19 Vaccine (1) Never done    Foot Exam  Never done    High Dose Statin  Never done    Shingles Vaccine (1 of 2) Never done    Eye Exam  06/24/2021    Influenza Vaccine (1) 09/01/2021         Past Medical History:   Diagnosis Date    Acute angina     Asthma     COPD (chronic obstructive pulmonary disease)     Diabetes mellitus     Digestive disorder     ulcers    General anesthetics causing adverse effect in therapeutic use     Headache     Hypertension     MI, old     PIPER on CPAP     Schizophrenia     Smoker unmotivated to quit     Stroke 2005    Thyroid disease     took medicine in the past    Type II diabetes mellitus with neurological manifestations 11/21/2019       Past Surgical History:   Procedure Laterality Date    APPENDECTOMY      BONE MARROW ASPIRATION Left 8/21/2020    Procedure: ASPIRATION, BONE MARROW;  Surgeon: Lex Kathleen MD;  Location: [facility] OR;  Service: Oncology;  Laterality: Left;    CLOSURE OF WOUND Right 9/9/2019    Procedure: CLOSURE, WOUND;  Surgeon: Li Kathleen DPM;  Location: [facility] OR;  Service: Podiatry;  Laterality: Right;    COLONOSCOPY N/A 12/10/2019    Procedure: COLONOSCOPY;  Surgeon: Ryan Lucas MD;  Location: [facility];  Service: Endoscopy;  Laterality: N/A;    COLONOSCOPY N/A 12/13/2019    Procedure: COLONOSCOPY;  Surgeon: Ryan Lucas MD;  Location: [facility];  Service: Endoscopy;  Laterality: N/A;    ESOPHAGOGASTRODUODENOSCOPY N/A 12/10/2019    Procedure: EGD (ESOPHAGOGASTRODUODENOSCOPY);  Surgeon: Ryan  USHA Lucas MD;  Location: [facility];  Service: Endoscopy;  Laterality: N/A;    SHOULDER ARTHROSCOPY Left        Social History     Tobacco Use    Smoking status: Current Every Day Smoker     Packs/day: 1.00     Years: 45.00     Pack years: 45.00     Types: Cigarettes    Smokeless tobacco: Never Used    Tobacco comment: Age Started 14    Substance Use Topics    Alcohol use: No    Drug use: Not Currently     Types: Marijuana       Family History   Problem Relation Age of Onset    Melanoma Mother     Diabetes Mother     Hypertension Mother     Stroke Father     Diabetes Father     Hypertension Father     Colon cancer Father         unsure of age of diagnosis    Cancer Father         colon cancer    Cervical cancer Sister     Cirrhosis Brother     Hypertension Brother     Lung cancer Maternal Grandmother     Prostate cancer Maternal Grandfather     Crohn's disease Neg Hx     Ulcerative colitis Neg Hx     Stomach cancer Neg Hx     Esophageal cancer Neg Hx     Retinal detachment Neg Hx     Strabismus Neg Hx     Macular degeneration Neg Hx     Glaucoma Neg Hx        Review of patient's allergies indicates:   Allergen Reactions    Alcohol Anaphylaxis    Alcohol antiseptic pads Anaphylaxis    Cephalexin Anaphylaxis       Current Outpatient Medications on File Prior to Visit   Medication Sig Dispense Refill    albuterol (PROVENTIL) 2.5 mg /3 mL (0.083 %) nebulizer solution Take 3 mLs (2.5 mg total) by nebulization every 6 (six) hours as needed for Shortness of Breath. Rescue 1 Box 0    albuterol (PROVENTIL/VENTOLIN HFA) 90 mcg/actuation inhaler INHALE 2 PUFFS BY MOUTH AND INTO THE LUNGS EVERY 6 HOURS AS NEEDED FOR WHEEZING (Patient taking differently: Inhale 2 puffs into the lungs every 6 (six) hours as needed for Wheezing.) 18 g 11    aspirin (ECOTRIN) 325 MG EC tablet Take 1 tablet (325 mg total) by mouth once daily.  0    budesonide (PULMICORT) 0.5 mg/2 mL nebulizer solution USE 2 ML(0.5  MG) VIA NEBULIZER EVERY DAY (Patient taking differently: Take 0.5 mg by nebulization as needed (shortness of breath).) 180 mL 0    busPIRone (BUSPAR) 15 MG tablet Take 15 mg by mouth 3 (three) times daily.       lamoTRIgine (LAMICTAL) 100 MG tablet Take 150 mg by mouth once daily.      OXcarbazepine (TRILEPTAL) 600 MG Tab Take 600 mg by mouth 3 (three) times daily.      pregabalin (LYRICA) 50 MG capsule TAKE 1 CAPSULE(50 MG) BY MOUTH THREE TIMES DAILY (Patient taking differently: Take 50 mg by mouth 3 (three) times daily. TAKE 1 CAPSULE(50 MG) BY MOUTH THREE TIMES DAILY) 90 capsule 3    tadalafiL (CIALIS) 20 MG Tab Take 1 tablet (20 mg total) by mouth prior to sexual activity, not to exceed more than 1 tablet in 72 hours. 10 tablet 11    ziprasidone (GEODON) 80 MG capsule Take 160 mg by mouth every evening.       nitroGLYCERIN (NITROSTAT) 0.4 MG SL tablet PLACE 1 TABLET BY MOUTH UNDER THE TONGUE EVERY 5 MINUTES AS NEEDED FOR CHEST PAIN (Patient taking differently: Place 0.4 mg under the tongue every 5 (five) minutes as needed for Chest pain. PLACE 1 TABLET BY MOUTH UNDER THE TONGUE EVERY 5 MINUTES AS NEEDED FOR CHEST PAIN) 25 tablet 0     Current Facility-Administered Medications on File Prior to Visit   Medication Dose Route Frequency Provider Last Rate Last Admin    cyanocobalamin injection 1,000 mcg  1,000 mcg Intramuscular Q30 Days Laura Becerril MD        lactated ringers infusion   Intravenous Continuous Sheela Nuno MD 10 mL/hr at 08/21/20 0655 New Bag at 08/21/20 0655    lidocaine (PF) 10 mg/ml (1%) injection 10 mg  1 mL Intradermal Once Sheela Nuno MD           Physical Exam  Vitals reviewed.   Constitutional:       Appearance: He is well-developed and well-nourished.   HENT:      Head: Normocephalic and atraumatic.   Eyes:      Extraocular Movements: EOM normal.   Neck:      Thyroid: No thyromegaly.      Vascular: No carotid bruit.   Cardiovascular:      Rate and Rhythm: Normal  rate and regular rhythm.      Heart sounds: Normal heart sounds. No murmur heard.  No gallop.       Comments: ARPITA: 3-4/6 aortic; 2/6 LLSB; 3/6 apex. Hx of AV sclerosis; AI/MR  Pulmonary:      Effort: Pulmonary effort is normal. No respiratory distress.      Breath sounds: Normal breath sounds. No wheezing or rales.   Abdominal:      General: Bowel sounds are normal. There is no distension.      Palpations: Abdomen is soft.      Tenderness: There is no abdominal tenderness. There is no guarding or rebound.   Musculoskeletal:         General: No edema. Normal range of motion.      Cervical back: Normal range of motion and neck supple.      Right lower leg: No edema.      Left lower leg: No edema.      Comments: CN's II-XII gr intact.    Lymphadenopathy:      Cervical: No cervical adenopathy.   Skin:     Findings: No rash.      Comments: Dry scalp.   Neurological:      Mental Status: He is alert and oriented to person, place, and time.      Comments: Moves all 4 extremities fine.   Psychiatric:         Mood and Affect: Mood and affect normal.         Behavior: Behavior normal.         Thought Content: Thought content normal.         Assessment:       1. Intractable chronic migraine without aura and without status migrainosus    2. Benign CNS tumor    3. Uncontrolled hypertension    4. PIPER on CPAP    5. Type II diabetes mellitus with neurological manifestations    6. Anxiety    7. Bipolar disorder, current condition not specified as either manic or depressive    8. Schizophrenia, unspecified type    9. CAD in native artery - moderate 3V 60% by Pt history    10. Aortic valve sclerosis    11. Nicotine abuse    12. Chronic obstructive pulmonary disease, unspecified COPD type    13. Class 1 obesity with serious comorbidity and body mass index (BMI) of 32.0 to 32.9 in adult, unspecified obesity type    14. Eczema of scalp        Plan:       Chronic intractable migraine headache, without aura; not w status migrainus; wean off  to stop Goody's powder; no NSAID agents.  Migraine associated w n/v at times. Tylenol XS otc for mild pain; limit caffeine intake in the interim with eventually weaning off caffeine altogether.  Prescribed nurtec odt 75 mg w HA; may repeat x1 in 48 hrs. Neurology consult.  As soon as possible.  CT of the head ordered previously 09/10/2021 by Dr. Becerril asked to be obtained as soon as possible as stat   -     Ambulatory referral/consult to Neurology; Future; Expected date: 12/30/2021; as soon as possible    Benign CNS tumor; 2 right-sided small benign tumors diagnosed in [place] about 4 years ago  by MRI by patient's account; since then patient has had metal clamps placed in his buttocks which preclude follow-up MRI of his head; 09/10/2021 CT the head without contrast ordered by Dr. Becerril still pending; have asked that be obtained as a stat CT of the head, as well as his neurology consult  -     Ambulatory referral/consult to Neurology; Future; Expected date: 12/30/2021    Uncontrolled hypertension; Maintain < 2 Gm Na a day diet, and monitor BP at home; keep a log.  Wait several minutes before checking his blood pressure once seated. Add metoprolol succinate 25 mg today and every morning. To ER if dizziness unimproved. .Maintain < 2 Gm Na a day diet, and monitor BP at home; keep a log.  -     metoprolol succinate (TOPROL-XL) 25 MG 24 hr tablet; Take 1 tablet (25 mg total) by mouth once daily.  Dispense: 30 tablet; Refill: 2  -     Ambulatory referral/consult to Neurology; Future; Expected date: 12/30/2021    Obstructive sleep apnea on CPAP:  Have recommended he contact his pulmonologist to see about obtaining new supplies as this could help both his headaches and his blood pressure management    Type II diabetes mellitus with neurological manifestations; Maintain a low carb diet; monitor CBG's at home; keep a log for review.     Anxiety:  On BuSpar 15 mg t.i.d. around the clock; also on Trileptal; stress  reduction and limit caffeine intake.    Bipolar, type unspecified/history of schizophrenia:  On Trileptal generic and Geodon generic.  Sees a psychiatrist Dr. Christianson at [facility].  Have asked patient to call his psychiatrist about his persistent migraine headaches to see if medications need to be adjusted    Coronary artery disease in native artery:  History of old myocardial infarction.  Moderate 3 vessel 60% by patient; has been followed by .  History of angina of effort.  Has been apparently stable.    Aortic valve sclerosis:  Followed by Cardiology.    Nicotine abuse:  1 pack per day for 45 years; unmotivated to quit.  To wean off on own.  At presently 1 pack every 2 days.    COPD:  Also with a history of asthma in the past. Has albuterol nebulizer and meter dose inhaler for rescue if needed; has budesonide to add his nebulizer treatments if needed as well.  Followed by Pulmonary service as well.    Class 1 obesity:  BMI 32.81; low-fat low-salt diet with portion control and light exercise as tolerated will all help his weight come down    Eczema of scalp  -     ketoconazole (NIZORAL) 2 % shampoo; Apply topically twice a week.  Dispense: 120 mL; Refill: 2

## 2021-12-30 ENCOUNTER — OFFICE VISIT (OUTPATIENT)
Dept: NEUROLOGY | Facility: CLINIC | Age: 56
End: 2021-12-30
Payer: MEDICARE

## 2021-12-30 ENCOUNTER — PATIENT OUTREACH (OUTPATIENT)
Dept: ADMINISTRATIVE | Facility: OTHER | Age: 56
End: 2021-12-30
Payer: MEDICARE

## 2021-12-30 VITALS
SYSTOLIC BLOOD PRESSURE: 179 MMHG | BODY MASS INDEX: 34.64 KG/M2 | TEMPERATURE: 99 F | WEIGHT: 247.44 LBS | DIASTOLIC BLOOD PRESSURE: 83 MMHG | HEART RATE: 83 BPM | HEIGHT: 71 IN | RESPIRATION RATE: 17 BRPM

## 2021-12-30 DIAGNOSIS — R51.9 CHRONIC INTRACTABLE HEADACHE, UNSPECIFIED HEADACHE TYPE: ICD-10-CM

## 2021-12-30 DIAGNOSIS — D33.9: ICD-10-CM

## 2021-12-30 DIAGNOSIS — I10 ESSENTIAL HYPERTENSION: ICD-10-CM

## 2021-12-30 DIAGNOSIS — G43.719 INTRACTABLE CHRONIC MIGRAINE WITHOUT AURA AND WITHOUT STATUS MIGRAINOSUS: Primary | ICD-10-CM

## 2021-12-30 DIAGNOSIS — I10 UNCONTROLLED HYPERTENSION: ICD-10-CM

## 2021-12-30 DIAGNOSIS — I25.10 CAD IN NATIVE ARTERY: ICD-10-CM

## 2021-12-30 DIAGNOSIS — G89.29 CHRONIC INTRACTABLE HEADACHE, UNSPECIFIED HEADACHE TYPE: ICD-10-CM

## 2021-12-30 DIAGNOSIS — G44.40 MEDICATION OVERUSE HEADACHE: ICD-10-CM

## 2021-12-30 DIAGNOSIS — G47.33 OSA ON CPAP: ICD-10-CM

## 2021-12-30 PROCEDURE — 3008F BODY MASS INDEX DOCD: CPT | Mod: HCNC,CPTII,S$GLB, | Performed by: NURSE PRACTITIONER

## 2021-12-30 PROCEDURE — 3079F DIAST BP 80-89 MM HG: CPT | Mod: HCNC,CPTII,S$GLB, | Performed by: NURSE PRACTITIONER

## 2021-12-30 PROCEDURE — 3077F SYST BP >= 140 MM HG: CPT | Mod: HCNC,CPTII,S$GLB, | Performed by: NURSE PRACTITIONER

## 2021-12-30 PROCEDURE — 3077F PR MOST RECENT SYSTOLIC BLOOD PRESSURE >= 140 MM HG: ICD-10-PCS | Mod: HCNC,CPTII,S$GLB, | Performed by: NURSE PRACTITIONER

## 2021-12-30 PROCEDURE — 99205 OFFICE O/P NEW HI 60 MIN: CPT | Mod: HCNC,S$GLB,, | Performed by: NURSE PRACTITIONER

## 2021-12-30 PROCEDURE — 3044F HG A1C LEVEL LT 7.0%: CPT | Mod: HCNC,CPTII,S$GLB, | Performed by: NURSE PRACTITIONER

## 2021-12-30 PROCEDURE — 99205 PR OFFICE/OUTPT VISIT, NEW, LEVL V, 60-74 MIN: ICD-10-PCS | Mod: HCNC,S$GLB,, | Performed by: NURSE PRACTITIONER

## 2021-12-30 PROCEDURE — 1159F MED LIST DOCD IN RCRD: CPT | Mod: HCNC,CPTII,S$GLB, | Performed by: NURSE PRACTITIONER

## 2021-12-30 PROCEDURE — 99499 RISK ADDL DX/OHS AUDIT: ICD-10-PCS | Mod: S$GLB,,, | Performed by: NURSE PRACTITIONER

## 2021-12-30 PROCEDURE — 3008F PR BODY MASS INDEX (BMI) DOCUMENTED: ICD-10-PCS | Mod: HCNC,CPTII,S$GLB, | Performed by: NURSE PRACTITIONER

## 2021-12-30 PROCEDURE — 99999 PR PBB SHADOW E&M-EST. PATIENT-LVL V: CPT | Mod: PBBFAC,HCNC,, | Performed by: NURSE PRACTITIONER

## 2021-12-30 PROCEDURE — 1159F PR MEDICATION LIST DOCUMENTED IN MEDICAL RECORD: ICD-10-PCS | Mod: HCNC,CPTII,S$GLB, | Performed by: NURSE PRACTITIONER

## 2021-12-30 PROCEDURE — 99999 PR PBB SHADOW E&M-EST. PATIENT-LVL V: ICD-10-PCS | Mod: PBBFAC,HCNC,, | Performed by: NURSE PRACTITIONER

## 2021-12-30 PROCEDURE — 4010F ACE/ARB THERAPY RXD/TAKEN: CPT | Mod: HCNC,CPTII,S$GLB, | Performed by: NURSE PRACTITIONER

## 2021-12-30 PROCEDURE — 3079F PR MOST RECENT DIASTOLIC BLOOD PRESSURE 80-89 MM HG: ICD-10-PCS | Mod: HCNC,CPTII,S$GLB, | Performed by: NURSE PRACTITIONER

## 2021-12-30 PROCEDURE — 3044F PR MOST RECENT HEMOGLOBIN A1C LEVEL <7.0%: ICD-10-PCS | Mod: HCNC,CPTII,S$GLB, | Performed by: NURSE PRACTITIONER

## 2021-12-30 PROCEDURE — 99499 UNLISTED E&M SERVICE: CPT | Mod: S$GLB,,, | Performed by: NURSE PRACTITIONER

## 2021-12-30 PROCEDURE — 4010F PR ACE/ARB THEARPY RXD/TAKEN: ICD-10-PCS | Mod: HCNC,CPTII,S$GLB, | Performed by: NURSE PRACTITIONER

## 2021-12-30 RX ORDER — RIMEGEPANT SULFATE 75 MG/75MG
75 TABLET, ORALLY DISINTEGRATING ORAL DAILY PRN
Qty: 16 TABLET | Refills: 11 | Status: SHIPPED | OUTPATIENT
Start: 2021-12-30 | End: 2022-11-09

## 2021-12-31 DIAGNOSIS — I10 UNCONTROLLED HYPERTENSION: ICD-10-CM

## 2021-12-31 NOTE — TELEPHONE ENCOUNTER
No new care gaps identified.  Powered by [employer]. Reference number: [id number].   12/31/2021 8:05:46 AM CST

## 2022-01-01 RX ORDER — METOPROLOL SUCCINATE 25 MG/1
TABLET, EXTENDED RELEASE ORAL
Qty: 30 TABLET | Refills: 2 | Status: SHIPPED | OUTPATIENT
Start: 2022-01-01 | End: 2022-05-26 | Stop reason: SDUPTHER

## 2022-01-04 ENCOUNTER — TELEPHONE (OUTPATIENT)
Dept: PHARMACY | Facility: CLINIC | Age: 57
End: 2022-01-04
Payer: MEDICARE

## 2022-01-05 ENCOUNTER — TELEPHONE (OUTPATIENT)
Dept: NEUROSURGERY | Facility: CLINIC | Age: 57
End: 2022-01-05
Payer: MEDICARE

## 2022-01-05 DIAGNOSIS — G43.719 INTRACTABLE CHRONIC MIGRAINE WITHOUT AURA AND WITHOUT STATUS MIGRAINOSUS: Primary | ICD-10-CM

## 2022-01-10 ENCOUNTER — HOSPITAL ENCOUNTER (OUTPATIENT)
Dept: RADIOLOGY | Facility: HOSPITAL | Age: 57
Discharge: HOME | End: 2022-01-10
Attending: NEUROLOGICAL SURGERY
Payer: MEDICARE

## 2022-01-10 ENCOUNTER — PATIENT OUTREACH (OUTPATIENT)
Dept: ADMINISTRATIVE | Facility: HOSPITAL | Age: 57
End: 2022-01-10
Payer: MEDICARE

## 2022-01-10 DIAGNOSIS — G43.719 INTRACTABLE CHRONIC MIGRAINE WITHOUT AURA AND WITHOUT STATUS MIGRAINOSUS: ICD-10-CM

## 2022-01-10 PROCEDURE — 70470 CT HEAD WITH AND WITHOUT: ICD-10-PCS | Mod: 26,HCNC,, | Performed by: RADIOLOGY

## 2022-01-10 PROCEDURE — 70470 CT HEAD/BRAIN W/O & W/DYE: CPT | Mod: TC,HCNC,PO

## 2022-01-10 PROCEDURE — 25500020 PHARM REV CODE 255: Mod: HCNC,PO | Performed by: NEUROLOGICAL SURGERY

## 2022-01-10 PROCEDURE — 70470 CT HEAD/BRAIN W/O & W/DYE: CPT | Mod: 26,HCNC,, | Performed by: RADIOLOGY

## 2022-01-10 RX ADMIN — IOHEXOL 75 ML: 350 INJECTION, SOLUTION INTRAVENOUS at 02:01

## 2022-01-10 NOTE — PROGRESS NOTES
Digital medicine Day sign up Thursday Jan 13, 2022 at the [facility] 1-6:00 for patients eligible for either the DM or HTN program.    Left VM for patient to return call.  Will welcome patient to come in on Thursday to gather information, sign up, etc.  This patient is eligible for both the HTN and DM program.

## 2022-01-11 ENCOUNTER — OFFICE VISIT (OUTPATIENT)
Dept: NEUROSURGERY | Facility: CLINIC | Age: 57
End: 2022-01-11
Payer: MEDICARE

## 2022-01-11 ENCOUNTER — TELEPHONE (OUTPATIENT)
Dept: ADMINISTRATIVE | Facility: HOSPITAL | Age: 57
End: 2022-01-11
Payer: MEDICARE

## 2022-01-11 ENCOUNTER — TELEPHONE (OUTPATIENT)
Dept: NEUROSURGERY | Facility: CLINIC | Age: 57
End: 2022-01-11
Payer: MEDICARE

## 2022-01-11 DIAGNOSIS — D33.9: ICD-10-CM

## 2022-01-11 PROBLEM — E87.1 HYPONATREMIA: Status: ACTIVE | Noted: 2022-01-11

## 2022-01-11 PROBLEM — R55 SYNCOPE: Status: ACTIVE | Noted: 2022-01-11

## 2022-01-11 PROCEDURE — 99024 PR POST-OP FOLLOW-UP VISIT: ICD-10-PCS | Mod: HCNC,S$GLB,, | Performed by: NEUROLOGICAL SURGERY

## 2022-01-11 PROCEDURE — 99024 POSTOP FOLLOW-UP VISIT: CPT | Mod: HCNC,S$GLB,, | Performed by: NEUROLOGICAL SURGERY

## 2022-01-11 NOTE — TELEPHONE ENCOUNTER
----- Message from Bria Melgar sent at 1/10/2022  1:11 PM CST -----  Regarding: returning call  Contact: jennifer  Type:  Patient Returning Call    Who Called:  pt  Who Left Message for Patient:  tanner  Does the patient know what this is regarding?:  not sure  Best Call Back Number:  [phone]    Additional Information:  please call back

## 2022-01-11 NOTE — PROGRESS NOTES
"Mr. Crane was referred to us for evaluation of a prior history of two possible "small tumors on the right side of his brain" that he was told about in [place].    Upon arrival to clinic he informed us that he "passed out" in the parking lot.  Furthermore, on his way driving here he had sudden onset of blurry vision for which he had to stop and have his wife drive.  He says he currently feels "dizzy" and had several bouts of nausea     Vital signs:  O2 98%  RR 20  /62  T 97.9    AAOx4, NAD  MAEW    Given these acute changes we have called 911 for emergency transport the ER for general evaluation.    I discussed his CT head with and without contrast while awaiting arrival of the EMS services.  I do not see any evidence of brain tumors on his CT with contrast.  I told him that it is possible if he had MRI in the past that the higher resolution allowed identification of a subtle abnormality that MRI cannot pickup.    Given the CT results I do not feel that further evaluation with neurosurgery is needed at this time.  After his acute illness is addressed in the emergency room he can return for further workup with neurology and primary care.                  "

## 2022-01-13 PROBLEM — H53.453 PERIPHERAL VISUAL FIELD DEFECT, BILATERAL: Status: ACTIVE | Noted: 2022-01-13

## 2022-01-13 PROBLEM — E23.7 PITUITARY ABNORMALITY: Status: ACTIVE | Noted: 2022-01-13

## 2022-01-13 PROBLEM — R51.9 HEADACHE: Status: ACTIVE | Noted: 2021-12-30

## 2022-01-13 PROBLEM — G93.0 ARACHNOID CYST OF POSTERIOR CRANIAL FOSSA: Status: ACTIVE | Noted: 2022-01-13

## 2022-01-16 PROCEDURE — G0180 MD CERTIFICATION HHA PATIENT: HCPCS | Mod: ,,, | Performed by: FAMILY MEDICINE

## 2022-01-16 PROCEDURE — G0180 PR HOME HEALTH MD CERTIFICATION: ICD-10-PCS | Mod: ,,, | Performed by: FAMILY MEDICINE

## 2022-01-17 ENCOUNTER — TELEPHONE (OUTPATIENT)
Dept: FAMILY MEDICINE | Facility: CLINIC | Age: 57
End: 2022-01-17
Payer: MEDICARE

## 2022-01-17 DIAGNOSIS — I20.89 ANGINA OF EFFORT: ICD-10-CM

## 2022-01-17 DIAGNOSIS — I25.2 HISTORY OF MYOCARDIAL INFARCTION: ICD-10-CM

## 2022-01-17 DIAGNOSIS — I25.10 CAD IN NATIVE ARTERY: Primary | ICD-10-CM

## 2022-01-17 NOTE — TELEPHONE ENCOUNTER
Please schedule appointment for patient to see Cardiology Dr. Alicea to establish care with him in lieu of Dr. Merritt's retiring status.  An order has been placed

## 2022-01-20 NOTE — TELEPHONE ENCOUNTER
----- Message from Juve Arroyo sent at 1/20/2022  3:59 PM CST -----  Contact: Jalyn [phone]  Type: Patient Call Back         Who called: Jalyn yin/[facility] home health nurse          What is the request in detail:Pt has been experiencing stomach issues and would like to see if  can prescribe some Zofran          Can the clinic reply by [facility]?No         Would the patient rather a call back or a response via [facility]? Call back          Best call back number:[phone]         Additional Information:            Thank You

## 2022-01-20 NOTE — TELEPHONE ENCOUNTER
No new care gaps identified.  Powered by [employer]. Reference number: [id number].   1/20/2022 4:37:49 PM CST

## 2022-01-21 RX ORDER — ONDANSETRON 4 MG/1
4 TABLET, FILM COATED ORAL 2 TIMES DAILY
Qty: 20 TABLET | Refills: 0 | Status: SHIPPED | OUTPATIENT
Start: 2022-01-21 | End: 2022-08-04 | Stop reason: SDUPTHER

## 2022-01-22 PROBLEM — R11.2 INTRACTABLE VOMITING WITH NAUSEA: Status: ACTIVE | Noted: 2022-01-22

## 2022-01-23 PROBLEM — E66.811 CLASS 1 OBESITY DUE TO EXCESS CALORIES WITH SERIOUS COMORBIDITY AND BODY MASS INDEX (BMI) OF 33.0 TO 33.9 IN ADULT: Status: ACTIVE | Noted: 2022-01-23

## 2022-01-23 PROBLEM — E66.09 CLASS 1 OBESITY DUE TO EXCESS CALORIES WITH SERIOUS COMORBIDITY AND BODY MASS INDEX (BMI) OF 33.0 TO 33.9 IN ADULT: Status: ACTIVE | Noted: 2022-01-23

## 2022-01-24 ENCOUNTER — EXTERNAL HOME HEALTH (OUTPATIENT)
Dept: HOME HEALTH SERVICES | Facility: HOSPITAL | Age: 57
End: 2022-01-24
Payer: MEDICARE

## 2022-01-28 ENCOUNTER — OFFICE VISIT (OUTPATIENT)
Dept: CARDIOLOGY | Facility: CLINIC | Age: 57
End: 2022-01-28
Payer: MEDICARE

## 2022-01-28 VITALS
HEART RATE: 86 BPM | BODY MASS INDEX: 33.24 KG/M2 | HEIGHT: 71 IN | DIASTOLIC BLOOD PRESSURE: 68 MMHG | SYSTOLIC BLOOD PRESSURE: 123 MMHG | WEIGHT: 237.44 LBS

## 2022-01-28 DIAGNOSIS — I73.9 PVD (PERIPHERAL VASCULAR DISEASE): ICD-10-CM

## 2022-01-28 DIAGNOSIS — E11.49 TYPE II DIABETES MELLITUS WITH NEUROLOGICAL MANIFESTATIONS: ICD-10-CM

## 2022-01-28 DIAGNOSIS — F17.200 TOBACCO USE DISORDER: ICD-10-CM

## 2022-01-28 DIAGNOSIS — E23.7 PITUITARY ABNORMALITY: ICD-10-CM

## 2022-01-28 DIAGNOSIS — D50.0 IRON DEFICIENCY ANEMIA DUE TO CHRONIC BLOOD LOSS: ICD-10-CM

## 2022-01-28 DIAGNOSIS — E66.09 CLASS 1 OBESITY DUE TO EXCESS CALORIES WITH SERIOUS COMORBIDITY AND BODY MASS INDEX (BMI) OF 33.0 TO 33.9 IN ADULT: ICD-10-CM

## 2022-01-28 DIAGNOSIS — R55 SYNCOPE, UNSPECIFIED SYNCOPE TYPE: ICD-10-CM

## 2022-01-28 DIAGNOSIS — J44.9 CHRONIC OBSTRUCTIVE PULMONARY DISEASE, UNSPECIFIED COPD TYPE: ICD-10-CM

## 2022-01-28 DIAGNOSIS — G47.33 OSA ON CPAP: ICD-10-CM

## 2022-01-28 DIAGNOSIS — I25.10 CAD IN NATIVE ARTERY: ICD-10-CM

## 2022-01-28 DIAGNOSIS — I10 ESSENTIAL HYPERTENSION: ICD-10-CM

## 2022-01-28 DIAGNOSIS — I25.2 HISTORY OF MYOCARDIAL INFARCTION: ICD-10-CM

## 2022-01-28 DIAGNOSIS — H53.453 PERIPHERAL VISUAL FIELD DEFECT, BILATERAL: ICD-10-CM

## 2022-01-28 DIAGNOSIS — E08.42 DIABETIC POLYNEUROPATHY ASSOCIATED WITH DIABETES MELLITUS DUE TO UNDERLYING CONDITION: Primary | ICD-10-CM

## 2022-01-28 DIAGNOSIS — I20.89 ANGINA OF EFFORT: ICD-10-CM

## 2022-01-28 DIAGNOSIS — F20.9 SCHIZOPHRENIA, UNSPECIFIED TYPE: ICD-10-CM

## 2022-01-28 DIAGNOSIS — E87.1 HYPONATREMIA: ICD-10-CM

## 2022-01-28 PROCEDURE — 99999 PR PBB SHADOW E&M-EST. PATIENT-LVL IV: ICD-10-PCS | Mod: PBBFAC,HCNC,, | Performed by: INTERNAL MEDICINE

## 2022-01-28 PROCEDURE — 1159F MED LIST DOCD IN RCRD: CPT | Mod: HCNC,CPTII,S$GLB, | Performed by: INTERNAL MEDICINE

## 2022-01-28 PROCEDURE — 99214 OFFICE O/P EST MOD 30 MIN: CPT | Mod: HCNC,S$GLB,, | Performed by: INTERNAL MEDICINE

## 2022-01-28 PROCEDURE — 99999 PR PBB SHADOW E&M-EST. PATIENT-LVL IV: CPT | Mod: PBBFAC,HCNC,, | Performed by: INTERNAL MEDICINE

## 2022-01-28 PROCEDURE — 99214 PR OFFICE/OUTPT VISIT, EST, LEVL IV, 30-39 MIN: ICD-10-PCS | Mod: HCNC,S$GLB,, | Performed by: INTERNAL MEDICINE

## 2022-01-28 PROCEDURE — 1160F RVW MEDS BY RX/DR IN RCRD: CPT | Mod: HCNC,CPTII,S$GLB, | Performed by: INTERNAL MEDICINE

## 2022-01-28 PROCEDURE — 3078F DIAST BP <80 MM HG: CPT | Mod: HCNC,CPTII,S$GLB, | Performed by: INTERNAL MEDICINE

## 2022-01-28 PROCEDURE — 3008F BODY MASS INDEX DOCD: CPT | Mod: HCNC,CPTII,S$GLB, | Performed by: INTERNAL MEDICINE

## 2022-01-28 PROCEDURE — 3074F PR MOST RECENT SYSTOLIC BLOOD PRESSURE < 130 MM HG: ICD-10-PCS | Mod: HCNC,CPTII,S$GLB, | Performed by: INTERNAL MEDICINE

## 2022-01-28 PROCEDURE — 1159F PR MEDICATION LIST DOCUMENTED IN MEDICAL RECORD: ICD-10-PCS | Mod: HCNC,CPTII,S$GLB, | Performed by: INTERNAL MEDICINE

## 2022-01-28 PROCEDURE — 1160F PR REVIEW ALL MEDS BY PRESCRIBER/CLIN PHARMACIST DOCUMENTED: ICD-10-PCS | Mod: HCNC,CPTII,S$GLB, | Performed by: INTERNAL MEDICINE

## 2022-01-28 PROCEDURE — 3044F PR MOST RECENT HEMOGLOBIN A1C LEVEL <7.0%: ICD-10-PCS | Mod: HCNC,CPTII,S$GLB, | Performed by: INTERNAL MEDICINE

## 2022-01-28 PROCEDURE — 3044F HG A1C LEVEL LT 7.0%: CPT | Mod: HCNC,CPTII,S$GLB, | Performed by: INTERNAL MEDICINE

## 2022-01-28 PROCEDURE — 3008F PR BODY MASS INDEX (BMI) DOCUMENTED: ICD-10-PCS | Mod: HCNC,CPTII,S$GLB, | Performed by: INTERNAL MEDICINE

## 2022-01-28 PROCEDURE — 3078F PR MOST RECENT DIASTOLIC BLOOD PRESSURE < 80 MM HG: ICD-10-PCS | Mod: HCNC,CPTII,S$GLB, | Performed by: INTERNAL MEDICINE

## 2022-01-28 PROCEDURE — 3074F SYST BP LT 130 MM HG: CPT | Mod: HCNC,CPTII,S$GLB, | Performed by: INTERNAL MEDICINE

## 2022-01-28 NOTE — PROGRESS NOTES
Subjective:    Patient ID:  Ryan Crane is a 56 y.o. male patient here for evaluation Hospital Follow Up      History of Present Illness:  Cardiology follow-up.  History of rickets that syncope x2.  Has a history of chronic hypo natremia.  Last cardiac evaluation about 6 months ago where patient was evaluated for elevated CT calcium score with negative nuclear study for ischemia.  Echocardiogram done 1-2 weeks ago with preserved ejection fraction, mild aortic stenosis, mild aortic insufficiency.  Recent EKG with normal sinus rhythm.  Decreased anterior forces, no evidence of high-grade SA AV shala block.  No acute change.             Review of patient's allergies indicates:   Allergen Reactions    Alcohol Anaphylaxis    Alcohol antiseptic pads Anaphylaxis    Cephalexin Anaphylaxis       Past Medical History:   Diagnosis Date    Acute angina     Asthma     COPD (chronic obstructive pulmonary disease)     Diabetes mellitus     Digestive disorder     ulcers    General anesthetics causing adverse effect in therapeutic use     Headache     Hypertension     MI, old     PIPER on CPAP     Schizophrenia     Smoker unmotivated to quit     Stroke 2005    Thyroid disease     took medicine in the past    Type II diabetes mellitus with neurological manifestations 11/21/2019     Past Surgical History:   Procedure Laterality Date    APPENDECTOMY      BONE MARROW ASPIRATION Left 8/21/2020    Procedure: ASPIRATION, BONE MARROW;  Surgeon: Lex Kathleen MD;  Location: [facility] OR;  Service: Oncology;  Laterality: Left;    CLOSURE OF WOUND Right 9/9/2019    Procedure: CLOSURE, WOUND;  Surgeon: Li Kathleen DPM;  Location: [facility] OR;  Service: Podiatry;  Laterality: Right;    COLONOSCOPY N/A 12/10/2019    Procedure: COLONOSCOPY;  Surgeon: Ryan Lucas MD;  Location: [facility] ENDO;  Service: Endoscopy;  Laterality: N/A;    COLONOSCOPY N/A 12/13/2019    Procedure: COLONOSCOPY;  Surgeon: Ryan Lucas MD;   Location: [facility] ENDO;  Service: Endoscopy;  Laterality: N/A;    ESOPHAGOGASTRODUODENOSCOPY N/A 12/10/2019    Procedure: EGD (ESOPHAGOGASTRODUODENOSCOPY);  Surgeon: Ryan Lucas MD;  Location: [facility];  Service: Endoscopy;  Laterality: N/A;    SHOULDER ARTHROSCOPY Left      Social History     Tobacco Use    Smoking status: Current Every Day Smoker     Packs/day: 1.00     Years: 45.00     Pack years: 45.00     Types: Cigarettes    Smokeless tobacco: Never Used    Tobacco comment: Age Started 14    Substance Use Topics    Alcohol use: No    Drug use: Not Currently     Types: Marijuana        Review of Systems:    As noted in HPI in addition      REVIEW OF SYSTEMS  Review of Systems   Constitutional: Negative for decreased appetite, diaphoresis, night sweats, weight gain and weight loss.   HENT: Negative for nosebleeds and odynophagia.    Eyes: Negative for double vision and photophobia.   Cardiovascular: Positive for syncope. Negative for chest pain, claudication, cyanosis, dyspnea on exertion, irregular heartbeat, leg swelling, near-syncope, orthopnea, palpitations and paroxysmal nocturnal dyspnea.   Respiratory: Negative for cough, hemoptysis, shortness of breath and wheezing.    Hematologic/Lymphatic: Negative for adenopathy.   Skin: Negative for flushing, skin cancer and suspicious lesions.   Musculoskeletal: Negative for gout, myalgias and neck pain.   Gastrointestinal: Negative for abdominal pain, heartburn, hematemesis and hematochezia.   Genitourinary: Negative for bladder incontinence, hesitancy and nocturia.   Neurological: Negative for focal weakness, headaches, light-headedness and paresthesias.   Psychiatric/Behavioral: Negative for memory loss and substance abuse.              Objective:        Vitals:    01/28/22 1054   BP: 123/68   Pulse: 86       Lab Results   Component Value Date    WBC 13.27 (H) 01/22/2022    HGB 15.8 01/22/2022    HCT 42.9 01/22/2022     01/22/2022    CHOL 170  07/08/2021    TRIG 118 07/08/2021    HDL 41 07/08/2021    ALT 24 01/22/2022    AST 28 01/22/2022     (L) 01/24/2022    K 4.4 01/24/2022    CL 89 (L) 01/24/2022    CREATININE 0.64 01/24/2022    BUN 7 (L) 01/24/2022    CO2 28 01/24/2022    TSH 0.594 01/22/2022    HGBA1C 5.5 01/11/2022        ECHOCARDIOGRAM RESULTS  Results for orders placed during the hospital encounter of 01/11/22    Echo    Interpretation Summary  · The left ventricle is normal in size with concentric remodeling and normal systolic function.  · The estimated ejection fraction is 60%.  · Grade I left ventricular diastolic dysfunction.  · Normal right ventricular size with normal right ventricular systolic function.  · Moderate left atrial enlargement.  · There is mild aortic valve stenosis.  · Aortic valve area is 1.91 cm2; peak velocity is 3.27 m/s; mean gradient is 24 mmHg.  · Mild aortic regurgitation.  · Elevated central venous pressure (15 mmHg).  · The estimated PA systolic pressure is 27 mmHg.        CURRENT/PREVIOUS VISIT EKG  Results for orders placed or performed during the hospital encounter of 01/22/22   EKG 12-lead    Collection Time: 01/22/22  5:16 PM    Narrative    Test Reason : R11.2,    Vent. Rate : 071 BPM     Atrial Rate : 071 BPM     P-R Int : 178 ms          QRS Dur : 110 ms      QT Int : 420 ms       P-R-T Axes : 030 -05 076 degrees     QTc Int : 456 ms    Normal sinus rhythm  Possible Left atrial enlargement  Minimal voltage criteria for LVH, may be normal variant ( Crosby product )  Nonspecific ST abnormality  Abnormal ECG        Confirmed by Gosia VILLEGAS, Martin HENSON ([id number]) on 1/24/2022 12:42:13 PM    Referred By: [email or web address]   SELF           Confirmed By:Martin Elise MD     No valid procedures specified.   Results for orders placed during the hospital encounter of 07/08/21    Nuclear Stress - Cardiology Interpreted    Interpretation Summary    Normal myocardial perfusion scan. There is no evidence of myocardial ischemia or  infarction.    There is a  mild intensity fixed defect in the inferior wall of the left ventricle secondary to diaphragm attenuation.    The visually estimated ejection fraction is normal at rest.    The EKG portion of this study is negative for ischemia.    There are no prior studies for comparison.    No valid procedures specified.    PHYSICAL EXAM  CONSTITUTIONAL:  no apparent distress  NECK: no carotid bruit, no JVD  LUNGS: CTA  CHEST WALL: no tenderness,  HEART: regular rate and rhythm, S1-S2 mildly distant.  Grade 1-2/6 crescendo decrescendo murmur heard throughout the precordium.  PMI poorly localized.  ABDOMEN: soft, non-tender; bowel sounds normal; no masses,  no organomegaly  EXTREMITIES: Extremities normal, no edema, no calf tenderness noted  NEURO: AAO X 3    I HAVE REVIEWED :    The vital signs, nurses notes, and all the pertinent radiology and labs.         Current Outpatient Medications   Medication Instructions    albuterol (PROVENTIL) 2.5 mg, Nebulization, Every 6 hours PRN, Rescue    albuterol (PROVENTIL/VENTOLIN HFA) 90 mcg/actuation inhaler INHALE 2 PUFFS BY MOUTH AND INTO THE LUNGS EVERY 6 HOURS AS NEEDED FOR WHEEZING    aspirin (ECOTRIN) 325 mg, Oral, Daily    budesonide (PULMICORT) 0.5 mg/2 mL nebulizer solution USE 2 ML(0.5 MG) VIA NEBULIZER EVERY DAY    busPIRone (BUSPAR) 15 mg, Oral, 3 times daily    galcanezumab-gnlm 120 mg/mL PnIj Inject 2 pens (240mg) subcutaneous at separate sites, once (loading dose). Start maintenance dose 28 days later    galcanezumab-gnlm 120 mg/mL Syrg Inject 1 pen (120 mg) into the skin every 28 days.    ketoconazole (NIZORAL) 2 % shampoo Topical (Top), Twice weekly    lamoTRIgine (LAMICTAL) 150 mg, Oral, Daily    metoprolol succinate (TOPROL-XL) 25 MG 24 hr tablet TAKE 1 TABLET(25 MG) BY MOUTH EVERY DAY    nicotine (NICODERM CQ) 21 mg/24 hr 1 patch, Transdermal, Daily    nitroGLYCERIN (NITROSTAT) 0.4 MG SL tablet PLACE 1 TABLET BY MOUTH UNDER THE  TONGUE EVERY 5 MINUTES AS NEEDED FOR CHEST PAIN    NURTEC 75 mg, Oral, Daily PRN, Place ODT tablet on the tongue; alternatively the ODT tablet may be placed under the tongue    ondansetron (ZOFRAN) 4 mg, Oral, 2 times daily    OXcarbazepine (TRILEPTAL) 600 mg, Oral, 3 times daily    pregabalin (LYRICA) 50 MG capsule TAKE 1 CAPSULE(50 MG) BY MOUTH THREE TIMES DAILY    tadalafiL (CIALIS) 20 MG Tab Take 1 tablet (20 mg total) by mouth prior to sexual activity, not to exceed more than 1 tablet in 72 hours.    ziprasidone (GEODON) 160 mg, Oral, Nightly          Assessment:   Syncope.  Recent hospitalizations x2 with negative workup.  Chronic hyponatremia  History of elevated CTA calcium score negative noninvasive cardiac assessment within the last 2 years.  Recent echo with preserved ejection fraction, mild AS mild AI.  EKG  without acute change  History of carotid ultrasound screening done about a month ago with no evidence of high-grade carotid or vertebral disease.  History of microadenoma pituitary gland.    Plan:   Suggest repeat Lexiscan, 48 hour Holter monitor.  Return to clinic results.    Suggest endocrine follow-up to rule out endocrine etiology of syncope due to persistent low sodium, suspected abnormality in the past.  History of hypertension dyslipidemia.  History of diabetes mellitus.  History of tobacco use.        No follow-ups on file.

## 2022-02-02 PROBLEM — Z71.89 ACP (ADVANCE CARE PLANNING): Status: ACTIVE | Noted: 2022-02-02

## 2022-02-04 DIAGNOSIS — N52.01 ERECTILE DYSFUNCTION DUE TO ARTERIAL INSUFFICIENCY: Primary | ICD-10-CM

## 2022-02-04 RX ORDER — TADALAFIL 20 MG/1
TABLET ORAL
Qty: 10 TABLET | Refills: 11 | Status: SHIPPED | OUTPATIENT
Start: 2022-02-04 | End: 2022-02-24 | Stop reason: RX

## 2022-02-04 RX ORDER — PREGABALIN 50 MG/1
CAPSULE ORAL
Qty: 90 CAPSULE | Refills: 1 | Status: SHIPPED | OUTPATIENT
Start: 2022-02-04 | End: 2022-04-19

## 2022-02-04 NOTE — TELEPHONE ENCOUNTER
No new care gaps identified.  Powered by [employer]. Reference number: [id number].   2/04/2022 1:17:22 PM CST

## 2022-02-11 ENCOUNTER — TELEPHONE (OUTPATIENT)
Dept: FAMILY MEDICINE | Facility: CLINIC | Age: 57
End: 2022-02-11
Payer: MEDICARE

## 2022-02-11 RX ORDER — MECLIZINE HYDROCHLORIDE 25 MG/1
25 TABLET ORAL 2 TIMES DAILY PRN
Qty: 30 TABLET | Refills: 1 | Status: SHIPPED | OUTPATIENT
Start: 2022-02-11 | End: 2022-05-08

## 2022-02-11 NOTE — TELEPHONE ENCOUNTER
Spoke with HH nurse and notified of medication and Dr. Becerril note on compression socks.   Felisha verbalized understanding.

## 2022-02-11 NOTE — TELEPHONE ENCOUNTER
----- Message from Gloria Melgar sent at 2/11/2022  1:35 PM CST -----  Contact: [facility]  Type: Needs Medical Advice  Who Called:  Jalyn yin/ [facility]  Pharmacy name and phone #:    [id number] [address]  [address] [address]  Phone: [phone] Fax: [phone]      Best Call Back Number: [phone]  Additional Information: please call, pt complaints of dizziness

## 2022-02-11 NOTE — TELEPHONE ENCOUNTER
Spoke with Felisha, HH nurse, she states pt is c/o dizziness. He states this has been going on for several months when he goes from Sitting to standing  He was in hospital last week for hyponatremia and dizziness  BP yesterday was 160/74, last week was 138/86  Hr 69-82  No edema or headaches or SOB  Pt mentioned previous issue with vertigo and is requesting antivert  She did teach him how to use his own BP cuff and he will be keeping home log for her to review at his HH visits/

## 2022-02-14 ENCOUNTER — TELEPHONE (OUTPATIENT)
Dept: FAMILY MEDICINE | Facility: CLINIC | Age: 57
End: 2022-02-14
Payer: MEDICARE

## 2022-02-14 NOTE — TELEPHONE ENCOUNTER
----- Message from Jessica Walters sent at 2/14/2022  3:00 PM CST -----  Contact: patient  Type: Needs Medical Advice  Who Called:  Mena with [facility]  Best Call Back Number: [phone]  Additional Information: patient is having dizziness and blurry vision, please contact to discuss, she advised patient he should maybe go to ER, he did not want to go last night, patient fell last night and blood sugar was 54 this morning.

## 2022-02-14 NOTE — TELEPHONE ENCOUNTER
Spoke with HH nurse. Agreed that pt should be evaluated in ER.  Joelle verbalized understanding and states she will contact pt and advise him to be evaluated today   Will call clinic back with further questions.

## 2022-02-15 PROBLEM — U07.1 COVID-19 VIRUS INFECTION: Status: ACTIVE | Noted: 2022-02-15

## 2022-02-24 ENCOUNTER — PATIENT OUTREACH (OUTPATIENT)
Dept: INFECTIOUS DISEASES | Facility: HOSPITAL | Age: 57
End: 2022-02-24
Payer: MEDICARE

## 2022-02-28 ENCOUNTER — OFFICE VISIT (OUTPATIENT)
Dept: FAMILY MEDICINE | Facility: CLINIC | Age: 57
End: 2022-02-28
Payer: MEDICARE

## 2022-02-28 VITALS
BODY MASS INDEX: 33.5 KG/M2 | OXYGEN SATURATION: 96 % | HEIGHT: 71 IN | RESPIRATION RATE: 18 BRPM | DIASTOLIC BLOOD PRESSURE: 66 MMHG | HEART RATE: 64 BPM | SYSTOLIC BLOOD PRESSURE: 124 MMHG | WEIGHT: 239.31 LBS

## 2022-02-28 DIAGNOSIS — E87.1 HYPONATREMIA: ICD-10-CM

## 2022-02-28 DIAGNOSIS — U07.1 COVID-19 VIRUS INFECTION: ICD-10-CM

## 2022-02-28 DIAGNOSIS — I35.0 AORTIC VALVE STENOSIS, ETIOLOGY OF CARDIAC VALVE DISEASE UNSPECIFIED: Primary | ICD-10-CM

## 2022-02-28 DIAGNOSIS — F20.9 CHRONIC SCHIZOPHRENIA: ICD-10-CM

## 2022-02-28 PROCEDURE — 99999 PR PBB SHADOW E&M-EST. PATIENT-LVL IV: CPT | Mod: PBBFAC,,, | Performed by: FAMILY MEDICINE

## 2022-02-28 PROCEDURE — 99214 OFFICE O/P EST MOD 30 MIN: CPT | Mod: S$GLB,,, | Performed by: FAMILY MEDICINE

## 2022-02-28 PROCEDURE — 3078F PR MOST RECENT DIASTOLIC BLOOD PRESSURE < 80 MM HG: ICD-10-PCS | Mod: CPTII,S$GLB,, | Performed by: FAMILY MEDICINE

## 2022-02-28 PROCEDURE — 99999 PR PBB SHADOW E&M-EST. PATIENT-LVL IV: ICD-10-PCS | Mod: PBBFAC,,, | Performed by: FAMILY MEDICINE

## 2022-02-28 PROCEDURE — 99499 RISK ADDL DX/OHS AUDIT: ICD-10-PCS | Mod: S$GLB,,, | Performed by: FAMILY MEDICINE

## 2022-02-28 PROCEDURE — 3008F PR BODY MASS INDEX (BMI) DOCUMENTED: ICD-10-PCS | Mod: CPTII,S$GLB,, | Performed by: FAMILY MEDICINE

## 2022-02-28 PROCEDURE — 1111F PR DISCHARGE MEDS RECONCILED W/ CURRENT OUTPATIENT MED LIST: ICD-10-PCS | Mod: CPTII,S$GLB,, | Performed by: FAMILY MEDICINE

## 2022-02-28 PROCEDURE — 1159F PR MEDICATION LIST DOCUMENTED IN MEDICAL RECORD: ICD-10-PCS | Mod: CPTII,S$GLB,, | Performed by: FAMILY MEDICINE

## 2022-02-28 PROCEDURE — 3008F BODY MASS INDEX DOCD: CPT | Mod: CPTII,S$GLB,, | Performed by: FAMILY MEDICINE

## 2022-02-28 PROCEDURE — 3044F PR MOST RECENT HEMOGLOBIN A1C LEVEL <7.0%: ICD-10-PCS | Mod: CPTII,S$GLB,, | Performed by: FAMILY MEDICINE

## 2022-02-28 PROCEDURE — 3074F PR MOST RECENT SYSTOLIC BLOOD PRESSURE < 130 MM HG: ICD-10-PCS | Mod: CPTII,S$GLB,, | Performed by: FAMILY MEDICINE

## 2022-02-28 PROCEDURE — 3078F DIAST BP <80 MM HG: CPT | Mod: CPTII,S$GLB,, | Performed by: FAMILY MEDICINE

## 2022-02-28 PROCEDURE — 99499 UNLISTED E&M SERVICE: CPT | Mod: S$GLB,,, | Performed by: FAMILY MEDICINE

## 2022-02-28 PROCEDURE — 1159F MED LIST DOCD IN RCRD: CPT | Mod: CPTII,S$GLB,, | Performed by: FAMILY MEDICINE

## 2022-02-28 PROCEDURE — 3074F SYST BP LT 130 MM HG: CPT | Mod: CPTII,S$GLB,, | Performed by: FAMILY MEDICINE

## 2022-02-28 PROCEDURE — 3044F HG A1C LEVEL LT 7.0%: CPT | Mod: CPTII,S$GLB,, | Performed by: FAMILY MEDICINE

## 2022-02-28 PROCEDURE — 99214 PR OFFICE/OUTPT VISIT, EST, LEVL IV, 30-39 MIN: ICD-10-PCS | Mod: S$GLB,,, | Performed by: FAMILY MEDICINE

## 2022-02-28 PROCEDURE — 1111F DSCHRG MED/CURRENT MED MERGE: CPT | Mod: CPTII,S$GLB,, | Performed by: FAMILY MEDICINE

## 2022-02-28 NOTE — PROGRESS NOTES
"Subjective:       Patient ID: Ryan Crane is a 56 y.o. male.    Chief Complaint: Hospital Follow Up (Last week in hospital)    Hospital follow-up.  He was hospitalized several times over the past couple of months with hyponatremia.  This is exacerbated by nausea and vomiting.  Nausea and vomiting seems to be chronic with him.  He says he has a past history of peptic ulcer disease.  His last EGD was in 2019. During his most recent hospitalization he was found to have COVID.  There was no sign of COVID pneumonia.  He is a smoker 15 cigarettes daily.  He has chronic schizophrenia and takes Geodon, Trileptal, BuSpar, Lyrica.  I believe that those are responsible for his hyponatremia.  A repeat sodium last week was 134.      Review of Systems   Constitutional: Negative for fever and unexpected weight change.   Respiratory: Negative for cough and shortness of breath.    Cardiovascular: Negative for chest pain, leg swelling and claudication.   Gastrointestinal:        He says he vomits 3 times a week.         Objective:     Blood pressure 124/66, pulse 64, resp. rate 18, height 5' 11" (1.803 m), weight 108.6 kg (239 lb 5 oz), SpO2 96 %.      Physical Exam  Constitutional:       Comments: Disheveled.   Cardiovascular:      Rate and Rhythm: Normal rate and regular rhythm.      Heart sounds: Murmur (Grade 2 systolic murmur heard at the right upper sternal border down to the left midsternal border.) heard.   Pulmonary:      Effort: No respiratory distress.      Breath sounds: No wheezing.   Musculoskeletal:      Right lower leg: No edema.      Left lower leg: No edema.   Skin:     Comments: Facial seborrhea.   Neurological:      Mental Status: He is alert.         Assessment:       Problem List Items Addressed This Visit     Chronic schizophrenia    Hyponatremia    COVID-19 virus infection    Aortic valve stenosis - Primary          Plan:       Continue present medication.    I do not think I need new lab work " today.

## 2022-03-04 ENCOUNTER — CLINICAL SUPPORT (OUTPATIENT)
Dept: CARDIOLOGY | Facility: HOSPITAL | Age: 57
End: 2022-03-04
Attending: INTERNAL MEDICINE
Payer: MEDICARE

## 2022-03-04 DIAGNOSIS — I25.2 HISTORY OF MYOCARDIAL INFARCTION: ICD-10-CM

## 2022-03-04 DIAGNOSIS — I25.10 CAD IN NATIVE ARTERY: ICD-10-CM

## 2022-03-04 DIAGNOSIS — H53.453 PERIPHERAL VISUAL FIELD DEFECT, BILATERAL: ICD-10-CM

## 2022-03-04 DIAGNOSIS — I20.89 ANGINA OF EFFORT: ICD-10-CM

## 2022-03-04 DIAGNOSIS — I10 ESSENTIAL HYPERTENSION: ICD-10-CM

## 2022-03-04 DIAGNOSIS — E08.42 DIABETIC POLYNEUROPATHY ASSOCIATED WITH DIABETES MELLITUS DUE TO UNDERLYING CONDITION: ICD-10-CM

## 2022-03-04 DIAGNOSIS — J44.9 CHRONIC OBSTRUCTIVE PULMONARY DISEASE, UNSPECIFIED COPD TYPE: ICD-10-CM

## 2022-03-04 PROCEDURE — 93225 XTRNL ECG REC<48 HRS REC: CPT | Mod: PO

## 2022-03-04 PROCEDURE — 93227 XTRNL ECG REC<48 HR R&I: CPT | Mod: ,,, | Performed by: INTERNAL MEDICINE

## 2022-03-04 PROCEDURE — 93227 HOLTER MONITOR - 48 HOUR (CUPID ONLY): ICD-10-PCS | Mod: ,,, | Performed by: INTERNAL MEDICINE

## 2022-03-08 ENCOUNTER — CLINICAL SUPPORT (OUTPATIENT)
Dept: CARDIOLOGY | Facility: HOSPITAL | Age: 57
End: 2022-03-08
Attending: INTERNAL MEDICINE
Payer: MEDICARE

## 2022-03-08 ENCOUNTER — HOSPITAL ENCOUNTER (OUTPATIENT)
Dept: RADIOLOGY | Facility: HOSPITAL | Age: 57
Discharge: HOME | End: 2022-03-08
Attending: INTERNAL MEDICINE
Payer: MEDICARE

## 2022-03-08 VITALS — HEIGHT: 71 IN | BODY MASS INDEX: 33.18 KG/M2 | WEIGHT: 237 LBS

## 2022-03-08 DIAGNOSIS — J44.9 CHRONIC OBSTRUCTIVE PULMONARY DISEASE, UNSPECIFIED COPD TYPE: ICD-10-CM

## 2022-03-08 DIAGNOSIS — E08.42 DIABETIC POLYNEUROPATHY ASSOCIATED WITH DIABETES MELLITUS DUE TO UNDERLYING CONDITION: ICD-10-CM

## 2022-03-08 DIAGNOSIS — I25.2 HISTORY OF MYOCARDIAL INFARCTION: ICD-10-CM

## 2022-03-08 DIAGNOSIS — I20.89 ANGINA OF EFFORT: ICD-10-CM

## 2022-03-08 DIAGNOSIS — I25.10 CAD IN NATIVE ARTERY: ICD-10-CM

## 2022-03-08 DIAGNOSIS — I73.9 PVD (PERIPHERAL VASCULAR DISEASE): ICD-10-CM

## 2022-03-08 DIAGNOSIS — H53.453 PERIPHERAL VISUAL FIELD DEFECT, BILATERAL: ICD-10-CM

## 2022-03-08 DIAGNOSIS — I10 ESSENTIAL HYPERTENSION: ICD-10-CM

## 2022-03-08 LAB
CV PHARM DOSE: 0.4 MG
CV STRESS BASE HR: 75 BPM
DIASTOLIC BLOOD PRESSURE: 63 MMHG
NUC STRESS EJECTION FRACTION: 69 %
OHS CV CPX 1 MINUTE RECOVERY HEART RATE: 103 BPM
OHS CV CPX 85 PERCENT MAX PREDICTED HEART RATE MALE: 141
OHS CV CPX MAX PREDICTED HEART RATE: 166
OHS CV CPX PATIENT IS FEMALE: 0
OHS CV CPX PATIENT IS MALE: 1
OHS CV CPX PEAK DIASTOLIC BLOOD PRESSURE: 63 MMHG
OHS CV CPX PEAK HEAR RATE: 105 BPM
OHS CV CPX PEAK RATE PRESSURE PRODUCT: NORMAL
OHS CV CPX PEAK SYSTOLIC BLOOD PRESSURE: 121 MMHG
OHS CV CPX PERCENT MAX PREDICTED HEART RATE ACHIEVED: 63
OHS CV CPX RATE PRESSURE PRODUCT PRESENTING: 9075
OHS CV EVENT MONITOR DAY: 0
OHS CV HOLTER LENGTH DECIMAL HOURS: 48
OHS CV HOLTER LENGTH HOURS: 48
OHS CV HOLTER LENGTH MINUTES: 0
OHS CV HOLTER SINUS AVERAGE HR: 91
OHS CV HOLTER SINUS MAX HR: 141
OHS CV HOLTER SINUS MIN HR: 70
OHS CV PHARM TIME: 1353 MIN
SYSTOLIC BLOOD PRESSURE: 121 MMHG

## 2022-03-08 PROCEDURE — 78452 HT MUSCLE IMAGE SPECT MULT: CPT | Mod: 26,,, | Performed by: INTERNAL MEDICINE

## 2022-03-08 PROCEDURE — 78452 STRESS TEST WITH MYOCARDIAL PERFUSION (CUPID ONLY): ICD-10-PCS | Mod: 26,,, | Performed by: INTERNAL MEDICINE

## 2022-03-08 PROCEDURE — 93017 CV STRESS TEST TRACING ONLY: CPT | Mod: PO

## 2022-03-08 PROCEDURE — 93018 PR CARDIAC STRESS TST,INTERP/REPT ONLY: ICD-10-PCS | Mod: ,,, | Performed by: INTERNAL MEDICINE

## 2022-03-08 PROCEDURE — 93016 STRESS TEST WITH MYOCARDIAL PERFUSION (CUPID ONLY): ICD-10-PCS | Mod: ,,, | Performed by: INTERNAL MEDICINE

## 2022-03-08 PROCEDURE — 63600175 PHARM REV CODE 636 W HCPCS: Mod: PO | Performed by: INTERNAL MEDICINE

## 2022-03-08 PROCEDURE — 93016 CV STRESS TEST SUPVJ ONLY: CPT | Mod: ,,, | Performed by: INTERNAL MEDICINE

## 2022-03-08 PROCEDURE — 93018 CV STRESS TEST I&R ONLY: CPT | Mod: ,,, | Performed by: INTERNAL MEDICINE

## 2022-03-08 PROCEDURE — A9502 TC99M TETROFOSMIN: HCPCS | Mod: PO

## 2022-03-08 RX ORDER — REGADENOSON 0.08 MG/ML
0.4 INJECTION, SOLUTION INTRAVENOUS ONCE
Status: COMPLETED | OUTPATIENT
Start: 2022-03-08 | End: 2022-03-08

## 2022-03-08 RX ADMIN — REGADENOSON 0.4 MG: 0.08 INJECTION, SOLUTION INTRAVENOUS at 01:03

## 2022-03-15 ENCOUNTER — PATIENT OUTREACH (OUTPATIENT)
Dept: ADMINISTRATIVE | Facility: OTHER | Age: 57
End: 2022-03-15
Payer: MEDICARE

## 2022-03-15 NOTE — PROGRESS NOTES
LINKS immunization registry updated  Care Everywhere updated  Health Maintenance updated  Chart reviewed for overdue [facility] ([facility]) health maintenance testing (CRS, Breast Ca, Diabetic Eye Exam)   Orders entered:N/A

## 2022-03-21 ENCOUNTER — OFFICE VISIT (OUTPATIENT)
Dept: CARDIOLOGY | Facility: CLINIC | Age: 57
End: 2022-03-21
Payer: MEDICARE

## 2022-03-21 VITALS
DIASTOLIC BLOOD PRESSURE: 70 MMHG | HEIGHT: 71 IN | SYSTOLIC BLOOD PRESSURE: 138 MMHG | WEIGHT: 228.81 LBS | HEART RATE: 93 BPM | BODY MASS INDEX: 32.03 KG/M2

## 2022-03-21 DIAGNOSIS — I10 ESSENTIAL HYPERTENSION: ICD-10-CM

## 2022-03-21 DIAGNOSIS — R46.0 POOR HYGIENE: ICD-10-CM

## 2022-03-21 DIAGNOSIS — F17.200 TOBACCO USE DISORDER: ICD-10-CM

## 2022-03-21 DIAGNOSIS — G47.33 OSA ON CPAP: ICD-10-CM

## 2022-03-21 DIAGNOSIS — I25.10 CAD IN NATIVE ARTERY: ICD-10-CM

## 2022-03-21 DIAGNOSIS — R09.89 BILATERAL CAROTID BRUITS: ICD-10-CM

## 2022-03-21 DIAGNOSIS — I35.0 AORTIC VALVE STENOSIS, ETIOLOGY OF CARDIAC VALVE DISEASE UNSPECIFIED: ICD-10-CM

## 2022-03-21 DIAGNOSIS — E08.42 DIABETIC POLYNEUROPATHY ASSOCIATED WITH DIABETES MELLITUS DUE TO UNDERLYING CONDITION: Primary | ICD-10-CM

## 2022-03-21 DIAGNOSIS — F20.9 CHRONIC SCHIZOPHRENIA: ICD-10-CM

## 2022-03-21 DIAGNOSIS — I20.89 ANGINA OF EFFORT: ICD-10-CM

## 2022-03-21 PROCEDURE — 99214 PR OFFICE/OUTPT VISIT, EST, LEVL IV, 30-39 MIN: ICD-10-PCS | Mod: S$GLB,,, | Performed by: INTERNAL MEDICINE

## 2022-03-21 PROCEDURE — 1159F MED LIST DOCD IN RCRD: CPT | Mod: CPTII,S$GLB,, | Performed by: INTERNAL MEDICINE

## 2022-03-21 PROCEDURE — 1160F RVW MEDS BY RX/DR IN RCRD: CPT | Mod: CPTII,S$GLB,, | Performed by: INTERNAL MEDICINE

## 2022-03-21 PROCEDURE — 3008F BODY MASS INDEX DOCD: CPT | Mod: CPTII,S$GLB,, | Performed by: INTERNAL MEDICINE

## 2022-03-21 PROCEDURE — 3008F PR BODY MASS INDEX (BMI) DOCUMENTED: ICD-10-PCS | Mod: CPTII,S$GLB,, | Performed by: INTERNAL MEDICINE

## 2022-03-21 PROCEDURE — 99214 OFFICE O/P EST MOD 30 MIN: CPT | Mod: S$GLB,,, | Performed by: INTERNAL MEDICINE

## 2022-03-21 PROCEDURE — 3075F SYST BP GE 130 - 139MM HG: CPT | Mod: CPTII,S$GLB,, | Performed by: INTERNAL MEDICINE

## 2022-03-21 PROCEDURE — 99999 PR PBB SHADOW E&M-EST. PATIENT-LVL III: ICD-10-PCS | Mod: PBBFAC,,, | Performed by: INTERNAL MEDICINE

## 2022-03-21 PROCEDURE — 1159F PR MEDICATION LIST DOCUMENTED IN MEDICAL RECORD: ICD-10-PCS | Mod: CPTII,S$GLB,, | Performed by: INTERNAL MEDICINE

## 2022-03-21 PROCEDURE — 3078F PR MOST RECENT DIASTOLIC BLOOD PRESSURE < 80 MM HG: ICD-10-PCS | Mod: CPTII,S$GLB,, | Performed by: INTERNAL MEDICINE

## 2022-03-21 PROCEDURE — 3044F PR MOST RECENT HEMOGLOBIN A1C LEVEL <7.0%: ICD-10-PCS | Mod: CPTII,S$GLB,, | Performed by: INTERNAL MEDICINE

## 2022-03-21 PROCEDURE — 3078F DIAST BP <80 MM HG: CPT | Mod: CPTII,S$GLB,, | Performed by: INTERNAL MEDICINE

## 2022-03-21 PROCEDURE — 3044F HG A1C LEVEL LT 7.0%: CPT | Mod: CPTII,S$GLB,, | Performed by: INTERNAL MEDICINE

## 2022-03-21 PROCEDURE — 1160F PR REVIEW ALL MEDS BY PRESCRIBER/CLIN PHARMACIST DOCUMENTED: ICD-10-PCS | Mod: CPTII,S$GLB,, | Performed by: INTERNAL MEDICINE

## 2022-03-21 PROCEDURE — 99999 PR PBB SHADOW E&M-EST. PATIENT-LVL III: CPT | Mod: PBBFAC,,, | Performed by: INTERNAL MEDICINE

## 2022-03-21 PROCEDURE — 3075F PR MOST RECENT SYSTOLIC BLOOD PRESS GE 130-139MM HG: ICD-10-PCS | Mod: CPTII,S$GLB,, | Performed by: INTERNAL MEDICINE

## 2022-03-21 NOTE — PROGRESS NOTES
Subjective:    Patient ID:  Ryan Crane is a 56 y.o. male patient here for evaluation Follow-up      History of Present Illness:  Cardiology follow-up test results.  Low risk nuclear study.  Echo done several months ago with mild AS, mild AI, preserved ejection fraction.  Holter monitor no major arrhythmia or heart block.  History of remote CVA, no sequelae.  History of EKG, elevated calcium score.  History of continued back tobacco use.             Review of patient's allergies indicates:   Allergen Reactions    Alcohol Anaphylaxis    Alcohol antiseptic pads Anaphylaxis    Cephalexin Anaphylaxis       Past Medical History:   Diagnosis Date    Acute angina     Asthma     COPD (chronic obstructive pulmonary disease)     Diabetes mellitus     Digestive disorder     ulcers    General anesthetics causing adverse effect in therapeutic use     Headache     Hypertension     MI, old     PIPER on CPAP     Schizophrenia     Smoker unmotivated to quit     Stroke 2005    Thyroid disease     took medicine in the past    Type II diabetes mellitus with neurological manifestations 11/21/2019     Past Surgical History:   Procedure Laterality Date    APPENDECTOMY      BONE MARROW ASPIRATION Left 8/21/2020    Procedure: ASPIRATION, BONE MARROW;  Surgeon: Lex Kathleen MD;  Location: [facility] OR;  Service: Oncology;  Laterality: Left;    CLOSURE OF WOUND Right 9/9/2019    Procedure: CLOSURE, WOUND;  Surgeon: Li Kathleen DPM;  Location: [facility] OR;  Service: Podiatry;  Laterality: Right;    COLONOSCOPY N/A 12/10/2019    Procedure: COLONOSCOPY;  Surgeon: Ryan Lucas MD;  [facility];  Service: Endoscopy;  Laterality: N/A;    COLONOSCOPY N/A 12/13/2019    Procedure: COLONOSCOPY;  Surgeon: Ryan Lucas MD;  Location: [facility];  Service: Endoscopy;  Laterality: N/A;    ESOPHAGOGASTRODUODENOSCOPY N/A 12/10/2019    Procedure: EGD (ESOPHAGOGASTRODUODENOSCOPY);  Surgeon: Ryan Lucas MD;   Location: [facility];  Service: Endoscopy;  Laterality: N/A;    SHOULDER ARTHROSCOPY Left      Social History     Tobacco Use    Smoking status: Current Every Day Smoker     Packs/day: 1.00     Years: 45.00     Pack years: 45.00     Types: Cigarettes    Smokeless tobacco: Never Used    Tobacco comment: Age Started 14    Substance Use Topics    Alcohol use: No    Drug use: Not Currently     Types: Marijuana        Review of Systems:    As noted in HPI in addition      REVIEW OF SYSTEMS  Review of Systems   Constitutional: Negative for decreased appetite, diaphoresis, night sweats, weight gain and weight loss.   HENT: Negative for nosebleeds and odynophagia.    Eyes: Positive for double vision. Negative for photophobia.   Cardiovascular: Negative for chest pain, claudication, cyanosis, dyspnea on exertion, irregular heartbeat, leg swelling, near-syncope, orthopnea, palpitations, paroxysmal nocturnal dyspnea and syncope.   Respiratory: Negative for cough, hemoptysis, shortness of breath and wheezing.    Hematologic/Lymphatic: Negative for adenopathy.   Skin: Negative for flushing, skin cancer and suspicious lesions.   Musculoskeletal: Negative for gout, myalgias and neck pain.   Gastrointestinal: Negative for abdominal pain, heartburn, hematemesis and hematochezia.   Genitourinary: Negative for bladder incontinence, hesitancy and nocturia.   Neurological: Positive for dizziness. Negative for focal weakness, headaches, light-headedness and paresthesias.   Psychiatric/Behavioral: Negative for memory loss and substance abuse.              Objective:        Vitals:    03/21/22 0950   BP: 138/70   Pulse: 93       Lab Results   Component Value Date    WBC 7.92 02/14/2022    HGB 14.9 02/14/2022    HCT 41.8 02/14/2022     02/14/2022    CHOL 170 07/08/2021    TRIG 118 07/08/2021    HDL 41 07/08/2021    ALT 28 02/14/2022    AST 31 02/14/2022     (L) 02/18/2022    K 3.9 02/18/2022    CL 96 02/18/2022     CREATININE 0.77 02/18/2022    BUN 12 02/18/2022    CO2 28 02/18/2022    TSH 0.594 01/22/2022    HGBA1C 5.5 01/11/2022        ECHOCARDIOGRAM RESULTS  Results for orders placed during the hospital encounter of 01/11/22    Echo    Interpretation Summary  · The left ventricle is normal in size with concentric remodeling and normal systolic function.  · The estimated ejection fraction is 60%.  · Grade I left ventricular diastolic dysfunction.  · Normal right ventricular size with normal right ventricular systolic function.  · Moderate left atrial enlargement.  · There is mild aortic valve stenosis.  · Aortic valve area is 1.91 cm2; peak velocity is 3.27 m/s; mean gradient is 24 mmHg.  · Mild aortic regurgitation.  · Elevated central venous pressure (15 mmHg).  · The estimated PA systolic pressure is 27 mmHg.        CURRENT/PREVIOUS VISIT EKG  Results for orders placed or performed during the hospital encounter of 02/14/22   EKG 12-lead    Collection Time: 02/14/22  6:16 PM    Narrative    Test Reason : R42,    Vent. Rate : 081 BPM     Atrial Rate : 081 BPM     P-R Int : 160 ms          QRS Dur : 100 ms      QT Int : 384 ms       P-R-T Axes : 032 021 054 degrees     QTc Int : 446 ms    Normal sinus rhythm  Possible Left atrial enlargement  Borderline Abnormal ECG  When compared with ECG of 02-FEB-2022 15:14,  No significant change was found  Confirmed by Adam Cote MD ([id number]) on 2/15/2022 9:30:32 AM    Referred By: AAAREFERR   SELF           Confirmed By:Adam Cote MD     No valid procedures specified.   Results for orders placed during the hospital encounter of 03/08/22    Nuclear Stress - Cardiology Interpreted    Interpretation Summary    Normal myocardial perfusion scan. There is no evidence of myocardial ischemia or infarction.    There is a  mild intensity fixed perfusion abnormality in the  wall of the left ventricle secondary to diaphragm attenuation.    The gated perfusion images showed an ejection  fraction of 69% post stress.    The EKG portion of this study is negative for ischemia.    No valid procedures specified.    PHYSICAL EXAM  CONSTITUTIONAL: Well built, well nourished in no apparent distress  NECK: no carotid bruit, no JVD  LUNGS: CTA  CHEST WALL: no tenderness,  HEART: regular rate and rhythm, S1, S2 normal, no murmur, click, rub or gallop   ABDOMEN: soft, non-tender; bowel sounds normal; no masses,  no organomegaly  EXTREMITIES: Extremities normal, no edema, no calf tenderness noted  NEURO: AAO X 3    I HAVE REVIEWED :    The vital signs, nurses notes, and all the pertinent radiology and labs.         Current Outpatient Medications   Medication Instructions    albuterol (PROVENTIL) 2.5 mg, Nebulization, Every 6 hours PRN, Rescue    albuterol (PROVENTIL/VENTOLIN HFA) 90 mcg/actuation inhaler INHALE 2 PUFFS BY MOUTH AND INTO THE LUNGS EVERY 6 HOURS AS NEEDED FOR WHEEZING    amLODIPine (NORVASC) 2.5 mg, Oral, Daily    budesonide (PULMICORT) 0.5 mg/2 mL nebulizer solution USE 2 ML(0.5 MG) VIA NEBULIZER EVERY DAY    busPIRone (BUSPAR) 15 mg, Oral, 3 times daily    galcanezumab-gnlm 120 mg/mL PnIj Inject 2 pens (240mg) subcutaneous at separate sites, once (loading dose). Start maintenance dose 28 days later    galcanezumab-gnlm 120 mg/mL Syrg Inject 1 pen (120 mg) into the skin every 28 days.    ketoconazole (NIZORAL) 2 % shampoo Topical (Top), Twice weekly    meclizine (ANTIVERT) 25 mg, Oral, 2 times daily PRN    metoprolol succinate (TOPROL-XL) 25 MG 24 hr tablet TAKE 1 TABLET(25 MG) BY MOUTH EVERY DAY    NURTEC 75 mg, Oral, Daily PRN, Place ODT tablet on the tongue; alternatively the ODT tablet may be placed under the tongue    ondansetron (ZOFRAN) 4 mg, Oral, 2 times daily    OXcarbazepine (TRILEPTAL) 600 mg, Oral, 2 times daily    pregabalin (LYRICA) 50 MG capsule TAKE 1 CAPSULE(50 MG) BY MOUTH THREE TIMES DAILY    tadalafiL (CIALIS) 20 MG Tab Take one tablet by mouth prior to sexual  activity, not to exceed 1 tablet in 72 hours    ziprasidone (GEODON) 160 mg, Oral, Nightly          Assessment:   History of atypical chest pain, abnormal EKG, abnormal calcium score.  Negative noninvasive cardiac assessment  Mild aortic stenosis, mild AI.   History of tobacco use  History of PIPER CPAP  History of hypertension dyslipidemia, diabetes mellitus  History of carotid screening done about a month ago with no evidence of high-grade coronary disease.  History of microadenoma pituitary gland.    Plan:   Continue risk factor modification.  Return to clinic 6 months      Patient with dizziness, blurred vision, schizophrenia.  Patient request transfer to the emergency room.    No follow-ups on file.

## 2022-03-23 ENCOUNTER — DOCUMENT SCAN (OUTPATIENT)
Dept: HOME HEALTH SERVICES | Facility: HOSPITAL | Age: 57
End: 2022-03-23
Payer: MEDICARE

## 2022-03-29 ENCOUNTER — TELEPHONE (OUTPATIENT)
Dept: NEUROLOGY | Facility: CLINIC | Age: 57
End: 2022-03-29
Payer: MEDICARE

## 2022-03-30 ENCOUNTER — LAB VISIT (OUTPATIENT)
Dept: LAB | Facility: HOSPITAL | Age: 57
End: 2022-03-30
Attending: PSYCHIATRY & NEUROLOGY
Payer: MEDICARE

## 2022-03-30 ENCOUNTER — OFFICE VISIT (OUTPATIENT)
Dept: NEUROLOGY | Facility: CLINIC | Age: 57
End: 2022-03-30
Payer: MEDICARE

## 2022-03-30 ENCOUNTER — TELEPHONE (OUTPATIENT)
Dept: NEUROLOGY | Facility: CLINIC | Age: 57
End: 2022-03-30

## 2022-03-30 VITALS
HEART RATE: 86 BPM | RESPIRATION RATE: 17 BRPM | SYSTOLIC BLOOD PRESSURE: 122 MMHG | HEIGHT: 71 IN | DIASTOLIC BLOOD PRESSURE: 78 MMHG | BODY MASS INDEX: 32.06 KG/M2 | TEMPERATURE: 99 F | WEIGHT: 229 LBS

## 2022-03-30 DIAGNOSIS — E87.1 HYPONATREMIA: ICD-10-CM

## 2022-03-30 DIAGNOSIS — Z79.899 HIGH RISK MEDICATION USE: Primary | ICD-10-CM

## 2022-03-30 DIAGNOSIS — R11.0 NAUSEA: ICD-10-CM

## 2022-03-30 DIAGNOSIS — G43.719 INTRACTABLE CHRONIC MIGRAINE WITHOUT AURA AND WITHOUT STATUS MIGRAINOSUS: Primary | ICD-10-CM

## 2022-03-30 DIAGNOSIS — G44.40 MEDICATION OVERUSE HEADACHE: ICD-10-CM

## 2022-03-30 DIAGNOSIS — G47.33 OSA ON CPAP: ICD-10-CM

## 2022-03-30 LAB
ALBUMIN SERPL BCP-MCNC: 4 G/DL (ref 3.5–5.2)
ALP SERPL-CCNC: 93 U/L (ref 55–135)
ALT SERPL W/O P-5'-P-CCNC: 15 U/L (ref 10–44)
ANION GAP SERPL CALC-SCNC: 13 MMOL/L (ref 8–16)
AST SERPL-CCNC: 17 U/L (ref 10–40)
BASOPHILS # BLD AUTO: 0.05 K/UL (ref 0–0.2)
BASOPHILS NFR BLD: 0.4 % (ref 0–1.9)
BILIRUB SERPL-MCNC: 0.7 MG/DL (ref 0.1–1)
BUN SERPL-MCNC: 5 MG/DL (ref 6–20)
CALCIUM SERPL-MCNC: 9.8 MG/DL (ref 8.7–10.5)
CHLORIDE SERPL-SCNC: 85 MMOL/L (ref 95–110)
CHOLEST SERPL-MCNC: 159 MG/DL (ref 120–199)
CHOLEST/HDLC SERPL: 4.4 {RATIO} (ref 2–5)
CO2 SERPL-SCNC: 24 MMOL/L (ref 23–29)
CREAT SERPL-MCNC: 0.8 MG/DL (ref 0.5–1.4)
DIFFERENTIAL METHOD: ABNORMAL
EOSINOPHIL # BLD AUTO: 0.1 K/UL (ref 0–0.5)
EOSINOPHIL NFR BLD: 0.5 % (ref 0–8)
ERYTHROCYTE [DISTWIDTH] IN BLOOD BY AUTOMATED COUNT: 12.8 % (ref 11.5–14.5)
EST. GFR  (AFRICAN AMERICAN): >60 ML/MIN/1.73 M^2
EST. GFR  (NON AFRICAN AMERICAN): >60 ML/MIN/1.73 M^2
ESTIMATED AVG GLUCOSE: 114 MG/DL (ref 68–131)
GLUCOSE SERPL-MCNC: 86 MG/DL (ref 70–110)
HBA1C MFR BLD: 5.6 % (ref 4–5.6)
HCT VFR BLD AUTO: 43.5 % (ref 40–54)
HDLC SERPL-MCNC: 36 MG/DL (ref 40–75)
HDLC SERPL: 22.6 % (ref 20–50)
HGB BLD-MCNC: 15.5 G/DL (ref 14–18)
IMM GRANULOCYTES # BLD AUTO: 0.08 K/UL (ref 0–0.04)
IMM GRANULOCYTES NFR BLD AUTO: 0.7 % (ref 0–0.5)
LDLC SERPL CALC-MCNC: 106.8 MG/DL (ref 63–159)
LYMPHOCYTES # BLD AUTO: 2.3 K/UL (ref 1–4.8)
LYMPHOCYTES NFR BLD: 19.8 % (ref 18–48)
MCH RBC QN AUTO: 29.9 PG (ref 27–31)
MCHC RBC AUTO-ENTMCNC: 35.6 G/DL (ref 32–36)
MCV RBC AUTO: 84 FL (ref 82–98)
MONOCYTES # BLD AUTO: 1.3 K/UL (ref 0.3–1)
MONOCYTES NFR BLD: 11.3 % (ref 4–15)
NEUTROPHILS # BLD AUTO: 7.8 K/UL (ref 1.8–7.7)
NEUTROPHILS NFR BLD: 67.3 % (ref 38–73)
NONHDLC SERPL-MCNC: 123 MG/DL
NRBC BLD-RTO: 0 /100 WBC
PLATELET # BLD AUTO: 416 K/UL (ref 150–450)
PMV BLD AUTO: 9.1 FL (ref 9.2–12.9)
POTASSIUM SERPL-SCNC: 4 MMOL/L (ref 3.5–5.1)
PROT SERPL-MCNC: 7.7 G/DL (ref 6–8.4)
RBC # BLD AUTO: 5.19 M/UL (ref 4.6–6.2)
SODIUM SERPL-SCNC: 122 MMOL/L (ref 136–145)
TRIGL SERPL-MCNC: 81 MG/DL (ref 30–150)
TSH SERPL DL<=0.005 MIU/L-ACNC: 1.27 UIU/ML (ref 0.4–4)
WBC # BLD AUTO: 11.64 K/UL (ref 3.9–12.7)

## 2022-03-30 PROCEDURE — 80053 COMPREHEN METABOLIC PANEL: CPT | Performed by: PSYCHIATRY & NEUROLOGY

## 2022-03-30 PROCEDURE — 3044F PR MOST RECENT HEMOGLOBIN A1C LEVEL <7.0%: ICD-10-PCS | Mod: CPTII,S$GLB,, | Performed by: NURSE PRACTITIONER

## 2022-03-30 PROCEDURE — 99214 OFFICE O/P EST MOD 30 MIN: CPT | Mod: S$GLB,,, | Performed by: NURSE PRACTITIONER

## 2022-03-30 PROCEDURE — 84443 ASSAY THYROID STIM HORMONE: CPT | Performed by: PSYCHIATRY & NEUROLOGY

## 2022-03-30 PROCEDURE — 3078F DIAST BP <80 MM HG: CPT | Mod: CPTII,S$GLB,, | Performed by: NURSE PRACTITIONER

## 2022-03-30 PROCEDURE — 1160F PR REVIEW ALL MEDS BY PRESCRIBER/CLIN PHARMACIST DOCUMENTED: ICD-10-PCS | Mod: CPTII,S$GLB,, | Performed by: NURSE PRACTITIONER

## 2022-03-30 PROCEDURE — 99999 PR PBB SHADOW E&M-EST. PATIENT-LVL V: CPT | Mod: PBBFAC,,, | Performed by: NURSE PRACTITIONER

## 2022-03-30 PROCEDURE — 3078F PR MOST RECENT DIASTOLIC BLOOD PRESSURE < 80 MM HG: ICD-10-PCS | Mod: CPTII,S$GLB,, | Performed by: NURSE PRACTITIONER

## 2022-03-30 PROCEDURE — 3044F HG A1C LEVEL LT 7.0%: CPT | Mod: CPTII,S$GLB,, | Performed by: NURSE PRACTITIONER

## 2022-03-30 PROCEDURE — 36415 COLL VENOUS BLD VENIPUNCTURE: CPT | Mod: PN | Performed by: PSYCHIATRY & NEUROLOGY

## 2022-03-30 PROCEDURE — 3074F SYST BP LT 130 MM HG: CPT | Mod: CPTII,S$GLB,, | Performed by: NURSE PRACTITIONER

## 2022-03-30 PROCEDURE — 80061 LIPID PANEL: CPT | Performed by: PSYCHIATRY & NEUROLOGY

## 2022-03-30 PROCEDURE — 1159F MED LIST DOCD IN RCRD: CPT | Mod: CPTII,S$GLB,, | Performed by: NURSE PRACTITIONER

## 2022-03-30 PROCEDURE — 3008F PR BODY MASS INDEX (BMI) DOCUMENTED: ICD-10-PCS | Mod: CPTII,S$GLB,, | Performed by: NURSE PRACTITIONER

## 2022-03-30 PROCEDURE — 99499 UNLISTED E&M SERVICE: CPT | Mod: S$GLB,,, | Performed by: NURSE PRACTITIONER

## 2022-03-30 PROCEDURE — 99999 PR PBB SHADOW E&M-EST. PATIENT-LVL V: ICD-10-PCS | Mod: PBBFAC,,, | Performed by: NURSE PRACTITIONER

## 2022-03-30 PROCEDURE — 85025 COMPLETE CBC W/AUTO DIFF WBC: CPT | Performed by: PSYCHIATRY & NEUROLOGY

## 2022-03-30 PROCEDURE — 1160F RVW MEDS BY RX/DR IN RCRD: CPT | Mod: CPTII,S$GLB,, | Performed by: NURSE PRACTITIONER

## 2022-03-30 PROCEDURE — 1159F PR MEDICATION LIST DOCUMENTED IN MEDICAL RECORD: ICD-10-PCS | Mod: CPTII,S$GLB,, | Performed by: NURSE PRACTITIONER

## 2022-03-30 PROCEDURE — 3008F BODY MASS INDEX DOCD: CPT | Mod: CPTII,S$GLB,, | Performed by: NURSE PRACTITIONER

## 2022-03-30 PROCEDURE — 3074F PR MOST RECENT SYSTOLIC BLOOD PRESSURE < 130 MM HG: ICD-10-PCS | Mod: CPTII,S$GLB,, | Performed by: NURSE PRACTITIONER

## 2022-03-30 PROCEDURE — 99499 RISK ADDL DX/OHS AUDIT: ICD-10-PCS | Mod: S$GLB,,, | Performed by: NURSE PRACTITIONER

## 2022-03-30 PROCEDURE — 83036 HEMOGLOBIN GLYCOSYLATED A1C: CPT | Performed by: PSYCHIATRY & NEUROLOGY

## 2022-03-30 PROCEDURE — 99214 PR OFFICE/OUTPT VISIT, EST, LEVL IV, 30-39 MIN: ICD-10-PCS | Mod: S$GLB,,, | Performed by: NURSE PRACTITIONER

## 2022-03-30 NOTE — PATIENT INSTRUCTIONS
Please call our clinic at [phone] or send a message on the [email or web address] portal if there are any changes to the plan described below, for example,if you are not contacted for the requested tests, referral(s) within one week, if you are unable to receive the medications prescribed, or if you feel you need to change the treatment course for any reason.     TESTING:  -- none     REFERRALS:  -- sleep specialist     PREVENTION (use daily regardless of headache):  -- continue Emgality injection once every 28 days   I suspect once we treat your sleep apnea, we will reduce your daily headache frequency significantly.    AS-NEEDED TREATMENT (use total no more than 10 days per month unless otherwise stated):  -- continue Nurtec once every other day. This dissolves under the tongue and is only taken once in 24 hour time frame.      Suggested that the patient research out OTC supplements (stressed NOT FDA regulated and should take at own risk).    To help prevent a headache:   Vitamin B2 (riboflavin)  CoQ10  Magnesium    * all of the above can be found locally in a variety of shops or on the internet

## 2022-03-30 NOTE — PROGRESS NOTES
"Date of service: 3/30/2022  Referring provider: No ref. provider found    Subjective:      Chief complaint: Headache       Patient ID: Ryan Crane is a 56 y.o. male with CAD, COPD, DMII, HTN, PIPER (does not use CPAP), PVD, bipolar and schizophrenia and history of stroke and MI who presents for new patient evaluation of headache     History of Present Illness    INTERVAL HISTORY  3/30/22    Last visit was three months ago and at that time we added Emgality and Nurtec. He was in medication overuse to Excedrin. He was referred to neurosurgery for benign CNS tumor. Plan to monitor at this time, not a surgical candidate.     Today he reports he is much better. Migraines are less frequent but still remains with daily headache. They are holocephalic. Current pain 4 with range 4-10. He has 2-3 migraines per month with mild daily headache. He takes Nurtec every other day. He has completely stopped Goody's powder. He has also reduced his caffeine intake from one 2 liter per day to a 12 oz coke daily.  He is not using his CPAP machine. He states he cannot find it.    Of note, he has had five ED/hospital admissions since January for hyponatremia. This is contributed to his psych medications. He states he cannot change his medications so plan is to take salt tablets and add salt to food. His psychiatrist is Dr. Christianson in [place].   Otherwise information below is reviewed and verified with no changes made unless noted.     ORIGINAL HEADACHE HISTORY - 12/30/21  Age at onset and course over time: 22 years ago began with migraines 2-3 times per week. He treated with Goody's.    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 Saw neurologist 4 years ago in [place]. Diagnosed via MRI. He can no longer have MRI due to metal clips in rectum.     Family history - mother had brain aneurysm  Last eye exam - 2020  Location: temples, occipital, holocephalic  Quality:  [] pressure [] tight [x] throbbing [] " sharp [] stabbing   Severity: current 10 with range 5-10  Duration: hours  Frequency: daily  Headaches awaken at night?:  yes  Worst time of day: mid-day, evening   Associated with: [x] photophobia [x]  phonophobia [] osmophobia [x] blurred vision  [] double vision [] loss of appetite [x] nausea [x] vomiting [x] dizziness [] vertigo  [] tinnitus [x] irritability [] sinus pressure [x] problems with concentration   [] neck tightness   Alleviated by:  [] sleep [x] darkness [] massage [] heat [x] ice [] medication  Exacerbated by:  [] fatigue [x] light [x] noise [] smells [x] coughing [x] sneezing  [] bending over [] ovulation [] menses [] alcohol [] change in weather []  stress  Ipsilateral autonomic: [] nasal congestion [] lacrimation [] ptosis [] injection [] edema [] foreign body sensation [] ear fullness   ICP:  [] transient visual obscurations  [] tinnitus   [] positional headache  [x] non-positional   Sleep habits: trouble staying asleep, has diagnosed PIPER but not using CPAP for past 4 years  Caffeine intake: 2 liter of coke daily  Gyn status (if female): n/a  MIDAS: n/a    Current acute treatment:  Nurtec - has not tried    Current prevention:  Amlodipine-valsartan  Buspar  HCTZ  Lamictal  Metoprolol  Trileptal   Lyrica  Geodon  Emgality - first January 2022    Previously tried/failed acute treatment:  Robaxin  Naproxen  Tizanidine  Tramadol  Ibuprofen   Goody's - daily for 4 years    Previously tried/failed preventative treatment:  Cymbalta      Review of patient's allergies indicates:   Allergen Reactions    Alcohol Anaphylaxis    Alcohol antiseptic pads Anaphylaxis    Cephalexin Anaphylaxis     Current Outpatient Medications   Medication Sig Dispense Refill    albuterol (PROVENTIL/VENTOLIN HFA) 90 mcg/actuation inhaler INHALE 2 PUFFS BY MOUTH AND INTO THE LUNGS EVERY 6 HOURS AS NEEDED FOR WHEEZING (Patient taking differently: Inhale 2 puffs into the lungs every 6 (six) hours as needed for Wheezing.) 18 g  11    busPIRone (BUSPAR) 15 MG tablet Take 15 mg by mouth 3 (three) times daily.       galcanezumab-gnlm 120 mg/mL PnIj Inject 2 pens (240mg) subcutaneous at separate sites, once (loading dose). Start maintenance dose 28 days later (Patient taking differently: Inject 2 pens (240mg) subcutaneous at separate sites, once (loading dose). Start maintenance dose 28 days later) 2 mL 0    LAMICTAL 100 mg tablet Take 150 mg by mouth every morning.      meclizine (ANTIVERT) 25 mg tablet Take 1 tablet (25 mg total) by mouth 2 (two) times daily as needed for Dizziness. 30 tablet 1    metoprolol succinate (TOPROL-XL) 25 MG 24 hr tablet TAKE 1 TABLET(25 MG) BY MOUTH EVERY DAY (Patient taking differently: Take 25 mg by mouth once daily.) 30 tablet 2    ondansetron (ZOFRAN) 4 MG tablet Take 1 tablet (4 mg total) by mouth 2 (two) times daily. (Patient taking differently: Take 4 mg by mouth every 12 (twelve) hours as needed.) 20 tablet 0    OXcarbazepine (TRILEPTAL) 600 MG Tab Take 600 mg by mouth 3 (three) times daily.      pregabalin (LYRICA) 50 MG capsule TAKE 1 CAPSULE(50 MG) BY MOUTH THREE TIMES DAILY (Patient taking differently: Take 50 mg by mouth 3 (three) times daily.) 90 capsule 1    rimegepant (NURTEC) 75 mg odt Take 1 tablet (75 mg total) by mouth daily as needed. Place ODT tablet on the tongue; alternatively the ODT tablet may be placed under the tongue (Patient taking differently: Take 75 mg by mouth every other day. Place ODT tablet on the tongue; alternatively the ODT tablet may be placed under the tongue) 16 tablet 11    sodium chloride 1 gram tablet Take 1 tablet (1 g total) by mouth 2 (two) times daily. 60 tablet 0    tadalafiL (CIALIS) 20 MG Tab Take one tablet by mouth prior to sexual activity, not to exceed 1 tablet in 72 hours 10 tablet 10    ziprasidone (GEODON) 80 MG capsule Take 160 mg by mouth every evening.       albuterol (PROVENTIL) 2.5 mg /3 mL (0.083 %) nebulizer solution Take 3 mLs (2.5  mg total) by nebulization every 6 (six) hours as needed for Shortness of Breath. Rescue (Patient not taking: No sig reported) 1 Box 0    amLODIPine (NORVASC) 2.5 MG tablet Take 2.5 mg by mouth once daily.      budesonide (PULMICORT) 0.5 mg/2 mL nebulizer solution USE 2 ML(0.5 MG) VIA NEBULIZER EVERY DAY (Patient not taking: No sig reported) 180 mL 0    ketoconazole (NIZORAL) 2 % shampoo Apply topically twice a week. (Patient not taking: No sig reported) 120 mL 2     No current facility-administered medications for this visit.     Facility-Administered Medications Ordered in Other Visits   Medication Dose Route Frequency Provider Last Rate Last Admin    lactated ringers infusion   Intravenous Continuous Sheela Nuno MD 10 mL/hr at 08/21/20 0655 New Bag at 08/21/20 0655    lidocaine (PF) 10 mg/ml (1%) injection 10 mg  1 mL Intradermal Once Sheela Nuno MD           Past Medical History  Past Medical History:   Diagnosis Date    Acute angina     Asthma     COPD (chronic obstructive pulmonary disease)     Diabetes mellitus     Digestive disorder     ulcers    General anesthetics causing adverse effect in therapeutic use     Headache     Hypertension     MI, old     PIPER on CPAP     Schizophrenia     Smoker unmotivated to quit     Stroke 2005    Thyroid disease     took medicine in the past    Type II diabetes mellitus with neurological manifestations 11/21/2019       Past Surgical History  Past Surgical History:   Procedure Laterality Date    APPENDECTOMY      BONE MARROW ASPIRATION Left 8/21/2020    Procedure: ASPIRATION, BONE MARROW;  Surgeon: Lex Kathleen MD;  Location: [facility] OR;  Service: Oncology;  Laterality: Left;    CLOSURE OF WOUND Right 9/9/2019    Procedure: CLOSURE, WOUND;  Surgeon: Li Kathleen DPM;  Location: [facility] OR;  Service: Podiatry;  Laterality: Right;    COLONOSCOPY N/A 12/10/2019    Procedure: COLONOSCOPY;  Surgeon: Ryan Lucas MD;  Location: [facility]  ENDO;  Service: Endoscopy;  Laterality: N/A;    COLONOSCOPY N/A 12/13/2019    Procedure: COLONOSCOPY;  Surgeon: Ryan Lucas MD;  Location: [facility] ENDO;  Service: Endoscopy;  Laterality: N/A;    ESOPHAGOGASTRODUODENOSCOPY N/A 12/10/2019    Procedure: EGD (ESOPHAGOGASTRODUODENOSCOPY);  Surgeon: Ryan Lucas MD;  Location: [facility] ENDO;  Service: Endoscopy;  Laterality: N/A;    SHOULDER ARTHROSCOPY Left        Family History  Family History   Problem Relation Age of Onset    Melanoma Mother     Diabetes Mother     Hypertension Mother     Stroke Father     Diabetes Father     Hypertension Father     Colon cancer Father         unsure of age of diagnosis    Cancer Father         colon cancer    Cervical cancer Sister     Cirrhosis Brother     Hypertension Brother     Lung cancer Maternal Grandmother     Prostate cancer Maternal Grandfather     Crohn's disease Neg Hx     Ulcerative colitis Neg Hx     Stomach cancer Neg Hx     Esophageal cancer Neg Hx     Retinal detachment Neg Hx     Strabismus Neg Hx     Macular degeneration Neg Hx     Glaucoma Neg Hx        Social History  Social History     Socioeconomic History    Marital status:    Occupational History    Occupation: disabled (mental)     Comment: since 2000   Tobacco Use    Smoking status: Current Every Day Smoker     Packs/day: 1.00     Years: 45.00     Pack years: 45.00     Types: Cigarettes    Smokeless tobacco: Never Used    Tobacco comment: Age Started 14    Substance and Sexual Activity    Alcohol use: No    Drug use: Not Currently     Types: Marijuana    Sexual activity: Yes     Partners: Female        Review of Systems  14-point review of systems as follows:   No check katina indicates NEGATIVE response   Constitutional: [] weight loss, [] change to appetite   Eyes: [] change in vision, [] double vision   Ears, nose, mouth, throat: [] frequent nose bleeds, [] ringing in the ears   Respiratory: [] cough, []  wheezing   Cardiovascular: [x] chest pain, [x] palpitations   Gastrointestinal: [] jaundice, [] nausea/vomiting   Genitourinary: [] incontinence, [] burning with urination   Hematologic/lymphatic: [x] easy bruising/bleeding, [x] night sweats   Neurological: [x] numbness, [x] weakness   Endocrine: [] fatigue, [] heat/cold intolerance   Allergy/Immunologic: [] fevers, [] chills   Musculoskeletal: [x] muscle pain, [x] joint pain   Psychiatric: [] thoughts of harming self/others, [] depression   Integumentary: [] rashes, [] sores that do not heal     Objective:        Vitals:    03/30/22 0942   BP: 122/78   Pulse: 86   Resp: 17   Temp: 98.6 °F (37 °C)     Body mass index is 31.94 kg/m².    3/30/22  Constitutional:   He appears well-developed and well-nourished. He is well groomed     Neurological Exam:  General: well-developed, well-nourished, no distress  Mental status: Awake and alert  Speech language: No dysarthria or aphasia on conversation  Cranial nerves: Face symmetric  Motor: Moves all extremities well  Coordination: No ataxia. No tremor.    12/30/22  Constitutional: appears in no acute distress, well-developed, well-nourished     Eyes: normal conjunctiva, PERRLA    Ears, nose, mouth, throat: external appearance of ears and nose normal, hearing intact     Cardiovascular: regular rate and rhythm, +murmur    Respiratory: unlabored respirations, breath sounds normal bilaterally    Gastrointestinal: no visible abdominal masses, no guarding, no visible hernia    Musculoskeletal: normal tone in all four extremities. No abnormal movements. No pronator drift. No orbit. Symmetric finger tapping.      Spine:   CERVICAL SPINE:  ROM: mildly restricted   MUSCLE SPASM: no   FACET LOADING: no   SPURLING: no  CONOR / STEFF tender: no     Psychiatric: normal judgment and insight. Oriented to person, place, and time.     Neurologic:   Cortical functions: recent and remote memory intact, normal attention span and concentration, speech  fluent, adequate fund of knowledge   Cranial nerves: visual fields full, PERRLA, EOMI, symmetric facial strength, hearing intact, palate elevates symmetrically, shoulder shrug 5/5, tongue protrudes midline   Reflexes: 2+ in the upper and lower extremities, no Long  Sensation: intact to temperature throughout   Coordination: normal finger to nose, heel to shin, abnormal/unsteady gait, did not attempt tandem     Data Review:     I have personally reviewed the referring provider's notes, labs, & imaging made available to me today.      RADIOLOGY STUDIES:  I have personally reviewed the pertinent images performed.       No results found for this or any previous visit.    Lab Results   Component Value Date     (L) 03/24/2022    K 3.6 03/24/2022    MG 2.1 03/21/2022    CL 91 (L) 03/24/2022    CO2 28 03/24/2022    BUN 5 (L) 03/24/2022    CREATININE 0.58 03/24/2022     03/24/2022    HGBA1C 5.5 01/11/2022    AST 34 03/21/2022    ALT 23 03/21/2022    ALBUMIN 4.5 03/21/2022    PROT 7.6 03/21/2022    BILITOT 0.5 03/21/2022    CHOL 170 07/08/2021    HDL 41 07/08/2021    LDLCALC 105.4 07/08/2021    TRIG 118 07/08/2021       Lab Results   Component Value Date    WBC 8.87 03/24/2022    HGB 13.3 (L) 03/24/2022    HCT 37.7 (L) 03/24/2022    MCV 86 03/24/2022     03/24/2022       Lab Results   Component Value Date    TSH 1.530 03/23/2022           Assessment & Plan:       Problem List Items Addressed This Visit        Neuro    Intractable chronic migraine without aura and without status migrainosus - Primary    Overview     20 year history of migraine headaches. Headaches are typically moderate to severe in intensity, worsen with activity, pounding in quality and associated with sensitivity to light and sound. Given history of reported brain tumor, agree with head CT today. Cannot do MRI due to metal clips.     Galcanezumab (Emgality) treatment was approved for the prevention of acute and chronic migraine on  9/27/2018. The patient will be an ideal candidate for Emgality. We are planning for 3 treatments 28 days apart. If we see no improvement after 3 treatments, we will discontinue the injections.     Add Nurtec once every other day.              Current Assessment & Plan     He has been on Emgality for three months and although he has a daily headache, he has reduced his migraine frequency by over 50%. I suspect if he treats his sleep apnea, he will improve his milder daily headache. Continue Emgality monthly and Nurtec every other day.               Renal/    Hyponatremia       Other    PIPER on CPAP    Relevant Orders    Ambulatory referral/consult to Sleep Disorders      Other Visit Diagnoses     Nausea        Medication overuse headache        Resolved.               Please call our clinic at [phone] or send a message on the [facility] portal if there are any changes to the plan described below, for example,if you are not contacted for the requested tests, referral(s) within one week, if you are unable to receive the medications prescribed, or if you feel you need to change the treatment course for any reason.     TESTING:  -- none     REFERRALS:  -- sleep specialist     PREVENTION (use daily regardless of headache):  -- continue Emgality injection once every 28 days   I suspect once we treat your sleep apnea, we will reduce your daily headache frequency significantly.    AS-NEEDED TREATMENT (use total no more than 10 days per month unless otherwise stated):  -- continue Nurtec once every other day. This dissolves under the tongue and is only taken once in 24 hour time frame.        Follow up in about 3 months (around 6/30/2022) for follow up with ALEIDA.    Mckenna Bennett NP

## 2022-03-30 NOTE — ASSESSMENT & PLAN NOTE
He has been on Emgality for three months and although he has a daily headache, he has reduced his migraine frequency by over 50%. I suspect if he treats his sleep apnea, he will improve his milder daily headache. Continue Emgality monthly and Nurtec every other day.

## 2022-04-01 DIAGNOSIS — G43.719 INTRACTABLE CHRONIC MIGRAINE WITHOUT AURA AND WITHOUT STATUS MIGRAINOSUS: ICD-10-CM

## 2022-04-01 RX ORDER — GALCANEZUMAB 120 MG/ML
INJECTION, SOLUTION SUBCUTANEOUS
Qty: 2 ML | Refills: 0 | OUTPATIENT
Start: 2022-04-01

## 2022-04-04 DIAGNOSIS — G43.719 INTRACTABLE CHRONIC MIGRAINE WITHOUT AURA AND WITHOUT STATUS MIGRAINOSUS: ICD-10-CM

## 2022-04-04 RX ORDER — GALCANEZUMAB 120 MG/ML
120 INJECTION, SOLUTION SUBCUTANEOUS
Qty: 1 ML | Refills: 11 | Status: SHIPPED | OUTPATIENT
Start: 2022-04-04 | End: 2023-04-04 | Stop reason: SDUPTHER

## 2022-04-07 ENCOUNTER — LAB VISIT (OUTPATIENT)
Dept: LAB | Facility: HOSPITAL | Age: 57
End: 2022-04-07
Attending: FAMILY MEDICINE
Payer: MEDICARE

## 2022-04-07 ENCOUNTER — OFFICE VISIT (OUTPATIENT)
Dept: FAMILY MEDICINE | Facility: CLINIC | Age: 57
End: 2022-04-07
Payer: MEDICARE

## 2022-04-07 VITALS
TEMPERATURE: 99 F | HEART RATE: 88 BPM | DIASTOLIC BLOOD PRESSURE: 62 MMHG | BODY MASS INDEX: 31.54 KG/M2 | HEIGHT: 71 IN | SYSTOLIC BLOOD PRESSURE: 116 MMHG | WEIGHT: 225.31 LBS | RESPIRATION RATE: 14 BRPM

## 2022-04-07 DIAGNOSIS — Z00.00 ROUTINE HEALTH MAINTENANCE: ICD-10-CM

## 2022-04-07 DIAGNOSIS — E66.01 MORBID (SEVERE) OBESITY DUE TO EXCESS CALORIES: ICD-10-CM

## 2022-04-07 DIAGNOSIS — E87.1 HYPONATREMIA: ICD-10-CM

## 2022-04-07 DIAGNOSIS — E87.1 HYPONATREMIA: Primary | ICD-10-CM

## 2022-04-07 LAB
ANION GAP SERPL CALC-SCNC: 10 MMOL/L (ref 8–16)
BUN SERPL-MCNC: 13 MG/DL (ref 6–20)
CALCIUM SERPL-MCNC: 9.6 MG/DL (ref 8.7–10.5)
CHLORIDE SERPL-SCNC: 95 MMOL/L (ref 95–110)
CO2 SERPL-SCNC: 28 MMOL/L (ref 23–29)
CREAT SERPL-MCNC: 0.9 MG/DL (ref 0.5–1.4)
EST. GFR  (AFRICAN AMERICAN): >60 ML/MIN/1.73 M^2
EST. GFR  (NON AFRICAN AMERICAN): >60 ML/MIN/1.73 M^2
GLUCOSE SERPL-MCNC: 102 MG/DL (ref 70–110)
POTASSIUM SERPL-SCNC: 3.8 MMOL/L (ref 3.5–5.1)
SODIUM SERPL-SCNC: 133 MMOL/L (ref 136–145)

## 2022-04-07 PROCEDURE — 3074F SYST BP LT 130 MM HG: CPT | Mod: CPTII,S$GLB,, | Performed by: FAMILY MEDICINE

## 2022-04-07 PROCEDURE — 3044F HG A1C LEVEL LT 7.0%: CPT | Mod: CPTII,S$GLB,, | Performed by: FAMILY MEDICINE

## 2022-04-07 PROCEDURE — 99999 PR PBB SHADOW E&M-EST. PATIENT-LVL IV: ICD-10-PCS | Mod: PBBFAC,,, | Performed by: FAMILY MEDICINE

## 2022-04-07 PROCEDURE — 3078F PR MOST RECENT DIASTOLIC BLOOD PRESSURE < 80 MM HG: ICD-10-PCS | Mod: CPTII,S$GLB,, | Performed by: FAMILY MEDICINE

## 2022-04-07 PROCEDURE — 99214 PR OFFICE/OUTPT VISIT, EST, LEVL IV, 30-39 MIN: ICD-10-PCS | Mod: S$GLB,,, | Performed by: FAMILY MEDICINE

## 2022-04-07 PROCEDURE — 99999 PR PBB SHADOW E&M-EST. PATIENT-LVL IV: CPT | Mod: PBBFAC,,, | Performed by: FAMILY MEDICINE

## 2022-04-07 PROCEDURE — 3074F PR MOST RECENT SYSTOLIC BLOOD PRESSURE < 130 MM HG: ICD-10-PCS | Mod: CPTII,S$GLB,, | Performed by: FAMILY MEDICINE

## 2022-04-07 PROCEDURE — 1159F PR MEDICATION LIST DOCUMENTED IN MEDICAL RECORD: ICD-10-PCS | Mod: CPTII,S$GLB,, | Performed by: FAMILY MEDICINE

## 2022-04-07 PROCEDURE — 1160F RVW MEDS BY RX/DR IN RCRD: CPT | Mod: CPTII,S$GLB,, | Performed by: FAMILY MEDICINE

## 2022-04-07 PROCEDURE — 1160F PR REVIEW ALL MEDS BY PRESCRIBER/CLIN PHARMACIST DOCUMENTED: ICD-10-PCS | Mod: CPTII,S$GLB,, | Performed by: FAMILY MEDICINE

## 2022-04-07 PROCEDURE — 1159F MED LIST DOCD IN RCRD: CPT | Mod: CPTII,S$GLB,, | Performed by: FAMILY MEDICINE

## 2022-04-07 PROCEDURE — 3078F DIAST BP <80 MM HG: CPT | Mod: CPTII,S$GLB,, | Performed by: FAMILY MEDICINE

## 2022-04-07 PROCEDURE — 99499 UNLISTED E&M SERVICE: CPT | Mod: S$GLB,,, | Performed by: FAMILY MEDICINE

## 2022-04-07 PROCEDURE — 99214 OFFICE O/P EST MOD 30 MIN: CPT | Mod: S$GLB,,, | Performed by: FAMILY MEDICINE

## 2022-04-07 PROCEDURE — 3044F PR MOST RECENT HEMOGLOBIN A1C LEVEL <7.0%: ICD-10-PCS | Mod: CPTII,S$GLB,, | Performed by: FAMILY MEDICINE

## 2022-04-07 PROCEDURE — 86803 HEPATITIS C AB TEST: CPT | Performed by: FAMILY MEDICINE

## 2022-04-07 PROCEDURE — 99499 RISK ADDL DX/OHS AUDIT: ICD-10-PCS | Mod: S$GLB,,, | Performed by: FAMILY MEDICINE

## 2022-04-07 PROCEDURE — 3008F PR BODY MASS INDEX (BMI) DOCUMENTED: ICD-10-PCS | Mod: CPTII,S$GLB,, | Performed by: FAMILY MEDICINE

## 2022-04-07 PROCEDURE — 3008F BODY MASS INDEX DOCD: CPT | Mod: CPTII,S$GLB,, | Performed by: FAMILY MEDICINE

## 2022-04-07 PROCEDURE — 36415 COLL VENOUS BLD VENIPUNCTURE: CPT | Mod: PN | Performed by: FAMILY MEDICINE

## 2022-04-07 PROCEDURE — 80048 BASIC METABOLIC PNL TOTAL CA: CPT | Performed by: FAMILY MEDICINE

## 2022-04-07 RX ORDER — EPINEPHRINE 0.3 MG/.3ML
1 INJECTION SUBCUTANEOUS ONCE
Qty: 0.3 ML | Refills: 0 | Status: SHIPPED | OUTPATIENT
Start: 2022-04-07 | End: 2022-07-07 | Stop reason: SDUPTHER

## 2022-04-07 NOTE — PROGRESS NOTES
Subjective:       Patient ID: Ryan Crane is a 56 y.o. male.    Chief Complaint: Hospital Follow Up ([facility] on 3/21/22)      Ryan Crane is in the office for hosp f/u.    HPI   Multiple admissions for hyponatremia. Currently discharged to take salt tablets 2x/day and fluid restriction. Last sodium level ~1 week ago was 120.   Past Medical History:   Diagnosis Date    Acute angina     Asthma     COPD (chronic obstructive pulmonary disease)     Diabetes mellitus     Digestive disorder     ulcers    General anesthetics causing adverse effect in therapeutic use     Headache     Hypertension     MI, old     PIPER on CPAP     Schizophrenia     Smoker unmotivated to quit     Stroke 2005    Thyroid disease     took medicine in the past    Type II diabetes mellitus with neurological manifestations 11/21/2019         Current Outpatient Medications:     albuterol (PROVENTIL) 2.5 mg /3 mL (0.083 %) nebulizer solution, Take 3 mLs (2.5 mg total) by nebulization every 6 (six) hours as needed for Shortness of Breath. Rescue, Disp: 1 Box, Rfl: 0    albuterol (PROVENTIL/VENTOLIN HFA) 90 mcg/actuation inhaler, INHALE 2 PUFFS BY MOUTH AND INTO THE LUNGS EVERY 6 HOURS AS NEEDED FOR WHEEZING (Patient taking differently: Inhale 2 puffs into the lungs every 6 (six) hours as needed for Wheezing.), Disp: 18 g, Rfl: 11    amLODIPine (NORVASC) 2.5 MG tablet, Take 2.5 mg by mouth once daily., Disp: , Rfl:     busPIRone (BUSPAR) 15 MG tablet, Take 15 mg by mouth 3 (three) times daily. , Disp: , Rfl:     galcanezumab-gnlm (EMGALITY SYRINGE) 120 mg/mL Syrg, Inject 1 pen (120 mg) into the skin every 28 days., Disp: 1 mL, Rfl: 11    LAMICTAL 100 mg tablet, Take 150 mg by mouth every morning., Disp: , Rfl:     meclizine (ANTIVERT) 25 mg tablet, Take 1 tablet (25 mg total) by mouth 2 (two) times daily as needed for Dizziness., Disp: 30 tablet, Rfl: 1    metoprolol succinate (TOPROL-XL) 25 MG 24 hr tablet, TAKE 1  TABLET(25 MG) BY MOUTH EVERY DAY (Patient taking differently: Take 25 mg by mouth once daily.), Disp: 30 tablet, Rfl: 2    ondansetron (ZOFRAN) 4 MG tablet, Take 1 tablet (4 mg total) by mouth 2 (two) times daily. (Patient taking differently: Take 4 mg by mouth every 12 (twelve) hours as needed.), Disp: 20 tablet, Rfl: 0    OXcarbazepine (TRILEPTAL) 600 MG Tab, Take 600 mg by mouth 3 (three) times daily., Disp: , Rfl:     pregabalin (LYRICA) 50 MG capsule, TAKE 1 CAPSULE(50 MG) BY MOUTH THREE TIMES DAILY (Patient taking differently: Take 50 mg by mouth 3 (three) times daily.), Disp: 90 capsule, Rfl: 1    rimegepant (NURTEC) 75 mg odt, Take 1 tablet (75 mg total) by mouth daily as needed. Place ODT tablet on the tongue; alternatively the ODT tablet may be placed under the tongue (Patient taking differently: Take 75 mg by mouth every other day. Place ODT tablet on the tongue; alternatively the ODT tablet may be placed under the tongue), Disp: 16 tablet, Rfl: 11    sodium chloride 1 gram tablet, Take 1 tablet (1 g total) by mouth 2 (two) times daily., Disp: 60 tablet, Rfl: 0    ziprasidone (GEODON) 80 MG capsule, Take 160 mg by mouth every evening. , Disp: , Rfl:     budesonide (PULMICORT) 0.5 mg/2 mL nebulizer solution, USE 2 ML(0.5 MG) VIA NEBULIZER EVERY DAY, Disp: 180 mL, Rfl: 0    galcanezumab-gnlm 120 mg/mL PnIj, Inject 2 pens (240mg) subcutaneous at separate sites, once (loading dose). Start maintenance dose 28 days later (Patient taking differently: Inject 2 pens (240mg) subcutaneous at separate sites, once (loading dose). Start maintenance dose 28 days later), Disp: 2 mL, Rfl: 0    ketoconazole (NIZORAL) 2 % shampoo, Apply topically twice a week., Disp: 120 mL, Rfl: 2    tadalafiL (CIALIS) 20 MG Tab, Take one tablet by mouth prior to sexual activity, not to exceed 1 tablet in 72 hours, Disp: 10 tablet, Rfl: 10  No current facility-administered medications for this visit.    Facility-Administered  Medications Ordered in Other Visits:     lactated ringers infusion, , Intravenous, Continuous, Sheela Nuno MD, Last Rate: 10 mL/hr at 08/21/20 0655, New Bag at 08/21/20 0655    lidocaine (PF) 10 mg/ml (1%) injection 10 mg, 1 mL, Intradermal, Once, Sheela Nuno MD    The ASCVD Risk score ([place] SIVAKUMAR Jr., et al., 2013) failed to calculate for the following reasons:    The patient has a prior MI or stroke diagnosis     Lab Results   Component Value Date    HGBA1C 5.6 03/30/2022    HGBA1C 5.5 01/11/2022    HGBA1C 5.3 07/08/2021     Lab Results   Component Value Date    LDLCALC 106.8 03/30/2022    CREATININE 0.8 03/30/2022   labs, MRI, CT head, cxr, stress, holter 2022 rev.    Review of Systems   Constitutional: Negative for appetite change, fatigue and unexpected weight change.   HENT: Negative for congestion, postnasal drip and rhinorrhea.         Denies seasonal allergies, or perennial allergies   Eyes: Negative for discharge and itching.   Respiratory: Negative for cough and shortness of breath.    Cardiovascular: Negative for chest pain and leg swelling.   Gastrointestinal: Negative for abdominal pain, constipation, diarrhea and nausea.   Endocrine: Negative for polydipsia, polyphagia and polyuria.   Genitourinary: Negative for dysuria and hematuria.   Musculoskeletal: Negative for arthralgias and myalgias.   Skin: Negative for rash.   Allergic/Immunologic: Negative for environmental allergies and food allergies.   Neurological: Positive for dizziness (chronic) and headaches (chronic, stable). Negative for tremors and seizures.   Hematological: Negative for adenopathy. Does not bruise/bleed easily.   Psychiatric/Behavioral: Positive for dysphoric mood and sleep disturbance (chronic, stable). The patient is not nervous/anxious.         Managed by outside psychiatry (Dr Christianson). Currently on dose adjustment due to hyponatremia.           Objective:      Physical Exam  Vitals and nursing note reviewed.    Constitutional:       General: He is not in acute distress.     Appearance: Normal appearance. He is well-developed. He is obese.   HENT:      Head: Normocephalic and atraumatic.      Right Ear: Tympanic membrane normal.   Eyes:      General: No scleral icterus.        Right eye: No discharge.         Left eye: No discharge.      Conjunctiva/sclera: Conjunctivae normal.   Cardiovascular:      Rate and Rhythm: Normal rate and regular rhythm.      Heart sounds: Murmur heard.   Pulmonary:      Effort: Pulmonary effort is normal. No respiratory distress.      Breath sounds: Normal breath sounds.   Musculoskeletal:      Cervical back: Neck supple.      Right lower leg: No edema.      Left lower leg: No edema.   Skin:     General: Skin is warm and dry.   Neurological:      General: No focal deficit present.      Mental Status: He is alert and oriented to person, place, and time.      Cranial Nerves: No cranial nerve deficit.   Psychiatric:         Behavior: Behavior normal.         Thought Content: Thought content normal.             Screening recommendations appropriate to age and health status were reviewed.    Assessment & Plan:    Hyponatremia  Comments:  continue sodium tablets 2x/day   check bmp today  Orders:  -     Basic Metabolic Panel; Future; Expected date: 04/07/2022    Routine health maintenance  -     Hepatitis C Antibody; Future; Expected date: 04/07/2022    Morbid (severe) obesity due to excess calories  Comments:  continue healthy habits for weight control    Other orders  -     sodium chloride 1 gram tablet; Take 1 tablet (1 g total) by mouth 2 (two) times daily.  Dispense: 180 tablet; Refill: 3

## 2022-04-08 DIAGNOSIS — E87.1 HYPONATREMIA: Primary | ICD-10-CM

## 2022-04-08 LAB — HCV AB SERPL QL IA: NEGATIVE

## 2022-04-18 NOTE — TELEPHONE ENCOUNTER
No new care gaps identified.  Powered by [employer]. Reference number: [id number].   4/18/2022 11:10:47 AM CDT

## 2022-04-19 RX ORDER — PREGABALIN 50 MG/1
50 CAPSULE ORAL 3 TIMES DAILY
Qty: 90 CAPSULE | Refills: 3 | Status: SHIPPED | OUTPATIENT
Start: 2022-04-19 | End: 2022-08-19

## 2022-04-23 ENCOUNTER — PATIENT OUTREACH (OUTPATIENT)
Dept: ADMINISTRATIVE | Facility: OTHER | Age: 57
End: 2022-04-23
Payer: MEDICARE

## 2022-04-27 ENCOUNTER — LAB VISIT (OUTPATIENT)
Dept: LAB | Facility: HOSPITAL | Age: 57
End: 2022-04-27
Attending: FAMILY MEDICINE
Payer: MEDICARE

## 2022-04-27 ENCOUNTER — OFFICE VISIT (OUTPATIENT)
Dept: OPHTHALMOLOGY | Facility: CLINIC | Age: 57
End: 2022-04-27
Payer: MEDICARE

## 2022-04-27 DIAGNOSIS — E55.9 VITAMIN D DEFICIENCY: ICD-10-CM

## 2022-04-27 DIAGNOSIS — E11.9 DIABETES MELLITUS TYPE 2 WITHOUT RETINOPATHY: ICD-10-CM

## 2022-04-27 DIAGNOSIS — H52.7 REFRACTIVE ERROR: ICD-10-CM

## 2022-04-27 DIAGNOSIS — H25.13 NUCLEAR SCLEROSIS OF BOTH EYES: Primary | ICD-10-CM

## 2022-04-27 DIAGNOSIS — E87.1 HYPONATREMIA: ICD-10-CM

## 2022-04-27 DIAGNOSIS — E11.9 TYPE 2 DIABETES MELLITUS WITHOUT COMPLICATION, UNSPECIFIED WHETHER LONG TERM INSULIN USE: ICD-10-CM

## 2022-04-27 DIAGNOSIS — E53.8 B12 DEFICIENCY: ICD-10-CM

## 2022-04-27 DIAGNOSIS — R79.89 ABNORMAL CBC: ICD-10-CM

## 2022-04-27 LAB
ANION GAP SERPL CALC-SCNC: 9 MMOL/L (ref 8–16)
BASOPHILS # BLD AUTO: 0.06 K/UL (ref 0–0.2)
BASOPHILS NFR BLD: 0.5 % (ref 0–1.9)
BUN SERPL-MCNC: 10 MG/DL (ref 6–20)
CALCIUM SERPL-MCNC: 9.6 MG/DL (ref 8.7–10.5)
CHLORIDE SERPL-SCNC: 100 MMOL/L (ref 95–110)
CO2 SERPL-SCNC: 27 MMOL/L (ref 23–29)
CREAT SERPL-MCNC: 0.9 MG/DL (ref 0.5–1.4)
DIFFERENTIAL METHOD: ABNORMAL
EOSINOPHIL # BLD AUTO: 0.1 K/UL (ref 0–0.5)
EOSINOPHIL NFR BLD: 1 % (ref 0–8)
ERYTHROCYTE [DISTWIDTH] IN BLOOD BY AUTOMATED COUNT: 14.3 % (ref 11.5–14.5)
EST. GFR  (AFRICAN AMERICAN): >60 ML/MIN/1.73 M^2
EST. GFR  (NON AFRICAN AMERICAN): >60 ML/MIN/1.73 M^2
ESTIMATED AVG GLUCOSE: 105 MG/DL (ref 68–131)
GLUCOSE SERPL-MCNC: 81 MG/DL (ref 70–110)
HBA1C MFR BLD: 5.3 % (ref 4–5.6)
HCT VFR BLD AUTO: 45.4 % (ref 40–54)
HGB BLD-MCNC: 15 G/DL (ref 14–18)
IMM GRANULOCYTES # BLD AUTO: 0.05 K/UL (ref 0–0.04)
IMM GRANULOCYTES NFR BLD AUTO: 0.4 % (ref 0–0.5)
LYMPHOCYTES # BLD AUTO: 2.6 K/UL (ref 1–4.8)
LYMPHOCYTES NFR BLD: 20 % (ref 18–48)
MCH RBC QN AUTO: 29.9 PG (ref 27–31)
MCHC RBC AUTO-ENTMCNC: 33 G/DL (ref 32–36)
MCV RBC AUTO: 91 FL (ref 82–98)
MONOCYTES # BLD AUTO: 1.2 K/UL (ref 0.3–1)
MONOCYTES NFR BLD: 9.4 % (ref 4–15)
NEUTROPHILS # BLD AUTO: 8.9 K/UL (ref 1.8–7.7)
NEUTROPHILS NFR BLD: 68.7 % (ref 38–73)
NRBC BLD-RTO: 0 /100 WBC
PLATELET # BLD AUTO: 408 K/UL (ref 150–450)
PMV BLD AUTO: 9.7 FL (ref 9.2–12.9)
POTASSIUM SERPL-SCNC: 4.7 MMOL/L (ref 3.5–5.1)
RBC # BLD AUTO: 5.01 M/UL (ref 4.6–6.2)
SODIUM SERPL-SCNC: 136 MMOL/L (ref 136–145)
VIT B12 SERPL-MCNC: <148 PG/ML (ref 210–950)
WBC # BLD AUTO: 12.93 K/UL (ref 3.9–12.7)

## 2022-04-27 PROCEDURE — 1159F PR MEDICATION LIST DOCUMENTED IN MEDICAL RECORD: ICD-10-PCS | Mod: CPTII,S$GLB,, | Performed by: OPHTHALMOLOGY

## 2022-04-27 PROCEDURE — 2023F PR DILATED RETINAL EXAM W/O EVID OF RETINOPATHY: ICD-10-PCS | Mod: CPTII,S$GLB,, | Performed by: OPHTHALMOLOGY

## 2022-04-27 PROCEDURE — 99999 PR PBB SHADOW E&M-EST. PATIENT-LVL III: ICD-10-PCS | Mod: PBBFAC,,, | Performed by: OPHTHALMOLOGY

## 2022-04-27 PROCEDURE — 1160F PR REVIEW ALL MEDS BY PRESCRIBER/CLIN PHARMACIST DOCUMENTED: ICD-10-PCS | Mod: CPTII,S$GLB,, | Performed by: OPHTHALMOLOGY

## 2022-04-27 PROCEDURE — 1159F MED LIST DOCD IN RCRD: CPT | Mod: CPTII,S$GLB,, | Performed by: OPHTHALMOLOGY

## 2022-04-27 PROCEDURE — 80048 BASIC METABOLIC PNL TOTAL CA: CPT | Performed by: FAMILY MEDICINE

## 2022-04-27 PROCEDURE — 36415 COLL VENOUS BLD VENIPUNCTURE: CPT | Mod: PN | Performed by: FAMILY MEDICINE

## 2022-04-27 PROCEDURE — 82607 VITAMIN B-12: CPT | Performed by: FAMILY MEDICINE

## 2022-04-27 PROCEDURE — 3044F HG A1C LEVEL LT 7.0%: CPT | Mod: CPTII,S$GLB,, | Performed by: OPHTHALMOLOGY

## 2022-04-27 PROCEDURE — 92015 PR REFRACTION: ICD-10-PCS | Mod: S$GLB,,, | Performed by: OPHTHALMOLOGY

## 2022-04-27 PROCEDURE — 99499 RISK ADDL DX/OHS AUDIT: ICD-10-PCS | Mod: S$GLB,,, | Performed by: OPHTHALMOLOGY

## 2022-04-27 PROCEDURE — 92014 COMPRE OPH EXAM EST PT 1/>: CPT | Mod: S$GLB,,, | Performed by: OPHTHALMOLOGY

## 2022-04-27 PROCEDURE — 85025 COMPLETE CBC W/AUTO DIFF WBC: CPT | Performed by: FAMILY MEDICINE

## 2022-04-27 PROCEDURE — 2023F DILAT RTA XM W/O RTNOPTHY: CPT | Mod: CPTII,S$GLB,, | Performed by: OPHTHALMOLOGY

## 2022-04-27 PROCEDURE — 83036 HEMOGLOBIN GLYCOSYLATED A1C: CPT | Performed by: FAMILY MEDICINE

## 2022-04-27 PROCEDURE — 99499 UNLISTED E&M SERVICE: CPT | Mod: S$GLB,,, | Performed by: OPHTHALMOLOGY

## 2022-04-27 PROCEDURE — 1160F RVW MEDS BY RX/DR IN RCRD: CPT | Mod: CPTII,S$GLB,, | Performed by: OPHTHALMOLOGY

## 2022-04-27 PROCEDURE — 92015 DETERMINE REFRACTIVE STATE: CPT | Mod: S$GLB,,, | Performed by: OPHTHALMOLOGY

## 2022-04-27 PROCEDURE — 3044F PR MOST RECENT HEMOGLOBIN A1C LEVEL <7.0%: ICD-10-PCS | Mod: CPTII,S$GLB,, | Performed by: OPHTHALMOLOGY

## 2022-04-27 PROCEDURE — 99999 PR PBB SHADOW E&M-EST. PATIENT-LVL III: CPT | Mod: PBBFAC,,, | Performed by: OPHTHALMOLOGY

## 2022-04-27 PROCEDURE — 92014 PR EYE EXAM, EST PATIENT,COMPREHESV: ICD-10-PCS | Mod: S$GLB,,, | Performed by: OPHTHALMOLOGY

## 2022-04-27 NOTE — PROGRESS NOTES
HPI     Diabetic Eye Exam      Additional comments: DM eye exam              Comments     DLS: 6/4/20    Pt states blurred vision has gotten worse recently. Only wears OTC readers   for near.     LBSL: does not have to ck - was taken off medications.  Hemoglobin A1C       Date                     Value               Ref Range             Status                03/30/2022               5.6                 4.0 - 5.6 %           Final                 01/11/2022               5.5                 0.0 - 5.6 %           Final                 07/08/2021               5.3                 4.0 - 5.6 %           Final                    Last edited by Cheryle Quintana on 4/27/2022  7:47 AM. (History)        ROS     Positive for: Endocrine    Negative for: Constitutional, Gastrointestinal, Neurological, Skin,   Genitourinary, Musculoskeletal, HENT, Cardiovascular, Eyes, Respiratory,   Psychiatric, Allergic/Imm, Heme/Lymph    Last edited by Arnulfo Prince Jr., MD on 4/27/2022  8:25 AM. (History)        Assessment /Plan     For exam results, see Encounter Report.    Nuclear sclerosis of both eyes    Diabetes mellitus type 2 without retinopathy    Refractive error      -no decrease in ADLS, no significant glare, and functionality is satisfactory  -counseled on what to expect if the cataracts worsening and how it can effect the vision  -continue to monitor and if vision changes patient can call for another appointment  -no retinopathy seen on DFE today  -continue good blood pressure and glucose control  -patient no longer takes diabetic medication  -rx for glasses given to patient  No follow-ups on file.

## 2022-04-29 ENCOUNTER — TELEPHONE (OUTPATIENT)
Dept: FAMILY MEDICINE | Facility: CLINIC | Age: 57
End: 2022-04-29
Payer: MEDICARE

## 2022-04-29 DIAGNOSIS — E53.8 B12 DEFICIENCY: Primary | ICD-10-CM

## 2022-04-29 RX ORDER — CYANOCOBALAMIN 1000 UG/ML
1000 INJECTION, SOLUTION INTRAMUSCULAR; SUBCUTANEOUS WEEKLY
Status: SHIPPED | OUTPATIENT
Start: 2022-04-29 | End: 2022-05-27

## 2022-04-29 NOTE — TELEPHONE ENCOUNTER
Persistent B12 deficiency. Start B12 injections weekly x 4 then repeat lab.  White count mildly elevated. Recheck next week.

## 2022-05-03 ENCOUNTER — TELEPHONE (OUTPATIENT)
Dept: FAMILY MEDICINE | Facility: CLINIC | Age: 57
End: 2022-05-03

## 2022-05-03 ENCOUNTER — CLINICAL SUPPORT (OUTPATIENT)
Dept: FAMILY MEDICINE | Facility: CLINIC | Age: 57
End: 2022-05-03
Payer: MEDICARE

## 2022-05-03 PROCEDURE — 96372 THER/PROPH/DIAG INJ SC/IM: CPT | Mod: S$GLB,,, | Performed by: FAMILY MEDICINE

## 2022-05-03 PROCEDURE — 96372 PR INJECTION,THERAP/PROPH/DIAG2ST, IM OR SUBCUT: ICD-10-PCS | Mod: S$GLB,,, | Performed by: FAMILY MEDICINE

## 2022-05-03 RX ADMIN — CYANOCOBALAMIN 1000 MCG: 1000 INJECTION, SOLUTION INTRAMUSCULAR; SUBCUTANEOUS at 09:05

## 2022-05-03 NOTE — TELEPHONE ENCOUNTER
Attempted to contact pt. No answer, unable to leave message.    Line did not ring and then disconnected.

## 2022-05-03 NOTE — PROGRESS NOTES
Patient presents to clinic to receive B12 injection. Administered to Right deltoid. Tolerated well with no signs or symptoms of adverse effects.

## 2022-05-03 NOTE — TELEPHONE ENCOUNTER
----- Message from Yashira Powell sent at 5/3/2022  1:24 PM CDT -----  Regarding: Patient Advice            Name of Who is Calling:    DEANNE MICHAELS    Who Left The Message:    DEANNE MICHAELS      What is the request in detail:       Patient called requesting a call.  Patient wasn't very specific as to what his call pertains to.  Please further advise.   Thank you!      Reply by [facility]:  No      Preferred Call Back  : [phone] ([phone])                                           [phone]

## 2022-05-04 ENCOUNTER — TELEPHONE (OUTPATIENT)
Dept: FAMILY MEDICINE | Facility: CLINIC | Age: 57
End: 2022-05-04
Payer: MEDICARE

## 2022-05-04 NOTE — TELEPHONE ENCOUNTER
----- Message from Kristen Encarnacion sent at 5/3/2022  4:35 PM CDT -----  Contact: Pt  Type:  Patient Returning Call    Who Called:  Pt  Who Left Message for Patient:  Hanna  Does the patient know what this is regarding?:  Yes  Best Call Back Number:  [phone]  Additional Information:  Please call back.  Thanks.

## 2022-05-04 NOTE — TELEPHONE ENCOUNTER
"Spoke with pt, he is asking for refill of his BP medication, he is unsure what the name of the medication is as he threw the bottle away. He believes it is a large yellow pill  I reviewed his medication list, I have amlodipine and metoprolol listed in his med list but he states there are three.   He said he will discuss it with Dr. Becerril at his visit  I advised we do not want to wait until his visit for him to get a refill, he said "Well I can't help you and you can't help me." and ended call.   "

## 2022-05-09 ENCOUNTER — TELEPHONE (OUTPATIENT)
Dept: FAMILY MEDICINE | Facility: CLINIC | Age: 57
End: 2022-05-09
Payer: MEDICARE

## 2022-05-09 NOTE — TELEPHONE ENCOUNTER
----- Message from Tiffanie Richard sent at 5/9/2022  8:01 AM CDT -----  Contact: pt  Type: Sooner Appt Request    Caller is requesting a sooner appt.  Caller declined first available listed below.  Caller will not accept being placed on the wait list and are requesting a message be sent to the doctor.    Name of Caller: pt     When is the 1st available appt:     Symptoms: Needs to schedule B 12 shot     Best Call Back #:[phone]    Additional Info-Please advise-Thank you~

## 2022-05-09 NOTE — TELEPHONE ENCOUNTER
----- Message from Tiffanie Richard sent at 5/9/2022  8:00 AM CDT -----  Contact: pt  Type: Sooner Appt Request    Caller is requesting a sooner appt.  Caller declined first available listed below.  Caller will not accept being placed on the wait list and are requesting a message be sent to the doctor.    Name of Caller:Pt     When is the 1st available appt: 05/23    Symptoms: [facility] F/u for Low Sodium, Dehydration     Best Call Back #:[phone]    Additional Info-Please advise-Thank you~

## 2022-05-09 NOTE — TELEPHONE ENCOUNTER
Pt has hx of low sodium(see results). Went to ED last night and Fri. Was advised by ED to f/u w/ PCP regarding this. Appt made for 920am on Thurs. Advised pt to make a f/u appt w/ Dr Christianson(psych) as well. Pt agreed

## 2022-05-16 ENCOUNTER — TELEPHONE (OUTPATIENT)
Dept: NEUROLOGY | Facility: CLINIC | Age: 57
End: 2022-05-16
Payer: MEDICARE

## 2022-05-16 NOTE — TELEPHONE ENCOUNTER
Called and spoke with patient to reschedule appointment due to provider will not be in office. Date and time confirmed with patient.

## 2022-05-23 ENCOUNTER — DOCUMENT SCAN (OUTPATIENT)
Dept: HOME HEALTH SERVICES | Facility: HOSPITAL | Age: 57
End: 2022-05-23
Payer: MEDICARE

## 2022-05-26 ENCOUNTER — LAB VISIT (OUTPATIENT)
Dept: LAB | Facility: HOSPITAL | Age: 57
End: 2022-05-26
Attending: FAMILY MEDICINE
Payer: MEDICARE

## 2022-05-26 ENCOUNTER — OFFICE VISIT (OUTPATIENT)
Dept: FAMILY MEDICINE | Facility: CLINIC | Age: 57
End: 2022-05-26
Payer: MEDICARE

## 2022-05-26 VITALS
OXYGEN SATURATION: 98 % | HEIGHT: 71 IN | BODY MASS INDEX: 29.77 KG/M2 | HEART RATE: 93 BPM | DIASTOLIC BLOOD PRESSURE: 74 MMHG | WEIGHT: 212.63 LBS | SYSTOLIC BLOOD PRESSURE: 130 MMHG

## 2022-05-26 DIAGNOSIS — E87.1 HYPONATREMIA: Primary | ICD-10-CM

## 2022-05-26 DIAGNOSIS — E53.8 B12 DEFICIENCY: ICD-10-CM

## 2022-05-26 DIAGNOSIS — J06.9 UPPER RESPIRATORY TRACT INFECTION, UNSPECIFIED TYPE: ICD-10-CM

## 2022-05-26 DIAGNOSIS — I10 UNCONTROLLED HYPERTENSION: ICD-10-CM

## 2022-05-26 LAB
CTP QC/QA: YES
ERYTHROCYTE [DISTWIDTH] IN BLOOD BY AUTOMATED COUNT: 14.6 % (ref 11.5–14.5)
HCT VFR BLD AUTO: 48.8 % (ref 40–54)
HGB BLD-MCNC: 15.9 G/DL (ref 14–18)
MCH RBC QN AUTO: 29.7 PG (ref 27–31)
MCHC RBC AUTO-ENTMCNC: 32.6 G/DL (ref 32–36)
MCV RBC AUTO: 91 FL (ref 82–98)
PLATELET # BLD AUTO: 561 K/UL (ref 150–450)
PMV BLD AUTO: 10.1 FL (ref 9.2–12.9)
RBC # BLD AUTO: 5.35 M/UL (ref 4.6–6.2)
SARS-COV-2 RDRP RESP QL NAA+PROBE: NEGATIVE
VIT B12 SERPL-MCNC: 152 PG/ML (ref 210–950)
WBC # BLD AUTO: 12.73 K/UL (ref 3.9–12.7)

## 2022-05-26 PROCEDURE — 96372 PR INJECTION,THERAP/PROPH/DIAG2ST, IM OR SUBCUT: ICD-10-PCS | Mod: S$GLB,,, | Performed by: FAMILY MEDICINE

## 2022-05-26 PROCEDURE — 1160F PR REVIEW ALL MEDS BY PRESCRIBER/CLIN PHARMACIST DOCUMENTED: ICD-10-PCS | Mod: CPTII,S$GLB,, | Performed by: FAMILY MEDICINE

## 2022-05-26 PROCEDURE — 3075F PR MOST RECENT SYSTOLIC BLOOD PRESS GE 130-139MM HG: ICD-10-PCS | Mod: CPTII,S$GLB,, | Performed by: FAMILY MEDICINE

## 2022-05-26 PROCEDURE — 1159F MED LIST DOCD IN RCRD: CPT | Mod: CPTII,S$GLB,, | Performed by: FAMILY MEDICINE

## 2022-05-26 PROCEDURE — 99214 PR OFFICE/OUTPT VISIT, EST, LEVL IV, 30-39 MIN: ICD-10-PCS | Mod: 25,S$GLB,, | Performed by: FAMILY MEDICINE

## 2022-05-26 PROCEDURE — 99214 OFFICE O/P EST MOD 30 MIN: CPT | Mod: 25,S$GLB,, | Performed by: FAMILY MEDICINE

## 2022-05-26 PROCEDURE — 3008F PR BODY MASS INDEX (BMI) DOCUMENTED: ICD-10-PCS | Mod: CPTII,S$GLB,, | Performed by: FAMILY MEDICINE

## 2022-05-26 PROCEDURE — 85027 COMPLETE CBC AUTOMATED: CPT | Performed by: FAMILY MEDICINE

## 2022-05-26 PROCEDURE — 99999 PR PBB SHADOW E&M-EST. PATIENT-LVL IV: CPT | Mod: PBBFAC,,, | Performed by: FAMILY MEDICINE

## 2022-05-26 PROCEDURE — 3078F DIAST BP <80 MM HG: CPT | Mod: CPTII,S$GLB,, | Performed by: FAMILY MEDICINE

## 2022-05-26 PROCEDURE — 3075F SYST BP GE 130 - 139MM HG: CPT | Mod: CPTII,S$GLB,, | Performed by: FAMILY MEDICINE

## 2022-05-26 PROCEDURE — 3044F HG A1C LEVEL LT 7.0%: CPT | Mod: CPTII,S$GLB,, | Performed by: FAMILY MEDICINE

## 2022-05-26 PROCEDURE — 96372 THER/PROPH/DIAG INJ SC/IM: CPT | Mod: S$GLB,,, | Performed by: FAMILY MEDICINE

## 2022-05-26 PROCEDURE — 82607 VITAMIN B-12: CPT | Mod: GA | Performed by: FAMILY MEDICINE

## 2022-05-26 PROCEDURE — 3078F PR MOST RECENT DIASTOLIC BLOOD PRESSURE < 80 MM HG: ICD-10-PCS | Mod: CPTII,S$GLB,, | Performed by: FAMILY MEDICINE

## 2022-05-26 PROCEDURE — 1159F PR MEDICATION LIST DOCUMENTED IN MEDICAL RECORD: ICD-10-PCS | Mod: CPTII,S$GLB,, | Performed by: FAMILY MEDICINE

## 2022-05-26 PROCEDURE — 3008F BODY MASS INDEX DOCD: CPT | Mod: CPTII,S$GLB,, | Performed by: FAMILY MEDICINE

## 2022-05-26 PROCEDURE — U0002 COVID-19 LAB TEST NON-CDC: HCPCS | Mod: QW,S$GLB,, | Performed by: FAMILY MEDICINE

## 2022-05-26 PROCEDURE — 3044F PR MOST RECENT HEMOGLOBIN A1C LEVEL <7.0%: ICD-10-PCS | Mod: CPTII,S$GLB,, | Performed by: FAMILY MEDICINE

## 2022-05-26 PROCEDURE — 99999 PR PBB SHADOW E&M-EST. PATIENT-LVL IV: ICD-10-PCS | Mod: PBBFAC,,, | Performed by: FAMILY MEDICINE

## 2022-05-26 PROCEDURE — U0002: ICD-10-PCS | Mod: QW,S$GLB,, | Performed by: FAMILY MEDICINE

## 2022-05-26 PROCEDURE — 1160F RVW MEDS BY RX/DR IN RCRD: CPT | Mod: CPTII,S$GLB,, | Performed by: FAMILY MEDICINE

## 2022-05-26 RX ORDER — MECLIZINE HYDROCHLORIDE 25 MG/1
25 TABLET ORAL 3 TIMES DAILY PRN
Qty: 20 TABLET | Refills: 1 | Status: SHIPPED | OUTPATIENT
Start: 2022-05-26 | End: 2022-08-04 | Stop reason: SDUPTHER

## 2022-05-26 RX ORDER — AMLODIPINE BESYLATE 2.5 MG/1
2.5 TABLET ORAL DAILY
Qty: 90 TABLET | Refills: 3 | Status: SHIPPED | OUTPATIENT
Start: 2022-05-26 | End: 2022-09-02 | Stop reason: SDUPTHER

## 2022-05-26 RX ORDER — CYANOCOBALAMIN 1000 UG/ML
1000 INJECTION, SOLUTION INTRAMUSCULAR; SUBCUTANEOUS
Status: COMPLETED | OUTPATIENT
Start: 2022-05-26 | End: 2022-05-26

## 2022-05-26 RX ORDER — METOPROLOL SUCCINATE 25 MG/1
25 TABLET, EXTENDED RELEASE ORAL DAILY
Qty: 90 TABLET | Refills: 2 | Status: SHIPPED | OUTPATIENT
Start: 2022-05-26 | End: 2022-09-02 | Stop reason: SDUPTHER

## 2022-05-26 RX ORDER — TOPIRAMATE 100 MG/1
TABLET, FILM COATED ORAL
COMMUNITY
Start: 2022-05-23 | End: 2022-08-11

## 2022-05-26 RX ADMIN — CYANOCOBALAMIN 1000 MCG: 1000 INJECTION, SOLUTION INTRAMUSCULAR; SUBCUTANEOUS at 09:05

## 2022-05-26 NOTE — PATIENT INSTRUCTIONS
Last ov 2/2/21  No future appt    Mucinex  Coricidin HBP for nasal congestion  Nasal saline and flonase

## 2022-05-26 NOTE — PROGRESS NOTES
"Subjective:       Patient ID: Ryan Crane is a 56 y.o. male.    Chief Complaint: Follow-up ([facility] [place] low sodium)    HPI  Hyponatremia - now on topamax in place of previous trileptal. Still with some dizziness. Still taking sodium tablets 2x/day. No fluid retention.  Dizziness - was given antivert from the ER. Taking 1-2x/day depending on sx. Helps at least in part. Needs refill.   +uri sx x 3 days. Home covid test negative at onset. +nasal congestion. No cough or chest sx.     Review of Systems:  Review of Systems   Constitutional: Negative for appetite change, fatigue, fever and unexpected weight change.   HENT: Positive for congestion. Negative for postnasal drip, rhinorrhea, sinus pressure and sore throat.         Denies seasonal allergies, or perennial allergies   Eyes: Negative for discharge and itching.   Respiratory: Negative for cough and shortness of breath.    Cardiovascular: Negative for chest pain and leg swelling.   Gastrointestinal: Negative for abdominal pain, constipation, diarrhea and nausea.   Endocrine: Negative for polydipsia, polyphagia and polyuria.   Genitourinary: Negative for dysuria and hematuria.   Musculoskeletal: Negative for arthralgias and myalgias.   Skin: Negative for rash.   Allergic/Immunologic: Negative for environmental allergies and food allergies.   Neurological: Positive for dizziness (chronic) and headaches (chronic, stable). Negative for tremors and seizures.   Hematological: Negative for adenopathy. Does not bruise/bleed easily.   Psychiatric/Behavioral: Positive for dysphoric mood and sleep disturbance (chronic, stable). The patient is not nervous/anxious.         Managed by outside psychiatry (Dr Christianson). Currently on dose adjustment due to hyponatremia.       Objective:     Vitals:    05/26/22 0845   BP: 130/74   Pulse: 93   SpO2: 98%   Weight: 96.5 kg (212 lb 10.1 oz)   Height: 5' 11" (1.803 m)          Physical Exam  Vitals and nursing note " reviewed.   Constitutional:       General: He is not in acute distress.     Appearance: Normal appearance. He is well-developed. He is obese.   HENT:      Head: Normocephalic and atraumatic.   Eyes:      General: No scleral icterus.        Right eye: No discharge.         Left eye: No discharge.      Conjunctiva/sclera: Conjunctivae normal.   Cardiovascular:      Rate and Rhythm: Normal rate.   Pulmonary:      Effort: Pulmonary effort is normal. No respiratory distress.   Skin:     General: Skin is warm and dry.   Neurological:      Mental Status: He is alert and oriented to person, place, and time.      Cranial Nerves: No cranial nerve deficit.   Psychiatric:         Behavior: Behavior normal.           Assessment & Plan:  Hyponatremia  -     Basic Metabolic Panel; Future; Expected date: 05/26/2022    Uncontrolled hypertension  -     metoprolol succinate (TOPROL-XL) 25 MG 24 hr tablet; Take 1 tablet (25 mg total) by mouth once daily.  Dispense: 90 tablet; Refill: 2  -     amLODIPine (NORVASC) 2.5 MG tablet; Take 1 tablet (2.5 mg total) by mouth once daily.  Dispense: 90 tablet; Refill: 3    Upper respiratory tract infection, unspecified type  -     meclizine (ANTIVERT) 25 mg tablet; Take 1 tablet (25 mg total) by mouth 3 (three) times daily as needed for Dizziness.  Dispense: 20 tablet; Refill: 1  -     POCT COVID-19 Rapid Screening    B12 deficiency  -     cyanocobalamin injection 1,000 mcg  -     Prior authorization Order

## 2022-05-27 DIAGNOSIS — J06.9 UPPER RESPIRATORY TRACT INFECTION, UNSPECIFIED TYPE: Primary | ICD-10-CM

## 2022-06-10 ENCOUNTER — TELEPHONE (OUTPATIENT)
Dept: PODIATRY | Facility: CLINIC | Age: 57
End: 2022-06-10
Payer: MEDICARE

## 2022-06-10 NOTE — TELEPHONE ENCOUNTER
----- Message from Lavern Wood sent at 6/8/2022  3:57 PM CDT -----  Contact: PT  Type:  Sooner Appointment Request    Caller is requesting a sooner appointment.  Caller declined first available appointment listed below.  Caller will not accept being placed on the waitlist and is requesting a message be sent to doctor.    Name of Caller:  PT  When is the first available appointment?  No solutions found  Symptoms:  Toe nails cut  Best Call Back Number:  [phone]  Additional Information:  PT prefer morning appt

## 2022-06-10 NOTE — TELEPHONE ENCOUNTER
Spoke with patient via phone. I let him know that dr jeronimo is still out of office on medical leave with no return date known. He requested to be scheduled with another provider. appt scheduled for 6/22/22 at 11 am per patient request.

## 2022-06-16 ENCOUNTER — OFFICE VISIT (OUTPATIENT)
Dept: HEMATOLOGY/ONCOLOGY | Facility: CLINIC | Age: 57
End: 2022-06-16
Payer: MEDICARE

## 2022-06-16 ENCOUNTER — LAB VISIT (OUTPATIENT)
Dept: LAB | Facility: HOSPITAL | Age: 57
End: 2022-06-16
Attending: INTERNAL MEDICINE
Payer: MEDICARE

## 2022-06-16 VITALS
RESPIRATION RATE: 18 BRPM | WEIGHT: 214.31 LBS | HEIGHT: 71 IN | DIASTOLIC BLOOD PRESSURE: 64 MMHG | BODY MASS INDEX: 30 KG/M2 | SYSTOLIC BLOOD PRESSURE: 116 MMHG | TEMPERATURE: 98 F | HEART RATE: 68 BPM | OXYGEN SATURATION: 97 %

## 2022-06-16 DIAGNOSIS — D50.9 IRON DEFICIENCY ANEMIA, UNSPECIFIED IRON DEFICIENCY ANEMIA TYPE: Primary | ICD-10-CM

## 2022-06-16 DIAGNOSIS — J06.9 UPPER RESPIRATORY TRACT INFECTION, UNSPECIFIED TYPE: ICD-10-CM

## 2022-06-16 DIAGNOSIS — D50.0 IRON DEFICIENCY ANEMIA DUE TO CHRONIC BLOOD LOSS: ICD-10-CM

## 2022-06-16 LAB
ANION GAP SERPL CALC-SCNC: 9 MMOL/L (ref 8–16)
BASOPHILS # BLD AUTO: 0.1 K/UL (ref 0–0.2)
BASOPHILS NFR BLD: 0.8 % (ref 0–1.9)
BUN SERPL-MCNC: 11 MG/DL (ref 6–20)
CALCIUM SERPL-MCNC: 9.9 MG/DL (ref 8.7–10.5)
CHLORIDE SERPL-SCNC: 106 MMOL/L (ref 95–110)
CO2 SERPL-SCNC: 23 MMOL/L (ref 23–29)
CREAT SERPL-MCNC: 1 MG/DL (ref 0.5–1.4)
DIFFERENTIAL METHOD: ABNORMAL
EOSINOPHIL # BLD AUTO: 0.2 K/UL (ref 0–0.5)
EOSINOPHIL NFR BLD: 1.4 % (ref 0–8)
ERYTHROCYTE [DISTWIDTH] IN BLOOD BY AUTOMATED COUNT: 14.2 % (ref 11.5–14.5)
ERYTHROCYTE [DISTWIDTH] IN BLOOD BY AUTOMATED COUNT: 14.2 % (ref 11.5–14.5)
EST. GFR  (AFRICAN AMERICAN): >60 ML/MIN/1.73 M^2
EST. GFR  (NON AFRICAN AMERICAN): >60 ML/MIN/1.73 M^2
GLUCOSE SERPL-MCNC: 99 MG/DL (ref 70–110)
HCT VFR BLD AUTO: 45.1 % (ref 40–54)
HCT VFR BLD AUTO: 45.1 % (ref 40–54)
HGB BLD-MCNC: 15.3 G/DL (ref 14–18)
HGB BLD-MCNC: 15.3 G/DL (ref 14–18)
IMM GRANULOCYTES # BLD AUTO: 0.03 K/UL (ref 0–0.04)
IMM GRANULOCYTES NFR BLD AUTO: 0.3 % (ref 0–0.5)
LYMPHOCYTES # BLD AUTO: 2.9 K/UL (ref 1–4.8)
LYMPHOCYTES NFR BLD: 24.3 % (ref 18–48)
MCH RBC QN AUTO: 30 PG (ref 27–31)
MCH RBC QN AUTO: 30 PG (ref 27–31)
MCHC RBC AUTO-ENTMCNC: 33.9 G/DL (ref 32–36)
MCHC RBC AUTO-ENTMCNC: 33.9 G/DL (ref 32–36)
MCV RBC AUTO: 88 FL (ref 82–98)
MCV RBC AUTO: 88 FL (ref 82–98)
MONOCYTES # BLD AUTO: 0.8 K/UL (ref 0.3–1)
MONOCYTES NFR BLD: 7.1 % (ref 4–15)
NEUTROPHILS # BLD AUTO: 7.8 K/UL (ref 1.8–7.7)
NEUTROPHILS NFR BLD: 66.1 % (ref 38–73)
NRBC BLD-RTO: 0 /100 WBC
PLATELET # BLD AUTO: 354 K/UL (ref 150–450)
PLATELET # BLD AUTO: 354 K/UL (ref 150–450)
PMV BLD AUTO: 9.6 FL (ref 9.2–12.9)
PMV BLD AUTO: 9.6 FL (ref 9.2–12.9)
POTASSIUM SERPL-SCNC: 4.2 MMOL/L (ref 3.5–5.1)
RBC # BLD AUTO: 5.1 M/UL (ref 4.6–6.2)
RBC # BLD AUTO: 5.1 M/UL (ref 4.6–6.2)
SODIUM SERPL-SCNC: 138 MMOL/L (ref 136–145)
WBC # BLD AUTO: 11.77 K/UL (ref 3.9–12.7)
WBC # BLD AUTO: 11.77 K/UL (ref 3.9–12.7)

## 2022-06-16 PROCEDURE — 99999 PR PBB SHADOW E&M-EST. PATIENT-LVL V: ICD-10-PCS | Mod: PBBFAC,,, | Performed by: NURSE PRACTITIONER

## 2022-06-16 PROCEDURE — 3044F PR MOST RECENT HEMOGLOBIN A1C LEVEL <7.0%: ICD-10-PCS | Mod: CPTII,S$GLB,, | Performed by: NURSE PRACTITIONER

## 2022-06-16 PROCEDURE — 3078F DIAST BP <80 MM HG: CPT | Mod: CPTII,S$GLB,, | Performed by: NURSE PRACTITIONER

## 2022-06-16 PROCEDURE — 3078F PR MOST RECENT DIASTOLIC BLOOD PRESSURE < 80 MM HG: ICD-10-PCS | Mod: CPTII,S$GLB,, | Performed by: NURSE PRACTITIONER

## 2022-06-16 PROCEDURE — 1160F PR REVIEW ALL MEDS BY PRESCRIBER/CLIN PHARMACIST DOCUMENTED: ICD-10-PCS | Mod: CPTII,S$GLB,, | Performed by: NURSE PRACTITIONER

## 2022-06-16 PROCEDURE — 3008F PR BODY MASS INDEX (BMI) DOCUMENTED: ICD-10-PCS | Mod: CPTII,S$GLB,, | Performed by: NURSE PRACTITIONER

## 2022-06-16 PROCEDURE — 3044F HG A1C LEVEL LT 7.0%: CPT | Mod: CPTII,S$GLB,, | Performed by: NURSE PRACTITIONER

## 2022-06-16 PROCEDURE — 3074F SYST BP LT 130 MM HG: CPT | Mod: CPTII,S$GLB,, | Performed by: NURSE PRACTITIONER

## 2022-06-16 PROCEDURE — 99999 PR PBB SHADOW E&M-EST. PATIENT-LVL V: CPT | Mod: PBBFAC,,, | Performed by: NURSE PRACTITIONER

## 2022-06-16 PROCEDURE — 3074F PR MOST RECENT SYSTOLIC BLOOD PRESSURE < 130 MM HG: ICD-10-PCS | Mod: CPTII,S$GLB,, | Performed by: NURSE PRACTITIONER

## 2022-06-16 PROCEDURE — 1159F MED LIST DOCD IN RCRD: CPT | Mod: CPTII,S$GLB,, | Performed by: NURSE PRACTITIONER

## 2022-06-16 PROCEDURE — 3008F BODY MASS INDEX DOCD: CPT | Mod: CPTII,S$GLB,, | Performed by: NURSE PRACTITIONER

## 2022-06-16 PROCEDURE — 99213 OFFICE O/P EST LOW 20 MIN: CPT | Mod: S$GLB,,, | Performed by: NURSE PRACTITIONER

## 2022-06-16 PROCEDURE — 1160F RVW MEDS BY RX/DR IN RCRD: CPT | Mod: CPTII,S$GLB,, | Performed by: NURSE PRACTITIONER

## 2022-06-16 PROCEDURE — 36415 COLL VENOUS BLD VENIPUNCTURE: CPT | Mod: PN | Performed by: INTERNAL MEDICINE

## 2022-06-16 PROCEDURE — 99213 PR OFFICE/OUTPT VISIT, EST, LEVL III, 20-29 MIN: ICD-10-PCS | Mod: S$GLB,,, | Performed by: NURSE PRACTITIONER

## 2022-06-16 PROCEDURE — 85025 COMPLETE CBC W/AUTO DIFF WBC: CPT | Mod: PN | Performed by: INTERNAL MEDICINE

## 2022-06-16 PROCEDURE — 80048 BASIC METABOLIC PNL TOTAL CA: CPT | Mod: PN | Performed by: INTERNAL MEDICINE

## 2022-06-16 PROCEDURE — 1159F PR MEDICATION LIST DOCUMENTED IN MEDICAL RECORD: ICD-10-PCS | Mod: CPTII,S$GLB,, | Performed by: NURSE PRACTITIONER

## 2022-06-16 RX ORDER — GABAPENTIN 100 MG
100 CAPSULE ORAL 3 TIMES DAILY
COMMUNITY
Start: 2022-05-10 | End: 2022-08-11

## 2022-06-16 RX ORDER — AMOXICILLIN 500 MG/1
CAPSULE ORAL
COMMUNITY
Start: 2022-06-08 | End: 2022-08-04

## 2022-06-16 RX ORDER — HYDROCODONE BITARTRATE AND ACETAMINOPHEN 10; 325 MG/1; MG/1
1 TABLET ORAL EVERY 6 HOURS PRN
COMMUNITY
Start: 2022-06-08 | End: 2022-09-16

## 2022-06-16 NOTE — PROGRESS NOTES
Subjective:      Name: Ryan Crane  : 1965  MRN: [id number]      HPI:   Ryan Crane is a 56 y.o. male presents for evaluation of ELAINE.    The patient is a 56-year-old white gentleman known to Macey Justice/Frannie for iron deficiency anemia from blood loss.    Last UGI endoscopy/Colonoscopy with Dr. Lucas in .    UGI revealed reflux gastritis.  Colonoscoppy revealed post polyp bleed in descending colon & hepatic flexure.  Patient was infused with Venofer in  (Feb - April;  for a total of 10 infusions).  Injectafer x 2 2021        Patient returns to clinic for interval evaluation & review of labs.  He reports no visible bleeding, melena, abdominal discomfort, N/V, cold extremities, pica, headaches, blurred vision, etc.  He endorses difficulties with sodium levels due to psych meds.  Dr. Becerril is managing med/levels.  No recurrent signs/symptoms of anemia.    Past Medical History:   Diagnosis Date    Acute angina     Asthma     Cataract     COPD (chronic obstructive pulmonary disease)     Diabetes mellitus     Digestive disorder     ulcers    General anesthetics causing adverse effect in therapeutic use     Headache     Hypertension     MI, old     PIPER on CPAP     Schizophrenia     Smoker unmotivated to quit     Stroke     Thyroid disease     took medicine in the past    Type II diabetes mellitus with neurological manifestations 2019       Past Surgical History:   Procedure Laterality Date    APPENDECTOMY      BONE MARROW ASPIRATION Left 2020    Procedure: ASPIRATION, BONE MARROW;  Surgeon: Lex Kathleen MD;  Location: [facility] OR;  Service: Oncology;  Laterality: Left;    CLOSURE OF WOUND Right 2019    Procedure: CLOSURE, WOUND;  Surgeon: Li Kathleen DPM;  Location: [facility] OR;  Service: Podiatry;  Laterality: Right;    COLONOSCOPY N/A 12/10/2019    Procedure: COLONOSCOPY;  Surgeon: Ryan Lucas MD;  Location: [facility] ENDO;   Service: Endoscopy;  Laterality: N/A;    COLONOSCOPY N/A 12/13/2019    Procedure: COLONOSCOPY;  Surgeon: Ryan Lucas MD;  Location: [facility] ENDO;  Service: Endoscopy;  Laterality: N/A;    ESOPHAGOGASTRODUODENOSCOPY N/A 12/10/2019    Procedure: EGD (ESOPHAGOGASTRODUODENOSCOPY);  Surgeon: Ryan Lucas MD;  Location: [facility] ENDO;  Service: Endoscopy;  Laterality: N/A;    SHOULDER ARTHROSCOPY Left        Family History   Problem Relation Age of Onset    Melanoma Mother     Diabetes Mother     Hypertension Mother     Stroke Father     Diabetes Father     Hypertension Father     Colon cancer Father         unsure of age of diagnosis    Cancer Father         colon cancer    Cervical cancer Sister     Cirrhosis Brother     Hypertension Brother     Lung cancer Maternal Grandmother     Prostate cancer Maternal Grandfather     Crohn's disease Neg Hx     Ulcerative colitis Neg Hx     Stomach cancer Neg Hx     Esophageal cancer Neg Hx     Retinal detachment Neg Hx     Strabismus Neg Hx     Macular degeneration Neg Hx     Glaucoma Neg Hx        Social History     Socioeconomic History    Marital status:    Occupational History    Occupation: disabled (mental)     Comment: since 2000   Tobacco Use    Smoking status: Current Every Day Smoker     Packs/day: 1.00     Years: 45.00     Pack years: 45.00     Types: Cigarettes    Smokeless tobacco: Never Used    Tobacco comment: Age Started 14    Substance and Sexual Activity    Alcohol use: No    Drug use: Not Currently     Types: Marijuana    Sexual activity: Yes     Partners: Female       Review of patient's allergies indicates:   Allergen Reactions    Alcohol Anaphylaxis    Alcohol antiseptic pads Anaphylaxis    Cephalexin Anaphylaxis       Review of Systems   All other systems reviewed and are negative.           Objective:     Vitals:    06/16/22 1014   BP: 116/64   BP Location: Left arm   Patient Position: Sitting   BP Method:  "Medium (Automatic)   Pulse: 68   Resp: 18   Temp: 97.9 °F (36.6 °C)   TempSrc: Temporal   SpO2: 97%   Weight: 97.2 kg (214 lb 4.6 oz)   Height: 5' 11" (1.803 m)        Physical Exam  Vitals reviewed.   Constitutional:       Appearance: Normal appearance.   HENT:      Head: Normocephalic and atraumatic.   Eyes:      Conjunctiva/sclera: Conjunctivae normal.   Cardiovascular:      Rate and Rhythm: Normal rate and regular rhythm.      Pulses: Normal pulses.      Heart sounds: Normal heart sounds.   Pulmonary:      Effort: Pulmonary effort is normal.      Breath sounds: Normal breath sounds.   Musculoskeletal:      Cervical back: Normal range of motion.   Neurological:      Mental Status: He is alert and oriented to person, place, and time.             Current Outpatient Medications on File Prior to Visit   Medication Sig    albuterol (PROVENTIL/VENTOLIN HFA) 90 mcg/actuation inhaler INHALE 2 PUFFS BY MOUTH AND INTO THE LUNGS EVERY 6 HOURS AS NEEDED FOR WHEEZING (Patient taking differently: Inhale 2 puffs into the lungs every 6 (six) hours as needed for Wheezing.)    amLODIPine (NORVASC) 2.5 MG tablet Take 1 tablet (2.5 mg total) by mouth once daily.    amoxicillin (AMOXIL) 500 MG capsule TAKE 1 CAPSULE BY MOUTH THREE TIMES DAILY UNTIL ALL TAKEN    busPIRone (BUSPAR) 15 MG tablet Take 15 mg by mouth 3 (three) times daily.     galcanezumab-gnlm (EMGALITY SYRINGE) 120 mg/mL Syrg Inject 1 pen (120 mg) into the skin every 28 days.    HYDROcodone-acetaminophen (NORCO)  mg per tablet Take 1 tablet by mouth every 6 (six) hours as needed.    LAMICTAL 100 mg tablet Take 150 mg by mouth every morning.    meclizine (ANTIVERT) 25 mg tablet Take 1 tablet (25 mg total) by mouth 3 (three) times daily as needed for Dizziness.    metoprolol succinate (TOPROL-XL) 25 MG 24 hr tablet Take 1 tablet (25 mg total) by mouth once daily.    ondansetron (ZOFRAN) 4 MG tablet Take 1 tablet (4 mg total) by mouth 2 (two) times daily. " (Patient taking differently: Take 4 mg by mouth every 12 (twelve) hours as needed.)    ondansetron (ZOFRAN-ODT) 4 MG TbDL Take 1 tablet (4 mg total) by mouth every 8 (eight) hours as needed (Nausea).    OXcarbazepine (TRILEPTAL) 600 MG Tab Take 600 mg by mouth 3 (three) times daily.    pregabalin (LYRICA) 50 MG capsule Take 1 capsule (50 mg total) by mouth 3 (three) times daily.    rimegepant (NURTEC) 75 mg odt Take 1 tablet (75 mg total) by mouth daily as needed. Place ODT tablet on the tongue; alternatively the ODT tablet may be placed under the tongue (Patient taking differently: Take 75 mg by mouth every other day. Place ODT tablet on the tongue; alternatively the ODT tablet may be placed under the tongue)    sodium chloride 1 gram tablet Take 1 g by mouth 3 (three) times daily.    tadalafiL (CIALIS) 20 MG Tab Take one tablet by mouth prior to sexual activity, not to exceed 1 tablet in 72 hours    topiramate (TOPAMAX) 100 MG tablet TAKE A 1/2 TABLET BY MOUTH FOR THE 1ST WEEK AND THEN INCREASE TO 1 TABLET AS TOLERATED    ziprasidone (GEODON) 80 MG capsule Take 160 mg by mouth every evening.     albuterol (PROVENTIL) 2.5 mg /3 mL (0.083 %) nebulizer solution Take 3 mLs (2.5 mg total) by nebulization every 6 (six) hours as needed for Shortness of Breath. Rescue (Patient not taking: Reported on 6/16/2022)    budesonide (PULMICORT) 0.5 mg/2 mL nebulizer solution USE 2 ML(0.5 MG) VIA NEBULIZER EVERY DAY (Patient not taking: No sig reported)    EPINEPHrine (EPIPEN) 0.3 mg/0.3 mL AtIn Inject 0.3 mLs (0.3 mg total) into the muscle once. for 1 dose    ketoconazole (NIZORAL) 2 % shampoo Apply topically twice a week. (Patient not taking: No sig reported)    NEURONTIN 100 mg capsule Take 100 mg by mouth 3 (three) times daily.     Current Facility-Administered Medications on File Prior to Visit   Medication    lactated ringers infusion    lidocaine (PF) 10 mg/ml (1%) injection 10 mg       CBC:  Lab Results    Component Value Date    WBC 11.77 06/16/2022    WBC 11.77 06/16/2022    HGB 15.3 06/16/2022    HGB 15.3 06/16/2022    HCT 45.1 06/16/2022    HCT 45.1 06/16/2022    MCV 88 06/16/2022    MCV 88 06/16/2022     06/16/2022     06/16/2022         CMP:  Sodium   Date Value Ref Range Status   06/16/2022 138 136 - 145 mmol/L Final     Potassium   Date Value Ref Range Status   06/16/2022 4.2 3.5 - 5.1 mmol/L Final     Chloride   Date Value Ref Range Status   06/16/2022 106 95 - 110 mmol/L Final     CO2   Date Value Ref Range Status   06/16/2022 23 23 - 29 mmol/L Final     Glucose   Date Value Ref Range Status   06/16/2022 99 70 - 110 mg/dL Final     BUN   Date Value Ref Range Status   06/16/2022 11 6 - 20 mg/dL Final     Creatinine   Date Value Ref Range Status   06/16/2022 1.0 0.5 - 1.4 mg/dL Final     Calcium   Date Value Ref Range Status   06/16/2022 9.9 8.7 - 10.5 mg/dL Final     Total Protein   Date Value Ref Range Status   05/08/2022 7.6 6.0 - 8.4 g/dL Final     Albumin   Date Value Ref Range Status   05/08/2022 4.3 3.5 - 5.2 g/dL Final     Total Bilirubin   Date Value Ref Range Status   05/08/2022 0.3 0.2 - 1.3 mg/dL Final     Alkaline Phosphatase   Date Value Ref Range Status   05/08/2022 106 38 - 145 U/L Final     AST   Date Value Ref Range Status   05/08/2022 24 17 - 59 U/L Final     ALT   Date Value Ref Range Status   05/08/2022 18 0 - 50 U/L Final     Anion Gap   Date Value Ref Range Status   06/16/2022 9 8 - 16 mmol/L Final     eGFR if    Date Value Ref Range Status   06/16/2022 >60 >60 mL/min/1.73 m^2 Final     eGFR if non    Date Value Ref Range Status   06/16/2022 >60 >60 mL/min/1.73 m^2 Final     Comment:     Calculation used to obtain the estimated glomerular filtration  rate (eGFR) is the CKD-EPI equation.        All pertinent labs and imaging reviewed.    Assessment:       1. Iron deficiency anemia, unspecified iron deficiency anemia type         Plan:      Iron deficiency anemia, unspecified iron deficiency anemia type       ELAINE post bleed:  S/p iron infusions; stable; resolved; f/u prn.  Follow up with PCP as indicated.    Route Chart for Scheduling    Med Onc Chart Routing      Follow up with physician    Follow up with [facility] No follow up needed.   Labs    Imaging    Pharmacy appointment No pharmacy appointment needed      Other referrals No additional referrals needed

## 2022-06-22 ENCOUNTER — OFFICE VISIT (OUTPATIENT)
Dept: PODIATRY | Facility: CLINIC | Age: 57
End: 2022-06-22
Payer: MEDICARE

## 2022-06-22 DIAGNOSIS — E11.49 TYPE II DIABETES MELLITUS WITH NEUROLOGICAL MANIFESTATIONS: ICD-10-CM

## 2022-06-22 DIAGNOSIS — B35.1 ONYCHOMYCOSIS DUE TO DERMATOPHYTE: Primary | ICD-10-CM

## 2022-06-22 DIAGNOSIS — I73.9 PVD (PERIPHERAL VASCULAR DISEASE): ICD-10-CM

## 2022-06-22 DIAGNOSIS — E08.42 DIABETIC POLYNEUROPATHY ASSOCIATED WITH DIABETES MELLITUS DUE TO UNDERLYING CONDITION: ICD-10-CM

## 2022-06-22 PROCEDURE — 99213 OFFICE O/P EST LOW 20 MIN: CPT | Mod: 25,S$GLB,, | Performed by: STUDENT IN AN ORGANIZED HEALTH CARE EDUCATION/TRAINING PROGRAM

## 2022-06-22 PROCEDURE — 11721 ROUTINE FOOT CARE: ICD-10-PCS | Mod: S$GLB,,, | Performed by: STUDENT IN AN ORGANIZED HEALTH CARE EDUCATION/TRAINING PROGRAM

## 2022-06-22 PROCEDURE — 3044F PR MOST RECENT HEMOGLOBIN A1C LEVEL <7.0%: ICD-10-PCS | Mod: CPTII,S$GLB,, | Performed by: STUDENT IN AN ORGANIZED HEALTH CARE EDUCATION/TRAINING PROGRAM

## 2022-06-22 PROCEDURE — 3044F HG A1C LEVEL LT 7.0%: CPT | Mod: CPTII,S$GLB,, | Performed by: STUDENT IN AN ORGANIZED HEALTH CARE EDUCATION/TRAINING PROGRAM

## 2022-06-22 PROCEDURE — 11721 DEBRIDE NAIL 6 OR MORE: CPT | Mod: S$GLB,,, | Performed by: STUDENT IN AN ORGANIZED HEALTH CARE EDUCATION/TRAINING PROGRAM

## 2022-06-22 PROCEDURE — 99213 PR OFFICE/OUTPT VISIT, EST, LEVL III, 20-29 MIN: ICD-10-PCS | Mod: 25,S$GLB,, | Performed by: STUDENT IN AN ORGANIZED HEALTH CARE EDUCATION/TRAINING PROGRAM

## 2022-06-22 NOTE — PROGRESS NOTES
Subjective:      Patient ID: Ryan Crane is a 56 y.o. male.    Chief Complaint: No chief complaint on file.    HPI:  Ryan is a 56 y.o. male who presents to the clinic for evaluation and treatment of high risk feet. Ryan has a past medical history of Acute angina, Asthma, Cataract, COPD (chronic obstructive pulmonary disease), Diabetes mellitus, Digestive disorder, General anesthetics causing adverse effect in therapeutic use, Headache, Hypertension, MI, old, PIPER on CPAP, Schizophrenia, Smoker unmotivated to quit, Stroke (2005), Thyroid disease, and Type II diabetes mellitus with neurological manifestations (11/21/2019). The patient's chief complaint is long, thick toenails and right heel pain. This patient has documented high risk feet requiring routine maintenance secondary to diabetes mellitis and those secondary complications of diabetes, as mentioned..  He reports having right heel pain, which he rates as 3/10 on the pain scale but denies having a wound.  He also informs of losing 30 lbs since his last visit without trying to lose weight and is concerned about it.  He denies trying to self-treat his heel pain or trim his toenails himself.  Patient denies any other pedal complaints at this time.    PCP: Laura Becerril MD    Date Last Seen by PCP:  5/26/2022    Current shoe gear:  Affected Foot: Casual shoes     Unaffected Foot: Casual shoes    Review of Systems   Constitutional: Negative for appetite change, fever, chills, fatigue.  Positive for unexpected weight change.   Respiratory: Negative for cough, wheezing, shortness of breath.  Cardiovascular: Negative for chest pain, claudication, cyanosis.  Positive for leg swelling.  Musculoskeletal: Negative for back pain, arthritis, joint pain, joint swelling, myalgias, stiffness.   Skin: Negative for rash, itching, suspicious lesion, poor wound healing, unusual hair distribution.  Positive for nail bed changes, discoloration,.  Neurological: Negative  for loss of balance, sensory change, paresthesias, numbness.  Positive for pain.  Hematological: Negative for adenopathy, bleeding, bruising easily.   Psychiatric/Behavioral: The patient is not nervous/anxious.  Negative for altered mental status.    Hemoglobin A1C   Date Value Ref Range Status   04/27/2022 5.3 4.0 - 5.6 % Final     Comment:     ADA Screening Guidelines:  5.7-6.4%  Consistent with prediabetes  >or=6.5%  Consistent with diabetes    High levels of fetal hemoglobin interfere with the HbA1C  assay. Heterozygous hemoglobin variants (HbS, HgC, etc)do  not significantly interfere with this assay.   However, presence of multiple variants may affect accuracy.     03/30/2022 5.6 4.0 - 5.6 % Final     Comment:     ADA Screening Guidelines:  5.7-6.4%  Consistent with prediabetes  >or=6.5%  Consistent with diabetes    High levels of fetal hemoglobin interfere with the HbA1C  assay. Heterozygous hemoglobin variants (HbS, HgC, etc)do  not significantly interfere with this assay.   However, presence of multiple variants may affect accuracy.     01/11/2022 5.5 0.0 - 5.6 % Final     Comment:     Reference Interval:  5.0 - 5.6 Normal   5.7 - 6.4 High Risk   > 6.5 Diabetic      Hgb A1c results are standardized based on the (NGSP) National   Glycohemoglobin Standardization Program.      Hemoglobin A1C levels are related to mean serum/plasma glucose   during the preceding 2-3 months.            Past Medical History:   Diagnosis Date    Acute angina     Asthma     Cataract     COPD (chronic obstructive pulmonary disease)     Diabetes mellitus     Digestive disorder     ulcers    General anesthetics causing adverse effect in therapeutic use     Headache     Hypertension     MI, old     PIPER on CPAP     Schizophrenia     Smoker unmotivated to quit     Stroke 2005    Thyroid disease     took medicine in the past    Type II diabetes mellitus with neurological manifestations 11/21/2019     Past Surgical History:    Procedure Laterality Date    APPENDECTOMY      BONE MARROW ASPIRATION Left 8/21/2020    Procedure: ASPIRATION, BONE MARROW;  Surgeon: Lex Kathleen MD;  Location: [facility] OR;  Service: Oncology;  Laterality: Left;    CLOSURE OF WOUND Right 9/9/2019    Procedure: CLOSURE, WOUND;  Surgeon: Li Kathleen DPM;  Location: [facility] OR;  Service: Podiatry;  Laterality: Right;    COLONOSCOPY N/A 12/10/2019    Procedure: COLONOSCOPY;  Surgeon: Ryan Lucas MD;  Location: [facility] ENDO;  Service: Endoscopy;  Laterality: N/A;    COLONOSCOPY N/A 12/13/2019    Procedure: COLONOSCOPY;  Surgeon: Ryan Lucas MD;  Location: [facility] ENDO;  Service: Endoscopy;  Laterality: N/A;    ESOPHAGOGASTRODUODENOSCOPY N/A 12/10/2019    Procedure: EGD (ESOPHAGOGASTRODUODENOSCOPY);  Surgeon: Ryan Lucas MD;  Location: [facility] ENDO;  Service: Endoscopy;  Laterality: N/A;    SHOULDER ARTHROSCOPY Left      Family History   Problem Relation Age of Onset    Melanoma Mother     Diabetes Mother     Hypertension Mother     Stroke Father     Diabetes Father     Hypertension Father     Colon cancer Father         unsure of age of diagnosis    Cancer Father         colon cancer    Cervical cancer Sister     Cirrhosis Brother     Hypertension Brother     Lung cancer Maternal Grandmother     Prostate cancer Maternal Grandfather     Crohn's disease Neg Hx     Ulcerative colitis Neg Hx     Stomach cancer Neg Hx     Esophageal cancer Neg Hx     Retinal detachment Neg Hx     Strabismus Neg Hx     Macular degeneration Neg Hx     Glaucoma Neg Hx      Social History     Socioeconomic History    Marital status:    Occupational History    Occupation: disabled (mental)     Comment: since 2000   Tobacco Use    Smoking status: Current Every Day Smoker     Packs/day: 1.00     Years: 45.00     Pack years: 45.00     Types: Cigarettes    Smokeless tobacco: Never Used    Tobacco comment: Age Started 14    Substance and Sexual  Activity    Alcohol use: No    Drug use: Not Currently     Types: Marijuana    Sexual activity: Yes     Partners: Female           Objective:        There were no vitals taken for this visit.    Physical Exam   Constitutional: Patient is oriented to person, place, and time. Patient appears well-developed and well-nourished.  Strong malodor noted from patient due to lack of personal hygiene.  No acute distress.     Psychiatric: Patient has a normal mood and affect. Patient's speech is normal and behavior is normal. Judgment is normal. Cognition and memory are normal.     Bilateral pedal exam was performed today.  Vascular: Vascular: Pedal pulses palpable 2/4 DP & PT.  CFT is = 3 seconds to the hallux.  Skin temperature is warm to cool proximal tibia to distal toes without localized increase in calor noted.  No erythema and ecchymosis noted to the LE.  Nonpitting edema noted to the LE.  Hair growth present distally to the LE.     Musculoskeletal: Ankle and pedal joints ROM are decreased. Ankle joint dorsiflexion is restricted with the knee extended and flexed per Silfverskiold exam.  Muscle strength is 5/5 for all LE muscle groups tested.  Flat foot type present.  Flexion contracture of lesser digits noted.    Neurological:  Epicritic sensation is slightly dimiished to the foot.  Chaddock STR is intact to the LE.  Tenderness to palpation of right plantar heel noted.     Dermatological: Toenails 1-5 are elongated and distally thickened, dystrophic, brittle, discolored, and mycotic with subungal debris present.  Webspaces 1-4 are dry and intact with debris present.  Skin turgor is increased.  Skin texture is dry and flaky.  Deep dermal fibrosis noted plantar right heel without ulceration.      Nursing note and vitals reviewed.        Assessment:       Encounter Diagnoses   Name Primary?    Onychomycosis due to dermatophyte Yes    Type II diabetes mellitus with neurological manifestations     PVD (peripheral  vascular disease)     Diabetic polyneuropathy associated with diabetes mellitus due to underlying condition          Plan:       Diagnoses and all orders for this visit:    Onychomycosis due to dermatophyte    Type II diabetes mellitus with neurological manifestations    PVD (peripheral vascular disease)    Diabetic polyneuropathy associated with diabetes mellitus due to underlying condition      I counseled the patient on his conditions, their implications and medical management.    Patient was evaluated and risk assessed today. The patient is at low risk for developing pedal complications due to peripheral vascular disease. I explained to the patient that 6/22/22 can make healing injuries difficult leading to wounds or gangrene.    In general, I recommend monitoring the feet daily for any areas of pressure or injuries. If any areas appear to be healing slowly or become infected, seek medical attention as soon as possible. Wear supportive shoes and avoid barefoot walking.     Follow up in 4 months    Routine Foot Care    Date/Time: 6/22/2022 11:00 AM  Performed by: Дмитрий Pratt DPM  Authorized by: Дмитрий Pratt DPM     Consent Done?:  Yes (Verbal)  Hyperkeratotic Skin Lesions?: No      Nail Care Type:  Debride  Location(s): All  (Left 1st Toe, Left 3rd Toe, Left 2nd Toe, Left 4th Toe, Left 5th Toe, Right 1st Toe, Right 2nd Toe, Right 3rd Toe, Right 4th Toe and Right 5th Toe)  Patient tolerance:  Patient tolerated the procedure well with no immediate complications            Dictation was performed using M*Modal Fluency.  Transcription errors may be present.

## 2022-07-07 ENCOUNTER — TELEPHONE (OUTPATIENT)
Dept: FAMILY MEDICINE | Facility: CLINIC | Age: 57
End: 2022-07-07
Payer: MEDICARE

## 2022-07-07 NOTE — TELEPHONE ENCOUNTER
----- Message from Flora Mendez sent at 7/7/2022  8:28 AM CDT -----  Regarding: Refill  Type: RX Refill Request    Who Called:DEANNE MICHAELS [[id number]]    RX Name and Strength:albuterol (PROVENTIL/VENTOLIN HFA) 90 mcg/actuation inhaler    Preferred Pharmacy with phone number: [facility], [place] [address] [phone]    Would the patient rather a call back or a response via [facility]?call back    Best Call Back Number:[phone]    Additional Information:

## 2022-08-04 ENCOUNTER — LAB VISIT (OUTPATIENT)
Dept: LAB | Facility: HOSPITAL | Age: 57
End: 2022-08-04
Attending: FAMILY MEDICINE
Payer: MEDICARE

## 2022-08-04 ENCOUNTER — OFFICE VISIT (OUTPATIENT)
Dept: FAMILY MEDICINE | Facility: CLINIC | Age: 57
End: 2022-08-04
Payer: MEDICARE

## 2022-08-04 VITALS
HEIGHT: 70 IN | DIASTOLIC BLOOD PRESSURE: 60 MMHG | WEIGHT: 205.25 LBS | OXYGEN SATURATION: 98 % | HEART RATE: 65 BPM | BODY MASS INDEX: 29.38 KG/M2 | RESPIRATION RATE: 16 BRPM | TEMPERATURE: 98 F | SYSTOLIC BLOOD PRESSURE: 110 MMHG

## 2022-08-04 DIAGNOSIS — E11.49 TYPE II DIABETES MELLITUS WITH NEUROLOGICAL MANIFESTATIONS: ICD-10-CM

## 2022-08-04 DIAGNOSIS — E53.8 B12 DEFICIENCY: ICD-10-CM

## 2022-08-04 DIAGNOSIS — I20.89 ANGINA OF EFFORT: ICD-10-CM

## 2022-08-04 DIAGNOSIS — D50.9 IRON DEFICIENCY ANEMIA, UNSPECIFIED IRON DEFICIENCY ANEMIA TYPE: ICD-10-CM

## 2022-08-04 DIAGNOSIS — I10 ESSENTIAL HYPERTENSION: ICD-10-CM

## 2022-08-04 DIAGNOSIS — Z72.0 TOBACCO ABUSE: ICD-10-CM

## 2022-08-04 DIAGNOSIS — Z23 IMMUNIZATION DUE: ICD-10-CM

## 2022-08-04 DIAGNOSIS — D50.9 MICROCYTIC ANEMIA: ICD-10-CM

## 2022-08-04 DIAGNOSIS — Z87.891 PERSONAL HISTORY OF NICOTINE DEPENDENCE: ICD-10-CM

## 2022-08-04 DIAGNOSIS — E87.1 HYPONATREMIA: Primary | ICD-10-CM

## 2022-08-04 DIAGNOSIS — E87.1 HYPONATREMIA: ICD-10-CM

## 2022-08-04 LAB
ALBUMIN SERPL BCP-MCNC: 3.9 G/DL (ref 3.5–5.2)
ALP SERPL-CCNC: 105 U/L (ref 55–135)
ALT SERPL W/O P-5'-P-CCNC: 11 U/L (ref 10–44)
ANION GAP SERPL CALC-SCNC: 7 MMOL/L (ref 8–16)
AST SERPL-CCNC: 15 U/L (ref 10–40)
BILIRUB SERPL-MCNC: 0.3 MG/DL (ref 0.1–1)
BUN SERPL-MCNC: 9 MG/DL (ref 6–20)
CALCIUM SERPL-MCNC: 9.1 MG/DL (ref 8.7–10.5)
CHLORIDE SERPL-SCNC: 109 MMOL/L (ref 95–110)
CO2 SERPL-SCNC: 22 MMOL/L (ref 23–29)
CREAT SERPL-MCNC: 1.1 MG/DL (ref 0.5–1.4)
ERYTHROCYTE [DISTWIDTH] IN BLOOD BY AUTOMATED COUNT: 15.3 % (ref 11.5–14.5)
EST. GFR  (NO RACE VARIABLE): >60 ML/MIN/1.73 M^2
ESTIMATED AVG GLUCOSE: 108 MG/DL (ref 68–131)
GLUCOSE SERPL-MCNC: 100 MG/DL (ref 70–110)
HBA1C MFR BLD: 5.4 % (ref 4–5.6)
HCT VFR BLD AUTO: 47.3 % (ref 40–54)
HGB BLD-MCNC: 15.7 G/DL (ref 14–18)
MCH RBC QN AUTO: 29.4 PG (ref 27–31)
MCHC RBC AUTO-ENTMCNC: 33.2 G/DL (ref 32–36)
MCV RBC AUTO: 89 FL (ref 82–98)
PLATELET # BLD AUTO: 269 K/UL (ref 150–450)
PMV BLD AUTO: 10.5 FL (ref 9.2–12.9)
POTASSIUM SERPL-SCNC: 4.4 MMOL/L (ref 3.5–5.1)
PROT SERPL-MCNC: 7.6 G/DL (ref 6–8.4)
RBC # BLD AUTO: 5.34 M/UL (ref 4.6–6.2)
SODIUM SERPL-SCNC: 138 MMOL/L (ref 136–145)
TSH SERPL DL<=0.005 MIU/L-ACNC: 1.56 UIU/ML (ref 0.4–4)
WBC # BLD AUTO: 6.48 K/UL (ref 3.9–12.7)

## 2022-08-04 PROCEDURE — 85027 COMPLETE CBC AUTOMATED: CPT | Performed by: FAMILY MEDICINE

## 2022-08-04 PROCEDURE — 3008F PR BODY MASS INDEX (BMI) DOCUMENTED: ICD-10-PCS | Mod: CPTII,S$GLB,, | Performed by: FAMILY MEDICINE

## 2022-08-04 PROCEDURE — 84443 ASSAY THYROID STIM HORMONE: CPT | Performed by: FAMILY MEDICINE

## 2022-08-04 PROCEDURE — 90732 PPSV23 VACC 2 YRS+ SUBQ/IM: CPT | Mod: S$GLB,,, | Performed by: FAMILY MEDICINE

## 2022-08-04 PROCEDURE — G0009 ADMIN PNEUMOCOCCAL VACCINE: HCPCS | Mod: S$GLB,,, | Performed by: FAMILY MEDICINE

## 2022-08-04 PROCEDURE — 36415 COLL VENOUS BLD VENIPUNCTURE: CPT | Mod: PN | Performed by: FAMILY MEDICINE

## 2022-08-04 PROCEDURE — 99999 PR PBB SHADOW E&M-EST. PATIENT-LVL V: CPT | Mod: PBBFAC,,, | Performed by: FAMILY MEDICINE

## 2022-08-04 PROCEDURE — 3074F SYST BP LT 130 MM HG: CPT | Mod: CPTII,S$GLB,, | Performed by: FAMILY MEDICINE

## 2022-08-04 PROCEDURE — 3078F DIAST BP <80 MM HG: CPT | Mod: CPTII,S$GLB,, | Performed by: FAMILY MEDICINE

## 2022-08-04 PROCEDURE — 80053 COMPREHEN METABOLIC PANEL: CPT | Performed by: FAMILY MEDICINE

## 2022-08-04 PROCEDURE — 3044F HG A1C LEVEL LT 7.0%: CPT | Mod: CPTII,S$GLB,, | Performed by: FAMILY MEDICINE

## 2022-08-04 PROCEDURE — 99214 PR OFFICE/OUTPT VISIT, EST, LEVL IV, 30-39 MIN: ICD-10-PCS | Mod: 25,S$GLB,, | Performed by: FAMILY MEDICINE

## 2022-08-04 PROCEDURE — 3074F PR MOST RECENT SYSTOLIC BLOOD PRESSURE < 130 MM HG: ICD-10-PCS | Mod: CPTII,S$GLB,, | Performed by: FAMILY MEDICINE

## 2022-08-04 PROCEDURE — G0009 PNEUMOCOCCAL POLYSACCHARIDE VACCINE 23-VALENT =>2YO SQ IM: ICD-10-PCS | Mod: S$GLB,,, | Performed by: FAMILY MEDICINE

## 2022-08-04 PROCEDURE — 99999 PR PBB SHADOW E&M-EST. PATIENT-LVL V: ICD-10-PCS | Mod: PBBFAC,,, | Performed by: FAMILY MEDICINE

## 2022-08-04 PROCEDURE — 3078F PR MOST RECENT DIASTOLIC BLOOD PRESSURE < 80 MM HG: ICD-10-PCS | Mod: CPTII,S$GLB,, | Performed by: FAMILY MEDICINE

## 2022-08-04 PROCEDURE — 90732 PNEUMOCOCCAL POLYSACCHARIDE VACCINE 23-VALENT =>2YO SQ IM: ICD-10-PCS | Mod: S$GLB,,, | Performed by: FAMILY MEDICINE

## 2022-08-04 PROCEDURE — 3008F BODY MASS INDEX DOCD: CPT | Mod: CPTII,S$GLB,, | Performed by: FAMILY MEDICINE

## 2022-08-04 PROCEDURE — 3044F PR MOST RECENT HEMOGLOBIN A1C LEVEL <7.0%: ICD-10-PCS | Mod: CPTII,S$GLB,, | Performed by: FAMILY MEDICINE

## 2022-08-04 PROCEDURE — 83036 HEMOGLOBIN GLYCOSYLATED A1C: CPT | Performed by: FAMILY MEDICINE

## 2022-08-04 PROCEDURE — 99214 OFFICE O/P EST MOD 30 MIN: CPT | Mod: 25,S$GLB,, | Performed by: FAMILY MEDICINE

## 2022-08-04 RX ORDER — CYANOCOBALAMIN 1000 UG/ML
INJECTION, SOLUTION INTRAMUSCULAR; SUBCUTANEOUS
COMMUNITY
Start: 2022-05-03 | End: 2022-08-22 | Stop reason: CLARIF

## 2022-08-04 RX ORDER — MECLIZINE HYDROCHLORIDE 25 MG/1
25 TABLET ORAL 3 TIMES DAILY PRN
Qty: 60 TABLET | Refills: 1 | Status: SHIPPED | OUTPATIENT
Start: 2022-08-04 | End: 2023-03-17

## 2022-08-04 NOTE — PROGRESS NOTES
Subjective:       Patient ID: Ryan Crane is a 56 y.o. male.    Chief Complaint: Follow-up      Ryan Crane is in the office follow-up.    HPI  No updates to health history.    He does report sensation of pressure/discomfort in the mid chest with some activities.  Can last for a few minutes, not syncopal, no secondary symptoms of nausea or sweating.  Chronic shortness of breath attributed to COPD.  EKG during hospitalization for hyponatremia in May with sinus rhythm, stress test in March/Neu clear with ejection fraction 69%, negative for ischemia or infarct.  Last cards eval March 2022.   He has albuterol to use as needed but no controller med listed on chart.  He does have PFTs ordered from pulmonology, not yet scheduled.  Past Medical History:   Diagnosis Date    Acute angina     Asthma     Cataract     COPD (chronic obstructive pulmonary disease)     Diabetes mellitus     Digestive disorder     ulcers    General anesthetics causing adverse effect in therapeutic use     Headache     Hypertension     MI, old     PIPER on CPAP     Schizophrenia     Smoker unmotivated to quit     Stroke 2005    Thyroid disease     took medicine in the past    Type II diabetes mellitus with neurological manifestations 11/21/2019         Current Outpatient Medications:     albuterol (PROVENTIL) 2.5 mg /3 mL (0.083 %) nebulizer solution, Take 3 mLs (2.5 mg total) by nebulization every 6 (six) hours as needed for Shortness of Breath. Rescue, Disp: 1 Box, Rfl: 0    albuterol (PROVENTIL/VENTOLIN HFA) 90 mcg/actuation inhaler, INHALE 2 PUFFS BY MOUTH AND INTO THE LUNGS EVERY 6 HOURS AS NEEDED FOR WHEEZING, Disp: 90 g, Rfl: 3    amLODIPine (NORVASC) 2.5 MG tablet, Take 1 tablet (2.5 mg total) by mouth once daily., Disp: 90 tablet, Rfl: 3    busPIRone (BUSPAR) 15 MG tablet, Take 15 mg by mouth 3 (three) times daily. , Disp: , Rfl:     galcanezumab-gnlm (EMGALITY SYRINGE) 120 mg/mL Syrg, Inject 1 pen (120  mg) into the skin every 28 days., Disp: 1 mL, Rfl: 11    HYDROcodone-acetaminophen (NORCO)  mg per tablet, Take 1 tablet by mouth every 6 (six) hours as needed., Disp: , Rfl:     ketoconazole (NIZORAL) 2 % shampoo, Apply topically twice a week., Disp: 120 mL, Rfl: 2    LAMICTAL 100 mg tablet, Take 150 mg by mouth every morning., Disp: , Rfl:     metoprolol succinate (TOPROL-XL) 25 MG 24 hr tablet, Take 1 tablet (25 mg total) by mouth once daily., Disp: 90 tablet, Rfl: 2    NEURONTIN 100 mg capsule, Take 100 mg by mouth 3 (three) times daily., Disp: , Rfl:     ondansetron (ZOFRAN-ODT) 4 MG TbDL, Take 1 tablet (4 mg total) by mouth every 8 (eight) hours as needed (Nausea)., Disp: 12 tablet, Rfl: 0    OXcarbazepine (TRILEPTAL) 600 MG Tab, Take 600 mg by mouth 3 (three) times daily., Disp: , Rfl:     pregabalin (LYRICA) 50 MG capsule, Take 1 capsule (50 mg total) by mouth 3 (three) times daily., Disp: 90 capsule, Rfl: 3    rimegepant (NURTEC) 75 mg odt, Take 1 tablet (75 mg total) by mouth daily as needed. Place ODT tablet on the tongue; alternatively the ODT tablet may be placed under the tongue (Patient taking differently: Take 75 mg by mouth every other day. Place ODT tablet on the tongue; alternatively the ODT tablet may be placed under the tongue), Disp: 16 tablet, Rfl: 11    sodium chloride 1 gram tablet, Take 1 g by mouth 3 (three) times daily., Disp: , Rfl:     tadalafiL (CIALIS) 20 MG Tab, Take one tablet by mouth prior to sexual activity, not to exceed 1 tablet in 72 hours, Disp: 10 tablet, Rfl: 10    topiramate (TOPAMAX) 100 MG tablet, TAKE A 1/2 TABLET BY MOUTH FOR THE 1ST WEEK AND THEN INCREASE TO 1 TABLET AS TOLERATED, Disp: , Rfl:     ziprasidone (GEODON) 80 MG capsule, Take 160 mg by mouth every evening. , Disp: , Rfl:     EPINEPHrine (EPIPEN) 0.3 mg/0.3 mL AtIn, Inject 0.3 mLs (0.3 mg total) into the muscle once. for 1 dose, Disp: 0.3 mL, Rfl: 3    meclizine (ANTIVERT) 25 mg tablet,  Take 1 tablet (25 mg total) by mouth 3 (three) times daily as needed for Dizziness., Disp: 60 tablet, Rfl: 1  No current facility-administered medications for this visit.    Facility-Administered Medications Ordered in Other Visits:     lactated ringers infusion, , Intravenous, Continuous, Sheela Nuno MD, Last Rate: 10 mL/hr at 08/21/20 0655, New Bag at 08/21/20 0655    lidocaine (PF) 10 mg/ml (1%) injection 10 mg, 1 mL, Intradermal, Once, Sheela Nuno MD    The ASCVD Risk score (Lillimireille SHAVER Jr., et al., 2013) failed to calculate for the following reasons:    The patient has a prior MI or stroke diagnosis     Lab Results   Component Value Date    HGBA1C 5.3 04/27/2022    HGBA1C 5.6 03/30/2022    HGBA1C 5.5 01/11/2022     Lab Results   Component Value Date    LDLCALC 106.8 03/30/2022    CREATININE 1.0 06/16/2022   Labs drawn at time of visit.    Review of Systems   Constitutional: Negative for fatigue, fever and unexpected weight change.   HENT: Positive for congestion. Negative for postnasal drip, rhinorrhea, sinus pressure and sore throat.         Denies seasonal allergies, or perennial allergies   Eyes: Negative for discharge and itching.   Respiratory: Negative for cough and shortness of breath.    Cardiovascular: Negative for chest pain and leg swelling.   Gastrointestinal: Negative for constipation and diarrhea.   Genitourinary: Negative for dysuria and hematuria.   Musculoskeletal: Negative for arthralgias and myalgias.   Skin: Negative for rash and wound.   Allergic/Immunologic: Negative for environmental allergies and food allergies.   Neurological: Positive for dizziness (chronic) and headaches (chronic, stable). Negative for tremors and seizures.   Hematological: Negative for adenopathy. Does not bruise/bleed easily.   Psychiatric/Behavioral: Positive for dysphoric mood and sleep disturbance (chronic, stable). The patient is not nervous/anxious.         Managed by outside psychiatry (  [facility]. Currently on dose adjustment due to hyponatremia.           Objective:      Physical Exam  Vitals and nursing note reviewed.   Constitutional:       General: He is not in acute distress.     Appearance: Normal appearance. He is well-developed.   HENT:      Head: Normocephalic and atraumatic.   Eyes:      General: No scleral icterus.        Right eye: No discharge.         Left eye: No discharge.      Conjunctiva/sclera: Conjunctivae normal.   Cardiovascular:      Rate and Rhythm: Normal rate and regular rhythm.   Pulmonary:      Effort: Pulmonary effort is normal. No respiratory distress.      Breath sounds: Normal breath sounds.   Skin:     General: Skin is warm and dry.   Neurological:      General: No focal deficit present.      Mental Status: He is alert and oriented to person, place, and time.      Cranial Nerves: No cranial nerve deficit.   Psychiatric:         Behavior: Behavior normal.         Thought Content: Thought content normal.             Screening recommendations appropriate to age and health status were reviewed.    Assessment & Plan:    Hyponatremia  Comments:  Continue meclizine as needed  Orders:  -     Comprehensive Metabolic Panel; Future; Expected date: 08/04/2022  -     TSH; Future; Expected date: 08/04/2022  -     meclizine (ANTIVERT) 25 mg tablet; Take 1 tablet (25 mg total) by mouth 3 (three) times daily as needed for Dizziness.  Dispense: 60 tablet; Refill: 1    Angina of effort  Comments:  Has upcoming appointment with Cardiology for further review    Iron deficiency anemia, unspecified iron deficiency anemia type  -     CBC Without Differential; Future; Expected date: 08/04/2022    Essential hypertension  -     TSH; Future; Expected date: 08/04/2022  -     Urinalysis, Reflex to Urine Culture Urine, Clean Catch; Future    Type II diabetes mellitus with neurological manifestations  Comments:  Update labs, reviewed home monitoring and recommendations/targets for blood  sugar.  Orders:  -     Comprehensive Metabolic Panel; Future; Expected date: 08/04/2022  -     TSH; Future; Expected date: 08/04/2022  -     Hemoglobin A1C; Future; Expected date: 08/04/2022    Tobacco abuse  -     CT Chest Lung Screening Low Dose; Future; Expected date: 08/04/2022    Personal history of nicotine dependence   -     CT Chest Lung Screening Low Dose; Future; Expected date: 08/04/2022    Immunization due  -     (In Office Administered) Pneumococcal Polysaccharide Vaccine (23 Valent) (SQ/IM)    B12 deficiency  Comments:  B12 level with next set of labs, documented history of deficiency.  Continue with home injections

## 2022-08-05 ENCOUNTER — TELEPHONE (OUTPATIENT)
Dept: CARDIOLOGY | Facility: CLINIC | Age: 57
End: 2022-08-05
Payer: MEDICARE

## 2022-08-05 NOTE — TELEPHONE ENCOUNTER
----- Message from Marian Copeland sent at 8/5/2022  3:00 PM CDT -----  Contact: Patient  Type: Patient Call Back         Who called: Patient         What is the request in detail: calling to sched an appt asap for severe chest pains; state she needs at least 3 days notice to get transportation set up; states he saw his pcp yesterday and was recommended to see heart dr asap;  please advise         Best call back number: [phone]         Additional Information:   [employer] [id number] [address] [place] [address]  [address] [facility] [address]  Phone: [phone] Fax: [phone]           Thank You

## 2022-08-05 NOTE — TELEPHONE ENCOUNTER
"Spoke to pt over the phone, pt states " I been having chest pain and I would like to see dr Chun" I told pt that he will need to go to the ER since he is having chest pain . Pt states " I do not have a ride " I suggest pt to call ambulance . Pt vu and still wanted to book appt with dr chun . appt with dr chun was schedule for 8/15/22 at 8/15/22 at 11:15 am . PT VU   "

## 2022-08-11 ENCOUNTER — OFFICE VISIT (OUTPATIENT)
Dept: NEUROLOGY | Facility: CLINIC | Age: 57
End: 2022-08-11
Payer: MEDICARE

## 2022-08-11 VITALS
WEIGHT: 206.38 LBS | HEART RATE: 63 BPM | DIASTOLIC BLOOD PRESSURE: 70 MMHG | BODY MASS INDEX: 29.54 KG/M2 | TEMPERATURE: 98 F | HEIGHT: 70 IN | RESPIRATION RATE: 17 BRPM | SYSTOLIC BLOOD PRESSURE: 116 MMHG

## 2022-08-11 DIAGNOSIS — G43.719 INTRACTABLE CHRONIC MIGRAINE WITHOUT AURA AND WITHOUT STATUS MIGRAINOSUS: ICD-10-CM

## 2022-08-11 PROCEDURE — 3078F PR MOST RECENT DIASTOLIC BLOOD PRESSURE < 80 MM HG: ICD-10-PCS | Mod: CPTII,S$GLB,, | Performed by: NURSE PRACTITIONER

## 2022-08-11 PROCEDURE — 3044F PR MOST RECENT HEMOGLOBIN A1C LEVEL <7.0%: ICD-10-PCS | Mod: CPTII,S$GLB,, | Performed by: NURSE PRACTITIONER

## 2022-08-11 PROCEDURE — 3044F HG A1C LEVEL LT 7.0%: CPT | Mod: CPTII,S$GLB,, | Performed by: NURSE PRACTITIONER

## 2022-08-11 PROCEDURE — 3008F BODY MASS INDEX DOCD: CPT | Mod: CPTII,S$GLB,, | Performed by: NURSE PRACTITIONER

## 2022-08-11 PROCEDURE — 99499 UNLISTED E&M SERVICE: CPT | Mod: HCNC,S$GLB,, | Performed by: NURSE PRACTITIONER

## 2022-08-11 PROCEDURE — 3008F PR BODY MASS INDEX (BMI) DOCUMENTED: ICD-10-PCS | Mod: CPTII,S$GLB,, | Performed by: NURSE PRACTITIONER

## 2022-08-11 PROCEDURE — 1159F PR MEDICATION LIST DOCUMENTED IN MEDICAL RECORD: ICD-10-PCS | Mod: CPTII,S$GLB,, | Performed by: NURSE PRACTITIONER

## 2022-08-11 PROCEDURE — 99213 PR OFFICE/OUTPT VISIT, EST, LEVL III, 20-29 MIN: ICD-10-PCS | Mod: S$GLB,,, | Performed by: NURSE PRACTITIONER

## 2022-08-11 PROCEDURE — 3074F PR MOST RECENT SYSTOLIC BLOOD PRESSURE < 130 MM HG: ICD-10-PCS | Mod: CPTII,S$GLB,, | Performed by: NURSE PRACTITIONER

## 2022-08-11 PROCEDURE — 1160F RVW MEDS BY RX/DR IN RCRD: CPT | Mod: CPTII,S$GLB,, | Performed by: NURSE PRACTITIONER

## 2022-08-11 PROCEDURE — 99213 OFFICE O/P EST LOW 20 MIN: CPT | Mod: S$GLB,,, | Performed by: NURSE PRACTITIONER

## 2022-08-11 PROCEDURE — 99499 RISK ADDL DX/OHS AUDIT: ICD-10-PCS | Mod: HCNC,S$GLB,, | Performed by: NURSE PRACTITIONER

## 2022-08-11 PROCEDURE — 3074F SYST BP LT 130 MM HG: CPT | Mod: CPTII,S$GLB,, | Performed by: NURSE PRACTITIONER

## 2022-08-11 PROCEDURE — 1160F PR REVIEW ALL MEDS BY PRESCRIBER/CLIN PHARMACIST DOCUMENTED: ICD-10-PCS | Mod: CPTII,S$GLB,, | Performed by: NURSE PRACTITIONER

## 2022-08-11 PROCEDURE — 3078F DIAST BP <80 MM HG: CPT | Mod: CPTII,S$GLB,, | Performed by: NURSE PRACTITIONER

## 2022-08-11 PROCEDURE — 99999 PR PBB SHADOW E&M-EST. PATIENT-LVL IV: CPT | Mod: PBBFAC,,, | Performed by: NURSE PRACTITIONER

## 2022-08-11 PROCEDURE — 1159F MED LIST DOCD IN RCRD: CPT | Mod: CPTII,S$GLB,, | Performed by: NURSE PRACTITIONER

## 2022-08-11 PROCEDURE — 99999 PR PBB SHADOW E&M-EST. PATIENT-LVL IV: ICD-10-PCS | Mod: PBBFAC,,, | Performed by: NURSE PRACTITIONER

## 2022-08-11 NOTE — PROGRESS NOTES
Date of service: 8/11/2022  Referring provider: No ref. provider found    Subjective:      Chief complaint: Headache       Patient ID: Ryan Crane is a 56 y.o. male with CAD, COPD, DMII, HTN, PIPER (does not use CPAP), PVD, bipolar and schizophrenia and history of stroke and MI who presents for follow up of headache     History of Present Illness    INTERVAL HISTORY 8/11/22    Last visit was four months ago and at that time he was doing much better on Emgality. He was referred for sleep evaluation.    Today he reports he is about the same to a little better. Headaches occur in the top of the head. Current pain 6 with range 3-9. He reports four headache days per week. He has not had a sleep study. He does have significant hearing loss in both ears. He reports he is getting hearing aids next week. Otherwise information below is reviewed and verified with no changes made     INTERVAL HISTORY  3/30/22    Last visit was three months ago and at that time we added Emgality and Nurtec. He was in medication overuse to Excedrin. He was referred to neurosurgery for benign CNS tumor. Plan to monitor at this time, not a surgical candidate.     Today he reports he is much better. Migraines are less frequent but still remains with daily headache. They are holocephalic. Current pain 4 with range 4-10. He has 2-3 migraines per month with mild daily headache. He takes Nurtec every other day. He has completely stopped Goody's powder. He has also reduced his caffeine intake from one 2 liter per day to a 12 oz coke daily.  He is not using his CPAP machine. He states he cannot find it.    Of note, he has had five ED/hospital admissions since January for hyponatremia. This is contributed to his psych medications. He states he cannot change his medications so plan is to take salt tablets and add salt to food. His psychiatrist is Dr. Christianson in [place].   Otherwise information below is reviewed and verified with no changes made unless  "noted.     ORIGINAL HEADACHE HISTORY - 12/30/21  Age at onset and course over time: 22 years ago began with migraines 2-3 times per week. He treated with Goody's.    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 Saw neurologist 4 years ago in [place]. Diagnosed via MRI. He can no longer have MRI due to metal clips in rectum.     Family history - mother had brain aneurysm  Last eye exam - 2020  Location: temples, occipital, holocephalic  Quality:  [] pressure [] tight [x] throbbing [] sharp [] stabbing   Severity: current 10 with range 5-10  Duration: hours  Frequency: daily  Headaches awaken at night?:  yes  Worst time of day: mid-day, evening   Associated with: [x] photophobia [x]  phonophobia [] osmophobia [x] blurred vision  [] double vision [] loss of appetite [x] nausea [x] vomiting [x] dizziness [] vertigo  [] tinnitus [x] irritability [] sinus pressure [x] problems with concentration   [] neck tightness   Alleviated by:  [] sleep [x] darkness [] massage [] heat [x] ice [] medication  Exacerbated by:  [] fatigue [x] light [x] noise [] smells [x] coughing [x] sneezing  [] bending over [] ovulation [] menses [] alcohol [] change in weather []  stress  Ipsilateral autonomic: [] nasal congestion [] lacrimation [] ptosis [] injection [] edema [] foreign body sensation [] ear fullness   ICP:  [] transient visual obscurations  [] tinnitus   [] positional headache  [x] non-positional   Sleep habits: trouble staying asleep, has diagnosed PIPER but not using CPAP for past 4 years  Caffeine intake: 2 liter of coke daily  Gyn status (if female): n/a  MIDAS: n/a    Current acute treatment:  Nurtec     Current prevention:  Amlodipine-valsartan  Buspar  HCTZ  Lamictal  Metoprolol  Lyrica  Geodon  Emgality - first January 2022    Previously tried/failed acute treatment:  Robaxin  Naproxen  Tizanidine  Tramadol  Ibuprofen   Goody's - daily for 4 years    Previously tried/failed preventative " treatment:  Cymbalta  Gabapentin   Topamax   Trileptal     Review of patient's allergies indicates:   Allergen Reactions    Alcohol Anaphylaxis    Alcohol antiseptic pads Anaphylaxis    Cephalexin Anaphylaxis     Current Outpatient Medications   Medication Sig Dispense Refill    albuterol (PROVENTIL) 2.5 mg /3 mL (0.083 %) nebulizer solution Take 3 mLs (2.5 mg total) by nebulization every 6 (six) hours as needed for Shortness of Breath. Rescue 1 Box 0    albuterol (PROVENTIL/VENTOLIN HFA) 90 mcg/actuation inhaler INHALE 2 PUFFS BY MOUTH AND INTO THE LUNGS EVERY 6 HOURS AS NEEDED FOR WHEEZING 90 g 3    amLODIPine (NORVASC) 2.5 MG tablet Take 1 tablet (2.5 mg total) by mouth once daily. 90 tablet 3    busPIRone (BUSPAR) 15 MG tablet Take 15 mg by mouth 3 (three) times daily.       cyanocobalamin 1,000 mcg/mL injection       EPINEPHrine (EPIPEN) 0.3 mg/0.3 mL AtIn Inject 0.3 mLs (0.3 mg total) into the muscle once. for 1 dose 0.3 mL 3    galcanezumab-gnlm (EMGALITY SYRINGE) 120 mg/mL Syrg Inject 1 pen (120 mg) into the skin every 28 days. 1 mL 11    HYDROcodone-acetaminophen (NORCO)  mg per tablet Take 1 tablet by mouth every 6 (six) hours as needed.      ketoconazole (NIZORAL) 2 % shampoo Apply topically twice a week. 120 mL 2    LAMICTAL 100 mg tablet Take 150 mg by mouth every morning.      meclizine (ANTIVERT) 25 mg tablet Take 1 tablet (25 mg total) by mouth 3 (three) times daily as needed for Dizziness. 60 tablet 1    metoprolol succinate (TOPROL-XL) 25 MG 24 hr tablet Take 1 tablet (25 mg total) by mouth once daily. 90 tablet 2    ondansetron (ZOFRAN-ODT) 4 MG TbDL Take 1 tablet (4 mg total) by mouth every 8 (eight) hours as needed (Nausea). 12 tablet 0    pregabalin (LYRICA) 50 MG capsule Take 1 capsule (50 mg total) by mouth 3 (three) times daily. 90 capsule 3    rimegepant (NURTEC) 75 mg odt Take 1 tablet (75 mg total) by mouth daily as needed. Place ODT tablet on the tongue;  alternatively the ODT tablet may be placed under the tongue (Patient taking differently: Take 75 mg by mouth every other day. Place ODT tablet on the tongue; alternatively the ODT tablet may be placed under the tongue) 16 tablet 11    sodium chloride 1 gram tablet Take 1 g by mouth 3 (three) times daily.      tadalafiL (CIALIS) 20 MG Tab Take one tablet by mouth prior to sexual activity, not to exceed 1 tablet in 72 hours 10 tablet 10    ziprasidone (GEODON) 80 MG capsule Take 160 mg by mouth every evening.        No current facility-administered medications for this visit.     Facility-Administered Medications Ordered in Other Visits   Medication Dose Route Frequency Provider Last Rate Last Admin    lactated ringers infusion   Intravenous Continuous Sheela Nuno MD 10 mL/hr at 08/21/20 0655 New Bag at 08/21/20 0655    lidocaine (PF) 10 mg/ml (1%) injection 10 mg  1 mL Intradermal Once Sheela Nuno MD           Past Medical History  Past Medical History:   Diagnosis Date    Acute angina     Asthma     Cataract     COPD (chronic obstructive pulmonary disease)     Diabetes mellitus     Digestive disorder     ulcers    General anesthetics causing adverse effect in therapeutic use     Headache     Hypertension     MI, old     PIPER on CPAP     Schizophrenia     Smoker unmotivated to quit     Stroke 2005    Thyroid disease     took medicine in the past    Type II diabetes mellitus with neurological manifestations 11/21/2019       Past Surgical History  Past Surgical History:   Procedure Laterality Date    APPENDECTOMY      BONE MARROW ASPIRATION Left 8/21/2020    Procedure: ASPIRATION, BONE MARROW;  Surgeon: Lex Kathleen MD;  Location: [facility] OR;  Service: Oncology;  Laterality: Left;    CLOSURE OF WOUND Right 9/9/2019    Procedure: CLOSURE, WOUND;  Surgeon: Li Kathleen DPM;  Location: [facility] OR;  Service: Podiatry;  Laterality: Right;    COLONOSCOPY N/A 12/10/2019    Procedure:  COLONOSCOPY;  Surgeon: Ryan Lucas MD;  Location: [facility];  Service: Endoscopy;  Laterality: N/A;    COLONOSCOPY N/A 12/13/2019    Procedure: COLONOSCOPY;  Surgeon: Ryan Lucas MD;  Location: [facility];  Service: Endoscopy;  Laterality: N/A;    ESOPHAGOGASTRODUODENOSCOPY N/A 12/10/2019    Procedure: EGD (ESOPHAGOGASTRODUODENOSCOPY);  Surgeon: Ryan Lucas MD;  Location: [facility];  Service: Endoscopy;  Laterality: N/A;    SHOULDER ARTHROSCOPY Left        Family History  Family History   Problem Relation Age of Onset    Melanoma Mother     Diabetes Mother     Hypertension Mother     Stroke Father     Diabetes Father     Hypertension Father     Colon cancer Father         unsure of age of diagnosis    Cancer Father         colon cancer    Cervical cancer Sister     Cirrhosis Brother     Hypertension Brother     Lung cancer Maternal Grandmother     Prostate cancer Maternal Grandfather     Crohn's disease Neg Hx     Ulcerative colitis Neg Hx     Stomach cancer Neg Hx     Esophageal cancer Neg Hx     Retinal detachment Neg Hx     Strabismus Neg Hx     Macular degeneration Neg Hx     Glaucoma Neg Hx        Social History  Social History     Socioeconomic History    Marital status:    Occupational History    Occupation: disabled (mental)     Comment: since 2000   Tobacco Use    Smoking status: Current Every Day Smoker     Packs/day: 1.00     Years: 45.00     Pack years: 45.00     Types: Cigarettes    Smokeless tobacco: Never Used    Tobacco comment: Age Started 14    Substance and Sexual Activity    Alcohol use: No    Drug use: Not Currently     Types: Marijuana    Sexual activity: Yes     Partners: Female        Review of Systems  14-point review of systems as follows:   No check katina indicates NEGATIVE response   Constitutional: [] weight loss, [] change to appetite   Eyes: [] change in vision, [] double vision   Ears, nose, mouth, throat: [] frequent nose  bleeds, [] ringing in the ears   Respiratory: [] cough, [] wheezing   Cardiovascular: [] chest pain, [] palpitations   Gastrointestinal: [] jaundice, [] nausea/vomiting   Genitourinary: [] incontinence, [] burning with urination   Hematologic/lymphatic: [] easy bruising/bleeding, [] night sweats   Neurological: [] numbness, [] weakness   Endocrine: [] fatigue, [] heat/cold intolerance   Allergy/Immunologic: [] fevers, [] chills   Musculoskeletal: [] muscle pain, [] joint pain   Psychiatric: [] thoughts of harming self/others, [x] depression   Integumentary: [] rashes, [] sores that do not heal     Objective:        Vitals:    08/11/22 1352   BP: 116/70   Pulse: 63   Resp: 17   Temp: 97.9 °F (36.6 °C)     Body mass index is 29.61 kg/m².    8/11/22  Constitutional:   He appears well-developed and well-nourished. He is well groomed. He was texting on phone throughout visit.      Neurological Exam:  General: well-developed, well-nourished, no distress  Mental status: Awake and alert  Speech language: No dysarthria or aphasia on conversation  Cranial nerves: Face symmetric  Motor: Moves all extremities well      12/30/22  Constitutional: appears in no acute distress, well-developed, well-nourished     Eyes: normal conjunctiva, PERRLA    Ears, nose, mouth, throat: external appearance of ears and nose normal, hearing intact     Cardiovascular: regular rate and rhythm, +murmur    Respiratory: unlabored respirations, breath sounds normal bilaterally    Gastrointestinal: no visible abdominal masses, no guarding, no visible hernia    Musculoskeletal: normal tone in all four extremities. No abnormal movements. No pronator drift. No orbit. Symmetric finger tapping.      Spine:   CERVICAL SPINE:  ROM: mildly restricted   MUSCLE SPASM: no   FACET LOADING: no   SPURLING: no  CONOR / STEFF tender: no     Psychiatric: normal judgment and insight. Oriented to person, place, and time.     Neurologic:   Cortical functions: recent and remote  memory intact, normal attention span and concentration, speech fluent, adequate fund of knowledge   Cranial nerves: visual fields full, PERRLA, EOMI, symmetric facial strength, hearing intact, palate elevates symmetrically, shoulder shrug 5/5, tongue protrudes midline   Reflexes: 2+ in the upper and lower extremities, no Long  Sensation: intact to temperature throughout   Coordination: normal finger to nose, heel to shin, abnormal/unsteady gait, did not attempt tandem     Data Review:     I have personally reviewed the referring provider's notes, labs, & imaging made available to me today.      RADIOLOGY STUDIES:  I have personally reviewed the pertinent images performed.       No results found for this or any previous visit.    Lab Results   Component Value Date     08/04/2022    K 4.4 08/04/2022    MG 2.1 03/21/2022     08/04/2022    CO2 22 (L) 08/04/2022    BUN 9 08/04/2022    CREATININE 1.1 08/04/2022     08/04/2022    HGBA1C 5.4 08/04/2022    AST 15 08/04/2022    ALT 11 08/04/2022    ALBUMIN 3.9 08/04/2022    PROT 7.6 08/04/2022    BILITOT 0.3 08/04/2022    CHOL 159 03/30/2022    HDL 36 (L) 03/30/2022    LDLCALC 106.8 03/30/2022    TRIG 81 03/30/2022       Lab Results   Component Value Date    WBC 6.48 08/04/2022    HGB 15.7 08/04/2022    HCT 47.3 08/04/2022    MCV 89 08/04/2022     08/04/2022       Lab Results   Component Value Date    TSH 1.563 08/04/2022           Assessment & Plan:       Problem List Items Addressed This Visit        Neuro    Intractable chronic migraine without aura and without status migrainosus    Overview     20 year history of migraine headaches. Headaches are typically moderate to severe in intensity, worsen with activity, pounding in quality and associated with sensitivity to light and sound. Given history of reported brain tumor, agree with head CT today. Cannot do MRI due to metal clips.     Galcanezumab (Emgality) treatment was approved for the  prevention of acute and chronic migraine on 9/27/2018. The patient will be an ideal candidate for Emgality. We are planning for 3 treatments 28 days apart. If we see no improvement after 3 treatments, we will discontinue the injections.     Add Nurtec once every other day.              Current Assessment & Plan     He continues to improve on Emgality and Nurtec. He has gone from daily headache to now four per week. Will continue current plan. Offered to add on Botox however he wishes to continue current regiment.                     Please call our clinic at [phone] or send a message on the [employer] portal if there are any changes to the plan described below, for example,if you are not contacted for the requested tests, referral(s) within one week, if you are unable to receive the medications prescribed, or if you feel you need to change the treatment course for any reason.     TESTING:  -- none     REFERRALS:  -- none      PREVENTION (use daily regardless of headache):  -- continue Emgality injection once every 28 days   I suspect once we treat your sleep apnea, we will reduce your daily headache frequency significantly.    AS-NEEDED TREATMENT (use total no more than 10 days per month unless otherwise stated):  -- continue Nurtec once every other day. This dissolves under the tongue and is only taken once in 24 hour time frame.    Follow up in about 6 months (around 2/11/2023) for follow up with ALEIDA.    Mckenna Bennett NP

## 2022-08-11 NOTE — ASSESSMENT & PLAN NOTE
He continues to improve on Emgality and Nurtec. He has gone from daily headache to now four per week. Will continue current plan. Offered to add on Botox however he wishes to continue current regiment.

## 2022-08-11 NOTE — PATIENT INSTRUCTIONS
Please call our clinic at [phone] or send a message on the [employer] portal if there are any changes to the plan described below, for example,if you are not contacted for the requested tests, referral(s) within one week, if you are unable to receive the medications prescribed, or if you feel you need to change the treatment course for any reason.     TESTING:  -- none     REFERRALS:  -- none but I still recommend a sleep evaluation     PREVENTION (use daily regardless of headache):  -- continue Emgality injection once every 28 days   I suspect once we treat your sleep apnea, we will reduce your daily headache frequency significantly.    AS-NEEDED TREATMENT (use total no more than 10 days per month unless otherwise stated):  -- continue Nurtec once every other day. This dissolves under the tongue and is only taken once in 24 hour time frame.

## 2022-08-12 ENCOUNTER — TELEPHONE (OUTPATIENT)
Dept: FAMILY MEDICINE | Facility: CLINIC | Age: 57
End: 2022-08-12
Payer: MEDICARE

## 2022-08-12 NOTE — TELEPHONE ENCOUNTER
----- Message from Janeth Angel sent at 8/12/2022  3:41 PM CDT -----  Regarding: pt called  Name of Who is Calling: DEANNE MICHAELS [[id number]]      What is the request in detail: pt would like to know if he still needs to take the amLODIPine (NORVASC) 2.5 MG tablet . Pt has a whole bottle. Please advise       Can the clinic reply by [facility]: No      [facility] [phone] (home)

## 2022-08-12 NOTE — TELEPHONE ENCOUNTER
Spoke with pt. Pt stated he found a bottle of medication and unsure if he should be taking or not- Amlodipine Valsartan 5/160mg from 2020.     Notified pt. Medication was d/c'd at hospital visit on 1/22/22. Pt confirmed that currently he is taking Metoprolol and Amlodipine 2.5mg daily as reported in his OV on 8/4/22.    Home BP reading 113/65 HR 70  Informed pt to continue Metoprolol and Amlodipine 2.5mg daily. Discard the bottle of med he found as it was stopped. Pt Verbalized understanding.

## 2022-08-15 ENCOUNTER — OFFICE VISIT (OUTPATIENT)
Dept: CARDIOLOGY | Facility: CLINIC | Age: 57
End: 2022-08-15
Payer: MEDICARE

## 2022-08-15 ENCOUNTER — HOSPITAL ENCOUNTER (OUTPATIENT)
Dept: RADIOLOGY | Facility: HOSPITAL | Age: 57
Discharge: HOME | End: 2022-08-15
Attending: INTERNAL MEDICINE
Payer: MEDICARE

## 2022-08-15 VITALS
DIASTOLIC BLOOD PRESSURE: 67 MMHG | SYSTOLIC BLOOD PRESSURE: 120 MMHG | BODY MASS INDEX: 29.63 KG/M2 | HEART RATE: 67 BPM | HEIGHT: 70 IN | WEIGHT: 207 LBS

## 2022-08-15 DIAGNOSIS — R55 SYNCOPE, UNSPECIFIED SYNCOPE TYPE: ICD-10-CM

## 2022-08-15 DIAGNOSIS — E08.42 DIABETIC POLYNEUROPATHY ASSOCIATED WITH DIABETES MELLITUS DUE TO UNDERLYING CONDITION: Primary | ICD-10-CM

## 2022-08-15 DIAGNOSIS — I10 ESSENTIAL HYPERTENSION: ICD-10-CM

## 2022-08-15 DIAGNOSIS — J44.9 CHRONIC OBSTRUCTIVE PULMONARY DISEASE, UNSPECIFIED COPD TYPE: ICD-10-CM

## 2022-08-15 DIAGNOSIS — I20.89 ANGINA OF EFFORT: ICD-10-CM

## 2022-08-15 DIAGNOSIS — I25.10 CAD IN NATIVE ARTERY: ICD-10-CM

## 2022-08-15 DIAGNOSIS — I73.9 PVD (PERIPHERAL VASCULAR DISEASE): ICD-10-CM

## 2022-08-15 DIAGNOSIS — R09.89 BILATERAL CAROTID BRUITS: ICD-10-CM

## 2022-08-15 DIAGNOSIS — R07.9 CHEST PAIN, UNSPECIFIED TYPE: Primary | ICD-10-CM

## 2022-08-15 DIAGNOSIS — F17.200 TOBACCO USE DISORDER: ICD-10-CM

## 2022-08-15 DIAGNOSIS — I35.0 AORTIC VALVE STENOSIS, ETIOLOGY OF CARDIAC VALVE DISEASE UNSPECIFIED: ICD-10-CM

## 2022-08-15 PROCEDURE — 1159F PR MEDICATION LIST DOCUMENTED IN MEDICAL RECORD: ICD-10-PCS | Mod: CPTII,S$GLB,, | Performed by: INTERNAL MEDICINE

## 2022-08-15 PROCEDURE — 3008F BODY MASS INDEX DOCD: CPT | Mod: CPTII,S$GLB,, | Performed by: INTERNAL MEDICINE

## 2022-08-15 PROCEDURE — 71046 X-RAY EXAM CHEST 2 VIEWS: CPT | Mod: 26,,, | Performed by: RADIOLOGY

## 2022-08-15 PROCEDURE — 71046 XR CHEST PA AND LATERAL: ICD-10-PCS | Mod: 26,,, | Performed by: RADIOLOGY

## 2022-08-15 PROCEDURE — 3044F PR MOST RECENT HEMOGLOBIN A1C LEVEL <7.0%: ICD-10-PCS | Mod: CPTII,S$GLB,, | Performed by: INTERNAL MEDICINE

## 2022-08-15 PROCEDURE — 3074F PR MOST RECENT SYSTOLIC BLOOD PRESSURE < 130 MM HG: ICD-10-PCS | Mod: CPTII,S$GLB,, | Performed by: INTERNAL MEDICINE

## 2022-08-15 PROCEDURE — 99999 PR PBB SHADOW E&M-EST. PATIENT-LVL III: ICD-10-PCS | Mod: PBBFAC,,, | Performed by: INTERNAL MEDICINE

## 2022-08-15 PROCEDURE — 1159F MED LIST DOCD IN RCRD: CPT | Mod: CPTII,S$GLB,, | Performed by: INTERNAL MEDICINE

## 2022-08-15 PROCEDURE — 99214 PR OFFICE/OUTPT VISIT, EST, LEVL IV, 30-39 MIN: ICD-10-PCS | Mod: S$GLB,,, | Performed by: INTERNAL MEDICINE

## 2022-08-15 PROCEDURE — 3078F DIAST BP <80 MM HG: CPT | Mod: CPTII,S$GLB,, | Performed by: INTERNAL MEDICINE

## 2022-08-15 PROCEDURE — 99999 PR PBB SHADOW E&M-EST. PATIENT-LVL III: CPT | Mod: PBBFAC,,, | Performed by: INTERNAL MEDICINE

## 2022-08-15 PROCEDURE — 3078F PR MOST RECENT DIASTOLIC BLOOD PRESSURE < 80 MM HG: ICD-10-PCS | Mod: CPTII,S$GLB,, | Performed by: INTERNAL MEDICINE

## 2022-08-15 PROCEDURE — 71046 X-RAY EXAM CHEST 2 VIEWS: CPT | Mod: TC,FY,PO

## 2022-08-15 PROCEDURE — 3074F SYST BP LT 130 MM HG: CPT | Mod: CPTII,S$GLB,, | Performed by: INTERNAL MEDICINE

## 2022-08-15 PROCEDURE — 3044F HG A1C LEVEL LT 7.0%: CPT | Mod: CPTII,S$GLB,, | Performed by: INTERNAL MEDICINE

## 2022-08-15 PROCEDURE — 3008F PR BODY MASS INDEX (BMI) DOCUMENTED: ICD-10-PCS | Mod: CPTII,S$GLB,, | Performed by: INTERNAL MEDICINE

## 2022-08-15 PROCEDURE — 99214 OFFICE O/P EST MOD 30 MIN: CPT | Mod: S$GLB,,, | Performed by: INTERNAL MEDICINE

## 2022-08-15 RX ORDER — NITROGLYCERIN 0.3 MG/1
0.3 TABLET SUBLINGUAL EVERY 5 MIN PRN
COMMUNITY
End: 2023-01-31 | Stop reason: CLARIF

## 2022-08-15 NOTE — PATIENT INSTRUCTIONS
Angiogram    Arrive for procedure at: [facility]    You will receive a phone call from [facility] Pre-Op Department with further instructions prior to your scheduled procedure.    Notify the nurse if you are ALLERGIC TO IODINE.    FASTING: You MAY NOT have anything to eat or drink AFTER MIDNIGHT the day before your procedure. If your procedure is scheduled in the afternoon, you may have a LIGHT BREAKFAST 6-8 hours prior to your procedure.  For example: Two slices of toast; black coffee or black tea.    MEDICATIONS: You may take your regular morning medications with water. If there are any medications that you should not take, you will be instructed to hold them for that morning.    CARDIOLOGY PRE-PROCEDURE MEDICATION ORDERS:  ** Please hold any medications that are checked below:    HOLD   # OF DAYS TO HOLD  Coumadin   Consult with Coumadin Clinic   Xarelto    _DAY BEFORE & DAY OF_  Pradaxa  _ DAY BEFORE & DAY OF _  Eliquis   _ DAY BEFORE & DAY OF _  Metformin    Day before procedure & morning of procedure  Short acting insulin   Morning of procedure    CONTINUE the Following Medications   Plavix      Effient     Aspirin    WHAT TO EXPECT:    How long will the procedure take?  The procedure will take an average of 1 - 2 hours to perform.  After the procedure, you will need to lay flat for around 4 - 6 hours to minimize bleeding from the puncture site. If the wrist is accessed you will need to keep your arm still as instructed by the nurse.    When can I go home?  You may be able to be discharged home that same afternoon if there were no complications.  If you have one of the following: balloon; stent; pacemaker or defibrillator procedures, you may spend one night for observation.  Your doctor will determine your discharge based upon your progress.  The results of your procedure will be discussed with you before you are discharged.  Any further testing or procedures will be scheduled for you either  before you leave or you will be instructed to call for a future appointment.      TRANSPORTATION:  PLEASE ARRANGE TO HAVE SOMEONE DRIVE YOU HOME FOLLOWING YOUR PROCEDURE, YOU WILL NOT BE ALLOWED TO DRIVE.

## 2022-08-15 NOTE — PROGRESS NOTES
Subjective:    Patient ID:  Ryan Crane is a 56 y.o. male patient here for evaluation Chest Pain (tightness)      History of Present Illness:  Cardiology follow-up, continue complaints of chest pain.  History of coronary disease, mild aortic stenosis.  Nuclear study and echo done at the beginning of the year were relatively unremarkable, no definite ischemia, mild AS preserved ejection fraction.  Patient has risk factors positive for positive calcium score, diabetes mellitus dyslipidemia family history.  Because of ongoing intermittent chest pain requiring nitroglycerin patient was referred for further evaluation.    Ongoing tobacco use, although patient has cut his tobacco use and half, currently 1/2 pack per day.  History of chronic lung disease.            Review of patient's allergies indicates:   Allergen Reactions    Alcohol Anaphylaxis    Alcohol antiseptic pads Anaphylaxis    Cephalexin Anaphylaxis       Past Medical History:   Diagnosis Date    Acute angina     Asthma     Cataract     COPD (chronic obstructive pulmonary disease)     Diabetes mellitus     Digestive disorder     ulcers    General anesthetics causing adverse effect in therapeutic use     Headache     Hypertension     MI, old     PIPER on CPAP     Schizophrenia     Smoker unmotivated to quit     Stroke 2005    Thyroid disease     took medicine in the past    Type II diabetes mellitus with neurological manifestations 11/21/2019     Past Surgical History:   Procedure Laterality Date    APPENDECTOMY      BONE MARROW ASPIRATION Left 8/21/2020    Procedure: ASPIRATION, BONE MARROW;  Surgeon: Lex Kathleen MD;  Location: [facility] OR;  Service: Oncology;  Laterality: Left;    CLOSURE OF WOUND Right 9/9/2019    Procedure: CLOSURE, WOUND;  Surgeon: Li Kathleen DPM;  Location: [facility] OR;  Service: Podiatry;  Laterality: Right;    COLONOSCOPY N/A 12/10/2019    Procedure: COLONOSCOPY;  Surgeon: Ryan Lucas MD;  Location:  [facility] ENDO;  Service: Endoscopy;  Laterality: N/A;    COLONOSCOPY N/A 12/13/2019    Procedure: COLONOSCOPY;  Surgeon: Ryan Lucas MD;  Location: [facility];  Service: Endoscopy;  Laterality: N/A;    ESOPHAGOGASTRODUODENOSCOPY N/A 12/10/2019    Procedure: EGD (ESOPHAGOGASTRODUODENOSCOPY);  Surgeon: Ryan Lucas MD;  Location: [facility];  Service: Endoscopy;  Laterality: N/A;    SHOULDER ARTHROSCOPY Left      Social History     Tobacco Use    Smoking status: Current Every Day Smoker     Packs/day: 1.00     Years: 45.00     Pack years: 45.00     Types: Cigarettes    Smokeless tobacco: Never Used    Tobacco comment: Age Started 14    Substance Use Topics    Alcohol use: No    Drug use: Not Currently     Types: Marijuana        Review of Systems:    As noted in HPI in addition      REVIEW OF SYSTEMS  Review of Systems   Constitutional: Negative for decreased appetite, diaphoresis, night sweats, weight gain and weight loss.   HENT: Negative for nosebleeds and odynophagia.    Eyes: Negative for double vision and photophobia.   Cardiovascular: Positive for chest pain and near-syncope. Negative for claudication, cyanosis, dyspnea on exertion, irregular heartbeat, leg swelling, orthopnea, palpitations, paroxysmal nocturnal dyspnea and syncope.   Respiratory: Negative for cough, hemoptysis, shortness of breath and wheezing.    Hematologic/Lymphatic: Negative for adenopathy.   Skin: Negative for flushing, skin cancer and suspicious lesions.   Musculoskeletal: Negative for gout, myalgias and neck pain.   Gastrointestinal: Negative for abdominal pain, heartburn, hematemesis and hematochezia.   Genitourinary: Negative for bladder incontinence, hesitancy and nocturia.   Neurological: Negative for focal weakness, headaches, light-headedness and paresthesias.   Psychiatric/Behavioral: Negative for memory loss and substance abuse.              Objective:        Vitals:    08/15/22 1042   BP: 120/67   Pulse: 67        Lab Results   Component Value Date    WBC 6.48 08/04/2022    HGB 15.7 08/04/2022    HCT 47.3 08/04/2022     08/04/2022    CHOL 159 03/30/2022    TRIG 81 03/30/2022    HDL 36 (L) 03/30/2022    ALT 11 08/04/2022    AST 15 08/04/2022     08/04/2022    K 4.4 08/04/2022     08/04/2022    CREATININE 1.1 08/04/2022    BUN 9 08/04/2022    CO2 22 (L) 08/04/2022    TSH 1.563 08/04/2022    INR 1.0 05/06/2022    HGBA1C 5.4 08/04/2022        ECHOCARDIOGRAM RESULTS  Results for orders placed during the hospital encounter of 01/11/22    Echo    Interpretation Summary  · The left ventricle is normal in size with concentric remodeling and normal systolic function.  · The estimated ejection fraction is 60%.  · Grade I left ventricular diastolic dysfunction.  · Normal right ventricular size with normal right ventricular systolic function.  · Moderate left atrial enlargement.  · There is mild aortic valve stenosis.  · Aortic valve area is 1.91 cm2; peak velocity is 3.27 m/s; mean gradient is 24 mmHg.  · Mild aortic regurgitation.  · Elevated central venous pressure (15 mmHg).  · The estimated PA systolic pressure is 27 mmHg.        CURRENT/PREVIOUS VISIT EKG  Results for orders placed or performed in visit on 05/08/22   EKG 12-lead    Collection Time: 05/08/22  4:29 PM    Narrative    Test Reason : R42    Vent. Rate : 071 BPM     Atrial Rate : 071 BPM     P-R Int : 172 ms          QRS Dur : 096 ms      QT Int : 402 ms       P-R-T Axes : 026 049 009 degrees     QTc Int : 436 ms    Normal sinus rhythm  Normal ECG  When compared with ECG of 06-MAY-2022 11:37,  No significant change was found  Confirmed by Kera VILLEGAS, Adam ([id number]) on 5/9/2022 9:10:57 AM    Referred By: [email or web address]   SELF           Confirmed By:Adam Cote MD     No valid procedures specified.   Results for orders placed during the hospital encounter of 03/08/22    Nuclear Stress - Cardiology Interpreted    Interpretation Summary    Normal  myocardial perfusion scan. There is no evidence of myocardial ischemia or infarction.    There is a  mild intensity fixed perfusion abnormality in the  wall of the left ventricle secondary to diaphragm attenuation.    The gated perfusion images showed an ejection fraction of 69% post stress.    The EKG portion of this study is negative for ischemia.    No valid procedures specified.    PHYSICAL EXAM  CONSTITUTIONAL: Well built, well nourished in no apparent distress  NECK: no carotid bruit, no JVD  LUNGS: CTA  CHEST WALL: no tenderness,  HEART: regular rate and rhythm, S1, S2 normal, no murmur, click, rub or gallop   ABDOMEN: soft, non-tender; bowel sounds normal; no masses,  no organomegaly  EXTREMITIES: Extremities normal, no edema, no calf tenderness noted  NEURO: AAO X 3    I HAVE REVIEWED :    The vital signs, nurses notes, and all the pertinent radiology and labs.         Current Outpatient Medications   Medication Instructions    albuterol (PROVENTIL) 2.5 mg, Nebulization, Every 6 hours PRN, Rescue    albuterol (PROVENTIL/VENTOLIN HFA) 90 mcg/actuation inhaler INHALE 2 PUFFS BY MOUTH AND INTO THE LUNGS EVERY 6 HOURS AS NEEDED FOR WHEEZING    amLODIPine (NORVASC) 2.5 mg, Oral, Daily    busPIRone (BUSPAR) 15 mg, Oral, 3 times daily    cyanocobalamin 1,000 mcg/mL injection No dose, route, or frequency recorded.    EPINEPHrine (EPIPEN) 0.3 mg, Intramuscular, Once    galcanezumab-gnlm (EMGALITY SYRINGE) 120 mg/mL Syrg Inject 1 pen (120 mg) into the skin every 28 days.    HYDROcodone-acetaminophen (NORCO)  mg per tablet 1 tablet, Oral, Every 6 hours PRN    ketoconazole (NIZORAL) 2 % shampoo Topical (Top), Twice weekly    LaMICtal 150 mg, Oral, Every morning    meclizine (ANTIVERT) 25 mg, Oral, 3 times daily PRN    metoprolol succinate (TOPROL-XL) 25 mg, Oral, Daily    nitroGLYCERIN (NITROSTAT) 0.3 mg, Sublingual, Every 5 min PRN    NURTEC 75 mg, Oral, Daily PRN, Place ODT tablet on the  tongue; alternatively the ODT tablet may be placed under the tongue    ondansetron (ZOFRAN-ODT) 4 mg, Oral, Every 8 hours PRN    pregabalin (LYRICA) 50 mg, Oral, 3 times daily    sodium chloride 1 g, Oral, 3 times daily    tadalafiL (CIALIS) 20 MG Tab Take one tablet by mouth prior to sexual activity, not to exceed 1 tablet in 72 hours    ziprasidone (GEODON) 160 mg, Oral, Nightly          Assessment:   Chest pain in the setting of recent noninvasive cardiac assessment in beginning of this year.  Chest pain is daily requiring nitroglycerin.    Hypertension, dyslipidemia, diabetes mellitus, ongoing tobacco use.    History of CVA 2005, no major neurologic sequelae.  Carotid screening done 7/21.  negative for high-grade disease.        Plan:   Referred for left heart catheterization.          No follow-ups on file.

## 2022-08-18 ENCOUNTER — TELEPHONE (OUTPATIENT)
Dept: FAMILY MEDICINE | Facility: CLINIC | Age: 57
End: 2022-08-18
Payer: MEDICARE

## 2022-08-18 NOTE — TELEPHONE ENCOUNTER
----- Message from Tena Campoverde sent at 8/18/2022  3:40 PM CDT -----  .Type:  Patient Returning Call    Who Called: PT     Who Left Message for Patient: Hanna Chadwick    Does the patient know what this is regarding?: MISSED CALL     Would the patient rather a call back YES      Best Call Back Number: [phone]    Additional Information:  NONE

## 2022-08-18 NOTE — TELEPHONE ENCOUNTER
----- Message from Nora Andrews Patient Care Assistant sent at 8/18/2022  1:35 PM CDT -----  Contact: Pt  Type: Needs Medical Advice    Who Called: Pt   Best Call Back Number: [phone]  Inquiry/Question: Pt is calling to have his prescriptions transferred to [email or web address]. [email or web address] is sending a request for the transfer. Please call back and advise Thank you~

## 2022-08-18 NOTE — TELEPHONE ENCOUNTER
Spoke with pt and notified transfer request was received from [facility]. Will update pt preferred pharmacy in [facility]. No refills needed at this time.

## 2022-08-19 RX ORDER — PREGABALIN 50 MG/1
CAPSULE ORAL
Qty: 90 CAPSULE | Refills: 1 | Status: SHIPPED | OUTPATIENT
Start: 2022-08-19 | End: 2022-09-02 | Stop reason: SDUPTHER

## 2022-08-19 NOTE — TELEPHONE ENCOUNTER
----- Message from Lyn Epps sent at 8/19/2022  3:18 PM CDT -----  Contact: patient  Type:  Patient Call          Who Called:Patient         Does the patient know what this is regarding?:requesting refill on Rx pregabalin (LYRICA) 50 MG capsule; PT states that he's out of medication ;please advise           Would the patient rather a call back or a response via [email or web address]?          Best Call Back Number: [phone]             Additional Information:       [id number] - [place], [place] - [address] [place] [address]  [address] [facility] [address]  Phone: [phone] Fax: [phone]

## 2022-08-19 NOTE — TELEPHONE ENCOUNTER
No new care gaps identified.  [facility] Embedded Care Gaps. Reference number: [id number]. 8/19/2022   2:17:03 PM CDT

## 2022-08-24 ENCOUNTER — TELEPHONE (OUTPATIENT)
Dept: CARDIOLOGY | Facility: CLINIC | Age: 57
End: 2022-08-24
Payer: MEDICARE

## 2022-08-31 RX ORDER — ONDANSETRON 4 MG/1
4 TABLET, ORALLY DISINTEGRATING ORAL EVERY 8 HOURS PRN
Qty: 12 TABLET | Refills: 1 | Status: ON HOLD | OUTPATIENT
Start: 2022-08-31 | End: 2023-03-24 | Stop reason: CLARIF

## 2022-08-31 NOTE — TELEPHONE ENCOUNTER
No new care gaps identified.  [facility] Embedded Care Gaps. Reference number: [id number]. 8/31/2022   1:48:14 PM CDT

## 2022-08-31 NOTE — TELEPHONE ENCOUNTER
----- Message from Marian Copeland sent at 8/31/2022  8:25 AM CDT -----  Contact: Patient  Type:  RX Refill Request    Who Called: Patient    Refill or New Rx: refill    RX Name and Strength: ondansetron (ZOFRAN-ODT) 4 MG TbDL    How is the patient currently taking it? (ex. 1XDay)    Is this a 30 day or 90 day RX:    Preferred Pharmacy with phone number:     [id number] [address]  [address] [facility] [address] [facility] [address]  Phone: [phone] Fax: [phone]      Local or Mail Order:    Ordering Provider:    Would the patient rather a call back or a response via [facility]?    Best Call Back Number: [phone]    Additional Information:

## 2022-09-01 ENCOUNTER — PATIENT MESSAGE (OUTPATIENT)
Dept: HEMATOLOGY/ONCOLOGY | Facility: CLINIC | Age: 57
End: 2022-09-01
Payer: MEDICARE

## 2022-09-02 DIAGNOSIS — I10 UNCONTROLLED HYPERTENSION: ICD-10-CM

## 2022-09-02 RX ORDER — METOPROLOL SUCCINATE 25 MG/1
25 TABLET, EXTENDED RELEASE ORAL DAILY
Qty: 90 TABLET | Refills: 3 | Status: SHIPPED | OUTPATIENT
Start: 2022-09-02 | End: 2022-10-28 | Stop reason: SDUPTHER

## 2022-09-02 NOTE — TELEPHONE ENCOUNTER
----- Message from Meme Weber sent at 9/2/2022  9:58 AM CDT -----  Contact: pt  Type: Needs Medical Advice  Who Called:  pt   Best Call Back Number:There are no phone numbers on file.   Additional Information:pt is calling the office to speak to the team about medication being transferred to insurance pharm.. adv the insurance company faxed paperwork a week ago.. please call and adv--

## 2022-09-02 NOTE — TELEPHONE ENCOUNTER
No new care gaps identified.  [facility] Embedded Care Gaps. Reference number: [id number]. 9/02/2022   10:52:16 AM CDT

## 2022-09-02 NOTE — TELEPHONE ENCOUNTER
Refill Routing Note   Medication(s) are not appropriate for processing by [facility] for the following reason(s):      - Outside of protocol  - Drug-Disease Interaction (Aortic valve stenosis)    [facility] action(s):  Defer  Route  Approve       Medication Therapy Plan: Defer norvasc - disease interaction. Route buspar and lyrica - OOS. Approve toprol - rx sent to patient's requested pharmacy.  Medication reconciliation completed: No     Appointments  past 12m or future 3m with PCP    Date Provider   Last Visit   8/4/2022 Laura Becerril MD   Next Visit   Visit date not found Laura Becerril MD   ED visits in past 90 days: 0        Note composed:12:04 PM 09/02/2022

## 2022-09-07 DIAGNOSIS — J44.9 CHRONIC OBSTRUCTIVE PULMONARY DISEASE, UNSPECIFIED COPD TYPE: ICD-10-CM

## 2022-09-09 ENCOUNTER — TELEPHONE (OUTPATIENT)
Dept: FAMILY MEDICINE | Facility: CLINIC | Age: 57
End: 2022-09-09
Payer: MEDICARE

## 2022-09-09 RX ORDER — PREGABALIN 50 MG/1
50 CAPSULE ORAL 3 TIMES DAILY
Qty: 270 CAPSULE | Refills: 1 | Status: SHIPPED | OUTPATIENT
Start: 2022-09-09 | End: 2023-03-29 | Stop reason: SDUPTHER

## 2022-09-09 RX ORDER — AMLODIPINE BESYLATE 2.5 MG/1
2.5 TABLET ORAL DAILY
Qty: 90 TABLET | Refills: 1 | Status: SHIPPED | OUTPATIENT
Start: 2022-09-09 | End: 2022-10-28 | Stop reason: SDUPTHER

## 2022-09-09 RX ORDER — BUSPIRONE HYDROCHLORIDE 15 MG/1
15 TABLET ORAL 3 TIMES DAILY
Qty: 270 TABLET | Refills: 1 | Status: SHIPPED | OUTPATIENT
Start: 2022-09-09 | End: 2023-10-03

## 2022-09-09 NOTE — TELEPHONE ENCOUNTER
----- Message from Moody Eduardo sent at 9/9/2022 10:43 AM CDT -----  Contact: self  Type: Needs Medical Advice  Who Called: Patient   Best Call Back Number: [phone]  Additional Information: Pt states this is his second message need Dr. Becerril to fax [facility] [phone]( office number). Plz confirm with pt that its done. [facility] states they sent a fax to Dr. Becerril on 9/2 wanted to know when is the office going to reply. Pt will be due for refills soon. Thanks

## 2022-09-14 DIAGNOSIS — E11.9 TYPE 2 DIABETES MELLITUS WITHOUT COMPLICATION: ICD-10-CM

## 2022-09-19 ENCOUNTER — PATIENT MESSAGE (OUTPATIENT)
Dept: ADMINISTRATIVE | Facility: HOSPITAL | Age: 57
End: 2022-09-19
Payer: MEDICARE

## 2022-09-20 ENCOUNTER — TELEPHONE (OUTPATIENT)
Dept: FAMILY MEDICINE | Facility: CLINIC | Age: 57
End: 2022-09-20
Payer: MEDICARE

## 2022-09-20 NOTE — TELEPHONE ENCOUNTER
Rx sent on 9/9/22 #270 x 1R to [facility]. Advised pt this was sent. Pt confirmed that it came in today. It was cx'd at [facility] and he was just afraid the rx had been cx'd altogether,

## 2022-09-20 NOTE — TELEPHONE ENCOUNTER
----- Message from Lyn Epps sent at 9/20/2022  8:24 AM CDT -----  Contact: Patient  Type:  Patient Call          Who Called:Patient         Does the patient know what this is regarding?: requesting a  call back Pt would like to know why his refills stop for pregabalin (LYRICA) 50 MG capsule Pt said he need the medication for pain in his foot ;please advise           Would the patient rather a call back or a response via [facility]?  Call           Best Call Back Number:[phone]             Additional Information:      [facility] - [place], [place] - [address]  [address] [facility] [address]  Phone: [phone] Fax: [phone]

## 2022-09-27 ENCOUNTER — OFFICE VISIT (OUTPATIENT)
Dept: PODIATRY | Facility: CLINIC | Age: 57
End: 2022-09-27
Payer: MEDICARE

## 2022-09-27 ENCOUNTER — PES CALL (OUTPATIENT)
Dept: ADMINISTRATIVE | Facility: CLINIC | Age: 57
End: 2022-09-27
Payer: MEDICARE

## 2022-09-27 DIAGNOSIS — M79.671 INTRACTABLE RIGHT HEEL PAIN: Primary | ICD-10-CM

## 2022-09-27 DIAGNOSIS — I73.9 PVD (PERIPHERAL VASCULAR DISEASE): ICD-10-CM

## 2022-09-27 DIAGNOSIS — B35.1 ONYCHOMYCOSIS DUE TO DERMATOPHYTE: ICD-10-CM

## 2022-09-27 DIAGNOSIS — E11.49 TYPE II DIABETES MELLITUS WITH NEUROLOGICAL MANIFESTATIONS: ICD-10-CM

## 2022-09-27 DIAGNOSIS — E08.42 DIABETIC POLYNEUROPATHY ASSOCIATED WITH DIABETES MELLITUS DUE TO UNDERLYING CONDITION: ICD-10-CM

## 2022-09-27 PROCEDURE — 3044F HG A1C LEVEL LT 7.0%: CPT | Mod: CPTII,S$GLB,, | Performed by: STUDENT IN AN ORGANIZED HEALTH CARE EDUCATION/TRAINING PROGRAM

## 2022-09-27 PROCEDURE — 11721 ROUTINE FOOT CARE: ICD-10-PCS | Mod: 59,Q9,S$GLB, | Performed by: STUDENT IN AN ORGANIZED HEALTH CARE EDUCATION/TRAINING PROGRAM

## 2022-09-27 PROCEDURE — 99999 PR PBB SHADOW E&M-EST. PATIENT-LVL III: CPT | Mod: PBBFAC,,, | Performed by: STUDENT IN AN ORGANIZED HEALTH CARE EDUCATION/TRAINING PROGRAM

## 2022-09-27 PROCEDURE — 99999 PR PBB SHADOW E&M-EST. PATIENT-LVL III: ICD-10-PCS | Mod: PBBFAC,,, | Performed by: STUDENT IN AN ORGANIZED HEALTH CARE EDUCATION/TRAINING PROGRAM

## 2022-09-27 PROCEDURE — 11721 DEBRIDE NAIL 6 OR MORE: CPT | Mod: 59,Q9,S$GLB, | Performed by: STUDENT IN AN ORGANIZED HEALTH CARE EDUCATION/TRAINING PROGRAM

## 2022-09-27 PROCEDURE — 11055 PARING/CUTG B9 HYPRKER LES 1: CPT | Mod: Q9,S$GLB,, | Performed by: STUDENT IN AN ORGANIZED HEALTH CARE EDUCATION/TRAINING PROGRAM

## 2022-09-27 PROCEDURE — 1159F MED LIST DOCD IN RCRD: CPT | Mod: CPTII,S$GLB,, | Performed by: STUDENT IN AN ORGANIZED HEALTH CARE EDUCATION/TRAINING PROGRAM

## 2022-09-27 PROCEDURE — 3044F PR MOST RECENT HEMOGLOBIN A1C LEVEL <7.0%: ICD-10-PCS | Mod: CPTII,S$GLB,, | Performed by: STUDENT IN AN ORGANIZED HEALTH CARE EDUCATION/TRAINING PROGRAM

## 2022-09-27 PROCEDURE — 1160F RVW MEDS BY RX/DR IN RCRD: CPT | Mod: CPTII,S$GLB,, | Performed by: STUDENT IN AN ORGANIZED HEALTH CARE EDUCATION/TRAINING PROGRAM

## 2022-09-27 PROCEDURE — 99214 PR OFFICE/OUTPT VISIT, EST, LEVL IV, 30-39 MIN: ICD-10-PCS | Mod: 25,S$GLB,, | Performed by: STUDENT IN AN ORGANIZED HEALTH CARE EDUCATION/TRAINING PROGRAM

## 2022-09-27 PROCEDURE — 1159F PR MEDICATION LIST DOCUMENTED IN MEDICAL RECORD: ICD-10-PCS | Mod: CPTII,S$GLB,, | Performed by: STUDENT IN AN ORGANIZED HEALTH CARE EDUCATION/TRAINING PROGRAM

## 2022-09-27 PROCEDURE — 99499 UNLISTED E&M SERVICE: CPT | Mod: S$GLB,,, | Performed by: STUDENT IN AN ORGANIZED HEALTH CARE EDUCATION/TRAINING PROGRAM

## 2022-09-27 PROCEDURE — 11055 ROUTINE FOOT CARE: ICD-10-PCS | Mod: Q9,S$GLB,, | Performed by: STUDENT IN AN ORGANIZED HEALTH CARE EDUCATION/TRAINING PROGRAM

## 2022-09-27 PROCEDURE — 99214 OFFICE O/P EST MOD 30 MIN: CPT | Mod: 25,S$GLB,, | Performed by: STUDENT IN AN ORGANIZED HEALTH CARE EDUCATION/TRAINING PROGRAM

## 2022-09-27 PROCEDURE — 1160F PR REVIEW ALL MEDS BY PRESCRIBER/CLIN PHARMACIST DOCUMENTED: ICD-10-PCS | Mod: CPTII,S$GLB,, | Performed by: STUDENT IN AN ORGANIZED HEALTH CARE EDUCATION/TRAINING PROGRAM

## 2022-09-27 NOTE — PROGRESS NOTES
Subjective:      Patient ID: Ryan Crane is a 57 y.o. male.    Chief Complaint: Diabetic Foot Exam (Samanta John 8/4/22)    HPI:  Ryan is a 57 y.o. male who presents to the clinic for evaluation and treatment of high risk feet. Ryan has a past medical history of Acute angina, Asthma, Cancer, Cataract, COPD (chronic obstructive pulmonary disease), Diabetes mellitus, Digestive disorder, General anesthetics causing adverse effect in therapeutic use, Headache, Hypertension, MI, old, PIPER on CPAP, Schizophrenia, Seizures (2008), Smoker unmotivated to quit, Stroke (2005), Thyroid disease, and Type II diabetes mellitus with neurological manifestations (11/21/2019). The patient's chief complaint is long, thick toenails and right heel pain. This patient has documented high risk feet requiring routine maintenance secondary to diabetes mellitis and those secondary complications of diabetes, as mentioned..  He reports having right heel pain, which he rates as 3/10 on the pain scale but denies having a wound.  He also informs of losing 30 lbs since his last visit without trying to lose weight and is concerned about it.  He denies trying to self-treat his heel pain or trim his toenails himself.  Patient denies any other pedal complaints at this time.    9/27/22: f/u for nail care and heel pain R.    PCP: Laura Becerril MD    Date Last Seen by PCP:  8/4/2022    Current shoe gear:  Affected Foot: Casual shoes     Unaffected Foot: Casual shoes    Review of Systems   Constitutional: Negative for appetite change, fever, chills, fatigue.  Positive for unexpected weight change.   Respiratory: Negative for cough, wheezing, shortness of breath.  Cardiovascular: Negative for chest pain, claudication, cyanosis.  Positive for leg swelling.  Musculoskeletal: Negative for back pain, arthritis, joint pain, joint swelling, myalgias, stiffness.   Skin: Negative for rash, itching, suspicious lesion, poor wound healing, unusual hair  distribution.  Positive for nail bed changes, discoloration,.  Neurological: Negative for loss of balance, sensory change, paresthesias, numbness.  Positive for pain.  Hematological: Negative for adenopathy, bleeding, bruising easily.   Psychiatric/Behavioral: The patient is not nervous/anxious.  Negative for altered mental status.    Hemoglobin A1C   Date Value Ref Range Status   08/04/2022 5.4 4.0 - 5.6 % Final     Comment:     ADA Screening Guidelines:  5.7-6.4%  Consistent with prediabetes  >or=6.5%  Consistent with diabetes    High levels of fetal hemoglobin interfere with the HbA1C  assay. Heterozygous hemoglobin variants (HbS, HgC, etc)do  not significantly interfere with this assay.   However, presence of multiple variants may affect accuracy.     04/27/2022 5.3 4.0 - 5.6 % Final     Comment:     ADA Screening Guidelines:  5.7-6.4%  Consistent with prediabetes  >or=6.5%  Consistent with diabetes    High levels of fetal hemoglobin interfere with the HbA1C  assay. Heterozygous hemoglobin variants (HbS, HgC, etc)do  not significantly interfere with this assay.   However, presence of multiple variants may affect accuracy.     03/30/2022 5.6 4.0 - 5.6 % Final     Comment:     ADA Screening Guidelines:  5.7-6.4%  Consistent with prediabetes  >or=6.5%  Consistent with diabetes    High levels of fetal hemoglobin interfere with the HbA1C  assay. Heterozygous hemoglobin variants (HbS, HgC, etc)do  not significantly interfere with this assay.   However, presence of multiple variants may affect accuracy.         Past Medical History:   Diagnosis Date    Acute angina     Asthma     Cancer     Cataract     skin cancer right foot    COPD (chronic obstructive pulmonary disease)     Diabetes mellitus     Digestive disorder     ulcers    General anesthetics causing adverse effect in therapeutic use     Headache     Hypertension     MI, old     PIPER on CPAP     Schizophrenia     Seizures 2008    now on meds    Smoker  unmotivated to quit     Stroke 2005    Thyroid disease     took medicine in the past    Type II diabetes mellitus with neurological manifestations 11/21/2019    diet controlled     Past Surgical History:   Procedure Laterality Date    ANGIOGRAM, CORONARY, WITH LEFT HEART CATHETERIZATION  8/25/2022    Procedure: Angiogram, Coronary, with Left Heart Cath;  Surgeon: Mai Deleon MD;  Location: [facility] CATH;  Service: Cardiology;;    APPENDECTOMY      BONE MARROW ASPIRATION Left 8/21/2020    Procedure: ASPIRATION, BONE MARROW;  Surgeon: Lex Kathleen MD;  Location: [facility] OR;  Service: Oncology;  Laterality: Left;    CLOSURE OF WOUND Right 9/9/2019    Procedure: CLOSURE, WOUND;  Surgeon: Li Kathleen DPM;  Location: [facility] OR;  Service: Podiatry;  Laterality: Right;    COLONOSCOPY N/A 12/10/2019    Procedure: COLONOSCOPY;  Surgeon: Ryan Lucas MD;  Location: [facility];  Service: Endoscopy;  Laterality: N/A;    COLONOSCOPY N/A 12/13/2019    Procedure: COLONOSCOPY;  Surgeon: Ryan Lucas MD;  Location: [facility] ENDO;  Service: Endoscopy;  Laterality: N/A;    ESOPHAGOGASTRODUODENOSCOPY N/A 12/10/2019    Procedure: EGD (ESOPHAGOGASTRODUODENOSCOPY);  Surgeon: Ryan Lucas MD;  Location: [facility];  Service: Endoscopy;  Laterality: N/A;    LEFT HEART CATHETERIZATION Left 8/25/2022    Procedure: Left heart cath;  Surgeon: Mai Deleon MD;  Location: [facility] CATH;  Service: Cardiology;  Laterality: Left;    SHOULDER ARTHROSCOPY Left      Family History   Problem Relation Age of Onset    Melanoma Mother     Diabetes Mother     Hypertension Mother     Stroke Father     Diabetes Father     Hypertension Father     Colon cancer Father         unsure of age of diagnosis    Cancer Father         colon cancer    Cervical cancer Sister     Cirrhosis Brother     Hypertension Brother     Lung cancer Maternal Grandmother     Prostate cancer Maternal Grandfather     Crohn's disease Neg Hx     Ulcerative colitis Neg Hx      Stomach cancer Neg Hx     Esophageal cancer Neg Hx     Retinal detachment Neg Hx     Strabismus Neg Hx     Macular degeneration Neg Hx     Glaucoma Neg Hx      Social History     Socioeconomic History    Marital status:    Occupational History    Occupation: disabled (mental)     Comment: since 2000   Tobacco Use    Smoking status: Every Day     Packs/day: 1.00     Years: 45.00     Pack years: 45.00     Types: Cigarettes     Start date: 1979    Smokeless tobacco: Never    Tobacco comments:     Age Started 14    Substance and Sexual Activity    Alcohol use: No    Drug use: Not Currently     Types: Marijuana    Sexual activity: Yes     Partners: Female           Objective:        There were no vitals taken for this visit.    Physical Exam   Constitutional: Patient is oriented to person, place, and time. Patient appears well-developed and well-nourished.  Strong malodor noted from patient due to lack of personal hygiene.  No acute distress.     Psychiatric: Patient has a normal mood and affect. Patient's speech is normal and behavior is normal. Judgment is normal. Cognition and memory are normal.     Bilateral pedal exam was performed today.  Vascular: Vascular: Pedal pulses palpable 2/4 DP & PT.  CFT is = 3 seconds to the hallux.  Skin temperature is warm to cool proximal tibia to distal toes without localized increase in calor noted.  No erythema and ecchymosis noted to the LE.  Nonpitting edema noted to the LE.  Hair growth present distally to the LE.     Musculoskeletal: Ankle and pedal joints ROM are decreased. Ankle joint dorsiflexion is restricted with the knee extended and flexed per Silfverskiold exam.  Muscle strength is 5/5 for all LE muscle groups tested.  Flat foot type present.  Flexion contracture of lesser digits noted.    Neurological:  Epicritic sensation is slightly dimiished to the foot.  Chaddock STR is intact to the LE.  Tenderness to palpation of right plantar heel noted.    "  Dermatological: Toenails 1-5 are elongated and distally thickened, dystrophic, brittle, discolored, and mycotic with subungal debris present.  Webspaces 1-4 are dry and intact with debris present.  Skin turgor is increased.  Skin texture is dry and flaky.  Deep dermal fibrosis noted plantar right heel without ulceration.      Nursing note and vitals reviewed.        Assessment:       Encounter Diagnoses   Name Primary?    Intractable right heel pain Yes    Onychomycosis due to dermatophyte     Type II diabetes mellitus with neurological manifestations     Diabetic polyneuropathy associated with diabetes mellitus due to underlying condition     PVD (peripheral vascular disease)          Plan:       Ryan was seen today for diabetic foot exam.    Diagnoses and all orders for this visit:    Intractable right heel pain  -     MRI Foot ([facility]) Right Without Contrast; Future  -     Case Request Operating Room: EXCISION, MASS    Onychomycosis due to dermatophyte    Type II diabetes mellitus with neurological manifestations    Diabetic polyneuropathy associated with diabetes mellitus due to underlying condition    PVD (peripheral vascular disease)    Other orders  -     Routine Foot Care    I counseled the patient on his conditions, their implications and medical management.    Patient was evaluated and risk assessed today. The patient is at low risk for developing pedal complications due to peripheral vascular disease. I explained to the patient that 6/22/22 can make healing injuries difficult leading to wounds or gangrene.    In general, I recommend monitoring the feet daily for any areas of pressure or injuries. If any areas appear to be healing slowly or become infected, seek medical attention as soon as possible. Wear supportive shoes and avoid barefoot walking.     9/27/22: Patient would like another surgical excision of the "wart" on the bottom of the R foot. MRI ordered to evaluate for any underlying cause of " this lesion and for surgical planning.    Tentatively plan for surgery on 11/18/22 for soft tissue mass excision.    Consents were obtained and no guarantees were made.    Follow up in 4 months    Routine Foot Care    Date/Time: 9/27/2022 1:40 PM  Performed by: Димтрий Pratt DPM  Authorized by: Дмитрий Pratt DPM     Consent Done?:  Yes (Verbal)  Hyperkeratotic Skin Lesions?: Yes    Number of trimmed lesions:  1  Location(s):  Right Heel    Nail Care Type:  Debride  Location(s): All  (Left 1st Toe, Left 3rd Toe, Left 2nd Toe, Left 4th Toe, Left 5th Toe, Right 1st Toe, Right 2nd Toe, Right 3rd Toe, Right 4th Toe and Right 5th Toe)  Patient tolerance:  Patient tolerated the procedure well with no immediate complications

## 2022-10-04 ENCOUNTER — TELEPHONE (OUTPATIENT)
Dept: CARDIOLOGY | Facility: CLINIC | Age: 57
End: 2022-10-04

## 2022-10-04 ENCOUNTER — OFFICE VISIT (OUTPATIENT)
Dept: CARDIOLOGY | Facility: CLINIC | Age: 57
End: 2022-10-04
Payer: MEDICARE

## 2022-10-04 ENCOUNTER — TELEPHONE (OUTPATIENT)
Dept: PODIATRY | Facility: CLINIC | Age: 57
End: 2022-10-04
Payer: MEDICARE

## 2022-10-04 VITALS
BODY MASS INDEX: 29.61 KG/M2 | HEART RATE: 68 BPM | SYSTOLIC BLOOD PRESSURE: 94 MMHG | DIASTOLIC BLOOD PRESSURE: 60 MMHG | WEIGHT: 206.81 LBS | HEIGHT: 70 IN

## 2022-10-04 DIAGNOSIS — R09.89 BILATERAL CAROTID BRUITS: ICD-10-CM

## 2022-10-04 DIAGNOSIS — I25.10 CAD IN NATIVE ARTERY: ICD-10-CM

## 2022-10-04 DIAGNOSIS — G47.33 OSA ON CPAP: ICD-10-CM

## 2022-10-04 DIAGNOSIS — I20.89 ANGINA OF EFFORT: ICD-10-CM

## 2022-10-04 DIAGNOSIS — I73.9 PVD (PERIPHERAL VASCULAR DISEASE): ICD-10-CM

## 2022-10-04 DIAGNOSIS — E08.42 DIABETIC POLYNEUROPATHY ASSOCIATED WITH DIABETES MELLITUS DUE TO UNDERLYING CONDITION: Primary | ICD-10-CM

## 2022-10-04 DIAGNOSIS — F17.200 TOBACCO USE DISORDER: ICD-10-CM

## 2022-10-04 DIAGNOSIS — F20.9 CHRONIC SCHIZOPHRENIA: ICD-10-CM

## 2022-10-04 DIAGNOSIS — I35.0 AORTIC VALVE STENOSIS, ETIOLOGY OF CARDIAC VALVE DISEASE UNSPECIFIED: ICD-10-CM

## 2022-10-04 DIAGNOSIS — J44.9 CHRONIC OBSTRUCTIVE PULMONARY DISEASE, UNSPECIFIED COPD TYPE: ICD-10-CM

## 2022-10-04 DIAGNOSIS — I10 ESSENTIAL HYPERTENSION: ICD-10-CM

## 2022-10-04 PROCEDURE — 3078F PR MOST RECENT DIASTOLIC BLOOD PRESSURE < 80 MM HG: ICD-10-PCS | Mod: CPTII,S$GLB,, | Performed by: INTERNAL MEDICINE

## 2022-10-04 PROCEDURE — 99999 PR PBB SHADOW E&M-EST. PATIENT-LVL III: CPT | Mod: PBBFAC,,, | Performed by: INTERNAL MEDICINE

## 2022-10-04 PROCEDURE — 3008F PR BODY MASS INDEX (BMI) DOCUMENTED: ICD-10-PCS | Mod: CPTII,S$GLB,, | Performed by: INTERNAL MEDICINE

## 2022-10-04 PROCEDURE — 3044F PR MOST RECENT HEMOGLOBIN A1C LEVEL <7.0%: ICD-10-PCS | Mod: CPTII,S$GLB,, | Performed by: INTERNAL MEDICINE

## 2022-10-04 PROCEDURE — 99214 OFFICE O/P EST MOD 30 MIN: CPT | Mod: S$GLB,,, | Performed by: INTERNAL MEDICINE

## 2022-10-04 PROCEDURE — 3078F DIAST BP <80 MM HG: CPT | Mod: CPTII,S$GLB,, | Performed by: INTERNAL MEDICINE

## 2022-10-04 PROCEDURE — 99499 UNLISTED E&M SERVICE: CPT | Mod: S$GLB,,, | Performed by: INTERNAL MEDICINE

## 2022-10-04 PROCEDURE — 1160F PR REVIEW ALL MEDS BY PRESCRIBER/CLIN PHARMACIST DOCUMENTED: ICD-10-PCS | Mod: CPTII,S$GLB,, | Performed by: INTERNAL MEDICINE

## 2022-10-04 PROCEDURE — 3074F PR MOST RECENT SYSTOLIC BLOOD PRESSURE < 130 MM HG: ICD-10-PCS | Mod: CPTII,S$GLB,, | Performed by: INTERNAL MEDICINE

## 2022-10-04 PROCEDURE — 3044F HG A1C LEVEL LT 7.0%: CPT | Mod: CPTII,S$GLB,, | Performed by: INTERNAL MEDICINE

## 2022-10-04 PROCEDURE — 1159F MED LIST DOCD IN RCRD: CPT | Mod: CPTII,S$GLB,, | Performed by: INTERNAL MEDICINE

## 2022-10-04 PROCEDURE — 1160F RVW MEDS BY RX/DR IN RCRD: CPT | Mod: CPTII,S$GLB,, | Performed by: INTERNAL MEDICINE

## 2022-10-04 PROCEDURE — 3008F BODY MASS INDEX DOCD: CPT | Mod: CPTII,S$GLB,, | Performed by: INTERNAL MEDICINE

## 2022-10-04 PROCEDURE — 3074F SYST BP LT 130 MM HG: CPT | Mod: CPTII,S$GLB,, | Performed by: INTERNAL MEDICINE

## 2022-10-04 PROCEDURE — 99999 PR PBB SHADOW E&M-EST. PATIENT-LVL III: ICD-10-PCS | Mod: PBBFAC,,, | Performed by: INTERNAL MEDICINE

## 2022-10-04 PROCEDURE — 1159F PR MEDICATION LIST DOCUMENTED IN MEDICAL RECORD: ICD-10-PCS | Mod: CPTII,S$GLB,, | Performed by: INTERNAL MEDICINE

## 2022-10-04 PROCEDURE — 99214 PR OFFICE/OUTPT VISIT, EST, LEVL IV, 30-39 MIN: ICD-10-PCS | Mod: S$GLB,,, | Performed by: INTERNAL MEDICINE

## 2022-10-04 NOTE — PROGRESS NOTES
Subjective:    Patient ID:  Ryan Crane is a 57 y.o. male patient here for evaluation Diabetic polyneuropathy associated with diabetes mellitus d      History of Present Illness:  Rocael CARROLL follow-up.  Left heart catheterization performed 8/22 with preserved ejection fraction, moderate disease in the non dominant right coronary artery, patent stent the LAD.  Mild disease in the circumflex artery.  Patient has chronic stable angina.  Ongoing tobacco use.  No medications for diabetes mellitus, hypertension or cholesterol.          Review of patient's allergies indicates:   Allergen Reactions    Alcohol Anaphylaxis     Pt states all types of alcohol    Alcohol antiseptic pads Anaphylaxis    Cephalexin Anaphylaxis       Past Medical History:   Diagnosis Date    Acute angina     Asthma     Cancer     Cataract     skin cancer right foot    COPD (chronic obstructive pulmonary disease)     Diabetes mellitus     Digestive disorder     ulcers    General anesthetics causing adverse effect in therapeutic use     Headache     Hypertension     MI, old     PIPER on CPAP     Schizophrenia     Seizures 2008    now on meds    Smoker unmotivated to quit     Stroke 2005    Thyroid disease     took medicine in the past    Type II diabetes mellitus with neurological manifestations 11/21/2019    diet controlled     Past Surgical History:   Procedure Laterality Date    ANGIOGRAM, CORONARY, WITH LEFT HEART CATHETERIZATION  8/25/2022    Procedure: Angiogram, Coronary, with Left Heart Cath;  Surgeon: Mai Deleon MD;  Location: [facility] CATH;  Service: Cardiology;;    APPENDECTOMY      BONE MARROW ASPIRATION Left 8/21/2020    Procedure: ASPIRATION, BONE MARROW;  Surgeon: Lex Kathleen MD;  Location: [facility] OR;  Service: Oncology;  Laterality: Left;    CLOSURE OF WOUND Right 9/9/2019    Procedure: CLOSURE, WOUND;  Surgeon: Li Kathleen DPM;  Location: [facility] OR;  Service: Podiatry;  Laterality: Right;    COLONOSCOPY N/A 12/10/2019    Procedure:  COLONOSCOPY;  Surgeon: Ryan Lucas MD;  Location: [facility] ENDO;  Service: Endoscopy;  Laterality: N/A;    COLONOSCOPY N/A 12/13/2019    Procedure: COLONOSCOPY;  Surgeon: Ryan Lucas MD;  Location: [facility] ENDO;  Service: Endoscopy;  Laterality: N/A;    ESOPHAGOGASTRODUODENOSCOPY N/A 12/10/2019    Procedure: EGD (ESOPHAGOGASTRODUODENOSCOPY);  Surgeon: Ryan Lucas MD;  Location: [facility] ENDO;  Service: Endoscopy;  Laterality: N/A;    LEFT HEART CATHETERIZATION Left 8/25/2022    Procedure: Left heart cath;  Surgeon: Mai Deleon MD;  Location: [facility] CATH;  Service: Cardiology;  Laterality: Left;    SHOULDER ARTHROSCOPY Left      Social History     Tobacco Use    Smoking status: Every Day     Packs/day: 1.00     Years: 45.00     Pack years: 45.00     Types: Cigarettes     Start date: 1979    Smokeless tobacco: Never    Tobacco comments:     Age Started 14    Substance Use Topics    Alcohol use: No    Drug use: Not Currently     Types: Marijuana        Review of Systems:    As noted in HPI in addition      REVIEW OF SYSTEMS  Review of Systems   Constitutional: Negative for decreased appetite, diaphoresis, night sweats, weight gain and weight loss.   HENT:  Negative for nosebleeds and odynophagia.    Eyes:  Negative for double vision and photophobia.   Cardiovascular:  Positive for chest pain. Negative for claudication, cyanosis, dyspnea on exertion, irregular heartbeat, leg swelling, near-syncope, orthopnea, palpitations, paroxysmal nocturnal dyspnea and syncope.   Respiratory:  Negative for cough, hemoptysis, shortness of breath and wheezing.    Hematologic/Lymphatic: Negative for adenopathy.   Skin:  Negative for flushing, skin cancer and suspicious lesions.   Musculoskeletal:  Negative for gout, myalgias and neck pain.   Gastrointestinal:  Negative for abdominal pain, heartburn, hematemesis and hematochezia.   Genitourinary:  Negative for bladder incontinence, hesitancy and nocturia.   Neurological:   Negative for focal weakness, headaches, light-headedness and paresthesias.   Psychiatric/Behavioral:  Negative for memory loss and substance abuse.             Objective:        Vitals:    10/04/22 1403   BP: 94/60   Pulse: 68       Lab Results   Component Value Date    WBC 8.97 08/25/2022    HGB 14.7 08/25/2022    HCT 44.7 08/25/2022     08/25/2022    CHOL 159 03/30/2022    TRIG 81 03/30/2022    HDL 36 (L) 03/30/2022    ALT 14 08/25/2022    AST 23 08/25/2022     08/25/2022    K 4.0 08/25/2022     08/25/2022    CREATININE 0.98 08/25/2022    BUN 14 08/25/2022    CO2 22 08/25/2022    TSH 1.563 08/04/2022    INR 1.0 05/06/2022    HGBA1C 5.4 08/04/2022        ECHOCARDIOGRAM RESULTS  Results for orders placed during the hospital encounter of 01/11/22    Echo    Interpretation Summary  · The left ventricle is normal in size with concentric remodeling and normal systolic function.  · The estimated ejection fraction is 60%.  · Grade I left ventricular diastolic dysfunction.  · Normal right ventricular size with normal right ventricular systolic function.  · Moderate left atrial enlargement.  · There is mild aortic valve stenosis.  · Aortic valve area is 1.91 cm2; peak velocity is 3.27 m/s; mean gradient is 24 mmHg.  · Mild aortic regurgitation.  · Elevated central venous pressure (15 mmHg).  · The estimated PA systolic pressure is 27 mmHg.        CURRENT/PREVIOUS VISIT EKG  Results for orders placed or performed during the hospital encounter of 08/25/22   EKG 12-lead    Collection Time: 08/25/22  8:12 AM    Narrative    Test Reason : R07.9,    Vent. Rate : 069 BPM     Atrial Rate : 069 BPM     P-R Int : 156 ms          QRS Dur : 096 ms      QT Int : 412 ms       P-R-T Axes : 026 -16 047 degrees     QTc Int : 441 ms    Normal sinus rhythm  Minimal voltage criteria for LVH, may be normal variant ( [place] product )  Borderline Abnormal ECG  When compared with ECG of 08-MAY-2022 16:29,  T wave inversion no  longer evident in Inferior leads  Nonspecific T wave abnormality no longer evident in Lateral leads  Confirmed by Kera VILLEGAS, Adam ([id number]) on 8/25/2022 10:49:13 AM    Referred By: WOLFGANG RANDALL           Confirmed By:Adam Cote MD     No valid procedures specified.   Results for orders placed during the hospital encounter of 03/08/22    Nuclear Stress - Cardiology Interpreted    Interpretation Summary    Normal myocardial perfusion scan. There is no evidence of myocardial ischemia or infarction.    There is a  mild intensity fixed perfusion abnormality in the  wall of the left ventricle secondary to diaphragm attenuation.    The gated perfusion images showed an ejection fraction of 69% post stress.    The EKG portion of this study is negative for ischemia.    No valid procedures specified.    PHYSICAL EXAM  CONSTITUTIONAL: Well built, well nourished in no apparent distress  NECK: no carotid bruit, no JVD  LUNGS: CTA  CHEST WALL: no tenderness,  HEART: regular rate and rhythm, S1, S2 normal, no murmur, click, rub or gallop   ABDOMEN: soft, non-tender; bowel sounds normal; no masses,  no organomegaly  EXTREMITIES: Extremities normal, no edema, no calf tenderness noted  NEURO: AAO X 3    I HAVE REVIEWED :    The vital signs, nurses notes, and all the pertinent radiology and labs.         Current Outpatient Medications   Medication Instructions    albuterol (PROVENTIL/VENTOLIN HFA) 90 mcg/actuation inhaler 2 puffs, Inhalation, Every 6 hours PRN    amLODIPine (NORVASC) 2.5 mg, Oral, Daily    busPIRone (BUSPAR) 15 mg, Oral, 3 times daily    EPINEPHrine (EPIPEN) 0.3 mg, Intramuscular, Once    galcanezumab-gnlm (EMGALITY SYRINGE) 120 mg/mL Syrg Inject 1 pen (120 mg) into the skin every 28 days.    meclizine (ANTIVERT) 25 mg, Oral, 3 times daily PRN    metoprolol succinate (TOPROL-XL) 25 mg, Oral, Daily    nitroGLYCERIN (NITROSTAT) 0.3 mg, Sublingual, Every 5 min PRN    NURTEC 75 mg, Oral, Daily PRN, Place ODT tablet on the  tongue; alternatively the ODT tablet may be placed under the tongue    ondansetron (ZOFRAN-ODT) 4 mg, Oral, Every 8 hours PRN    pregabalin (LYRICA) 50 mg, Oral, 3 times daily    sodium chloride 1 g, Oral, 3 times daily    tadalafiL (CIALIS) 20 MG Tab Take one tablet by mouth prior to sexual activity, not to exceed 1 tablet in 72 hours    traZODone (DESYREL) 100 MG tablet No dose, route, or frequency recorded.    ziprasidone (GEODON) 160 mg, Oral, Nightly          Assessment:   Coronary artery disease.  Left heart catheterization 8/22 with patent stent mid LAD.  Moderate disease involving nondominant right coronary artery.  EF 60%.  Mild AS, AI.  Ongoing tobacco use, COPD.  Medication induced hyponatremia, improving.  Labs stable 8/22.      Plan:   Continue risk factor modification.  Tobacco cessation.  No change current medications.  Follow-up 6 months.          No follow-ups on file.

## 2022-10-04 NOTE — TELEPHONE ENCOUNTER
----- Message from Jacinto Bryan sent at 10/4/2022  3:06 PM CDT -----  Type: Needs Medical Advice  Who Called:  patient  Best Call Back Number: [phone]   Additional Information: patient wants to know if its okay for him to get surgery on his foot. If so, can you let his podiatrist know. DR. Дмитрий Pratt

## 2022-10-04 NOTE — TELEPHONE ENCOUNTER
----- Message from Shellie Bryan sent at 10/4/2022  4:20 PM CDT -----  Contact: Patient  Type:  Needs Medical Advice    Who Called: Patient       Would the patient rather a call back or a response via [facility]? Call    Best Call Back Number: [phone] (home)      Additional Information:  Just got off phone with cardiology and needs a request for patient to have surgery on 11/18.     Please call to advise

## 2022-10-10 ENCOUNTER — OFFICE VISIT (OUTPATIENT)
Dept: HOME HEALTH SERVICES | Facility: CLINIC | Age: 57
End: 2022-10-10
Payer: MEDICARE

## 2022-10-10 VITALS
HEART RATE: 68 BPM | HEIGHT: 70 IN | WEIGHT: 203 LBS | BODY MASS INDEX: 29.06 KG/M2 | SYSTOLIC BLOOD PRESSURE: 121 MMHG | OXYGEN SATURATION: 100 % | DIASTOLIC BLOOD PRESSURE: 77 MMHG

## 2022-10-10 DIAGNOSIS — I10 ESSENTIAL HYPERTENSION: ICD-10-CM

## 2022-10-10 DIAGNOSIS — Z00.00 ENCOUNTER FOR PREVENTIVE HEALTH EXAMINATION: Primary | ICD-10-CM

## 2022-10-10 DIAGNOSIS — E11.49 TYPE II DIABETES MELLITUS WITH NEUROLOGICAL MANIFESTATIONS: ICD-10-CM

## 2022-10-10 DIAGNOSIS — I70.0 THORACIC AORTIC ATHEROSCLEROSIS: ICD-10-CM

## 2022-10-10 DIAGNOSIS — I73.9 PVD (PERIPHERAL VASCULAR DISEASE): ICD-10-CM

## 2022-10-10 DIAGNOSIS — E08.42 DIABETIC POLYNEUROPATHY ASSOCIATED WITH DIABETES MELLITUS DUE TO UNDERLYING CONDITION: ICD-10-CM

## 2022-10-10 DIAGNOSIS — G47.33 OSA ON CPAP: ICD-10-CM

## 2022-10-10 DIAGNOSIS — F20.9 CHRONIC SCHIZOPHRENIA: ICD-10-CM

## 2022-10-10 DIAGNOSIS — I35.0 AORTIC VALVE STENOSIS, ETIOLOGY OF CARDIAC VALVE DISEASE UNSPECIFIED: ICD-10-CM

## 2022-10-10 DIAGNOSIS — R35.0 INCREASED FREQUENCY OF URINATION: ICD-10-CM

## 2022-10-10 DIAGNOSIS — I25.10 CAD IN NATIVE ARTERY: ICD-10-CM

## 2022-10-10 DIAGNOSIS — Z72.0 TOBACCO ABUSE: ICD-10-CM

## 2022-10-10 DIAGNOSIS — D50.9 IRON DEFICIENCY ANEMIA, UNSPECIFIED IRON DEFICIENCY ANEMIA TYPE: ICD-10-CM

## 2022-10-10 PROCEDURE — 3066F NEPHROPATHY DOC TX: CPT | Mod: CPTII,S$GLB,, | Performed by: NURSE PRACTITIONER

## 2022-10-10 PROCEDURE — 3044F PR MOST RECENT HEMOGLOBIN A1C LEVEL <7.0%: ICD-10-PCS | Mod: CPTII,S$GLB,, | Performed by: NURSE PRACTITIONER

## 2022-10-10 PROCEDURE — G9919 PR SCREENING AND POSITIVE: ICD-10-PCS | Mod: CPTII,S$GLB,, | Performed by: NURSE PRACTITIONER

## 2022-10-10 PROCEDURE — G0439 PPPS, SUBSEQ VISIT: HCPCS | Mod: S$GLB,,, | Performed by: NURSE PRACTITIONER

## 2022-10-10 PROCEDURE — 3061F PR NEG MICROALBUMINURIA RESULT DOCUMENTED/REVIEW: ICD-10-PCS | Mod: CPTII,S$GLB,, | Performed by: NURSE PRACTITIONER

## 2022-10-10 PROCEDURE — G0439 PR MEDICARE ANNUAL WELLNESS SUBSEQUENT VISIT: ICD-10-PCS | Mod: S$GLB,,, | Performed by: NURSE PRACTITIONER

## 2022-10-10 PROCEDURE — 3078F PR MOST RECENT DIASTOLIC BLOOD PRESSURE < 80 MM HG: ICD-10-PCS | Mod: CPTII,S$GLB,, | Performed by: NURSE PRACTITIONER

## 2022-10-10 PROCEDURE — 3044F HG A1C LEVEL LT 7.0%: CPT | Mod: CPTII,S$GLB,, | Performed by: NURSE PRACTITIONER

## 2022-10-10 PROCEDURE — 3074F SYST BP LT 130 MM HG: CPT | Mod: CPTII,S$GLB,, | Performed by: NURSE PRACTITIONER

## 2022-10-10 PROCEDURE — G9919 SCRN ND POS ND PROV OF REC: HCPCS | Mod: CPTII,S$GLB,, | Performed by: NURSE PRACTITIONER

## 2022-10-10 PROCEDURE — 99499 UNLISTED E&M SERVICE: CPT | Mod: S$GLB,,, | Performed by: NURSE PRACTITIONER

## 2022-10-10 PROCEDURE — 3074F PR MOST RECENT SYSTOLIC BLOOD PRESSURE < 130 MM HG: ICD-10-PCS | Mod: CPTII,S$GLB,, | Performed by: NURSE PRACTITIONER

## 2022-10-10 PROCEDURE — 3061F NEG MICROALBUMINURIA REV: CPT | Mod: CPTII,S$GLB,, | Performed by: NURSE PRACTITIONER

## 2022-10-10 PROCEDURE — 3078F DIAST BP <80 MM HG: CPT | Mod: CPTII,S$GLB,, | Performed by: NURSE PRACTITIONER

## 2022-10-10 PROCEDURE — 3066F PR DOCUMENTATION OF TREATMENT FOR NEPHROPATHY: ICD-10-PCS | Mod: CPTII,S$GLB,, | Performed by: NURSE PRACTITIONER

## 2022-10-10 RX ORDER — LAMOTRIGINE 200 MG/1
200 TABLET ORAL DAILY
COMMUNITY
Start: 2022-10-10

## 2022-10-10 RX ORDER — TOPIRAMATE 200 MG/1
200 TABLET ORAL NIGHTLY
COMMUNITY
End: 2023-10-03

## 2022-10-12 ENCOUNTER — TELEPHONE (OUTPATIENT)
Dept: PODIATRY | Facility: CLINIC | Age: 57
End: 2022-10-12
Payer: MEDICARE

## 2022-10-12 ENCOUNTER — HOSPITAL ENCOUNTER (OUTPATIENT)
Dept: RADIOLOGY | Facility: HOSPITAL | Age: 57
Discharge: HOME | End: 2022-10-12
Attending: STUDENT IN AN ORGANIZED HEALTH CARE EDUCATION/TRAINING PROGRAM
Payer: MEDICARE

## 2022-10-12 DIAGNOSIS — M79.671 INTRACTABLE RIGHT HEEL PAIN: ICD-10-CM

## 2022-10-12 PROCEDURE — 73718 MRI LOWER EXTREMITY W/O DYE: CPT | Mod: TC,PO,RT

## 2022-10-12 PROCEDURE — 73718 MRI FOOT (HINDFOOT) RIGHT WITHOUT CONTRAST: ICD-10-PCS | Mod: 26,RT,, | Performed by: RADIOLOGY

## 2022-10-12 PROCEDURE — 73718 MRI LOWER EXTREMITY W/O DYE: CPT | Mod: 26,RT,, | Performed by: RADIOLOGY

## 2022-10-12 NOTE — TELEPHONE ENCOUNTER
----- Message from Gloria Huang sent at 10/12/2022  4:47 PM CDT -----  Patient Call Back    Who Called: pt     What is the reqeust in detail:PT CALLING TO SPEAK WITH SOMEONE REGARDING HIS MRI RESULTS. PLS ADVISE.     Can the clinic reply by [facility]?    Best Call Back Number: [phone]

## 2022-10-12 NOTE — TELEPHONE ENCOUNTER
Informed Pt that  once Dr. Pratt interprets the imaging his nurse will be in contact with him. Pt verbalized understanding.

## 2022-10-14 ENCOUNTER — OFFICE VISIT (OUTPATIENT)
Dept: FAMILY MEDICINE | Facility: CLINIC | Age: 57
End: 2022-10-14
Payer: MEDICARE

## 2022-10-14 VITALS
DIASTOLIC BLOOD PRESSURE: 70 MMHG | RESPIRATION RATE: 16 BRPM | HEIGHT: 70 IN | OXYGEN SATURATION: 98 % | HEART RATE: 67 BPM | SYSTOLIC BLOOD PRESSURE: 130 MMHG | WEIGHT: 201.38 LBS | BODY MASS INDEX: 28.83 KG/M2

## 2022-10-14 DIAGNOSIS — F20.9 CHRONIC SCHIZOPHRENIA: ICD-10-CM

## 2022-10-14 DIAGNOSIS — R11.2 INTRACTABLE VOMITING WITH NAUSEA: Primary | ICD-10-CM

## 2022-10-14 PROCEDURE — 3061F NEG MICROALBUMINURIA REV: CPT | Mod: CPTII,S$GLB,, | Performed by: STUDENT IN AN ORGANIZED HEALTH CARE EDUCATION/TRAINING PROGRAM

## 2022-10-14 PROCEDURE — 1159F PR MEDICATION LIST DOCUMENTED IN MEDICAL RECORD: ICD-10-PCS | Mod: CPTII,S$GLB,, | Performed by: STUDENT IN AN ORGANIZED HEALTH CARE EDUCATION/TRAINING PROGRAM

## 2022-10-14 PROCEDURE — 99214 OFFICE O/P EST MOD 30 MIN: CPT | Mod: S$GLB,,, | Performed by: STUDENT IN AN ORGANIZED HEALTH CARE EDUCATION/TRAINING PROGRAM

## 2022-10-14 PROCEDURE — 3066F NEPHROPATHY DOC TX: CPT | Mod: CPTII,S$GLB,, | Performed by: STUDENT IN AN ORGANIZED HEALTH CARE EDUCATION/TRAINING PROGRAM

## 2022-10-14 PROCEDURE — 3078F PR MOST RECENT DIASTOLIC BLOOD PRESSURE < 80 MM HG: ICD-10-PCS | Mod: CPTII,S$GLB,, | Performed by: STUDENT IN AN ORGANIZED HEALTH CARE EDUCATION/TRAINING PROGRAM

## 2022-10-14 PROCEDURE — 3075F PR MOST RECENT SYSTOLIC BLOOD PRESS GE 130-139MM HG: ICD-10-PCS | Mod: CPTII,S$GLB,, | Performed by: STUDENT IN AN ORGANIZED HEALTH CARE EDUCATION/TRAINING PROGRAM

## 2022-10-14 PROCEDURE — 3066F PR DOCUMENTATION OF TREATMENT FOR NEPHROPATHY: ICD-10-PCS | Mod: CPTII,S$GLB,, | Performed by: STUDENT IN AN ORGANIZED HEALTH CARE EDUCATION/TRAINING PROGRAM

## 2022-10-14 PROCEDURE — 3075F SYST BP GE 130 - 139MM HG: CPT | Mod: CPTII,S$GLB,, | Performed by: STUDENT IN AN ORGANIZED HEALTH CARE EDUCATION/TRAINING PROGRAM

## 2022-10-14 PROCEDURE — 99999 PR PBB SHADOW E&M-EST. PATIENT-LVL V: CPT | Mod: PBBFAC,,, | Performed by: STUDENT IN AN ORGANIZED HEALTH CARE EDUCATION/TRAINING PROGRAM

## 2022-10-14 PROCEDURE — 3044F HG A1C LEVEL LT 7.0%: CPT | Mod: CPTII,S$GLB,, | Performed by: STUDENT IN AN ORGANIZED HEALTH CARE EDUCATION/TRAINING PROGRAM

## 2022-10-14 PROCEDURE — 99999 PR PBB SHADOW E&M-EST. PATIENT-LVL V: ICD-10-PCS | Mod: PBBFAC,,, | Performed by: STUDENT IN AN ORGANIZED HEALTH CARE EDUCATION/TRAINING PROGRAM

## 2022-10-14 PROCEDURE — 3044F PR MOST RECENT HEMOGLOBIN A1C LEVEL <7.0%: ICD-10-PCS | Mod: CPTII,S$GLB,, | Performed by: STUDENT IN AN ORGANIZED HEALTH CARE EDUCATION/TRAINING PROGRAM

## 2022-10-14 PROCEDURE — 1160F PR REVIEW ALL MEDS BY PRESCRIBER/CLIN PHARMACIST DOCUMENTED: ICD-10-PCS | Mod: CPTII,S$GLB,, | Performed by: STUDENT IN AN ORGANIZED HEALTH CARE EDUCATION/TRAINING PROGRAM

## 2022-10-14 PROCEDURE — 99214 PR OFFICE/OUTPT VISIT, EST, LEVL IV, 30-39 MIN: ICD-10-PCS | Mod: S$GLB,,, | Performed by: STUDENT IN AN ORGANIZED HEALTH CARE EDUCATION/TRAINING PROGRAM

## 2022-10-14 PROCEDURE — 3061F PR NEG MICROALBUMINURIA RESULT DOCUMENTED/REVIEW: ICD-10-PCS | Mod: CPTII,S$GLB,, | Performed by: STUDENT IN AN ORGANIZED HEALTH CARE EDUCATION/TRAINING PROGRAM

## 2022-10-14 PROCEDURE — 3078F DIAST BP <80 MM HG: CPT | Mod: CPTII,S$GLB,, | Performed by: STUDENT IN AN ORGANIZED HEALTH CARE EDUCATION/TRAINING PROGRAM

## 2022-10-14 PROCEDURE — 1159F MED LIST DOCD IN RCRD: CPT | Mod: CPTII,S$GLB,, | Performed by: STUDENT IN AN ORGANIZED HEALTH CARE EDUCATION/TRAINING PROGRAM

## 2022-10-14 PROCEDURE — 1160F RVW MEDS BY RX/DR IN RCRD: CPT | Mod: CPTII,S$GLB,, | Performed by: STUDENT IN AN ORGANIZED HEALTH CARE EDUCATION/TRAINING PROGRAM

## 2022-10-14 RX ORDER — PANTOPRAZOLE SODIUM 40 MG/1
40 TABLET, DELAYED RELEASE ORAL DAILY
Qty: 45 TABLET | Refills: 0 | Status: SHIPPED | OUTPATIENT
Start: 2022-10-14 | End: 2022-10-18 | Stop reason: SDUPTHER

## 2022-10-14 RX ORDER — PROMETHAZINE HYDROCHLORIDE 12.5 MG/1
12.5 TABLET ORAL EVERY 6 HOURS PRN
Qty: 40 TABLET | Refills: 0 | Status: SHIPPED | OUTPATIENT
Start: 2022-10-14 | End: 2022-10-24

## 2022-10-14 RX ORDER — NITROGLYCERIN 0.4 MG/1
0.4 TABLET SUBLINGUAL EVERY 5 MIN PRN
COMMUNITY
End: 2023-02-22 | Stop reason: SDUPTHER

## 2022-10-14 RX ORDER — SUCRALFATE 1 G/1
1 TABLET ORAL 4 TIMES DAILY
Qty: 40 TABLET | Refills: 0 | Status: SHIPPED | OUTPATIENT
Start: 2022-10-14 | End: 2022-10-18 | Stop reason: SDUPTHER
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
## 2022-10-17 ENCOUNTER — TELEPHONE (OUTPATIENT)
Dept: FAMILY MEDICINE | Facility: CLINIC | Age: 57
End: 2022-10-17
Payer: MEDICARE

## 2022-10-17 ENCOUNTER — LAB VISIT (OUTPATIENT)
Dept: LAB | Facility: HOSPITAL | Age: 57
End: 2022-10-17
Attending: STUDENT IN AN ORGANIZED HEALTH CARE EDUCATION/TRAINING PROGRAM
Payer: MEDICARE

## 2022-10-17 DIAGNOSIS — R11.2 INTRACTABLE VOMITING WITH NAUSEA: ICD-10-CM

## 2022-10-17 PROBLEM — E66.811 CLASS 1 OBESITY DUE TO EXCESS CALORIES WITH SERIOUS COMORBIDITY AND BODY MASS INDEX (BMI) OF 33.0 TO 33.9 IN ADULT: Status: RESOLVED | Noted: 2022-01-23 | Resolved: 2022-10-17

## 2022-10-17 PROBLEM — D62 ACUTE BLOOD LOSS ANEMIA: Status: RESOLVED | Noted: 2019-12-14 | Resolved: 2022-10-17

## 2022-10-17 PROBLEM — E61.1 IRON DEFICIENCY: Status: RESOLVED | Noted: 2020-09-18 | Resolved: 2022-10-17

## 2022-10-17 PROBLEM — D50.0 IRON DEFICIENCY ANEMIA DUE TO CHRONIC BLOOD LOSS: Status: RESOLVED | Noted: 2020-10-23 | Resolved: 2022-10-17

## 2022-10-17 PROBLEM — I70.0 THORACIC AORTIC ATHEROSCLEROSIS: Status: ACTIVE | Noted: 2022-10-17

## 2022-10-17 PROBLEM — E66.09 CLASS 1 OBESITY DUE TO EXCESS CALORIES WITH SERIOUS COMORBIDITY AND BODY MASS INDEX (BMI) OF 33.0 TO 33.9 IN ADULT: Status: RESOLVED | Noted: 2022-01-23 | Resolved: 2022-10-17

## 2022-10-17 LAB
ALBUMIN SERPL BCP-MCNC: 4 G/DL (ref 3.5–5.2)
ALP SERPL-CCNC: 99 U/L (ref 55–135)
ALT SERPL W/O P-5'-P-CCNC: 9 U/L (ref 10–44)
ANION GAP SERPL CALC-SCNC: 7 MMOL/L (ref 8–16)
AST SERPL-CCNC: 13 U/L (ref 10–40)
BASOPHILS # BLD AUTO: 0.1 K/UL (ref 0–0.2)
BASOPHILS NFR BLD: 0.8 % (ref 0–1.9)
BILIRUB SERPL-MCNC: 0.4 MG/DL (ref 0.1–1)
BUN SERPL-MCNC: 11 MG/DL (ref 6–20)
CALCIUM SERPL-MCNC: 9.3 MG/DL (ref 8.7–10.5)
CHLORIDE SERPL-SCNC: 110 MMOL/L (ref 95–110)
CO2 SERPL-SCNC: 24 MMOL/L (ref 23–29)
CREAT SERPL-MCNC: 0.9 MG/DL (ref 0.5–1.4)
DIFFERENTIAL METHOD: ABNORMAL
EOSINOPHIL # BLD AUTO: 0.3 K/UL (ref 0–0.5)
EOSINOPHIL NFR BLD: 2.5 % (ref 0–8)
ERYTHROCYTE [DISTWIDTH] IN BLOOD BY AUTOMATED COUNT: 16.6 % (ref 11.5–14.5)
EST. GFR  (NO RACE VARIABLE): >60 ML/MIN/1.73 M^2
GLUCOSE SERPL-MCNC: 73 MG/DL (ref 70–110)
HCT VFR BLD AUTO: 45.1 % (ref 40–54)
HGB BLD-MCNC: 15.1 G/DL (ref 14–18)
IMM GRANULOCYTES # BLD AUTO: 0.05 K/UL (ref 0–0.04)
IMM GRANULOCYTES NFR BLD AUTO: 0.4 % (ref 0–0.5)
LYMPHOCYTES # BLD AUTO: 3.4 K/UL (ref 1–4.8)
LYMPHOCYTES NFR BLD: 28.1 % (ref 18–48)
MCH RBC QN AUTO: 31.9 PG (ref 27–31)
MCHC RBC AUTO-ENTMCNC: 33.5 G/DL (ref 32–36)
MCV RBC AUTO: 95 FL (ref 82–98)
MONOCYTES # BLD AUTO: 1.1 K/UL (ref 0.3–1)
MONOCYTES NFR BLD: 9 % (ref 4–15)
NEUTROPHILS # BLD AUTO: 7.1 K/UL (ref 1.8–7.7)
NEUTROPHILS NFR BLD: 59.2 % (ref 38–73)
NRBC BLD-RTO: 0 /100 WBC
PLATELET # BLD AUTO: 324 K/UL (ref 150–450)
PMV BLD AUTO: 11.1 FL (ref 9.2–12.9)
POTASSIUM SERPL-SCNC: 4.5 MMOL/L (ref 3.5–5.1)
PROT SERPL-MCNC: 7.4 G/DL (ref 6–8.4)
RBC # BLD AUTO: 4.74 M/UL (ref 4.6–6.2)
SODIUM SERPL-SCNC: 141 MMOL/L (ref 136–145)
WBC # BLD AUTO: 11.98 K/UL (ref 3.9–12.7)

## 2022-10-17 PROCEDURE — 85025 COMPLETE CBC W/AUTO DIFF WBC: CPT | Performed by: STUDENT IN AN ORGANIZED HEALTH CARE EDUCATION/TRAINING PROGRAM

## 2022-10-17 PROCEDURE — 80053 COMPREHEN METABOLIC PANEL: CPT | Performed by: STUDENT IN AN ORGANIZED HEALTH CARE EDUCATION/TRAINING PROGRAM

## 2022-10-17 PROCEDURE — 36415 COLL VENOUS BLD VENIPUNCTURE: CPT | Mod: PN | Performed by: STUDENT IN AN ORGANIZED HEALTH CARE EDUCATION/TRAINING PROGRAM

## 2022-10-17 NOTE — TELEPHONE ENCOUNTER
The patient is showing as non-compliant on the Statin Therapy Measure.   Patient does not have current high dose statin listed in medication list.  Please review and advise.

## 2022-10-17 NOTE — PATIENT INSTRUCTIONS
Counseling and Referral of Other Preventative  (Italic type indicates deductible and co-insurance are waived)    Patient Name: Ryan Crane  Today's Date: 10/17/2022    Health Maintenance       Date Due Completion Date    COVID-19 Vaccine (1) Never done ---    High Dose Statin Never done ---    Shingles Vaccine (1 of 2) Never done ---    Influenza Vaccine (1) 09/01/2022 2/4/2022    Override on 1/30/2020: Declined    Colorectal Cancer Screening 12/13/2022 12/13/2019    Hemoglobin A1c 02/04/2023 8/4/2022    Lipid Panel 03/30/2023 3/30/2022    Eye Exam 04/27/2023 4/27/2022    Override on 4/27/2022: Done    Foot Exam 06/22/2023 6/22/2022    Pneumococcal Vaccines (Age 0-64) (2 - PCV) 08/04/2023 8/4/2022    LDCT Lung Screen 09/22/2023 9/22/2022    Diabetes Urine Screening 10/12/2023 10/12/2022    TETANUS VACCINE 08/29/2029 8/29/2019        Orders Placed This Encounter   Procedures    Ambulatory referral/consult to Urology    Ambulatory referral/consult to Smoking Cessation Program       The following information is provided to all patients.  This information is to help you find resources for any of the problems found today that may be affecting your health:                Living healthy guide: www.[facility].[place].gov      Understanding Diabetes: www.diabetes.org      Eating healthy: www.cdc.gov/healthyweight      CDC home safety checklist: www.cdc.gov/steadi/patient.html      Agency on Aging: www.goea.[place].[facility]      Alcoholics anonymous (AA): www.aa.org      Physical Activity: www.nir.nih.gov/fn5hbzt      Tobacco use: www.quitwithusla.org

## 2022-10-17 NOTE — PROGRESS NOTES
"  Ryan Crane presented for a  Medicare AWV and comprehensive Health Risk Assessment today. The following components were reviewed and updated:    Medical history  Family History  Social history  Allergies and Current Medications  Health Risk Assessment  Health Maintenance  Care Team     Patient screened moderate and/or high risk for one or more social determinants of health (SDOH). Patient connected to community resources through the ED Navigator.      ** See Completed Assessments for Annual Wellness Visit within the encounter summary.**         The following assessments were completed:  Living Situation  CAGE  Depression Screening  Timed Get Up and Go  Whisper Test  Cognitive Function Screening  Nutrition Screening  ADL Screening  PAQ Screening        Vitals:    10/10/22 0804   BP: 121/77   Pulse: 68   SpO2: 100%   Weight: 92.1 kg (203 lb)   Height: 5' 10" (1.778 m)     Body mass index is 29.13 kg/m².  Physical Exam  Constitutional:       Appearance: Normal appearance.   HENT:      Head: Normocephalic and atraumatic.      Nose: Nose normal.   Eyes:      Extraocular Movements: Extraocular movements intact.      Pupils: Pupils are equal, round, and reactive to light.   Cardiovascular:      Rate and Rhythm: Normal rate.      Pulses: Normal pulses.   Pulmonary:      Effort: Pulmonary effort is normal.   Musculoskeletal:      Cervical back: Normal range of motion and neck supple.   Neurological:      General: No focal deficit present.      Mental Status: He is alert and oriented to person, place, and time.             Diagnoses and health risks identified today and associated recommendations/orders:    1. Encounter for preventive health examination  Awv completed    2. Increased frequency of urination  - Ambulatory referral/consult to Urology; Future    3. Tobacco abuse  Cessation discussed  - Ambulatory referral/consult to Smoking Cessation Program; Future    4. Diabetic polyneuropathy associated with diabetes " mellitus due to underlying condition  Chronic and stable. Continue current treatment. Follow with PCP.      5. PVD (peripheral vascular disease)  Chronic and stable. Continue current treatment. Follow with PCP.      6. Essential hypertension  Chronic and stable. Continue current treatment. Follow with PCP.      7. CAD in native artery - moderate 3V 60% by Pt history  Chronic and stable. Continue current treatment. Follow with PCP.      8. Aortic valve stenosis, etiology of cardiac valve disease unspecified  Chronic and stable. Continue current treatment. Follow with PCP.      9. Thoracic aortic atherosclerosis  Chronic and stable. Continue current treatment. Follow with PCP.    10. Iron deficiency anemia, unspecified iron deficiency anemia type  Chronic and stable. Continue current treatment. Follow with PCP.      11. Type II diabetes mellitus with neurological manifestations  Chronic and stable. Continue current treatment. Follow with PCP.      12. BMI 28.0-28.9,adult  Chronic and stable. Continue current treatment. Follow with PCP.      13. PIPER on CPAP  Chronic and stable. Continue current treatment. Follow with PCP.      14. Chronic schizophrenia  Chronic and stable. Continue current treatment. Follow with PCP.  Om meds     Patient not on opioid medication     Provided Ryan with a 5-10 year written screening schedule and personal prevention plan. Recommendations were developed using the USPSTF age appropriate recommendations. Education, counseling, and referrals were provided as needed. After Visit Summary printed and given to patient which includes a list of additional screenings\tests needed.    Fu in 1 yr for RENATE Whaley NP  I offered to discuss advanced care planning, including how to pick a person who would make decisions for you if you were unable to make them for yourself, called a health care power of , and what kind of decisions you might make such as use of life  sustaining treatments such as ventilators and tube feeding when faced with a life limiting illness recorded on a living will that they will need to know. (How you want to be cared for as you near the end of your natural life)     X  Patient has advanced directives on file, which we reviewed, and they do not wish to make changes.

## 2022-10-18 ENCOUNTER — LAB VISIT (OUTPATIENT)
Dept: LAB | Facility: HOSPITAL | Age: 57
End: 2022-10-18
Attending: NURSE PRACTITIONER
Payer: MEDICARE

## 2022-10-18 ENCOUNTER — OFFICE VISIT (OUTPATIENT)
Dept: GASTROENTEROLOGY | Facility: CLINIC | Age: 57
End: 2022-10-18
Payer: MEDICARE

## 2022-10-18 VITALS — HEIGHT: 70 IN | BODY MASS INDEX: 29.61 KG/M2 | WEIGHT: 206.81 LBS

## 2022-10-18 DIAGNOSIS — Z80.0 FAMILY HISTORY OF COLON CANCER: ICD-10-CM

## 2022-10-18 DIAGNOSIS — Z87.19 HISTORY OF GALLSTONES: ICD-10-CM

## 2022-10-18 DIAGNOSIS — Z87.11 HISTORY OF PEPTIC ULCER: ICD-10-CM

## 2022-10-18 DIAGNOSIS — R11.2 INTRACTABLE VOMITING WITH NAUSEA: ICD-10-CM

## 2022-10-18 DIAGNOSIS — R11.2 INTRACTABLE VOMITING WITH NAUSEA: Primary | ICD-10-CM

## 2022-10-18 DIAGNOSIS — Z86.010 HISTORY OF COLON POLYPS: ICD-10-CM

## 2022-10-18 LAB — LIPASE SERPL-CCNC: 30 U/L (ref 4–60)

## 2022-10-18 PROCEDURE — 99999 PR PBB SHADOW E&M-EST. PATIENT-LVL IV: ICD-10-PCS | Mod: PBBFAC,,, | Performed by: NURSE PRACTITIONER

## 2022-10-18 PROCEDURE — 3061F NEG MICROALBUMINURIA REV: CPT | Mod: CPTII,S$GLB,, | Performed by: NURSE PRACTITIONER

## 2022-10-18 PROCEDURE — 3044F PR MOST RECENT HEMOGLOBIN A1C LEVEL <7.0%: ICD-10-PCS | Mod: CPTII,S$GLB,, | Performed by: NURSE PRACTITIONER

## 2022-10-18 PROCEDURE — 3044F HG A1C LEVEL LT 7.0%: CPT | Mod: CPTII,S$GLB,, | Performed by: NURSE PRACTITIONER

## 2022-10-18 PROCEDURE — 3066F PR DOCUMENTATION OF TREATMENT FOR NEPHROPATHY: ICD-10-PCS | Mod: CPTII,S$GLB,, | Performed by: NURSE PRACTITIONER

## 2022-10-18 PROCEDURE — 99215 PR OFFICE/OUTPT VISIT, EST, LEVL V, 40-54 MIN: ICD-10-PCS | Mod: S$GLB,,, | Performed by: NURSE PRACTITIONER

## 2022-10-18 PROCEDURE — 1160F RVW MEDS BY RX/DR IN RCRD: CPT | Mod: CPTII,S$GLB,, | Performed by: NURSE PRACTITIONER

## 2022-10-18 PROCEDURE — 3066F NEPHROPATHY DOC TX: CPT | Mod: CPTII,S$GLB,, | Performed by: NURSE PRACTITIONER

## 2022-10-18 PROCEDURE — 83690 ASSAY OF LIPASE: CPT | Performed by: NURSE PRACTITIONER

## 2022-10-18 PROCEDURE — 3061F PR NEG MICROALBUMINURIA RESULT DOCUMENTED/REVIEW: ICD-10-PCS | Mod: CPTII,S$GLB,, | Performed by: NURSE PRACTITIONER

## 2022-10-18 PROCEDURE — 36415 COLL VENOUS BLD VENIPUNCTURE: CPT | Mod: PO | Performed by: NURSE PRACTITIONER

## 2022-10-18 PROCEDURE — 1159F MED LIST DOCD IN RCRD: CPT | Mod: CPTII,S$GLB,, | Performed by: NURSE PRACTITIONER

## 2022-10-18 PROCEDURE — 1160F PR REVIEW ALL MEDS BY PRESCRIBER/CLIN PHARMACIST DOCUMENTED: ICD-10-PCS | Mod: CPTII,S$GLB,, | Performed by: NURSE PRACTITIONER

## 2022-10-18 PROCEDURE — 99215 OFFICE O/P EST HI 40 MIN: CPT | Mod: S$GLB,,, | Performed by: NURSE PRACTITIONER

## 2022-10-18 PROCEDURE — 1159F PR MEDICATION LIST DOCUMENTED IN MEDICAL RECORD: ICD-10-PCS | Mod: CPTII,S$GLB,, | Performed by: NURSE PRACTITIONER

## 2022-10-18 PROCEDURE — 99999 PR PBB SHADOW E&M-EST. PATIENT-LVL IV: CPT | Mod: PBBFAC,,, | Performed by: NURSE PRACTITIONER

## 2022-10-18 RX ORDER — PANTOPRAZOLE SODIUM 40 MG/1
40 TABLET, DELAYED RELEASE ORAL
Qty: 60 TABLET | Refills: 1 | Status: SHIPPED | OUTPATIENT
Start: 2022-10-18 | End: 2022-11-14

## 2022-10-18 RX ORDER — SUCRALFATE 1 G/1
1 TABLET ORAL
Qty: 120 TABLET | Refills: 0 | Status: SHIPPED | OUTPATIENT
Start: 2022-10-18 | End: 2022-11-14

## 2022-10-18 NOTE — PROGRESS NOTES
Subjective:       Patient ID: Ryan Crane is a 57 y.o. male Body mass index is 29.67 kg/m².    Chief Complaint: Other (Vomiting: Everytime he eats about 30-45 minutes he vomits it back up. Has changed diet but no help. Tried Ensure and stayed down longer but came back up. Only solid food he throws it up. )    Referring Provider: Dr. Ana Mayer for intractable vomiting with nausea   This is an established patient of Dr. Lucas & myself.    GI Problem  The primary symptoms include weight loss (has been losing weight over the past few years, gained weight since last visit with PCP team; has been trying to lose weight with dieting (stopped drinking sodas and eating healthier foods)), nausea (phenergan PRN or zofran PRN) and vomiting. Primary symptoms do not include fever, fatigue, abdominal pain, diarrhea, melena, hematemesis, hematochezia or dysuria. The problem has not changed since onset.  Nausea began more than 1 week ago (started ~1.5-2 weeks ago). The nausea is associated with eating. The nausea is exacerbated by food.   The vomiting began more than 2 days ago (started ~1.5-2 weeks ago). Vomiting occurs 2 to 5 times per day (anytime he eats or drinks, vomits it up ~30 minutes later; able to keep ensure down a little longer than food but he still vomits it up later on). The emesis contains undigested food and stomach contents.   The illness does not include chills, dysphagia, odynophagia or constipation. Significant associated medical issues include GERD (frequent; protonix & carafate never started as prescribed by PCP due to pharmacy not filling, tums PRN- mild relief), gallstones and PUD (had ulcers as a teenage).     Review of Systems   Constitutional:  Positive for weight loss (has been losing weight over the past few years, gained weight since last visit with PCP team; has been trying to lose weight with dieting (stopped drinking sodas and eating healthier foods)). Negative for appetite  change, chills, fatigue and fever.   HENT:  Negative for sore throat and trouble swallowing.    Respiratory:  Negative for cough, choking and shortness of breath.    Cardiovascular:  Negative for chest pain.   Gastrointestinal:  Positive for nausea (phenergan PRN or zofran PRN) and vomiting. Negative for abdominal pain, anal bleeding, blood in stool, constipation, diarrhea, dysphagia, hematemesis, hematochezia, melena and rectal pain.   Genitourinary:  Negative for difficulty urinating, dysuria and flank pain.   Neurological:  Negative for weakness.       Past Medical History:   Diagnosis Date    Acute angina     Asthma     Cancer     Cataract     skin cancer right foot    Colon polyps     COPD (chronic obstructive pulmonary disease)     Diabetes mellitus     Digestive disorder     ulcers    Gallstone     General anesthetics causing adverse effect in therapeutic use     Headache     Hypertension     MI, old     PIPER on CPAP     Schizophrenia     Seizures 2008    now on meds    Smoker unmotivated to quit     Stroke 2005    Thyroid disease     took medicine in the past    Type II diabetes mellitus with neurological manifestations 11/21/2019    diet controlled     Past Surgical History:   Procedure Laterality Date    ANGIOGRAM, CORONARY, WITH LEFT HEART CATHETERIZATION  08/25/2022    Procedure: Angiogram, Coronary, with Left Heart Cath;  Surgeon: Mai Deleon MD;  Location: [facility] CATH;  Service: Cardiology;;    APPENDECTOMY      BONE MARROW ASPIRATION Left 08/21/2020    Procedure: ASPIRATION, BONE MARROW;  Surgeon: Lex Kathleen MD;  Location: [facility] OR;  Service: Oncology;  Laterality: Left;    CLOSURE OF WOUND Right 09/09/2019    Procedure: CLOSURE, WOUND;  Surgeon: Li Kathleen DPM;  Location: [facility] OR;  Service: Podiatry;  Laterality: Right;    COLONOSCOPY N/A 12/10/2019    Procedure: COLONOSCOPY;  Surgeon: Ryan Lucas MD;  Location: [facility] ENDO;  Service: Endoscopy;  Laterality: N/A;    COLONOSCOPY N/A  12/13/2019    Procedure: COLONOSCOPY;  Surgeon: Ryan Lucas MD;  Location: [facility] ENDO;  Service: Endoscopy;  Laterality: N/A;    CORONARY STENT PLACEMENT      ESOPHAGOGASTRODUODENOSCOPY N/A 12/10/2019    Procedure: EGD (ESOPHAGOGASTRODUODENOSCOPY);  Surgeon: Ryan Lucas MD;  Location: [facility] ENDO;  Service: Endoscopy;  Laterality: N/A;    LEFT HEART CATHETERIZATION Left 08/25/2022    Procedure: Left heart cath;  Surgeon: Mai Deleon MD;  Location: [facility] CATH;  Service: Cardiology;  Laterality: Left;    right heel surgery      SHOULDER ARTHROSCOPY Left      Family History   Problem Relation Age of Onset    Melanoma Mother     Diabetes Mother     Hypertension Mother     Stroke Father     Diabetes Father     Hypertension Father     Colon cancer Father         unsure of age of diagnosis    Cancer Father         colon cancer    Cervical cancer Sister     Cirrhosis Brother     Hypertension Brother     Lung cancer Maternal Grandmother     Prostate cancer Maternal Grandfather     Crohn's disease Neg Hx     Ulcerative colitis Neg Hx     Stomach cancer Neg Hx     Esophageal cancer Neg Hx     Retinal detachment Neg Hx     Strabismus Neg Hx     Macular degeneration Neg Hx     Glaucoma Neg Hx      Social History     Tobacco Use    Smoking status: Every Day     Packs/day: 1.00     Years: 45.00     Pack years: 45.00     Types: Cigarettes     Start date: 1979    Smokeless tobacco: Never    Tobacco comments:     Age Started 14    Substance Use Topics    Alcohol use: No    Drug use: Not Currently     Types: Marijuana     Wt Readings from Last 30 Encounters:   10/18/22 93.8 kg (206 lb 12.7 oz)   10/14/22 91.3 kg (201 lb 6.2 oz)   10/10/22 92.1 kg (203 lb)   10/04/22 93.8 kg (206 lb 12.7 oz)   09/16/22 93.4 kg (206 lb)   08/25/22 90.7 kg (200 lb)   08/15/22 93.9 kg (207 lb 0.2 oz)   08/11/22 93.6 kg (206 lb 5.6 oz)   08/04/22 93.1 kg (205 lb 4 oz)   06/16/22 97.2 kg (214 lb 4.6 oz)   05/08/22 102.1 kg (225 lb 1.4 oz)    05/06/22 102.1 kg (225 lb)   04/07/22 102.2 kg (225 lb 5 oz)   03/30/22 103.9 kg (229 lb)   03/21/22 104.2 kg (229 lb 11.5 oz)   03/21/22 103.8 kg (228 lb 13.4 oz)   03/08/22 107.5 kg (237 lb)   02/16/22 107.8 kg (237 lb 10.5 oz)   02/03/22 108.8 kg (239 lb 13.8 oz)   01/28/22 107.7 kg (237 lb 7 oz)   01/22/22 109.2 kg (240 lb 11.9 oz)   01/14/22 106.7 kg (235 lb 3.7 oz)   12/30/21 112.2 kg (247 lb 7.5 oz)   12/29/21 112.6 kg (248 lb 3.8 oz)   12/07/21 112.4 kg (247 lb 12.8 oz)   09/10/21 110.2 kg (242 lb 15.2 oz)   08/11/21 112.5 kg (248 lb 0.3 oz)   07/08/21 112.5 kg (248 lb)   07/08/21 112.5 kg (248 lb)   06/22/21 112.8 kg (248 lb 10.9 oz)     Lab Results   Component Value Date    WBC 11.98 10/17/2022    HGB 15.1 10/17/2022    HCT 45.1 10/17/2022    MCV 95 10/17/2022     10/17/2022     Lab Results   Component Value Date    IRON 76 12/03/2021    TIBC 348 12/03/2021    FERRITIN 140 12/03/2021     Lab Results   Component Value Date    OLKBUZZL95 152 (L) 05/26/2022     Lab Results   Component Value Date    FOLATE 4.3 08/29/2019     CMP  Sodium   Date Value Ref Range Status   10/17/2022 141 136 - 145 mmol/L Final     Potassium   Date Value Ref Range Status   10/17/2022 4.5 3.5 - 5.1 mmol/L Final     Chloride   Date Value Ref Range Status   10/17/2022 110 95 - 110 mmol/L Final     CO2   Date Value Ref Range Status   10/17/2022 24 23 - 29 mmol/L Final     Glucose   Date Value Ref Range Status   10/17/2022 73 70 - 110 mg/dL Final     BUN   Date Value Ref Range Status   10/17/2022 11 6 - 20 mg/dL Final     Creatinine   Date Value Ref Range Status   10/17/2022 0.9 0.5 - 1.4 mg/dL Final     Calcium   Date Value Ref Range Status   10/17/2022 9.3 8.7 - 10.5 mg/dL Final     Total Protein   Date Value Ref Range Status   10/17/2022 7.4 6.0 - 8.4 g/dL Final     Albumin   Date Value Ref Range Status   10/17/2022 4.0 3.5 - 5.2 g/dL Final     Total Bilirubin   Date Value Ref Range Status   10/17/2022 0.4 0.1 - 1.0 mg/dL  Final     Comment:     For infants and newborns, interpretation of results should be based  on gestational age, weight and in agreement with clinical  observations.    Premature Infant recommended reference ranges:  Up to 24 hours.............<8.0 mg/dL  Up to 48 hours............<12.0 mg/dL  3-5 days..................<15.0 mg/dL  6-29 days.................<15.0 mg/dL       Alkaline Phosphatase   Date Value Ref Range Status   10/17/2022 99 55 - 135 U/L Final     AST   Date Value Ref Range Status   10/17/2022 13 10 - 40 U/L Final     ALT   Date Value Ref Range Status   10/17/2022 9 (L) 10 - 44 U/L Final     Anion Gap   Date Value Ref Range Status   10/17/2022 7 (L) 8 - 16 mmol/L Final     eGFR if    Date Value Ref Range Status   06/16/2022 >60 >60 mL/min/1.73 m^2 Final     eGFR if non    Date Value Ref Range Status   06/16/2022 >60 >60 mL/min/1.73 m^2 Final     Comment:     Calculation used to obtain the estimated glomerular filtration  rate (eGFR) is the CKD-EPI equation.        Lab Results   Component Value Date    TSH 1.563 08/04/2022     Reviewed prior medical records including radiology report of 4/20/2021 CT abdomen pelvis; 10/14/2022 visit note with Dr. Gastelum; & endoscopy history (see surgical history).    Objective:      Physical Exam  Vitals and nursing note reviewed.   Constitutional:       General: He is not in acute distress.     Appearance: Normal appearance. He is well-developed. He is not diaphoretic.   HENT:      Mouth/Throat:      Lips: Pink. No lesions.      Mouth: Mucous membranes are moist. No oral lesions.      Tongue: No lesions.      Pharynx: Oropharynx is clear. No pharyngeal swelling or posterior oropharyngeal erythema.   Eyes:      General: No scleral icterus.     Conjunctiva/sclera: Conjunctivae normal.   Pulmonary:      Effort: Pulmonary effort is normal. No respiratory distress.      Breath sounds: Normal breath sounds. No wheezing.   Abdominal:       General: Bowel sounds are normal. There is no distension or abdominal bruit.      Palpations: Abdomen is soft. Abdomen is not rigid. There is no mass.      Tenderness: There is no abdominal tenderness. There is no guarding or rebound. Negative signs include Swanson's sign and McBurney's sign.   Skin:     General: Skin is warm and dry.      Coloration: Skin is not pale.      Findings: No erythema or rash.      Comments: Non-jaundiced   Neurological:      Mental Status: He is alert and oriented to person, place, and time.   Psychiatric:         Behavior: Behavior normal.         Thought Content: Thought content normal.         Judgment: Judgment normal.       Assessment:       1. Intractable vomiting with nausea    2. History of peptic ulcer    3. History of gallstones    4. History of colon polyps    5. Family history of colon cancer        Plan:     New prescriptions for carafate and protonix sent to local pharmacy on file.    Intractable vomiting with nausea  - schedule EGD to be done ASAP, discussed procedure with patient, including risks and benefits, patient verbalized understanding; informed patient that if his symptoms do not improve/worsen, he needs to go to the ER for further evaluation and management; patient verbalized understanding  -     US Abdomen Limited; Future; Expected date: 10/18/2022  -     Lipase; Future; Expected date: 10/18/2022  -  START   pantoprazole (PROTONIX) 40 MG tablet; Take 1 tablet (40 mg total) by mouth 2 (two) times daily before meals.  Dispense: 60 tablet; Refill: 1  -   START  sucralfate (CARAFATE) 1 gram tablet; Take 1 tablet (1 g total) by mouth 4 (four) times daily before meals and nightly.  Dispense: 120 tablet; Refill: 0  - CONTINUE PHENERGAN PRN AS DIRECTED OR ZOFRAN PRN AS DIRECTED FOR NAUSEA; advise not to take together  - Possible gastric emptying study pending results of testing and if symptoms persist    History of peptic ulcer  - schedule EGD to be done ASAP, discussed  procedure with patient, including risks and benefits, patient verbalized understanding; informed patient that if his symptoms do not improve/worsen, he needs to go to the ER for further evaluation and management; patient verbalized understanding  - avoid/minimize use of NSAIDs- since they can cause GI upset, bleeding and/or ulcers. If NSAID must be taken, recommend take with food.    History of gallstones  -     US Abdomen Limited; Future; Expected date: 10/18/2022  - recommend low fat diet  - discussed diagnosis & that it can cause symptoms in some patients, while other patients with it can be asymptomatic; recommend continue recommendations as discussed during our visit and if symptoms persist after other testing has been completed, recommend seeing general surgery for further evaluation and management, patient verbalized understanding    History of colon polyps & Family history of colon cancer  - schedule Colonoscopy, discussed procedure with the patient, including risks and benefits, patient verbalized understanding    Follow up in about 3 weeks (around 11/8/2022), or if symptoms worsen or fail to improve.    If no improvement in symptoms or symptoms worsen, call/follow-up at clinic or go to ER.        48 minutes of total time spent on the encounter, which includes face to face time and non-face to face time preparing to see the patient (e.g., review of tests), Obtaining and/or reviewing separately obtained history, Documenting clinical information in the electronic or other health record, Independently interpreting results (not separately reported) and communicating results to the patient/family/caregiver, or Care coordination (not separately reported).

## 2022-10-18 NOTE — PATIENT INSTRUCTIONS
"Vomiting (Adult)  Vomiting is a common symptom that may be due to different causes. These include gastroenteritis ("stomach flu"), food poisoning and gastritis. There are other more serious causes of vomiting which may be hard to diagnose early in the illness. Therefore, it is important to watch for the warning signs listed below.  The main danger from repeated vomiting is dehydration. This is due to excess loss of water and minerals from the body. When this occurs, body fluids must be replaced.  Home care  If symptoms are severe, rest at home for the next 24 hours.  Because your symptoms may be from an infection, wash your hands frequently and well, and use alcohol-based  to avoid spreading the infection to others.  Wash your hands for at least 20 seconds. Hum the happy birthday song twice for the correct length of time.  Wash your hands after using the toilet, before and after preparing food, before eating food, after changing a diaper, cleaning a wound, caring for a sick person, and blowing your nose, coughing, or sneezing. You should also wash your hands after caring for someone who is sick, touching pet food, or treats, and touching an animal, or animal waste.  You may use acetaminophen or NSAID medicines like ibuprofen or naproxen to control fever, unless another medicine was prescribed. If you have chronic liver or kidney disease or ever had a stomach ulcer or GI bleeding, talk with your doctor before using these medicines. Aspirin should never be used in anyone under 18 years of age who is ill with a fever. It may cause severe liver damage. Don't use NSAID medicines if you are already taking one for another condition (like arthritis) or are on aspirin (such as for heart disease, or after a stroke)  Avoid tobacco and alcohol use, which may worsen your symptoms.  If medicines for vomiting were prescribed, take as directed.  Once vomiting stops, then follow these guidelines:  During the first 12 to 24 " hours follow the diet below:  Fruit juices. Apple, grape juice, clear fruit drinks, and electrolyte replacement drinks.  Beverages. Soft drinks without caffeine; mineral water (plain or flavored), decaffeinated tea and coffee.  Soups. Clear broth, consommé and bouillon  Desserts. Plain gelatin, popsicles and fruit juice bars. As you feel better, you may add 6-8 ounces of yogurt per day.  During the next 24 hours you may add the following to the above:  Hot cereal, plain toast, bread, rolls, crackers  Plain noodles, rice, mashed potatoes, chicken noodle or rice soup  Unsweetened canned fruit (avoid pineapple), bananas  Limit caffeine and chocolate. No spices or seasonings except salt.  During the next 24 hours:  Gradually resume a normal diet, as you feel better and your symptoms lessen.  Follow-up care  Follow up with your healthcare provider, or as advised.  When to seek medical advice  Call your healthcare provider right away if any of these occur:  Constant right-sided lower abdominal pain or increasing general abdominal pain  Continued vomiting (unable to keep liquids down) for 24 hours  Frequent diarrhea (more than 5 times a day); blood (red or black color) or mucus in diarrhea  Reduced urine output or extreme thirst  Weakness, dizziness or fainting  Unusually drowsy or confused  Fever of 100.4°F (38°C) oral or higher, or as directed  Yellow color of the eyes or skin  Date Last Reviewed: 11/16/2015  © 2350-6082 [employer] [address] [place] [address], [place] [address]. All rights reserved. This information is not intended as a substitute for professional medical care. Always follow your healthcare professional's instructions.

## 2022-10-19 ENCOUNTER — TELEPHONE (OUTPATIENT)
Dept: GASTROENTEROLOGY | Facility: CLINIC | Age: 57
End: 2022-10-19
Payer: MEDICARE

## 2022-10-19 ENCOUNTER — PATIENT MESSAGE (OUTPATIENT)
Dept: PSYCHIATRY | Facility: CLINIC | Age: 57
End: 2022-10-19
Payer: MEDICARE

## 2022-10-19 NOTE — TELEPHONE ENCOUNTER
----- Message from Hayde Llamas sent at 10/19/2022  8:08 AM CDT -----  Contact: Patient  Type:  Needs Medical Advice    Who Called:  Patient       Would the patient rather a call back or a response via [facility]?  Call    Best Call Back Number:  [phone] (home)     Additional Information:  Patient needs to get the exact time of his procedure on 10/26 because he needs to let transportation know     Please call to advise

## 2022-10-19 NOTE — TELEPHONE ENCOUNTER
Spoke with pt. Discussed arrival time with pt. Discussed prep instructions with pt. Pt verbalized understanding to all.

## 2022-10-20 ENCOUNTER — TELEPHONE (OUTPATIENT)
Dept: PSYCHIATRY | Facility: CLINIC | Age: 57
End: 2022-10-20
Payer: MEDICARE

## 2022-10-20 ENCOUNTER — OFFICE VISIT (OUTPATIENT)
Dept: FAMILY MEDICINE | Facility: CLINIC | Age: 57
End: 2022-10-20
Payer: MEDICARE

## 2022-10-20 ENCOUNTER — PATIENT MESSAGE (OUTPATIENT)
Dept: FAMILY MEDICINE | Facility: CLINIC | Age: 57
End: 2022-10-20
Payer: MEDICARE

## 2022-10-20 VITALS
RESPIRATION RATE: 19 BRPM | HEIGHT: 70 IN | OXYGEN SATURATION: 99 % | BODY MASS INDEX: 29.42 KG/M2 | DIASTOLIC BLOOD PRESSURE: 62 MMHG | WEIGHT: 205.5 LBS | SYSTOLIC BLOOD PRESSURE: 124 MMHG | HEART RATE: 70 BPM

## 2022-10-20 DIAGNOSIS — R11.2 INTRACTABLE VOMITING WITH NAUSEA: Primary | ICD-10-CM

## 2022-10-20 DIAGNOSIS — F20.9 SCHIZOPHRENIA, UNSPECIFIED TYPE: ICD-10-CM

## 2022-10-20 PROCEDURE — 3074F SYST BP LT 130 MM HG: CPT | Mod: CPTII,S$GLB,, | Performed by: STUDENT IN AN ORGANIZED HEALTH CARE EDUCATION/TRAINING PROGRAM

## 2022-10-20 PROCEDURE — 99999 PR PBB SHADOW E&M-EST. PATIENT-LVL IV: ICD-10-PCS | Mod: PBBFAC,,, | Performed by: STUDENT IN AN ORGANIZED HEALTH CARE EDUCATION/TRAINING PROGRAM

## 2022-10-20 PROCEDURE — 3044F HG A1C LEVEL LT 7.0%: CPT | Mod: CPTII,S$GLB,, | Performed by: STUDENT IN AN ORGANIZED HEALTH CARE EDUCATION/TRAINING PROGRAM

## 2022-10-20 PROCEDURE — 1159F MED LIST DOCD IN RCRD: CPT | Mod: CPTII,S$GLB,, | Performed by: STUDENT IN AN ORGANIZED HEALTH CARE EDUCATION/TRAINING PROGRAM

## 2022-10-20 PROCEDURE — 99213 PR OFFICE/OUTPT VISIT, EST, LEVL III, 20-29 MIN: ICD-10-PCS | Mod: S$GLB,,, | Performed by: STUDENT IN AN ORGANIZED HEALTH CARE EDUCATION/TRAINING PROGRAM

## 2022-10-20 PROCEDURE — 99999 PR PBB SHADOW E&M-EST. PATIENT-LVL IV: CPT | Mod: PBBFAC,,, | Performed by: STUDENT IN AN ORGANIZED HEALTH CARE EDUCATION/TRAINING PROGRAM

## 2022-10-20 PROCEDURE — 3044F PR MOST RECENT HEMOGLOBIN A1C LEVEL <7.0%: ICD-10-PCS | Mod: CPTII,S$GLB,, | Performed by: STUDENT IN AN ORGANIZED HEALTH CARE EDUCATION/TRAINING PROGRAM

## 2022-10-20 PROCEDURE — 3061F NEG MICROALBUMINURIA REV: CPT | Mod: CPTII,S$GLB,, | Performed by: STUDENT IN AN ORGANIZED HEALTH CARE EDUCATION/TRAINING PROGRAM

## 2022-10-20 PROCEDURE — 99213 OFFICE O/P EST LOW 20 MIN: CPT | Mod: S$GLB,,, | Performed by: STUDENT IN AN ORGANIZED HEALTH CARE EDUCATION/TRAINING PROGRAM

## 2022-10-20 PROCEDURE — 3074F PR MOST RECENT SYSTOLIC BLOOD PRESSURE < 130 MM HG: ICD-10-PCS | Mod: CPTII,S$GLB,, | Performed by: STUDENT IN AN ORGANIZED HEALTH CARE EDUCATION/TRAINING PROGRAM

## 2022-10-20 PROCEDURE — 3078F PR MOST RECENT DIASTOLIC BLOOD PRESSURE < 80 MM HG: ICD-10-PCS | Mod: CPTII,S$GLB,, | Performed by: STUDENT IN AN ORGANIZED HEALTH CARE EDUCATION/TRAINING PROGRAM

## 2022-10-20 PROCEDURE — 3078F DIAST BP <80 MM HG: CPT | Mod: CPTII,S$GLB,, | Performed by: STUDENT IN AN ORGANIZED HEALTH CARE EDUCATION/TRAINING PROGRAM

## 2022-10-20 PROCEDURE — 1159F PR MEDICATION LIST DOCUMENTED IN MEDICAL RECORD: ICD-10-PCS | Mod: CPTII,S$GLB,, | Performed by: STUDENT IN AN ORGANIZED HEALTH CARE EDUCATION/TRAINING PROGRAM

## 2022-10-20 PROCEDURE — 3066F PR DOCUMENTATION OF TREATMENT FOR NEPHROPATHY: ICD-10-PCS | Mod: CPTII,S$GLB,, | Performed by: STUDENT IN AN ORGANIZED HEALTH CARE EDUCATION/TRAINING PROGRAM

## 2022-10-20 PROCEDURE — 3061F PR NEG MICROALBUMINURIA RESULT DOCUMENTED/REVIEW: ICD-10-PCS | Mod: CPTII,S$GLB,, | Performed by: STUDENT IN AN ORGANIZED HEALTH CARE EDUCATION/TRAINING PROGRAM

## 2022-10-20 PROCEDURE — 3066F NEPHROPATHY DOC TX: CPT | Mod: CPTII,S$GLB,, | Performed by: STUDENT IN AN ORGANIZED HEALTH CARE EDUCATION/TRAINING PROGRAM

## 2022-10-20 NOTE — PROGRESS NOTES
Subjective:      Patient ID: Ryan Crane is a 57 y.o. male    Chief Complaint   Patient presents with    Follow-up     HPI  57 y.o. male with a PMHx as documented below presents to clinic today for the following:  - Intractable nausea and vomiting: Following w/ GI, EGD scheduled for 10/26/22. Pt taking Protonix and Carafate PRN for symptoms with some relief.   - Pt states he has not yet heard back from Psychiatry regarding referral - pt states he is unable to use MyChart. Overall, pt reports improvement in mood. Denies SI/HI. Endorses chronic auditory hallucinations which have become less bothersome and easier to ignore recently. Pt reports stress regarding mom now living w/ sister, but he states he is able to talk to his mom on the phone daily.    Review of Systems   Constitutional:  Negative for chills and fever.   Cardiovascular:  Negative for chest pain.   Gastrointestinal:  Positive for nausea and vomiting. Negative for abdominal pain, constipation and diarrhea.   Genitourinary:  Negative for dysuria.   Musculoskeletal:  Negative for back pain and neck pain.   Skin:  Negative for rash.   Neurological:  Negative for dizziness, sensory change, weakness and headaches.   Psychiatric/Behavioral:  Positive for depression and hallucinations. Negative for suicidal ideas. The patient is not nervous/anxious.    All other systems reviewed and are negative.    Past Medical History:   Diagnosis Date    Acute angina     Asthma     Cancer     Cataract     skin cancer right foot    Colon polyps     COPD (chronic obstructive pulmonary disease)     Diabetes mellitus     Digestive disorder     ulcers    Gallstone     General anesthetics causing adverse effect in therapeutic use     Headache     Hypertension     MI, old     PIPER on CPAP     Schizophrenia     Seizures 2008    now on meds    Smoker unmotivated to quit     Stroke 2005    Thyroid disease     took medicine in the past    Type II diabetes mellitus with  neurological manifestations 11/21/2019    diet controlled     Objective:      Vitals:    10/20/22 1446   BP: 124/62   Pulse: 70   Resp: 19     Physical Exam  Vitals reviewed.   Constitutional:       General: He is not in acute distress.  HENT:      Head: Normocephalic and atraumatic.   Cardiovascular:      Rate and Rhythm: Normal rate.   Pulmonary:      Effort: Pulmonary effort is normal. No respiratory distress.   Neurological:      General: No focal deficit present.      Mental Status: He is alert and oriented to person, place, and time. Mental status is at baseline.   Psychiatric:         Mood and Affect: Affect is blunt and flat.         Thought Content: Thought content does not include homicidal or suicidal ideation. Thought content does not include homicidal or suicidal plan.      Comments: Pt does not appear to be responding to internal stimuli.       Assessment:       1. Intractable vomiting with nausea    2. Schizophrenia, unspecified type      Plan:       Ryan was seen today for follow-up.    Diagnoses and all orders for this visit:    Intractable vomiting with nausea: Continue Protonix, Carafate, Zofran, and Phenergan PRN. EGD scheduled on 10/26 with GI.     Schizophrenia, unspecified type: Follow-up Psych referral.         Follow up in about 4 weeks (around 11/17/2022), or if symptoms worsen or fail to improve.    Ana Mayer MD  [facility]  Office: [phone]   Fax: [phone]  10/20/2022    Disclaimer: This note was partly generated using dictation software which may occasionally result in transcription errors.

## 2022-10-20 NOTE — TELEPHONE ENCOUNTER
At the request of Ana Mayer MD  Stating that the pt needs all communication by phone because the patient can not use my chart.     I called and spoke with the patient. I explained that we are unable to get him scheduled at this time however I have placed him on the waiting list. I also offered to give him some community resources.     Pt also stated that he would need to be seen for therapy and medication management

## 2022-10-21 ENCOUNTER — OFFICE VISIT (OUTPATIENT)
Dept: UROLOGY | Facility: CLINIC | Age: 57
End: 2022-10-21
Payer: MEDICARE

## 2022-10-21 ENCOUNTER — HOSPITAL ENCOUNTER (OUTPATIENT)
Dept: RADIOLOGY | Facility: HOSPITAL | Age: 57
Discharge: HOME | End: 2022-10-21
Attending: NURSE PRACTITIONER
Payer: MEDICARE

## 2022-10-21 VITALS — HEIGHT: 70 IN | WEIGHT: 203.94 LBS | BODY MASS INDEX: 29.2 KG/M2

## 2022-10-21 DIAGNOSIS — K80.20 CALCULUS OF GALLBLADDER WITHOUT CHOLECYSTITIS WITHOUT OBSTRUCTION: Primary | ICD-10-CM

## 2022-10-21 DIAGNOSIS — R31.0 GROSS HEMATURIA: Primary | ICD-10-CM

## 2022-10-21 DIAGNOSIS — N52.01 ERECTILE DYSFUNCTION DUE TO ARTERIAL INSUFFICIENCY: ICD-10-CM

## 2022-10-21 DIAGNOSIS — Z87.19 HISTORY OF GALLSTONES: ICD-10-CM

## 2022-10-21 DIAGNOSIS — R11.2 INTRACTABLE VOMITING WITH NAUSEA: ICD-10-CM

## 2022-10-21 PROCEDURE — 99214 PR OFFICE/OUTPT VISIT, EST, LEVL IV, 30-39 MIN: ICD-10-PCS | Mod: S$GLB,,, | Performed by: STUDENT IN AN ORGANIZED HEALTH CARE EDUCATION/TRAINING PROGRAM

## 2022-10-21 PROCEDURE — 3061F NEG MICROALBUMINURIA REV: CPT | Mod: CPTII,S$GLB,, | Performed by: STUDENT IN AN ORGANIZED HEALTH CARE EDUCATION/TRAINING PROGRAM

## 2022-10-21 PROCEDURE — 3044F HG A1C LEVEL LT 7.0%: CPT | Mod: CPTII,S$GLB,, | Performed by: STUDENT IN AN ORGANIZED HEALTH CARE EDUCATION/TRAINING PROGRAM

## 2022-10-21 PROCEDURE — 76705 ECHO EXAM OF ABDOMEN: CPT | Mod: 26,,, | Performed by: RADIOLOGY

## 2022-10-21 PROCEDURE — 1159F MED LIST DOCD IN RCRD: CPT | Mod: CPTII,S$GLB,, | Performed by: STUDENT IN AN ORGANIZED HEALTH CARE EDUCATION/TRAINING PROGRAM

## 2022-10-21 PROCEDURE — 1159F PR MEDICATION LIST DOCUMENTED IN MEDICAL RECORD: ICD-10-PCS | Mod: CPTII,S$GLB,, | Performed by: STUDENT IN AN ORGANIZED HEALTH CARE EDUCATION/TRAINING PROGRAM

## 2022-10-21 PROCEDURE — 1160F RVW MEDS BY RX/DR IN RCRD: CPT | Mod: CPTII,S$GLB,, | Performed by: STUDENT IN AN ORGANIZED HEALTH CARE EDUCATION/TRAINING PROGRAM

## 2022-10-21 PROCEDURE — 1160F PR REVIEW ALL MEDS BY PRESCRIBER/CLIN PHARMACIST DOCUMENTED: ICD-10-PCS | Mod: CPTII,S$GLB,, | Performed by: STUDENT IN AN ORGANIZED HEALTH CARE EDUCATION/TRAINING PROGRAM

## 2022-10-21 PROCEDURE — 3066F PR DOCUMENTATION OF TREATMENT FOR NEPHROPATHY: ICD-10-PCS | Mod: CPTII,S$GLB,, | Performed by: STUDENT IN AN ORGANIZED HEALTH CARE EDUCATION/TRAINING PROGRAM

## 2022-10-21 PROCEDURE — 3066F NEPHROPATHY DOC TX: CPT | Mod: CPTII,S$GLB,, | Performed by: STUDENT IN AN ORGANIZED HEALTH CARE EDUCATION/TRAINING PROGRAM

## 2022-10-21 PROCEDURE — 3061F PR NEG MICROALBUMINURIA RESULT DOCUMENTED/REVIEW: ICD-10-PCS | Mod: CPTII,S$GLB,, | Performed by: STUDENT IN AN ORGANIZED HEALTH CARE EDUCATION/TRAINING PROGRAM

## 2022-10-21 PROCEDURE — 76705 ECHO EXAM OF ABDOMEN: CPT | Mod: TC,PO

## 2022-10-21 PROCEDURE — 99214 OFFICE O/P EST MOD 30 MIN: CPT | Mod: S$GLB,,, | Performed by: STUDENT IN AN ORGANIZED HEALTH CARE EDUCATION/TRAINING PROGRAM

## 2022-10-21 PROCEDURE — 3044F PR MOST RECENT HEMOGLOBIN A1C LEVEL <7.0%: ICD-10-PCS | Mod: CPTII,S$GLB,, | Performed by: STUDENT IN AN ORGANIZED HEALTH CARE EDUCATION/TRAINING PROGRAM

## 2022-10-21 PROCEDURE — 99999 PR PBB SHADOW E&M-EST. PATIENT-LVL III: ICD-10-PCS | Mod: PBBFAC,,, | Performed by: STUDENT IN AN ORGANIZED HEALTH CARE EDUCATION/TRAINING PROGRAM

## 2022-10-21 PROCEDURE — 76705 US ABDOMEN LIMITED: ICD-10-PCS | Mod: 26,,, | Performed by: RADIOLOGY

## 2022-10-21 PROCEDURE — 99999 PR PBB SHADOW E&M-EST. PATIENT-LVL III: CPT | Mod: PBBFAC,,, | Performed by: STUDENT IN AN ORGANIZED HEALTH CARE EDUCATION/TRAINING PROGRAM

## 2022-10-21 NOTE — PROGRESS NOTES
"[facility]   Clinic Note    SUBJECTIVE:     Chief Complaint: Other (F/u prostate exam)      History of Present Illness:  Ryan Crane is a 57 y.o. male who presents to clinic for prostate check and ED. He is established to our clinic.     He reports a history of ED and has been on Cialis.  He has had difficulty maintaining an erection despite the Cialis.  He has a prescription for nitroglycerin and reports intermittent angina but recently underwent a coronary stent.     He denies LUTS. He drinks plenty of fluids. Reports a strong urinary stream. He does report an episode of gross hematuria a few months ago while was taking trazodone. This stopped after taking the medication.     Past medical, family, surgical and social history reviewed as documented in chart with pertinent positive medical, family, surgical and social history detailed in HPI.    A review systems was conducted with pertinent positive and negative findings documented in HPI.    OBJECTIVE:     Estimated body mass index is 29.26 kg/m² as calculated from the following:    Height as of this encounter: 5' 10" (1.778 m).    Weight as of this encounter: 92.5 kg (203 lb 14.8 oz).    Vital Signs (Most Recent)       Physical Exam  Vitals and nursing note reviewed.   Constitutional:       General: He is not in acute distress.     Appearance: Normal appearance. He is well-developed. He is not ill-appearing or toxic-appearing.   Pulmonary:      Effort: Pulmonary effort is normal. No accessory muscle usage or respiratory distress.   Neurological:      General: No focal deficit present.      Mental Status: He is alert and oriented to person, place, and time. Mental status is at baseline.   Psychiatric:         Mood and Affect: Mood normal.         Behavior: Behavior is cooperative.         Thought Content: Thought content normal.         Judgment: Judgment normal.       Lab Results   Component Value Date    BUN 11 10/17/2022    CREATININE 0.9 " 10/17/2022    WBC 11.98 10/17/2022    HGB 15.1 10/17/2022    HCT 45.1 10/17/2022     10/17/2022    AST 13 10/17/2022    ALT 9 (L) 10/17/2022    ALKPHOS 99 10/17/2022    ALBUMIN 4.0 10/17/2022    HGBA1C 5.4 08/04/2022        Lab Results   Component Value Date    PSADIAG 2.0 08/29/2019        ASSESSMENT     1. Gross hematuria    2. Erectile dysfunction due to arterial insufficiency      PLAN:     He has gross hematuria based on the clinical history.  I counseled the patient on the American Urology Guidelines for a hematuria workup. An evaluation is recommend on all patients with gross hematuria and on patients with microscopic hematuria (defined as 3 red blood cells or greater on microscopic urinalysis). The goal of upper tract imaging in patients is to identify malignancies of the renal parenchyma and upper tract urothelium, as well as to identify actionable non-malignant diagnoses of the kidney, collecting system, and ureters. The choice of imaging modality involves tradeoffs between diagnostic accuracy versus risk. The evaluation also involves a flexible cystoscopy performed in the clinic.     Plan for flexible cystoscopy and CT urogram    For the erectile dysfunction, I explained that he cannot have a prescription for nitroglycerin due to the risk of hypotension.  I recommend he stop taking the Cialis.  Other options include intracavernosal injections.  He will reach out to his cardiologist to see if he still needs the nitroglycerin on his med list.      Bonilla Pablo MD

## 2022-10-21 NOTE — PROGRESS NOTES
Please call to inform & review the results with the patient- The radiology report of the right upper abdominal ultrasound showed multiple gallstones. This may be contributing to your symptoms. Recommend follow-up with general surgery for continued evaluation and management of this finding. Referral placed. Otherwise, no acute findings.   Continue with previous recommendations.  Thank you for choosing us to participate in your healthcare,  Kaylyn PANDYA

## 2022-10-24 ENCOUNTER — TELEPHONE (OUTPATIENT)
Dept: CARDIOLOGY | Facility: CLINIC | Age: 57
End: 2022-10-24
Payer: MEDICARE

## 2022-10-24 ENCOUNTER — TELEPHONE (OUTPATIENT)
Dept: FAMILY MEDICINE | Facility: CLINIC | Age: 57
End: 2022-10-24

## 2022-10-24 ENCOUNTER — CLINICAL SUPPORT (OUTPATIENT)
Dept: SMOKING CESSATION | Facility: CLINIC | Age: 57
End: 2022-10-24
Payer: COMMERCIAL

## 2022-10-24 DIAGNOSIS — F17.200 TOBACCO USE DISORDER: Primary | ICD-10-CM

## 2022-10-24 PROCEDURE — 99999 PR PBB SHADOW E&M-EST. PATIENT-LVL III: ICD-10-PCS | Mod: PBBFAC,,,

## 2022-10-24 PROCEDURE — 99404 PR PREVENT COUNSEL,INDIV,60 MIN: ICD-10-PCS | Mod: S$GLB,,, | Performed by: GENERAL PRACTICE

## 2022-10-24 PROCEDURE — 99999 PR PBB SHADOW E&M-EST. PATIENT-LVL III: CPT | Mod: PBBFAC,,,

## 2022-10-24 PROCEDURE — 99404 PREV MED CNSL INDIV APPRX 60: CPT | Mod: S$GLB,,, | Performed by: GENERAL PRACTICE

## 2022-10-24 RX ORDER — IBUPROFEN 200 MG
1 TABLET ORAL DAILY
Qty: 28 PATCH | Refills: 0 | Status: SHIPPED | OUTPATIENT
Start: 2022-10-24 | End: 2022-11-22 | Stop reason: SDUPTHER

## 2022-10-24 RX ORDER — ASPIRIN/CALCIUM CARB/MAGNESIUM 325 MG
TABLET ORAL
Qty: 72 LOZENGE | Refills: 0 | Status: SHIPPED | OUTPATIENT
Start: 2022-10-24 | End: 2022-11-08 | Stop reason: SDUPTHER

## 2022-10-24 NOTE — TELEPHONE ENCOUNTER
pt called in regarding wanting to know if its ok for him to have intercourse ?  Pt states that he has been on nitroGLYCERIN for the last 20 years

## 2022-10-24 NOTE — Clinical Note
Patient will be participating in weekly tobacco cessation meetings and will begin the prescribed tobacco cessation medication regime of the 21 mg patch and 4 mg lozenges. Patient has used Chantix before.

## 2022-10-24 NOTE — TELEPHONE ENCOUNTER
----- Message from Hermelinda Edward sent at 10/21/2022  4:30 PM CDT -----  Type: Patient Call Back         Who called: Pt          What is the request in detail: pt called in regarding wanting to know if its ok for him to have intercourse ?  Pt states that he has been on nitroGLYCERIN for the last 20 years .          Can the clinic reply by [facility]?no          Would the patient rather a call back or a response via [facility]? Call back          Best call back number:[phone] (mobile)          Additional Information:           Thank You

## 2022-10-24 NOTE — TELEPHONE ENCOUNTER
----- Message from Dora Garza MA sent at 10/21/2022  4:58 PM CDT -----    ----- Message -----  From: Manfred Quan  Sent: 10/21/2022   4:24 PM CDT  To: [facility] Staff    Type: Needs Medical Advice  Who Called:  pt  Symptoms (please be specific):  pt said he need to know if it's ok for him to have sex--please call and advise  Best Call Back Number: [phone]    Additional Information: thank you

## 2022-10-26 ENCOUNTER — HOSPITAL ENCOUNTER (OUTPATIENT)
Facility: HOSPITAL | Age: 57
Discharge: HOME | End: 2022-10-26
Attending: INTERNAL MEDICINE | Admitting: INTERNAL MEDICINE
Payer: MEDICARE

## 2022-10-26 ENCOUNTER — TELEPHONE (OUTPATIENT)
Dept: SURGERY | Facility: HOSPITAL | Age: 57
End: 2022-10-26
Payer: MEDICARE

## 2022-10-26 VITALS
SYSTOLIC BLOOD PRESSURE: 109 MMHG | RESPIRATION RATE: 16 BRPM | DIASTOLIC BLOOD PRESSURE: 55 MMHG | OXYGEN SATURATION: 100 % | HEART RATE: 63 BPM | TEMPERATURE: 98 F

## 2022-10-26 DIAGNOSIS — R11.2 N&V (NAUSEA AND VOMITING): ICD-10-CM

## 2022-10-26 PROCEDURE — 63600175 PHARM REV CODE 636 W HCPCS: Mod: PO | Performed by: INTERNAL MEDICINE

## 2022-10-26 RX ORDER — SODIUM CHLORIDE, SODIUM LACTATE, POTASSIUM CHLORIDE, CALCIUM CHLORIDE 600; 310; 30; 20 MG/100ML; MG/100ML; MG/100ML; MG/100ML
INJECTION, SOLUTION INTRAVENOUS CONTINUOUS
Status: DISCONTINUED | OUTPATIENT
Start: 2022-10-26 | End: 2022-10-26 | Stop reason: HOSPADM

## 2022-10-26 RX ORDER — SODIUM CHLORIDE 0.9 % (FLUSH) 0.9 %
10 SYRINGE (ML) INJECTION
Status: DISCONTINUED | OUTPATIENT
Start: 2022-10-26 | End: 2022-10-26 | Stop reason: HOSPADM

## 2022-10-26 RX ADMIN — SODIUM CHLORIDE, SODIUM LACTATE, POTASSIUM CHLORIDE, AND CALCIUM CHLORIDE: .6; .31; .03; .02 INJECTION, SOLUTION INTRAVENOUS at 09:10

## 2022-10-26 NOTE — TELEPHONE ENCOUNTER
This patient will be canceling his procedure today and rescheduling due to not having a proper ride home. Dr. Lucas made aware, offered to perform the procedure without anesthesia, pt refused and wishes to reschedule. Please contact this pt to reschedule at his convenience. Thank you.

## 2022-10-26 NOTE — H&P
History & Physical - Short Stay  Gastroenterology      SUBJECTIVE:     Procedure: EGD    Chief Complaint/Indication for Procedure: N/V    No medications prior to admission.       Review of patient's allergies indicates:   Allergen Reactions    Alcohol Anaphylaxis     Pt states all types of alcohol    Alcohol antiseptic pads Anaphylaxis    Cephalexin Anaphylaxis        Past Medical History:   Diagnosis Date    Acute angina     Asthma     Cancer     Cataract     skin cancer right foot    Colon polyps     COPD (chronic obstructive pulmonary disease)     Diabetes mellitus     Digestive disorder     ulcers    Gallstone     General anesthetics causing adverse effect in therapeutic use     Headache     Hypertension     MI, old     PIPER on CPAP     Schizophrenia     Seizures 2008    now on meds    Smoker unmotivated to quit     Stroke 2005    Thyroid disease     took medicine in the past    Type II diabetes mellitus with neurological manifestations 11/21/2019    diet controlled     Past Surgical History:   Procedure Laterality Date    ANGIOGRAM, CORONARY, WITH LEFT HEART CATHETERIZATION  08/25/2022    Procedure: Angiogram, Coronary, with Left Heart Cath;  Surgeon: Mai Deleon MD;  Location: [facility] CATH;  Service: Cardiology;;    APPENDECTOMY      BONE MARROW ASPIRATION Left 08/21/2020    Procedure: ASPIRATION, BONE MARROW;  Surgeon: Lex Kathleen MD;  Location: [facility] OR;  Service: Oncology;  Laterality: Left;    CLOSURE OF WOUND Right 09/09/2019    Procedure: CLOSURE, WOUND;  Surgeon: Li Kathleen DPM;  Location: [facility] OR;  Service: Podiatry;  Laterality: Right;    COLONOSCOPY N/A 12/10/2019    Procedure: COLONOSCOPY;  Surgeon: Ryan Lucas MD;  Location: [facility] ENDO;  Service: Endoscopy;  Laterality: N/A;    COLONOSCOPY N/A 12/13/2019    Procedure: COLONOSCOPY;  Surgeon: Ryan Lucas MD;  Location: [facility] ENDO;  Service: Endoscopy;  Laterality: N/A;    CORONARY STENT PLACEMENT      ESOPHAGOGASTRODUODENOSCOPY N/A  12/10/2019    Procedure: EGD (ESOPHAGOGASTRODUODENOSCOPY);  Surgeon: Ryan Lucas MD;  Location: [facility] ENDO;  Service: Endoscopy;  Laterality: N/A;    LEFT HEART CATHETERIZATION Left 08/25/2022    Procedure: Left heart cath;  Surgeon: Mai Deleon MD;  Location: [facility] CATH;  Service: Cardiology;  Laterality: Left;    right heel surgery      SHOULDER ARTHROSCOPY Left      Family History   Problem Relation Age of Onset    Melanoma Mother     Diabetes Mother     Hypertension Mother     Stroke Father     Diabetes Father     Hypertension Father     Colon cancer Father         unsure of age of diagnosis    Cancer Father         colon cancer    Cervical cancer Sister     Cirrhosis Brother     Hypertension Brother     Lung cancer Maternal Grandmother     Prostate cancer Maternal Grandfather     Crohn's disease Neg Hx     Ulcerative colitis Neg Hx     Stomach cancer Neg Hx     Esophageal cancer Neg Hx     Retinal detachment Neg Hx     Strabismus Neg Hx     Macular degeneration Neg Hx     Glaucoma Neg Hx      Social History     Tobacco Use    Smoking status: Every Day     Packs/day: 1.00     Years: 43.00     Pack years: 43.00     Types: Cigarettes     Start date: 1979    Smokeless tobacco: Never    Tobacco comments:     Age Started 14    Substance Use Topics    Alcohol use: No    Drug use: Not Currently     Types: Marijuana         OBJECTIVE:     Vital Signs (Most Recent)  Temp: 98.2 °F (36.8 °C) (10/26/22 0932)  Pulse: 63 (10/26/22 0932)  Resp: 16 (10/26/22 0932)  BP: (!) 109/55 (10/26/22 0932)  SpO2: 100 % (10/26/22 0932)    Physical Exam:                                                       GENERAL:  Comfortable, in no acute distress.                                 HEENT EXAM:  Nonicteric.  No adenopathy.  Oropharynx is clear.               NECK:  Supple.                                                               LUNGS:  Clear.                                                               CARDIAC:  Regular rate  and rhythm.  S1, S2.  No murmur.                      ABDOMEN:  Soft, positive bowel sounds, nontender.  No hepatosplenomegaly or masses.  No rebound or guarding.                                             EXTREMITIES:  No edema.     MENTAL STATUS:  Normal, alert and oriented.      ASSESSMENT/PLAN:     Assessment: N/V    Plan: EGD    Anesthesia Plan: General    ASA Grade: ASA 3 - Patient with moderate systemic disease with functional limitations    MALLAMPATI SCORE:  I (soft palate, uvula, fauces, and tonsillar pillars visible)

## 2022-10-26 NOTE — PROGRESS NOTES
Pt states only has hired transportation to return home (pt will be alone).  Informed pt [facility] policy would not allow pt to return home alone after anesthesia. Offered to perform EGD without sedation, pt declined, stating he will re-schedule when someone is available to bring him home and stay. Procedure cancelled.

## 2022-10-27 ENCOUNTER — TELEPHONE (OUTPATIENT)
Dept: GASTROENTEROLOGY | Facility: CLINIC | Age: 57
End: 2022-10-27
Payer: MEDICARE

## 2022-10-27 NOTE — TELEPHONE ENCOUNTER
----- Message from Hermelinda Edward sent at 10/27/2022  3:10 PM CDT -----  Type: Patient Call Back         Who called: Pt          What is the request in detail: Pt called in regarding wanting  to reschedule  upper GI         Can the clinic reply by [facility]? No          Would the patient rather a call back or a response via [facility]? Call back          Best call back number [phone] or [phone]                  Additional Information:           Thank You

## 2022-10-28 DIAGNOSIS — E87.1 HYPONATREMIA: ICD-10-CM

## 2022-10-28 DIAGNOSIS — I10 UNCONTROLLED HYPERTENSION: ICD-10-CM

## 2022-10-28 RX ORDER — AMLODIPINE BESYLATE 2.5 MG/1
2.5 TABLET ORAL DAILY
Qty: 90 TABLET | Refills: 1 | Status: SHIPPED | OUTPATIENT
Start: 2022-10-28 | End: 2023-02-22 | Stop reason: SDUPTHER

## 2022-10-28 RX ORDER — METOPROLOL SUCCINATE 25 MG/1
25 TABLET, EXTENDED RELEASE ORAL DAILY
Qty: 90 TABLET | Refills: 1 | Status: SHIPPED | OUTPATIENT
Start: 2022-10-28 | End: 2023-02-14 | Stop reason: SDUPTHER

## 2022-10-28 NOTE — TELEPHONE ENCOUNTER
----- Message from Tena Campoverde sent at 10/28/2022  9:23 AM CDT -----  .Type:  RX Refill Request    Who Called: PT     Refill or New Rx: REFILLS    RX Name and Strength:     metoprolol succinate (TOPROL-XL) 25 MG 24 hr tablet    amLODIPine (NORVASC) 2.5 MG tablet    sodium chloride 1 gram tablet    Preferred Pharmacy with phone number: [facility] [phone]     Pt Call Back Number: [phone]    Additional Information: Thank You

## 2022-10-28 NOTE — TELEPHONE ENCOUNTER
----- Message from Hayde Llamas sent at 10/28/2022  7:58 AM CDT -----  Contact: Patient  Type:  RX Refill Request    Who Called:  Patient     Refill or New Rx: Refill    RX Name and Strength: sodium chloride 1 gram tablet    How is the patient currently taking it? (ex. 1XDay): 2 times a day     Is this a 30 day or 90 day RX: 90    Preferred Pharmacy with phone number:      [facility] ([facility]) - [place], [place] - [address]  [address] [address]  Phone: [phone] Fax: [phone]    Local or Mail Order: Mail     Ordering Provider: Dr Becerril     Would the patient rather a call back or a response via [facility]?  Call    Best Call Back Number: [phone]    Additional Information:

## 2022-10-31 ENCOUNTER — TELEPHONE (OUTPATIENT)
Dept: GASTROENTEROLOGY | Facility: CLINIC | Age: 57
End: 2022-10-31
Payer: MEDICARE

## 2022-10-31 NOTE — TELEPHONE ENCOUNTER
----- Message from Agusto Garcia sent at 10/31/2022 11:03 AM CDT -----  Regarding: pt called  Name of Who is Calling: DEANNE MICHAELS [[id number]]      What is the request in detail: pt called requesting a call back wanting to know what time is his procedure on 11/3.He needs a call back to he needs to book his transportation to get there.Please advise      Can the clinic reply by [facility]: no      What Number to Call Back if not in [facility]:[phone] (home)

## 2022-11-01 ENCOUNTER — HOSPITAL ENCOUNTER (OUTPATIENT)
Dept: RADIOLOGY | Facility: HOSPITAL | Age: 57
Discharge: HOME | End: 2022-11-01
Attending: STUDENT IN AN ORGANIZED HEALTH CARE EDUCATION/TRAINING PROGRAM
Payer: MEDICARE

## 2022-11-01 DIAGNOSIS — R31.0 GROSS HEMATURIA: ICD-10-CM

## 2022-11-01 PROCEDURE — 74178 CT UROGRAM ABD PELVIS W WO: ICD-10-PCS | Mod: 26,,, | Performed by: RADIOLOGY

## 2022-11-01 PROCEDURE — 74178 CT ABD&PLV WO CNTR FLWD CNTR: CPT | Mod: 26,,, | Performed by: RADIOLOGY

## 2022-11-01 PROCEDURE — 25500020 PHARM REV CODE 255: Mod: PO | Performed by: STUDENT IN AN ORGANIZED HEALTH CARE EDUCATION/TRAINING PROGRAM

## 2022-11-01 PROCEDURE — 74178 CT ABD&PLV WO CNTR FLWD CNTR: CPT | Mod: TC,PO

## 2022-11-01 RX ADMIN — IOHEXOL 125 ML: 350 INJECTION, SOLUTION INTRAVENOUS at 10:11

## 2022-11-04 ENCOUNTER — PATIENT MESSAGE (OUTPATIENT)
Dept: PSYCHIATRY | Facility: CLINIC | Age: 57
End: 2022-11-04
Payer: MEDICARE

## 2022-11-08 ENCOUNTER — CLINICAL SUPPORT (OUTPATIENT)
Dept: SMOKING CESSATION | Facility: CLINIC | Age: 57
End: 2022-11-08
Payer: COMMERCIAL

## 2022-11-08 DIAGNOSIS — F17.200 TOBACCO USE DISORDER: Primary | ICD-10-CM

## 2022-11-08 PROCEDURE — 99999 PR PBB SHADOW E&M-EST. PATIENT-LVL III: CPT | Mod: PBBFAC,,,

## 2022-11-08 PROCEDURE — 99404 PREV MED CNSL INDIV APPRX 60: CPT | Mod: S$GLB,,, | Performed by: GENERAL PRACTICE

## 2022-11-08 PROCEDURE — 99999 PR PBB SHADOW E&M-EST. PATIENT-LVL III: ICD-10-PCS | Mod: PBBFAC,,,

## 2022-11-08 PROCEDURE — 99404 PR PREVENT COUNSEL,INDIV,60 MIN: ICD-10-PCS | Mod: S$GLB,,, | Performed by: GENERAL PRACTICE

## 2022-11-08 RX ORDER — ASPIRIN/CALCIUM CARB/MAGNESIUM 325 MG
TABLET ORAL
Qty: 72 LOZENGE | Refills: 0 | Status: SHIPPED | OUTPATIENT
Start: 2022-11-08 | End: 2022-11-22 | Stop reason: SDUPTHER

## 2022-11-08 NOTE — Clinical Note
Patient is currently smoking 12 cpd and using the 21 mg patches and 4 mg lozenges with no negative side effects at this time. We discussed medications, dosages, and reduction. Patient needs refill of the lozenges as he finds them very helpful at calming cravings for nicotine. Patient will continue to work on new habits and reduction. Patient has a follow up in 2 weeks.

## 2022-11-08 NOTE — PROGRESS NOTES
Individual Follow-Up Form    11/8/2022    Quit Date:     Clinical Status of Patient: Outpatient    Length of Service: 60 minutes    Continuing Medication: yes  Patches    Other Medications: lozenges     Target Symptoms: Withdrawal and medication side effects. The following were  rated moderate (3) to severe (4) on TCRS:  Moderate (3): none  Severe (4): none    Comments: Patient is currently smoking 12 cpd and using the 21 mg patches and 4 mg lozenges with no negative side effects at this time. We discussed medications, dosages, and reduction. Patient needs refill of the lozenges as he finds them very helpful at calming cravings for nicotine. Patient will continue to work on new habits and reduction. Patient has a follow up in 2 weeks.    Diagnosis: F17.200    Next Visit: 2 weeks

## 2022-11-09 ENCOUNTER — OFFICE VISIT (OUTPATIENT)
Dept: SURGERY | Facility: CLINIC | Age: 57
End: 2022-11-09
Payer: MEDICARE

## 2022-11-09 ENCOUNTER — TELEPHONE (OUTPATIENT)
Dept: SURGERY | Facility: CLINIC | Age: 57
End: 2022-11-09

## 2022-11-09 ENCOUNTER — PATIENT OUTREACH (OUTPATIENT)
Dept: ADMINISTRATIVE | Facility: HOSPITAL | Age: 57
End: 2022-11-09
Payer: MEDICARE

## 2022-11-09 VITALS
TEMPERATURE: 97 F | HEIGHT: 70 IN | SYSTOLIC BLOOD PRESSURE: 139 MMHG | BODY MASS INDEX: 29.61 KG/M2 | HEART RATE: 63 BPM | DIASTOLIC BLOOD PRESSURE: 67 MMHG | WEIGHT: 206.81 LBS

## 2022-11-09 DIAGNOSIS — K80.20 CALCULUS OF GALLBLADDER WITHOUT CHOLECYSTITIS WITHOUT OBSTRUCTION: ICD-10-CM

## 2022-11-09 DIAGNOSIS — R11.2 INTRACTABLE VOMITING WITH NAUSEA: ICD-10-CM

## 2022-11-09 PROCEDURE — 99999 PR PBB SHADOW E&M-EST. PATIENT-LVL V: CPT | Mod: PBBFAC,,, | Performed by: SURGERY

## 2022-11-09 PROCEDURE — 99999 PR PBB SHADOW E&M-EST. PATIENT-LVL V: ICD-10-PCS | Mod: PBBFAC,,, | Performed by: SURGERY

## 2022-11-09 PROCEDURE — 3078F PR MOST RECENT DIASTOLIC BLOOD PRESSURE < 80 MM HG: ICD-10-PCS | Mod: CPTII,S$GLB,, | Performed by: SURGERY

## 2022-11-09 PROCEDURE — 3008F PR BODY MASS INDEX (BMI) DOCUMENTED: ICD-10-PCS | Mod: CPTII,S$GLB,, | Performed by: SURGERY

## 2022-11-09 PROCEDURE — 1160F RVW MEDS BY RX/DR IN RCRD: CPT | Mod: CPTII,S$GLB,, | Performed by: SURGERY

## 2022-11-09 PROCEDURE — 3075F SYST BP GE 130 - 139MM HG: CPT | Mod: CPTII,S$GLB,, | Performed by: SURGERY

## 2022-11-09 PROCEDURE — 3078F DIAST BP <80 MM HG: CPT | Mod: CPTII,S$GLB,, | Performed by: SURGERY

## 2022-11-09 PROCEDURE — 1159F PR MEDICATION LIST DOCUMENTED IN MEDICAL RECORD: ICD-10-PCS | Mod: CPTII,S$GLB,, | Performed by: SURGERY

## 2022-11-09 PROCEDURE — 99204 PR OFFICE/OUTPT VISIT, NEW, LEVL IV, 45-59 MIN: ICD-10-PCS | Mod: S$GLB,,, | Performed by: SURGERY

## 2022-11-09 PROCEDURE — 1160F PR REVIEW ALL MEDS BY PRESCRIBER/CLIN PHARMACIST DOCUMENTED: ICD-10-PCS | Mod: CPTII,S$GLB,, | Performed by: SURGERY

## 2022-11-09 PROCEDURE — 3061F NEG MICROALBUMINURIA REV: CPT | Mod: CPTII,S$GLB,, | Performed by: SURGERY

## 2022-11-09 PROCEDURE — 3066F PR DOCUMENTATION OF TREATMENT FOR NEPHROPATHY: ICD-10-PCS | Mod: CPTII,S$GLB,, | Performed by: SURGERY

## 2022-11-09 PROCEDURE — 99204 OFFICE O/P NEW MOD 45 MIN: CPT | Mod: S$GLB,,, | Performed by: SURGERY

## 2022-11-09 PROCEDURE — 3044F PR MOST RECENT HEMOGLOBIN A1C LEVEL <7.0%: ICD-10-PCS | Mod: CPTII,S$GLB,, | Performed by: SURGERY

## 2022-11-09 PROCEDURE — 3066F NEPHROPATHY DOC TX: CPT | Mod: CPTII,S$GLB,, | Performed by: SURGERY

## 2022-11-09 PROCEDURE — 3061F PR NEG MICROALBUMINURIA RESULT DOCUMENTED/REVIEW: ICD-10-PCS | Mod: CPTII,S$GLB,, | Performed by: SURGERY

## 2022-11-09 PROCEDURE — 3008F BODY MASS INDEX DOCD: CPT | Mod: CPTII,S$GLB,, | Performed by: SURGERY

## 2022-11-09 PROCEDURE — 1159F MED LIST DOCD IN RCRD: CPT | Mod: CPTII,S$GLB,, | Performed by: SURGERY

## 2022-11-09 PROCEDURE — 3075F PR MOST RECENT SYSTOLIC BLOOD PRESS GE 130-139MM HG: ICD-10-PCS | Mod: CPTII,S$GLB,, | Performed by: SURGERY

## 2022-11-09 PROCEDURE — 3044F HG A1C LEVEL LT 7.0%: CPT | Mod: CPTII,S$GLB,, | Performed by: SURGERY

## 2022-11-09 RX ORDER — MIRTAZAPINE 15 MG/1
15 TABLET, FILM COATED ORAL NIGHTLY
COMMUNITY
Start: 2022-11-09

## 2022-11-09 RX ORDER — OXCARBAZEPINE 600 MG/1
600 TABLET, FILM COATED ORAL DAILY
COMMUNITY
Start: 2022-08-19 | End: 2022-11-09

## 2022-11-09 RX ORDER — PNEUMOCOCCAL VACCINE POLYVALENT 25; 25; 25; 25; 25; 25; 25; 25; 25; 25; 25; 25; 25; 25; 25; 25; 25; 25; 25; 25; 25; 25; 25 UG/.5ML; UG/.5ML; UG/.5ML; UG/.5ML; UG/.5ML; UG/.5ML; UG/.5ML; UG/.5ML; UG/.5ML; UG/.5ML; UG/.5ML; UG/.5ML; UG/.5ML; UG/.5ML; UG/.5ML; UG/.5ML; UG/.5ML; UG/.5ML; UG/.5ML; UG/.5ML; UG/.5ML; UG/.5ML; UG/.5ML
INJECTION, SOLUTION INTRAMUSCULAR; SUBCUTANEOUS
Status: ON HOLD | COMMUNITY
Start: 2022-08-04 | End: 2023-03-26 | Stop reason: HOSPADM

## 2022-11-09 NOTE — PROGRESS NOTES
Subjective:       Patient ID: Ryan Crane is a 57 y.o. male.    Chief Complaint: Consult (Multiple Gallstones)    HPI  pleasant 57-year-old gentleman who was referred to me for evaluation of cholelithiasis.  Patient notes that for the last several months he has been having nausea vomiting whenever he eats.  States that has gotten progressively worse.  Denies any abdominal pain or discomfort.  He did have an ultrasound which did demonstrate cholelithiasis.  He is scheduled for a EGD in January of this year.  He does have past medical history significant for obesity, diabetes, schizophrenia with bipolar traits.  He also has history of COPD.  Patient had angiogram performed in August of this year with significant findings.  Medical optimization was recommended.  Previous surgical history significant for open appendectomy as a child.  Review of Systems   Constitutional:  Negative for activity change and appetite change.   Respiratory:  Negative for apnea.    Gastrointestinal:  Positive for nausea and vomiting. Negative for abdominal distention and abdominal pain.   Hematological:  Negative for adenopathy. Does not bruise/bleed easily.   Psychiatric/Behavioral:  Negative for agitation and decreased concentration.        Objective:      Physical Exam  Vitals reviewed.   Cardiovascular:      Rate and Rhythm: Normal rate.      Pulses: Normal pulses.   Pulmonary:      Effort: Pulmonary effort is normal.   Abdominal:      General: Abdomen is flat. Bowel sounds are normal. There is no distension.      Tenderness: There is no abdominal tenderness.      Hernia: No hernia is present.   Neurological:      Mental Status: He is alert.   Psychiatric:         Mood and Affect: Mood normal.         Behavior: Behavior normal.         Lab Results   Component Value Date    WBC 11.98 10/17/2022    HGB 15.1 10/17/2022    HCT 45.1 10/17/2022    MCV 95 10/17/2022     10/17/2022       CMP  Sodium   Date Value Ref Range Status    10/17/2022 141 136 - 145 mmol/L Final     Potassium   Date Value Ref Range Status   10/17/2022 4.5 3.5 - 5.1 mmol/L Final     Chloride   Date Value Ref Range Status   10/17/2022 110 95 - 110 mmol/L Final     CO2   Date Value Ref Range Status   10/17/2022 24 23 - 29 mmol/L Final     Glucose   Date Value Ref Range Status   10/17/2022 73 70 - 110 mg/dL Final     BUN   Date Value Ref Range Status   10/17/2022 11 6 - 20 mg/dL Final     Creatinine   Date Value Ref Range Status   10/17/2022 0.9 0.5 - 1.4 mg/dL Final     Calcium   Date Value Ref Range Status   10/17/2022 9.3 8.7 - 10.5 mg/dL Final     Total Protein   Date Value Ref Range Status   10/17/2022 7.4 6.0 - 8.4 g/dL Final     Albumin   Date Value Ref Range Status   10/17/2022 4.0 3.5 - 5.2 g/dL Final     Total Bilirubin   Date Value Ref Range Status   10/17/2022 0.4 0.1 - 1.0 mg/dL Final     Comment:     For infants and newborns, interpretation of results should be based  on gestational age, weight and in agreement with clinical  observations.    Premature Infant recommended reference ranges:  Up to 24 hours.............<8.0 mg/dL  Up to 48 hours............<12.0 mg/dL  3-5 days..................<15.0 mg/dL  6-29 days.................<15.0 mg/dL       Alkaline Phosphatase   Date Value Ref Range Status   10/17/2022 99 55 - 135 U/L Final     AST   Date Value Ref Range Status   10/17/2022 13 10 - 40 U/L Final     ALT   Date Value Ref Range Status   10/17/2022 9 (L) 10 - 44 U/L Final     Anion Gap   Date Value Ref Range Status   10/17/2022 7 (L) 8 - 16 mmol/L Final     eGFR if    Date Value Ref Range Status   06/16/2022 >60 >60 mL/min/1.73 m^2 Final     eGFR if non    Date Value Ref Range Status   06/16/2022 >60 >60 mL/min/1.73 m^2 Final     Comment:     Calculation used to obtain the estimated glomerular filtration  rate (eGFR) is the CKD-EPI equation.      C    US reviewed.  Cholelithiasis    Assessment:       Problem List Items  Addressed This Visit       Intractable vomiting with nausea     Other Visit Diagnoses       Calculus of gallbladder without cholecystitis without obstruction                  Plan:       Discussion with patient.  He does have gallstones present.  Discussed him this may be the source of his nausea vomiting but cannot guarantee it.  He notes he would like to have the gallbladder removed.  He expresses understanding of the risks involved with surgery.  Would like to proceed with laparoscopic cholecystectomy.  Informed consent has been obtained.  Surgery scheduled for next Wednesday.

## 2022-11-09 NOTE — TELEPHONE ENCOUNTER
----- Message from Kaylyn Franco sent at 11/9/2022  2:12 PM CST -----  Contact: Self  Type:  Patient Returning Call    Who Called:  Patient   Who Left Message for Patient:  N/A- needs to speak to the nurse  Does the patient know what this is regarding?:  rescheduling his surgery, stated he has another surgery already scheduled on that date  Best Call Back Number:  [phone]  Additional Information:  Thank You

## 2022-11-09 NOTE — PATIENT INSTRUCTIONS
Surgery is scheduled for 11/16/22 arrival time will be given by the the preop nurse.  The preop nurse will call you from [phone]  Nothing to eat or drink after midnight.  Someone to drive you home if you are same day surgery.    THE PREOP NURSE WILL CALL, SOMETIMES AS LATE AS 4 or 5 PM IN THE AFTERNOON THE DAY BEFORE SURGERY.    Bathe the night before and the morning of your procedure with a Chlorhexidine wash such as Hibiclens, can be purchased at most Pharmacy's no prescription needed.    Special Instruction:     Your surgery is scheduled at the [facility] at [address] [facility] [address] in [place] on the first floor. Entrance 2.     Contact William Sousa LPN for any questions or concerns. [phone]

## 2022-11-09 NOTE — PROGRESS NOTES
Health Maintenance Due   Topic Date Due    COVID-19 Vaccine (1) Never done    High Dose Statin  Never done    Shingles Vaccine (1 of 2) Never done    Colorectal Cancer Screening  12/13/2022

## 2022-11-10 RX ORDER — SODIUM CHLORIDE 9 MG/ML
INJECTION, SOLUTION INTRAVENOUS CONTINUOUS
Status: CANCELLED | OUTPATIENT
Start: 2022-11-10

## 2022-11-15 ENCOUNTER — TELEPHONE (OUTPATIENT)
Dept: CARDIOLOGY | Facility: CLINIC | Age: 57
End: 2022-11-15
Payer: MEDICARE

## 2022-11-15 NOTE — TELEPHONE ENCOUNTER
----- Message from Dayanna Reyes LPN sent at 11/15/2022  4:20 PM CST -----  Regarding: Clearance  Royce Davis's was seen in your clinic on 11/4/22 for surgical clearance. Unfortunately he change the date of his surgery to 11/18/22. Can you please send a clearance for Removal of Soft Tissue Mass surgery

## 2022-11-16 ENCOUNTER — TELEPHONE (OUTPATIENT)
Dept: PODIATRY | Facility: CLINIC | Age: 57
End: 2022-11-16
Payer: MEDICARE

## 2022-11-16 NOTE — TELEPHONE ENCOUNTER
----- Message from La Nena Mack sent at 11/16/2022 10:03 AM CST -----  Contact: [phone]  Type: Needs Medical Advice  Who Called:  Pt     Best Call Back Number: [phone]    Additional Information: Pt requesting  a call from nurse about his surgery on Friday. Pls call back and advise

## 2022-11-17 ENCOUNTER — OFFICE VISIT (OUTPATIENT)
Dept: FAMILY MEDICINE | Facility: CLINIC | Age: 57
End: 2022-11-17
Payer: MEDICARE

## 2022-11-17 ENCOUNTER — TELEPHONE (OUTPATIENT)
Dept: SURGERY | Facility: CLINIC | Age: 57
End: 2022-11-17
Payer: MEDICARE

## 2022-11-17 ENCOUNTER — ANESTHESIA EVENT (OUTPATIENT)
Dept: SURGERY | Facility: HOSPITAL | Age: 57
End: 2022-11-17
Payer: MEDICARE

## 2022-11-17 VITALS
RESPIRATION RATE: 18 BRPM | SYSTOLIC BLOOD PRESSURE: 128 MMHG | BODY MASS INDEX: 28.83 KG/M2 | WEIGHT: 201.38 LBS | HEIGHT: 70 IN | DIASTOLIC BLOOD PRESSURE: 74 MMHG

## 2022-11-17 DIAGNOSIS — Z01.818 PREOP EXAMINATION: Primary | ICD-10-CM

## 2022-11-17 DIAGNOSIS — R11.2 INTRACTABLE VOMITING WITH NAUSEA: ICD-10-CM

## 2022-11-17 PROBLEM — G43.719 INTRACTABLE CHRONIC MIGRAINE WITHOUT AURA AND WITHOUT STATUS MIGRAINOSUS: Status: RESOLVED | Noted: 2021-12-30 | Resolved: 2022-11-17

## 2022-11-17 PROBLEM — F31.9 BIPOLAR DISORDER: Status: ACTIVE | Noted: 2022-01-16

## 2022-11-17 PROBLEM — T81.31XD: Status: RESOLVED | Noted: 2019-05-21 | Resolved: 2022-11-17

## 2022-11-17 PROBLEM — I70.0 THORACIC AORTIC ATHEROSCLEROSIS: Chronic | Status: ACTIVE | Noted: 2022-10-17

## 2022-11-17 PROBLEM — D72.829 LEUKOCYTOSIS: Status: RESOLVED | Noted: 2020-08-21 | Resolved: 2022-11-17

## 2022-11-17 PROBLEM — E11.49 TYPE II DIABETES MELLITUS WITH NEUROLOGICAL MANIFESTATIONS: Status: RESOLVED | Noted: 2019-11-21 | Resolved: 2022-11-17

## 2022-11-17 PROBLEM — F17.200 NICOTINE DEPENDENCE, UNSPECIFIED, UNCOMPLICATED: Status: ACTIVE | Noted: 2022-01-16

## 2022-11-17 PROBLEM — I10 ESSENTIAL HYPERTENSION: Chronic | Status: ACTIVE | Noted: 2019-10-09

## 2022-11-17 PROBLEM — D50.0 ANEMIA DUE TO CHRONIC BLOOD LOSS: Status: RESOLVED | Noted: 2019-10-09 | Resolved: 2022-11-17

## 2022-11-17 PROBLEM — D50.9 IDA (IRON DEFICIENCY ANEMIA): Status: RESOLVED | Noted: 2019-12-10 | Resolved: 2022-11-17

## 2022-11-17 PROBLEM — E23.7 PITUITARY ABNORMALITY: Status: RESOLVED | Noted: 2022-01-13 | Resolved: 2022-11-17

## 2022-11-17 PROBLEM — M21.6X1 EQUINUS DEFORMITY OF BOTH FEET: Chronic | Status: ACTIVE | Noted: 2019-11-21

## 2022-11-17 PROBLEM — R55 SYNCOPE: Status: RESOLVED | Noted: 2022-01-11 | Resolved: 2022-11-17

## 2022-11-17 PROBLEM — I11.9 HYPERTENSIVE HEART DISEASE WITHOUT HEART FAILURE: Chronic | Status: ACTIVE | Noted: 2022-01-16

## 2022-11-17 PROBLEM — Z71.89 ACP (ADVANCE CARE PLANNING): Status: RESOLVED | Noted: 2022-02-02 | Resolved: 2022-11-17

## 2022-11-17 PROBLEM — M21.40 FLAT FOOT: Chronic | Status: ACTIVE | Noted: 2019-11-21

## 2022-11-17 PROBLEM — I73.9 PVD (PERIPHERAL VASCULAR DISEASE): Chronic | Status: ACTIVE | Noted: 2019-11-21

## 2022-11-17 PROBLEM — K92.2 ACUTE LOWER GI BLEEDING: Status: RESOLVED | Noted: 2019-12-13 | Resolved: 2022-11-17

## 2022-11-17 PROBLEM — I20.89 ANGINA OF EFFORT: Status: RESOLVED | Noted: 2021-06-22 | Resolved: 2022-11-17

## 2022-11-17 PROBLEM — B37.2 CANDIDAL INTERTRIGO: Status: RESOLVED | Noted: 2019-11-11 | Resolved: 2022-11-17

## 2022-11-17 PROBLEM — G93.0 CEREBRAL CYST: Status: ACTIVE | Noted: 2022-01-13

## 2022-11-17 PROBLEM — M20.42 HAMMER TOES OF BOTH FEET: Chronic | Status: ACTIVE | Noted: 2019-11-21

## 2022-11-17 PROBLEM — E08.42 DIABETIC POLYNEUROPATHY ASSOCIATED WITH DIABETES MELLITUS DUE TO UNDERLYING CONDITION: Chronic | Status: ACTIVE | Noted: 2019-11-21

## 2022-11-17 PROBLEM — I25.10 CAD IN NATIVE ARTERY: Chronic | Status: ACTIVE | Noted: 2019-09-04

## 2022-11-17 PROBLEM — T81.30XA DEHISCENCE OF WOUND: Status: RESOLVED | Noted: 2019-03-21 | Resolved: 2022-11-17

## 2022-11-17 PROBLEM — R46.0 POOR HYGIENE: Status: RESOLVED | Noted: 2019-05-26 | Resolved: 2022-11-17

## 2022-11-17 PROBLEM — I25.10 ATHEROSCLEROSIS OF CORONARY ARTERY WITHOUT ANGINA PECTORIS: Status: ACTIVE | Noted: 2022-01-16

## 2022-11-17 PROBLEM — U07.1 COVID-19 VIRUS INFECTION: Status: RESOLVED | Noted: 2022-02-15 | Resolved: 2022-11-17

## 2022-11-17 PROBLEM — E11.51 TYPE 2 DIABETES MELLITUS WITH DIABETIC PERIPHERAL ANGIOPATHY WITHOUT GANGRENE: Status: ACTIVE | Noted: 2022-01-16

## 2022-11-17 PROBLEM — F17.200 NICOTINE DEPENDENCE, UNSPECIFIED, UNCOMPLICATED: Chronic | Status: ACTIVE | Noted: 2022-01-16

## 2022-11-17 PROBLEM — E11.51 TYPE 2 DIABETES MELLITUS WITH DIABETIC PERIPHERAL ANGIOPATHY WITHOUT GANGRENE: Chronic | Status: ACTIVE | Noted: 2022-01-16

## 2022-11-17 PROBLEM — M21.6X2 EQUINUS DEFORMITY OF BOTH FEET: Chronic | Status: ACTIVE | Noted: 2019-11-21

## 2022-11-17 PROBLEM — M20.41 HAMMER TOES OF BOTH FEET: Chronic | Status: ACTIVE | Noted: 2019-11-21

## 2022-11-17 PROBLEM — R23.4 FISSURE IN SKIN OF FOOT: Status: RESOLVED | Noted: 2019-03-21 | Resolved: 2022-11-17

## 2022-11-17 PROBLEM — S99.921A INJURY OF RIGHT FOOT: Status: RESOLVED | Noted: 2019-09-16 | Resolved: 2022-11-17

## 2022-11-17 PROBLEM — G93.0 CEREBRAL CYST: Status: RESOLVED | Noted: 2022-01-13 | Resolved: 2022-11-17

## 2022-11-17 PROBLEM — I35.0 AORTIC VALVE STENOSIS: Chronic | Status: ACTIVE | Noted: 2022-02-28

## 2022-11-17 PROBLEM — E11.49 TYPE II DIABETES MELLITUS WITH NEUROLOGICAL MANIFESTATIONS: Chronic | Status: ACTIVE | Noted: 2019-11-21

## 2022-11-17 PROBLEM — N45.2 ORCHITIS: Status: RESOLVED | Noted: 2019-11-09 | Resolved: 2022-11-17

## 2022-11-17 PROBLEM — K92.2 GASTROINTESTINAL HEMORRHAGE: Status: RESOLVED | Noted: 2019-12-13 | Resolved: 2022-11-17

## 2022-11-17 PROBLEM — D50.9 MICROCYTIC ANEMIA: Status: RESOLVED | Noted: 2019-10-09 | Resolved: 2022-11-17

## 2022-11-17 PROBLEM — F17.200 TOBACCO USE DISORDER: Chronic | Status: ACTIVE | Noted: 2019-12-14

## 2022-11-17 PROBLEM — F31.9 BIPOLAR DISORDER: Status: RESOLVED | Noted: 2022-01-16 | Resolved: 2022-11-17

## 2022-11-17 PROBLEM — Z98.890 POSTOPERATIVE STATE: Status: RESOLVED | Noted: 2019-05-05 | Resolved: 2022-11-17

## 2022-11-17 PROBLEM — I25.10 ATHEROSCLEROSIS OF CORONARY ARTERY WITHOUT ANGINA PECTORIS: Status: RESOLVED | Noted: 2022-01-16 | Resolved: 2022-11-17

## 2022-11-17 PROBLEM — R51.9 HEADACHE: Status: RESOLVED | Noted: 2021-12-30 | Resolved: 2022-11-17

## 2022-11-17 PROBLEM — I11.9 HYPERTENSIVE HEART DISEASE WITHOUT HEART FAILURE: Status: ACTIVE | Noted: 2022-01-16

## 2022-11-17 PROBLEM — Z86.31 HX OF DIABETIC FOOT ULCER: Chronic | Status: ACTIVE | Noted: 2019-11-21

## 2022-11-17 PROCEDURE — 3008F BODY MASS INDEX DOCD: CPT | Mod: CPTII,S$GLB,, | Performed by: STUDENT IN AN ORGANIZED HEALTH CARE EDUCATION/TRAINING PROGRAM

## 2022-11-17 PROCEDURE — 99999 PR PBB SHADOW E&M-EST. PATIENT-LVL IV: CPT | Mod: PBBFAC,,, | Performed by: STUDENT IN AN ORGANIZED HEALTH CARE EDUCATION/TRAINING PROGRAM

## 2022-11-17 PROCEDURE — 1160F RVW MEDS BY RX/DR IN RCRD: CPT | Mod: CPTII,S$GLB,, | Performed by: STUDENT IN AN ORGANIZED HEALTH CARE EDUCATION/TRAINING PROGRAM

## 2022-11-17 PROCEDURE — 3044F PR MOST RECENT HEMOGLOBIN A1C LEVEL <7.0%: ICD-10-PCS | Mod: CPTII,S$GLB,, | Performed by: STUDENT IN AN ORGANIZED HEALTH CARE EDUCATION/TRAINING PROGRAM

## 2022-11-17 PROCEDURE — 3074F PR MOST RECENT SYSTOLIC BLOOD PRESSURE < 130 MM HG: ICD-10-PCS | Mod: CPTII,S$GLB,, | Performed by: STUDENT IN AN ORGANIZED HEALTH CARE EDUCATION/TRAINING PROGRAM

## 2022-11-17 PROCEDURE — 3078F PR MOST RECENT DIASTOLIC BLOOD PRESSURE < 80 MM HG: ICD-10-PCS | Mod: CPTII,S$GLB,, | Performed by: STUDENT IN AN ORGANIZED HEALTH CARE EDUCATION/TRAINING PROGRAM

## 2022-11-17 PROCEDURE — 3061F PR NEG MICROALBUMINURIA RESULT DOCUMENTED/REVIEW: ICD-10-PCS | Mod: CPTII,S$GLB,, | Performed by: STUDENT IN AN ORGANIZED HEALTH CARE EDUCATION/TRAINING PROGRAM

## 2022-11-17 PROCEDURE — 3066F NEPHROPATHY DOC TX: CPT | Mod: CPTII,S$GLB,, | Performed by: STUDENT IN AN ORGANIZED HEALTH CARE EDUCATION/TRAINING PROGRAM

## 2022-11-17 PROCEDURE — 99213 OFFICE O/P EST LOW 20 MIN: CPT | Mod: S$GLB,,, | Performed by: STUDENT IN AN ORGANIZED HEALTH CARE EDUCATION/TRAINING PROGRAM

## 2022-11-17 PROCEDURE — 3078F DIAST BP <80 MM HG: CPT | Mod: CPTII,S$GLB,, | Performed by: STUDENT IN AN ORGANIZED HEALTH CARE EDUCATION/TRAINING PROGRAM

## 2022-11-17 PROCEDURE — 1160F PR REVIEW ALL MEDS BY PRESCRIBER/CLIN PHARMACIST DOCUMENTED: ICD-10-PCS | Mod: CPTII,S$GLB,, | Performed by: STUDENT IN AN ORGANIZED HEALTH CARE EDUCATION/TRAINING PROGRAM

## 2022-11-17 PROCEDURE — 3008F PR BODY MASS INDEX (BMI) DOCUMENTED: ICD-10-PCS | Mod: CPTII,S$GLB,, | Performed by: STUDENT IN AN ORGANIZED HEALTH CARE EDUCATION/TRAINING PROGRAM

## 2022-11-17 PROCEDURE — 99999 PR PBB SHADOW E&M-EST. PATIENT-LVL IV: ICD-10-PCS | Mod: PBBFAC,,, | Performed by: STUDENT IN AN ORGANIZED HEALTH CARE EDUCATION/TRAINING PROGRAM

## 2022-11-17 PROCEDURE — 99213 PR OFFICE/OUTPT VISIT, EST, LEVL III, 20-29 MIN: ICD-10-PCS | Mod: S$GLB,,, | Performed by: STUDENT IN AN ORGANIZED HEALTH CARE EDUCATION/TRAINING PROGRAM

## 2022-11-17 PROCEDURE — 1159F MED LIST DOCD IN RCRD: CPT | Mod: CPTII,S$GLB,, | Performed by: STUDENT IN AN ORGANIZED HEALTH CARE EDUCATION/TRAINING PROGRAM

## 2022-11-17 PROCEDURE — 3061F NEG MICROALBUMINURIA REV: CPT | Mod: CPTII,S$GLB,, | Performed by: STUDENT IN AN ORGANIZED HEALTH CARE EDUCATION/TRAINING PROGRAM

## 2022-11-17 PROCEDURE — 3044F HG A1C LEVEL LT 7.0%: CPT | Mod: CPTII,S$GLB,, | Performed by: STUDENT IN AN ORGANIZED HEALTH CARE EDUCATION/TRAINING PROGRAM

## 2022-11-17 PROCEDURE — 1159F PR MEDICATION LIST DOCUMENTED IN MEDICAL RECORD: ICD-10-PCS | Mod: CPTII,S$GLB,, | Performed by: STUDENT IN AN ORGANIZED HEALTH CARE EDUCATION/TRAINING PROGRAM

## 2022-11-17 PROCEDURE — 3066F PR DOCUMENTATION OF TREATMENT FOR NEPHROPATHY: ICD-10-PCS | Mod: CPTII,S$GLB,, | Performed by: STUDENT IN AN ORGANIZED HEALTH CARE EDUCATION/TRAINING PROGRAM

## 2022-11-17 PROCEDURE — 3074F SYST BP LT 130 MM HG: CPT | Mod: CPTII,S$GLB,, | Performed by: STUDENT IN AN ORGANIZED HEALTH CARE EDUCATION/TRAINING PROGRAM

## 2022-11-17 RX ORDER — SUCRALFATE 1 G/1
1 TABLET ORAL
Qty: 120 TABLET | Refills: 0 | OUTPATIENT
Start: 2022-11-17 | End: 2022-12-17

## 2022-11-17 NOTE — TELEPHONE ENCOUNTER
----- Message from Pooja Kendrick sent at 11/17/2022 11:29 AM CST -----  Regarding: cancel procedure  Contact: patient  Type:  Sooner Appointment Request    Caller is requesting a sooner appointment.  Caller declined first available appointment listed below.  Caller will not accept being placed on the waitlist and is requesting a message be sent to doctor.    Name of Caller:  patient  When is the first available appointment?  11/30/22  Symptoms:  procedure  Best Call Back Number:  [phone] (home)   Additional Information:  Patient needs to reschedule surgery. He will be out of town until 12/08/22. Please call patient to advise.Thanks!

## 2022-11-17 NOTE — PROGRESS NOTES
Subjective:      Patient ID: Ryan Crane is a 57 y.o. male    Chief Complaint   Patient presents with    Follow-up    Annual Exam    Pre-op Exam     HPI  57 y.o. male with a PMHx as documented below presents to clinic today pre-op visit. Patient is scheduled for excision of mass with Dr. Pratt on 11/18/22.   - Pt denies chest pain at rest or w/ exertion, dyspnea at rest, PND, claudication.  - Pt endorses dyspnea w/ exertion, intermittent and trace BLE edema to the ankle, rare intermittent palpitations.   - Pt has no history of CHF, recent anticoagulant or antithrombic use, personal or family history of coagulopathy.   - Pt w/ history of CAD and DMT2.  - Pt w/ history of stress test and cardiac cath as documented below.  - Pt currently smokes 10 cigarettes/day, working on smoking cessation.  - Outpatient medications reviewed as below counseled on holding anticoagulant/antithombic medications prior to surgery.   - Pt reports being able to achieve 8.91 METs activity.     Cardiac cath (8/15/22):  The left ventricular end diastolic pressure was elevated.  The pre-procedure left ventricular end diastolic pressure was 21.  Mild disease in the circumflex artery with patent stent in the LAD  The Prox RCA lesion was 50% stenosed, very small non dominant RCA.  Medical treatment.    Nuclear stress test (1/28/22):     Normal myocardial perfusion scan. There is no evidence of myocardial ischemia or infarction.    There is a  mild intensity fixed perfusion abnormality in the  wall of the left ventricle secondary to diaphragm attenuation.    The gated perfusion images showed an ejection fraction of 69% post stress.    The EKG portion of this study is negative for ischemia.    Review of Systems   Constitutional:  Negative for chills and fever.   Respiratory:  Negative for shortness of breath.    Cardiovascular:  Negative for chest pain.   Gastrointestinal:  Negative for abdominal pain, constipation, diarrhea, nausea and  vomiting.   Genitourinary:  Negative for dysuria.   Musculoskeletal:  Negative for back pain and neck pain.   Skin:  Negative for rash.   Neurological:  Negative for dizziness, sensory change, weakness and headaches.   Psychiatric/Behavioral:  Negative for depression. The patient is not nervous/anxious.      Current Outpatient Medications   Medication Instructions    albuterol (PROVENTIL/VENTOLIN HFA) 90 mcg/actuation inhaler 2 puffs, Inhalation, Every 6 hours PRN    amLODIPine (NORVASC) 2.5 mg, Oral, Daily    busPIRone (BUSPAR) 15 mg, Oral, 3 times daily    EPINEPHrine (EPIPEN) 0.3 mg, Intramuscular, Once    galcanezumab-gnlm (EMGALITY SYRINGE) 120 mg/mL Syrg Inject 1 pen (120 mg) into the skin every 28 days.    lamoTRIgine (LAMICTAL) 200 mg, Oral, Daily    meclizine (ANTIVERT) 25 mg, Oral, 3 times daily PRN    metoprolol succinate (TOPROL-XL) 25 mg, Oral, Daily    mirtazapine (REMERON) 15 mg, Oral, Nightly    nicotine (NICODERM CQ) 21 mg/24 hr 1 patch, Transdermal, Daily    nicotine polacrilex 4 MG Lozg Take up to 8 pieces daily as needed    nitroGLYCERIN (NITROSTAT) 0.4 MG SL tablet PLACE 1 TABLET UNDER THE TONGUE EVERY 5 MINUTES AS NEEDED FOR CHEST PAIN    nitroGLYCERIN (NITROSTAT) 0.3 mg, Sublingual, Every 5 min PRN    ondansetron (ZOFRAN-ODT) 4 mg, Oral, Every 8 hours PRN    pantoprazole (PROTONIX) 40 mg, Oral, 2 times daily before meals    PNEUMOVAX-23 25 mcg/0.5 mL vaccine No dose, route, or frequency recorded.    pregabalin (LYRICA) 50 mg, Oral, 3 times daily    sodium chloride 1 g, Oral, 3 times daily    sucralfate (CARAFATE) 1 g, Oral, Before meals & nightly    tadalafiL (CIALIS) 20 MG Tab Take one tablet by mouth prior to sexual activity, not to exceed 1 tablet in 72 hours    topiramate (TOPAMAX) 200 MG Tab Oral, Nightly    ziprasidone (GEODON) 160 mg, Oral, Nightly      Past Medical History:   Diagnosis Date    Angina of effort 06/22/2021    Aortic valve stenosis 02/28/2022    Arachnoid cyst of  posterior cranial fossa 01/13/2022    Asthma     Bilateral carotid bruits 06/22/2021    Bipolar disorder 01/16/2022    CAD in native artery - moderate 3V 60% by Pt history 09/04/2019    Cancer     Candidal intertrigo 11/11/2019    Cataract     Chronic schizophrenia     Colon polyps     COPD (chronic obstructive pulmonary disease)     Equinus deformity of both feet 11/21/2019    Essential hypertension 10/09/2019    Flat foot 11/21/2019    Gallstone     Gastrointestinal hemorrhage 12/13/2019    Post polypectomy bleed     General anesthetics causing adverse effect in therapeutic use     Hammer toes of both feet 11/21/2019    Headache     Hypertension     Hypertensive heart disease without heart failure 01/16/2022    Hyponatremia 01/11/2022    ELAINE (iron deficiency anemia) 12/10/2019    Intractable chronic migraine without aura and without status migrainosus 12/30/2021    20 year history of migraine headaches. Headaches are typically moderate to severe in intensity, worsen with activity, pounding in quality and associated with sensitivity to light and sound. Given history of reported brain tumor, agree with head CT today. Cannot do MRI due to metal clips.   Galcanezumab (Emgality) treatment was approved for the prevention of acute and chronic migraine on 9/27/2018.    MI, old     Microcytic anemia 10/09/2019    Nicotine dependence, unspecified, uncomplicated 01/16/2022    Onychomycosis due to dermatophyte 11/21/2019    Orchitis 11/09/2019    PIPER on CPAP     Peripheral visual field defect, bilateral 01/13/2022    PVD (peripheral vascular disease) 11/21/2019    Schizophrenia     Seizures 2008    Stroke 2005    Thoracic aortic atherosclerosis 10/17/2022    CT chest    Thyroid disease     took medicine in the past    Tinea pedis of right foot 05/21/2019    Tobacco use disorder 12/14/2019    Type 2 diabetes mellitus with diabetic peripheral angiopathy without gangrene 01/16/2022     Past Surgical History:   Procedure  Laterality Date    ANGIOGRAM, CORONARY, WITH LEFT HEART CATHETERIZATION  08/25/2022    Procedure: Angiogram, Coronary, with Left Heart Cath;  Surgeon: Mai Deleon MD;  Location: [facility] CATH;  Service: Cardiology;;    APPENDECTOMY      BONE MARROW ASPIRATION Left 08/21/2020    Procedure: ASPIRATION, BONE MARROW;  Surgeon: Lex Kathleen MD;  Location: [facility] OR;  Service: Oncology;  Laterality: Left;    CLOSURE OF WOUND Right 09/09/2019    Procedure: CLOSURE, WOUND;  Surgeon: Li Kathleen DPM;  Location: [facility] OR;  Service: Podiatry;  Laterality: Right;    COLONOSCOPY N/A 12/10/2019    Procedure: COLONOSCOPY;  Surgeon: Ryan Lucas MD;  Location: [facility];  Service: Endoscopy;  Laterality: N/A;    COLONOSCOPY N/A 12/13/2019    Procedure: COLONOSCOPY;  Surgeon: Ryan Lucas MD;  Location: [facility];  Service: Endoscopy;  Laterality: N/A;    CORONARY STENT PLACEMENT      ESOPHAGOGASTRODUODENOSCOPY N/A 12/10/2019    Procedure: EGD (ESOPHAGOGASTRODUODENOSCOPY);  Surgeon: Ryan Lucas MD;  Location: [facility];  Service: Endoscopy;  Laterality: N/A;    ESOPHAGOGASTRODUODENOSCOPY N/A 11/3/2022    Procedure: EGD (ESOPHAGOGASTRODUODENOSCOPY);  Surgeon: Ryan Lucas MD;  Location: [facility];  Service: Endoscopy;  Laterality: N/A;    LEFT HEART CATHETERIZATION Left 08/25/2022    Procedure: Left heart cath;  Surgeon: Mai Deleon MD;  Location: [facility] CATH;  Service: Cardiology;  Laterality: Left;    right heel surgery      SHOULDER ARTHROSCOPY Left      Review of patient's allergies indicates:   Allergen Reactions    Alcohol Anaphylaxis     Pt states all types of alcohol    Alcohol antiseptic pads Anaphylaxis    Cephalexin Anaphylaxis     Family History   Problem Relation Age of Onset    Melanoma Mother     Diabetes Mother     Hypertension Mother     Stroke Father     Diabetes Father     Hypertension Father     Colon cancer Father         unsure of age of diagnosis    Cancer Father         colon  "cancer    Cervical cancer Sister     Cirrhosis Brother     Hypertension Brother     Lung cancer Maternal Grandmother     Prostate cancer Maternal Grandfather     Crohn's disease Neg Hx     Ulcerative colitis Neg Hx     Stomach cancer Neg Hx     Esophageal cancer Neg Hx     Retinal detachment Neg Hx     Strabismus Neg Hx     Macular degeneration Neg Hx     Glaucoma Neg Hx      Social History     Tobacco Use    Smoking status: Every Day     Packs/day: 0.25     Years: 43.00     Pack years: 10.75     Types: Cigarettes     Start date: 1979    Smokeless tobacco: Never    Tobacco comments:     Age Started 14    Substance Use Topics    Alcohol use: No    Drug use: Not Currently     Types: Marijuana     Currently on File with [facility]:   Most Recent Immunizations   Administered Date(s) Administered    Influenza - Quadrivalent - PF *Preferred* (6 months and older) 02/04/2022    Pneumococcal Polysaccharide - 23 Valent 08/04/2022    Tdap 08/29/2019     Objective:      Vitals:    11/17/22 1035   BP: 128/74   BP Location: Right arm   Patient Position: Sitting   Resp: 18   Weight: 91.3 kg (201 lb 6.2 oz)   Height: 5' 10" (1.778 m)     Body mass index is 28.9 kg/m².    Physical Exam  Vitals reviewed.   Constitutional:       General: He is not in acute distress.  HENT:      Head: Normocephalic and atraumatic.   Cardiovascular:      Rate and Rhythm: Normal rate.   Pulmonary:      Effort: Pulmonary effort is normal. No respiratory distress.   Neurological:      General: No focal deficit present.      Mental Status: He is alert and oriented to person, place, and time. Mental status is at baseline.      Assessment:       1. Preop examination      Plan:       Ryna was seen today for follow-up, annual exam and pre-op exam.    Diagnoses and all orders for this visit:    Preop examination     Evaluating Risk in Surgery is a combination of patients risk factors and surgical risk factors. Patient is being evaluated for medical " optimization and not surgical clearance. MACE is the major adverse cardiovascular event risk, and can be assessed based on the Revised Cardiac Risk Index & NSQIP AKIRA scores.    RCRI: Class III risk, 10.1% risk of death, MI, or cardiac arrest  NSQIP AKIRA: 0.5% risk of MI or cardiac arrest, intraoperatively or up to 30 days post-op.    Surgical Risks: Vascular/cardiothoracic/emergent surgeries are high risk and carry a >5% risk of MI. Head & Neck/Abdominal/Orthopedic/Prostate surgeries are intermediate risk and carry a 1-5% risk of MI. Endoscopic/Cataract/Plastic/Breast surgeries are low risk and carry <1% risk of MI.     Per ACC/AHA neri-operative guidelines, moderate risk surgery and METS >=4 is low risk for MACE and no further cardiac testing is required.     Per evaluation, patient is intermediate risk for an intermediate risk surgery. Patient is medically optimized for surgery at this time.    Counseled patient on importance of smoking cessation and diabetes management, especially in the post-op period.      Follow-up as needed.    Ana Mayer MD  [facility]  Office: [phone]   Fax: [phone]  11/17/2022

## 2022-11-18 ENCOUNTER — TELEPHONE (OUTPATIENT)
Dept: GASTROENTEROLOGY | Facility: CLINIC | Age: 57
End: 2022-11-18
Payer: MEDICARE

## 2022-11-18 ENCOUNTER — ANESTHESIA (OUTPATIENT)
Dept: SURGERY | Facility: HOSPITAL | Age: 57
End: 2022-11-18
Payer: MEDICARE

## 2022-11-18 ENCOUNTER — HOSPITAL ENCOUNTER (OUTPATIENT)
Facility: HOSPITAL | Age: 57
Discharge: HOME | End: 2022-11-18
Attending: STUDENT IN AN ORGANIZED HEALTH CARE EDUCATION/TRAINING PROGRAM | Admitting: STUDENT IN AN ORGANIZED HEALTH CARE EDUCATION/TRAINING PROGRAM
Payer: MEDICARE

## 2022-11-18 VITALS
WEIGHT: 201 LBS | HEART RATE: 88 BPM | TEMPERATURE: 98 F | SYSTOLIC BLOOD PRESSURE: 124 MMHG | OXYGEN SATURATION: 98 % | RESPIRATION RATE: 14 BRPM | HEIGHT: 70 IN | DIASTOLIC BLOOD PRESSURE: 71 MMHG | BODY MASS INDEX: 28.77 KG/M2

## 2022-11-18 DIAGNOSIS — M79.671 INTRACTABLE RIGHT HEEL PAIN: Primary | ICD-10-CM

## 2022-11-18 DIAGNOSIS — M79.89 PALPABLE MASS OF SOFT TISSUE OF FOOT: ICD-10-CM

## 2022-11-18 DIAGNOSIS — R22.41 MASS OF RIGHT FOOT: ICD-10-CM

## 2022-11-18 DIAGNOSIS — M79.89 MASS OF SOFT TISSUE OF RIGHT LOWER EXTREMITY: ICD-10-CM

## 2022-11-18 PROCEDURE — 63600175 PHARM REV CODE 636 W HCPCS: Mod: PO | Performed by: NURSE ANESTHETIST, CERTIFIED REGISTERED

## 2022-11-18 PROCEDURE — 25000003 PHARM REV CODE 250: Mod: PO | Performed by: STUDENT IN AN ORGANIZED HEALTH CARE EDUCATION/TRAINING PROGRAM

## 2022-11-18 PROCEDURE — 71000033 HC RECOVERY, INTIAL HOUR: Mod: PO | Performed by: STUDENT IN AN ORGANIZED HEALTH CARE EDUCATION/TRAINING PROGRAM

## 2022-11-18 PROCEDURE — D9220A PRA ANESTHESIA: ICD-10-PCS | Mod: ANES,,, | Performed by: ANESTHESIOLOGY

## 2022-11-18 PROCEDURE — 88341 PR IHC OR ICC EACH ADD'L SINGLE ANTIBODY  STAINPR: ICD-10-PCS | Mod: 26,,, | Performed by: PATHOLOGY

## 2022-11-18 PROCEDURE — 71000015 HC POSTOP RECOV 1ST HR: Mod: PO | Performed by: STUDENT IN AN ORGANIZED HEALTH CARE EDUCATION/TRAINING PROGRAM

## 2022-11-18 PROCEDURE — 88342 IMHCHEM/IMCYTCHM 1ST ANTB: CPT | Performed by: PATHOLOGY

## 2022-11-18 PROCEDURE — 88342 IMHCHEM/IMCYTCHM 1ST ANTB: CPT | Mod: 26,,, | Performed by: PATHOLOGY

## 2022-11-18 PROCEDURE — D9220A PRA ANESTHESIA: Mod: CRNA,,, | Performed by: NURSE ANESTHETIST, CERTIFIED REGISTERED

## 2022-11-18 PROCEDURE — 37000009 HC ANESTHESIA EA ADD 15 MINS: Mod: PO | Performed by: STUDENT IN AN ORGANIZED HEALTH CARE EDUCATION/TRAINING PROGRAM

## 2022-11-18 PROCEDURE — 88307 TISSUE EXAM BY PATHOLOGIST: CPT | Mod: 26,,, | Performed by: PATHOLOGY

## 2022-11-18 PROCEDURE — 28041 EXC FOOT/TOE TUM DEP 1.5CM/>: CPT | Mod: RT,,, | Performed by: STUDENT IN AN ORGANIZED HEALTH CARE EDUCATION/TRAINING PROGRAM

## 2022-11-18 PROCEDURE — 37000008 HC ANESTHESIA 1ST 15 MINUTES: Mod: PO | Performed by: STUDENT IN AN ORGANIZED HEALTH CARE EDUCATION/TRAINING PROGRAM

## 2022-11-18 PROCEDURE — D9220A PRA ANESTHESIA: ICD-10-PCS | Mod: CRNA,,, | Performed by: NURSE ANESTHETIST, CERTIFIED REGISTERED

## 2022-11-18 PROCEDURE — 88341 IMHCHEM/IMCYTCHM EA ADD ANTB: CPT | Mod: 26,,, | Performed by: PATHOLOGY

## 2022-11-18 PROCEDURE — 36000706: Mod: PO | Performed by: STUDENT IN AN ORGANIZED HEALTH CARE EDUCATION/TRAINING PROGRAM

## 2022-11-18 PROCEDURE — 63600175 PHARM REV CODE 636 W HCPCS: Mod: PO | Performed by: ANESTHESIOLOGY

## 2022-11-18 PROCEDURE — 88342 CHG IMMUNOCYTOCHEMISTRY: ICD-10-PCS | Mod: 26,,, | Performed by: PATHOLOGY

## 2022-11-18 PROCEDURE — 28041 PR EXC TUMOR SOFT TISSUE FOOT/TOE SUBFASC 1.5+CM: ICD-10-PCS | Mod: RT,,, | Performed by: STUDENT IN AN ORGANIZED HEALTH CARE EDUCATION/TRAINING PROGRAM

## 2022-11-18 PROCEDURE — D9220A PRA ANESTHESIA: Mod: ANES,,, | Performed by: ANESTHESIOLOGY

## 2022-11-18 PROCEDURE — 88304 TISSUE EXAM BY PATHOLOGIST: CPT | Performed by: PATHOLOGY

## 2022-11-18 PROCEDURE — 88307 PR  SURG PATH,LEVEL V: ICD-10-PCS | Mod: 26,,, | Performed by: PATHOLOGY

## 2022-11-18 PROCEDURE — 36000707: Mod: PO | Performed by: STUDENT IN AN ORGANIZED HEALTH CARE EDUCATION/TRAINING PROGRAM

## 2022-11-18 RX ORDER — CLINDAMYCIN PHOSPHATE 900 MG/50ML
900 INJECTION, SOLUTION INTRAVENOUS
Status: COMPLETED | OUTPATIENT
Start: 2022-11-18 | End: 2022-11-18

## 2022-11-18 RX ORDER — FENTANYL CITRATE 50 UG/ML
25 INJECTION, SOLUTION INTRAMUSCULAR; INTRAVENOUS EVERY 5 MIN PRN
Status: DISCONTINUED | OUTPATIENT
Start: 2022-11-18 | End: 2022-11-18 | Stop reason: HOSPADM

## 2022-11-18 RX ORDER — OXYCODONE AND ACETAMINOPHEN 5; 325 MG/1; MG/1
1 TABLET ORAL EVERY 4 HOURS PRN
Qty: 42 TABLET | Refills: 0 | Status: SHIPPED | OUTPATIENT
Start: 2022-11-18 | End: 2022-11-25

## 2022-11-18 RX ORDER — ONDANSETRON 2 MG/ML
INJECTION INTRAMUSCULAR; INTRAVENOUS
Status: DISCONTINUED | OUTPATIENT
Start: 2022-11-18 | End: 2022-11-18

## 2022-11-18 RX ORDER — KETOROLAC TROMETHAMINE 30 MG/ML
INJECTION, SOLUTION INTRAMUSCULAR; INTRAVENOUS
Status: DISCONTINUED | OUTPATIENT
Start: 2022-11-18 | End: 2022-11-18

## 2022-11-18 RX ORDER — LIDOCAINE HYDROCHLORIDE 10 MG/ML
1 INJECTION, SOLUTION EPIDURAL; INFILTRATION; INTRACAUDAL; PERINEURAL ONCE
Status: DISCONTINUED | OUTPATIENT
Start: 2022-11-18 | End: 2022-11-18 | Stop reason: HOSPADM

## 2022-11-18 RX ORDER — BUPIVACAINE HYDROCHLORIDE 5 MG/ML
INJECTION, SOLUTION EPIDURAL; INTRACAUDAL
Status: DISCONTINUED | OUTPATIENT
Start: 2022-11-18 | End: 2022-11-18 | Stop reason: HOSPADM

## 2022-11-18 RX ORDER — PROPOFOL 10 MG/ML
VIAL (ML) INTRAVENOUS CONTINUOUS PRN
Status: DISCONTINUED | OUTPATIENT
Start: 2022-11-18 | End: 2022-11-18

## 2022-11-18 RX ORDER — FENTANYL CITRATE 50 UG/ML
INJECTION, SOLUTION INTRAMUSCULAR; INTRAVENOUS
Status: DISCONTINUED | OUTPATIENT
Start: 2022-11-18 | End: 2022-11-18

## 2022-11-18 RX ORDER — LIDOCAINE HYDROCHLORIDE 10 MG/ML
INJECTION INFILTRATION; PERINEURAL
Status: DISCONTINUED | OUTPATIENT
Start: 2022-11-18 | End: 2022-11-18 | Stop reason: HOSPADM

## 2022-11-18 RX ORDER — HYDROCODONE BITARTRATE AND ACETAMINOPHEN 5; 325 MG/1; MG/1
1 TABLET ORAL EVERY 4 HOURS PRN
Status: DISCONTINUED | OUTPATIENT
Start: 2022-11-18 | End: 2022-11-18 | Stop reason: HOSPADM

## 2022-11-18 RX ORDER — MIDAZOLAM HYDROCHLORIDE 1 MG/ML
INJECTION, SOLUTION INTRAMUSCULAR; INTRAVENOUS
Status: DISCONTINUED | OUTPATIENT
Start: 2022-11-18 | End: 2022-11-18

## 2022-11-18 RX ORDER — OXYCODONE HYDROCHLORIDE 5 MG/1
5 TABLET ORAL
Status: DISCONTINUED | OUTPATIENT
Start: 2022-11-18 | End: 2022-11-18 | Stop reason: HOSPADM

## 2022-11-18 RX ORDER — ONDANSETRON 8 MG/1
8 TABLET, ORALLY DISINTEGRATING ORAL EVERY 8 HOURS PRN
Status: DISCONTINUED | OUTPATIENT
Start: 2022-11-18 | End: 2022-11-18 | Stop reason: HOSPADM

## 2022-11-18 RX ORDER — HYDROMORPHONE HYDROCHLORIDE 2 MG/ML
0.2 INJECTION, SOLUTION INTRAMUSCULAR; INTRAVENOUS; SUBCUTANEOUS EVERY 5 MIN PRN
Status: DISCONTINUED | OUTPATIENT
Start: 2022-11-18 | End: 2022-11-18 | Stop reason: HOSPADM

## 2022-11-18 RX ORDER — SODIUM CHLORIDE, SODIUM LACTATE, POTASSIUM CHLORIDE, CALCIUM CHLORIDE 600; 310; 30; 20 MG/100ML; MG/100ML; MG/100ML; MG/100ML
INJECTION, SOLUTION INTRAVENOUS CONTINUOUS
Status: DISCONTINUED | OUTPATIENT
Start: 2022-11-18 | End: 2022-11-18 | Stop reason: HOSPADM

## 2022-11-18 RX ORDER — PROPOFOL 10 MG/ML
VIAL (ML) INTRAVENOUS
Status: DISCONTINUED | OUTPATIENT
Start: 2022-11-18 | End: 2022-11-18

## 2022-11-18 RX ADMIN — PROPOFOL 90 MG: 10 INJECTION, EMULSION INTRAVENOUS at 07:11

## 2022-11-18 RX ADMIN — FENTANYL CITRATE 25 MCG: 50 INJECTION, SOLUTION INTRAMUSCULAR; INTRAVENOUS at 07:11

## 2022-11-18 RX ADMIN — KETOROLAC TROMETHAMINE 30 MG: 30 INJECTION, SOLUTION INTRAMUSCULAR at 07:11

## 2022-11-18 RX ADMIN — ONDANSETRON 4 MG: 2 INJECTION, SOLUTION INTRAMUSCULAR; INTRAVENOUS at 07:11

## 2022-11-18 RX ADMIN — FENTANYL CITRATE 50 MCG: 50 INJECTION, SOLUTION INTRAMUSCULAR; INTRAVENOUS at 07:11

## 2022-11-18 RX ADMIN — MIDAZOLAM HYDROCHLORIDE 2 MG: 1 INJECTION, SOLUTION INTRAMUSCULAR; INTRAVENOUS at 07:11

## 2022-11-18 RX ADMIN — SODIUM CHLORIDE, SODIUM LACTATE, POTASSIUM CHLORIDE, AND CALCIUM CHLORIDE: .6; .31; .03; .02 INJECTION, SOLUTION INTRAVENOUS at 06:11

## 2022-11-18 RX ADMIN — PROPOFOL 75 MCG/KG/MIN: 10 INJECTION, EMULSION INTRAVENOUS at 07:11

## 2022-11-18 RX ADMIN — CLINDAMYCIN PHOSPHATE 900 MG: 18 INJECTION, SOLUTION INTRAVENOUS at 07:11

## 2022-11-18 NOTE — PLAN OF CARE
PIV initiated- IV fluid bolus infusing at this time per Dr. Ortez order. Pt continues to deny CP, SOB, weakness, dizziness.

## 2022-11-18 NOTE — OP NOTE
[facility] - Surgery  Operative Note      Date of Procedure: 11/18/2022     Procedure: Procedure(s) (LRB):  EXCISION, MASS (Right)     Surgeon(s) and Role:     * Дмитрий Pratt DPM - Primary    Assisting Surgeon: None    Pre-Operative Diagnosis: Intractable right heel pain [M79.671]  Recurrent soft tissue mass R foot.    Post-Operative Diagnosis: Post-Op Diagnosis Codes:     * Intractable right heel pain [M79.671]  Recurrent soft tissue mass R foot.    Anesthesia: Local MAC    Operative Findings (including complications, if any): large, firm soft tissue mass that measures 2.0x1.0x0.5cm    Description of Technical Procedures: The patient was brought to the operating room on a stretcher and transferred to the OR table. in a  supine position. Following the successful induction of MAC: tourniquet was applied to the right ankle and local block was given with 20 ml of 1% lidocaine and 0.5% marcaine plain. The right lower extremity was prepped and draped in a sterile manner.     A timeout was performed confirming the patient's identification, procedure, surgical site, medications and allergies. All were in agreement.    The tourniquet was inflated to 250mmHg.    Attention to the right plantar heel, there is a small soft tissue mass at the center of the heel. An elliptical excision was perform measure 3.0x1.5. There is a large fibrotic mass with a root that extends medially. The root was follow for about 1 cm and the decision was made to excise the root and cauterize it there as it was diving towards the tarsal tunnel.     The wound was then flushed with sterile saline solution and the wound was close primarily with 3-0 vicryl and 3-0 nylon.  The tourniquet was deflated. The incision site was dressed with  mupirocin and DSD . Patient was placed into a boot.     The patient was transferred to the recovery room with vital signs stable. Following a period of post op monitoring, the patient will be discharged home with the  following post op instructions:   1. Keep dressing dry and intact until Podiatry follow up.   2.Weight bearing status:  WB in boot as tolerated but rest for the first week.  3. All necessary prescriptions ordered and medical management will continue.   4. Contact Podiatry for all post op follow up care and if any problems arise.      Significant Surgical Tasks Conducted by the Assistant(s), if Applicable: n/a    Estimated Blood Loss (EBL): 0mL           Implants: * No implants in log *    Specimens:   Specimen (24h ago, onward)       Start     Ordered    11/18/22 0740  Specimen to Pathology, Surgery Other (podiatry)  Once        Comments: Pre-op Diagnosis: Intractable right heel pain [M79.671]Procedure(s):EXCISION, MASS Number of specimens: 1Name of specimens: 1. Soft tissue mass right heel.     References:    Click here for ordering Quick Tip   Question Answer Comment   Procedure Type: Other podiatry   Which provider would you like to cc? ALEK GARCIA    Release to patient Immediate        11/18/22 0748                            Condition: Good    Disposition: PACU - hemodynamically stable.    Attestation: I was present and scrubbed for the entire procedure.    Discharge Note    OUTCOME: Patient tolerated treatment/procedure well without complication and is now ready for discharge.    DISPOSITION: Home or Self Care    FINAL DIAGNOSIS:  <principal problem not specified>    FOLLOWUP: In clinic    DISCHARGE INSTRUCTIONS:    Discharge Procedure Orders   Diet general     Ice to affected area     Keep surgical extremity elevated     Call MD for:  extreme fatigue     Call MD for:  persistent dizziness or light-headedness     Call MD for:  hives     Call MD for:  redness, tenderness, or signs of infection (pain, swelling, redness, odor or green/yellow discharge around incision site)     Leave dressing on - Keep it clean, dry, and intact until clinic visit     Weight bearing restrictions (specify)   Order Comments:  WBAT but should minimize weightbearing for the first week.

## 2022-11-18 NOTE — TELEPHONE ENCOUNTER
----- Message from Shellie Bryan sent at 11/18/2022  2:14 PM CST -----  Contact: Patient  Type:  Test Results    Who Called:  Patient     Name of Test (Lab/Mammo/Etc):  biopsy     Date of Test:      Ordering Provider:      Where the test was performed:  [facility]     Would the patient rather a call back or a response via [facility]? Call     Best Call Back Number: [phone] (home)        Additional Information:

## 2022-11-18 NOTE — ANESTHESIA POSTPROCEDURE EVALUATION
Anesthesia Post Evaluation    Patient: Ryan Crane    Procedure(s) Performed: Procedure(s) (LRB):  EXCISION, MASS (Right)    Final Anesthesia Type: general      Patient location during evaluation: PACU  Patient participation: Yes- Able to Participate  Level of consciousness: awake and alert and oriented  Post-procedure vital signs: reviewed and stable  Pain management: adequate  Airway patency: patent    PONV status at discharge: No PONV  Anesthetic complications: no      Cardiovascular status: blood pressure returned to baseline  Respiratory status: unassisted, spontaneous ventilation and room air  Hydration status: euvolemic  Follow-up not needed.          Vitals Value Taken Time   /64 11/18/22 0804   Temp 36.6 °C (97.8 °F) 11/18/22 0804   Pulse 82 11/18/22 0804   Resp 14 11/18/22 0804   SpO2 98 % 11/18/22 0804         No case tracking events are documented in the log.      Pain/Hai Score: Hai Score: 5 (11/18/2022  8:04 AM)

## 2022-11-18 NOTE — PLAN OF CARE
Patient tolerating oral liquids without difficulty. No apparent s&s of distress noted at this time, no complaints voiced at this time. Dressing C,D,I to right foot with surgical boot in place. Discharge instructions reviewed with patient/family/friend with good verbal feedback received. Patient ready for discharge

## 2022-11-18 NOTE — TELEPHONE ENCOUNTER
Returned patient call, informed patient of biopsy results per dr. Lucas. Pt verbalized understanding

## 2022-11-18 NOTE — H&P
[facility] Surgery  Podiatry  H&P    Patient Name: Ryan Crane  MRN: [id number]  Admission Date: 11/18/2022  Attending Physician: Дмитрий Pratt DPM  Primary Care Provider: Ana Mayer MD     Subjective:     History of Present Illness:  Ryan Crane is a 57 y.o. male who  has a past medical history of Angina of effort, Aortic valve stenosis, Arachnoid cyst of posterior cranial fossa, Asthma, Bilateral carotid bruits, Bipolar disorder, CAD in native artery - moderate 3V 60% by Pt history, Cancer, Candidal intertrigo, Cataract, Chronic schizophrenia, Colon polyps, COPD (chronic obstructive pulmonary disease), Equinus deformity of both feet, Essential hypertension, Flat foot, Gallstone, Gastrointestinal hemorrhage, General anesthetics causing adverse effect in therapeutic use, Hammer toes of both feet, Headache, Hypertension, Hypertensive heart disease without heart failure, Hyponatremia, ELAINE (iron deficiency anemia), Intractable chronic migraine without aura and without status migrainosus, MI, old, Microcytic anemia, Nicotine dependence, unspecified, uncomplicated, Onychomycosis due to dermatophyte, Orchitis, PIPER on CPAP, Peripheral visual field defect, bilateral, PVD (peripheral vascular disease), Schizophrenia, Seizures, Stroke, Thoracic aortic atherosclerosis, Thyroid disease, Tinea pedis of right foot, Tobacco use disorder, and Type 2 diabetes mellitus with diabetic peripheral angiopathy without gangrene.    Patient presents for mass excision of the right heel.         Scheduled Meds:   LIDOcaine (PF) 10 mg/ml (1%)  1 mL Intradermal Once     Continuous Infusions:   electrolyte-S (pH 7.4)      lactated ringers       PRN Meds:    Review of patient's allergies indicates:   Allergen Reactions    Alcohol Anaphylaxis     Pt states all types of alcohol    Alcohol antiseptic pads Anaphylaxis    Cephalexin Anaphylaxis        Past Medical History:   Diagnosis Date    Angina of effort 06/22/2021     Aortic valve stenosis 02/28/2022    Arachnoid cyst of posterior cranial fossa 01/13/2022    Asthma     Bilateral carotid bruits 06/22/2021    Bipolar disorder 01/16/2022    CAD in native artery - moderate 3V 60% by Pt history 09/04/2019    Cancer     Candidal intertrigo 11/11/2019    Cataract     Chronic schizophrenia     Colon polyps     COPD (chronic obstructive pulmonary disease)     Equinus deformity of both feet 11/21/2019    Essential hypertension 10/09/2019    Flat foot 11/21/2019    Gallstone     Gastrointestinal hemorrhage 12/13/2019    Post polypectomy bleed     General anesthetics causing adverse effect in therapeutic use     Hammer toes of both feet 11/21/2019    Headache     Hypertension     Hypertensive heart disease without heart failure 01/16/2022    Hyponatremia 01/11/2022    ELAINE (iron deficiency anemia) 12/10/2019    Intractable chronic migraine without aura and without status migrainosus 12/30/2021    20 year history of migraine headaches. Headaches are typically moderate to severe in intensity, worsen with activity, pounding in quality and associated with sensitivity to light and sound. Given history of reported brain tumor, agree with head CT today. Cannot do MRI due to metal clips.   Galcanezumab (Emgality) treatment was approved for the prevention of acute and chronic migraine on 9/27/2018.    MI, old     Microcytic anemia 10/09/2019    Nicotine dependence, unspecified, uncomplicated 01/16/2022    Onychomycosis due to dermatophyte 11/21/2019    Orchitis 11/09/2019    PIPER on CPAP     Peripheral visual field defect, bilateral 01/13/2022    PVD (peripheral vascular disease) 11/21/2019    Schizophrenia     Seizures 2008    Stroke 2005    Thoracic aortic atherosclerosis 10/17/2022    CT chest    Thyroid disease     took medicine in the past    Tinea pedis of right foot 05/21/2019    Tobacco use disorder 12/14/2019    Type 2 diabetes mellitus with diabetic  peripheral angiopathy without gangrene 01/16/2022     Past Surgical History:   Procedure Laterality Date    ANGIOGRAM, CORONARY, WITH LEFT HEART CATHETERIZATION  08/25/2022    Procedure: Angiogram, Coronary, with Left Heart Cath;  Surgeon: Mai Deleon MD;  Location: [facility] CATH;  Service: Cardiology;;    APPENDECTOMY      BONE MARROW ASPIRATION Left 08/21/2020    Procedure: ASPIRATION, BONE MARROW;  Surgeon: Lex Kathleen MD;  Location: [facility] OR;  Service: Oncology;  Laterality: Left;    CLOSURE OF WOUND Right 09/09/2019    Procedure: CLOSURE, WOUND;  Surgeon: Li Kathleen DPM;  Location: [facility] OR;  Service: Podiatry;  Laterality: Right;    COLONOSCOPY N/A 12/10/2019    Procedure: COLONOSCOPY;  Surgeon: Ryan Lucas MD;  Location: [facility];  Service: Endoscopy;  Laterality: N/A;    COLONOSCOPY N/A 12/13/2019    Procedure: COLONOSCOPY;  Surgeon: Ryan Lucas MD;  Location: [facility] ENDO;  Service: Endoscopy;  Laterality: N/A;    CORONARY STENT PLACEMENT      ESOPHAGOGASTRODUODENOSCOPY N/A 12/10/2019    Procedure: EGD (ESOPHAGOGASTRODUODENOSCOPY);  Surgeon: Ryan Lucas MD;  Location: [facility];  Service: Endoscopy;  Laterality: N/A;    ESOPHAGOGASTRODUODENOSCOPY N/A 11/3/2022    Procedure: EGD (ESOPHAGOGASTRODUODENOSCOPY);  Surgeon: Ryan Lucas MD;  Location: [facility];  Service: Endoscopy;  Laterality: N/A;    LEFT HEART CATHETERIZATION Left 08/25/2022    Procedure: Left heart cath;  Surgeon: Mai Deleon MD;  Location: [facility] CATH;  Service: Cardiology;  Laterality: Left;    right heel surgery      SHOULDER ARTHROSCOPY Left        Family History       Problem Relation (Age of Onset)    Cancer Father    Cervical cancer Sister    Cirrhosis Brother    Colon cancer Father    Diabetes Mother, Father    Hypertension Mother, Father, Brother    Lung cancer Maternal Grandmother    Melanoma Mother    Prostate cancer Maternal Grandfather    Stroke Father          Tobacco Use    Smoking  status: Every Day     Packs/day: 0.25     Years: 43.00     Pack years: 10.75     Types: Cigarettes     Start date: 1979    Smokeless tobacco: Never    Tobacco comments:     Age Started 14    Substance and Sexual Activity    Alcohol use: No    Drug use: Not Currently     Types: Marijuana    Sexual activity: Yes     Partners: Female     Review of Systems  Objective:     Vital Signs (Most Recent):    Vital Signs (24h Range):  Resp:  [18] 18  BP: (128)/(74) 128/74     Weight: 92.1 kg (203 lb)  Body mass index is 29.13 kg/m².    Foot Exam    Right Foot/Ankle     Inspection and Palpation  Tenderness: (R heel)    Neurovascular  Dorsalis pedis: 1+  Posterior tibial: 1+  Saphenous nerve sensation: diminished  Tibial nerve sensation: diminished  Superficial peroneal nerve sensation: diminished  Deep peroneal nerve sensation: diminished  Sural nerve sensation: diminished    Comments  Large palpable mass at the plantar aspect of the right heel.         Laboratory:  None    Diagnostic Results:  I have reviewed all pertinent imaging results/findings within the past 24 hours.    Clinical Findings:  As above    Assessment/Plan:     No new Assessment & Plan notes have been filed under this hospital service since the last note was generated.  Service: Podiatry      Дмитрий Pratt DPM  Podiatry  [facility] - Surgery

## 2022-11-18 NOTE — TRANSFER OF CARE
"Anesthesia Transfer of Care Note    Patient: Ryan Crane    Procedure(s) Performed: Procedure(s) (LRB):  EXCISION, MASS (Right)    Patient location: PACU    Anesthesia Type: MAC    Transport from OR: Transported from OR on room air with adequate spontaneous ventilation    Post pain: adequate analgesia    Post assessment: no apparent anesthetic complications    Post vital signs: stable    Level of consciousness: awake    Nausea/Vomiting: no nausea/vomiting    Complications: none    Transfer of care protocol was followed      Last vitals:   Visit Vitals  BP (!) 89/50 (BP Location: Left arm, Patient Position: Sitting)   Pulse 96   Temp 36.3 °C (97.4 °F) (Skin)   Resp 16   Ht 5' 10" (1.778 m)   Wt 91.2 kg (201 lb)   SpO2 95%   BMI 28.84 kg/m²     "

## 2022-11-18 NOTE — PLAN OF CARE
Pt hypotensive in preop. BP 89/50, second reading 87/53. Pt denies any chest pain, SOB, weakness, dizziness. Speech clear, answers all questions appropriately. Dr. Ortez with Anesthesia notified and at bedside to speak with pt. States ok to proceed. All questions answered. Denies recent fever or illness. Pt states ready for procedure.

## 2022-11-18 NOTE — HPI
Ryan Crane is a 57 y.o. male who  has a past medical history of Angina of effort, Aortic valve stenosis, Arachnoid cyst of posterior cranial fossa, Asthma, Bilateral carotid bruits, Bipolar disorder, CAD in native artery - moderate 3V 60% by Pt history, Cancer, Candidal intertrigo, Cataract, Chronic schizophrenia, Colon polyps, COPD (chronic obstructive pulmonary disease), Equinus deformity of both feet, Essential hypertension, Flat foot, Gallstone, Gastrointestinal hemorrhage, General anesthetics causing adverse effect in therapeutic use, Hammer toes of both feet, Headache, Hypertension, Hypertensive heart disease without heart failure, Hyponatremia, ELAINE (iron deficiency anemia), Intractable chronic migraine without aura and without status migrainosus, MI, old, Microcytic anemia, Nicotine dependence, unspecified, uncomplicated, Onychomycosis due to dermatophyte, Orchitis, PIPER on CPAP, Peripheral visual field defect, bilateral, PVD (peripheral vascular disease), Schizophrenia, Seizures, Stroke, Thoracic aortic atherosclerosis, Thyroid disease, Tinea pedis of right foot, Tobacco use disorder, and Type 2 diabetes mellitus with diabetic peripheral angiopathy without gangrene.    Patient presents for mass excision of the right heel.

## 2022-11-18 NOTE — SUBJECTIVE & OBJECTIVE
Scheduled Meds:   LIDOcaine (PF) 10 mg/ml (1%)  1 mL Intradermal Once     Continuous Infusions:   electrolyte-S (pH 7.4)      lactated ringers       PRN Meds:    Review of patient's allergies indicates:   Allergen Reactions    Alcohol Anaphylaxis     Pt states all types of alcohol    Alcohol antiseptic pads Anaphylaxis    Cephalexin Anaphylaxis        Past Medical History:   Diagnosis Date    Angina of effort 06/22/2021    Aortic valve stenosis 02/28/2022    Arachnoid cyst of posterior cranial fossa 01/13/2022    Asthma     Bilateral carotid bruits 06/22/2021    Bipolar disorder 01/16/2022    CAD in native artery - moderate 3V 60% by Pt history 09/04/2019    Cancer     Candidal intertrigo 11/11/2019    Cataract     Chronic schizophrenia     Colon polyps     COPD (chronic obstructive pulmonary disease)     Equinus deformity of both feet 11/21/2019    Essential hypertension 10/09/2019    Flat foot 11/21/2019    Gallstone     Gastrointestinal hemorrhage 12/13/2019    Post polypectomy bleed     General anesthetics causing adverse effect in therapeutic use     Hammer toes of both feet 11/21/2019    Headache     Hypertension     Hypertensive heart disease without heart failure 01/16/2022    Hyponatremia 01/11/2022    ELAINE (iron deficiency anemia) 12/10/2019    Intractable chronic migraine without aura and without status migrainosus 12/30/2021    20 year history of migraine headaches. Headaches are typically moderate to severe in intensity, worsen with activity, pounding in quality and associated with sensitivity to light and sound. Given history of reported brain tumor, agree with head CT today. Cannot do MRI due to metal clips.   Galcanezumab (Emgality) treatment was approved for the prevention of acute and chronic migraine on 9/27/2018.    MI, old     Microcytic anemia 10/09/2019    Nicotine dependence, unspecified, uncomplicated 01/16/2022    Onychomycosis due to dermatophyte 11/21/2019    Orchitis 11/09/2019    PIPER on  CPAP     Peripheral visual field defect, bilateral 01/13/2022    PVD (peripheral vascular disease) 11/21/2019    Schizophrenia     Seizures 2008    Stroke 2005    Thoracic aortic atherosclerosis 10/17/2022    CT chest    Thyroid disease     took medicine in the past    Tinea pedis of right foot 05/21/2019    Tobacco use disorder 12/14/2019    Type 2 diabetes mellitus with diabetic peripheral angiopathy without gangrene 01/16/2022     Past Surgical History:   Procedure Laterality Date    ANGIOGRAM, CORONARY, WITH LEFT HEART CATHETERIZATION  08/25/2022    Procedure: Angiogram, Coronary, with Left Heart Cath;  Surgeon: Mai Deleon MD;  Location: [facility] CATH;  Service: Cardiology;;    APPENDECTOMY      BONE MARROW ASPIRATION Left 08/21/2020    Procedure: ASPIRATION, BONE MARROW;  Surgeon: Lex Kathleen MD;  Location: [facility] OR;  Service: Oncology;  Laterality: Left;    CLOSURE OF WOUND Right 09/09/2019    Procedure: CLOSURE, WOUND;  Surgeon: Li Kathleen DPM;  Location: [facility] OR;  Service: Podiatry;  Laterality: Right;    COLONOSCOPY N/A 12/10/2019    Procedure: COLONOSCOPY;  Surgeon: Ryan Lucas MD;  Location: [facility] ENDO;  Service: Endoscopy;  Laterality: N/A;    COLONOSCOPY N/A 12/13/2019    Procedure: COLONOSCOPY;  Surgeon: Ryan Lucas MD;  Location: [facility];  Service: Endoscopy;  Laterality: N/A;    CORONARY STENT PLACEMENT      ESOPHAGOGASTRODUODENOSCOPY N/A 12/10/2019    Procedure: EGD (ESOPHAGOGASTRODUODENOSCOPY);  Surgeon: Ryan Lucas MD;  Location: [facility] ENDO;  Service: Endoscopy;  Laterality: N/A;    ESOPHAGOGASTRODUODENOSCOPY N/A 11/3/2022    Procedure: EGD (ESOPHAGOGASTRODUODENOSCOPY);  Surgeon: Ryan Lucas MD;  Location: [facility] ENDO;  Service: Endoscopy;  Laterality: N/A;    LEFT HEART CATHETERIZATION Left 08/25/2022    Procedure: Left heart cath;  Surgeon: Mai Deleon MD;  Location: [facility] CATH;  Service: Cardiology;  Laterality: Left;    right heel surgery      SHOULDER  ARTHROSCOPY Left        Family History       Problem Relation (Age of Onset)    Cancer Father    Cervical cancer Sister    Cirrhosis Brother    Colon cancer Father    Diabetes Mother, Father    Hypertension Mother, Father, Brother    Lung cancer Maternal Grandmother    Melanoma Mother    Prostate cancer Maternal Grandfather    Stroke Father          Tobacco Use    Smoking status: Every Day     Packs/day: 0.25     Years: 43.00     Pack years: 10.75     Types: Cigarettes     Start date: 1979    Smokeless tobacco: Never    Tobacco comments:     Age Started 14    Substance and Sexual Activity    Alcohol use: No    Drug use: Not Currently     Types: Marijuana    Sexual activity: Yes     Partners: Female     Review of Systems  Objective:     Vital Signs (Most Recent):    Vital Signs (24h Range):  Resp:  [18] 18  BP: (128)/(74) 128/74     Weight: 92.1 kg (203 lb)  Body mass index is 29.13 kg/m².    Foot Exam    Right Foot/Ankle     Inspection and Palpation  Tenderness: (R heel)    Neurovascular  Dorsalis pedis: 1+  Posterior tibial: 1+  Saphenous nerve sensation: diminished  Tibial nerve sensation: diminished  Superficial peroneal nerve sensation: diminished  Deep peroneal nerve sensation: diminished  Sural nerve sensation: diminished    Comments  Large palpable mass at the plantar aspect of the right heel.         Laboratory:  None    Diagnostic Results:  I have reviewed all pertinent imaging results/findings within the past 24 hours.    Clinical Findings:  As above

## 2022-11-18 NOTE — DISCHARGE INSTRUCTIONS
FOOT SURGERY  After surgery:    DO:  Keep leg elevated until first post operative visit.  Keep ice pack on foot for 20 minutes each hour while awake for next 24-48 hours.  Keep dressing clean and dry. Do not change the bandages.  Advance diet as tolerated.   Check circulation frequently in toes by pressing down on toenail. Nail should turn white and then pink WITHIN 3 SECONDS when released.  Wear surgical shoe/boot. May remove to shower.  Do not walk without shoe/boot.  Resume home medications.    DO NOT:  Do not remove your dressing.  Do not get dressing wet.  Do not drive until released by your doctor.  Do not take additional tylenol/acetaminophen while taking narcotic pain medication that contains tylenol/acetaminophen.     CALL PHYSICIAN FOR:  Burning, or numbness of the toes not relieved by elevation of the leg.  Pale or cold toes.  Blue colored nail beds.  Redness, swelling, or bleeding.  Fever greater than 101,  Drainage (pus) from the operative site.  Pain unrelieved by pain medication.    FOR EMERGENCIES CONTACT YOUR PHYSICIAN'S OFFICE  [phone]

## 2022-11-21 ENCOUNTER — TELEPHONE (OUTPATIENT)
Dept: PODIATRY | Facility: CLINIC | Age: 57
End: 2022-11-21
Payer: MEDICARE

## 2022-11-21 NOTE — TELEPHONE ENCOUNTER
----- Message from Jessica Walters sent at 11/21/2022  3:28 PM CST -----  Contact: patient  Type: Needs Medical Advice  Who Called:  patient  Best Call Back Number: [phone]  Additional Information: requesting a call back to find out when he needs his next appt

## 2022-11-22 ENCOUNTER — CLINICAL SUPPORT (OUTPATIENT)
Dept: SMOKING CESSATION | Facility: CLINIC | Age: 57
End: 2022-11-22
Payer: COMMERCIAL

## 2022-11-22 DIAGNOSIS — F17.200 TOBACCO USE DISORDER: Primary | ICD-10-CM

## 2022-11-22 PROCEDURE — 99404 PR PREVENT COUNSEL,INDIV,60 MIN: ICD-10-PCS | Mod: S$GLB,,, | Performed by: GENERAL PRACTICE

## 2022-11-22 PROCEDURE — 99404 PREV MED CNSL INDIV APPRX 60: CPT | Mod: S$GLB,,, | Performed by: GENERAL PRACTICE

## 2022-11-22 PROCEDURE — 99999 PR PBB SHADOW E&M-EST. PATIENT-LVL III: ICD-10-PCS | Mod: PBBFAC,,,

## 2022-11-22 PROCEDURE — 99999 PR PBB SHADOW E&M-EST. PATIENT-LVL III: CPT | Mod: PBBFAC,,,

## 2022-11-22 RX ORDER — IBUPROFEN 200 MG
1 TABLET ORAL DAILY
Qty: 28 PATCH | Refills: 0 | Status: SHIPPED | OUTPATIENT
Start: 2022-11-22 | End: 2022-12-08 | Stop reason: DRUGHIGH

## 2022-11-22 RX ORDER — ASPIRIN/CALCIUM CARB/MAGNESIUM 325 MG
TABLET ORAL
Qty: 72 LOZENGE | Refills: 0 | Status: SHIPPED | OUTPATIENT
Start: 2022-11-22 | End: 2022-12-08 | Stop reason: SDUPTHER

## 2022-11-22 NOTE — PROGRESS NOTES
Individual Follow-Up Form    11/22/2022    Quit Date:     Clinical Status of Patient: Outpatient    Length of Service: 60 minutes    Continuing Medication: yes  Patches    Other Medications: lozenges     Target Symptoms: Withdrawal and medication side effects. The following were  rated moderate (3) to severe (4) on TCRS:  Moderate (3): none  Severe (4): none    Comments: Patient is currently smoking 8 cpd and using the 21 mg patch and 8 lozenges per day with no negative side effects at this time. Patient will continue with reduction and we discussed high risk situations and habitual behaviors. Patient has a follow up in 2 weeks.    Diagnosis: F17.200    Next Visit: 2 weeks

## 2022-11-22 NOTE — Clinical Note
Patient is currently smoking 8 cpd and using the 21 mg patch and 8 lozenges per day with no negative side effects at this time. Patient will continue with reduction and we discussed high risk situations and habitual behaviors. Patient has a follow up in 2 weeks.

## 2022-11-29 NOTE — TELEPHONE ENCOUNTER
Called and spoke with pt. He wanted to know when he would be getting his sutures out. I informed the pt that Dr. Pratt will take a look at them during his appointment tomorrow and make a decision then, but typically the sutures are removed about two weeks or on the 2nd post op visit. Pt acknowledged understanding.

## 2022-11-30 ENCOUNTER — OFFICE VISIT (OUTPATIENT)
Dept: PODIATRY | Facility: CLINIC | Age: 57
End: 2022-11-30
Payer: MEDICARE

## 2022-11-30 DIAGNOSIS — Z98.890 POST-OPERATIVE STATE: Primary | ICD-10-CM

## 2022-11-30 PROCEDURE — 99999 PR PBB SHADOW E&M-EST. PATIENT-LVL III: CPT | Mod: PBBFAC,,, | Performed by: STUDENT IN AN ORGANIZED HEALTH CARE EDUCATION/TRAINING PROGRAM

## 2022-11-30 PROCEDURE — 1160F RVW MEDS BY RX/DR IN RCRD: CPT | Mod: CPTII,S$GLB,, | Performed by: STUDENT IN AN ORGANIZED HEALTH CARE EDUCATION/TRAINING PROGRAM

## 2022-11-30 PROCEDURE — 3061F PR NEG MICROALBUMINURIA RESULT DOCUMENTED/REVIEW: ICD-10-PCS | Mod: CPTII,S$GLB,, | Performed by: STUDENT IN AN ORGANIZED HEALTH CARE EDUCATION/TRAINING PROGRAM

## 2022-11-30 PROCEDURE — 1159F PR MEDICATION LIST DOCUMENTED IN MEDICAL RECORD: ICD-10-PCS | Mod: CPTII,S$GLB,, | Performed by: STUDENT IN AN ORGANIZED HEALTH CARE EDUCATION/TRAINING PROGRAM

## 2022-11-30 PROCEDURE — 3044F HG A1C LEVEL LT 7.0%: CPT | Mod: CPTII,S$GLB,, | Performed by: STUDENT IN AN ORGANIZED HEALTH CARE EDUCATION/TRAINING PROGRAM

## 2022-11-30 PROCEDURE — 99024 PR POST-OP FOLLOW-UP VISIT: ICD-10-PCS | Mod: S$GLB,,, | Performed by: STUDENT IN AN ORGANIZED HEALTH CARE EDUCATION/TRAINING PROGRAM

## 2022-11-30 PROCEDURE — 3066F NEPHROPATHY DOC TX: CPT | Mod: CPTII,S$GLB,, | Performed by: STUDENT IN AN ORGANIZED HEALTH CARE EDUCATION/TRAINING PROGRAM

## 2022-11-30 PROCEDURE — 99024 POSTOP FOLLOW-UP VISIT: CPT | Mod: S$GLB,,, | Performed by: STUDENT IN AN ORGANIZED HEALTH CARE EDUCATION/TRAINING PROGRAM

## 2022-11-30 PROCEDURE — 3066F PR DOCUMENTATION OF TREATMENT FOR NEPHROPATHY: ICD-10-PCS | Mod: CPTII,S$GLB,, | Performed by: STUDENT IN AN ORGANIZED HEALTH CARE EDUCATION/TRAINING PROGRAM

## 2022-11-30 PROCEDURE — 3061F NEG MICROALBUMINURIA REV: CPT | Mod: CPTII,S$GLB,, | Performed by: STUDENT IN AN ORGANIZED HEALTH CARE EDUCATION/TRAINING PROGRAM

## 2022-11-30 PROCEDURE — 99999 PR PBB SHADOW E&M-EST. PATIENT-LVL III: ICD-10-PCS | Mod: PBBFAC,,, | Performed by: STUDENT IN AN ORGANIZED HEALTH CARE EDUCATION/TRAINING PROGRAM

## 2022-11-30 PROCEDURE — 1159F MED LIST DOCD IN RCRD: CPT | Mod: CPTII,S$GLB,, | Performed by: STUDENT IN AN ORGANIZED HEALTH CARE EDUCATION/TRAINING PROGRAM

## 2022-11-30 PROCEDURE — 3044F PR MOST RECENT HEMOGLOBIN A1C LEVEL <7.0%: ICD-10-PCS | Mod: CPTII,S$GLB,, | Performed by: STUDENT IN AN ORGANIZED HEALTH CARE EDUCATION/TRAINING PROGRAM

## 2022-11-30 PROCEDURE — 1160F PR REVIEW ALL MEDS BY PRESCRIBER/CLIN PHARMACIST DOCUMENTED: ICD-10-PCS | Mod: CPTII,S$GLB,, | Performed by: STUDENT IN AN ORGANIZED HEALTH CARE EDUCATION/TRAINING PROGRAM

## 2022-11-30 NOTE — PROGRESS NOTES
Patient returns today s/p mass excision at the bottom of the right heel. DOS (11/18/22).    Patient is doing patient is doing very well.    Pain is well controlled.    Wound margins are wellcoapted.  Sutures are intact  There are no signs of infection.     Incision was cleaned and redressed.     Patient to follow up in 1 weeks for suture removal.

## 2022-12-01 DIAGNOSIS — G43.719 INTRACTABLE CHRONIC MIGRAINE WITHOUT AURA AND WITHOUT STATUS MIGRAINOSUS: ICD-10-CM

## 2022-12-02 RX ORDER — RIMEGEPANT SULFATE 75 MG/75MG
75 TABLET, ORALLY DISINTEGRATING ORAL DAILY PRN
Qty: 16 TABLET | Refills: 11 | Status: SHIPPED | OUTPATIENT
Start: 2022-12-02 | End: 2023-02-14

## 2022-12-08 ENCOUNTER — CLINICAL SUPPORT (OUTPATIENT)
Dept: SMOKING CESSATION | Facility: CLINIC | Age: 57
End: 2022-12-08
Payer: COMMERCIAL

## 2022-12-08 DIAGNOSIS — F17.200 TOBACCO USE DISORDER: Primary | ICD-10-CM

## 2022-12-08 PROCEDURE — 99404 PREV MED CNSL INDIV APPRX 60: CPT | Mod: S$GLB,,, | Performed by: GENERAL PRACTICE

## 2022-12-08 PROCEDURE — 99404 PR PREVENT COUNSEL,INDIV,60 MIN: ICD-10-PCS | Mod: S$GLB,,, | Performed by: GENERAL PRACTICE

## 2022-12-08 PROCEDURE — 99999 PR PBB SHADOW E&M-EST. PATIENT-LVL III: ICD-10-PCS | Mod: PBBFAC,,,

## 2022-12-08 PROCEDURE — 99999 PR PBB SHADOW E&M-EST. PATIENT-LVL III: CPT | Mod: PBBFAC,,,

## 2022-12-08 RX ORDER — IBUPROFEN 200 MG
1 TABLET ORAL DAILY
Qty: 28 PATCH | Refills: 0 | Status: SHIPPED | OUTPATIENT
Start: 2022-12-08 | End: 2023-01-05 | Stop reason: SDUPTHER

## 2022-12-08 RX ORDER — ASPIRIN/CALCIUM CARB/MAGNESIUM 325 MG
TABLET ORAL
Qty: 72 LOZENGE | Refills: 0 | Status: SHIPPED | OUTPATIENT
Start: 2022-12-08 | End: 2023-01-19 | Stop reason: SDUPTHER

## 2022-12-08 NOTE — Clinical Note
Patient is currently tobacco free and using the 21 mg patch and lozenges with no negative side effects at this time. I will order the 14 mg patches and lozenges at this time. We discussed urges and cravings, new habits and relapse prevention strategies to help maintain his quit. Patient has a follow up in 3 weeks.

## 2022-12-08 NOTE — PROGRESS NOTES
Individual Follow-Up Form    12/8/2022    Quit Date: 12/7/22    Clinical Status of Patient: Outpatient    Length of Service: 60 minutes    Continuing Medication: yes  Patches    Other Medications: lozenges     Target Symptoms: Withdrawal and medication side effects. The following were  rated moderate (3) to severe (4) on TCRS:  Moderate (3): none  Severe (4): none    Comments: Patient is currently tobacco free and using the 21 mg patch and lozenges with no negative side effects at this time. I will order the 14 mg patches and lozenges at this time. We discussed urges and cravings, new habits and relapse prevention strategies to help maintain his quit. Patient has a follow up in 3 weeks.    Diagnosis: F17.200    Next Visit: 2 weeks

## 2022-12-09 LAB
FINAL PATHOLOGIC DIAGNOSIS: NORMAL
GROSS: NORMAL
Lab: NORMAL
MICROSCOPIC EXAM: NORMAL

## 2022-12-12 ENCOUNTER — OFFICE VISIT (OUTPATIENT)
Dept: PODIATRY | Facility: CLINIC | Age: 57
End: 2022-12-12
Payer: MEDICARE

## 2022-12-12 VITALS — WEIGHT: 201.06 LBS | HEIGHT: 70 IN | BODY MASS INDEX: 28.78 KG/M2

## 2022-12-12 DIAGNOSIS — B35.1 ONYCHOMYCOSIS DUE TO DERMATOPHYTE: ICD-10-CM

## 2022-12-12 DIAGNOSIS — R46.0 POOR HYGIENE: Primary | ICD-10-CM

## 2022-12-12 DIAGNOSIS — Z98.890 POST-OPERATIVE STATE: ICD-10-CM

## 2022-12-12 DIAGNOSIS — E11.49 TYPE II DIABETES MELLITUS WITH NEUROLOGICAL MANIFESTATIONS: ICD-10-CM

## 2022-12-12 DIAGNOSIS — I73.9 PVD (PERIPHERAL VASCULAR DISEASE): ICD-10-CM

## 2022-12-12 PROCEDURE — 3008F BODY MASS INDEX DOCD: CPT | Mod: HCNC,CPTII,S$GLB, | Performed by: STUDENT IN AN ORGANIZED HEALTH CARE EDUCATION/TRAINING PROGRAM

## 2022-12-12 PROCEDURE — 3066F NEPHROPATHY DOC TX: CPT | Mod: HCNC,CPTII,S$GLB, | Performed by: STUDENT IN AN ORGANIZED HEALTH CARE EDUCATION/TRAINING PROGRAM

## 2022-12-12 PROCEDURE — 99499 UNLISTED E&M SERVICE: CPT | Mod: HCNC,S$GLB,, | Performed by: STUDENT IN AN ORGANIZED HEALTH CARE EDUCATION/TRAINING PROGRAM

## 2022-12-12 PROCEDURE — 1159F PR MEDICATION LIST DOCUMENTED IN MEDICAL RECORD: ICD-10-PCS | Mod: HCNC,CPTII,S$GLB, | Performed by: STUDENT IN AN ORGANIZED HEALTH CARE EDUCATION/TRAINING PROGRAM

## 2022-12-12 PROCEDURE — 11721 ROUTINE FOOT CARE: ICD-10-PCS | Mod: Q9,HCNC,S$GLB, | Performed by: STUDENT IN AN ORGANIZED HEALTH CARE EDUCATION/TRAINING PROGRAM

## 2022-12-12 PROCEDURE — 3061F NEG MICROALBUMINURIA REV: CPT | Mod: HCNC,CPTII,S$GLB, | Performed by: STUDENT IN AN ORGANIZED HEALTH CARE EDUCATION/TRAINING PROGRAM

## 2022-12-12 PROCEDURE — 1160F PR REVIEW ALL MEDS BY PRESCRIBER/CLIN PHARMACIST DOCUMENTED: ICD-10-PCS | Mod: HCNC,CPTII,S$GLB, | Performed by: STUDENT IN AN ORGANIZED HEALTH CARE EDUCATION/TRAINING PROGRAM

## 2022-12-12 PROCEDURE — 99499 UNLISTED E&M SERVICE: CPT | Mod: S$GLB,,, | Performed by: STUDENT IN AN ORGANIZED HEALTH CARE EDUCATION/TRAINING PROGRAM

## 2022-12-12 PROCEDURE — 1159F MED LIST DOCD IN RCRD: CPT | Mod: HCNC,CPTII,S$GLB, | Performed by: STUDENT IN AN ORGANIZED HEALTH CARE EDUCATION/TRAINING PROGRAM

## 2022-12-12 PROCEDURE — 3044F HG A1C LEVEL LT 7.0%: CPT | Mod: HCNC,CPTII,S$GLB, | Performed by: STUDENT IN AN ORGANIZED HEALTH CARE EDUCATION/TRAINING PROGRAM

## 2022-12-12 PROCEDURE — 1160F RVW MEDS BY RX/DR IN RCRD: CPT | Mod: HCNC,CPTII,S$GLB, | Performed by: STUDENT IN AN ORGANIZED HEALTH CARE EDUCATION/TRAINING PROGRAM

## 2022-12-12 PROCEDURE — 99999 PR PBB SHADOW E&M-EST. PATIENT-LVL IV: CPT | Mod: PBBFAC,HCNC,, | Performed by: STUDENT IN AN ORGANIZED HEALTH CARE EDUCATION/TRAINING PROGRAM

## 2022-12-12 PROCEDURE — 99999 PR PBB SHADOW E&M-EST. PATIENT-LVL IV: ICD-10-PCS | Mod: PBBFAC,HCNC,, | Performed by: STUDENT IN AN ORGANIZED HEALTH CARE EDUCATION/TRAINING PROGRAM

## 2022-12-12 PROCEDURE — 3061F PR NEG MICROALBUMINURIA RESULT DOCUMENTED/REVIEW: ICD-10-PCS | Mod: HCNC,CPTII,S$GLB, | Performed by: STUDENT IN AN ORGANIZED HEALTH CARE EDUCATION/TRAINING PROGRAM

## 2022-12-12 PROCEDURE — 3044F PR MOST RECENT HEMOGLOBIN A1C LEVEL <7.0%: ICD-10-PCS | Mod: HCNC,CPTII,S$GLB, | Performed by: STUDENT IN AN ORGANIZED HEALTH CARE EDUCATION/TRAINING PROGRAM

## 2022-12-12 PROCEDURE — 3066F PR DOCUMENTATION OF TREATMENT FOR NEPHROPATHY: ICD-10-PCS | Mod: HCNC,CPTII,S$GLB, | Performed by: STUDENT IN AN ORGANIZED HEALTH CARE EDUCATION/TRAINING PROGRAM

## 2022-12-12 PROCEDURE — 11721 DEBRIDE NAIL 6 OR MORE: CPT | Mod: Q9,HCNC,S$GLB, | Performed by: STUDENT IN AN ORGANIZED HEALTH CARE EDUCATION/TRAINING PROGRAM

## 2022-12-12 PROCEDURE — 99499 RISK ADDL DX/OHS AUDIT: ICD-10-PCS | Mod: S$GLB,,, | Performed by: STUDENT IN AN ORGANIZED HEALTH CARE EDUCATION/TRAINING PROGRAM

## 2022-12-12 PROCEDURE — 3008F PR BODY MASS INDEX (BMI) DOCUMENTED: ICD-10-PCS | Mod: HCNC,CPTII,S$GLB, | Performed by: STUDENT IN AN ORGANIZED HEALTH CARE EDUCATION/TRAINING PROGRAM

## 2022-12-12 NOTE — PROGRESS NOTES
Subjective:      Patient ID: Ryan Crane is a 57 y.o. male.    Chief Complaint: Wound Check      HPI:  Ryan is a 57 y.o. male who presents to the clinic for evaluation and treatment of high risk feet. Ryan has a past medical history of Angina of effort (06/22/2021), Aortic valve stenosis (02/28/2022), Arachnoid cyst of posterior cranial fossa (01/13/2022), Asthma, Bilateral carotid bruits (06/22/2021), Bipolar disorder (01/16/2022), CAD in native artery - moderate 3V 60% by Pt history (09/04/2019), Cancer, Candidal intertrigo (11/11/2019), Cataract, Chronic schizophrenia, Colon polyps, COPD (chronic obstructive pulmonary disease), Equinus deformity of both feet (11/21/2019), Essential hypertension (10/09/2019), Flat foot (11/21/2019), Gallstone, Gastrointestinal hemorrhage (12/13/2019), General anesthetics causing adverse effect in therapeutic use, Hammer toes of both feet (11/21/2019), Headache, Hypertension, Hypertensive heart disease without heart failure (01/16/2022), Hyponatremia (01/11/2022), ELAINE (iron deficiency anemia) (12/10/2019), Intractable chronic migraine without aura and without status migrainosus (12/30/2021), MI, old, Microcytic anemia (10/09/2019), Nicotine dependence, unspecified, uncomplicated (01/16/2022), Onychomycosis due to dermatophyte (11/21/2019), Orchitis (11/09/2019), PIPER on CPAP, Peripheral visual field defect, bilateral (01/13/2022), PVD (peripheral vascular disease) (11/21/2019), Schizophrenia, Seizures (2008), Stroke (2005), Thoracic aortic atherosclerosis (10/17/2022), Thyroid disease, Tinea pedis of right foot (05/21/2019), Tobacco use disorder (12/14/2019), and Type 2 diabetes mellitus with diabetic peripheral angiopathy without gangrene (01/16/2022). The patient's chief complaint is long, thick toenails and right heel pain. This patient has documented high risk feet requiring routine maintenance secondary to diabetes mellitis and those secondary complications of  diabetes, as mentioned..  He reports having right heel pain, which he rates as 3/10 on the pain scale but denies having a wound.  He also informs of losing 30 lbs since his last visit without trying to lose weight and is concerned about it.  He denies trying to self-treat his heel pain or trim his toenails himself.  Patient denies any other pedal complaints at this time.    9/27/22: f/u for nail care and heel pain R.    12/12/2022: Patient returns for suture removal of the right heel.  He is also here for nail care.    PCP: Ana Mayer MD    Date Last Seen by PCP:  11/17/2022    Current shoe gear:  Affected Foot: Casual shoes     Unaffected Foot: Casual shoes    Review of Systems   Constitutional: Negative for appetite change, fever, chills, fatigue.  Positive for unexpected weight change.   Respiratory: Negative for cough, wheezing, shortness of breath.  Cardiovascular: Negative for chest pain, claudication, cyanosis.  Positive for leg swelling.  Musculoskeletal: Negative for back pain, arthritis, joint pain, joint swelling, myalgias, stiffness.   Skin: Negative for rash, itching, suspicious lesion, poor wound healing, unusual hair distribution.  Positive for nail bed changes, discoloration,.  Neurological: Negative for loss of balance, sensory change, paresthesias, numbness.  Positive for pain.  Hematological: Negative for adenopathy, bleeding, bruising easily.   Psychiatric/Behavioral: The patient is not nervous/anxious.  Negative for altered mental status.    Hemoglobin A1C   Date Value Ref Range Status   08/04/2022 5.4 4.0 - 5.6 % Final     Comment:     ADA Screening Guidelines:  5.7-6.4%  Consistent with prediabetes  >or=6.5%  Consistent with diabetes    High levels of fetal hemoglobin interfere with the HbA1C  assay. Heterozygous hemoglobin variants (HbS, HgC, etc)do  not significantly interfere with this assay.   However, presence of multiple variants may affect accuracy.     04/27/2022 5.3 4.0 - 5.6  % Final     Comment:     ADA Screening Guidelines:  5.7-6.4%  Consistent with prediabetes  >or=6.5%  Consistent with diabetes    High levels of fetal hemoglobin interfere with the HbA1C  assay. Heterozygous hemoglobin variants (HbS, HgC, etc)do  not significantly interfere with this assay.   However, presence of multiple variants may affect accuracy.     03/30/2022 5.6 4.0 - 5.6 % Final     Comment:     ADA Screening Guidelines:  5.7-6.4%  Consistent with prediabetes  >or=6.5%  Consistent with diabetes    High levels of fetal hemoglobin interfere with the HbA1C  assay. Heterozygous hemoglobin variants (HbS, HgC, etc)do  not significantly interfere with this assay.   However, presence of multiple variants may affect accuracy.         Past Medical History:   Diagnosis Date    Angina of effort 06/22/2021    Aortic valve stenosis 02/28/2022    Arachnoid cyst of posterior cranial fossa 01/13/2022    Asthma     Bilateral carotid bruits 06/22/2021    Bipolar disorder 01/16/2022    CAD in native artery - moderate 3V 60% by Pt history 09/04/2019    Cancer     Candidal intertrigo 11/11/2019    Cataract     Chronic schizophrenia     Colon polyps     COPD (chronic obstructive pulmonary disease)     Equinus deformity of both feet 11/21/2019    Essential hypertension 10/09/2019    Flat foot 11/21/2019    Gallstone     Gastrointestinal hemorrhage 12/13/2019    Post polypectomy bleed     General anesthetics causing adverse effect in therapeutic use     Hammer toes of both feet 11/21/2019    Headache     Hypertension     Hypertensive heart disease without heart failure 01/16/2022    Hyponatremia 01/11/2022    ELAINE (iron deficiency anemia) 12/10/2019    Intractable chronic migraine without aura and without status migrainosus 12/30/2021    20 year history of migraine headaches. Headaches are typically moderate to severe in intensity, worsen with activity, pounding in quality and associated with sensitivity to light and sound. Given  history of reported brain tumor, agree with head CT today. Cannot do MRI due to metal clips.   Galcanezumab (Emgality) treatment was approved for the prevention of acute and chronic migraine on 9/27/2018.    MI, old     Microcytic anemia 10/09/2019    Nicotine dependence, unspecified, uncomplicated 01/16/2022    Onychomycosis due to dermatophyte 11/21/2019    Orchitis 11/09/2019    PIPER on CPAP     Peripheral visual field defect, bilateral 01/13/2022    PVD (peripheral vascular disease) 11/21/2019    Schizophrenia     Seizures 2008    Stroke 2005    Thoracic aortic atherosclerosis 10/17/2022    CT chest    Thyroid disease     took medicine in the past    Tinea pedis of right foot 05/21/2019    Tobacco use disorder 12/14/2019    Type 2 diabetes mellitus with diabetic peripheral angiopathy without gangrene 01/16/2022     Past Surgical History:   Procedure Laterality Date    ANGIOGRAM, CORONARY, WITH LEFT HEART CATHETERIZATION  08/25/2022    Procedure: Angiogram, Coronary, with Left Heart Cath;  Surgeon: Mai Deleon MD;  Location: [facility] CATH;  Service: Cardiology;;    APPENDECTOMY      BONE MARROW ASPIRATION Left 08/21/2020    Procedure: ASPIRATION, BONE MARROW;  Surgeon: Lex Kathleen MD;  Location: [facility] OR;  Service: Oncology;  Laterality: Left;    CLOSURE OF WOUND Right 09/09/2019    Procedure: CLOSURE, WOUND;  Surgeon: Li Kathleen DPM;  Location: [facility] OR;  Service: Podiatry;  Laterality: Right;    COLONOSCOPY N/A 12/10/2019    Procedure: COLONOSCOPY;  Surgeon: Ryan Lucas MD;  Location: [facility] ENDO;  Service: Endoscopy;  Laterality: N/A;    COLONOSCOPY N/A 12/13/2019    Procedure: COLONOSCOPY;  Surgeon: Ryan Lucas MD;  Location: [facility] ENDO;  Service: Endoscopy;  Laterality: N/A;    CORONARY STENT PLACEMENT      ESOPHAGOGASTRODUODENOSCOPY N/A 12/10/2019    Procedure: EGD (ESOPHAGOGASTRODUODENOSCOPY);  Surgeon: Ryan Lucas MD;  Location: [facility] ENDO;  Service: Endoscopy;  Laterality: N/A;     ESOPHAGOGASTRODUODENOSCOPY N/A 11/3/2022    Procedure: EGD (ESOPHAGOGASTRODUODENOSCOPY);  Surgeon: Ryan Lucas MD;  Location: [facility];  Service: Endoscopy;  Laterality: N/A;    LEFT HEART CATHETERIZATION Left 2022    Procedure: Left heart cath;  Surgeon: Mai Deleon MD;  Location: [facility] CATH;  Service: Cardiology;  Laterality: Left;    right heel surgery      SHOULDER ARTHROSCOPY Left      Family History   Problem Relation Age of Onset    Melanoma Mother     Diabetes Mother     Hypertension Mother     Stroke Father     Diabetes Father     Hypertension Father     Colon cancer Father         unsure of age of diagnosis    Cancer Father         colon cancer    Cervical cancer Sister     Cirrhosis Brother     Hypertension Brother     Lung cancer Maternal Grandmother     Prostate cancer Maternal Grandfather     Crohn's disease Neg Hx     Ulcerative colitis Neg Hx     Stomach cancer Neg Hx     Esophageal cancer Neg Hx     Retinal detachment Neg Hx     Strabismus Neg Hx     Macular degeneration Neg Hx     Glaucoma Neg Hx      Social History     Socioeconomic History    Marital status: Legally    Occupational History    Occupation: disabled (mental)     Comment: since    Tobacco Use    Smoking status: Former     Packs/day: 0.25     Years: 43.00     Pack years: 10.75     Types: Cigarettes     Start date:      Quit date: 2022     Years since quittin.0    Smokeless tobacco: Never    Tobacco comments:     Age Started 14    Substance and Sexual Activity    Alcohol use: No    Drug use: Not Currently     Types: Marijuana    Sexual activity: Yes     Partners: Female     Social Determinants of Health     Financial Resource Strain: Low Risk     Difficulty of Paying Living Expenses: Not hard at all   Food Insecurity: No Food Insecurity    Worried About Running Out of Food in the Last Year: Never true    Ran Out of Food in the Last Year: Never true   Transportation Needs: Unmet Transportation  "Needs    Lack of Transportation (Medical): Yes    Lack of Transportation (Non-Medical): Yes   Physical Activity: Insufficiently Active    Days of Exercise per Week: 2 days    Minutes of Exercise per Session: 10 min   Stress: Stress Concern Present    Feeling of Stress : To some extent   Social Connections: Socially Isolated    Frequency of Communication with Friends and Family: Twice a week    Frequency of Social Gatherings with Friends and Family: Never    Attends [employer] Services: More than 4 times per year    Active Member of Clubs or Organizations: No    Attends Club or Organization Meetings: Never    Marital Status:    Housing Stability: Unknown    Unable to Pay for Housing in the Last Year: No    Unstable Housing in the Last Year: No           Objective:        Ht 5' 10" (1.778 m)   Wt 91.2 kg (201 lb 1 oz)   BMI 28.85 kg/m²     Physical Exam   Constitutional: Patient is oriented to person, place, and time. Patient appears well-developed and well-nourished.  Strong malodor noted from patient due to lack of personal hygiene.  No acute distress.     Psychiatric: Patient has a normal mood and affect. Patient's speech is normal and behavior is normal. Judgment is normal. Cognition and memory are normal.     Bilateral pedal exam was performed today.  Vascular: Vascular: Pedal pulses palpable 2/4 DP & PT.  CFT is = 3 seconds to the hallux.  Skin temperature is warm to cool proximal tibia to distal toes without localized increase in calor noted.  No erythema and ecchymosis noted to the LE.  Nonpitting edema noted to the LE.  Hair growth present distally to the LE.     Musculoskeletal: Ankle and pedal joints ROM are decreased. Ankle joint dorsiflexion is restricted with the knee extended and flexed per Silfverskiold exam.  Muscle strength is 5/5 for all LE muscle groups tested.  Flat foot type present.  Flexion contracture of lesser digits noted.    Neurological:  Epicritic sensation is slightly dimiished " to the foot.  Chaddock STR is intact to the LE.  Tenderness to palpation of right plantar heel noted.     Dermatological: Toenails 1-5 are elongated and distally thickened, dystrophic, brittle, discolored, and mycotic with subungal debris present.  Webspaces 1-4 are dry and intact with debris present.  Skin turgor is increased.  Skin texture is dry and flaky.  Deep dermal fibrosis noted plantar right heel without ulceration.      Nursing note and vitals reviewed.        Assessment:       Encounter Diagnoses   Name Primary?    Poor hygiene Yes    Post-operative state     Type II diabetes mellitus with neurological manifestations     PVD (peripheral vascular disease)     Onychomycosis due to dermatophyte            Plan:       Ryan was seen today for wound check.    Diagnoses and all orders for this visit:    Poor hygiene    Post-operative state    Type II diabetes mellitus with neurological manifestations    PVD (peripheral vascular disease)    Onychomycosis due to dermatophyte    Other orders  -     Routine Foot Care        I counseled the patient on his conditions, their implications and medical management.    Patient was evaluated and risk assessed today. The patient is at low risk for developing pedal complications due to peripheral vascular disease. I explained to the patient that 6/22/22 can make healing injuries difficult leading to wounds or   gangrene.    Sutures removed today. Avoid soaking and creams for another 2 weeks.     Follow up in 4 months    Routine Foot Care    Date/Time: 12/12/2022 3:20 PM  Performed by: Дмитрий Pratt DPM  Authorized by: Дмитрий Pratt DPM     Consent Done?:  Yes (Verbal)  Hyperkeratotic Skin Lesions?: No      Nail Care Type:  Debride  Location(s): All  (Left 1st Toe, Left 3rd Toe, Left 2nd Toe, Left 4th Toe, Left 5th Toe, Right 1st Toe, Right 2nd Toe, Right 3rd Toe, Right 4th Toe and Right 5th Toe)  Patient tolerance:  Patient tolerated the procedure well with no  immediate complications

## 2022-12-13 DIAGNOSIS — R11.2 INTRACTABLE VOMITING WITH NAUSEA: ICD-10-CM

## 2022-12-13 RX ORDER — SUCRALFATE 1 G/1
TABLET ORAL
Qty: 120 TABLET | Refills: 0 | Status: SHIPPED | OUTPATIENT
Start: 2022-12-13 | End: 2023-01-12

## 2022-12-13 RX ORDER — PANTOPRAZOLE SODIUM 40 MG/1
TABLET, DELAYED RELEASE ORAL
Qty: 30 TABLET | Refills: 0 | Status: SHIPPED | OUTPATIENT
Start: 2022-12-13 | End: 2022-12-28

## 2022-12-23 ENCOUNTER — PROCEDURE VISIT (OUTPATIENT)
Dept: UROLOGY | Facility: CLINIC | Age: 57
End: 2022-12-23
Payer: MEDICARE

## 2022-12-23 VITALS — WEIGHT: 213.19 LBS | HEIGHT: 70 IN | BODY MASS INDEX: 30.52 KG/M2

## 2022-12-23 DIAGNOSIS — R31.0 GROSS HEMATURIA: ICD-10-CM

## 2022-12-23 PROCEDURE — 52000 CYSTOURETHROSCOPY: CPT | Mod: HCNC,S$GLB,, | Performed by: STUDENT IN AN ORGANIZED HEALTH CARE EDUCATION/TRAINING PROGRAM

## 2022-12-23 PROCEDURE — 52000 CYSTOSCOPY: ICD-10-PCS | Mod: HCNC,S$GLB,, | Performed by: STUDENT IN AN ORGANIZED HEALTH CARE EDUCATION/TRAINING PROGRAM

## 2022-12-23 NOTE — PROCEDURES
Cystoscopy    Date/Time: 12/23/2022 9:20 AM  Performed by: Bonilla Pablo MD  Authorized by: Bonilla Pablo MD     Procedure:   Flexible cysto-urethroscopy    Pre Procedure Diagnosis:  gross hematuria    Post Procedure Diagnosis:  Same    Surgeon: Bonilla Pablo MD     Anesthesia: 2% uro-jet lidocaine jelly for local analgesia    Procedure note:  The risks and benefits were explained and informed consent was obtained. 2% lidocaine urojet was used for local analgesia. The genitalia was prepped and draped in the sterile fashion.    The flexible scope was advanced into the urethra and into the bladder. There were no urethral strictures noted during scope passage. There was a defect in the ventral urethra near the glans from a previous piercing.     The bladder was completely surveyed in a systematic fashion. The bilateral ureteral orifices were identified in their normal orthotopic locations bilaterally. The remainder of the bladder was evaluated. There were no foreign bodies, stones, diverticula, or trabeculations. No bladder tumors or lesions suspicious for malignancy were seen.     Upon retroflexion there was no significant median lobe enlargement. As the flexible cystoscope was being withdrawn, the prostate was evaluated carefully. The lateral lobes of the prostate were not significantly enlarged. The estimated prostatic urethral length was 3 cm.     The patient tolerated the procedure well without complication.     Findings in summary:  Normal cystoscopy    Assessment: 57 y.o. male with gross hematuria     Plan:  Follow up PRN

## 2022-12-30 ENCOUNTER — TELEPHONE (OUTPATIENT)
Dept: ENDOSCOPY | Facility: HOSPITAL | Age: 57
End: 2022-12-30
Payer: MEDICARE

## 2022-12-30 NOTE — TELEPHONE ENCOUNTER
Good afternoon,    Patient was not aware of his colonoscopy scheduled for Thursday, 1/5. Patient is unable to make that date for his procedure. Please contact patient regarding rescheduling. Thank you!    Alis Reyes

## 2022-12-30 NOTE — TELEPHONE ENCOUNTER
Spoke with pt. Rescheduled procedure to March. Prep instructions & reminder letter placed in mail. Pt verbalized understanding to new date & location.

## 2023-01-03 ENCOUNTER — TELEPHONE (OUTPATIENT)
Dept: SURGERY | Facility: CLINIC | Age: 58
End: 2023-01-03
Payer: MEDICARE

## 2023-01-03 ENCOUNTER — TELEPHONE (OUTPATIENT)
Dept: GASTROENTEROLOGY | Facility: CLINIC | Age: 58
End: 2023-01-03
Payer: MEDICARE

## 2023-01-03 NOTE — TELEPHONE ENCOUNTER
----- Message from Marian Copeland sent at 1/3/2023  3:37 PM CST -----  Contact: Patient  Type: Patient Call Back         Who called: Patient         What is the request in detail: returning missed call from Karlos; requesting a call back from staff whenever avail; please advise         Best call back number: [phone]         Additional Information:            Thank You

## 2023-01-03 NOTE — TELEPHONE ENCOUNTER
----- Message from Clovis Mari sent at 1/3/2023 11:39 AM CST -----  Contact: Pt  Type: Needs Medical Advice    Who Called:Pt  Best Call Back Number:[phone]    Additional Information Requesting a call back regarding Pt was calling to speak with nurse in regards to there upcoming procedure pt stated to please call back when available Thank you  Please Advise-Thank you

## 2023-01-03 NOTE — TELEPHONE ENCOUNTER
I called patient and he states that he wants to talk to the Gastro nurse about his colonoscopy on 1/6/23.  He needs the instructions.  He stated that he didn't need to talk to Dr. Davis staff about his surgery on 1/11/23.  Message forwarded to [facility].  Karlos

## 2023-01-04 NOTE — TELEPHONE ENCOUNTER
Left message for pt informing his c-scope is in March & his prep instructions were placed in the mail to him. Number provided.

## 2023-01-05 ENCOUNTER — TELEPHONE (OUTPATIENT)
Dept: SMOKING CESSATION | Facility: CLINIC | Age: 58
End: 2023-01-05
Payer: MEDICARE

## 2023-01-05 DIAGNOSIS — F17.200 TOBACCO USE DISORDER: ICD-10-CM

## 2023-01-05 RX ORDER — IBUPROFEN 200 MG
1 TABLET ORAL DAILY
Qty: 28 PATCH | Refills: 0 | Status: SHIPPED | OUTPATIENT
Start: 2023-01-05 | End: 2023-03-04

## 2023-01-05 NOTE — TELEPHONE ENCOUNTER
Patient had to reschedule his tobacco cessation appointment but needs refills of patches. Patient is currently smoking 2 cpd.

## 2023-01-09 ENCOUNTER — TELEPHONE (OUTPATIENT)
Dept: FAMILY MEDICINE | Facility: CLINIC | Age: 58
End: 2023-01-09
Payer: MEDICARE

## 2023-01-09 NOTE — TELEPHONE ENCOUNTER
----- Message from Kristen Cornelius sent at 1/9/2023  1:51 PM CST -----  Contact: pt at [phone]  Type:  Sooner Appointment Request    Caller is requesting a sooner appointment.  Caller declined first available appointment listed below.  Caller will not accept being placed on the waitlist and is requesting a message be sent to doctor.    Name of Caller:  pt  When is the first available appointment?  N/a  Symptoms:  pain underneath armpit right arm   Best Call Back Number:  [phone]    Additional Information:  pt is calling the office to schedule an appt due to him having pain under his armpit. Please call back and advise.

## 2023-01-10 ENCOUNTER — ANESTHESIA EVENT (OUTPATIENT)
Dept: SURGERY | Facility: HOSPITAL | Age: 58
End: 2023-01-10
Payer: MEDICARE

## 2023-01-10 ENCOUNTER — OFFICE VISIT (OUTPATIENT)
Dept: FAMILY MEDICINE | Facility: CLINIC | Age: 58
End: 2023-01-10
Payer: MEDICARE

## 2023-01-10 VITALS
OXYGEN SATURATION: 98 % | HEART RATE: 102 BPM | BODY MASS INDEX: 31.53 KG/M2 | RESPIRATION RATE: 18 BRPM | HEIGHT: 70 IN | SYSTOLIC BLOOD PRESSURE: 128 MMHG | DIASTOLIC BLOOD PRESSURE: 68 MMHG | WEIGHT: 220.25 LBS

## 2023-01-10 DIAGNOSIS — L73.9 FOLLICULITIS: Primary | ICD-10-CM

## 2023-01-10 PROCEDURE — 3074F PR MOST RECENT SYSTOLIC BLOOD PRESSURE < 130 MM HG: ICD-10-PCS | Mod: CPTII,S$GLB,, | Performed by: STUDENT IN AN ORGANIZED HEALTH CARE EDUCATION/TRAINING PROGRAM

## 2023-01-10 PROCEDURE — 99999 PR PBB SHADOW E&M-EST. PATIENT-LVL V: CPT | Mod: PBBFAC,,, | Performed by: STUDENT IN AN ORGANIZED HEALTH CARE EDUCATION/TRAINING PROGRAM

## 2023-01-10 PROCEDURE — 3008F BODY MASS INDEX DOCD: CPT | Mod: CPTII,S$GLB,, | Performed by: STUDENT IN AN ORGANIZED HEALTH CARE EDUCATION/TRAINING PROGRAM

## 2023-01-10 PROCEDURE — 1159F PR MEDICATION LIST DOCUMENTED IN MEDICAL RECORD: ICD-10-PCS | Mod: CPTII,S$GLB,, | Performed by: STUDENT IN AN ORGANIZED HEALTH CARE EDUCATION/TRAINING PROGRAM

## 2023-01-10 PROCEDURE — 99213 PR OFFICE/OUTPT VISIT, EST, LEVL III, 20-29 MIN: ICD-10-PCS | Mod: S$GLB,,, | Performed by: STUDENT IN AN ORGANIZED HEALTH CARE EDUCATION/TRAINING PROGRAM

## 2023-01-10 PROCEDURE — 3074F SYST BP LT 130 MM HG: CPT | Mod: CPTII,S$GLB,, | Performed by: STUDENT IN AN ORGANIZED HEALTH CARE EDUCATION/TRAINING PROGRAM

## 2023-01-10 PROCEDURE — 3008F PR BODY MASS INDEX (BMI) DOCUMENTED: ICD-10-PCS | Mod: CPTII,S$GLB,, | Performed by: STUDENT IN AN ORGANIZED HEALTH CARE EDUCATION/TRAINING PROGRAM

## 2023-01-10 PROCEDURE — 99213 OFFICE O/P EST LOW 20 MIN: CPT | Mod: S$GLB,,, | Performed by: STUDENT IN AN ORGANIZED HEALTH CARE EDUCATION/TRAINING PROGRAM

## 2023-01-10 PROCEDURE — 1159F MED LIST DOCD IN RCRD: CPT | Mod: CPTII,S$GLB,, | Performed by: STUDENT IN AN ORGANIZED HEALTH CARE EDUCATION/TRAINING PROGRAM

## 2023-01-10 PROCEDURE — 3072F LOW RISK FOR RETINOPATHY: CPT | Mod: CPTII,S$GLB,, | Performed by: STUDENT IN AN ORGANIZED HEALTH CARE EDUCATION/TRAINING PROGRAM

## 2023-01-10 PROCEDURE — 3078F PR MOST RECENT DIASTOLIC BLOOD PRESSURE < 80 MM HG: ICD-10-PCS | Mod: CPTII,S$GLB,, | Performed by: STUDENT IN AN ORGANIZED HEALTH CARE EDUCATION/TRAINING PROGRAM

## 2023-01-10 PROCEDURE — 3078F DIAST BP <80 MM HG: CPT | Mod: CPTII,S$GLB,, | Performed by: STUDENT IN AN ORGANIZED HEALTH CARE EDUCATION/TRAINING PROGRAM

## 2023-01-10 PROCEDURE — 3072F PR LOW RISK FOR RETINOPATHY: ICD-10-PCS | Mod: CPTII,S$GLB,, | Performed by: STUDENT IN AN ORGANIZED HEALTH CARE EDUCATION/TRAINING PROGRAM

## 2023-01-10 PROCEDURE — 99999 PR PBB SHADOW E&M-EST. PATIENT-LVL V: ICD-10-PCS | Mod: PBBFAC,,, | Performed by: STUDENT IN AN ORGANIZED HEALTH CARE EDUCATION/TRAINING PROGRAM

## 2023-01-10 RX ORDER — MUPIROCIN 20 MG/G
OINTMENT TOPICAL 3 TIMES DAILY
Qty: 30 G | Refills: 1 | Status: SHIPPED | OUTPATIENT
Start: 2023-01-10 | End: 2023-01-17

## 2023-01-10 NOTE — PROGRESS NOTES
Pirocin  Subjective:      Patient ID: Ryan Crane is a 57 y.o. male    Chief Complaint   Patient presents with    under arms     Right under arm hurt, 6 days      HPI  57 y.o. male with a PMHx as documented below presents to clinic today for the following:    Patient reports scattered, raised bumps under right axillary region for the past week.  Patient describes bumps as tender to palpation.  No drainage from lesions. No reported fever, chills, chest pain, SOB, cough, recent viral illness, or other systemic symptoms.     Past Medical History:   Diagnosis Date    Angina of effort 06/22/2021    Aortic valve stenosis 02/28/2022    Arachnoid cyst of posterior cranial fossa 01/13/2022    Asthma     Bilateral carotid bruits 06/22/2021    Bipolar disorder 01/16/2022    CAD in native artery - moderate 3V 60% by Pt history 09/04/2019    Cancer     Candidal intertrigo 11/11/2019    Cataract     Chronic schizophrenia     Colon polyps     COPD (chronic obstructive pulmonary disease)     Equinus deformity of both feet 11/21/2019    Essential hypertension 10/09/2019    Flat foot 11/21/2019    Gallstone     Gastrointestinal hemorrhage 12/13/2019    Post polypectomy bleed     General anesthetics causing adverse effect in therapeutic use     Hammer toes of both feet 11/21/2019    Headache     Hypertension     Hypertensive heart disease without heart failure 01/16/2022    Hyponatremia 01/11/2022    ELAINE (iron deficiency anemia) 12/10/2019    Intractable chronic migraine without aura and without status migrainosus 12/30/2021    20 year history of migraine headaches. Headaches are typically moderate to severe in intensity, worsen with activity, pounding in quality and associated with sensitivity to light and sound. Given history of reported brain tumor, agree with head CT today. Cannot do MRI due to metal clips.   Galcanezumab (Emgality) treatment was approved for the prevention of acute and chronic migraine on 9/27/2018.     "MI, old     Microcytic anemia 10/09/2019    Nicotine dependence, unspecified, uncomplicated 01/16/2022    Onychomycosis due to dermatophyte 11/21/2019    Orchitis 11/09/2019    PIPER on CPAP     Peripheral visual field defect, bilateral 01/13/2022    PVD (peripheral vascular disease) 11/21/2019    Schizophrenia     Seizures 2008    Stroke 2005    Thoracic aortic atherosclerosis 10/17/2022    CT chest    Thyroid disease     took medicine in the past    Tinea pedis of right foot 05/21/2019    Tobacco use disorder 12/14/2019    Type 2 diabetes mellitus with diabetic peripheral angiopathy without gangrene 01/16/2022      Review of Systems   Constitutional:  Negative for chills and fever.   Respiratory:  Negative for shortness of breath.    Cardiovascular:  Negative for chest pain.   Gastrointestinal:  Negative for abdominal pain, constipation, diarrhea, nausea and vomiting.   Genitourinary:  Negative for dysuria.   Musculoskeletal:  Negative for back pain and neck pain.   Skin:  Positive for rash.   Neurological:  Negative for dizziness, sensory change, weakness and headaches.   Psychiatric/Behavioral:  Negative for depression. The patient is not nervous/anxious.      Objective:      Vitals:    01/10/23 1326   BP: 128/68   BP Location: Right arm   Patient Position: Sitting   Pulse: 102   Resp: 18   SpO2: 98%   Weight: 99.9 kg (220 lb 3.8 oz)   Height: 5' 10" (1.778 m)     Physical Exam  Vitals reviewed.   Constitutional:       General: He is not in acute distress.  HENT:      Head: Normocephalic and atraumatic.   Cardiovascular:      Rate and Rhythm: Normal rate.   Pulmonary:      Effort: Pulmonary effort is normal. No respiratory distress.   Skin:     Comments: Few scattered lesions under arm/axillary region (right) - raised with surrounding erythema. No drainage noted. No significant swelling.   Neurological:      General: No focal deficit present.      Mental Status: He is alert and oriented to person, place, and time. " Mental status is at baseline.      Assessment:       1. Folliculitis      Plan:       Ryan was seen today for under arms.    Diagnoses and all orders for this visit:    Folliculitis  -     mupirocin (BACTROBAN) 2 % ointment; Apply topically 3 (three) times daily. for 7 days      Follow up if symptoms worsen or fail to improve.    Ana Maeyr MD  [facility]  Office: [phone]   Fax: [phone]  01/10/2023      Disclaimer: This note was partly generated using dictation software which may occasionally result in transcription errors.

## 2023-01-11 ENCOUNTER — ANESTHESIA (OUTPATIENT)
Dept: SURGERY | Facility: HOSPITAL | Age: 58
End: 2023-01-11
Payer: MEDICARE

## 2023-01-11 ENCOUNTER — HOSPITAL ENCOUNTER (OUTPATIENT)
Facility: HOSPITAL | Age: 58
Discharge: HOME | End: 2023-01-11
Attending: SURGERY | Admitting: SURGERY
Payer: MEDICARE

## 2023-01-11 VITALS
DIASTOLIC BLOOD PRESSURE: 75 MMHG | OXYGEN SATURATION: 99 % | HEIGHT: 70 IN | RESPIRATION RATE: 18 BRPM | WEIGHT: 202 LBS | HEART RATE: 74 BPM | BODY MASS INDEX: 28.92 KG/M2 | TEMPERATURE: 98 F | SYSTOLIC BLOOD PRESSURE: 145 MMHG

## 2023-01-11 DIAGNOSIS — K80.20 CALCULUS OF GALLBLADDER WITHOUT CHOLECYSTITIS WITHOUT OBSTRUCTION: ICD-10-CM

## 2023-01-11 PROCEDURE — D9220A PRA ANESTHESIA: ICD-10-PCS | Mod: CRNA,,, | Performed by: NURSE ANESTHETIST, CERTIFIED REGISTERED

## 2023-01-11 PROCEDURE — 25000003 PHARM REV CODE 250: Mod: PO | Performed by: SURGERY

## 2023-01-11 PROCEDURE — D9220A PRA ANESTHESIA: ICD-10-PCS | Mod: ANES,,, | Performed by: ANESTHESIOLOGY

## 2023-01-11 PROCEDURE — 36000708 HC OR TIME LEV III 1ST 15 MIN: Mod: PO | Performed by: SURGERY

## 2023-01-11 PROCEDURE — 63600175 PHARM REV CODE 636 W HCPCS: Mod: PO | Performed by: NURSE ANESTHETIST, CERTIFIED REGISTERED

## 2023-01-11 PROCEDURE — 47562 PR LAP,CHOLECYSTECTOMY: ICD-10-PCS | Mod: ,,, | Performed by: SURGERY

## 2023-01-11 PROCEDURE — 27201423 OPTIME MED/SURG SUP & DEVICES STERILE SUPPLY: Mod: PO | Performed by: SURGERY

## 2023-01-11 PROCEDURE — 63600175 PHARM REV CODE 636 W HCPCS: Mod: PO | Performed by: ANESTHESIOLOGY

## 2023-01-11 PROCEDURE — 88304 PR  SURG PATH,LEVEL III: ICD-10-PCS | Mod: 26,,, | Performed by: PATHOLOGY

## 2023-01-11 PROCEDURE — 25000003 PHARM REV CODE 250: Mod: PO | Performed by: ANESTHESIOLOGY

## 2023-01-11 PROCEDURE — D9220A PRA ANESTHESIA: Mod: ANES,,, | Performed by: ANESTHESIOLOGY

## 2023-01-11 PROCEDURE — 88304 TISSUE EXAM BY PATHOLOGIST: CPT | Mod: 26,,, | Performed by: PATHOLOGY

## 2023-01-11 PROCEDURE — 25000003 PHARM REV CODE 250: Mod: PO | Performed by: NURSE ANESTHETIST, CERTIFIED REGISTERED

## 2023-01-11 PROCEDURE — 36000709 HC OR TIME LEV III EA ADD 15 MIN: Mod: PO | Performed by: SURGERY

## 2023-01-11 PROCEDURE — 88304 TISSUE EXAM BY PATHOLOGIST: CPT | Performed by: PATHOLOGY

## 2023-01-11 PROCEDURE — 37000008 HC ANESTHESIA 1ST 15 MINUTES: Mod: PO | Performed by: SURGERY

## 2023-01-11 PROCEDURE — D9220A PRA ANESTHESIA: Mod: CRNA,,, | Performed by: NURSE ANESTHETIST, CERTIFIED REGISTERED

## 2023-01-11 PROCEDURE — 71000015 HC POSTOP RECOV 1ST HR: Mod: PO | Performed by: SURGERY

## 2023-01-11 PROCEDURE — 71000033 HC RECOVERY, INTIAL HOUR: Mod: PO | Performed by: SURGERY

## 2023-01-11 PROCEDURE — S0030 INJECTION, METRONIDAZOLE: HCPCS | Mod: PO | Performed by: NURSE ANESTHETIST, CERTIFIED REGISTERED

## 2023-01-11 PROCEDURE — 47562 LAPAROSCOPIC CHOLECYSTECTOMY: CPT | Mod: ,,, | Performed by: SURGERY

## 2023-01-11 PROCEDURE — 37000009 HC ANESTHESIA EA ADD 15 MINS: Mod: PO | Performed by: SURGERY

## 2023-01-11 RX ORDER — MIDAZOLAM HYDROCHLORIDE 1 MG/ML
INJECTION, SOLUTION INTRAMUSCULAR; INTRAVENOUS
Status: DISCONTINUED | OUTPATIENT
Start: 2023-01-11 | End: 2023-01-11

## 2023-01-11 RX ORDER — METRONIDAZOLE 500 MG/100ML
INJECTION, SOLUTION INTRAVENOUS
Status: DISCONTINUED | OUTPATIENT
Start: 2023-01-11 | End: 2023-01-11

## 2023-01-11 RX ORDER — HYDROMORPHONE HYDROCHLORIDE 2 MG/ML
0.2 INJECTION, SOLUTION INTRAMUSCULAR; INTRAVENOUS; SUBCUTANEOUS EVERY 5 MIN PRN
Status: DISCONTINUED | OUTPATIENT
Start: 2023-01-11 | End: 2023-01-11 | Stop reason: HOSPADM

## 2023-01-11 RX ORDER — EPHEDRINE SULFATE 50 MG/ML
INJECTION, SOLUTION INTRAVENOUS
Status: DISCONTINUED | OUTPATIENT
Start: 2023-01-11 | End: 2023-01-11

## 2023-01-11 RX ORDER — SODIUM CHLORIDE 0.9 G/100ML
IRRIGANT IRRIGATION
Status: DISCONTINUED | OUTPATIENT
Start: 2023-01-11 | End: 2023-01-11 | Stop reason: HOSPADM

## 2023-01-11 RX ORDER — DEXAMETHASONE SODIUM PHOSPHATE 4 MG/ML
INJECTION, SOLUTION INTRA-ARTICULAR; INTRALESIONAL; INTRAMUSCULAR; INTRAVENOUS; SOFT TISSUE
Status: DISCONTINUED | OUTPATIENT
Start: 2023-01-11 | End: 2023-01-11

## 2023-01-11 RX ORDER — ONDANSETRON 2 MG/ML
INJECTION INTRAMUSCULAR; INTRAVENOUS
Status: DISCONTINUED | OUTPATIENT
Start: 2023-01-11 | End: 2023-01-11

## 2023-01-11 RX ORDER — LIDOCAINE HYDROCHLORIDE 10 MG/ML
1 INJECTION, SOLUTION EPIDURAL; INFILTRATION; INTRACAUDAL; PERINEURAL ONCE
Status: DISCONTINUED | OUTPATIENT
Start: 2023-01-11 | End: 2023-01-11 | Stop reason: HOSPADM

## 2023-01-11 RX ORDER — LIDOCAINE HYDROCHLORIDE 20 MG/ML
INJECTION INTRAVENOUS
Status: DISCONTINUED | OUTPATIENT
Start: 2023-01-11 | End: 2023-01-11

## 2023-01-11 RX ORDER — PROPOFOL 10 MG/ML
VIAL (ML) INTRAVENOUS
Status: DISCONTINUED | OUTPATIENT
Start: 2023-01-11 | End: 2023-01-11

## 2023-01-11 RX ORDER — FENTANYL CITRATE 50 UG/ML
25 INJECTION, SOLUTION INTRAMUSCULAR; INTRAVENOUS EVERY 5 MIN PRN
Status: DISCONTINUED | OUTPATIENT
Start: 2023-01-11 | End: 2023-01-11 | Stop reason: HOSPADM

## 2023-01-11 RX ORDER — HYDROCODONE BITARTRATE AND ACETAMINOPHEN 5; 325 MG/1; MG/1
1 TABLET ORAL EVERY 4 HOURS PRN
Status: CANCELLED | OUTPATIENT
Start: 2023-01-11

## 2023-01-11 RX ORDER — NEOSTIGMINE METHYLSULFATE 0.5 MG/ML
INJECTION, SOLUTION INTRAVENOUS
Status: DISCONTINUED | OUTPATIENT
Start: 2023-01-11 | End: 2023-01-11

## 2023-01-11 RX ORDER — BUPIVACAINE HYDROCHLORIDE AND EPINEPHRINE 2.5; 5 MG/ML; UG/ML
INJECTION, SOLUTION EPIDURAL; INFILTRATION; INTRACAUDAL; PERINEURAL
Status: DISCONTINUED | OUTPATIENT
Start: 2023-01-11 | End: 2023-01-11 | Stop reason: HOSPADM

## 2023-01-11 RX ORDER — SUCCINYLCHOLINE CHLORIDE 20 MG/ML
INJECTION INTRAMUSCULAR; INTRAVENOUS
Status: DISCONTINUED | OUTPATIENT
Start: 2023-01-11 | End: 2023-01-11

## 2023-01-11 RX ORDER — SODIUM CHLORIDE 9 MG/ML
INJECTION, SOLUTION INTRAVENOUS CONTINUOUS
Status: DISCONTINUED | OUTPATIENT
Start: 2023-01-11 | End: 2023-01-11 | Stop reason: HOSPADM

## 2023-01-11 RX ORDER — ROCURONIUM BROMIDE 10 MG/ML
INJECTION, SOLUTION INTRAVENOUS
Status: DISCONTINUED | OUTPATIENT
Start: 2023-01-11 | End: 2023-01-11

## 2023-01-11 RX ORDER — CIPROFLOXACIN 2 MG/ML
INJECTION, SOLUTION INTRAVENOUS
Status: DISCONTINUED | OUTPATIENT
Start: 2023-01-11 | End: 2023-01-11

## 2023-01-11 RX ORDER — OXYCODONE HYDROCHLORIDE 5 MG/1
5 TABLET ORAL
Status: DISCONTINUED | OUTPATIENT
Start: 2023-01-11 | End: 2023-01-11 | Stop reason: HOSPADM

## 2023-01-11 RX ORDER — SODIUM CHLORIDE 9 MG/ML
INJECTION, SOLUTION INTRAVENOUS CONTINUOUS
Status: CANCELLED | OUTPATIENT
Start: 2023-01-11

## 2023-01-11 RX ORDER — HYDROCODONE BITARTRATE AND ACETAMINOPHEN 5; 325 MG/1; MG/1
1 TABLET ORAL EVERY 6 HOURS PRN
Qty: 30 TABLET | Refills: 0 | Status: SHIPPED | OUTPATIENT
Start: 2023-01-11 | End: 2023-02-23

## 2023-01-11 RX ORDER — ONDANSETRON 2 MG/ML
4 INJECTION INTRAMUSCULAR; INTRAVENOUS EVERY 12 HOURS PRN
Status: CANCELLED | OUTPATIENT
Start: 2023-01-11

## 2023-01-11 RX ORDER — ACETAMINOPHEN 10 MG/ML
INJECTION, SOLUTION INTRAVENOUS
Status: DISCONTINUED | OUTPATIENT
Start: 2023-01-11 | End: 2023-01-11

## 2023-01-11 RX ORDER — FENTANYL CITRATE 50 UG/ML
INJECTION, SOLUTION INTRAMUSCULAR; INTRAVENOUS
Status: DISCONTINUED | OUTPATIENT
Start: 2023-01-11 | End: 2023-01-11

## 2023-01-11 RX ORDER — SODIUM CHLORIDE, SODIUM LACTATE, POTASSIUM CHLORIDE, CALCIUM CHLORIDE 600; 310; 30; 20 MG/100ML; MG/100ML; MG/100ML; MG/100ML
INJECTION, SOLUTION INTRAVENOUS CONTINUOUS
Status: DISCONTINUED | OUTPATIENT
Start: 2023-01-11 | End: 2023-01-11 | Stop reason: HOSPADM

## 2023-01-11 RX ADMIN — ACETAMINOPHEN 1000 MG: 10 INJECTION, SOLUTION INTRAVENOUS at 09:01

## 2023-01-11 RX ADMIN — PROPOFOL 150 MG: 10 INJECTION, EMULSION INTRAVENOUS at 09:01

## 2023-01-11 RX ADMIN — MIDAZOLAM HYDROCHLORIDE 2 MG: 1 INJECTION, SOLUTION INTRAMUSCULAR; INTRAVENOUS at 09:01

## 2023-01-11 RX ADMIN — METRONIDAZOLE 500 MG: 500 INJECTION, SOLUTION INTRAVENOUS at 09:01

## 2023-01-11 RX ADMIN — DEXAMETHASONE SODIUM PHOSPHATE 4 MG: 4 INJECTION, SOLUTION INTRAMUSCULAR; INTRAVENOUS at 09:01

## 2023-01-11 RX ADMIN — ROCURONIUM BROMIDE 5 MG: 10 INJECTION, SOLUTION INTRAVENOUS at 09:01

## 2023-01-11 RX ADMIN — EPHEDRINE SULFATE 10 MG: 50 INJECTION INTRAVENOUS at 09:01

## 2023-01-11 RX ADMIN — GLYCOPYRROLATE 0.4 MG: 0.2 INJECTION, SOLUTION INTRAMUSCULAR; INTRAVITREAL at 10:01

## 2023-01-11 RX ADMIN — ROCURONIUM BROMIDE 25 MG: 10 INJECTION, SOLUTION INTRAVENOUS at 09:01

## 2023-01-11 RX ADMIN — EPHEDRINE SULFATE 15 MG: 50 INJECTION INTRAVENOUS at 10:01

## 2023-01-11 RX ADMIN — FENTANYL CITRATE 100 MCG: 50 INJECTION, SOLUTION INTRAMUSCULAR; INTRAVENOUS at 09:01

## 2023-01-11 RX ADMIN — SODIUM CHLORIDE, POTASSIUM CHLORIDE, SODIUM LACTATE AND CALCIUM CHLORIDE: 600; 310; 30; 20 INJECTION, SOLUTION INTRAVENOUS at 08:01

## 2023-01-11 RX ADMIN — SUCCINYLCHOLINE CHLORIDE 140 MG: 20 INJECTION, SOLUTION INTRAMUSCULAR; INTRAVENOUS at 09:01

## 2023-01-11 RX ADMIN — ONDANSETRON 4 MG: 2 INJECTION, SOLUTION INTRAMUSCULAR; INTRAVENOUS at 09:01

## 2023-01-11 RX ADMIN — LIDOCAINE HYDROCHLORIDE 100 MG: 20 INJECTION INTRAVENOUS at 09:01

## 2023-01-11 RX ADMIN — NEOSTIGMINE METHYLSULFATE 3 MG: 0.5 INJECTION, SOLUTION INTRAVENOUS at 10:01

## 2023-01-11 RX ADMIN — CIPROFLOXACIN 400 MG: 2 INJECTION, SOLUTION INTRAVENOUS at 10:01

## 2023-01-11 RX ADMIN — OXYCODONE 5 MG: 5 TABLET ORAL at 10:01

## 2023-01-11 NOTE — ANESTHESIA POSTPROCEDURE EVALUATION
Anesthesia Post Evaluation    Patient: Ryan Crane    Procedure(s) Performed: Procedure(s) (LRB):  CHOLECYSTECTOMY, LAPAROSCOPIC (N/A)    Final Anesthesia Type: general      Patient location during evaluation: PACU  Patient participation: Yes- Able to Participate  Level of consciousness: awake and alert  Post-procedure vital signs: reviewed and stable  Pain management: adequate  Airway patency: patent    PONV status at discharge: No PONV  Anesthetic complications: no      Cardiovascular status: blood pressure returned to baseline  Respiratory status: unassisted and room air  Hydration status: euvolemic  Follow-up not needed.          Vitals Value Taken Time   /75 01/11/23 1110   Temp  01/11/23 1202   Pulse 74 01/11/23 1110   Resp 18 01/11/23 1110   SpO2 99 % 01/11/23 1110         Event Time   Out of Recovery 10:55:00         Pain/Hai Score: Pain Rating Prior to Med Admin: 2 (1/11/2023 10:44 AM)  Hai Score: 10 (1/11/2023 11:06 AM)

## 2023-01-11 NOTE — H&P
HPI  pleasant 57-year-old gentleman who was referred to me for evaluation of cholelithiasis.  Patient notes that for the last several months he has been having nausea vomiting whenever he eats.  States that has gotten progressively worse.  Denies any abdominal pain or discomfort.  He did have an ultrasound which did demonstrate cholelithiasis.  He is scheduled for a EGD in January of this year.  He does have past medical history significant for obesity, diabetes, schizophrenia with bipolar traits.  He also has history of COPD.  Patient had angiogram performed in August of this year with significant findings.  Medical optimization was recommended.  Previous surgical history significant for open appendectomy as a child.  Review of Systems   Constitutional:  Negative for activity change and appetite change.   Respiratory:  Negative for apnea.    Gastrointestinal:  Positive for nausea and vomiting. Negative for abdominal distention and abdominal pain.   Hematological:  Negative for adenopathy. Does not bruise/bleed easily.   Psychiatric/Behavioral:  Negative for agitation and decreased concentration.        Objective:   Physical Exam  Vitals reviewed.   Cardiovascular:      Rate and Rhythm: Normal rate.      Pulses: Normal pulses.   Pulmonary:      Effort: Pulmonary effort is normal.   Abdominal:      General: Abdomen is flat. Bowel sounds are normal. There is no distension.      Tenderness: There is no abdominal tenderness.      Hernia: No hernia is present.   Neurological:      Mental Status: He is alert.   Psychiatric:         Mood and Affect: Mood normal.         Behavior: Behavior normal.                Lab Results   Component Value Date     WBC 11.98 10/17/2022     HGB 15.1 10/17/2022     HCT 45.1 10/17/2022     MCV 95 10/17/2022      10/17/2022         CMP        Sodium   Date Value Ref Range Status   10/17/2022 141 136 - 145 mmol/L Final            Potassium   Date Value Ref Range Status   10/17/2022 4.5  3.5 - 5.1 mmol/L Final            Chloride   Date Value Ref Range Status   10/17/2022 110 95 - 110 mmol/L Final            CO2   Date Value Ref Range Status   10/17/2022 24 23 - 29 mmol/L Final            Glucose   Date Value Ref Range Status   10/17/2022 73 70 - 110 mg/dL Final            BUN   Date Value Ref Range Status   10/17/2022 11 6 - 20 mg/dL Final            Creatinine   Date Value Ref Range Status   10/17/2022 0.9 0.5 - 1.4 mg/dL Final            Calcium   Date Value Ref Range Status   10/17/2022 9.3 8.7 - 10.5 mg/dL Final            Total Protein   Date Value Ref Range Status   10/17/2022 7.4 6.0 - 8.4 g/dL Final            Albumin   Date Value Ref Range Status   10/17/2022 4.0 3.5 - 5.2 g/dL Final              Total Bilirubin   Date Value Ref Range Status   10/17/2022 0.4 0.1 - 1.0 mg/dL Final       Comment:       For infants and newborns, interpretation of results should be based  on gestational age, weight and in agreement with clinical  observations.     Premature Infant recommended reference ranges:  Up to 24 hours.............<8.0 mg/dL  Up to 48 hours............<12.0 mg/dL  3-5 days..................<15.0 mg/dL  6-29 days.................<15.0 mg/dL               Alkaline Phosphatase   Date Value Ref Range Status   10/17/2022 99 55 - 135 U/L Final            AST   Date Value Ref Range Status   10/17/2022 13 10 - 40 U/L Final            ALT   Date Value Ref Range Status   10/17/2022 9 (L) 10 - 44 U/L Final            Anion Gap   Date Value Ref Range Status   10/17/2022 7 (L) 8 - 16 mmol/L Final            eGFR if    Date Value Ref Range Status   06/16/2022 >60 >60 mL/min/1.73 m^2 Final              eGFR if non    Date Value Ref Range Status   06/16/2022 >60 >60 mL/min/1.73 m^2 Final       Comment:       Calculation used to obtain the estimated glomerular filtration  rate (eGFR) is the CKD-EPI equation.       C     US reviewed.  Cholelithiasis     Assessment:        Problem List Items Addressed This Visit         Intractable vomiting with nausea      Other Visit Diagnoses         Calculus of gallbladder without cholecystitis without obstruction                 Plan:       Discussion with patient.  Proceed wth lap cortes

## 2023-01-11 NOTE — TRANSFER OF CARE
"Anesthesia Transfer of Care Note    Patient: Ryan Crane    Procedure(s) Performed: Procedure(s) (LRB):  CHOLECYSTECTOMY, LAPAROSCOPIC (N/A)    Patient location: PACU    Anesthesia Type: general    Transport from OR: Transported from OR on room air with adequate spontaneous ventilation    Post pain: adequate analgesia    Post assessment: no apparent anesthetic complications and tolerated procedure well    Post vital signs: stable    Level of consciousness: sedated    Nausea/Vomiting: no nausea/vomiting    Complications: none    Transfer of care protocol was followed      Last vitals:   Visit Vitals  /67   Pulse 70   Temp 36.8 °C (98.2 °F) (Skin)   Resp 18   Ht 5' 10" (1.778 m)   Wt 91.6 kg (202 lb)   SpO2 99%   BMI 28.98 kg/m²     "

## 2023-01-11 NOTE — BRIEF OP NOTE
[facility] - Surgery  Brief Operative Note    Surgery Date: 1/11/2023     Surgeon(s) and Role:     * Deborah Davis MD - Primary    Assisting Surgeon: None    Pre-op Diagnosis:  Calculus of gallbladder without cholecystitis without obstruction [K80.20]  Intractable vomiting with nausea [R11.2]    Post-op Diagnosis:  Post-Op Diagnosis Codes:     * Calculus of gallbladder without cholecystitis without obstruction [K80.20]     * Intractable vomiting with nausea [R11.2]    Procedure(s) (LRB):  CHOLECYSTECTOMY, LAPAROSCOPIC (N/A)    Anesthesia: General    Operative Findings: Distended gallbladder    Estimated Blood Loss: 25 cc's           Specimens:   Specimen (24h ago, onward)       Start     Ordered    01/11/23 1000  Specimen to Pathology, Surgery General Surgery  Once        Comments: Pre-op Diagnosis: Calculus of gallbladder without cholecystitis without obstruction [K80.20]Intractable vomiting with nausea [R11.2]Procedure(s):CHOLECYSTECTOMY, LAPAROSCOPIC Number of specimens: 1Name of specimens: 1. GALLBLADDER AND CONTENTS     References:    Click here for ordering Quick Tip   Question Answer Comment   Procedure Type: General Surgery    Which provider would you like to cc? DEBORAH DAVIS    Release to patient Immediate        01/11/23 1001                      Discharge Note    OUTCOME: Patient tolerated treatment/procedure well without complication and is now ready for discharge.    DISPOSITION: Home or Self Care    FINAL DIAGNOSIS:  Calculus of gallbladder without cholecystitis without obstruction    FOLLOWUP: In clinic    DISCHARGE INSTRUCTIONS:    Discharge Procedure Orders   Diet general     Call MD for:  extreme fatigue     Call MD for:  persistent dizziness or light-headedness     Call MD for:  hives     Call MD for:  redness, tenderness, or signs of infection (pain, swelling, redness, odor or green/yellow discharge around incision site)     Call MD for:  difficulty breathing, headache or visual  disturbances     Call MD for:  severe uncontrolled pain     Call MD for:  persistent nausea and vomiting     Call MD for:  temperature >100.4     Remove dressing in 48 hours     Activity as tolerated

## 2023-01-11 NOTE — ANESTHESIA PROCEDURE NOTES
Intubation    Date/Time: 1/11/2023 9:39 AM  Performed by: Diamond Sanchez CRNA  Authorized by: Sheela Nuno MD     Intubation:     Induction:  Intravenous    Intubated:  Postinduction    Mask Ventilation:  Unsatisfactory mask ventilation - proceeded to laryngoscopy    Attempted By:  CRNA    Method of Intubation:  Video laryngoscopy    Blade:  Kline 3    Laryngeal View Grade: Grade I - full view of cords      Difficult Airway Encountered?: No      Complications:  None    Airway Device:  Oral endotracheal tube    Airway Device Size:  8.0    Style/Cuff Inflation:  Cuffed (inflated to minimal occlusive pressure)    Tube secured:  23    Secured at:  The lips    Placement Verified By:  Capnometry    Complicating Factors:  None    Findings Post-Intubation:  BS equal bilateral and atraumatic/condition of teeth unchanged

## 2023-01-11 NOTE — PLAN OF CARE
Pt stated in preop that he has piercings to his penis and bilateral nipples. Notified pt of possibility of areas becoming burned during procedure. Pt stated they are unable to be removed.

## 2023-01-11 NOTE — OP NOTE
Date of surgery:  1/11/22    Staff surgeon:  Dr. Laith Davis    Preoperative diagnosis:  Biliary colic    Postoperative diagnosis:  The same    Procedure:  Laparoscopic cholecystectomy    Anesthesia:  General endotracheal anesthesia    Indication for procedure:  Pleasant 57-year-old male presented to the office with signs symptoms suggestive of biliary colic.  She is scheduled for laparoscopic cholecystectomy the above-mentioned date.    Description of procedure:  Following signing informed consent patient in the operating room placed supine position.  General endotracheal anesthesia was administered without event.  The abdomen is prepped and draped in standard fashion and appropriate time-out procedure was then performed.  Next a  small transverse incision above the umbilicus is made and carried down to  the deep dermal tissue.  The abdominal cavity is entered with a  Veress needle and pneumoperitoneum to 15 mmHg is established.  Next the abdomen is entered with an Optiview trocar under direct visualization.  Patient placed in reverse Trendelenburg and tilted towards the left side.  A 5 mm is placed in the epigastric trocar and 2 in the right subcostal margin.  All trocars were placed under direct visualization and after injection of quarter percent Marcaine with epi.  The fundus was grasped and held over the patient's liver.  This exposed the infundibulum which was grasped and held towards the patient's feet.  Next the triangle of Calot is dissected identifying the cystic duct and cystic artery in their usual anatomic locations. Two clips are placed distally on the cystic duct 1 clip proximally.  Two clips are placed on the cystic artery. The duct and artery are cut between clips.  Next cautery is used to remove the gallbladder from the hepatic fossa.  The gallbladder is then removed from the abdominal cavity with assistance of an Endo-Catch bag.  Having removed the gallbladder the hepatic fossa is evaluated   once again to ensure adequate hemostasis.  Next all trocars are removed under direct visualization.  I closed the umbilical fascia with 0 Vicryl suture.  Skin incision was closed with 4-0 Monocryl.  Patient is extubated taken recovery room stable condition.  Blood loss was 10 cc's

## 2023-01-11 NOTE — DISCHARGE INSTRUCTIONS
Department of General Surgery  [facility]  LAPAROSCOPIC CHOLECYSTECTOMY    DO:  Minimal activity for 48 hours.  Resume diet as tolerated.  May shower in 48 hours.  May remove outer dressing in 48 hours. Leave steri-strips in place. If steri-strips get wet, pat them dry. If they fall off, do not worry. They will fall off on their own. Do not pull them off.  Resume home medications as prescribed.    DO NOT:  Do not lift anything over 15 pounds until you have been released by your physician.  Do not soak in tub or pool.  Do not drive for 24 hours or while taking narcotic pain medication.  Do not take additional tylenol/acetaminophen while taking narcotic pain medication that contains tylenol/acetaminophen.     CALL PHYSICIAN FOR:  Redness, swelling, or bleeding from surgical site.  Fever greater than 101.  Drainage from the incision site.  Persistent pain not relieved by pain medication.    FOLLOW-UP APPOINTMENT: Call to schedule appointment in 10-14 days.    FOR EMERGENCIES: Contact your doctor at [phone].

## 2023-01-11 NOTE — ANESTHESIA PREPROCEDURE EVALUATION
01/11/2023  Ryan Crane is a 57 y.o., male.      Pre-op Assessment    I have reviewed the Patient Summary Reports.     I have reviewed the Nursing Notes. I have reviewed the NPO Status.   I have reviewed the Medications.     Review of Systems  Anesthesia Hx:  No problems with previous Anesthesia    Social:  Non-Smoker    Cardiovascular:   Denies Hypertension.  Denies MI.  Denies CAD.    Denies CABG/stent.   Denies Angina.    Pulmonary:   Denies COPD.  Denies Asthma.  Denies Recent URI.    Renal/:   Denies Chronic Renal Disease.     Hepatic/GI:   Denies GERD. Denies Liver Disease.    Neurological:   Denies TIA. Denies CVA. Denies Seizures.    Endocrine:   Denies Diabetes. Denies Hypothyroidism.    Psych:   Denies Psychiatric History.          Physical Exam  General: Well nourished, Cooperative, Alert and Oriented    Airway:  Mallampati: II / II  Mouth Opening: Normal  TM Distance: 4 - 6 cm  Tongue: Normal    Dental:  Intact    Chest/Lungs:  Clear to auscultation, Normal Respiratory Rate    Heart:  Rate: Normal  Rhythm: Regular Rhythm  Sounds: Normal        Anesthesia Plan  Type of Anesthesia, risks & benefits discussed:    Anesthesia Type: Gen ETT  Intra-op Monitoring Plan: Standard ASA Monitors  Post Op Pain Control Plan: multimodal analgesia and IV/PO Opioids PRN  Induction:  IV  Airway Plan: Video  Informed Consent: Informed consent signed with the Patient and all parties understand the risks and agree with anesthesia plan.  All questions answered.   ASA Score: 3  Day of Surgery Review of History & Physical: H&P Update referred to the surgeon/provider.    Ready For Surgery From Anesthesia Perspective.     .

## 2023-01-12 ENCOUNTER — TELEPHONE (OUTPATIENT)
Dept: SURGERY | Facility: CLINIC | Age: 58
End: 2023-01-12
Payer: MEDICARE

## 2023-01-12 NOTE — TELEPHONE ENCOUNTER
----- Message from Tena Campoverde sent at 1/12/2023  2:59 PM CST -----  .Type:  Patient Returning Call    Who Called: PT     Who Left Message for Patient:  UNSURE HE JUST SAID DR. CARO     Does the patient know what this is regarding?: MISSED CALL     Would the patient rather a call back YES      Best Call Back Number: [phone]    Additional Information:  NONE

## 2023-01-17 ENCOUNTER — TELEPHONE (OUTPATIENT)
Dept: FAMILY MEDICINE | Facility: CLINIC | Age: 58
End: 2023-01-17
Payer: MEDICARE

## 2023-01-17 NOTE — TELEPHONE ENCOUNTER
----- Message from Ana Mayer MD sent at 1/17/2023  2:21 PM CST -----  Regarding: needs appointment    ----- Message -----  From: Nikia Garrett  Sent: 1/17/2023  10:49 AM CST  To: Ana Mayer MD    Who Called:PT       What is the reqeust in detail:PT is requesting a call back to schedule a appt for the bumps on arm which now has pus coming out . Please advise       Can the clinic reply by [facility]? No       Best Call Back Number: .[phone]      Additional Information:

## 2023-01-18 LAB
FINAL PATHOLOGIC DIAGNOSIS: NORMAL
Lab: NORMAL

## 2023-01-19 ENCOUNTER — CLINICAL SUPPORT (OUTPATIENT)
Dept: SMOKING CESSATION | Facility: CLINIC | Age: 58
End: 2023-01-19
Payer: COMMERCIAL

## 2023-01-19 DIAGNOSIS — F17.200 TOBACCO USE DISORDER: Primary | ICD-10-CM

## 2023-01-19 PROCEDURE — 99999 PR PBB SHADOW E&M-EST. PATIENT-LVL III: CPT | Mod: PBBFAC,,,

## 2023-01-19 PROCEDURE — 99999 PR PBB SHADOW E&M-EST. PATIENT-LVL III: ICD-10-PCS | Mod: PBBFAC,,,

## 2023-01-19 PROCEDURE — 99404 PR PREVENT COUNSEL,INDIV,60 MIN: ICD-10-PCS | Mod: S$GLB,,, | Performed by: GENERAL PRACTICE

## 2023-01-19 PROCEDURE — 99404 PREV MED CNSL INDIV APPRX 60: CPT | Mod: S$GLB,,, | Performed by: GENERAL PRACTICE

## 2023-01-19 RX ORDER — ASPIRIN/CALCIUM CARB/MAGNESIUM 325 MG
TABLET ORAL
Qty: 72 LOZENGE | Refills: 0 | Status: SHIPPED | OUTPATIENT
Start: 2023-01-19 | End: 2023-04-06 | Stop reason: SDUPTHER

## 2023-01-19 NOTE — PROGRESS NOTES
Individual Follow-Up Form    1/19/2023    Quit Date: 12/7/22    Clinical Status of Patient: Outpatient    Length of Service: 60 minutes    Continuing Medication: yes  Patches    Other Medications: lozenges     Target Symptoms: Withdrawal and medication side effects. The following were  rated moderate (3) to severe (4) on TCRS:  Moderate (3): none  Severe (4): none    Comments: Patient states he had a lapse but is back on track and using patches and lozenges with no negative side effects at this time. We discussed lapses and relapses as well as strategies to maintain his quit. Patient needs refill of the lozenges.    Diagnosis: F17.200    Next Visit:

## 2023-01-19 NOTE — Clinical Note
Patient states he had a lapse but is back on track and using patches and lozenges with no negative side effects at this time. We discussed lapses and relapses as well as strategies to maintain his quit. Patient needs refill of the lozenges.   Skin Substitute Text: The defect edges were debeveled with a #15 scalpel blade.  Given the location of the defect, shape of the defect and the proximity to free margins a skin substitute graft was deemed most appropriate.  The graft material was trimmed to fit the size of the defect. The graft was then placed in the primary defect and oriented appropriately.

## 2023-01-20 ENCOUNTER — OFFICE VISIT (OUTPATIENT)
Dept: FAMILY MEDICINE | Facility: CLINIC | Age: 58
End: 2023-01-20
Payer: MEDICARE

## 2023-01-20 VITALS
SYSTOLIC BLOOD PRESSURE: 122 MMHG | BODY MASS INDEX: 31.41 KG/M2 | DIASTOLIC BLOOD PRESSURE: 74 MMHG | RESPIRATION RATE: 18 BRPM | OXYGEN SATURATION: 99 % | WEIGHT: 219.38 LBS | HEIGHT: 70 IN | HEART RATE: 62 BPM

## 2023-01-20 DIAGNOSIS — L02.219 CELLULITIS AND ABSCESS OF TRUNK: Primary | ICD-10-CM

## 2023-01-20 DIAGNOSIS — L03.319 CELLULITIS AND ABSCESS OF TRUNK: Primary | ICD-10-CM

## 2023-01-20 DIAGNOSIS — H60.393 OTHER INFECTIVE ACUTE OTITIS EXTERNA OF BOTH EARS: ICD-10-CM

## 2023-01-20 DIAGNOSIS — J06.9 URI WITH COUGH AND CONGESTION: ICD-10-CM

## 2023-01-20 LAB
CTP QC/QA: YES
CTP QC/QA: YES
POC MOLECULAR INFLUENZA A AGN: NEGATIVE
POC MOLECULAR INFLUENZA B AGN: NEGATIVE
SARS-COV-2 RDRP RESP QL NAA+PROBE: NEGATIVE

## 2023-01-20 PROCEDURE — 1159F PR MEDICATION LIST DOCUMENTED IN MEDICAL RECORD: ICD-10-PCS | Mod: CPTII,S$GLB,, | Performed by: STUDENT IN AN ORGANIZED HEALTH CARE EDUCATION/TRAINING PROGRAM

## 2023-01-20 PROCEDURE — 3078F DIAST BP <80 MM HG: CPT | Mod: CPTII,S$GLB,, | Performed by: STUDENT IN AN ORGANIZED HEALTH CARE EDUCATION/TRAINING PROGRAM

## 2023-01-20 PROCEDURE — 3072F PR LOW RISK FOR RETINOPATHY: ICD-10-PCS | Mod: CPTII,S$GLB,, | Performed by: STUDENT IN AN ORGANIZED HEALTH CARE EDUCATION/TRAINING PROGRAM

## 2023-01-20 PROCEDURE — 99499 RISK ADDL DX/OHS AUDIT: ICD-10-PCS | Mod: S$GLB,,, | Performed by: STUDENT IN AN ORGANIZED HEALTH CARE EDUCATION/TRAINING PROGRAM

## 2023-01-20 PROCEDURE — 3072F LOW RISK FOR RETINOPATHY: CPT | Mod: CPTII,S$GLB,, | Performed by: STUDENT IN AN ORGANIZED HEALTH CARE EDUCATION/TRAINING PROGRAM

## 2023-01-20 PROCEDURE — 99999 PR PBB SHADOW E&M-EST. PATIENT-LVL V: CPT | Mod: PBBFAC,,, | Performed by: STUDENT IN AN ORGANIZED HEALTH CARE EDUCATION/TRAINING PROGRAM

## 2023-01-20 PROCEDURE — 1160F RVW MEDS BY RX/DR IN RCRD: CPT | Mod: CPTII,S$GLB,, | Performed by: STUDENT IN AN ORGANIZED HEALTH CARE EDUCATION/TRAINING PROGRAM

## 2023-01-20 PROCEDURE — 99999 PR PBB SHADOW E&M-EST. PATIENT-LVL V: ICD-10-PCS | Mod: PBBFAC,,, | Performed by: STUDENT IN AN ORGANIZED HEALTH CARE EDUCATION/TRAINING PROGRAM

## 2023-01-20 PROCEDURE — 87635 SARS-COV-2 COVID-19 AMP PRB: CPT | Mod: QW,S$GLB,, | Performed by: STUDENT IN AN ORGANIZED HEALTH CARE EDUCATION/TRAINING PROGRAM

## 2023-01-20 PROCEDURE — 1160F PR REVIEW ALL MEDS BY PRESCRIBER/CLIN PHARMACIST DOCUMENTED: ICD-10-PCS | Mod: CPTII,S$GLB,, | Performed by: STUDENT IN AN ORGANIZED HEALTH CARE EDUCATION/TRAINING PROGRAM

## 2023-01-20 PROCEDURE — 87502 POCT INFLUENZA A/B MOLECULAR: ICD-10-PCS | Mod: QW,S$GLB,, | Performed by: STUDENT IN AN ORGANIZED HEALTH CARE EDUCATION/TRAINING PROGRAM

## 2023-01-20 PROCEDURE — 87635: ICD-10-PCS | Mod: QW,S$GLB,, | Performed by: STUDENT IN AN ORGANIZED HEALTH CARE EDUCATION/TRAINING PROGRAM

## 2023-01-20 PROCEDURE — 3008F BODY MASS INDEX DOCD: CPT | Mod: CPTII,S$GLB,, | Performed by: STUDENT IN AN ORGANIZED HEALTH CARE EDUCATION/TRAINING PROGRAM

## 2023-01-20 PROCEDURE — 3074F PR MOST RECENT SYSTOLIC BLOOD PRESSURE < 130 MM HG: ICD-10-PCS | Mod: CPTII,S$GLB,, | Performed by: STUDENT IN AN ORGANIZED HEALTH CARE EDUCATION/TRAINING PROGRAM

## 2023-01-20 PROCEDURE — 99499 UNLISTED E&M SERVICE: CPT | Mod: S$GLB,,, | Performed by: STUDENT IN AN ORGANIZED HEALTH CARE EDUCATION/TRAINING PROGRAM

## 2023-01-20 PROCEDURE — 99214 OFFICE O/P EST MOD 30 MIN: CPT | Mod: S$GLB,,, | Performed by: STUDENT IN AN ORGANIZED HEALTH CARE EDUCATION/TRAINING PROGRAM

## 2023-01-20 PROCEDURE — 1159F MED LIST DOCD IN RCRD: CPT | Mod: CPTII,S$GLB,, | Performed by: STUDENT IN AN ORGANIZED HEALTH CARE EDUCATION/TRAINING PROGRAM

## 2023-01-20 PROCEDURE — 3074F SYST BP LT 130 MM HG: CPT | Mod: CPTII,S$GLB,, | Performed by: STUDENT IN AN ORGANIZED HEALTH CARE EDUCATION/TRAINING PROGRAM

## 2023-01-20 PROCEDURE — 3008F PR BODY MASS INDEX (BMI) DOCUMENTED: ICD-10-PCS | Mod: CPTII,S$GLB,, | Performed by: STUDENT IN AN ORGANIZED HEALTH CARE EDUCATION/TRAINING PROGRAM

## 2023-01-20 PROCEDURE — 99214 PR OFFICE/OUTPT VISIT, EST, LEVL IV, 30-39 MIN: ICD-10-PCS | Mod: S$GLB,,, | Performed by: STUDENT IN AN ORGANIZED HEALTH CARE EDUCATION/TRAINING PROGRAM

## 2023-01-20 PROCEDURE — 3078F PR MOST RECENT DIASTOLIC BLOOD PRESSURE < 80 MM HG: ICD-10-PCS | Mod: CPTII,S$GLB,, | Performed by: STUDENT IN AN ORGANIZED HEALTH CARE EDUCATION/TRAINING PROGRAM

## 2023-01-20 PROCEDURE — 87502 INFLUENZA DNA AMP PROBE: CPT | Mod: QW,S$GLB,, | Performed by: STUDENT IN AN ORGANIZED HEALTH CARE EDUCATION/TRAINING PROGRAM

## 2023-01-20 RX ORDER — CIPROFLOXACIN 500 MG/1
500 TABLET ORAL EVERY 12 HOURS
Qty: 14 TABLET | Refills: 0 | Status: SHIPPED | OUTPATIENT
Start: 2023-01-20 | End: 2023-01-27

## 2023-01-20 RX ORDER — DOXYCYCLINE 100 MG/1
100 CAPSULE ORAL 2 TIMES DAILY
Qty: 14 CAPSULE | Refills: 0 | Status: SHIPPED | OUTPATIENT
Start: 2023-01-20 | End: 2023-01-27

## 2023-01-20 RX ORDER — CIPROFLOXACIN AND DEXAMETHASONE 3; 1 MG/ML; MG/ML
4 SUSPENSION/ DROPS AURICULAR (OTIC) 2 TIMES DAILY
Qty: 7.5 ML | Refills: 1 | Status: SHIPPED | OUTPATIENT
Start: 2023-01-20 | End: 2023-01-27

## 2023-01-20 NOTE — PROGRESS NOTES
"Subjective:      Patient ID: Ryan Crane is a 57 y.o. male    Chief Complaint   Patient presents with    skin irritation     Influenza    Cough    Suture / Staple Removal     Pt stated he has irritated his stiches    Otalgia     HPI  57 y.o. male with a PMHx as documented below presents to clinic today for the following:    Pt reports several day history of URI w/ cough and congestion, subjective fever and chills, and ear pain (R > L).     Pt reports irritation surrounding sutures (s/p valdez kolb w/ Dr. Davis on 1/11/23) - follow-up appt with surgery on 1/25/23. He is worried that he might have accidentally messed up the sutures.    Follow-up folliculitis of the right axillary region: Pt reports using mupirocin as prescribed at last clinic appointment. Pt states symptoms worsened w/ lesions become more erythematous and edematous, subsequently draining purulent fluid for a day or two, and now gradually improving but  to touch and inflamed.      Last seen on 1/10/23 for the issue of folliculitis of the right axillary region:   HPI: "Patient reports scattered, raised bumps under right axillary region for the past week.  Patient describes bumps as tender to palpation.  No drainage from lesions. No reported fever, chills, chest pain, SOB, cough, recent viral illness, or other systemic symptoms."  Physical exam: "Few scattered lesions under arm/axillary region (right) - raised with surrounding erythema. No drainage noted. No significant swelling."  A/P: mupirocin (BACTROBAN) 2 % ointment; Apply topically 3 (three) times daily. for 7 days    Past Medical History:   Diagnosis Date    Angina of effort 06/22/2021    Aortic valve stenosis 02/28/2022    Arachnoid cyst of posterior cranial fossa 01/13/2022    Asthma     Bilateral carotid bruits 06/22/2021    Bipolar disorder 01/16/2022    CAD in native artery - moderate 3V 60% by Pt history 09/04/2019    Cancer     Candidal intertrigo 11/11/2019    Cataract  "    Chronic schizophrenia     Colon polyps     COPD (chronic obstructive pulmonary disease)     Equinus deformity of both feet 11/21/2019    Essential hypertension 10/09/2019    Flat foot 11/21/2019    Gallstone     Gastrointestinal hemorrhage 12/13/2019    Post polypectomy bleed     General anesthetics causing adverse effect in therapeutic use     Hammer toes of both feet 11/21/2019    Headache     Hypertension     Hypertensive heart disease without heart failure 01/16/2022    Hyponatremia 01/11/2022    ELAINE (iron deficiency anemia) 12/10/2019    Intractable chronic migraine without aura and without status migrainosus 12/30/2021    20 year history of migraine headaches. Headaches are typically moderate to severe in intensity, worsen with activity, pounding in quality and associated with sensitivity to light and sound. Given history of reported brain tumor, agree with head CT today. Cannot do MRI due to metal clips.   Galcanezumab (Emgality) treatment was approved for the prevention of acute and chronic migraine on 9/27/2018.    MI, old     Microcytic anemia 10/09/2019    Nicotine dependence, unspecified, uncomplicated 01/16/2022    Onychomycosis due to dermatophyte 11/21/2019    Orchitis 11/09/2019    PIPER on CPAP     Peripheral visual field defect, bilateral 01/13/2022    PVD (peripheral vascular disease) 11/21/2019    Schizophrenia     Seizures 2008    Stroke 2005    Thoracic aortic atherosclerosis 10/17/2022    CT chest    Thyroid disease     took medicine in the past    Tinea pedis of right foot 05/21/2019    Tobacco use disorder 12/14/2019    Type 2 diabetes mellitus with diabetic peripheral angiopathy without gangrene 01/16/2022      Review of Systems   Constitutional:  Positive for chills, fever and malaise/fatigue.   HENT:  Positive for congestion and ear pain.    Respiratory:  Positive for cough and sputum production. Negative for shortness of breath and wheezing.    Cardiovascular:  Negative for chest pain.  "  Gastrointestinal:  Negative for abdominal pain, constipation, diarrhea, nausea and vomiting.   Genitourinary:  Negative for dysuria.     Objective:      Vitals:    01/20/23 1331   BP: 122/74   BP Location: Right arm   Patient Position: Sitting   Pulse: 62   Resp: 18   SpO2: 99%   Weight: 99.5 kg (219 lb 5.7 oz)   Height: 5' 10" (1.778 m)     Physical Exam  Vitals reviewed.   Constitutional:       General: He is not in acute distress.  HENT:      Head: Normocephalic and atraumatic.      Right Ear: Swelling (mild) and tenderness (R > L) present. No drainage. A middle ear effusion is present. Tympanic membrane is erythematous and bulging (mild).      Left Ear: Tenderness present. No drainage or swelling. A middle ear effusion is present. Tympanic membrane is not erythematous or bulging.      Nose: Congestion present.   Cardiovascular:      Rate and Rhythm: Normal rate.   Pulmonary:      Effort: Pulmonary effort is normal. No respiratory distress.      Breath sounds: Normal breath sounds. No wheezing or rales.      Comments: Productive cough w/ deep inspiration.  Skin:     Comments:   See picture below. No active drainage.     Surgical incision sites healing well, no open areas, no surrounding erythema/edema - no complications noted.    Neurological:      General: No focal deficit present.      Mental Status: He is alert and oriented to person, place, and time. Mental status is at baseline.          Assessment:       1. Cellulitis and abscess of trunk    2. Other infective acute otitis externa of both ears    3. URI with cough and congestion      Plan:       Ryan was seen today for skin irritation , influenza, cough, suture / staple removal and otalgia.    Diagnoses and all orders for this visit:    Cellulitis and abscess of trunk  -     doxycycline (VIBRAMYCIN) 100 MG Cap; Take 1 capsule (100 mg total) by mouth 2 (two) times daily. TAKE WITH FOOD. for 7 days    Other infective acute otitis externa of both ears  -   "   ciprofloxacin-dexAMETHasone 0.3-0.1% (CIPRODEX) 0.3-0.1 % DrpS; Place 4 drops into both ears 2 (two) times daily. for 7 days  -     ciprofloxacin HCl (CIPRO) 500 MG tablet; Take 1 tablet (500 mg total) by mouth every 12 (twelve) hours. for 7 days    URI with cough and congestion  -     POCT Influenza A/B Molecular  -     POCT COVID-19 Rapid Screening; Future      COVID negative, flu negative.     Advised patient to notify/RTC in 1 week if symptoms worsen or fail to improve (especially cellulitis/healing abscesses of the right axillary region).     Follow up in about 4 weeks (around 2/17/2023), or if symptoms worsen or fail to improve.    Ana Mayer MD  [facility]  Office: [phone]   Fax: [phone]  01/20/2023      Disclaimer: This note was partly generated using dictation software which may occasionally result in transcription errors.

## 2023-01-25 ENCOUNTER — OFFICE VISIT (OUTPATIENT)
Dept: SURGERY | Facility: CLINIC | Age: 58
End: 2023-01-25
Payer: MEDICARE

## 2023-01-25 VITALS
HEIGHT: 70 IN | DIASTOLIC BLOOD PRESSURE: 70 MMHG | TEMPERATURE: 99 F | WEIGHT: 212.5 LBS | HEART RATE: 78 BPM | BODY MASS INDEX: 30.42 KG/M2 | SYSTOLIC BLOOD PRESSURE: 143 MMHG

## 2023-01-25 DIAGNOSIS — Z09 POSTOP CHECK: Primary | ICD-10-CM

## 2023-01-25 PROCEDURE — 99999 PR PBB SHADOW E&M-EST. PATIENT-LVL IV: CPT | Mod: PBBFAC,,, | Performed by: SURGERY

## 2023-01-25 PROCEDURE — 3078F DIAST BP <80 MM HG: CPT | Mod: CPTII,S$GLB,, | Performed by: SURGERY

## 2023-01-25 PROCEDURE — 3077F SYST BP >= 140 MM HG: CPT | Mod: CPTII,S$GLB,, | Performed by: SURGERY

## 2023-01-25 PROCEDURE — 3072F PR LOW RISK FOR RETINOPATHY: ICD-10-PCS | Mod: CPTII,S$GLB,, | Performed by: SURGERY

## 2023-01-25 PROCEDURE — 3008F BODY MASS INDEX DOCD: CPT | Mod: CPTII,S$GLB,, | Performed by: SURGERY

## 2023-01-25 PROCEDURE — 3078F PR MOST RECENT DIASTOLIC BLOOD PRESSURE < 80 MM HG: ICD-10-PCS | Mod: CPTII,S$GLB,, | Performed by: SURGERY

## 2023-01-25 PROCEDURE — 3077F PR MOST RECENT SYSTOLIC BLOOD PRESSURE >= 140 MM HG: ICD-10-PCS | Mod: CPTII,S$GLB,, | Performed by: SURGERY

## 2023-01-25 PROCEDURE — 3072F LOW RISK FOR RETINOPATHY: CPT | Mod: CPTII,S$GLB,, | Performed by: SURGERY

## 2023-01-25 PROCEDURE — 3008F PR BODY MASS INDEX (BMI) DOCUMENTED: ICD-10-PCS | Mod: CPTII,S$GLB,, | Performed by: SURGERY

## 2023-01-25 PROCEDURE — 99024 PR POST-OP FOLLOW-UP VISIT: ICD-10-PCS | Mod: S$GLB,,, | Performed by: SURGERY

## 2023-01-25 PROCEDURE — 99999 PR PBB SHADOW E&M-EST. PATIENT-LVL IV: ICD-10-PCS | Mod: PBBFAC,,, | Performed by: SURGERY

## 2023-01-25 PROCEDURE — 99024 POSTOP FOLLOW-UP VISIT: CPT | Mod: S$GLB,,, | Performed by: SURGERY

## 2023-01-25 PROCEDURE — 1159F PR MEDICATION LIST DOCUMENTED IN MEDICAL RECORD: ICD-10-PCS | Mod: CPTII,S$GLB,, | Performed by: SURGERY

## 2023-01-25 PROCEDURE — 1159F MED LIST DOCD IN RCRD: CPT | Mod: CPTII,S$GLB,, | Performed by: SURGERY

## 2023-01-25 NOTE — PROGRESS NOTES
Cc: post op    HPI: 57 y.o.  male  2 weeks s/p lap cortes.   Pt notes that she is doing well.  No pain or discomfort    PE: AFVSS    AAOx3  CTA  Soft/NT/nd  Inc: c/d/i        Path:   Diagnosis RELIAPATH DIAGNOSIS:   GALLBLADDER, CHOLECYSTECTOMY:   - Chronic cholecystitis with cholelithiasis.   - One (1) benign, reactive appearing lymph node (0/1).   ARDEN VALDES M.D.        A/P:   Pt doing well post surgery.   F/U with me prn

## 2023-01-26 ENCOUNTER — CLINICAL SUPPORT (OUTPATIENT)
Dept: SMOKING CESSATION | Facility: CLINIC | Age: 58
End: 2023-01-26
Payer: COMMERCIAL

## 2023-01-26 DIAGNOSIS — F17.200 NICOTINE DEPENDENCE: Primary | ICD-10-CM

## 2023-01-26 PROCEDURE — 99407 PR TOBACCO USE CESSATION INTENSIVE >10 MINUTES: ICD-10-PCS | Mod: S$GLB,,,

## 2023-01-26 PROCEDURE — 99407 BEHAV CHNG SMOKING > 10 MIN: CPT | Mod: S$GLB,,,

## 2023-01-31 ENCOUNTER — TELEPHONE (OUTPATIENT)
Dept: CARDIOLOGY | Facility: CLINIC | Age: 58
End: 2023-01-31
Payer: MEDICARE

## 2023-01-31 PROBLEM — R07.9 CHEST PAIN: Status: ACTIVE | Noted: 2023-01-31

## 2023-01-31 NOTE — TELEPHONE ENCOUNTER
"Patient called and reports having had chest pain early this am around 2 am and had to take 3 NTG which helped but continues to experience chest pain "like someone sitting on my chest". Advised patient to go to the Emergency Department and bring list of medication regimen. Patient verbalized understanding.  "

## 2023-02-02 ENCOUNTER — TELEPHONE (OUTPATIENT)
Dept: CARDIOLOGY | Facility: CLINIC | Age: 58
End: 2023-02-02
Payer: MEDICARE

## 2023-02-02 NOTE — TELEPHONE ENCOUNTER
----- Message from Julissa Marcos sent at 2/2/2023  8:30 AM CST -----  Regarding: appt follow up  Who Called:DEANNE MICHAELS [[id number]]         What is the reqeust in detail: Requesting call back to discuss if pt can get appt for follow up from ER 02/1. Please advise         Can the clinic reply by [facility]?no         Best Call Back Number:[phone]           Additional Information:

## 2023-02-06 ENCOUNTER — TELEPHONE (OUTPATIENT)
Dept: PHARMACY | Facility: CLINIC | Age: 58
End: 2023-02-06
Payer: MEDICARE

## 2023-02-09 ENCOUNTER — OFFICE VISIT (OUTPATIENT)
Dept: FAMILY MEDICINE | Facility: CLINIC | Age: 58
End: 2023-02-09
Payer: MEDICARE

## 2023-02-09 VITALS
OXYGEN SATURATION: 98 % | HEIGHT: 70 IN | BODY MASS INDEX: 31.32 KG/M2 | DIASTOLIC BLOOD PRESSURE: 78 MMHG | RESPIRATION RATE: 18 BRPM | WEIGHT: 218.81 LBS | HEART RATE: 72 BPM | SYSTOLIC BLOOD PRESSURE: 124 MMHG

## 2023-02-09 DIAGNOSIS — I73.9 PVD (PERIPHERAL VASCULAR DISEASE): Chronic | ICD-10-CM

## 2023-02-09 DIAGNOSIS — H60.393 OTHER INFECTIVE ACUTE OTITIS EXTERNA OF BOTH EARS: ICD-10-CM

## 2023-02-09 DIAGNOSIS — E11.51 TYPE 2 DIABETES MELLITUS WITH DIABETIC PERIPHERAL ANGIOPATHY WITHOUT GANGRENE, WITHOUT LONG-TERM CURRENT USE OF INSULIN: Chronic | ICD-10-CM

## 2023-02-09 DIAGNOSIS — I70.0 THORACIC AORTIC ATHEROSCLEROSIS: Chronic | ICD-10-CM

## 2023-02-09 DIAGNOSIS — I25.10 CAD IN NATIVE ARTERY: Primary | Chronic | ICD-10-CM

## 2023-02-09 PROCEDURE — 1160F RVW MEDS BY RX/DR IN RCRD: CPT | Mod: CPTII,S$GLB,, | Performed by: STUDENT IN AN ORGANIZED HEALTH CARE EDUCATION/TRAINING PROGRAM

## 2023-02-09 PROCEDURE — 3074F SYST BP LT 130 MM HG: CPT | Mod: CPTII,S$GLB,, | Performed by: STUDENT IN AN ORGANIZED HEALTH CARE EDUCATION/TRAINING PROGRAM

## 2023-02-09 PROCEDURE — 3044F HG A1C LEVEL LT 7.0%: CPT | Mod: CPTII,S$GLB,, | Performed by: STUDENT IN AN ORGANIZED HEALTH CARE EDUCATION/TRAINING PROGRAM

## 2023-02-09 PROCEDURE — 99999 PR PBB SHADOW E&M-EST. PATIENT-LVL IV: ICD-10-PCS | Mod: PBBFAC,,, | Performed by: STUDENT IN AN ORGANIZED HEALTH CARE EDUCATION/TRAINING PROGRAM

## 2023-02-09 PROCEDURE — 3072F PR LOW RISK FOR RETINOPATHY: ICD-10-PCS | Mod: CPTII,S$GLB,, | Performed by: STUDENT IN AN ORGANIZED HEALTH CARE EDUCATION/TRAINING PROGRAM

## 2023-02-09 PROCEDURE — 99214 OFFICE O/P EST MOD 30 MIN: CPT | Mod: S$GLB,,, | Performed by: STUDENT IN AN ORGANIZED HEALTH CARE EDUCATION/TRAINING PROGRAM

## 2023-02-09 PROCEDURE — 1159F PR MEDICATION LIST DOCUMENTED IN MEDICAL RECORD: ICD-10-PCS | Mod: CPTII,S$GLB,, | Performed by: STUDENT IN AN ORGANIZED HEALTH CARE EDUCATION/TRAINING PROGRAM

## 2023-02-09 PROCEDURE — 3044F PR MOST RECENT HEMOGLOBIN A1C LEVEL <7.0%: ICD-10-PCS | Mod: CPTII,S$GLB,, | Performed by: STUDENT IN AN ORGANIZED HEALTH CARE EDUCATION/TRAINING PROGRAM

## 2023-02-09 PROCEDURE — 1159F MED LIST DOCD IN RCRD: CPT | Mod: CPTII,S$GLB,, | Performed by: STUDENT IN AN ORGANIZED HEALTH CARE EDUCATION/TRAINING PROGRAM

## 2023-02-09 PROCEDURE — 1160F PR REVIEW ALL MEDS BY PRESCRIBER/CLIN PHARMACIST DOCUMENTED: ICD-10-PCS | Mod: CPTII,S$GLB,, | Performed by: STUDENT IN AN ORGANIZED HEALTH CARE EDUCATION/TRAINING PROGRAM

## 2023-02-09 PROCEDURE — 3008F BODY MASS INDEX DOCD: CPT | Mod: CPTII,S$GLB,, | Performed by: STUDENT IN AN ORGANIZED HEALTH CARE EDUCATION/TRAINING PROGRAM

## 2023-02-09 PROCEDURE — 3008F PR BODY MASS INDEX (BMI) DOCUMENTED: ICD-10-PCS | Mod: CPTII,S$GLB,, | Performed by: STUDENT IN AN ORGANIZED HEALTH CARE EDUCATION/TRAINING PROGRAM

## 2023-02-09 PROCEDURE — 3078F PR MOST RECENT DIASTOLIC BLOOD PRESSURE < 80 MM HG: ICD-10-PCS | Mod: CPTII,S$GLB,, | Performed by: STUDENT IN AN ORGANIZED HEALTH CARE EDUCATION/TRAINING PROGRAM

## 2023-02-09 PROCEDURE — 99214 PR OFFICE/OUTPT VISIT, EST, LEVL IV, 30-39 MIN: ICD-10-PCS | Mod: S$GLB,,, | Performed by: STUDENT IN AN ORGANIZED HEALTH CARE EDUCATION/TRAINING PROGRAM

## 2023-02-09 PROCEDURE — 3078F DIAST BP <80 MM HG: CPT | Mod: CPTII,S$GLB,, | Performed by: STUDENT IN AN ORGANIZED HEALTH CARE EDUCATION/TRAINING PROGRAM

## 2023-02-09 PROCEDURE — 3074F PR MOST RECENT SYSTOLIC BLOOD PRESSURE < 130 MM HG: ICD-10-PCS | Mod: CPTII,S$GLB,, | Performed by: STUDENT IN AN ORGANIZED HEALTH CARE EDUCATION/TRAINING PROGRAM

## 2023-02-09 PROCEDURE — 3072F LOW RISK FOR RETINOPATHY: CPT | Mod: CPTII,S$GLB,, | Performed by: STUDENT IN AN ORGANIZED HEALTH CARE EDUCATION/TRAINING PROGRAM

## 2023-02-09 PROCEDURE — 99999 PR PBB SHADOW E&M-EST. PATIENT-LVL IV: CPT | Mod: PBBFAC,,, | Performed by: STUDENT IN AN ORGANIZED HEALTH CARE EDUCATION/TRAINING PROGRAM

## 2023-02-09 RX ORDER — ATORVASTATIN CALCIUM 40 MG/1
40 TABLET, FILM COATED ORAL DAILY
Qty: 90 TABLET | Refills: 3 | Status: ON HOLD | OUTPATIENT
Start: 2023-02-09 | End: 2023-03-07 | Stop reason: HOSPADM

## 2023-02-09 RX ORDER — ATORVASTATIN CALCIUM 40 MG/1
40 TABLET, FILM COATED ORAL DAILY
Qty: 90 TABLET | Refills: 3 | Status: SHIPPED | OUTPATIENT
Start: 2023-02-09 | End: 2023-02-09

## 2023-02-09 RX ORDER — NEOMYCIN SULFATE, POLYMYXIN B SULFATE AND HYDROCORTISONE 10; 3.5; 1 MG/ML; MG/ML; [USP'U]/ML
3 SUSPENSION/ DROPS AURICULAR (OTIC) 3 TIMES DAILY
Qty: 9 ML | Refills: 0 | Status: SHIPPED | OUTPATIENT
Start: 2023-02-09 | End: 2023-02-23

## 2023-02-09 NOTE — PROGRESS NOTES
Plan:      Ryan was seen today for follow-up.    Diagnoses and all orders for this visit:    CAD in native artery - moderate 3V 60% by Pt history  -     atorvastatin (LIPITOR) 40 MG tablet; Take 1 tablet (40 mg total) by mouth once daily.    PVD (peripheral vascular disease)  -     atorvastatin (LIPITOR) 40 MG tablet; Take 1 tablet (40 mg total) by mouth once daily.    Thoracic aortic atherosclerosis  -     atorvastatin (LIPITOR) 40 MG tablet; Take 1 tablet (40 mg total) by mouth once daily.    Type 2 diabetes mellitus with diabetic peripheral angiopathy without gangrene, without long-term current use of insulin  -     atorvastatin (LIPITOR) 40 MG tablet; Take 1 tablet (40 mg total) by mouth once daily.    Other infective acute otitis externa of both ears  -     neomycin-polymyxin-hydrocortisone (CORTISPORIN) 3.5-10,000-1 mg/mL-unit/mL-% otic suspension; Place 3 drops into both ears 3 (three) times daily. for 14 days    Continue daily ASA 81 mg daily, continue working on smoking cessation.     Follow up in about 3 months (around 5/9/2023), or if symptoms worsen or fail to improve.    Ana Mayer MD  02/09/2023    Subjective:      Patient ID: Ryan Crane is a 57 y.o. male    Chief Complaint   Patient presents with    Follow-up     HPI  57 y.o. male with a PMHx as documented below presents to clinic today for the following:    Pt s/p hospital admission from 1/31/23 - 2/1/23 for ACS r/o in the setting of chest pain with history of CAD s/p stent placement (LAD), HTN, DMT2, and tobacco use. Workup including nuclear stress test negative - results below. Patient was discharged with plan to start ASA 81 mg daily and high-dose statin with PCP follow-up.     Nuclear stress test (2/1/23):   - Normal myocardial perfusion scan. There is no evidence of myocardial ischemia or infarction.  - The gated perfusion images showed an ejection fraction of 70% post stress.  - There is normal wall motion post stress.  - LV  cavity size is normal at stress.  - The ECG portion of the study is uninterpretable.  - The patient reported no chest pain during the stress test.  - There were no arrhythmias during stress.    Pt reports not being able to get ear drops prescribed on 1/20/23 for treatment of otitis externa due to cost - needs cheaper option.     Past Medical History:   Diagnosis Date    Angina of effort 06/22/2021    Aortic valve stenosis 02/28/2022    Arachnoid cyst of posterior cranial fossa 01/13/2022    Asthma     Bilateral carotid bruits 06/22/2021    Bipolar disorder 01/16/2022    CAD in native artery - moderate 3V 60% by Pt history 09/04/2019    Cancer     Candidal intertrigo 11/11/2019    Cataract     Chronic schizophrenia     Colon polyps     COPD (chronic obstructive pulmonary disease)     Equinus deformity of both feet 11/21/2019    Essential hypertension 10/09/2019    Flat foot 11/21/2019    Gallstone     Gastrointestinal hemorrhage 12/13/2019    Post polypectomy bleed     General anesthetics causing adverse effect in therapeutic use     Hammer toes of both feet 11/21/2019    Headache     Hypertension     Hypertensive heart disease without heart failure 01/16/2022    Hyponatremia 01/11/2022    ELAINE (iron deficiency anemia) 12/10/2019    Intractable chronic migraine without aura and without status migrainosus 12/30/2021    20 year history of migraine headaches. Headaches are typically moderate to severe in intensity, worsen with activity, pounding in quality and associated with sensitivity to light and sound. Given history of reported brain tumor, agree with head CT today. Cannot do MRI due to metal clips.   Galcanezumab (Emgality) treatment was approved for the prevention of acute and chronic migraine on 9/27/2018.    MI, old     Microcytic anemia 10/09/2019    Nicotine dependence, unspecified, uncomplicated 01/16/2022    Onychomycosis due to dermatophyte 11/21/2019    Orchitis 11/09/2019    PIPER on CPAP     Peripheral  visual field defect, bilateral 01/13/2022    PVD (peripheral vascular disease) 11/21/2019    Schizophrenia     Seizures 2008    Stroke 2005    Thoracic aortic atherosclerosis 10/17/2022    CT chest    Thyroid disease     took medicine in the past    Tinea pedis of right foot 05/21/2019    Tobacco use disorder 12/14/2019    Type 2 diabetes mellitus with diabetic peripheral angiopathy without gangrene 01/16/2022      Current Outpatient Medications   Medication Instructions    albuterol (PROVENTIL/VENTOLIN HFA) 90 mcg/actuation inhaler 2 puffs, Inhalation, Every 6 hours PRN    amLODIPine (NORVASC) 2.5 mg, Oral, Daily    aspirin (ECOTRIN) 81 mg, Oral, Daily    atorvastatin (LIPITOR) 40 mg, Oral, Daily    busPIRone (BUSPAR) 15 mg, Oral, 3 times daily    EPINEPHrine (EPIPEN) 0.3 mg, Intramuscular, Once    galcanezumab-gnlm (EMGALITY SYRINGE) 120 mg/mL Syrg Inject 1 pen (120 mg) into the skin every 28 days.    HYDROcodone-acetaminophen (NORCO) 5-325 mg per tablet 1 tablet, Oral, Every 6 hours PRN    lamoTRIgine (LAMICTAL) 200 mg, Oral, Daily    meclizine (ANTIVERT) 25 mg, Oral, 3 times daily PRN    metoprolol succinate (TOPROL-XL) 25 mg, Oral, Daily    mirtazapine (REMERON) 15 mg, Oral, Nightly    neomycin-polymyxin-hydrocortisone (CORTISPORIN) 3.5-10,000-1 mg/mL-unit/mL-% otic suspension 3 drops, Both Ears, 3 times daily    nicotine (NICODERM CQ) 14 mg/24 hr 1 patch, Transdermal, Daily    nicotine polacrilex 4 MG Lozg Take up to 8 pieces daily as needed    nitroGLYCERIN (NITROSTAT) 0.4 mg, Sublingual, Every 5 min PRN    NURTEC 75 mg, Oral, Daily PRN, Place ODT tablet on the tongue; alternatively the ODT tablet may be placed under the tongue    ondansetron (ZOFRAN-ODT) 4 mg, Oral, Every 8 hours PRN    pantoprazole (PROTONIX) 40 MG tablet TAKE 1 TABLET(40 MG) BY MOUTH TWICE DAILY BEFORE MEALS    PNEUMOVAX-23 25 mcg/0.5 mL vaccine No dose, route, or frequency recorded.    pregabalin (LYRICA) 50 mg, Oral, 3 times daily     "sodium chloride 1 g, Oral, 3 times daily    sucralfate (CARAFATE) 1 g, Oral, 4 times daily    tadalafiL (CIALIS) 20 MG Tab Take one tablet by mouth prior to sexual activity, not to exceed 1 tablet in 72 hours    topiramate (TOPAMAX) 200 mg, Oral, Nightly    ziprasidone (GEODON) 160 mg, Oral, Nightly      Review of Systems   Constitutional:  Negative for chills and fever.   HENT:  Positive for ear pain.    Respiratory:  Negative for shortness of breath.    Cardiovascular:  Negative for chest pain.   Gastrointestinal:  Negative for abdominal pain, constipation, diarrhea, nausea and vomiting.   Genitourinary:  Negative for dysuria.   Musculoskeletal:  Negative for back pain and neck pain.   Skin:  Negative for rash.   Neurological:  Negative for dizziness, sensory change, weakness and headaches.   Psychiatric/Behavioral:  Negative for depression. The patient is not nervous/anxious.      Objective:      Vitals:    02/09/23 1131   BP: 124/78   BP Location: Right arm   Patient Position: Sitting   Pulse: 72   Resp: 18   SpO2: 98%   Weight: 99.2 kg (218 lb 12.9 oz)   Height: 5' 10" (1.778 m)     Body mass index is 31.4 kg/m².    Physical Exam  Vitals reviewed.   Constitutional:       General: He is not in acute distress.  HENT:      Head: Normocephalic and atraumatic.      Right Ear: Swelling (mild) and tenderness (R > L) present. No drainage. A middle ear effusion is present. Tympanic membrane is erythematous and bulging (mild).      Left Ear: Tenderness present. No drainage or swelling. A middle ear effusion is present. Tympanic membrane is not erythematous or bulging.   Cardiovascular:      Rate and Rhythm: Normal rate.   Pulmonary:      Effort: Pulmonary effort is normal. No respiratory distress.      Breath sounds: Normal breath sounds. No wheezing or rales.   Skin:     Comments: .    Neurological:      General: No focal deficit present.      Mental Status: He is alert and oriented to person, place, and time. Mental " status is at baseline.      Assessment:       1. CAD in native artery - moderate 3V 60% by Pt history    2. PVD (peripheral vascular disease)    3. Thoracic aortic atherosclerosis    4. Type 2 diabetes mellitus with diabetic peripheral angiopathy without gangrene, without long-term current use of insulin    5. Other infective acute otitis externa of both ears        Ana Mayer MD  [facility]  Office: [phone]   Fax: [phone]  02/09/2023      Disclaimer: This note was partly generated using dictation software which may occasionally result in transcription errors.    Total time spent on this encounter includes face to face time and non-face to face time preparing to see the patient (eg, review of tests), obtaining and/or reviewing separately obtained history, documenting clinical information in the electronic or other health record, independently interpreting results and communicating results to the patient/family/caregiver, or care coordinator.

## 2023-02-22 ENCOUNTER — TELEPHONE (OUTPATIENT)
Dept: FAMILY MEDICINE | Facility: CLINIC | Age: 58
End: 2023-02-22
Payer: MEDICARE

## 2023-02-22 ENCOUNTER — OFFICE VISIT (OUTPATIENT)
Dept: CARDIOLOGY | Facility: CLINIC | Age: 58
End: 2023-02-22
Payer: MEDICARE

## 2023-02-22 ENCOUNTER — TELEPHONE (OUTPATIENT)
Dept: CARDIOLOGY | Facility: CLINIC | Age: 58
End: 2023-02-22

## 2023-02-22 VITALS
WEIGHT: 213.63 LBS | SYSTOLIC BLOOD PRESSURE: 124 MMHG | HEIGHT: 70 IN | DIASTOLIC BLOOD PRESSURE: 65 MMHG | HEART RATE: 103 BPM | BODY MASS INDEX: 30.58 KG/M2

## 2023-02-22 DIAGNOSIS — G47.33 OSA ON CPAP: Chronic | ICD-10-CM

## 2023-02-22 DIAGNOSIS — I10 ESSENTIAL HYPERTENSION: Chronic | ICD-10-CM

## 2023-02-22 DIAGNOSIS — J44.9 CHRONIC OBSTRUCTIVE PULMONARY DISEASE, UNSPECIFIED COPD TYPE: Chronic | ICD-10-CM

## 2023-02-22 DIAGNOSIS — I25.10 CAD IN NATIVE ARTERY: Primary | Chronic | ICD-10-CM

## 2023-02-22 DIAGNOSIS — I10 UNCONTROLLED HYPERTENSION: ICD-10-CM

## 2023-02-22 DIAGNOSIS — I73.9 PVD (PERIPHERAL VASCULAR DISEASE): Chronic | ICD-10-CM

## 2023-02-22 DIAGNOSIS — F17.200 NICOTINE DEPENDENCE, UNCOMPLICATED, UNSPECIFIED NICOTINE PRODUCT TYPE: Chronic | ICD-10-CM

## 2023-02-22 DIAGNOSIS — E11.51 TYPE 2 DIABETES MELLITUS WITH DIABETIC PERIPHERAL ANGIOPATHY WITHOUT GANGRENE, UNSPECIFIED WHETHER LONG TERM INSULIN USE: Chronic | ICD-10-CM

## 2023-02-22 DIAGNOSIS — I70.0 THORACIC AORTIC ATHEROSCLEROSIS: Chronic | ICD-10-CM

## 2023-02-22 PROCEDURE — 99214 OFFICE O/P EST MOD 30 MIN: CPT | Mod: S$GLB,,, | Performed by: PHYSICIAN ASSISTANT

## 2023-02-22 PROCEDURE — 3074F SYST BP LT 130 MM HG: CPT | Mod: CPTII,S$GLB,, | Performed by: PHYSICIAN ASSISTANT

## 2023-02-22 PROCEDURE — 3078F PR MOST RECENT DIASTOLIC BLOOD PRESSURE < 80 MM HG: ICD-10-PCS | Mod: CPTII,S$GLB,, | Performed by: PHYSICIAN ASSISTANT

## 2023-02-22 PROCEDURE — 1159F MED LIST DOCD IN RCRD: CPT | Mod: CPTII,S$GLB,, | Performed by: PHYSICIAN ASSISTANT

## 2023-02-22 PROCEDURE — 3044F PR MOST RECENT HEMOGLOBIN A1C LEVEL <7.0%: ICD-10-PCS | Mod: CPTII,S$GLB,, | Performed by: PHYSICIAN ASSISTANT

## 2023-02-22 PROCEDURE — 3008F PR BODY MASS INDEX (BMI) DOCUMENTED: ICD-10-PCS | Mod: CPTII,S$GLB,, | Performed by: PHYSICIAN ASSISTANT

## 2023-02-22 PROCEDURE — 3008F BODY MASS INDEX DOCD: CPT | Mod: CPTII,S$GLB,, | Performed by: PHYSICIAN ASSISTANT

## 2023-02-22 PROCEDURE — 99999 PR PBB SHADOW E&M-EST. PATIENT-LVL III: CPT | Mod: PBBFAC,,, | Performed by: PHYSICIAN ASSISTANT

## 2023-02-22 PROCEDURE — 3044F HG A1C LEVEL LT 7.0%: CPT | Mod: CPTII,S$GLB,, | Performed by: PHYSICIAN ASSISTANT

## 2023-02-22 PROCEDURE — 99214 PR OFFICE/OUTPT VISIT, EST, LEVL IV, 30-39 MIN: ICD-10-PCS | Mod: S$GLB,,, | Performed by: PHYSICIAN ASSISTANT

## 2023-02-22 PROCEDURE — 3072F LOW RISK FOR RETINOPATHY: CPT | Mod: CPTII,S$GLB,, | Performed by: PHYSICIAN ASSISTANT

## 2023-02-22 PROCEDURE — 3074F PR MOST RECENT SYSTOLIC BLOOD PRESSURE < 130 MM HG: ICD-10-PCS | Mod: CPTII,S$GLB,, | Performed by: PHYSICIAN ASSISTANT

## 2023-02-22 PROCEDURE — 3072F PR LOW RISK FOR RETINOPATHY: ICD-10-PCS | Mod: CPTII,S$GLB,, | Performed by: PHYSICIAN ASSISTANT

## 2023-02-22 PROCEDURE — 3078F DIAST BP <80 MM HG: CPT | Mod: CPTII,S$GLB,, | Performed by: PHYSICIAN ASSISTANT

## 2023-02-22 PROCEDURE — 1159F PR MEDICATION LIST DOCUMENTED IN MEDICAL RECORD: ICD-10-PCS | Mod: CPTII,S$GLB,, | Performed by: PHYSICIAN ASSISTANT

## 2023-02-22 PROCEDURE — 99999 PR PBB SHADOW E&M-EST. PATIENT-LVL III: ICD-10-PCS | Mod: PBBFAC,,, | Performed by: PHYSICIAN ASSISTANT

## 2023-02-22 RX ORDER — AMLODIPINE BESYLATE 2.5 MG/1
2.5 TABLET ORAL DAILY
Qty: 30 TABLET | Refills: 11 | Status: SHIPPED | OUTPATIENT
Start: 2023-02-22 | End: 2023-02-22 | Stop reason: SDUPTHER

## 2023-02-22 RX ORDER — NITROGLYCERIN 0.4 MG/1
0.4 TABLET SUBLINGUAL EVERY 5 MIN PRN
Qty: 25 TABLET | Refills: 11 | Status: SHIPPED | OUTPATIENT
Start: 2023-02-22

## 2023-02-22 RX ORDER — METOPROLOL SUCCINATE 50 MG/1
50 TABLET, EXTENDED RELEASE ORAL DAILY
Qty: 90 TABLET | Refills: 3 | Status: ON HOLD | OUTPATIENT
Start: 2023-02-22 | End: 2023-03-26 | Stop reason: HOSPADM

## 2023-02-22 NOTE — TELEPHONE ENCOUNTER
----- Message from Juan Miguel Tabor sent at 2/22/2023  3:50 PM CST -----  Type:  Needs Medical Advice    Who Called: Pt  Symptoms (please be specific): Missing RX   How long has patient had these symptoms:    Pharmacy name and phone #:      [id number] - [place], [place] - [address]  [address] [address]  Phone: [phone] Fax: [phone]    Would the patient rather a call back or a response via [facility]? Call  Best Call Back Number: [phone]  Additional Information: Sts that when his RX was picked up they just had the Nitro  they were missing the amLODIPine (NORVASC) 2.5 MG tablet.  Please advise -- Thank you

## 2023-02-22 NOTE — TELEPHONE ENCOUNTER
----- Message from Margie Arroyo sent at 2/22/2023  2:54 PM CST -----  Who Called: Pt    What is the request in detail: Requesting call back to [place] with nurse Veronica. Please advise.     Can the clinic reply by [facility]? No    Best Call Back Number: [phone]      Additional Information:

## 2023-02-22 NOTE — TELEPHONE ENCOUNTER
----- Message from Gail Naqvi sent at 2/22/2023 12:28 PM CST -----  Regarding: same day appt  Contact: Patient  Type:  Same Day Appointment Request    Caller is requesting a same day appointment.  Caller declined first available appointment listed below.      Name of Caller:  Patient  When is the first available appointment?    Symptoms:  bad cold  Best Call Back Number:  [phone]    Additional Information: He would like to be seen today. Thanks!           Chart reviewed by case management, I meet with the pt and her friend, pt lives in a 2 stoey home 2 steps outside 12 steps inside, br on the 1st and 2nd floor, pt lives with family members and friends, pt has hx of anxiety, she has a cane, shower chair and walker, pt has has had [facility] in th past and she is unsure of the agency, pt's friend will drive her home, pt still works cm will continue to follow and assess for any additional d/c needs, d/c plan was discussed at care coordination rounds today, tentative d/c for tomorrow, pt remains on IV antibiotics

## 2023-02-22 NOTE — PROGRESS NOTES
Subjective:    Patient ID:  Ryan Crane is a 57 y.o. male who presents for follow-up of chest pain.       HPI  Mr. Crane is a pleasant gentleman who follows with Dr. Aranda. He presents today for follow up after a recent hospitalization at [facility] from 1/31-2/1 for chest pain. He ruled out for ACS and underwent nuclear stress testing that was negative for ischemia. He was discharged home.     He continues to have episodes of chest discomfort. They occur at rest and resolve with SL nitro. He has not been taking his amlodipine (ran out) nor his ASA 81mg daily. Has not used Cialis in months.     He had a coronary angiogram in August 2022 that showed:  Left Anterior Descending   Moderate size vessel with some diffuse disease in a small diagonal branch   Prox LAD lesion was 10% stenosed. The lesion was previously treated using a stent of unknown type.      Left Circumflex   The vessel is large. Large dominant circumflex artery with mild disease   Prox Cx to Mid Cx lesion was 25% stenosed.      Right Coronary Artery   The vessel is small. Very small vessel about 1 mm caliber vessel with about 50% stenosis in its proximal to midportion   Prox RCA lesion was 50% stenosed.        Echo in January 2022 showed:  The left ventricle is normal in size with concentric remodeling and normal systolic function.  The estimated ejection fraction is 60%.  Grade I left ventricular diastolic dysfunction.  Normal right ventricular size with normal right ventricular systolic function.  Moderate left atrial enlargement.  There is mild aortic valve stenosis.  Aortic valve area is 1.91 cm2; peak velocity is 3.27 m/s; mean gradient is 24 mmHg.  Mild aortic regurgitation.  Elevated central venous pressure (15 mmHg).  The estimated PA systolic pressure is 27 mmHg.      Review of Systems   Constitutional: Negative for chills, diaphoresis, fever, weight gain and weight loss.   HENT:  Negative for sore throat.    Eyes:  Negative for blurred  "vision, vision loss in left eye, vision loss in right eye and visual disturbance.   Cardiovascular:  Positive for chest pain. Negative for claudication, dyspnea on exertion, leg swelling, near-syncope, orthopnea, palpitations, paroxysmal nocturnal dyspnea and syncope.   Respiratory:  Negative for cough, hemoptysis, shortness of breath, sputum production and wheezing.    Endocrine: Negative for cold intolerance and heat intolerance.   Hematologic/Lymphatic: Negative for adenopathy. Does not bruise/bleed easily.   Skin:  Negative for rash.   Musculoskeletal:  Negative for falls, muscle weakness and myalgias.   Gastrointestinal:  Negative for abdominal pain, change in bowel habit, constipation, diarrhea, melena and nausea.   Genitourinary:  Negative for bladder incontinence.   Neurological:  Negative for dizziness, focal weakness, headaches, light-headedness, numbness and weakness.   Psychiatric/Behavioral:  Negative for altered mental status.       Vitals:    02/22/23 0954   BP: 124/65   BP Location: Left arm   Patient Position: Sitting   BP Method: Medium (Automatic)   Pulse: 103   Weight: 96.9 kg (213 lb 10 oz)   Height: 5' 10" (1.778 m)   Body mass index is 30.65 kg/m².    Objective:    Physical Exam  Constitutional:       Appearance: He is well-developed.   HENT:      Head: Normocephalic and atraumatic.   Eyes:      Extraocular Movements: Extraocular movements intact.      Pupils: Pupils are equal, round, and reactive to light.   Neck:      Thyroid: No thyromegaly.      Vascular: No JVD.      Trachea: No tracheal deviation.   Cardiovascular:      Rate and Rhythm: Normal rate and regular rhythm.      Chest Wall: PMI is not displaced.      Pulses: Normal pulses and intact distal pulses.      Heart sounds: S1 normal and S2 normal. No murmur heard.    No friction rub. No gallop.   Pulmonary:      Effort: Pulmonary effort is normal. No respiratory distress.      Breath sounds: Normal breath sounds. No wheezing or " rales.   Chest:      Chest wall: No tenderness.   Abdominal:      General: Bowel sounds are normal. There is no distension.      Palpations: Abdomen is soft. There is no mass.      Tenderness: There is no abdominal tenderness.   Musculoskeletal:         General: No tenderness. Normal range of motion.      Cervical back: Neck supple.   Skin:     General: Skin is warm and dry.      Findings: No rash.   Neurological:      General: No focal deficit present.      Mental Status: He is alert and oriented to person, place, and time.   Psychiatric:         Mood and Affect: Mood normal.         Behavior: Behavior normal.         Assessment:       Problem List Items Addressed This Visit          Cardiology Problems    CAD in native artery - moderate 3V 60% by Pt history - Primary (Chronic)    Essential hypertension (Chronic)    PVD (peripheral vascular disease) (Chronic)    Thoracic aortic atherosclerosis (Chronic)    Type 2 diabetes mellitus with diabetic peripheral angiopathy without gangrene (Chronic)       Other    COPD (chronic obstructive pulmonary disease) (Chronic)    Nicotine dependence, unspecified, uncomplicated (Chronic)    PIPER on CPAP (Chronic)          Plan:       Increase metoprolol to 50mg QHS.   Resume Norvasc 2.5mg QAM.   Monitor BP and HR.   Start ASA 81mg daily.   Continue atorvastatin 40mg daily.   Can use SL nitro prn. If CP persists, consider long-acting nitrate. Reinforced importance of avoiding use of nitro if he takes Cialis.   Risk factor modification including diet and exercise.   F/U with Dr. Aranda as scheduled.

## 2023-02-22 NOTE — TELEPHONE ENCOUNTER
----- Message from Maura Agee sent at 2/22/2023  8:27 AM CST -----  Name of Who is Calling:DEANNE MICHAELS [[id number]]       What is the request in detail: pt stated that he is completely out of amilodipine 2.5mg        Can the clinic reply by [facility]:no        What [facility]:[phone] or [phone]

## 2023-02-22 NOTE — TELEPHONE ENCOUNTER
Spoke to pt and advised to call [facility] to find out why they didn't fill the amlodipine--prescriptions were sent and received by the pharmacy at the same time

## 2023-02-23 ENCOUNTER — HOSPITAL ENCOUNTER (OUTPATIENT)
Dept: RADIOLOGY | Facility: HOSPITAL | Age: 58
Discharge: HOME | End: 2023-02-23
Payer: MEDICARE

## 2023-02-23 ENCOUNTER — OFFICE VISIT (OUTPATIENT)
Dept: FAMILY MEDICINE | Facility: CLINIC | Age: 58
End: 2023-02-23
Payer: MEDICARE

## 2023-02-23 VITALS
RESPIRATION RATE: 16 BRPM | DIASTOLIC BLOOD PRESSURE: 76 MMHG | OXYGEN SATURATION: 98 % | WEIGHT: 214.75 LBS | TEMPERATURE: 98 F | SYSTOLIC BLOOD PRESSURE: 106 MMHG | HEART RATE: 98 BPM | HEIGHT: 70 IN | BODY MASS INDEX: 30.74 KG/M2

## 2023-02-23 DIAGNOSIS — R05.9 COUGH, UNSPECIFIED TYPE: Primary | ICD-10-CM

## 2023-02-23 DIAGNOSIS — R09.3 INCREASED SPUTUM PRODUCTION: ICD-10-CM

## 2023-02-23 DIAGNOSIS — J44.0 CHRONIC OBSTRUCTIVE PULMONARY DISEASE WITH ACUTE LOWER RESPIRATORY INFECTION: ICD-10-CM

## 2023-02-23 DIAGNOSIS — J44.9 CHRONIC OBSTRUCTIVE PULMONARY DISEASE, UNSPECIFIED COPD TYPE: Chronic | ICD-10-CM

## 2023-02-23 DIAGNOSIS — R05.9 COUGH, UNSPECIFIED TYPE: ICD-10-CM

## 2023-02-23 PROCEDURE — 99214 OFFICE O/P EST MOD 30 MIN: CPT | Mod: 25,S$GLB,,

## 2023-02-23 PROCEDURE — 3044F HG A1C LEVEL LT 7.0%: CPT | Mod: CPTII,S$GLB,,

## 2023-02-23 PROCEDURE — 3078F PR MOST RECENT DIASTOLIC BLOOD PRESSURE < 80 MM HG: ICD-10-PCS | Mod: CPTII,S$GLB,,

## 2023-02-23 PROCEDURE — 3072F PR LOW RISK FOR RETINOPATHY: ICD-10-PCS | Mod: CPTII,S$GLB,,

## 2023-02-23 PROCEDURE — 71046 X-RAY EXAM CHEST 2 VIEWS: CPT | Mod: 26,,, | Performed by: RADIOLOGY

## 2023-02-23 PROCEDURE — 87502 POCT INFLUENZA A/B MOLECULAR: ICD-10-PCS | Mod: QW,S$GLB,,

## 2023-02-23 PROCEDURE — 99999 PR PBB SHADOW E&M-EST. PATIENT-LVL V: CPT | Mod: PBBFAC,,,

## 2023-02-23 PROCEDURE — 87635 SARS-COV-2 COVID-19 AMP PRB: CPT | Mod: QW,S$GLB,,

## 2023-02-23 PROCEDURE — 3074F PR MOST RECENT SYSTOLIC BLOOD PRESSURE < 130 MM HG: ICD-10-PCS | Mod: CPTII,S$GLB,,

## 2023-02-23 PROCEDURE — 99999 PR PBB SHADOW E&M-EST. PATIENT-LVL V: ICD-10-PCS | Mod: PBBFAC,,,

## 2023-02-23 PROCEDURE — 3044F PR MOST RECENT HEMOGLOBIN A1C LEVEL <7.0%: ICD-10-PCS | Mod: CPTII,S$GLB,,

## 2023-02-23 PROCEDURE — 1159F MED LIST DOCD IN RCRD: CPT | Mod: CPTII,S$GLB,,

## 2023-02-23 PROCEDURE — 99214 PR OFFICE/OUTPT VISIT, EST, LEVL IV, 30-39 MIN: ICD-10-PCS | Mod: 25,S$GLB,,

## 2023-02-23 PROCEDURE — 3072F LOW RISK FOR RETINOPATHY: CPT | Mod: CPTII,S$GLB,,

## 2023-02-23 PROCEDURE — 87070 CULTURE OTHR SPECIMN AEROBIC: CPT

## 2023-02-23 PROCEDURE — 3008F PR BODY MASS INDEX (BMI) DOCUMENTED: ICD-10-PCS | Mod: CPTII,S$GLB,,

## 2023-02-23 PROCEDURE — 94640 AIRWAY INHALATION TREATMENT: CPT | Mod: S$GLB,,,

## 2023-02-23 PROCEDURE — 87205 SMEAR GRAM STAIN: CPT

## 2023-02-23 PROCEDURE — 94640 PR INHAL RX, AIRWAY OBST/DX SPUTUM INDUCT: ICD-10-PCS | Mod: S$GLB,,,

## 2023-02-23 PROCEDURE — 3078F DIAST BP <80 MM HG: CPT | Mod: CPTII,S$GLB,,

## 2023-02-23 PROCEDURE — 3074F SYST BP LT 130 MM HG: CPT | Mod: CPTII,S$GLB,,

## 2023-02-23 PROCEDURE — 71046 XR CHEST PA AND LATERAL: ICD-10-PCS | Mod: 26,,, | Performed by: RADIOLOGY

## 2023-02-23 PROCEDURE — 1159F PR MEDICATION LIST DOCUMENTED IN MEDICAL RECORD: ICD-10-PCS | Mod: CPTII,S$GLB,,

## 2023-02-23 PROCEDURE — 87502 INFLUENZA DNA AMP PROBE: CPT | Mod: QW,S$GLB,,

## 2023-02-23 PROCEDURE — 87635: ICD-10-PCS | Mod: QW,S$GLB,,

## 2023-02-23 PROCEDURE — 71046 X-RAY EXAM CHEST 2 VIEWS: CPT | Mod: TC,PN

## 2023-02-23 PROCEDURE — 3008F BODY MASS INDEX DOCD: CPT | Mod: CPTII,S$GLB,,

## 2023-02-23 RX ORDER — DOXYCYCLINE HYCLATE 100 MG
100 TABLET ORAL EVERY 12 HOURS
Qty: 10 TABLET | Refills: 0 | Status: SHIPPED | OUTPATIENT
Start: 2023-02-23 | End: 2023-02-28

## 2023-02-23 RX ORDER — PREDNISONE 20 MG/1
20 TABLET ORAL 2 TIMES DAILY
Qty: 10 TABLET | Refills: 0 | Status: SHIPPED | OUTPATIENT
Start: 2023-02-23 | End: 2023-02-28

## 2023-02-23 RX ORDER — AZITHROMYCIN 250 MG/1
TABLET, FILM COATED ORAL
Qty: 6 TABLET | Refills: 0 | Status: CANCELLED | OUTPATIENT
Start: 2023-02-23 | End: 2023-02-28

## 2023-02-23 RX ORDER — AMLODIPINE BESYLATE 2.5 MG/1
2.5 TABLET ORAL DAILY
Qty: 90 TABLET | Refills: 3 | Status: ON HOLD | OUTPATIENT
Start: 2023-02-23 | End: 2023-03-26 | Stop reason: HOSPADM

## 2023-02-23 RX ORDER — IPRATROPIUM BROMIDE AND ALBUTEROL SULFATE 2.5; .5 MG/3ML; MG/3ML
3 SOLUTION RESPIRATORY (INHALATION) EVERY 6 HOURS PRN
Qty: 75 ML | Refills: 0 | Status: SHIPPED | OUTPATIENT
Start: 2023-02-23 | End: 2024-02-23

## 2023-02-23 RX ORDER — IPRATROPIUM BROMIDE AND ALBUTEROL SULFATE 2.5; .5 MG/3ML; MG/3ML
3 SOLUTION RESPIRATORY (INHALATION)
Status: COMPLETED | OUTPATIENT
Start: 2023-02-23 | End: 2023-02-23

## 2023-02-23 RX ADMIN — IPRATROPIUM BROMIDE AND ALBUTEROL SULFATE 3 ML: 2.5; .5 SOLUTION RESPIRATORY (INHALATION) at 08:02

## 2023-02-23 NOTE — PROGRESS NOTES
[facility]  [address]  [place] [address]    Subjective    Chief Complaint:   Chief Complaint   Patient presents with    Nasal Congestion    Cough    Sore Throat       History of Present Illness:     Ryan Crane is a(n) 57 y.o. male with past medical history as noted below who presents to the clinic today for 1 month onset cough, nasal congestion and sore throat. For the past 2 weeks he has been coughing up dark green/black sputum.     He has not had a fever at home, reports pleuritic chest pain. Reports shortness of breath for the past 2 weeks, doesn't have inhaler with him. O2 sat 98%. He has a history of smoking, COPD, reports no medications for COPD. PIPER on CPAP.     Following with cardiology for CAD, aortic stenosis.       Chief Complaint   Patient presents with    Nasal Congestion    Cough    Sore Throat   .          Problem List:   Patient Active Problem List   Diagnosis    Chronic schizophrenia    COPD (chronic obstructive pulmonary disease)    PIPER on CPAP    Intractable right heel pain    Tinea pedis of right foot    CAD in native artery - moderate 3V 60% by Pt history    Essential hypertension    Flat foot    Hammer toes of both feet    Equinus deformity of both feet    Hx of diabetic foot ulcer    Onychomycosis due to dermatophyte    PVD (peripheral vascular disease)    Diabetic polyneuropathy associated with diabetes mellitus due to underlying condition    Smoker    Bilateral carotid bruits    Hyponatremia    Arachnoid cyst of posterior cranial fossa    Peripheral visual field defect, bilateral    Intractable vomiting with nausea    Advance care planning    Aortic valve stenosis    Thoracic aortic atherosclerosis    BMI 28.0-28.9,adult    Type 2 diabetes mellitus with diabetic peripheral angiopathy without gangrene    Nicotine dependence, unspecified, uncomplicated    Hypertensive heart disease without heart failure    Mass of right foot     Calculus of gallbladder without cholecystitis without obstruction    Chest pain       Current Outpatient Medications:   Current Outpatient Medications   Medication Instructions    albuterol (PROVENTIL/VENTOLIN HFA) 90 mcg/actuation inhaler 2 puffs, Inhalation, Every 6 hours PRN    amLODIPine (NORVASC) 2.5 mg, Oral, Daily    aspirin (ECOTRIN) 81 mg, Oral, Daily    atorvastatin (LIPITOR) 40 mg, Oral, Daily    busPIRone (BUSPAR) 15 mg, Oral, 3 times daily    galcanezumab-gnlm (EMGALITY SYRINGE) 120 mg/mL Syrg Inject 1 pen (120 mg) into the skin every 28 days.    lamoTRIgine (LAMICTAL) 200 mg, Oral, Daily    meclizine (ANTIVERT) 25 mg, Oral, 3 times daily PRN    metoprolol succinate (TOPROL-XL) 50 mg, Oral, Daily    mirtazapine (REMERON) 15 mg, Oral, Nightly    neomycin-polymyxin-hydrocortisone (CORTISPORIN) 3.5-10,000-1 mg/mL-unit/mL-% otic suspension 3 drops, Both Ears, 3 times daily    nicotine (NICODERM CQ) 14 mg/24 hr 1 patch, Transdermal, Daily    nicotine polacrilex 4 MG Lozg Take up to 8 pieces daily as needed    nitroGLYCERIN (NITROSTAT) 0.4 mg, Sublingual, Every 5 min PRN    NURTEC 75 mg, Oral, Every other day    ondansetron (ZOFRAN-ODT) 4 mg, Oral, Every 8 hours PRN    pantoprazole (PROTONIX) 40 MG tablet TAKE 1 TABLET(40 MG) BY MOUTH TWICE DAILY BEFORE MEALS    PNEUMOVAX-23 25 mcg/0.5 mL vaccine No dose, route, or frequency recorded.    pregabalin (LYRICA) 50 mg, Oral, 3 times daily    sodium chloride 1 g, Oral, 3 times daily    sucralfate (CARAFATE) 1 g, Oral, 4 times daily    tadalafiL (CIALIS) 20 MG Tab Take one tablet by mouth prior to sexual activity, not to exceed 1 tablet in 72 hours    topiramate (TOPAMAX) 200 mg, Oral, Nightly    ziprasidone (GEODON) 160 mg, Oral, Nightly       Surgical History:   Past Surgical History:   Procedure Laterality Date    ANGIOGRAM, CORONARY, WITH LEFT HEART CATHETERIZATION  08/25/2022    Procedure: Angiogram, Coronary, with Left Heart Cath;  Surgeon: Mai Deleon MD;   Location: [facility] CATH;  Service: Cardiology;;    APPENDECTOMY      BONE MARROW ASPIRATION Left 08/21/2020    Procedure: ASPIRATION, BONE MARROW;  Surgeon: Lex Kathleen MD;  Location: [facility] OR;  Service: Oncology;  Laterality: Left;    CLOSURE OF WOUND Right 09/09/2019    Procedure: CLOSURE, WOUND;  Surgeon: Li Kathleen DPM;  Location: [facility] OR;  Service: Podiatry;  Laterality: Right;    COLONOSCOPY N/A 12/10/2019    Procedure: COLONOSCOPY;  Surgeon: Ryan Lucas MD;  [facility];  Service: Endoscopy;  Laterality: N/A;    COLONOSCOPY N/A 12/13/2019    Procedure: COLONOSCOPY;  Surgeon: Ryan Lucas MD;  Location: [facility] ENDO;  Service: Endoscopy;  Laterality: N/A;    CORONARY STENT PLACEMENT      ESOPHAGOGASTRODUODENOSCOPY N/A 12/10/2019    Procedure: EGD (ESOPHAGOGASTRODUODENOSCOPY);  Surgeon: Ryan Lucas MD;  Location: [facility] ENDO;  Service: Endoscopy;  Laterality: N/A;    ESOPHAGOGASTRODUODENOSCOPY N/A 11/3/2022    Procedure: EGD (ESOPHAGOGASTRODUODENOSCOPY);  Surgeon: Ryan Lucas MD;  Location: [facility] ENDO;  Service: Endoscopy;  Laterality: N/A;    LAPAROSCOPIC CHOLECYSTECTOMY N/A 1/11/2023    Procedure: CHOLECYSTECTOMY, LAPAROSCOPIC;  Surgeon: Laith Davis MD;  Location: [facility] OR;  Service: General;  Laterality: N/A;    LEFT HEART CATHETERIZATION Left 08/25/2022    Procedure: Left heart cath;  Surgeon: Mai Deleon MD;  Location: [facility] CATH;  Service: Cardiology;  Laterality: Left;    right heel surgery      SHOULDER ARTHROSCOPY Left        Family History:   Family History   Problem Relation Age of Onset    Melanoma Mother     Diabetes Mother     Hypertension Mother     Stroke Father     Diabetes Father     Hypertension Father     Colon cancer Father         unsure of age of diagnosis    Cancer Father         colon cancer    Cervical cancer Sister     Cirrhosis Brother     Hypertension Brother     Lung cancer Maternal Grandmother     Prostate cancer Maternal Grandfather      "Crohn's disease Neg Hx     Ulcerative colitis Neg Hx     Stomach cancer Neg Hx     Esophageal cancer Neg Hx     Retinal detachment Neg Hx     Strabismus Neg Hx     Macular degeneration Neg Hx     Glaucoma Neg Hx        Allergies:   Review of patient's allergies indicates:   Allergen Reactions    Alcohol Anaphylaxis     Pt states all types of alcohol    Alcohol antiseptic pads Anaphylaxis    Cephalexin Anaphylaxis       Tobacco Status:   Tobacco Use: High Risk    Smoking Tobacco Use: Every Day    Smokeless Tobacco Use: Never    Passive Exposure: Not on file       Sexual Activity:   Social History     Substance and Sexual Activity   Sexual Activity Yes    Partners: Female       Alcohol Use:   Social History     Substance and Sexual Activity   Alcohol Use No         Objective       Vitals:    02/23/23 0747   BP: 106/76   BP Location: Left arm   Patient Position: Sitting   Pulse: 98   Resp: 16   Temp: 97.8 °F (36.6 °C)   SpO2: 98%   Weight: 97.4 kg (214 lb 11.7 oz)   Height: 5' 10" (1.778 m)       Review of Systems   Constitutional:  Negative for fever.   Respiratory:  Positive for cough, sputum production and shortness of breath.    Cardiovascular:  Positive for chest pain (pleuritic).     Physical Exam  Constitutional:       Appearance: Normal appearance.   HENT:      Head: Normocephalic and atraumatic.      Nose: Nose normal. No congestion.      Mouth/Throat:      Mouth: Mucous membranes are moist.      Pharynx: Oropharynx is clear.   Cardiovascular:      Rate and Rhythm: Normal rate and regular rhythm.      Heart sounds: Normal heart sounds. No murmur heard.  Pulmonary:      Effort: No respiratory distress.      Breath sounds: Wheezing present.      Comments: +increased work of breathing, no respiratory distress  Musculoskeletal:      Cervical back: Normal range of motion.   Lymphadenopathy:      Cervical: No cervical adenopathy.   Skin:     General: Skin is warm and dry.   Neurological:      Mental Status: He is " alert and oriented to person, place, and time.   Psychiatric:         Behavior: Behavior normal.     Component      Latest Ref Rng & Units 2/23/2023 2/23/2023           8:31 AM  8:30 AM   POC Molecular Influenza A Ag      Negative, Not Reported Negative    POC Molecular Influenza B Ag      Negative, Not Reported Negative     Acceptable       Yes Yes   SARS-CoV-2 RNA, Amplification, Qual      Negative  Negative       Assessment and Plan:    1. Cough, unspecified type  -     X-Ray Chest PA And Lateral; Future; Expected date: 02/23/2023  -     POCT COVID-19 Rapid Screening  -     POCT Influenza A/B Molecular    2. Chronic obstructive pulmonary disease, unspecified COPD type  -     albuterol-ipratropium 2.5 mg-0.5 mg/3 mL nebulizer solution 3 mL  -     predniSONE (DELTASONE) 20 MG tablet; Take 1 tablet (20 mg total) by mouth 2 (two) times daily. for 5 days  Dispense: 10 tablet; Refill: 0  -     Culture, Respiratory with Gram Stain  -     albuterol-ipratropium (DUO-NEB) 2.5 mg-0.5 mg/3 mL nebulizer solution; Take 3 mLs by nebulization every 6 (six) hours as needed for Wheezing. Rescue  Dispense: 75 mL; Refill: 0  -     doxycycline (VIBRA-TABS) 100 MG tablet; Take 1 tablet (100 mg total) by mouth every 12 (twelve) hours. for 5 days  Dispense: 10 tablet; Refill: 0    3. Increased sputum production  -     Culture, Respiratory with Gram Stain    4. Chronic obstructive pulmonary disease with acute lower respiratory infection  -     doxycycline (VIBRA-TABS) 100 MG tablet; Take 1 tablet (100 mg total) by mouth every 12 (twelve) hours. for 5 days  Dispense: 10 tablet; Refill: 0        Visit summary:    Ryan Crane presented today with symptoms consistent with COPD exacerbation. He is clinically stable at this time.     Flu and Covid negative, prescribed prednisone and doxycycline. Sputum culture for analysis given pt with black sputum. Low dose CT lung cancer screening previously ordered, advised patient  to schedule     Patient responded favorably to duoneb tx today, has home nebulizer, prescribed nebulizer liquid. Advised to use in place of home albuterol if more effective until symptom resolution    Discussed caution with doxycycline and sun exposure, esophagitis risk    Diabetes well controlled, prednisone OK    Patient was instructed to report to ER if symptoms become severe.    Follow up: No follow-ups on file. To follow up with me Monday.    This case was discussed with JEAN-PAUL Medrano MD.     Annie Joseph PA-C

## 2023-02-27 ENCOUNTER — OFFICE VISIT (OUTPATIENT)
Dept: FAMILY MEDICINE | Facility: CLINIC | Age: 58
End: 2023-02-27
Payer: MEDICARE

## 2023-02-27 ENCOUNTER — TELEPHONE (OUTPATIENT)
Dept: GASTROENTEROLOGY | Facility: CLINIC | Age: 58
End: 2023-02-27
Payer: MEDICARE

## 2023-02-27 VITALS
SYSTOLIC BLOOD PRESSURE: 126 MMHG | WEIGHT: 213.31 LBS | HEIGHT: 70 IN | BODY MASS INDEX: 30.54 KG/M2 | HEART RATE: 76 BPM | OXYGEN SATURATION: 98 % | TEMPERATURE: 98 F | DIASTOLIC BLOOD PRESSURE: 60 MMHG

## 2023-02-27 DIAGNOSIS — J44.0 CHRONIC OBSTRUCTIVE PULMONARY DISEASE WITH ACUTE LOWER RESPIRATORY INFECTION: Primary | Chronic | ICD-10-CM

## 2023-02-27 DIAGNOSIS — F17.210 CIGARETTE NICOTINE DEPENDENCE WITHOUT COMPLICATION: ICD-10-CM

## 2023-02-27 DIAGNOSIS — R05.9 COUGH, UNSPECIFIED TYPE: ICD-10-CM

## 2023-02-27 PROCEDURE — 3072F LOW RISK FOR RETINOPATHY: CPT | Mod: CPTII,S$GLB,,

## 2023-02-27 PROCEDURE — 99999 PR PBB SHADOW E&M-EST. PATIENT-LVL V: CPT | Mod: PBBFAC,,,

## 2023-02-27 PROCEDURE — 1160F PR REVIEW ALL MEDS BY PRESCRIBER/CLIN PHARMACIST DOCUMENTED: ICD-10-PCS | Mod: CPTII,S$GLB,,

## 2023-02-27 PROCEDURE — 3074F SYST BP LT 130 MM HG: CPT | Mod: CPTII,S$GLB,,

## 2023-02-27 PROCEDURE — 3008F PR BODY MASS INDEX (BMI) DOCUMENTED: ICD-10-PCS | Mod: CPTII,S$GLB,,

## 2023-02-27 PROCEDURE — 1160F RVW MEDS BY RX/DR IN RCRD: CPT | Mod: CPTII,S$GLB,,

## 2023-02-27 PROCEDURE — 3078F PR MOST RECENT DIASTOLIC BLOOD PRESSURE < 80 MM HG: ICD-10-PCS | Mod: CPTII,S$GLB,,

## 2023-02-27 PROCEDURE — 99213 PR OFFICE/OUTPT VISIT, EST, LEVL III, 20-29 MIN: ICD-10-PCS | Mod: 25,S$GLB,,

## 2023-02-27 PROCEDURE — 3008F BODY MASS INDEX DOCD: CPT | Mod: CPTII,S$GLB,,

## 2023-02-27 PROCEDURE — 3044F HG A1C LEVEL LT 7.0%: CPT | Mod: CPTII,S$GLB,,

## 2023-02-27 PROCEDURE — 1159F PR MEDICATION LIST DOCUMENTED IN MEDICAL RECORD: ICD-10-PCS | Mod: CPTII,S$GLB,,

## 2023-02-27 PROCEDURE — 99999 PR PBB SHADOW E&M-EST. PATIENT-LVL V: ICD-10-PCS | Mod: PBBFAC,,,

## 2023-02-27 PROCEDURE — 99213 OFFICE O/P EST LOW 20 MIN: CPT | Mod: 25,S$GLB,,

## 2023-02-27 PROCEDURE — 1159F MED LIST DOCD IN RCRD: CPT | Mod: CPTII,S$GLB,,

## 2023-02-27 PROCEDURE — 3074F PR MOST RECENT SYSTOLIC BLOOD PRESSURE < 130 MM HG: ICD-10-PCS | Mod: CPTII,S$GLB,,

## 2023-02-27 PROCEDURE — 3044F PR MOST RECENT HEMOGLOBIN A1C LEVEL <7.0%: ICD-10-PCS | Mod: CPTII,S$GLB,,

## 2023-02-27 PROCEDURE — 3072F PR LOW RISK FOR RETINOPATHY: ICD-10-PCS | Mod: CPTII,S$GLB,,

## 2023-02-27 PROCEDURE — 3078F DIAST BP <80 MM HG: CPT | Mod: CPTII,S$GLB,,

## 2023-02-27 NOTE — TELEPHONE ENCOUNTER
----- Message from Janeth Angel sent at 2/27/2023 10:24 AM CST -----  Regarding: pt called  Name of Who is Calling: DEANNE MICHAELS [[id number]]      What is the request in detail: pt needs to cancel his upcoming procedure on 03/06 and rescheduled due to him moving. Please advise       Can the clinic reply by [facility]: No      What Number to Call Back [facility] not [facility]:  [phone]

## 2023-02-27 NOTE — PROGRESS NOTES
[facility]  [address]  [place] [address]    Subjective    Chief Complaint: No chief complaint on file.      History of Present Illness:     Ryan Crane is a(n) 57 y.o. male with past medical history as noted below who presents to the clinic today for follow up of COPD exacerbation with suspected lower respiratory tract infection. Saw me 2/23/23 for 1 month history acutely worsening cough with associated shortness of breath and dark green/ black sputum. Covid and flu negative. Prescribed prednisone, duonebs for home nebulizer and doxycycline 100 mg BID x 5 days.     Today reports breathing treatments have helped symptoms, reports taking all medications as prescribed. Sputum volume lessening, no longer black in color, he brought sputum in for culture. No fevers at home, pleuritic chest pain resolved, shortness of breath improved, cough frequency getting better.     Smokes 0.5 ppd x 43 years. He is using patches and lozenges to help quit smoking, reports it has helped him cut back. He scheduled his low dose CT scan/ lung cancer screening for next month. He denies recurrent episodes of bronchitis/pneumonia/ similar respiratory symptoms.       Problem List:   Patient Active Problem List   Diagnosis    Chronic schizophrenia    COPD (chronic obstructive pulmonary disease)    PIPER on CPAP    Intractable right heel pain    Tinea pedis of right foot    CAD in native artery - moderate 3V 60% by Pt history    Essential hypertension    Flat foot    Hammer toes of both feet    Equinus deformity of both feet    Hx of diabetic foot ulcer    Onychomycosis due to dermatophyte    PVD (peripheral vascular disease)    Diabetic polyneuropathy associated with diabetes mellitus due to underlying condition    Smoker    Bilateral carotid bruits    Hyponatremia    Arachnoid cyst of posterior cranial fossa    Peripheral visual field defect, bilateral    Intractable vomiting with  nausea    Advance care planning    Aortic valve stenosis    Thoracic aortic atherosclerosis    BMI 28.0-28.9,adult    Type 2 diabetes mellitus with diabetic peripheral angiopathy without gangrene    Nicotine dependence, unspecified, uncomplicated    Hypertensive heart disease without heart failure    Mass of right foot    Calculus of gallbladder without cholecystitis without obstruction    Chest pain       Current Outpatient Medications:   Current Outpatient Medications   Medication Instructions    albuterol (PROVENTIL/VENTOLIN HFA) 90 mcg/actuation inhaler 2 puffs, Inhalation, Every 6 hours PRN    albuterol-ipratropium (DUO-NEB) 2.5 mg-0.5 mg/3 mL nebulizer solution 3 mLs, Nebulization, Every 6 hours PRN, Rescue    amLODIPine (NORVASC) 2.5 mg, Oral, Daily    aspirin (ECOTRIN) 81 mg, Oral, Daily    atorvastatin (LIPITOR) 40 mg, Oral, Daily    busPIRone (BUSPAR) 15 mg, Oral, 3 times daily    doxycycline (VIBRA-TABS) 100 mg, Oral, Every 12 hours    galcanezumab-gnlm (EMGALITY SYRINGE) 120 mg/mL Syrg Inject 1 pen (120 mg) into the skin every 28 days.    lamoTRIgine (LAMICTAL) 200 mg, Oral, Daily    meclizine (ANTIVERT) 25 mg, Oral, 3 times daily PRN    metoprolol succinate (TOPROL-XL) 50 mg, Oral, Daily    mirtazapine (REMERON) 15 mg, Oral, Nightly    nicotine (NICODERM CQ) 14 mg/24 hr 1 patch, Transdermal, Daily    nicotine polacrilex 4 MG Lozg Take up to 8 pieces daily as needed    nitroGLYCERIN (NITROSTAT) 0.4 mg, Sublingual, Every 5 min PRN    NURTEC 75 mg, Oral, Every other day    ondansetron (ZOFRAN-ODT) 4 mg, Oral, Every 8 hours PRN    pantoprazole (PROTONIX) 40 MG tablet TAKE 1 TABLET(40 MG) BY MOUTH TWICE DAILY BEFORE MEALS    PNEUMOVAX-23 25 mcg/0.5 mL vaccine No dose, route, or frequency recorded.    predniSONE (DELTASONE) 20 mg, Oral, 2 times daily    pregabalin (LYRICA) 50 mg, Oral, 3 times daily    sodium chloride 1 g, Oral, 3 times daily    sucralfate (CARAFATE) 1 g, Oral, 4 times daily    tadalafiL  (CIALIS) 20 MG Tab Take one tablet by mouth prior to sexual activity, not to exceed 1 tablet in 72 hours    topiramate (TOPAMAX) 200 mg, Oral, Nightly    ziprasidone (GEODON) 160 mg, Oral, Nightly       Surgical History:   Past Surgical History:   Procedure Laterality Date    ANGIOGRAM, CORONARY, WITH LEFT HEART CATHETERIZATION  08/25/2022    Procedure: Angiogram, Coronary, with Left Heart Cath;  Surgeon: Mai Deleon MD;  Location: [facility] CATH;  Service: Cardiology;;    APPENDECTOMY      BONE MARROW ASPIRATION Left 08/21/2020    Procedure: ASPIRATION, BONE MARROW;  Surgeon: Lex Kathleen MD;  Location: [facility];  Service: Oncology;  Laterality: Left;    CLOSURE OF WOUND Right 09/09/2019    Procedure: CLOSURE, WOUND;  Surgeon: Li Kathleen DPM;  Location: [facility];  Service: Podiatry;  Laterality: Right;    COLONOSCOPY N/A 12/10/2019    Procedure: COLONOSCOPY;  Surgeon: Ryan Lucas MD;  Location: [facility];  Service: Endoscopy;  Laterality: N/A;    COLONOSCOPY N/A 12/13/2019    Procedure: COLONOSCOPY;  Surgeon: Ryan Lucas MD;  Location: [facility];  Service: Endoscopy;  Laterality: N/A;    CORONARY STENT PLACEMENT      ESOPHAGOGASTRODUODENOSCOPY N/A 12/10/2019    Procedure: EGD (ESOPHAGOGASTRODUODENOSCOPY);  Surgeon: Ryan Lucas MD;  Location: [facility];  Service: Endoscopy;  Laterality: N/A;    ESOPHAGOGASTRODUODENOSCOPY N/A 11/3/2022    Procedure: EGD (ESOPHAGOGASTRODUODENOSCOPY);  Surgeon: Ryan Lucas MD;  Location: [facility];  Service: Endoscopy;  Laterality: N/A;    LAPAROSCOPIC CHOLECYSTECTOMY N/A 1/11/2023    Procedure: CHOLECYSTECTOMY, LAPAROSCOPIC;  Surgeon: Laith Davis MD;  Location: [facility];  Service: General;  Laterality: N/A;    LEFT HEART CATHETERIZATION Left 08/25/2022    Procedure: Left heart cath;  Surgeon: Mai Deleon MD;  Location: [facility] CATH;  Service: Cardiology;  Laterality: Left;    right heel surgery      SHOULDER ARTHROSCOPY Left        Family History:    Family History   Problem Relation Age of Onset    Melanoma Mother     Diabetes Mother     Hypertension Mother     Stroke Father     Diabetes Father     Hypertension Father     Colon cancer Father         unsure of age of diagnosis    Cancer Father         colon cancer    Cervical cancer Sister     Cirrhosis Brother     Hypertension Brother     Lung cancer Maternal Grandmother     Prostate cancer Maternal Grandfather     Crohn's disease Neg Hx     Ulcerative colitis Neg Hx     Stomach cancer Neg Hx     Esophageal cancer Neg Hx     Retinal detachment Neg Hx     Strabismus Neg Hx     Macular degeneration Neg Hx     Glaucoma Neg Hx        Allergies:   Review of patient's allergies indicates:   Allergen Reactions    Alcohol Anaphylaxis     Pt states all types of alcohol    Alcohol antiseptic pads Anaphylaxis    Cephalexin Anaphylaxis       Tobacco Status:   Tobacco Use: High Risk    Smoking Tobacco Use: Every Day    Smokeless Tobacco Use: Never    Passive Exposure: Not on file       Sexual Activity:   Social History     Substance and Sexual Activity   Sexual Activity Yes    Partners: Female       Alcohol Use:   Social History     Substance and Sexual Activity   Alcohol Use No         Objective       There were no vitals filed for this visit.    Review of Systems   Constitutional:  Negative for chills and fever.   HENT:  Positive for congestion. Negative for ear pain and sore throat.    Respiratory:  Positive for cough (improving) and wheezing (1 episode last night relieved by albuterol). Negative for sputum production.    Cardiovascular:  Negative for chest pain.     Physical Exam  Constitutional:       Appearance: Normal appearance.   HENT:      Head: Normocephalic and atraumatic.      Comments: Left maxillary sinus pressure upon palpation       Nose: Rhinorrhea present.      Mouth/Throat:      Mouth: Mucous membranes are moist.      Pharynx: Oropharynx is clear. No oropharyngeal exudate or posterior oropharyngeal  erythema.   Eyes:      Pupils: Pupils are equal, round, and reactive to light.   Cardiovascular:      Rate and Rhythm: Normal rate and regular rhythm.      Heart sounds: Murmur (systolic murmur consistent with established diagnosis aortic stenosis) heard.   Pulmonary:      Effort: No respiratory distress.      Breath sounds: Normal breath sounds. No wheezing, rhonchi or rales.      Comments: Increased work of breathing noted, improved since last visit. No respiratory distress.   Musculoskeletal:      Cervical back: Normal range of motion.   Skin:     General: Skin is warm and dry.   Neurological:      Mental Status: He is alert and oriented to person, place, and time.   Psychiatric:         Behavior: Behavior normal.     EXAMINATION:  XR CHEST PA AND LATERAL     CLINICAL HISTORY:  Cough, unspecified     TECHNIQUE:  PA and lateral views of the chest were performed.     COMPARISON:  01/31/2023, 08/15/2022, 03/21/2022, 02/14/2022     FINDINGS:  The cardiac silhouette appears appropriate in size.  No convincing confluent consolidations are noted.  No acute cardiopulmonary process is convincingly noted.  Widening of the acromioclavicular distance on the left is noted which could relate to ligamentous injury and or posttraumatic osteolysis similar to on 02/14/2022. Osseous demineralization is noted.     Impression:     No acute cardiopulmonary process noted.       Assessment and Plan:    1. Chronic obstructive pulmonary disease with acute lower respiratory infection    2. Cigarette nicotine dependence without complication    3. Cough, unspecified type        Visit summary:    Ryan Crane presented today for follow up of COPD with suspected lower respiratory tract infection.     Vital signs stable, presentation today not suspicious of acute worsening of infection. Respiratory symptoms improving, taking doxycycline, predisone, and home duonebs as prescribed. Shortness of breath improving, use of either home duonebs  or albuterol helping with occasional wheezing. Sent sputum for culture today.     Discussed smoking cessation today, pt will continue nicotine patches and lozenges. Low dose CT scan scheduled.    Instructed to let us know if he develops worsening symptoms, especially fever, cough, sputum production. Patient was instructed to report to ER if symptoms become severe.    Discussed potential for pulmonology referral if COPD exacerbations become recurrent.     Follow up: No follow-ups on file. With Dr. Mayer in about 2 months, sooner if symptoms persist      Annie Joseph PA-C

## 2023-02-28 ENCOUNTER — OFFICE VISIT (OUTPATIENT)
Dept: NEUROLOGY | Facility: CLINIC | Age: 58
End: 2023-02-28
Payer: MEDICARE

## 2023-02-28 VITALS
RESPIRATION RATE: 20 BRPM | WEIGHT: 214.75 LBS | DIASTOLIC BLOOD PRESSURE: 75 MMHG | BODY MASS INDEX: 30.81 KG/M2 | HEART RATE: 92 BPM | SYSTOLIC BLOOD PRESSURE: 136 MMHG

## 2023-02-28 DIAGNOSIS — G43.719 INTRACTABLE CHRONIC MIGRAINE WITHOUT AURA AND WITHOUT STATUS MIGRAINOSUS: Primary | ICD-10-CM

## 2023-02-28 DIAGNOSIS — G47.33 OSA ON CPAP: ICD-10-CM

## 2023-02-28 PROCEDURE — 3078F PR MOST RECENT DIASTOLIC BLOOD PRESSURE < 80 MM HG: ICD-10-PCS | Mod: CPTII,S$GLB,, | Performed by: NURSE PRACTITIONER

## 2023-02-28 PROCEDURE — 99999 PR PBB SHADOW E&M-EST. PATIENT-LVL V: ICD-10-PCS | Mod: PBBFAC,,, | Performed by: NURSE PRACTITIONER

## 2023-02-28 PROCEDURE — 99213 OFFICE O/P EST LOW 20 MIN: CPT | Mod: S$GLB,,, | Performed by: NURSE PRACTITIONER

## 2023-02-28 PROCEDURE — 1159F PR MEDICATION LIST DOCUMENTED IN MEDICAL RECORD: ICD-10-PCS | Mod: CPTII,S$GLB,, | Performed by: NURSE PRACTITIONER

## 2023-02-28 PROCEDURE — 3044F PR MOST RECENT HEMOGLOBIN A1C LEVEL <7.0%: ICD-10-PCS | Mod: CPTII,S$GLB,, | Performed by: NURSE PRACTITIONER

## 2023-02-28 PROCEDURE — 1159F MED LIST DOCD IN RCRD: CPT | Mod: CPTII,S$GLB,, | Performed by: NURSE PRACTITIONER

## 2023-02-28 PROCEDURE — 3075F SYST BP GE 130 - 139MM HG: CPT | Mod: CPTII,S$GLB,, | Performed by: NURSE PRACTITIONER

## 2023-02-28 PROCEDURE — 3072F PR LOW RISK FOR RETINOPATHY: ICD-10-PCS | Mod: CPTII,S$GLB,, | Performed by: NURSE PRACTITIONER

## 2023-02-28 PROCEDURE — 3072F LOW RISK FOR RETINOPATHY: CPT | Mod: CPTII,S$GLB,, | Performed by: NURSE PRACTITIONER

## 2023-02-28 PROCEDURE — 99213 PR OFFICE/OUTPT VISIT, EST, LEVL III, 20-29 MIN: ICD-10-PCS | Mod: S$GLB,,, | Performed by: NURSE PRACTITIONER

## 2023-02-28 PROCEDURE — 3008F BODY MASS INDEX DOCD: CPT | Mod: CPTII,S$GLB,, | Performed by: NURSE PRACTITIONER

## 2023-02-28 PROCEDURE — 3008F PR BODY MASS INDEX (BMI) DOCUMENTED: ICD-10-PCS | Mod: CPTII,S$GLB,, | Performed by: NURSE PRACTITIONER

## 2023-02-28 PROCEDURE — 3044F HG A1C LEVEL LT 7.0%: CPT | Mod: CPTII,S$GLB,, | Performed by: NURSE PRACTITIONER

## 2023-02-28 PROCEDURE — 99999 PR PBB SHADOW E&M-EST. PATIENT-LVL V: CPT | Mod: PBBFAC,,, | Performed by: NURSE PRACTITIONER

## 2023-02-28 PROCEDURE — 3078F DIAST BP <80 MM HG: CPT | Mod: CPTII,S$GLB,, | Performed by: NURSE PRACTITIONER

## 2023-02-28 PROCEDURE — 3075F PR MOST RECENT SYSTOLIC BLOOD PRESS GE 130-139MM HG: ICD-10-PCS | Mod: CPTII,S$GLB,, | Performed by: NURSE PRACTITIONER

## 2023-02-28 NOTE — PATIENT INSTRUCTIONS
Please call our clinic at [phone] or send a message on the [facility] portal if there are any changes to the plan described below, for example,if you are not contacted for the requested tests, referral(s) within one week, if you are unable to receive the medications prescribed, or if you feel you need to change the treatment course for any reason.     TESTING:  -- none     REFERRALS:  -- sleep specialist       PREVENTION (use daily regardless of headache):  -- continue Emgality injection once every 28 days   I suspect once we treat your sleep apnea, we will reduce your daily headache frequency significantly.    AS-NEEDED TREATMENT (use total no more than 10 days per month unless otherwise stated):  -- continue Nurtec once every other day. This dissolves under the tongue and is only taken once in 24 hour time frame.

## 2023-02-28 NOTE — PROGRESS NOTES
Date of service: 2/28/2023  Referring provider: No ref. provider found    Subjective:      Chief complaint: Headache       Patient ID: Ryan Crane is a 57 y.o. male with CAD, COPD, DMII, HTN, PIPER (does not use CPAP), PVD, bipolar and schizophrenia and history of stroke and MI who presents for follow up of headache     History of Present Illness    INTERVAL HISTORY 2/28/23    Last visit was six months ago and at that time he was a little better.    Today he reports he is the same. He has about 5 headache days per month with 2-3 milder headache days per week. Current pian 0 with range 3-8. When present, they are holocephalic. He takes Nurtec QOD. He takes naproxen up to 3 days per week. Otherwise information below is reviewed and verified with no changes made     INTERVAL HISTORY 8/11/22    Last visit was four months ago and at that time he was doing much better on Emgality. He was referred for sleep evaluation.    Today he reports he is about the same to a little better. Headaches occur in the top of the head. Current pain 6 with range 3-9. He reports four headache days per week. He has not had a sleep study. He does have significant hearing loss in both ears. He reports he is getting hearing aids next week. Otherwise information below is reviewed and verified with no changes made     INTERVAL HISTORY  3/30/22    Last visit was three months ago and at that time we added Emgality and Nurtec. He was in medication overuse to Excedrin. He was referred to neurosurgery for benign CNS tumor. Plan to monitor at this time, not a surgical candidate.     Today he reports he is much better. Migraines are less frequent but still remains with daily headache. They are holocephalic. Current pain 4 with range 4-10. He has 2-3 migraines per month with mild daily headache. He takes Nurtec every other day. He has completely stopped Goody's powder. He has also reduced his caffeine intake from one 2 liter per day to a 12 oz coke  "daily.  He is not using his CPAP machine. He states he cannot find it.    Of note, he has had five ED/hospital admissions since January for hyponatremia. This is contributed to his psych medications. He states he cannot change his medications so plan is to take salt tablets and add salt to food. His psychiatrist is Dr. Chritsianson in [place].   Otherwise information below is reviewed and verified with no changes made unless noted.     ORIGINAL HEADACHE HISTORY - 12/30/21  Age at onset and course over time: 22 years ago began with migraines 2-3 times per week. He treated with Goody's.    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 Saw neurologist 4 years ago in [place]. Diagnosed via MRI. He can no longer have MRI due to metal clips in rectum.     Family history - mother had brain aneurysm  Last eye exam - 2020  Location: temples, occipital, holocephalic  Quality:  [] pressure [] tight [x] throbbing [] sharp [] stabbing   Severity: current 10 with range 5-10  Duration: hours  Frequency: daily  Headaches awaken at night?:  yes  Worst time of day: mid-day, evening   Associated with: [x] photophobia [x]  phonophobia [] osmophobia [x] blurred vision  [] double vision [] loss of appetite [x] nausea [x] vomiting [x] dizziness [] vertigo  [] tinnitus [x] irritability [] sinus pressure [x] problems with concentration   [] neck tightness   Alleviated by:  [] sleep [x] darkness [] massage [] heat [x] ice [] medication  Exacerbated by:  [] fatigue [x] light [x] noise [] smells [x] coughing [x] sneezing  [] bending over [] ovulation [] menses [] alcohol [] change in weather []  stress  Ipsilateral autonomic: [] nasal congestion [] lacrimation [] ptosis [] injection [] edema [] foreign body sensation [] ear fullness   ICP:  [] transient visual obscurations  [] tinnitus   [] positional headache  [x] non-positional   Sleep habits: trouble staying asleep, has diagnosed PIPER but not using CPAP for past 4 " years  Caffeine intake: 2 liter of coke daily  Gyn status (if female): n/a  MIDAS: n/a    Current acute treatment:  Nurtec   Naproxen    Current prevention:  Amlodipine  Buspar  HCTZ  Lamictal  Metoprolol  Lyrica  Geodon  Emgality - first January 2022  Remeron    Previously tried/failed acute treatment:  Robaxin  Naproxen  Tizanidine  Tramadol  Ibuprofen   Goody's - daily for 4 years    Previously tried/failed preventative treatment:  Cymbalta  Gabapentin   Topamax   Trileptal   Valsartan    Review of patient's allergies indicates:   Allergen Reactions    Alcohol Anaphylaxis     Pt states all types of alcohol    Alcohol antiseptic pads Anaphylaxis    Cephalexin Anaphylaxis     Current Outpatient Medications   Medication Sig Dispense Refill    albuterol (PROVENTIL/VENTOLIN HFA) 90 mcg/actuation inhaler Inhale 2 puffs into the lungs every 6 (six) hours as needed for Wheezing. 54 g 3    albuterol-ipratropium (DUO-NEB) 2.5 mg-0.5 mg/3 mL nebulizer solution Take 3 mLs by nebulization every 6 (six) hours as needed for Wheezing. Rescue 75 mL 0    amLODIPine (NORVASC) 2.5 MG tablet Take 1 tablet (2.5 mg total) by mouth once daily. 90 tablet 3    aspirin (ECOTRIN) 81 MG EC tablet Take 1 tablet (81 mg total) by mouth once daily. 30 tablet 3    atorvastatin (LIPITOR) 40 MG tablet Take 1 tablet (40 mg total) by mouth once daily. 90 tablet 3    busPIRone (BUSPAR) 15 MG tablet Take 1 tablet (15 mg total) by mouth 3 (three) times daily. 270 tablet 1    galcanezumab-gnlm (EMGALITY SYRINGE) 120 mg/mL Syrg Inject 1 pen (120 mg) into the skin every 28 days. 1 mL 11    lamoTRIgine (LAMICTAL) 200 MG tablet Take 200 mg by mouth once daily.      meclizine (ANTIVERT) 25 mg tablet Take 1 tablet (25 mg total) by mouth 3 (three) times daily as needed for Dizziness. 60 tablet 1    metoprolol succinate (TOPROL-XL) 50 MG 24 hr tablet Take 1 tablet (50 mg total) by mouth once daily. 90 tablet 3    mirtazapine (REMERON) 15 MG tablet Take 15 mg by  mouth every evening.      nicotine (NICODERM CQ) 14 mg/24 hr Place 1 patch onto the skin once daily. 28 patch 0    nicotine polacrilex 4 MG Lozg Take up to 8 pieces daily as needed (Patient taking differently: Take 4 mg by mouth as needed. Take up to 8 pieces daily as needed) 72 lozenge 0    nitroGLYCERIN (NITROSTAT) 0.4 MG SL tablet Place 1 tablet (0.4 mg total) under the tongue every 5 (five) minutes as needed for Chest pain. 25 tablet 11    ondansetron (ZOFRAN-ODT) 4 MG TbDL Take 1 tablet (4 mg total) by mouth every 8 (eight) hours as needed (Nausea). 12 tablet 1    PNEUMOVAX-23 25 mcg/0.5 mL vaccine       pregabalin (LYRICA) 50 MG capsule Take 1 capsule (50 mg total) by mouth 3 (three) times daily. 270 capsule 1    rimegepant (NURTEC) 75 mg odt Take 1 tablet (75 mg total) by mouth every other day. 45 tablet 0    sodium chloride 1 gram tablet Take 1 tablet (1 g total) by mouth 3 (three) times daily. (Patient taking differently: Take 1 g by mouth 2 (two) times a day.) 270 tablet 1    sucralfate (CARAFATE) 1 gram tablet Take 1 g by mouth 4 (four) times daily.      tadalafiL (CIALIS) 20 MG Tab Take one tablet by mouth prior to sexual activity, not to exceed 1 tablet in 72 hours (Patient taking differently: Take 20 mg by mouth as needed (ED).) 10 tablet 10    topiramate (TOPAMAX) 200 MG Tab Take 200 mg by mouth every evening.      ziprasidone (GEODON) 80 MG capsule Take 160 mg by mouth every evening.       doxycycline (VIBRA-TABS) 100 MG tablet Take 1 tablet (100 mg total) by mouth every 12 (twelve) hours. for 5 days (Patient not taking: Reported on 2/28/2023) 10 tablet 0    pantoprazole (PROTONIX) 40 MG tablet TAKE 1 TABLET(40 MG) BY MOUTH TWICE DAILY BEFORE MEALS (Patient taking differently: Take 40 mg by mouth 2 (two) times daily.) 30 tablet 2    predniSONE (DELTASONE) 20 MG tablet Take 1 tablet (20 mg total) by mouth 2 (two) times daily. for 5 days (Patient not taking: Reported on 2/28/2023) 10 tablet 0     No  current facility-administered medications for this visit.       Past Medical History  Past Medical History:   Diagnosis Date    Angina of effort 06/22/2021    Aortic valve stenosis 02/28/2022    Arachnoid cyst of posterior cranial fossa 01/13/2022    Asthma     Bilateral carotid bruits 06/22/2021    Bipolar disorder 01/16/2022    CAD in native artery - moderate 3V 60% by Pt history 09/04/2019    Cancer     Candidal intertrigo 11/11/2019    Cataract     Chronic schizophrenia     Colon polyps     COPD (chronic obstructive pulmonary disease)     Equinus deformity of both feet 11/21/2019    Essential hypertension 10/09/2019    Flat foot 11/21/2019    Gallstone     Gastrointestinal hemorrhage 12/13/2019    Post polypectomy bleed     General anesthetics causing adverse effect in therapeutic use     Hammer toes of both feet 11/21/2019    Headache     Hypertension     Hypertensive heart disease without heart failure 01/16/2022    Hyponatremia 01/11/2022    ELAINE (iron deficiency anemia) 12/10/2019    Intractable chronic migraine without aura and without status migrainosus 12/30/2021    20 year history of migraine headaches. Headaches are typically moderate to severe in intensity, worsen with activity, pounding in quality and associated with sensitivity to light and sound. Given history of reported brain tumor, agree with head CT today. Cannot do MRI due to metal clips.   Galcanezumab (Emgality) treatment was approved for the prevention of acute and chronic migraine on 9/27/2018.    MI, old     Microcytic anemia 10/09/2019    Nicotine dependence, unspecified, uncomplicated 01/16/2022    Onychomycosis due to dermatophyte 11/21/2019    Orchitis 11/09/2019    IPPER on CPAP     Peripheral visual field defect, bilateral 01/13/2022    PVD (peripheral vascular disease) 11/21/2019    Schizophrenia     Seizures 2008    Stroke 2005    Thoracic aortic atherosclerosis 10/17/2022    CT chest    Thyroid disease     took medicine in the past     Tinea pedis of right foot 05/21/2019    Tobacco use disorder 12/14/2019    Type 2 diabetes mellitus with diabetic peripheral angiopathy without gangrene 01/16/2022       Past Surgical History  Past Surgical History:   Procedure Laterality Date    ANGIOGRAM, CORONARY, WITH LEFT HEART CATHETERIZATION  08/25/2022    Procedure: Angiogram, Coronary, with Left Heart Cath;  Surgeon: Mai Deleon MD;  Location: [facility] CATH;  Service: Cardiology;;    APPENDECTOMY      BONE MARROW ASPIRATION Left 08/21/2020    Procedure: ASPIRATION, BONE MARROW;  Surgeon: Lex Kathleen MD;  Location: [facility] OR;  Service: Oncology;  Laterality: Left;    CLOSURE OF WOUND Right 09/09/2019    Procedure: CLOSURE, WOUND;  Surgeon: Li Kathleen DPM;  Location: [facility] OR;  Service: Podiatry;  Laterality: Right;    COLONOSCOPY N/A 12/10/2019    Procedure: COLONOSCOPY;  Surgeon: Ryan Lucas MD;  Location: [facility];  Service: Endoscopy;  Laterality: N/A;    COLONOSCOPY N/A 12/13/2019    Procedure: COLONOSCOPY;  Surgeon: Ryan Lucas MD;  Location: [facility];  Service: Endoscopy;  Laterality: N/A;    CORONARY STENT PLACEMENT      ESOPHAGOGASTRODUODENOSCOPY N/A 12/10/2019    Procedure: EGD (ESOPHAGOGASTRODUODENOSCOPY);  Surgeon: Ryan Lucas MD;  Location: [facility];  Service: Endoscopy;  Laterality: N/A;    ESOPHAGOGASTRODUODENOSCOPY N/A 11/3/2022    Procedure: EGD (ESOPHAGOGASTRODUODENOSCOPY);  Surgeon: Ryan Lucas MD;  Location: [facility];  Service: Endoscopy;  Laterality: N/A;    LAPAROSCOPIC CHOLECYSTECTOMY N/A 1/11/2023    Procedure: CHOLECYSTECTOMY, LAPAROSCOPIC;  Surgeon: Laith Davis MD;  Location: [facility] OR;  Service: General;  Laterality: N/A;    LEFT HEART CATHETERIZATION Left 08/25/2022    Procedure: Left heart cath;  Surgeon: Mai Deleon MD;  Location: [facility] CATH;  Service: Cardiology;  Laterality: Left;    right heel surgery      SHOULDER ARTHROSCOPY Left        Family History  Family History   Problem  Relation Age of Onset    Melanoma Mother     Diabetes Mother     Hypertension Mother     Stroke Father     Diabetes Father     Hypertension Father     Colon cancer Father         unsure of age of diagnosis    Cancer Father         colon cancer    Cervical cancer Sister     Cirrhosis Brother     Hypertension Brother     Lung cancer Maternal Grandmother     Prostate cancer Maternal Grandfather     Crohn's disease Neg Hx     Ulcerative colitis Neg Hx     Stomach cancer Neg Hx     Esophageal cancer Neg Hx     Retinal detachment Neg Hx     Strabismus Neg Hx     Macular degeneration Neg Hx     Glaucoma Neg Hx        Social History  Social History     Socioeconomic History    Marital status: Legally    Occupational History    Occupation: disabled (mental)     Comment: since    Tobacco Use    Smoking status: Every Day     Packs/day: 0.25     Years: 43.00     Pack years: 10.75     Types: Cigarettes     Start date:      Last attempt to quit: 2022     Years since quittin.2    Smokeless tobacco: Never    Tobacco comments:     Age Started 14    Substance and Sexual Activity    Alcohol use: No    Drug use: Not Currently     Types: Marijuana    Sexual activity: Yes     Partners: Female     Social Determinants of Health     Financial Resource Strain: Low Risk     Difficulty of Paying Living Expenses: Not hard at all   Food Insecurity: No Food Insecurity    Worried About Running Out of Food in the Last Year: Never true    Ran Out of Food in the Last Year: Never true   Transportation Needs: Unmet Transportation Needs    Lack of Transportation (Medical): Yes    Lack of Transportation (Non-Medical): Yes   Physical Activity: Insufficiently Active    Days of Exercise per Week: 2 days    Minutes of Exercise per Session: 10 min   Stress: Stress Concern Present    Feeling of Stress : To some extent   Social Connections: Socially Isolated    Frequency of Communication with Friends and Family: Twice a week     Frequency of Social Gatherings with Friends and Family: Never    Attends [employer] Services: More than 4 times per year    Active Member of Clubs or Organizations: No    Attends Club or Organization Meetings: Never    Marital Status:    Housing Stability: Unknown    Unable to Pay for Housing in the Last Year: No    Unstable Housing in the Last Year: No        Review of Systems  14-point review of systems as follows:   No check katina indicates NEGATIVE response   Constitutional: [] weight loss, [] change to appetite   Eyes: [] change in vision, [] double vision   Ears, nose, mouth, throat: [] frequent nose bleeds, [] ringing in the ears   Respiratory: [] cough, [] wheezing   Cardiovascular: [] chest pain, [] palpitations   Gastrointestinal: [] jaundice, [] nausea/vomiting   Genitourinary: [] incontinence, [] burning with urination   Hematologic/lymphatic: [] easy bruising/bleeding, [] night sweats   Neurological: [] numbness, [] weakness   Endocrine: [] fatigue, [] heat/cold intolerance   Allergy/Immunologic: [] fevers, [] chills   Musculoskeletal: [] muscle pain, [] joint pain   Psychiatric: [] thoughts of harming self/others, [x] depression   Integumentary: [] rashes, [] sores that do not heal     Objective:        Vitals:    02/28/23 1059   BP: 136/75   Pulse: 92   Resp: 20       Body mass index is 30.81 kg/m².    2/28/23  Constitutional:   He appears well-developed and well-nourished. He is well groomed.      Neurological Exam:  General: well-developed, well-nourished, no distress  Mental status: Awake and alert  Speech language: No dysarthria or aphasia on conversation  Cranial nerves: Face symmetric  Motor: Moves all extremities well      12/30/22  Constitutional: appears in no acute distress, well-developed, well-nourished     Eyes: normal conjunctiva, PERRLA    Ears, nose, mouth, throat: external appearance of ears and nose normal, hearing intact     Cardiovascular: regular rate and rhythm,  +murmur    Respiratory: unlabored respirations, breath sounds normal bilaterally    Gastrointestinal: no visible abdominal masses, no guarding, no visible hernia    Musculoskeletal: normal tone in all four extremities. No abnormal movements. No pronator drift. No orbit. Symmetric finger tapping.      Spine:   CERVICAL SPINE:  ROM: mildly restricted   MUSCLE SPASM: no   FACET LOADING: no   SPURLING: no  CONOR / STEFF tender: no     Psychiatric: normal judgment and insight. Oriented to person, place, and time.     Neurologic:   Cortical functions: recent and remote memory intact, normal attention span and concentration, speech fluent, adequate fund of knowledge   Cranial nerves: visual fields full, PERRLA, EOMI, symmetric facial strength, hearing intact, palate elevates symmetrically, shoulder shrug 5/5, tongue protrudes midline   Reflexes: 2+ in the upper and lower extremities, no Long  Sensation: intact to temperature throughout   Coordination: normal finger to nose, heel to shin, abnormal/unsteady gait, did not attempt tandem     Data Review:     I have personally reviewed the referring provider's notes, labs, & imaging made available to me today.      RADIOLOGY STUDIES:  I have personally reviewed the pertinent images performed.       No results found for this or any previous visit.    Lab Results   Component Value Date     02/01/2023    K 3.5 02/01/2023    MG 2.1 03/21/2022     (H) 02/01/2023    CO2 22 02/01/2023    BUN 13 02/01/2023    CREATININE 0.95 02/01/2023     02/01/2023    HGBA1C 5.0 02/01/2023    AST 23 01/31/2023    ALT 13 01/31/2023    ALBUMIN 4.2 01/31/2023    PROT 7.4 01/31/2023    BILITOT 0.6 01/31/2023    CHOL 159 03/30/2022    HDL 36 (L) 03/30/2022    LDLCALC 106.8 03/30/2022    TRIG 81 03/30/2022       Lab Results   Component Value Date    WBC 9.99 02/01/2023    HGB 12.5 (L) 02/01/2023    HCT 36.5 (L) 02/01/2023    MCV 98 02/01/2023     02/01/2023       Lab Results    Component Value Date    TSH 1.563 08/04/2022           Assessment & Plan:       Problem List Items Addressed This Visit          Neuro    Intractable chronic migraine without aura and without status migrainosus - Primary    Overview     20 year history of migraine headaches. Headaches are typically moderate to severe in intensity, worsen with activity, pounding in quality and associated with sensitivity to light and sound. Given history of reported brain tumor, agree with head CT today. Cannot do MRI due to metal clips.     Galcanezumab (Emgality) treatment was approved for the prevention of acute and chronic migraine on 9/27/2018. The patient will be an ideal candidate for Emgality. We are planning for 3 treatments 28 days apart. If we see no improvement after 3 treatments, we will discontinue the injections.     Add Nurtec once every other day.            Current Assessment & Plan     Continues to be happy with Emgality monthly and Nurtec QOD. No changes today.             Other    PIPER on CPAP (Chronic)    Relevant Orders    Ambulatory referral/consult to Sleep Disorders             Please call our clinic at [phone] or send a message on the [facility] portal if there are any changes to the plan described below, for example,if you are not contacted for the requested tests, referral(s) within one week, if you are unable to receive the medications prescribed, or if you feel you need to change the treatment course for any reason.     TESTING:  -- none     REFERRALS:  -- sleep specialist       PREVENTION (use daily regardless of headache):  -- continue Emgality injection once every 28 days   I suspect once we treat your sleep apnea, we will reduce your daily headache frequency significantly.    AS-NEEDED TREATMENT (use total no more than 10 days per month unless otherwise stated):  -- continue Nurtec once every other day. This dissolves under the tongue and is only taken once in 24 hour time frame.    Follow up in  about 6 months (around 8/28/2023) for follow up with ALEIDA.    Mckenna Bennett NP

## 2023-03-01 LAB
BACTERIA SPEC AEROBE CULT: NORMAL
BACTERIA SPEC AEROBE CULT: NORMAL
GRAM STN SPEC: NORMAL

## 2023-03-02 DIAGNOSIS — G43.719 INTRACTABLE CHRONIC MIGRAINE WITHOUT AURA AND WITHOUT STATUS MIGRAINOSUS: ICD-10-CM

## 2023-03-02 RX ORDER — RIMEGEPANT SULFATE 75 MG/75MG
75 TABLET, ORALLY DISINTEGRATING ORAL DAILY PRN
Qty: 16 TABLET | Refills: 11 | Status: ON HOLD | OUTPATIENT
Start: 2023-03-02 | End: 2023-12-27

## 2023-03-04 PROBLEM — I63.30 CEREBROVASCULAR ACCIDENT (CVA) DUE TO THROMBOSIS OF CEREBRAL ARTERY: Status: ACTIVE | Noted: 2023-03-04

## 2023-03-09 ENCOUNTER — PATIENT MESSAGE (OUTPATIENT)
Dept: FAMILY MEDICINE | Facility: CLINIC | Age: 58
End: 2023-03-09
Payer: MEDICARE

## 2023-03-13 ENCOUNTER — TELEPHONE (OUTPATIENT)
Dept: NEUROLOGY | Facility: CLINIC | Age: 58
End: 2023-03-13
Payer: MEDICARE

## 2023-03-13 NOTE — TELEPHONE ENCOUNTER
----- Message from Kaylyn Franco sent at 3/13/2023 12:56 PM CDT -----  Contact: Wanda simons [facility]  Type:  Sooner Appointment Request    Caller is requesting a sooner appointment.  Caller declined first available appointment listed below.  Caller will not accept being placed on the waitlist and is requesting a message be sent to doctor.    Name of Caller:  Wanda simons [facility]   When is the first available appointment?  N/A  Symptoms: 2wk  Hosp F/U Stroke  Best Call Back Number:  [phone]  Additional Information:  Please call Wanda back to schedule, pt will be discharging. Thank You

## 2023-03-13 NOTE — TELEPHONE ENCOUNTER
Spoke to [facility], appt scheduled 3/17/23 at 1300 with Alyse Wilson.  Hosp f/u CVA.

## 2023-03-17 ENCOUNTER — OFFICE VISIT (OUTPATIENT)
Dept: NEUROLOGY | Facility: CLINIC | Age: 58
End: 2023-03-17
Payer: MEDICARE

## 2023-03-17 VITALS
SYSTOLIC BLOOD PRESSURE: 113 MMHG | WEIGHT: 204.94 LBS | DIASTOLIC BLOOD PRESSURE: 77 MMHG | HEIGHT: 70 IN | RESPIRATION RATE: 18 BRPM | HEART RATE: 86 BPM | BODY MASS INDEX: 29.34 KG/M2

## 2023-03-17 DIAGNOSIS — I10 ESSENTIAL HYPERTENSION: Primary | Chronic | ICD-10-CM

## 2023-03-17 DIAGNOSIS — G47.33 OSA ON CPAP: Chronic | ICD-10-CM

## 2023-03-17 DIAGNOSIS — Z86.73 HISTORY OF STROKE: ICD-10-CM

## 2023-03-17 DIAGNOSIS — F17.200 SMOKER: ICD-10-CM

## 2023-03-17 PROBLEM — R09.89 BILATERAL CAROTID BRUITS: Status: RESOLVED | Noted: 2021-06-22 | Resolved: 2023-03-17

## 2023-03-17 PROCEDURE — 3078F PR MOST RECENT DIASTOLIC BLOOD PRESSURE < 80 MM HG: ICD-10-PCS | Mod: CPTII,S$GLB,, | Performed by: NURSE PRACTITIONER

## 2023-03-17 PROCEDURE — 3008F BODY MASS INDEX DOCD: CPT | Mod: CPTII,S$GLB,, | Performed by: NURSE PRACTITIONER

## 2023-03-17 PROCEDURE — 3074F SYST BP LT 130 MM HG: CPT | Mod: CPTII,S$GLB,, | Performed by: NURSE PRACTITIONER

## 2023-03-17 PROCEDURE — 1159F PR MEDICATION LIST DOCUMENTED IN MEDICAL RECORD: ICD-10-PCS | Mod: CPTII,S$GLB,, | Performed by: NURSE PRACTITIONER

## 2023-03-17 PROCEDURE — 1111F PR DISCHARGE MEDS RECONCILED W/ CURRENT OUTPATIENT MED LIST: ICD-10-PCS | Mod: CPTII,S$GLB,, | Performed by: NURSE PRACTITIONER

## 2023-03-17 PROCEDURE — 3074F PR MOST RECENT SYSTOLIC BLOOD PRESSURE < 130 MM HG: ICD-10-PCS | Mod: CPTII,S$GLB,, | Performed by: NURSE PRACTITIONER

## 2023-03-17 PROCEDURE — 1111F DSCHRG MED/CURRENT MED MERGE: CPT | Mod: CPTII,S$GLB,, | Performed by: NURSE PRACTITIONER

## 2023-03-17 PROCEDURE — 99999 PR PBB SHADOW E&M-EST. PATIENT-LVL V: CPT | Mod: PBBFAC,,, | Performed by: NURSE PRACTITIONER

## 2023-03-17 PROCEDURE — 3072F LOW RISK FOR RETINOPATHY: CPT | Mod: CPTII,S$GLB,, | Performed by: NURSE PRACTITIONER

## 2023-03-17 PROCEDURE — G0180 PR HOME HEALTH MD CERTIFICATION: ICD-10-PCS | Mod: ,,, | Performed by: STUDENT IN AN ORGANIZED HEALTH CARE EDUCATION/TRAINING PROGRAM

## 2023-03-17 PROCEDURE — 3078F DIAST BP <80 MM HG: CPT | Mod: CPTII,S$GLB,, | Performed by: NURSE PRACTITIONER

## 2023-03-17 PROCEDURE — 99215 OFFICE O/P EST HI 40 MIN: CPT | Mod: S$GLB,,, | Performed by: NURSE PRACTITIONER

## 2023-03-17 PROCEDURE — 1159F MED LIST DOCD IN RCRD: CPT | Mod: CPTII,S$GLB,, | Performed by: NURSE PRACTITIONER

## 2023-03-17 PROCEDURE — 3072F PR LOW RISK FOR RETINOPATHY: ICD-10-PCS | Mod: CPTII,S$GLB,, | Performed by: NURSE PRACTITIONER

## 2023-03-17 PROCEDURE — G0180 MD CERTIFICATION HHA PATIENT: HCPCS | Mod: ,,, | Performed by: STUDENT IN AN ORGANIZED HEALTH CARE EDUCATION/TRAINING PROGRAM

## 2023-03-17 PROCEDURE — 3044F PR MOST RECENT HEMOGLOBIN A1C LEVEL <7.0%: ICD-10-PCS | Mod: CPTII,S$GLB,, | Performed by: NURSE PRACTITIONER

## 2023-03-17 PROCEDURE — 99215 PR OFFICE/OUTPT VISIT, EST, LEVL V, 40-54 MIN: ICD-10-PCS | Mod: S$GLB,,, | Performed by: NURSE PRACTITIONER

## 2023-03-17 PROCEDURE — 3008F PR BODY MASS INDEX (BMI) DOCUMENTED: ICD-10-PCS | Mod: CPTII,S$GLB,, | Performed by: NURSE PRACTITIONER

## 2023-03-17 PROCEDURE — 3044F HG A1C LEVEL LT 7.0%: CPT | Mod: CPTII,S$GLB,, | Performed by: NURSE PRACTITIONER

## 2023-03-17 PROCEDURE — 99999 PR PBB SHADOW E&M-EST. PATIENT-LVL V: ICD-10-PCS | Mod: PBBFAC,,, | Performed by: NURSE PRACTITIONER

## 2023-03-17 RX ORDER — ATORVASTATIN CALCIUM 40 MG/1
40 TABLET, FILM COATED ORAL DAILY
Qty: 90 TABLET | Refills: 3 | Status: ON HOLD
Start: 2023-03-17 | End: 2023-03-26 | Stop reason: HOSPADM

## 2023-03-17 NOTE — ASSESSMENT & PLAN NOTE
Diagnostic testing from recent hospitalization reviewed w/pt:  - MRI brain shows acute/subacute R subcortical ischemic CVA w/out hemorrhage, as well as e/o encephalomalacia in the R occipital lobe and possible L subcortical region. 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   - CTA stroke negative for LVO or significant atherosclerotic disease  - TTE negative for shunt. Notable for moderate LAE, but in the setting of mod-severe AS, mitral regurg and mod MS. Has had Holter study in the past that did not show any arrhythmia.   - LDL was 42. A1C was normal.   - reviewed vascular RF: CAD, HTN, HLD, tobacco abuse, PIPER    No indication for long term DAPT. Complete 21 days (will end 3/28) of Plavix and then resume ASA 81 mg daily monotherapy (he had just started ASA therapy in Feb 2023).   No indication for increase in Lipitor dose given recent LDL. Resume Lipitor 40 mg daily.   Smoking cessation discussed at length -- he defers referral to cessation program and is not ready to quit.   Reinforced continued CPAP compliance   BP at goal today  CVA education provided

## 2023-03-17 NOTE — PATIENT INSTRUCTIONS
"On 3/28 (3 weeks from hospital discharge day), stop taking the Plavix (clopidogrel). At that point, you will continue just the aspirin 81 mg daily to prevent more strokes.     You do not need the higher dose of cholesterol medication, as your labs indicate that the 40 mg dose was doing the trick! Go back to taking the 40 mg of Lipitor (atorvastatin) every day.     The stroke was most likely related to issues with the small blood vessels deep in the brain. These vessels become damaged from things like high BP, high cholesterol and smoking. The most important thing to work on is quitting smoking completely!    Follow up as planned with your primary doctor regarding the elevated white blood cells on recent labs.     Continue using your CPAP machine nightly. Untreated sleep apnea also increased risk for more strokes.       STROKE EDUCATION:    During a stroke, blood stops flowing to part of the brain. This can damage areas in the brain that control the rest of the body. Symptoms after a stroke depend on which part of the brain has been affected. A TIA is often called a "mini stroke" and can be a warning sign for future strokes.     Stroke Risk Factors:  - Previous stroke  - High blood pressure  - High cholesterol  - Cigarette/cigar smoking  - Diabetes  - Carotid or other artery disease  - Atrial fibrillation/flutter  - Obesity  - Blood clotting disorders  - Excessive alcohol use  - Street drug use  - Family history of stroke    Call 911 right away if you have any of the following symptoms of stroke:  Weakness, tingling, or loss of feeling on one side of your face or body  Sudden double vision or trouble seeing in one or both eyes  Sudden trouble talking or slurred speech  Trouble understanding others  Sudden, severe headache  Dizziness, loss of balance, or a sense of falling  Blackouts or seizures     F.A.S.T. is an easy way to remember the signs of stroke. When you see these signs, you know that you need to call 911 " FAST!   F is for face drooping. One side of the face is drooping or numb. When the person smiles, the smile is uneven.   A is for arm weakness. One arm is weak or numb. When the person lifts both arms at the same time, one arm may drift downward.   S is for speech difficulty. You may notice slurred speech or trouble speaking. The person can't repeat a simple sentence correctly when asked.   T is for time to call 911. If someone shows any of these symptoms, even if they go away, call 911 immediately. Make note of the time the symptoms first appeared.

## 2023-03-17 NOTE — PROGRESS NOTES
"NEUROLOGY  Outpatient Follow Up Visit     [facility]  [address] [facility] [address], [place] [address]  [phone] (office) / [phone] (fax)    Patient Name:  Ryan Crane  :  1965  MR #:  [id number]  Acct #:  [id number]    Date of  Visit: 2023    Other Physicians:  Ana Mayer MD (Primary Care Physician); No ref. provider found (Referring)      CHIEF COMPLAINT: Stroke (Hospital follow up)      INTERVAL HISTORY:  Ryan Crane is a 57 y.o. R-handed male seen in hospital follow up for CVA.     The patient is new to me today, but was evaluated by Dr. Molina during a recent admission to [facility].     Medical history is otherwise significant for CAD, tobacco abuse, COPD, HTN, PIPER, PVD, bipolar disorder and schizophrenia.     He is a poor historian. Here alone today.     He went to the ED because he "felt funny." He thought that he was having recurrent symptoms r/t hypoNa. He took a "dizzy pill" and went to sleep. He woke up to smoke and kept dropping the cigarette from the L hand. Symptoms continued, so he went to the ED for evaluation the following day.     He went to [facility] and was d/c yesterday. He has no issues with the LLE. He has regained some strength in the LUE but still has significant difficulty with  and fine motor movements. He denies numbness. He hasn't had any falls since getting home. Has help from family. He started therapy with  today.     Reports that he was taking ASA 81 mg and Lipitor 40 mg at time of event. PCP had just started the Lipitor in February. He had started the ASA in February as well after being admitted to  for CP. At d/c, statin dose increased to 80 mg and DAPT was initiated. He denies any bleeding complications from DAPT.     He had a stroke in . He didn't have significant deficits from this. He states that he was not started on any medications, like aspirin, after that. This was when he was living in [place].     Just found " "out today that his biological father had 13 strokes.     He hasn't smoked since 3/3 because he has been hospitalized. He has tried to quit smoking in the past and hasn't been able to. He has cut down from 3 ppd to 10 cigarettes per day. He isn't interested in the cessation program, has already tried this.     He doesn't drink.     He has PIPER and states that he uses CPAP nightly.     He used to be diabetic, but this was when he weighted nearly 400 lbs. Recent A1C normal.     Prior history:  [facility] Neurology Consultation 3/5/23:  " Ryan Crane is a 57 y.o. year old RH male who has a history of long term smoking and prior strokes presents with another stroke. Discussed with patient about stroke risk factors again. He does endorse cutting down for 3 packs a day to 10 cigs a day. Did inform him of new stroke and he will continue to work on those risk factors."    Allergies:  Review of patient's allergies indicates:   Allergen Reactions    Alcohol Anaphylaxis     Pt states all types of alcohol    Alcohol antiseptic pads Anaphylaxis    Cephalexin Anaphylaxis       Current Medications:  Current Outpatient Medications   Medication Sig Dispense Refill    albuterol (PROVENTIL/VENTOLIN HFA) 90 mcg/actuation inhaler Inhale 2 puffs into the lungs every 6 (six) hours as needed for Wheezing. 54 g 3    albuterol-ipratropium (DUO-NEB) 2.5 mg-0.5 mg/3 mL nebulizer solution Take 3 mLs by nebulization every 6 (six) hours as needed for Wheezing. Rescue 75 mL 0    amLODIPine (NORVASC) 2.5 MG tablet Take 1 tablet (2.5 mg total) by mouth once daily. 90 tablet 3    aspirin (ECOTRIN) 81 MG EC tablet Take 1 tablet (81 mg total) by mouth once daily. 30 tablet 3    busPIRone (BUSPAR) 15 MG tablet Take 1 tablet (15 mg total) by mouth 3 (three) times daily. 270 tablet 1    clopidogreL (PLAVIX) 75 mg tablet Take 1 tablet (75 mg total) by mouth once daily. 30 tablet 11    galcanezumab-gnlm (EMGALITY SYRINGE) 120 mg/mL Syrg Inject 1 pen (120 " mg) into the skin every 28 days. 1 mL 11    L. acidophilus/Bifid. animalis (DERMACINRX PROMEROL ORAL) Take 1 capsule by mouth daily as needed.      lamoTRIgine (LAMICTAL) 200 MG tablet Take 200 mg by mouth once daily.      metoprolol succinate (TOPROL-XL) 50 MG 24 hr tablet Take 1 tablet (50 mg total) by mouth once daily. 90 tablet 3    mirtazapine (REMERON) 15 MG tablet Take 15 mg by mouth every evening.      ondansetron (ZOFRAN-ODT) 4 MG TbDL Take 1 tablet (4 mg total) by mouth every 8 (eight) hours as needed (Nausea). 12 tablet 1    pregabalin (LYRICA) 50 MG capsule Take 1 capsule (50 mg total) by mouth 3 (three) times daily. 270 capsule 1    rimegepant (NURTEC) 75 mg odt Take 1 tablet (75 mg total) by mouth daily as needed. Place ODT tablet on the tongue; alternatively the ODT tablet may be placed under the tongue 16 tablet 11    sodium chloride 1 gram tablet Take 1 tablet (1 g total) by mouth 2 (two) times a day. 60 tablet 0    tadalafiL (CIALIS) 20 MG Tab Take one tablet by mouth prior to sexual activity, not to exceed 1 tablet in 72 hours (Patient taking differently: Take 20 mg by mouth as needed (ED).) 10 tablet 10    topiramate (TOPAMAX) 200 MG Tab Take 200 mg by mouth every evening.      ziprasidone (GEODON) 80 MG capsule Take 160 mg by mouth every evening.       atorvastatin (LIPITOR) 40 MG tablet Take 1 tablet (40 mg total) by mouth once daily. 90 tablet 3    nicotine polacrilex 4 MG Lozg Take up to 8 pieces daily as needed (Patient taking differently: Take 4 mg by mouth as needed. Take up to 8 pieces daily as needed) 72 lozenge 0    nitroGLYCERIN (NITROSTAT) 0.4 MG SL tablet Place 1 tablet (0.4 mg total) under the tongue every 5 (five) minutes as needed for Chest pain. 25 tablet 11    pantoprazole (PROTONIX) 40 MG tablet TAKE 1 TABLET(40 MG) BY MOUTH TWICE DAILY BEFORE MEALS (Patient taking differently: Take 40 mg by mouth 2 (two) times daily.) 30 tablet 2    PNEUMOVAX-23 25 mcg/0.5 mL vaccine        No  current facility-administered medications for this visit.       Past Medical History:  Past Medical History:   Diagnosis Date    Angina of effort 06/22/2021    Aortic valve stenosis 02/28/2022    Arachnoid cyst of posterior cranial fossa 01/13/2022    Asthma     Bilateral carotid bruits 06/22/2021    Bipolar disorder 01/16/2022    CAD in native artery - moderate 3V 60% by Pt history 09/04/2019    Cancer     Candidal intertrigo 11/11/2019    Cataract     Chronic schizophrenia     Colon polyps     COPD (chronic obstructive pulmonary disease)     Equinus deformity of both feet 11/21/2019    Essential hypertension 10/09/2019    Flat foot 11/21/2019    Gallstone     Gastrointestinal hemorrhage 12/13/2019    Post polypectomy bleed     General anesthetics causing adverse effect in therapeutic use     Hammer toes of both feet 11/21/2019    Headache     Hypertension     Hypertensive heart disease without heart failure 01/16/2022    Hyponatremia 01/11/2022    ELAINE (iron deficiency anemia) 12/10/2019    Intractable chronic migraine without aura and without status migrainosus 12/30/2021    20 year history of migraine headaches. Headaches are typically moderate to severe in intensity, worsen with activity, pounding in quality and associated with sensitivity to light and sound. Given history of reported brain tumor, agree with head CT today. Cannot do MRI due to metal clips.   Galcanezumab (Emgality) treatment was approved for the prevention of acute and chronic migraine on 9/27/2018.    MI, old     Microcytic anemia 10/09/2019    Nicotine dependence, unspecified, uncomplicated 01/16/2022    Onychomycosis due to dermatophyte 11/21/2019    Orchitis 11/09/2019    PIPER on CPAP     Peripheral visual field defect, bilateral 01/13/2022    PVD (peripheral vascular disease) 11/21/2019    Schizophrenia     Seizures 2008    Stroke 2005    Thoracic aortic atherosclerosis 10/17/2022    CT chest    Thyroid disease     took medicine in the past     Tinea pedis of right foot 05/21/2019    Tobacco use disorder 12/14/2019    Type 2 diabetes mellitus with diabetic peripheral angiopathy without gangrene 01/16/2022       Past Surgical History:  Past Surgical History:   Procedure Laterality Date    ANGIOGRAM, CORONARY, WITH LEFT HEART CATHETERIZATION  08/25/2022    Procedure: Angiogram, Coronary, with Left Heart Cath;  Surgeon: Mai Deleon MD;  Location: [facility] CATH;  Service: Cardiology;;    APPENDECTOMY      BONE MARROW ASPIRATION Left 08/21/2020    Procedure: ASPIRATION, BONE MARROW;  Surgeon: Lex Kathleen MD;  Location: [facility];  Service: Oncology;  Laterality: Left;    CLOSURE OF WOUND Right 09/09/2019    Procedure: CLOSURE, WOUND;  Surgeon: Li Kathleen DPM;  Location: [facility];  Service: Podiatry;  Laterality: Right;    COLONOSCOPY N/A 12/10/2019    Procedure: COLONOSCOPY;  Surgeon: Ryan Lucas MD;  Location: [facility];  Service: Endoscopy;  Laterality: N/A;    COLONOSCOPY N/A 12/13/2019    Procedure: COLONOSCOPY;  Surgeon: Ryan Lucas MD;  Location: [facility];  Service: Endoscopy;  Laterality: N/A;    CORONARY STENT PLACEMENT      ESOPHAGOGASTRODUODENOSCOPY N/A 12/10/2019    Procedure: EGD (ESOPHAGOGASTRODUODENOSCOPY);  Surgeon: Ryan Lucas MD;  Location: [facility];  Service: Endoscopy;  Laterality: N/A;    ESOPHAGOGASTRODUODENOSCOPY N/A 11/3/2022    Procedure: EGD (ESOPHAGOGASTRODUODENOSCOPY);  Surgeon: Ryan Lucas MD;  Location: [facility];  Service: Endoscopy;  Laterality: N/A;    LAPAROSCOPIC CHOLECYSTECTOMY N/A 1/11/2023    Procedure: CHOLECYSTECTOMY, LAPAROSCOPIC;  Surgeon: Laith Davis MD;  Location: [facility];  Service: General;  Laterality: N/A;    LEFT HEART CATHETERIZATION Left 08/25/2022    Procedure: Left heart cath;  Surgeon: Mai Deleon MD;  Location: [facility] CATH;  Service: Cardiology;  Laterality: Left;    right heel surgery      SHOULDER ARTHROSCOPY Left        Family History:  family history includes  "Cancer in his father; Cervical cancer in his sister; Cirrhosis in his brother; Colon cancer in his father; Diabetes in his father and mother; Hypertension in his brother, father, and mother; Lung cancer in his maternal grandmother; Melanoma in his mother; Prostate cancer in his maternal grandfather; Stroke in his father.    Social History:   reports that he has been smoking cigarettes. He started smoking about 44 years ago. He has a 10.75 pack-year smoking history. He has never used smokeless tobacco. He reports that he does not currently use drugs after having used the following drugs: Marijuana. He reports that he does not drink alcohol.      PHYSICAL EXAM:  /77 (BP Location: Right arm, Patient Position: Sitting, BP Method: Medium (Automatic))   Pulse 86   Resp 18   Ht 5' 10" (1.778 m)   Wt 92.9 kg (204 lb 14.7 oz)   BMI 29.40 kg/m²     General: appears older than stated age. NAD.  Pulmonary: Normal effort and rate.   Musculoskeletal: No obvious joint deformities, moves all extremities well.  Extremities: No clubbing, cyanosis or edema.     Neurological exam:  Mental status: Awake and alert.  Oriented to person, place, time and situation. Recent memory questionable, poor historian as above. Fund of knowledge normal. Normal attention and concentration.   Speech/Language: Fluent and appropriate. No dysarthria or aphasia on conversation. Able to follow complex commands.   Cranial nerves (II-XII): Visual fields full. Pupils equal round and reactive to light, extraocular movements intact, no ptosis, no nystagmus. Facial sensation and symmetry intact bilaterally. Hearing grossly intact. Palate deferred. Shoulder shrug normal bilaterally. Normal tongue protrusion.   Motor: 5 out of 5 strength throughout the R extremities. 5-/5 in L bicep, 4/5 in L hand / IO. 5-/5 in L IP. Normal bulk and tone. Subtle L pronator drift present.   Sensation: Intact to light touch and temperature throughout.   DTR: Mute at the " biceps. Mute at the R knee, 1+ L knee.   Coordination: Finger-nose-finger testing intact bilaterally. No tremor.   Gait: uses cane, subtle LLE circumduction but no instability     DIAGNOSTIC DATA:  I have personally reviewed provider notes, labs and imaging made available to me today.     Imaging:  CTA stroke 3/4/23:  FINDINGS:  The off take of the common carotids appears normal.  The origin of the vertebral arteries appears normal.  There is no significant stenosis in the carotid bifurcations bilaterally.  There is flow throughout both vertebral arteries.  The internal carotids appear normal.  There is calcification in the carotid siphons without a significant stenosis demonstrated.  The A1 segments appear normal.  The M1 segment appears normal.  The anterior cerebral, middle cerebral vessels appear normal.  The posterior cerebral vessels appear normal.  The basilar artery appears normal.  An aneurysm is not seen.     Impression:  No acute abnormalities are demonstrated.    MRI brain wo 3/4/23:    Impression:  1. Small area of interest diffusion is seen in the right corona radiata extending to the region of the superior aspect of the posterior limb internal capsule, compatible with acute or subacute ischemia/infarction.  2. Additional findings and details as above.    Personally reviewed -- encephalomalacia in the R occipital lobe, chronic microangiopathy, SA cyst dorsal to cerebellar vermis w/o mass effect     Cardiac:  EKG 3/2023: NSR    TTE 3/5/23:  The left ventricle is normal in size with concentric remodeling and normal systolic function.  Moderate left atrial enlargement.  Grade I left ventricular diastolic dysfunction.  The estimated PA systolic pressure is 27 mmHg.  Normal right ventricular size with normal right ventricular systolic function.  Normal central venous pressure (3 mmHg).  The estimated ejection fraction is 60%.  Mild right atrial enlargement.  There is moderate-to-severe aortic valve  stenosis.  Aortic valve area is 1.54 cm2; peak velocity is 4.00 m/s; mean gradient is 33 mmHg.  Mild-to-moderate mitral regurgitation.  The mean diastolic gradient across the mitral valve is 8 mmHg at a heart rate of bpm.  There is moderate mitral stenosis.  Mild-to-moderate aortic regurgitation.  There is no evidence of intracardiac shunting.    Holter 48 hr 3/2022:  Dominant rhythm is sinus. No complex ventricular or atrial ectopy. No high-grade SA or AV shala block. Periods of sinus tachycardia.    Labs:  CBC:   Lab Results   Component Value Date    WBC 14.62 (H) 03/06/2023    HGB 12.1 (L) 03/06/2023    HCT 35.1 (L) 03/06/2023     03/06/2023    MCV 99 (H) 03/06/2023    RDW 15.3 (H) 03/06/2023     BMP:   Lab Results   Component Value Date     03/06/2023    K 4.2 03/06/2023     (H) 03/06/2023    CO2 19 (L) 03/06/2023    BUN 14 03/06/2023    CREATININE 0.76 03/06/2023     (H) 03/06/2023    CALCIUM 8.5 03/06/2023    MG 2.4 03/05/2023    PHOS 3.2 03/05/2023     LFTS;   Lab Results   Component Value Date    PROT 6.7 03/06/2023    ALBUMIN 3.5 03/06/2023    BILITOT 0.5 03/06/2023    AST 18 03/06/2023    ALKPHOS 91 03/06/2023    ALT 18 03/06/2023     COAGS:   Lab Results   Component Value Date    INR 1.1 03/05/2023     FLP:   Lab Results   Component Value Date    CHOL 83 (L) 03/05/2023    HDL 29 (L) 03/05/2023    LDLCALC 42.4 (L) 03/05/2023    TRIG 58 03/05/2023    CHOLHDL 34.9 03/05/2023     Lab Results   Component Value Date    HGBA1C 5.1 03/05/2023       ASSESSMENT & PLAN:  Ryan Crane is a 57 y.o. R-handed male seen in hospital follow up for CVA.     Problem List Items Addressed This Visit          Neuro    History of stroke    Overview     Reports initial CVA in 2005 without significant clinical deficit[place]  Most recent event 3/2023 - R subcortical lacune with resultant LUE > LLE paresis          Current Assessment & Plan     Diagnostic testing from  recent hospitalization reviewed w/pt:  - MRI brain shows acute/subacute R subcortical ischemic CVA w/out hemorrhage, as well as e/o encephalomalacia in the R occipital lobe and possible L subcortical region. 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   - CTA stroke negative for LVO or significant atherosclerotic disease  - TTE negative for shunt. Notable for moderate LAE, but in the setting of mod-severe AS, mitral regurg and mod MS. Has had Holter study in the past that did not show any arrhythmia.   - LDL was 42. A1C was normal.   - reviewed vascular RF: CAD, HTN, HLD, tobacco abuse, PIPER    No indication for long term DAPT. Complete 21 days (will end 3/28) of Plavix and then resume ASA 81 mg daily monotherapy (he had just started ASA therapy in Feb 2023).   No indication for increase in Lipitor dose given recent LDL. Resume Lipitor 40 mg daily.   Smoking cessation discussed at length -- he defers referral to cessation program and is not ready to quit.   Reinforced continued CPAP compliance   BP at goal today  CVA education provided               Cardiac/Vascular    Essential hypertension - Primary (Chronic)       Other    PIPER on CPAP (Chronic)    Smoker       Follow up: PRN    I spent a total of 55 minutes on the day of the visit.    This includes face to face time with the patient, as well as non-face to face time preparing for and completing the visit (review of prior diagnostic testing and clinical notes, obtaining or reviewing history, documenting clinical information in the EMR, independently interpreting and communicating results to the patient/family and coordinating ongoing care).               I appreciate the opportunity to participate in the care of this patient. Please feel free to contact me with any questions or concerns.       Alyse Wilson, ACNPC-AG  [facility]  [address] [place] [address]

## 2023-03-20 ENCOUNTER — TELEPHONE (OUTPATIENT)
Dept: PHYSICAL MEDICINE AND REHAB | Facility: CLINIC | Age: 58
End: 2023-03-20
Payer: MEDICARE

## 2023-03-20 NOTE — TELEPHONE ENCOUNTER
----- Message from Alysha Dumont RN sent at 3/20/2023  7:53 AM CDT -----  Contact: pt at [phone]    ----- Message -----  From: Ana Vázquez  Sent: 3/17/2023   9:45 AM CDT  To: Denice Franco Staff    Type: Needs Medical Advice  Who Called:  pt  Symptoms (please be specific):  stroke f/u    Best Call Back Number: [phone]    Additional Information: Patient is calling to make a follow up appt. Please call back and advise. Thank you.

## 2023-03-20 NOTE — TELEPHONE ENCOUNTER
Left voicemail for pt in regards to schedule follow up from in patient rehab with BERENICE Garcia.

## 2023-03-20 NOTE — TELEPHONE ENCOUNTER
----- Message from Alysha Dumont RN sent at 3/20/2023  9:55 AM CDT -----  Regarding: FW: returning call  Contact: Patient    ----- Message -----  From: Nolan Nash  Sent: 3/20/2023   9:43 AM CDT  To: Denice Franco Staff  Subject: returning call                                   Type:  Patient Returning Call    Who Called:  Patient  Who Left Message for Patient:  shiv  Does the patient know what this is regarding?:  no  Best Call Back Number:    Mobile    [phone]       Case number [id number]

## 2023-03-20 NOTE — TELEPHONE ENCOUNTER
Returned pt call. Appointment was made with BERENICE Garcia for a follow-up from a stroke and being in inpatient.

## 2023-03-24 PROBLEM — D75.89 MACROCYTOSIS: Status: ACTIVE | Noted: 2023-03-24

## 2023-03-24 PROBLEM — I63.81 RIGHT-SIDED LACUNAR STROKE: Status: ACTIVE | Noted: 2023-03-24

## 2023-03-24 PROBLEM — R47.81 SLURRED SPEECH: Status: ACTIVE | Noted: 2023-03-24

## 2023-03-25 PROBLEM — R06.02 SHORTNESS OF BREATH: Status: ACTIVE | Noted: 2023-03-25

## 2023-03-25 PROBLEM — E53.8 VITAMIN B12 DEFICIENCY: Status: ACTIVE | Noted: 2023-03-24

## 2023-03-28 ENCOUNTER — OFFICE VISIT (OUTPATIENT)
Dept: PSYCHIATRY | Facility: CLINIC | Age: 58
End: 2023-03-28
Payer: MEDICARE

## 2023-03-28 DIAGNOSIS — F20.9 CHRONIC SCHIZOPHRENIA: ICD-10-CM

## 2023-03-28 DIAGNOSIS — F41.9 ANXIETY: Primary | ICD-10-CM

## 2023-03-28 PROCEDURE — 90792 PR PSYCHIATRIC DIAGNOSTIC EVALUATION W/MEDICAL SERVICES: ICD-10-PCS | Mod: 95,,, | Performed by: PSYCHOLOGIST

## 2023-03-28 PROCEDURE — 90792 PSYCH DIAG EVAL W/MED SRVCS: CPT | Mod: 95,,, | Performed by: PSYCHOLOGIST

## 2023-03-28 NOTE — PROGRESS NOTES
"The patient location is:  [place], [place]  The patient location [place] is: [place]  The patient phone number is: [phone]  Visit type: Virtual visit with synchronous audio and video  Each patient to whom he or she provides medical services by telemedicine is:  (1) informed of the relationship between the physician and patient and the respective role of any other health care provider with respect to management of the patient; and (2) notified that he or she may decline to receive medical services by telemedicine and may withdraw from such care at any time.     Outpatient Psychiatric Initial Visit  03/27/2023     ID:   Patient presents for an initial evaluation.      Reason for encounter: Referral from Dr. Mayer     Chief Complaint: Schizoaffective Disorder    History of Presenting Illness:  Pt presents to transfer services from his previous psychiatrist in [place] (Dr. Abebe). Pt stated that he stopped going to see Dr. Abebe because he wanted a therapist and was not provided with one. Pt reported a long history of "schizophrenia and manic depression." Pt reported having command auditory hallucinations, "tells me to hurt people." Pt noted that he has not done so in 20-30 years. Pt reported that his last hospitalization was in 2019 after moving to [place] from [place]. Pt stated that he was first diagnosed with schizophrenia in his 20's. Pt reported being a poor historian due to recent strokes. Stated that he has had 3 strokes in 3 weeks. Has been worked up by neuro. Pt stated that he has difficulty using his left hand, in addition to memory decline. Currently lives with daughter.     Depression symptoms: depressed mood, lethargy, amotivation, hypersomnolescence,      Anxiety symptoms: Nonproductive worry.  Pt denied symptoms consistent with KENNEY, OCD, panic, phobias, or social anxiety.     Miesha/Hypomania Symptoms: Not assessed    Psychosis Symptoms: Command auditory hallucinations. "     Attention/Concentration Symptoms: Not assessed    Disordered Eating/Body Image Concerns: Not assessed    Suicidal Ideation and Risk: Pt denied current or history of related symptoms.    Homicidal/Violent Ideation and Risk: Pt denied current or history of related symptoms.    Criminal History:     Prior Psychiatric Treatment/Hospitalizations: Most recently seen by Dr. Abebe, Hx of at least one inpt treatment in 2019.     Current psychiatric medication: buspirone 15 mg TID, lamotrigine 200 mg, mirtazapine 15 mg, ziprasidone 160 mg (2 80 mg tabs) qhs    Prior psychiatric medication trials: alprazolam, cannot recall    Current Medical Conditions Per Chart Review:   Patient Active Problem List   Diagnosis    Chronic schizophrenia    COPD (chronic obstructive pulmonary disease)    PIPER on CPAP    Intractable right heel pain    Tinea pedis of right foot    CAD in native artery - moderate 3V 60% by Pt history    Essential hypertension    Flat foot    Hammer toes of both feet    Equinus deformity of both feet    Hx of diabetic foot ulcer    Onychomycosis due to dermatophyte    PVD (peripheral vascular disease)    Diabetic polyneuropathy associated with diabetes mellitus due to underlying condition    Smoker    Intractable chronic migraine without aura and without status migrainosus    Hyponatremia    Arachnoid cyst of posterior cranial fossa    Peripheral visual field defect, bilateral    Intractable vomiting with nausea    Advance care planning    Aortic stenosis, moderate    Thoracic aortic atherosclerosis    BMI 28.0-28.9,adult    Type 2 diabetes mellitus with diabetic peripheral angiopathy without gangrene    Nicotine dependence, unspecified, uncomplicated    Hypertensive heart disease without heart failure    Mass of right foot    Calculus of gallbladder without cholecystitis without obstruction    Chest pain    History of stroke    Right-sided lacunar stroke    Slurred speech    Vitamin B12 deficiency    Shortness of  "breath      Family Psychiatric History: Unable to answer    Alcohol Use: Unable to answer    Tobacco and Drug Use: unable to answer     Trauma history:  Unable to answer    Mental Status Exam:  Appearance: older than stated age, bearded, disheveled  Grooming: disheveled  Arousal: alert, awake  Behavior/Cooperation: uncooperative  Speech: normal tone, normal pitch, normal volume, delayed  Language: appropriate english vocabular; can repeat phrases and objects  Mood: irritable  Affect: agitated  and irritable  Thought Process: loose associations  Thought Content: hallucinations: (auditory: yes)  Associations: no loose associations noted  Orientation: grossly intact  Memory: Impaired to some degree  Fund of Knowledge: appropriate for education level  Attention Span/Concentration: Easily distracted  Cognition: memory > able to remember recent events- no  Insight: poor  Judgment: limited   Current Evaluation:  Nutritional Screening:  Considering the patient's height and weight, medications, medical history and preferences, should a referral be made to the dietitian? No  Vitals: most recent vitals signs, dated greater than 90 days prior to this appointment, were reviewed  General: age appropriate, well nourished, casually dressed, neatly groomed  MSK: muscle strength/tone : no tremor or abnormal movements. Gait/Station: no ataxic, steady    Clinical Assessment : Pt is a 58 y/o male presenting with a long history of "schizophrenia and manic depression." He presented disheveled and had difficulty tracking the conversation. Unable to answer most questions due to memory concerns or unable to track conversation. Pt is currently living with adult daughter with no concern of safety. Denies any SI, plan or intent. Discussed with pt a need for a higher level of care than what [facility] offers and provided contact information to options that include case management, therapy, and psychiatry. Pt agreed to contact these " agencies ASAP and reach out to Dr. Abebe if he needs refills before he can be seen.     Summary     Diagnosis(es):   1) Schizophrenia, unspecified  2) Anxiety Disorder    Plan      Goal #1: Refer to alternative clinic more equipped to manage pt's needs    Pt is to schedule apt with phone number provided.    Treatment plan and medication changes will be coordinated and consulted with PCP, Dr Mayer on 3/27. All general medical concerns will be managed by the PCP.     This author reviewed limits to confidentiality and this author's collaboration with pt's physician. Pt indicated understanding and denied any questions.    Return to Clinic: Pt referred to alternative clinic with comprehensive treatment    -Call to report any worsening of symptoms or problems associated with medication  - Pt instructed to go to ER if thoughts of harming self or others arise     -Supportive therapy and psychoeducation provided  -R/B/SE's of medications discussed with the pt who expresses understanding and chooses to take medications as prescribed.   -Pt instructed to call clinic, 911 or go to nearest emergency room if sxs worsen or pt is in   crisis. The pt expresses understanding.

## 2023-03-29 ENCOUNTER — TELEPHONE (OUTPATIENT)
Dept: GASTROENTEROLOGY | Facility: CLINIC | Age: 58
End: 2023-03-29
Payer: MEDICARE

## 2023-03-29 ENCOUNTER — OFFICE VISIT (OUTPATIENT)
Dept: FAMILY MEDICINE | Facility: CLINIC | Age: 58
End: 2023-03-29
Payer: MEDICARE

## 2023-03-29 VITALS
SYSTOLIC BLOOD PRESSURE: 108 MMHG | HEART RATE: 72 BPM | HEIGHT: 70 IN | WEIGHT: 203.94 LBS | DIASTOLIC BLOOD PRESSURE: 66 MMHG | OXYGEN SATURATION: 99 % | BODY MASS INDEX: 29.2 KG/M2

## 2023-03-29 DIAGNOSIS — F33.9 DEPRESSION, RECURRENT: ICD-10-CM

## 2023-03-29 DIAGNOSIS — I69.354 FLACCID HEMIPLEGIA OF LEFT NONDOMINANT SIDE AS LATE EFFECT OF CEREBRAL INFARCTION: ICD-10-CM

## 2023-03-29 DIAGNOSIS — E08.42 DIABETIC POLYNEUROPATHY ASSOCIATED WITH DIABETES MELLITUS DUE TO UNDERLYING CONDITION: Chronic | ICD-10-CM

## 2023-03-29 DIAGNOSIS — D33.9: ICD-10-CM

## 2023-03-29 DIAGNOSIS — F17.210 CIGARETTE NICOTINE DEPENDENCE WITHOUT COMPLICATION: ICD-10-CM

## 2023-03-29 DIAGNOSIS — I70.0 THORACIC AORTIC ATHEROSCLEROSIS: Chronic | ICD-10-CM

## 2023-03-29 DIAGNOSIS — J44.1 COPD WITH ACUTE EXACERBATION: Primary | ICD-10-CM

## 2023-03-29 DIAGNOSIS — Z86.73 HISTORY OF STROKE: ICD-10-CM

## 2023-03-29 DIAGNOSIS — I05.0 MODERATE MITRAL STENOSIS: Chronic | ICD-10-CM

## 2023-03-29 DIAGNOSIS — I50.32 CHRONIC HEART FAILURE WITH PRESERVED EJECTION FRACTION: ICD-10-CM

## 2023-03-29 DIAGNOSIS — E53.8 VITAMIN B12 DEFICIENCY: ICD-10-CM

## 2023-03-29 DIAGNOSIS — J44.9 STAGE 2 MODERATE COPD BY GOLD CLASSIFICATION: Chronic | ICD-10-CM

## 2023-03-29 DIAGNOSIS — I73.9 PVD (PERIPHERAL VASCULAR DISEASE): Chronic | ICD-10-CM

## 2023-03-29 DIAGNOSIS — I25.10 CAD IN NATIVE ARTERY: Chronic | ICD-10-CM

## 2023-03-29 PROBLEM — R06.02 SHORTNESS OF BREATH: Status: RESOLVED | Noted: 2023-03-25 | Resolved: 2023-03-29

## 2023-03-29 PROBLEM — E87.1 HYPONATREMIA: Status: RESOLVED | Noted: 2022-01-11 | Resolved: 2023-03-29

## 2023-03-29 PROBLEM — Z98.61 CAD S/P PERCUTANEOUS CORONARY ANGIOPLASTY: Status: ACTIVE | Noted: 2019-09-04

## 2023-03-29 PROBLEM — I35.1 MODERATE AORTIC REGURGITATION: Status: ACTIVE | Noted: 2023-03-29

## 2023-03-29 PROBLEM — I63.81 RIGHT-SIDED LACUNAR STROKE: Status: RESOLVED | Noted: 2023-03-24 | Resolved: 2023-03-29

## 2023-03-29 PROBLEM — G43.909 MIGRAINE HEADACHE: Status: ACTIVE | Noted: 2021-12-30

## 2023-03-29 PROBLEM — I69.322 DYSARTHRIA AS LATE EFFECT OF CEREBELLAR CEREBROVASCULAR ACCIDENT (CVA): Status: ACTIVE | Noted: 2023-03-24

## 2023-03-29 PROBLEM — I69.322 DYSARTHRIA AS LATE EFFECT OF CEREBELLAR CEREBROVASCULAR ACCIDENT (CVA): Chronic | Status: ACTIVE | Noted: 2023-03-24

## 2023-03-29 PROBLEM — I34.0 MODERATE MITRAL REGURGITATION: Status: ACTIVE | Noted: 2023-03-29

## 2023-03-29 PROBLEM — I34.0 MODERATE MITRAL REGURGITATION: Chronic | Status: ACTIVE | Noted: 2023-03-29

## 2023-03-29 PROBLEM — I11.9 HYPERTENSIVE HEART DISEASE WITHOUT HEART FAILURE: Status: RESOLVED | Noted: 2022-01-16 | Resolved: 2023-03-29

## 2023-03-29 PROBLEM — G93.0 ARACHNOID CYST OF POSTERIOR CRANIAL FOSSA: Status: RESOLVED | Noted: 2022-01-13 | Resolved: 2023-03-29

## 2023-03-29 PROBLEM — R07.9 CHEST PAIN: Status: RESOLVED | Noted: 2023-01-31 | Resolved: 2023-03-29

## 2023-03-29 PROBLEM — Z71.89 ADVANCE CARE PLANNING: Status: RESOLVED | Noted: 2022-02-02 | Resolved: 2023-03-29

## 2023-03-29 PROBLEM — E11.51 TYPE 2 DIABETES MELLITUS WITH DIABETIC PERIPHERAL ANGIOPATHY WITHOUT GANGRENE: Status: RESOLVED | Noted: 2022-01-16 | Resolved: 2023-03-29

## 2023-03-29 PROBLEM — G43.909 MIGRAINE HEADACHE: Chronic | Status: ACTIVE | Noted: 2021-12-30

## 2023-03-29 PROBLEM — I35.1 MODERATE AORTIC REGURGITATION: Chronic | Status: ACTIVE | Noted: 2023-03-29

## 2023-03-29 PROBLEM — K80.20 CALCULUS OF GALLBLADDER WITHOUT CHOLECYSTITIS WITHOUT OBSTRUCTION: Status: RESOLVED | Noted: 2023-01-11 | Resolved: 2023-03-29

## 2023-03-29 PROBLEM — R11.2 INTRACTABLE VOMITING WITH NAUSEA: Status: RESOLVED | Noted: 2022-01-22 | Resolved: 2023-03-29

## 2023-03-29 PROCEDURE — 3072F LOW RISK FOR RETINOPATHY: CPT | Mod: CPTII,S$GLB,, | Performed by: STUDENT IN AN ORGANIZED HEALTH CARE EDUCATION/TRAINING PROGRAM

## 2023-03-29 PROCEDURE — 99215 PR OFFICE/OUTPT VISIT, EST, LEVL V, 40-54 MIN: ICD-10-PCS | Mod: 25,S$GLB,, | Performed by: STUDENT IN AN ORGANIZED HEALTH CARE EDUCATION/TRAINING PROGRAM

## 2023-03-29 PROCEDURE — 1111F DSCHRG MED/CURRENT MED MERGE: CPT | Mod: CPTII,S$GLB,, | Performed by: STUDENT IN AN ORGANIZED HEALTH CARE EDUCATION/TRAINING PROGRAM

## 2023-03-29 PROCEDURE — 1159F PR MEDICATION LIST DOCUMENTED IN MEDICAL RECORD: ICD-10-PCS | Mod: CPTII,S$GLB,, | Performed by: STUDENT IN AN ORGANIZED HEALTH CARE EDUCATION/TRAINING PROGRAM

## 2023-03-29 PROCEDURE — 1159F MED LIST DOCD IN RCRD: CPT | Mod: CPTII,S$GLB,, | Performed by: STUDENT IN AN ORGANIZED HEALTH CARE EDUCATION/TRAINING PROGRAM

## 2023-03-29 PROCEDURE — 99999 PR PBB SHADOW E&M-EST. PATIENT-LVL III: ICD-10-PCS | Mod: PBBFAC,,, | Performed by: STUDENT IN AN ORGANIZED HEALTH CARE EDUCATION/TRAINING PROGRAM

## 2023-03-29 PROCEDURE — 3078F PR MOST RECENT DIASTOLIC BLOOD PRESSURE < 80 MM HG: ICD-10-PCS | Mod: CPTII,S$GLB,, | Performed by: STUDENT IN AN ORGANIZED HEALTH CARE EDUCATION/TRAINING PROGRAM

## 2023-03-29 PROCEDURE — 3044F PR MOST RECENT HEMOGLOBIN A1C LEVEL <7.0%: ICD-10-PCS | Mod: CPTII,S$GLB,, | Performed by: STUDENT IN AN ORGANIZED HEALTH CARE EDUCATION/TRAINING PROGRAM

## 2023-03-29 PROCEDURE — 3008F PR BODY MASS INDEX (BMI) DOCUMENTED: ICD-10-PCS | Mod: CPTII,S$GLB,, | Performed by: STUDENT IN AN ORGANIZED HEALTH CARE EDUCATION/TRAINING PROGRAM

## 2023-03-29 PROCEDURE — 3074F PR MOST RECENT SYSTOLIC BLOOD PRESSURE < 130 MM HG: ICD-10-PCS | Mod: CPTII,S$GLB,, | Performed by: STUDENT IN AN ORGANIZED HEALTH CARE EDUCATION/TRAINING PROGRAM

## 2023-03-29 PROCEDURE — 96372 THER/PROPH/DIAG INJ SC/IM: CPT | Mod: S$GLB,,, | Performed by: STUDENT IN AN ORGANIZED HEALTH CARE EDUCATION/TRAINING PROGRAM

## 2023-03-29 PROCEDURE — 1160F RVW MEDS BY RX/DR IN RCRD: CPT | Mod: CPTII,S$GLB,, | Performed by: STUDENT IN AN ORGANIZED HEALTH CARE EDUCATION/TRAINING PROGRAM

## 2023-03-29 PROCEDURE — 99215 OFFICE O/P EST HI 40 MIN: CPT | Mod: 25,S$GLB,, | Performed by: STUDENT IN AN ORGANIZED HEALTH CARE EDUCATION/TRAINING PROGRAM

## 2023-03-29 PROCEDURE — 99999 PR PBB SHADOW E&M-EST. PATIENT-LVL III: CPT | Mod: PBBFAC,,, | Performed by: STUDENT IN AN ORGANIZED HEALTH CARE EDUCATION/TRAINING PROGRAM

## 2023-03-29 PROCEDURE — 96372 PR INJECTION,THERAP/PROPH/DIAG2ST, IM OR SUBCUT: ICD-10-PCS | Mod: S$GLB,,, | Performed by: STUDENT IN AN ORGANIZED HEALTH CARE EDUCATION/TRAINING PROGRAM

## 2023-03-29 PROCEDURE — 3072F PR LOW RISK FOR RETINOPATHY: ICD-10-PCS | Mod: CPTII,S$GLB,, | Performed by: STUDENT IN AN ORGANIZED HEALTH CARE EDUCATION/TRAINING PROGRAM

## 2023-03-29 PROCEDURE — 99406 PR TOBACCO USE CESSATION INTERMEDIATE 3-10 MINUTES: ICD-10-PCS | Mod: S$GLB,,, | Performed by: STUDENT IN AN ORGANIZED HEALTH CARE EDUCATION/TRAINING PROGRAM

## 2023-03-29 PROCEDURE — 99406 BEHAV CHNG SMOKING 3-10 MIN: CPT | Mod: S$GLB,,, | Performed by: STUDENT IN AN ORGANIZED HEALTH CARE EDUCATION/TRAINING PROGRAM

## 2023-03-29 PROCEDURE — 1160F PR REVIEW ALL MEDS BY PRESCRIBER/CLIN PHARMACIST DOCUMENTED: ICD-10-PCS | Mod: CPTII,S$GLB,, | Performed by: STUDENT IN AN ORGANIZED HEALTH CARE EDUCATION/TRAINING PROGRAM

## 2023-03-29 PROCEDURE — 1111F PR DISCHARGE MEDS RECONCILED W/ CURRENT OUTPATIENT MED LIST: ICD-10-PCS | Mod: CPTII,S$GLB,, | Performed by: STUDENT IN AN ORGANIZED HEALTH CARE EDUCATION/TRAINING PROGRAM

## 2023-03-29 PROCEDURE — 3044F HG A1C LEVEL LT 7.0%: CPT | Mod: CPTII,S$GLB,, | Performed by: STUDENT IN AN ORGANIZED HEALTH CARE EDUCATION/TRAINING PROGRAM

## 2023-03-29 PROCEDURE — 3008F BODY MASS INDEX DOCD: CPT | Mod: CPTII,S$GLB,, | Performed by: STUDENT IN AN ORGANIZED HEALTH CARE EDUCATION/TRAINING PROGRAM

## 2023-03-29 PROCEDURE — 3074F SYST BP LT 130 MM HG: CPT | Mod: CPTII,S$GLB,, | Performed by: STUDENT IN AN ORGANIZED HEALTH CARE EDUCATION/TRAINING PROGRAM

## 2023-03-29 PROCEDURE — 3078F DIAST BP <80 MM HG: CPT | Mod: CPTII,S$GLB,, | Performed by: STUDENT IN AN ORGANIZED HEALTH CARE EDUCATION/TRAINING PROGRAM

## 2023-03-29 RX ORDER — CIPROFLOXACIN 500 MG/1
500 TABLET ORAL 2 TIMES DAILY
COMMUNITY
Start: 2023-03-16 | End: 2023-03-29

## 2023-03-29 RX ORDER — NICOTINE POLACRILEX 2 MG/1
2 LOZENGE ORAL DAILY
COMMUNITY
Start: 2022-12-05 | End: 2023-03-29

## 2023-03-29 RX ORDER — TADALAFIL 20 MG/1
1 TABLET ORAL DAILY
COMMUNITY
Start: 2022-12-05 | End: 2023-05-25

## 2023-03-29 RX ORDER — PREGABALIN 50 MG/1
50 CAPSULE ORAL 3 TIMES DAILY
Qty: 270 CAPSULE | Refills: 1 | Status: SHIPPED | OUTPATIENT
Start: 2023-03-29 | End: 2023-06-26 | Stop reason: SDUPTHER

## 2023-03-29 RX ORDER — METOPROLOL SUCCINATE 50 MG/1
1 TABLET, EXTENDED RELEASE ORAL DAILY
COMMUNITY
Start: 2022-12-05 | End: 2023-08-14

## 2023-03-29 RX ORDER — PREDNISONE 20 MG/1
40 TABLET ORAL DAILY
Qty: 10 TABLET | Refills: 0 | Status: SHIPPED | OUTPATIENT
Start: 2023-03-29 | End: 2023-04-03

## 2023-03-29 RX ORDER — ASPIRIN 81 MG/1
1 TABLET ORAL DAILY
COMMUNITY
Start: 2022-12-05 | End: 2023-03-29

## 2023-03-29 RX ORDER — AMLODIPINE BESYLATE 2.5 MG/1
1 TABLET ORAL DAILY
COMMUNITY
Start: 2022-12-05 | End: 2023-04-12

## 2023-03-29 RX ORDER — FLUTICASONE PROPIONATE 50 MCG
2 SPRAY, SUSPENSION (ML) NASAL NIGHTLY
COMMUNITY
Start: 2023-03-16 | End: 2023-08-14

## 2023-03-29 RX ORDER — TOPIRAMATE 100 MG/1
1 TABLET, FILM COATED ORAL NIGHTLY
COMMUNITY
Start: 2022-12-05 | End: 2023-03-29

## 2023-03-29 RX ORDER — ZIPRASIDONE HYDROCHLORIDE 80 MG/1
1 CAPSULE ORAL NIGHTLY
COMMUNITY
Start: 2022-12-05 | End: 2023-03-29

## 2023-03-29 RX ORDER — CYANOCOBALAMIN 1000 UG/ML
1000 INJECTION, SOLUTION INTRAMUSCULAR; SUBCUTANEOUS WEEKLY
Status: SHIPPED | OUTPATIENT
Start: 2023-03-29 | End: 2023-04-26

## 2023-03-29 RX ORDER — PANTOPRAZOLE SODIUM 40 MG/1
1 TABLET, DELAYED RELEASE ORAL 2 TIMES DAILY
COMMUNITY
Start: 2022-12-05 | End: 2023-03-29

## 2023-03-29 RX ORDER — SODIUM CHLORIDE 1 G/1
1 TABLET ORAL 3 TIMES DAILY
COMMUNITY
Start: 2022-12-05 | End: 2023-12-28

## 2023-03-29 RX ORDER — IPRATROPIUM BROMIDE AND ALBUTEROL SULFATE 2.5; .5 MG/3ML; MG/3ML
3 SOLUTION RESPIRATORY (INHALATION) 4 TIMES DAILY
COMMUNITY
Start: 2022-12-05 | End: 2023-03-29

## 2023-03-29 RX ORDER — ALBUTEROL SULFATE 90 UG/1
2 AEROSOL, METERED RESPIRATORY (INHALATION) EVERY 6 HOURS PRN
COMMUNITY
Start: 2022-12-05 | End: 2023-10-18 | Stop reason: SDUPTHER

## 2023-03-29 RX ORDER — LORATADINE 10 MG/1
1 TABLET ORAL DAILY
COMMUNITY
Start: 2023-03-16 | End: 2023-08-14

## 2023-03-29 RX ORDER — ATORVASTATIN CALCIUM 40 MG/1
40 TABLET, FILM COATED ORAL DAILY
Qty: 90 TABLET | Refills: 3 | Status: SHIPPED | OUTPATIENT
Start: 2023-03-29 | End: 2024-03-08 | Stop reason: SDUPTHER

## 2023-03-29 RX ORDER — AZITHROMYCIN 250 MG/1
TABLET, FILM COATED ORAL
Qty: 6 TABLET | Refills: 0 | Status: SHIPPED | OUTPATIENT
Start: 2023-03-29 | End: 2023-04-03

## 2023-03-29 RX ORDER — ATORVASTATIN CALCIUM 40 MG/1
1 TABLET, FILM COATED ORAL DAILY
COMMUNITY
Start: 2022-12-05 | End: 2023-03-29

## 2023-03-29 RX ORDER — BUSPIRONE HYDROCHLORIDE 15 MG/1
15 TABLET ORAL 3 TIMES DAILY
COMMUNITY
Start: 2022-12-05 | End: 2023-03-29

## 2023-03-29 RX ADMIN — CYANOCOBALAMIN 1000 MCG: 1000 INJECTION, SOLUTION INTRAMUSCULAR; SUBCUTANEOUS at 12:03

## 2023-03-29 NOTE — TELEPHONE ENCOUNTER
Spoke with pt. Pt states he had a stroke last week & will not be able to have c-scope next week. Pt informed to call clinic back whenever he is ready to reschedule. Pt verbalized understanding.     C-scope canceled.

## 2023-03-29 NOTE — PROGRESS NOTES
Plan:      Ryan was seen today for follow-up.    Diagnoses and all orders for this visit:    COPD with acute exacerbation  -     predniSONE (DELTASONE) 20 MG tablet; Take 2 tablets (40 mg total) by mouth once daily. for 5 days  -     azithromycin (Z-SCOTT) 250 MG tablet; Take 2 tablets by mouth on day 1; Take 1 tablet by mouth on days 2-5    Stage 2 moderate COPD by GOLD classification  -     fluticasone-umeclidin-vilanter (TRELEGY ELLIPTA) 100-62.5-25 mcg DsDv; Inhale 1 puff into the lungs once daily.    History of stroke  -     atorvastatin (LIPITOR) 40 MG tablet; Take 1 tablet (40 mg total) by mouth once daily.    CAD in native artery - moderate 3V 60% by Pt history  -     atorvastatin (LIPITOR) 40 MG tablet; Take 1 tablet (40 mg total) by mouth once daily.    Thoracic aortic atherosclerosis  -     atorvastatin (LIPITOR) 40 MG tablet; Take 1 tablet (40 mg total) by mouth once daily.    PVD (peripheral vascular disease)  -     atorvastatin (LIPITOR) 40 MG tablet; Take 1 tablet (40 mg total) by mouth once daily.    Diabetic polyneuropathy associated with diabetes mellitus due to underlying condition  -     pregabalin (LYRICA) 50 MG capsule; Take 1 capsule (50 mg total) by mouth 3 (three) times daily.    Vitamin B12 deficiency  -     cyanocobalamin injection 1,000 mcg    Moderate mitral stenosis: No further intervention needed at this time. Continue to monitor.    Cigarette nicotine dependence without complication:   Patient with history of tobacco/nicotine use as documented in today's note. Patient counseled on short-term and long-term health risks associated with tobacco/nicotine use and advised to quit. Discussion included patient's readiness to quit, methods and skills for tobacco/nicotine use cessation including pharmacologic intervention and [facility], and importance of setting a quit date. Plan for continued follow-up at next appointment to assess progress. Approximately 5 minutes  spent on smoking and tobacco cessation counseling.    Benign CNS tumor: No further intervention needed at this time.    Depression, recurrent: Buspar 15 mg TID, Lamictal 200 mg daily, Remeron 15 mg qhs, Geodon 160 mg qhs    Chronic heart failure with preserved ejection fraction: No further intervention needed at this time. Continue to monitor.    Flaccid hemiplegia of left nondominant side as late effect of cerebral infarction: Continue PT/OT.      Follow up in about 2 weeks (around 4/12/2023), or if symptoms worsen or fail to improve.    Ana Mayer MD  03/29/2023    Subjective:      Patient ID: Ryan Crane is a 57 y.o. male    Chief Complaint   Patient presents with    Follow-up     Follow up from [facility]      HPI  57 y.o. male with a PMHx as documented below presents to clinic today for the following:    Hospital follow-up:   Pt s/p hospital admission on 3/4/23 - 3/7/23 for left-sided weakness, slurred speech, and dizziness - workup significant for CVA (right lacunar stroke). CTA negative. CT head w/o contrast showed changes consistent with subacute infarct of the left cerebellum and chronic infarct of the right occipital lobe. MRI w/o contrast showed small area of interest diffusion is seen in the right corona radiata extending to the region of the superior aspect of the posterior limb internal capsule, compatible with acute or subacute ischemia/infarction. TTE showed grade I left ventricular diastolic dysfunction and EF 60%. BLE venous US negative for DVT. BLE arterial US showed mild vascular insufficiency or proximal stenosis, RLE > LLE. Pt started on ASA/Plavix with continuation of statin. Pt was discharged to inpatient rehab from 3/7/23 - 3/16/23. Pt s/p repeat hospital admission on 3/24/23 for recurrent left-sided weakness - workup negative for new/acute CVA, discharged w/ plan to continue ASA/Plavix/statin and follow-up with PCP and PT/OT.     Hx of CVA:   - Right lacunar infarct  "(3/4/23) resulting in hemiplegia of left, nondominant side  - CT head w/o contrast showed changes consistent with subacute infarct of the left cerebellum and chronic infarct of the right occipital lobe  - MRI w/o contrast showed small area of interest diffusion is seen in the right corona radiata extending to the region of the superior aspect of the posterior limb internal capsule  - ASA 81 mg daily, Lipitor 40 mg daily  - S/p Neuro follow-up on 3/17/23 - okay to keep Lipitor 40 mg daily rather than 80 mg daily  - Plavix 75 mg daily x21 days after CVA    CAD:   - ASA 81 mg daily, Lipitor 40 mg daily  - Nitro 0.4 mg SL q5min PRN    HTN:   - Amlodipine 2.5 mg daily, Toprol-XL 50 mg daily    HFpEF:   - TTE (34/23) w/ EF 60% and grade I left ventricular diastolic dysfunction    PVD:   - ASA 81 mg daily, Lipitor 40 mg daily  - BLE arterial US (3/7/23): "There appear to be waveform changes throughout the bilateral lower extremity arterial system, more evident in the distal right lower extremity arteries which may indicate some degree of vascular insufficiency or more proximal stenosis. However, no doubling of peak systolic velocities to suggest greater than 50% stenosis is sonographically demonstrated."    COPD, GOLD 2:   - PFTs w/ FEV1 (73%) (9/2022)  - Not currently on any controller medication  - Albuterol PRN    Allergic rhinitis:   - Claritin 10 mg daily PRN, Flonase daily PRN    Hx of DMT2 w/ diabetic polyneuropathy:   - Most recent Hgb A1c 5.1% (3/2023)  - Lyrica 50 mg TID     Chronic schizophrenia:   - Buspar 15 mg TID, Lamictal 200 mg daily, Remeron 15 mg qhs, Geodon 160 mg qhs  - Re-establishing with Psychiatry, upcoming appointment April 12th with outside provider  - Appointment w/ social work on April 16th    Migraine headaches:   - Emgality 120 mg monthly injections, Topamax 200 mg qhs, Nutrex PRN    B12 deficiency:   - Vitamin B12 < 200 on 3/24/23  - S/p B12 1000 mcg IM while in hospital, discharged w/ plan " to continue as outpatient    ED:   - Cialis 20 mg PRN    Hyponatremia:   - NaCl 1000 mg TID     Review of Systems   Constitutional:  Negative for chills and fever.   Respiratory:  Positive for cough and sputum production. Negative for shortness of breath.    Cardiovascular:  Negative for chest pain.   Gastrointestinal:  Negative for abdominal pain, constipation, diarrhea, nausea and vomiting.   Genitourinary:  Negative for dysuria.   Musculoskeletal:  Negative for back pain and neck pain.   Skin:  Negative for rash.   Neurological:  Negative for dizziness, sensory change, weakness and headaches.   Psychiatric/Behavioral:  Negative for depression. The patient is not nervous/anxious.      Current Outpatient Medications   Medication Instructions    albuterol (PROVENTIL/VENTOLIN HFA) 90 mcg/actuation inhaler 2 puffs, Inhalation, Every 6 hours PRN    albuterol-ipratropium (DUO-NEB) 2.5 mg-0.5 mg/3 mL nebulizer solution 3 mLs, Nebulization, Every 6 hours PRN, Rescue    amLODIPine (NORVASC) 2.5 MG tablet 1 tablet, Oral, Daily    aspirin (ECOTRIN) 81 mg, Oral, Daily    atorvastatin (LIPITOR) 40 mg, Oral, Daily    azithromycin (Z-SCOTT) 250 MG tablet Take 2 tablets by mouth on day 1; Take 1 tablet by mouth on days 2-5<BR>    busPIRone (BUSPAR) 15 mg, Oral, 3 times daily    clopidogreL (PLAVIX) 75 mg, Oral, Daily    cyanocobalamin 1,000 mcg/mL injection 1 ml sq daily x 5 days, then 1 ml sq weekly x 4 weeks then q monthly -  will need to go to PCP    fluticasone propionate (FLONASE ALLERGY RELIEF) 50 mcg/actuation nasal spray 2 sprays, Nasal, Nightly    fluticasone-umeclidin-vilanter (TRELEGY ELLIPTA) 100-62.5-25 mcg DsDv 1 puff, Inhalation, Daily    galcanezumab-gnlm (EMGALITY SYRINGE) 120 mg/mL Syrg Inject 1 pen (120 mg) into the skin every 28 days.    Lactobac. rhamnosus GG-inulin 10 billion cell -200 mg Chew 1 tablet, Oral, Every morning    lamoTRIgine (LAMICTAL) 200 mg, Oral, Daily    loratadine (CLARITIN) 10 mg tablet 1  each, Oral, Daily    metoprolol succinate (TOPROL-XL) 50 MG 24 hr tablet 1 tablet, Oral, Daily    mirtazapine (REMERON) 15 mg, Oral, Nightly    nicotine polacrilex 4 MG Lozg Take up to 8 pieces daily as needed    nitroGLYCERIN (NITROSTAT) 0.4 mg, Sublingual, Every 5 min PRN    NURTEC 75 mg, Oral, Daily PRN, Place ODT tablet on the tongue; alternatively the ODT tablet may be placed under the tongue    predniSONE (DELTASONE) 40 mg, Oral, Daily    pregabalin (LYRICA) 50 mg, Oral, 3 times daily    sodium chloride 1,000 mg TbSO tablet tbso 1 tablet, Misc.(Non-Drug; Combo Route), 3 times daily    tadalafiL (CIALIS) 20 MG Tab 1 tablet, Oral, Daily    topiramate (TOPAMAX) 200 mg, Oral, Nightly    ziprasidone (GEODON) 160 mg, Oral, Nightly      Past Medical History:   Diagnosis Date    Aortic valve stenosis 02/28/2022    Arachnoid cyst of posterior cranial fossa 01/13/2022    Bilateral carotid bruits 06/22/2021    Bipolar disorder 01/16/2022    CAD S/P percutaneous coronary angioplasty     3 vessel disease    Calculus of gallbladder without cholecystitis without obstruction 01/11/2023    Cancer     Candidal intertrigo 11/11/2019    Cataract     Chronic heart failure with preserved ejection fraction 03/29/2023    Chronic schizophrenia     Colon polyps     Diabetic polyneuropathy associated with diabetes mellitus due to underlying condition 11/21/2019    Dysarthria as late effect of cerebellar cerebrovascular accident (CVA) 03/24/2023    Equinus deformity of both feet 11/21/2019    Flaccid hemiplegia of left nondominant side as late effect of cerebral infarction 03/17/2023    Flat foot 11/21/2019    Gastrointestinal hemorrhage 12/13/2019    Post polypectomy bleeding    General anesthetics causing adverse effect in therapeutic use     Hammer toes of both feet 11/21/2019    Hx of diabetic foot ulcer 11/21/2019    Hypertension     Hyponatremia 01/11/2022    ELAINE (iron deficiency anemia) 12/10/2019    Intractable chronic migraine  "without aura and without status migrainosus 12/30/2021    20 year history of migraine headaches. Headaches are typically moderate to severe in intensity, worsen with activity, pounding in quality and associated with sensitivity to light and sound. Given history of reported brain tumor, agree with head CT today. Cannot do MRI due to metal clips.   Galcanezumab (Emgality) treatment was approved for the prevention of acute and chronic migraine on 9/27/2018.    Microcytic anemia 10/09/2019    Migraine headache 12/30/2021    20 year history of migraine headaches. Headaches are typically moderate to severe in intensity, worsen with activity, pounding in quality and associated with sensitivity to light and sound. Given history of reported brain tumor, agree with head CT today. Cannot do MRI due to metal clips.   Galcanezumab (Emgality) treatment was approved for the prevention of acute and chronic migraine on 9/27/2018.    Moderate aortic regurgitation 03/29/2023    Moderate mitral regurgitation 03/29/2023    Moderate mitral stenosis 03/29/2023    Moderate to severe aortic stenosis 02/28/2022    Myocardial infarct, old     Nicotine dependence, unspecified, uncomplicated 01/16/2022    Onychomycosis due to dermatophyte 11/21/2019    Orchitis 11/09/2019    PIPER on CPAP     Peripheral visual field defect, bilateral 01/13/2022    PVD (peripheral vascular disease) 11/21/2019    Seizures 2008    Stage 2 moderate COPD by GOLD classification     PFTs w/ FEV1 (73%) (9/2022)    Stroke 2005    Thoracic aortic atherosclerosis 10/17/2022    CT chest    Thyroid disease     Previously on pharmacologic Tx    Tinea pedis of right foot 05/21/2019    Tobacco use disorder     Type 2 diabetes mellitus with diabetic peripheral angiopathy without gangrene     A1c 5.1% (3/2023)    Vitamin B12 deficiency 03/24/2023        Objective:      Vitals:    03/29/23 1141   BP: 108/66   Pulse: 72   SpO2: 99%   Weight: 92.5 kg (203 lb 14.8 oz)   Height: 5' 10" " (1.778 m)     Body mass index is 29.26 kg/m².    Physical Exam  Vitals reviewed.   Constitutional:       General: He is not in acute distress.  HENT:      Head: Normocephalic and atraumatic.      Nose: Congestion present.   Cardiovascular:      Rate and Rhythm: Normal rate.   Pulmonary:      Effort: Pulmonary effort is normal. No respiratory distress.   Musculoskeletal:      Comments: Ambulating with walker, weakness of upper/lower extremities of the left side.   Neurological:      Mental Status: He is alert and oriented to person, place, and time.     Assessment:       1. COPD with acute exacerbation    2. Stage 2 moderate COPD by GOLD classification    3. History of stroke    4. CAD in native artery - moderate 3V 60% by Pt history    5. Thoracic aortic atherosclerosis    6. PVD (peripheral vascular disease)    7. Diabetic polyneuropathy associated with diabetes mellitus due to underlying condition    8. Vitamin B12 deficiency    9. Moderate mitral stenosis    10. Cigarette nicotine dependence without complication    11. Benign CNS tumor    12. Depression, recurrent    13. Chronic heart failure with preserved ejection fraction    14. Flaccid hemiplegia of left nondominant side as late effect of cerebral infarction        Ana Mayer MD  [facility]  Office: [phone]   Fax: [phone]  03/29/2023      Disclaimer: This note was partly generated using dictation software which may occasionally result in transcription errors.    Total time spend on encounter: 40-54 minutes. This includes face to face time and non-face to face time preparing to see the patient (eg, review of tests), obtaining and/or reviewing separately obtained history, documenting clinical information in the electronic or other health record, independently interpreting results and communicating results to the patient/family/caregiver, or care coordinator.

## 2023-03-30 ENCOUNTER — TELEPHONE (OUTPATIENT)
Dept: FAMILY MEDICINE | Facility: CLINIC | Age: 58
End: 2023-03-30
Payer: MEDICARE

## 2023-03-30 NOTE — TELEPHONE ENCOUNTER
----- Message from Fidelina Lozyoa sent at 3/30/2023  1:44 PM CDT -----  Regarding: advice  Contact: Fidelina with [facility]  Type: Needs Medical Advice  Who Called:  Fidelina with [facility]  Symptoms (please be specific):    How long has patient had these symptoms:    Pharmacy name and phone #:    Best Call Back Number: [phone]  Additional Information: Fidelina states they are adding a  to the patients case.  Please call Fidelina to advise.  Thanks!

## 2023-04-04 DIAGNOSIS — G43.719 INTRACTABLE CHRONIC MIGRAINE WITHOUT AURA AND WITHOUT STATUS MIGRAINOSUS: ICD-10-CM

## 2023-04-04 RX ORDER — GALCANEZUMAB 120 MG/ML
120 INJECTION, SOLUTION SUBCUTANEOUS
Qty: 1 ML | Refills: 11 | Status: SHIPPED | OUTPATIENT
Start: 2023-04-04 | End: 2023-05-30 | Stop reason: ALTCHOICE

## 2023-04-04 RX ORDER — GALCANEZUMAB 120 MG/ML
120 INJECTION, SOLUTION SUBCUTANEOUS
Qty: 1 ML | Refills: 11 | Status: CANCELLED | OUTPATIENT
Start: 2023-04-04

## 2023-04-05 ENCOUNTER — TELEPHONE (OUTPATIENT)
Dept: FAMILY MEDICINE | Facility: CLINIC | Age: 58
End: 2023-04-05
Payer: MEDICARE

## 2023-04-05 NOTE — TELEPHONE ENCOUNTER
----- Message from Flor Oneal, Patient Care Assistant sent at 4/5/2023 10:01 AM CDT -----  Contact: self  Pt missed his nurse visit this morning and calling to [facility] [phone]  thanks

## 2023-04-05 NOTE — TELEPHONE ENCOUNTER
----- Message from Flor Oneal, Patient Care Assistant sent at 4/5/2023 10:01 AM CDT -----  Contact: self  Pt missed his nurse visit this morning and calling to [facility] [phone]  thanks

## 2023-04-06 ENCOUNTER — TELEPHONE (OUTPATIENT)
Dept: FAMILY MEDICINE | Facility: CLINIC | Age: 58
End: 2023-04-06
Payer: MEDICARE

## 2023-04-06 DIAGNOSIS — F17.200 TOBACCO USE DISORDER: ICD-10-CM

## 2023-04-06 RX ORDER — IBUPROFEN 200 MG
1 TABLET ORAL DAILY
Qty: 28 PATCH | Refills: 2 | Status: SHIPPED | OUTPATIENT
Start: 2023-04-06 | End: 2023-10-25

## 2023-04-06 RX ORDER — ASPIRIN/CALCIUM CARB/MAGNESIUM 325 MG
TABLET ORAL
Qty: 270 LOZENGE | Refills: 2 | Status: SHIPPED | OUTPATIENT
Start: 2023-04-06 | End: 2023-07-18 | Stop reason: SDUPTHER

## 2023-04-06 NOTE — TELEPHONE ENCOUNTER
----- Message from Juan Miguel Kelly CEP sent at 4/6/2023  3:06 PM CDT -----  Regarding: RX request  Dr. Mayer, I saw one of your patients, Mr. Ryan Crane for a smoking cessation counseling session.  He would like to use 21 mg nicotine patches to help him stop smoking as well as 4 mg nicotine lozenges.   He is a member of the [place] [employer] and as such the Trust will cover the cost of the patches as long as his MD writes a prescription for nicotine patches.  I am requesting a 21 mg prescription for nicotine patches and 4 mg lozenges from you to be sent to his pharmacy of record.  If you have any questions please contact me via this message board or call me at [phone].   Thank you,  Juan Miguel Kelly CEP, TTS.

## 2023-04-06 NOTE — TELEPHONE ENCOUNTER
----- Message from Danae Sanchez sent at 4/6/2023 11:17 AM CDT -----  Contact: self  Patient is requesting a call back from Geri he is out of the hospital and needs to himanshu his appt, he would not let me do it .   Call back at [phone] and thanks

## 2023-04-11 ENCOUNTER — PATIENT MESSAGE (OUTPATIENT)
Dept: ADMINISTRATIVE | Facility: HOSPITAL | Age: 58
End: 2023-04-11
Payer: MEDICARE

## 2023-04-13 ENCOUNTER — CLINICAL SUPPORT (OUTPATIENT)
Dept: FAMILY MEDICINE | Facility: CLINIC | Age: 58
End: 2023-04-13
Payer: MEDICARE

## 2023-04-13 ENCOUNTER — OFFICE VISIT (OUTPATIENT)
Dept: FAMILY MEDICINE | Facility: CLINIC | Age: 58
End: 2023-04-13
Payer: MEDICARE

## 2023-04-13 VITALS
BODY MASS INDEX: 28.1 KG/M2 | WEIGHT: 196.31 LBS | SYSTOLIC BLOOD PRESSURE: 112 MMHG | RESPIRATION RATE: 18 BRPM | HEIGHT: 70 IN | DIASTOLIC BLOOD PRESSURE: 68 MMHG
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
## 2023-04-13 PROBLEM — E53.8 VITAMIN B12 DEFICIENCY: Chronic | Status: ACTIVE | Noted: 2023-03-24

## 2023-04-13 PROBLEM — F33.9 DEPRESSION, RECURRENT: Chronic | Status: ACTIVE | Noted: 2023-03-29

## 2023-04-13 PROBLEM — Z98.61 CAD S/P PERCUTANEOUS CORONARY ANGIOPLASTY: Chronic | Status: ACTIVE | Noted: 2019-09-04

## 2023-04-13 PROCEDURE — 3078F PR MOST RECENT DIASTOLIC BLOOD PRESSURE < 80 MM HG: ICD-10-PCS | Mod: CPTII,S$GLB,, | Performed by: STUDENT IN AN ORGANIZED HEALTH CARE EDUCATION/TRAINING PROGRAM

## 2023-04-13 PROCEDURE — 3008F BODY MASS INDEX DOCD: CPT | Mod: CPTII,S$GLB,, | Performed by: STUDENT IN AN ORGANIZED HEALTH CARE EDUCATION/TRAINING PROGRAM

## 2023-04-13 PROCEDURE — 99999 PR PBB SHADOW E&M-EST. PATIENT-LVL IV: ICD-10-PCS | Mod: PBBFAC,,, | Performed by: STUDENT IN AN ORGANIZED HEALTH CARE EDUCATION/TRAINING PROGRAM

## 2023-04-13 PROCEDURE — 1159F PR MEDICATION LIST DOCUMENTED IN MEDICAL RECORD: ICD-10-PCS | Mod: CPTII,S$GLB,, | Performed by: STUDENT IN AN ORGANIZED HEALTH CARE EDUCATION/TRAINING PROGRAM

## 2023-04-13 PROCEDURE — 99213 PR OFFICE/OUTPT VISIT, EST, LEVL III, 20-29 MIN: ICD-10-PCS | Mod: 25,S$GLB,, | Performed by: STUDENT IN AN ORGANIZED HEALTH CARE EDUCATION/TRAINING PROGRAM

## 2023-04-13 PROCEDURE — 99999 PR PBB SHADOW E&M-EST. PATIENT-LVL IV: CPT | Mod: PBBFAC,,, | Performed by: STUDENT IN AN ORGANIZED HEALTH CARE EDUCATION/TRAINING PROGRAM

## 2023-04-13 PROCEDURE — 96372 THER/PROPH/DIAG INJ SC/IM: CPT | Mod: S$GLB,,, | Performed by: STUDENT IN AN ORGANIZED HEALTH CARE EDUCATION/TRAINING PROGRAM

## 2023-04-13 PROCEDURE — 3074F PR MOST RECENT SYSTOLIC BLOOD PRESSURE < 130 MM HG: ICD-10-PCS | Mod: CPTII,S$GLB,, | Performed by: STUDENT IN AN ORGANIZED HEALTH CARE EDUCATION/TRAINING PROGRAM

## 2023-04-13 PROCEDURE — 3044F PR MOST RECENT HEMOGLOBIN A1C LEVEL <7.0%: ICD-10-PCS | Mod: CPTII,S$GLB,, | Performed by: STUDENT IN AN ORGANIZED HEALTH CARE EDUCATION/TRAINING PROGRAM

## 2023-04-13 PROCEDURE — 3008F PR BODY MASS INDEX (BMI) DOCUMENTED: ICD-10-PCS | Mod: CPTII,S$GLB,, | Performed by: STUDENT IN AN ORGANIZED HEALTH CARE EDUCATION/TRAINING PROGRAM

## 2023-04-13 PROCEDURE — 3074F SYST BP LT 130 MM HG: CPT | Mod: CPTII,S$GLB,, | Performed by: STUDENT IN AN ORGANIZED HEALTH CARE EDUCATION/TRAINING PROGRAM

## 2023-04-13 PROCEDURE — 99213 OFFICE O/P EST LOW 20 MIN: CPT | Mod: 25,S$GLB,, | Performed by: STUDENT IN AN ORGANIZED HEALTH CARE EDUCATION/TRAINING PROGRAM

## 2023-04-13 PROCEDURE — 1159F MED LIST DOCD IN RCRD: CPT | Mod: CPTII,S$GLB,, | Performed by: STUDENT IN AN ORGANIZED HEALTH CARE EDUCATION/TRAINING PROGRAM

## 2023-04-13 PROCEDURE — 3078F DIAST BP <80 MM HG: CPT | Mod: CPTII,S$GLB,, | Performed by: STUDENT IN AN ORGANIZED HEALTH CARE EDUCATION/TRAINING PROGRAM

## 2023-04-13 PROCEDURE — 3072F LOW RISK FOR RETINOPATHY: CPT | Mod: CPTII,S$GLB,, | Performed by: STUDENT IN AN ORGANIZED HEALTH CARE EDUCATION/TRAINING PROGRAM

## 2023-04-13 PROCEDURE — 3072F PR LOW RISK FOR RETINOPATHY: ICD-10-PCS | Mod: CPTII,S$GLB,, | Performed by: STUDENT IN AN ORGANIZED HEALTH CARE EDUCATION/TRAINING PROGRAM

## 2023-04-13 PROCEDURE — 3044F HG A1C LEVEL LT 7.0%: CPT | Mod: CPTII,S$GLB,, | Performed by: STUDENT IN AN ORGANIZED HEALTH CARE EDUCATION/TRAINING PROGRAM

## 2023-04-13 PROCEDURE — 96372 PR INJECTION,THERAP/PROPH/DIAG2ST, IM OR SUBCUT: ICD-10-PCS | Mod: S$GLB,,, | Performed by: STUDENT IN AN ORGANIZED HEALTH CARE EDUCATION/TRAINING PROGRAM

## 2023-04-13 RX ADMIN — CYANOCOBALAMIN 1000 MCG: 1000 INJECTION, SOLUTION INTRAMUSCULAR; SUBCUTANEOUS at 09:04

## 2023-04-13 NOTE — PROGRESS NOTES
Plan:      Ryan was seen today for follow-up and fall.    Diagnoses and all orders for this visit:    Ground-level fall    Doing well - no acute issues. Continue treatment for chronic medical conditions as detailed below - tolerating well with no reported side effects.     Follow up in about 6 weeks (around 5/25/2023), or if symptoms worsen or fail to improve.    Ana Mayer MD  04/13/2023    Subjective:      Patient ID: Ryan Crane is a 57 y.o. male    Chief Complaint   Patient presents with    Follow-up     Hospital f/u    Fall     Pt stated he has fallen on Monday      HPI  57 y.o. male with a PMHx as documented below presents to clinic today for the following:    Pt reports ground level fall at his house three days ago - no head trauma, no LOC. Pt reports falling onto left knee/leg - no lasting symptoms, worked w/ physical therapy yesterday with no problems.     Pt still working with physical therapy to regain left-sided strength following stroke. Pt reports frustration not being able to do the things he used to do. Social support includes daughter who lives nearby and friend Lars who is staying at the house to help.    Hx of CVA:   - Right lacunar infarct (3/4/23) resulting in hemiplegia of left, nondominant side  - CT head w/o contrast showed changes consistent with subacute infarct of the left cerebellum and chronic infarct of the right occipital lobe  - MRI w/o contrast showed small area of interest diffusion is seen in the right corona radiata extending to the region of the superior aspect of the posterior limb internal capsule  - ASA 81 mg daily, Lipitor 40 mg daily  - S/p Neuro follow-up on 3/17/23 - okay to keep Lipitor 40 mg daily rather than 80 mg daily  - Plavix 75 mg daily x21 days after CVA     CAD:   - ASA 81 mg daily, Lipitor 40 mg daily  - Nitro 0.4 mg SL q5min PRN     HTN:   - Amlodipine 2.5 mg daily, Toprol-XL 50 mg daily     HFpEF:   - TTE (3/4/23) w/ EF 60% and grade I left  "ventricular diastolic dysfunction     PVD:   - ASA 81 mg daily, Lipitor 40 mg daily  - BLE arterial US (3/7/23): "There appear to be waveform changes throughout the bilateral lower extremity arterial system, more evident in the distal right lower extremity arteries which may indicate some degree of vascular insufficiency or more proximal stenosis. However, no doubling of peak systolic velocities to suggest greater than 50% stenosis is sonographically demonstrated."     COPD, GOLD 2:   - PFTs w/ FEV1 (73%) (9/2022)  - Started on Trelegy 100-62.5 mcg, 1 puff once daily at last appointment (3/29/23) - pt reports improvement in symptoms  - Albuterol PRN     Allergic rhinitis:   - Claritin 10 mg daily PRN, Flonase daily PRN     Hx of DMT2 w/ diabetic polyneuropathy:   - Most recent Hgb A1c 5.1% (3/2023)  - Lyrica 50 mg TID      Chronic schizophrenia:   - Buspar 15 mg TID, Lamictal 200 mg daily, Remeron 15 mg qhs, Geodon 160 mg qhs  - Re-establishing with Psychiatry, last appointment April 12th with outside provider, next appointments May 16th and 23rd  - Appointment w/ social work on April 16th     Migraine headaches:   - Emgality 120 mg monthly injections, Topamax 200 mg qhs, Nutrex PRN     B12 deficiency:   - Vitamin B12 < 200 on 3/24/23  - S/p B12 1000 mcg IM while in hospital, discharged w/ plan to continue as outpatient     ED:   - Cialis 20 mg PRN     Hyponatremia:   - NaCl 1000 mg TID     ROS  Constitutional:  Negative for chills and fever.   Respiratory:  Negative for shortness of breath.    Cardiovascular:  Negative for chest pain.   Gastrointestinal:  Negative for abdominal pain, constipation, diarrhea, nausea and vomiting.     Current Outpatient Medications   Medication Instructions    albuterol (PROVENTIL/VENTOLIN HFA) 90 mcg/actuation inhaler 2 puffs, Inhalation, Every 6 hours PRN    albuterol-ipratropium (DUO-NEB) 2.5 mg-0.5 mg/3 mL nebulizer solution 3 mLs, Nebulization, Every 6 hours PRN, Rescue    " amLODIPine (NORVASC) 2.5 mg, Oral, Daily    aspirin (ECOTRIN) 81 mg, Oral, Daily    atorvastatin (LIPITOR) 40 mg, Oral, Daily    busPIRone (BUSPAR) 15 mg, Oral, 3 times daily    clopidogreL (PLAVIX) 75 mg, Oral, Daily    cyanocobalamin 1,000 mcg/mL injection 1 ml sq daily x 5 days, then 1 ml sq weekly x 4 weeks then q monthly -  will need to go to PCP    fluticasone propionate (FLONASE) 50 mcg/actuation nasal spray 2 sprays, Nasal, Nightly    fluticasone-umeclidin-vilanter (TRELEGY ELLIPTA) 100-62.5-25 mcg DsDv 1 puff, Inhalation, Daily    galcanezumab-gnlm (EMGALITY SYRINGE) 120 mg/mL Syrg Inject 1 pen (120 mg) into the skin every 28 days.    Lactobac. rhamnosus GG-inulin 10 billion cell -200 mg Chew 1 tablet, Oral, Every morning    lamoTRIgine (LAMICTAL) 200 mg, Oral, Daily    loratadine (CLARITIN) 10 mg tablet 1 each, Oral, Daily    metoprolol succinate (TOPROL-XL) 50 MG 24 hr tablet 1 tablet, Oral, Daily    mirtazapine (REMERON) 15 mg, Oral, Nightly    nicotine (NICODERM CQ) 21 mg/24 hr 1 patch, Transdermal, Daily    nicotine polacrilex 4 MG Lozg Take up to 8 pieces daily as needed    nitroGLYCERIN (NITROSTAT) 0.4 mg, Sublingual, Every 5 min PRN    NURTEC 75 mg, Oral, Daily PRN, Place ODT tablet on the tongue; alternatively the ODT tablet may be placed under the tongue    pregabalin (LYRICA) 50 mg, Oral, 3 times daily    sodium chloride 1,000 mg TbSO tablet tbso 1 tablet, Misc.(Non-Drug; Combo Route), 3 times daily    tadalafiL (CIALIS) 20 MG Tab 1 tablet, Oral, Daily    topiramate (TOPAMAX) 200 mg, Oral, Nightly    ziprasidone (GEODON) 160 mg, Oral, Nightly      Past Medical History:   Diagnosis Date    Aortic valve stenosis 02/28/2022    Arachnoid cyst of posterior cranial fossa 01/13/2022    Bilateral carotid bruits 06/22/2021    Bipolar disorder 01/16/2022    CAD S/P percutaneous coronary angioplasty     3 vessel disease    Calculus of gallbladder without cholecystitis without obstruction 01/11/2023     Cancer     Candidal intertrigo 11/11/2019    Cataract     Chronic heart failure with preserved ejection fraction 03/29/2023    Chronic schizophrenia     Colon polyps     Diabetic polyneuropathy associated with diabetes mellitus due to underlying condition 11/21/2019    Dysarthria as late effect of cerebellar cerebrovascular accident (CVA) 03/24/2023    Equinus deformity of both feet 11/21/2019    Flaccid hemiplegia of left nondominant side as late effect of cerebral infarction 03/17/2023    Flat foot 11/21/2019    Gastrointestinal hemorrhage 12/13/2019    Post polypectomy bleeding    General anesthetics causing adverse effect in therapeutic use     Hammer toes of both feet 11/21/2019    Hx of diabetic foot ulcer 11/21/2019    Hypertension     Hyponatremia 01/11/2022    EALINE (iron deficiency anemia) 12/10/2019    Intractable chronic migraine without aura and without status migrainosus 12/30/2021    20 year history of migraine headaches. Headaches are typically moderate to severe in intensity, worsen with activity, pounding in quality and associated with sensitivity to light and sound. Given history of reported brain tumor, agree with head CT today. Cannot do MRI due to metal clips.   Galcanezumab (Emgality) treatment was approved for the prevention of acute and chronic migraine on 9/27/2018.    Microcytic anemia 10/09/2019    Migraine headache 12/30/2021    20 year history of migraine headaches. Headaches are typically moderate to severe in intensity, worsen with activity, pounding in quality and associated with sensitivity to light and sound. Given history of reported brain tumor, agree with head CT today. Cannot do MRI due to metal clips.   Galcanezumab (Emgality) treatment was approved for the prevention of acute and chronic migraine on 9/27/2018.    Moderate aortic regurgitation 03/29/2023    Moderate mitral regurgitation 03/29/2023    Moderate mitral stenosis 03/29/2023    Moderate to severe aortic stenosis  "02/28/2022    Myocardial infarct, old     Nicotine dependence, unspecified, uncomplicated 01/16/2022    Onychomycosis due to dermatophyte 11/21/2019    Orchitis 11/09/2019    PIPER on CPAP     Peripheral visual field defect, bilateral 01/13/2022    PVD (peripheral vascular disease) 11/21/2019    Seizures 2008    Stage 2 moderate COPD by GOLD classification     PFTs w/ FEV1 (73%) (9/2022)    Stroke 2005    Thoracic aortic atherosclerosis 10/17/2022    CT chest    Thyroid disease     Previously on pharmacologic Tx    Tinea pedis of right foot 05/21/2019    Tobacco use disorder     Type 2 diabetes mellitus with diabetic peripheral angiopathy without gangrene     A1c 5.1% (3/2023)    Vitamin B12 deficiency 03/24/2023        Objective:      Vitals:    04/13/23 0918   BP: 112/68   BP Location: Right arm   Patient Position: Sitting   Resp: 18   Weight: 89 kg (196 lb 5.1 oz)   Height: 5' 10" (1.778 m)     Body mass index is 28.17 kg/m².    Physical Exam   Constitutional:       General: No acute distress.  HENT:      Head: Normocephalic and atraumatic.   Pulmonary:      Effort: Pulmonary effort is normal. No respiratory distress.   Neurological:      General: No focal deficit present.      Mental Status: Alert and oriented to person, place, and time. Mental status is at baseline.    Assessment:       1. Ground-level fall        Ana Mayer MD  [facility]  Office: [phone]   Fax: [phone]  04/13/2023      Disclaimer: This note was partly generated using dictation software which may occasionally result in transcription errors.    Total time spent on this encounter includes face to face time and non-face to face time preparing to see the patient (eg, review of tests), obtaining and/or reviewing separately obtained history, documenting clinical information in the electronic or other health record, independently interpreting results, and communicating results to the " patient/family/caregiver, or care coordinator.

## 2023-04-13 NOTE — PROGRESS NOTES
Patient presented to the office today for a B12 injection.   B12 injection administered into the right deltoid.   Patient tolerated immunization well with no complaints

## 2023-04-17 ENCOUNTER — OFFICE VISIT (OUTPATIENT)
Dept: CARDIOLOGY | Facility: CLINIC | Age: 58
End: 2023-04-17
Payer: MEDICARE

## 2023-04-17 VITALS
SYSTOLIC BLOOD PRESSURE: 104 MMHG | BODY MASS INDEX: 29.03 KG/M2 | HEIGHT: 70 IN | DIASTOLIC BLOOD PRESSURE: 58 MMHG | HEART RATE: 73 BPM | WEIGHT: 202.81 LBS

## 2023-04-17 DIAGNOSIS — Z86.73 HISTORY OF STROKE: Chronic | ICD-10-CM

## 2023-04-17 DIAGNOSIS — E08.42 DIABETIC POLYNEUROPATHY ASSOCIATED WITH DIABETES MELLITUS DUE TO UNDERLYING CONDITION: Primary | Chronic | ICD-10-CM

## 2023-04-17 DIAGNOSIS — I69.322 DYSARTHRIA AS LATE EFFECT OF CEREBELLAR CEREBROVASCULAR ACCIDENT (CVA): Chronic | ICD-10-CM

## 2023-04-17 DIAGNOSIS — F20.9 CHRONIC SCHIZOPHRENIA: Chronic | ICD-10-CM

## 2023-04-17 DIAGNOSIS — I73.9 PVD (PERIPHERAL VASCULAR DISEASE): Chronic | ICD-10-CM

## 2023-04-17 DIAGNOSIS — I50.32 CHRONIC HEART FAILURE WITH PRESERVED EJECTION FRACTION: Chronic | ICD-10-CM

## 2023-04-17 DIAGNOSIS — I35.0 MODERATE TO SEVERE AORTIC STENOSIS: Chronic | ICD-10-CM

## 2023-04-17 DIAGNOSIS — I25.10 CAD S/P PERCUTANEOUS CORONARY ANGIOPLASTY: Chronic | ICD-10-CM

## 2023-04-17 DIAGNOSIS — I70.0 THORACIC AORTIC ATHEROSCLEROSIS: Chronic | ICD-10-CM

## 2023-04-17 DIAGNOSIS — F17.200 SMOKER: ICD-10-CM

## 2023-04-17 DIAGNOSIS — G47.33 OSA ON CPAP: Chronic | ICD-10-CM

## 2023-04-17 DIAGNOSIS — Z98.61 CAD S/P PERCUTANEOUS CORONARY ANGIOPLASTY: Chronic | ICD-10-CM

## 2023-04-17 PROCEDURE — 3008F PR BODY MASS INDEX (BMI) DOCUMENTED: ICD-10-PCS | Mod: CPTII,S$GLB,, | Performed by: INTERNAL MEDICINE

## 2023-04-17 PROCEDURE — 99214 OFFICE O/P EST MOD 30 MIN: CPT | Mod: S$GLB,,, | Performed by: INTERNAL MEDICINE

## 2023-04-17 PROCEDURE — 99999 PR PBB SHADOW E&M-EST. PATIENT-LVL IV: CPT | Mod: PBBFAC,,, | Performed by: INTERNAL MEDICINE

## 2023-04-17 PROCEDURE — 99999 PR PBB SHADOW E&M-EST. PATIENT-LVL IV: ICD-10-PCS | Mod: PBBFAC,,, | Performed by: INTERNAL MEDICINE

## 2023-04-17 PROCEDURE — 3072F PR LOW RISK FOR RETINOPATHY: ICD-10-PCS | Mod: CPTII,S$GLB,, | Performed by: INTERNAL MEDICINE

## 2023-04-17 PROCEDURE — 99214 PR OFFICE/OUTPT VISIT, EST, LEVL IV, 30-39 MIN: ICD-10-PCS | Mod: S$GLB,,, | Performed by: INTERNAL MEDICINE

## 2023-04-17 PROCEDURE — 1159F MED LIST DOCD IN RCRD: CPT | Mod: CPTII,S$GLB,, | Performed by: INTERNAL MEDICINE

## 2023-04-17 PROCEDURE — 3078F PR MOST RECENT DIASTOLIC BLOOD PRESSURE < 80 MM HG: ICD-10-PCS | Mod: CPTII,S$GLB,, | Performed by: INTERNAL MEDICINE

## 2023-04-17 PROCEDURE — 3072F LOW RISK FOR RETINOPATHY: CPT | Mod: CPTII,S$GLB,, | Performed by: INTERNAL MEDICINE

## 2023-04-17 PROCEDURE — 1159F PR MEDICATION LIST DOCUMENTED IN MEDICAL RECORD: ICD-10-PCS | Mod: CPTII,S$GLB,, | Performed by: INTERNAL MEDICINE

## 2023-04-17 PROCEDURE — 3074F PR MOST RECENT SYSTOLIC BLOOD PRESSURE < 130 MM HG: ICD-10-PCS | Mod: CPTII,S$GLB,, | Performed by: INTERNAL MEDICINE

## 2023-04-17 PROCEDURE — 3008F BODY MASS INDEX DOCD: CPT | Mod: CPTII,S$GLB,, | Performed by: INTERNAL MEDICINE

## 2023-04-17 PROCEDURE — 3078F DIAST BP <80 MM HG: CPT | Mod: CPTII,S$GLB,, | Performed by: INTERNAL MEDICINE

## 2023-04-17 PROCEDURE — 3074F SYST BP LT 130 MM HG: CPT | Mod: CPTII,S$GLB,, | Performed by: INTERNAL MEDICINE

## 2023-04-17 PROCEDURE — 3044F HG A1C LEVEL LT 7.0%: CPT | Mod: CPTII,S$GLB,, | Performed by: INTERNAL MEDICINE

## 2023-04-17 PROCEDURE — 3044F PR MOST RECENT HEMOGLOBIN A1C LEVEL <7.0%: ICD-10-PCS | Mod: CPTII,S$GLB,, | Performed by: INTERNAL MEDICINE

## 2023-04-17 NOTE — PROGRESS NOTES
Subjective:    Patient ID:  Ryan Crane is a 57 y.o. male patient here for evaluation Follow-up      History of Present Illness:  Cardiology follow-up history of coronary artery disease.  Noninvasive cardiac assessment beginning of 2023 January negative for acute ischemia, EF preserved, moderate AS.  Last left heart catheterization, August of 2022 with mild moderate CAD, no intervention.  Recent hospitalization for CVA, left-sided residual weakness.  Risk factors positive for hypertension dyslipidemia.  Tobacco use.  Diet and weight dependent diabetes mellitus.    Chronic intermittent atypical chest pain.  Stable KELLEY.  No syncope/presyncope.            Review of patient's allergies indicates:   Allergen Reactions    Alcohol Anaphylaxis     Pt states all types of alcohol    Alcohol antiseptic pads Anaphylaxis    Cephalexin Anaphylaxis       Past Medical History:   Diagnosis Date    Aortic valve stenosis 02/28/2022    Arachnoid cyst of posterior cranial fossa 01/13/2022    Bilateral carotid bruits 06/22/2021    Bipolar disorder 01/16/2022    CAD S/P percutaneous coronary angioplasty     3 vessel disease    Calculus of gallbladder without cholecystitis without obstruction 01/11/2023    Cancer     Candidal intertrigo 11/11/2019    Cataract     Chronic heart failure with preserved ejection fraction 03/29/2023    Chronic schizophrenia     Colon polyps     Diabetic polyneuropathy associated with diabetes mellitus due to underlying condition 11/21/2019    Dysarthria as late effect of cerebellar cerebrovascular accident (CVA) 03/24/2023    Equinus deformity of both feet 11/21/2019    Flaccid hemiplegia of left nondominant side as late effect of cerebral infarction 03/17/2023    Flat foot 11/21/2019    Gastrointestinal hemorrhage 12/13/2019    Post polypectomy bleeding    General anesthetics causing adverse effect in therapeutic use     Hammer toes of both feet 11/21/2019    Hx of diabetic foot ulcer 11/21/2019     Hypertension     Hyponatremia 01/11/2022    ELAINE (iron deficiency anemia) 12/10/2019    Intractable chronic migraine without aura and without status migrainosus 12/30/2021    20 year history of migraine headaches. Headaches are typically moderate to severe in intensity, worsen with activity, pounding in quality and associated with sensitivity to light and sound. Given history of reported brain tumor, agree with head CT today. Cannot do MRI due to metal clips.   Galcanezumab (Emgality) treatment was approved for the prevention of acute and chronic migraine on 9/27/2018.    Microcytic anemia 10/09/2019    Migraine headache 12/30/2021    20 year history of migraine headaches. Headaches are typically moderate to severe in intensity, worsen with activity, pounding in quality and associated with sensitivity to light and sound. Given history of reported brain tumor, agree with head CT today. Cannot do MRI due to metal clips.   Galcanezumab (Emgality) treatment was approved for the prevention of acute and chronic migraine on 9/27/2018.    Moderate aortic regurgitation 03/29/2023    Moderate mitral regurgitation 03/29/2023    Moderate mitral stenosis 03/29/2023    Moderate to severe aortic stenosis 02/28/2022    Myocardial infarct, old     Nicotine dependence, unspecified, uncomplicated 01/16/2022    Onychomycosis due to dermatophyte 11/21/2019    Orchitis 11/09/2019    PIPER on CPAP     Peripheral visual field defect, bilateral 01/13/2022    PVD (peripheral vascular disease) 11/21/2019    Seizures 2008    Stage 2 moderate COPD by GOLD classification     PFTs w/ FEV1 (73%) (9/2022)    Stroke 2005    Thoracic aortic atherosclerosis 10/17/2022    CT chest    Thyroid disease     Previously on pharmacologic Tx    Tinea pedis of right foot 05/21/2019    Tobacco use disorder     Type 2 diabetes mellitus with diabetic peripheral angiopathy without gangrene     A1c 5.1% (3/2023)    Vitamin B12 deficiency 03/24/2023     Past Surgical  History:   Procedure Laterality Date    ANGIOGRAM, CORONARY, WITH LEFT HEART CATHETERIZATION  2022    Procedure: Angiogram, Coronary, with Left Heart Cath;  Surgeon: Mai Deleon MD;  Location: [facility] CATH;  Service: Cardiology;;    APPENDECTOMY      BONE MARROW ASPIRATION Left 2020    Procedure: ASPIRATION, BONE MARROW;  Surgeon: Lex Kathleen MD;  Location: [facility];  Service: Oncology;  Laterality: Left;    CLOSURE OF WOUND Right 2019    Procedure: CLOSURE, WOUND;  Surgeon: Li Kathleen DPM;  Location: [facility] OR;  Service: Podiatry;  Laterality: Right;    COLONOSCOPY N/A 12/10/2019    Procedure: COLONOSCOPY;  Surgeon: Ryan Lucas MD;  Location: [facility];  Service: Endoscopy;  Laterality: N/A;    COLONOSCOPY N/A 2019    Procedure: COLONOSCOPY;  Surgeon: Ryan Lucas MD;  Location: [facility];  Service: Endoscopy;  Laterality: N/A;    CORONARY STENT PLACEMENT      ESOPHAGOGASTRODUODENOSCOPY N/A 12/10/2019    Procedure: EGD (ESOPHAGOGASTRODUODENOSCOPY);  Surgeon: Ryan Lucas MD;  Location: [facility];  Service: Endoscopy;  Laterality: N/A;    ESOPHAGOGASTRODUODENOSCOPY N/A 11/3/2022    Procedure: EGD (ESOPHAGOGASTRODUODENOSCOPY);  Surgeon: Ryan Lucas MD;  Location: [facility];  Service: Endoscopy;  Laterality: N/A;    LAPAROSCOPIC CHOLECYSTECTOMY N/A 2023    Procedure: CHOLECYSTECTOMY, LAPAROSCOPIC;  Surgeon: Laith Davis MD;  Location: [facility];  Service: General;  Laterality: N/A;    LEFT HEART CATHETERIZATION Left 2022    Procedure: Left heart cath;  Surgeon: Mai Deleon MD;  Location: [facility] CATH;  Service: Cardiology;  Laterality: Left;    right heel surgery      SHOULDER ARTHROSCOPY Left      Social History     Tobacco Use    Smoking status: Every Day     Packs/day: 0.25     Years: 43.00     Pack years: 10.75     Types: Cigarettes     Start date:      Last attempt to quit: 2022     Years since quittin.3    Smokeless tobacco: Never     Tobacco comments:     Age Started 14    Substance Use Topics    Alcohol use: No    Drug use: Not Currently     Types: Marijuana        Review of Systems:    As noted in HPI in addition      REVIEW OF SYSTEMS  Review of Systems   Constitutional: Negative for decreased appetite, diaphoresis, night sweats, weight gain and weight loss.   HENT:  Negative for nosebleeds and odynophagia.    Eyes:  Negative for double vision and photophobia.   Cardiovascular:  Negative for chest pain, claudication, cyanosis, dyspnea on exertion, irregular heartbeat, leg swelling, near-syncope, orthopnea, palpitations, paroxysmal nocturnal dyspnea and syncope.   Respiratory:  Negative for cough, hemoptysis, shortness of breath and wheezing.    Hematologic/Lymphatic: Negative for adenopathy.   Skin:  Negative for flushing, skin cancer and suspicious lesions.   Musculoskeletal:  Negative for gout, myalgias and neck pain.   Gastrointestinal:  Negative for abdominal pain, heartburn, hematemesis and hematochezia.   Genitourinary:  Negative for bladder incontinence, hesitancy and nocturia.   Neurological:  Negative for focal weakness, headaches, light-headedness and paresthesias.   Psychiatric/Behavioral:  Negative for memory loss and substance abuse.             Objective:        Vitals:    04/17/23 1309   BP: (!) 104/58   Pulse: 73       Lab Results   Component Value Date    WBC 17.94 (H) 04/05/2023    HGB 15.1 04/05/2023    HCT 45.4 04/05/2023     04/05/2023    CHOL 83 (L) 03/05/2023    TRIG 58 03/05/2023    HDL 29 (L) 03/05/2023    ALT 25 04/05/2023    AST 23 04/05/2023     04/05/2023    K 3.7 04/05/2023     04/05/2023    CREATININE 1.01 04/05/2023    BUN 11 04/05/2023    CO2 24 04/05/2023    TSH 1.140 03/05/2023    INR 1.1 04/05/2023    HGBA1C 5.1 03/05/2023        ECHOCARDIOGRAM RESULTS  Results for orders placed during the hospital encounter of 03/04/23    Echo    Interpretation Summary  · The left ventricle is normal  in size with concentric remodeling and normal systolic function.  · Moderate left atrial enlargement.  · Grade I left ventricular diastolic dysfunction.  · The estimated PA systolic pressure is 27 mmHg.  · Normal right ventricular size with normal right ventricular systolic function.  · Normal central venous pressure (3 mmHg).  · The estimated ejection fraction is 60%.  · Mild right atrial enlargement.  · There is moderate-to-severe aortic valve stenosis.  · Aortic valve area is 1.54 cm2; peak velocity is 4.00 m/s; mean gradient is 33 mmHg.  · Mild-to-moderate mitral regurgitation.  · The mean diastolic gradient across the mitral valve is 8 mmHg at a heart rate of bpm.  · There is moderate mitral stenosis.  · Mild-to-moderate aortic regurgitation.  · There is no evidence of intracardiac shunting.        CURRENT/PREVIOUS VISIT EKG  Results for orders placed or performed during the hospital encounter of 03/24/23   EKG 12-lead    Collection Time: 04/05/23  1:59 PM    Narrative    Test Reason : R53.1,    Vent. Rate : 081 BPM     Atrial Rate : 081 BPM     P-R Int : 156 ms          QRS Dur : 108 ms      QT Int : 380 ms       P-R-T Axes : 024 -09 047 degrees     QTc Int : 441 ms    Normal sinus rhythm  Possible Left atrial enlargement  Minimal voltage criteria for LVH, may be normal variant ( [place] product )  Borderline Abnormal ECG  When compared with ECG of 25-MAR-2023 08:30,  No significant change was found  Confirmed by TEDDY DOBSON MD ([id number]) on 4/7/2023 1:15:16 PM    Referred By: [email or web address]   SELF           Confirmed By:TEDDY DOBSON MD     No valid procedures specified.   Results for orders placed during the hospital encounter of 01/31/23    Nuclear Stress - Cardiology Interpreted    Interpretation Summary    Normal myocardial perfusion scan. There is no evidence of myocardial ischemia or infarction.    The gated perfusion images showed an ejection fraction of 70% post stress.    There is normal wall motion post  stress.    LV cavity size is normal at stress.    The ECG portion of the study is uninterpretable.    The patient reported no chest pain during the stress test.    There were no arrhythmias during stress.    This is a low risk study.    No valid procedures specified.    PHYSICAL EXAM  CONSTITUTIONAL: Well built, well nourished in no apparent distress  NECK: no carotid bruit, no JVD  LUNGS: CTA  CHEST WALL: no tenderness,  HEART: regular rate and rhythm, S1, S2 distant, grade 2/6 crescendo decrescendo murmur aortic area.  ABDOMEN: soft, non-tender; bowel sounds normal; no masses,  no organomegaly  EXTREMITIES: Extremities normal, no edema, no calf tenderness noted  NEURO: AAO X 3    I HAVE REVIEWED :    The vital signs, nurses notes, and all the pertinent radiology and labs.         Current Outpatient Medications   Medication Instructions    albuterol (PROVENTIL/VENTOLIN HFA) 90 mcg/actuation inhaler 2 puffs, Inhalation, Every 6 hours PRN    albuterol-ipratropium (DUO-NEB) 2.5 mg-0.5 mg/3 mL nebulizer solution 3 mLs, Nebulization, Every 6 hours PRN, Rescue    amLODIPine (NORVASC) 2.5 mg, Oral, Daily    aspirin (ECOTRIN) 81 mg, Oral, Daily    atorvastatin (LIPITOR) 40 mg, Oral, Daily    busPIRone (BUSPAR) 15 mg, Oral, 3 times daily    clopidogreL (PLAVIX) 75 mg, Oral, Daily    cyanocobalamin 1,000 mcg/mL injection 1 ml sq daily x 5 days, then 1 ml sq weekly x 4 weeks then q monthly -  will need to go to PCP    fluticasone propionate (FLONASE) 50 mcg/actuation nasal spray 2 sprays, Nasal, Nightly    fluticasone-umeclidin-vilanter (TRELEGY ELLIPTA) 100-62.5-25 mcg DsDv 1 puff, Inhalation, Daily    galcanezumab-gnlm (EMGALITY SYRINGE) 120 mg/mL Syrg Inject 1 pen (120 mg) into the skin every 28 days.    Lactobac. rhamnosus GG-inulin 10 billion cell -200 mg Chew 1 tablet, Oral, Every morning    lamoTRIgine (LAMICTAL) 200 mg, Oral, Daily    loratadine (CLARITIN) 10 mg tablet 1 each, Oral, Daily    metoprolol succinate  (TOPROL-XL) 50 MG 24 hr tablet 1 tablet, Oral, Daily    mirtazapine (REMERON) 15 mg, Oral, Nightly    nicotine (NICODERM CQ) 21 mg/24 hr 1 patch, Transdermal, Daily    nicotine polacrilex 4 MG Lozg Take up to 8 pieces daily as needed    nitroGLYCERIN (NITROSTAT) 0.4 mg, Sublingual, Every 5 min PRN    NURTEC 75 mg, Oral, Daily PRN, Place ODT tablet on the tongue; alternatively the ODT tablet may be placed under the tongue    pregabalin (LYRICA) 50 mg, Oral, 3 times daily    sodium chloride 1,000 mg TbSO tablet tbso 1 tablet, Misc.(Non-Drug; Combo Route), 3 times daily    tadalafiL (CIALIS) 20 MG Tab 1 tablet, Oral, Daily    topiramate (TOPAMAX) 200 mg, Oral, Nightly    ziprasidone (GEODON) 160 mg, Oral, Nightly          Assessment:   CAD, medical management.  Left heart catheterization August 2022.  Nuclear study negative for ischemia 01/2023.  Valvular heart issues with moderate severe AS, preserved ejection fraction, echo 03/2023.  History of CVA, residual left hemiparesis.  Hypertension, diet dependent diabetes mellitus, dyslipidemia, ongoing tobacco use.   Carotid screening in the past negative for high-grade disease.  See CTA head and neck 03/24/2023.      Plan:   Meds reviewed and reconciled.  Recommend no changes.  Smoking cessation.  Risk factor modification.  Six months.  Recommend reinstitution of Plavix with aspirin 81 mg daily.        No follow-ups on file.

## 2023-04-18 ENCOUNTER — EXTERNAL HOME HEALTH (OUTPATIENT)
Dept: HOME HEALTH SERVICES | Facility: HOSPITAL | Age: 58
End: 2023-04-18
Payer: MEDICARE

## 2023-04-19 ENCOUNTER — CLINICAL SUPPORT (OUTPATIENT)
Dept: FAMILY MEDICINE | Facility: CLINIC | Age: 58
End: 2023-04-19
Payer: MEDICARE

## 2023-04-19 ENCOUNTER — TELEPHONE (OUTPATIENT)
Dept: CARDIOLOGY | Facility: CLINIC | Age: 58
End: 2023-04-19
Payer: MEDICARE

## 2023-04-19 ENCOUNTER — TELEPHONE (OUTPATIENT)
Dept: FAMILY MEDICINE | Facility: CLINIC | Age: 58
End: 2023-04-19

## 2023-04-19 DIAGNOSIS — E53.8 VITAMIN B12 DEFICIENCY: Primary | ICD-10-CM

## 2023-04-19 PROCEDURE — 96372 PR INJECTION,THERAP/PROPH/DIAG2ST, IM OR SUBCUT: ICD-10-PCS | Mod: S$GLB,,, | Performed by: STUDENT IN AN ORGANIZED HEALTH CARE EDUCATION/TRAINING PROGRAM

## 2023-04-19 PROCEDURE — 96372 THER/PROPH/DIAG INJ SC/IM: CPT | Mod: S$GLB,,, | Performed by: STUDENT IN AN ORGANIZED HEALTH CARE EDUCATION/TRAINING PROGRAM

## 2023-04-19 RX ADMIN — CYANOCOBALAMIN 1000 MCG: 1000 INJECTION, SOLUTION INTRAMUSCULAR; SUBCUTANEOUS at 09:04

## 2023-04-19 NOTE — TELEPHONE ENCOUNTER
Advised per Dr Aranda (Of persistent problem follow up with primary care, does not appear to be cardiac in origin)

## 2023-04-19 NOTE — TELEPHONE ENCOUNTER
----- Message from Deanneyessenia Villalobos sent at 4/19/2023 11:21 AM CDT -----  Regarding: advice  Contact: DEANNE MICHAELS [[id number]]  Type: Needs Medical Advice    Who Called:  Deanne    Symptoms (please be specific):  Lost mobility in left arm    How long has patient had these symptoms:  Yesterday    Pharmacy name and phone #:  na    Best Call Back Number: [phone]  (home)     Additional Information: Pt needs to know if he needs to come in. Please call to advise.

## 2023-04-19 NOTE — TELEPHONE ENCOUNTER
Please advise: last night while lift his granddaughter pt lost mobility in his arm; lasted a few hours and is now fine; no chest pain or tightness, no SOB; pt was told to check with cardiology

## 2023-04-19 NOTE — PROGRESS NOTES
Patient presents to the office for a Vitamin B12 injection  Injection was administered to his Right Deltoid per patients request.   Patient tolerated injection well with no complaints.

## 2023-04-19 NOTE — TELEPHONE ENCOUNTER
Spoke with patient and stated he lost mobility on left arm. It came back in a few hours. He was lifting grand daughter when it happened. He has appointment in may with  which I told him to keep. I also told him to call his heart doctor and if any other episodes to please contact our office to come in.

## 2023-04-21 ENCOUNTER — TELEPHONE (OUTPATIENT)
Dept: FAMILY MEDICINE | Facility: CLINIC | Age: 58
End: 2023-04-21
Payer: MEDICARE

## 2023-04-21 ENCOUNTER — TELEPHONE (OUTPATIENT)
Dept: CARDIOLOGY | Facility: CLINIC | Age: 58
End: 2023-04-21
Payer: MEDICARE

## 2023-04-21 NOTE — TELEPHONE ENCOUNTER
Pt was instructed to go to the nearest Hospital.   Pt stated he was having chest pain.   Pt was instructed to give clinic a call when he is release from the hospital.

## 2023-04-21 NOTE — TELEPHONE ENCOUNTER
----- Message from Moody Eduardo sent at 4/21/2023 12:47 PM CDT -----  Contact: self  Type: Needs Medical Advice  Who Called: patient   Best Call Back Number: [phone]  Additional Information: Pt is having chest pains on the left side. Plz call to check on pt stated he took 2 aspirins. As well. Thanks

## 2023-04-21 NOTE — TELEPHONE ENCOUNTER
----- Message from Moody Eduardo sent at 4/21/2023  1:06 PM CDT -----  Contact: self  Type: Needs Medical Advice  Who Called: patient   Best Call Back Number: [phone]  Additional Information: Pt states he took two nitroGLYCERIN (NITROSTAT) 0.4 MG SL tablet but he is still having pain. Please call and advise. Thanks

## 2023-04-21 NOTE — TELEPHONE ENCOUNTER
Pt complaining of left sided chest pain. Has taken two nitro tablets with no relief; advised to go to the emergency room; pt expressed understanding

## 2023-04-24 ENCOUNTER — TELEPHONE (OUTPATIENT)
Dept: CARDIOLOGY | Facility: CLINIC | Age: 58
End: 2023-04-24
Payer: MEDICARE

## 2023-04-24 ENCOUNTER — TELEPHONE (OUTPATIENT)
Dept: FAMILY MEDICINE | Facility: CLINIC | Age: 58
End: 2023-04-24
Payer: MEDICARE

## 2023-04-24 ENCOUNTER — TELEPHONE (OUTPATIENT)
Dept: GASTROENTEROLOGY | Facility: CLINIC | Age: 58
End: 2023-04-24
Payer: MEDICARE

## 2023-04-24 NOTE — TELEPHONE ENCOUNTER
Spoke w/ pt. He states he forgot to sched his next B12 inj when he was here last week and would like to come in Wed. Nurse visit sched for 11am on Wed per pt request.

## 2023-04-24 NOTE — TELEPHONE ENCOUNTER
"----- Message from Lorene Valenzuela sent at 4/24/2023  8:21 AM CDT -----  Type: Needs Medical Advice  Who Called:  pt   Symptoms (please be specific):  chest pains .... hos f/u     Best Call Back Number: [phone]    Additional Information: pt says he was seen at hospital and was told to "speak " with dr , doesn't want an appt . Please advise .         "

## 2023-04-24 NOTE — TELEPHONE ENCOUNTER
----- Message from Callie Leger sent at 4/24/2023  8:16 AM CDT -----  Contact: pt  Type:  Sooner Appointment Request    Caller is requesting a sooner appointment.  Caller declined first available appointment listed below.  Caller will not accept being placed on the waitlist and is requesting a message be sent to doctor.    Name of Caller:  er follow up  When is the first available appointment?  05/10  Symptoms:  er follow up  Best Call Back Number:  [phone]    Additional Information:  pt would like a sooner appt and would like to speak with provider. Please advise.

## 2023-04-24 NOTE — TELEPHONE ENCOUNTER
Please advise: pt was in ER Friday with chest pain; was told to call cardiology to see if follow up testing or appointment needed

## 2023-04-24 NOTE — TELEPHONE ENCOUNTER
----- Message from Reji Caruso sent at 4/24/2023  9:31 AM CDT -----  Type:  Sooner Appointment Request    Caller is requesting a sooner appointment.  Caller declined first available appointment listed below.  Caller will not accept being placed on the waitlist and is requesting a message be sent to doctor.    Name of Caller:  Patient  When is the first available appointment?  Nurse Visit  Symptoms:  B-12 injection  Best Call Back Number:  [phone]  Additional Information:

## 2023-04-26 ENCOUNTER — CLINICAL SUPPORT (OUTPATIENT)
Dept: SMOKING CESSATION | Facility: CLINIC | Age: 58
End: 2023-04-26
Payer: COMMERCIAL

## 2023-04-26 DIAGNOSIS — F17.200 NICOTINE DEPENDENCE: Primary | ICD-10-CM

## 2023-04-26 PROCEDURE — 99407 BEHAV CHNG SMOKING > 10 MIN: CPT | Mod: S$GLB,,,

## 2023-04-26 PROCEDURE — 99407 PR TOBACCO USE CESSATION INTENSIVE >10 MINUTES: ICD-10-PCS | Mod: S$GLB,,,

## 2023-04-26 NOTE — PROGRESS NOTES
Spoke with patient today in regard to smoking cessation progress for 6 month telephone follow up, he states not tobacco free. Patient states no interest in returning to the program at this time. Informed patient of benefit period, future follow up, and contact information if any further help or support is needed. Will complete smart form for 6 month follow up on Quit attempt #1.

## 2023-04-28 ENCOUNTER — OFFICE VISIT (OUTPATIENT)
Dept: CARDIOLOGY | Facility: CLINIC | Age: 58
End: 2023-04-28
Payer: MEDICARE

## 2023-04-28 VITALS
HEIGHT: 70 IN | DIASTOLIC BLOOD PRESSURE: 69 MMHG | SYSTOLIC BLOOD PRESSURE: 124 MMHG | BODY MASS INDEX: 28.88 KG/M2 | HEART RATE: 81 BPM | WEIGHT: 201.75 LBS

## 2023-04-28 DIAGNOSIS — I35.0 MODERATE TO SEVERE AORTIC STENOSIS: Chronic | ICD-10-CM

## 2023-04-28 DIAGNOSIS — I10 ESSENTIAL HYPERTENSION: Chronic | ICD-10-CM

## 2023-04-28 DIAGNOSIS — Z98.61 CAD S/P PERCUTANEOUS CORONARY ANGIOPLASTY: Chronic | ICD-10-CM

## 2023-04-28 DIAGNOSIS — I05.0 MODERATE MITRAL STENOSIS: Chronic | ICD-10-CM

## 2023-04-28 DIAGNOSIS — I73.9 PVD (PERIPHERAL VASCULAR DISEASE): Chronic | ICD-10-CM

## 2023-04-28 DIAGNOSIS — I50.32 CHRONIC HEART FAILURE WITH PRESERVED EJECTION FRACTION: Chronic | ICD-10-CM

## 2023-04-28 DIAGNOSIS — I25.10 CAD S/P PERCUTANEOUS CORONARY ANGIOPLASTY: Chronic | ICD-10-CM

## 2023-04-28 PROCEDURE — 3044F HG A1C LEVEL LT 7.0%: CPT | Mod: CPTII,S$GLB,,

## 2023-04-28 PROCEDURE — 99999 PR PBB SHADOW E&M-EST. PATIENT-LVL III: CPT | Mod: PBBFAC,,,

## 2023-04-28 PROCEDURE — 99999 PR PBB SHADOW E&M-EST. PATIENT-LVL III: ICD-10-PCS | Mod: PBBFAC,,,

## 2023-04-28 PROCEDURE — 99214 PR OFFICE/OUTPT VISIT, EST, LEVL IV, 30-39 MIN: ICD-10-PCS | Mod: S$GLB,,,

## 2023-04-28 PROCEDURE — 3072F LOW RISK FOR RETINOPATHY: CPT | Mod: CPTII,S$GLB,,

## 2023-04-28 PROCEDURE — 99214 OFFICE O/P EST MOD 30 MIN: CPT | Mod: S$GLB,,,

## 2023-04-28 PROCEDURE — 3008F BODY MASS INDEX DOCD: CPT | Mod: CPTII,S$GLB,,

## 2023-04-28 PROCEDURE — 3072F PR LOW RISK FOR RETINOPATHY: ICD-10-PCS | Mod: CPTII,S$GLB,,

## 2023-04-28 PROCEDURE — 3074F SYST BP LT 130 MM HG: CPT | Mod: CPTII,S$GLB,,

## 2023-04-28 PROCEDURE — 3074F PR MOST RECENT SYSTOLIC BLOOD PRESSURE < 130 MM HG: ICD-10-PCS | Mod: CPTII,S$GLB,,

## 2023-04-28 PROCEDURE — 3078F DIAST BP <80 MM HG: CPT | Mod: CPTII,S$GLB,,

## 2023-04-28 PROCEDURE — 1159F MED LIST DOCD IN RCRD: CPT | Mod: CPTII,S$GLB,,

## 2023-04-28 PROCEDURE — 1159F PR MEDICATION LIST DOCUMENTED IN MEDICAL RECORD: ICD-10-PCS | Mod: CPTII,S$GLB,,

## 2023-04-28 PROCEDURE — 3008F PR BODY MASS INDEX (BMI) DOCUMENTED: ICD-10-PCS | Mod: CPTII,S$GLB,,

## 2023-04-28 PROCEDURE — 3044F PR MOST RECENT HEMOGLOBIN A1C LEVEL <7.0%: ICD-10-PCS | Mod: CPTII,S$GLB,,

## 2023-04-28 PROCEDURE — 3078F PR MOST RECENT DIASTOLIC BLOOD PRESSURE < 80 MM HG: ICD-10-PCS | Mod: CPTII,S$GLB,,

## 2023-04-28 RX ORDER — RANOLAZINE 500 MG/1
500 TABLET, EXTENDED RELEASE ORAL 2 TIMES DAILY
Qty: 180 TABLET | Refills: 3 | Status: SHIPPED | OUTPATIENT
Start: 2023-04-28 | End: 2023-04-28 | Stop reason: CLARIF

## 2023-04-28 RX ORDER — RANOLAZINE 500 MG/1
500 TABLET, EXTENDED RELEASE ORAL 2 TIMES DAILY
Qty: 180 TABLET | Refills: 3 | Status: SHIPPED | OUTPATIENT
Start: 2023-04-28 | End: 2023-05-22 | Stop reason: SDUPTHER

## 2023-04-28 NOTE — PROGRESS NOTES
Subjective:    Patient ID:  Ryan Crane is a 57 y.o. male patient here for evaluation No chief complaint on file.    History of Present Illness:     Mr. Crane is a 57 year old M who follows with Dr. Aranda here today for a follow up. He had an episode of chest pains that sent him to the ED, cardiac workup was negative. EKG unchanged. He has a long history of chest pains with negative recent cardiac workup. He has been feeling well since his ER stay. See recent cardiographics below.       Most Recent Echocardiogram Results  Results for orders placed during the hospital encounter of 03/04/23    Echo    Interpretation Summary  · The left ventricle is normal in size with concentric remodeling and normal systolic function.  · Moderate left atrial enlargement.  · Grade I left ventricular diastolic dysfunction.  · The estimated PA systolic pressure is 27 mmHg.  · Normal right ventricular size with normal right ventricular systolic function.  · Normal central venous pressure (3 mmHg).  · The estimated ejection fraction is 60%.  · Mild right atrial enlargement.  · There is moderate-to-severe aortic valve stenosis.  · Aortic valve area is 1.54 cm2; peak velocity is 4.00 m/s; mean gradient is 33 mmHg.  · Mild-to-moderate mitral regurgitation.  · The mean diastolic gradient across the mitral valve is 8 mmHg at a heart rate of bpm.  · There is moderate mitral stenosis.  · Mild-to-moderate aortic regurgitation.  · There is no evidence of intracardiac shunting.      Most Recent Nuclear Stress Test Results  Results for orders placed during the hospital encounter of 01/31/23    Nuclear Stress - Cardiology Interpreted    Interpretation Summary    Normal myocardial perfusion scan. There is no evidence of myocardial ischemia or infarction.    The gated perfusion images showed an ejection fraction of 70% post stress.    There is normal wall motion post stress.    LV cavity size is normal at stress.    The ECG portion of the  study is uninterpretable.    The patient reported no chest pain during the stress test.    There were no arrhythmias during stress.    This is a low risk study.      Most Recent Cardiovascular Angiogram results  Results for orders placed during the hospital encounter of 08/25/22    Cardiac catheterization    Conclusion  · The left ventricular end diastolic pressure was elevated.  · The pre-procedure left ventricular end diastolic pressure was 21.  · Mild disease in the circumflex artery with patent stent in the LAD  · The Prox RCA lesion was 50% stenosed, very small non dominant RCA  · Medical treatment.    The procedure log was documented by Documenter: RT Krishna and verified by Mai Deleon MD.    Date: 8/26/2022  Time: 8:36 AM      Other Most Recent Cardiology Results  Results for orders placed during the hospital encounter of 04/05/23    CARDIAC MONITORING STRIPS      REVIEW OF SYSTEMS: As noted in HPI   CARDIOVASCULAR: + chest pains. No recent palpitations, arm, neck, or jaw pain.  RESPIRATORY: No recent fever, cough chills, SOB.  : No blood in the urine  GI: No nausea, vomiting, or blood in stool.   MUSCULOSKELETAL: No myalgias or falls.   NEURO: No syncope, lightheadedness, or dizziness.  EYES: No sudden changes in vision.     Past Medical History:   Diagnosis Date    Aortic valve stenosis 02/28/2022    Arachnoid cyst of posterior cranial fossa 01/13/2022    Bilateral carotid bruits 06/22/2021    Bipolar disorder 01/16/2022    CAD S/P percutaneous coronary angioplasty     3 vessel disease    Calculus of gallbladder without cholecystitis without obstruction 01/11/2023    Cancer     Candidal intertrigo 11/11/2019    Cataract     Chronic heart failure with preserved ejection fraction 03/29/2023    Chronic schizophrenia     Colon polyps     Diabetic polyneuropathy associated with diabetes mellitus due to underlying condition 11/21/2019    Dysarthria as late effect of cerebellar cerebrovascular  accident (CVA) 03/24/2023    Equinus deformity of both feet 11/21/2019    Flaccid hemiplegia of left nondominant side as late effect of cerebral infarction 03/17/2023    Flat foot 11/21/2019    Gastrointestinal hemorrhage 12/13/2019    Post polypectomy bleeding    General anesthetics causing adverse effect in therapeutic use     Hammer toes of both feet 11/21/2019    Hx of diabetic foot ulcer 11/21/2019    Hypertension     Hyponatremia 01/11/2022    ELAINE (iron deficiency anemia) 12/10/2019    Intractable chronic migraine without aura and without status migrainosus 12/30/2021    20 year history of migraine headaches. Headaches are typically moderate to severe in intensity, worsen with activity, pounding in quality and associated with sensitivity to light and sound. Given history of reported brain tumor, agree with head CT today. Cannot do MRI due to metal clips.   Galcanezumab (Emgality) treatment was approved for the prevention of acute and chronic migraine on 9/27/2018.    Microcytic anemia 10/09/2019    Migraine headache 12/30/2021    20 year history of migraine headaches. Headaches are typically moderate to severe in intensity, worsen with activity, pounding in quality and associated with sensitivity to light and sound. Given history of reported brain tumor, agree with head CT today. Cannot do MRI due to metal clips.   Galcanezumab (Emgality) treatment was approved for the prevention of acute and chronic migraine on 9/27/2018.    Moderate aortic regurgitation 03/29/2023    Moderate mitral regurgitation 03/29/2023    Moderate mitral stenosis 03/29/2023    Moderate to severe aortic stenosis 02/28/2022    Myocardial infarct, old     Nicotine dependence, unspecified, uncomplicated 01/16/2022    Onychomycosis due to dermatophyte 11/21/2019    Orchitis 11/09/2019    PIPER on CPAP     Peripheral visual field defect, bilateral 01/13/2022    PVD (peripheral vascular disease) 11/21/2019    Seizures 2008    Stage 2 moderate  COPD by GOLD classification     PFTs w/ FEV1 (73%) (9/2022)    Stroke 2005    Thoracic aortic atherosclerosis 10/17/2022    CT chest    Thyroid disease     Previously on pharmacologic Tx    Tinea pedis of right foot 05/21/2019    Tobacco use disorder     Type 2 diabetes mellitus with diabetic peripheral angiopathy without gangrene     A1c 5.1% (3/2023)    Vitamin B12 deficiency 03/24/2023     Past Surgical History:   Procedure Laterality Date    ANGIOGRAM, CORONARY, WITH LEFT HEART CATHETERIZATION  08/25/2022    Procedure: Angiogram, Coronary, with Left Heart Cath;  Surgeon: Mai Deleon MD;  [facility] CATH;  Service: Cardiology;;    APPENDECTOMY      BONE MARROW ASPIRATION Left 08/21/2020    Procedure: ASPIRATION, BONE MARROW;  Surgeon: Lex Kathleen MD;  Location: [facility] OR;  Service: Oncology;  Laterality: Left;    CLOSURE OF WOUND Right 09/09/2019    Procedure: CLOSURE, WOUND;  Surgeon: Li Kathleen DPM;  Location: [facility] OR;  Service: Podiatry;  Laterality: Right;    COLONOSCOPY N/A 12/10/2019    Procedure: COLONOSCOPY;  Surgeon: Ryan Lucas MD;  Location: [facility];  Service: Endoscopy;  Laterality: N/A;    COLONOSCOPY N/A 12/13/2019    Procedure: COLONOSCOPY;  Surgeon: Ryan Lucas MD;  Location: [facility];  Service: Endoscopy;  Laterality: N/A;    CORONARY STENT PLACEMENT      ESOPHAGOGASTRODUODENOSCOPY N/A 12/10/2019    Procedure: EGD (ESOPHAGOGASTRODUODENOSCOPY);  Surgeon: Ryan Lucas MD;  Location: [facility] ENDO;  Service: Endoscopy;  Laterality: N/A;    ESOPHAGOGASTRODUODENOSCOPY N/A 11/3/2022    Procedure: EGD (ESOPHAGOGASTRODUODENOSCOPY);  Surgeon: Ryan Lucas MD;  Location: [facility];  Service: Endoscopy;  Laterality: N/A;    LAPAROSCOPIC CHOLECYSTECTOMY N/A 1/11/2023    Procedure: CHOLECYSTECTOMY, LAPAROSCOPIC;  Surgeon: Laith Davis MD;  Location: [facility];  Service: General;  Laterality: N/A;    LEFT HEART CATHETERIZATION Left 08/25/2022    Procedure: Left heart  cath;  Surgeon: Mai Deleon MD;  Location: [facility] CATH;  Service: Cardiology;  Laterality: Left;    right heel surgery      SHOULDER ARTHROSCOPY Left      Social History     Tobacco Use    Smoking status: Every Day     Packs/day: 0.25     Years: 43.00     Pack years: 10.75     Types: Cigarettes     Start date:      Last attempt to quit: 2022     Years since quittin.3    Smokeless tobacco: Never    Tobacco comments:     Age Started 14    Substance Use Topics    Alcohol use: No    Drug use: Not Currently     Types: Marijuana         Objective      Vitals:    23 1535   BP: 124/69   Pulse: 81       LAST EKG  Results for orders placed or performed during the hospital encounter of 23   EKG 12-lead    Collection Time: 23  4:57 PM    Narrative    Test Reason : R53.1,    Vent. Rate : 093 BPM     Atrial Rate : 093 BPM     P-R Int : 154 ms          QRS Dur : 096 ms      QT Int : 382 ms       P-R-T Axes : 042 033 004 degrees     QTc Int : 474 ms    Normal sinus rhythm  Normal ECG  When compared with ECG of 2023 13:59,  T wave inversion now evident in Inferior leads  Nonspecific T wave abnormality now evident in Lateral leads  Confirmed by Santosh Gunderson ([id number]) on 2023 10:32:45 AM    Referred By: MARKO   SELF           Confirmed By:Santosh Gunderson     LIPIDS - LAST 2   Lab Results   Component Value Date    CHOL 83 (L) 2023    CHOL 159 2022    HDL 29 (L) 2023    HDL 36 (L) 2022    LDLCALC 42.4 (L) 2023    LDLCALC 106.8 2022    TRIG 58 2023    TRIG 81 2022    CHOLHDL 34.9 2023    CHOLHDL 22.6 2022     CARDIAC PROFILE - LAST 2  Lab Results   Component Value Date    TROPONINI <0.012 2023    TROPONINI 0.016 2023      CBC - LAST 2  Lab Results   Component Value Date    WBC 13.15 (H) 2023    WBC 17.94 (H) 2023    RBC 4.20 (L) 2023    RBC 4.58 (L) 2023    HGB 13.5 (L) 2023    HGB 15.1  04/05/2023    HCT 40.6 04/21/2023    HCT 45.4 04/05/2023     04/21/2023     04/05/2023     Lab Results   Component Value Date    LABPT 14.1 04/05/2023    LABPT 14.3 03/05/2023    INR 1.1 04/05/2023    INR 1.1 03/05/2023    APTT 29.3 04/05/2023    APTT 38.9 (H) 03/05/2023     CHEMISTRY - LAST 2  Lab Results   Component Value Date     04/21/2023     04/05/2023    K 3.5 04/21/2023    K 3.7 04/05/2023     04/21/2023     04/05/2023    CO2 24 04/21/2023    CO2 24 04/05/2023    ANIONGAP 9 04/21/2023    ANIONGAP 10 04/05/2023    BUN 16 04/21/2023    BUN 11 04/05/2023    CREATININE 0.98 04/21/2023    CREATININE 1.01 04/05/2023    GLU 99 04/21/2023     (H) 04/05/2023    CALCIUM 9.1 04/21/2023    CALCIUM 8.8 04/05/2023    MG 2.5 04/05/2023    MG 2.3 03/25/2023    ALBUMIN 4.3 04/21/2023    ALBUMIN 4.2 04/05/2023    PROT 7.3 04/21/2023    PROT 7.2 04/05/2023    ALKPHOS 91 04/21/2023    ALKPHOS 98 04/05/2023    ALT 21 04/21/2023    ALT 25 04/05/2023    AST 25 04/21/2023    AST 23 04/05/2023    BILITOT 0.4 04/21/2023    BILITOT 0.6 04/05/2023      ENDOCRINE - LAST 2  Lab Results   Component Value Date    HGBA1C 5.1 03/05/2023    HGBA1C 5.0 02/01/2023    TSH 1.140 03/05/2023    TSH 1.563 08/04/2022        PHYSICAL EXAM  CONSTITUTIONAL: Well built, well nourished in no apparent distress  NECK: no carotid bruit, no JVD  LUNGS: CTA  CHEST WALL: no tenderness  HEART: regular rate and rhythm, S1, S2 normal, no murmur, click, rub or gallop   ABDOMEN: soft, non-tender; bowel sounds normal; no masses,  no organomegaly  EXTREMITIES: Extremities normal, no edema, no calf tenderness noted  NEURO: AAO X 3    I HAVE REVIEWED :    The vital signs, most recent cardiac testing, and most recent pertinent non-cardiology provider notes.    Current Outpatient Medications   Medication Instructions    albuterol (PROVENTIL/VENTOLIN HFA) 90 mcg/actuation inhaler 2 puffs, Inhalation, Every 6 hours PRN     albuterol-ipratropium (DUO-NEB) 2.5 mg-0.5 mg/3 mL nebulizer solution 3 mLs, Nebulization, Every 6 hours PRN, Rescue    amLODIPine (NORVASC) 2.5 mg, Oral, Daily    aspirin (ECOTRIN) 81 mg, Oral, Daily    atorvastatin (LIPITOR) 40 mg, Oral, Daily    busPIRone (BUSPAR) 15 mg, Oral, 3 times daily    clopidogreL (PLAVIX) 75 mg, Oral, Daily    cyanocobalamin 1,000 mcg/mL injection 1 ml sq daily x 5 days, then 1 ml sq weekly x 4 weeks then q monthly -  will need to go to PCP    fluticasone propionate (FLONASE) 50 mcg/actuation nasal spray 2 sprays, Nasal, Nightly    fluticasone-umeclidin-vilanter (TRELEGY ELLIPTA) 100-62.5-25 mcg DsDv 1 puff, Inhalation, Daily    galcanezumab-gnlm (EMGALITY SYRINGE) 120 mg/mL Syrg Inject 1 pen (120 mg) into the skin every 28 days.    Lactobac. rhamnosus GG-inulin 10 billion cell -200 mg Chew 1 tablet, Oral, Every morning    lamoTRIgine (LAMICTAL) 200 mg, Oral, Daily    loratadine (CLARITIN) 10 mg tablet 1 each, Oral, Daily    metoprolol succinate (TOPROL-XL) 50 MG 24 hr tablet 1 tablet, Oral, Daily    mirtazapine (REMERON) 15 mg, Oral, Nightly    nicotine (NICODERM CQ) 21 mg/24 hr 1 patch, Transdermal, Daily    nicotine polacrilex 4 MG [facility] Take up to 8 pieces daily as needed    nitroGLYCERIN (NITROSTAT) 0.4 mg, Sublingual, Every 5 min PRN    NURTEC 75 mg, Oral, Daily PRN, Place ODT tablet on the tongue; alternatively the ODT tablet may be placed under the tongue    pregabalin (LYRICA) 50 mg, Oral, 3 times daily    ranolazine (RANEXA) 500 mg, Oral, 2 times daily    sodium chloride 1,000 mg TbSO tablet tbso 1 tablet, Misc.(Non-Drug; Combo Route), 3 times daily    tadalafiL (CIALIS) 20 MG Tab 1 tablet, Oral, Daily    topiramate (TOPAMAX) 200 mg, Oral, Nightly    ziprasidone (GEODON) 160 mg, Oral, Nightly      Assessment & Plan     CAD S/P percutaneous coronary angioplasty  With chronic atypical chest pains   Negative stress 1/2023 and nonobstructive cath 8/2022   Try Ranexa 500 mg BID    Continue DAPT - no bleeding tendencies noted   Continue statin therapy     Essential hypertension  BP stable   Continue toprol 50 mg daily and amlodipine 2.5mg daily   Low Na diet     PVD (peripheral vascular disease)  Stable  No complaints of claudication     Moderate to severe aortic stenosis  Repeat echo 6 mo    Chronic heart failure with preserved ejection fraction  Euvolemic on exam     Moderate mitral stenosis  Repeat echo 6 mo          F/U as scheduled with Dr. Aranda in October.     [facility]   Office: [phone]

## 2023-04-28 NOTE — ASSESSMENT & PLAN NOTE
With chronic atypical chest pains   Negative stress 1/2023 and nonobstructive cath 8/2022   Try Ranexa 500 mg BID   Continue DAPT - no bleeding tendencies noted   Continue statin therapy

## 2023-05-01 RX ORDER — TADALAFIL 20 MG/1
TABLET ORAL
Qty: 10 TABLET | Refills: 10 | OUTPATIENT
Start: 2023-05-01

## 2023-05-02 ENCOUNTER — LAB VISIT (OUTPATIENT)
Dept: LAB | Facility: HOSPITAL | Age: 58
End: 2023-05-02
Payer: MEDICARE

## 2023-05-02 ENCOUNTER — TELEPHONE (OUTPATIENT)
Dept: UROLOGY | Facility: CLINIC | Age: 58
End: 2023-05-02

## 2023-05-02 ENCOUNTER — OFFICE VISIT (OUTPATIENT)
Dept: UROLOGY | Facility: CLINIC | Age: 58
End: 2023-05-02
Payer: MEDICARE

## 2023-05-02 ENCOUNTER — OFFICE VISIT (OUTPATIENT)
Dept: PODIATRY | Facility: CLINIC | Age: 58
End: 2023-05-02
Payer: MEDICARE

## 2023-05-02 VITALS — WEIGHT: 196.19 LBS | BODY MASS INDEX: 28.09 KG/M2 | HEIGHT: 70 IN

## 2023-05-02 DIAGNOSIS — N40.0 BENIGN PROSTATIC HYPERPLASIA WITHOUT LOWER URINARY TRACT SYMPTOMS: ICD-10-CM

## 2023-05-02 DIAGNOSIS — Z12.5 PROSTATE CANCER SCREENING: ICD-10-CM

## 2023-05-02 DIAGNOSIS — B35.1 ONYCHOMYCOSIS DUE TO DERMATOPHYTE: ICD-10-CM

## 2023-05-02 DIAGNOSIS — E11.49 TYPE II DIABETES MELLITUS WITH NEUROLOGICAL MANIFESTATIONS: Primary | ICD-10-CM

## 2023-05-02 DIAGNOSIS — I73.9 PVD (PERIPHERAL VASCULAR DISEASE): ICD-10-CM

## 2023-05-02 DIAGNOSIS — M79.671 INTRACTABLE RIGHT HEEL PAIN: ICD-10-CM

## 2023-05-02 DIAGNOSIS — N52.01 ERECTILE DYSFUNCTION DUE TO ARTERIAL INSUFFICIENCY: Primary | ICD-10-CM

## 2023-05-02 LAB — COMPLEXED PSA SERPL-MCNC: 1.4 NG/ML (ref 0–4)

## 2023-05-02 PROCEDURE — 1159F MED LIST DOCD IN RCRD: CPT | Mod: CPTII,S$GLB,, | Performed by: STUDENT IN AN ORGANIZED HEALTH CARE EDUCATION/TRAINING PROGRAM

## 2023-05-02 PROCEDURE — 99213 OFFICE O/P EST LOW 20 MIN: CPT | Mod: S$GLB,,, | Performed by: STUDENT IN AN ORGANIZED HEALTH CARE EDUCATION/TRAINING PROGRAM

## 2023-05-02 PROCEDURE — 1160F PR REVIEW ALL MEDS BY PRESCRIBER/CLIN PHARMACIST DOCUMENTED: ICD-10-PCS | Mod: CPTII,S$GLB,, | Performed by: STUDENT IN AN ORGANIZED HEALTH CARE EDUCATION/TRAINING PROGRAM

## 2023-05-02 PROCEDURE — 1160F RVW MEDS BY RX/DR IN RCRD: CPT | Mod: CPTII,S$GLB,,

## 2023-05-02 PROCEDURE — 1159F PR MEDICATION LIST DOCUMENTED IN MEDICAL RECORD: ICD-10-PCS | Mod: CPTII,S$GLB,,

## 2023-05-02 PROCEDURE — 3072F PR LOW RISK FOR RETINOPATHY: ICD-10-PCS | Mod: CPTII,S$GLB,,

## 2023-05-02 PROCEDURE — 1159F PR MEDICATION LIST DOCUMENTED IN MEDICAL RECORD: ICD-10-PCS | Mod: CPTII,S$GLB,, | Performed by: STUDENT IN AN ORGANIZED HEALTH CARE EDUCATION/TRAINING PROGRAM

## 2023-05-02 PROCEDURE — 1160F PR REVIEW ALL MEDS BY PRESCRIBER/CLIN PHARMACIST DOCUMENTED: ICD-10-PCS | Mod: CPTII,S$GLB,,

## 2023-05-02 PROCEDURE — 3044F HG A1C LEVEL LT 7.0%: CPT | Mod: CPTII,S$GLB,, | Performed by: STUDENT IN AN ORGANIZED HEALTH CARE EDUCATION/TRAINING PROGRAM

## 2023-05-02 PROCEDURE — 3072F LOW RISK FOR RETINOPATHY: CPT | Mod: CPTII,S$GLB,, | Performed by: STUDENT IN AN ORGANIZED HEALTH CARE EDUCATION/TRAINING PROGRAM

## 2023-05-02 PROCEDURE — 99999 PR PBB SHADOW E&M-EST. PATIENT-LVL III: ICD-10-PCS | Mod: PBBFAC,,, | Performed by: STUDENT IN AN ORGANIZED HEALTH CARE EDUCATION/TRAINING PROGRAM

## 2023-05-02 PROCEDURE — 3008F PR BODY MASS INDEX (BMI) DOCUMENTED: ICD-10-PCS | Mod: CPTII,S$GLB,,

## 2023-05-02 PROCEDURE — 99213 PR OFFICE/OUTPT VISIT, EST, LEVL III, 20-29 MIN: ICD-10-PCS | Mod: S$GLB,,, | Performed by: STUDENT IN AN ORGANIZED HEALTH CARE EDUCATION/TRAINING PROGRAM

## 2023-05-02 PROCEDURE — 36415 COLL VENOUS BLD VENIPUNCTURE: CPT | Mod: PO

## 2023-05-02 PROCEDURE — 99999 PR PBB SHADOW E&M-EST. PATIENT-LVL III: CPT | Mod: PBBFAC,,,

## 2023-05-02 PROCEDURE — 3072F LOW RISK FOR RETINOPATHY: CPT | Mod: CPTII,S$GLB,,

## 2023-05-02 PROCEDURE — 99213 PR OFFICE/OUTPT VISIT, EST, LEVL III, 20-29 MIN: ICD-10-PCS | Mod: S$GLB,,,

## 2023-05-02 PROCEDURE — 99999 PR PBB SHADOW E&M-EST. PATIENT-LVL III: CPT | Mod: PBBFAC,,, | Performed by: STUDENT IN AN ORGANIZED HEALTH CARE EDUCATION/TRAINING PROGRAM

## 2023-05-02 PROCEDURE — 1159F MED LIST DOCD IN RCRD: CPT | Mod: CPTII,S$GLB,,

## 2023-05-02 PROCEDURE — 3072F PR LOW RISK FOR RETINOPATHY: ICD-10-PCS | Mod: CPTII,S$GLB,, | Performed by: STUDENT IN AN ORGANIZED HEALTH CARE EDUCATION/TRAINING PROGRAM

## 2023-05-02 PROCEDURE — 3044F PR MOST RECENT HEMOGLOBIN A1C LEVEL <7.0%: ICD-10-PCS | Mod: CPTII,S$GLB,, | Performed by: STUDENT IN AN ORGANIZED HEALTH CARE EDUCATION/TRAINING PROGRAM

## 2023-05-02 PROCEDURE — 3044F HG A1C LEVEL LT 7.0%: CPT | Mod: CPTII,S$GLB,,

## 2023-05-02 PROCEDURE — 3008F BODY MASS INDEX DOCD: CPT | Mod: CPTII,S$GLB,,

## 2023-05-02 PROCEDURE — 99213 OFFICE O/P EST LOW 20 MIN: CPT | Mod: S$GLB,,,

## 2023-05-02 PROCEDURE — 3044F PR MOST RECENT HEMOGLOBIN A1C LEVEL <7.0%: ICD-10-PCS | Mod: CPTII,S$GLB,,

## 2023-05-02 PROCEDURE — 99999 PR PBB SHADOW E&M-EST. PATIENT-LVL III: ICD-10-PCS | Mod: PBBFAC,,,

## 2023-05-02 PROCEDURE — 1160F RVW MEDS BY RX/DR IN RCRD: CPT | Mod: CPTII,S$GLB,, | Performed by: STUDENT IN AN ORGANIZED HEALTH CARE EDUCATION/TRAINING PROGRAM

## 2023-05-02 PROCEDURE — 84153 ASSAY OF PSA TOTAL: CPT

## 2023-05-02 RX ORDER — TADALAFIL 20 MG/1
20 TABLET ORAL DAILY
Qty: 10 TABLET | Refills: 11 | Status: ON HOLD | OUTPATIENT
Start: 2023-05-02 | End: 2023-12-27 | Stop reason: HOSPADM

## 2023-05-02 NOTE — PROGRESS NOTES
[facility] Note  Staff: YA Gutiérrez    PCP: MD Moreno    Chief Complaint: Prostate Exam    Subjective:        HPI: Ryan Crane is a 57 y.o. male new patient to me presents today for a prostate exam. He is established to our office and previously seen by Dr. Pablo for work up gross hematuria- all work up was negative at the time. He states he would like a prostate exam. He also states he spoke to his cardiologist and is able to continue to use cialis as needed. He is requesting refills. He denies dysuria, hematuria, nocturia, urgency, frequency, and difficulty urinating. He does not have a history of kidney stones. He does have a family history of prostate cancer. His most recent PSA from 8/29/2019 was 2.0. Discussed need to update PSA. He is currently not taking any bladder or prostate medications.     Questions asked the pt during ov today:  Urgency: No, urge incontinence? No  NTF: 0-1x night  Dysuria: No  Gross Hematuria:No  Straining:No, Hesistancy:No, Intermittency:No}, Weak stream:No  ED:Yes - cialis works well for him    Last PSA Screening:   Lab Results   Component Value Date    PSADIAG 2.0 08/29/2019       History of Kidney Stones?:  No    Constipation issues?:  No    REVIEW OF SYSTEMS:  Review of Systems   Constitutional: Negative.  Negative for chills and fever.   HENT: Negative.     Eyes: Negative.    Respiratory: Negative.     Cardiovascular: Negative.    Gastrointestinal: Negative.  Negative for abdominal pain, constipation, diarrhea, nausea and vomiting.   Genitourinary: Negative.  Negative for dysuria, flank pain, frequency, hematuria and urgency.   Musculoskeletal: Negative.  Negative for back pain.   Skin: Negative.    Neurological: Negative.    Endo/Heme/Allergies: Negative.    Psychiatric/Behavioral: Negative.       PMHx:  Past Medical History:   Diagnosis Date    Aortic valve stenosis 02/28/2022    Arachnoid cyst of posterior cranial fossa 01/13/2022     Bilateral carotid bruits 06/22/2021    Bipolar disorder 01/16/2022    CAD S/P percutaneous coronary angioplasty     3 vessel disease    Calculus of gallbladder without cholecystitis without obstruction 01/11/2023    Cancer     Candidal intertrigo 11/11/2019    Cataract     Chronic heart failure with preserved ejection fraction 03/29/2023    Chronic schizophrenia     Colon polyps     Diabetic polyneuropathy associated with diabetes mellitus due to underlying condition 11/21/2019    Dysarthria as late effect of cerebellar cerebrovascular accident (CVA) 03/24/2023    Equinus deformity of both feet 11/21/2019    Flaccid hemiplegia of left nondominant side as late effect of cerebral infarction 03/17/2023    Flat foot 11/21/2019    Gastrointestinal hemorrhage 12/13/2019    Post polypectomy bleeding    General anesthetics causing adverse effect in therapeutic use     Hammer toes of both feet 11/21/2019    Hx of diabetic foot ulcer 11/21/2019    Hypertension     Hyponatremia 01/11/2022    ELAINE (iron deficiency anemia) 12/10/2019    Intractable chronic migraine without aura and without status migrainosus 12/30/2021    20 year history of migraine headaches. Headaches are typically moderate to severe in intensity, worsen with activity, pounding in quality and associated with sensitivity to light and sound. Given history of reported brain tumor, agree with head CT today. Cannot do MRI due to metal clips.   Galcanezumab (Emgality) treatment was approved for the prevention of acute and chronic migraine on 9/27/2018.    Microcytic anemia 10/09/2019    Migraine headache 12/30/2021    20 year history of migraine headaches. Headaches are typically moderate to severe in intensity, worsen with activity, pounding in quality and associated with sensitivity to light and sound. Given history of reported brain tumor, agree with head CT today. Cannot do MRI due to metal clips.   Galcanezumab (Emgality) treatment was approved for the  prevention of acute and chronic migraine on 9/27/2018.    Moderate aortic regurgitation 03/29/2023    Moderate mitral regurgitation 03/29/2023    Moderate mitral stenosis 03/29/2023    Moderate to severe aortic stenosis 02/28/2022    Myocardial infarct, old     Nicotine dependence, unspecified, uncomplicated 01/16/2022    Onychomycosis due to dermatophyte 11/21/2019    Orchitis 11/09/2019    PIPER on CPAP     Peripheral visual field defect, bilateral 01/13/2022    PVD (peripheral vascular disease) 11/21/2019    Seizures 2008    Stage 2 moderate COPD by GOLD classification     PFTs w/ FEV1 (73%) (9/2022)    Stroke 2005    Thoracic aortic atherosclerosis 10/17/2022    CT chest    Thyroid disease     Previously on pharmacologic Tx    Tinea pedis of right foot 05/21/2019    Tobacco use disorder     Type 2 diabetes mellitus with diabetic peripheral angiopathy without gangrene     A1c 5.1% (3/2023)    Vitamin B12 deficiency 03/24/2023       PSHx:  Past Surgical History:   Procedure Laterality Date    ANGIOGRAM, CORONARY, WITH LEFT HEART CATHETERIZATION  08/25/2022    Procedure: Angiogram, Coronary, with Left Heart Cath;  Surgeon: Mai Deleon MD;  Location: [facility] CATH;  Service: Cardiology;;    APPENDECTOMY      BONE MARROW ASPIRATION Left 08/21/2020    Procedure: ASPIRATION, BONE MARROW;  Surgeon: Lex Kathleen MD;  Location: [facility] OR;  Service: Oncology;  Laterality: Left;    CLOSURE OF WOUND Right 09/09/2019    Procedure: CLOSURE, WOUND;  Surgeon: Li Kathleen DPM;  Location: [facility] OR;  Service: Podiatry;  Laterality: Right;    COLONOSCOPY N/A 12/10/2019    Procedure: COLONOSCOPY;  Surgeon: Ryan Lucas MD;  Location: [facility] ENDO;  Service: Endoscopy;  Laterality: N/A;    COLONOSCOPY N/A 12/13/2019    Procedure: COLONOSCOPY;  Surgeon: Ryan Lucas MD;  Location: [facility] ENDO;  Service: Endoscopy;  Laterality: N/A;    CORONARY STENT PLACEMENT      ESOPHAGOGASTRODUODENOSCOPY N/A 12/10/2019    Procedure: EGD  (ESOPHAGOGASTRODUODENOSCOPY);  Surgeon: Ryan Lucas MD;  Location: [facility] ENDO;  Service: Endoscopy;  Laterality: N/A;    ESOPHAGOGASTRODUODENOSCOPY N/A 11/3/2022    Procedure: EGD (ESOPHAGOGASTRODUODENOSCOPY);  Surgeon: Ryan Lucas MD;  Location: [facility] ENDO;  Service: Endoscopy;  Laterality: N/A;    LAPAROSCOPIC CHOLECYSTECTOMY N/A 1/11/2023    Procedure: CHOLECYSTECTOMY, LAPAROSCOPIC;  Surgeon: Laith Davis MD;  Location: [facility] OR;  Service: General;  Laterality: N/A;    LEFT HEART CATHETERIZATION Left 08/25/2022    Procedure: Left heart cath;  Surgeon: Mai Deleon MD;  Location: [facility] CATH;  Service: Cardiology;  Laterality: Left;    right heel surgery      SHOULDER ARTHROSCOPY Left        Fam Hx:   malignancies: Yes - father prostate cancer    kidney stones: No     Soc Hx:  Lives in [place]    Allergies:  Alcohol, Alcohol antiseptic pads, and Cephalexin    Medications: reviewed     Objective:   There were no vitals filed for this visit.    Physical Exam  Constitutional:       Appearance: Normal appearance.   HENT:      Head: Normocephalic.      Mouth/Throat:      Mouth: Mucous membranes are moist.   Eyes:      Conjunctiva/sclera: Conjunctivae normal.   Pulmonary:      Effort: Pulmonary effort is normal.   Abdominal:      General: There is no distension.      Palpations: Abdomen is soft.      Tenderness: There is no abdominal tenderness. There is no right CVA tenderness or left CVA tenderness.   Musculoskeletal:         General: Normal range of motion.      Cervical back: Normal range of motion.   Skin:     General: Skin is warm.   Neurological:      Mental Status: He is alert and oriented to person, place, and time.   Psychiatric:         Mood and Affect: Mood normal.         Behavior: Behavior normal.        EXAM performed by me in office today:  Inspection of anus and perineum normal  VELMA: 30g prostate gland without nodules, masses, tenderness. SV not palpable. Normal sphincter tone.    No hemorrhoids visible    LABS REVIEW:  UA today:  pt urinated prior to OV    Assessment:       1. Erectile dysfunction due to arterial insufficiency    2. Prostate cancer screening    3. Benign prostatic hyperplasia without lower urinary tract symptoms          Plan:     PSA ordered to be updated  Refills prescribed for cialis 20mg as needed  BPH on observation    F/u Annually    Denitaner: Active    Naida Corral, BERENICE-C

## 2023-05-02 NOTE — TELEPHONE ENCOUNTER
----- Message from Naida Corral NP sent at 5/2/2023  2:24 PM CDT -----  Regarding: RE: pt called  I prescribed 10, not sure why he only got 3- that is a question for pharmacy staff  ----- Message -----  From: Abby Alva MA  Sent: 5/2/2023  12:30 PM CDT  To: Naida Corral NP  Subject: FW: pt called                                      ----- Message -----  From: Janeth Angel  Sent: 5/2/2023  12:13 PM CDT  To: Prem RUIZ Staff  Subject: pt called                                        Name of Who is Calling: DEANNE MICHAELS [[id number]]      What is the request in detail: pt only received three pill instead of 10 for his prescription today tadilil 20mg . Please advise       Can the clinic reply by [facility]: No      What Number to Call Back if not in [facility]: Telephone Information:  Mobile          [phone]

## 2023-05-03 ENCOUNTER — DOCUMENT SCAN (OUTPATIENT)
Dept: HOME HEALTH SERVICES | Facility: HOSPITAL | Age: 58
End: 2023-05-03
Payer: MEDICARE

## 2023-05-03 ENCOUNTER — CLINICAL SUPPORT (OUTPATIENT)
Dept: FAMILY MEDICINE | Facility: CLINIC | Age: 58
End: 2023-05-03
Payer: MEDICARE

## 2023-05-03 ENCOUNTER — TELEPHONE (OUTPATIENT)
Dept: FAMILY MEDICINE | Facility: CLINIC | Age: 58
End: 2023-05-03

## 2023-05-03 ENCOUNTER — TELEPHONE (OUTPATIENT)
Dept: FAMILY MEDICINE | Facility: CLINIC | Age: 58
End: 2023-05-03
Payer: MEDICARE

## 2023-05-03 DIAGNOSIS — E53.8 VITAMIN B12 DEFICIENCY: Primary | ICD-10-CM

## 2023-05-03 PROCEDURE — 96372 THER/PROPH/DIAG INJ SC/IM: CPT | Mod: S$GLB,,, | Performed by: STUDENT IN AN ORGANIZED HEALTH CARE EDUCATION/TRAINING PROGRAM

## 2023-05-03 PROCEDURE — 96372 PR INJECTION,THERAP/PROPH/DIAG2ST, IM OR SUBCUT: ICD-10-PCS | Mod: S$GLB,,, | Performed by: STUDENT IN AN ORGANIZED HEALTH CARE EDUCATION/TRAINING PROGRAM

## 2023-05-03 RX ADMIN — CYANOCOBALAMIN 1000 MCG: 1000 INJECTION, SOLUTION INTRAMUSCULAR; SUBCUTANEOUS at 08:05

## 2023-05-03 NOTE — TELEPHONE ENCOUNTER
----- Message from Shellie Irvin sent at 5/2/2023  3:00 PM CDT -----  Contact: Fidelina  Type:  Needs Medical Advice    Who Called:  Fidelina with [facility]     Would the patient rather a call back or a response via [facility]? Call     Best Call Back Number:     Additional Information: Fidelina with [facility] would like to speak with the nurse in regards to his insurance is denying PT/OT and patient needs to do this they will need to do a peer to peer. Needs to call by 1:45 tomorrow to schedule it and speak with Taylor at [phone].     Please call to advise

## 2023-05-03 NOTE — TELEPHONE ENCOUNTER
Dr Moseley placed Zachary ref in Feb. Called pt to see if she has seen someone yet. Pt has not. Phone number given to sched w/ [facility]. Pt will let us know who she schedules w/.

## 2023-05-04 ENCOUNTER — TELEPHONE (OUTPATIENT)
Dept: FAMILY MEDICINE | Facility: CLINIC | Age: 58
End: 2023-05-04
Payer: MEDICARE

## 2023-05-04 ENCOUNTER — DOCUMENT SCAN (OUTPATIENT)
Dept: HOME HEALTH SERVICES | Facility: HOSPITAL | Age: 58
End: 2023-05-04
Payer: MEDICARE

## 2023-05-04 ENCOUNTER — NURSE TRIAGE (OUTPATIENT)
Dept: ADMINISTRATIVE | Facility: CLINIC | Age: 58
End: 2023-05-04
Payer: MEDICARE

## 2023-05-04 NOTE — TELEPHONE ENCOUNTER
Reason for Disposition   Severe headache    Additional Information   Negative: ACUTE NEUROLOGIC SYMPTOM and symptom present now   Negative: Knocked out (unconscious) > 1 minute   Negative: Seizure (convulsion) occurred  (Exception: Prior history of seizures and now alert and without Acute Neurologic Symptoms.)   Negative: Neck pain after dangerous injury (e.g., MVA, diving, trampoline, contact sports, fall > 10 feet or 3 meters)  (Exception: Neck pain began > 1 hour after injury.)   Negative: Major bleeding (actively dripping or spurting) that can't be stopped   Negative: Penetrating head injury (e.g., knife, gunshot wound, metal object)   Negative: Sounds like a life-threatening emergency to the triager   Negative: Diagnosed with a concussion within last 14 days   Negative: Can't remember what happened (amnesia)   Negative: Vomiting once or more   Negative: Watery or blood-tinged fluid dripping from the nose or ears   Negative: ACUTE NEUROLOGIC SYMPTOM and now fine   Negative: Knocked out (unconscious) < 1 minute and now fine    Protocols used: Head Injury-A-[place]  Pt states he fell earlier today and hit his head on the cement. States he had two strokes last month and complains of a severe headache now. Pt advised to have someone bring to the nearest ED now for evaluation. Pt advised he cannot wait until tomorrow's appointment that was set by schedulers. He verbalized understanding.

## 2023-05-04 NOTE — PROGRESS NOTES
Subjective:      Patient ID: Ryan Crane is a 57 y.o. male.    Chief Complaint: Diabetes Mellitus and Nail Care      HPI:  Ryan is a 57 y.o. male who presents to the clinic for evaluation and treatment of high risk feet. Ryan has a past medical history of Aortic valve stenosis (02/28/2022), Arachnoid cyst of posterior cranial fossa (01/13/2022), Bilateral carotid bruits (06/22/2021), Bipolar disorder (01/16/2022), CAD S/P percutaneous coronary angioplasty, Calculus of gallbladder without cholecystitis without obstruction (01/11/2023), Cancer, Candidal intertrigo (11/11/2019), Cataract, Chronic heart failure with preserved ejection fraction (03/29/2023), Chronic schizophrenia, Colon polyps, Diabetic polyneuropathy associated with diabetes mellitus due to underlying condition (11/21/2019), Dysarthria as late effect of cerebellar cerebrovascular accident (CVA) (03/24/2023), Equinus deformity of both feet (11/21/2019), Flaccid hemiplegia of left nondominant side as late effect of cerebral infarction (03/17/2023), Flat foot (11/21/2019), Gastrointestinal hemorrhage (12/13/2019), General anesthetics causing adverse effect in therapeutic use, Hammer toes of both feet (11/21/2019), diabetic foot ulcer (11/21/2019), Hypertension, Hyponatremia (01/11/2022), ELAINE (iron deficiency anemia) (12/10/2019), Intractable chronic migraine without aura and without status migrainosus (12/30/2021), Microcytic anemia (10/09/2019), Migraine headache (12/30/2021), Moderate aortic regurgitation (03/29/2023), Moderate mitral regurgitation (03/29/2023), Moderate mitral stenosis (03/29/2023), Moderate to severe aortic stenosis (02/28/2022), Myocardial infarct, old, Nicotine dependence, unspecified, uncomplicated (01/16/2022), Onychomycosis due to dermatophyte (11/21/2019), Orchitis (11/09/2019), PIPER on CPAP, Peripheral visual field defect, bilateral (01/13/2022), PVD (peripheral vascular disease) (11/21/2019), Seizures (2008), Stage  2 moderate COPD by GOLD classification, Stroke (2005), Thoracic aortic atherosclerosis (10/17/2022), Thyroid disease, Tinea pedis of right foot (05/21/2019), Tobacco use disorder, Type 2 diabetes mellitus with diabetic peripheral angiopathy without gangrene, and Vitamin B12 deficiency (03/24/2023). The patient's chief complaint is long, thick toenails and right heel pain. This patient has documented high risk feet requiring routine maintenance secondary to diabetes mellitis and those secondary complications of diabetes, as mentioned..  He reports having right heel pain, which he rates as 3/10 on the pain scale but denies having a wound.  He also informs of losing 30 lbs since his last visit without trying to lose weight and is concerned about it.  He denies trying to self-treat his heel pain or trim his toenails himself.  Patient denies any other pedal complaints at this time.    9/27/22: f/u for nail care and heel pain R.    12/12/2022: Patient returns for suture removal of the right heel.  He is also here for nail care.    05/03/2023  Returns today for nail care. He reports that the R heel is painful again and that the mass that was removed may be returning.    PCP: Ana Mayer MD    Date Last Seen by PCP:  4/13/2023    Current shoe gear:  Affected Foot: Casual shoes     Unaffected Foot: Casual shoes    Review of Systems   Constitutional: Negative for appetite change, fever, chills, fatigue.  Positive for unexpected weight change.   Respiratory: Negative for cough, wheezing, shortness of breath.  Cardiovascular: Negative for chest pain, claudication, cyanosis.  Positive for leg swelling.  Musculoskeletal: Negative for back pain, arthritis, joint pain, joint swelling, myalgias, stiffness.   Skin: Negative for rash, itching, suspicious lesion, poor wound healing, unusual hair distribution.  Positive for nail bed changes, discoloration,.  Neurological: Negative for loss of balance, sensory change,  paresthesias, numbness.  Positive for pain.  Hematological: Negative for adenopathy, bleeding, bruising easily.   Psychiatric/Behavioral: The patient is not nervous/anxious.  Negative for altered mental status.    Hemoglobin A1C   Date Value Ref Range Status   03/05/2023 5.1 0.0 - 5.6 % Final     Comment:     Reference Interval:  5.0 - 5.6 Normal   5.7 - 6.4 High Risk   > 6.5 Diabetic      Hgb A1c results are standardized based on the (NGSP) National   Glycohemoglobin Standardization Program.      Hemoglobin A1C levels are related to mean serum/plasma glucose   during the preceding 2-3 months.        02/01/2023 5.0 0.0 - 5.6 % Final     Comment:     Reference Interval:  5.0 - 5.6 Normal   5.7 - 6.4 High Risk   > 6.5 Diabetic      Hgb A1c results are standardized based on the (NGSP) National   Glycohemoglobin Standardization Program.      Hemoglobin A1C levels are related to mean serum/plasma glucose   during the preceding 2-3 months.        08/04/2022 5.4 4.0 - 5.6 % Final     Comment:     ADA Screening Guidelines:  5.7-6.4%  Consistent with prediabetes  >or=6.5%  Consistent with diabetes    High levels of fetal hemoglobin interfere with the HbA1C  assay. Heterozygous hemoglobin variants (HbS, HgC, etc)do  not significantly interfere with this assay.   However, presence of multiple variants may affect accuracy.         Past Medical History:   Diagnosis Date    Aortic valve stenosis 02/28/2022    Arachnoid cyst of posterior cranial fossa 01/13/2022    Bilateral carotid bruits 06/22/2021    Bipolar disorder 01/16/2022    CAD S/P percutaneous coronary angioplasty     3 vessel disease    Calculus of gallbladder without cholecystitis without obstruction 01/11/2023    Cancer     Candidal intertrigo 11/11/2019    Cataract     Chronic heart failure with preserved ejection fraction 03/29/2023    Chronic schizophrenia     Colon polyps     Diabetic polyneuropathy associated with diabetes mellitus due to underlying condition  11/21/2019    Dysarthria as late effect of cerebellar cerebrovascular accident (CVA) 03/24/2023    Equinus deformity of both feet 11/21/2019    Flaccid hemiplegia of left nondominant side as late effect of cerebral infarction 03/17/2023    Flat foot 11/21/2019    Gastrointestinal hemorrhage 12/13/2019    Post polypectomy bleeding    General anesthetics causing adverse effect in therapeutic use     Hammer toes of both feet 11/21/2019    Hx of diabetic foot ulcer 11/21/2019    Hypertension     Hyponatremia 01/11/2022    ELAINE (iron deficiency anemia) 12/10/2019    Intractable chronic migraine without aura and without status migrainosus 12/30/2021    20 year history of migraine headaches. Headaches are typically moderate to severe in intensity, worsen with activity, pounding in quality and associated with sensitivity to light and sound. Given history of reported brain tumor, agree with head CT today. Cannot do MRI due to metal clips.   Galcanezumab (Emgality) treatment was approved for the prevention of acute and chronic migraine on 9/27/2018.    Microcytic anemia 10/09/2019    Migraine headache 12/30/2021    20 year history of migraine headaches. Headaches are typically moderate to severe in intensity, worsen with activity, pounding in quality and associated with sensitivity to light and sound. Given history of reported brain tumor, agree with head CT today. Cannot do MRI due to metal clips.   Galcanezumab (Emgality) treatment was approved for the prevention of acute and chronic migraine on 9/27/2018.    Moderate aortic regurgitation 03/29/2023    Moderate mitral regurgitation 03/29/2023    Moderate mitral stenosis 03/29/2023    Moderate to severe aortic stenosis 02/28/2022    Myocardial infarct, old     Nicotine dependence, unspecified, uncomplicated 01/16/2022    Onychomycosis due to dermatophyte 11/21/2019    Orchitis 11/09/2019    PIEPR on CPAP     Peripheral visual field defect, bilateral 01/13/2022    PVD  (peripheral vascular disease) 11/21/2019    Seizures 2008    Stage 2 moderate COPD by GOLD classification     PFTs w/ FEV1 (73%) (9/2022)    Stroke 2005    Thoracic aortic atherosclerosis 10/17/2022    CT chest    Thyroid disease     Previously on pharmacologic Tx    Tinea pedis of right foot 05/21/2019    Tobacco use disorder     Type 2 diabetes mellitus with diabetic peripheral angiopathy without gangrene     A1c 5.1% (3/2023)    Vitamin B12 deficiency 03/24/2023     Past Surgical History:   Procedure Laterality Date    ANGIOGRAM, CORONARY, WITH LEFT HEART CATHETERIZATION  08/25/2022    Procedure: Angiogram, Coronary, with Left Heart Cath;  Surgeon: Mai Deleon MD;  Location: [facility] CATH;  Service: Cardiology;;    APPENDECTOMY      BONE MARROW ASPIRATION Left 08/21/2020    Procedure: ASPIRATION, BONE MARROW;  Surgeon: Lex Kathleen MD;  Location: [facility];  Service: Oncology;  Laterality: Left;    CLOSURE OF WOUND Right 09/09/2019    Procedure: CLOSURE, WOUND;  Surgeon: Li Kathleen DPM;  Location: [facility];  Service: Podiatry;  Laterality: Right;    COLONOSCOPY N/A 12/10/2019    Procedure: COLONOSCOPY;  Surgeon: Ryan Lucas MD;  Location: [facility];  Service: Endoscopy;  Laterality: N/A;    COLONOSCOPY N/A 12/13/2019    Procedure: COLONOSCOPY;  Surgeon: Ryan Lucas MD;  Location: [facility];  Service: Endoscopy;  Laterality: N/A;    CORONARY STENT PLACEMENT      ESOPHAGOGASTRODUODENOSCOPY N/A 12/10/2019    Procedure: EGD (ESOPHAGOGASTRODUODENOSCOPY);  Surgeon: Ryan Lucas MD;  Location: [facility];  Service: Endoscopy;  Laterality: N/A;    ESOPHAGOGASTRODUODENOSCOPY N/A 11/3/2022    Procedure: EGD (ESOPHAGOGASTRODUODENOSCOPY);  Surgeon: Ryan Lucas MD;  Location: [facility];  Service: Endoscopy;  Laterality: N/A;    LAPAROSCOPIC CHOLECYSTECTOMY N/A 1/11/2023    Procedure: CHOLECYSTECTOMY, LAPAROSCOPIC;  Surgeon: Laith Davis MD;  Location: [facility];  Service: General;  Laterality:  N/A;    LEFT HEART CATHETERIZATION Left 2022    Procedure: Left heart cath;  Surgeon: Mai Deleon MD;  Location: [facility] CATH;  Service: Cardiology;  Laterality: Left;    right heel surgery      SHOULDER ARTHROSCOPY Left      Family History   Problem Relation Age of Onset    Melanoma Mother     Diabetes Mother     Hypertension Mother     Stroke Father     Diabetes Father     Hypertension Father     Colon cancer Father         unsure of age of diagnosis    Cancer Father         colon cancer    Cervical cancer Sister     Cirrhosis Brother     Hypertension Brother     Lung cancer Maternal Grandmother     Prostate cancer Maternal Grandfather     Crohn's disease Neg Hx     Ulcerative colitis Neg Hx     Stomach cancer Neg Hx     Esophageal cancer Neg Hx     Retinal detachment Neg Hx     Strabismus Neg Hx     Macular degeneration Neg Hx     Glaucoma Neg Hx      Social History     Socioeconomic History    Marital status: Legally    Occupational History    Occupation: disabled (mental)     Comment: since    Tobacco Use    Smoking status: Every Day     Packs/day: 0.25     Years: 43.00     Pack years: 10.75     Types: Cigarettes     Start date:      Last attempt to quit: 2022     Years since quittin.4    Smokeless tobacco: Never    Tobacco comments:     Age Started 14    Substance and Sexual Activity    Alcohol use: No    Drug use: Not Currently     Types: Marijuana    Sexual activity: Yes     Partners: Female     Social Determinants of Health     Financial Resource Strain: Low Risk     Difficulty of Paying Living Expenses: Not hard at all   Food Insecurity: No Food Insecurity    Worried About Running Out of Food in the Last Year: Never true    Ran Out of Food in the Last Year: Never true   Transportation Needs: Unmet Transportation Needs    Lack of Transportation (Medical): Yes    Lack of Transportation (Non-Medical): Yes   Physical Activity: Insufficiently Active    Days of Exercise per Week:  2 days    Minutes of Exercise per Session: 10 min   Stress: Stress Concern Present    Feeling of Stress : To some extent   Social Connections: Socially Isolated    Frequency of Communication with Friends and Family: Twice a week    Frequency of Social Gatherings with Friends and Family: Never    Attends Restoration Services: More than 4 times per year    Active Member of Clubs or Organizations: No    Attends Club or Organization Meetings: Never    Marital Status:    Housing Stability: Unknown    Unable to Pay for Housing in the Last Year: No    Unstable Housing in the Last Year: No           Objective:        There were no vitals taken for this visit.    Physical Exam   Constitutional: Patient is oriented to person, place, and time. Patient appears well-developed and well-nourished.  Strong malodor noted from patient due to lack of personal hygiene.  No acute distress.     Psychiatric: Patient has a normal mood and affect. Patient's speech is normal and behavior is normal. Judgment is normal. Cognition and memory are normal.     Bilateral pedal exam was performed today.  Vascular: Vascular: Pedal pulses palpable 2/4 DP & PT.  CFT is = 3 seconds to the hallux.  Skin temperature is warm to cool proximal tibia to distal toes without localized increase in calor noted.  No erythema and ecchymosis noted to the LE.  Nonpitting edema noted to the LE.  Hair growth present distally to the LE.     Musculoskeletal: Ankle and pedal joints ROM are decreased. Ankle joint dorsiflexion is restricted with the knee extended and flexed per [place] exam.  Muscle strength is 5/5 for all LE muscle groups tested.  Flat foot type present.  Flexion contracture of lesser digits noted.    Neurological:  Epicritic sensation is slightly dimiished to the foot.  Chaddock STR is intact to the LE.  Tenderness to palpation of right plantar heel noted.     Dermatological: Toenails 1-5 are elongated and distally thickened, dystrophic,  brittle, discolored, and mycotic with subungal debris present.  Webspaces 1-4 are dry and intact with debris present.  Skin turgor is increased.  Skin texture is dry and flaky. Dermal fibrosis appreciated at the plantar right heel has returned.     Nursing note and vitals reviewed.        Assessment:       Encounter Diagnoses   Name Primary?    Type II diabetes mellitus with neurological manifestations Yes    PVD (peripheral vascular disease)     Onychomycosis due to dermatophyte     Intractable right heel pain              Plan:       Ryan was seen today for diabetes mellitus and nail care.    Diagnoses and all orders for this visit:    Type II diabetes mellitus with neurological manifestations    PVD (peripheral vascular disease)    Onychomycosis due to dermatophyte    Intractable right heel pain    Other orders  -     Routine Foot Care          I counseled the patient on his conditions, their implications and medical management.    Patient was evaluated and risk assessed today. The patient is at low risk for developing pedal complications due to peripheral vascular disease. I explained to the patient that 6/22/22 can make healing injuries difficult leading to wounds or   gangrene.    F/u in 3-4 months for nail care.    Possible surgical removal of the mass again in the future. He's had this removed 4 times previously.     Follow up in 4 months    Routine Foot Care    Date/Time: 5/2/2023 2:40 PM  Performed by: Дмитрий Pratt DPM  Authorized by: Дмитрий Pratt DPM     Consent Done?:  Yes (Verbal)  Hyperkeratotic Skin Lesions?: No      Nail Care Type:  Debride  Patient tolerance:  Patient tolerated the procedure well with no immediate complications

## 2023-05-04 NOTE — TELEPHONE ENCOUNTER
----- Message from Moody Eduardo sent at 5/4/2023 10:46 AM CDT -----  Contact: self  Type: Needs Medical Advice  Who Called: patient   Best Call Back Number: [phone]  Additional Information: Pt states he took a fall and has a headache  wants to know what can he take for it. Thanks

## 2023-05-05 ENCOUNTER — OFFICE VISIT (OUTPATIENT)
Dept: FAMILY MEDICINE | Facility: CLINIC | Age: 58
End: 2023-05-05
Payer: MEDICARE

## 2023-05-05 ENCOUNTER — TELEPHONE (OUTPATIENT)
Dept: FAMILY MEDICINE | Facility: CLINIC | Age: 58
End: 2023-05-05

## 2023-05-05 VITALS
HEART RATE: 67 BPM | WEIGHT: 198.44 LBS | OXYGEN SATURATION: 98 % | BODY MASS INDEX: 28.41 KG/M2 | HEIGHT: 70 IN | SYSTOLIC BLOOD PRESSURE: 110 MMHG | DIASTOLIC BLOOD PRESSURE: 60 MMHG

## 2023-05-05 DIAGNOSIS — Z74.09 POOR MOBILITY: ICD-10-CM
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
## 2023-05-05 DIAGNOSIS — Z86.73 HISTORY OF STROKE: ICD-10-CM

## 2023-05-05 DIAGNOSIS — M25.561 RIGHT KNEE PAIN, UNSPECIFIED CHRONICITY: Primary | ICD-10-CM

## 2023-05-05 PROCEDURE — 99214 OFFICE O/P EST MOD 30 MIN: CPT | Mod: S$GLB,,,

## 2023-05-05 PROCEDURE — 3008F BODY MASS INDEX DOCD: CPT | Mod: CPTII,S$GLB,,

## 2023-05-05 PROCEDURE — 1159F MED LIST DOCD IN RCRD: CPT | Mod: CPTII,S$GLB,,

## 2023-05-05 PROCEDURE — 3008F PR BODY MASS INDEX (BMI) DOCUMENTED: ICD-10-PCS | Mod: CPTII,S$GLB,,

## 2023-05-05 PROCEDURE — 3072F LOW RISK FOR RETINOPATHY: CPT | Mod: CPTII,S$GLB,,

## 2023-05-05 PROCEDURE — 3044F HG A1C LEVEL LT 7.0%: CPT | Mod: CPTII,S$GLB,,

## 2023-05-05 PROCEDURE — 99999 PR PBB SHADOW E&M-EST. PATIENT-LVL V: CPT | Mod: PBBFAC,,,

## 2023-05-05 PROCEDURE — 3044F PR MOST RECENT HEMOGLOBIN A1C LEVEL <7.0%: ICD-10-PCS | Mod: CPTII,S$GLB,,

## 2023-05-05 PROCEDURE — 3074F SYST BP LT 130 MM HG: CPT | Mod: CPTII,S$GLB,,

## 2023-05-05 PROCEDURE — 3074F PR MOST RECENT SYSTOLIC BLOOD PRESSURE < 130 MM HG: ICD-10-PCS | Mod: CPTII,S$GLB,,

## 2023-05-05 PROCEDURE — 99999 PR PBB SHADOW E&M-EST. PATIENT-LVL V: ICD-10-PCS | Mod: PBBFAC,,,

## 2023-05-05 PROCEDURE — 3072F PR LOW RISK FOR RETINOPATHY: ICD-10-PCS | Mod: CPTII,S$GLB,,

## 2023-05-05 PROCEDURE — 3078F PR MOST RECENT DIASTOLIC BLOOD PRESSURE < 80 MM HG: ICD-10-PCS | Mod: CPTII,S$GLB,,

## 2023-05-05 PROCEDURE — 1159F PR MEDICATION LIST DOCUMENTED IN MEDICAL RECORD: ICD-10-PCS | Mod: CPTII,S$GLB,,

## 2023-05-05 PROCEDURE — 99214 PR OFFICE/OUTPT VISIT, EST, LEVL IV, 30-39 MIN: ICD-10-PCS | Mod: S$GLB,,,

## 2023-05-05 PROCEDURE — 3078F DIAST BP <80 MM HG: CPT | Mod: CPTII,S$GLB,,

## 2023-05-05 RX ORDER — CYANOCOBALAMIN 1000 UG/ML
1000 INJECTION, SOLUTION INTRAMUSCULAR; SUBCUTANEOUS
Status: SHIPPED | OUTPATIENT
Start: 2023-05-05 | End: 2023-07-28

## 2023-05-05 RX ORDER — AMLODIPINE BESYLATE 2.5 MG/1
1 TABLET ORAL DAILY
COMMUNITY
Start: 2023-04-03 | End: 2023-05-25

## 2023-05-05 NOTE — PROGRESS NOTES
[facility]  [address] [place] [address]    Subjective    Chief Complaint:   Chief Complaint   Patient presents with    Follow-up     Hospital follow up fell yesterday        History of Present Illness:     Ryan Crane is a(n) 57 y.o. male with past medical history as noted below who presents to the clinic today for emergency room follow-up.     Yesterday experienced a fall from ground level and presented to [place] [facility].  During this fall he hit his head and right knee.  He is taking Plavix.  CT head without acute intracranial abnormality, CT cervical spine without acute concerns, x-ray left knee without signs of fracture or dislocation.  He was given Norco upon discharge with instructions not to drive.  He has an upcoming appointment with ortho on May 8, 2023.    No new symptoms since ER discharge.  He has been wearing a brace on his right knee which is helping.  He has not started taking Norco for pain.  He is able to bear weight on his right knee.  States he normally uses a walker which he does not have with him today.  He is not currently doing PT or OT.  He denies feeling lightheaded, dizzy, having chest pain, or having palpitations at the time of fall.  States his right knee gave out when walking.      Problem List:   Patient Active Problem List   Diagnosis    Chronic schizophrenia    Stage 2 moderate COPD by GOLD classification    PIPER on CPAP    Intractable right heel pain    Tinea pedis of right foot    CAD S/P percutaneous coronary angioplasty    Essential hypertension    Flat foot    Hammer toes of both feet    Equinus deformity of both feet    Hx of diabetic foot ulcer    Onychomycosis due to dermatophyte    PVD (peripheral vascular disease)    Diabetic polyneuropathy associated with diabetes mellitus due to underlying condition    Smoker    Migraine headache    Peripheral visual field defect, bilateral    Moderate to  severe aortic stenosis    Thoracic aortic atherosclerosis    Cigarette nicotine dependence without complication    Mass of right foot    History of stroke    Dysarthria as late effect of cerebellar cerebrovascular accident (CVA)    Vitamin B12 deficiency    Flaccid hemiplegia of left nondominant side as late effect of cerebral infarction    Chronic heart failure with preserved ejection fraction    Moderate aortic regurgitation    Moderate mitral stenosis    Moderate mitral regurgitation    Benign CNS tumor    Depression, recurrent       Current Outpatient Medications:   Current Outpatient Medications   Medication Instructions    albuterol (PROVENTIL/VENTOLIN HFA) 90 mcg/actuation inhaler 2 puffs, Inhalation, Every 6 hours PRN    albuterol-ipratropium (DUO-NEB) 2.5 mg-0.5 mg/3 mL nebulizer solution 3 mLs, Nebulization, Every 6 hours PRN, Rescue    amLODIPine (NORVASC) 2.5 MG tablet 1 tablet, Oral, Daily    amLODIPine (NORVASC) 2.5 mg, Oral, Daily    aspirin (ECOTRIN) 81 mg, Oral, Daily    atorvastatin (LIPITOR) 40 mg, Oral, Daily    busPIRone (BUSPAR) 15 mg, Oral, 3 times daily    clopidogreL (PLAVIX) 75 mg, Oral, Daily    cyanocobalamin 1,000 mcg/mL injection 1 ml sq daily x 5 days, then 1 ml sq weekly x 4 weeks then q monthly -  will need to go to PCP    fluticasone propionate (FLONASE) 50 mcg/actuation nasal spray 2 sprays, Nasal, Nightly    fluticasone-umeclidin-vilanter (TRELEGY ELLIPTA) 100-62.5-25 mcg DsDv 1 puff, Inhalation, Daily    galcanezumab-gnlm (EMGALITY SYRINGE) 120 mg/mL Syrg Inject 1 pen (120 mg) into the skin every 28 days.    HYDROcodone-acetaminophen (NORCO) 5-325 mg per tablet 1 tablet, Oral, Every 4 hours PRN    Lactobac. rhamnosus GG-inulin 10 billion cell -200 mg Chew 1 tablet, Oral, Every morning    lamoTRIgine (LAMICTAL) 200 mg, Oral, Daily    loratadine (CLARITIN) 10 mg tablet 1 each, Oral, Daily    metoprolol succinate (TOPROL-XL) 50 MG 24 hr tablet 1 tablet, Oral, Daily    metoprolol  succinate 50 mg CSpX 1 tablet EVERY PM (route: oral)    mirtazapine (REMERON) 15 mg, Oral, Nightly    nicotine (NICODERM CQ) 21 mg/24 hr 1 patch, Transdermal, Daily    nicotine polacrilex 4 MG Lozg Take up to 8 pieces daily as needed    nitroGLYCERIN (NITROSTAT) 0.4 mg, Sublingual, Every 5 min PRN    NURTEC 75 mg, Oral, Daily PRN, Place ODT tablet on the tongue; alternatively the ODT tablet may be placed under the tongue    pregabalin (LYRICA) 50 mg, Oral, 3 times daily    ranolazine (RANEXA) 500 mg, Oral, 2 times daily    sodium chloride 1,000 mg TbSO tablet tbso 1 tablet, Misc.(Non-Drug; Combo Route), 3 times daily    tadalafiL (CIALIS) 20 MG Tab 1 tablet, Oral, Daily    tadalafiL (CIALIS) 20 mg, Oral, Daily    topiramate (TOPAMAX) 200 mg, Oral, Nightly    ziprasidone (GEODON) 160 mg, Oral, Nightly       Surgical History:   Past Surgical History:   Procedure Laterality Date    ANGIOGRAM, CORONARY, WITH LEFT HEART CATHETERIZATION  08/25/2022    Procedure: Angiogram, Coronary, with Left Heart Cath;  Surgeon: Mai Deleon MD;  Location: [facility] CATH;  Service: Cardiology;;    APPENDECTOMY      BONE MARROW ASPIRATION Left 08/21/2020    Procedure: ASPIRATION, BONE MARROW;  Surgeon: Lex Kathleen MD;  Location: [facility] OR;  Service: Oncology;  Laterality: Left;    CLOSURE OF WOUND Right 09/09/2019    Procedure: CLOSURE, WOUND;  Surgeon: Li Kathleen DPM;  Location: [facility] OR;  Service: Podiatry;  Laterality: Right;    COLONOSCOPY N/A 12/10/2019    Procedure: COLONOSCOPY;  Surgeon: Ryan Lucas MD;  Location: [facility] ENDO;  Service: Endoscopy;  Laterality: N/A;    COLONOSCOPY N/A 12/13/2019    Procedure: COLONOSCOPY;  Surgeon: Ryan Lucas MD;  Location: [facility] ENDO;  Service: Endoscopy;  Laterality: N/A;    CORONARY STENT PLACEMENT      ESOPHAGOGASTRODUODENOSCOPY N/A 12/10/2019    Procedure: EGD (ESOPHAGOGASTRODUODENOSCOPY);  Surgeon: Ryan Lucas MD;  Location: [facility];  Service: Endoscopy;  Laterality:  "N/A;    ESOPHAGOGASTRODUODENOSCOPY N/A 11/3/2022    Procedure: EGD (ESOPHAGOGASTRODUODENOSCOPY);  Surgeon: Ryan Lucas MD;  Location: [facility] ENDO;  Service: Endoscopy;  Laterality: N/A;    LAPAROSCOPIC CHOLECYSTECTOMY N/A 1/11/2023    Procedure: CHOLECYSTECTOMY, LAPAROSCOPIC;  Surgeon: Laith Davis MD;  Location: [facility] OR;  Service: General;  Laterality: N/A;    LEFT HEART CATHETERIZATION Left 08/25/2022    Procedure: Left heart cath;  Surgeon: Mai Deleon MD;  Location: [facility] CATH;  Service: Cardiology;  Laterality: Left;    right heel surgery      SHOULDER ARTHROSCOPY Left        Family History:   Family History   Problem Relation Age of Onset    Melanoma Mother     Diabetes Mother     Hypertension Mother     Stroke Father     Diabetes Father     Hypertension Father     Colon cancer Father         unsure of age of diagnosis    Cancer Father         colon cancer    Cervical cancer Sister     Cirrhosis Brother     Hypertension Brother     Lung cancer Maternal Grandmother     Prostate cancer Maternal Grandfather     Crohn's disease Neg Hx     Ulcerative colitis Neg Hx     Stomach cancer Neg Hx     Esophageal cancer Neg Hx     Retinal detachment Neg Hx     Strabismus Neg Hx     Macular degeneration Neg Hx     Glaucoma Neg Hx        Allergies:   Review of patient's allergies indicates:   Allergen Reactions    Alcohol Anaphylaxis     Pt states all types of alcohol    Alcohol antiseptic pads Anaphylaxis    Cephalexin Anaphylaxis       Tobacco Status:   Tobacco Use: High Risk    Smoking Tobacco Use: Every Day    Smokeless Tobacco Use: Never    Passive Exposure: Not on file       Sexual Activity:   Social History     Substance and Sexual Activity   Sexual Activity Yes    Partners: Female       Alcohol Use:   Social History     Substance and Sexual Activity   Alcohol Use No         Objective       Vitals:    05/05/23 1304   BP: 110/60   Pulse: 67   SpO2: 98%   Weight: 90 kg (198 lb 6.6 oz)   Height: 5' 10" (1.778 " m)       Review of Systems   Constitutional:  Negative for chills and fever.   Cardiovascular:  Negative for chest pain and palpitations.   Musculoskeletal:  Positive for falls and joint pain.   Skin:         + abrasion to right knee   Neurological:  Negative for dizziness, sensory change, speech change, focal weakness and loss of consciousness.     Physical Exam  Constitutional:       General: He is not in acute distress.     Appearance: Normal appearance.   HENT:      Head: Normocephalic and atraumatic.   Cardiovascular:      Rate and Rhythm: Normal rate and regular rhythm.      Heart sounds: Normal heart sounds. No murmur heard.  Pulmonary:      Effort: Pulmonary effort is normal. No respiratory distress.      Breath sounds: Normal breath sounds. No wheezing.   Musculoskeletal:      Comments: +knee brace     Skin:     General: Skin is warm.   Neurological:      General: No focal deficit present.      Mental Status: He is alert and oriented to person, place, and time.      Cranial Nerves: No cranial nerve deficit.      Sensory: No sensory deficit.   Psychiatric:         Behavior: Behavior normal.     EXAMINATION:  XR KNEE 3 VIEW RIGHT     INDICATION:  Pain in unspecified knee     COMPARISON:  Right knee radiographs from 02/14/2022     FINDINGS:  AP, oblique and lateral views of the right knee are obtained.  There is no fracture.  Alignment is anatomic.  No joint effusion is present.  There are minimal degenerative changes at the medial compartment manifesting as nonuniform joint space narrowing and tiny marginal osteophyte formation.  No chondrocalcinosis.     Impression:     1. Minimal degenerative changes at the medial compartment, similar compared to 02/14/2022.        Electronically signed by: Elieser Mackay MD  Date:                                            05/04/2023  Time:                                           16:08    EXAMINATION:  CT CERVICAL SPINE WITHOUT CONTRAST     CLINICAL HISTORY:  Fall      TECHNIQUE:  Axial CT images were obtained through the cervical spine without contrast.    Coronal and sagittal reconstructions submitted and interpreted.  Total .  Automated exposure control utilized.     COMPARISON:  None     FINDINGS:  No acute fracture or traumatic subluxation.  Vertebral bodies are normal in height.  Mild multilevel facet hypertrophy and disc space narrowing are present .     Prevertebral soft tissues are normal.  Lung apices are clear.     Impression:     No CT evidence of acute cervical spine injury        Electronically signed by: Дмитрий Hu MD  Date:                                            05/04/2023  Time:                                           16:04    EXAMINATION:  CT HEAD WITHOUT CONTRAST     INDICATION:  Head trauma, moderate-severe;     TECHNIQUE:  Contiguous axial images are acquired through the head without IV contrast.  These images were reconstructed into the coronal and sagittal plane.  Automated exposure control, dose radiation lowering technique was utilized.     COMPARISON:  Head CT from 04/05/2023     FINDINGS:  Clear mastoid air cells.  Partially visualized paranasal sinuses are clear.  Intact calvarium.  There appears to be a posterior fossa arachnoid cyst, similar compared to priors.     No extra-axial fluid collection, contusion or hemorrhage.  Patent basilar cisterns.  There is no midline shift.     Remote appearing lacunar infarct is present at the deep white white matter of the right frontal lobe.  Small area of encephalomalacia is present at the posterior aspect of the right temporal lobe.     Impression:     1. No evidence of acute intracranial injury.        Electronically signed by: Elieser Mackay MD  Date:                                            05/04/2023  Time:                                           16:03    Assessment and Plan:    1. Ground-level fall  -     Ambulatory referral/consult to Home Health; Future; Expected date:  05/06/2023    2. History of stroke  Overview:  Reports initial CVA in 2005 without significant clinical deficit, [place]  Most recent event 3/2023 - R subcortical lacune with resultant LUE > LLE paresis     Orders:  -     Ambulatory referral/consult to Home Health; Future; Expected date: 05/06/2023    3. Poor mobility  -     Ambulatory referral/consult to Home Health; Future; Expected date: 05/06/2023        Visit summary:    Ryan Crane presented today for ER follow-up after fall from ground level.    Patient to see ortho next week.  Can take Norco as needed for pain prescribed in ER.  NSAIDs contraindicated.  Follow safety instructions given by ER    Ordered home health for PT/OT to minimize fall risk    Patient was instructed to report to ER if symptoms become severe.    Follow up:  Routine with PCP      Annie Joseph PA-C    This note was created partially with voice dictation software and is prone to errors. This note has been reviewed by me but some errors are inevitable.

## 2023-05-07 ENCOUNTER — DOCUMENT SCAN (OUTPATIENT)
Dept: HOME HEALTH SERVICES | Facility: HOSPITAL | Age: 58
End: 2023-05-07
Payer: MEDICARE

## 2023-05-10 ENCOUNTER — OFFICE VISIT (OUTPATIENT)
Dept: GASTROENTEROLOGY | Facility: CLINIC | Age: 58
End: 2023-05-10
Payer: MEDICARE

## 2023-05-10 VITALS — WEIGHT: 195.13 LBS | BODY MASS INDEX: 27.94 KG/M2 | HEIGHT: 70 IN

## 2023-05-10 DIAGNOSIS — R07.9 CHEST PAIN OF UNCERTAIN ETIOLOGY: Primary | ICD-10-CM

## 2023-05-10 DIAGNOSIS — Z90.49 S/P CHOLECYSTECTOMY: ICD-10-CM

## 2023-05-10 DIAGNOSIS — Z86.010 HISTORY OF COLON POLYPS: ICD-10-CM

## 2023-05-10 DIAGNOSIS — Z87.19 HISTORY OF GASTRITIS: ICD-10-CM

## 2023-05-10 DIAGNOSIS — Z79.01 CURRENT USE OF ANTICOAGULANT THERAPY: ICD-10-CM

## 2023-05-10 PROCEDURE — 3044F HG A1C LEVEL LT 7.0%: CPT | Mod: CPTII,S$GLB,, | Performed by: NURSE PRACTITIONER

## 2023-05-10 PROCEDURE — 99214 OFFICE O/P EST MOD 30 MIN: CPT | Mod: S$GLB,,, | Performed by: NURSE PRACTITIONER

## 2023-05-10 PROCEDURE — 3008F BODY MASS INDEX DOCD: CPT | Mod: CPTII,S$GLB,, | Performed by: NURSE PRACTITIONER

## 2023-05-10 PROCEDURE — 3072F LOW RISK FOR RETINOPATHY: CPT | Mod: CPTII,S$GLB,, | Performed by: NURSE PRACTITIONER

## 2023-05-10 PROCEDURE — 99214 PR OFFICE/OUTPT VISIT, EST, LEVL IV, 30-39 MIN: ICD-10-PCS | Mod: S$GLB,,, | Performed by: NURSE PRACTITIONER

## 2023-05-10 PROCEDURE — 3044F PR MOST RECENT HEMOGLOBIN A1C LEVEL <7.0%: ICD-10-PCS | Mod: CPTII,S$GLB,, | Performed by: NURSE PRACTITIONER

## 2023-05-10 PROCEDURE — 1159F MED LIST DOCD IN RCRD: CPT | Mod: CPTII,S$GLB,, | Performed by: NURSE PRACTITIONER

## 2023-05-10 PROCEDURE — 1159F PR MEDICATION LIST DOCUMENTED IN MEDICAL RECORD: ICD-10-PCS | Mod: CPTII,S$GLB,, | Performed by: NURSE PRACTITIONER

## 2023-05-10 PROCEDURE — 3072F PR LOW RISK FOR RETINOPATHY: ICD-10-PCS | Mod: CPTII,S$GLB,, | Performed by: NURSE PRACTITIONER

## 2023-05-10 PROCEDURE — 99999 PR PBB SHADOW E&M-EST. PATIENT-LVL IV: CPT | Mod: PBBFAC,,, | Performed by: NURSE PRACTITIONER

## 2023-05-10 PROCEDURE — 99999 PR PBB SHADOW E&M-EST. PATIENT-LVL IV: ICD-10-PCS | Mod: PBBFAC,,, | Performed by: NURSE PRACTITIONER

## 2023-05-10 PROCEDURE — 1160F RVW MEDS BY RX/DR IN RCRD: CPT | Mod: CPTII,S$GLB,, | Performed by: NURSE PRACTITIONER

## 2023-05-10 PROCEDURE — 3008F PR BODY MASS INDEX (BMI) DOCUMENTED: ICD-10-PCS | Mod: CPTII,S$GLB,, | Performed by: NURSE PRACTITIONER

## 2023-05-10 PROCEDURE — 1160F PR REVIEW ALL MEDS BY PRESCRIBER/CLIN PHARMACIST DOCUMENTED: ICD-10-PCS | Mod: CPTII,S$GLB,, | Performed by: NURSE PRACTITIONER

## 2023-05-10 RX ORDER — PANTOPRAZOLE SODIUM 40 MG/1
40 TABLET, DELAYED RELEASE ORAL DAILY
Qty: 30 TABLET | Refills: 2 | Status: SHIPPED | OUTPATIENT
Start: 2023-05-10 | End: 2023-09-05 | Stop reason: SDUPTHER

## 2023-05-10 NOTE — PROGRESS NOTES
"Subjective:       Patient ID: Ryan Crane is a 57 y.o. male, Body mass index is 27.99 kg/m².    Chief Complaint: Chest Pain      Patient is new to me. Established patient of Dr. Lucas & Kaylyn Antunez NP.    Gastroesophageal Reflux  He complains of abdominal pain (LUQ abdominal pain; intermittent) and chest pain (Chief complaint; left sided chest pain; intermittent; describes as "stabbing"; denies currently; followed by cardiology- cardiac workup negative). He reports no belching, no choking, no coughing, no dysphagia, no early satiety, no globus sensation, no heartburn, no hoarse voice, no nausea, no sore throat, no stridor, no tooth decay, no water brash or no wheezing. This is a new problem. The current episode started 1 to 4 weeks ago. The problem occurs occasionally. The problem has been unchanged. The heartburn is located in the left chest and abdomen. The heartburn is of severe intensity. The heartburn does not wake him from sleep. The heartburn limits his activity. The heartburn doesn't change with position. Nothing aggravates the symptoms. Associated symptoms include weight loss (intentional weight loss; watching what he eats). Pertinent negatives include no anemia or melena. Risk factors include lack of exercise and smoking/tobacco exposure. He has tried nothing (he never started Protonix prescribed to him in 2022) for the symptoms. Past procedures include an abdominal ultrasound and an EGD.   Review of Systems   Constitutional:  Positive for weight loss (intentional weight loss; watching what he eats). Negative for appetite change, fever and unexpected weight change.   HENT:  Negative for hoarse voice, sore throat and trouble swallowing.    Respiratory:  Negative for cough, choking, shortness of breath and wheezing.    Cardiovascular:  Positive for chest pain (Chief complaint; left sided chest pain; intermittent; describes as "stabbing"; denies currently; followed by cardiology- cardiac " workup negative).   Gastrointestinal:  Positive for abdominal pain (LUQ abdominal pain; intermittent). Negative for abdominal distention, anal bleeding, blood in stool, constipation, diarrhea, dysphagia, heartburn, melena, nausea, rectal pain and vomiting.   Genitourinary:  Negative for difficulty urinating and dysuria.   Musculoskeletal:  Negative for gait problem.   Skin:  Negative for rash.   Neurological:  Positive for weakness. Negative for speech difficulty.   Psychiatric/Behavioral:  Negative for confusion.      Past Medical History:   Diagnosis Date    Aortic valve stenosis 02/28/2022    Arachnoid cyst of posterior cranial fossa 01/13/2022    Bilateral carotid bruits 06/22/2021    Bipolar disorder 01/16/2022    CAD S/P percutaneous coronary angioplasty     3 vessel disease    Calculus of gallbladder without cholecystitis without obstruction 01/11/2023    Cancer     Candidal intertrigo 11/11/2019    Cataract     Chronic heart failure with preserved ejection fraction 03/29/2023    Chronic schizophrenia     Colon polyps     Diabetic polyneuropathy associated with diabetes mellitus due to underlying condition 11/21/2019    Dysarthria as late effect of cerebellar cerebrovascular accident (CVA) 03/24/2023    Equinus deformity of both feet 11/21/2019    Flaccid hemiplegia of left nondominant side as late effect of cerebral infarction 03/17/2023    Flat foot 11/21/2019    Gastritis     Gastrointestinal hemorrhage 12/13/2019    Post polypectomy bleeding    General anesthetics causing adverse effect in therapeutic use     Hammer toes of both feet 11/21/2019    Hx of diabetic foot ulcer 11/21/2019    Hypertension     Hyponatremia 01/11/2022    ELAINE (iron deficiency anemia) 12/10/2019    Intractable chronic migraine without aura and without status migrainosus 12/30/2021    20 year history of migraine headaches. Headaches are typically moderate to severe in intensity, worsen with activity, pounding in quality and  associated with sensitivity to light and sound. Given history of reported brain tumor, agree with head CT today. Cannot do MRI due to metal clips.   Galcanezumab (Emgality) treatment was approved for the prevention of acute and chronic migraine on 9/27/2018.    Microcytic anemia 10/09/2019    Migraine headache 12/30/2021    20 year history of migraine headaches. Headaches are typically moderate to severe in intensity, worsen with activity, pounding in quality and associated with sensitivity to light and sound. Given history of reported brain tumor, agree with head CT today. Cannot do MRI due to metal clips.   Galcanezumab (Emgality) treatment was approved for the prevention of acute and chronic migraine on 9/27/2018.    Moderate aortic regurgitation 03/29/2023    Moderate mitral regurgitation 03/29/2023    Moderate mitral stenosis 03/29/2023    Moderate to severe aortic stenosis 02/28/2022    Myocardial infarct, old     Nicotine dependence, unspecified, uncomplicated 01/16/2022    Onychomycosis due to dermatophyte 11/21/2019    Orchitis 11/09/2019    PIPER on CPAP     Peripheral visual field defect, bilateral 01/13/2022    PVD (peripheral vascular disease) 11/21/2019    Seizures 2008    Stage 2 moderate COPD by GOLD classification     PFTs w/ FEV1 (73%) (9/2022)    Stroke 2005    Thoracic aortic atherosclerosis 10/17/2022    CT chest    Thyroid disease     Previously on pharmacologic Tx    Tinea pedis of right foot 05/21/2019    Tobacco use disorder     Type 2 diabetes mellitus with diabetic peripheral angiopathy without gangrene     A1c 5.1% (3/2023)    Vitamin B12 deficiency 03/24/2023      Past Surgical History:   Procedure Laterality Date    ANGIOGRAM, CORONARY, WITH LEFT HEART CATHETERIZATION  08/25/2022    Procedure: Angiogram, Coronary, with Left Heart Cath;  Surgeon: Mai Deleon MD;  Location: [facility] CATH;  Service: Cardiology;;    APPENDECTOMY      BONE MARROW ASPIRATION Left 08/21/2020    Procedure:  ASPIRATION, BONE MARROW;  Surgeon: Lex Kathleen MD;  Location: [facility];  Service: Oncology;  Laterality: Left;    CHOLECYSTECTOMY      CLOSURE OF WOUND Right 09/09/2019    Procedure: CLOSURE, WOUND;  Surgeon: Li Kathleen DPM;  Location: [facility] OR;  Service: Podiatry;  Laterality: Right;    COLONOSCOPY N/A 12/10/2019    Procedure: COLONOSCOPY;  Surgeon: Ryan Lucas MD;  Location: [facility] ENDO;  Service: Endoscopy;  Laterality: N/A;    COLONOSCOPY N/A 12/13/2019    Procedure: COLONOSCOPY;  Surgeon: Ryan Lucas MD;  Location: [facility];  Service: Endoscopy;  Laterality: N/A;    CORONARY STENT PLACEMENT      ESOPHAGOGASTRODUODENOSCOPY N/A 12/10/2019    Procedure: EGD (ESOPHAGOGASTRODUODENOSCOPY);  Surgeon: Ryan Lucas MD;  Location: [facility];  Service: Endoscopy;  Laterality: N/A;    ESOPHAGOGASTRODUODENOSCOPY N/A 11/03/2022    Procedure: EGD (ESOPHAGOGASTRODUODENOSCOPY);  Surgeon: Ryan Lucas MD;  Location: [facility];  Service: Endoscopy;  Laterality: N/A;    LAPAROSCOPIC CHOLECYSTECTOMY N/A 01/11/2023    Procedure: CHOLECYSTECTOMY, LAPAROSCOPIC;  Surgeon: Laith Davis MD;  Location: [facility];  Service: General;  Laterality: N/A;    LEFT HEART CATHETERIZATION Left 08/25/2022    Procedure: Left heart cath;  Surgeon: Mai Deleon MD;  Location: [facility] CATH;  Service: Cardiology;  Laterality: Left;    right heel surgery      SHOULDER ARTHROSCOPY Left     UPPER GASTROINTESTINAL ENDOSCOPY        Family History   Problem Relation Age of Onset    Melanoma Mother     Diabetes Mother     Hypertension Mother     Stroke Father     Diabetes Father     Hypertension Father     Colon cancer Father         unsure of age of diagnosis    Cancer Father         colon cancer    Cervical cancer Sister     Cirrhosis Brother     Hypertension Brother     Lung cancer Maternal Grandmother     Prostate cancer Maternal Grandfather     Crohn's disease Neg Hx     Ulcerative colitis Neg Hx     Stomach cancer Neg Hx      Esophageal cancer Neg Hx     Retinal detachment Neg Hx     Strabismus Neg Hx     Macular degeneration Neg Hx     Glaucoma Neg Hx       Wt Readings from Last 10 Encounters:   05/10/23 88.5 kg (195 lb 1.7 oz)   05/05/23 90 kg (198 lb 6.6 oz)   05/04/23 89.8 kg (198 lb)   05/02/23 89 kg (196 lb 3.2 oz)   04/28/23 91.5 kg (201 lb 11.5 oz)   04/21/23 92 kg (202 lb 13.2 oz)   04/17/23 92 kg (202 lb 13.2 oz)   04/13/23 89 kg (196 lb 5.1 oz)   04/05/23 92.1 kg (203 lb)   03/29/23 92.5 kg (203 lb 14.8 oz)     Lab Results   Component Value Date    WBC 13.15 (H) 04/21/2023    HGB 13.5 (L) 04/21/2023    HCT 40.6 04/21/2023    MCV 97 04/21/2023     04/21/2023     CMP  Sodium   Date Value Ref Range Status   04/21/2023 142 136 - 145 mmol/L Final     Potassium   Date Value Ref Range Status   04/21/2023 3.5 3.5 - 5.1 mmol/L Final     Chloride   Date Value Ref Range Status   04/21/2023 109 95 - 110 mmol/L Final     CO2   Date Value Ref Range Status   04/21/2023 24 22 - 31 mmol/L Final     Glucose   Date Value Ref Range Status   04/21/2023 99 70 - 110 mg/dL Final     Comment:     The ADA recommends the following guidelines for fasting glucose:    Normal:       less than 100 mg/dL    Prediabetes:  100 mg/dL to 125 mg/dL    Diabetes:     126 mg/dL or higher       BUN   Date Value Ref Range Status   04/21/2023 16 9 - 21 mg/dL Final     Creatinine   Date Value Ref Range Status   04/21/2023 0.98 0.50 - 1.40 mg/dL Final     Calcium   Date Value Ref Range Status   04/21/2023 9.1 8.4 - 10.2 mg/dL Final     Total Protein   Date Value Ref Range Status   04/21/2023 7.3 6.0 - 8.4 g/dL Final     Albumin   Date Value Ref Range Status   04/21/2023 4.3 3.5 - 5.2 g/dL Final     Total Bilirubin   Date Value Ref Range Status   04/21/2023 0.4 0.2 - 1.3 mg/dL Final     Alkaline Phosphatase   Date Value Ref Range Status   04/21/2023 91 38 - 145 U/L Final     AST   Date Value Ref Range Status   04/21/2023 25 17 - 59 U/L Final     ALT   Date Value  Ref Range Status   04/21/2023 21 0 - 50 U/L Final     Anion Gap   Date Value Ref Range Status   04/21/2023 9 8 - 16 mmol/L Final     eGFR if    Date Value Ref Range Status   06/16/2022 >60 >60 mL/min/1.73 m^2 Final     eGFR if non    Date Value Ref Range Status   06/16/2022 >60 >60 mL/min/1.73 m^2 Final     Comment:     Calculation used to obtain the estimated glomerular filtration  rate (eGFR) is the CKD-EPI equation.          Lab Results   Component Value Date    LIPASE 30 10/18/2022     Lab Results   Component Value Date    LIPASERES 61 04/21/2023             Reviewed prior medical records including radiology report of US of abdomen 10/21/22 and CT Urogram abd pelvis 11/1/22 & endoscopy history (see surgical history).     Objective:      Physical Exam  Constitutional:       General: He is not in acute distress.     Appearance: He is well-developed.   HENT:      Head: Normocephalic.      Right Ear: Hearing normal.      Left Ear: Hearing normal.      Nose: Nose normal.      Mouth/Throat:      Mouth: No oral lesions.      Pharynx: Uvula midline.   Eyes:      General: Lids are normal.      Conjunctiva/sclera: Conjunctivae normal.      Pupils: Pupils are equal, round, and reactive to light.   Neck:      Trachea: Trachea normal.   Cardiovascular:      Rate and Rhythm: Normal rate and regular rhythm.      Heart sounds: Murmur heard.   Pulmonary:      Effort: Pulmonary effort is normal. No respiratory distress.      Breath sounds: Normal breath sounds. No stridor. No wheezing.   Abdominal:      General: Bowel sounds are normal. There is no distension.      Palpations: Abdomen is soft. There is no mass.      Tenderness: There is abdominal tenderness (mild) in the left upper quadrant. There is no guarding or rebound.   Musculoskeletal:         General: Normal range of motion.      Cervical back: Normal range of motion.   Skin:     General: Skin is warm and dry.      Findings: No rash.       Comments: Non jaundiced   Neurological:      Mental Status: He is alert and oriented to person, place, and time.      Comments: Left sided weakness   Psychiatric:         Speech: Speech normal.         Behavior: Behavior normal. Behavior is cooperative.         Assessment:       1. Chest pain of uncertain etiology    2. History of gastritis    3. S/P cholecystectomy    4. History of colon polyps    5. Current use of anticoagulant therapy           Plan:   All diagnoses and orders for this visit:    Chest pain of uncertain etiology & History of gastritis  - Start: pantoprazole (PROTONIX) 40 MG tablet; Take 1 tablet (40 mg total) by mouth once daily.  Dispense: 30 tablet; Refill: 2  - Take PPI in the morning 30 minutes before breakfast  - Recommend to avoid large meals, avoid eating within 3 hours of bedtime, elevate head of bed if nocturnal symptoms are present, smoking cessation (if current smoker), & weight loss (if overweight).   - Recommend minimize/avoid high-fat foods, chocolate, caffeine, citrus, alcohol, & tomato products.  - Advised to avoid/limit use of NSAID's, since they can cause GI upset, bleeding, and/or ulcers. If needed, take with food.    - Recommend follow-up with cardiology for continued evaluation and management    S/P cholecystectomy    History of colon polyps   - Recommend colonoscopy once patient able to stop Plavix (3 CVA's in 3/2023)    Current use of anticoagulant therapy    If no improvement in symptoms or symptoms worsen, call/follow-up at clinic or go to ER

## 2023-05-11 ENCOUNTER — HOSPITAL ENCOUNTER (OUTPATIENT)
Dept: RADIOLOGY | Facility: HOSPITAL | Age: 58
Discharge: HOME | End: 2023-05-11
Attending: ORTHOPAEDIC SURGERY
Payer: MEDICARE

## 2023-05-11 ENCOUNTER — OFFICE VISIT (OUTPATIENT)
Dept: ORTHOPEDICS | Facility: CLINIC | Age: 58
End: 2023-05-11
Payer: MEDICARE

## 2023-05-11 VITALS — BODY MASS INDEX: 27.92 KG/M2 | WEIGHT: 195 LBS | HEIGHT: 70 IN

## 2023-05-11 DIAGNOSIS — M23.51 CHRONIC INSTABILITY OF RIGHT KNEE: Primary | ICD-10-CM

## 2023-05-11 DIAGNOSIS — M25.561 RIGHT KNEE PAIN, UNSPECIFIED CHRONICITY: ICD-10-CM

## 2023-05-11 PROCEDURE — 73564 X-RAY EXAM KNEE 4 OR MORE: CPT | Mod: TC,PO,RT

## 2023-05-11 PROCEDURE — 99213 PR OFFICE/OUTPT VISIT, EST, LEVL III, 20-29 MIN: ICD-10-PCS | Mod: S$GLB,,, | Performed by: ORTHOPAEDIC SURGERY

## 2023-05-11 PROCEDURE — 73564 X-RAY EXAM KNEE 4 OR MORE: CPT | Mod: 26,RT,, | Performed by: RADIOLOGY

## 2023-05-11 PROCEDURE — 1159F MED LIST DOCD IN RCRD: CPT | Mod: CPTII,S$GLB,, | Performed by: ORTHOPAEDIC SURGERY

## 2023-05-11 PROCEDURE — 3008F BODY MASS INDEX DOCD: CPT | Mod: CPTII,S$GLB,, | Performed by: ORTHOPAEDIC SURGERY

## 2023-05-11 PROCEDURE — 99999 PR PBB SHADOW E&M-EST. PATIENT-LVL IV: ICD-10-PCS | Mod: PBBFAC,,, | Performed by: ORTHOPAEDIC SURGERY

## 2023-05-11 PROCEDURE — 1160F PR REVIEW ALL MEDS BY PRESCRIBER/CLIN PHARMACIST DOCUMENTED: ICD-10-PCS | Mod: CPTII,S$GLB,, | Performed by: ORTHOPAEDIC SURGERY

## 2023-05-11 PROCEDURE — 99999 PR PBB SHADOW E&M-EST. PATIENT-LVL IV: CPT | Mod: PBBFAC,,, | Performed by: ORTHOPAEDIC SURGERY

## 2023-05-11 PROCEDURE — 3072F PR LOW RISK FOR RETINOPATHY: ICD-10-PCS | Mod: CPTII,S$GLB,, | Performed by: ORTHOPAEDIC SURGERY

## 2023-05-11 PROCEDURE — 3072F LOW RISK FOR RETINOPATHY: CPT | Mod: CPTII,S$GLB,, | Performed by: ORTHOPAEDIC SURGERY

## 2023-05-11 PROCEDURE — 3044F HG A1C LEVEL LT 7.0%: CPT | Mod: CPTII,S$GLB,, | Performed by: ORTHOPAEDIC SURGERY

## 2023-05-11 PROCEDURE — 1160F RVW MEDS BY RX/DR IN RCRD: CPT | Mod: CPTII,S$GLB,, | Performed by: ORTHOPAEDIC SURGERY

## 2023-05-11 PROCEDURE — 1159F PR MEDICATION LIST DOCUMENTED IN MEDICAL RECORD: ICD-10-PCS | Mod: CPTII,S$GLB,, | Performed by: ORTHOPAEDIC SURGERY

## 2023-05-11 PROCEDURE — 99213 OFFICE O/P EST LOW 20 MIN: CPT | Mod: S$GLB,,, | Performed by: ORTHOPAEDIC SURGERY

## 2023-05-11 PROCEDURE — 3044F PR MOST RECENT HEMOGLOBIN A1C LEVEL <7.0%: ICD-10-PCS | Mod: CPTII,S$GLB,, | Performed by: ORTHOPAEDIC SURGERY

## 2023-05-11 PROCEDURE — 3008F PR BODY MASS INDEX (BMI) DOCUMENTED: ICD-10-PCS | Mod: CPTII,S$GLB,, | Performed by: ORTHOPAEDIC SURGERY

## 2023-05-11 PROCEDURE — 73562 XR KNEE ORTHO RIGHT WITH FLEXION: ICD-10-PCS | Mod: 26,LT,, | Performed by: RADIOLOGY

## 2023-05-11 PROCEDURE — 73562 X-RAY EXAM OF KNEE 3: CPT | Mod: 26,LT,, | Performed by: RADIOLOGY

## 2023-05-11 PROCEDURE — 73564 XR KNEE ORTHO RIGHT WITH FLEXION: ICD-10-PCS | Mod: 26,RT,, | Performed by: RADIOLOGY

## 2023-05-11 NOTE — PROGRESS NOTES
Confirmed pt name and . Verified medication with Nora SANCHEZ LPN. Administered Cyanocobalamin 1,000mcg IM per MD orders to R deltoid. Pt tolerated well. Scheduled for next injection.

## 2023-05-11 NOTE — PROGRESS NOTES
Subjective:      Patient ID: Ryan Crane is a 57 y.o. male.    Chief Complaint: Pain of the Right Knee    This is a 57-year-old gentleman who comes in presenting with right knee pain and instability.  He has a history of strokes.  He is using a walker today.  He presented to the emergency room due to the instability they tried to place him in a knee immobilizer however he was unable to ambulate with a knee immobilizer so he has not been using any type of brace on his knee    Pain    Review of Systems   Musculoskeletal:  Positive for joint pain and stiffness.       Objective:    Ortho Exam     Examination of the right knee demonstrates no effusion he does have a bruise on the medial aspect secondary to a fall he has laxity with both varus and valgus stress ACL appears to be intact PCL is intact        X-Ray Knee 3 View Right  Narrative: EXAMINATION:  XR KNEE 3 VIEW RIGHT    INDICATION:  Pain in unspecified knee    COMPARISON:  Right knee radiographs from 02/14/2022    FINDINGS:  AP, oblique and lateral views of the right knee are obtained.  There is no fracture.  Alignment is anatomic.  No joint effusion is present.  There are minimal degenerative changes at the medial compartment manifesting as nonuniform joint space narrowing and tiny marginal osteophyte formation.  No chondrocalcinosis.  Impression: 1. Minimal degenerative changes at the medial compartment, similar compared to 02/14/2022.    Electronically signed by: Elieser Mackay MD  Date:    05/04/2023  Time:    16:08  CT Cervical Spine Without Contrast  Narrative: EXAMINATION:  CT CERVICAL SPINE WITHOUT CONTRAST    CLINICAL HISTORY:  Fall    TECHNIQUE:  Axial CT images were obtained through the cervical spine without contrast.    Coronal and sagittal reconstructions submitted and interpreted.  Total .  Automated exposure control utilized.    COMPARISON:  None    FINDINGS:  No acute fracture or traumatic subluxation.  Vertebral bodies are  normal in height.  Mild multilevel facet hypertrophy and disc space narrowing are present .    Prevertebral soft tissues are normal.  Lung apices are clear.  Impression: No CT evidence of acute cervical spine injury    Electronically signed by: Дмитрий Hu MD  Date:    05/04/2023  Time:    16:04  CT Head Without Contrast  Narrative: EXAMINATION:  CT HEAD WITHOUT CONTRAST    INDICATION:  Head trauma, moderate-severe;    TECHNIQUE:  Contiguous axial images are acquired through the head without IV contrast.  These images were reconstructed into the coronal and sagittal plane.  Automated exposure control, dose radiation lowering technique was utilized.    COMPARISON:  Head CT from 04/05/2023    FINDINGS:  Clear mastoid air cells.  Partially visualized paranasal sinuses are clear.  Intact calvarium.  There appears to be a posterior fossa arachnoid cyst, similar compared to priors.    No extra-axial fluid collection, contusion or hemorrhage.  Patent basilar cisterns.  There is no midline shift.    Remote appearing lacunar infarct is present at the deep white white matter of the right frontal lobe.  Small area of encephalomalacia is present at the posterior aspect of the right temporal lobe.  Impression: 1. No evidence of acute intracranial injury.    Electronically signed by: Elieser Mackay MD  Date:    05/04/2023  Time:    16:03       My Findings:    X-Ray:  X-rays were reviewed he has mild arthritic change bilateral knee    MRI Review: N/A      Assessment:       Encounter Diagnosis   Name Primary?    Chronic instability of right knee Yes         Plan:         57-year-old male with buckling of the right knee we will place him in a hinged knee brace today hopefully this will help stabilize the knee we will follow up with him p.r.n. I did provide assistance in fitting of the hinged knee brace    No orders of the defined types were placed in this encounter.            Past Medical History:   Diagnosis Date    Aortic  valve stenosis 02/28/2022    Arachnoid cyst of posterior cranial fossa 01/13/2022    Bilateral carotid bruits 06/22/2021    Bipolar disorder 01/16/2022    CAD S/P percutaneous coronary angioplasty     3 vessel disease    Calculus of gallbladder without cholecystitis without obstruction 01/11/2023    Cancer     Candidal intertrigo 11/11/2019    Cataract     Chronic heart failure with preserved ejection fraction 03/29/2023    Chronic schizophrenia     Colon polyps     Diabetic polyneuropathy associated with diabetes mellitus due to underlying condition 11/21/2019    Dysarthria as late effect of cerebellar cerebrovascular accident (CVA) 03/24/2023    Equinus deformity of both feet 11/21/2019    Flaccid hemiplegia of left nondominant side as late effect of cerebral infarction 03/17/2023    Flat foot 11/21/2019    Gastritis     Gastrointestinal hemorrhage 12/13/2019    Post polypectomy bleeding    General anesthetics causing adverse effect in therapeutic use     Hammer toes of both feet 11/21/2019    Hx of diabetic foot ulcer 11/21/2019    Hypertension     Hyponatremia 01/11/2022    ELAINE (iron deficiency anemia) 12/10/2019    Intractable chronic migraine without aura and without status migrainosus 12/30/2021    20 year history of migraine headaches. Headaches are typically moderate to severe in intensity, worsen with activity, pounding in quality and associated with sensitivity to light and sound. Given history of reported brain tumor, agree with head CT today. Cannot do MRI due to metal clips.   Galcanezumab (Emgality) treatment was approved for the prevention of acute and chronic migraine on 9/27/2018.    Microcytic anemia 10/09/2019    Migraine headache 12/30/2021    20 year history of migraine headaches. Headaches are typically moderate to severe in intensity, worsen with activity, pounding in quality and associated with sensitivity to light and sound. Given history of reported brain tumor, agree with head CT today.  Cannot do MRI due to metal clips.   Galcanezumab (Emgality) treatment was approved for the prevention of acute and chronic migraine on 9/27/2018.    Moderate aortic regurgitation 03/29/2023    Moderate mitral regurgitation 03/29/2023    Moderate mitral stenosis 03/29/2023    Moderate to severe aortic stenosis 02/28/2022    Myocardial infarct, old     Nicotine dependence, unspecified, uncomplicated 01/16/2022    Onychomycosis due to dermatophyte 11/21/2019    Orchitis 11/09/2019    PIPER on CPAP     Peripheral visual field defect, bilateral 01/13/2022    PVD (peripheral vascular disease) 11/21/2019    Seizures 2008    Stage 2 moderate COPD by GOLD classification     PFTs w/ FEV1 (73%) (9/2022)    Stroke 2005    Thoracic aortic atherosclerosis 10/17/2022    CT chest    Thyroid disease     Previously on pharmacologic Tx    Tinea pedis of right foot 05/21/2019    Tobacco use disorder     Type 2 diabetes mellitus with diabetic peripheral angiopathy without gangrene     A1c 5.1% (3/2023)    Vitamin B12 deficiency 03/24/2023     Past Surgical History:   Procedure Laterality Date    ANGIOGRAM, CORONARY, WITH LEFT HEART CATHETERIZATION  08/25/2022    Procedure: Angiogram, Coronary, with Left Heart Cath;  Surgeon: Mai Deleon MD;  Location: [facility] CATH;  Service: Cardiology;;    APPENDECTOMY      BONE MARROW ASPIRATION Left 08/21/2020    Procedure: ASPIRATION, BONE MARROW;  Surgeon: Lex Kathleen MD;  Location: [facility] OR;  Service: Oncology;  Laterality: Left;    CHOLECYSTECTOMY      CLOSURE OF WOUND Right 09/09/2019    Procedure: CLOSURE, WOUND;  Surgeon: Li Kathleen DPM;  Location: [facility] OR;  Service: Podiatry;  Laterality: Right;    COLONOSCOPY N/A 12/10/2019    Procedure: COLONOSCOPY;  Surgeon: Ryan Lucas MD;  Location: [facility] ENDO;  Service: Endoscopy;  Laterality: N/A;    COLONOSCOPY N/A 12/13/2019    Procedure: COLONOSCOPY;  Surgeon: Ryan Lucas MD;  Location: [facility] ENDO;  Service: Endoscopy;  Laterality:  N/A;    CORONARY STENT PLACEMENT      ESOPHAGOGASTRODUODENOSCOPY N/A 12/10/2019    Procedure: EGD (ESOPHAGOGASTRODUODENOSCOPY);  Surgeon: Ryan Lucas MD;  Location: [facility] ENDO;  Service: Endoscopy;  Laterality: N/A;    ESOPHAGOGASTRODUODENOSCOPY N/A 11/03/2022    Procedure: EGD (ESOPHAGOGASTRODUODENOSCOPY);  Surgeon: Ryan Lucas MD;  Location: [facility] ENDO;  Service: Endoscopy;  Laterality: N/A;    LAPAROSCOPIC CHOLECYSTECTOMY N/A 01/11/2023    Procedure: CHOLECYSTECTOMY, LAPAROSCOPIC;  Surgeon: Laith Davis MD;  Location: [facility] OR;  Service: General;  Laterality: N/A;    LEFT HEART CATHETERIZATION Left 08/25/2022    Procedure: Left heart cath;  Surgeon: Mai Deleon MD;  Location: [facility] CATH;  Service: Cardiology;  Laterality: Left;    right heel surgery      SHOULDER ARTHROSCOPY Left     UPPER GASTROINTESTINAL ENDOSCOPY           Current Outpatient Medications:     albuterol (PROVENTIL/VENTOLIN HFA) 90 mcg/actuation inhaler, Inhale 2 puffs into the lungs every 6 (six) hours as needed for Shortness of Breath or Wheezing., Disp: , Rfl:     albuterol-ipratropium (DUO-NEB) 2.5 mg-0.5 mg/3 mL nebulizer solution, Take 3 mLs by nebulization every 6 (six) hours as needed for Wheezing. Rescue, Disp: 75 mL, Rfl: 0    amLODIPine (NORVASC) 2.5 MG tablet, Take 1 tablet (2.5 mg total) by mouth once daily., Disp: 90 tablet, Rfl: 3    amLODIPine (NORVASC) 2.5 MG tablet, Take 1 tablet by mouth once daily., Disp: , Rfl:     aspirin (ECOTRIN) 81 MG EC tablet, Take 1 tablet (81 mg total) by mouth once daily., Disp: 30 tablet, Rfl: 3    atorvastatin (LIPITOR) 40 MG tablet, Take 1 tablet (40 mg total) by mouth once daily., Disp: 90 tablet, Rfl: 3    busPIRone (BUSPAR) 15 MG tablet, Take 1 tablet (15 mg total) by mouth 3 (three) times daily., Disp: 270 tablet, Rfl: 1    clopidogreL (PLAVIX) 75 mg tablet, Take 1 tablet (75 mg total) by mouth once daily., Disp: 30 tablet, Rfl: 11    cyanocobalamin 1,000 mcg/mL injection, 1  ml sq daily x 5 days, then 1 ml sq weekly x 4 weeks then q monthly -  will need to go to PCP, Disp: 9 mL, Rfl: 0    fluticasone propionate (FLONASE) 50 mcg/actuation nasal spray, 2 sprays by Nasal route every evening., Disp: , Rfl:     fluticasone-umeclidin-vilanter (TRELEGY ELLIPTA) 100-62.5-25 mcg DsDv, Inhale 1 puff into the lungs once daily., Disp: 28 each, Rfl: 11    galcanezumab-gnlm (EMGALITY SYRINGE) 120 mg/mL Syrg, Inject 1 pen (120 mg) into the skin every 28 days., Disp: 1 mL, Rfl: 11    HYDROcodone-acetaminophen (NORCO) 5-325 mg per tablet, Take 1 tablet by mouth every 4 (four) hours as needed for Pain., Disp: 12 tablet, Rfl: 0    Lactobac. rhamnosus GG-inulin 10 billion cell -200 mg Chew, Take 1 tablet by mouth once daily., Disp: , Rfl:     lamoTRIgine (LAMICTAL) 200 MG tablet, Take 200 mg by mouth once daily., Disp: , Rfl:     loratadine (CLARITIN) 10 mg tablet, Take 1 each by mouth once daily., Disp: , Rfl:     metoprolol succinate (TOPROL-XL) 50 MG 24 hr tablet, Take 1 tablet by mouth once daily., Disp: , Rfl:     metoprolol succinate 50 mg CSpX, 1 tablet EVERY PM (route: oral), Disp: , Rfl:     mirtazapine (REMERON) 15 MG tablet, Take 15 mg by mouth every evening., Disp: , Rfl:     nicotine (NICODERM CQ) 21 mg/24 hr, Place 1 patch onto the skin once daily., Disp: 28 patch, Rfl: 2    nicotine polacrilex 4 MG Lozg, Take up to 8 pieces daily as needed, Disp: 270 lozenge, Rfl: 2    nitroGLYCERIN (NITROSTAT) 0.4 MG SL tablet, Place 1 tablet (0.4 mg total) under the tongue every 5 (five) minutes as needed for Chest pain., Disp: 25 tablet, Rfl: 11    pantoprazole (PROTONIX) 40 MG tablet, Take 1 tablet (40 mg total) by mouth once daily., Disp: 30 tablet, Rfl: 2    pregabalin (LYRICA) 50 MG capsule, Take 1 capsule (50 mg total) by mouth 3 (three) times daily., Disp: 270 capsule, Rfl: 1    ranolazine (RANEXA) 500 MG Tb12, Take 1 tablet (500 mg total) by mouth 2 (two) times daily., Disp: 180 tablet, Rfl: 3     rimegepant (NURTEC) 75 mg odt, Take 1 tablet (75 mg total) by mouth daily as needed. Place ODT tablet on the tongue; alternatively the ODT tablet may be placed under the tongue, Disp: 16 tablet, Rfl: 11    sodium chloride 1,000 mg TbSO tablet tbso, 1 tablet by Misc.(Non-Drug; Combo Route) route 3 (three) times daily., Disp: , Rfl:     tadalafiL (CIALIS) 20 MG Tab, Take 1 tablet by mouth once daily., Disp: , Rfl:     tadalafiL (CIALIS) 20 MG Tab, Take 1 tablet (20 mg total) by mouth once daily., Disp: 10 tablet, Rfl: 11    topiramate (TOPAMAX) 200 MG Tab, Take 200 mg by mouth every evening., Disp: , Rfl:     ziprasidone (GEODON) 80 MG capsule, Take 160 mg by mouth every evening. , Disp: , Rfl:     Current Facility-Administered Medications:     cyanocobalamin injection 1,000 mcg, 1,000 mcg, Intramuscular, Q14 Days, Ana Mayer MD, 1,000 mcg at 05/03/23 0821    Review of patient's allergies indicates:   Allergen Reactions    Alcohol Anaphylaxis     Pt states all types of alcohol    Alcohol antiseptic pads Anaphylaxis    Cephalexin Anaphylaxis       Family History   Problem Relation Age of Onset    Melanoma Mother     Diabetes Mother     Hypertension Mother     Stroke Father     Diabetes Father     Hypertension Father     Colon cancer Father         unsure of age of diagnosis    Cancer Father         colon cancer    Cervical cancer Sister     Cirrhosis Brother     Hypertension Brother     Lung cancer Maternal Grandmother     Prostate cancer Maternal Grandfather     Crohn's disease Neg Hx     Ulcerative colitis Neg Hx     Stomach cancer Neg Hx     Esophageal cancer Neg Hx     Retinal detachment Neg Hx     Strabismus Neg Hx     Macular degeneration Neg Hx     Glaucoma Neg Hx      Social History     Occupational History    Occupation: disabled (mental)     Comment: since 2000   Tobacco Use    Smoking status: Every Day     Packs/day: 0.25     Years: 43.00     Pack years: 10.75     Types: Cigarettes     Start date:  1979     Last attempt to quit: 2022     Years since quittin.4    Smokeless tobacco: Never    Tobacco comments:     Age Started 14    Substance and Sexual Activity    Alcohol use: No    Drug use: Not Currently     Types: Marijuana    Sexual activity: Yes     Partners: Female       MARILEE Her MD

## 2023-05-15 ENCOUNTER — DOCUMENT SCAN (OUTPATIENT)
Dept: HOME HEALTH SERVICES | Facility: HOSPITAL | Age: 58
End: 2023-05-15
Payer: MEDICARE

## 2023-05-15 ENCOUNTER — NURSE TRIAGE (OUTPATIENT)
Dept: ADMINISTRATIVE | Facility: CLINIC | Age: 58
End: 2023-05-15
Payer: MEDICARE

## 2023-05-15 NOTE — TELEPHONE ENCOUNTER
Pt calling with c/o back pain and he said that he has an appt on 5/25 and I triaged him today and care advice to the office now. Pt told he would have to find a ride and told to go to UC or ED once he found one if able. Pt said that he was going to discuss this with MD during appt next week. Pt told to call back if any other questions or concern. Pt said ok he will and will try to get a ride.I will route to PCP               Reason for Disposition   SEVERE back pain (e.g., excruciating, unable to do any normal activities) and not improved after pain medicine and CARE ADVICE    Additional Information   Negative: Passed out (i.e., fainted, collapsed and was not responding)   Negative: Shock suspected (e.g., cold/pale/clammy skin, too weak to stand, low BP, rapid pulse)   Negative: Sounds like a life-threatening emergency to the triager   Negative: SEVERE back pain of sudden onset and age > 60 years   Negative: SEVERE abdominal pain (e.g., excruciating)   Negative: Abdominal pain and age > 60 years   Negative: Unable to urinate (or only a few drops) and bladder feels very full   Negative: Loss of bladder or bowel control (urine or bowel incontinence; wetting self, leaking stool) of new-onset   Negative: Numbness (loss of sensation) in groin or rectal area   Negative: Pain radiates into groin, scrotum   Negative: Blood in urine (red, pink, or tea-colored)   Negative: Vomiting and pain over lower ribs of back (i.e., flank - kidney area)   Negative: Weakness of a leg or foot (e.g., unable to bear weight, dragging foot)   Negative: Patient sounds very sick or weak to the triager   Negative: Fever > 100.4 F (38.0 C) and flank pain   Negative: Pain or burning with passing urine (urination)    Protocols used: Back Pain-A-[place]

## 2023-05-17 ENCOUNTER — CLINICAL SUPPORT (OUTPATIENT)
Dept: FAMILY MEDICINE | Facility: CLINIC | Age: 58
End: 2023-05-17
Payer: MEDICARE

## 2023-05-17 DIAGNOSIS — E53.8 VITAMIN B12 DEFICIENCY: Primary | ICD-10-CM

## 2023-05-17 PROCEDURE — 99999 PR PBB SHADOW E&M-EST. PATIENT-LVL I: CPT | Mod: PBBFAC,,,

## 2023-05-17 PROCEDURE — 96372 PR INJECTION,THERAP/PROPH/DIAG2ST, IM OR SUBCUT: ICD-10-PCS | Mod: ,,, | Performed by: STUDENT IN AN ORGANIZED HEALTH CARE EDUCATION/TRAINING PROGRAM

## 2023-05-17 PROCEDURE — 99999 PR PBB SHADOW E&M-EST. PATIENT-LVL I: ICD-10-PCS | Mod: PBBFAC,,,

## 2023-05-17 PROCEDURE — 96372 THER/PROPH/DIAG INJ SC/IM: CPT | Mod: ,,, | Performed by: STUDENT IN AN ORGANIZED HEALTH CARE EDUCATION/TRAINING PROGRAM

## 2023-05-17 PROCEDURE — 99211 OFF/OP EST MAY X REQ PHY/QHP: CPT | Mod: PN

## 2023-05-17 RX ORDER — CYANOCOBALAMIN 1000 UG/ML
1000 INJECTION, SOLUTION INTRAMUSCULAR; SUBCUTANEOUS
Status: SHIPPED | OUTPATIENT
Start: 2023-05-17

## 2023-05-17 RX ADMIN — CYANOCOBALAMIN 1000 MCG: 1000 INJECTION, SOLUTION INTRAMUSCULAR; SUBCUTANEOUS at 03:05

## 2023-05-17 NOTE — PROGRESS NOTES
Patient presents to the office for a B12 injection.   B12 injection administered into his Right deltoid per patient request.   Patient tolerated injection well with no complaints

## 2023-05-23 RX ORDER — RANOLAZINE 500 MG/1
500 TABLET, EXTENDED RELEASE ORAL 2 TIMES DAILY
Qty: 180 TABLET | Refills: 3 | Status: SHIPPED | OUTPATIENT
Start: 2023-05-23 | End: 2024-05-22

## 2023-05-24 ENCOUNTER — CLINICAL SUPPORT (OUTPATIENT)
Dept: FAMILY MEDICINE | Facility: CLINIC | Age: 58
End: 2023-05-24
Payer: MEDICARE

## 2023-05-24 ENCOUNTER — DOCUMENT SCAN (OUTPATIENT)
Dept: HOME HEALTH SERVICES | Facility: HOSPITAL | Age: 58
End: 2023-05-24
Payer: MEDICARE

## 2023-05-24 DIAGNOSIS — E53.8 VITAMIN B12 DEFICIENCY: Primary | ICD-10-CM

## 2023-05-24 PROCEDURE — 96372 PR INJECTION,THERAP/PROPH/DIAG2ST, IM OR SUBCUT: ICD-10-PCS | Mod: S$GLB,,, | Performed by: STUDENT IN AN ORGANIZED HEALTH CARE EDUCATION/TRAINING PROGRAM

## 2023-05-24 PROCEDURE — 96372 THER/PROPH/DIAG INJ SC/IM: CPT | Mod: S$GLB,,, | Performed by: STUDENT IN AN ORGANIZED HEALTH CARE EDUCATION/TRAINING PROGRAM

## 2023-05-24 RX ADMIN — CYANOCOBALAMIN 1000 MCG: 1000 INJECTION, SOLUTION INTRAMUSCULAR; SUBCUTANEOUS at 10:05

## 2023-05-24 NOTE — PROGRESS NOTES
Confirmed pt name and . Verified medication with Mally GOLDMAN MA. Administered Cyanocobalamin 1,000 mcg IM to R deltoid. Pt tolerated well. Upcoming appt with Dr. Mayer on 23. Per pt B12 injections Q14 days. Ok per Dr. Mayer. Scheduled for next nurse visit on 23. KM

## 2023-05-25 ENCOUNTER — HOSPITAL ENCOUNTER (OUTPATIENT)
Dept: RADIOLOGY | Facility: HOSPITAL | Age: 58
Discharge: HOME | End: 2023-05-25
Attending: STUDENT IN AN ORGANIZED HEALTH CARE EDUCATION/TRAINING PROGRAM
Payer: MEDICARE

## 2023-05-25 ENCOUNTER — OFFICE VISIT (OUTPATIENT)
Dept: FAMILY MEDICINE | Facility: CLINIC | Age: 58
End: 2023-05-25
Payer: MEDICARE

## 2023-05-25 VITALS
BODY MASS INDEX: 27.57 KG/M2 | RESPIRATION RATE: 18 BRPM | DIASTOLIC BLOOD PRESSURE: 68 MMHG | SYSTOLIC BLOOD PRESSURE: 114 MMHG | HEIGHT: 70 IN | WEIGHT: 192.56 LBS

## 2023-05-25 DIAGNOSIS — M54.50 ACUTE BILATERAL LOW BACK PAIN WITHOUT SCIATICA: Primary | ICD-10-CM

## 2023-05-25 DIAGNOSIS — M54.6 ACUTE BILATERAL THORACIC BACK PAIN: ICD-10-CM

## 2023-05-25 DIAGNOSIS — M54.50 ACUTE BILATERAL LOW BACK PAIN WITHOUT SCIATICA: ICD-10-CM

## 2023-05-25 PROBLEM — N52.01 ERECTILE DYSFUNCTION DUE TO ARTERIAL INSUFFICIENCY: Chronic | Status: ACTIVE | Noted: 2023-05-25

## 2023-05-25 PROBLEM — N40.0 BENIGN PROSTATIC HYPERPLASIA WITHOUT LOWER URINARY TRACT SYMPTOMS: Status: ACTIVE | Noted: 2023-05-25

## 2023-05-25 PROCEDURE — 1159F MED LIST DOCD IN RCRD: CPT | Mod: CPTII,S$GLB,, | Performed by: STUDENT IN AN ORGANIZED HEALTH CARE EDUCATION/TRAINING PROGRAM

## 2023-05-25 PROCEDURE — 99214 OFFICE O/P EST MOD 30 MIN: CPT | Mod: S$GLB,,, | Performed by: STUDENT IN AN ORGANIZED HEALTH CARE EDUCATION/TRAINING PROGRAM

## 2023-05-25 PROCEDURE — 1159F PR MEDICATION LIST DOCUMENTED IN MEDICAL RECORD: ICD-10-PCS | Mod: CPTII,S$GLB,, | Performed by: STUDENT IN AN ORGANIZED HEALTH CARE EDUCATION/TRAINING PROGRAM

## 2023-05-25 PROCEDURE — 1160F PR REVIEW ALL MEDS BY PRESCRIBER/CLIN PHARMACIST DOCUMENTED: ICD-10-PCS | Mod: CPTII,S$GLB,, | Performed by: STUDENT IN AN ORGANIZED HEALTH CARE EDUCATION/TRAINING PROGRAM

## 2023-05-25 PROCEDURE — 72070 X-RAY EXAM THORAC SPINE 2VWS: CPT | Mod: TC,PN

## 2023-05-25 PROCEDURE — 3044F HG A1C LEVEL LT 7.0%: CPT | Mod: CPTII,S$GLB,, | Performed by: STUDENT IN AN ORGANIZED HEALTH CARE EDUCATION/TRAINING PROGRAM

## 2023-05-25 PROCEDURE — 99999 PR PBB SHADOW E&M-EST. PATIENT-LVL V: ICD-10-PCS | Mod: PBBFAC,,, | Performed by: STUDENT IN AN ORGANIZED HEALTH CARE EDUCATION/TRAINING PROGRAM

## 2023-05-25 PROCEDURE — 3078F DIAST BP <80 MM HG: CPT | Mod: CPTII,S$GLB,, | Performed by: STUDENT IN AN ORGANIZED HEALTH CARE EDUCATION/TRAINING PROGRAM

## 2023-05-25 PROCEDURE — 72070 X-RAY EXAM THORAC SPINE 2VWS: CPT | Mod: 26,,, | Performed by: RADIOLOGY

## 2023-05-25 PROCEDURE — 3078F PR MOST RECENT DIASTOLIC BLOOD PRESSURE < 80 MM HG: ICD-10-PCS | Mod: CPTII,S$GLB,, | Performed by: STUDENT IN AN ORGANIZED HEALTH CARE EDUCATION/TRAINING PROGRAM

## 2023-05-25 PROCEDURE — 72100 XR LUMBAR SPINE AP AND LATERAL: ICD-10-PCS | Mod: 26,,, | Performed by: RADIOLOGY

## 2023-05-25 PROCEDURE — 3072F PR LOW RISK FOR RETINOPATHY: ICD-10-PCS | Mod: CPTII,S$GLB,, | Performed by: STUDENT IN AN ORGANIZED HEALTH CARE EDUCATION/TRAINING PROGRAM

## 2023-05-25 PROCEDURE — 3008F BODY MASS INDEX DOCD: CPT | Mod: CPTII,S$GLB,, | Performed by: STUDENT IN AN ORGANIZED HEALTH CARE EDUCATION/TRAINING PROGRAM

## 2023-05-25 PROCEDURE — 3072F LOW RISK FOR RETINOPATHY: CPT | Mod: CPTII,S$GLB,, | Performed by: STUDENT IN AN ORGANIZED HEALTH CARE EDUCATION/TRAINING PROGRAM

## 2023-05-25 PROCEDURE — 72070 XR THORACIC SPINE AP LATERAL: ICD-10-PCS | Mod: 26,,, | Performed by: RADIOLOGY

## 2023-05-25 PROCEDURE — 99999 PR PBB SHADOW E&M-EST. PATIENT-LVL V: CPT | Mod: PBBFAC,,, | Performed by: STUDENT IN AN ORGANIZED HEALTH CARE EDUCATION/TRAINING PROGRAM

## 2023-05-25 PROCEDURE — 3008F PR BODY MASS INDEX (BMI) DOCUMENTED: ICD-10-PCS | Mod: CPTII,S$GLB,, | Performed by: STUDENT IN AN ORGANIZED HEALTH CARE EDUCATION/TRAINING PROGRAM

## 2023-05-25 PROCEDURE — 1160F RVW MEDS BY RX/DR IN RCRD: CPT | Mod: CPTII,S$GLB,, | Performed by: STUDENT IN AN ORGANIZED HEALTH CARE EDUCATION/TRAINING PROGRAM

## 2023-05-25 PROCEDURE — 99214 PR OFFICE/OUTPT VISIT, EST, LEVL IV, 30-39 MIN: ICD-10-PCS | Mod: S$GLB,,, | Performed by: STUDENT IN AN ORGANIZED HEALTH CARE EDUCATION/TRAINING PROGRAM

## 2023-05-25 PROCEDURE — 3044F PR MOST RECENT HEMOGLOBIN A1C LEVEL <7.0%: ICD-10-PCS | Mod: CPTII,S$GLB,, | Performed by: STUDENT IN AN ORGANIZED HEALTH CARE EDUCATION/TRAINING PROGRAM

## 2023-05-25 PROCEDURE — 72100 X-RAY EXAM L-S SPINE 2/3 VWS: CPT | Mod: TC,PN

## 2023-05-25 PROCEDURE — 72100 X-RAY EXAM L-S SPINE 2/3 VWS: CPT | Mod: 26,,, | Performed by: RADIOLOGY

## 2023-05-25 PROCEDURE — 3074F SYST BP LT 130 MM HG: CPT | Mod: CPTII,S$GLB,, | Performed by: STUDENT IN AN ORGANIZED HEALTH CARE EDUCATION/TRAINING PROGRAM

## 2023-05-25 PROCEDURE — 3074F PR MOST RECENT SYSTOLIC BLOOD PRESSURE < 130 MM HG: ICD-10-PCS | Mod: CPTII,S$GLB,, | Performed by: STUDENT IN AN ORGANIZED HEALTH CARE EDUCATION/TRAINING PROGRAM

## 2023-05-25 RX ORDER — TRAMADOL HYDROCHLORIDE 50 MG/1
50 TABLET ORAL EVERY 8 HOURS PRN
Qty: 15 TABLET | Refills: 0 | Status: SHIPPED | OUTPATIENT
Start: 2023-05-25 | End: 2023-10-03

## 2023-05-25 NOTE — PROGRESS NOTES
Plan:      Ryan was seen today for follow-up.    Diagnoses and all orders for this visit:    Acute bilateral low back pain without sciatica  -     X-Ray Lumbar Spine AP And Lateral; Future  -     traMADoL (ULTRAM) 50 mg tablet; Take 1 tablet (50 mg total) by mouth every 8 (eight) hours as needed for Pain.  -     Ambulatory referral/consult to [facility]; Future    Acute bilateral thoracic back pain  -     X-Ray Thoracic Spine AP Lateral; Future  -     traMADoL (ULTRAM) 50 mg tablet; Take 1 tablet (50 mg total) by mouth every 8 (eight) hours as needed for Pain.  -     Ambulatory referral/consult to [facility]; Future      Follow up in about 4 weeks (around 6/22/2023), or if symptoms worsen or fail to improve.    Ana Mayer MD  05/25/2023    Subjective:      Patient ID: Ryan Crane is a 57 y.o. male    Chief Complaint   Patient presents with    Follow-up     Pt stated he has been having back pain for past 3 wks      HPI  57 y.o. male with a PMHx as documented below presents to clinic today for the following:    Pt reports 3 week history of progressively worsening bilateral lower-mid back pain. Onset of symptoms was not associated w/ any particular accident or injury. Pt describes the pain as sharp. Pt reports laying down helps improve the pain. Aggravating factors include standing up. Pt reports taking Lyrica (chronic medication) for other chronic pain - denies significant relief of back pain.     Hx of CVA:   - Right lacunar infarct (3/4/23) resulting in hemiplegia of left, nondominant side  - CT head w/o contrast showed changes consistent with subacute infarct of the left cerebellum and chronic infarct of the right occipital lobe  - MRI w/o contrast showed small area of interest diffusion is seen in the right corona radiata extending to the region of the superior aspect of the posterior limb internal capsule  - ASA 81 mg daily, Lipitor 40 mg daily  - S/p Neuro follow-up on 3/17/23 -  "okay to keep Lipitor 40 mg daily rather than 80 mg daily     CAD:   - ASA 81 mg daily, Plavix 75 mg daily, Lipitor 40 mg daily, Ranexa 500 mg BID  - Nitro 0.4 mg SL q5min PRN     HTN:   - Amlodipine 2.5 mg daily, Toprol-XL 50 mg daily     HFpEF:   - TTE (3/4/23) w/ EF 60% and grade I left ventricular diastolic dysfunction     PVD:   - ASA 81 mg daily, Lipitor 40 mg daily  - BLE arterial US (3/7/23): "There appear to be waveform changes throughout the bilateral lower extremity arterial system, more evident in the distal right lower extremity arteries which may indicate some degree of vascular insufficiency or more proximal stenosis. However, no doubling of peak systolic velocities to suggest greater than 50% stenosis is sonographically demonstrated."     COPD, GOLD 2:   - PFTs w/ FEV1 (73%) (9/2022)  - Trelegy 100-62.5 mcg, 1 puff once daily  - Albuterol PRN     Allergic rhinitis:   - Claritin 10 mg daily PRN, Flonase daily PRN     Hx of DMT2 w/ diabetic polyneuropathy:   - Most recent Hgb A1c 5.1% (3/2023)  - Lyrica 50 mg TID      Chronic schizophrenia:   - Buspar 15 mg TID, Lamictal 200 mg daily, Remeron 15 mg qhs, Geodon 160 mg qhs  - Re-establishing with Psychiatry, recent appointments: April 12th, May 16th and 23rd  - Appointment w/ social work April 16th     Migraine headaches:   - Emgality 120 mg monthly injections, Topamax 200 mg qhs, Nutrex PRN     B12 deficiency:   - Vitamin B12 < 200 on 3/24/23  - S/p B12 1000 mcg IM while in hospital, discharged w/ plan to continue as outpatient     ED:   - Cialis 20 mg PRN     Hyponatremia:   - NaCl 1000 mg TID     Review of Systems   Constitutional:  Negative for chills and fever.   Respiratory:  Negative for shortness of breath.    Cardiovascular:  Negative for chest pain.   Gastrointestinal:  Negative for abdominal pain, constipation, diarrhea, nausea and vomiting.   Genitourinary:  Negative for dysuria.   Musculoskeletal:  Positive for back pain and myalgias. "     Current Outpatient Medications   Medication Instructions    albuterol (PROVENTIL/VENTOLIN HFA) 90 mcg/actuation inhaler 2 puffs, Inhalation, Every 6 hours PRN    albuterol-ipratropium (DUO-NEB) 2.5 mg-0.5 mg/3 mL nebulizer solution 3 mLs, Nebulization, Every 6 hours PRN, Rescue    amLODIPine (NORVASC) 2.5 mg, Oral, Daily    aspirin (ECOTRIN) 81 mg, Oral, Daily    atorvastatin (LIPITOR) 40 mg, Oral, Daily    busPIRone (BUSPAR) 15 mg, Oral, 3 times daily    clopidogreL (PLAVIX) 75 mg, Oral, Daily    cyanocobalamin 1,000 mcg/mL injection 1 ml sq daily x 5 days, then 1 ml sq weekly x 4 weeks then q monthly -  will need to go to PCP    fluticasone propionate (FLONASE) 50 mcg/actuation nasal spray 2 sprays, Nasal, Nightly    fluticasone-umeclidin-vilanter (TRELEGY ELLIPTA) 100-62.5-25 mcg DsDv 1 puff, Inhalation, Daily    galcanezumab-gnlm (EMGALITY SYRINGE) 120 mg/mL Syrg Inject 1 pen (120 mg) into the skin every 28 days.    Lactobac. rhamnosus GG-inulin 10 billion cell -200 mg Chew 1 tablet, Oral, Every morning    lamoTRIgine (LAMICTAL) 200 mg, Oral, Daily    loratadine (CLARITIN) 10 mg tablet 1 each, Oral, Daily    metoprolol succinate (TOPROL-XL) 50 MG 24 hr tablet 1 tablet, Oral, Daily    metoprolol succinate 50 mg CSpX 1 tablet EVERY PM (route: oral)    mirtazapine (REMERON) 15 mg, Oral, Nightly    nicotine (NICODERM CQ) 21 mg/24 hr 1 patch, Transdermal, Daily    nicotine polacrilex 4 MG Lozg Take up to 8 pieces daily as needed    nitroGLYCERIN (NITROSTAT) 0.4 mg, Sublingual, Every 5 min PRN    NURTEC 75 mg, Oral, Daily PRN, Place ODT tablet on the tongue; alternatively the ODT tablet may be placed under the tongue    pantoprazole (PROTONIX) 40 mg, Oral, Daily    pregabalin (LYRICA) 50 mg, Oral, 3 times daily    ranolazine (RANEXA) 500 mg, Oral, 2 times daily    sodium chloride 1,000 mg TbSO tablet tbso 1 tablet, Misc.(Non-Drug; Combo Route), 3 times daily    tadalafiL (CIALIS) 20 mg, Oral, Daily    topiramate  (TOPAMAX) 200 mg, Oral, Nightly    traMADoL (ULTRAM) 50 mg, Oral, Every 8 hours PRN    ziprasidone (GEODON) 160 mg, Oral, Nightly      Past Medical History:   Diagnosis Date    Aortic valve stenosis 02/28/2022    Arachnoid cyst of posterior cranial fossa 01/13/2022    Benign prostatic hyperplasia without lower urinary tract symptoms 5/25/2023    Bilateral carotid bruits 06/22/2021    Bipolar disorder 01/16/2022    CAD S/P percutaneous coronary angioplasty     3 vessel disease    Calculus of gallbladder without cholecystitis without obstruction 01/11/2023    Cancer     Candidal intertrigo 11/11/2019    Cataract     Chronic heart failure with preserved ejection fraction 03/29/2023    Chronic schizophrenia     Colon polyps     Diabetic polyneuropathy associated with diabetes mellitus due to underlying condition 11/21/2019    Dysarthria as late effect of cerebellar cerebrovascular accident (CVA) 03/24/2023    Equinus deformity of both feet 11/21/2019    Erectile dysfunction due to arterial insufficiency 5/25/2023    Flaccid hemiplegia of left nondominant side as late effect of cerebral infarction 03/17/2023    Flat foot 11/21/2019    Gastritis     Gastrointestinal hemorrhage 12/13/2019    Post polypectomy bleeding    General anesthetics causing adverse effect in therapeutic use     Hammer toes of both feet 11/21/2019    Hx of diabetic foot ulcer 11/21/2019    Hypertension     Hyponatremia 01/11/2022    ELAINE (iron deficiency anemia) 12/10/2019    Intractable chronic migraine without aura and without status migrainosus 12/30/2021    20 year history of migraine headaches. Headaches are typically moderate to severe in intensity, worsen with activity, pounding in quality and associated with sensitivity to light and sound. Given history of reported brain tumor, agree with head CT today. Cannot do MRI due to metal clips.   Galcanezumab (Emgality) treatment was approved for the prevention of acute and chronic migraine on  "9/27/2018.    Microcytic anemia 10/09/2019    Migraine headache 12/30/2021    20 year history of migraine headaches. Headaches are typically moderate to severe in intensity, worsen with activity, pounding in quality and associated with sensitivity to light and sound. Given history of reported brain tumor, agree with head CT today. Cannot do MRI due to metal clips.   Galcanezumab (Emgality) treatment was approved for the prevention of acute and chronic migraine on 9/27/2018.    Moderate aortic regurgitation 03/29/2023    Moderate mitral regurgitation 03/29/2023    Moderate mitral stenosis 03/29/2023    Moderate to severe aortic stenosis 02/28/2022    Myocardial infarct, old     Nicotine dependence, unspecified, uncomplicated 01/16/2022    Onychomycosis due to dermatophyte 11/21/2019    Orchitis 11/09/2019    PIPER on CPAP     Peripheral visual field defect, bilateral 01/13/2022    PVD (peripheral vascular disease) 11/21/2019    Seizures 2008    Stage 2 moderate COPD by GOLD classification     PFTs w/ FEV1 (73%) (9/2022)    Stroke 2005    Thoracic aortic atherosclerosis 10/17/2022    CT chest    Thyroid disease     Previously on pharmacologic Tx    Tinea pedis of right foot 05/21/2019    Tobacco use disorder     Type 2 diabetes mellitus with diabetic peripheral angiopathy without gangrene     A1c 5.1% (3/2023)    Vitamin B12 deficiency 03/24/2023      Objective:      Vitals:    05/25/23 0821   BP: 114/68   BP Location: Right arm   Patient Position: Sitting   Resp: 18   Weight: 87.4 kg (192 lb 9.2 oz)   Height: 5' 10" (1.778 m)     Body mass index is 27.63 kg/m².    Physical Exam  Vitals reviewed.   Constitutional:       General: He is not in acute distress.     Comments: Presents using walker.   HENT:      Head: Normocephalic and atraumatic.   Cardiovascular:      Rate and Rhythm: Normal rate.   Pulmonary:      Effort: Pulmonary effort is normal. No respiratory distress.   Musculoskeletal:      Thoracic back: Tenderness " and bony tenderness present. No swelling, deformity or signs of trauma.      Lumbar back: Tenderness and bony tenderness present. No swelling, deformity or signs of trauma.   Neurological:      General: No focal deficit present.      Mental Status: He is alert and oriented to person, place, and time. Mental status is at baseline.     Assessment:       1. Acute bilateral low back pain without sciatica    2. Acute bilateral thoracic back pain        Ana Mayer MD  [facility]  Office: [phone]   Fax: [phone]  05/25/2023      Disclaimer: This note was partly generated using dictation software which may occasionally result in transcription errors.    Total time spent on this encounter includes face to face time and non-face to face time preparing to see the patient (eg, review of tests), obtaining and/or reviewing separately obtained history, documenting clinical information in the electronic or other health record, independently interpreting results, and communicating results to the patient/family/caregiver, or care coordinator.

## 2023-05-29 ENCOUNTER — TELEPHONE (OUTPATIENT)
Dept: FAMILY MEDICINE | Facility: CLINIC | Age: 58
End: 2023-05-29
Payer: MEDICARE

## 2023-05-29 NOTE — TELEPHONE ENCOUNTER
Pt aware , pt is scheduled to see [facility] on Friday 6/2. Pt advised to keep that appt .--lp

## 2023-05-29 NOTE — TELEPHONE ENCOUNTER
----- Message from Deon Ramos sent at 5/27/2023 10:05 AM CDT -----  Contact: self  Type: Needs Medical Advice  Who Called:  Patient    Best Call Back Number: [phone]    Additional Information: Pt states he would like to speak with office regarding xray results.Please call back

## 2023-05-30 ENCOUNTER — OFFICE VISIT (OUTPATIENT)
Dept: NEUROLOGY | Facility: CLINIC | Age: 58
End: 2023-05-30
Payer: MEDICARE

## 2023-05-30 VITALS
RESPIRATION RATE: 17 BRPM | HEART RATE: 73 BPM | WEIGHT: 190.06 LBS | HEIGHT: 70 IN | BODY MASS INDEX: 27.21 KG/M2 | SYSTOLIC BLOOD PRESSURE: 101 MMHG | DIASTOLIC BLOOD PRESSURE: 67 MMHG

## 2023-05-30 DIAGNOSIS — G43.719 INTRACTABLE CHRONIC MIGRAINE WITHOUT AURA AND WITHOUT STATUS MIGRAINOSUS: Primary | ICD-10-CM

## 2023-05-30 DIAGNOSIS — Z86.73 HISTORY OF STROKE: ICD-10-CM

## 2023-05-30 DIAGNOSIS — G47.33 OSA ON CPAP: ICD-10-CM

## 2023-05-30 PROCEDURE — 3074F SYST BP LT 130 MM HG: CPT | Mod: CPTII,S$GLB,, | Performed by: NURSE PRACTITIONER

## 2023-05-30 PROCEDURE — 3072F PR LOW RISK FOR RETINOPATHY: ICD-10-PCS | Mod: CPTII,S$GLB,, | Performed by: NURSE PRACTITIONER

## 2023-05-30 PROCEDURE — 3008F BODY MASS INDEX DOCD: CPT | Mod: CPTII,S$GLB,, | Performed by: NURSE PRACTITIONER

## 2023-05-30 PROCEDURE — 1160F PR REVIEW ALL MEDS BY PRESCRIBER/CLIN PHARMACIST DOCUMENTED: ICD-10-PCS | Mod: CPTII,S$GLB,, | Performed by: NURSE PRACTITIONER

## 2023-05-30 PROCEDURE — 99999 PR PBB SHADOW E&M-EST. PATIENT-LVL V: CPT | Mod: PBBFAC,,, | Performed by: NURSE PRACTITIONER

## 2023-05-30 PROCEDURE — 3072F LOW RISK FOR RETINOPATHY: CPT | Mod: CPTII,S$GLB,, | Performed by: NURSE PRACTITIONER

## 2023-05-30 PROCEDURE — 1159F PR MEDICATION LIST DOCUMENTED IN MEDICAL RECORD: ICD-10-PCS | Mod: CPTII,S$GLB,, | Performed by: NURSE PRACTITIONER

## 2023-05-30 PROCEDURE — 3078F DIAST BP <80 MM HG: CPT | Mod: CPTII,S$GLB,, | Performed by: NURSE PRACTITIONER

## 2023-05-30 PROCEDURE — 3074F PR MOST RECENT SYSTOLIC BLOOD PRESSURE < 130 MM HG: ICD-10-PCS | Mod: CPTII,S$GLB,, | Performed by: NURSE PRACTITIONER

## 2023-05-30 PROCEDURE — 3044F HG A1C LEVEL LT 7.0%: CPT | Mod: CPTII,S$GLB,, | Performed by: NURSE PRACTITIONER

## 2023-05-30 PROCEDURE — 1160F RVW MEDS BY RX/DR IN RCRD: CPT | Mod: CPTII,S$GLB,, | Performed by: NURSE PRACTITIONER

## 2023-05-30 PROCEDURE — 3078F PR MOST RECENT DIASTOLIC BLOOD PRESSURE < 80 MM HG: ICD-10-PCS | Mod: CPTII,S$GLB,, | Performed by: NURSE PRACTITIONER

## 2023-05-30 PROCEDURE — 99999 PR PBB SHADOW E&M-EST. PATIENT-LVL V: ICD-10-PCS | Mod: PBBFAC,,, | Performed by: NURSE PRACTITIONER

## 2023-05-30 PROCEDURE — 3008F PR BODY MASS INDEX (BMI) DOCUMENTED: ICD-10-PCS | Mod: CPTII,S$GLB,, | Performed by: NURSE PRACTITIONER

## 2023-05-30 PROCEDURE — 99214 OFFICE O/P EST MOD 30 MIN: CPT | Mod: S$GLB,,, | Performed by: NURSE PRACTITIONER

## 2023-05-30 PROCEDURE — 99214 PR OFFICE/OUTPT VISIT, EST, LEVL IV, 30-39 MIN: ICD-10-PCS | Mod: S$GLB,,, | Performed by: NURSE PRACTITIONER

## 2023-05-30 PROCEDURE — 3044F PR MOST RECENT HEMOGLOBIN A1C LEVEL <7.0%: ICD-10-PCS | Mod: CPTII,S$GLB,, | Performed by: NURSE PRACTITIONER

## 2023-05-30 PROCEDURE — 1159F MED LIST DOCD IN RCRD: CPT | Mod: CPTII,S$GLB,, | Performed by: NURSE PRACTITIONER

## 2023-05-30 RX ORDER — FREMANEZUMAB-VFRM 225 MG/1.5ML
225 INJECTION SUBCUTANEOUS
Qty: 1 EACH | Refills: 11 | Status: SHIPPED | OUTPATIENT
Start: 2023-06-05 | End: 2024-03-27 | Stop reason: SDUPTHER

## 2023-05-30 NOTE — PATIENT INSTRUCTIONS
Please call our clinic at [phone] or send a message on the [employer] portal if there are any changes to the plan described below, for example,if you are not contacted for the requested tests, referral(s) within one week, if you are unable to receive the medications prescribed, or if you feel you need to change the treatment course for any reason.     TESTING:  -- none     REFERRALS:  -- sleep specialist - will give you Dr. Bennett's number today    PREVENTION (use daily regardless of headache):  -- STOP Emgality  -- START Ajovy once per month. Prescription sent to [facility]     AS-NEEDED TREATMENT (use total no more than 10 days per month unless otherwise stated):  -- continue Nurtec once every other day. This dissolves under the tongue and is only taken once in 24 hour time frame.

## 2023-05-30 NOTE — PROGRESS NOTES
"Date of service: 5/30/2023  Referring provider: No ref. provider found    Subjective:      Chief complaint: Headache       Patient ID: Ryan Crane is a 57 y.o. male with CAD, COPD, DMII, HTN, PIPER (does not use CPAP), PVD, bipolar and schizophrenia and history of stroke and MI who presents for follow up of headache     History of Present Illness    INTERVAL HISTORY 5/30/23    Last visit was three months ago and at that time he was having about 5 migraines per month on Emgality. I recommended a sleep study.     Today he reports he is worse. He had three strokes in March. Headaches are holocephalic. Current pain 8 with range 6-10. He has 14 headache days per month. He takes Nurtec every other day. He has sleep apnea but not using CPAP. He states he "can't breathe" with it. He is followed by psychiatry. He has appt for follow up in July with RENE Lipscomb. Otherwise information below is reviewed and verified with no changes made     INTERVAL HISTORY 2/28/23    Last visit was six months ago and at that time he was a little better.    Today he reports he is the same. He has about 5 headache days per month with 2-3 milder headache days per week. Current pian 0 with range 3-8. When present, they are holocephalic. He takes Nurtec QOD. He takes naproxen up to 3 days per week. Otherwise information below is reviewed and verified with no changes made     INTERVAL HISTORY 8/11/22    Last visit was four months ago and at that time he was doing much better on Emgality. He was referred for sleep evaluation.    Today he reports he is about the same to a little better. Headaches occur in the top of the head. Current pain 6 with range 3-9. He reports four headache days per week. He has not had a sleep study. He does have significant hearing loss in both ears. He reports he is getting hearing aids next week. Otherwise information below is reviewed and verified with no changes made     INTERVAL HISTORY  3/30/22    Last " "visit was three months ago and at that time we added Emgality and Nurtec. He was in medication overuse to Excedrin. He was referred to neurosurgery for benign CNS tumor. Plan to monitor at this time, not a surgical candidate.     Today he reports he is much better. Migraines are less frequent but still remains with daily headache. They are holocephalic. Current pain 4 with range 4-10. He has 2-3 migraines per month with mild daily headache. He takes Nurtec every other day. He has completely stopped Goody's powder. He has also reduced his caffeine intake from one 2 liter per day to a 12 oz coke daily.  He is not using his CPAP machine. He states he cannot find it.    Of note, he has had five ED/hospital admissions since January for hyponatremia. This is contributed to his psych medications. He states he cannot change his medications so plan is to take salt tablets and add salt to food. His psychiatrist is Dr. Christianson in [place].   Otherwise information below is reviewed and verified with no changes made unless noted.     ORIGINAL HEADACHE HISTORY - 12/30/21  Age at onset and course over time: 22 years ago began with migraines 2-3 times per week. He treated with Goody's.    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 Saw neurologist 4 years ago in [place]. Diagnosed via MRI. He can no longer have MRI due to metal clips in rectum.     Family history - mother had brain aneurysm  Last eye exam - 2020  Location: temples, occipital, holocephalic  Quality:  [] pressure [] tight [x] throbbing [] sharp [] stabbing   Severity: current 10 with range 5-10  Duration: hours  Frequency: daily  Headaches awaken at night?:  yes  Worst time of day: mid-day, evening   Associated with: [x] photophobia [x]  phonophobia [] osmophobia [x] blurred vision  [] double vision [] loss of appetite [x] nausea [x] vomiting [x] dizziness [] vertigo  [] tinnitus [x] irritability [] sinus pressure [x] problems with " concentration   [] neck tightness   Alleviated by:  [] sleep [x] darkness [] massage [] heat [x] ice [] medication  Exacerbated by:  [] fatigue [x] light [x] noise [] smells [x] coughing [x] sneezing  [] bending over [] ovulation [] menses [] alcohol [] change in weather []  stress  Ipsilateral autonomic: [] nasal congestion [] lacrimation [] ptosis [] injection [] edema [] foreign body sensation [] ear fullness   ICP:  [] transient visual obscurations  [] tinnitus   [] positional headache  [x] non-positional   Sleep habits: trouble staying asleep, has diagnosed PIPER but not using CPAP for past 4 years  Caffeine intake: 2 liter of coke daily  Gyn status (if female): n/a  MIDAS: n/a    Current acute treatment:  Nurtec   Naproxen    Current prevention:  Amlodipine  Buspar  HCTZ  Lamictal  Metoprolol  Lyrica  Geodon  Emgality - first January 2022  Remeron  Topamax     Previously tried/failed acute treatment:  Robaxin  Naproxen  Tizanidine  Tramadol  Ibuprofen   Goody's - daily for 4 years    Previously tried/failed preventative treatment:  Cymbalta  Gabapentin   Topamax   Trileptal   Valsartan    Review of patient's allergies indicates:   Allergen Reactions    Alcohol Anaphylaxis     Pt states all types of alcohol    Alcohol antiseptic pads Anaphylaxis    Cephalexin Anaphylaxis     Current Outpatient Medications   Medication Sig Dispense Refill    albuterol (PROVENTIL/VENTOLIN HFA) 90 mcg/actuation inhaler Inhale 2 puffs into the lungs every 6 (six) hours as needed for Shortness of Breath or Wheezing.      albuterol-ipratropium (DUO-NEB) 2.5 mg-0.5 mg/3 mL nebulizer solution Take 3 mLs by nebulization every 6 (six) hours as needed for Wheezing. Rescue 75 mL 0    amLODIPine (NORVASC) 2.5 MG tablet Take 1 tablet (2.5 mg total) by mouth once daily. 90 tablet 3    aspirin (ECOTRIN) 81 MG EC tablet Take 1 tablet (81 mg total) by mouth once daily. 30 tablet 3    atorvastatin (LIPITOR) 40 MG tablet Take 1 tablet (40 mg total)  by mouth once daily. 90 tablet 3    busPIRone (BUSPAR) 15 MG tablet Take 1 tablet (15 mg total) by mouth 3 (three) times daily. 270 tablet 1    clopidogreL (PLAVIX) 75 mg tablet Take 1 tablet (75 mg total) by mouth once daily. 30 tablet 11    cyanocobalamin 1,000 mcg/mL injection 1 ml sq daily x 5 days, then 1 ml sq weekly x 4 weeks then q monthly -  will need to go to [facility] 9 mL 0    fluticasone propionate (FLONASE) 50 mcg/actuation nasal spray 2 sprays by Nasal route every evening.      fluticasone-umeclidin-vilanter (TRELEGY ELLIPTA) 100-62.5-25 mcg DsDv Inhale 1 puff into the lungs once daily. 28 each 11    Lactobac. rhamnosus GG-inulin 10 billion cell -200 mg Chew Take 1 tablet by mouth once daily.      lamoTRIgine (LAMICTAL) 200 MG tablet Take 200 mg by mouth once daily.      loratadine (CLARITIN) 10 mg tablet Take 1 each by mouth once daily.      metoprolol succinate (TOPROL-XL) 50 MG 24 hr tablet Take 1 tablet by mouth once daily.      metoprolol succinate 50 mg CSpX 1 tablet EVERY PM (route: oral)      mirtazapine (REMERON) 15 MG tablet Take 15 mg by mouth every evening.      nicotine (NICODERM CQ) 21 mg/24 hr Place 1 patch onto the skin once daily. 28 patch 2    nicotine polacrilex 4 MG Lozg Take up to 8 pieces daily as needed 270 lozenge 2    nitroGLYCERIN (NITROSTAT) 0.4 MG SL tablet Place 1 tablet (0.4 mg total) under the tongue every 5 (five) minutes as needed for Chest pain. 25 tablet 11    pantoprazole (PROTONIX) 40 MG tablet Take 1 tablet (40 mg total) by mouth once daily. 30 tablet 2    pregabalin (LYRICA) 50 MG capsule Take 1 capsule (50 mg total) by mouth 3 (three) times daily. 270 capsule 1    ranolazine (RANEXA) 500 MG Tb12 Take 1 tablet (500 mg total) by mouth 2 (two) times daily. 180 tablet 3    rimegepant (NURTEC) 75 mg odt Take 1 tablet (75 mg total) by mouth daily as needed. Place ODT tablet on the tongue; alternatively the ODT tablet may be placed under the tongue 16 tablet 11    sodium  chloride 1,000 mg TbSO tablet tbso 1 tablet by Misc.(Non-Drug; Combo Route) route 3 (three) times daily.      tadalafiL (CIALIS) 20 MG Tab Take 1 tablet (20 mg total) by mouth once daily. 10 tablet 11    topiramate (TOPAMAX) 200 MG Tab Take 200 mg by mouth every evening.      traMADoL (ULTRAM) 50 mg tablet Take 1 tablet (50 mg total) by mouth every 8 (eight) hours as needed for Pain. 15 tablet 0    ziprasidone (GEODON) 80 MG capsule Take 160 mg by mouth every evening.       [START ON 6/5/2023] fremanezumab-vfrm (AJOVY AUTOINJECTOR) 225 mg/1.5 mL autoinjector Inject 1.5 mLs (225 mg total) into the skin every 28 days. 1 each 11     Current Facility-Administered Medications   Medication Dose Route Frequency Provider Last Rate Last Admin    cyanocobalamin injection 1,000 mcg  1,000 mcg Intramuscular Q14 Days Ana Mayer MD   1,000 mcg at 05/24/23 1025    cyanocobalamin injection 1,000 mcg  1,000 mcg Intramuscular Q14 Days Ana Mayer MD   1,000 mcg at 05/17/23 1551       Past Medical History  Past Medical History:   Diagnosis Date    Aortic valve stenosis 02/28/2022    Arachnoid cyst of posterior cranial fossa 01/13/2022    Benign prostatic hyperplasia without lower urinary tract symptoms 5/25/2023    Bilateral carotid bruits 06/22/2021    Bipolar disorder 01/16/2022    CAD S/P percutaneous coronary angioplasty     3 vessel disease    Calculus of gallbladder without cholecystitis without obstruction 01/11/2023    Cancer     Candidal intertrigo 11/11/2019    Cataract     Chronic heart failure with preserved ejection fraction 03/29/2023    Chronic schizophrenia     Colon polyps     Diabetic polyneuropathy associated with diabetes mellitus due to underlying condition 11/21/2019    Dysarthria as late effect of cerebellar cerebrovascular accident (CVA) 03/24/2023    Equinus deformity of both feet 11/21/2019    Erectile dysfunction due to arterial insufficiency 5/25/2023    Flaccid hemiplegia of left  nondominant side as late effect of cerebral infarction 03/17/2023    Flat foot 11/21/2019    Gastritis     Gastrointestinal hemorrhage 12/13/2019    Post polypectomy bleeding    General anesthetics causing adverse effect in therapeutic use     Hammer toes of both feet 11/21/2019    Hx of diabetic foot ulcer 11/21/2019    Hypertension     Hyponatremia 01/11/2022    ELAINE (iron deficiency anemia) 12/10/2019    Intractable chronic migraine without aura and without status migrainosus 12/30/2021    20 year history of migraine headaches. Headaches are typically moderate to severe in intensity, worsen with activity, pounding in quality and associated with sensitivity to light and sound. Given history of reported brain tumor, agree with head CT today. Cannot do MRI due to metal clips.   Galcanezumab (Emgality) treatment was approved for the prevention of acute and chronic migraine on 9/27/2018.    Microcytic anemia 10/09/2019    Migraine headache 12/30/2021    20 year history of migraine headaches. Headaches are typically moderate to severe in intensity, worsen with activity, pounding in quality and associated with sensitivity to light and sound. Given history of reported brain tumor, agree with head CT today. Cannot do MRI due to metal clips.   Galcanezumab (Emgality) treatment was approved for the prevention of acute and chronic migraine on 9/27/2018.    Moderate aortic regurgitation 03/29/2023    Moderate mitral regurgitation 03/29/2023    Moderate mitral stenosis 03/29/2023    Moderate to severe aortic stenosis 02/28/2022    Myocardial infarct, old     Nicotine dependence, unspecified, uncomplicated 01/16/2022    Onychomycosis due to dermatophyte 11/21/2019    Orchitis 11/09/2019    PIPER on CPAP     Peripheral visual field defect, bilateral 01/13/2022    PVD (peripheral vascular disease) 11/21/2019    Seizures 2008    Stage 2 moderate COPD by GOLD classification     PFTs w/ FEV1 (73%) (9/2022)    Stroke 2005    Thoracic  aortic atherosclerosis 10/17/2022    CT chest    Thyroid disease     Previously on pharmacologic Tx    Tinea pedis of right foot 05/21/2019    Tobacco use disorder     Type 2 diabetes mellitus with diabetic peripheral angiopathy without gangrene     A1c 5.1% (3/2023)    Vitamin B12 deficiency 03/24/2023       Past Surgical History  Past Surgical History:   Procedure Laterality Date    ANGIOGRAM, CORONARY, WITH LEFT HEART CATHETERIZATION  08/25/2022    Procedure: Angiogram, Coronary, with Left Heart Cath;  Surgeon: Mai Deleon MD;  [email or web address];  Service: Cardiology;;    APPENDECTOMY      BONE MARROW ASPIRATION Left 08/21/2020    Procedure: ASPIRATION, BONE MARROW;  Surgeon: Lex Kathleen MD;  Location: [facility] OR;  Service: Oncology;  Laterality: Left;    CHOLECYSTECTOMY      CLOSURE OF WOUND Right 09/09/2019    Procedure: CLOSURE, WOUND;  Surgeon: Li Kathleen DPM;  Location: [facility];  Service: Podiatry;  Laterality: Right;    COLONOSCOPY N/A 12/10/2019    Procedure: COLONOSCOPY;  Surgeon: Ryan Lucas MD;  Location: [facility];  Service: Endoscopy;  Laterality: N/A;    COLONOSCOPY N/A 12/13/2019    Procedure: COLONOSCOPY;  Surgeon: Ryan Lucas MD;  Location: [facility];  Service: Endoscopy;  Laterality: N/A;    CORONARY STENT PLACEMENT      ESOPHAGOGASTRODUODENOSCOPY N/A 12/10/2019    Procedure: EGD (ESOPHAGOGASTRODUODENOSCOPY);  Surgeon: Ryan Lucas MD;  Location: [facility];  Service: Endoscopy;  Laterality: N/A;    ESOPHAGOGASTRODUODENOSCOPY N/A 11/03/2022    Procedure: EGD (ESOPHAGOGASTRODUODENOSCOPY);  Surgeon: Ryan Lucas MD;  Location: [facility];  Service: Endoscopy;  Laterality: N/A;    LAPAROSCOPIC CHOLECYSTECTOMY N/A 01/11/2023    Procedure: CHOLECYSTECTOMY, LAPAROSCOPIC;  Surgeon: Laith Davis MD;  Location: [facility];  Service: General;  Laterality: N/A;    LEFT HEART CATHETERIZATION Left 08/25/2022    Procedure: Left heart cath;  Surgeon: Mai Deleon MD;   Location: [facility];  Service: Cardiology;  Laterality: Left;    right heel surgery      SHOULDER ARTHROSCOPY Left     UPPER GASTROINTESTINAL ENDOSCOPY         Family History  Family History   Problem Relation Age of Onset    Melanoma Mother     Diabetes Mother     Hypertension Mother     Stroke Father     Diabetes Father     Hypertension Father     Colon cancer Father         unsure of age of diagnosis    Cancer Father         colon cancer    Cervical cancer Sister     Cirrhosis Brother     Hypertension Brother     Lung cancer Maternal Grandmother     Prostate cancer Maternal Grandfather     Crohn's disease Neg Hx     Ulcerative colitis Neg Hx     Stomach cancer Neg Hx     Esophageal cancer Neg Hx     Retinal detachment Neg Hx     Strabismus Neg Hx     Macular degeneration Neg Hx     Glaucoma Neg Hx        Social History  Social History     Socioeconomic History    Marital status: Legally    Occupational History    Occupation: disabled (mental)     Comment: since    Tobacco Use    Smoking status: Every Day     Packs/day: 0.25     Years: 43.00     Pack years: 10.75     Types: Cigarettes     Start date:      Last attempt to quit: 2022     Years since quittin.4    Smokeless tobacco: Never    Tobacco comments:     Age Started 14    Substance and Sexual Activity    Alcohol use: No    Drug use: Not Currently     Types: Marijuana    Sexual activity: Yes     Partners: Female     Social Determinants of Health     Financial Resource Strain: Low Risk     Difficulty of Paying Living Expenses: Not hard at all   Food Insecurity: No Food Insecurity    Worried About Running Out of Food in the Last Year: Never true    Ran Out of Food in the Last Year: Never true   Transportation Needs: Unmet Transportation Needs    Lack of Transportation (Medical): Yes    Lack of Transportation (Non-Medical): Yes   Physical Activity: Insufficiently Active    Days of Exercise per Week: 2 days    Minutes of Exercise per  Session: 10 min   Stress: Stress Concern Present    Feeling of Stress : To some extent   Social Connections: Socially Isolated    Frequency of Communication with Friends and Family: Twice a week    Frequency of Social Gatherings with Friends and Family: Never    Attends Yazidi Services: More than 4 times per year    Active Member of Clubs or Organizations: No    Attends Club or Organization Meetings: Never    Marital Status:    Housing Stability: Unknown    Unable to Pay for Housing in the Last Year: No    Unstable Housing in the Last Year: No        Review of Systems  14-point review of systems as follows:   No check katina indicates NEGATIVE response   Constitutional: [] weight loss, [] change to appetite   Eyes: [] change in vision, [] double vision   Ears, nose, mouth, throat: [] frequent nose bleeds, [] ringing in the ears   Respiratory: [x] cough, [x] wheezing   Cardiovascular: [x] chest pain, [x] palpitations   Gastrointestinal: [] jaundice, [] nausea/vomiting   Genitourinary: [] incontinence, [] burning with urination   Hematologic/lymphatic: [] easy bruising/bleeding, [x] night sweats   Neurological: [] numbness, [x] weakness   Endocrine: [] fatigue, [x] heat/cold intolerance   Allergy/Immunologic: [] fevers, [x] chills   Musculoskeletal: [] muscle pain, [x] joint pain   Psychiatric: [] thoughts of harming self/others, [x] depression   Integumentary: [] rashes, [] sores that do not heal     Objective:        Vitals:    05/30/23 0924   BP: 101/67   Pulse: 73   Resp: 17         Body mass index is 27.27 kg/m².    5/30/23  Constitutional:   He appears well-developed and well-nourished. He is well groomed.      Neurological Exam:  General: well-developed, well-nourished, no distress  Mental status: Awake and alert  Speech language: No dysarthria or aphasia on conversation  Cranial nerves: Face symmetric  Motor: Moves all extremities well using walker        12/30/22  Constitutional: appears in no acute  distress, well-developed, well-nourished     Eyes: normal conjunctiva, PERRLA    Ears, nose, mouth, throat: external appearance of ears and nose normal, hearing intact     Cardiovascular: regular rate and rhythm, +murmur    Respiratory: unlabored respirations, breath sounds normal bilaterally    Gastrointestinal: no visible abdominal masses, no guarding, no visible hernia    Musculoskeletal: normal tone in all four extremities. No abnormal movements. No pronator drift. No orbit. Symmetric finger tapping.      Spine:   CERVICAL SPINE:  ROM: mildly restricted   MUSCLE SPASM: no   FACET LOADING: no   SPURLING: no  CONOR / STEFF tender: no     Psychiatric: normal judgment and insight. Oriented to person, place, and time.     Neurologic:   Cortical functions: recent and remote memory intact, normal attention span and concentration, speech fluent, adequate fund of knowledge   Cranial nerves: visual fields full, PERRLA, EOMI, symmetric facial strength, hearing intact, palate elevates symmetrically, shoulder shrug 5/5, tongue protrudes midline   Reflexes: 2+ in the upper and lower extremities, no Long  Sensation: intact to temperature throughout   Coordination: normal finger to nose, heel to shin, abnormal/unsteady gait, did not attempt tandem     Data Review:     I have personally reviewed the referring provider's notes, labs, & imaging made available to me today.      RADIOLOGY STUDIES:  I have personally reviewed the pertinent images performed.       No results found for this or any previous visit.    Lab Results   Component Value Date     04/21/2023    K 3.5 04/21/2023    MG 2.5 04/05/2023     04/21/2023    CO2 24 04/21/2023    BUN 16 04/21/2023    CREATININE 0.98 04/21/2023    GLU 99 04/21/2023    HGBA1C 5.1 03/05/2023    AST 25 04/21/2023    ALT 21 04/21/2023    ALBUMIN 4.3 04/21/2023    PROT 7.3 04/21/2023    BILITOT 0.4 04/21/2023    CHOL 83 (L) 03/05/2023    HDL 29 (L) 03/05/2023    LDLCALC 42.4 (L)  03/05/2023    TRIG 58 03/05/2023       Lab Results   Component Value Date    WBC 13.15 (H) 04/21/2023    HGB 13.5 (L) 04/21/2023    HCT 40.6 04/21/2023    MCV 97 04/21/2023     04/21/2023       Lab Results   Component Value Date    TSH 1.140 03/05/2023           Assessment & Plan:       Problem List Items Addressed This Visit          Neuro    History of stroke (Chronic)    Overview     Reports initial CVA in 2005 without significant clinical deficit[place]  Most recent event 3/2023 - R subcortical lacune with resultant LUE > LLE paresis          Intractable chronic migraine without aura and without status migrainosus - Primary    Overview     20 year history of migraine headaches. Headaches are typically moderate to severe in intensity, worsen with activity, pounding in quality and associated with sensitivity to light and sound. Given history of reported brain tumor, agree with head CT today. Cannot do MRI due to metal clips.     He has been on Emgality for over a year. Worsening headaches in past three months. Will change to Ajovy. He does not wish to start Botox. He prefers once monthly injection to once daily.     Continue Nurtec once every other day.            Relevant Medications    fremanezumab-vfrm (AJOVY AUTOINJECTOR) 225 mg/1.5 mL autoinjector (Start on 6/5/2023)       Other    PIPER on CPAP (Chronic)    Current Assessment & Plan     Untreated. Number for sleep provider given to patient.                     Please call our clinic at [phone] or send a message on the [employer] portal if there are any changes to the plan described below, for example,if you are not contacted for the requested tests, referral(s) within one week, if you are unable to receive the medications prescribed, or if you feel you need to change the treatment course for any reason.     TESTING:  -- none     REFERRALS:  -- sleep specialist - will give you Dr. Bennett's number today    PREVENTION (use  daily regardless of headache):  -- STOP Emgality  -- START Ajovy once per month. Prescription sent to [facility]     AS-NEEDED TREATMENT (use total no more than 10 days per month unless otherwise stated):  -- continue Nurtec once every other day. This dissolves under the tongue and is only taken once in 24 hour time frame.    Follow up in about 3 months (around 8/30/2023) for follow up with [facility]D.    Face to Face time with patient: 20  30 minutes of total time spent on the encounter, which includes face to face time and non-face to face time on day of visit preparing to see the patient (eg, review of tests), Obtaining and/or reviewing separately obtained history, Documenting clinical information in the electronic or other health record, Independently interpreting results (not separately reported) and communicating results to the patient/family/caregiver, or Care coordination (not separately reported).     Mckenna Bennett NP

## 2023-05-31 ENCOUNTER — TELEPHONE (OUTPATIENT)
Dept: PHARMACY | Facility: CLINIC | Age: 58
End: 2023-05-31
Payer: MEDICARE

## 2023-05-31 NOTE — TELEPHONE ENCOUNTER
AJOVY prescription has been APPROVED FROM 5/30/23 TO 12/31/23 with copayment of $0.       [facility]

## 2023-06-02 ENCOUNTER — OFFICE VISIT (OUTPATIENT)
Dept: SPINE | Facility: CLINIC | Age: 58
End: 2023-06-02
Payer: MEDICARE

## 2023-06-02 VITALS — BODY MASS INDEX: 27.3 KG/M2 | HEIGHT: 70 IN | WEIGHT: 190.69 LBS

## 2023-06-02 DIAGNOSIS — M54.6 PAIN IN THORACIC SPINE: Primary | ICD-10-CM

## 2023-06-02 DIAGNOSIS — M54.6 ACUTE BILATERAL THORACIC BACK PAIN: ICD-10-CM

## 2023-06-02 DIAGNOSIS — M54.50 ACUTE BILATERAL LOW BACK PAIN WITHOUT SCIATICA: ICD-10-CM

## 2023-06-02 DIAGNOSIS — M54.9 DORSALGIA, UNSPECIFIED: ICD-10-CM

## 2023-06-02 PROCEDURE — 99214 OFFICE O/P EST MOD 30 MIN: CPT | Mod: S$GLB,,, | Performed by: PHYSICAL MEDICINE & REHABILITATION

## 2023-06-02 PROCEDURE — 99214 PR OFFICE/OUTPT VISIT, EST, LEVL IV, 30-39 MIN: ICD-10-PCS | Mod: S$GLB,,, | Performed by: PHYSICAL MEDICINE & REHABILITATION

## 2023-06-02 PROCEDURE — 1159F MED LIST DOCD IN RCRD: CPT | Mod: CPTII,S$GLB,, | Performed by: PHYSICAL MEDICINE & REHABILITATION

## 2023-06-02 PROCEDURE — 3044F PR MOST RECENT HEMOGLOBIN A1C LEVEL <7.0%: ICD-10-PCS | Mod: CPTII,S$GLB,, | Performed by: PHYSICAL MEDICINE & REHABILITATION

## 2023-06-02 PROCEDURE — 3044F HG A1C LEVEL LT 7.0%: CPT | Mod: CPTII,S$GLB,, | Performed by: PHYSICAL MEDICINE & REHABILITATION

## 2023-06-02 PROCEDURE — 3008F PR BODY MASS INDEX (BMI) DOCUMENTED: ICD-10-PCS | Mod: CPTII,S$GLB,, | Performed by: PHYSICAL MEDICINE & REHABILITATION

## 2023-06-02 PROCEDURE — 1159F PR MEDICATION LIST DOCUMENTED IN MEDICAL RECORD: ICD-10-PCS | Mod: CPTII,S$GLB,, | Performed by: PHYSICAL MEDICINE & REHABILITATION

## 2023-06-02 PROCEDURE — 1160F RVW MEDS BY RX/DR IN RCRD: CPT | Mod: CPTII,S$GLB,, | Performed by: PHYSICAL MEDICINE & REHABILITATION

## 2023-06-02 PROCEDURE — 1160F PR REVIEW ALL MEDS BY PRESCRIBER/CLIN PHARMACIST DOCUMENTED: ICD-10-PCS | Mod: CPTII,S$GLB,, | Performed by: PHYSICAL MEDICINE & REHABILITATION

## 2023-06-02 PROCEDURE — 3072F PR LOW RISK FOR RETINOPATHY: ICD-10-PCS | Mod: CPTII,S$GLB,, | Performed by: PHYSICAL MEDICINE & REHABILITATION

## 2023-06-02 PROCEDURE — 3072F LOW RISK FOR RETINOPATHY: CPT | Mod: CPTII,S$GLB,, | Performed by: PHYSICAL MEDICINE & REHABILITATION

## 2023-06-02 PROCEDURE — 3008F BODY MASS INDEX DOCD: CPT | Mod: CPTII,S$GLB,, | Performed by: PHYSICAL MEDICINE & REHABILITATION

## 2023-06-02 NOTE — PROGRESS NOTES
SUBJECTIVE:    Patient ID: Ryan Crane is a 57 y.o. male.    Chief Complaint: Back Pain (5/25/23 x-ray )    This is a 57-year-old man who sees  for his primary care.  Has a complicated medical history.  He had a stroke in March which left him with some residual left arm weakness.  He is had an MI in 1995.  Has 1 stent and he is on Plavix.  History of schizophrenia and chronic obstructive pulmonary disease.  Denies a cancer history.  Presents with approximately 1 month history of pain from the midthoracic region to the lumbosacral junction that started for no reason.  Not associated with any radicular symptoms.  No bowel or bladder dysfunction fever chills sweats or unexpected weight loss.  Not had any treatment for these issues.  Takes tramadol pain.  His pain level is 7/10.  I reviewed x-rays of the thoracic and lumbar spine which show expected degenerative changes        Past Medical History:   Diagnosis Date    Aortic valve stenosis 02/28/2022    Arachnoid cyst of posterior cranial fossa 01/13/2022    Benign prostatic hyperplasia without lower urinary tract symptoms 5/25/2023    Bilateral carotid bruits 06/22/2021    Bipolar disorder 01/16/2022    CAD S/P percutaneous coronary angioplasty     3 vessel disease    Calculus of gallbladder without cholecystitis without obstruction 01/11/2023    Cancer     Candidal intertrigo 11/11/2019    Cataract     Chronic heart failure with preserved ejection fraction 03/29/2023    Chronic schizophrenia     Colon polyps     Diabetic polyneuropathy associated with diabetes mellitus due to underlying condition 11/21/2019    Dysarthria as late effect of cerebellar cerebrovascular accident (CVA) 03/24/2023    Equinus deformity of both feet 11/21/2019    Erectile dysfunction due to arterial insufficiency 5/25/2023    Flaccid hemiplegia of left nondominant side as late effect of cerebral infarction 03/17/2023    Flat foot 11/21/2019    Gastritis      Gastrointestinal hemorrhage 12/13/2019    Post polypectomy bleeding    General anesthetics causing adverse effect in therapeutic use     Hammer toes of both feet 11/21/2019    Hx of diabetic foot ulcer 11/21/2019    Hypertension     Hyponatremia 01/11/2022    ELAINE (iron deficiency anemia) 12/10/2019    Intractable chronic migraine without aura and without status migrainosus 12/30/2021    20 year history of migraine headaches. Headaches are typically moderate to severe in intensity, worsen with activity, pounding in quality and associated with sensitivity to light and sound. Given history of reported brain tumor, agree with head CT today. Cannot do MRI due to metal clips.   Galcanezumab (Emgality) treatment was approved for the prevention of acute and chronic migraine on 9/27/2018.    Microcytic anemia 10/09/2019    Migraine headache 12/30/2021    20 year history of migraine headaches. Headaches are typically moderate to severe in intensity, worsen with activity, pounding in quality and associated with sensitivity to light and sound. Given history of reported brain tumor, agree with head CT today. Cannot do MRI due to metal clips.   Galcanezumab (Emgality) treatment was approved for the prevention of acute and chronic migraine on 9/27/2018.    Moderate aortic regurgitation 03/29/2023    Moderate mitral regurgitation 03/29/2023    Moderate mitral stenosis 03/29/2023    Moderate to severe aortic stenosis 02/28/2022    Myocardial infarct, old     Nicotine dependence, unspecified, uncomplicated 01/16/2022    Onychomycosis due to dermatophyte 11/21/2019    Orchitis 11/09/2019    PIPER on CPAP     Peripheral visual field defect, bilateral 01/13/2022    PVD (peripheral vascular disease) 11/21/2019    Seizures 2008    Stage 2 moderate COPD by GOLD classification     PFTs w/ FEV1 (73%) (9/2022)    Stroke 2005    Thoracic aortic atherosclerosis 10/17/2022    CT chest    Thyroid disease     Previously on pharmacologic Tx    Tinea  pedis of right foot 2019    Tobacco use disorder     Type 2 diabetes mellitus with diabetic peripheral angiopathy without gangrene     A1c 5.1% (3/2023)    Vitamin B12 deficiency 2023     Social History     Socioeconomic History    Marital status: Legally    Occupational History    Occupation: disabled (mental)     Comment: since    Tobacco Use    Smoking status: Every Day     Packs/day: 0.25     Years: 43.00     Pack years: 10.75     Types: Cigarettes     Start date:      Last attempt to quit: 2022     Years since quittin.4    Smokeless tobacco: Never    Tobacco comments:     Age Started 14    Substance and Sexual Activity    Alcohol use: No    Drug use: Not Currently     Types: Marijuana    Sexual activity: Yes     Partners: Female     Social Determinants of Health     Financial Resource Strain: Low Risk     Difficulty of Paying Living Expenses: Not hard at all   Food Insecurity: No Food Insecurity    Worried About Running Out of Food in the Last Year: Never true    Ran Out of Food in the Last Year: Never true   Transportation Needs: Unmet Transportation Needs    Lack of Transportation (Medical): Yes    Lack of Transportation (Non-Medical): Yes   Physical Activity: Insufficiently Active    Days of Exercise per Week: 2 days    Minutes of Exercise per Session: 10 min   Stress: Stress Concern Present    Feeling of Stress : To some extent   Social Connections: Socially Isolated    Frequency of Communication with Friends and Family: Twice a week    Frequency of Social Gatherings with Friends and Family: Never    Attends Jewish Services: More than 4 times per year    Active Member of Clubs or Organizations: No    Attends Club or Organization Meetings: Never    Marital Status:    Housing Stability: Unknown    Unable to Pay for Housing in the Last Year: No    Unstable Housing in the Last Year: No     Past Surgical History:   Procedure Laterality Date    ANGIOGRAM,  CORONARY, WITH LEFT HEART CATHETERIZATION  08/25/2022    Procedure: Angiogram, Coronary, with Left Heart Cath;  Surgeon: Mai Deleon MD;  Location: [facility] CATH;  Service: Cardiology;;    APPENDECTOMY      BONE MARROW ASPIRATION Left 08/21/2020    Procedure: ASPIRATION, BONE MARROW;  Surgeon: Lex Kathleen MD;  Location: [facility] OR;  Service: Oncology;  Laterality: Left;    CHOLECYSTECTOMY      CLOSURE OF WOUND Right 09/09/2019    Procedure: CLOSURE, WOUND;  Surgeon: Li Kathleen DPM;  Location: [facility] OR;  Service: Podiatry;  Laterality: Right;    COLONOSCOPY N/A 12/10/2019    Procedure: COLONOSCOPY;  Surgeon: Ryan Lucas MD;  Location: [facility];  Service: Endoscopy;  Laterality: N/A;    COLONOSCOPY N/A 12/13/2019    Procedure: COLONOSCOPY;  Surgeon: Ryan Lucas MD;  Location: [facility] ENDO;  Service: Endoscopy;  Laterality: N/A;    CORONARY STENT PLACEMENT      ESOPHAGOGASTRODUODENOSCOPY N/A 12/10/2019    Procedure: EGD (ESOPHAGOGASTRODUODENOSCOPY);  Surgeon: Ryan Lucas MD;  Location: [facility];  Service: Endoscopy;  Laterality: N/A;    ESOPHAGOGASTRODUODENOSCOPY N/A 11/03/2022    Procedure: EGD (ESOPHAGOGASTRODUODENOSCOPY);  Surgeon: Ryan Lucas MD;  Location: [facility] ENDO;  Service: Endoscopy;  Laterality: N/A;    LAPAROSCOPIC CHOLECYSTECTOMY N/A 01/11/2023    Procedure: CHOLECYSTECTOMY, LAPAROSCOPIC;  Surgeon: Laith Davis MD;  Location: [facility];  Service: General;  Laterality: N/A;    LEFT HEART CATHETERIZATION Left 08/25/2022    Procedure: Left heart cath;  Surgeon: Mai Deleon MD;  Location: [facility] CATH;  Service: Cardiology;  Laterality: Left;    right heel surgery      SHOULDER ARTHROSCOPY Left     UPPER GASTROINTESTINAL ENDOSCOPY       Family History   Problem Relation Age of Onset    Melanoma Mother     Diabetes Mother     Hypertension Mother     Stroke Father     Diabetes Father     Hypertension Father     Colon cancer Father         unsure of age of diagnosis    Cancer  "Father         colon cancer    Cervical cancer Sister     Cirrhosis Brother     Hypertension Brother     Lung cancer Maternal Grandmother     Prostate cancer Maternal Grandfather     Crohn's disease Neg Hx     Ulcerative colitis Neg Hx     Stomach cancer Neg Hx     Esophageal cancer Neg Hx     Retinal detachment Neg Hx     Strabismus Neg Hx     Macular degeneration Neg Hx     Glaucoma Neg Hx      Vitals:    06/02/23 0808   Weight: 86.5 kg (190 lb 11.2 oz)   Height: 5' 10" (1.778 m)       Review of Systems   Constitutional:  Negative for chills, diaphoresis, fatigue, fever and unexpected weight change.   HENT:  Negative for trouble swallowing.    Eyes:  Negative for visual disturbance.   Respiratory:  Negative for shortness of breath.    Cardiovascular:  Negative for chest pain.   Gastrointestinal:  Negative for abdominal pain, constipation, diarrhea, nausea and vomiting.   Genitourinary:  Negative for difficulty urinating.   Musculoskeletal:  Negative for arthralgias, back pain, gait problem, joint swelling, myalgias, neck pain and neck stiffness.   Neurological:  Negative for dizziness, speech difficulty, weakness, light-headedness, numbness and headaches.        Objective:      Physical Exam  Neurological:      Mental Status: He is alert and oriented to person, place, and time.      Comments: He is awake and in no acute distress  Mild tenderness palpation midthoracic and lumbar paraspinous musculature with no palpable masses  Reflexes- +1-+2 reflexes at the following:   C5-Biceps   C6-Brachioradialis   C7-Triceps   L3/4-Patellar   S1-Achilles   Ashley sign negative bilaterally  Mild weakness in the left arm otherwise strength is normal in the right arm and both legs  Straight leg raise is negative bilaterally           Assessment:       1. Pain in thoracic spine    2. Acute bilateral low back pain without sciatica    3. Acute bilateral thoracic back pain    4. Dorsalgia, unspecified           Plan:    Other than " known left arm weakness he has a non focal neurological examination and no historical red flags.  I suspect he has low back pain and thoracic pain on basis of degenerative disc disease and facet arthropathy.  Treatment options include living with his ongoing pain versus physical therapy versus epidural steroid injection versus surgical intervention.  As of right now he is not interested in physical therapy because he does not have transportation and he is not interested in injections.  Treatment options are limited.  He says he just wants to know what is going on with his back so we will order MRIs of the thoracic and lumbar spine.  I will follow-up with him afterwards      Pain in thoracic spine  -     MRI Thoracic Spine Without Contrast; Future; Expected date: 06/02/2023    Acute bilateral low back pain without sciatica  -     Ambulatory referral[facility]consult to [facility]    Acute bilateral thoracic back pain  -     Ambulatory referral/consult to [facility]    Dorsalgia, unspecified  -     MRI Lumbar Spine Without Contrast; Future; Expected date: 06/02/2023

## 2023-06-07 ENCOUNTER — CLINICAL SUPPORT (OUTPATIENT)
Dept: FAMILY MEDICINE | Facility: CLINIC | Age: 58
End: 2023-06-07
Payer: MEDICARE

## 2023-06-07 DIAGNOSIS — E53.8 B12 DEFICIENCY: Primary | ICD-10-CM

## 2023-06-07 PROCEDURE — 96372 PR INJECTION,THERAP/PROPH/DIAG2ST, IM OR SUBCUT: ICD-10-PCS | Mod: S$GLB,,, | Performed by: STUDENT IN AN ORGANIZED HEALTH CARE EDUCATION/TRAINING PROGRAM

## 2023-06-07 PROCEDURE — 96372 THER/PROPH/DIAG INJ SC/IM: CPT | Mod: S$GLB,,, | Performed by: STUDENT IN AN ORGANIZED HEALTH CARE EDUCATION/TRAINING PROGRAM

## 2023-06-07 RX ADMIN — CYANOCOBALAMIN 1000 MCG: 1000 INJECTION, SOLUTION INTRAMUSCULAR; SUBCUTANEOUS at 09:06

## 2023-06-07 NOTE — PROGRESS NOTES
Patient presents to the office for his Vitamin B12 Injection.  Injection administered into left  deltoid per patients request.  Patient tolerated immunization well with no complaints.

## 2023-06-12 ENCOUNTER — HOSPITAL ENCOUNTER (OUTPATIENT)
Dept: RADIOLOGY | Facility: HOSPITAL | Age: 58
Discharge: HOME | End: 2023-06-12
Attending: PHYSICAL MEDICINE & REHABILITATION
Payer: MEDICARE

## 2023-06-12 DIAGNOSIS — M54.9 DORSALGIA, UNSPECIFIED: ICD-10-CM

## 2023-06-12 DIAGNOSIS — M54.6 PAIN IN THORACIC SPINE: ICD-10-CM

## 2023-06-12 PROCEDURE — 72146 MRI CHEST SPINE W/O DYE: CPT | Mod: 26,,, | Performed by: RADIOLOGY

## 2023-06-12 PROCEDURE — 72146 MRI THORACIC SPINE WITHOUT CONTRAST: ICD-10-PCS | Mod: 26,,, | Performed by: RADIOLOGY

## 2023-06-12 PROCEDURE — 72148 MRI LUMBAR SPINE WITHOUT CONTRAST: ICD-10-PCS | Mod: 26,,, | Performed by: RADIOLOGY

## 2023-06-12 PROCEDURE — 72148 MRI LUMBAR SPINE W/O DYE: CPT | Mod: 26,,, | Performed by: RADIOLOGY

## 2023-06-12 PROCEDURE — 72148 MRI LUMBAR SPINE W/O DYE: CPT | Mod: TC,PO

## 2023-06-12 PROCEDURE — 72146 MRI CHEST SPINE W/O DYE: CPT | Mod: TC,PO

## 2023-06-14 ENCOUNTER — TELEPHONE (OUTPATIENT)
Dept: PAIN MEDICINE | Facility: CLINIC | Age: 58
End: 2023-06-14
Payer: MEDICARE

## 2023-06-14 ENCOUNTER — OFFICE VISIT (OUTPATIENT)
Dept: SPINE | Facility: CLINIC | Age: 58
End: 2023-06-14
Payer: MEDICARE

## 2023-06-14 VITALS — BODY MASS INDEX: 27.3 KG/M2 | WEIGHT: 190.69 LBS | HEIGHT: 70 IN

## 2023-06-14 DIAGNOSIS — M54.50 ACUTE BILATERAL LOW BACK PAIN WITHOUT SCIATICA: Primary | ICD-10-CM

## 2023-06-14 DIAGNOSIS — M54.6 PAIN IN THORACIC SPINE: ICD-10-CM

## 2023-06-14 DIAGNOSIS — M47.816 LUMBAR SPONDYLOSIS: Primary | ICD-10-CM

## 2023-06-14 PROCEDURE — 1159F PR MEDICATION LIST DOCUMENTED IN MEDICAL RECORD: ICD-10-PCS | Mod: CPTII,S$GLB,, | Performed by: PHYSICAL MEDICINE & REHABILITATION

## 2023-06-14 PROCEDURE — 3072F PR LOW RISK FOR RETINOPATHY: ICD-10-PCS | Mod: CPTII,S$GLB,, | Performed by: PHYSICAL MEDICINE & REHABILITATION

## 2023-06-14 PROCEDURE — 3044F PR MOST RECENT HEMOGLOBIN A1C LEVEL <7.0%: ICD-10-PCS | Mod: CPTII,S$GLB,, | Performed by: PHYSICAL MEDICINE & REHABILITATION

## 2023-06-14 PROCEDURE — 3008F BODY MASS INDEX DOCD: CPT | Mod: CPTII,S$GLB,, | Performed by: PHYSICAL MEDICINE & REHABILITATION

## 2023-06-14 PROCEDURE — 3072F LOW RISK FOR RETINOPATHY: CPT | Mod: CPTII,S$GLB,, | Performed by: PHYSICAL MEDICINE & REHABILITATION

## 2023-06-14 PROCEDURE — 1160F PR REVIEW ALL MEDS BY PRESCRIBER/CLIN PHARMACIST DOCUMENTED: ICD-10-PCS | Mod: CPTII,S$GLB,, | Performed by: PHYSICAL MEDICINE & REHABILITATION

## 2023-06-14 PROCEDURE — 3008F PR BODY MASS INDEX (BMI) DOCUMENTED: ICD-10-PCS | Mod: CPTII,S$GLB,, | Performed by: PHYSICAL MEDICINE & REHABILITATION

## 2023-06-14 PROCEDURE — 99213 OFFICE O/P EST LOW 20 MIN: CPT | Mod: S$GLB,,, | Performed by: PHYSICAL MEDICINE & REHABILITATION

## 2023-06-14 PROCEDURE — 99213 PR OFFICE/OUTPT VISIT, EST, LEVL III, 20-29 MIN: ICD-10-PCS | Mod: S$GLB,,, | Performed by: PHYSICAL MEDICINE & REHABILITATION

## 2023-06-14 PROCEDURE — 3044F HG A1C LEVEL LT 7.0%: CPT | Mod: CPTII,S$GLB,, | Performed by: PHYSICAL MEDICINE & REHABILITATION

## 2023-06-14 PROCEDURE — 1159F MED LIST DOCD IN RCRD: CPT | Mod: CPTII,S$GLB,, | Performed by: PHYSICAL MEDICINE & REHABILITATION

## 2023-06-14 PROCEDURE — 1160F RVW MEDS BY RX/DR IN RCRD: CPT | Mod: CPTII,S$GLB,, | Performed by: PHYSICAL MEDICINE & REHABILITATION

## 2023-06-14 NOTE — TELEPHONE ENCOUNTER
----- Message from Huan Dowd MD sent at 6/14/2023  8:32 AM CDT -----  Please schedule for medial branch blocks and subsequent radiofrequency ablation as indicated of the L4-5 and L5-S1 facet joints bilaterally

## 2023-06-14 NOTE — PROGRESS NOTES
SUBJECTIVE:    Patient ID: Ryan Crane is a 57 y.o. male.    Chief Complaint: Follow-up (MRI follow up)    He is here to review thoracic and lumbar MRIs done 06/12/2023 to evaluate his complaint of midthoracic and low back pain without radicular symptoms.    The thoracic MRI is summarized below:    FINDINGS:  Morphology: Vertebral body heights are preserved.  Incidental T6 and T12 vertebral body hemangiomas.  Minimal endplate centered marrow edema ventrally at T9-T10 in the setting of degenerative disc changes.  No fractures.     Alignment: Within normal limits.     Cord: The thoracic cord is normal in contour, caliber, and signal intensity throughout its length.     Degenerative changes: Mild multilevel degenerative disc height loss and desiccation.  No focal disc herniation noting minimal disc bulging asymmetric to the right at T3-T4.  The spinal canal remains patent throughout.  No substantial foraminal narrowing identified at any level.     Other: Limited imaging of the paraspinal soft tissues, chest, and abdomen demonstrates no focal abnormality.     Impression:     1. Mild degenerative changes as discussed above.  The spinal canal and foramina are patent throughout.  No cord impingement or acute process.      The lumbar MRI is summarized below:    FINDINGS:  Morphology: Incidental T12, L1, and L3 vertebral hemangiomas.  Marrow signal is otherwise within normal limits and vertebral body heights are preserved.     Alignment: Within normal limits.     Cord: Normal in contour, caliber, and signal intensity.  Conus positioning is within normal limits.     Disc levels:     T12-L1: Within normal limits.     L1-L2: Mild degenerative disc height loss with shallow disc bulging and mild bilateral facet arthrosis mildly narrowing the spinal canal.  The foramina remain patent.     L2-L3: Mild degenerative disc height loss and desiccation with shallow disc bulging and associated central annular fissure as well as  mild bilateral facet arthrosis mildly narrowing the spinal canal.  The foramina remain patent.     L3-L4: Mild to moderate degenerative disc height loss and desiccation with shallow disc bulging and mild bilateral facet arthrosis mildly narrowing the spinal canal and encroaching both subarticular recesses, right greater than left.  Mild bilateral foraminal narrowing.     L4-L5: Mild to moderate degenerative disc height loss and desiccation with shallow disc bulging and moderate bilateral facet arthrosis producing mild narrowing of the spinal canal and encroachment of both subarticular recesses as well as mild to moderate right and mild left foraminal narrowing.     L5-S1: Mild to moderate degenerative disc height loss and desiccation with shallow disc bulging but no substantial narrowing of the spinal canal.  Facets are within normal limits.  The foramina remain patent.     Other: Visualized paraspinal soft tissues are within normal limits.     Impression:     1. Multilevel degenerative changes of the lumbar spine without more than mild spinal canal/recess narrowing there is shallow disc bulging is noted at multiple levels.  No lateralizing disc herniation.  2. Mild to moderate right foraminal narrowing at L4-L5.      Clinically he is about the same.  Complaining of midthoracic and low back pain.  Pain level is 8/10.  The lower back bothers him more than the mid back.  He has no new or progressive problems        Past Medical History:   Diagnosis Date    Aortic valve stenosis 02/28/2022    Arachnoid cyst of posterior cranial fossa 01/13/2022    Benign prostatic hyperplasia without lower urinary tract symptoms 5/25/2023    Bilateral carotid bruits 06/22/2021    Bipolar disorder 01/16/2022    CAD S/P percutaneous coronary angioplasty     3 vessel disease    Calculus of gallbladder without cholecystitis without obstruction 01/11/2023    Cancer     Candidal intertrigo 11/11/2019    Cataract     Chronic heart failure  with preserved ejection fraction 03/29/2023    Chronic schizophrenia     Colon polyps     Diabetic polyneuropathy associated with diabetes mellitus due to underlying condition 11/21/2019    Dysarthria as late effect of cerebellar cerebrovascular accident (CVA) 03/24/2023    Equinus deformity of both feet 11/21/2019    Erectile dysfunction due to arterial insufficiency 5/25/2023    Flaccid hemiplegia of left nondominant side as late effect of cerebral infarction 03/17/2023    Flat foot 11/21/2019    Gastritis     Gastrointestinal hemorrhage 12/13/2019    Post polypectomy bleeding    General anesthetics causing adverse effect in therapeutic use     Hammer toes of both feet 11/21/2019    Hx of diabetic foot ulcer 11/21/2019    Hypertension     Hyponatremia 01/11/2022    ELAINE (iron deficiency anemia) 12/10/2019    Intractable chronic migraine without aura and without status migrainosus 12/30/2021    20 year history of migraine headaches. Headaches are typically moderate to severe in intensity, worsen with activity, pounding in quality and associated with sensitivity to light and sound. Given history of reported brain tumor, agree with head CT today. Cannot do MRI due to metal clips.   Galcanezumab (Emgality) treatment was approved for the prevention of acute and chronic migraine on 9/27/2018.    Microcytic anemia 10/09/2019    Migraine headache 12/30/2021    20 year history of migraine headaches. Headaches are typically moderate to severe in intensity, worsen with activity, pounding in quality and associated with sensitivity to light and sound. Given history of reported brain tumor, agree with head CT today. Cannot do MRI due to metal clips.   Galcanezumab (Emgality) treatment was approved for the prevention of acute and chronic migraine on 9/27/2018.    Moderate aortic regurgitation 03/29/2023    Moderate mitral regurgitation 03/29/2023    Moderate mitral stenosis 03/29/2023    Moderate to severe aortic stenosis  2022    Myocardial infarct, old     Nicotine dependence, unspecified, uncomplicated 2022    Onychomycosis due to dermatophyte 2019    Orchitis 2019    PIPER on CPAP     Peripheral visual field defect, bilateral 2022    PVD (peripheral vascular disease) 2019    Seizures 2008    Stage 2 moderate COPD by GOLD classification     PFTs w/ FEV1 (73%) (2022)    Stroke 2005    Thoracic aortic atherosclerosis 10/17/2022    CT chest    Thyroid disease     Previously on pharmacologic Tx    Tinea pedis of right foot 2019    Tobacco use disorder     Type 2 diabetes mellitus with diabetic peripheral angiopathy without gangrene     A1c 5.1% (3/2023)    Vitamin B12 deficiency 2023     Social History     Socioeconomic History    Marital status: Legally    Occupational History    Occupation: disabled (mental)     Comment: since    Tobacco Use    Smoking status: Every Day     Packs/day: 0.25     Years: 43.00     Pack years: 10.75     Types: Cigarettes     Start date:      Last attempt to quit: 2022     Years since quittin.5    Smokeless tobacco: Never    Tobacco comments:     Age Started 14    Substance and Sexual Activity    Alcohol use: No    Drug use: Not Currently     Types: Marijuana    Sexual activity: Yes     Partners: Female     Social Determinants of Health     Financial Resource Strain: Low Risk     Difficulty of Paying Living Expenses: Not hard at all   Food Insecurity: No Food Insecurity    Worried About Running Out of Food in the Last Year: Never true    Ran Out of Food in the Last Year: Never true   Transportation Needs: Unmet Transportation Needs    Lack of Transportation (Medical): Yes    Lack of Transportation (Non-Medical): Yes   Physical Activity: Insufficiently Active    Days of Exercise per Week: 2 days    Minutes of Exercise per Session: 10 min   Stress: Stress Concern Present    Feeling of Stress : To some extent   Social Connections:  Socially Isolated    Frequency of Communication with Friends and Family: Twice a week    Frequency of Social Gatherings with Friends and Family: Never    Attends Scientology Services: More than 4 times per year    Active Member of Clubs or Organizations: No    Attends Club or Organization Meetings: Never    Marital Status:    Housing Stability: Unknown    Unable to Pay for Housing in the Last Year: No    Unstable Housing in the Last Year: No     Past Surgical History:   Procedure Laterality Date    ANGIOGRAM, CORONARY, WITH LEFT HEART CATHETERIZATION  08/25/2022    Procedure: Angiogram, Coronary, with Left Heart Cath;  Surgeon: Mai Deleon MD;  Location: [facility] CATH;  Service: Cardiology;;    APPENDECTOMY      BONE MARROW ASPIRATION Left 08/21/2020    Procedure: ASPIRATION, BONE MARROW;  Surgeon: Lex Kathleen MD;  Location: [facility];  Service: Oncology;  Laterality: Left;    CHOLECYSTECTOMY      CLOSURE OF WOUND Right 09/09/2019    Procedure: CLOSURE, WOUND;  Surgeon: Li Kathleen DPM;  Location: [facility];  Service: Podiatry;  Laterality: Right;    COLONOSCOPY N/A 12/10/2019    Procedure: COLONOSCOPY;  Surgeon: Ryan Lucas MD;  Location: [facility];  Service: Endoscopy;  Laterality: N/A;    COLONOSCOPY N/A 12/13/2019    Procedure: COLONOSCOPY;  Surgeon: Ryan Lucas MD;  Location: [facility];  Service: Endoscopy;  Laterality: N/A;    CORONARY STENT PLACEMENT      ESOPHAGOGASTRODUODENOSCOPY N/A 12/10/2019    Procedure: EGD (ESOPHAGOGASTRODUODENOSCOPY);  Surgeon: Ryan Lucas MD;  Location: [facility];  Service: Endoscopy;  Laterality: N/A;    ESOPHAGOGASTRODUODENOSCOPY N/A 11/03/2022    Procedure: EGD (ESOPHAGOGASTRODUODENOSCOPY);  Surgeon: Ryan Lucas MD;  Location: [facility];  Service: Endoscopy;  Laterality: N/A;    LAPAROSCOPIC CHOLECYSTECTOMY N/A 01/11/2023    Procedure: CHOLECYSTECTOMY, LAPAROSCOPIC;  Surgeon: Laith Davis MD;  Location: [facility];  Service: General;   "Laterality: N/A;    LEFT HEART CATHETERIZATION Left 08/25/2022    Procedure: Left heart cath;  Surgeon: Mai Deleon MD;  Location: [facility] CATH;  Service: Cardiology;  Laterality: Left;    right heel surgery      SHOULDER ARTHROSCOPY Left     UPPER GASTROINTESTINAL ENDOSCOPY       Family History   Problem Relation Age of Onset    Melanoma Mother     Diabetes Mother     Hypertension Mother     Stroke Father     Diabetes Father     Hypertension Father     Colon cancer Father         unsure of age of diagnosis    Cancer Father         colon cancer    Cervical cancer Sister     Cirrhosis Brother     Hypertension Brother     Lung cancer Maternal Grandmother     Prostate cancer Maternal Grandfather     Crohn's disease Neg Hx     Ulcerative colitis Neg Hx     Stomach cancer Neg Hx     Esophageal cancer Neg Hx     Retinal detachment Neg Hx     Strabismus Neg Hx     Macular degeneration Neg Hx     Glaucoma Neg Hx      Vitals:    06/14/23 0821   Weight: 86.5 kg (190 lb 11.2 oz)   Height: 5' 10" (1.778 m)       Review of Systems   Constitutional:  Negative for chills, diaphoresis, fatigue, fever and unexpected weight change.   HENT:  Negative for trouble swallowing.    Eyes:  Negative for visual disturbance.   Respiratory:  Negative for shortness of breath.    Cardiovascular:  Negative for chest pain.   Gastrointestinal:  Negative for abdominal pain, constipation, diarrhea, nausea and vomiting.   Genitourinary:  Negative for difficulty urinating.   Musculoskeletal:  Negative for arthralgias, back pain, gait problem, joint swelling, myalgias, neck pain and neck stiffness.   Neurological:  Negative for dizziness, speech difficulty, weakness, light-headedness, numbness and headaches.        Objective:      Physical Exam  Neurological:      Mental Status: He is alert and oriented to person, place, and time.      Comments: He is awake and in no acute distress  Extension of the lumbar spine causes pain at the lumbosacral junction    "        Assessment:       1. Acute bilateral low back pain without sciatica    2. Pain in thoracic spine           Plan:     I reassured him he has no worrisome findings on his MRIs.  I suspect he has back pain on basis of degenerative disc disease and facet arthropathy.  Has pain with facet loading.  I am recommending medial branch blocks and subsequent radiofrequency ablation as indicated of the L4-5 and L5-S1 facet joints bilaterally.  Consider epidural steroid injections in the midthoracic region.  Follow-up with me after the procedure      Acute bilateral low back pain without sciatica    Pain in thoracic spine

## 2023-06-21 ENCOUNTER — TELEPHONE (OUTPATIENT)
Dept: FAMILY MEDICINE | Facility: CLINIC | Age: 58
End: 2023-06-21

## 2023-06-21 ENCOUNTER — CLINICAL SUPPORT (OUTPATIENT)
Dept: FAMILY MEDICINE | Facility: CLINIC | Age: 58
End: 2023-06-21
Payer: MEDICARE

## 2023-06-21 VITALS — BODY MASS INDEX: 26.73 KG/M2 | WEIGHT: 186.31 LBS

## 2023-06-21 DIAGNOSIS — E53.8 B12 DEFICIENCY: Primary | ICD-10-CM

## 2023-06-21 PROCEDURE — 96372 THER/PROPH/DIAG INJ SC/IM: CPT | Mod: S$GLB,,, | Performed by: STUDENT IN AN ORGANIZED HEALTH CARE EDUCATION/TRAINING PROGRAM

## 2023-06-21 PROCEDURE — 96372 PR INJECTION,THERAP/PROPH/DIAG2ST, IM OR SUBCUT: ICD-10-PCS | Mod: S$GLB,,, | Performed by: STUDENT IN AN ORGANIZED HEALTH CARE EDUCATION/TRAINING PROGRAM

## 2023-06-21 RX ADMIN — CYANOCOBALAMIN 1000 MCG: 1000 INJECTION, SOLUTION INTRAMUSCULAR; SUBCUTANEOUS at 09:06

## 2023-06-21 NOTE — TELEPHONE ENCOUNTER
Tried to reach pt. No answer. Left message for pt to call back. Nurse visits cancelled. Will sched next B12 inj when pt calls back.

## 2023-06-21 NOTE — TELEPHONE ENCOUNTER
----- Message from Aristides La sent at 6/21/2023 12:39 PM CDT -----  Type: Needs Medical Advice  Who Called:  Pt  Best Call Back Number: [phone]  Additional Information: Pt has two nurse visits coming up on 7/5 and 7/19 and he has another appt on those dates and needs to reschedule them, pl call bk to advise thanks

## 2023-06-21 NOTE — PROGRESS NOTES
Patient presents to the office for her biweekly Vitamin B12 injection.  Injection administered into right deltoid per patients request.  Patient tolerated injection well with no complaints.    Next nurse visit scheduled.

## 2023-06-26 DIAGNOSIS — E08.42 DIABETIC POLYNEUROPATHY ASSOCIATED WITH DIABETES MELLITUS DUE TO UNDERLYING CONDITION: Chronic | ICD-10-CM

## 2023-06-26 RX ORDER — PREGABALIN 50 MG/1
50 CAPSULE ORAL 3 TIMES DAILY
Qty: 270 CAPSULE | Refills: 1 | Status: SHIPPED | OUTPATIENT
Start: 2023-06-26 | End: 2023-08-14 | Stop reason: SDUPTHER

## 2023-06-26 RX ORDER — PREGABALIN 50 MG/1
50 CAPSULE ORAL 3 TIMES DAILY
Qty: 270 CAPSULE | Refills: 1 | Status: SHIPPED | OUTPATIENT
Start: 2023-06-26 | End: 2023-06-26

## 2023-06-26 NOTE — TELEPHONE ENCOUNTER
No care due was identified.  Health [facility] Embedded Care Due Messages. Reference number: [id number].   6/26/2023 11:08:23 AM CDT

## 2023-06-30 ENCOUNTER — TELEPHONE (OUTPATIENT)
Dept: FAMILY MEDICINE | Facility: CLINIC | Age: 58
End: 2023-06-30
Payer: MEDICARE

## 2023-06-30 NOTE — TELEPHONE ENCOUNTER
----- Message from Ngoc Catalan sent at 6/30/2023  1:26 PM CDT -----  Regarding: Needs to schedule injection  Type: Needs Medical Advice  Who Called:  Ryan Tapia Call Back Number: [phone]    Additional Information: Pt needs to schedule b12 shot please call to  schedule

## 2023-07-05 ENCOUNTER — CLINICAL SUPPORT (OUTPATIENT)
Dept: FAMILY MEDICINE | Facility: CLINIC | Age: 58
End: 2023-07-05
Payer: MEDICARE

## 2023-07-05 DIAGNOSIS — E53.8 VITAMIN B12 DEFICIENCY: Primary | Chronic | ICD-10-CM

## 2023-07-05 PROCEDURE — 96372 PR INJECTION,THERAP/PROPH/DIAG2ST, IM OR SUBCUT: ICD-10-PCS | Mod: S$GLB,,, | Performed by: STUDENT IN AN ORGANIZED HEALTH CARE EDUCATION/TRAINING PROGRAM

## 2023-07-05 PROCEDURE — 96372 THER/PROPH/DIAG INJ SC/IM: CPT | Mod: S$GLB,,, | Performed by: STUDENT IN AN ORGANIZED HEALTH CARE EDUCATION/TRAINING PROGRAM

## 2023-07-05 RX ADMIN — CYANOCOBALAMIN 1000 MCG: 1000 INJECTION, SOLUTION INTRAMUSCULAR; SUBCUTANEOUS at 09:07

## 2023-07-05 NOTE — PROGRESS NOTES
Patient presents to the office for his biweekly vitamin B12 injection.  Injection administered into left deltoid per patients request.  Patient tolerated injection well with no complaints.    Next appointment scheduled for July 15 th @ 9:30 A M per patients request.

## 2023-07-13 ENCOUNTER — OFFICE VISIT (OUTPATIENT)
Dept: FAMILY MEDICINE | Facility: CLINIC | Age: 58
End: 2023-07-13
Payer: MEDICARE

## 2023-07-13 ENCOUNTER — LAB VISIT (OUTPATIENT)
Dept: LAB | Facility: HOSPITAL | Age: 58
End: 2023-07-13
Payer: MEDICARE

## 2023-07-13 VITALS
WEIGHT: 185.06 LBS | DIASTOLIC BLOOD PRESSURE: 72 MMHG | SYSTOLIC BLOOD PRESSURE: 126 MMHG | BODY MASS INDEX: 26.49 KG/M2 | HEIGHT: 70 IN | RESPIRATION RATE: 18 BRPM

## 2023-07-13 DIAGNOSIS — R73.9 ELEVATED BLOOD SUGAR: ICD-10-CM

## 2023-07-13 DIAGNOSIS — F20.9 CHRONIC SCHIZOPHRENIA: Primary | Chronic | ICD-10-CM

## 2023-07-13 PROBLEM — K21.9 GASTROESOPHAGEAL REFLUX DISEASE WITHOUT ESOPHAGITIS: Chronic | Status: ACTIVE | Noted: 2023-07-13

## 2023-07-13 PROBLEM — G43.719 INTRACTABLE CHRONIC MIGRAINE WITHOUT AURA AND WITHOUT STATUS MIGRAINOSUS: Chronic | Status: ACTIVE | Noted: 2021-12-30

## 2023-07-13 LAB
ESTIMATED AVG GLUCOSE: 94 MG/DL (ref 68–131)
HBA1C MFR BLD: 4.9 % (ref 4–5.6)

## 2023-07-13 PROCEDURE — 3078F PR MOST RECENT DIASTOLIC BLOOD PRESSURE < 80 MM HG: ICD-10-PCS | Mod: CPTII,S$GLB,, | Performed by: STUDENT IN AN ORGANIZED HEALTH CARE EDUCATION/TRAINING PROGRAM

## 2023-07-13 PROCEDURE — 3072F PR LOW RISK FOR RETINOPATHY: ICD-10-PCS | Mod: CPTII,S$GLB,, | Performed by: STUDENT IN AN ORGANIZED HEALTH CARE EDUCATION/TRAINING PROGRAM

## 2023-07-13 PROCEDURE — 3074F SYST BP LT 130 MM HG: CPT | Mod: CPTII,S$GLB,, | Performed by: STUDENT IN AN ORGANIZED HEALTH CARE EDUCATION/TRAINING PROGRAM

## 2023-07-13 PROCEDURE — 3008F PR BODY MASS INDEX (BMI) DOCUMENTED: ICD-10-PCS | Mod: CPTII,S$GLB,, | Performed by: STUDENT IN AN ORGANIZED HEALTH CARE EDUCATION/TRAINING PROGRAM

## 2023-07-13 PROCEDURE — 1159F PR MEDICATION LIST DOCUMENTED IN MEDICAL RECORD: ICD-10-PCS | Mod: CPTII,S$GLB,, | Performed by: STUDENT IN AN ORGANIZED HEALTH CARE EDUCATION/TRAINING PROGRAM

## 2023-07-13 PROCEDURE — 3008F BODY MASS INDEX DOCD: CPT | Mod: CPTII,S$GLB,, | Performed by: STUDENT IN AN ORGANIZED HEALTH CARE EDUCATION/TRAINING PROGRAM

## 2023-07-13 PROCEDURE — 36415 COLL VENOUS BLD VENIPUNCTURE: CPT | Mod: PN | Performed by: STUDENT IN AN ORGANIZED HEALTH CARE EDUCATION/TRAINING PROGRAM

## 2023-07-13 PROCEDURE — 99214 PR OFFICE/OUTPT VISIT, EST, LEVL IV, 30-39 MIN: ICD-10-PCS | Mod: S$GLB,,, | Performed by: STUDENT IN AN ORGANIZED HEALTH CARE EDUCATION/TRAINING PROGRAM

## 2023-07-13 PROCEDURE — 99999 PR PBB SHADOW E&M-EST. PATIENT-LVL V: CPT | Mod: PBBFAC,,, | Performed by: STUDENT IN AN ORGANIZED HEALTH CARE EDUCATION/TRAINING PROGRAM

## 2023-07-13 PROCEDURE — 3044F PR MOST RECENT HEMOGLOBIN A1C LEVEL <7.0%: ICD-10-PCS | Mod: CPTII,S$GLB,, | Performed by: STUDENT IN AN ORGANIZED HEALTH CARE EDUCATION/TRAINING PROGRAM

## 2023-07-13 PROCEDURE — 3072F LOW RISK FOR RETINOPATHY: CPT | Mod: CPTII,S$GLB,, | Performed by: STUDENT IN AN ORGANIZED HEALTH CARE EDUCATION/TRAINING PROGRAM

## 2023-07-13 PROCEDURE — 99999 PR PBB SHADOW E&M-EST. PATIENT-LVL V: ICD-10-PCS | Mod: PBBFAC,,, | Performed by: STUDENT IN AN ORGANIZED HEALTH CARE EDUCATION/TRAINING PROGRAM

## 2023-07-13 PROCEDURE — 3078F DIAST BP <80 MM HG: CPT | Mod: CPTII,S$GLB,, | Performed by: STUDENT IN AN ORGANIZED HEALTH CARE EDUCATION/TRAINING PROGRAM

## 2023-07-13 PROCEDURE — 99214 OFFICE O/P EST MOD 30 MIN: CPT | Mod: S$GLB,,, | Performed by: STUDENT IN AN ORGANIZED HEALTH CARE EDUCATION/TRAINING PROGRAM

## 2023-07-13 PROCEDURE — 1159F MED LIST DOCD IN RCRD: CPT | Mod: CPTII,S$GLB,, | Performed by: STUDENT IN AN ORGANIZED HEALTH CARE EDUCATION/TRAINING PROGRAM

## 2023-07-13 PROCEDURE — 3074F PR MOST RECENT SYSTOLIC BLOOD PRESSURE < 130 MM HG: ICD-10-PCS | Mod: CPTII,S$GLB,, | Performed by: STUDENT IN AN ORGANIZED HEALTH CARE EDUCATION/TRAINING PROGRAM

## 2023-07-13 PROCEDURE — 83036 HEMOGLOBIN GLYCOSYLATED A1C: CPT | Performed by: STUDENT IN AN ORGANIZED HEALTH CARE EDUCATION/TRAINING PROGRAM

## 2023-07-13 PROCEDURE — 3044F HG A1C LEVEL LT 7.0%: CPT | Mod: CPTII,S$GLB,, | Performed by: STUDENT IN AN ORGANIZED HEALTH CARE EDUCATION/TRAINING PROGRAM

## 2023-07-13 RX ORDER — ARIPIPRAZOLE 5 MG/1
TABLET ORAL
COMMUNITY
End: 2023-08-14

## 2023-07-13 NOTE — PROGRESS NOTES
Plan:      Ryan was seen today for follow-up and blood sugar problem.    Diagnoses and all orders for this visit:    Chronic schizophrenia: Will print out records for patient to bring to psychiatry appointment tomorrow. Stressed importance of not buying xanax from outside sources and need to wean off medication. Pt states he plans to address these issues at his appointment tomorrow.     Elevated blood sugar: Repeat A1c to confirm outside lab results.   -     HEMOGLOBIN A1C; Future    Follow up in about 4 weeks (around 8/10/2023), or if symptoms worsen or fail to improve.    Ana Mayer MD  07/13/2023    Subjective:      Patient ID: Ryan Crane is a 57 y.o. male    Chief Complaint   Patient presents with    Follow-up    Blood Sugar Problem     HPI  57 y.o. male with a PMHx as documented below presents to clinic today for the following:    Per pt, psychiatry wants to discontinue Geodon and start Abilify - pt is very hesitant to make this change as he thinks his regimen is working well. Reports outside lab w/ Hgb A1c 5.7% - most recent A1c on file w/ [facility] 5.1% (3/2023). Additionally, pt reports taking xanax daily, which he has purchased from a friend. He reports having seizures 2/2 xanax withdrawal from the past. Pt states he is currently taking to his psychiatrist about this issue. Unclear if Psych is able to see [facility] records for reference.     Chronic schizophrenia:   - Buspar 15 mg TID, Lamictal 200 mg daily, Remeron 15 mg qhs, Geodon 160 mg qhs  - Re-establishing with Psychiatry, recent appointments: April 12th, May 16th and 23rd - pt reports appointment scheduled for tomorrow   - Appointment w/ social work April 16th     Subacute thoracic and lumbar back pain:   - MRI lumbar spine w/o contrast (6/12/23): Multilevel degenerative changes of the lumbar spine without more than mild spinal canal/recess narrowing there is shallow disc bulging is noted at multiple levels. No lateralizing disc  "herniation. Mild to moderate right foraminal narrowing at L4-L5.  - MRI thoracic spine w/o contrast (6/12/23): Mild degenerative changes as discussed above.  The spinal canal and foramina are patent throughout.  No cord impingement or acute process  - S/p eval w/ Dr. Shirley Dowd [facility] - recommending medial branch blocks and subsequent radiofrequency ablation as indicated of the L4-5 and L5-S1 facet joints bilaterally (scheduled 7/25/23), considering future epidural steroid injections in the midthoracic region    Hx of CVA:   - Right lacunar infarct (3/4/23) resulting in hemiplegia of left, nondominant side  - CT head w/o contrast showed changes consistent with subacute infarct of the left cerebellum and chronic infarct of the right occipital lobe  - MRI w/o contrast showed small area of interest diffusion is seen in the right corona radiata extending to the region of the superior aspect of the posterior limb internal capsule  - ASA 81 mg daily, Lipitor 40 mg daily  - S/p Neuro follow-up on 3/17/23 - okay to keep Lipitor 40 mg daily rather than 80 mg daily     CAD:   - ASA 81 mg daily, Plavix 75 mg daily, Lipitor 40 mg daily, Ranexa 500 mg BID  - Nitro 0.4 mg SL q5min PRN     HTN:   - Amlodipine 2.5 mg daily, Toprol-XL 50 mg daily     HFpEF:   - TTE (3/4/23) w/ EF 60% and grade I left ventricular diastolic dysfunction     PVD:   - ASA 81 mg daily, Lipitor 40 mg daily  - BLE arterial US (3/7/23): "There appear to be waveform changes throughout the bilateral lower extremity arterial system, more evident in the distal right lower extremity arteries which may indicate some degree of vascular insufficiency or more proximal stenosis. However, no doubling of peak systolic velocities to suggest greater than 50% stenosis is sonographically demonstrated."     COPD, GOLD 2:   - PFTs w/ FEV1 (73%) (9/2022)  - Trelegy 100-62.5 mcg, 1 puff once daily  - Albuterol PRN     Allergic rhinitis:   - Claritin 10 mg daily " PRN, Flonase daily PRN     Hx of DMT2 w/ diabetic polyneuropathy:   - Most recent Hgb A1c 5.1% (3/2023)  - Lyrica 50 mg TID      Migraine headaches:   - Ajovy 225 mg monthly injections (switched from Emgality 5/30/23), Topamax 200 mg qhs, Nutrex PRN     B12 deficiency:   - Vitamin B12 < 200 on 3/24/23  - S/p B12 1000 mcg IM while in hospital, discharged w/ plan to continue as outpatient     ED:   - Cialis 20 mg PRN     Hyponatremia:   - NaCl 1000 mg TID     GERD:   - Protonix 40 mg daily     ROS  Constitutional:  Negative for chills and fever.   Respiratory:  Negative for shortness of breath.    Cardiovascular:  Negative for chest pain.   Gastrointestinal:  Negative for abdominal pain, constipation, diarrhea, nausea and vomiting.     Current Outpatient Medications   Medication Instructions    AJOVY AUTOINJECTOR 225 mg, Subcutaneous, Every 28 days    albuterol (PROVENTIL/VENTOLIN HFA) 90 mcg/actuation inhaler 2 puffs, Inhalation, Every 6 hours PRN    albuterol-ipratropium (DUO-NEB) 2.5 mg-0.5 mg/3 mL nebulizer solution 3 mLs, Nebulization, Every 6 hours PRN, Rescue    amLODIPine (NORVASC) 2.5 mg, Oral, Daily    ARIPiprazole (ABILIFY) 5 MG Tab 1 tablet Orally Once a day    aspirin (ECOTRIN) 81 mg, Oral, Daily    atorvastatin (LIPITOR) 40 mg, Oral, Daily    busPIRone (BUSPAR) 15 mg, Oral, 3 times daily    clopidogreL (PLAVIX) 75 mg, Oral, Daily    cyanocobalamin 1,000 mcg/mL injection 1 ml sq daily x 5 days, then 1 ml sq weekly x 4 weeks then q monthly -  will need to go to PCP    fluticasone propionate (FLONASE) 50 mcg/actuation nasal spray 2 sprays, Nasal, Nightly    fluticasone-umeclidin-vilanter (TRELEGY ELLIPTA) 100-62.5-25 mcg DsDv 1 puff, Inhalation, Daily    Lactobac. rhamnosus GG-inulin 10 billion cell -200 mg Chew 1 tablet, Oral, Every morning    lamoTRIgine (LAMICTAL) 200 mg, Oral, Daily    loratadine (CLARITIN) 10 mg tablet 1 each, Oral, Daily    metoprolol succinate (TOPROL-XL) 50 MG 24 hr tablet 1 tablet,  Oral, Daily    metoprolol succinate 50 mg CSpX 1 tablet EVERY PM (route: oral)    mirtazapine (REMERON) 15 mg, Oral, Nightly    nicotine (NICODERM CQ) 21 mg/24 hr 1 patch, Transdermal, Daily    nicotine polacrilex 4 MG Lozg Take up to 8 pieces daily as needed    nitroGLYCERIN (NITROSTAT) 0.4 mg, Sublingual, Every 5 min PRN    NURTEC 75 mg, Oral, Daily PRN, Place ODT tablet on the tongue; alternatively the ODT tablet may be placed under the tongue    pantoprazole (PROTONIX) 40 mg, Oral, Daily    predniSONE (DELTASONE) 20 mg, Oral, Daily, Take one tablet by mouth daily for 5 days as needed for shortness of breath    pregabalin (LYRICA) 50 mg, Oral, 3 times daily    ranolazine (RANEXA) 500 mg, Oral, 2 times daily    sodium chloride 1,000 mg TbSO tablet tbso 1 tablet, Misc.(Non-Drug; Combo Route), 3 times daily    tadalafiL (CIALIS) 20 mg, Oral, Daily    topiramate (TOPAMAX) 200 mg, Oral, Nightly    traMADoL (ULTRAM) 50 mg, Oral, Every 8 hours PRN    ziprasidone (GEODON) 160 mg, Oral, Nightly      Past Medical History:   Diagnosis Date    Aortic valve stenosis 02/28/2022    Arachnoid cyst of posterior cranial fossa 01/13/2022    Benign prostatic hyperplasia without lower urinary tract symptoms 05/25/2023    Bilateral carotid bruits 06/22/2021    Bipolar disorder 01/16/2022    CAD S/P percutaneous coronary angioplasty     3 vessel disease    Calculus of gallbladder without cholecystitis without obstruction 01/11/2023    Cancer     Candidal intertrigo 11/11/2019    Cataract     Chronic heart failure with preserved ejection fraction 03/29/2023    Chronic schizophrenia     Colon polyps     Diabetic polyneuropathy associated with diabetes mellitus due to underlying condition 11/21/2019    Dysarthria as late effect of cerebellar cerebrovascular accident (CVA) 03/24/2023    Equinus deformity of both feet 11/21/2019    Erectile dysfunction due to arterial insufficiency 05/25/2023    Flaccid hemiplegia of left nondominant side  as late effect of cerebral infarction 03/17/2023    Flat foot 11/21/2019    Gastritis     Gastroesophageal reflux disease without esophagitis 07/13/2023    Gastrointestinal hemorrhage 12/13/2019    Post polypectomy bleeding    General anesthetics causing adverse effect in therapeutic use     Hammer toes of both feet 11/21/2019    Hx of diabetic foot ulcer 11/21/2019    Hypertension     Hyponatremia 01/11/2022    ELAINE (iron deficiency anemia) 12/10/2019    Intractable chronic migraine without aura and without status migrainosus 12/30/2021    Microcytic anemia 10/09/2019    Moderate aortic regurgitation 03/29/2023    Moderate mitral regurgitation 03/29/2023    Moderate mitral stenosis 03/29/2023    Moderate to severe aortic stenosis 02/28/2022    Myocardial infarct, old     Nicotine dependence, unspecified, uncomplicated 01/16/2022    Onychomycosis due to dermatophyte 11/21/2019    Orchitis 11/09/2019    PIPER on CPAP     Peripheral visual field defect, bilateral 01/13/2022    PVD (peripheral vascular disease) 11/21/2019    Seizures 2008    Stage 2 moderate COPD by GOLD classification     PFTs w/ FEV1 (73%) (9/2022)    Stroke 2005    Thoracic aortic atherosclerosis 10/17/2022    CT chest    Thyroid disease     Previously on pharmacologic Tx    Tinea pedis of right foot 05/21/2019    Tobacco use disorder     Type 2 diabetes mellitus with diabetic peripheral angiopathy without gangrene     A1c 5.1% (3/2023)    Vitamin B12 deficiency 03/24/2023      Past Surgical History:   Procedure Laterality Date    ANGIOGRAM, CORONARY, WITH LEFT HEART CATHETERIZATION  08/25/2022    Procedure: Angiogram, Coronary, with Left Heart Cath;  Surgeon: Mai Deleon MD;  Location: [facility] CATH;  Service: Cardiology;;    APPENDECTOMY      BONE MARROW ASPIRATION Left 08/21/2020    Procedure: ASPIRATION, BONE MARROW;  Surgeon: Lex Kathleen MD;  Location: [facility] OR;  Service: Oncology;  Laterality: Left;    CHOLECYSTECTOMY      CLOSURE OF WOUND Right  09/09/2019    Procedure: CLOSURE, WOUND;  Surgeon: Li Kathleen DPM;  Location: [facility] OR;  Service: Podiatry;  Laterality: Right;    COLONOSCOPY N/A 12/10/2019    Procedure: COLONOSCOPY;  Surgeon: Ryan Lucas MD;  Location: [facility] ENDO;  Service: Endoscopy;  Laterality: N/A;    COLONOSCOPY N/A 12/13/2019    Procedure: COLONOSCOPY;  Surgeon: Ryan Lucas MD;  Location: [facility] ENDO;  Service: Endoscopy;  Laterality: N/A;    CORONARY STENT PLACEMENT      ESOPHAGOGASTRODUODENOSCOPY N/A 12/10/2019    Procedure: EGD (ESOPHAGOGASTRODUODENOSCOPY);  Surgeon: Ryan Lucas MD;  Location: [facility] ENDO;  Service: Endoscopy;  Laterality: N/A;    ESOPHAGOGASTRODUODENOSCOPY N/A 11/03/2022    Procedure: EGD (ESOPHAGOGASTRODUODENOSCOPY);  Surgeon: Ryan Lucas MD;  Location: [facility] ENDO;  Service: Endoscopy;  Laterality: N/A;    LAPAROSCOPIC CHOLECYSTECTOMY N/A 01/11/2023    Procedure: CHOLECYSTECTOMY, LAPAROSCOPIC;  Surgeon: Laith Davis MD;  Location: [facility] OR;  Service: General;  Laterality: N/A;    LEFT HEART CATHETERIZATION Left 08/25/2022    Procedure: Left heart cath;  Surgeon: Mai Deleon MD;  Location: [facility] CATH;  Service: Cardiology;  Laterality: Left;    right heel surgery      SHOULDER ARTHROSCOPY Left     UPPER GASTROINTESTINAL ENDOSCOPY       Family History   Problem Relation Age of Onset    Melanoma Mother     Diabetes Mother     Hypertension Mother     Stroke Father     Diabetes Father     Hypertension Father     Colon cancer Father         unsure of age of diagnosis    Cancer Father         colon cancer    Cervical cancer Sister     Cirrhosis Brother     Hypertension Brother     Lung cancer Maternal Grandmother     Prostate cancer Maternal Grandfather     Crohn's disease Neg Hx     Ulcerative colitis Neg Hx     Stomach cancer Neg Hx     Esophageal cancer Neg Hx     Retinal detachment Neg Hx     Strabismus Neg Hx     Macular degeneration Neg Hx     Glaucoma Neg Hx      Social History  "    Tobacco Use    Smoking status: Every Day     Packs/day: 0.25     Years: 43.00     Pack years: 10.75     Types: Cigarettes     Start date:      Last attempt to quit: 2022     Years since quittin.5    Smokeless tobacco: Never    Tobacco comments:     Age Started 14    Substance Use Topics    Alcohol use: No    Drug use: Not Currently     Types: Marijuana      Immunization History   Administered Date(s) Administered    Influenza - Quadrivalent - PF *Preferred* (6 months and older) 2019, 2022    Pneumococcal Polysaccharide - 23 Valent 2019, 2022    Tdap 2019      Objective:      Vitals:    23 1451   BP: 126/72   BP Location: Right arm   Patient Position: Sitting   Resp: 18   Weight: 84 kg (185 lb 1.2 oz)   Height: 5' 10" (1.778 m)     Body mass index is 26.56 kg/m².    Physical Exam   Constitutional:       General: No acute distress.  HENT:      Head: Normocephalic and atraumatic.   Pulmonary:      Effort: Pulmonary effort is normal. No respiratory distress.   Neurological:      General: No focal deficit present.      Mental Status: Alert and oriented to person, place, and time. Mental status is at baseline.    Assessment:       1. Chronic schizophrenia    2. Elevated blood sugar          Ana Mayer MD  [facility]  Office: [phone]   Fax: [phone]  2023      Disclaimer: This note was partly generated using dictation software which may occasionally result in transcription errors.    Total time spent on this encounter includes face to face time and non-face to face time preparing to see the patient (eg, review of tests), obtaining and/or reviewing separately obtained history, documenting clinical information in the electronic or other health record, independently interpreting results, and communicating results to the patient/family/caregiver, or care coordinator.    "

## 2023-07-17 ENCOUNTER — TELEPHONE (OUTPATIENT)
Dept: FAMILY MEDICINE | Facility: CLINIC | Age: 58
End: 2023-07-17
Payer: MEDICARE

## 2023-07-17 DIAGNOSIS — R53.83 FATIGUE, UNSPECIFIED TYPE: Primary | ICD-10-CM

## 2023-07-17 NOTE — TELEPHONE ENCOUNTER
Pt stated he would like to get his testosterone check due to him feeling fatigue.     Please send in lab order

## 2023-07-17 NOTE — TELEPHONE ENCOUNTER
----- Message from Fidelina Lozoya sent at 7/17/2023  9:47 AM CDT -----  Regarding: advice  Contact: patient  Type: Needs Medical Advice  Who Called:  patient  Symptoms (please be specific):    How long has patient had these symptoms:    Pharmacy name and phone #:    Best Call Back Number: [phone]    Additional Information: Patient states he would like to get testosterone checked.  Patient still does not have any energy.  Please call patient to advise.  Thanks!

## 2023-07-18 DIAGNOSIS — F17.200 TOBACCO USE DISORDER: ICD-10-CM

## 2023-07-18 RX ORDER — ASPIRIN/CALCIUM CARB/MAGNESIUM 325 MG
TABLET ORAL
Qty: 270 LOZENGE | Refills: 2 | Status: SHIPPED | OUTPATIENT
Start: 2023-07-18 | End: 2023-10-25 | Stop reason: SDUPTHER

## 2023-07-18 NOTE — PROGRESS NOTES
Diagnoses and all orders for this visit:    Tobacco use disorder  -     nicotine polacrilex 4 MG Lozg; Take up to 8 pieces daily as needed        Ana Mayer MD  [facility]  Office: [phone]   Fax: [phone]  07/18/2023

## 2023-07-19 ENCOUNTER — TELEPHONE (OUTPATIENT)
Dept: FAMILY MEDICINE | Facility: CLINIC | Age: 58
End: 2023-07-19
Payer: MEDICARE

## 2023-07-19 NOTE — TELEPHONE ENCOUNTER
----- Message from Nora Andrews, Patient Care Assistant sent at 7/19/2023 10:34 AM CDT -----  Contact: Pt  Type:  RX Refill Request    Who Called:  Pt  Refill or New Rx:  Refill  RX Name and Strength:  Sodium Fluoride 1 mg  How is the patient currently taking it? (ex. 1XDay):  As Directed  Is this a 30 day or 90 day RX:  90  Preferred Pharmacy with phone number:    [facility] - [place], [place] - [address]  [address] [address]  Phone: [phone] Fax: [phone]  Local or Mail Order:  Mail  Ordering Provider:  Moreno Tapia Call Back Number:  [phone]  Additional Information:  Please contact pt upon completion-Thank you~

## 2023-07-19 NOTE — TELEPHONE ENCOUNTER
Please review and advise if pt should still be taking Sodium Chloride. If so, please send refill

## 2023-07-19 NOTE — TELEPHONE ENCOUNTER
Per MD:  Please call patient and ask is he has been taking this medication long-term (because his sodium levels have  been stable, need to know if that was because of this medication or if he is fine without it) - thanks!

## 2023-07-19 NOTE — TELEPHONE ENCOUNTER
----- Message from Flor Oneal, Patient Care Assistant sent at 7/19/2023 10:56 AM CDT -----  Contact: self  Pt is calling to get labs for Testerone, he says the b12 shot is not working [phone]  thanks

## 2023-07-25 ENCOUNTER — HOSPITAL ENCOUNTER (OUTPATIENT)
Facility: HOSPITAL | Age: 58
Discharge: HOME | End: 2023-07-25
Attending: ANESTHESIOLOGY | Admitting: ANESTHESIOLOGY
Payer: MEDICARE

## 2023-07-25 DIAGNOSIS — M54.16 LUMBAR RADICULITIS: ICD-10-CM

## 2023-07-25 PROCEDURE — 64493 PR INJ DX/THER AGNT PARAVERT FACET JOINT,IMG GUIDE,LUMBAR/SAC,1ST LVL: ICD-10-PCS | Mod: 50,,, | Performed by: ANESTHESIOLOGY

## 2023-07-25 PROCEDURE — 64493 INJ PARAVERT F JNT L/S 1 LEV: CPT | Mod: RT | Performed by: ANESTHESIOLOGY

## 2023-07-25 PROCEDURE — 64494 PR INJ DX/THER AGNT PARAVERT FACET JOINT,IMG GUIDE,LUMBAR/SAC, 2ND LEVEL: ICD-10-PCS | Mod: 50,,, | Performed by: ANESTHESIOLOGY

## 2023-07-25 PROCEDURE — 64494 INJ PARAVERT F JNT L/S 2 LEV: CPT | Mod: LT | Performed by: ANESTHESIOLOGY

## 2023-07-25 PROCEDURE — 64494 INJ PARAVERT F JNT L/S 2 LEV: CPT | Mod: 50,,, | Performed by: ANESTHESIOLOGY

## 2023-07-25 PROCEDURE — 64493 INJ PARAVERT F JNT L/S 1 LEV: CPT | Mod: 50,,, | Performed by: ANESTHESIOLOGY

## 2023-07-25 PROCEDURE — 63600175 PHARM REV CODE 636 W HCPCS: Performed by: ANESTHESIOLOGY

## 2023-07-25 RX ORDER — FENTANYL CITRATE 50 UG/ML
INJECTION, SOLUTION INTRAMUSCULAR; INTRAVENOUS
Status: DISCONTINUED | OUTPATIENT
Start: 2023-07-25 | End: 2023-07-25 | Stop reason: HOSPADM

## 2023-07-25 RX ORDER — BUPIVACAINE HYDROCHLORIDE 5 MG/ML
INJECTION, SOLUTION EPIDURAL; INTRACAUDAL
Status: DISCONTINUED | OUTPATIENT
Start: 2023-07-25 | End: 2023-07-25 | Stop reason: HOSPADM

## 2023-07-25 RX ORDER — MIDAZOLAM HYDROCHLORIDE 1 MG/ML
INJECTION INTRAMUSCULAR; INTRAVENOUS
Status: DISCONTINUED | OUTPATIENT
Start: 2023-07-25 | End: 2023-07-25 | Stop reason: HOSPADM

## 2023-07-25 RX ORDER — SODIUM CHLORIDE, SODIUM LACTATE, POTASSIUM CHLORIDE, CALCIUM CHLORIDE 600; 310; 30; 20 MG/100ML; MG/100ML; MG/100ML; MG/100ML
INJECTION, SOLUTION INTRAVENOUS CONTINUOUS
Status: ACTIVE | OUTPATIENT
Start: 2023-07-25

## 2023-07-25 RX ORDER — LIDOCAINE HYDROCHLORIDE 10 MG/ML
1 INJECTION, SOLUTION EPIDURAL; INFILTRATION; INTRACAUDAL; PERINEURAL ONCE
Status: ACTIVE | OUTPATIENT
Start: 2023-07-25

## 2023-07-25 NOTE — DISCHARGE SUMMARY
[facility] - Periop Services  Discharge Note  Short Stay    Procedure(s) (LRB):  Block-nerve-medial branch-lumbar (Bilateral)      OUTCOME: Patient tolerated treatment/procedure well without complication and is now ready for discharge.    DISPOSITION: Home or Self Care    FINAL DIAGNOSIS:  <principal problem not specified>    FOLLOWUP: In clinic    DISCHARGE INSTRUCTIONS:    Discharge Procedure Orders   Notify your health care provider if you experience any of the following:  temperature >100.4     Notify your health care provider if you experience any of the following:  severe uncontrolled pain     Notify your health care provider if you experience any of the following:  redness, tenderness, or signs of infection (pain, swelling, redness, odor or green/yellow discharge around incision site)     Activity as tolerated        TIME SPENT ON DISCHARGE: 30 minutes

## 2023-07-25 NOTE — H&P
CC: low back pain    HPI: The patient is a 57 y.o. male with a history of low back pain here for MBB. There are no major changes in history and physical from 6/14/23 by Nile.    Past Medical History:   Diagnosis Date    Aortic valve stenosis 02/28/2022    Arachnoid cyst of posterior cranial fossa 01/13/2022    Benign prostatic hyperplasia without lower urinary tract symptoms 05/25/2023    Bilateral carotid bruits 06/22/2021    Bipolar disorder 01/16/2022    CAD S/P percutaneous coronary angioplasty     3 vessel disease    Calculus of gallbladder without cholecystitis without obstruction 01/11/2023    Cancer     Candidal intertrigo 11/11/2019    Cataract     Chronic heart failure with preserved ejection fraction 03/29/2023    Chronic schizophrenia     Colon polyps     Diabetic polyneuropathy associated with diabetes mellitus due to underlying condition 11/21/2019    Dysarthria as late effect of cerebellar cerebrovascular accident (CVA) 03/24/2023    Equinus deformity of both feet 11/21/2019    Erectile dysfunction due to arterial insufficiency 05/25/2023    Flaccid hemiplegia of left nondominant side as late effect of cerebral infarction 03/17/2023    Flat foot 11/21/2019    Gastritis     Gastroesophageal reflux disease without esophagitis 07/13/2023    Gastrointestinal hemorrhage 12/13/2019    Post polypectomy bleeding    General anesthetics causing adverse effect in therapeutic use     Hammer toes of both feet 11/21/2019    Hx of diabetic foot ulcer 11/21/2019    Hypertension     Hyponatremia 01/11/2022    ELAINE (iron deficiency anemia) 12/10/2019    Intractable chronic migraine without aura and without status migrainosus 12/30/2021    Microcytic anemia 10/09/2019    Moderate aortic regurgitation 03/29/2023    Moderate mitral regurgitation 03/29/2023    Moderate mitral stenosis 03/29/2023    Moderate to severe aortic stenosis 02/28/2022    Myocardial infarct, old     Nicotine dependence, unspecified, uncomplicated  01/16/2022    Onychomycosis due to dermatophyte 11/21/2019    Orchitis 11/09/2019    PIPER on CPAP     Peripheral visual field defect, bilateral 01/13/2022    PVD (peripheral vascular disease) 11/21/2019    Seizures 2008    Stage 2 moderate COPD by GOLD classification     PFTs w/ FEV1 (73%) (9/2022)    Stroke 2005    Thoracic aortic atherosclerosis 10/17/2022    CT chest    Thyroid disease     Previously on pharmacologic Tx    Tinea pedis of right foot 05/21/2019    Tobacco use disorder     Type 2 diabetes mellitus with diabetic peripheral angiopathy without gangrene     A1c 5.1% (3/2023)    Vitamin B12 deficiency 03/24/2023       Past Surgical History:   Procedure Laterality Date    ANGIOGRAM, CORONARY, WITH LEFT HEART CATHETERIZATION  08/25/2022    Procedure: Angiogram, Coronary, with Left Heart Cath;  Surgeon: Mai Deleon MD;  Location: [facility] CATH;  Service: Cardiology;;    APPENDECTOMY      BONE MARROW ASPIRATION Left 08/21/2020    Procedure: ASPIRATION, BONE MARROW;  Surgeon: Lex Kathleen MD;  Location: [facility] OR;  Service: Oncology;  Laterality: Left;    CHOLECYSTECTOMY      CLOSURE OF WOUND Right 09/09/2019    Procedure: CLOSURE, WOUND;  Surgeon: Li Kathleen DPM;  Location: [facility] OR;  Service: Podiatry;  Laterality: Right;    COLONOSCOPY N/A 12/10/2019    Procedure: COLONOSCOPY;  Surgeon: Ryan Lucas MD;  Location: [facility] ENDO;  Service: Endoscopy;  Laterality: N/A;    COLONOSCOPY N/A 12/13/2019    Procedure: COLONOSCOPY;  Surgeon: Ryan Lucas MD;  Location: [facility];  Service: Endoscopy;  Laterality: N/A;    CORONARY STENT PLACEMENT      ESOPHAGOGASTRODUODENOSCOPY N/A 12/10/2019    Procedure: EGD (ESOPHAGOGASTRODUODENOSCOPY);  Surgeon: Ryan Lucas MD;  Location: [facility] ENDO;  Service: Endoscopy;  Laterality: N/A;    ESOPHAGOGASTRODUODENOSCOPY N/A 11/03/2022    Procedure: EGD (ESOPHAGOGASTRODUODENOSCOPY);  Surgeon: Ryan Lucas MD;  Location: [facility] ENDO;  Service: Endoscopy;   Laterality: N/A;    LAPAROSCOPIC CHOLECYSTECTOMY N/A 2023    Procedure: CHOLECYSTECTOMY, LAPAROSCOPIC;  Surgeon: Laith Davis MD;  Location: [facility] OR;  Service: General;  Laterality: N/A;    LEFT HEART CATHETERIZATION Left 2022    Procedure: Left heart cath;  Surgeon: Mai Deleon MD;  Location: [facility] CATH;  Service: Cardiology;  Laterality: Left;    right heel surgery      SHOULDER ARTHROSCOPY Left     UPPER GASTROINTESTINAL ENDOSCOPY         Family History   Problem Relation Age of Onset    Melanoma Mother     Diabetes Mother     Hypertension Mother     Stroke Father     Diabetes Father     Hypertension Father     Colon cancer Father         unsure of age of diagnosis    Cancer Father         colon cancer    Cervical cancer Sister     Cirrhosis Brother     Hypertension Brother     Lung cancer Maternal Grandmother     Prostate cancer Maternal Grandfather     Crohn's disease Neg Hx     Ulcerative colitis Neg Hx     Stomach cancer Neg Hx     Esophageal cancer Neg Hx     Retinal detachment Neg Hx     Strabismus Neg Hx     Macular degeneration Neg Hx     Glaucoma Neg Hx        Social History     Socioeconomic History    Marital status: Legally    Occupational History    Occupation: disabled (mental)     Comment: since    Tobacco Use    Smoking status: Every Day     Packs/day: 0.25     Years: 43.00     Pack years: 10.75     Types: Cigarettes     Start date:      Last attempt to quit: 2022     Years since quittin.6    Smokeless tobacco: Never    Tobacco comments:     Age Started 14    Substance and Sexual Activity    Alcohol use: No    Drug use: Not Currently     Types: Marijuana    Sexual activity: Yes     Partners: Female     Social Determinants of Health     Financial Resource Strain: Low Risk     Difficulty of Paying Living Expenses: Not hard at all   Food Insecurity: No Food Insecurity    Worried About Running Out of Food in the Last Year: Never true    Ran Out of Food in  "the Last Year: Never true   Transportation Needs: Unmet Transportation Needs    Lack of Transportation (Medical): Yes    Lack of Transportation (Non-Medical): Yes   Physical Activity: Insufficiently Active    Days of Exercise per Week: 2 days    Minutes of Exercise per Session: 10 min   Stress: Stress Concern Present    Feeling of Stress : To some extent   Social Connections: Socially Isolated    Frequency of Communication with Friends and Family: Twice a week    Frequency of Social Gatherings with Friends and Family: Never    Attends Denominational Services: More than 4 times per year    Active Member of Clubs or Organizations: No    Attends Club or Organization Meetings: Never    Marital Status:    Housing Stability: Unknown    Unable to Pay for Housing in the Last Year: No    Unstable Housing in the Last Year: No       No current facility-administered medications for this encounter.       Review of patient's allergies indicates:   Allergen Reactions    Alcohol Anaphylaxis     Pt states all types of alcohol    Alcohol antiseptic pads Anaphylaxis    Cephalexin Anaphylaxis       Vitals:    07/20/23 1511   Weight: 84 kg (185 lb 3 oz)   Height: 5' 10" (1.778 m)       REVIEW OF SYSTEMS:     GENERAL: No weight loss, malaise or fevers.  HEENT:  No recent changes in vision or hearing  NECK: Negative for lumps, no difficulty with swallowing.  RESPIRATORY: Negative for cough, wheezing or shortness of breath, patient denies any recent URI.  CARDIOVASCULAR: Negative for chest pain, leg swelling or palpitations.  GI: Negative for abdominal discomfort, blood in stools or black stools or change in bowel habits.  MUSCULOSKELETAL: See HPI.  SKIN: Negative for lesions, rash, and itching.  PSYCH: No suicidal or homicidal ideations, no current mood disturbances.  HEMATOLOGY/LYMPHOLOGY: Negative for prolonged bleeding, bruising easily or swollen nodes. Patient is not currently taking any anti-coagulants  ENDO: No history of " diabetes or thyroid dysfunction  NEURO: No history of syncope, paralysis, seizures or tremors.All other reviewed and negative other than HPI.    Physical exam:  Gen: A and O x3, pleasant, well-groomed  Skin: No rashes or obvious lesions  HEENT: PERRLA, no obvious deformities on ears or in canals. No thyroid masses, trachea midline, no palpable lymph nodes in neck, axilla.  CVS: Regular rate and rhythm, normal S1 and S2, no murmurs.  Resp: Clear to auscultation bilaterally.  Abdomen: Soft, NT/ND, normal bowel sounds present.  Musculoskeletal/Neuro: Moving all extremities    Assessment:  Lumbar radiculitis          PLAN: MBB      This patient has been cleared for surgery in an ambulatory surgical facility    ASA 3,  Mallampatti Score 3  No history of anesthetic complications  Plan for RN IV sedation

## 2023-07-25 NOTE — OP NOTE
PROCEDURE DATE: 7/25/2023    PROCEDURE:  Bilateral L3,4,5 medial branch nerve blocks under fluoroscopy  (corresponds to bilateral L4/5 and L5/S1 facets)    DIAGNOSIS:  Other lumbar spondylosis    Post Op diagnosis: Same    PHYSICIAN: Napoleon Grimaldo MD    MEDICATIONS INJECTED: 0.5% bupivicaine, 0.5 ml at each level    SEDATION MEDICATIONS:RN IV Versed    LOCAL ANESTHETIC USED: none    ESTIMATED BLOOD LOSS:  none    COMPLICATIONS:  None    TECHNIQUE: A time out was taken to identify the patient, procedure and side of the procedure. The patient was placed in a prone position, then prepped and draped in the usual sterile fashion using Betadine and sterile towels.  The levels were determined under fluoroscopic guidance and then marked.  A 25-gauge 3.5 inch needle was introduced to the anatomic location of the L3,4,5 medial branch nerves on the bilateral side. The above medication was then injected. The patient tolerated the procedure well.     The patient was monitored after the procedure. Patient was given pain diary to record pain levels at home.     If found to have greater than a 50% recovery and so will be scheduled for a radiofrequency ablation of the corresponding nerves.  Patient was given post procedure and discharge instructions to follow at home.  The patient was discharged in a stable condition.

## 2023-07-26 ENCOUNTER — TELEPHONE (OUTPATIENT)
Dept: PAIN MEDICINE | Facility: CLINIC | Age: 58
End: 2023-07-26
Payer: MEDICARE

## 2023-07-26 VITALS
DIASTOLIC BLOOD PRESSURE: 71 MMHG | BODY MASS INDEX: 26.51 KG/M2 | HEIGHT: 70 IN | HEART RATE: 57 BPM | OXYGEN SATURATION: 98 % | RESPIRATION RATE: 18 BRPM | TEMPERATURE: 98 F | WEIGHT: 185.19 LBS | SYSTOLIC BLOOD PRESSURE: 135 MMHG

## 2023-07-26 DIAGNOSIS — M47.816 LUMBAR SPONDYLOSIS: Primary | ICD-10-CM

## 2023-07-26 NOTE — TELEPHONE ENCOUNTER
Pt had 1st MBB 07/25 called to get relief he states he had 100% relief x 3 hours set up for 2nd MBB for 08/24 .

## 2023-08-04 ENCOUNTER — CLINICAL SUPPORT (OUTPATIENT)
Dept: FAMILY MEDICINE | Facility: CLINIC | Age: 58
End: 2023-08-04
Payer: MEDICARE

## 2023-08-04 DIAGNOSIS — E53.8 VITAMIN B12 DEFICIENCY: Primary | ICD-10-CM

## 2023-08-04 PROCEDURE — 96372 PR INJECTION,THERAP/PROPH/DIAG2ST, IM OR SUBCUT: ICD-10-PCS | Mod: S$GLB,,, | Performed by: STUDENT IN AN ORGANIZED HEALTH CARE EDUCATION/TRAINING PROGRAM

## 2023-08-04 PROCEDURE — 96372 THER/PROPH/DIAG INJ SC/IM: CPT | Mod: S$GLB,,, | Performed by: STUDENT IN AN ORGANIZED HEALTH CARE EDUCATION/TRAINING PROGRAM

## 2023-08-04 RX ADMIN — CYANOCOBALAMIN 1000 MCG: 1000 INJECTION, SOLUTION INTRAMUSCULAR; SUBCUTANEOUS at 08:08

## 2023-08-07 ENCOUNTER — OFFICE VISIT (OUTPATIENT)
Dept: UROLOGY | Facility: CLINIC | Age: 58
End: 2023-08-07
Payer: MEDICARE

## 2023-08-07 VITALS — WEIGHT: 177.5 LBS | BODY MASS INDEX: 25.41 KG/M2 | HEIGHT: 70 IN

## 2023-08-07 DIAGNOSIS — R36.1 HEMATOSPERMIA: Primary | ICD-10-CM

## 2023-08-07 LAB
BILIRUBIN, UA POC OHS: NEGATIVE
BLOOD, UA POC OHS: NEGATIVE
CLARITY, UA POC OHS: CLEAR
COLOR, UA POC OHS: YELLOW
GLUCOSE, UA POC OHS: NEGATIVE
KETONES, UA POC OHS: NEGATIVE
LEUKOCYTES, UA POC OHS: NEGATIVE
NITRITE, UA POC OHS: NEGATIVE
PH, UA POC OHS: 6.5
POC RESIDUAL URINE VOLUME: 163 ML (ref 0–100)
PROTEIN, UA POC OHS: NEGATIVE
SPECIFIC GRAVITY, UA POC OHS: 1.01
UROBILINOGEN, UA POC OHS: 1

## 2023-08-07 PROCEDURE — 99999 PR PBB SHADOW E&M-EST. PATIENT-LVL IV: CPT | Mod: PBBFAC,,,

## 2023-08-07 PROCEDURE — 1160F RVW MEDS BY RX/DR IN RCRD: CPT | Mod: CPTII,S$GLB,,

## 2023-08-07 PROCEDURE — 81003 URINALYSIS AUTO W/O SCOPE: CPT | Mod: QW,S$GLB,,

## 2023-08-07 PROCEDURE — 51798 POCT BLADDER SCAN: ICD-10-PCS | Mod: S$GLB,,,

## 2023-08-07 PROCEDURE — 3072F PR LOW RISK FOR RETINOPATHY: ICD-10-PCS | Mod: CPTII,S$GLB,,

## 2023-08-07 PROCEDURE — 1159F MED LIST DOCD IN RCRD: CPT | Mod: CPTII,S$GLB,,

## 2023-08-07 PROCEDURE — 3072F LOW RISK FOR RETINOPATHY: CPT | Mod: CPTII,S$GLB,,

## 2023-08-07 PROCEDURE — 3008F PR BODY MASS INDEX (BMI) DOCUMENTED: ICD-10-PCS | Mod: CPTII,S$GLB,,

## 2023-08-07 PROCEDURE — 1160F PR REVIEW ALL MEDS BY PRESCRIBER/CLIN PHARMACIST DOCUMENTED: ICD-10-PCS | Mod: CPTII,S$GLB,,

## 2023-08-07 PROCEDURE — 81003 POCT URINALYSIS(INSTRUMENT): ICD-10-PCS | Mod: QW,S$GLB,,

## 2023-08-07 PROCEDURE — 99213 OFFICE O/P EST LOW 20 MIN: CPT | Mod: S$GLB,,,

## 2023-08-07 PROCEDURE — 3008F BODY MASS INDEX DOCD: CPT | Mod: CPTII,S$GLB,,

## 2023-08-07 PROCEDURE — 99999 PR PBB SHADOW E&M-EST. PATIENT-LVL IV: ICD-10-PCS | Mod: PBBFAC,,,

## 2023-08-07 PROCEDURE — 3044F HG A1C LEVEL LT 7.0%: CPT | Mod: CPTII,S$GLB,,

## 2023-08-07 PROCEDURE — 1159F PR MEDICATION LIST DOCUMENTED IN MEDICAL RECORD: ICD-10-PCS | Mod: CPTII,S$GLB,,

## 2023-08-07 PROCEDURE — 3044F PR MOST RECENT HEMOGLOBIN A1C LEVEL <7.0%: ICD-10-PCS | Mod: CPTII,S$GLB,,

## 2023-08-07 PROCEDURE — 51798 US URINE CAPACITY MEASURE: CPT | Mod: S$GLB,,,

## 2023-08-07 PROCEDURE — 99213 PR OFFICE/OUTPT VISIT, EST, LEVL III, 20-29 MIN: ICD-10-PCS | Mod: S$GLB,,,

## 2023-08-07 NOTE — PROGRESS NOTES
[facility] Note  Staff: YA Gutiérrez    PCP: MD Moreno    Chief Complaint: Hematospermia    Subjective:        HPI: Ryan Crane is a 57 y.o. male presents today for evaluation of hematospermia. He states this happened once about 4 weeks ago. He states he noticed blood in the condom. He denies dysuria, hematuria, abd pain, flank pain, urgency, frequency, incontinence, and difficulty urinating. He states cialis works well for him. Advised hematospermia is benign and likely due to breakage of blood vessel during ejaculation or trauma. He does have a penile piercing.     Questions asked the pt during ov today:  Urgency: No, urge incontinence? No  NTF: 0-1x night  Dysuria: No  Gross Hematuria:No   Straining:No, Hesistancy:No, Intermittency:No, Weak stream:No  ED:Yes - cialis works well for him    Last PSA Screening:   Lab Results   Component Value Date    PSA 1.4 05/02/2023    PSADIAG 2.0 08/29/2019       History of Kidney Stones?:  No    Constipation issues?:  No    PVR by bladder scan performed by MA today:  0 mL    REVIEW OF SYSTEMS:  Review of Systems   Constitutional: Negative.  Negative for chills and fever.   HENT: Negative.     Eyes: Negative.    Respiratory: Negative.     Cardiovascular: Negative.    Gastrointestinal: Negative.  Negative for abdominal pain, constipation, diarrhea, nausea and vomiting.   Genitourinary: Negative.  Negative for dysuria, flank pain, frequency, hematuria and urgency.   Musculoskeletal: Negative.  Negative for back pain.   Skin: Negative.    Neurological: Negative.    Endo/Heme/Allergies: Negative.    Psychiatric/Behavioral: Negative.         PMHx:  Past Medical History:   Diagnosis Date    Aortic valve stenosis 02/28/2022    Arachnoid cyst of posterior cranial fossa 01/13/2022    Benign prostatic hyperplasia without lower urinary tract symptoms 05/25/2023    Bilateral carotid bruits 06/22/2021    Bipolar disorder 01/16/2022    CAD S/P  percutaneous coronary angioplasty     3 vessel disease    Calculus of gallbladder without cholecystitis without obstruction 01/11/2023    Cancer     Candidal intertrigo 11/11/2019    Cataract     Chronic heart failure with preserved ejection fraction 03/29/2023    Chronic schizophrenia     Colon polyps     Diabetic polyneuropathy associated with diabetes mellitus due to underlying condition 11/21/2019    Dysarthria as late effect of cerebellar cerebrovascular accident (CVA) 03/24/2023    Equinus deformity of both feet 11/21/2019    Erectile dysfunction due to arterial insufficiency 05/25/2023    Flaccid hemiplegia of left nondominant side as late effect of cerebral infarction 03/17/2023    Flat foot 11/21/2019    Gastritis     Gastroesophageal reflux disease without esophagitis 07/13/2023    Gastrointestinal hemorrhage 12/13/2019    Post polypectomy bleeding    General anesthetics causing adverse effect in therapeutic use     Hammer toes of both feet 11/21/2019    Hx of diabetic foot ulcer 11/21/2019    Hypertension     Hyponatremia 01/11/2022    ELAINE (iron deficiency anemia) 12/10/2019    Intractable chronic migraine without aura and without status migrainosus 12/30/2021    Microcytic anemia 10/09/2019    Moderate aortic regurgitation 03/29/2023    Moderate mitral regurgitation 03/29/2023    Moderate mitral stenosis 03/29/2023    Moderate to severe aortic stenosis 02/28/2022    Myocardial infarct, old     Nicotine dependence, unspecified, uncomplicated 01/16/2022    Onychomycosis due to dermatophyte 11/21/2019    Orchitis 11/09/2019    PIPER on CPAP     Peripheral visual field defect, bilateral 01/13/2022    PVD (peripheral vascular disease) 11/21/2019    Seizures 2008    Stage 2 moderate COPD by GOLD classification     PFTs w/ FEV1 (73%) (9/2022)    Stroke 2005    Thoracic aortic atherosclerosis 10/17/2022    CT chest    Thyroid disease     Previously on pharmacologic Tx    Tinea pedis of right foot 05/21/2019     Tobacco use disorder     Type 2 diabetes mellitus with diabetic peripheral angiopathy without gangrene     A1c 5.1% (3/2023)    Vitamin B12 deficiency 03/24/2023       PSHx:  Past Surgical History:   Procedure Laterality Date    ANGIOGRAM, CORONARY, WITH LEFT HEART CATHETERIZATION  08/25/2022    Procedure: Angiogram, Coronary, with Left Heart Cath;  Surgeon: Mai Deleon MD;  Location: [facility] CATH;  Service: Cardiology;;    APPENDECTOMY      BONE MARROW ASPIRATION Left 08/21/2020    Procedure: ASPIRATION, BONE MARROW;  Surgeon: Lex Kathleen MD;  Location: [facility] OR;  Service: Oncology;  Laterality: Left;    CHOLECYSTECTOMY      CLOSURE OF WOUND Right 09/09/2019    Procedure: CLOSURE, WOUND;  Surgeon: Li Kathleen DPM;  Location: [facility] OR;  Service: Podiatry;  Laterality: Right;    COLONOSCOPY N/A 12/10/2019    Procedure: COLONOSCOPY;  Surgeon: Ryan Lucas MD;  Location: [facility];  Service: Endoscopy;  Laterality: N/A;    COLONOSCOPY N/A 12/13/2019    Procedure: COLONOSCOPY;  Surgeon: Ryan Lucas MD;  Location: [facility];  Service: Endoscopy;  Laterality: N/A;    CORONARY STENT PLACEMENT      ESOPHAGOGASTRODUODENOSCOPY N/A 12/10/2019    Procedure: EGD (ESOPHAGOGASTRODUODENOSCOPY);  Surgeon: Ryan Lucas MD;  Location: [facility];  Service: Endoscopy;  Laterality: N/A;    ESOPHAGOGASTRODUODENOSCOPY N/A 11/03/2022    Procedure: EGD (ESOPHAGOGASTRODUODENOSCOPY);  Surgeon: Ryan Lucas MD;  Location: [facility];  Service: Endoscopy;  Laterality: N/A;    INJECTION OF ANESTHETIC AGENT AROUND MEDIAL BRANCH NERVES INNERVATING LUMBAR FACET JOINT Bilateral 7/25/2023    Procedure: Block-nerve-medial branch-lumbar;  Surgeon: Napoleon Grimaldo MD;  Location: [facility] OR;  Service: Pain Management;  Laterality: Bilateral;  L3,4,5 MBB    LAPAROSCOPIC CHOLECYSTECTOMY N/A 01/11/2023    Procedure: CHOLECYSTECTOMY, LAPAROSCOPIC;  Surgeon: Laith Davis MD;  Location: [facility] OR;  Service: General;  Laterality:  N/A;    LEFT HEART CATHETERIZATION Left 08/25/2022    Procedure: Left heart cath;  Surgeon: Mai Deleon MD;  Location: [facility] CATH;  Service: Cardiology;  Laterality: Left;    right heel surgery      SHOULDER ARTHROSCOPY Left     UPPER GASTROINTESTINAL ENDOSCOPY         Fam Hx:   malignancies: No    kidney stones: No     Soc Hx:  Lives in [place]    Allergies:  Alcohol, Alcohol antiseptic pads, and Cephalexin    Medications: reviewed     Objective:   There were no vitals filed for this visit.    Physical Exam  Constitutional:       Appearance: Normal appearance.   HENT:      Head: Normocephalic.      Mouth/Throat:      Mouth: Mucous membranes are moist.   Eyes:      Conjunctiva/sclera: Conjunctivae normal.   Pulmonary:      Effort: Pulmonary effort is normal.   Abdominal:      General: There is no distension.      Palpations: Abdomen is soft.      Tenderness: There is no abdominal tenderness. There is no right CVA tenderness or left CVA tenderness.   Musculoskeletal:         General: Normal range of motion.      Cervical back: Normal range of motion.   Skin:     General: Skin is warm.   Neurological:      Mental Status: He is alert and oriented to person, place, and time.   Psychiatric:         Mood and Affect: Mood normal.         Behavior: Behavior normal.         LABS REVIEW:  UA today:  Color:Clear, Yellow  Spec. Grav.  1.010  PH  6.5  Negative for leukocytes, nitrates, protein, glucose, ketones, urobili, bili, and blood.    Assessment:       1. Hematospermia          Plan:     Reassured- benign finding  Continue cialis 20mg as needed    F/u Annually    [facility]: Active    YA Gutiérrez

## 2023-08-10 ENCOUNTER — TELEPHONE (OUTPATIENT)
Dept: FAMILY MEDICINE | Facility: CLINIC | Age: 58
End: 2023-08-10
Payer: MEDICARE

## 2023-08-10 NOTE — TELEPHONE ENCOUNTER
----- Message from Angie Johns sent at 8/9/2023  4:14 PM CDT -----  Regarding: Pt advise  Type:  Needs Medical Advice    Who Called: pt      Would the patient rather a call back or a response via [facility]? Callback    Best Call Back Number: [phone]    Additional Information: sts he want he needs to know the correct weight for his height he is 5ft 10inches. Please advise ------------------- thank you

## 2023-08-11 ENCOUNTER — LAB VISIT (OUTPATIENT)
Dept: LAB | Facility: HOSPITAL | Age: 58
End: 2023-08-11
Attending: STUDENT IN AN ORGANIZED HEALTH CARE EDUCATION/TRAINING PROGRAM
Payer: MEDICARE

## 2023-08-11 DIAGNOSIS — R53.83 FATIGUE, UNSPECIFIED TYPE: ICD-10-CM

## 2023-08-11 PROCEDURE — 84403 ASSAY OF TOTAL TESTOSTERONE: CPT | Performed by: STUDENT IN AN ORGANIZED HEALTH CARE EDUCATION/TRAINING PROGRAM

## 2023-08-11 PROCEDURE — 36415 COLL VENOUS BLD VENIPUNCTURE: CPT | Mod: PN | Performed by: STUDENT IN AN ORGANIZED HEALTH CARE EDUCATION/TRAINING PROGRAM

## 2023-08-12 LAB — TESTOST SERPL-MCNC: 598 NG/DL (ref 304–1227)

## 2023-08-14 ENCOUNTER — HOSPITAL ENCOUNTER (OUTPATIENT)
Dept: RADIOLOGY | Facility: HOSPITAL | Age: 58
Discharge: HOME | End: 2023-08-14
Attending: STUDENT IN AN ORGANIZED HEALTH CARE EDUCATION/TRAINING PROGRAM
Payer: MEDICARE

## 2023-08-14 ENCOUNTER — TELEPHONE (OUTPATIENT)
Dept: FAMILY MEDICINE | Facility: CLINIC | Age: 58
End: 2023-08-14
Payer: MEDICARE

## 2023-08-14 ENCOUNTER — OFFICE VISIT (OUTPATIENT)
Dept: FAMILY MEDICINE | Facility: CLINIC | Age: 58
End: 2023-08-14
Payer: MEDICARE

## 2023-08-14 VITALS
HEART RATE: 61 BPM | OXYGEN SATURATION: 97 % | WEIGHT: 176.5 LBS | HEIGHT: 70 IN | BODY MASS INDEX: 25.27 KG/M2 | RESPIRATION RATE: 18 BRPM | SYSTOLIC BLOOD PRESSURE: 128 MMHG | DIASTOLIC BLOOD PRESSURE: 76 MMHG

## 2023-08-14 DIAGNOSIS — G89.29 CHRONIC PAIN OF LEFT KNEE: ICD-10-CM

## 2023-08-14 DIAGNOSIS — M25.562 CHRONIC PAIN OF LEFT KNEE: ICD-10-CM

## 2023-08-14 DIAGNOSIS — E08.42 DIABETIC POLYNEUROPATHY ASSOCIATED WITH DIABETES MELLITUS DUE TO UNDERLYING CONDITION: Chronic | ICD-10-CM

## 2023-08-14 DIAGNOSIS — F41.9 ANXIETY: ICD-10-CM

## 2023-08-14 DIAGNOSIS — E53.8 VITAMIN B12 DEFICIENCY: Primary | Chronic | ICD-10-CM

## 2023-08-14 PROCEDURE — 3008F PR BODY MASS INDEX (BMI) DOCUMENTED: ICD-10-PCS | Mod: CPTII,S$GLB,, | Performed by: STUDENT IN AN ORGANIZED HEALTH CARE EDUCATION/TRAINING PROGRAM

## 2023-08-14 PROCEDURE — 3078F DIAST BP <80 MM HG: CPT | Mod: CPTII,S$GLB,, | Performed by: STUDENT IN AN ORGANIZED HEALTH CARE EDUCATION/TRAINING PROGRAM

## 2023-08-14 PROCEDURE — 73560 X-RAY EXAM OF KNEE 1 OR 2: CPT | Mod: 26,RT,, | Performed by: RADIOLOGY

## 2023-08-14 PROCEDURE — 3078F PR MOST RECENT DIASTOLIC BLOOD PRESSURE < 80 MM HG: ICD-10-PCS | Mod: CPTII,S$GLB,, | Performed by: STUDENT IN AN ORGANIZED HEALTH CARE EDUCATION/TRAINING PROGRAM

## 2023-08-14 PROCEDURE — 99999 PR PBB SHADOW E&M-EST. PATIENT-LVL III: ICD-10-PCS | Mod: PBBFAC,,, | Performed by: STUDENT IN AN ORGANIZED HEALTH CARE EDUCATION/TRAINING PROGRAM

## 2023-08-14 PROCEDURE — 73560 XR KNEE ORTHO LEFT: ICD-10-PCS | Mod: 26,RT,, | Performed by: RADIOLOGY

## 2023-08-14 PROCEDURE — 3072F LOW RISK FOR RETINOPATHY: CPT | Mod: CPTII,S$GLB,, | Performed by: STUDENT IN AN ORGANIZED HEALTH CARE EDUCATION/TRAINING PROGRAM

## 2023-08-14 PROCEDURE — 99215 OFFICE O/P EST HI 40 MIN: CPT | Mod: S$GLB,,, | Performed by: STUDENT IN AN ORGANIZED HEALTH CARE EDUCATION/TRAINING PROGRAM

## 2023-08-14 PROCEDURE — 1159F MED LIST DOCD IN RCRD: CPT | Mod: CPTII,S$GLB,, | Performed by: STUDENT IN AN ORGANIZED HEALTH CARE EDUCATION/TRAINING PROGRAM

## 2023-08-14 PROCEDURE — 3072F PR LOW RISK FOR RETINOPATHY: ICD-10-PCS | Mod: CPTII,S$GLB,, | Performed by: STUDENT IN AN ORGANIZED HEALTH CARE EDUCATION/TRAINING PROGRAM

## 2023-08-14 PROCEDURE — 3008F BODY MASS INDEX DOCD: CPT | Mod: CPTII,S$GLB,, | Performed by: STUDENT IN AN ORGANIZED HEALTH CARE EDUCATION/TRAINING PROGRAM

## 2023-08-14 PROCEDURE — 99999 PR PBB SHADOW E&M-EST. PATIENT-LVL III: CPT | Mod: PBBFAC,,, | Performed by: STUDENT IN AN ORGANIZED HEALTH CARE EDUCATION/TRAINING PROGRAM

## 2023-08-14 PROCEDURE — 99215 PR OFFICE/OUTPT VISIT, EST, LEVL V, 40-54 MIN: ICD-10-PCS | Mod: S$GLB,,, | Performed by: STUDENT IN AN ORGANIZED HEALTH CARE EDUCATION/TRAINING PROGRAM

## 2023-08-14 PROCEDURE — 3044F PR MOST RECENT HEMOGLOBIN A1C LEVEL <7.0%: ICD-10-PCS | Mod: CPTII,S$GLB,, | Performed by: STUDENT IN AN ORGANIZED HEALTH CARE EDUCATION/TRAINING PROGRAM

## 2023-08-14 PROCEDURE — 73562 XR KNEE ORTHO LEFT: ICD-10-PCS | Mod: 26,LT,, | Performed by: RADIOLOGY

## 2023-08-14 PROCEDURE — 3044F HG A1C LEVEL LT 7.0%: CPT | Mod: CPTII,S$GLB,, | Performed by: STUDENT IN AN ORGANIZED HEALTH CARE EDUCATION/TRAINING PROGRAM

## 2023-08-14 PROCEDURE — 73562 X-RAY EXAM OF KNEE 3: CPT | Mod: 26,LT,, | Performed by: RADIOLOGY

## 2023-08-14 PROCEDURE — 3074F SYST BP LT 130 MM HG: CPT | Mod: CPTII,S$GLB,, | Performed by: STUDENT IN AN ORGANIZED HEALTH CARE EDUCATION/TRAINING PROGRAM

## 2023-08-14 PROCEDURE — 3074F PR MOST RECENT SYSTOLIC BLOOD PRESSURE < 130 MM HG: ICD-10-PCS | Mod: CPTII,S$GLB,, | Performed by: STUDENT IN AN ORGANIZED HEALTH CARE EDUCATION/TRAINING PROGRAM

## 2023-08-14 PROCEDURE — 1159F PR MEDICATION LIST DOCUMENTED IN MEDICAL RECORD: ICD-10-PCS | Mod: CPTII,S$GLB,, | Performed by: STUDENT IN AN ORGANIZED HEALTH CARE EDUCATION/TRAINING PROGRAM

## 2023-08-14 PROCEDURE — 73560 X-RAY EXAM OF KNEE 1 OR 2: CPT | Mod: TC,PN,RT

## 2023-08-14 RX ORDER — DULOXETIN HYDROCHLORIDE 60 MG/1
60 CAPSULE, DELAYED RELEASE ORAL DAILY
COMMUNITY
End: 2023-08-14

## 2023-08-14 RX ORDER — CLONAZEPAM 0.5 MG/1
0.5 TABLET ORAL DAILY
COMMUNITY
Start: 2023-08-12 | End: 2024-01-04

## 2023-08-14 RX ORDER — EPINEPHRINE 0.3 MG/.3ML
0.3 INJECTION SUBCUTANEOUS ONCE
COMMUNITY
End: 2024-01-03

## 2023-08-14 RX ORDER — ALPRAZOLAM 1 MG/1
1 TABLET ORAL
COMMUNITY
End: 2023-08-14

## 2023-08-14 RX ORDER — GABAPENTIN 600 MG/1
600 TABLET ORAL
COMMUNITY
End: 2023-08-14

## 2023-08-14 RX ORDER — LOVASTATIN 40 MG/1
40 TABLET ORAL
COMMUNITY
End: 2023-08-14

## 2023-08-14 RX ORDER — PROPRANOLOL HYDROCHLORIDE 10 MG/1
10 TABLET ORAL
COMMUNITY
End: 2023-08-14

## 2023-08-14 RX ORDER — DILTIAZEM HYDROCHLORIDE 120 MG/1
120 CAPSULE, COATED, EXTENDED RELEASE ORAL DAILY
COMMUNITY
End: 2023-08-14

## 2023-08-14 RX ORDER — HYDROXYZINE HYDROCHLORIDE 10 MG/1
10 TABLET, FILM COATED ORAL 3 TIMES DAILY PRN
Qty: 30 TABLET | Refills: 0 | Status: SHIPPED | OUTPATIENT
Start: 2023-08-14 | End: 2023-09-13

## 2023-08-14 RX ORDER — RIZATRIPTAN BENZOATE 10 MG/1
10 TABLET, ORALLY DISINTEGRATING ORAL
COMMUNITY
End: 2023-08-14

## 2023-08-14 RX ORDER — PREGABALIN 50 MG/1
50 CAPSULE ORAL 3 TIMES DAILY
Qty: 270 CAPSULE | Refills: 1 | Status: SHIPPED | OUTPATIENT
Start: 2023-08-14

## 2023-08-14 RX ORDER — OXCARBAZEPINE 600 MG/1
600 TABLET, FILM COATED ORAL
COMMUNITY
End: 2023-08-14

## 2023-08-14 RX ORDER — BENZTROPINE MESYLATE 1 MG/1
1 TABLET ORAL
COMMUNITY
End: 2023-08-14

## 2023-08-14 RX ORDER — LEVOTHYROXINE SODIUM 25 UG/1
25 TABLET ORAL
COMMUNITY
End: 2023-08-14

## 2023-08-14 RX ORDER — MECLIZINE HYDROCHLORIDE 25 MG/1
25 TABLET ORAL
COMMUNITY
End: 2023-08-14

## 2023-08-14 RX ORDER — HYDROXYZINE HYDROCHLORIDE 10 MG/1
10 TABLET, FILM COATED ORAL
COMMUNITY
Start: 2023-08-10 | End: 2023-08-14 | Stop reason: SDUPTHER

## 2023-08-14 NOTE — PROGRESS NOTES
Plan:      Ryan was seen today for follow-up.    Diagnoses and all orders for this visit:    Vitamin B12 deficiency  -     Vitamin B12; Future    Diabetic polyneuropathy associated with diabetes mellitus due to underlying condition  -     pregabalin (LYRICA) 50 MG capsule; Take 1 capsule (50 mg total) by mouth 3 (three) times daily.    Anxiety  -     hydrOXYzine HCL (ATARAX) 10 MG Tab; Take 1 tablet (10 mg total) by mouth 3 (three) times daily as needed (anxiety).    Chronic pain of left knee  -     X-ray Knee Ortho Left; Future      Follow up in about 4 weeks (around 9/11/2023), or if symptoms worsen or fail to improve.    Ana Mayer MD  08/14/2023    Subjective:      Patient ID: Ryan Crane is a 57 y.o. male    Chief Complaint   Patient presents with    Follow-up     HPI  57 y.o. male with a PMHx as documented below presents to clinic today for the following:    Pt recently moved in with his oldest daughter - he states he prefers living alone, at his old house. Recent stressors include his brother's wake a few weeks ago.     Pt reports several year history of left knee pain. Onset of symptoms was not associated w/ any particular event or injury. Pt reports soreness along the lateral and medial joint lines with movement. No associated swelling or overlying skin changes.      Chronic schizophrenia:   - Buspar 15 mg TID, Lamictal 200 mg daily, Remeron 15 mg qhs, Geodon 160 mg qhs  - Klonopin 0.5 mg daily PRN, Atarax 10 mg TID PRN  - Following w/ Psychiatry     Subacute thoracic and lumbar back pain:   - MRI lumbar spine w/o contrast (6/12/23): Multilevel degenerative changes of the lumbar spine without more than mild spinal canal/recess narrowing there is shallow disc bulging is noted at multiple levels. No lateralizing disc herniation. Mild to moderate right foraminal narrowing at L4-L5.  - MRI thoracic spine w/o contrast (6/12/23): Mild degenerative changes as discussed above.  The spinal canal and  "foramina are patent throughout.  No cord impingement or acute process  - S/p eval w/ Dr. Shirley Dowd [facility]/ [facility] - recommending medial branch blocks and subsequent radiofrequency ablation as indicated of the L4-5 and L5-S1 facet joints bilaterally (scheduled 7/25/23), considering future epidural steroid injections in the midthoracic region     Hx of CVA:   - Right lacunar infarct (3/4/23) resulting in hemiplegia of left, nondominant side  - CT head w/o contrast showed changes consistent with subacute infarct of the left cerebellum and chronic infarct of the right occipital lobe  - MRI w/o contrast showed small area of interest diffusion is seen in the right corona radiata extending to the region of the superior aspect of the posterior limb internal capsule  - ASA 81 mg daily, Lipitor 40 mg daily  - S/p Neuro follow-up on 3/17/23 - okay to keep Lipitor 40 mg daily rather than 80 mg daily     CAD:   - ASA 81 mg daily, Plavix 75 mg daily, Lipitor 40 mg daily, Ranexa 500 mg BID  - Nitro 0.4 mg SL q5min PRN     HTN:   - Amlodipine 2.5 mg daily     HFpEF:   - TTE (3/4/23) w/ EF 60% and grade I left ventricular diastolic dysfunction     PVD:   - ASA 81 mg daily, Lipitor 40 mg daily  - BLE arterial US (3/7/23): "There appear to be waveform changes throughout the bilateral lower extremity arterial system, more evident in the distal right lower extremity arteries which may indicate some degree of vascular insufficiency or more proximal stenosis. However, no doubling of peak systolic velocities to suggest greater than 50% stenosis is sonographically demonstrated."     COPD, GOLD 2:   - PFTs w/ FEV1 (73%) (9/2022)  - Trelegy 100-62.5 mcg, 1 puff once daily  - Albuterol PRN     Allergic rhinitis:   - Claritin 10 mg daily PRN, Flonase daily PRN     Hx of DMT2 w/ diabetic polyneuropathy:   - Most recent Hgb A1c 4.9% (7/2023)  - Lyrica 50 mg TID      Migraine headaches:   - Ajovy 225 mg monthly injections (switched from " Emgality 5/30/23), Topamax 200 mg qhs, Nutrex PRN     B12 deficiency:   - Vitamin B12 < 200 on 3/24/23  - S/p B12 1000 mcg IM while in hospital, discharged w/ plan to continue as outpatient     ED:   - Cialis 20 mg PRN     Hyponatremia:   - NaCl 1000 mg TID      GERD:   - Protonix 40 mg daily     Review of Systems   Constitutional:  Negative for chills and fever.   Respiratory:  Negative for shortness of breath.    Cardiovascular:  Negative for chest pain.   Gastrointestinal:  Negative for abdominal pain, constipation, diarrhea, nausea and vomiting.   Genitourinary:  Negative for dysuria.   Musculoskeletal:  Positive for joint pain.     Current Outpatient Medications   Medication Instructions    AJOVY AUTOINJECTOR 225 mg, Subcutaneous, Every 28 days    albuterol (PROVENTIL/VENTOLIN HFA) 90 mcg/actuation inhaler 2 puffs, Inhalation, Every 6 hours PRN    albuterol-ipratropium (DUO-NEB) 2.5 mg-0.5 mg/3 mL nebulizer solution 3 mLs, Nebulization, Every 6 hours PRN, Rescue    amLODIPine (NORVASC) 2.5 mg, Oral, Daily    aspirin (ECOTRIN) 81 mg, Oral, Daily    atorvastatin (LIPITOR) 40 mg, Oral, Daily    busPIRone (BUSPAR) 15 mg, Oral, 3 times daily    clonazePAM (KLONOPIN) 0.5 mg, Oral, Daily PRN    clopidogreL (PLAVIX) 75 mg, Oral, Daily    cyanocobalamin 1,000 mcg/mL injection 1 ml sq daily x 5 days, then 1 ml sq weekly x 4 weeks then q monthly -  will need to go to PCP    EPINEPHrine (EPIPEN) 0.3 mg/0.3 mL AtIn No dose, route, or frequency recorded.    fluticasone-umeclidin-vilanter (TRELEGY ELLIPTA) 100-62.5-25 mcg DsDv 1 puff, Inhalation, Daily    hydrOXYzine HCL (ATARAX) 10 mg, Oral, 3 times daily PRN    Lactobac. rhamnosus GG-inulin 10 billion cell -200 mg Chew 1 tablet, Oral, Every morning    lamoTRIgine (LAMICTAL) 200 mg, Oral, Daily    mirtazapine (REMERON) 15 mg, Oral, Nightly    nicotine (NICODERM CQ) 21 mg/24 hr 1 patch, Transdermal, Daily    nicotine polacrilex 4 MG Lozg Take up to 8 pieces daily as needed     nitroGLYCERIN (NITROSTAT) 0.4 mg, Sublingual, Every 5 min PRN    NURTEC 75 mg, Oral, Daily PRN, Place ODT tablet on the tongue; alternatively the ODT tablet may be placed under the tongue    pantoprazole (PROTONIX) 40 mg, Oral, Daily    pregabalin (LYRICA) 50 mg, Oral, 3 times daily    ranolazine (RANEXA) 500 mg, Oral, 2 times daily    sodium chloride 1,000 mg TbSO tablet tbso 1 tablet, Misc.(Non-Drug; Combo Route), 3 times daily    tadalafiL (CIALIS) 20 mg, Oral, Daily    tiotropium-olodateroL (STIOLTO RESPIMAT) 2.5-2.5 mcg/actuation Mist No dose, route, or frequency recorded.    topiramate (TOPAMAX) 200 mg, Oral, Nightly    traMADoL (ULTRAM) 50 mg, Oral, Every 8 hours PRN    ziprasidone (GEODON) 160 mg, Oral, Nightly      Past Medical History:   Diagnosis Date    Aortic valve stenosis 02/28/2022    Arachnoid cyst of posterior cranial fossa 01/13/2022    Benign prostatic hyperplasia without lower urinary tract symptoms 05/25/2023    Bilateral carotid bruits 06/22/2021    Bipolar disorder 01/16/2022    CAD S/P percutaneous coronary angioplasty     3 vessel disease    Calculus of gallbladder without cholecystitis without obstruction 01/11/2023    Cancer     Candidal intertrigo 11/11/2019    Cataract     Chronic heart failure with preserved ejection fraction 03/29/2023    Chronic schizophrenia     Colon polyps     Diabetic polyneuropathy associated with diabetes mellitus due to underlying condition 11/21/2019    Dysarthria as late effect of cerebellar cerebrovascular accident (CVA) 03/24/2023    Equinus deformity of both feet 11/21/2019    Erectile dysfunction due to arterial insufficiency 05/25/2023    Flaccid hemiplegia of left nondominant side as late effect of cerebral infarction 03/17/2023    Flat foot 11/21/2019    Gastritis     Gastroesophageal reflux disease without esophagitis 07/13/2023    Gastrointestinal hemorrhage 12/13/2019    Post polypectomy bleeding    General anesthetics causing adverse effect in  "therapeutic use     Hammer toes of both feet 11/21/2019    Hx of diabetic foot ulcer 11/21/2019    Hypertension     Hyponatremia 01/11/2022    ELAINE (iron deficiency anemia) 12/10/2019    Intractable chronic migraine without aura and without status migrainosus 12/30/2021    Microcytic anemia 10/09/2019    Moderate aortic regurgitation 03/29/2023    Moderate mitral regurgitation 03/29/2023    Moderate mitral stenosis 03/29/2023    Moderate to severe aortic stenosis 02/28/2022    Myocardial infarct, old     Nicotine dependence, unspecified, uncomplicated 01/16/2022    Onychomycosis due to dermatophyte 11/21/2019    Orchitis 11/09/2019    PIPER on CPAP     Peripheral visual field defect, bilateral 01/13/2022    PVD (peripheral vascular disease) 11/21/2019    Seizures 2008    Stage 2 moderate COPD by GOLD classification     PFTs w/ FEV1 (73%) (9/2022)    Stroke 2005    Thoracic aortic atherosclerosis 10/17/2022    CT chest    Thyroid disease     Previously on pharmacologic Tx    Tinea pedis of right foot 05/21/2019    Tobacco use disorder     Type 2 diabetes mellitus with diabetic peripheral angiopathy without gangrene     A1c 5.1% (3/2023)    Vitamin B12 deficiency 03/24/2023      Objective:      Vitals:    08/14/23 0949   BP: 128/76   BP Location: Left arm   Patient Position: Sitting   Pulse: 61   Resp: 18   SpO2: 97%   Weight: 80 kg (176 lb 7.7 oz)   Height: 5' 10" (1.778 m)     Body mass index is 25.32 kg/m².    Physical Exam  Vitals reviewed.   Constitutional:       General: He is not in acute distress.  HENT:      Head: Normocephalic and atraumatic.   Cardiovascular:      Rate and Rhythm: Normal rate.   Pulmonary:      Effort: Pulmonary effort is normal. No respiratory distress.   Musculoskeletal:      Left knee: Crepitus present. No swelling or effusion. Tenderness present over the medial joint line, lateral joint line, MCL, LCL, ACL and PCL.   Neurological:      General: No focal deficit present.      Mental Status: " He is alert and oriented to person, place, and time. Mental status is at baseline.        Assessment:       1. Vitamin B12 deficiency    2. Diabetic polyneuropathy associated with diabetes mellitus due to underlying condition    3. Anxiety    4. Chronic pain of left knee        Ana Mayer MD  [facility]  Office: [phone]   Fax: [phone]  08/14/2023      Disclaimer: This note was partly generated using dictation software which may occasionally result in transcription errors.    Total time spend on encounter: 40-54 minutes. This includes face to face time and non-face to face time preparing to see the patient (eg, review of tests), obtaining and/or reviewing separately obtained history, documenting clinical information in the electronic or other health record, independently interpreting results and communicating results to the patient/family/caregiver, or care coordinator.

## 2023-08-14 NOTE — H&P (VIEW-ONLY)
Plan:      Ryan was seen today for follow-up.    Diagnoses and all orders for this visit:    Vitamin B12 deficiency  -     Vitamin B12; Future    Diabetic polyneuropathy associated with diabetes mellitus due to underlying condition  -     pregabalin (LYRICA) 50 MG capsule; Take 1 capsule (50 mg total) by mouth 3 (three) times daily.    Anxiety  -     hydrOXYzine HCL (ATARAX) 10 MG Tab; Take 1 tablet (10 mg total) by mouth 3 (three) times daily as needed (anxiety).    Chronic pain of left knee  -     X-ray Knee Ortho Left; Future      Follow up in about 4 weeks (around 9/11/2023), or if symptoms worsen or fail to improve.    Ana Mayer MD  08/14/2023    Subjective:      Patient ID: Ryan Crane is a 57 y.o. male    Chief Complaint   Patient presents with    Follow-up     HPI  57 y.o. male with a PMHx as documented below presents to clinic today for the following:    Pt recently moved in with his oldest daughter - he states he prefers living alone, at his old house. Recent stressors include his brother's wake a few weeks ago.     Pt reports several year history of left knee pain. Onset of symptoms was not associated w/ any particular event or injury. Pt reports soreness along the lateral and medial joint lines with movement. No associated swelling or overlying skin changes.      Chronic schizophrenia:   - Buspar 15 mg TID, Lamictal 200 mg daily, Remeron 15 mg qhs, Geodon 160 mg qhs  - Klonopin 0.5 mg daily PRN, Atarax 10 mg TID PRN  - Following w/ Psychiatry     Subacute thoracic and lumbar back pain:   - MRI lumbar spine w/o contrast (6/12/23): Multilevel degenerative changes of the lumbar spine without more than mild spinal canal/recess narrowing there is shallow disc bulging is noted at multiple levels. No lateralizing disc herniation. Mild to moderate right foraminal narrowing at L4-L5.  - MRI thoracic spine w/o contrast (6/12/23): Mild degenerative changes as discussed above.  The spinal canal and  "foramina are patent throughout.  No cord impingement or acute process  - S/p eval w/ Dr. Shirley Dowd w/ [facility] - recommending medial branch blocks and subsequent radiofrequency ablation as indicated of the L4-5 and L5-S1 facet joints bilaterally (scheduled 7/25/23), considering future epidural steroid injections in the midthoracic region     Hx of CVA:   - Right lacunar infarct (3/4/23) resulting in hemiplegia of left, nondominant side  - CT head w/o contrast showed changes consistent with subacute infarct of the left cerebellum and chronic infarct of the right occipital lobe  - MRI w/o contrast showed small area of interest diffusion is seen in the right corona radiata extending to the region of the superior aspect of the posterior limb internal capsule  - ASA 81 mg daily, Lipitor 40 mg daily  - S/p Neuro follow-up on 3/17/23 - okay to keep Lipitor 40 mg daily rather than 80 mg daily     CAD:   - ASA 81 mg daily, Plavix 75 mg daily, Lipitor 40 mg daily, Ranexa 500 mg BID  - Nitro 0.4 mg SL q5min PRN     HTN:   - Amlodipine 2.5 mg daily     HFpEF:   - TTE (3/4/23) w/ EF 60% and grade I left ventricular diastolic dysfunction     PVD:   - ASA 81 mg daily, Lipitor 40 mg daily  - BLE arterial US (3/7/23): "There appear to be waveform changes throughout the bilateral lower extremity arterial system, more evident in the distal right lower extremity arteries which may indicate some degree of vascular insufficiency or more proximal stenosis. However, no doubling of peak systolic velocities to suggest greater than 50% stenosis is sonographically demonstrated."     COPD, GOLD 2:   - PFTs w/ FEV1 (73%) (9/2022)  - Trelegy 100-62.5 mcg, 1 puff once daily  - Albuterol PRN     Allergic rhinitis:   - Claritin 10 mg daily PRN, Flonase daily PRN     Hx of DMT2 w/ diabetic polyneuropathy:   - Most recent Hgb A1c 4.9% (7/2023)  - Lyrica 50 mg TID      Migraine headaches:   - Ajovy 225 mg monthly injections (switched from " Emgality 5/30/23), Topamax 200 mg qhs, Nutrex PRN     B12 deficiency:   - Vitamin B12 < 200 on 3/24/23  - S/p B12 1000 mcg IM while in hospital, discharged w/ plan to continue as outpatient     ED:   - Cialis 20 mg PRN     Hyponatremia:   - NaCl 1000 mg TID      GERD:   - Protonix 40 mg daily     Review of Systems   Constitutional:  Negative for chills and fever.   Respiratory:  Negative for shortness of breath.    Cardiovascular:  Negative for chest pain.   Gastrointestinal:  Negative for abdominal pain, constipation, diarrhea, nausea and vomiting.   Genitourinary:  Negative for dysuria.   Musculoskeletal:  Positive for joint pain.     Current Outpatient Medications   Medication Instructions    AJOVY AUTOINJECTOR 225 mg, Subcutaneous, Every 28 days    albuterol (PROVENTIL/VENTOLIN HFA) 90 mcg/actuation inhaler 2 puffs, Inhalation, Every 6 hours PRN    albuterol-ipratropium (DUO-NEB) 2.5 mg-0.5 mg/3 mL nebulizer solution 3 mLs, Nebulization, Every 6 hours PRN, Rescue    amLODIPine (NORVASC) 2.5 mg, Oral, Daily    aspirin (ECOTRIN) 81 mg, Oral, Daily    atorvastatin (LIPITOR) 40 mg, Oral, Daily    busPIRone (BUSPAR) 15 mg, Oral, 3 times daily    clonazePAM (KLONOPIN) 0.5 mg, Oral, Daily PRN    clopidogreL (PLAVIX) 75 mg, Oral, Daily    cyanocobalamin 1,000 mcg/mL injection 1 ml sq daily x 5 days, then 1 ml sq weekly x 4 weeks then q monthly -  will need to go to PCP    EPINEPHrine (EPIPEN) 0.3 mg/0.3 mL AtIn No dose, route, or frequency recorded.    fluticasone-umeclidin-vilanter (TRELEGY ELLIPTA) 100-62.5-25 mcg DsDv 1 puff, Inhalation, Daily    hydrOXYzine HCL (ATARAX) 10 mg, Oral, 3 times daily PRN    Lactobac. rhamnosus GG-inulin 10 billion cell -200 mg Chew 1 tablet, Oral, Every morning    lamoTRIgine (LAMICTAL) 200 mg, Oral, Daily    mirtazapine (REMERON) 15 mg, Oral, Nightly    nicotine (NICODERM CQ) 21 mg/24 hr 1 patch, Transdermal, Daily    nicotine polacrilex 4 MG Lozg Take up to 8 pieces daily as needed     nitroGLYCERIN (NITROSTAT) 0.4 mg, Sublingual, Every 5 min PRN    NURTEC 75 mg, Oral, Daily PRN, Place ODT tablet on the tongue; alternatively the ODT tablet may be placed under the tongue    pantoprazole (PROTONIX) 40 mg, Oral, Daily    pregabalin (LYRICA) 50 mg, Oral, 3 times daily    ranolazine (RANEXA) 500 mg, Oral, 2 times daily    sodium chloride 1,000 mg TbSO tablet tbso 1 tablet, Misc.(Non-Drug; Combo Route), 3 times daily    tadalafiL (CIALIS) 20 mg, Oral, Daily    tiotropium-olodateroL (STIOLTO RESPIMAT) 2.5-2.5 mcg/actuation Mist No dose, route, or frequency recorded.    topiramate (TOPAMAX) 200 mg, Oral, Nightly    traMADoL (ULTRAM) 50 mg, Oral, Every 8 hours PRN    ziprasidone (GEODON) 160 mg, Oral, Nightly      Past Medical History:   Diagnosis Date    Aortic valve stenosis 02/28/2022    Arachnoid cyst of posterior cranial fossa 01/13/2022    Benign prostatic hyperplasia without lower urinary tract symptoms 05/25/2023    Bilateral carotid bruits 06/22/2021    Bipolar disorder 01/16/2022    CAD S/P percutaneous coronary angioplasty     3 vessel disease    Calculus of gallbladder without cholecystitis without obstruction 01/11/2023    Cancer     Candidal intertrigo 11/11/2019    Cataract     Chronic heart failure with preserved ejection fraction 03/29/2023    Chronic schizophrenia     Colon polyps     Diabetic polyneuropathy associated with diabetes mellitus due to underlying condition 11/21/2019    Dysarthria as late effect of cerebellar cerebrovascular accident (CVA) 03/24/2023    Equinus deformity of both feet 11/21/2019    Erectile dysfunction due to arterial insufficiency 05/25/2023    Flaccid hemiplegia of left nondominant side as late effect of cerebral infarction 03/17/2023    Flat foot 11/21/2019    Gastritis     Gastroesophageal reflux disease without esophagitis 07/13/2023    Gastrointestinal hemorrhage 12/13/2019    Post polypectomy bleeding    General anesthetics causing adverse effect in  "therapeutic use     Hammer toes of both feet 11/21/2019    Hx of diabetic foot ulcer 11/21/2019    Hypertension     Hyponatremia 01/11/2022    ELAINE (iron deficiency anemia) 12/10/2019    Intractable chronic migraine without aura and without status migrainosus 12/30/2021    Microcytic anemia 10/09/2019    Moderate aortic regurgitation 03/29/2023    Moderate mitral regurgitation 03/29/2023    Moderate mitral stenosis 03/29/2023    Moderate to severe aortic stenosis 02/28/2022    Myocardial infarct, old     Nicotine dependence, unspecified, uncomplicated 01/16/2022    Onychomycosis due to dermatophyte 11/21/2019    Orchitis 11/09/2019    PIPER on CPAP     Peripheral visual field defect, bilateral 01/13/2022    PVD (peripheral vascular disease) 11/21/2019    Seizures 2008    Stage 2 moderate COPD by GOLD classification     PFTs w/ FEV1 (73%) (9/2022)    Stroke 2005    Thoracic aortic atherosclerosis 10/17/2022    CT chest    Thyroid disease     Previously on pharmacologic Tx    Tinea pedis of right foot 05/21/2019    Tobacco use disorder     Type 2 diabetes mellitus with diabetic peripheral angiopathy without gangrene     A1c 5.1% (3/2023)    Vitamin B12 deficiency 03/24/2023      Objective:      Vitals:    08/14/23 0949   BP: 128/76   BP Location: Left arm   Patient Position: Sitting   Pulse: 61   Resp: 18   SpO2: 97%   Weight: 80 kg (176 lb 7.7 oz)   Height: 5' 10" (1.778 m)     Body mass index is 25.32 kg/m².    Physical Exam  Vitals reviewed.   Constitutional:       General: He is not in acute distress.  HENT:      Head: Normocephalic and atraumatic.   Cardiovascular:      Rate and Rhythm: Normal rate.   Pulmonary:      Effort: Pulmonary effort is normal. No respiratory distress.   Musculoskeletal:      Left knee: Crepitus present. No swelling or effusion. Tenderness present over the medial joint line, lateral joint line, MCL, LCL, ACL and PCL.   Neurological:      General: No focal deficit present.      Mental Status: " He is alert and oriented to person, place, and time. Mental status is at baseline.        Assessment:       1. Vitamin B12 deficiency    2. Diabetic polyneuropathy associated with diabetes mellitus due to underlying condition    3. Anxiety    4. Chronic pain of left knee        Ana Mayer MD  [facility]  Office: [phone]   Fax: [phone]  08/14/2023      Disclaimer: This note was partly generated using dictation software which may occasionally result in transcription errors.    Total time spend on encounter: 40-54 minutes. This includes face to face time and non-face to face time preparing to see the patient (eg, review of tests), obtaining and/or reviewing separately obtained history, documenting clinical information in the electronic or other health record, independently interpreting results and communicating results to the patient/family/caregiver, or care coordinator.

## 2023-08-14 NOTE — TELEPHONE ENCOUNTER
----- Message from Gely Hermes sent at 8/14/2023  8:44 AM CDT -----  Contact: self  Type:  Needs Medical Advice    Who Called: self  Would the patient rather a call back or a response via [facility]? call  Best Call Back Number: [phone]    Additional Information: pt will be running a bit late to his appt. His  is on his way and will arrive to appt at 9:10. Please advise and thank you.

## 2023-08-17 ENCOUNTER — TELEPHONE (OUTPATIENT)
Dept: PAIN MEDICINE | Facility: CLINIC | Age: 58
End: 2023-08-17
Payer: MEDICARE

## 2023-08-17 NOTE — TELEPHONE ENCOUNTER
----- Message from Aristides La sent at 8/17/2023 10:30 AM CDT -----  Type: Needs Medical Advice  Who Called:  pt  Best Call Back Number: [phone]  Additional Information: pt is requesting a call to reschedule his procedure on 8/24 to 8/25 due to transportation, pl call bk to advise thanks

## 2023-08-18 ENCOUNTER — CLINICAL SUPPORT (OUTPATIENT)
Dept: FAMILY MEDICINE | Facility: CLINIC | Age: 58
End: 2023-08-18
Payer: MEDICARE

## 2023-08-18 DIAGNOSIS — E53.8 VITAMIN B12 DEFICIENCY: Primary | ICD-10-CM

## 2023-08-18 PROCEDURE — 96372 PR INJECTION,THERAP/PROPH/DIAG2ST, IM OR SUBCUT: ICD-10-PCS | Mod: S$GLB,,, | Performed by: STUDENT IN AN ORGANIZED HEALTH CARE EDUCATION/TRAINING PROGRAM

## 2023-08-18 PROCEDURE — 96372 THER/PROPH/DIAG INJ SC/IM: CPT | Mod: S$GLB,,, | Performed by: STUDENT IN AN ORGANIZED HEALTH CARE EDUCATION/TRAINING PROGRAM

## 2023-08-18 RX ADMIN — CYANOCOBALAMIN 1000 MCG: 1000 INJECTION, SOLUTION INTRAMUSCULAR; SUBCUTANEOUS at 08:08

## 2023-08-25 ENCOUNTER — HOSPITAL ENCOUNTER (OUTPATIENT)
Facility: HOSPITAL | Age: 58
Discharge: HOME | End: 2023-08-25
Attending: ANESTHESIOLOGY | Admitting: ANESTHESIOLOGY
Payer: MEDICARE

## 2023-08-25 DIAGNOSIS — M47.896 OTHER SPONDYLOSIS, LUMBAR REGION: ICD-10-CM

## 2023-08-25 LAB — POCT GLUCOSE: 113 MG/DL (ref 70–110)

## 2023-08-25 PROCEDURE — 64493 PR INJ DX/THER AGNT PARAVERT FACET JOINT,IMG GUIDE,LUMBAR/SAC,1ST LVL: ICD-10-PCS | Mod: 50,,, | Performed by: ANESTHESIOLOGY

## 2023-08-25 PROCEDURE — 63600175 PHARM REV CODE 636 W HCPCS: Performed by: ANESTHESIOLOGY

## 2023-08-25 PROCEDURE — 64494 INJ PARAVERT F JNT L/S 2 LEV: CPT | Mod: 50,,, | Performed by: ANESTHESIOLOGY

## 2023-08-25 PROCEDURE — 64494 INJ PARAVERT F JNT L/S 2 LEV: CPT | Mod: RT | Performed by: ANESTHESIOLOGY

## 2023-08-25 PROCEDURE — 64493 INJ PARAVERT F JNT L/S 1 LEV: CPT | Mod: RT | Performed by: ANESTHESIOLOGY

## 2023-08-25 PROCEDURE — 64494 PR INJ DX/THER AGNT PARAVERT FACET JOINT,IMG GUIDE,LUMBAR/SAC, 2ND LEVEL: ICD-10-PCS | Mod: 50,,, | Performed by: ANESTHESIOLOGY

## 2023-08-25 PROCEDURE — 64493 INJ PARAVERT F JNT L/S 1 LEV: CPT | Mod: 50,,, | Performed by: ANESTHESIOLOGY

## 2023-08-25 RX ORDER — LIDOCAINE HYDROCHLORIDE 10 MG/ML
1 INJECTION, SOLUTION EPIDURAL; INFILTRATION; INTRACAUDAL; PERINEURAL ONCE
Status: ACTIVE | OUTPATIENT
Start: 2023-08-25

## 2023-08-25 RX ORDER — MIDAZOLAM HYDROCHLORIDE 1 MG/ML
INJECTION INTRAMUSCULAR; INTRAVENOUS
Status: DISCONTINUED | OUTPATIENT
Start: 2023-08-25 | End: 2023-08-25 | Stop reason: HOSPADM

## 2023-08-25 RX ORDER — BUPIVACAINE HYDROCHLORIDE 5 MG/ML
INJECTION, SOLUTION EPIDURAL; INTRACAUDAL
Status: DISCONTINUED | OUTPATIENT
Start: 2023-08-25 | End: 2023-08-25 | Stop reason: HOSPADM

## 2023-08-25 RX ORDER — SODIUM CHLORIDE, SODIUM LACTATE, POTASSIUM CHLORIDE, CALCIUM CHLORIDE 600; 310; 30; 20 MG/100ML; MG/100ML; MG/100ML; MG/100ML
INJECTION, SOLUTION INTRAVENOUS CONTINUOUS
Status: ACTIVE | OUTPATIENT
Start: 2023-08-25

## 2023-08-25 RX ADMIN — SODIUM CHLORIDE, POTASSIUM CHLORIDE, SODIUM LACTATE AND CALCIUM CHLORIDE: 600; 310; 30; 20 INJECTION, SOLUTION INTRAVENOUS at 09:08

## 2023-08-25 NOTE — DISCHARGE SUMMARY
[facility] - Periop Services  Discharge Note  Short Stay    Procedure(s) (LRB):  Block-nerve-medial branch-lumbar (Bilateral)      OUTCOME: Patient tolerated treatment/procedure well without complication and is now ready for discharge.    DISPOSITION: Home or Self Care    FINAL DIAGNOSIS:  <principal problem not specified>    FOLLOWUP: In clinic    DISCHARGE INSTRUCTIONS:    Discharge Procedure Orders   Notify your health care provider if you experience any of the following:  temperature >100.4     Notify your health care provider if you experience any of the following:  severe uncontrolled pain     Notify your health care provider if you experience any of the following:  redness, tenderness, or signs of infection (pain, swelling, redness, odor or green/yellow discharge around incision site)     Activity as tolerated        TIME SPENT ON DISCHARGE:   30 minutes

## 2023-08-25 NOTE — PLAN OF CARE
Discharge instructions given to pt, verbalized understanding.  Tolerating PO fluids.  IV removed.  No c/o pain.  Wheeled out to cousin  per RN in no distress.

## 2023-08-25 NOTE — OP NOTE
PROCEDURE DATE: 8/25/2023    PROCEDURE:  Bilateral L3,4,5 medial branch nerve blocks under fluoroscopy  (corresponds to bilateral L4/5 and L5/S1 facets)    DIAGNOSIS:  Other lumbar spondylosis    Post Op diagnosis: Same    PHYSICIAN: Napoleon Grimaldo MD    MEDICATIONS INJECTED: 0.5% bupivicaine, 0.5 ml at each level    SEDATION MEDICATIONS:RN IV Versed    LOCAL ANESTHETIC USED: none    ESTIMATED BLOOD LOSS:  none    COMPLICATIONS:  None    TECHNIQUE: A time out was taken to identify the patient, procedure and side of the procedure. The patient was placed in a prone position, then prepped and draped in the usual sterile fashion using Betadine and sterile towels.  The levels were determined under fluoroscopic guidance and then marked.  A 25-gauge 3.5 inch needle was introduced to the anatomic location of the L3,4,5 medial branch nerves on the bilateral side. The above medication was then injected. The patient tolerated the procedure well.     The patient was monitored after the procedure. Patient was given pain diary to record pain levels at home.     If found to have greater than a 50% recovery and so will be scheduled for a radiofrequency ablation of the corresponding nerves.  Patient was given post procedure and discharge instructions to follow at home.  The patient was discharged in a stable condition.

## 2023-08-29 ENCOUNTER — TELEPHONE (OUTPATIENT)
Dept: PAIN MEDICINE | Facility: CLINIC | Age: 58
End: 2023-08-29
Payer: MEDICARE

## 2023-08-29 VITALS
BODY MASS INDEX: 25.2 KG/M2 | DIASTOLIC BLOOD PRESSURE: 69 MMHG | OXYGEN SATURATION: 92 % | WEIGHT: 176 LBS | SYSTOLIC BLOOD PRESSURE: 133 MMHG | HEIGHT: 70 IN | HEART RATE: 60 BPM | RESPIRATION RATE: 17 BRPM | TEMPERATURE: 97 F

## 2023-08-29 DIAGNOSIS — M47.816 LUMBAR SPONDYLOSIS: Primary | ICD-10-CM

## 2023-08-29 NOTE — TELEPHONE ENCOUNTER
Pt has 2nd L3,4,5 MBB 08/25/2023 called for relief     He states that he had 90% relief x 2 days . Pain score before procedure 8 and after was a 1/10. Scheduled for RFA 09/28

## 2023-09-01 ENCOUNTER — CLINICAL SUPPORT (OUTPATIENT)
Dept: FAMILY MEDICINE | Facility: CLINIC | Age: 58
End: 2023-09-01
Payer: MEDICARE

## 2023-09-01 DIAGNOSIS — E53.8 VITAMIN B12 DEFICIENCY: Primary | Chronic | ICD-10-CM

## 2023-09-01 PROCEDURE — 96372 PR INJECTION,THERAP/PROPH/DIAG2ST, IM OR SUBCUT: ICD-10-PCS | Mod: S$GLB,,, | Performed by: STUDENT IN AN ORGANIZED HEALTH CARE EDUCATION/TRAINING PROGRAM

## 2023-09-01 PROCEDURE — 96372 THER/PROPH/DIAG INJ SC/IM: CPT | Mod: S$GLB,,, | Performed by: STUDENT IN AN ORGANIZED HEALTH CARE EDUCATION/TRAINING PROGRAM

## 2023-09-01 RX ADMIN — CYANOCOBALAMIN 1000 MCG: 1000 INJECTION, SOLUTION INTRAMUSCULAR; SUBCUTANEOUS at 08:09

## 2023-09-05 ENCOUNTER — OFFICE VISIT (OUTPATIENT)
Dept: GASTROENTEROLOGY | Facility: CLINIC | Age: 58
End: 2023-09-05
Payer: MEDICARE

## 2023-09-05 VITALS — WEIGHT: 175.5 LBS | BODY MASS INDEX: 25.13 KG/M2 | HEIGHT: 70 IN

## 2023-09-05 DIAGNOSIS — K31.A0 GASTRIC INTESTINAL METAPLASIA: ICD-10-CM

## 2023-09-05 DIAGNOSIS — Z79.01 CURRENT USE OF ANTICOAGULANT THERAPY: ICD-10-CM

## 2023-09-05 DIAGNOSIS — Z90.49 S/P CHOLECYSTECTOMY: ICD-10-CM

## 2023-09-05 DIAGNOSIS — Z86.010 HISTORY OF COLON POLYPS: ICD-10-CM

## 2023-09-05 DIAGNOSIS — Z87.19 HISTORY OF GASTRITIS: ICD-10-CM

## 2023-09-05 DIAGNOSIS — R13.19 ESOPHAGEAL DYSPHAGIA: Primary | ICD-10-CM

## 2023-09-05 PROCEDURE — 1159F MED LIST DOCD IN RCRD: CPT | Mod: CPTII,S$GLB,, | Performed by: NURSE PRACTITIONER

## 2023-09-05 PROCEDURE — 3008F BODY MASS INDEX DOCD: CPT | Mod: CPTII,S$GLB,, | Performed by: NURSE PRACTITIONER

## 2023-09-05 PROCEDURE — 3044F PR MOST RECENT HEMOGLOBIN A1C LEVEL <7.0%: ICD-10-PCS | Mod: CPTII,S$GLB,, | Performed by: NURSE PRACTITIONER

## 2023-09-05 PROCEDURE — 99999 PR PBB SHADOW E&M-EST. PATIENT-LVL IV: CPT | Mod: PBBFAC,,, | Performed by: NURSE PRACTITIONER

## 2023-09-05 PROCEDURE — 1160F RVW MEDS BY RX/DR IN RCRD: CPT | Mod: CPTII,S$GLB,, | Performed by: NURSE PRACTITIONER

## 2023-09-05 PROCEDURE — 3044F HG A1C LEVEL LT 7.0%: CPT | Mod: CPTII,S$GLB,, | Performed by: NURSE PRACTITIONER

## 2023-09-05 PROCEDURE — 99214 OFFICE O/P EST MOD 30 MIN: CPT | Mod: S$GLB,,, | Performed by: NURSE PRACTITIONER

## 2023-09-05 PROCEDURE — 99999 PR PBB SHADOW E&M-EST. PATIENT-LVL IV: ICD-10-PCS | Mod: PBBFAC,,, | Performed by: NURSE PRACTITIONER

## 2023-09-05 PROCEDURE — 3008F PR BODY MASS INDEX (BMI) DOCUMENTED: ICD-10-PCS | Mod: CPTII,S$GLB,, | Performed by: NURSE PRACTITIONER

## 2023-09-05 PROCEDURE — 99214 PR OFFICE/OUTPT VISIT, EST, LEVL IV, 30-39 MIN: ICD-10-PCS | Mod: S$GLB,,, | Performed by: NURSE PRACTITIONER

## 2023-09-05 PROCEDURE — 3072F LOW RISK FOR RETINOPATHY: CPT | Mod: CPTII,S$GLB,, | Performed by: NURSE PRACTITIONER

## 2023-09-05 PROCEDURE — 1159F PR MEDICATION LIST DOCUMENTED IN MEDICAL RECORD: ICD-10-PCS | Mod: CPTII,S$GLB,, | Performed by: NURSE PRACTITIONER

## 2023-09-05 PROCEDURE — 1160F PR REVIEW ALL MEDS BY PRESCRIBER/CLIN PHARMACIST DOCUMENTED: ICD-10-PCS | Mod: CPTII,S$GLB,, | Performed by: NURSE PRACTITIONER

## 2023-09-05 PROCEDURE — 3072F PR LOW RISK FOR RETINOPATHY: ICD-10-PCS | Mod: CPTII,S$GLB,, | Performed by: NURSE PRACTITIONER

## 2023-09-05 RX ORDER — HYDROXYZINE PAMOATE 25 MG/1
25 CAPSULE ORAL 2 TIMES DAILY
COMMUNITY
End: 2023-12-28

## 2023-09-05 RX ORDER — PANTOPRAZOLE SODIUM 40 MG/1
40 TABLET, DELAYED RELEASE ORAL DAILY
Qty: 90 TABLET | Refills: 3 | Status: SHIPPED | OUTPATIENT
Start: 2023-09-05 | End: 2024-08-30

## 2023-09-05 NOTE — PROGRESS NOTES
Subjective:       Patient ID: Ryan Crane is a 58 y.o. male, Body mass index is 25.18 kg/m².    Chief Complaint: Other (Dysphagia/) and Gastroesophageal Reflux      Established patient of Dr. Lucas, Kaylyn Amorther, NP & myself.    Gastroesophageal Reflux  He complains of dysphagia. He reports no abdominal pain, no belching, no chest pain, no choking, no coughing, no early satiety, no globus sensation, no heartburn, no hoarse voice, no nausea, no sore throat, no stridor, no tooth decay, no water brash or no wheezing. This is a new problem. The current episode started more than 1 month ago (Started ~ 3-4 months ago). The problem occurs occasionally. The problem has been gradually worsening. The symptoms are aggravated by certain foods (dysphagia to solids; denies trouble swallowing liquids). Associated symptoms include weight loss. Pertinent negatives include no anemia or melena. Risk factors include smoking/tobacco exposure. He has tried a PPI (Past: Protonix) for the symptoms. The treatment provided mild relief. Past procedures include an EGD.     Review of Systems   Constitutional:  Positive for weight loss. Negative for appetite change, fever and unexpected weight change.   HENT:  Positive for trouble swallowing. Negative for hoarse voice and sore throat.    Respiratory:  Negative for cough, choking, shortness of breath and wheezing.    Cardiovascular:  Negative for chest pain.   Gastrointestinal:  Positive for dysphagia and vomiting (only when he overeats). Negative for abdominal distention, abdominal pain, anal bleeding, blood in stool, constipation, diarrhea, heartburn, melena, nausea and rectal pain.   Genitourinary:  Negative for difficulty urinating and dysuria.   Musculoskeletal:  Negative for gait problem.   Skin:  Negative for rash.   Neurological:  Negative for speech difficulty.   Psychiatric/Behavioral:  Negative for confusion.        Past Medical History:   Diagnosis Date    Aortic valve  stenosis 02/28/2022    Arachnoid cyst of posterior cranial fossa 01/13/2022    Benign prostatic hyperplasia without lower urinary tract symptoms 05/25/2023    Bilateral carotid bruits 06/22/2021    Bipolar disorder 01/16/2022    CAD S/P percutaneous coronary angioplasty     3 vessel disease    Calculus of gallbladder without cholecystitis without obstruction 01/11/2023    Cancer     Candidal intertrigo 11/11/2019    Cataract     Chronic heart failure with preserved ejection fraction 03/29/2023    Chronic schizophrenia     Colon polyps     Diabetic polyneuropathy associated with diabetes mellitus due to underlying condition 11/21/2019    Dysarthria as late effect of cerebellar cerebrovascular accident (CVA) 03/24/2023    Equinus deformity of both feet 11/21/2019    Erectile dysfunction due to arterial insufficiency 05/25/2023    Flaccid hemiplegia of left nondominant side as late effect of cerebral infarction 03/17/2023    Flat foot 11/21/2019    Gastritis     Gastroesophageal reflux disease without esophagitis 07/13/2023    Gastrointestinal hemorrhage 12/13/2019    Post polypectomy bleeding    General anesthetics causing adverse effect in therapeutic use     Hammer toes of both feet 11/21/2019    Hx of diabetic foot ulcer 11/21/2019    Hypertension     Hyponatremia 01/11/2022    ELAINE (iron deficiency anemia) 12/10/2019    Intractable chronic migraine without aura and without status migrainosus 12/30/2021    Microcytic anemia 10/09/2019    Moderate aortic regurgitation 03/29/2023    Moderate mitral regurgitation 03/29/2023    Moderate mitral stenosis 03/29/2023    Moderate to severe aortic stenosis 02/28/2022    Myocardial infarct, old     Nicotine dependence, unspecified, uncomplicated 01/16/2022    Onychomycosis due to dermatophyte 11/21/2019    Orchitis 11/09/2019    PIPER on CPAP     Peripheral visual field defect, bilateral 01/13/2022    PVD (peripheral vascular disease) 11/21/2019    Seizures 2008    Stage 2  moderate COPD by GOLD classification     PFTs w/ FEV1 (73%) (9/2022)    Stroke 2005    Thoracic aortic atherosclerosis 10/17/2022    CT chest    Thyroid disease     Previously on pharmacologic Tx    Tinea pedis of right foot 05/21/2019    Tobacco use disorder     Type 2 diabetes mellitus with diabetic peripheral angiopathy without gangrene     A1c 5.1% (3/2023)    Vitamin B12 deficiency 03/24/2023      Past Surgical History:   Procedure Laterality Date    ANGIOGRAM, CORONARY, WITH LEFT HEART CATHETERIZATION  08/25/2022    Procedure: Angiogram, Coronary, with Left Heart Cath;  Surgeon: Mai Deleon MD;  Location: [facility] CATH;  Service: Cardiology;;    APPENDECTOMY      BONE MARROW ASPIRATION Left 08/21/2020    Procedure: ASPIRATION, BONE MARROW;  Surgeon: Lex Kathleen MD;  Location: [facility];  Service: Oncology;  Laterality: Left;    CHOLECYSTECTOMY      CLOSURE OF WOUND Right 09/09/2019    Procedure: CLOSURE, WOUND;  Surgeon: Li Kathleen DPM;  Location: [facility];  Service: Podiatry;  Laterality: Right;    COLONOSCOPY N/A 12/10/2019    Procedure: COLONOSCOPY;  Surgeon: Ryan Lucas MD;  Location: [facility];  Service: Endoscopy;  Laterality: N/A;    COLONOSCOPY N/A 12/13/2019    Procedure: COLONOSCOPY;  Surgeon: Ryan Lucas MD;  Location: [facility];  Service: Endoscopy;  Laterality: N/A;    CORONARY STENT PLACEMENT      ESOPHAGOGASTRODUODENOSCOPY N/A 12/10/2019    Procedure: EGD (ESOPHAGOGASTRODUODENOSCOPY);  Surgeon: Ryan Lucas MD;  Location: [facility];  Service: Endoscopy;  Laterality: N/A;    ESOPHAGOGASTRODUODENOSCOPY N/A 11/03/2022    Procedure: EGD (ESOPHAGOGASTRODUODENOSCOPY);  Surgeon: Ryan Lucas MD;  Location: [facility];  Service: Endoscopy;  Laterality: N/A;    INJECTION OF ANESTHETIC AGENT AROUND MEDIAL BRANCH NERVES INNERVATING LUMBAR FACET JOINT Bilateral 7/25/2023    Procedure: Block-nerve-medial branch-lumbar;  Surgeon: Napoleon Grimaldo MD;  Location: [facility] OR;  Service:  Pain Management;  Laterality: Bilateral;  L3,4,5 MBB    INJECTION OF ANESTHETIC AGENT AROUND MEDIAL BRANCH NERVES INNERVATING LUMBAR FACET JOINT Bilateral 8/25/2023    Procedure: Block-nerve-medial branch-lumbar;  Surgeon: Napoleon Grimaldo MD;  Location: [facility] AS OR;  Service: Anesthesiology;  Laterality: Bilateral;  L3,4,5 MBB #2    LAPAROSCOPIC CHOLECYSTECTOMY N/A 01/11/2023    Procedure: CHOLECYSTECTOMY, LAPAROSCOPIC;  Surgeon: Laith Davis MD;  Location: [facility] OR;  Service: General;  Laterality: N/A;    LEFT HEART CATHETERIZATION Left 08/25/2022    Procedure: Left heart cath;  Surgeon: Mai Deleon MD;  Location: [facility] CATH;  Service: Cardiology;  Laterality: Left;    right heel surgery      SHOULDER ARTHROSCOPY Left     UPPER GASTROINTESTINAL ENDOSCOPY        Family History   Problem Relation Age of Onset    Melanoma Mother     Diabetes Mother     Hypertension Mother     Stroke Father     Diabetes Father     Hypertension Father     Colon cancer Father         unsure of age of diagnosis    Cancer Father         colon cancer    Cervical cancer Sister     Cirrhosis Brother     Hypertension Brother     Lung cancer Maternal Grandmother     Prostate cancer Maternal Grandfather     Crohn's disease Neg Hx     Ulcerative colitis Neg Hx     Stomach cancer Neg Hx     Esophageal cancer Neg Hx     Retinal detachment Neg Hx     Strabismus Neg Hx     Macular degeneration Neg Hx     Glaucoma Neg Hx       Wt Readings from Last 10 Encounters:   09/05/23 79.6 kg (175 lb 7.8 oz)   08/22/23 79.8 kg (176 lb)   08/14/23 80 kg (176 lb 7.7 oz)   08/07/23 80.5 kg (177 lb 7.5 oz)   07/20/23 84 kg (185 lb 3 oz)   07/13/23 84 kg (185 lb 1.2 oz)   06/21/23 84.5 kg (186 lb 5 oz)   06/14/23 86.5 kg (190 lb 11.2 oz)   06/02/23 86.5 kg (190 lb 11.2 oz)   05/30/23 86.5 kg (190 lb 9.6 oz)     Lab Results   Component Value Date    WBC 13.15 (H) 04/21/2023    HGB 13.5 (L) 04/21/2023    HCT 40.6 04/21/2023    MCV 97 04/21/2023     04/21/2023      CMP  Sodium   Date Value Ref Range Status   06/19/2023 145 (H) 136 - 144 mmol/L Final   04/21/2023 142 136 - 145 mmol/L Final     Potassium   Date Value Ref Range Status   06/19/2023 4.0 3.6 - 5.1 mmol/L Final   04/21/2023 3.5 3.5 - 5.1 mmol/L Final     Chloride   Date Value Ref Range Status   04/21/2023 109 95 - 110 mmol/L Final     CO2   Date Value Ref Range Status   06/19/2023 24 22 - 32 mmol/L Final   04/21/2023 24 22 - 31 mmol/L Final     Glucose   Date Value Ref Range Status   04/21/2023 99 70 - 110 mg/dL Final     Comment:     The ADA recommends the following guidelines for fasting glucose:    Normal:       less than 100 mg/dL    Prediabetes:  100 mg/dL to 125 mg/dL    Diabetes:     126 mg/dL or higher       BUN   Date Value Ref Range Status   04/21/2023 16 9 - 21 mg/dL Final     Blood Urea Nitrogen   Date Value Ref Range Status   06/19/2023 8 8 - 20 mg/dL Final     Creatinine   Date Value Ref Range Status   06/19/2023 1.08 0.90 - 1.30 mg/dL Final   04/21/2023 0.98 0.50 - 1.40 mg/dL Final     Calcium   Date Value Ref Range Status   06/19/2023 8.8 (L) 8.9 - 10.3 mg/dL Final   04/21/2023 9.1 8.4 - 10.2 mg/dL Final     Total Protein   Date Value Ref Range Status   04/21/2023 7.3 6.0 - 8.4 g/dL Final     Albumin   Date Value Ref Range Status   04/21/2023 4.3 3.5 - 5.2 g/dL Final     Total Bilirubin   Date Value Ref Range Status   04/21/2023 0.4 0.2 - 1.3 mg/dL Final     Alkaline Phosphatase   Date Value Ref Range Status   04/21/2023 91 38 - 145 U/L Final     AST   Date Value Ref Range Status   04/21/2023 25 17 - 59 U/L Final     ALT   Date Value Ref Range Status   04/21/2023 21 0 - 50 U/L Final     Anion Gap   Date Value Ref Range Status   06/19/2023 11 7 - 16 mmol/L Final   04/21/2023 9 8 - 16 mmol/L Final     eGFR if    Date Value Ref Range Status   06/16/2022 >60 >60 mL/min/1.73 m^2 Final     eGFR if non    Date Value Ref Range Status   06/16/2022 >60 >60 mL/min/1.73 m^2  Final     Comment:     Calculation used to obtain the estimated glomerular filtration  rate (eGFR) is the CKD-EPI equation.          Lab Results   Component Value Date    LIPASE 30 10/18/2022     Lab Results   Component Value Date    LIPASERES 61 04/21/2023             Reviewed prior medical records including radiology report of US of abdomen 10/21/22 & endoscopy history (see surgical history).     Objective:      Physical Exam  Constitutional:       General: He is not in acute distress.     Appearance: He is well-developed.   HENT:      Head: Normocephalic.      Right Ear: Hearing normal.      Left Ear: Hearing normal.      Nose: Nose normal.      Mouth/Throat:      Mouth: No oral lesions.      Pharynx: Uvula midline.   Eyes:      General: Lids are normal.      Conjunctiva/sclera: Conjunctivae normal.      Pupils: Pupils are equal, round, and reactive to light.   Neck:      Trachea: Trachea normal.   Cardiovascular:      Rate and Rhythm: Normal rate and regular rhythm.      Heart sounds: Murmur heard.   Pulmonary:      Effort: Pulmonary effort is normal. No respiratory distress.      Breath sounds: Normal breath sounds. No stridor. No wheezing.   Abdominal:      General: Bowel sounds are normal. There is no distension.      Palpations: Abdomen is soft. There is no mass.      Tenderness: There is generalized abdominal tenderness (mild). There is no guarding or rebound.   Musculoskeletal:         General: Normal range of motion.      Cervical back: Normal range of motion.   Skin:     General: Skin is warm and dry.      Findings: No rash.      Comments: Non jaundiced   Neurological:      Mental Status: He is alert and oriented to person, place, and time.   Psychiatric:         Speech: Speech normal.         Behavior: Behavior normal. Behavior is cooperative.           Assessment:       1. Esophageal dysphagia    2. Gastric intestinal metaplasia    3. History of gastritis    4. History of colon polyps    5. Current use  of anticoagulant therapy    6. S/P cholecystectomy           Plan:   All diagnoses and orders for this visit:    Esophageal dysphagia  - Restart: pantoprazole (PROTONIX) 40 MG tablet; Take 1 tablet (40 mg total) by mouth once daily.  Dispense: 90 tablet; Refill: 3  - Schedule EGD with possible esophageal dilation if indicated  - Educated patient to eat smaller more frequent meals and to eat slowly and advised to eat a soft diet.  - Possible UGI/esophagram/esophageal manometry if symptoms persist     Gastric intestinal metaplasia & History of gastritis  - Restart: pantoprazole (PROTONIX) 40 MG tablet; Take 1 tablet (40 mg total) by mouth once daily.  Dispense: 90 tablet; Refill: 3  - Take PPI in the morning 30 minutes before breakfast  - Recommend to avoid large meals, avoid eating within 3 hours of bedtime, elevate head of bed if nocturnal symptoms are present, smoking cessation (if current smoker), & weight loss (if overweight).   - Recommend minimize/avoid high-fat foods, chocolate, caffeine, citrus, alcohol, & tomato products.  - Advised to avoid/limit use of NSAID's, since they can cause GI upset, bleeding, and/or ulcers. If needed, take with food.      History of colon polyps   - Schedule Colonoscopy     Current use of anticoagulant therapy   - Informed patient that the anticoagulant(s) will likely need to be held for endoscopy, nurse will confirm with cardiologist/PCP.     S/P cholecystectomy    If no improvement in symptoms or symptoms worsen, call/follow-up at clinic or go to ER

## 2023-09-06 ENCOUNTER — TELEPHONE (OUTPATIENT)
Dept: FAMILY MEDICINE | Facility: CLINIC | Age: 58
End: 2023-09-06
Payer: MEDICARE

## 2023-09-06 ENCOUNTER — TELEPHONE (OUTPATIENT)
Dept: GASTROENTEROLOGY | Facility: CLINIC | Age: 58
End: 2023-09-06
Payer: MEDICARE

## 2023-09-06 NOTE — TELEPHONE ENCOUNTER
----- Message from Moody Eduardo sent at 9/6/2023  2:28 PM CDT -----  Contact: self  Type: Patient Returning Call        Who Called: patient   Who Left Message for Patient: Nurse Monster  Does the patient know what this is regarding?: n/a  Best Call Back Number: [phone]  Additional Information: Plz call pt back when possible he returned the first call. Thanks

## 2023-09-06 NOTE — TELEPHONE ENCOUNTER
Returned call to pt. Pt asking when to  the golytely prep from pharmacy for his scope. Advised him he could  at any time. Pt verbalized understanding

## 2023-09-06 NOTE — TELEPHONE ENCOUNTER
----- Message from Kassy Ramos sent at 9/6/2023  1:52 PM CDT -----  Regarding: Advice  Contact: Patient  Type: Needs Medical Advice  Who Called:  Patient  Symptoms (please be specific):    How long has patient had these symptoms:    Pharmacy name and phone #:    Best Call Back Number: [phone] (home)     Additional Information: Patient is wondering when he has to go get prep for his procedure. Please call patient to advise. Thanks!

## 2023-09-07 ENCOUNTER — TELEPHONE (OUTPATIENT)
Dept: GASTROENTEROLOGY | Facility: CLINIC | Age: 58
End: 2023-09-07
Payer: MEDICARE

## 2023-09-07 NOTE — TELEPHONE ENCOUNTER
Mr. Crane is scheduled for an EGD and colonoscopy on 10/10/2023 with Dr. Lucas.     Is it ok for him to hold his Plavix for 5 days prior to the procedure?

## 2023-09-08 NOTE — TELEPHONE ENCOUNTER
Ana Mayer MD  You; Nora Arroyo LPN 17 hours ago (9:49 PM)     ES  Hold Plavix for the minimal number of days required to safely perform EGD/colonoscopy - given pt's history of stroke and cardiovascular disease, holding the Plavix is not without risk, but may need to be done in order to safely perform this needed procedure.

## 2023-09-12 RX ORDER — METOPROLOL SUCCINATE 50 MG/1
TABLET, EXTENDED RELEASE ORAL
Refills: 0 | OUTPATIENT
Start: 2023-09-12

## 2023-09-12 NOTE — TELEPHONE ENCOUNTER
No care due was identified.  [facility] Embedded Care Due Messages. Reference number: [id number].   9/12/2023 11:40:32 AM CDT

## 2023-09-12 NOTE — TELEPHONE ENCOUNTER
Refill Decision Note   Ryan Crane  is requesting a refill authorization.  Brief Assessment and Rationale for Refill:  Quick Discontinue     Medication Therapy Plan:    Pharmacy is requesting new scripts for the following medications without required information, (sig/ frequency/qty/etc)      Medication Reconciliation Completed: No     Comments: Pharmacies have been requesting medications for patients without required information, (sig, frequency, qty, etc.). In addition, requests are sent for medication(s) pt. are currently not taking, and medications patients have never taken.    We have spoken to the pharmacies about these request types and advised their teams previously that we are unable to assess these New Script requests and require all details for these requests. This is a known issue and has been reported.     Note composed:12:52 PM 09/12/2023

## 2023-09-15 ENCOUNTER — CLINICAL SUPPORT (OUTPATIENT)
Dept: FAMILY MEDICINE | Facility: CLINIC | Age: 58
End: 2023-09-15
Payer: MEDICARE

## 2023-09-15 DIAGNOSIS — E53.8 VITAMIN B12 DEFICIENCY: Primary | ICD-10-CM

## 2023-09-15 PROCEDURE — 96372 THER/PROPH/DIAG INJ SC/IM: CPT | Mod: S$GLB,,, | Performed by: STUDENT IN AN ORGANIZED HEALTH CARE EDUCATION/TRAINING PROGRAM

## 2023-09-15 PROCEDURE — 96372 PR INJECTION,THERAP/PROPH/DIAG2ST, IM OR SUBCUT: ICD-10-PCS | Mod: S$GLB,,, | Performed by: STUDENT IN AN ORGANIZED HEALTH CARE EDUCATION/TRAINING PROGRAM

## 2023-09-15 RX ADMIN — CYANOCOBALAMIN 1000 MCG: 1000 INJECTION, SOLUTION INTRAMUSCULAR; SUBCUTANEOUS at 09:09

## 2023-09-15 NOTE — PROGRESS NOTES
Patient present to the clinic today for his biweekly vitamin B12 injection  Injection administered into the left deltoid per patient request.  Patient tolerated well with no complaints.   Next apt provided to patient at time of visit.

## 2023-09-18 ENCOUNTER — TELEPHONE (OUTPATIENT)
Dept: SPINE | Facility: CLINIC | Age: 58
End: 2023-09-18
Payer: MEDICARE

## 2023-09-18 ENCOUNTER — TELEPHONE (OUTPATIENT)
Dept: PAIN MEDICINE | Facility: CLINIC | Age: 58
End: 2023-09-18
Payer: MEDICARE

## 2023-09-18 NOTE — TELEPHONE ENCOUNTER
----- Message from Reji Caruso sent at 9/18/2023  8:58 AM CDT -----  Type:  Sooner Appointment Request    Caller is requesting a sooner appointment.  Caller declined first available appointment listed below.  Caller will not accept being placed on the waitlist and is requesting a message be sent to doctor.    Name of Caller:  Patient  When is the first available appointment?  Pt needs to reschedule his procedure from 09/28/23  Symptoms:    Best Call Back Number:  [phone]  Additional Information:

## 2023-09-18 NOTE — TELEPHONE ENCOUNTER
----- Message from Stacey M Lefort sent at 9/18/2023  9:13 AM CDT -----  Pt needs to discuss rescheduling his procedure set for 09/28. His callback is [phone]. Thank you.

## 2023-09-25 ENCOUNTER — OFFICE VISIT (OUTPATIENT)
Dept: PODIATRY | Facility: CLINIC | Age: 58
End: 2023-09-25
Payer: MEDICARE

## 2023-09-25 ENCOUNTER — HOSPITAL ENCOUNTER (OUTPATIENT)
Dept: RADIOLOGY | Facility: HOSPITAL | Age: 58
Discharge: HOME | End: 2023-09-25
Attending: STUDENT IN AN ORGANIZED HEALTH CARE EDUCATION/TRAINING PROGRAM
Payer: MEDICARE

## 2023-09-25 DIAGNOSIS — M79.671 INTRACTABLE RIGHT HEEL PAIN: Primary | ICD-10-CM

## 2023-09-25 DIAGNOSIS — Q82.8 POROKERATOSIS: ICD-10-CM

## 2023-09-25 DIAGNOSIS — M79.671 INTRACTABLE RIGHT HEEL PAIN: ICD-10-CM

## 2023-09-25 DIAGNOSIS — E08.42 DIABETIC POLYNEUROPATHY ASSOCIATED WITH DIABETES MELLITUS DUE TO UNDERLYING CONDITION: ICD-10-CM

## 2023-09-25 DIAGNOSIS — E11.49 TYPE II DIABETES MELLITUS WITH NEUROLOGICAL MANIFESTATIONS: ICD-10-CM

## 2023-09-25 DIAGNOSIS — I73.9 PVD (PERIPHERAL VASCULAR DISEASE): ICD-10-CM

## 2023-09-25 PROCEDURE — 1159F MED LIST DOCD IN RCRD: CPT | Mod: CPTII,S$GLB,, | Performed by: STUDENT IN AN ORGANIZED HEALTH CARE EDUCATION/TRAINING PROGRAM

## 2023-09-25 PROCEDURE — 99999 PR PBB SHADOW E&M-EST. PATIENT-LVL III: ICD-10-PCS | Mod: PBBFAC,,, | Performed by: STUDENT IN AN ORGANIZED HEALTH CARE EDUCATION/TRAINING PROGRAM

## 2023-09-25 PROCEDURE — 3044F HG A1C LEVEL LT 7.0%: CPT | Mod: CPTII,S$GLB,, | Performed by: STUDENT IN AN ORGANIZED HEALTH CARE EDUCATION/TRAINING PROGRAM

## 2023-09-25 PROCEDURE — 11055 PARING/CUTG B9 HYPRKER LES 1: CPT | Mod: Q9,S$GLB,, | Performed by: STUDENT IN AN ORGANIZED HEALTH CARE EDUCATION/TRAINING PROGRAM

## 2023-09-25 PROCEDURE — 1160F PR REVIEW ALL MEDS BY PRESCRIBER/CLIN PHARMACIST DOCUMENTED: ICD-10-PCS | Mod: CPTII,S$GLB,, | Performed by: STUDENT IN AN ORGANIZED HEALTH CARE EDUCATION/TRAINING PROGRAM

## 2023-09-25 PROCEDURE — 99999 PR PBB SHADOW E&M-EST. PATIENT-LVL III: CPT | Mod: PBBFAC,,, | Performed by: STUDENT IN AN ORGANIZED HEALTH CARE EDUCATION/TRAINING PROGRAM

## 2023-09-25 PROCEDURE — 11721 ROUTINE FOOT CARE: ICD-10-PCS | Mod: 59,Q9,S$GLB, | Performed by: STUDENT IN AN ORGANIZED HEALTH CARE EDUCATION/TRAINING PROGRAM

## 2023-09-25 PROCEDURE — 3072F PR LOW RISK FOR RETINOPATHY: ICD-10-PCS | Mod: CPTII,S$GLB,, | Performed by: STUDENT IN AN ORGANIZED HEALTH CARE EDUCATION/TRAINING PROGRAM

## 2023-09-25 PROCEDURE — 73630 X-RAY EXAM OF FOOT: CPT | Mod: 26,RT,, | Performed by: RADIOLOGY

## 2023-09-25 PROCEDURE — 3044F PR MOST RECENT HEMOGLOBIN A1C LEVEL <7.0%: ICD-10-PCS | Mod: CPTII,S$GLB,, | Performed by: STUDENT IN AN ORGANIZED HEALTH CARE EDUCATION/TRAINING PROGRAM

## 2023-09-25 PROCEDURE — 1159F PR MEDICATION LIST DOCUMENTED IN MEDICAL RECORD: ICD-10-PCS | Mod: CPTII,S$GLB,, | Performed by: STUDENT IN AN ORGANIZED HEALTH CARE EDUCATION/TRAINING PROGRAM

## 2023-09-25 PROCEDURE — 73630 XR FOOT COMPLETE 3 VIEW RIGHT: ICD-10-PCS | Mod: 26,RT,, | Performed by: RADIOLOGY

## 2023-09-25 PROCEDURE — 73630 X-RAY EXAM OF FOOT: CPT | Mod: TC,PO,RT

## 2023-09-25 PROCEDURE — 1160F RVW MEDS BY RX/DR IN RCRD: CPT | Mod: CPTII,S$GLB,, | Performed by: STUDENT IN AN ORGANIZED HEALTH CARE EDUCATION/TRAINING PROGRAM

## 2023-09-25 PROCEDURE — 99213 PR OFFICE/OUTPT VISIT, EST, LEVL III, 20-29 MIN: ICD-10-PCS | Mod: 25,S$GLB,, | Performed by: STUDENT IN AN ORGANIZED HEALTH CARE EDUCATION/TRAINING PROGRAM

## 2023-09-25 PROCEDURE — 99213 OFFICE O/P EST LOW 20 MIN: CPT | Mod: 25,S$GLB,, | Performed by: STUDENT IN AN ORGANIZED HEALTH CARE EDUCATION/TRAINING PROGRAM

## 2023-09-25 PROCEDURE — 11721 DEBRIDE NAIL 6 OR MORE: CPT | Mod: 59,Q9,S$GLB, | Performed by: STUDENT IN AN ORGANIZED HEALTH CARE EDUCATION/TRAINING PROGRAM

## 2023-09-25 PROCEDURE — 3072F LOW RISK FOR RETINOPATHY: CPT | Mod: CPTII,S$GLB,, | Performed by: STUDENT IN AN ORGANIZED HEALTH CARE EDUCATION/TRAINING PROGRAM

## 2023-09-25 PROCEDURE — 11055 ROUTINE FOOT CARE: ICD-10-PCS | Mod: Q9,S$GLB,, | Performed by: STUDENT IN AN ORGANIZED HEALTH CARE EDUCATION/TRAINING PROGRAM

## 2023-09-25 NOTE — PROGRESS NOTES
Subjective:      Patient ID: Ryna Crane is a 58 y.o. male.    Chief Complaint: No chief complaint on file.      HPI:  Ryan is a 58 y.o. male who presents to the clinic for evaluation and treatment of high risk feet. Ryan has a past medical history of Aortic valve stenosis (02/28/2022), Arachnoid cyst of posterior cranial fossa (01/13/2022), Benign prostatic hyperplasia without lower urinary tract symptoms (05/25/2023), Bilateral carotid bruits (06/22/2021), Bipolar disorder (01/16/2022), CAD S/P percutaneous coronary angioplasty, Calculus of gallbladder without cholecystitis without obstruction (01/11/2023), Cancer, Candidal intertrigo (11/11/2019), Cataract, Chronic heart failure with preserved ejection fraction (03/29/2023), Chronic schizophrenia, Colon polyps, Diabetic polyneuropathy associated with diabetes mellitus due to underlying condition (11/21/2019), Dysarthria as late effect of cerebellar cerebrovascular accident (CVA) (03/24/2023), Equinus deformity of both feet (11/21/2019), Erectile dysfunction due to arterial insufficiency (05/25/2023), Flaccid hemiplegia of left nondominant side as late effect of cerebral infarction (03/17/2023), Flat foot (11/21/2019), Gastritis, Gastroesophageal reflux disease without esophagitis (07/13/2023), Gastrointestinal hemorrhage (12/13/2019), General anesthetics causing adverse effect in therapeutic use, Hammer toes of both feet (11/21/2019), diabetic foot ulcer (11/21/2019), Hypertension, Hyponatremia (01/11/2022), ELAINE (iron deficiency anemia) (12/10/2019), Intractable chronic migraine without aura and without status migrainosus (12/30/2021), Microcytic anemia (10/09/2019), Moderate aortic regurgitation (03/29/2023), Moderate mitral regurgitation (03/29/2023), Moderate mitral stenosis (03/29/2023), Moderate to severe aortic stenosis (02/28/2022), Myocardial infarct, old, Nicotine dependence, unspecified, uncomplicated (01/16/2022), Onychomycosis due to  dermatophyte (11/21/2019), Orchitis (11/09/2019), PIPER on CPAP, Peripheral visual field defect, bilateral (01/13/2022), PVD (peripheral vascular disease) (11/21/2019), Seizures (2008), Stage 2 moderate COPD by GOLD classification, Stroke (2005), Thoracic aortic atherosclerosis (10/17/2022), Thyroid disease, Tinea pedis of right foot (05/21/2019), Tobacco use disorder, Type 2 diabetes mellitus with diabetic peripheral angiopathy without gangrene, and Vitamin B12 deficiency (03/24/2023). The patient's chief complaint is long, thick toenails and right heel pain. This patient has documented high risk feet requiring routine maintenance secondary to diabetes mellitis and those secondary complications of diabetes, as mentioned..  He reports having right heel pain, which he rates as 3/10 on the pain scale but denies having a wound.  He also informs of losing 30 lbs since his last visit without trying to lose weight and is concerned about it.  He denies trying to self-treat his heel pain or trim his toenails himself.  Patient denies any other pedal complaints at this time.    9/27/22: f/u for nail care and heel pain R.    12/12/2022: Patient returns for suture removal of the right heel.  He is also here for nail care.    09/25/2023  Returns today for nail care. He reports that the R heel is painful again and the mass has returned. He is interested in having another surgery to help stop the pain. He has lost over 200 lbs and the pain is still just as severe.    PCP: Ana Mayer MD    Date Last Seen by PCP:  8/14/2023    Current shoe gear:  Affected Foot: Casual shoes     Unaffected Foot: Casual shoes    Review of Systems   Constitutional: Negative for appetite change, fever, chills, fatigue.  Positive for unexpected weight change.   Respiratory: Negative for cough, wheezing, shortness of breath.  Cardiovascular: Negative for chest pain, claudication, cyanosis.  Positive for leg swelling.  Musculoskeletal: Negative for  back pain, arthritis, joint pain, joint swelling, myalgias, stiffness.   Skin: Negative for rash, itching, suspicious lesion, poor wound healing, unusual hair distribution.  Positive for nail bed changes, discoloration,.  Neurological: Negative for loss of balance, sensory change, paresthesias, numbness.  Positive for pain.  Hematological: Negative for adenopathy, bleeding, bruising easily.   Psychiatric/Behavioral: The patient is not nervous/anxious.  Negative for altered mental status.    Hemoglobin A1C   Date Value Ref Range Status   07/13/2023 4.9 4.0 - 5.6 % Final     Comment:     ADA Screening Guidelines:  5.7-6.4%  Consistent with prediabetes  >or=6.5%  Consistent with diabetes    High levels of fetal hemoglobin interfere with the HbA1C  assay. Heterozygous hemoglobin variants (HbS, HgC, etc)do  not significantly interfere with this assay.   However, presence of multiple variants may affect accuracy.     03/05/2023 5.1 0.0 - 5.6 % Final     Comment:     Reference Interval:  5.0 - 5.6 Normal   5.7 - 6.4 High Risk   > 6.5 Diabetic      Hgb A1c results are standardized based on the (NGSP) National   Glycohemoglobin Standardization Program.      Hemoglobin A1C levels are related to mean serum/plasma glucose   during the preceding 2-3 months.        02/01/2023 5.0 0.0 - 5.6 % Final     Comment:     Reference Interval:  5.0 - 5.6 Normal   5.7 - 6.4 High Risk   > 6.5 Diabetic      Hgb A1c results are standardized based on the (NGSP) National   Glycohemoglobin Standardization Program.      Hemoglobin A1C levels are related to mean serum/plasma glucose   during the preceding 2-3 months.            Past Medical History:   Diagnosis Date    Aortic valve stenosis 02/28/2022    Arachnoid cyst of posterior cranial fossa 01/13/2022    Benign prostatic hyperplasia without lower urinary tract symptoms 05/25/2023    Bilateral carotid bruits 06/22/2021    Bipolar disorder 01/16/2022    CAD S/P percutaneous coronary angioplasty      3 vessel disease    Calculus of gallbladder without cholecystitis without obstruction 01/11/2023    Cancer     Candidal intertrigo 11/11/2019    Cataract     Chronic heart failure with preserved ejection fraction 03/29/2023    Chronic schizophrenia     Colon polyps     Diabetic polyneuropathy associated with diabetes mellitus due to underlying condition 11/21/2019    Dysarthria as late effect of cerebellar cerebrovascular accident (CVA) 03/24/2023    Equinus deformity of both feet 11/21/2019    Erectile dysfunction due to arterial insufficiency 05/25/2023    Flaccid hemiplegia of left nondominant side as late effect of cerebral infarction 03/17/2023    Flat foot 11/21/2019    Gastritis     Gastroesophageal reflux disease without esophagitis 07/13/2023    Gastrointestinal hemorrhage 12/13/2019    Post polypectomy bleeding    General anesthetics causing adverse effect in therapeutic use     Hammer toes of both feet 11/21/2019    Hx of diabetic foot ulcer 11/21/2019    Hypertension     Hyponatremia 01/11/2022    ELAINE (iron deficiency anemia) 12/10/2019    Intractable chronic migraine without aura and without status migrainosus 12/30/2021    Microcytic anemia 10/09/2019    Moderate aortic regurgitation 03/29/2023    Moderate mitral regurgitation 03/29/2023    Moderate mitral stenosis 03/29/2023    Moderate to severe aortic stenosis 02/28/2022    Myocardial infarct, old     Nicotine dependence, unspecified, uncomplicated 01/16/2022    Onychomycosis due to dermatophyte 11/21/2019    Orchitis 11/09/2019    PIPER on CPAP     Peripheral visual field defect, bilateral 01/13/2022    PVD (peripheral vascular disease) 11/21/2019    Seizures 2008    Stage 2 moderate COPD by GOLD classification     PFTs w/ FEV1 (73%) (9/2022)    Stroke 2005    Thoracic aortic atherosclerosis 10/17/2022    CT chest    Thyroid disease     Previously on pharmacologic Tx    Tinea pedis of right foot 05/21/2019    Tobacco use disorder     Type 2  diabetes mellitus with diabetic peripheral angiopathy without gangrene     A1c 5.1% (3/2023)    Vitamin B12 deficiency 03/24/2023     Past Surgical History:   Procedure Laterality Date    ANGIOGRAM, CORONARY, WITH LEFT HEART CATHETERIZATION  08/25/2022    Procedure: Angiogram, Coronary, with Left Heart Cath;  Surgeon: Mai Deleon MD;  Location: [facility] CATH;  Service: Cardiology;;    APPENDECTOMY      BONE MARROW ASPIRATION Left 08/21/2020    Procedure: ASPIRATION, BONE MARROW;  Surgeon: Lex Kathleen MD;  Location: [facility] OR;  Service: Oncology;  Laterality: Left;    CHOLECYSTECTOMY      CLOSURE OF WOUND Right 09/09/2019    Procedure: CLOSURE, WOUND;  Surgeon: Li Kathleen DPM;  Location: [facility] OR;  Service: Podiatry;  Laterality: Right;    COLONOSCOPY N/A 12/10/2019    Procedure: COLONOSCOPY;  Surgeon: Ryan Lucas MD;  Location: [facility];  Service: Endoscopy;  Laterality: N/A;    COLONOSCOPY N/A 12/13/2019    Procedure: COLONOSCOPY;  Surgeon: Ryan Lucas MD;  Location: [facility];  Service: Endoscopy;  Laterality: N/A;    CORONARY STENT PLACEMENT      ESOPHAGOGASTRODUODENOSCOPY N/A 12/10/2019    Procedure: EGD (ESOPHAGOGASTRODUODENOSCOPY);  Surgeon: Ryan Lucas MD;  Location: [facility];  Service: Endoscopy;  Laterality: N/A;    ESOPHAGOGASTRODUODENOSCOPY N/A 11/03/2022    Procedure: EGD (ESOPHAGOGASTRODUODENOSCOPY);  Surgeon: Ryan Lucas MD;  Location: [facility];  Service: Endoscopy;  Laterality: N/A;    INJECTION OF ANESTHETIC AGENT AROUND MEDIAL BRANCH NERVES INNERVATING LUMBAR FACET JOINT Bilateral 7/25/2023    Procedure: Block-nerve-medial branch-lumbar;  Surgeon: Napoleon Grimaldo MD;  Location: [facility] OR;  Service: Pain Management;  Laterality: Bilateral;  L3,4,5 MBB    INJECTION OF ANESTHETIC AGENT AROUND MEDIAL BRANCH NERVES INNERVATING LUMBAR FACET JOINT Bilateral 8/25/2023    Procedure: Block-nerve-medial branch-lumbar;  Surgeon: Napoleon Grimaldo MD;  Location: [facility] OR;   Service: Anesthesiology;  Laterality: Bilateral;  L3,4,5 MBB #2    LAPAROSCOPIC CHOLECYSTECTOMY N/A 2023    Procedure: CHOLECYSTECTOMY, LAPAROSCOPIC;  Surgeon: Laith Davis MD;  Location: [facility] OR;  Service: General;  Laterality: N/A;    LEFT HEART CATHETERIZATION Left 2022    Procedure: Left heart cath;  Surgeon: Mai Deleon MD;  Location: [facility] CATH;  Service: Cardiology;  Laterality: Left;    right heel surgery      SHOULDER ARTHROSCOPY Left     UPPER GASTROINTESTINAL ENDOSCOPY       Family History   Problem Relation Age of Onset    Melanoma Mother     Diabetes Mother     Hypertension Mother     Stroke Father     Diabetes Father     Hypertension Father     Colon cancer Father         unsure of age of diagnosis    Cancer Father         colon cancer    Cervical cancer Sister     Cirrhosis Brother     Hypertension Brother     Lung cancer Maternal Grandmother     Prostate cancer Maternal Grandfather     Crohn's disease Neg Hx     Ulcerative colitis Neg Hx     Stomach cancer Neg Hx     Esophageal cancer Neg Hx     Retinal detachment Neg Hx     Strabismus Neg Hx     Macular degeneration Neg Hx     Glaucoma Neg Hx      Social History     Socioeconomic History    Marital status: Legally    Occupational History    Occupation: disabled (mental)     Comment: since    Tobacco Use    Smoking status: Former     Current packs/day: 0.00     Average packs/day: 1.5 packs/day for 43.9 years (67.0 ttl pk-yrs)     Types: Cigarettes     Start date:      Quit date: 2022     Years since quittin.8    Smokeless tobacco: Never    Tobacco comments:     Age Started 14    Substance and Sexual Activity    Alcohol use: No    Drug use: Not Currently     Types: Marijuana    Sexual activity: Yes     Partners: Female     Social Determinants of Health     Financial Resource Strain: Low Risk  (10/10/2022)    Overall Financial Resource Strain (CARDIA)     Difficulty of Paying Living Expenses: Not hard at all    Food Insecurity: No Food Insecurity (10/10/2022)    Hunger Vital Sign     Worried About Running Out of Food in the Last Year: Never true     Ran Out of Food in the Last Year: Never true   Transportation Needs: Unmet Transportation Needs (10/10/2022)    PRAPARE - Transportation     Lack of Transportation (Medical): Yes     Lack of Transportation (Non-Medical): Yes   Physical Activity: Insufficiently Active (10/10/2022)    Exercise Vital Sign     Days of Exercise per Week: 2 days     Minutes of Exercise per Session: 10 min   Stress: Stress Concern Present (10/10/2022)    [employer] [place] [employer] - Occupational Stress Questionnaire     Feeling of Stress : To some extent   Social Connections: Socially Isolated (10/10/2022)    Social Connection and Isolation Panel [NHANES]     Frequency of Communication with Friends and Family: Twice a week     Frequency of Social Gatherings with Friends and Family: Never     Attends Taoist Services: More than 4 times per year     Active Member of Clubs or Organizations: No     Attends Club or Organization Meetings: Never     Marital Status:    Housing Stability: Unknown (10/10/2022)    Housing Stability Vital Sign     Unable to Pay for Housing in the Last Year: No     Unstable Housing in the Last Year: No           Objective:        There were no vitals taken for this visit.    Physical Exam   Constitutional: Patient is oriented to person, place, and time. Patient appears well-developed and well-nourished.  Strong malodor noted from patient due to lack of personal hygiene.  No acute distress.     Psychiatric: Patient has a normal mood and affect. Patient's speech is normal and behavior is normal. Judgment is normal. Cognition and memory are normal.     Bilateral pedal exam was performed today.  Vascular: Vascular: Pedal pulses palpable 2/4 DP & PT.  CFT is = 3 seconds to the hallux.  Skin temperature is warm to cool proximal tibia to distal toes without  localized increase in calor noted.  No erythema and ecchymosis noted to the LE.  Nonpitting edema noted to the LE.  Hair growth present distally to the LE.     Musculoskeletal: Ankle and pedal joints ROM are decreased. Ankle joint dorsiflexion is restricted with the knee extended and flexed per [place] exam.  Muscle strength is 5/5 for all LE muscle groups tested.  Flat foot type present.  Flexion contracture of lesser digits noted.    Neurological:  Epicritic sensation is slightly dimiished to the foot.  [place] STR is intact to the LE.  Tenderness to palpation of right plantar heel noted.     Dermatological: Toenails 1-5 are elongated and distally thickened, dystrophic, brittle, discolored, and mycotic with subungal debris present.  Webspaces 1-4 are dry and intact with debris present.  Skin turgor is increased.  Skin texture is dry and flaky. Dermal fibrosis appreciated at the plantar right heel has returned.     Nursing note and vitals reviewed.        Assessment:       Encounter Diagnoses   Name Primary?    Intractable right heel pain Yes    Porokeratosis              Plan:       Diagnoses and all orders for this visit:    Intractable right heel pain  -     X-Ray Foot Complete Right; Future    Porokeratosis  -     X-Ray Foot Complete Right; Future          I counseled the patient on his conditions, their implications and medical management.    Patient was evaluated and risk assessed today. The patient is at low risk for developing pedal complications due to peripheral vascular disease.    Nail care was provided today. X-ray for possible surgical planning.    I explained to the patient that this may be current due an underlying osseous issue vs an isolated skin issue. I again reviewed the imaging and it does appear that the plantar defect is directly correlated to the large calcaneal spur. I discussed with the patient that we could attempt the excision a final time with plans to remove any deep osseous  spur. He is amenable but surgical consent was not obtained at this time.     Patient would like surgery some time in Nov and has a PCP f/u Nov 8.     Likely surgery Nov 17    Дмитрий Pratt DPM      Follow up in 4 months    Routine Foot Care    Date/Time: 9/25/2023 9:20 AM    Performed by: Дмитрий Pratt DPM  Authorized by: Дмитрий Pratt DPM    Consent Done?:  Yes (Verbal)  Hyperkeratotic Skin Lesions?: Yes    Number of trimmed lesions:  1  Location(s):  Right Plantar    Nail Care Type:  Debride  Location(s): All  (Left 1st Toe, Left 3rd Toe, Left 2nd Toe, Left 4th Toe, Left 5th Toe, Right 1st Toe, Right 2nd Toe, Right 3rd Toe, Right 4th Toe and Right 5th Toe)  Patient tolerance:  Patient tolerated the procedure well with no immediate complications

## 2023-09-29 ENCOUNTER — CLINICAL SUPPORT (OUTPATIENT)
Dept: FAMILY MEDICINE | Facility: CLINIC | Age: 58
End: 2023-09-29
Payer: MEDICARE

## 2023-09-29 ENCOUNTER — TELEPHONE (OUTPATIENT)
Dept: SPINE | Facility: CLINIC | Age: 58
End: 2023-09-29
Payer: MEDICARE

## 2023-09-29 VITALS — BODY MASS INDEX: 23.93 KG/M2 | WEIGHT: 166.75 LBS

## 2023-09-29 DIAGNOSIS — E53.8 VITAMIN B12 DEFICIENCY: Primary | ICD-10-CM

## 2023-09-29 PROCEDURE — 99999 PR PBB SHADOW E&M-EST. PATIENT-LVL I: CPT | Mod: PBBFAC,,,

## 2023-09-29 PROCEDURE — 96372 THER/PROPH/DIAG INJ SC/IM: CPT | Mod: S$GLB,,, | Performed by: STUDENT IN AN ORGANIZED HEALTH CARE EDUCATION/TRAINING PROGRAM

## 2023-09-29 RX ADMIN — CYANOCOBALAMIN 1000 MCG: 1000 INJECTION, SOLUTION INTRAMUSCULAR; SUBCUTANEOUS at 09:09

## 2023-09-29 NOTE — TELEPHONE ENCOUNTER
----- Message from Moody Eduardo sent at 9/29/2023  1:50 PM CDT -----  Contact: self  Type: Needs Medical Advice  Who Called: Patient   Best Call Back Number: [phone]  Additional Information: Pt wants to know will he be put under after they do his procedure or give him a shot in his back. Plz call to  advise. Thanks

## 2023-09-29 NOTE — TELEPHONE ENCOUNTER
Spoke to pt and let him know he was required to have IV sedation with RFA and reminded him he will need a  for the procedure. Pt verbalized understanding

## 2023-10-02 ENCOUNTER — PATIENT MESSAGE (OUTPATIENT)
Dept: FAMILY MEDICINE | Facility: CLINIC | Age: 58
End: 2023-10-02
Payer: MEDICARE

## 2023-10-03 ENCOUNTER — OFFICE VISIT (OUTPATIENT)
Dept: NEUROLOGY | Facility: CLINIC | Age: 58
End: 2023-10-03
Payer: MEDICARE

## 2023-10-03 ENCOUNTER — TELEPHONE (OUTPATIENT)
Dept: GASTROENTEROLOGY | Facility: CLINIC | Age: 58
End: 2023-10-03
Payer: MEDICARE

## 2023-10-03 VITALS
HEART RATE: 93 BPM | TEMPERATURE: 97 F | HEIGHT: 70 IN | DIASTOLIC BLOOD PRESSURE: 78 MMHG | SYSTOLIC BLOOD PRESSURE: 114 MMHG | BODY MASS INDEX: 24.2 KG/M2 | WEIGHT: 169 LBS | RESPIRATION RATE: 17 BRPM

## 2023-10-03 DIAGNOSIS — G43.719 INTRACTABLE CHRONIC MIGRAINE WITHOUT AURA AND WITHOUT STATUS MIGRAINOSUS: Primary | Chronic | ICD-10-CM

## 2023-10-03 DIAGNOSIS — G47.33 OSA ON CPAP: ICD-10-CM

## 2023-10-03 PROCEDURE — 3072F LOW RISK FOR RETINOPATHY: CPT | Mod: CPTII,S$GLB,, | Performed by: NURSE PRACTITIONER

## 2023-10-03 PROCEDURE — 99999 PR PBB SHADOW E&M-EST. PATIENT-LVL V: CPT | Mod: PBBFAC,,, | Performed by: NURSE PRACTITIONER

## 2023-10-03 PROCEDURE — 99213 OFFICE O/P EST LOW 20 MIN: CPT | Mod: S$GLB,,, | Performed by: NURSE PRACTITIONER

## 2023-10-03 PROCEDURE — 99213 PR OFFICE/OUTPT VISIT, EST, LEVL III, 20-29 MIN: ICD-10-PCS | Mod: S$GLB,,, | Performed by: NURSE PRACTITIONER

## 2023-10-03 PROCEDURE — 3074F PR MOST RECENT SYSTOLIC BLOOD PRESSURE < 130 MM HG: ICD-10-PCS | Mod: CPTII,S$GLB,, | Performed by: NURSE PRACTITIONER

## 2023-10-03 PROCEDURE — 3074F SYST BP LT 130 MM HG: CPT | Mod: CPTII,S$GLB,, | Performed by: NURSE PRACTITIONER

## 2023-10-03 PROCEDURE — 3072F PR LOW RISK FOR RETINOPATHY: ICD-10-PCS | Mod: CPTII,S$GLB,, | Performed by: NURSE PRACTITIONER

## 2023-10-03 PROCEDURE — 1159F PR MEDICATION LIST DOCUMENTED IN MEDICAL RECORD: ICD-10-PCS | Mod: CPTII,S$GLB,, | Performed by: NURSE PRACTITIONER

## 2023-10-03 PROCEDURE — 3078F PR MOST RECENT DIASTOLIC BLOOD PRESSURE < 80 MM HG: ICD-10-PCS | Mod: CPTII,S$GLB,, | Performed by: NURSE PRACTITIONER

## 2023-10-03 PROCEDURE — 1160F RVW MEDS BY RX/DR IN RCRD: CPT | Mod: CPTII,S$GLB,, | Performed by: NURSE PRACTITIONER

## 2023-10-03 PROCEDURE — 1160F PR REVIEW ALL MEDS BY PRESCRIBER/CLIN PHARMACIST DOCUMENTED: ICD-10-PCS | Mod: CPTII,S$GLB,, | Performed by: NURSE PRACTITIONER

## 2023-10-03 PROCEDURE — 99999 PR PBB SHADOW E&M-EST. PATIENT-LVL V: ICD-10-PCS | Mod: PBBFAC,,, | Performed by: NURSE PRACTITIONER

## 2023-10-03 PROCEDURE — 3044F HG A1C LEVEL LT 7.0%: CPT | Mod: CPTII,S$GLB,, | Performed by: NURSE PRACTITIONER

## 2023-10-03 PROCEDURE — 3008F BODY MASS INDEX DOCD: CPT | Mod: CPTII,S$GLB,, | Performed by: NURSE PRACTITIONER

## 2023-10-03 PROCEDURE — 3044F PR MOST RECENT HEMOGLOBIN A1C LEVEL <7.0%: ICD-10-PCS | Mod: CPTII,S$GLB,, | Performed by: NURSE PRACTITIONER

## 2023-10-03 PROCEDURE — 1159F MED LIST DOCD IN RCRD: CPT | Mod: CPTII,S$GLB,, | Performed by: NURSE PRACTITIONER

## 2023-10-03 PROCEDURE — 3078F DIAST BP <80 MM HG: CPT | Mod: CPTII,S$GLB,, | Performed by: NURSE PRACTITIONER

## 2023-10-03 PROCEDURE — 3008F PR BODY MASS INDEX (BMI) DOCUMENTED: ICD-10-PCS | Mod: CPTII,S$GLB,, | Performed by: NURSE PRACTITIONER

## 2023-10-03 RX ORDER — TOPIRAMATE 200 MG/1
200 TABLET ORAL NIGHTLY
Qty: 90 TABLET | Refills: 3
Start: 2023-10-03

## 2023-10-03 NOTE — PATIENT INSTRUCTIONS
Please call our clinic at [phone] or send a message on the [email or web address] portal if there are any changes to the plan described below, for example,if you are not contacted for the requested tests, referral(s) within one week, if you are unable to receive the medications prescribed, or if you feel you need to change the treatment course for any reason.     TESTING:  -- none     REFERRALS:  -- none     PREVENTION (use daily regardless of headache):  -- continue Ajovy once per month.     AS-NEEDED TREATMENT (use total no more than 10 days per month unless otherwise stated):  -- continue Nurtec once every other day. This dissolves under the tongue and is only taken once in 24 hour time frame.  -- limit naproxen and Goody's to 10 days per month or less

## 2023-10-03 NOTE — TELEPHONE ENCOUNTER
----- Message from Arlene Thompson sent at 10/3/2023  7:29 AM CDT -----  Contact: Patient  Type: Needs Medical Advice    Who Called:  Patient  What is this regarding?:  Pt is returning 2 missed calls this morning, possibly reminders about the EGD on 10/10/23.  Best Call Back Number:  [phone]  Additional Information:  Please call the patient back at the phone number listed above to advise. Thank you!
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
## 2023-10-04 ENCOUNTER — TELEPHONE (OUTPATIENT)
Dept: GASTROENTEROLOGY | Facility: CLINIC | Age: 58
End: 2023-10-04
Payer: MEDICARE

## 2023-10-04 NOTE — TELEPHONE ENCOUNTER
Needs appointment for physical exam, ordering of lab work for standard dementia evaluation, MMSE.  If she thinks he is severely dehydrated, he needs to go to ER, but it is probably just mild dehydration.

## 2023-10-04 NOTE — TELEPHONE ENCOUNTER
----- Message from Shellie Bryan sent at 10/4/2023  9:36 AM CDT -----  Contact: patient  Type:  Needs Medical Advice    Who Called: patient     Would the patient rather a call back or a response via [facility]? Call     Best Call Back Number: [phone] (home)      Additional Information: Patient would like to speak with the nurse in regards to transportation for procedure.     Please call to advise

## 2023-10-05 ENCOUNTER — TELEPHONE (OUTPATIENT)
Dept: GASTROENTEROLOGY | Facility: CLINIC | Age: 58
End: 2023-10-05
Payer: MEDICARE

## 2023-10-05 NOTE — TELEPHONE ENCOUNTER
----- Message from George Orosco sent at 10/5/2023  8:51 AM CDT -----  Contact: self  Type: Needs Medical Advice  Who Called:  pt    Best Call Back Number: [phone]   Additional Information: Pt states he would like to speak to staff in regards to procedure 10/10  Thanks

## 2023-10-09 ENCOUNTER — TELEPHONE (OUTPATIENT)
Dept: PODIATRY | Facility: CLINIC | Age: 58
End: 2023-10-09
Payer: MEDICARE

## 2023-10-09 NOTE — TELEPHONE ENCOUNTER
----- Message from Gem Nevarez sent at 10/9/2023  3:40 PM CDT -----  Contact: Self  Type: Needs Medical Advice  Who Called:  Pt     Best Call Back Number: [phone]    Additional Information: Surgery confirmation for 11/17 following xray

## 2023-10-13 ENCOUNTER — TELEPHONE (OUTPATIENT)
Dept: GASTROENTEROLOGY | Facility: CLINIC | Age: 58
End: 2023-10-13
Payer: MEDICARE

## 2023-10-13 ENCOUNTER — CLINICAL SUPPORT (OUTPATIENT)
Dept: FAMILY MEDICINE | Facility: CLINIC | Age: 58
End: 2023-10-13
Payer: MEDICARE

## 2023-10-13 DIAGNOSIS — E53.8 VITAMIN B12 DEFICIENCY: Primary | Chronic | ICD-10-CM

## 2023-10-13 PROCEDURE — 96372 PR INJECTION,THERAP/PROPH/DIAG2ST, IM OR SUBCUT: ICD-10-PCS | Mod: S$GLB,,, | Performed by: STUDENT IN AN ORGANIZED HEALTH CARE EDUCATION/TRAINING PROGRAM

## 2023-10-13 PROCEDURE — 96372 THER/PROPH/DIAG INJ SC/IM: CPT | Mod: S$GLB,,, | Performed by: STUDENT IN AN ORGANIZED HEALTH CARE EDUCATION/TRAINING PROGRAM

## 2023-10-13 RX ADMIN — CYANOCOBALAMIN 1000 MCG: 1000 INJECTION, SOLUTION INTRAMUSCULAR; SUBCUTANEOUS at 08:10

## 2023-10-13 NOTE — TELEPHONE ENCOUNTER
----- Message from Fidelina Lozoya sent at 10/13/2023  2:19 PM CDT -----  Regarding: reschedule appointment  Contact: patient  Type:  Sooner Appointment Request    Caller is requesting a sooner appointment.  Caller declined first available appointment listed below.  Caller will not accept being placed on the waitlist and is requesting a message be sent to doctor.    Name of Caller:  patient  When is the first available appointment?    Symptoms:  upper and lower GI  Would the patient rather a call back or a response via [facility]? yes  Best Call Back Number:  [phone]    Additional Information:  Please call patient to reschedule.  Thanks!

## 2023-10-13 NOTE — TELEPHONE ENCOUNTER
----- Message from Arlene Thompson sent at 10/13/2023 12:10 PM CDT -----  Contact: Self  Type: Needs Medical Advice    Who Called:  Patient for Gail  What is this regarding?:  He is setting up his upper and lower GI.  Best Call Back Number:  [phone]  Additional Information:  Please call the patient back at the phone number listed above to advise. Thank you!

## 2023-10-13 NOTE — TELEPHONE ENCOUNTER
----- Message from Fidelina Lozoya sent at 10/13/2023  2:19 PM CDT -----  Regarding: reschedule appointment  Contact: patient  Type:  Sooner Appointment Request    Caller is requesting a sooner appointment.  Caller declined first available appointment listed below.  Caller will not accept being placed on the waitlist and is requesting a message be sent to doctor.    Name of Caller:  patient  When is the first available appointment?    Symptoms:  upper and lower GI  Would the patient rather a call back or a response via [facility]? yes  Best Call Back Number:  [phone]    Additional Information:  Please call patient to reschedule.  Thanks!

## 2023-10-13 NOTE — PROGRESS NOTES
Patient present to the clinic today for his biweekly Vitamin B12 injections.  Injection administered into the right deltoid per patient request.  Patient tolerated well with no complaints.   Next visit scheduled and appt reminder provided to patient.

## 2023-10-16 NOTE — TELEPHONE ENCOUNTER
No care due was identified.  [facility] Embedded Care Due Messages. Reference number: [id number].   10/16/2023 3:39:42 AM CDT

## 2023-10-18 RX ORDER — ALBUTEROL SULFATE 90 UG/1
2 AEROSOL, METERED RESPIRATORY (INHALATION) EVERY 6 HOURS PRN
Qty: 54 G | OUTPATIENT
Start: 2023-10-18

## 2023-10-20 ENCOUNTER — TELEPHONE (OUTPATIENT)
Dept: PAIN MEDICINE | Facility: CLINIC | Age: 58
End: 2023-10-20
Payer: MEDICARE

## 2023-10-20 NOTE — TELEPHONE ENCOUNTER
----- Message from Stacey M Lefort sent at 10/20/2023  9:37 AM CDT -----  Pt is requesting a callback at [phone]. Thank you.

## 2023-10-24 ENCOUNTER — TELEPHONE (OUTPATIENT)
Dept: PAIN MEDICINE | Facility: CLINIC | Age: 58
End: 2023-10-24
Payer: MEDICARE

## 2023-10-24 ENCOUNTER — TELEPHONE (OUTPATIENT)
Dept: GASTROENTEROLOGY | Facility: CLINIC | Age: 58
End: 2023-10-24
Payer: MEDICARE

## 2023-10-24 NOTE — TELEPHONE ENCOUNTER
----- Message from Rosalina Paulino sent at 10/24/2023  1:25 PM CDT -----  Contact: pt  Type:  Needs Medical Advice    Who Called: pt    Would the patient rather a call back or a response via [facility]? Call back  Best Call Back Number: [phone]    Additional Information: needs to reschedule upper and lower GI  Please call to reschedule  Thanks

## 2023-10-24 NOTE — TELEPHONE ENCOUNTER
Spoke with pt. Rescheduled procedures with pt. Pt states he still has his prep instructions. Pt verbalized understanding to all.

## 2023-10-24 NOTE — TELEPHONE ENCOUNTER
Pt has procedure 10/31 he is supposed to call when he gets out of hospital to see if he can still keep that appt. Text reminders are automatically sent out.

## 2023-10-24 NOTE — TELEPHONE ENCOUNTER
"----- Message from Irvin Ge sent at 10/24/2023  9:10 AM CDT -----  Regarding: ret call  Contact: ryan at [phone]  Type:  Patient Returning Call    Who Called:  Ryan    Who Left Message for Patient:  office    Does the patient know what this is regarding?:  procedure    Best Call Back Number:  [phone]    Additional Information:  pt states "got text" and missed call      "

## 2023-10-24 NOTE — DISCHARGE INSTRUCTIONS
Before leaving please make sure you have all your personal belongings such as glasses, purses, wallets, keys, cell phone, jewelry, jackets etc     Radiofrequency of Nerves     This procedure may take several weeks to relieve pain You may get some pain relief from the local anesthetic initally.     A steroid may also be injected to help with pain relief. Steroids can have side effect of flushed face and nervous feeling.     If Diabetic, monitor your glucose carefully due to steroids could raise blood sugar.     Wait until tomorrow to resume any blood thinners (aspirin, Plavix, Coumadin) but you may resume all your other medications today unless otherwise instructed by your MD.     You can resume your normal diet. If nauseated start with a bland diet and gradually increase to normal diet.     Due to having anesthesia do not drive, drink alcohol, or make legal decisions for 24 hours.     Do not lift over 10lbs for the first 24 hours, gradually increase your activities as tolerated.     No heat at the injection sites for the next 2 full days , including  NO heating pads, hot tubs, saunas, swimming or tub baths for  the next  2 full days.      You may use a  clean ice pack for mild swelling and for discomfort , apply for 20 minutes at a time.     You may shower today if not drowsy but do not allow shower water to beat on injection sites for the next 2 full days..     When to call your doctor   Call right away if you notice any of the following symptoms:   Severe pain or headache that is unrelieved with medication   Fever > 100.0 or chills   Severe redness or swelling around the injection site   Chest pain or Shortness of breath   Continuous Vomiting   Increasing numbness or weakness in arms or legs lasting greater than 8 hours   If you are diabetic, a steroid injection can increase your blood sugar so moniter it carefully.     After Surgery:   Always be aware that any surgery can cause these symptoms:   ?   Pain-  Medication can be prescribed for pain to decrease your pain but may not completely take your pain away. Over the Counter pain medicine my be enough and you can always use Ice and rest to help ease pain.   ?   Bleeding- a little bleeding after a surgery is usually within normal. If there is a lot of blood you need to notify your MD. Emergency treatments of bleeding are cold application, elevation of the bleeding site and compression.   ?   Infection- Infection after surgery is NOT a normal occurrence. Signs of infection are fever, swelling, hot to touch the incision. If this occurs notify your MD immediately.   ?   Nausea- this can be common after a surgery especially if you have had anesthesia medicine or are taking pain medicine. The steroid the Dr injected can have a side effect of Nausea. Staying on clear liquids, bland foods, gingerale, or over the counter anti nausea medicines can help. If you vomit more than once, notify your MD. Anti Nausea medicines can be prescribed.

## 2023-10-24 NOTE — TELEPHONE ENCOUNTER
----- Message from Maura Agee sent at 10/24/2023 12:12 PM CDT -----  Name of Who is Calling: pt        What is the request in detail: pt stated he would like to speak in regards to back procedure 10:31       Can the clinic reply by [facility]: no        [facility]: [phone]

## 2023-10-24 NOTE — TELEPHONE ENCOUNTER
Pt needs time to arrive for 10/31 procedure 3 days in advance I explained that he would need to call procedure suite on Thursday to see if they can give him a time. Number was given

## 2023-10-25 DIAGNOSIS — F17.200 TOBACCO USE DISORDER: ICD-10-CM

## 2023-10-25 RX ORDER — IBUPROFEN 200 MG
1 TABLET ORAL DAILY
Qty: 30 PATCH | Refills: 2 | Status: SHIPPED | OUTPATIENT
Start: 2023-10-25 | End: 2024-01-23

## 2023-10-25 RX ORDER — ASPIRIN/CALCIUM CARB/MAGNESIUM 325 MG
TABLET ORAL
Qty: 270 LOZENGE | Refills: 2 | Status: SHIPPED | OUTPATIENT
Start: 2023-10-25

## 2023-10-25 RX ORDER — IBUPROFEN 200 MG
1 TABLET ORAL DAILY
Qty: 30 PATCH | Refills: 2 | Status: SHIPPED | OUTPATIENT
Start: 2023-10-25 | End: 2023-10-25 | Stop reason: SDUPTHER

## 2023-10-25 RX ORDER — ASPIRIN/CALCIUM CARB/MAGNESIUM 325 MG
TABLET ORAL
Qty: 270 LOZENGE | Refills: 2 | Status: SHIPPED | OUTPATIENT
Start: 2023-10-25 | End: 2023-10-25 | Stop reason: SDUPTHER

## 2023-10-25 NOTE — TELEPHONE ENCOUNTER
No care due was identified.  [facility] Embedded Care Due Messages. Reference number: [id number].   10/25/2023 3:43:11 PM CDT

## 2023-10-25 NOTE — PROGRESS NOTES
Dr. Mayer, Mr. Ryan Crane ( 1965 and MRN is [id number]) is in the [facility] Smoking Cessation Program. I would like to try him on the 14 mg patches and 4 mg lozenges. I am requesting a prescription, from you, for the 14 mg patches and 4 mg lozenges. He is a member of the [place] [employer], and as long as he has a prescription from his Md, then the Trust will cover the cost of the patches. Please send the prescription to his pharmacy in [place]. Thank you, very much. Juan Miguel Kelly, CEP, TTS    Diagnoses and all orders for this visit:    Tobacco use disorder  -     nicotine (NICODERM CQ) 14 mg/24 hr; Place 1 patch onto the skin once daily.  -     nicotine polacrilex 4 MG Lozg; Take up to 8 pieces daily as needed        Ana Mayer MD  [facility]  Office: [phone]   Fax: [phone]  10/25/2023

## 2023-10-25 NOTE — TELEPHONE ENCOUNTER
----- Message from Deanne Griselda sent at 10/25/2023  3:10 PM CDT -----  Regarding: advice  Contact: DEANNE MICHAELS [[id number]]  Type: Needs Medical Advice  Who Called:  Deanne    Symptoms (please be specific):  na    How long has patient had these symptoms:  na    Pharmacy name and phone #:      [id number] - [place], [place] - [id number]  [place] [address]  Phone: [phone] Fax: [phone]      Best Call Back Number: [phone]     Additional Information: Nicotine Rxs need to be transferred to [employer]. Please call to advise.

## 2023-10-26 ENCOUNTER — CLINICAL SUPPORT (OUTPATIENT)
Dept: SMOKING CESSATION | Facility: CLINIC | Age: 58
End: 2023-10-26
Payer: COMMERCIAL

## 2023-10-26 DIAGNOSIS — F17.200 NICOTINE DEPENDENCE: Primary | ICD-10-CM

## 2023-10-26 PROCEDURE — 99407 PR TOBACCO USE CESSATION INTENSIVE >10 MINUTES: ICD-10-PCS | Mod: S$PBB,,, | Performed by: GENERAL PRACTICE

## 2023-10-26 PROCEDURE — 99407 BEHAV CHNG SMOKING > 10 MIN: CPT | Mod: S$PBB,,, | Performed by: GENERAL PRACTICE

## 2023-10-26 PROCEDURE — 99999 PR PBB SHADOW E&M-EST. PATIENT-LVL I: ICD-10-PCS | Mod: PBBFAC,,,

## 2023-10-26 PROCEDURE — 99999 PR PBB SHADOW E&M-EST. PATIENT-LVL I: CPT | Mod: PBBFAC,,,

## 2023-10-27 ENCOUNTER — CLINICAL SUPPORT (OUTPATIENT)
Dept: FAMILY MEDICINE | Facility: CLINIC | Age: 58
End: 2023-10-27
Payer: MEDICARE

## 2023-10-27 DIAGNOSIS — Z23 IMMUNIZATION DUE: Primary | ICD-10-CM

## 2023-10-27 PROCEDURE — 96372 THER/PROPH/DIAG INJ SC/IM: CPT | Mod: S$GLB,,, | Performed by: STUDENT IN AN ORGANIZED HEALTH CARE EDUCATION/TRAINING PROGRAM

## 2023-10-27 PROCEDURE — 96372 PR INJECTION,THERAP/PROPH/DIAG2ST, IM OR SUBCUT: ICD-10-PCS | Mod: S$GLB,,, | Performed by: STUDENT IN AN ORGANIZED HEALTH CARE EDUCATION/TRAINING PROGRAM

## 2023-10-27 RX ADMIN — CYANOCOBALAMIN 1000 MCG: 1000 INJECTION, SOLUTION INTRAMUSCULAR; SUBCUTANEOUS at 08:10

## 2023-10-30 ENCOUNTER — TELEPHONE (OUTPATIENT)
Dept: FAMILY MEDICINE | Facility: CLINIC | Age: 58
End: 2023-10-30
Payer: MEDICARE

## 2023-10-30 NOTE — TELEPHONE ENCOUNTER
----- Message from Darwin Marino sent at 10/27/2023  8:46 AM CDT -----  Regarding: rt call  Type:  Patient Returning Call    Who Called:pt    Who Left Message for Patient: unknown    Does the patient know what this is regarding?:yes    Best Call Back Number:[phone]      Additional Information: pt needs a clearance to donate plasm. Please call to discuss.

## 2023-10-30 NOTE — PROGRESS NOTES
Spoke with patient today in regard to smoking cessation progress 12 month telephone follow up, he states that he is not tobacco free.  Commended patient on the accomplishments thus far.  Informed patient of benefit period, future follow up, and contact information if any further help or support is needed.  Will complete smart form for 12 month follow up on Quit attempt #3 and resolve episode.

## 2023-10-31 ENCOUNTER — HOSPITAL ENCOUNTER (OUTPATIENT)
Facility: HOSPITAL | Age: 58
Discharge: HOME | End: 2023-10-31
Attending: ANESTHESIOLOGY | Admitting: ANESTHESIOLOGY
Payer: MEDICARE

## 2023-10-31 DIAGNOSIS — M47.896 OTHER SPONDYLOSIS, LUMBAR REGION: ICD-10-CM

## 2023-10-31 LAB — POCT GLUCOSE: 85 MG/DL (ref 70–110)

## 2023-10-31 PROCEDURE — 64635 PR DESTROY LUMB/SAC FACET JNT: ICD-10-PCS | Mod: 50,,, | Performed by: ANESTHESIOLOGY

## 2023-10-31 PROCEDURE — 64635 DESTROY LUMB/SAC FACET JNT: CPT | Mod: 50 | Performed by: ANESTHESIOLOGY

## 2023-10-31 PROCEDURE — 64636 DESTROY L/S FACET JNT ADDL: CPT | Mod: 50,,, | Performed by: ANESTHESIOLOGY

## 2023-10-31 PROCEDURE — 25000003 PHARM REV CODE 250: Performed by: ANESTHESIOLOGY

## 2023-10-31 PROCEDURE — 64636 PR DESTROY L/S FACET JNT ADDL: ICD-10-PCS | Mod: 50,,, | Performed by: ANESTHESIOLOGY

## 2023-10-31 PROCEDURE — 64635 DESTROY LUMB/SAC FACET JNT: CPT | Mod: 50,,, | Performed by: ANESTHESIOLOGY

## 2023-10-31 PROCEDURE — 63600175 PHARM REV CODE 636 W HCPCS: Performed by: ANESTHESIOLOGY

## 2023-10-31 PROCEDURE — 99152 MOD SED SAME PHYS/QHP 5/>YRS: CPT | Performed by: ANESTHESIOLOGY

## 2023-10-31 PROCEDURE — 64636 DESTROY L/S FACET JNT ADDL: CPT | Mod: 50 | Performed by: ANESTHESIOLOGY

## 2023-10-31 RX ORDER — BUPIVACAINE HYDROCHLORIDE 2.5 MG/ML
INJECTION, SOLUTION EPIDURAL; INFILTRATION; INTRACAUDAL
Status: DISCONTINUED | OUTPATIENT
Start: 2023-10-31 | End: 2023-10-31 | Stop reason: HOSPADM

## 2023-10-31 RX ORDER — SODIUM CHLORIDE, SODIUM LACTATE, POTASSIUM CHLORIDE, CALCIUM CHLORIDE 600; 310; 30; 20 MG/100ML; MG/100ML; MG/100ML; MG/100ML
INJECTION, SOLUTION INTRAVENOUS CONTINUOUS
Status: ACTIVE | OUTPATIENT
Start: 2023-10-31

## 2023-10-31 RX ORDER — LIDOCAINE HYDROCHLORIDE 10 MG/ML
1 INJECTION, SOLUTION EPIDURAL; INFILTRATION; INTRACAUDAL; PERINEURAL ONCE
Status: ACTIVE | OUTPATIENT
Start: 2023-10-31

## 2023-10-31 RX ORDER — METHYLPREDNISOLONE ACETATE 80 MG/ML
INJECTION, SUSPENSION INTRA-ARTICULAR; INTRALESIONAL; INTRAMUSCULAR; SOFT TISSUE
Status: DISCONTINUED | OUTPATIENT
Start: 2023-10-31 | End: 2023-10-31 | Stop reason: HOSPADM

## 2023-10-31 RX ORDER — LIDOCAINE HYDROCHLORIDE 20 MG/ML
INJECTION, SOLUTION EPIDURAL; INFILTRATION; INTRACAUDAL; PERINEURAL
Status: DISCONTINUED | OUTPATIENT
Start: 2023-10-31 | End: 2023-10-31 | Stop reason: HOSPADM

## 2023-10-31 RX ORDER — LIDOCAINE HYDROCHLORIDE 10 MG/ML
INJECTION, SOLUTION EPIDURAL; INFILTRATION; INTRACAUDAL; PERINEURAL
Status: DISCONTINUED | OUTPATIENT
Start: 2023-10-31 | End: 2023-10-31 | Stop reason: HOSPADM

## 2023-10-31 RX ORDER — MIDAZOLAM HYDROCHLORIDE 1 MG/ML
INJECTION INTRAMUSCULAR; INTRAVENOUS
Status: DISCONTINUED | OUTPATIENT
Start: 2023-10-31 | End: 2023-10-31 | Stop reason: HOSPADM

## 2023-10-31 RX ORDER — FENTANYL CITRATE 50 UG/ML
INJECTION, SOLUTION INTRAMUSCULAR; INTRAVENOUS
Status: DISCONTINUED | OUTPATIENT
Start: 2023-10-31 | End: 2023-10-31 | Stop reason: HOSPADM

## 2023-10-31 RX ADMIN — SODIUM CHLORIDE, POTASSIUM CHLORIDE, SODIUM LACTATE AND CALCIUM CHLORIDE: 600; 310; 30; 20 INJECTION, SOLUTION INTRAVENOUS at 11:10

## 2023-10-31 NOTE — PLAN OF CARE
Patient is awake and alert and says he is ready to go home; his cousin says she is ready to take patient home and she is driving. Patient's vital signs and low back injection sites stable. Patient denies weakness, dizziness, or nausea.He states his current pain is level 5-6 of 10 and has been given  an ice pack and instructions for ice pack use for his pain. All patient's belongings have been returned to patient. Patient is in stable condition and being discharged via wheelchair to truck his cousin is driving.

## 2023-10-31 NOTE — DISCHARGE SUMMARY
[facility] - Periop Services  Discharge Note  Short Stay    Procedure(s) (LRB):  Radiofrequency Ablation, Nerve, Spinal, Lumbar, Medial Branch, 1 Level (Bilateral)      OUTCOME: Patient tolerated treatment/procedure well without complication and is now ready for discharge.    DISPOSITION: Home or Self Care    FINAL DIAGNOSIS:  <principal problem not specified>    FOLLOWUP: In clinic    DISCHARGE INSTRUCTIONS:    Discharge Procedure Orders   Notify your health care provider if you experience any of the following:  temperature >100.4     Notify your health care provider if you experience any of the following:  severe uncontrolled pain     Notify your health care provider if you experience any of the following:  redness, tenderness, or signs of infection (pain, swelling, redness, odor or green/yellow discharge around incision site)     Activity as tolerated        TIME SPENT ON DISCHARGE: 30 minutes

## 2023-10-31 NOTE — OP NOTE
PROCEDURE DATE: 10/31/2023    PROCEDURE:  Radiofrequency ablation of the medial branch nerves that innervate bilateral L4/5 and L5/S1 facets utilizing fluoroscopy    DIAGNOSIS:  Other spondylosis, lumbar region    Post op Diagnosis: Same    PHYSICIAN: Napoleon Grimaldo MD    MEDICATIONS INJECTED:  From a mixture of 6ml of 0.25% bupivicaine and 80mg of methylprednisone,  1ml of this solution was injected at each level.    LOCAL ANESTHETIC USED: Lidocaine 1%, 2 ml given at each site.    SEDATION MEDICATIONS: RN IV sedation    ESTIMATED BLOOD LOSS:  None    COMPLICATIONS:  None    TECHNIQUE:  A time out was taken to identify patient and procedure side prior to starting the procedure. Laying in a prone position, the patient was prepped and draped in the usual sterile fashion using Betadine and sterile towels.  The levels were determined under fluoroscopic guidance and then marked.  Local anesthetic was given by raising a wheal at the skin over each site and then infiltrated approximately 2cm deeper.  A 20-gauge  100 mm RF needle was introduced to the anatomic location of the medial branch nerves that innervate bilateral L4/5 and L5/S1 facets.  Motor stimulation up to 2 Volts at each level confirmed no motor nerve involvement.  Impedance was less than 800 ohms at each level.  1ml of 2% lidocaine was instilled prior to lesioning.  Ablation was performed per level utilizing radiofrequency generator 80°C for 60 seconds.  Needles were then rotated 90° and a second lesion was performed.  The above noted medication was then injected slowly. The patient tolerated the procedure well.     The patient was monitored after the procedure.  Patient was given post procedure and discharge instructions to follow at home.  The patient was discharged in a stable condition

## 2023-11-01 ENCOUNTER — TELEPHONE (OUTPATIENT)
Dept: SPINE | Facility: CLINIC | Age: 58
End: 2023-11-01
Payer: MEDICARE

## 2023-11-01 VITALS
HEIGHT: 70 IN | WEIGHT: 168 LBS | BODY MASS INDEX: 24.05 KG/M2 | HEART RATE: 65 BPM | RESPIRATION RATE: 16 BRPM | OXYGEN SATURATION: 100 % | SYSTOLIC BLOOD PRESSURE: 125 MMHG | TEMPERATURE: 98 F | DIASTOLIC BLOOD PRESSURE: 64 MMHG

## 2023-11-01 NOTE — TELEPHONE ENCOUNTER
Attempted to contact pt. No answer, unable to leave message.    Portal message sent to pt notifying this will be discussed at appt next week.

## 2023-11-01 NOTE — TELEPHONE ENCOUNTER
----- Message from Stacey M Lefort sent at 11/1/2023  9:23 AM CDT -----  Pt is requesting a callback at [phone]. Thank you.

## 2023-11-06 ENCOUNTER — OFFICE VISIT (OUTPATIENT)
Dept: PODIATRY | Facility: CLINIC | Age: 58
End: 2023-11-06
Payer: MEDICARE

## 2023-11-06 VITALS — WEIGHT: 167 LBS | BODY MASS INDEX: 23.91 KG/M2 | HEIGHT: 70 IN

## 2023-11-06 DIAGNOSIS — Q82.8 POROKERATOSIS: ICD-10-CM

## 2023-11-06 DIAGNOSIS — M79.671 INTRACTABLE RIGHT HEEL PAIN: Primary | ICD-10-CM

## 2023-11-06 PROCEDURE — 1160F RVW MEDS BY RX/DR IN RCRD: CPT | Mod: CPTII,S$GLB,, | Performed by: STUDENT IN AN ORGANIZED HEALTH CARE EDUCATION/TRAINING PROGRAM

## 2023-11-06 PROCEDURE — 3008F PR BODY MASS INDEX (BMI) DOCUMENTED: ICD-10-PCS | Mod: CPTII,S$GLB,, | Performed by: STUDENT IN AN ORGANIZED HEALTH CARE EDUCATION/TRAINING PROGRAM

## 2023-11-06 PROCEDURE — 99214 PR OFFICE/OUTPT VISIT, EST, LEVL IV, 30-39 MIN: ICD-10-PCS | Mod: 57,S$GLB,, | Performed by: STUDENT IN AN ORGANIZED HEALTH CARE EDUCATION/TRAINING PROGRAM

## 2023-11-06 PROCEDURE — 3044F HG A1C LEVEL LT 7.0%: CPT | Mod: CPTII,S$GLB,, | Performed by: STUDENT IN AN ORGANIZED HEALTH CARE EDUCATION/TRAINING PROGRAM

## 2023-11-06 PROCEDURE — 3072F LOW RISK FOR RETINOPATHY: CPT | Mod: CPTII,S$GLB,, | Performed by: STUDENT IN AN ORGANIZED HEALTH CARE EDUCATION/TRAINING PROGRAM

## 2023-11-06 PROCEDURE — 3044F PR MOST RECENT HEMOGLOBIN A1C LEVEL <7.0%: ICD-10-PCS | Mod: CPTII,S$GLB,, | Performed by: STUDENT IN AN ORGANIZED HEALTH CARE EDUCATION/TRAINING PROGRAM

## 2023-11-06 PROCEDURE — 1160F PR REVIEW ALL MEDS BY PRESCRIBER/CLIN PHARMACIST DOCUMENTED: ICD-10-PCS | Mod: CPTII,S$GLB,, | Performed by: STUDENT IN AN ORGANIZED HEALTH CARE EDUCATION/TRAINING PROGRAM

## 2023-11-06 PROCEDURE — 1159F MED LIST DOCD IN RCRD: CPT | Mod: CPTII,S$GLB,, | Performed by: STUDENT IN AN ORGANIZED HEALTH CARE EDUCATION/TRAINING PROGRAM

## 2023-11-06 PROCEDURE — 1159F PR MEDICATION LIST DOCUMENTED IN MEDICAL RECORD: ICD-10-PCS | Mod: CPTII,S$GLB,, | Performed by: STUDENT IN AN ORGANIZED HEALTH CARE EDUCATION/TRAINING PROGRAM

## 2023-11-06 PROCEDURE — 3008F BODY MASS INDEX DOCD: CPT | Mod: CPTII,S$GLB,, | Performed by: STUDENT IN AN ORGANIZED HEALTH CARE EDUCATION/TRAINING PROGRAM

## 2023-11-06 PROCEDURE — 99214 OFFICE O/P EST MOD 30 MIN: CPT | Mod: 57,S$GLB,, | Performed by: STUDENT IN AN ORGANIZED HEALTH CARE EDUCATION/TRAINING PROGRAM

## 2023-11-06 PROCEDURE — 99999 PR PBB SHADOW E&M-EST. PATIENT-LVL IV: ICD-10-PCS | Mod: PBBFAC,,, | Performed by: STUDENT IN AN ORGANIZED HEALTH CARE EDUCATION/TRAINING PROGRAM

## 2023-11-06 PROCEDURE — 3072F PR LOW RISK FOR RETINOPATHY: ICD-10-PCS | Mod: CPTII,S$GLB,, | Performed by: STUDENT IN AN ORGANIZED HEALTH CARE EDUCATION/TRAINING PROGRAM

## 2023-11-06 PROCEDURE — 99999 PR PBB SHADOW E&M-EST. PATIENT-LVL IV: CPT | Mod: PBBFAC,,, | Performed by: STUDENT IN AN ORGANIZED HEALTH CARE EDUCATION/TRAINING PROGRAM

## 2023-11-06 NOTE — PROGRESS NOTES
Subjective:      Patient ID: Ryan Crane is a 58 y.o. male.    Chief Complaint: Foot Pain      HPI:  Ryan is a 58 y.o. male who presents to the clinic for evaluation and treatment of high risk feet. Ryan has a past medical history of Aortic valve stenosis (02/28/2022), Arachnoid cyst of posterior cranial fossa (01/13/2022), Benign prostatic hyperplasia without lower urinary tract symptoms (05/25/2023), Bilateral carotid bruits (06/22/2021), Bipolar disorder (01/16/2022), CAD S/P percutaneous coronary angioplasty, Calculus of gallbladder without cholecystitis without obstruction (01/11/2023), Cancer, Candidal intertrigo (11/11/2019), Cataract, Chronic heart failure with preserved ejection fraction (03/29/2023), Chronic schizophrenia, Colon polyps, Diabetic polyneuropathy associated with diabetes mellitus due to underlying condition (11/21/2019), Dysarthria as late effect of cerebellar cerebrovascular accident (CVA) (03/24/2023), Equinus deformity of both feet (11/21/2019), Erectile dysfunction due to arterial insufficiency (05/25/2023), Flaccid hemiplegia of left nondominant side as late effect of cerebral infarction (03/17/2023), Flat foot (11/21/2019), Gastritis, Gastroesophageal reflux disease without esophagitis (07/13/2023), Gastrointestinal hemorrhage (12/13/2019), General anesthetics causing adverse effect in therapeutic use, Hammer toes of both feet (11/21/2019), diabetic foot ulcer (11/21/2019), Hypertension, Hyponatremia (01/11/2022), ELAINE (iron deficiency anemia) (12/10/2019), Intractable chronic migraine without aura and without status migrainosus (12/30/2021), Microcytic anemia (10/09/2019), Moderate aortic regurgitation (03/29/2023), Moderate mitral regurgitation (03/29/2023), Moderate mitral stenosis (03/29/2023), Moderate to severe aortic stenosis (02/28/2022), Myocardial infarct, old, Nicotine dependence, unspecified, uncomplicated (01/16/2022), Onychomycosis due to dermatophyte  Sadie (11/21/2019), Orchitis (11/09/2019), PIPER on CPAP, Peripheral visual field defect, bilateral (01/13/2022), PVD (peripheral vascular disease) (11/21/2019), Seizures (2008), Stage 2 moderate COPD by GOLD classification, Stroke (2005), Thoracic aortic atherosclerosis (10/17/2022), Thyroid disease, Tinea pedis of right foot (05/21/2019), Tobacco use disorder, Type 2 diabetes mellitus with diabetic peripheral angiopathy without gangrene, and Vitamin B12 deficiency (03/24/2023). The patient's chief complaint is long, thick toenails and right heel pain. This patient has documented high risk feet requiring routine maintenance secondary to diabetes mellitis and those secondary complications of diabetes, as mentioned..  He reports having right heel pain, which he rates as 3/10 on the pain scale but denies having a wound.  He also informs of losing 30 lbs since his last visit without trying to lose weight and is concerned about it.  He denies trying to self-treat his heel pain or trim his toenails himself.  Patient denies any other pedal complaints at this time.    11/06/2023  Mr. Crane returns today to discuss surgical plan for the right heel.     PCP: Ana Mayer MD    Date Last Seen by PCP:  8/14/2023    Current shoe gear:  Affected Foot: Casual shoes     Unaffected Foot: Casual shoes    Review of Systems   Constitutional: Negative for appetite change, fever, chills, fatigue.  Positive for unexpected weight change.   Respiratory: Negative for cough, wheezing, shortness of breath.  Cardiovascular: Negative for chest pain, claudication, cyanosis.  Positive for leg swelling.  Musculoskeletal: Negative for back pain, arthritis, joint pain, joint swelling, myalgias, stiffness.   Skin: Negative for rash, itching, suspicious lesion, poor wound healing, unusual hair distribution.  Positive for nail bed changes, discoloration,.  Neurological: Negative for loss of balance, sensory change, paresthesias, numbness.  Positive for  pain.  Hematological: Negative for adenopathy, bleeding, bruising easily.   Psychiatric/Behavioral: The patient is not nervous/anxious.  Negative for altered mental status.    Hemoglobin A1C   Date Value Ref Range Status   07/13/2023 4.9 4.0 - 5.6 % Final     Comment:     ADA Screening Guidelines:  5.7-6.4%  Consistent with prediabetes  >or=6.5%  Consistent with diabetes    High levels of fetal hemoglobin interfere with the HbA1C  assay. Heterozygous hemoglobin variants (HbS, HgC, etc)do  not significantly interfere with this assay.   However, presence of multiple variants may affect accuracy.     03/05/2023 5.1 0.0 - 5.6 % Final     Comment:     Reference Interval:  5.0 - 5.6 Normal   5.7 - 6.4 High Risk   > 6.5 Diabetic      Hgb A1c results are standardized based on the (NGSP) National   Glycohemoglobin Standardization Program.      Hemoglobin A1C levels are related to mean serum/plasma glucose   during the preceding 2-3 months.        02/01/2023 5.0 0.0 - 5.6 % Final     Comment:     Reference Interval:  5.0 - 5.6 Normal   5.7 - 6.4 High Risk   > 6.5 Diabetic      Hgb A1c results are standardized based on the (NGSP) National   Glycohemoglobin Standardization Program.      Hemoglobin A1C levels are related to mean serum/plasma glucose   during the preceding 2-3 months.            Past Medical History:   Diagnosis Date    Aortic valve stenosis 02/28/2022    Arachnoid cyst of posterior cranial fossa 01/13/2022    Benign prostatic hyperplasia without lower urinary tract symptoms 05/25/2023    Bilateral carotid bruits 06/22/2021    Bipolar disorder 01/16/2022    CAD S/P percutaneous coronary angioplasty     3 vessel disease    Calculus of gallbladder without cholecystitis without obstruction 01/11/2023    Cancer     Candidal intertrigo 11/11/2019    Cataract     Chronic heart failure with preserved ejection fraction 03/29/2023    Chronic schizophrenia     Colon polyps     Diabetic polyneuropathy associated with  diabetes mellitus due to underlying condition 11/21/2019    Dysarthria as late effect of cerebellar cerebrovascular accident (CVA) 03/24/2023    Equinus deformity of both feet 11/21/2019    Erectile dysfunction due to arterial insufficiency 05/25/2023    Flaccid hemiplegia of left nondominant side as late effect of cerebral infarction 03/17/2023    Flat foot 11/21/2019    Gastritis     Gastroesophageal reflux disease without esophagitis 07/13/2023    Gastrointestinal hemorrhage 12/13/2019    Post polypectomy bleeding    General anesthetics causing adverse effect in therapeutic use     Hammer toes of both feet 11/21/2019    Hx of diabetic foot ulcer 11/21/2019    Hypertension     Hyponatremia 01/11/2022    ELAINE (iron deficiency anemia) 12/10/2019    Intractable chronic migraine without aura and without status migrainosus 12/30/2021    Microcytic anemia 10/09/2019    Moderate aortic regurgitation 03/29/2023    Moderate mitral regurgitation 03/29/2023    Moderate mitral stenosis 03/29/2023    Moderate to severe aortic stenosis 02/28/2022    Myocardial infarct, old     Nicotine dependence, unspecified, uncomplicated 01/16/2022    Onychomycosis due to dermatophyte 11/21/2019    Orchitis 11/09/2019    PIPER on CPAP     Peripheral visual field defect, bilateral 01/13/2022    PVD (peripheral vascular disease) 11/21/2019    Seizures 2008    Stage 2 moderate COPD by GOLD classification     PFTs w/ FEV1 (73%) (9/2022)    Stroke 2005    Thoracic aortic atherosclerosis 10/17/2022    CT chest    Thyroid disease     Previously on pharmacologic Tx    Tinea pedis of right foot 05/21/2019    Tobacco use disorder     Type 2 diabetes mellitus with diabetic peripheral angiopathy without gangrene     A1c 5.1% (3/2023)    Vitamin B12 deficiency 03/24/2023     Past Surgical History:   Procedure Laterality Date    ANGIOGRAM, CORONARY, WITH LEFT HEART CATHETERIZATION  08/25/2022    Procedure: Angiogram, Coronary, with Left Heart Cath;  Surgeon:  Mai Deleon MD;  Location: [facility] CATH;  Service: Cardiology;;    APPENDECTOMY      BONE MARROW ASPIRATION Left 08/21/2020    Procedure: ASPIRATION, BONE MARROW;  Surgeon: Lex Kathleen MD;  Location: [facility] OR;  Service: Oncology;  Laterality: Left;    CHOLECYSTECTOMY      CLOSURE OF WOUND Right 09/09/2019    Procedure: CLOSURE, WOUND;  Surgeon: Li Kathleen DPM;  Location: [facility] OR;  Service: Podiatry;  Laterality: Right;    COLONOSCOPY N/A 12/10/2019    Procedure: COLONOSCOPY;  Surgeon: Ryan Lucas MD;  Location: [facility];  Service: Endoscopy;  Laterality: N/A;    COLONOSCOPY N/A 12/13/2019    Procedure: COLONOSCOPY;  Surgeon: Ryan Lucas MD;  Location: [facility] ENDO;  Service: Endoscopy;  Laterality: N/A;    CORONARY STENT PLACEMENT      ESOPHAGOGASTRODUODENOSCOPY N/A 12/10/2019    Procedure: EGD (ESOPHAGOGASTRODUODENOSCOPY);  Surgeon: Ryan Lucas MD;  Location: [facility];  Service: Endoscopy;  Laterality: N/A;    ESOPHAGOGASTRODUODENOSCOPY N/A 11/03/2022    Procedure: EGD (ESOPHAGOGASTRODUODENOSCOPY);  Surgeon: Ryan Lucas MD;  Location: [facility];  Service: Endoscopy;  Laterality: N/A;    INJECTION OF ANESTHETIC AGENT AROUND MEDIAL BRANCH NERVES INNERVATING LUMBAR FACET JOINT Bilateral 7/25/2023    Procedure: Block-nerve-medial branch-lumbar;  Surgeon: Napoleon Grimaldo MD;  Location[facility] OR;  Service: Pain Management;  Laterality: Bilateral;  L3,4,5 MBB    INJECTION OF ANESTHETIC AGENT AROUND MEDIAL BRANCH NERVES INNERVATING LUMBAR FACET JOINT Bilateral 8/25/2023    Procedure: Block-nerve-medial branch-lumbar;  Surgeon: Napoleon Grimaldo MD;  Location: [facility] OR;  Service: Anesthesiology;  Laterality: Bilateral;  L3,4,5 MBB #2    LAPAROSCOPIC CHOLECYSTECTOMY N/A 01/11/2023    Procedure: CHOLECYSTECTOMY, LAPAROSCOPIC;  Surgeon: Laith Davis MD;  Location: [facility];  Service: General;  Laterality: N/A;    LEFT HEART CATHETERIZATION Left 08/25/2022    Procedure: Left heart cath;   Surgeon: Mai Deleon MD;  Location: [facility];  Service: Cardiology;  Laterality: Left;    RADIOFREQUENCY ABLATION OF LUMBAR MEDIAL BRANCH NERVE AT SINGLE LEVEL Bilateral 10/31/2023    Procedure: Radiofrequency Ablation, Nerve, Spinal, Lumbar, Medial Branch, 1 Level;  Surgeon: Napoleon Grimaldo MD;  Location: [facility] OR;  Service: Anesthesiology;  Laterality: Bilateral;  L3,4,5 RFA    right heel surgery      SHOULDER ARTHROSCOPY Left     UPPER GASTROINTESTINAL ENDOSCOPY       Family History   Problem Relation Age of Onset    Melanoma Mother     Diabetes Mother     Hypertension Mother     Stroke Father     Diabetes Father     Hypertension Father     Colon cancer Father         unsure of age of diagnosis    Cancer Father         colon cancer    Cervical cancer Sister     Cirrhosis Brother     Hypertension Brother     Lung cancer Maternal Grandmother     Prostate cancer Maternal Grandfather     Crohn's disease Neg Hx     Ulcerative colitis Neg Hx     Stomach cancer Neg Hx     Esophageal cancer Neg Hx     Retinal detachment Neg Hx     Strabismus Neg Hx     Macular degeneration Neg Hx     Glaucoma Neg Hx      Social History     Socioeconomic History    Marital status: Legally    Occupational History    Occupation: disabled (mental)     Comment: since 2000   Tobacco Use    Smoking status: Every Day     Current packs/day: 0.50     Average packs/day: 1.5 packs/day for 44.8 years (67.4 ttl pk-yrs)     Types: Cigarettes     Start date: 1979    Smokeless tobacco: Never    Tobacco comments:     Age Started 14    Substance and Sexual Activity    Alcohol use: No    Drug use: Not Currently     Types: Marijuana    Sexual activity: Yes     Partners: Female     Social Determinants of Health     Financial Resource Strain: Low Risk  (10/10/2022)    Overall Financial Resource Strain (CARDIA)     Difficulty of Paying Living Expenses: Not hard at all   Food Insecurity: No Food Insecurity (10/10/2022)    Hunger Vital Sign      "Worried About Running Out of Food in the Last Year: Never true     Ran Out of Food in the Last Year: Never true   Transportation Needs: Unmet Transportation Needs (10/10/2022)    PRAPARE - Transportation     Lack of Transportation (Medical): Yes     Lack of Transportation (Non-Medical): Yes   Physical Activity: Insufficiently Active (10/10/2022)    Exercise Vital Sign     Days of Exercise per Week: 2 days     Minutes of Exercise per Session: 10 min   Stress: Stress Concern Present (10/10/2022)    [employer] - Occupational Stress Questionnaire     Feeling of Stress : To some extent   Social Connections: Socially Isolated (10/10/2022)    Social Connection and Isolation Panel [NHANES]     Frequency of Communication with Friends and Family: Twice a week     Frequency of Social Gatherings with Friends and Family: Never     Attends Restoration Services: More than 4 times per year     Active Member of Clubs or Organizations: No     Attends Club or Organization Meetings: Never     Marital Status:    Housing Stability: Unknown (10/10/2022)    Housing Stability Vital Sign     Unable to Pay for Housing in the Last Year: No     Unstable Housing in the Last Year: No           Objective:        Ht 5' 10" (1.778 m)   Wt 75.8 kg (167 lb)   BMI 23.96 kg/m²     Physical Exam   Constitutional: Patient is oriented to person, place, and time. Patient appears well-developed and well-nourished.  Strong malodor noted from patient due to lack of personal hygiene.  No acute distress.     Psychiatric: Patient has a normal mood and affect. Patient's speech is normal and behavior is normal. Judgment is normal. Cognition and memory are normal.     Bilateral pedal exam was performed today.  Vascular: Vascular: Pedal pulses palpable 2/4 DP & PT.  CFT is = 3 seconds to the hallux.  Skin temperature is warm to cool proximal tibia to distal toes without localized increase in calor noted.  No erythema and " ecchymosis noted to the LE.  Nonpitting edema noted to the LE.  Hair growth present distally to the LE.     Musculoskeletal: Ankle and pedal joints ROM are decreased. Ankle joint dorsiflexion is restricted with the knee extended and flexed per [place] exam.  Muscle strength is 5/5 for all LE muscle groups tested.  Flat foot type present.  Flexion contracture of lesser digits noted.    Neurological:  Epicritic sensation is slightly dimiished to the foot.  [place]addock STR is intact to the LE.  Tenderness to palpation of right plantar heel noted.     Dermatological: Toenails 1-5 are elongated and distally thickened, dystrophic, brittle, discolored, and mycotic with subungal debris present.  Webspaces 1-4 are dry and intact with debris present.  Skin turgor is increased.  Skin texture is dry and flaky. Dermal fibrosis appreciated at the plantar right heel has returned.     Nursing note and vitals reviewed.        Assessment:       Encounter Diagnoses   Name Primary?    Intractable right heel pain Yes    Porokeratosis                Plan:       Ryan was seen today for foot pain.    Diagnoses and all orders for this visit:    Intractable right heel pain    Porokeratosis            I counseled the patient on his conditions, their implications and medical management.    Patient was evaluated and risk assessed today. The patient is at low risk for developing pedal complications due to peripheral vascular disease.    I reviewed MRI and x-rays with the patient. I explained that the recurrence of these painful lesions is likely from the underlying bone spur. I recommend another removal of the painful skin lesions but also resection of the underlying calcaneal bone. I discussed the potential side affects of this due to the insertion of the plantar fascia along this area. Patient understands and is amenable for this surgery.    DOS Nov 17th for soft tissue excision and ostectomy calcaneus/partial calcanectomy.      Дмитрий  Pratt, KEEGAN

## 2023-11-08 ENCOUNTER — OFFICE VISIT (OUTPATIENT)
Dept: FAMILY MEDICINE | Facility: CLINIC | Age: 58
End: 2023-11-08
Payer: MEDICARE

## 2023-11-08 VITALS
OXYGEN SATURATION: 97 % | SYSTOLIC BLOOD PRESSURE: 114 MMHG | HEART RATE: 84 BPM | DIASTOLIC BLOOD PRESSURE: 68 MMHG | BODY MASS INDEX: 22.58 KG/M2 | WEIGHT: 157.75 LBS | HEIGHT: 70 IN

## 2023-11-08 DIAGNOSIS — Z01.818 PRE-OP EVALUATION: Primary | ICD-10-CM

## 2023-11-08 DIAGNOSIS — H81.13 BENIGN PAROXYSMAL POSITIONAL VERTIGO DUE TO BILATERAL VESTIBULAR DISORDER: ICD-10-CM

## 2023-11-08 PROBLEM — F41.9 ANXIETY: Chronic | Status: ACTIVE | Noted: 2023-08-14

## 2023-11-08 PROCEDURE — 3072F PR LOW RISK FOR RETINOPATHY: ICD-10-PCS | Mod: CPTII,S$GLB,, | Performed by: STUDENT IN AN ORGANIZED HEALTH CARE EDUCATION/TRAINING PROGRAM

## 2023-11-08 PROCEDURE — 3074F PR MOST RECENT SYSTOLIC BLOOD PRESSURE < 130 MM HG: ICD-10-PCS | Mod: CPTII,S$GLB,, | Performed by: STUDENT IN AN ORGANIZED HEALTH CARE EDUCATION/TRAINING PROGRAM

## 2023-11-08 PROCEDURE — 3044F PR MOST RECENT HEMOGLOBIN A1C LEVEL <7.0%: ICD-10-PCS | Mod: CPTII,S$GLB,, | Performed by: STUDENT IN AN ORGANIZED HEALTH CARE EDUCATION/TRAINING PROGRAM

## 2023-11-08 PROCEDURE — 99999 PR PBB SHADOW E&M-EST. PATIENT-LVL III: CPT | Mod: PBBFAC,,, | Performed by: STUDENT IN AN ORGANIZED HEALTH CARE EDUCATION/TRAINING PROGRAM

## 2023-11-08 PROCEDURE — 3078F PR MOST RECENT DIASTOLIC BLOOD PRESSURE < 80 MM HG: ICD-10-PCS | Mod: CPTII,S$GLB,, | Performed by: STUDENT IN AN ORGANIZED HEALTH CARE EDUCATION/TRAINING PROGRAM

## 2023-11-08 PROCEDURE — 3008F PR BODY MASS INDEX (BMI) DOCUMENTED: ICD-10-PCS | Mod: CPTII,S$GLB,, | Performed by: STUDENT IN AN ORGANIZED HEALTH CARE EDUCATION/TRAINING PROGRAM

## 2023-11-08 PROCEDURE — 1160F PR REVIEW ALL MEDS BY PRESCRIBER/CLIN PHARMACIST DOCUMENTED: ICD-10-PCS | Mod: CPTII,S$GLB,, | Performed by: STUDENT IN AN ORGANIZED HEALTH CARE EDUCATION/TRAINING PROGRAM

## 2023-11-08 PROCEDURE — 93010 EKG 12-LEAD: ICD-10-PCS | Mod: S$GLB,,, | Performed by: INTERNAL MEDICINE

## 2023-11-08 PROCEDURE — 3074F SYST BP LT 130 MM HG: CPT | Mod: CPTII,S$GLB,, | Performed by: STUDENT IN AN ORGANIZED HEALTH CARE EDUCATION/TRAINING PROGRAM

## 2023-11-08 PROCEDURE — 3078F DIAST BP <80 MM HG: CPT | Mod: CPTII,S$GLB,, | Performed by: STUDENT IN AN ORGANIZED HEALTH CARE EDUCATION/TRAINING PROGRAM

## 2023-11-08 PROCEDURE — 1159F MED LIST DOCD IN RCRD: CPT | Mod: CPTII,S$GLB,, | Performed by: STUDENT IN AN ORGANIZED HEALTH CARE EDUCATION/TRAINING PROGRAM

## 2023-11-08 PROCEDURE — 1160F RVW MEDS BY RX/DR IN RCRD: CPT | Mod: CPTII,S$GLB,, | Performed by: STUDENT IN AN ORGANIZED HEALTH CARE EDUCATION/TRAINING PROGRAM

## 2023-11-08 PROCEDURE — 93005 EKG 12-LEAD: ICD-10-PCS | Mod: S$GLB,,, | Performed by: STUDENT IN AN ORGANIZED HEALTH CARE EDUCATION/TRAINING PROGRAM

## 2023-11-08 PROCEDURE — 93010 ELECTROCARDIOGRAM REPORT: CPT | Mod: S$GLB,,, | Performed by: INTERNAL MEDICINE

## 2023-11-08 PROCEDURE — 99214 OFFICE O/P EST MOD 30 MIN: CPT | Mod: S$GLB,,, | Performed by: STUDENT IN AN ORGANIZED HEALTH CARE EDUCATION/TRAINING PROGRAM

## 2023-11-08 PROCEDURE — 3008F BODY MASS INDEX DOCD: CPT | Mod: CPTII,S$GLB,, | Performed by: STUDENT IN AN ORGANIZED HEALTH CARE EDUCATION/TRAINING PROGRAM

## 2023-11-08 PROCEDURE — 3044F HG A1C LEVEL LT 7.0%: CPT | Mod: CPTII,S$GLB,, | Performed by: STUDENT IN AN ORGANIZED HEALTH CARE EDUCATION/TRAINING PROGRAM

## 2023-11-08 PROCEDURE — 1159F PR MEDICATION LIST DOCUMENTED IN MEDICAL RECORD: ICD-10-PCS | Mod: CPTII,S$GLB,, | Performed by: STUDENT IN AN ORGANIZED HEALTH CARE EDUCATION/TRAINING PROGRAM

## 2023-11-08 PROCEDURE — 93005 ELECTROCARDIOGRAM TRACING: CPT | Mod: S$GLB,,, | Performed by: STUDENT IN AN ORGANIZED HEALTH CARE EDUCATION/TRAINING PROGRAM

## 2023-11-08 PROCEDURE — 99214 PR OFFICE/OUTPT VISIT, EST, LEVL IV, 30-39 MIN: ICD-10-PCS | Mod: S$GLB,,, | Performed by: STUDENT IN AN ORGANIZED HEALTH CARE EDUCATION/TRAINING PROGRAM

## 2023-11-08 PROCEDURE — 99999 PR PBB SHADOW E&M-EST. PATIENT-LVL III: ICD-10-PCS | Mod: PBBFAC,,, | Performed by: STUDENT IN AN ORGANIZED HEALTH CARE EDUCATION/TRAINING PROGRAM

## 2023-11-08 PROCEDURE — 3072F LOW RISK FOR RETINOPATHY: CPT | Mod: CPTII,S$GLB,, | Performed by: STUDENT IN AN ORGANIZED HEALTH CARE EDUCATION/TRAINING PROGRAM

## 2023-11-08 RX ORDER — MECLIZINE HCL 12.5 MG 12.5 MG/1
12.5 TABLET ORAL 3 TIMES DAILY PRN
Qty: 90 TABLET | Refills: 3 | Status: SHIPPED | OUTPATIENT
Start: 2023-11-08 | End: 2023-12-28

## 2023-11-08 NOTE — PROGRESS NOTES
Subjective:      Patient ID: Ryan Crane is a 58 y.o. male    Chief Complaint   Patient presents with    Follow-up     Xray results     HPI  58 y.o. male with a PMHx as documented below presents to clinic today pre-op visit. Patient is scheduled for soft tissue excision and ostectomy calcaneus/partial calcanectomy of the right foot on 11/17/23 with Dr. Pratt on 11/17/23.   - Pt denies chest pain at rest or w/ exertion, dyspnea at rest or w/ exertion, PND, LE edema, claudication, or palpitations.   - Pt w/ Hx of  DMT2, CAD s/p stent placement, HFpEF, and PVD. Currently on both ASA and Plavix. No known personal or family history of coagulopathy.   - Pt currently uses tobacco products, smoking 6 cigarettes per day, working on smoking cessation.  - Outpatient medications reviewed as below counseled on holding anticoagulant/antithombic medications prior to surgery.   - Pt reports being able to achieve METS >=4 METs activity.     ROS  Constitutional:  Negative for chills and fever.   Respiratory:  Negative for shortness of breath.    Cardiovascular:  Negative for chest pain.   Gastrointestinal:  Negative for abdominal pain, constipation, diarrhea, nausea and vomiting.     Current Outpatient Medications   Medication Instructions    AJOVY AUTOINJECTOR 225 mg, Subcutaneous, Every 28 days    albuterol (PROVENTIL/VENTOLIN HFA) 90 mcg/actuation inhaler 2 puffs, Inhalation, Every 6 hours PRN    albuterol-ipratropium (DUO-NEB) 2.5 mg-0.5 mg/3 mL nebulizer solution 3 mLs, Nebulization, Every 6 hours PRN, Rescue    amLODIPine (NORVASC) 2.5 mg, Oral, Daily    aspirin (ECOTRIN) 81 mg, Oral, Daily    atorvastatin (LIPITOR) 40 mg, Oral, Daily    clonazePAM (KLONOPIN) 0.5 mg, Oral, Daily PRN    clopidogreL (PLAVIX) 75 mg, Oral, Daily    cyanocobalamin 1,000 mcg/mL injection 1 ml sq daily x 5 days, then 1 ml sq weekly x 4 weeks then q monthly -  will need to go to PCP    dicyclomine (BENTYL) 20 mg, Oral, Every 6 hours PRN     diphenoxylate-atropine 2.5-0.025 mg (LOMOTIL) 2.5-0.025 mg per tablet 1 tablet, Oral    EPINEPHrine (EPIPEN) 0.3 mg/0.3 mL AtIn No dose, route, or frequency recorded.    fluticasone-umeclidin-vilanter (TRELEGY ELLIPTA) 100-62.5-25 mcg DsDv 1 puff, Inhalation, Daily    GAVILYTE-C 240-22.72-6.72 -5.84 gram SolR Oral    hydrOXYzine HCL (ATARAX) 10 MG Tab 1 tablet as needed Orally twice a day    hydrOXYzine pamoate (VISTARIL) 25 mg, Oral, 4 times daily    Lactobac. rhamnosus GG-inulin 10 billion cell -200 mg Chew 1 tablet, Oral, Every morning    lamoTRIgine (LAMICTAL) 200 mg, Oral, Daily    meclizine (ANTIVERT) 12.5 mg, Oral, 3 times daily PRN    mirtazapine (REMERON) 15 mg, Oral, Nightly    nicotine (NICODERM CQ) 14 mg/24 hr 1 patch, Transdermal, Daily    nicotine polacrilex 4 MG Lozg Take up to 8 pieces daily as needed    nitroGLYCERIN (NITROSTAT) 0.4 mg, Sublingual, Every 5 min PRN    NURTEC 75 mg, Oral, Daily PRN, Place ODT tablet on the tongue; alternatively the ODT tablet may be placed under the tongue    ondansetron (ZOFRAN-ODT) 4 MG TbDL 1 tablet, Oral, Every 8 hours PRN    pantoprazole (PROTONIX) 40 mg, Oral, Daily    pregabalin (LYRICA) 50 mg, Oral, 3 times daily    ranolazine (RANEXA) 500 mg, Oral, 2 times daily    sodium chloride 1,000 mg TbSO tablet tbso 1 tablet, Misc.(Non-Drug; Combo Route), 3 times daily    tadalafiL (CIALIS) 20 mg, Oral, Daily    tiotropium-olodateroL (STIOLTO RESPIMAT) 2.5-2.5 mcg/actuation Mist No dose, route, or frequency recorded.    topiramate (TOPAMAX) 200 mg, Oral, Nightly    ziprasidone (GEODON) 160 mg, Oral, Nightly      Past Medical History:   Diagnosis Date    Aortic valve stenosis 02/28/2022    Arachnoid cyst of posterior cranial fossa 01/13/2022    Benign prostatic hyperplasia without lower urinary tract symptoms 05/25/2023    Bilateral carotid bruits 06/22/2021    Bipolar disorder 01/16/2022    CAD S/P percutaneous coronary angioplasty     3 vessel disease    Calculus of  gallbladder without cholecystitis without obstruction 01/11/2023    Cancer     Candidal intertrigo 11/11/2019    Cataract     Chronic heart failure with preserved ejection fraction 03/29/2023    Chronic schizophrenia     Colon polyps     Diabetic polyneuropathy associated with diabetes mellitus due to underlying condition 11/21/2019    Dysarthria as late effect of cerebellar cerebrovascular accident (CVA) 03/24/2023    Equinus deformity of both feet 11/21/2019    Erectile dysfunction due to arterial insufficiency 05/25/2023    Flaccid hemiplegia of left nondominant side as late effect of cerebral infarction 03/17/2023    Flat foot 11/21/2019    Gastritis     Gastroesophageal reflux disease without esophagitis 07/13/2023    Gastrointestinal hemorrhage 12/13/2019    Post polypectomy bleeding    General anesthetics causing adverse effect in therapeutic use     Hammer toes of both feet 11/21/2019    Hx of diabetic foot ulcer 11/21/2019    Hypertension     Hyponatremia 01/11/2022    ELAINE (iron deficiency anemia) 12/10/2019    Intractable chronic migraine without aura and without status migrainosus 12/30/2021    Microcytic anemia 10/09/2019    Moderate aortic regurgitation 03/29/2023    Moderate mitral regurgitation 03/29/2023    Moderate mitral stenosis 03/29/2023    Moderate to severe aortic stenosis 02/28/2022    Myocardial infarct, old     Nicotine dependence, unspecified, uncomplicated 01/16/2022    Onychomycosis due to dermatophyte 11/21/2019    Orchitis 11/09/2019    PIPER on CPAP     Peripheral visual field defect, bilateral 01/13/2022    PVD (peripheral vascular disease) 11/21/2019    Seizures 2008    Stage 2 moderate COPD by GOLD classification     PFTs w/ FEV1 (73%) (9/2022)    Stroke 2005    Thoracic aortic atherosclerosis 10/17/2022    CT chest    Thyroid disease     Previously on pharmacologic Tx    Tinea pedis of right foot 05/21/2019    Tobacco use disorder     Type 2 diabetes mellitus with diabetic  peripheral angiopathy without gangrene     A1c 5.1% (3/2023)    Vitamin B12 deficiency 03/24/2023     Past Surgical History:   Procedure Laterality Date    ANGIOGRAM, CORONARY, WITH LEFT HEART CATHETERIZATION  08/25/2022    Procedure: Angiogram, Coronary, with Left Heart Cath;  Surgeon: Mai Deleon MD;  Location: [facility] CATH;  Service: Cardiology;;    APPENDECTOMY      BONE MARROW ASPIRATION Left 08/21/2020    Procedure: ASPIRATION, BONE MARROW;  Surgeon: Lex Kathleen MD;  Location: [facility] OR;  Service: Oncology;  Laterality: Left;    CHOLECYSTECTOMY      CLOSURE OF WOUND Right 09/09/2019    Procedure: CLOSURE, WOUND;  Surgeon: Li Kathleen DPM;  Location: [facility] OR;  Service: Podiatry;  Laterality: Right;    COLONOSCOPY N/A 12/10/2019    Procedure: COLONOSCOPY;  Surgeon: Ryan Lucas MD;  [facility];  Service: Endoscopy;  Laterality: N/A;    COLONOSCOPY N/A 12/13/2019    Procedure: COLONOSCOPY;  Surgeon: Ryan Lucas MD;  Location: [facility];  Service: Endoscopy;  Laterality: N/A;    CORONARY STENT PLACEMENT      ESOPHAGOGASTRODUODENOSCOPY N/A 12/10/2019    Procedure: EGD (ESOPHAGOGASTRODUODENOSCOPY);  Surgeon: Ryan Lucas MD;  Location: [facility];  Service: Endoscopy;  Laterality: N/A;    ESOPHAGOGASTRODUODENOSCOPY N/A 11/03/2022    Procedure: EGD (ESOPHAGOGASTRODUODENOSCOPY);  Surgeon: Ryan Lucas MD;  Location: [facility];  Service: Endoscopy;  Laterality: N/A;    INJECTION OF ANESTHETIC AGENT AROUND MEDIAL BRANCH NERVES INNERVATING LUMBAR FACET JOINT Bilateral 7/25/2023    Procedure: Block-nerve-medial branch-lumbar;  Surgeon: Napoleon Grimaldo MD;  Location: [facility] OR;  Service: Pain Management;  Laterality: Bilateral;  L3,4,5 MBB    INJECTION OF ANESTHETIC AGENT AROUND MEDIAL BRANCH NERVES INNERVATING LUMBAR FACET JOINT Bilateral 8/25/2023    Procedure: Block-nerve-medial branch-lumbar;  Surgeon: Napoleon Grimaldo MD;  Location: [facility] OR;  Service: Anesthesiology;  Laterality:  Bilateral;  L3,4,5 MBB #2    LAPAROSCOPIC CHOLECYSTECTOMY N/A 01/11/2023    Procedure: CHOLECYSTECTOMY, LAPAROSCOPIC;  Surgeon: Laith Davis MD;  Location: [facility] OR;  Service: General;  Laterality: N/A;    LEFT HEART CATHETERIZATION Left 08/25/2022    Procedure: Left heart cath;  Surgeon: Mai Deleon MD;  Location: [facility] CATH;  Service: Cardiology;  Laterality: Left;    RADIOFREQUENCY ABLATION OF LUMBAR MEDIAL BRANCH NERVE AT SINGLE LEVEL Bilateral 10/31/2023    Procedure: Radiofrequency Ablation, Nerve, Spinal, Lumbar, Medial Branch, 1 Level;  Surgeon: Napoleon Grimaldo MD;  Location: [facility] OR;  Service: Anesthesiology;  Laterality: Bilateral;  L3,4,5 RFA    right heel surgery      SHOULDER ARTHROSCOPY Left     UPPER GASTROINTESTINAL ENDOSCOPY       Review of patient's allergies indicates:   Allergen Reactions    Alcohol Anaphylaxis     Pt states all types of alcohol    Alcohol antiseptic pads Anaphylaxis    Cephalexin Anaphylaxis and Other (See Comments)     Family History   Problem Relation Age of Onset    Melanoma Mother     Diabetes Mother     Hypertension Mother     Stroke Father     Diabetes Father     Hypertension Father     Colon cancer Father         unsure of age of diagnosis    Cancer Father         colon cancer    Cervical cancer Sister     Cirrhosis Brother     Hypertension Brother     Lung cancer Maternal Grandmother     Prostate cancer Maternal Grandfather     Crohn's disease Neg Hx     Ulcerative colitis Neg Hx     Stomach cancer Neg Hx     Esophageal cancer Neg Hx     Retinal detachment Neg Hx     Strabismus Neg Hx     Macular degeneration Neg Hx     Glaucoma Neg Hx      Social History     Tobacco Use    Smoking status: Every Day     Current packs/day: 0.50     Average packs/day: 1.5 packs/day for 44.9 years (67.4 ttl pk-yrs)     Types: Cigarettes     Start date: 1979    Smokeless tobacco: Never    Tobacco comments:     Age Started 14    Substance Use Topics    Alcohol use: No    Drug use: Not  "Currently     Types: Marijuana     Currently on File with [facility]:   Most Recent Immunizations   Administered Date(s) Administered    Influenza - Quadrivalent - PF *Preferred* (6 months and older) 02/04/2022    Pneumococcal Polysaccharide - 23 Valent 08/04/2022    Tdap 08/29/2019     Objective:      Vitals:    11/08/23 1500   BP: 114/68   Pulse: 84   SpO2: 97%   Weight: 71.6 kg (157 lb 11.8 oz)   Height: 5' 10" (1.778 m)     Body mass index is 22.63 kg/m².    Physical Exam   Constitutional:       General: No acute distress.  HENT:      Head: Normocephalic and atraumatic.   Pulmonary:      Effort: Pulmonary effort is normal. No respiratory distress.   Neurological:      General: No focal deficit present.      Mental Status: Alert and oriented to person, place, and time. Mental status is at baseline.    Assessment:       1. Pre-op evaluation    2. Benign paroxysmal positional vertigo due to bilateral vestibular disorder      Plan:       Ryan was seen today for follow-up.    Diagnoses and all orders for this visit:    Benign paroxysmal positional vertigo due to bilateral vestibular disorder: Pt requests refill of meclizine as below.  -     meclizine (ANTIVERT) 12.5 mg tablet; Take 1 tablet (12.5 mg total) by mouth 3 (three) times daily as needed for Dizziness.    Pre-op evaluation  -     IN OFFICE EKG 12-LEAD (to Muse)     Evaluating Risk in Surgery is a combination of patients risk factors and surgical risk factors. Patient is being evaluated for medical optimization and not surgical clearance. MACE is the major adverse cardiovascular event risk, and can be assessed based on the Revised Cardiac Risk Index & NSQIP AKIRA scores.    RCRI: Class IV risk, 15% risk of death, MI, or cardiac arrest.  NSQIP AKIRA: 2.5% risk of MI or cardiac arrest, intraoperatively or up to 30 days post-op.    Surgical Risks: Vascular/cardiothoracic/emergent surgeries are high risk and carry a >5% risk of MI. Head & " Neck/Abdominal/Orthopedic/Prostate surgeries are intermediate risk and carry a 1-5% risk of MI. Endoscopic/Cataract/Plastic/Breast surgeries are low risk and carry <1% risk of MI.     Per ACC/AHA neri-operative guidelines, moderate risk surgery and METS >=4 is low risk for MACE and no further cardiac testing is required.     Per evaluation, patient is intermediate-high risk for an intermediate risk surgery. Patient is medically optimized for surgery at this time.      Follow-up as needed.    Ana Mayer MD  [facility]  Office: [phone]   Fax: [phone]  11/08/2023      Disclaimer: This note was partly generated using dictation software which may occasionally result in transcription errors.    Total time spent on this encounter includes face to face time and non-face to face time preparing to see the patient (eg, review of tests), obtaining and/or reviewing separately obtained history, documenting clinical information in the electronic or other health record, independently interpreting results, and communicating results to the patient/family/caregiver, or care coordinator.

## 2023-11-10 ENCOUNTER — CLINICAL SUPPORT (OUTPATIENT)
Dept: FAMILY MEDICINE | Facility: CLINIC | Age: 58
End: 2023-11-10
Payer: MEDICARE

## 2023-11-10 DIAGNOSIS — I69.354 FLACCID HEMIPLEGIA OF LEFT NONDOMINANT SIDE AS LATE EFFECT OF CEREBRAL INFARCTION: Primary | Chronic | ICD-10-CM

## 2023-11-10 PROCEDURE — 96372 PR INJECTION,THERAP/PROPH/DIAG2ST, IM OR SUBCUT: ICD-10-PCS | Mod: S$GLB,,, | Performed by: STUDENT IN AN ORGANIZED HEALTH CARE EDUCATION/TRAINING PROGRAM

## 2023-11-10 PROCEDURE — 96372 THER/PROPH/DIAG INJ SC/IM: CPT | Mod: S$GLB,,, | Performed by: STUDENT IN AN ORGANIZED HEALTH CARE EDUCATION/TRAINING PROGRAM

## 2023-11-10 RX ADMIN — CYANOCOBALAMIN 1000 MCG: 1000 INJECTION, SOLUTION INTRAMUSCULAR; SUBCUTANEOUS at 09:11

## 2023-11-10 NOTE — PROGRESS NOTES
Patient present to the clinic today for his biweekly vitamin B12 injection.  Injection administered into the left deltoid per patient request.  Patient tolerated well with no complaints.

## 2023-11-14 ENCOUNTER — TELEPHONE (OUTPATIENT)
Dept: CARDIOLOGY | Facility: CLINIC | Age: 58
End: 2023-11-14
Payer: MEDICARE

## 2023-11-15 ENCOUNTER — TELEPHONE (OUTPATIENT)
Dept: GASTROENTEROLOGY | Facility: CLINIC | Age: 58
End: 2023-11-15
Payer: MEDICARE

## 2023-11-15 ENCOUNTER — TELEPHONE (OUTPATIENT)
Dept: PODIATRY | Facility: CLINIC | Age: 58
End: 2023-11-15
Payer: MEDICARE

## 2023-11-15 ENCOUNTER — OFFICE VISIT (OUTPATIENT)
Dept: CARDIOLOGY | Facility: CLINIC | Age: 58
End: 2023-11-15
Payer: MEDICARE

## 2023-11-15 VITALS
WEIGHT: 163.13 LBS | BODY MASS INDEX: 23.35 KG/M2 | SYSTOLIC BLOOD PRESSURE: 102 MMHG | HEIGHT: 70 IN | HEART RATE: 69 BPM | DIASTOLIC BLOOD PRESSURE: 65 MMHG

## 2023-11-15 DIAGNOSIS — I34.0 MODERATE MITRAL REGURGITATION: Chronic | ICD-10-CM

## 2023-11-15 DIAGNOSIS — I10 ESSENTIAL HYPERTENSION: Chronic | ICD-10-CM

## 2023-11-15 DIAGNOSIS — F17.210 CIGARETTE NICOTINE DEPENDENCE WITHOUT COMPLICATION: ICD-10-CM

## 2023-11-15 DIAGNOSIS — I73.9 PVD (PERIPHERAL VASCULAR DISEASE): Chronic | ICD-10-CM

## 2023-11-15 DIAGNOSIS — M20.41 HAMMER TOES OF BOTH FEET: Chronic | ICD-10-CM

## 2023-11-15 DIAGNOSIS — Z98.61 CAD S/P PERCUTANEOUS CORONARY ANGIOPLASTY: Chronic | ICD-10-CM

## 2023-11-15 DIAGNOSIS — I35.0 MODERATE TO SEVERE AORTIC STENOSIS: Chronic | ICD-10-CM

## 2023-11-15 DIAGNOSIS — M21.6X2 EQUINUS DEFORMITY OF BOTH FEET: Chronic | ICD-10-CM

## 2023-11-15 DIAGNOSIS — J44.9 STAGE 2 MODERATE COPD BY GOLD CLASSIFICATION: Chronic | ICD-10-CM

## 2023-11-15 DIAGNOSIS — M21.6X1 EQUINUS DEFORMITY OF BOTH FEET: Chronic | ICD-10-CM

## 2023-11-15 DIAGNOSIS — F20.9 CHRONIC SCHIZOPHRENIA: Chronic | ICD-10-CM

## 2023-11-15 DIAGNOSIS — F33.9 DEPRESSION, RECURRENT: Chronic | ICD-10-CM

## 2023-11-15 DIAGNOSIS — E08.42 DIABETIC POLYNEUROPATHY ASSOCIATED WITH DIABETES MELLITUS DUE TO UNDERLYING CONDITION: Primary | Chronic | ICD-10-CM

## 2023-11-15 DIAGNOSIS — M20.42 HAMMER TOES OF BOTH FEET: Chronic | ICD-10-CM

## 2023-11-15 DIAGNOSIS — I25.10 CAD S/P PERCUTANEOUS CORONARY ANGIOPLASTY: Chronic | ICD-10-CM

## 2023-11-15 PROCEDURE — 3044F HG A1C LEVEL LT 7.0%: CPT | Mod: CPTII,S$GLB,, | Performed by: INTERNAL MEDICINE

## 2023-11-15 PROCEDURE — 3072F LOW RISK FOR RETINOPATHY: CPT | Mod: CPTII,S$GLB,, | Performed by: INTERNAL MEDICINE

## 2023-11-15 PROCEDURE — 3044F PR MOST RECENT HEMOGLOBIN A1C LEVEL <7.0%: ICD-10-PCS | Mod: CPTII,S$GLB,, | Performed by: INTERNAL MEDICINE

## 2023-11-15 PROCEDURE — 3008F PR BODY MASS INDEX (BMI) DOCUMENTED: ICD-10-PCS | Mod: CPTII,S$GLB,, | Performed by: INTERNAL MEDICINE

## 2023-11-15 PROCEDURE — 3074F PR MOST RECENT SYSTOLIC BLOOD PRESSURE < 130 MM HG: ICD-10-PCS | Mod: CPTII,S$GLB,, | Performed by: INTERNAL MEDICINE

## 2023-11-15 PROCEDURE — 1160F RVW MEDS BY RX/DR IN RCRD: CPT | Mod: CPTII,S$GLB,, | Performed by: INTERNAL MEDICINE

## 2023-11-15 PROCEDURE — 3078F PR MOST RECENT DIASTOLIC BLOOD PRESSURE < 80 MM HG: ICD-10-PCS | Mod: CPTII,S$GLB,, | Performed by: INTERNAL MEDICINE

## 2023-11-15 PROCEDURE — 99214 OFFICE O/P EST MOD 30 MIN: CPT | Mod: S$GLB,,, | Performed by: INTERNAL MEDICINE

## 2023-11-15 PROCEDURE — 3008F BODY MASS INDEX DOCD: CPT | Mod: CPTII,S$GLB,, | Performed by: INTERNAL MEDICINE

## 2023-11-15 PROCEDURE — 1159F PR MEDICATION LIST DOCUMENTED IN MEDICAL RECORD: ICD-10-PCS | Mod: CPTII,S$GLB,, | Performed by: INTERNAL MEDICINE

## 2023-11-15 PROCEDURE — 3078F DIAST BP <80 MM HG: CPT | Mod: CPTII,S$GLB,, | Performed by: INTERNAL MEDICINE

## 2023-11-15 PROCEDURE — 1160F PR REVIEW ALL MEDS BY PRESCRIBER/CLIN PHARMACIST DOCUMENTED: ICD-10-PCS | Mod: CPTII,S$GLB,, | Performed by: INTERNAL MEDICINE

## 2023-11-15 PROCEDURE — 99999 PR PBB SHADOW E&M-EST. PATIENT-LVL IV: CPT | Mod: PBBFAC,,, | Performed by: INTERNAL MEDICINE

## 2023-11-15 PROCEDURE — 99214 PR OFFICE/OUTPT VISIT, EST, LEVL IV, 30-39 MIN: ICD-10-PCS | Mod: S$GLB,,, | Performed by: INTERNAL MEDICINE

## 2023-11-15 PROCEDURE — 1159F MED LIST DOCD IN RCRD: CPT | Mod: CPTII,S$GLB,, | Performed by: INTERNAL MEDICINE

## 2023-11-15 PROCEDURE — 3074F SYST BP LT 130 MM HG: CPT | Mod: CPTII,S$GLB,, | Performed by: INTERNAL MEDICINE

## 2023-11-15 PROCEDURE — 99999 PR PBB SHADOW E&M-EST. PATIENT-LVL IV: ICD-10-PCS | Mod: PBBFAC,,, | Performed by: INTERNAL MEDICINE

## 2023-11-15 PROCEDURE — 3072F PR LOW RISK FOR RETINOPATHY: ICD-10-PCS | Mod: CPTII,S$GLB,, | Performed by: INTERNAL MEDICINE

## 2023-11-15 NOTE — H&P (VIEW-ONLY)
Subjective:    Patient ID:  Ryan Crane is a 58 y.o. male patient here for evaluation Hypertension      History of Present Illness:  Follow-up.  Coronary disease or valvular heart disease.  Peripheral arterial disease.  Hospitalization history for CVA, minimal residual neurologic deficit.  Recent brain MRI 10/23 without acute change.    Stable KELLEY.  No angina.  No arrhythmia.  Noninvasive cardiac assessment 01/2023 with unremarkable nuclear study, echo with moderate AS.  Left heart catheterization in August 2022 with moderate CAD, no intervention.  EF normal.     Ongoing tobacco use, hypertension, dyslipidemia, diabetes mellitus.        Review of patient's allergies indicates:   Allergen Reactions    Alcohol Anaphylaxis     Pt states all types of alcohol    Alcohol antiseptic pads Anaphylaxis    Cephalexin Anaphylaxis and Other (See Comments)       Past Medical History:   Diagnosis Date    Aortic valve stenosis 02/28/2022    Arachnoid cyst of posterior cranial fossa 01/13/2022    Benign prostatic hyperplasia without lower urinary tract symptoms 05/25/2023    Bilateral carotid bruits 06/22/2021    Bipolar disorder 01/16/2022    CAD S/P percutaneous coronary angioplasty     3 vessel disease    Calculus of gallbladder without cholecystitis without obstruction 01/11/2023    Cancer     Candidal intertrigo 11/11/2019    Cataract     Chronic heart failure with preserved ejection fraction 03/29/2023    Chronic schizophrenia     Colon polyps     Diabetic polyneuropathy associated with diabetes mellitus due to underlying condition 11/21/2019    Dysarthria as late effect of cerebellar cerebrovascular accident (CVA) 03/24/2023    Equinus deformity of both feet 11/21/2019    Erectile dysfunction due to arterial insufficiency 05/25/2023    Flaccid hemiplegia of left nondominant side as late effect of cerebral infarction 03/17/2023    Flat foot 11/21/2019    Gastritis     Gastroesophageal reflux disease without esophagitis  07/13/2023    Gastrointestinal hemorrhage 12/13/2019    Post polypectomy bleeding    General anesthetics causing adverse effect in therapeutic use     Hammer toes of both feet 11/21/2019    Hx of diabetic foot ulcer 11/21/2019    Hypertension     Hyponatremia 01/11/2022    ELAINE (iron deficiency anemia) 12/10/2019    Intractable chronic migraine without aura and without status migrainosus 12/30/2021    Microcytic anemia 10/09/2019    Moderate aortic regurgitation 03/29/2023    Moderate mitral regurgitation 03/29/2023    Moderate mitral stenosis 03/29/2023    Moderate to severe aortic stenosis 02/28/2022    Myocardial infarct, old     Nicotine dependence, unspecified, uncomplicated 01/16/2022    Onychomycosis due to dermatophyte 11/21/2019    Orchitis 11/09/2019    PIPER on CPAP     Peripheral visual field defect, bilateral 01/13/2022    PVD (peripheral vascular disease) 11/21/2019    Seizures 2008    Stage 2 moderate COPD by GOLD classification     PFTs w/ FEV1 (73%) (9/2022)    Stroke 2005    Thoracic aortic atherosclerosis 10/17/2022    CT chest    Thyroid disease     Previously on pharmacologic Tx    Tinea pedis of right foot 05/21/2019    Tobacco use disorder     Type 2 diabetes mellitus with diabetic peripheral angiopathy without gangrene     A1c 5.1% (3/2023)    Vitamin B12 deficiency 03/24/2023     Past Surgical History:   Procedure Laterality Date    ANGIOGRAM, CORONARY, WITH LEFT HEART CATHETERIZATION  08/25/2022    Procedure: Angiogram, Coronary, with Left Heart Cath;  Surgeon: Mai Deleon MD;  Location: [facility] CATH;  Service: Cardiology;;    APPENDECTOMY      BONE MARROW ASPIRATION Left 08/21/2020    Procedure: ASPIRATION, BONE MARROW;  Surgeon: Lex Kathleen MD;  Location: [facility] OR;  Service: Oncology;  Laterality: Left;    CHOLECYSTECTOMY      CLOSURE OF WOUND Right 09/09/2019    Procedure: CLOSURE, WOUND;  Surgeon: Li Kathleen DPM;  Location: [facility] OR;  Service: Podiatry;  Laterality: Right;     COLONOSCOPY N/A 12/10/2019    Procedure: COLONOSCOPY;  Surgeon: Ryan Lucas MD;  Location: [facility] ENDO;  Service: Endoscopy;  Laterality: N/A;    COLONOSCOPY N/A 12/13/2019    Procedure: COLONOSCOPY;  Surgeon: Ryan Lucas MD;  Location: [facility] ENDO;  Service: Endoscopy;  Laterality: N/A;    CORONARY STENT PLACEMENT      ESOPHAGOGASTRODUODENOSCOPY N/A 12/10/2019    Procedure: EGD (ESOPHAGOGASTRODUODENOSCOPY);  Surgeon: Ryan Lucas MD;  Location: [facility] ENDO;  Service: Endoscopy;  Laterality: N/A;    ESOPHAGOGASTRODUODENOSCOPY N/A 11/03/2022    Procedure: EGD (ESOPHAGOGASTRODUODENOSCOPY);  Surgeon: Ryan Lucas MD;  Location: [facility];  Service: Endoscopy;  Laterality: N/A;    INJECTION OF ANESTHETIC AGENT AROUND MEDIAL BRANCH NERVES INNERVATING LUMBAR FACET JOINT Bilateral 7/25/2023    Procedure: Block-nerve-medial branch-lumbar;  Surgeon: Napoleon Grimaldo MD;  Location: [facility] OR;  Service: Pain Management;  Laterality: Bilateral;  L3,4,5 MBB    INJECTION OF ANESTHETIC AGENT AROUND MEDIAL BRANCH NERVES INNERVATING LUMBAR FACET JOINT Bilateral 8/25/2023    Procedure: Block-nerve-medial branch-lumbar;  Surgeon: Napoleon Grimaldo MD;  Location: [facility] OR;  Service: Anesthesiology;  Laterality: Bilateral;  L3,4,5 MBB #2    LAPAROSCOPIC CHOLECYSTECTOMY N/A 01/11/2023    Procedure: CHOLECYSTECTOMY, LAPAROSCOPIC;  Surgeon: Laith Davis MD;  Location: [facility];  Service: General;  Laterality: N/A;    LEFT HEART CATHETERIZATION Left 08/25/2022    Procedure: Left heart cath;  Surgeon: Mai Deleon MD;  Location: [facility] CATH;  Service: Cardiology;  Laterality: Left;    RADIOFREQUENCY ABLATION OF LUMBAR MEDIAL BRANCH NERVE AT SINGLE LEVEL Bilateral 10/31/2023    Procedure: Radiofrequency Ablation, Nerve, Spinal, Lumbar, Medial Branch, 1 Level;  Surgeon: Napoleon Grimaldo MD;  Location: [facility] OR;  Service: Anesthesiology;  Laterality: Bilateral;  L3,4,5 RFA    right heel surgery      SHOULDER ARTHROSCOPY  Left     UPPER GASTROINTESTINAL ENDOSCOPY       Social History     Tobacco Use    Smoking status: Every Day     Current packs/day: 0.50     Average packs/day: 1.5 packs/day for 44.9 years (67.4 ttl pk-yrs)     Types: Cigarettes     Start date: 1979    Smokeless tobacco: Never    Tobacco comments:     Age Started 14    Substance Use Topics    Alcohol use: No    Drug use: Yes     Types: Marijuana     Comment: ocasionally - once every 6 months        Review of Systems:    As noted in HPI in addition      REVIEW OF SYSTEMS  Review of Systems   Constitutional: Negative for decreased appetite, diaphoresis, night sweats, weight gain and weight loss.   HENT:  Negative for nosebleeds and odynophagia.    Eyes:  Negative for double vision and photophobia.   Cardiovascular:  Positive for dyspnea on exertion. Negative for chest pain, claudication, cyanosis, irregular heartbeat, leg swelling, near-syncope, orthopnea, palpitations, paroxysmal nocturnal dyspnea and syncope.   Respiratory:  Negative for cough, hemoptysis, shortness of breath and wheezing.    Hematologic/Lymphatic: Negative for adenopathy.   Skin:  Negative for flushing, skin cancer and suspicious lesions.   Musculoskeletal:  Negative for gout, myalgias and neck pain.   Gastrointestinal:  Negative for abdominal pain, heartburn, hematemesis and hematochezia.   Genitourinary:  Negative for bladder incontinence, hesitancy and nocturia.   Neurological:  Negative for focal weakness, headaches, light-headedness and paresthesias.   Psychiatric/Behavioral:  Negative for memory loss and substance abuse.               Objective:        Vitals:    11/15/23 1039   BP: 102/65   Pulse: 69       Lab Results   Component Value Date    WBC 13.15 (H) 04/21/2023    HGB 13.5 (L) 04/21/2023    HCT 40.6 04/21/2023     04/21/2023    CHOL 83 (L) 03/05/2023    TRIG 58 03/05/2023    HDL 29 (L) 03/05/2023    ALT 9 10/03/2023    AST 11 (L) 10/03/2023     10/23/2023    K 3.7  10/23/2023     04/21/2023    CREATININE 0.90 10/23/2023    BUN 11 10/23/2023    CO2 23 10/23/2023    TSH 1.140 03/05/2023    PSA 1.4 05/02/2023    INR 1.1 04/05/2023    HGBA1C 4.9 07/13/2023        ECHOCARDIOGRAM RESULTS  Results for orders placed during the hospital encounter of 03/04/23    Echo    Interpretation Summary  · The left ventricle is normal in size with concentric remodeling and normal systolic function.  · Moderate left atrial enlargement.  · Grade I left ventricular diastolic dysfunction.  · The estimated PA systolic pressure is 27 mmHg.  · Normal right ventricular size with normal right ventricular systolic function.  · Normal central venous pressure (3 mmHg).  · The estimated ejection fraction is 60%.  · Mild right atrial enlargement.  · There is moderate-to-severe aortic valve stenosis.  · Aortic valve area is 1.54 cm2; peak velocity is 4.00 m/s; mean gradient is 33 mmHg.  · Mild-to-moderate mitral regurgitation.  · The mean diastolic gradient across the mitral valve is 8 mmHg at a heart rate of bpm.  · There is moderate mitral stenosis.  · Mild-to-moderate aortic regurgitation.  · There is no evidence of intracardiac shunting.        CURRENT/PREVIOUS VISIT EKG  Results for orders placed or performed in visit on 11/08/23   IN OFFICE EKG 12-LEAD (to [place])    Collection Time: 11/08/23  3:41 PM    Narrative    Test Reason : Z01.818,    Vent. Rate : 079 BPM     Atrial Rate : 079 BPM     P-R Int : 144 ms          QRS Dur : 100 ms      QT Int : 404 ms       P-R-T Axes : 014 013 044 degrees     QTc Int : 463 ms    Normal sinus rhythm  Voltage criteria for left ventricular hypertrophy  Abnormal ECG  When compared with ECG of 21-APR-2023 16:57,  T wave inversion no longer evident in Inferior leads  Nonspecific T wave abnormality no longer evident in Lateral leads  Confirmed by Mai Deleon MD ([id number]) on 11/8/2023 4:59:21 PM    Referred By:             Confirmed By:Mai Deleon MD     No valid procedures  specified.   Results for orders placed during the hospital encounter of 01/31/23    Nuclear Stress - Cardiology Interpreted    Interpretation Summary    Normal myocardial perfusion scan. There is no evidence of myocardial ischemia or infarction.    The gated perfusion images showed an ejection fraction of 70% post stress.    There is normal wall motion post stress.    LV cavity size is normal at stress.    The ECG portion of the study is uninterpretable.    The patient reported no chest pain during the stress test.    There were no arrhythmias during stress.    This is a low risk study.    No valid procedures specified.    PHYSICAL EXAM  CONSTITUTIONAL: Well built, well nourished in no apparent distress  NECK: no carotid bruit, no JVD  LUNGS:  Mildly decreased breath sounds bilaterally.  No rhonchi.  CHEST WALL: no tenderness,  HEART: regular rate and rhythm, S1, S2 distant, grade 2/6 crescendo decrescendo murmur aortic area.  ABDOMEN: soft, non-tender; bowel sounds normal; no masses,  no organomegaly  EXTREMITIES: Extremities normal, no edema, no calf tenderness noted plus two femoral and popliteal pulses diminished pedal pulses.  NEURO: AAO X 3    I HAVE REVIEWED :    The vital signs, nurses notes, and all the pertinent radiology and labs.         Current Outpatient Medications   Medication Instructions    AJOVY AUTOINJECTOR 225 mg, Subcutaneous, Every 28 days    albuterol (PROVENTIL/VENTOLIN HFA) 90 mcg/actuation inhaler 2 puffs, Inhalation, Every 6 hours PRN    albuterol-ipratropium (DUO-NEB) 2.5 mg-0.5 mg/3 mL nebulizer solution 3 mLs, Nebulization, Every 6 hours PRN, Rescue    amLODIPine (NORVASC) 2.5 mg, Oral, Daily    aspirin (ECOTRIN) 81 mg, Oral, Daily    atorvastatin (LIPITOR) 40 mg, Oral, Daily    clonazePAM (KLONOPIN) 0.5 mg, Oral, Daily PRN    clopidogreL (PLAVIX) 75 mg, Oral, Daily    cyanocobalamin 1,000 mcg/mL injection 1 ml sq daily x 5 days, then 1 ml sq weekly x 4 weeks then q monthly -  will  need to go to PCP    dicyclomine (BENTYL) 20 mg, Oral, Every 6 hours PRN    diphenoxylate-atropine 2.5-0.025 mg (LOMOTIL) 2.5-0.025 mg per tablet 1 tablet, Oral    EPINEPHrine (EPIPEN) 0.3 mg/0.3 mL AtIn No dose, route, or frequency recorded.    fluticasone-umeclidin-vilanter (TRELEGY ELLIPTA) 100-62.5-25 mcg DsDv 1 puff, Inhalation, Daily    GAVILYTE-C 240-22.72-6.72 -5.84 gram SolR Oral    hydrOXYzine HCL (ATARAX) 10 MG Tab 1 tablet as needed Orally twice a day    hydrOXYzine pamoate (VISTARIL) 25 mg, Oral, 4 times daily    Lactobac. rhamnosus GG-inulin 10 billion cell -200 mg Chew 1 tablet, Oral, Every morning    lamoTRIgine (LAMICTAL) 200 mg, Oral, Daily    meclizine (ANTIVERT) 12.5 mg, Oral, 3 times daily PRN    mirtazapine (REMERON) 15 mg, Oral, Nightly    nicotine (NICODERM CQ) 14 mg/24 hr 1 patch, Transdermal, Daily    nicotine polacrilex 4 MG Lo Take up to 8 pieces daily as needed    nitroGLYCERIN (NITROSTAT) 0.4 mg, Sublingual, Every 5 min PRN    NURTEC 75 mg, Oral, Daily PRN, Place ODT tablet on the tongue; alternatively the ODT tablet may be placed under the tongue    ondansetron (ZOFRAN-ODT) 4 MG TbDL 1 tablet, Oral, Every 8 hours PRN    pantoprazole (PROTONIX) 40 mg, Oral, Daily    pregabalin (LYRICA) 50 mg, Oral, 3 times daily    ranolazine (RANEXA) 500 mg, Oral, 2 times daily    sodium chloride 1,000 mg TbSO tablet tbso 1 tablet, Misc.(Non-Drug; Combo Route), 3 times daily    tadalafiL (CIALIS) 20 mg, Oral, Daily    tiotropium-olodateroL (STIOLTO RESPIMAT) 2.5-2.5 mcg/actuation Mist No dose, route, or frequency recorded.    topiramate (TOPAMAX) 200 mg, Oral, Nightly    ziprasidone (GEODON) 160 mg, Oral, Nightly          Assessment:   Coronary artery disease.  Stable noninvasive cardiac assessment for ischemic heart disease, 01/2023.  EF normal.  Moderate AS.    History of CVA.  Stable MRI.  10/2023.  No high-grade extracranial carotid disease.  CTA head and neck 03/24/2023.    Diabetes mellitus,  hypertension, dyslipidemia, ongoing tobacco use.      Plan:   Low to moderate risk cleared for surgery.  Okay to hold Plavix aspirin.  Risk factor modification.  Tobacco cessation.    6m          No follow-ups on file.

## 2023-11-15 NOTE — PROGRESS NOTES
Subjective:    Patient ID:  Ryan Crane is a 58 y.o. male patient here for evaluation Hypertension      History of Present Illness:  Follow-up.  Coronary disease or valvular heart disease.  Peripheral arterial disease.  Hospitalization history for CVA, minimal residual neurologic deficit.  Recent brain MRI 10/23 without acute change.    Stable KELLEY.  No angina.  No arrhythmia.  Noninvasive cardiac assessment 01/2023 with unremarkable nuclear study, echo with moderate AS.  Left heart catheterization in August 2022 with moderate CAD, no intervention.  EF normal.     Ongoing tobacco use, hypertension, dyslipidemia, diabetes mellitus.        Review of patient's allergies indicates:   Allergen Reactions    Alcohol Anaphylaxis     Pt states all types of alcohol    Alcohol antiseptic pads Anaphylaxis    Cephalexin Anaphylaxis and Other (See Comments)       Past Medical History:   Diagnosis Date    Aortic valve stenosis 02/28/2022    Arachnoid cyst of posterior cranial fossa 01/13/2022    Benign prostatic hyperplasia without lower urinary tract symptoms 05/25/2023    Bilateral carotid bruits 06/22/2021    Bipolar disorder 01/16/2022    CAD S/P percutaneous coronary angioplasty     3 vessel disease    Calculus of gallbladder without cholecystitis without obstruction 01/11/2023    Cancer     Candidal intertrigo 11/11/2019    Cataract     Chronic heart failure with preserved ejection fraction 03/29/2023    Chronic schizophrenia     Colon polyps     Diabetic polyneuropathy associated with diabetes mellitus due to underlying condition 11/21/2019    Dysarthria as late effect of cerebellar cerebrovascular accident (CVA) 03/24/2023    Equinus deformity of both feet 11/21/2019    Erectile dysfunction due to arterial insufficiency 05/25/2023    Flaccid hemiplegia of left nondominant side as late effect of cerebral infarction 03/17/2023    Flat foot 11/21/2019    Gastritis     Gastroesophageal reflux disease without esophagitis  07/13/2023    Gastrointestinal hemorrhage 12/13/2019    Post polypectomy bleeding    General anesthetics causing adverse effect in therapeutic use     Hammer toes of both feet 11/21/2019    Hx of diabetic foot ulcer 11/21/2019    Hypertension     Hyponatremia 01/11/2022    ELAINE (iron deficiency anemia) 12/10/2019    Intractable chronic migraine without aura and without status migrainosus 12/30/2021    Microcytic anemia 10/09/2019    Moderate aortic regurgitation 03/29/2023    Moderate mitral regurgitation 03/29/2023    Moderate mitral stenosis 03/29/2023    Moderate to severe aortic stenosis 02/28/2022    Myocardial infarct, old     Nicotine dependence, unspecified, uncomplicated 01/16/2022    Onychomycosis due to dermatophyte 11/21/2019    Orchitis 11/09/2019    PIPER on CPAP     Peripheral visual field defect, bilateral 01/13/2022    PVD (peripheral vascular disease) 11/21/2019    Seizures 2008    Stage 2 moderate COPD by GOLD classification     PFTs w/ FEV1 (73%) (9/2022)    Stroke 2005    Thoracic aortic atherosclerosis 10/17/2022    CT chest    Thyroid disease     Previously on pharmacologic Tx    Tinea pedis of right foot 05/21/2019    Tobacco use disorder     Type 2 diabetes mellitus with diabetic peripheral angiopathy without gangrene     A1c 5.1% (3/2023)    Vitamin B12 deficiency 03/24/2023     Past Surgical History:   Procedure Laterality Date    ANGIOGRAM, CORONARY, WITH LEFT HEART CATHETERIZATION  08/25/2022    Procedure: Angiogram, Coronary, with Left Heart Cath;  Surgeon: Mai Deleon MD;  Location: [facility] CATH;  Service: Cardiology;;    APPENDECTOMY      BONE MARROW ASPIRATION Left 08/21/2020    Procedure: ASPIRATION, BONE MARROW;  Surgeon: Lex Kathleen MD;  Location: [facility] OR;  Service: Oncology;  Laterality: Left;    CHOLECYSTECTOMY      CLOSURE OF WOUND Right 09/09/2019    Procedure: CLOSURE, WOUND;  Surgeon: Li Kathleen DPM;  Location: [facility] OR;  Service: Podiatry;  Laterality: Right;     COLONOSCOPY N/A 12/10/2019    Procedure: COLONOSCOPY;  Surgeon: Ryan Lucas MD;  Location: [facility] ENDO;  Service: Endoscopy;  Laterality: N/A;    COLONOSCOPY N/A 12/13/2019    Procedure: COLONOSCOPY;  Surgeon: Ryan Lucas MD;  Location: [facility] ENDO;  Service: Endoscopy;  Laterality: N/A;    CORONARY STENT PLACEMENT      ESOPHAGOGASTRODUODENOSCOPY N/A 12/10/2019    Procedure: EGD (ESOPHAGOGASTRODUODENOSCOPY);  Surgeon: Ryan Lucas MD;  Location: [facility] ENDO;  Service: Endoscopy;  Laterality: N/A;    ESOPHAGOGASTRODUODENOSCOPY N/A 11/03/2022    Procedure: EGD (ESOPHAGOGASTRODUODENOSCOPY);  Surgeon: Ryan Lucas MD;  Location: [facility];  Service: Endoscopy;  Laterality: N/A;    INJECTION OF ANESTHETIC AGENT AROUND MEDIAL BRANCH NERVES INNERVATING LUMBAR FACET JOINT Bilateral 7/25/2023    Procedure: Block-nerve-medial branch-lumbar;  Surgeon: Napoleon Grimaldo MD;  Location: [facility] OR;  Service: Pain Management;  Laterality: Bilateral;  L3,4,5 MBB    INJECTION OF ANESTHETIC AGENT AROUND MEDIAL BRANCH NERVES INNERVATING LUMBAR FACET JOINT Bilateral 8/25/2023    Procedure: Block-nerve-medial branch-lumbar;  Surgeon: Napoleon Grimaldo MD;  Location: [facility] OR;  Service: Anesthesiology;  Laterality: Bilateral;  L3,4,5 MBB #2    LAPAROSCOPIC CHOLECYSTECTOMY N/A 01/11/2023    Procedure: CHOLECYSTECTOMY, LAPAROSCOPIC;  Surgeon: Laith Davis MD;  Location: [facility];  Service: General;  Laterality: N/A;    LEFT HEART CATHETERIZATION Left 08/25/2022    Procedure: Left heart cath;  Surgeon: Mai Deleon MD;  Location: [facility] CATH;  Service: Cardiology;  Laterality: Left;    RADIOFREQUENCY ABLATION OF LUMBAR MEDIAL BRANCH NERVE AT SINGLE LEVEL Bilateral 10/31/2023    Procedure: Radiofrequency Ablation, Nerve, Spinal, Lumbar, Medial Branch, 1 Level;  Surgeon: Napoleon Grimaldo MD;  Location: [facility] OR;  Service: Anesthesiology;  Laterality: Bilateral;  L3,4,5 RFA    right heel surgery      SHOULDER ARTHROSCOPY  Left     UPPER GASTROINTESTINAL ENDOSCOPY       Social History     Tobacco Use    Smoking status: Every Day     Current packs/day: 0.50     Average packs/day: 1.5 packs/day for 44.9 years (67.4 ttl pk-yrs)     Types: Cigarettes     Start date: 1979    Smokeless tobacco: Never    Tobacco comments:     Age Started 14    Substance Use Topics    Alcohol use: No    Drug use: Yes     Types: Marijuana     Comment: ocasionally - once every 6 months        Review of Systems:    As noted in HPI in addition      REVIEW OF SYSTEMS  Review of Systems   Constitutional: Negative for decreased appetite, diaphoresis, night sweats, weight gain and weight loss.   HENT:  Negative for nosebleeds and odynophagia.    Eyes:  Negative for double vision and photophobia.   Cardiovascular:  Positive for dyspnea on exertion. Negative for chest pain, claudication, cyanosis, irregular heartbeat, leg swelling, near-syncope, orthopnea, palpitations, paroxysmal nocturnal dyspnea and syncope.   Respiratory:  Negative for cough, hemoptysis, shortness of breath and wheezing.    Hematologic/Lymphatic: Negative for adenopathy.   Skin:  Negative for flushing, skin cancer and suspicious lesions.   Musculoskeletal:  Negative for gout, myalgias and neck pain.   Gastrointestinal:  Negative for abdominal pain, heartburn, hematemesis and hematochezia.   Genitourinary:  Negative for bladder incontinence, hesitancy and nocturia.   Neurological:  Negative for focal weakness, headaches, light-headedness and paresthesias.   Psychiatric/Behavioral:  Negative for memory loss and substance abuse.               Objective:        Vitals:    11/15/23 1039   BP: 102/65   Pulse: 69       Lab Results   Component Value Date    WBC 13.15 (H) 04/21/2023    HGB 13.5 (L) 04/21/2023    HCT 40.6 04/21/2023     04/21/2023    CHOL 83 (L) 03/05/2023    TRIG 58 03/05/2023    HDL 29 (L) 03/05/2023    ALT 9 10/03/2023    AST 11 (L) 10/03/2023     10/23/2023    K 3.7  10/23/2023     04/21/2023    CREATININE 0.90 10/23/2023    BUN 11 10/23/2023    CO2 23 10/23/2023    TSH 1.140 03/05/2023    PSA 1.4 05/02/2023    INR 1.1 04/05/2023    HGBA1C 4.9 07/13/2023        ECHOCARDIOGRAM RESULTS  Results for orders placed during the hospital encounter of 03/04/23    Echo    Interpretation Summary  · The left ventricle is normal in size with concentric remodeling and normal systolic function.  · Moderate left atrial enlargement.  · Grade I left ventricular diastolic dysfunction.  · The estimated PA systolic pressure is 27 mmHg.  · Normal right ventricular size with normal right ventricular systolic function.  · Normal central venous pressure (3 mmHg).  · The estimated ejection fraction is 60%.  · Mild right atrial enlargement.  · There is moderate-to-severe aortic valve stenosis.  · Aortic valve area is 1.54 cm2; peak velocity is 4.00 m/s; mean gradient is 33 mmHg.  · Mild-to-moderate mitral regurgitation.  · The mean diastolic gradient across the mitral valve is 8 mmHg at a heart rate of bpm.  · There is moderate mitral stenosis.  · Mild-to-moderate aortic regurgitation.  · There is no evidence of intracardiac shunting.        CURRENT/PREVIOUS VISIT EKG  Results for orders placed or performed in visit on 11/08/23   IN OFFICE EKG 12-LEAD (to [place])    Collection Time: 11/08/23  3:41 PM    Narrative    Test Reason : Z01.818,    Vent. Rate : 079 BPM     Atrial Rate : 079 BPM     P-R Int : 144 ms          QRS Dur : 100 ms      QT Int : 404 ms       P-R-T Axes : 014 013 044 degrees     QTc Int : 463 ms    Normal sinus rhythm  Voltage criteria for left ventricular hypertrophy  Abnormal ECG  When compared with ECG of 21-APR-2023 16:57,  T wave inversion no longer evident in Inferior leads  Nonspecific T wave abnormality no longer evident in Lateral leads  Confirmed by Mai Deleon MD ([id number]) on 11/8/2023 4:59:21 PM    Referred By:             Confirmed By:Mai Deleon MD     No valid procedures  specified.   Results for orders placed during the hospital encounter of 01/31/23    Nuclear Stress - Cardiology Interpreted    Interpretation Summary    Normal myocardial perfusion scan. There is no evidence of myocardial ischemia or infarction.    The gated perfusion images showed an ejection fraction of 70% post stress.    There is normal wall motion post stress.    LV cavity size is normal at stress.    The ECG portion of the study is uninterpretable.    The patient reported no chest pain during the stress test.    There were no arrhythmias during stress.    This is a low risk study.    No valid procedures specified.    PHYSICAL EXAM  CONSTITUTIONAL: Well built, well nourished in no apparent distress  NECK: no carotid bruit, no JVD  LUNGS:  Mildly decreased breath sounds bilaterally.  No rhonchi.  CHEST WALL: no tenderness,  HEART: regular rate and rhythm, S1, S2 distant, grade 2/6 crescendo decrescendo murmur aortic area.  ABDOMEN: soft, non-tender; bowel sounds normal; no masses,  no organomegaly  EXTREMITIES: Extremities normal, no edema, no calf tenderness noted plus two femoral and popliteal pulses diminished pedal pulses.  NEURO: AAO X 3    I HAVE REVIEWED :    The vital signs, nurses notes, and all the pertinent radiology and labs.         Current Outpatient Medications   Medication Instructions    AJOVY AUTOINJECTOR 225 mg, Subcutaneous, Every 28 days    albuterol (PROVENTIL/VENTOLIN HFA) 90 mcg/actuation inhaler 2 puffs, Inhalation, Every 6 hours PRN    albuterol-ipratropium (DUO-NEB) 2.5 mg-0.5 mg/3 mL nebulizer solution 3 mLs, Nebulization, Every 6 hours PRN, Rescue    amLODIPine (NORVASC) 2.5 mg, Oral, Daily    aspirin (ECOTRIN) 81 mg, Oral, Daily    atorvastatin (LIPITOR) 40 mg, Oral, Daily    clonazePAM (KLONOPIN) 0.5 mg, Oral, Daily PRN    clopidogreL (PLAVIX) 75 mg, Oral, Daily    cyanocobalamin 1,000 mcg/mL injection 1 ml sq daily x 5 days, then 1 ml sq weekly x 4 weeks then q monthly -  will  need to go to PCP    dicyclomine (BENTYL) 20 mg, Oral, Every 6 hours PRN    diphenoxylate-atropine 2.5-0.025 mg (LOMOTIL) 2.5-0.025 mg per tablet 1 tablet, Oral    EPINEPHrine (EPIPEN) 0.3 mg/0.3 mL AtIn No dose, route, or frequency recorded.    fluticasone-umeclidin-vilanter (TRELEGY ELLIPTA) 100-62.5-25 mcg DsDv 1 puff, Inhalation, Daily    GAVILYTE-C 240-22.72-6.72 -5.84 gram SolR Oral    hydrOXYzine HCL (ATARAX) 10 MG Tab 1 tablet as needed Orally twice a day    hydrOXYzine pamoate (VISTARIL) 25 mg, Oral, 4 times daily    Lactobac. rhamnosus GG-inulin 10 billion cell -200 mg Chew 1 tablet, Oral, Every morning    lamoTRIgine (LAMICTAL) 200 mg, Oral, Daily    meclizine (ANTIVERT) 12.5 mg, Oral, 3 times daily PRN    mirtazapine (REMERON) 15 mg, Oral, Nightly    nicotine (NICODERM CQ) 14 mg/24 hr 1 patch, Transdermal, Daily    nicotine polacrilex 4 MG Lo Take up to 8 pieces daily as needed    nitroGLYCERIN (NITROSTAT) 0.4 mg, Sublingual, Every 5 min PRN    NURTEC 75 mg, Oral, Daily PRN, Place ODT tablet on the tongue; alternatively the ODT tablet may be placed under the tongue    ondansetron (ZOFRAN-ODT) 4 MG TbDL 1 tablet, Oral, Every 8 hours PRN    pantoprazole (PROTONIX) 40 mg, Oral, Daily    pregabalin (LYRICA) 50 mg, Oral, 3 times daily    ranolazine (RANEXA) 500 mg, Oral, 2 times daily    sodium chloride 1,000 mg TbSO tablet tbso 1 tablet, Misc.(Non-Drug; Combo Route), 3 times daily    tadalafiL (CIALIS) 20 mg, Oral, Daily    tiotropium-olodateroL (STIOLTO RESPIMAT) 2.5-2.5 mcg/actuation Mist No dose, route, or frequency recorded.    topiramate (TOPAMAX) 200 mg, Oral, Nightly    ziprasidone (GEODON) 160 mg, Oral, Nightly          Assessment:   Coronary artery disease.  Stable noninvasive cardiac assessment for ischemic heart disease, 01/2023.  EF normal.  Moderate AS.    History of CVA.  Stable MRI.  10/2023.  No high-grade extracranial carotid disease.  CTA head and neck 03/24/2023.    Diabetes mellitus,  hypertension, dyslipidemia, ongoing tobacco use.      Plan:   Low to moderate risk cleared for surgery.  Okay to hold Plavix aspirin.  Risk factor modification.  Tobacco cessation.    6m          No follow-ups on file.

## 2023-11-15 NOTE — TELEPHONE ENCOUNTER
----- Message from Ana Vázquez sent at 11/15/2023 11:35 AM CST -----  Regarding: Needs Medical Advice  Contact: patient at [phone]  Type: Needs Medical Advice  Who Called:  patient at [phone]    Additional Information: would like a call to discuss procedure on 11/17. Please call and advise. Thank you    Returned pt's call. Spoke with pt regarding surgery time. Pt stated surgery had called him already and let him know what time to be there.

## 2023-11-15 NOTE — TELEPHONE ENCOUNTER
----- Message from Manfred Quan sent at 11/15/2023  1:18 PM CST -----    Type: Needs Medical Advice  Who Called:  pt  Symptoms (please be specific):  pt said he have some questions about his procedure--please call and advise  Best Call Back Number:[phone]    Additional Information: thank you

## 2023-11-15 NOTE — TELEPHONE ENCOUNTER
----- Message from Juan Miguel Tabor sent at 11/15/2023 12:02 PM CST -----  Regarding: upcoming procedure  Type:  Needs Medical Advice    Who Called: Pt        Would the patient rather a call back or a response via [facility]? Call back    Best Call Back Number: [phone]      Additional Information: Sts he needs to talk to someone about his upcoming procedure on the 22nd.  Please advise -- Thank you

## 2023-11-15 NOTE — TELEPHONE ENCOUNTER
Returned call to pt. Pt wanting to know if his procedure is at [facility] or [facility]. Advised pt [facility]. Pt verbalized understanding. No further issues noted

## 2023-11-15 NOTE — TELEPHONE ENCOUNTER
----- Message from Mariah Zhu RN sent at 11/15/2023 11:22 AM CST -----  Please see Dr. Aranda's note form today for clearance.    Printed clinic notes,clearance for surgery and faxed to surgery department.

## 2023-11-16 ENCOUNTER — ANESTHESIA EVENT (OUTPATIENT)
Dept: SURGERY | Facility: HOSPITAL | Age: 58
End: 2023-11-16
Payer: MEDICARE

## 2023-11-17 ENCOUNTER — ANESTHESIA (OUTPATIENT)
Dept: SURGERY | Facility: HOSPITAL | Age: 58
End: 2023-11-17
Payer: MEDICARE

## 2023-11-17 ENCOUNTER — HOSPITAL ENCOUNTER (OUTPATIENT)
Facility: HOSPITAL | Age: 58
Discharge: HOME | End: 2023-11-17
Attending: STUDENT IN AN ORGANIZED HEALTH CARE EDUCATION/TRAINING PROGRAM | Admitting: STUDENT IN AN ORGANIZED HEALTH CARE EDUCATION/TRAINING PROGRAM
Payer: MEDICARE

## 2023-11-17 ENCOUNTER — TELEPHONE (OUTPATIENT)
Dept: SURGERY | Facility: HOSPITAL | Age: 58
End: 2023-11-17
Payer: MEDICARE

## 2023-11-17 ENCOUNTER — TELEPHONE (OUTPATIENT)
Dept: GASTROENTEROLOGY | Facility: CLINIC | Age: 58
End: 2023-11-17
Payer: MEDICARE

## 2023-11-17 VITALS
WEIGHT: 157 LBS | HEART RATE: 67 BPM | BODY MASS INDEX: 22.48 KG/M2 | SYSTOLIC BLOOD PRESSURE: 127 MMHG | HEIGHT: 70 IN | OXYGEN SATURATION: 98 % | RESPIRATION RATE: 20 BRPM | TEMPERATURE: 97 F | DIASTOLIC BLOOD PRESSURE: 82 MMHG

## 2023-11-17 DIAGNOSIS — M77.31 HEEL SPUR, RIGHT: ICD-10-CM

## 2023-11-17 DIAGNOSIS — M79.89 MASS OF SOFT TISSUE OF RIGHT LOWER EXTREMITY: ICD-10-CM

## 2023-11-17 DIAGNOSIS — Z98.890 POST-OPERATIVE STATE: ICD-10-CM

## 2023-11-17 DIAGNOSIS — R22.40 MASS OF FOOT, UNSPECIFIED LATERALITY: Primary | ICD-10-CM

## 2023-11-17 LAB — GLUCOSE SERPL-MCNC: 81 MG/DL (ref 70–110)

## 2023-11-17 PROCEDURE — 82962 GLUCOSE BLOOD TEST: CPT | Mod: PO | Performed by: STUDENT IN AN ORGANIZED HEALTH CARE EDUCATION/TRAINING PROGRAM

## 2023-11-17 PROCEDURE — 63600175 PHARM REV CODE 636 W HCPCS: Mod: PO | Performed by: ANESTHESIOLOGY

## 2023-11-17 PROCEDURE — D9220A PRA ANESTHESIA: ICD-10-PCS | Mod: CRNA,,, | Performed by: NURSE ANESTHETIST, CERTIFIED REGISTERED

## 2023-11-17 PROCEDURE — 63600175 PHARM REV CODE 636 W HCPCS: Mod: PO | Performed by: NURSE ANESTHETIST, CERTIFIED REGISTERED

## 2023-11-17 PROCEDURE — 63600175 PHARM REV CODE 636 W HCPCS: Mod: PO | Performed by: STUDENT IN AN ORGANIZED HEALTH CARE EDUCATION/TRAINING PROGRAM

## 2023-11-17 PROCEDURE — 71000015 HC POSTOP RECOV 1ST HR: Mod: PO | Performed by: STUDENT IN AN ORGANIZED HEALTH CARE EDUCATION/TRAINING PROGRAM

## 2023-11-17 PROCEDURE — 37000008 HC ANESTHESIA 1ST 15 MINUTES: Mod: PO | Performed by: STUDENT IN AN ORGANIZED HEALTH CARE EDUCATION/TRAINING PROGRAM

## 2023-11-17 PROCEDURE — D9220A PRA ANESTHESIA: ICD-10-PCS | Mod: ANES,,, | Performed by: ANESTHESIOLOGY

## 2023-11-17 PROCEDURE — 36000707: Mod: PO | Performed by: STUDENT IN AN ORGANIZED HEALTH CARE EDUCATION/TRAINING PROGRAM

## 2023-11-17 PROCEDURE — 28119 REMOVAL OF HEEL SPUR: CPT | Mod: RT,,, | Performed by: STUDENT IN AN ORGANIZED HEALTH CARE EDUCATION/TRAINING PROGRAM

## 2023-11-17 PROCEDURE — 36000706: Mod: PO | Performed by: STUDENT IN AN ORGANIZED HEALTH CARE EDUCATION/TRAINING PROGRAM

## 2023-11-17 PROCEDURE — D9220A PRA ANESTHESIA: Mod: CRNA,,, | Performed by: NURSE ANESTHETIST, CERTIFIED REGISTERED

## 2023-11-17 PROCEDURE — 25000003 PHARM REV CODE 250: Mod: PO | Performed by: NURSE ANESTHETIST, CERTIFIED REGISTERED

## 2023-11-17 PROCEDURE — 25000003 PHARM REV CODE 250: Mod: PO | Performed by: STUDENT IN AN ORGANIZED HEALTH CARE EDUCATION/TRAINING PROGRAM

## 2023-11-17 PROCEDURE — 71000033 HC RECOVERY, INTIAL HOUR: Mod: PO | Performed by: STUDENT IN AN ORGANIZED HEALTH CARE EDUCATION/TRAINING PROGRAM

## 2023-11-17 PROCEDURE — 27201423 OPTIME MED/SURG SUP & DEVICES STERILE SUPPLY: Mod: PO | Performed by: STUDENT IN AN ORGANIZED HEALTH CARE EDUCATION/TRAINING PROGRAM

## 2023-11-17 PROCEDURE — 37000009 HC ANESTHESIA EA ADD 15 MINS: Mod: PO | Performed by: STUDENT IN AN ORGANIZED HEALTH CARE EDUCATION/TRAINING PROGRAM

## 2023-11-17 PROCEDURE — 28119 PR REMOVAL OF HEEL SPUR: ICD-10-PCS | Mod: RT,,, | Performed by: STUDENT IN AN ORGANIZED HEALTH CARE EDUCATION/TRAINING PROGRAM

## 2023-11-17 PROCEDURE — D9220A PRA ANESTHESIA: Mod: ANES,,, | Performed by: ANESTHESIOLOGY

## 2023-11-17 RX ORDER — LIDOCAINE HYDROCHLORIDE 10 MG/ML
1 INJECTION, SOLUTION EPIDURAL; INFILTRATION; INTRACAUDAL; PERINEURAL ONCE
Status: ACTIVE | OUTPATIENT
Start: 2023-11-17

## 2023-11-17 RX ORDER — BUPIVACAINE HYDROCHLORIDE 2.5 MG/ML
INJECTION, SOLUTION EPIDURAL; INFILTRATION; INTRACAUDAL
Status: DISCONTINUED | OUTPATIENT
Start: 2023-11-17 | End: 2023-11-17 | Stop reason: HOSPADM

## 2023-11-17 RX ORDER — MIDAZOLAM HYDROCHLORIDE 1 MG/ML
INJECTION, SOLUTION INTRAMUSCULAR; INTRAVENOUS
Status: DISCONTINUED | OUTPATIENT
Start: 2023-11-17 | End: 2023-11-17

## 2023-11-17 RX ORDER — PROPOFOL 10 MG/ML
VIAL (ML) INTRAVENOUS CONTINUOUS PRN
Status: DISCONTINUED | OUTPATIENT
Start: 2023-11-17 | End: 2023-11-17

## 2023-11-17 RX ORDER — ONDANSETRON 2 MG/ML
INJECTION INTRAMUSCULAR; INTRAVENOUS
Status: DISCONTINUED | OUTPATIENT
Start: 2023-11-17 | End: 2023-11-17

## 2023-11-17 RX ORDER — PHENYLEPHRINE HYDROCHLORIDE 10 MG/ML
INJECTION INTRAVENOUS
Status: DISCONTINUED | OUTPATIENT
Start: 2023-11-17 | End: 2023-11-17

## 2023-11-17 RX ORDER — OXYCODONE HYDROCHLORIDE 5 MG/1
5 TABLET ORAL
Status: DISCONTINUED | OUTPATIENT
Start: 2023-11-17 | End: 2024-04-03

## 2023-11-17 RX ORDER — PROPOFOL 10 MG/ML
VIAL (ML) INTRAVENOUS
Status: DISCONTINUED | OUTPATIENT
Start: 2023-11-17 | End: 2023-11-17

## 2023-11-17 RX ORDER — OXYCODONE AND ACETAMINOPHEN 5; 325 MG/1; MG/1
1 TABLET ORAL EVERY 6 HOURS PRN
Qty: 28 TABLET | Refills: 0 | Status: SHIPPED | OUTPATIENT
Start: 2023-11-17 | End: 2023-11-22

## 2023-11-17 RX ORDER — SODIUM CHLORIDE, SODIUM LACTATE, POTASSIUM CHLORIDE, CALCIUM CHLORIDE 600; 310; 30; 20 MG/100ML; MG/100ML; MG/100ML; MG/100ML
INJECTION, SOLUTION INTRAVENOUS CONTINUOUS
Status: DISPENSED | OUTPATIENT
Start: 2023-11-17

## 2023-11-17 RX ORDER — HYDROCODONE BITARTRATE AND ACETAMINOPHEN 5; 325 MG/1; MG/1
1 TABLET ORAL EVERY 4 HOURS PRN
Status: CANCELLED | OUTPATIENT
Start: 2023-11-17

## 2023-11-17 RX ORDER — DIPHENHYDRAMINE HYDROCHLORIDE 50 MG/ML
12.5 INJECTION INTRAMUSCULAR; INTRAVENOUS EVERY 6 HOURS PRN
Status: ACTIVE | OUTPATIENT
Start: 2023-11-17

## 2023-11-17 RX ORDER — LIDOCAINE HYDROCHLORIDE 10 MG/ML
INJECTION INFILTRATION; PERINEURAL
Status: DISCONTINUED | OUTPATIENT
Start: 2023-11-17 | End: 2023-11-17 | Stop reason: HOSPADM

## 2023-11-17 RX ORDER — HYDROMORPHONE HYDROCHLORIDE 2 MG/ML
0.2 INJECTION, SOLUTION INTRAMUSCULAR; INTRAVENOUS; SUBCUTANEOUS EVERY 5 MIN PRN
Status: DISCONTINUED | OUTPATIENT
Start: 2023-11-17 | End: 2024-04-03

## 2023-11-17 RX ORDER — MEPERIDINE HYDROCHLORIDE 50 MG/ML
12.5 INJECTION INTRAMUSCULAR; INTRAVENOUS; SUBCUTANEOUS ONCE AS NEEDED
Status: ACTIVE | OUTPATIENT
Start: 2023-11-17 | End: 2023-11-18

## 2023-11-17 RX ORDER — CLINDAMYCIN PHOSPHATE 900 MG/50ML
900 INJECTION, SOLUTION INTRAVENOUS
Status: COMPLETED | OUTPATIENT
Start: 2023-11-17 | End: 2023-11-17

## 2023-11-17 RX ORDER — FENTANYL CITRATE 50 UG/ML
25 INJECTION, SOLUTION INTRAMUSCULAR; INTRAVENOUS EVERY 5 MIN PRN
Status: DISCONTINUED | OUTPATIENT
Start: 2023-11-17 | End: 2024-04-03

## 2023-11-17 RX ORDER — MUPIROCIN 20 MG/G
OINTMENT TOPICAL
Status: DISCONTINUED | OUTPATIENT
Start: 2023-11-17 | End: 2023-11-17 | Stop reason: HOSPADM

## 2023-11-17 RX ORDER — LIDOCAINE HYDROCHLORIDE 20 MG/ML
INJECTION INTRAVENOUS
Status: DISCONTINUED | OUTPATIENT
Start: 2023-11-17 | End: 2023-11-17

## 2023-11-17 RX ORDER — ACETAMINOPHEN 10 MG/ML
INJECTION, SOLUTION INTRAVENOUS
Status: DISCONTINUED | OUTPATIENT
Start: 2023-11-17 | End: 2023-11-17

## 2023-11-17 RX ORDER — FENTANYL CITRATE 50 UG/ML
INJECTION, SOLUTION INTRAMUSCULAR; INTRAVENOUS
Status: DISCONTINUED | OUTPATIENT
Start: 2023-11-17 | End: 2023-11-17

## 2023-11-17 RX ADMIN — PHENYLEPHRINE HYDROCHLORIDE 100 MCG: 10 INJECTION INTRAVENOUS at 10:11

## 2023-11-17 RX ADMIN — PROPOFOL 75 MCG/KG/MIN: 10 INJECTION, EMULSION INTRAVENOUS at 09:11

## 2023-11-17 RX ADMIN — LIDOCAINE HYDROCHLORIDE 50 MG: 20 INJECTION INTRAVENOUS at 09:11

## 2023-11-17 RX ADMIN — CLINDAMYCIN IN 5 PERCENT DEXTROSE 900 MG: 18 INJECTION, SOLUTION INTRAVENOUS at 07:11

## 2023-11-17 RX ADMIN — ONDANSETRON 4 MG: 2 INJECTION, SOLUTION INTRAMUSCULAR; INTRAVENOUS at 09:11

## 2023-11-17 RX ADMIN — FENTANYL CITRATE 25 MCG: 50 INJECTION, SOLUTION INTRAMUSCULAR; INTRAVENOUS at 09:11

## 2023-11-17 RX ADMIN — PROPOFOL 50 MG: 10 INJECTION, EMULSION INTRAVENOUS at 09:11

## 2023-11-17 RX ADMIN — ACETAMINOPHEN 1000 MG: 10 INJECTION, SOLUTION INTRAVENOUS at 09:11

## 2023-11-17 RX ADMIN — SODIUM CHLORIDE, POTASSIUM CHLORIDE, SODIUM LACTATE AND CALCIUM CHLORIDE: 600; 310; 30; 20 INJECTION, SOLUTION INTRAVENOUS at 07:11

## 2023-11-17 RX ADMIN — MIDAZOLAM HYDROCHLORIDE 2 MG: 1 INJECTION, SOLUTION INTRAMUSCULAR; INTRAVENOUS at 09:11

## 2023-11-17 NOTE — TELEPHONE ENCOUNTER
----- Message from Deanne Villalobos sent at 11/17/2023  2:22 PM CST -----  Regarding: advice  Contact: DEANNE MICHAELS [[id number]]    Type: Needs Medical Advice  Who Called:  Deanne    Symptoms (please be specific):  na    How long has patient had these symptoms:  na    Pharmacy name and phone #:  na    Best Call Back Number: [phone]    Additional Information: Pt can't access portal to get his prep instructions. Please call to advise.

## 2023-11-17 NOTE — TELEPHONE ENCOUNTER
Attempted to reach patient, call could not be accepted at this time, prep instructions sent to patient portal.        MiraLAX Prep      ALERT: Please notify the clinic if you start any blood thinners as does affect your procedure  NOTE: NO ASPIRIN or ibuprofen products for the morning of your procedure. This includes Motrin, ALEVE, Advil, or other arthritis type medications. TYLENOL IS ALLOWED.  AVOID the following foods for 7 - 10 days prior to the procedure: Beans, peas, corn, nuts, popcorn, or tomatoes. If you forget we will not cancel your procedure.      **You will need to purchase over-the-counter  -    4 Dulcolax laxative tablets - any brand is fine   -    2 Liter Bottle or 64 oz. of any clear liquid - see list below       (Noncarbonated, Nothing RED or PURPLE)   -    MiraLAX (255 gram / 8 oz ) bottle of powder     The evening before your prep day, at bedtime, take 2 Dulcolax tablets    ONE DAY BEFORE THE PROCEDURE (PREP DAY):   1. You will be on a clear liquid diet the entire day before the procedure.  2. At 10 am you will take 2 more Dulcolax laxative tablets  3. At 5 pm mix one 8oz. glass of clear liquid with one capful of Miralax and drink it entirely.  4. Repeat every 15 minutes until you have finished the 2 liter/ 64 oz. of clear fluid.      It's about 8 - 10 glasses of fluid. You may have some MiraLAX left over.      CLEAR LIQUIDS INCLUDE: Coffee (no cream), tea, apple juice, white grape juice, limit carbonated drinks to 2 in 24 hours, boullion, chicken broth, beef broth, Gatorade, Mark Anthony-Aid, jello, popsicles, snowballs, Italian ice, and hard candy. NO ALCOHOL. NOTHING RED OR PURPLE.    It is important to drink plenty of clear fluids throughout the day prior to the procedure while doing this prep. After midnight  WATER ONLY is allowed, then nothing by mouth 4 hours prior to the procedure time.    A preop nurse will call the day prior to your procedure to discuss medications you can and cannot take the  morning of your procedure. Since sedation is used, you must have someone drive you home. It is [facility] policy that you may not take a taxi home after sedation.         NOTE:  ·         Dulaglutide (Trulicity) (weekly) - hold 8 days  ·         Exenatide extended release (Bydureon bcise) (weekly) - hold 8 days  ·         Semaglutide (Ozempic) (weekly) - hold 8 days  Tirepatide (Mounjaro) (weekly) - hold 8 days  Wegovy (weekly) - hold 8 days  ·         Exenatide (Byetta) (twice daily)- hold DAY OF PROCEDURE  ·         Liraglutide (Victoza, Saxenda) (daily)- hold DAY OF PROCEDURE  ·         Lixisenatide (Adlyxin) (daily)- hold DAY OF PROCEDURE  ·         Semaglutide (Rybelsus) (daily) -hold DAY OF PROCEDURE

## 2023-11-17 NOTE — ANESTHESIA PREPROCEDURE EVALUATION
11/17/2023  Ryan Crane is a 58 y.o., male.      Pre-op Assessment    I have reviewed the Patient Summary Reports.     I have reviewed the Nursing Notes. I have reviewed the NPO Status.   I have reviewed the Medications.     Review of Systems  Anesthesia Hx:             Denies Family Hx of Anesthesia complications.    Denies Personal Hx of Anesthesia complications.                    Cardiovascular:     Hypertension Valvular problems/Murmurs, AS, AI Past MI CAD   CABG/stent       PVD    ECG has been reviewed.                          Pulmonary:   COPD, moderate     Sleep Apnea, CPAP                Hepatic/GI:     GERD             Neurological:   CVA, residual symptoms   Headaches Seizures          Peripheral Neuropathy                          Endocrine:  Diabetes, poorly controlled, type 2           Psych:  Psychiatric History                  Physical Exam  General: Well nourished, Cooperative and Flat Affect    Airway:  Mallampati: II   Mouth Opening: Normal  TM Distance: Normal  Tongue: Normal  Neck ROM: Normal ROM    Dental:  Dentures    Chest/Lungs:  Normal Respiratory Rate    Heart:  Rate: Normal  Rhythm: Regular Rhythm        Anesthesia Plan  Type of Anesthesia, risks & benefits discussed:    Anesthesia Type: MAC  Intra-op Monitoring Plan: Standard ASA Monitors  Post Op Pain Control Plan: multimodal analgesia  Induction:  IV  Informed Consent: Informed consent signed with the Patient and all parties understand the risks and agree with anesthesia plan.  All questions answered.   ASA Score: 3    Ready For Surgery From Anesthesia Perspective.     .

## 2023-11-17 NOTE — OP NOTE
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    Anesthesia: Local MAC    Operative Findings (including complications, if any):     Description of Technical Procedures: The patient was brought to the operating room on a stretcher and transferred to the OR table. in a  supine position. Following the successful induction of MAC: tourniquet was applied to the right ankle and local block was given with 20 ml of 1% lidocaine and 0.5% marcaine plain. The right lower extremity was prepped and draped in a sterile manner.     A timeout was performed confirming the patient's identification, procedure, surgical site, medications and allergies. All were in agreement.    The tourniquet was inflated to 250mmHg.    Attention to the right heel. There are obvious IPKs on the plantar right heel. Fluoroscopy was used to triangulate the heel spur and an incision was made over the spur. The dissection was deepened down until the plantar fascia was visualized. The plantar fascia was resected and good release was appreciated of the medial and central band. The bone spur was visualized. An osteotome was used to  resect the spur. A small portion of the bone spur was attached to the plantar fascia was retracted into the foot and was unable to be removed without further traumatic dissection. The decision was made to leave the small fragment. A power rasp was used to rasp the insertion of the plantar fascia until the heel was smooth.    The wound was then flushed with sterile saline solution and the wound was closed with 3-0 monocryl and 3-0 nylon.  The tourniquet was deflated. The incision site was dressed with 4x4s, kerlix, ACE, xeroform, and antibiotic ointment .    The patient was transferred to the recovery room with vital signs stable. Following a period of post op monitoring, the patient will be discharged home with the following post op instructions:   1. Keep dressing dry and intact until Podiatry follow up.   2.Weight bearing status:  WB to heel for short distances and no standing.  3. All necessary prescriptions ordered and medical management will continue.   4. Contact Podiatry for all post op follow up care and if any problems arise.      Significant Surgical Tasks Conducted by the Assistant(s), if Applicable: n/a    Estimated Blood Loss (EBL): <3mL           Implants: * No implants in log *    Specimens:   Specimen (24h ago, onward)      None                    Condition: Good    Disposition: PACU - hemodynamically stable.    Attestation: I was present and scrubbed for the entire procedure.    Discharge Note    OUTCOME: Patient tolerated treatment/procedure well without complication and is now ready for discharge.    DISPOSITION: Home or Self Care    FINAL DIAGNOSIS:  <principal problem not specified>    FOLLOWUP: In clinic    DISCHARGE INSTRUCTIONS:    Discharge Procedure Orders   Diet general     Lifting restrictions     Ice to affected area     Keep surgical extremity elevated     Sponge bath only until clinic visit     Call MD for:  extreme fatigue     Call MD for:  persistent dizziness or light-headedness      Call MD for:  hives     Call MD for:  redness, tenderness, or signs of infection (pain, swelling, redness, odor or green/yellow discharge around incision site)     Call MD for:  difficulty breathing, headache or visual disturbances     Call MD for:  severe uncontrolled pain     Call MD for:  persistent nausea and vomiting     Call MD for:  temperature >100.4     Leave dressing on - Keep it clean, dry, and intact until clinic visit     Shower on day dressing removed (No bath)     Activity as tolerated     Weight bearing restrictions (specify)   Order Comments: Weightbearing in boot for short distances is OK. No standing for long periods of time.

## 2023-11-17 NOTE — TELEPHONE ENCOUNTER
Good afternoon,     Mr. Crane is scheduled for an EGD/Colonoscopy on Wednesday, 11/22. He states he has lost his prep instructions. He takes plavix and aspirin. Please contact him to discuss.     Thank you!

## 2023-11-17 NOTE — TRANSFER OF CARE
"Anesthesia Transfer of Care Note    Patient: Ryan Crane    Procedure(s) Performed: Procedure(s) (LRB):  EXCISION, MASS, FOOT (Right)    Patient location: PACU    Anesthesia Type: MAC    Transport from OR: Transported from OR on room air with adequate spontaneous ventilation    Post pain: adequate analgesia    Post assessment: no apparent anesthetic complications    Post vital signs: stable    Level of consciousness: awake, alert and oriented    Nausea/Vomiting: no nausea/vomiting    Complications: none    Transfer of care protocol was followed      Last vitals: Visit Vitals  BP (!) 99/55 (BP Location: Right arm, Patient Position: Lying)   Pulse 71   Temp 36.3 °C (97.3 °F)   Resp 16   Ht 5' 10" (1.778 m)   Wt 71.2 kg (157 lb)   SpO2 98%   BMI 22.53 kg/m²     "

## 2023-11-17 NOTE — DISCHARGE INSTRUCTIONS
FOOT SURGERY  After surgery:    DO:  Keep leg elevated until first post operative visit.  Keep ice pack on foot for 20 minutes each hour while awake for next 24-48 hours.  Keep dressing clean and dry. Do not change the bandages.  Advance diet as tolerated.   Check circulation frequently in toes by pressing down on toenail. Nail should turn white and then pink WITHIN 3 SECONDS when released.  Wear surgical boot  Do not walk without boot.  Resume home medications.    DO NOT:  Do not remove your dressing.  Do not get dressing wet.  Do not drive until released by your doctor.  Do not take additional tylenol/acetaminophen while taking narcotic pain medication that contains tylenol/acetaminophen.     CALL PHYSICIAN FOR:  Burning, or numbness of the toes not relieved by elevation of the leg.  Pale or cold toes.  Blue colored nail beds.  Redness, swelling, or bleeding.  Fever greater than 101,  Drainage (pus) from the operative site.  Pain unrelieved by pain medication.    FOR EMERGENCIES CONTACT YOUR PHYSICIAN'S OFFICE  [phone]

## 2023-11-18 ENCOUNTER — NURSE TRIAGE (OUTPATIENT)
Dept: ADMINISTRATIVE | Facility: CLINIC | Age: 58
End: 2023-11-18
Payer: MEDICARE

## 2023-11-18 NOTE — TELEPHONE ENCOUNTER
Pt calls stating that he is experiencing severe right foot pain after having surgery on it yesterday. He is unable to bear weight on the foot even with the provided boot. He states that his pain medication is not managing his pain. Pt describes experiencing severe pains. OCP, Dr. Pratt is called for further guidance.    Dr. Pratt is merged into the call and advises pt directly. Pt is instructed to call back with any new/worsening pain, questions, or concerns. He verbalizes understanding.   Reason for Disposition   [1] SEVERE post-op pain (e.g., excruciating, pain scale 8-10) AND [2] not controlled with pain medications    Additional Information   Negative: Sounds like a life-threatening emergency to the triager   Negative: [1] Widespread rash AND [2] bright red, sunburn-like   Negative: [1] SEVERE headache AND [2] after spinal (epidural) anesthesia   Negative: [1] Vomiting AND [2] persists > 4 hours   Negative: [1] Vomiting AND [2] abdomen looks much more swollen than usual   Negative: [1] Drinking very little AND [2] dehydration suspected (e.g., no urine > 12 hours, very dry mouth, very lightheaded)   Negative: Patient sounds very sick or weak to the triager   Negative: Sounds like a serious complication to the triager   Negative: Fever > 100.4 F (38.0 C)    Protocols used: Post-Op Symptoms and Tlejixnbo-L-EC

## 2023-11-20 ENCOUNTER — TELEPHONE (OUTPATIENT)
Dept: PODIATRY | Facility: CLINIC | Age: 58
End: 2023-11-20
Payer: MEDICARE

## 2023-11-20 ENCOUNTER — TELEPHONE (OUTPATIENT)
Dept: GASTROENTEROLOGY | Facility: CLINIC | Age: 58
End: 2023-11-20
Payer: MEDICARE

## 2023-11-20 NOTE — TELEPHONE ENCOUNTER
Spoke with pt. Pt had foot surgery on Friday & is unable to walk. Pt states he had this procedure done a few years ago & was fine but this time he's not okay. Pt canceled procedure with Dr. Lucas. Pt will call back to reschedule.

## 2023-11-20 NOTE — TELEPHONE ENCOUNTER
----- Message from Gilda Wood sent at 11/20/2023  4:41 PM CST -----  Regarding: appt on 11/22/23  Contact: pt  Type:  Needs Medical Advice    Who Called: pt  Would the patient rather a call back or a response via [facility]? Call back  Best Call Back Number: [phone]    Additional Information: sts he would like to speak to Dr Pratt about his upcoming appt      Returned pt's message regarding appointment. Pt wanted to know what time the appointment was. I told the pt it is 11/22/23 @ 10:00. Pt verbalized understanding.

## 2023-11-20 NOTE — TELEPHONE ENCOUNTER
----- Message from Lorene Valenzuela sent at 11/20/2023  8:11 AM CST -----  Type: Needs Medical Advice  Who Called:  pt     Best Call Back Number: [phone]    Additional Information: pt wants to cancel procedure please advise

## 2023-11-20 NOTE — TELEPHONE ENCOUNTER
"Called to check on pt per Dr. Pratt. Pt stated "was doing some better after surgery, but pain was 8/10"on 1-10 pain scale. Informed pt of post op appointment for 11/22/23 @ 10:00. Pt verbalized understand.  "

## 2023-11-22 ENCOUNTER — OFFICE VISIT (OUTPATIENT)
Dept: PODIATRY | Facility: CLINIC | Age: 58
End: 2023-11-22
Payer: MEDICARE

## 2023-11-22 VITALS — WEIGHT: 163 LBS | BODY MASS INDEX: 23.34 KG/M2 | HEIGHT: 70 IN

## 2023-11-22 DIAGNOSIS — G89.18 POST-OP PAIN: ICD-10-CM

## 2023-11-22 DIAGNOSIS — Z98.890 POST-OPERATIVE STATE: Primary | ICD-10-CM

## 2023-11-22 PROCEDURE — 1160F PR REVIEW ALL MEDS BY PRESCRIBER/CLIN PHARMACIST DOCUMENTED: ICD-10-PCS | Mod: CPTII,S$GLB,, | Performed by: STUDENT IN AN ORGANIZED HEALTH CARE EDUCATION/TRAINING PROGRAM

## 2023-11-22 PROCEDURE — 1159F MED LIST DOCD IN RCRD: CPT | Mod: CPTII,S$GLB,, | Performed by: STUDENT IN AN ORGANIZED HEALTH CARE EDUCATION/TRAINING PROGRAM

## 2023-11-22 PROCEDURE — 3044F HG A1C LEVEL LT 7.0%: CPT | Mod: CPTII,S$GLB,, | Performed by: STUDENT IN AN ORGANIZED HEALTH CARE EDUCATION/TRAINING PROGRAM

## 2023-11-22 PROCEDURE — 3044F PR MOST RECENT HEMOGLOBIN A1C LEVEL <7.0%: ICD-10-PCS | Mod: CPTII,S$GLB,, | Performed by: STUDENT IN AN ORGANIZED HEALTH CARE EDUCATION/TRAINING PROGRAM

## 2023-11-22 PROCEDURE — 3072F PR LOW RISK FOR RETINOPATHY: ICD-10-PCS | Mod: CPTII,S$GLB,, | Performed by: STUDENT IN AN ORGANIZED HEALTH CARE EDUCATION/TRAINING PROGRAM

## 2023-11-22 PROCEDURE — 1159F PR MEDICATION LIST DOCUMENTED IN MEDICAL RECORD: ICD-10-PCS | Mod: CPTII,S$GLB,, | Performed by: STUDENT IN AN ORGANIZED HEALTH CARE EDUCATION/TRAINING PROGRAM

## 2023-11-22 PROCEDURE — 3072F LOW RISK FOR RETINOPATHY: CPT | Mod: CPTII,S$GLB,, | Performed by: STUDENT IN AN ORGANIZED HEALTH CARE EDUCATION/TRAINING PROGRAM

## 2023-11-22 PROCEDURE — 99999 PR PBB SHADOW E&M-EST. PATIENT-LVL IV: ICD-10-PCS | Mod: PBBFAC,,, | Performed by: STUDENT IN AN ORGANIZED HEALTH CARE EDUCATION/TRAINING PROGRAM

## 2023-11-22 PROCEDURE — 99024 PR POST-OP FOLLOW-UP VISIT: ICD-10-PCS | Mod: S$GLB,,, | Performed by: STUDENT IN AN ORGANIZED HEALTH CARE EDUCATION/TRAINING PROGRAM

## 2023-11-22 PROCEDURE — 99999 PR PBB SHADOW E&M-EST. PATIENT-LVL IV: CPT | Mod: PBBFAC,,, | Performed by: STUDENT IN AN ORGANIZED HEALTH CARE EDUCATION/TRAINING PROGRAM

## 2023-11-22 PROCEDURE — 99024 POSTOP FOLLOW-UP VISIT: CPT | Mod: S$GLB,,, | Performed by: STUDENT IN AN ORGANIZED HEALTH CARE EDUCATION/TRAINING PROGRAM

## 2023-11-22 PROCEDURE — 1160F RVW MEDS BY RX/DR IN RCRD: CPT | Mod: CPTII,S$GLB,, | Performed by: STUDENT IN AN ORGANIZED HEALTH CARE EDUCATION/TRAINING PROGRAM

## 2023-11-22 RX ORDER — OXYCODONE AND ACETAMINOPHEN 10; 325 MG/1; MG/1
1 TABLET ORAL EVERY 8 HOURS PRN
Qty: 21 TABLET | Refills: 0 | Status: SHIPPED | OUTPATIENT
Start: 2023-11-22 | End: 2023-11-29 | Stop reason: SDUPTHER

## 2023-11-22 NOTE — PROGRESS NOTES
Patient returns today s/p R foot: OPF with exostectomy of the plantar heel spur. DOS (11/17/23).    Patient is doing well.    Pain is well controlled with perc 5 x 2.    Wound margins are wellcoapted.  Sutures are intact .  There are no signs of infection.     Incision was cleaned and redressed.     Patient to follow up in 1 weeks for suture removal.

## 2023-11-24 ENCOUNTER — TELEPHONE (OUTPATIENT)
Dept: FAMILY MEDICINE | Facility: CLINIC | Age: 58
End: 2023-11-24
Payer: MEDICARE

## 2023-11-27 NOTE — ANESTHESIA POSTPROCEDURE EVALUATION
Anesthesia Post Evaluation    Patient: Ryan Crane    Procedure(s) Performed: Procedure(s) (LRB):  EXCISION, EXOSTOSIS (Right)    Final Anesthesia Type: MAC      Patient location during evaluation: PACU  Patient participation: Yes- Able to Participate  Level of consciousness: awake and alert  Post-procedure vital signs: reviewed and stable  Pain management: adequate  Airway patency: patent    PONV status at discharge: No PONV  Anesthetic complications: no      Cardiovascular status: blood pressure returned to baseline  Respiratory status: unassisted  Hydration status: euvolemic  Follow-up not needed.          Vitals Value Taken Time   /82 11/17/23 1107   Temp   11/26/23 1821   Pulse 67 11/17/23 1107   Resp 20 11/17/23 1107   SpO2 98 % 11/17/23 1107         Event Time   Out of Recovery 11:17:00         Pain/Hai Score: No data recorded

## 2023-11-29 ENCOUNTER — OFFICE VISIT (OUTPATIENT)
Dept: PODIATRY | Facility: CLINIC | Age: 58
End: 2023-11-29
Payer: MEDICARE

## 2023-11-29 VITALS — WEIGHT: 163 LBS | HEIGHT: 70 IN | BODY MASS INDEX: 23.34 KG/M2

## 2023-11-29 DIAGNOSIS — Z98.890 POST-OPERATIVE STATE: Primary | ICD-10-CM

## 2023-11-29 DIAGNOSIS — G89.18 POST-OP PAIN: ICD-10-CM

## 2023-11-29 PROCEDURE — 1160F PR REVIEW ALL MEDS BY PRESCRIBER/CLIN PHARMACIST DOCUMENTED: ICD-10-PCS | Mod: CPTII,S$GLB,, | Performed by: STUDENT IN AN ORGANIZED HEALTH CARE EDUCATION/TRAINING PROGRAM

## 2023-11-29 PROCEDURE — 1159F PR MEDICATION LIST DOCUMENTED IN MEDICAL RECORD: ICD-10-PCS | Mod: CPTII,S$GLB,, | Performed by: STUDENT IN AN ORGANIZED HEALTH CARE EDUCATION/TRAINING PROGRAM

## 2023-11-29 PROCEDURE — 3072F PR LOW RISK FOR RETINOPATHY: ICD-10-PCS | Mod: CPTII,S$GLB,, | Performed by: STUDENT IN AN ORGANIZED HEALTH CARE EDUCATION/TRAINING PROGRAM

## 2023-11-29 PROCEDURE — 99024 PR POST-OP FOLLOW-UP VISIT: ICD-10-PCS | Mod: S$GLB,,, | Performed by: STUDENT IN AN ORGANIZED HEALTH CARE EDUCATION/TRAINING PROGRAM

## 2023-11-29 PROCEDURE — 3044F HG A1C LEVEL LT 7.0%: CPT | Mod: CPTII,S$GLB,, | Performed by: STUDENT IN AN ORGANIZED HEALTH CARE EDUCATION/TRAINING PROGRAM

## 2023-11-29 PROCEDURE — 1160F RVW MEDS BY RX/DR IN RCRD: CPT | Mod: CPTII,S$GLB,, | Performed by: STUDENT IN AN ORGANIZED HEALTH CARE EDUCATION/TRAINING PROGRAM

## 2023-11-29 PROCEDURE — 3072F LOW RISK FOR RETINOPATHY: CPT | Mod: CPTII,S$GLB,, | Performed by: STUDENT IN AN ORGANIZED HEALTH CARE EDUCATION/TRAINING PROGRAM

## 2023-11-29 PROCEDURE — 1159F MED LIST DOCD IN RCRD: CPT | Mod: CPTII,S$GLB,, | Performed by: STUDENT IN AN ORGANIZED HEALTH CARE EDUCATION/TRAINING PROGRAM

## 2023-11-29 PROCEDURE — 99999 PR PBB SHADOW E&M-EST. PATIENT-LVL II: CPT | Mod: PBBFAC,,, | Performed by: STUDENT IN AN ORGANIZED HEALTH CARE EDUCATION/TRAINING PROGRAM

## 2023-11-29 PROCEDURE — 3044F PR MOST RECENT HEMOGLOBIN A1C LEVEL <7.0%: ICD-10-PCS | Mod: CPTII,S$GLB,, | Performed by: STUDENT IN AN ORGANIZED HEALTH CARE EDUCATION/TRAINING PROGRAM

## 2023-11-29 PROCEDURE — 99024 POSTOP FOLLOW-UP VISIT: CPT | Mod: S$GLB,,, | Performed by: STUDENT IN AN ORGANIZED HEALTH CARE EDUCATION/TRAINING PROGRAM

## 2023-11-29 PROCEDURE — 99999 PR PBB SHADOW E&M-EST. PATIENT-LVL II: ICD-10-PCS | Mod: PBBFAC,,, | Performed by: STUDENT IN AN ORGANIZED HEALTH CARE EDUCATION/TRAINING PROGRAM

## 2023-11-29 RX ORDER — OXYCODONE AND ACETAMINOPHEN 10; 325 MG/1; MG/1
1 TABLET ORAL EVERY 8 HOURS PRN
Qty: 21 TABLET | Refills: 0 | Status: SHIPPED | OUTPATIENT
Start: 2023-11-29 | End: 2023-12-06 | Stop reason: SDUPTHER

## 2023-11-29 RX ORDER — SULFAMETHOXAZOLE AND TRIMETHOPRIM 800; 160 MG/1; MG/1
1 TABLET ORAL 2 TIMES DAILY
Qty: 14 TABLET | Refills: 0 | Status: SHIPPED | OUTPATIENT
Start: 2023-11-29 | End: 2023-12-06

## 2023-11-29 NOTE — PROGRESS NOTES
Patient returns today s/p R foot: OPF with exostectomy of the plantar heel spur. DOS (11/17/23).    Patient is doing well.    Pain is well controlled with perc 10x1.    Wound margins are slightly macerated and dehisced..  Sutures are intact.  There are no signs of infection.     Incision was cleaned and redressed and reinforced with steristrips.     Patient to follow up in 1 weeks for suture removal.

## 2023-12-01 ENCOUNTER — CLINICAL SUPPORT (OUTPATIENT)
Dept: FAMILY MEDICINE | Facility: CLINIC | Age: 58
End: 2023-12-01
Payer: MEDICARE

## 2023-12-01 DIAGNOSIS — E53.8 VITAMIN B12 DEFICIENCY: Primary | ICD-10-CM

## 2023-12-01 PROCEDURE — 96372 THER/PROPH/DIAG INJ SC/IM: CPT | Mod: S$GLB,,, | Performed by: STUDENT IN AN ORGANIZED HEALTH CARE EDUCATION/TRAINING PROGRAM

## 2023-12-01 PROCEDURE — 96372 PR INJECTION,THERAP/PROPH/DIAG2ST, IM OR SUBCUT: ICD-10-PCS | Mod: S$GLB,,, | Performed by: STUDENT IN AN ORGANIZED HEALTH CARE EDUCATION/TRAINING PROGRAM

## 2023-12-01 RX ADMIN — CYANOCOBALAMIN 1000 MCG: 1000 INJECTION, SOLUTION INTRAMUSCULAR; SUBCUTANEOUS at 09:12

## 2023-12-01 NOTE — PROGRESS NOTES
Patient present to the clinic today for a Vitamin B12 injection.  Injection administered into the right deltoid per patient request.  Patient tolerated well with no complaints.

## 2023-12-05 ENCOUNTER — PATIENT MESSAGE (OUTPATIENT)
Dept: SPINE | Facility: CLINIC | Age: 58
End: 2023-12-05

## 2023-12-05 ENCOUNTER — OFFICE VISIT (OUTPATIENT)
Dept: SPINE | Facility: CLINIC | Age: 58
End: 2023-12-05
Payer: MEDICARE

## 2023-12-05 ENCOUNTER — HOSPITAL ENCOUNTER (OUTPATIENT)
Dept: RADIOLOGY | Facility: HOSPITAL | Age: 58
Discharge: HOME | End: 2023-12-05
Attending: PHYSICAL MEDICINE & REHABILITATION
Payer: MEDICARE

## 2023-12-05 ENCOUNTER — TELEPHONE (OUTPATIENT)
Dept: PAIN MEDICINE | Facility: CLINIC | Age: 58
End: 2023-12-05
Payer: MEDICARE

## 2023-12-05 VITALS — WEIGHT: 162.94 LBS | HEIGHT: 70 IN | BODY MASS INDEX: 23.33 KG/M2

## 2023-12-05 DIAGNOSIS — M54.14 THORACIC RADICULOPATHY: Primary | ICD-10-CM

## 2023-12-05 DIAGNOSIS — M54.50 ACUTE BILATERAL LOW BACK PAIN WITHOUT SCIATICA: ICD-10-CM

## 2023-12-05 DIAGNOSIS — M54.6 PAIN IN THORACIC SPINE: Primary | ICD-10-CM

## 2023-12-05 DIAGNOSIS — M54.6 PAIN IN THORACIC SPINE: ICD-10-CM

## 2023-12-05 PROCEDURE — 72070 XR THORACIC SPINE AP LATERAL: ICD-10-PCS | Mod: 26,,, | Performed by: RADIOLOGY

## 2023-12-05 PROCEDURE — 1160F RVW MEDS BY RX/DR IN RCRD: CPT | Mod: CPTII,S$GLB,, | Performed by: PHYSICAL MEDICINE & REHABILITATION

## 2023-12-05 PROCEDURE — 3072F LOW RISK FOR RETINOPATHY: CPT | Mod: CPTII,S$GLB,, | Performed by: PHYSICAL MEDICINE & REHABILITATION

## 2023-12-05 PROCEDURE — 3044F PR MOST RECENT HEMOGLOBIN A1C LEVEL <7.0%: ICD-10-PCS | Mod: CPTII,S$GLB,, | Performed by: PHYSICAL MEDICINE & REHABILITATION

## 2023-12-05 PROCEDURE — 99213 PR OFFICE/OUTPT VISIT, EST, LEVL III, 20-29 MIN: ICD-10-PCS | Mod: S$GLB,,, | Performed by: PHYSICAL MEDICINE & REHABILITATION

## 2023-12-05 PROCEDURE — 1160F PR REVIEW ALL MEDS BY PRESCRIBER/CLIN PHARMACIST DOCUMENTED: ICD-10-PCS | Mod: CPTII,S$GLB,, | Performed by: PHYSICAL MEDICINE & REHABILITATION

## 2023-12-05 PROCEDURE — 3072F PR LOW RISK FOR RETINOPATHY: ICD-10-PCS | Mod: CPTII,S$GLB,, | Performed by: PHYSICAL MEDICINE & REHABILITATION

## 2023-12-05 PROCEDURE — 3008F BODY MASS INDEX DOCD: CPT | Mod: CPTII,S$GLB,, | Performed by: PHYSICAL MEDICINE & REHABILITATION

## 2023-12-05 PROCEDURE — 99213 OFFICE O/P EST LOW 20 MIN: CPT | Mod: S$GLB,,, | Performed by: PHYSICAL MEDICINE & REHABILITATION

## 2023-12-05 PROCEDURE — 3008F PR BODY MASS INDEX (BMI) DOCUMENTED: ICD-10-PCS | Mod: CPTII,S$GLB,, | Performed by: PHYSICAL MEDICINE & REHABILITATION

## 2023-12-05 PROCEDURE — 3044F HG A1C LEVEL LT 7.0%: CPT | Mod: CPTII,S$GLB,, | Performed by: PHYSICAL MEDICINE & REHABILITATION

## 2023-12-05 PROCEDURE — 72070 X-RAY EXAM THORAC SPINE 2VWS: CPT | Mod: TC

## 2023-12-05 PROCEDURE — 1159F MED LIST DOCD IN RCRD: CPT | Mod: CPTII,S$GLB,, | Performed by: PHYSICAL MEDICINE & REHABILITATION

## 2023-12-05 PROCEDURE — 1159F PR MEDICATION LIST DOCUMENTED IN MEDICAL RECORD: ICD-10-PCS | Mod: CPTII,S$GLB,, | Performed by: PHYSICAL MEDICINE & REHABILITATION

## 2023-12-05 PROCEDURE — 72070 X-RAY EXAM THORAC SPINE 2VWS: CPT | Mod: 26,,, | Performed by: RADIOLOGY

## 2023-12-05 NOTE — PROGRESS NOTES
SUBJECTIVE:    Patient ID: Ryan Crane is a 58 y.o. male.    Chief Complaint: Follow-up    He is here for follow-up status post radiofrequency ablation bilaterally L4-5 and L5-S1 on 10/31/2023 with Dr. Grimaldo.  Good response to that procedure.  He describes 70% improvement in his low back pain symptoms with improved functional mobility.  He is satisfied with quality of life from that standpoint.  Continues to have pain in the mid to lower thoracic region.  Also he had right foot surgery and says he developed an infection.  He is followed by Podiatry for that.  Current pain level is 7/10 and interferes with his quality of life in terms of activities of daily living recreation and social activity          Past Medical History:   Diagnosis Date    Aortic valve stenosis 02/28/2022    Arachnoid cyst of posterior cranial fossa 01/13/2022    Benign prostatic hyperplasia without lower urinary tract symptoms 05/25/2023    Bilateral carotid bruits 06/22/2021    Bipolar disorder 01/16/2022    CAD S/P percutaneous coronary angioplasty     3 vessel disease    Calculus of gallbladder without cholecystitis without obstruction 01/11/2023    Cancer     Candidal intertrigo 11/11/2019    Cataract     Chronic heart failure with preserved ejection fraction 03/29/2023    Chronic schizophrenia     Colon polyps     Diabetic polyneuropathy associated with diabetes mellitus due to underlying condition 11/21/2019    Dysarthria as late effect of cerebellar cerebrovascular accident (CVA) 03/24/2023    Equinus deformity of both feet 11/21/2019    Erectile dysfunction due to arterial insufficiency 05/25/2023    Flaccid hemiplegia of left nondominant side as late effect of cerebral infarction 03/17/2023    Flat foot 11/21/2019    Gastritis     Gastroesophageal reflux disease without esophagitis 07/13/2023    Gastrointestinal hemorrhage 12/13/2019    Post polypectomy bleeding    General anesthetics causing adverse effect in therapeutic use      Hammer toes of both feet 11/21/2019    Hx of diabetic foot ulcer 11/21/2019    Hypertension     Hyponatremia 01/11/2022    ELAINE (iron deficiency anemia) 12/10/2019    Intractable chronic migraine without aura and without status migrainosus 12/30/2021    Microcytic anemia 10/09/2019    Moderate aortic regurgitation 03/29/2023    Moderate mitral regurgitation 03/29/2023    Moderate mitral stenosis 03/29/2023    Moderate to severe aortic stenosis 02/28/2022    Myocardial infarct, old     Nicotine dependence, unspecified, uncomplicated 01/16/2022    Onychomycosis due to dermatophyte 11/21/2019    Orchitis 11/09/2019    PIPER on CPAP     Peripheral visual field defect, bilateral 01/13/2022    PVD (peripheral vascular disease) 11/21/2019    Seizures 2008    Stage 2 moderate COPD by GOLD classification     PFTs w/ FEV1 (73%) (9/2022)    Stroke 2005    Thoracic aortic atherosclerosis 10/17/2022    CT chest    Thyroid disease     Previously on pharmacologic Tx    Tinea pedis of right foot 05/21/2019    Tobacco use disorder     Type 2 diabetes mellitus with diabetic peripheral angiopathy without gangrene     A1c 5.1% (3/2023)    Vitamin B12 deficiency 03/24/2023     Social History     Socioeconomic History    Marital status: Legally    Occupational History    Occupation: disabled (mental)     Comment: since 2000   Tobacco Use    Smoking status: Every Day     Current packs/day: 0.50     Average packs/day: 1.5 packs/day for 44.9 years (67.5 ttl pk-yrs)     Types: Cigarettes     Start date: 1979    Smokeless tobacco: Never    Tobacco comments:     Age Started 14    Substance and Sexual Activity    Alcohol use: No    Drug use: Yes     Types: Marijuana     Comment: ocasionally - once every 6 months    Sexual activity: Yes     Partners: Female     Social Determinants of Health     Financial Resource Strain: Low Risk  (10/10/2022)    Overall Financial Resource Strain (CARDIA)     Difficulty of Paying Living Expenses: Not  hard at all   Food Insecurity: No Food Insecurity (10/10/2022)    Hunger Vital Sign     Worried About Running Out of Food in the Last Year: Never true     Ran Out of Food in the Last Year: Never true   Transportation Needs: Unmet Transportation Needs (10/10/2022)    PRAPARE - Transportation     Lack of Transportation (Medical): Yes     Lack of Transportation (Non-Medical): Yes   Physical Activity: Insufficiently Active (10/10/2022)    Exercise Vital Sign     Days of Exercise per Week: 2 days     Minutes of Exercise per Session: 10 min   Stress: Stress Concern Present (10/10/2022)    [employer] - Occupational Stress Questionnaire     Feeling of Stress : To some extent   Social Connections: Socially Isolated (10/10/2022)    Social Connection and Isolation Panel [NHANES]     Frequency of Communication with Friends and Family: Twice a week     Frequency of Social Gatherings with Friends and Family: Never     Attends [employer] Services: More than 4 times per year     Active Member of Clubs or Organizations: No     Attends Club or Organization Meetings: Never     Marital Status:    Housing Stability: Unknown (10/10/2022)    Housing Stability Vital Sign     Unable to Pay for Housing in the Last Year: No     Unstable Housing in the Last Year: No     Past Surgical History:   Procedure Laterality Date    ANGIOGRAM, CORONARY, WITH LEFT HEART CATHETERIZATION  08/25/2022    Procedure: Angiogram, Coronary, with Left Heart Cath;  Surgeon: Mai Deleon MD;  Location: [facility] CATH;  Service: Cardiology;;    APPENDECTOMY      BONE EXOSTOSIS EXCISION Right 11/17/2023    Procedure: EXCISION, EXOSTOSIS;  Surgeon: Дмитрий Pratt DPM;  Location: [facility] OR;  Service: Podiatry;  Laterality: Right;    BONE MARROW ASPIRATION Left 08/21/2020    Procedure: ASPIRATION, BONE MARROW;  Surgeon: Lex Kathleen MD;  Location: [facility] OR;  Service: Oncology;  Laterality: Left;    CHOLECYSTECTOMY      CLOSURE OF WOUND  Right 09/09/2019    Procedure: CLOSURE, WOUND;  Surgeon: Li Kathleen DPM;  Location: [facility] OR;  Service: Podiatry;  Laterality: Right;    COLONOSCOPY N/A 12/10/2019    Procedure: COLONOSCOPY;  Surgeon: Ryan Lucas MD;  Location: [facility] ENDO;  Service: Endoscopy;  Laterality: N/A;    COLONOSCOPY N/A 12/13/2019    Procedure: COLONOSCOPY;  Surgeon: Ryan Lucas MD;  Location: [facility] ENDO;  Service: Endoscopy;  Laterality: N/A;    CORONARY STENT PLACEMENT      ESOPHAGOGASTRODUODENOSCOPY N/A 12/10/2019    Procedure: EGD (ESOPHAGOGASTRODUODENOSCOPY);  Surgeon: Ryan Lucas MD;  Location: [facility] ENDO;  Service: Endoscopy;  Laterality: N/A;    ESOPHAGOGASTRODUODENOSCOPY N/A 11/03/2022    Procedure: EGD (ESOPHAGOGASTRODUODENOSCOPY);  Surgeon: Ryan Lucas MD;  Location: [facility] ENDO;  Service: Endoscopy;  Laterality: N/A;    INJECTION OF ANESTHETIC AGENT AROUND MEDIAL BRANCH NERVES INNERVATING LUMBAR FACET JOINT Bilateral 7/25/2023    Procedure: Block-nerve-medial branch-lumbar;  Surgeon: Napoleon Grimaldo MD;  Location: [facility] OR;  Service: Pain Management;  Laterality: Bilateral;  L3,4,5 MBB    INJECTION OF ANESTHETIC AGENT AROUND MEDIAL BRANCH NERVES INNERVATING LUMBAR FACET JOINT Bilateral 8/25/2023    Procedure: Block-nerve-medial branch-lumbar;  Surgeon: Napoleon Grimaldo MD;  Location: [facility] OR;  Service: Anesthesiology;  Laterality: Bilateral;  L3,4,5 MBB #2    LAPAROSCOPIC CHOLECYSTECTOMY N/A 01/11/2023    Procedure: CHOLECYSTECTOMY, LAPAROSCOPIC;  Surgeon: Laith Davis MD;  Location: [facility] OR;  Service: General;  Laterality: N/A;    LEFT HEART CATHETERIZATION Left 08/25/2022    Procedure: Left heart cath;  Surgeon: Mai Deleon MD;  Location: [facility] CATH;  Service: Cardiology;  Laterality: Left;    RADIOFREQUENCY ABLATION OF LUMBAR MEDIAL BRANCH NERVE AT SINGLE LEVEL Bilateral 10/31/2023    Procedure: Radiofrequency Ablation, Nerve, Spinal, Lumbar, Medial Branch, 1 Level;  Surgeon: Napoleon Grimaldo  "MD;  Location: [facility] OR;  Service: Anesthesiology;  Laterality: Bilateral;  L3,4,5 RFA    right heel surgery      SHOULDER ARTHROSCOPY Left     UPPER GASTROINTESTINAL ENDOSCOPY       Family History   Problem Relation Age of Onset    Melanoma Mother     Diabetes Mother     Hypertension Mother     Stroke Father     Diabetes Father     Hypertension Father     Colon cancer Father         unsure of age of diagnosis    Cancer Father         colon cancer    Cervical cancer Sister     Cirrhosis Brother     Hypertension Brother     Lung cancer Maternal Grandmother     Prostate cancer Maternal Grandfather     Crohn's disease Neg Hx     Ulcerative colitis Neg Hx     Stomach cancer Neg Hx     Esophageal cancer Neg Hx     Retinal detachment Neg Hx     Strabismus Neg Hx     Macular degeneration Neg Hx     Glaucoma Neg Hx      Vitals:    12/05/23 1341   Weight: 73.9 kg (162 lb 14.7 oz)   Height: 5' 10" (1.778 m)       Review of Systems   Constitutional:  Negative for chills, diaphoresis, fatigue, fever and unexpected weight change.   HENT:  Negative for trouble swallowing.    Eyes:  Negative for visual disturbance.   Respiratory:  Negative for shortness of breath.    Cardiovascular:  Negative for chest pain.   Gastrointestinal:  Negative for abdominal pain, constipation, diarrhea, nausea and vomiting.   Genitourinary:  Negative for difficulty urinating.   Musculoskeletal:  Negative for arthralgias, back pain, gait problem, joint swelling, myalgias, neck pain and neck stiffness.   Neurological:  Negative for dizziness, speech difficulty, weakness, light-headedness, numbness and headaches.          Objective:      Physical Exam  Neurological:      Mental Status: He is alert and oriented to person, place, and time.             Assessment:       1. Pain in thoracic spine    2. Acute bilateral low back pain without sciatica           Plan:     Improved to his satisfaction status post radiofrequency ablation bilaterally L4-5 and " L5-S1.  I am going to have him get some x-rays of the thoracic spine done with a marker.  Consider epidural steroid injections in the mid to lower thoracic spine.      Pain in thoracic spine    Acute bilateral low back pain without sciatica

## 2023-12-05 NOTE — TELEPHONE ENCOUNTER
----- Message from Huan Dowd MD sent at 12/5/2023  3:51 PM CST -----  Please schedule for interlaminar injection T12-L1.  Note he is on Plavix.   is his cardiologist

## 2023-12-06 ENCOUNTER — OFFICE VISIT (OUTPATIENT)
Dept: PODIATRY | Facility: CLINIC | Age: 58
End: 2023-12-06
Payer: MEDICARE

## 2023-12-06 VITALS — BODY MASS INDEX: 23.33 KG/M2 | HEIGHT: 70 IN | WEIGHT: 162.94 LBS

## 2023-12-06 DIAGNOSIS — G89.18 POST-OP PAIN: ICD-10-CM

## 2023-12-06 DIAGNOSIS — Z98.890 POST-OPERATIVE STATE: Primary | ICD-10-CM

## 2023-12-06 LAB
GRAM STN SPEC: NORMAL

## 2023-12-06 PROCEDURE — 99999 PR PBB SHADOW E&M-EST. PATIENT-LVL IV: ICD-10-PCS | Mod: PBBFAC,,, | Performed by: STUDENT IN AN ORGANIZED HEALTH CARE EDUCATION/TRAINING PROGRAM

## 2023-12-06 PROCEDURE — 87076 CULTURE ANAEROBE IDENT EACH: CPT | Performed by: STUDENT IN AN ORGANIZED HEALTH CARE EDUCATION/TRAINING PROGRAM

## 2023-12-06 PROCEDURE — 1159F PR MEDICATION LIST DOCUMENTED IN MEDICAL RECORD: ICD-10-PCS | Mod: CPTII,S$GLB,, | Performed by: STUDENT IN AN ORGANIZED HEALTH CARE EDUCATION/TRAINING PROGRAM

## 2023-12-06 PROCEDURE — 3044F HG A1C LEVEL LT 7.0%: CPT | Mod: CPTII,S$GLB,, | Performed by: STUDENT IN AN ORGANIZED HEALTH CARE EDUCATION/TRAINING PROGRAM

## 2023-12-06 PROCEDURE — 1159F MED LIST DOCD IN RCRD: CPT | Mod: CPTII,S$GLB,, | Performed by: STUDENT IN AN ORGANIZED HEALTH CARE EDUCATION/TRAINING PROGRAM

## 2023-12-06 PROCEDURE — 87075 CULTR BACTERIA EXCEPT BLOOD: CPT | Performed by: STUDENT IN AN ORGANIZED HEALTH CARE EDUCATION/TRAINING PROGRAM

## 2023-12-06 PROCEDURE — 87070 CULTURE OTHR SPECIMN AEROBIC: CPT | Performed by: STUDENT IN AN ORGANIZED HEALTH CARE EDUCATION/TRAINING PROGRAM

## 2023-12-06 PROCEDURE — 99999 PR PBB SHADOW E&M-EST. PATIENT-LVL IV: CPT | Mod: PBBFAC,,, | Performed by: STUDENT IN AN ORGANIZED HEALTH CARE EDUCATION/TRAINING PROGRAM

## 2023-12-06 PROCEDURE — 3044F PR MOST RECENT HEMOGLOBIN A1C LEVEL <7.0%: ICD-10-PCS | Mod: CPTII,S$GLB,, | Performed by: STUDENT IN AN ORGANIZED HEALTH CARE EDUCATION/TRAINING PROGRAM

## 2023-12-06 PROCEDURE — 99024 POSTOP FOLLOW-UP VISIT: CPT | Mod: S$GLB,,, | Performed by: STUDENT IN AN ORGANIZED HEALTH CARE EDUCATION/TRAINING PROGRAM

## 2023-12-06 PROCEDURE — 87205 SMEAR GRAM STAIN: CPT | Performed by: STUDENT IN AN ORGANIZED HEALTH CARE EDUCATION/TRAINING PROGRAM

## 2023-12-06 PROCEDURE — 3072F LOW RISK FOR RETINOPATHY: CPT | Mod: CPTII,S$GLB,, | Performed by: STUDENT IN AN ORGANIZED HEALTH CARE EDUCATION/TRAINING PROGRAM

## 2023-12-06 PROCEDURE — 99024 PR POST-OP FOLLOW-UP VISIT: ICD-10-PCS | Mod: S$GLB,,, | Performed by: STUDENT IN AN ORGANIZED HEALTH CARE EDUCATION/TRAINING PROGRAM

## 2023-12-06 PROCEDURE — 1160F PR REVIEW ALL MEDS BY PRESCRIBER/CLIN PHARMACIST DOCUMENTED: ICD-10-PCS | Mod: CPTII,S$GLB,, | Performed by: STUDENT IN AN ORGANIZED HEALTH CARE EDUCATION/TRAINING PROGRAM

## 2023-12-06 PROCEDURE — 3072F PR LOW RISK FOR RETINOPATHY: ICD-10-PCS | Mod: CPTII,S$GLB,, | Performed by: STUDENT IN AN ORGANIZED HEALTH CARE EDUCATION/TRAINING PROGRAM

## 2023-12-06 PROCEDURE — 1160F RVW MEDS BY RX/DR IN RCRD: CPT | Mod: CPTII,S$GLB,, | Performed by: STUDENT IN AN ORGANIZED HEALTH CARE EDUCATION/TRAINING PROGRAM

## 2023-12-06 RX ORDER — OXYCODONE AND ACETAMINOPHEN 10; 325 MG/1; MG/1
1 TABLET ORAL EVERY 8 HOURS PRN
Qty: 21 TABLET | Refills: 0 | Status: SHIPPED | OUTPATIENT
Start: 2023-12-06 | End: 2023-12-13 | Stop reason: SDUPTHER

## 2023-12-06 NOTE — PROGRESS NOTES
Patient returns today s/p R foot: OPF with exostectomy of the plantar heel spur. DOS (11/17/23).    Patient is doing well. Completed course of antibiotics w/o improvement.     Pain is well controlled with perc 10x1.    Skin margins are still dehisced but deeper layers are well coapted. I removed the sutures and dead skin today. There is a DTI at the dorsum of the foot over the DP artery that is tender. Appears to be getting worse despite offloading attempts.    Cultures taken of the incision site today and the foot was dressed.    F/u 1 week.

## 2023-12-07 ENCOUNTER — TELEPHONE (OUTPATIENT)
Dept: PAIN MEDICINE | Facility: CLINIC | Age: 58
End: 2023-12-07
Payer: MEDICARE

## 2023-12-07 NOTE — TELEPHONE ENCOUNTER
----- Message from Stacey M Lefort sent at 12/7/2023 10:56 AM CST -----  Pt needs a callback at  [phone]. Thank you.

## 2023-12-07 NOTE — TELEPHONE ENCOUNTER
Spoke with pt and scheduled for 01/02 advised on holding plavix and that we have a message out to his cardiology team to clear for that hold.

## 2023-12-09 LAB — BACTERIA SPEC AEROBE CULT: NORMAL

## 2023-12-11 ENCOUNTER — TELEPHONE (OUTPATIENT)
Dept: PAIN MEDICINE | Facility: CLINIC | Age: 58
End: 2023-12-11
Payer: MEDICARE

## 2023-12-11 LAB — BACTERIA SPEC ANAEROBE CULT: ABNORMAL

## 2023-12-11 NOTE — TELEPHONE ENCOUNTER
----- Message from Flor Oneal, Patient Care Assistant sent at 12/11/2023 12:07 PM CST -----  Contact: self  Pt is calling to speak w/ a nurse regarding his procedure [phone]  thanks

## 2023-12-11 NOTE — TELEPHONE ENCOUNTER
Spoke with pt and he needs arrival time on 12/28 for procedure on 01/02 gave him procedure suites phone number to call them that day

## 2023-12-12 ENCOUNTER — TELEPHONE (OUTPATIENT)
Dept: PODIATRY | Facility: CLINIC | Age: 58
End: 2023-12-12
Payer: MEDICARE

## 2023-12-12 NOTE — TELEPHONE ENCOUNTER
hey doc just got off the phone with mr. jennifer phelan stated he is feeling bad bad he stated he went to [facility] last night for help and they didn't do to much  for him and he stated he has chills still and I told him it would be best to try stph out tonight and his pain level is 10/10 rn . I told him  I would reach to you and let you know

## 2023-12-12 NOTE — TELEPHONE ENCOUNTER
----- Message from Hermelinda Edward sent at 12/12/2023  4:16 PM CST -----  Type: Patient Call Back         Who called:Pt          What is the request in detail: Pt called in regarding going to the ER On last night and the Pt states  that the pain level is still a 10 . And Please give him  a call back .          Can the clinic reply by [facility]? No          Would the patient rather a call back or a response via [facility]? Call back          Best call back number: [phone] (mobile)          Additional Information:           Thank You

## 2023-12-13 ENCOUNTER — OFFICE VISIT (OUTPATIENT)
Dept: PODIATRY | Facility: CLINIC | Age: 58
End: 2023-12-13
Payer: MEDICARE

## 2023-12-13 ENCOUNTER — NURSE TRIAGE (OUTPATIENT)
Dept: ADMINISTRATIVE | Facility: CLINIC | Age: 58
End: 2023-12-13
Payer: MEDICARE

## 2023-12-13 VITALS — HEIGHT: 70 IN | WEIGHT: 162.94 LBS | BODY MASS INDEX: 23.33 KG/M2

## 2023-12-13 DIAGNOSIS — G89.18 POST-OP PAIN: ICD-10-CM

## 2023-12-13 DIAGNOSIS — Z98.890 POST-OPERATIVE STATE: Primary | ICD-10-CM

## 2023-12-13 PROCEDURE — 99999 PR PBB SHADOW E&M-EST. PATIENT-LVL V: ICD-10-PCS | Mod: PBBFAC,,, | Performed by: STUDENT IN AN ORGANIZED HEALTH CARE EDUCATION/TRAINING PROGRAM

## 2023-12-13 PROCEDURE — 3044F HG A1C LEVEL LT 7.0%: CPT | Mod: CPTII,S$GLB,, | Performed by: STUDENT IN AN ORGANIZED HEALTH CARE EDUCATION/TRAINING PROGRAM

## 2023-12-13 PROCEDURE — 3072F LOW RISK FOR RETINOPATHY: CPT | Mod: CPTII,S$GLB,, | Performed by: STUDENT IN AN ORGANIZED HEALTH CARE EDUCATION/TRAINING PROGRAM

## 2023-12-13 PROCEDURE — 99024 POSTOP FOLLOW-UP VISIT: CPT | Mod: S$GLB,,, | Performed by: STUDENT IN AN ORGANIZED HEALTH CARE EDUCATION/TRAINING PROGRAM

## 2023-12-13 PROCEDURE — 99999 PR PBB SHADOW E&M-EST. PATIENT-LVL V: CPT | Mod: PBBFAC,,, | Performed by: STUDENT IN AN ORGANIZED HEALTH CARE EDUCATION/TRAINING PROGRAM

## 2023-12-13 PROCEDURE — 3072F PR LOW RISK FOR RETINOPATHY: ICD-10-PCS | Mod: CPTII,S$GLB,, | Performed by: STUDENT IN AN ORGANIZED HEALTH CARE EDUCATION/TRAINING PROGRAM

## 2023-12-13 PROCEDURE — 3044F PR MOST RECENT HEMOGLOBIN A1C LEVEL <7.0%: ICD-10-PCS | Mod: CPTII,S$GLB,, | Performed by: STUDENT IN AN ORGANIZED HEALTH CARE EDUCATION/TRAINING PROGRAM

## 2023-12-13 PROCEDURE — 99024 PR POST-OP FOLLOW-UP VISIT: ICD-10-PCS | Mod: S$GLB,,, | Performed by: STUDENT IN AN ORGANIZED HEALTH CARE EDUCATION/TRAINING PROGRAM

## 2023-12-13 RX ORDER — OXYCODONE AND ACETAMINOPHEN 10; 325 MG/1; MG/1
1 TABLET ORAL EVERY 8 HOURS PRN
Qty: 21 TABLET | Refills: 0 | Status: ON HOLD | OUTPATIENT
Start: 2023-12-13 | End: 2023-12-27 | Stop reason: HOSPADM

## 2023-12-13 NOTE — PROGRESS NOTES
Patient returns today s/p R foot: OPF with exostectomy of the plantar heel spur. DOS (11/17/23).    Patient is not doing well. Reports chills over the last few days and lethargy in addition to continued R heel pain. He went to [employer] where he was given augmentin b.I.d. and sent home. He continues to have chills today. The incision site is actually healing fairly well after dehiscence without periwound erythema or edema. There is no fluctuance or drainage appreciated.     I found that the incision site still has deep probing. Upon decompression of the plantar tissue, there is a moderate amount of a mixture of bloody, serous drainage. I expressed this as much as possible. The tunneling was then flushed with 10mL of sterile saline and packed with 1/4 in iodoform packing. A football dressing was applied.    I recommend the patient be admitted to [facility] for expedient w/u and I&D of the heel but patient would like to avoid admission AMA. The only other option would be to get surgical clearance for OP surgery on Friday in the [facility]. He'd like to pursue this option but I advise him to go to the ED for admission if there is any worsening.    Unclear if he will be able to get clearance by Friday. Recommend admission if he is unable to get clearance in a timely manner.    Continue antibiotics.    Дмитрий Pratt DPM

## 2023-12-14 ENCOUNTER — TELEPHONE (OUTPATIENT)
Dept: FAMILY MEDICINE | Facility: CLINIC | Age: 58
End: 2023-12-14
Payer: MEDICARE

## 2023-12-14 ENCOUNTER — TELEPHONE (OUTPATIENT)
Dept: PODIATRY | Facility: CLINIC | Age: 58
End: 2023-12-14
Payer: MEDICARE

## 2023-12-14 DIAGNOSIS — E11.9 TYPE 2 DIABETES MELLITUS WITHOUT COMPLICATION: ICD-10-CM

## 2023-12-14 NOTE — TELEPHONE ENCOUNTER
----- Message from Adriana Agee sent at 12/13/2023  6:00 PM CST -----  Patient states doctor forgot to send his prescriptions to pharmacy.

## 2023-12-14 NOTE — TELEPHONE ENCOUNTER
Patient states he saw his podiatrist today- was supposed to be prescribed a medication for pain but went to pick it up and there was nothing there. Per chart- previous nurse reached out to Dr. Pratt and medication was supposed to be called in. No new prescription orders listed on chart at this time. Patient reports he is having R foot pain, 9/10. Took lyrica without relief.     Dispo is SEE HCP within 4 hours- since office is closed and no PCP triage- recommended patient been seen in [facility] or ED. Patient also offered [facility] on demand visit- instruct on how to access. Patient verbalized understanding.       Reason for Disposition   [1] SEVERE pain (e.g., excruciating, unable to do any normal activities) AND [2] not improved after 2 hours of pain medicine    Additional Information   Negative: Entire foot is cool or blue in comparison to other foot   Negative: Purple or black skin on foot or toe     At basline   Negative: [1] Red area or streak AND [2] fever   Negative: [1] Swollen foot AND [2] fever   Negative: Patient sounds very sick or weak to the triager    Protocols used: Foot Pain-A-AH

## 2023-12-14 NOTE — TELEPHONE ENCOUNTER
Reason for Disposition   [1] Caller has URGENT medicine question about med that PCP or specialist prescribed AND [2] triager unable to answer question    Additional Information   Negative: [1] Intentional drug overdose AND [2] suicidal thoughts or ideas   Negative: [1] DOUBLE DOSE (an extra dose or lesser amount) of prescription drug AND [2] NO symptoms  (Exception: A double dose of antibiotics.)   Negative: Diabetes drug error or overdose (e.g., took wrong type of insulin or took extra dose)   Negative: [1] Prescription not at pharmacy AND [2] was prescribed by PCP recently (Exception: Triager has access to EMR and prescription is recorded there. Go to Home Care and confirm for pharmacy.)   Negative: [1] Pharmacy calling with prescription question AND [2] triager unable to answer question   Negative: MORE THAN A DOUBLE DOSE of a prescription or over-the-counter (OTC) drug   Negative: [1] DOUBLE DOSE (an extra dose or lesser amount) of prescription drug AND [2] any symptoms (e.g., dizziness, nausea, pain, sleepiness)   Negative: [1] DOUBLE DOSE (an extra dose or lesser amount) of over-the-counter (OTC) drug AND [2] any symptoms (e.g., dizziness, nausea, pain, sleepiness)   Negative: Took another person's prescription drug    Protocols used: Medication Question Call-A-  Pt's daughter Dominique stated pt was seen in the office today by Dr. Pratt. She wants to know what was ordered and where it was sent. She wants the medication sent to:    [id number] - [place], [place] - [address]   Unable to view the Office note because it was not signed by the Provider. Secure chat sent to Dr. Pratt. He stated he is sending the order for pain medication now. Notified Pt's daughter and she verbalized understanding.

## 2023-12-14 NOTE — TELEPHONE ENCOUNTER
Spoke w/ pt.He states he is having foot surgery, Dr Pratt tomorrow, gen anesthesia and would like clearance. Pt unable to come in today or tomorrow. States he needs 3 business days to arrange transportation. Sched firt avail 12/20/23 at 10:30am w/ Dr Mayer. Pt will call surgeon and let them know.

## 2023-12-14 NOTE — TELEPHONE ENCOUNTER
----- Message from Massiel Chadwick LPN sent at 12/13/2023  9:18 PM CST -----    ----- Message -----  From: Margy Stanley  Sent: 12/13/2023   4:09 PM CST  To: [facility] Staff    Type:  Needs Medical Advice    Who Called: pt   Best Call Back Number: [phone]  Additional Information:  Requesting call back  wants to dx sx     please advise thank you

## 2023-12-14 NOTE — TELEPHONE ENCOUNTER
Currently being advised by another OOC nurse.     Reason for Disposition   Caller has already spoken with another triager and has no further questions.    Protocols used: No Contact or Duplicate Contact Call-A-AH

## 2023-12-15 ENCOUNTER — TELEPHONE (OUTPATIENT)
Dept: PODIATRY | Facility: CLINIC | Age: 58
End: 2023-12-15
Payer: MEDICARE

## 2023-12-15 NOTE — TELEPHONE ENCOUNTER
----- Message from Sagar Lowe sent at 12/14/2023 12:33 PM CST -----  Contact: Self  Type: Needs Medical Advice  Who Called: PT  Symptoms (please be specific):    How long has patient had these symptoms:    Pharmacy name and phone #:    Best Call Back Number: [phone]    Additional Information: Needs a call back to discuss tomorrows surgery.

## 2023-12-16 ENCOUNTER — NURSE TRIAGE (OUTPATIENT)
Dept: ADMINISTRATIVE | Facility: CLINIC | Age: 58
End: 2023-12-16
Payer: MEDICARE

## 2023-12-16 NOTE — TELEPHONE ENCOUNTER
Pt states that for a couple of days he has redness to urine. Now c/o burning when urinating 7/10. Also c/o lower back pain. Pt states that he is unsure if he twisted his back. Advised to be seen per protocol. VU. Encounter routed to provider.       Reason for Disposition   Side (flank) or lower back pain present    Additional Information   Negative: Shock suspected (e.g., cold/pale/clammy skin, too weak to stand, low BP, rapid pulse)   Negative: Sounds like a life-threatening emergency to the triager   Negative: [1] Unable to urinate (or only a few drops) > 4 hours AND [2] bladder feels very full (e.g., feels blocked with strong urge to urinate; palpable bladder)   Negative: Swollen scrotum   Negative: [1] Constant pain in scrotum or testicle AND [2] present > 1 hour   Negative: Vomiting   Negative: Patient sounds very sick or weak to the triager   Negative: [1] SEVERE pain with urination (e.g., excruciating) AND [2] not improved after 2 hours of pain medicine (e.g., acetaminophen or ibuprofen)   Negative: Fever > 100.4 F (38.0 C)    Protocols used: Urination Pain - Male-A-

## 2023-12-17 PROBLEM — I34.0 MODERATE MITRAL REGURGITATION: Chronic | Status: RESOLVED | Noted: 2023-03-29 | Resolved: 2023-12-17

## 2023-12-17 PROBLEM — G89.29 CHRONIC PAIN OF LEFT KNEE: Status: RESOLVED | Noted: 2023-08-14 | Resolved: 2023-12-17

## 2023-12-17 PROBLEM — N40.0 BENIGN PROSTATIC HYPERPLASIA WITHOUT LOWER URINARY TRACT SYMPTOMS: Status: RESOLVED | Noted: 2023-05-25 | Resolved: 2023-12-17

## 2023-12-17 PROBLEM — I69.354 FLACCID HEMIPLEGIA OF LEFT NONDOMINANT SIDE AS LATE EFFECT OF CEREBRAL INFARCTION: Chronic | Status: RESOLVED | Noted: 2023-03-17 | Resolved: 2023-12-17

## 2023-12-17 PROBLEM — R22.41 MASS OF RIGHT FOOT: Status: RESOLVED | Noted: 2022-11-18 | Resolved: 2023-12-17

## 2023-12-17 PROBLEM — M86.071 ACUTE HEMATOGENOUS OSTEOMYELITIS OF RIGHT FOOT: Status: ACTIVE | Noted: 2023-12-17

## 2023-12-17 PROBLEM — M21.40 FLAT FOOT: Chronic | Status: RESOLVED | Noted: 2019-11-21 | Resolved: 2023-12-17

## 2023-12-17 PROBLEM — M25.562 CHRONIC PAIN OF LEFT KNEE: Status: RESOLVED | Noted: 2023-08-14 | Resolved: 2023-12-17

## 2023-12-17 PROBLEM — N52.01 ERECTILE DYSFUNCTION DUE TO ARTERIAL INSUFFICIENCY: Chronic | Status: RESOLVED | Noted: 2023-05-25 | Resolved: 2023-12-17

## 2023-12-17 PROBLEM — M54.6 ACUTE BILATERAL THORACIC BACK PAIN: Status: RESOLVED | Noted: 2023-05-25 | Resolved: 2023-12-17

## 2023-12-17 PROBLEM — F17.200 SMOKER: Status: RESOLVED | Noted: 2019-12-14 | Resolved: 2023-12-17

## 2023-12-17 PROBLEM — M54.50 ACUTE BILATERAL LOW BACK PAIN WITHOUT SCIATICA: Status: RESOLVED | Noted: 2023-05-25 | Resolved: 2023-12-17

## 2023-12-17 PROBLEM — I69.322 DYSARTHRIA AS LATE EFFECT OF CEREBELLAR CEREBROVASCULAR ACCIDENT (CVA): Chronic | Status: RESOLVED | Noted: 2023-03-24 | Resolved: 2023-12-17

## 2023-12-17 PROBLEM — K21.9 GASTROESOPHAGEAL REFLUX DISEASE WITHOUT ESOPHAGITIS: Chronic | Status: RESOLVED | Noted: 2023-07-13 | Resolved: 2023-12-17

## 2023-12-17 PROBLEM — I35.1 MODERATE AORTIC REGURGITATION: Chronic | Status: RESOLVED | Noted: 2023-03-29 | Resolved: 2023-12-17

## 2023-12-19 PROBLEM — T81.42XA INFECTION OF DEEP INCISIONAL SURGICAL SITE AFTER PROCEDURE: Status: ACTIVE | Noted: 2023-12-19

## 2023-12-22 ENCOUNTER — PATIENT MESSAGE (OUTPATIENT)
Dept: FAMILY MEDICINE | Facility: CLINIC | Age: 58
End: 2023-12-22
Payer: MEDICARE

## 2023-12-22 NOTE — TELEPHONE ENCOUNTER
Please advise   LOV: 11/8/23      Warning - This patient is currently a hospital admitted inpatient. Please use caution when making changes to medications or other clinical information.

## 2023-12-22 NOTE — TELEPHONE ENCOUNTER
Dr chun, may this pt hold his plavix x 7 days prior to epidural injection? Thank you    Pt injection 01/02 2nd message. Thank you

## 2023-12-26 ENCOUNTER — TELEPHONE (OUTPATIENT)
Dept: SPINE | Facility: CLINIC | Age: 58
End: 2023-12-26
Payer: MEDICARE

## 2023-12-26 NOTE — TELEPHONE ENCOUNTER
----- Message from Moody Eduardo sent at 12/26/2023 11:29 AM CST -----  Contact: Self  Type: Sooner Appointment Request        Caller is requesting a sooner appointment. Caller declined first available appointment listed below. Caller will not accept being placed on the waitlist and is requesting a message be sent to doctor.        Name of Caller: Patient   Best Call Back Number: [phone]  Additional Information: Pt is in the hospital at this time and needs o rs his procedure. Plz call to advise and set up new date. Thanks

## 2023-12-28 ENCOUNTER — TELEPHONE (OUTPATIENT)
Dept: FAMILY MEDICINE | Facility: CLINIC | Age: 58
End: 2023-12-28
Payer: MEDICARE

## 2023-12-28 DIAGNOSIS — E08.42 DIABETIC POLYNEUROPATHY ASSOCIATED WITH DIABETES MELLITUS DUE TO UNDERLYING CONDITION: Primary | Chronic | ICD-10-CM

## 2023-12-28 RX ORDER — PREGABALIN 50 MG/1
50 CAPSULE ORAL 3 TIMES DAILY
Qty: 270 CAPSULE | Refills: 0 | Status: SHIPPED | OUTPATIENT
Start: 2023-12-28 | End: 2024-02-22 | Stop reason: SDUPTHER

## 2023-12-28 NOTE — TELEPHONE ENCOUNTER
----- Message from Danaeyessenia Teaguedwight sent at 12/28/2023 12:54 PM CST -----  Contact: self  Pt was released from [facility] on 12/27/23 he was in since 12/19/23 and he states that they took him off his lyrica and he wants to know if you will put him back on it.  Looking in his chart it states that this med was cancelled and he was changed to a different medicine but I do not know what that med is.  I tried to make a hospital f/u appt for him but states he is changing his insurace and so can't until it is active.  Call back at [phone] to advise and  Thanks

## 2023-12-28 NOTE — TELEPHONE ENCOUNTER
"Received the following message regarding Mr. Crane:     "Good afternoon, my name is Debbi NOBLE,RN from [facility] services. I performed SOC visit on Mr. Crane today and there was alot of medicine that he no longer takes but the hospital d/c instructions say to cont the medications. It was 10 medications that patient no longer take or need a refill on. The medications are; his neb treatments, Buspar, Klonopin, Lomotil, Epipen, Vistaril, Antivert, Metoprolol, Lyrica, and Sodium Chloride. I wanted to follow up with you regarding the medications on which one he shall cont or need to be refill. He said some were completely stop by MD. Thank you again."    Buspar, Lomotil, Antivert, Vistaril, Metoprolol, and sodium chloride tablets removed from medication list.     Nebulizer treatments kept on medication list as he is likely to need them in the future given his Dx of COPD.    On review of , pt picked up prescription for Klonopin 0.5 mg daily (30 tablets) on 12/17/23 (prescribed by Paulina Mendez on 10/26/23). Additionally, Lyrica 50 mg TID (90 day supply, 270 capsules) picked up on 11/13/23 (prescribed by myself on 8/14/23).    Chart updated with new information.    Ana Mayer MD  [facility]  Office: [phone]   Fax: [phone]  12/28/2023  3:56 PM      "

## 2023-12-28 NOTE — TELEPHONE ENCOUNTER
Diagnoses and all orders for this visit:    Diabetic polyneuropathy associated with diabetes mellitus due to underlying condition  -     pregabalin (LYRICA) 50 MG capsule; Take 1 capsule (50 mg total) by mouth 3 (three) times daily.        Ana Mayer MD  [facility]  Office: [phone]   Fax: [phone]  12/28/2023

## 2024-01-02 ENCOUNTER — TELEPHONE (OUTPATIENT)
Dept: PODIATRY | Facility: CLINIC | Age: 59
End: 2024-01-02
Payer: MEDICARE

## 2024-01-02 NOTE — TELEPHONE ENCOUNTER
----- Message from Viviane Cadena MA sent at 12/29/2023  4:24 PM CST -----  Type: Needs Medical Advice  Who Called:  pt   Symptoms (please be specific):  post op pain   How long has patient had these symptoms:  2 days   Pharmacy name and phone #:    [id number] - [place] - [address] [facility] [facility] [address] [facility]  [address]  [place] [address]  Phone: [phone] Fax: [phone]  Best Call Back Number: [phone]  Additional Information: please advise pt states his pain is a 9 he is asking for something to be called in to help with his pain

## 2024-01-02 NOTE — TELEPHONE ENCOUNTER
----- Message from Viviane Cadena MA sent at 12/29/2023  4:24 PM CST -----  Type: Needs Medical Advice  Who Called:  pt   Symptoms (please be specific):  post op pain   How long has patient had these symptoms:  2 days   Pharmacy name and phone #:    [id number] - [place] - [address] [facility] [facility] [facility]  [address]  [place] [address]  Phone: [phone] Fax: [phone]  Best Call Back Number: [phone]  Additional Information: please advise pt states his pain is a 9 he is asking for something to be called in to help with his pain

## 2024-01-03 ENCOUNTER — TELEPHONE (OUTPATIENT)
Dept: FAMILY MEDICINE | Facility: CLINIC | Age: 59
End: 2024-01-03
Payer: MEDICARE

## 2024-01-03 NOTE — TELEPHONE ENCOUNTER
----- Message from Margy Stanley sent at 12/29/2023  9:40 AM CST -----  Type:  Needs Medical Advice    Who Called: pt   Best Call Back Number: [phone] (home)     Additional Information:  Requesting call back  wants to dx rx that was change in the ed     please advise thank you

## 2024-01-04 ENCOUNTER — OFFICE VISIT (OUTPATIENT)
Dept: PODIATRY | Facility: CLINIC | Age: 59
End: 2024-01-04
Payer: MEDICARE

## 2024-01-04 VITALS — BODY MASS INDEX: 22.9 KG/M2 | HEIGHT: 70 IN | WEIGHT: 160 LBS

## 2024-01-04 DIAGNOSIS — Z98.890 POST-OPERATIVE STATE: ICD-10-CM

## 2024-01-04 DIAGNOSIS — G89.18 POST-OP PAIN: Primary | ICD-10-CM

## 2024-01-04 PROCEDURE — 1159F MED LIST DOCD IN RCRD: CPT | Mod: CPTII,S$GLB,, | Performed by: STUDENT IN AN ORGANIZED HEALTH CARE EDUCATION/TRAINING PROGRAM

## 2024-01-04 PROCEDURE — 99024 POSTOP FOLLOW-UP VISIT: CPT | Mod: S$GLB,,, | Performed by: STUDENT IN AN ORGANIZED HEALTH CARE EDUCATION/TRAINING PROGRAM

## 2024-01-04 PROCEDURE — 99999 PR PBB SHADOW E&M-EST. PATIENT-LVL IV: CPT | Mod: PBBFAC,,, | Performed by: STUDENT IN AN ORGANIZED HEALTH CARE EDUCATION/TRAINING PROGRAM

## 2024-01-04 PROCEDURE — 1160F RVW MEDS BY RX/DR IN RCRD: CPT | Mod: CPTII,S$GLB,, | Performed by: STUDENT IN AN ORGANIZED HEALTH CARE EDUCATION/TRAINING PROGRAM

## 2024-01-04 RX ORDER — HYDROCODONE BITARTRATE AND ACETAMINOPHEN 7.5; 325 MG/1; MG/1
1 TABLET ORAL EVERY 6 HOURS PRN
Qty: 28 TABLET | Refills: 0 | Status: SHIPPED | OUTPATIENT
Start: 2024-01-04 | End: 2024-01-12 | Stop reason: SDUPTHER

## 2024-01-04 NOTE — PROGRESS NOTES
Patient returns today s/p R foot: OPF with exostectomy of the plantar heel spur. DOS (11/17/23). Patient had post op complications requiring admission and washout on 12/19/23 and has been on 6 weeks of antibiotics.     He is overall feeling well but still has R foot pain with ambulation and even at rest. Tolerating antibiotics well and has home health every 5 days for dressing changes    R heel incision is well coapted without edema or erythema. No fluctuance or crepitus.     Continue home health dressing changes as previously ordered.     Continue antibiotics.    Дмитрий Pratt DPM

## 2024-01-05 ENCOUNTER — OFFICE VISIT (OUTPATIENT)
Dept: FAMILY MEDICINE | Facility: CLINIC | Age: 59
End: 2024-01-05
Payer: MEDICARE

## 2024-01-05 VITALS
DIASTOLIC BLOOD PRESSURE: 70 MMHG | HEART RATE: 80 BPM | WEIGHT: 147.38 LBS | HEIGHT: 70 IN | SYSTOLIC BLOOD PRESSURE: 124 MMHG | BODY MASS INDEX: 21.1 KG/M2

## 2024-01-05 DIAGNOSIS — J44.9 STAGE 2 MODERATE COPD BY GOLD CLASSIFICATION: ICD-10-CM

## 2024-01-05 DIAGNOSIS — Z09 HOSPITAL DISCHARGE FOLLOW-UP: Primary | ICD-10-CM

## 2024-01-05 DIAGNOSIS — I50.32 CHRONIC HEART FAILURE WITH PRESERVED EJECTION FRACTION: ICD-10-CM

## 2024-01-05 DIAGNOSIS — E08.42 DIABETIC POLYNEUROPATHY ASSOCIATED WITH DIABETES MELLITUS DUE TO UNDERLYING CONDITION: ICD-10-CM

## 2024-01-05 DIAGNOSIS — Z23 IMMUNIZATION DUE: ICD-10-CM

## 2024-01-05 DIAGNOSIS — I73.9 PVD (PERIPHERAL VASCULAR DISEASE): ICD-10-CM

## 2024-01-05 DIAGNOSIS — M86.071 ACUTE HEMATOGENOUS OSTEOMYELITIS OF RIGHT FOOT: ICD-10-CM

## 2024-01-05 DIAGNOSIS — E53.8 VITAMIN B12 DEFICIENCY: Chronic | ICD-10-CM

## 2024-01-05 PROCEDURE — 99214 OFFICE O/P EST MOD 30 MIN: CPT | Mod: 25,S$GLB,, | Performed by: NURSE PRACTITIONER

## 2024-01-05 PROCEDURE — 1159F MED LIST DOCD IN RCRD: CPT | Mod: CPTII,S$GLB,, | Performed by: NURSE PRACTITIONER

## 2024-01-05 PROCEDURE — 90471 IMMUNIZATION ADMIN: CPT | Mod: S$GLB,,, | Performed by: NURSE PRACTITIONER

## 2024-01-05 PROCEDURE — 90677 PCV20 VACCINE IM: CPT | Mod: S$GLB,,, | Performed by: NURSE PRACTITIONER

## 2024-01-05 PROCEDURE — 96372 THER/PROPH/DIAG INJ SC/IM: CPT | Mod: S$GLB,,, | Performed by: NURSE PRACTITIONER

## 2024-01-05 PROCEDURE — 99999 PR PBB SHADOW E&M-EST. PATIENT-LVL V: CPT | Mod: PBBFAC,,, | Performed by: NURSE PRACTITIONER

## 2024-01-05 PROCEDURE — G0009 ADMIN PNEUMOCOCCAL VACCINE: HCPCS | Mod: 59,S$GLB,, | Performed by: NURSE PRACTITIONER

## 2024-01-05 PROCEDURE — 90750 HZV VACC RECOMBINANT IM: CPT | Mod: S$GLB,,, | Performed by: NURSE PRACTITIONER

## 2024-01-05 PROCEDURE — 1111F DSCHRG MED/CURRENT MED MERGE: CPT | Mod: CPTII,S$GLB,, | Performed by: NURSE PRACTITIONER

## 2024-01-05 PROCEDURE — 3008F BODY MASS INDEX DOCD: CPT | Mod: CPTII,S$GLB,, | Performed by: NURSE PRACTITIONER

## 2024-01-05 PROCEDURE — 3074F SYST BP LT 130 MM HG: CPT | Mod: CPTII,S$GLB,, | Performed by: NURSE PRACTITIONER

## 2024-01-05 PROCEDURE — 3078F DIAST BP <80 MM HG: CPT | Mod: CPTII,S$GLB,, | Performed by: NURSE PRACTITIONER

## 2024-01-05 PROCEDURE — 1160F RVW MEDS BY RX/DR IN RCRD: CPT | Mod: CPTII,S$GLB,, | Performed by: NURSE PRACTITIONER

## 2024-01-05 RX ADMIN — CYANOCOBALAMIN 1000 MCG: 1000 INJECTION, SOLUTION INTRAMUSCULAR; SUBCUTANEOUS at 09:01

## 2024-01-05 NOTE — PROGRESS NOTES
"  Assessment and Plan:  Ryan was seen today for hospital follow up.    Diagnoses and all orders for this visit:    Hospital discharge follow-up    Acute hematogenous osteomyelitis of right foot  Comments:  Continue antibiotics as prescribed by infectious disease.  Wound care via home health  Follow-up with Podiatry as scheduled  Orders:  -     CBC W/ AUTO DIFFERENTIAL; Future    Stage 2 moderate COPD by GOLD classification  Comments:  Contiue inhalers as prescribed.  Recommend that you quit smoking    Chronic heart failure with preserved ejection fraction  -     LIPID PANEL; Future    Diabetic polyneuropathy associated with diabetes mellitus due to underlying condition    PVD (peripheral vascular disease)  -     COMPREHENSIVE METABOLIC PANEL; Future  -     LIPID PANEL; Future    Vitamin B12 deficiency  -     Vitamin B12; Future    Immunization due  -     (In Office Administered) Zoster Recombinant Vaccine  -     (In Office Administered) Pneumococcal Conjugate Vaccine (20 Valent) (IM) (Preferred)       Return for fasting labs prior to next clinic visit.     _Follow up in about 4 weeks (around 2/2/2024) for with PCP. _____________________________________________________________________  Subjective:    Chief Complaint:  Hospital follow-up     HPI:  Ryan is a 58 y.o. year old male  with a Hx of  DMT2, CAD s/p stent placement, HFpEF, and PVD here for hospital follow-up. Patient was admitted after he developed osteomyelitis of the right foot.  Patient reports he is doing well and receiving wound care at home. Wound care via home health  every 5 days. Patient reports healing well.  He is taking Doxycycline 100 mg p.o. b.i.d. and Flagyl 500 mg p.o. t.i.d. prescribed for 35 days  F/u with podiatry (Dr. Pratt) yesterday.  Scheduled with Dr. Pratt for suture removal next week.      See Hospital course below:    Hospital Course:   "Patient admitted and started on IV antibiotics. Podiatry and infectious disease consulted. " "  Patient was started on broad-spectrum antibiotics podiatry was consulted as well Infectious Disease.  He underwent incision and drainage as well as bone biopsy.  Pathology positive for chronic osteomyelitis.  Infectious Disease recommended changing to doxycycline 100 mg p.o. b.i.d. and Flagyl 500 mg p.o. t.i.d. when ready for discharge to complete a 4-6 weeks' course.  Therapy worked with him during his stay and recommended home health with 24/7 care.  Unfortunately the patient lives alone does not have 24/7 care.   consulted for discharge planning.  Patient now willing to pursue placement therefore he spent several additional days in the hospital awaiting on placement.   Note, PT/OT evaluated the patient and recommended no additional need for supervision upon discharge.  Patient continued to remain clinically stable, tolerating diet well and was transitioned to home on p.o. antibiotic therapy. "      Requesting B12 injection today.            Medications:  Current Outpatient Medications on File Prior to Visit   Medication Sig Dispense Refill    albuterol (PROVENTIL/VENTOLIN HFA) 90 mcg/actuation inhaler Inhale 2 puffs into the lungs every 6 (six) hours as needed for Shortness of Breath or Wheezing. 18 g 5    albuterol-ipratropium (DUO-NEB) 2.5 mg-0.5 mg/3 mL nebulizer solution Take 3 mLs by nebulization every 6 (six) hours as needed for Wheezing. Rescue 75 mL 0    amLODIPine (NORVASC) 2.5 MG tablet Take 1 tablet (2.5 mg total) by mouth once daily. 90 tablet 3    aspirin (ECOTRIN) 81 MG EC tablet Take 1 tablet (81 mg total) by mouth once daily. 30 tablet 3    atorvastatin (LIPITOR) 40 MG tablet Take 1 tablet (40 mg total) by mouth once daily. 90 tablet 3    clopidogreL (PLAVIX) 75 mg tablet Take 1 tablet (75 mg total) by mouth once daily. 30 tablet 11    cyanocobalamin 1,000 mcg/mL injection 1 ml sq daily x 5 days, then 1 ml sq weekly x 4 weeks then q monthly -  will need to go to PCP (Patient taking " differently: Inject 1,000 mcg into the muscle every 14 (fourteen) days. 1 ml sq daily x 5 days, then 1 ml sq weekly x 4 weeks then q monthly -  will need to go to PCP) 9 mL 0    doxycycline (VIBRA-TABS) 100 MG tablet Take 1 tablet (100 mg total) by mouth 2 (two) times daily. 70 tablet 0    fluticasone-umeclidin-vilanter (TRELEGY ELLIPTA) 100-62.5-25 mcg DsDv Inhale 1 puff into the lungs once daily. 28 each 11    fremanezumab-vfrm (AJOVY AUTOINJECTOR) 225 mg/1.5 mL autoinjector Inject 1.5 mLs (225 mg total) into the skin every 28 days. 1 each 11    HYDROcodone-acetaminophen (NORCO) 7.5-325 mg per tablet Take 1 tablet by mouth every 6 (six) hours as needed for Pain. 28 tablet 0    lamoTRIgine (LAMICTAL) 200 MG tablet Take 200 mg by mouth once daily.      metroNIDAZOLE (FLAGYL) 500 MG tablet Take 1 tablet (500 mg total) by mouth every 8 (eight) hours. 105 tablet 0    mirtazapine (REMERON) 15 MG tablet Take 15 mg by mouth every evening.      nicotine (NICODERM CQ) 14 mg/24 hr Place 1 patch onto the skin once daily. 30 patch 2    nicotine polacrilex 4 MG Lozg Take up to 8 pieces daily as needed (Patient taking differently: Take 4 mg by mouth as needed (Take up to 8 pieces daily as needed).) 270 lozenge 2    nitroGLYCERIN (NITROSTAT) 0.4 MG SL tablet Place 1 tablet (0.4 mg total) under the tongue every 5 (five) minutes as needed for Chest pain. 25 tablet 11    ondansetron (ZOFRAN-ODT) 4 MG TbDL Take 1 tablet by mouth every 8 (eight) hours as needed (nausea).      pantoprazole (PROTONIX) 40 MG tablet Take 1 tablet (40 mg total) by mouth once daily. 90 tablet 3    pregabalin (LYRICA) 50 MG capsule Take 1 capsule (50 mg total) by mouth 3 (three) times daily. 270 capsule 1    pregabalin (LYRICA) 50 MG capsule Take 1 capsule (50 mg total) by mouth 3 (three) times daily. 270 capsule 0    ranolazine (RANEXA) 500 MG Tb12 Take 1 tablet (500 mg total) by mouth 2 (two) times daily. 180 tablet 3    rimegepant (NURTEC) 75 mg odt Take  1 tablet (75 mg total) by mouth every other day. Place ODT tablet on the tongue; alternatively the ODT tablet may be placed under the tongue      topiramate (TOPAMAX) 200 MG Tab Take 1 tablet (200 mg total) by mouth every evening. 90 tablet 3    ziprasidone (GEODON) 80 MG capsule Take 160 mg by mouth every evening.      [DISCONTINUED] clonazePAM (KLONOPIN) 0.5 MG tablet Take 0.5 mg by mouth once daily.       Current Facility-Administered Medications on File Prior to Visit   Medication Dose Route Frequency Provider Last Rate Last Admin    cyanocobalamin injection 1,000 mcg  1,000 mcg Intramuscular Q14 Days Ana Mayer MD   1,000 mcg at 01/05/24 0925    diphenhydrAMINE injection 12.5 mg  12.5 mg Intravenous Q6H PRN Irvin Frank MD        electrolyte-S (ISOLYTE)   Intravenous Continuous Irvin Frank MD        fentaNYL 50 mcg/mL injection 25 mcg  25 mcg Intravenous Q5 Min PRN Irvin Frank MD        HYDROmorphone (PF) injection 0.2 mg  0.2 mg Intravenous Q5 Min PRN Irvin Frank MD        lactated ringers infusion   Intravenous Continuous Napoleon Grimaldo MD        lactated ringers infusion   Intravenous Continuous Napoleon Grimaldo MD 25 mL/hr at 08/25/23 0948 New Bag at 08/25/23 0948    lactated ringers infusion   Intravenous Continuous Napoleon Grimaldo MD   Stopped at 10/31/23 1255    lactated ringers infusion   Intravenous Continuous Irvin Frank MD 10 mL/hr at 11/17/23 0745 New Bag at 11/17/23 0745    LIDOcaine (PF) 10 mg/ml (1%) injection 10 mg  1 mL Intradermal Once Napoleon Grimaldo MD        LIDOcaine (PF) 10 mg/ml (1%) injection 10 mg  1 mL Intradermal Once Napoleon Grimaldo MD        LIDOcaine (PF) 10 mg/ml (1%) injection 10 mg  1 mL Intradermal Once Napoleon Grimaldo MD        LIDOcaine (PF) 10 mg/ml (1%) injection 10 mg  1 mL Intradermal Once Irvin Frank MD        oxyCODONE immediate release tablet 5 mg  5 mg Oral Q3H PRN Irvin Frank MD           Review of Systems:  Review of Systems    Constitutional:  Negative for chills, fatigue and fever.   HENT:  Negative for congestion and rhinorrhea.    Respiratory:  Negative for cough and shortness of breath.    Cardiovascular:  Negative for chest pain, palpitations and leg swelling.   Gastrointestinal:  Negative for diarrhea, nausea and vomiting.   Musculoskeletal:  Positive for arthralgias. Negative for joint swelling.   Skin:  Positive for wound (right foot). Negative for rash.   Neurological:  Negative for dizziness, weakness, light-headedness and headaches.       Past Medical History:  Past Medical History:   Diagnosis Date    Aortic valve stenosis 02/28/2022    Arachnoid cyst of posterior cranial fossa 01/13/2022    Benign prostatic hyperplasia without lower urinary tract symptoms 05/25/2023    Bilateral carotid bruits 06/22/2021    Bipolar disorder 01/16/2022    CAD S/P percutaneous coronary angioplasty     3 vessel disease    Calculus of gallbladder without cholecystitis without obstruction 01/11/2023    Cancer     Candidal intertrigo 11/11/2019    Cataract     Chronic heart failure with preserved ejection fraction 03/29/2023    Chronic schizophrenia     Colon polyps     Diabetic polyneuropathy associated with diabetes mellitus due to underlying condition 11/21/2019    Dysarthria as late effect of cerebellar cerebrovascular accident (CVA) 03/24/2023    Equinus deformity of both feet 11/21/2019    Erectile dysfunction due to arterial insufficiency 05/25/2023    Flaccid hemiplegia of left nondominant side as late effect of cerebral infarction 03/17/2023    Flat foot 11/21/2019    Gastritis     Gastroesophageal reflux disease without esophagitis 07/13/2023    Gastrointestinal hemorrhage 12/13/2019    Post polypectomy bleeding    General anesthetics causing adverse effect in therapeutic use     Hammer toes of both feet 11/21/2019    Hx of diabetic foot ulcer 11/21/2019    Hypertension     Hyponatremia 01/11/2022    ELAINE (iron deficiency anemia)  "12/10/2019    Intractable chronic migraine without aura and without status migrainosus 12/30/2021    Microcytic anemia 10/09/2019    Moderate aortic regurgitation 03/29/2023    Moderate mitral regurgitation 03/29/2023    Moderate mitral stenosis 03/29/2023    Moderate to severe aortic stenosis 02/28/2022    Myocardial infarct, old     Nicotine dependence, unspecified, uncomplicated 01/16/2022    Onychomycosis due to dermatophyte 11/21/2019    Orchitis 11/09/2019    PIPER on CPAP     Peripheral visual field defect, bilateral 01/13/2022    PVD (peripheral vascular disease) 11/21/2019    Seizures 2008    Stage 2 moderate COPD by GOLD classification     PFTs w/ FEV1 (73%) (9/2022)    Stroke 2005    Thoracic aortic atherosclerosis 10/17/2022    CT chest    Thyroid disease     Previously on pharmacologic Tx    Tinea pedis of right foot 05/21/2019    Tobacco use disorder     Type 2 diabetes mellitus with diabetic peripheral angiopathy without gangrene     A1c 5.1% (3/2023)    Vitamin B12 deficiency 03/24/2023       Objective:    Vitals:  Vitals:    01/05/24 0809   BP: 124/70   Pulse: 80   Weight: 66.8 kg (147 lb 6 oz)   Height: 5' 10" (1.778 m)   PainSc:   9   PainLoc: Foot       Physical Exam  Constitutional:       Appearance: Normal appearance.   HENT:      Head: Normocephalic.      Nose: Nose normal.   Eyes:      General: No scleral icterus.  Cardiovascular:      Rate and Rhythm: Normal rate and regular rhythm.      Heart sounds: Normal heart sounds.   Pulmonary:      Effort: Pulmonary effort is normal.      Breath sounds: Examination of the left-middle field reveals rhonchi. Decreased breath sounds (slightly deminished in bases bilaterally) and rhonchi present.   Musculoskeletal:      Cervical back: Normal range of motion and neck supple.   Feet:      Comments: Dry intact dressing covering  LLE with walking boot  Lymphadenopathy:      Cervical: No cervical adenopathy.   Skin:     General: Skin is warm and dry. "   Neurological:      Mental Status: He is alert and oriented to person, place, and time.   Psychiatric:         Mood and Affect: Mood normal.         Behavior: Behavior normal.         Thought Content: Thought content normal.         Data:    Medical history reviewed, Medications reconciled.       Advised patient to keep follow-up appointments with podiatry.  Discussed smoking cessation.  Wound not visualized. Patient requested to leave dressing intact.  Advised on wound care and s/s of worsening infection and emergent conditions.  Agrees to have pneumonia and shingles vaccine today.  Labs ordered to be repeated in 1 mth, prior to f/u with PCP.   Side effects and precautions of medication use reviewed with patient, expressed understanding. No questions or concerns       ANNABELLE NairP-C  Family Medicine

## 2024-01-08 ENCOUNTER — TELEPHONE (OUTPATIENT)
Dept: PODIATRY | Facility: CLINIC | Age: 59
End: 2024-01-08
Payer: MEDICARE

## 2024-01-08 NOTE — TELEPHONE ENCOUNTER
----- Message from Shellie Bryan sent at 1/8/2024 11:53 AM CST -----  Contact: patient  Type:  Needs Medical Advice    Who Called: patient     Would the patient rather a call back or a response via [facility]? Call     Best Call Back Number: [phone] (home)      Additional Information: patient would like to speak with the nurse in regards to his appointment for the 11th.     Please call to advise          Spoke with pt regarding appointment. Pt had 2 appointments for the same wk. Pt advised what appointment day they wanted to keep. I canceled the one they did not want.

## 2024-01-11 ENCOUNTER — OFFICE VISIT (OUTPATIENT)
Dept: PODIATRY | Facility: CLINIC | Age: 59
End: 2024-01-11
Payer: MEDICARE

## 2024-01-11 ENCOUNTER — TELEPHONE (OUTPATIENT)
Dept: PODIATRY | Facility: CLINIC | Age: 59
End: 2024-01-11
Payer: MEDICARE

## 2024-01-11 VITALS — WEIGHT: 147 LBS | BODY MASS INDEX: 21.05 KG/M2 | HEIGHT: 70 IN

## 2024-01-11 DIAGNOSIS — G89.18 POST-OP PAIN: Primary | ICD-10-CM

## 2024-01-11 DIAGNOSIS — T81.31XA SURGICAL WOUND DEHISCENCE, INITIAL ENCOUNTER: ICD-10-CM

## 2024-01-11 PROCEDURE — 99024 POSTOP FOLLOW-UP VISIT: CPT | Mod: S$GLB,,, | Performed by: STUDENT IN AN ORGANIZED HEALTH CARE EDUCATION/TRAINING PROGRAM

## 2024-01-11 PROCEDURE — 99999 PR PBB SHADOW E&M-EST. PATIENT-LVL IV: CPT | Mod: PBBFAC,,, | Performed by: STUDENT IN AN ORGANIZED HEALTH CARE EDUCATION/TRAINING PROGRAM

## 2024-01-11 PROCEDURE — 1160F RVW MEDS BY RX/DR IN RCRD: CPT | Mod: CPTII,S$GLB,, | Performed by: STUDENT IN AN ORGANIZED HEALTH CARE EDUCATION/TRAINING PROGRAM

## 2024-01-11 PROCEDURE — 1159F MED LIST DOCD IN RCRD: CPT | Mod: CPTII,S$GLB,, | Performed by: STUDENT IN AN ORGANIZED HEALTH CARE EDUCATION/TRAINING PROGRAM

## 2024-01-11 NOTE — PROGRESS NOTES
Patient returns today s/p R foot: OPF with exostectomy of the plantar heel spur. DOS (11/17/23). Patient had post op complications requiring admission and washout on 12/19/23 and has been on 6 weeks of antibiotics.     He is overall feeling well but still has R foot pain with ambulation and even at rest.    Sutures removed today but there is still gapping of the skin margins. The deeper layers of skin are closed. Slough was removed and skin edges were fish scaled and dressing was applied.    Continue home health.     Дмитрий Pratt DPM

## 2024-01-11 NOTE — TELEPHONE ENCOUNTER
----- Message from Mena Avila sent at 1/11/2024  8:29 AM CST -----  Type:  RX Refill Request    Who Called:  pt  Refill or New Rx:  refill  RX Name and Strength:  HYDROcodone-acetaminophen (NORCO) 7.5-325 mg per tablet  How is the patient currently taking it? (ex. 1XDay):  as directed  Is this a 30 day or 90 day RX:  7 day  Preferred Pharmacy with phone number:    [facility] - [place], [place] - [address]  [address] [facility] [address]  Phone: [phone] Fax: [phone]  [facility] #[id number] - [place] - [address] [facility] [address] [facility]  [address]  [place] [address]  Phone: [phone] Fax: [phone]  Best Call Back Number:  [phone]  Additional Information:  pt is calling in regards to needing to see if the office can call in a pain medication for the pt.Please call back and advise. Thanks!

## 2024-01-12 ENCOUNTER — TELEPHONE (OUTPATIENT)
Dept: PODIATRY | Facility: CLINIC | Age: 59
End: 2024-01-12
Payer: MEDICARE

## 2024-01-12 NOTE — TELEPHONE ENCOUNTER
----- Message from Beverly Frank sent at 1/12/2024  2:39 PM CST -----  Contact: Patient  Type:  RX Refill Request    Who Called:   Patient  Refill or New Rx:  Refill    RX Name and Strength:HYDROcodone-acetaminophen (NORCO) 7.5-325 mg per tablet     Preferred Pharmacy with phone number:      [facility] #[id number] - [place], [place] - [address] AT [address] [facility] [address] [facility]  [address] [facility] [address]  Phone: [phone] Fax: [phone]    Local or Mail Order: Local  Ordering Provider:  Dr Pratt    Would the patient rather a call back or a response via [facility]?   Call back  Best Call Back Number:   [phone]    Additional Information:  States he is completely out of this medication and needs refills sent to the pharmacy above - please call - thank you

## 2024-01-12 NOTE — TELEPHONE ENCOUNTER
----- Message from Norma Shankar MD sent at 2/9/2019  9:57 AM CST -----  PSA remains normal.  Chol is good. No diabetes. Thyroid is normal.  Same dose thyroid. Refill one year if needed.   OK for yearly labs Please see the attached refill request.

## 2024-01-16 RX ORDER — HYDROCODONE BITARTRATE AND ACETAMINOPHEN 7.5; 325 MG/1; MG/1
1 TABLET ORAL EVERY 6 HOURS PRN
Qty: 28 TABLET | Refills: 0 | Status: SHIPPED | OUTPATIENT
Start: 2024-01-16 | End: 2024-01-18 | Stop reason: SDUPTHER

## 2024-01-17 ENCOUNTER — TELEPHONE (OUTPATIENT)
Dept: PODIATRY | Facility: CLINIC | Age: 59
End: 2024-01-17
Payer: MEDICARE

## 2024-01-17 ENCOUNTER — PATIENT MESSAGE (OUTPATIENT)
Dept: PODIATRY | Facility: CLINIC | Age: 59
End: 2024-01-17
Payer: MEDICARE

## 2024-01-17 NOTE — TELEPHONE ENCOUNTER
----- Message from Deanne Villalobos sent at 1/17/2024 11:06 AM CST -----  Regarding: advice  Contact: DEANNE MICHAELS [[id number]]  Type: Needs Medical Advice  Who Called:  Deanne    Symptoms (please be specific):  na    How long has patient had these symptoms:  na    Pharmacy name and phone #:      [facility] #[id number] - [place], [place] - [address] [facility] [address] [facility]  [address] [address]  Phone: [phone] Fax: [phone]        Best Call Back Number: [phone] (home)     Additional Information: Norco 7.5 RX failed to send. Please call to advise.

## 2024-01-17 NOTE — TELEPHONE ENCOUNTER
----- Message from Angie Johns sent at 1/17/2024  2:56 PM CST -----  Regarding: Refill  Type:  RX Refill Request    Who Called: Pt    Refill or New Rx:Refill    RX Name and Strength:[facility] #[id number] - [place], [place] - [address] [place]      How is the patient currently taking it? (ex. 1XDay):Every 6hrs      Preferred Pharmacy with phone number:    [facility] #[id number] - [place], [place] - [address] [place]  [address] [address]  Phone: [phone] Fax: [phone]        Local or Mail Order:Local      Would the patient rather a call back or a response via [facility]? Call back    Best Call Back Number:[phone]    Additional Information: Rx system was down yesterday and today they sts the refill was not received. Thank you

## 2024-01-18 ENCOUNTER — TELEPHONE (OUTPATIENT)
Dept: PODIATRY | Facility: CLINIC | Age: 59
End: 2024-01-18
Payer: MEDICARE

## 2024-01-18 DIAGNOSIS — M79.671 INTRACTABLE RIGHT HEEL PAIN: Primary | ICD-10-CM

## 2024-01-18 DIAGNOSIS — G89.18 POST-OP PAIN: ICD-10-CM

## 2024-01-18 RX ORDER — HYDROCODONE BITARTRATE AND ACETAMINOPHEN 7.5; 325 MG/1; MG/1
1 TABLET ORAL EVERY 6 HOURS PRN
Qty: 28 TABLET | Refills: 0 | Status: SHIPPED | OUTPATIENT
Start: 2024-01-18 | End: 2024-01-23

## 2024-01-18 NOTE — TELEPHONE ENCOUNTER
----- Message from Irvin Ge sent at 1/18/2024 10:53 AM CST -----  Contact: jennifer at [phone]  Type: Needs Medical Advice    Who Called:  jennifer Tapia Call Back Number: [phone]    Additional Information: pt just spoke with insurance co about pain med. Pt was told that ins co just sent fax about meds. Please call pt to let know if received fax and how to proceed to get pain med.

## 2024-01-23 ENCOUNTER — TELEPHONE (OUTPATIENT)
Dept: PODIATRY | Facility: CLINIC | Age: 59
End: 2024-01-23

## 2024-01-23 ENCOUNTER — OFFICE VISIT (OUTPATIENT)
Dept: PODIATRY | Facility: CLINIC | Age: 59
End: 2024-01-23
Payer: MEDICARE

## 2024-01-23 VITALS — HEIGHT: 70 IN | WEIGHT: 147 LBS | BODY MASS INDEX: 21.05 KG/M2

## 2024-01-23 DIAGNOSIS — Z98.890 POST-OPERATIVE STATE: Primary | ICD-10-CM

## 2024-01-23 PROCEDURE — 99024 POSTOP FOLLOW-UP VISIT: CPT | Mod: S$GLB,,, | Performed by: STUDENT IN AN ORGANIZED HEALTH CARE EDUCATION/TRAINING PROGRAM

## 2024-01-23 PROCEDURE — 99999 PR PBB SHADOW E&M-EST. PATIENT-LVL II: CPT | Mod: PBBFAC,,, | Performed by: STUDENT IN AN ORGANIZED HEALTH CARE EDUCATION/TRAINING PROGRAM

## 2024-01-23 PROCEDURE — 1159F MED LIST DOCD IN RCRD: CPT | Mod: CPTII,S$GLB,, | Performed by: STUDENT IN AN ORGANIZED HEALTH CARE EDUCATION/TRAINING PROGRAM

## 2024-01-23 PROCEDURE — 1160F RVW MEDS BY RX/DR IN RCRD: CPT | Mod: CPTII,S$GLB,, | Performed by: STUDENT IN AN ORGANIZED HEALTH CARE EDUCATION/TRAINING PROGRAM

## 2024-01-23 RX ORDER — HYDROCODONE BITARTRATE AND ACETAMINOPHEN 5; 325 MG/1; MG/1
1 TABLET ORAL EVERY 8 HOURS PRN
Qty: 21 TABLET | Refills: 0 | Status: SHIPPED | OUTPATIENT
Start: 2024-01-23 | End: 2024-02-01 | Stop reason: SDUPTHER

## 2024-01-23 NOTE — PROGRESS NOTES
Patient returns today s/p R foot: OPF with exostectomy of the plantar heel spur. DOS (11/17/23). Patient had post op complications requiring admission and washout on 12/19/23 and has been on 6 weeks of antibiotics.     Wound is healing well. He still has foot pain but it is improving and would like to wean off narcotics.     F/u in 2 weeks. Continue WC with HH. Avoid tight dressings.     Дмитрий Pratt DPM

## 2024-01-23 NOTE — TELEPHONE ENCOUNTER
----- Message from Rosalina King sent at 1/23/2024 11:23 AM CST -----  Regarding: Patient Prescription Refill  Mr. Crane had an appointment with Dr. Pratt this morning.  He wanted to be sure that his medication was going to be sent to the [facility] in [place].  Please contact patient to discuss.    Contact #: [phone]    Thank you     oral

## 2024-01-29 ENCOUNTER — TELEPHONE (OUTPATIENT)
Dept: PODIATRY | Facility: CLINIC | Age: 59
End: 2024-01-29
Payer: MEDICARE

## 2024-01-29 ENCOUNTER — LAB VISIT (OUTPATIENT)
Dept: LAB | Facility: HOSPITAL | Age: 59
End: 2024-01-29
Attending: STUDENT IN AN ORGANIZED HEALTH CARE EDUCATION/TRAINING PROGRAM
Payer: MEDICARE

## 2024-01-29 DIAGNOSIS — M86.071 ACUTE HEMATOGENOUS OSTEOMYELITIS OF RIGHT FOOT: ICD-10-CM

## 2024-01-29 DIAGNOSIS — E53.8 VITAMIN B12 DEFICIENCY: Chronic | ICD-10-CM

## 2024-01-29 DIAGNOSIS — I50.32 CHRONIC HEART FAILURE WITH PRESERVED EJECTION FRACTION: ICD-10-CM

## 2024-01-29 DIAGNOSIS — J44.9 STAGE 2 MODERATE COPD BY GOLD CLASSIFICATION: Chronic | ICD-10-CM

## 2024-01-29 DIAGNOSIS — I73.9 PVD (PERIPHERAL VASCULAR DISEASE): ICD-10-CM

## 2024-01-29 LAB
ALBUMIN SERPL BCP-MCNC: 3.5 G/DL (ref 3.5–5.2)
ALP SERPL-CCNC: 66 U/L (ref 55–135)
ALT SERPL W/O P-5'-P-CCNC: 8 U/L (ref 10–44)
ANION GAP SERPL CALC-SCNC: 9 MMOL/L (ref 8–16)
AST SERPL-CCNC: 11 U/L (ref 10–40)
BILIRUB SERPL-MCNC: 0.6 MG/DL (ref 0.1–1)
BUN SERPL-MCNC: 7 MG/DL (ref 6–20)
CALCIUM SERPL-MCNC: 9.9 MG/DL (ref 8.7–10.5)
CHLORIDE SERPL-SCNC: 111 MMOL/L (ref 95–110)
CHOLEST SERPL-MCNC: 107 MG/DL (ref 120–199)
CHOLEST/HDLC SERPL: 3 {RATIO} (ref 2–5)
CO2 SERPL-SCNC: 22 MMOL/L (ref 23–29)
CREAT SERPL-MCNC: 1.1 MG/DL (ref 0.5–1.4)
EST. GFR  (NO RACE VARIABLE): >60 ML/MIN/1.73 M^2
GLUCOSE SERPL-MCNC: 102 MG/DL (ref 70–110)
HDLC SERPL-MCNC: 36 MG/DL (ref 40–75)
HDLC SERPL: 33.6 % (ref 20–50)
LDLC SERPL CALC-MCNC: 48.6 MG/DL (ref 63–159)
NONHDLC SERPL-MCNC: 71 MG/DL
POTASSIUM SERPL-SCNC: 4.2 MMOL/L (ref 3.5–5.1)
PROT SERPL-MCNC: 6.5 G/DL (ref 6–8.4)
SODIUM SERPL-SCNC: 142 MMOL/L (ref 136–145)
TRIGL SERPL-MCNC: 112 MG/DL (ref 30–150)
VIT B12 SERPL-MCNC: 336 PG/ML (ref 210–950)

## 2024-01-29 PROCEDURE — 80053 COMPREHEN METABOLIC PANEL: CPT | Performed by: NURSE PRACTITIONER

## 2024-01-29 PROCEDURE — 85025 COMPLETE CBC W/AUTO DIFF WBC: CPT | Performed by: NURSE PRACTITIONER

## 2024-01-29 PROCEDURE — 82607 VITAMIN B-12: CPT | Performed by: NURSE PRACTITIONER

## 2024-01-29 PROCEDURE — 36415 COLL VENOUS BLD VENIPUNCTURE: CPT | Mod: PN | Performed by: NURSE PRACTITIONER

## 2024-01-29 PROCEDURE — 80061 LIPID PANEL: CPT | Performed by: NURSE PRACTITIONER

## 2024-01-29 RX ORDER — FLUTICASONE FUROATE, UMECLIDINIUM BROMIDE AND VILANTEROL TRIFENATATE 100; 62.5; 25 UG/1; UG/1; UG/1
POWDER RESPIRATORY (INHALATION)
Qty: 60 EACH | Refills: 11 | Status: SHIPPED | OUTPATIENT
Start: 2024-01-29 | End: 2025-01-23

## 2024-01-29 NOTE — TELEPHONE ENCOUNTER
Care Due:                  Date            Visit Type   Department     Provider  --------------------------------------------------------------------------------                                EP -                              PRIMARY      [facility] FAMILY  Last Visit: 11-      CARE ([facility])   MEDICINE       Ana Mayer  Next Visit: None Scheduled  None         None Found                                                            Last  Test          Frequency    Reason                     Performed    Due Date  --------------------------------------------------------------------------------    Lipid Panel.  12 months..  atorvastatin.............  03- 02-    [facility] Embedded Care Due Messages. Reference number: [id number].   1/29/2024 4:20:21 PM CST

## 2024-01-29 NOTE — TELEPHONE ENCOUNTER
----- Message from Ana Vázquez sent at 1/29/2024 12:41 PM CST -----  Regarding: RX Refill Request  Contact: patient at [phone]  Type:  RX Refill Request    Who Called:  patient at [phone]    Preferred Pharmacy with phone number:    [facility] #[id number] - [place] - [address] AT [facility] [address] [facility]  [address]  [place] [address]  Phone: [phone] Fax: [phone]    Additional Information:  Requesting a refill. Pain level is an 8. Please call and advise. Thank you    HYDROcodone-acetaminophen (NORCO) 5-325 mg per tablet 21 tablet 0 1/23/2024 1/30/2024   Sig - Route: Take 1 tablet by mouth every 8 (eight) hours as needed for Pain. - Oral   Sent to pharmacy as: HYDROcodone-acetaminophen (NORCO) 5-325 mg per tablet   Earliest Fill Date: 1/23/2024   Notes to Pharmacy: Quantity prescribed more than 7 day supply? No   E-Prescribing Status: Receipt confirmed by pharmacy (1/23/2024  3:17 PM CST)

## 2024-01-30 LAB
BASOPHILS # BLD AUTO: 0.13 K/UL (ref 0–0.2)
BASOPHILS NFR BLD: 1.5 % (ref 0–1.9)
DIFFERENTIAL METHOD BLD: ABNORMAL
EOSINOPHIL # BLD AUTO: 0.2 K/UL (ref 0–0.5)
EOSINOPHIL NFR BLD: 1.9 % (ref 0–8)
ERYTHROCYTE [DISTWIDTH] IN BLOOD BY AUTOMATED COUNT: 21.4 % (ref 11.5–14.5)
HCT VFR BLD AUTO: 41.7 % (ref 40–54)
HGB BLD-MCNC: 13.8 G/DL (ref 14–18)
IMM GRANULOCYTES # BLD AUTO: 0.01 K/UL (ref 0–0.04)
IMM GRANULOCYTES NFR BLD AUTO: 0.1 % (ref 0–0.5)
LYMPHOCYTES # BLD AUTO: 3.3 K/UL (ref 1–4.8)
LYMPHOCYTES NFR BLD: 39 % (ref 18–48)
MCH RBC QN AUTO: 27.9 PG (ref 27–31)
MCHC RBC AUTO-ENTMCNC: 33.1 G/DL (ref 32–36)
MCV RBC AUTO: 84 FL (ref 82–98)
MONOCYTES # BLD AUTO: 0.7 K/UL (ref 0.3–1)
MONOCYTES NFR BLD: 8.2 % (ref 4–15)
NEUTROPHILS # BLD AUTO: 4.1 K/UL (ref 1.8–7.7)
NEUTROPHILS NFR BLD: 49.3 % (ref 38–73)
NRBC BLD-RTO: 0 /100 WBC
PLATELET # BLD AUTO: 273 K/UL (ref 150–450)
PMV BLD AUTO: 11.7 FL (ref 9.2–12.9)
RBC # BLD AUTO: 4.95 M/UL (ref 4.6–6.2)
WBC # BLD AUTO: 8.4 K/UL (ref 3.9–12.7)

## 2024-01-30 NOTE — TELEPHONE ENCOUNTER
----- Message from Shellie Irvin sent at 1/30/2024  3:52 PM CST -----  Contact: patient  Type:  Needs Medical Advice    Who Called: patient     Would the patient rather a call back or a response via [facility]? Call     Best Call Back Number: [phone] (home)      Additional Information: Patient would like to speak with the nurse in regards to the extreme pain that he is having with his foot.     Please call to advise

## 2024-01-30 NOTE — TELEPHONE ENCOUNTER
Spoke with pt and rescheduled nurse visit due to transportation    Detail Level: Detailed Depth Of Biopsy: dermis Was A Bandage Applied: Yes Size Of Lesion In Cm: 0 Biopsy Type: H and E Biopsy Method: Dermablade Anesthesia Type: 1% lidocaine with epinephrine Anesthesia Volume In Cc: 0.9 Hemostasis: Drysol Wound Care: Petrolatum Dressing: bandage Destruction After The Procedure: No Type Of Destruction Used: Curettage Curettage Text: The wound bed was treated with curettage after the biopsy was performed. Cryotherapy Text: The wound bed was treated with cryotherapy after the biopsy was performed. Electrodesiccation Text: The wound bed was treated with electrodesiccation after the biopsy was performed. Electrodesiccation And Curettage Text: The wound bed was treated with electrodesiccation and curettage after the biopsy was performed. Silver Nitrate Text: The wound bed was treated with silver nitrate after the biopsy was performed. Lab: [id number] Consent: Written consent was obtained and risks were reviewed including but not limited to scarring, infection, bleeding, scabbing, incomplete removal, nerve damage and allergy to anesthesia. Post-Care Instructions: I reviewed with the patient in detail post-care instructions. Patient is to keep the biopsy site dry overnight, and then apply bacitracin twice daily until healed. Patient may apply hydrogen peroxide soaks to remove any crusting. Notification Instructions: Patient will be notified of biopsy results. However, patient instructed to call the office if not contacted within 2 weeks. Billing Type: Third-Party Bill Information: Selecting Yes will display possible errors in your note based on the variables you have selected. This validation is only offered as a suggestion for you. PLEASE NOTE THAT THE VALIDATION TEXT WILL BE REMOVED WHEN YOU FINALIZE YOUR NOTE. IF YOU WANT TO FAX A PRELIMINARY NOTE YOU WILL NEED TO TOGGLE THIS TO 'NO' IF YOU DO NOT WANT IT IN YOUR FAXED NOTE.

## 2024-01-30 NOTE — PROGRESS NOTES
Lab was reviewed, and is in acceptable range. Please plan to review in more detail at your next appt. Don't hesitate to contact the office with any questions or concerns.    Sincerely,    YA Nair

## 2024-02-01 RX ORDER — HYDROCODONE BITARTRATE AND ACETAMINOPHEN 5; 325 MG/1; MG/1
1 TABLET ORAL EVERY 8 HOURS PRN
Qty: 21 TABLET | Refills: 0 | Status: SHIPPED | OUTPATIENT
Start: 2024-02-01 | End: 2024-02-06 | Stop reason: SDUPTHER

## 2024-02-02 ENCOUNTER — OFFICE VISIT (OUTPATIENT)
Dept: FAMILY MEDICINE | Facility: CLINIC | Age: 59
End: 2024-02-02
Payer: MEDICARE

## 2024-02-02 VITALS
SYSTOLIC BLOOD PRESSURE: 120 MMHG | BODY MASS INDEX: 19.82 KG/M2 | HEART RATE: 88 BPM | HEIGHT: 70 IN | WEIGHT: 138.44 LBS | DIASTOLIC BLOOD PRESSURE: 70 MMHG

## 2024-02-02 DIAGNOSIS — J44.9 STAGE 2 MODERATE COPD BY GOLD CLASSIFICATION: ICD-10-CM

## 2024-02-02 DIAGNOSIS — F17.200 TOBACCO USE DISORDER: ICD-10-CM

## 2024-02-02 DIAGNOSIS — E08.42 DIABETIC POLYNEUROPATHY ASSOCIATED WITH DIABETES MELLITUS DUE TO UNDERLYING CONDITION: Primary | ICD-10-CM

## 2024-02-02 DIAGNOSIS — M86.071 ACUTE HEMATOGENOUS OSTEOMYELITIS OF RIGHT FOOT: ICD-10-CM

## 2024-02-02 DIAGNOSIS — E53.8 VITAMIN B12 DEFICIENCY: ICD-10-CM

## 2024-02-02 PROCEDURE — 3008F BODY MASS INDEX DOCD: CPT | Mod: CPTII,S$GLB,, | Performed by: NURSE PRACTITIONER

## 2024-02-02 PROCEDURE — 3074F SYST BP LT 130 MM HG: CPT | Mod: CPTII,S$GLB,, | Performed by: NURSE PRACTITIONER

## 2024-02-02 PROCEDURE — 1160F RVW MEDS BY RX/DR IN RCRD: CPT | Mod: CPTII,S$GLB,, | Performed by: NURSE PRACTITIONER

## 2024-02-02 PROCEDURE — 99999 PR PBB SHADOW E&M-EST. PATIENT-LVL V: CPT | Mod: PBBFAC,,, | Performed by: NURSE PRACTITIONER

## 2024-02-02 PROCEDURE — 99213 OFFICE O/P EST LOW 20 MIN: CPT | Mod: 25,S$GLB,, | Performed by: NURSE PRACTITIONER

## 2024-02-02 PROCEDURE — 1159F MED LIST DOCD IN RCRD: CPT | Mod: CPTII,S$GLB,, | Performed by: NURSE PRACTITIONER

## 2024-02-02 PROCEDURE — 3078F DIAST BP <80 MM HG: CPT | Mod: CPTII,S$GLB,, | Performed by: NURSE PRACTITIONER

## 2024-02-02 PROCEDURE — 96372 THER/PROPH/DIAG INJ SC/IM: CPT | Mod: S$GLB,,, | Performed by: NURSE PRACTITIONER

## 2024-02-02 RX ADMIN — CYANOCOBALAMIN 1000 MCG: 1000 INJECTION, SOLUTION INTRAMUSCULAR; SUBCUTANEOUS at 10:02

## 2024-02-02 NOTE — PROGRESS NOTES
Assessment and Plan:  Ryan was seen today for follow-up.    Diagnoses and all orders for this visit:    Diabetic polyneuropathy associated with diabetes mellitus due to underlying condition  Comments:  May resume taking Lyrica as prescribed.  Orders:  -     Hemoglobin A1C; Future    Acute hematogenous osteomyelitis of right foot  Comments:  Healing well.   Follow up Podiatry on February 6.  Continue wound care at home.    Stage 2 moderate COPD by GOLD classification  Comments:  Stable    Vitamin B12 deficiency         Follow up in about 2 months (around 4/2/2024) for with Dr. Mayer, may return sooner for any concerns..   ______________________________________________________________________  Subjective:    Chief Complaint:  Follow up chronic medical conditions.    HPI:  Ryan is a 58 y.o. year old male here to follow up chronic medical conditions.     Acute hematogenous osteomyelitis of right foot:  Taking antibiotics as prescribed by infectious disease.  Wound care via home health twice a week  Has follow up with Podiatry on February 6th. Doing well.   No longer in boot.    COPD:  Denies recent exacerbation, continues to work on tobacco cessation.  Down to 5 cigerettes a day    Diabetic polyneuropathy:  Reports he was prescribed Lyrica, but it was stopped while he was in the hospital.  Patient wondering if he can resume taking medication.  He reports he is no longer taking Norco.     B12 deficiency:  Requesting B12 injection today.   Medications:  Current Outpatient Medications on File Prior to Visit   Medication Sig Dispense Refill    albuterol (PROVENTIL/VENTOLIN HFA) 90 mcg/actuation inhaler Inhale 2 puffs into the lungs every 6 (six) hours as needed for Shortness of Breath or Wheezing. 18 g 5    albuterol-ipratropium (DUO-NEB) 2.5 mg-0.5 mg/3 mL nebulizer solution Take 3 mLs by nebulization every 6 (six) hours as needed for Wheezing. Rescue 75 mL 0    amLODIPine (NORVASC) 2.5 MG tablet Take 1 tablet (2.5  mg total) by mouth once daily. 90 tablet 3    aspirin (ECOTRIN) 81 MG EC tablet Take 1 tablet (81 mg total) by mouth once daily. 30 tablet 3    atorvastatin (LIPITOR) 40 MG tablet Take 1 tablet (40 mg total) by mouth once daily. 90 tablet 3    brexpiprazole (REXULTI) 2 mg Tab Take 2 mg by mouth once daily. Indications: schizophrenia      clopidogreL (PLAVIX) 75 mg tablet Take 1 tablet (75 mg total) by mouth once daily. 30 tablet 11    cyanocobalamin 1,000 mcg/mL injection 1 ml sq daily x 5 days, then 1 ml sq weekly x 4 weeks then q monthly -  will need to go to PCP (Patient taking differently: Inject 1,000 mcg into the muscle every 14 (fourteen) days. 1 ml sq daily x 5 days, then 1 ml sq weekly x 4 weeks then q monthly -  will need to go to PCP) 9 mL 0    fluticasone-umeclidin-vilanter (TRELEGY ELLIPTA) 100-62.5-25 mcg DsDv INHALE 1 PUFF INTO THE LUNGS ONE TIME DAILY 60 each 11    fremanezumab-vfrm (AJOVY AUTOINJECTOR) 225 mg/1.5 mL autoinjector Inject 1.5 mLs (225 mg total) into the skin every 28 days. 1 each 11    HYDROcodone-acetaminophen (NORCO) 5-325 mg per tablet Take 1 tablet by mouth every 8 (eight) hours as needed for Pain. 21 tablet 0    lamoTRIgine (LAMICTAL) 200 MG tablet Take 200 mg by mouth once daily.      mirtazapine (REMERON) 15 MG tablet Take 15 mg by mouth every evening.      nicotine polacrilex 4 MG Lozg Take up to 8 pieces daily as needed (Patient taking differently: Take 4 mg by mouth as needed (Take up to 8 pieces daily as needed).) 270 lozenge 2    nitroGLYCERIN (NITROSTAT) 0.4 MG SL tablet Place 1 tablet (0.4 mg total) under the tongue every 5 (five) minutes as needed for Chest pain. 25 tablet 11    ondansetron (ZOFRAN-ODT) 4 MG TbDL Take 1 tablet by mouth every 8 (eight) hours as needed (nausea).      pantoprazole (PROTONIX) 40 MG tablet Take 1 tablet (40 mg total) by mouth once daily. 90 tablet 3    pregabalin (LYRICA) 50 MG capsule Take 1 capsule (50 mg total) by mouth 3 (three) times  daily. 270 capsule 1    pregabalin (LYRICA) 50 MG capsule Take 1 capsule (50 mg total) by mouth 3 (three) times daily. 270 capsule 0    ranolazine (RANEXA) 500 MG Tb12 Take 1 tablet (500 mg total) by mouth 2 (two) times daily. 180 tablet 3    rimegepant (NURTEC) 75 mg odt Take 1 tablet (75 mg total) by mouth every other day. Place ODT tablet on the tongue; alternatively the ODT tablet may be placed under the tongue      topiramate (TOPAMAX) 200 MG Tab Take 1 tablet (200 mg total) by mouth every evening. 90 tablet 3    ziprasidone (GEODON) 80 MG capsule Take 160 mg by mouth every evening.      [DISCONTINUED] clonazePAM (KLONOPIN) 0.5 MG tablet Take 0.5 mg by mouth once daily.       Current Facility-Administered Medications on File Prior to Visit   Medication Dose Route Frequency Provider Last Rate Last Admin    cyanocobalamin injection 1,000 mcg  1,000 mcg Intramuscular Q14 Days Ana Mayer MD   1,000 mcg at 01/05/24 0925    diphenhydrAMINE injection 12.5 mg  12.5 mg Intravenous Q6H PRN Irvin Frank MD        electrolyte-S (ISOLYTE)   Intravenous Continuous Irvin Frank MD        fentaNYL 50 mcg/mL injection 25 mcg  25 mcg Intravenous Q5 Min PRN Irvin Frank MD        HYDROmorphone (PF) injection 0.2 mg  0.2 mg Intravenous Q5 Min PRN Irvin Frank MD        lactated ringers infusion   Intravenous Continuous Napoleon Grimaldo MD        lactated ringers infusion   Intravenous Continuous Napoleon Grimaldo MD 25 mL/hr at 08/25/23 0948 New Bag at 08/25/23 0948    lactated ringers infusion   Intravenous Continuous Napoleon Grimaldo MD   Stopped at 10/31/23 1255    lactated ringers infusion   Intravenous Continuous Irvin Frank MD 10 mL/hr at 11/17/23 0745 New Bag at 11/17/23 0745    LIDOcaine (PF) 10 mg/ml (1%) injection 10 mg  1 mL Intradermal Once Napoleon Grimaldo MD        LIDOcaine (PF) 10 mg/ml (1%) injection 10 mg  1 mL Intradermal Once Napoleon Grimaldo MD        LIDOcaine (PF) 10 mg/ml (1%) injection 10 mg  1  mL Intradermal Once Napoleon Grimaldo MD        LIDOcaine (PF) 10 mg/ml (1%) injection 10 mg  1 mL Intradermal Once Irvin Frank MD        oxyCODONE immediate release tablet 5 mg  5 mg Oral Q3H PRN Irvin Frank MD           Review of Systems:  Review of Systems   Constitutional:  Negative for chills, fatigue and fever.   HENT:  Negative for congestion and rhinorrhea.    Respiratory:  Negative for cough, shortness of breath and wheezing.    Cardiovascular:  Negative for chest pain, palpitations and leg swelling.   Gastrointestinal:  Negative for diarrhea, nausea and vomiting.   Musculoskeletal:  Positive for arthralgias. Negative for joint swelling, neck pain and neck stiffness.   Skin:  Negative for rash.   Neurological:  Negative for dizziness, weakness, light-headedness and headaches.       Past Medical History:  Past Medical History:   Diagnosis Date    Aortic valve stenosis 02/28/2022    Arachnoid cyst of posterior cranial fossa 01/13/2022    Benign prostatic hyperplasia without lower urinary tract symptoms 05/25/2023    Bilateral carotid bruits 06/22/2021    Bipolar disorder 01/16/2022    CAD S/P percutaneous coronary angioplasty     3 vessel disease    Calculus of gallbladder without cholecystitis without obstruction 01/11/2023    Cancer     Candidal intertrigo 11/11/2019    Cataract     Chronic heart failure with preserved ejection fraction 03/29/2023    Chronic schizophrenia     Colon polyps     Diabetic polyneuropathy associated with diabetes mellitus due to underlying condition 11/21/2019    Dysarthria as late effect of cerebellar cerebrovascular accident (CVA) 03/24/2023    Equinus deformity of both feet 11/21/2019    Erectile dysfunction due to arterial insufficiency 05/25/2023    Flaccid hemiplegia of left nondominant side as late effect of cerebral infarction 03/17/2023    Flat foot 11/21/2019    Gastritis     Gastroesophageal reflux disease without esophagitis 07/13/2023    Gastrointestinal  "hemorrhage 12/13/2019    Post polypectomy bleeding    General anesthetics causing adverse effect in therapeutic use     Hammer toes of both feet 11/21/2019    Hx of diabetic foot ulcer 11/21/2019    Hypertension     Hyponatremia 01/11/2022    ELAINE (iron deficiency anemia) 12/10/2019    Intractable chronic migraine without aura and without status migrainosus 12/30/2021    Microcytic anemia 10/09/2019    Moderate aortic regurgitation 03/29/2023    Moderate mitral regurgitation 03/29/2023    Moderate mitral stenosis 03/29/2023    Moderate to severe aortic stenosis 02/28/2022    Myocardial infarct, old     Nicotine dependence, unspecified, uncomplicated 01/16/2022    Onychomycosis due to dermatophyte 11/21/2019    Orchitis 11/09/2019    PIPER on CPAP     Peripheral visual field defect, bilateral 01/13/2022    PVD (peripheral vascular disease) 11/21/2019    Seizures 2008    Stage 2 moderate COPD by GOLD classification     PFTs w/ FEV1 (73%) (9/2022)    Stroke 2005    Thoracic aortic atherosclerosis 10/17/2022    CT chest    Thyroid disease     Previously on pharmacologic Tx    Tinea pedis of right foot 05/21/2019    Tobacco use disorder     Type 2 diabetes mellitus with diabetic peripheral angiopathy without gangrene     A1c 5.1% (3/2023)    Vitamin B12 deficiency 03/24/2023       Objective:    Vitals:  Vitals:    02/02/24 0904   BP: 120/70   Pulse: 88   Weight: 62.8 kg (138 lb 7.2 oz)   Height: 5' 10" (1.778 m)   PainSc:   7   PainLoc: Foot       Physical Exam  Vitals reviewed.   HENT:      Head: Normocephalic and atraumatic.   Eyes:      General: No scleral icterus.  Cardiovascular:      Rate and Rhythm: Normal rate and regular rhythm.      Heart sounds: Normal heart sounds.   Pulmonary:      Effort: Pulmonary effort is normal. No respiratory distress.      Breath sounds: No wheezing.   Abdominal:      General: Abdomen is flat.   Musculoskeletal:      Right lower leg: No edema.      Left lower leg: No edema.      " Comments: Clean intact dressing noted to right foot    Skin:     General: Skin is warm and dry.   Neurological:      Mental Status: He is alert.         Data:    Medical history reviewed, Medications reconciled.      Visit summary:    Patient was seen today for follow up osteomyelitis right foot.   Healing well.  He will continue with wound care at home and follow up with Podiatry as scheduled next week.    1/29/24 lab results reviewed with patient in detail.    He will continue working on being tobacco free.     May resume taking Lyrica as prescribed.       I spent a total of 20 minutes on the day of the visit.  This includes face to face time and non-face to face time preparing to see the patient (eg, review of tests), obtaining and/or reviewing separately obtained history, documenting clinical information in the electronic or other health record, independently interpreting results and communicating results to the patient/family/caregiver, or care coordinator.     ANNABELLE NairP-C  Family Medicine

## 2024-02-06 ENCOUNTER — OFFICE VISIT (OUTPATIENT)
Dept: PODIATRY | Facility: CLINIC | Age: 59
End: 2024-02-06
Payer: MEDICARE

## 2024-02-06 VITALS — WEIGHT: 138.44 LBS | HEIGHT: 70 IN | BODY MASS INDEX: 19.82 KG/M2

## 2024-02-06 DIAGNOSIS — Z98.890 POST-OPERATIVE STATE: ICD-10-CM

## 2024-02-06 DIAGNOSIS — G89.18 POST-OP PAIN: Primary | ICD-10-CM

## 2024-02-06 PROCEDURE — 1159F MED LIST DOCD IN RCRD: CPT | Mod: CPTII,S$GLB,, | Performed by: STUDENT IN AN ORGANIZED HEALTH CARE EDUCATION/TRAINING PROGRAM

## 2024-02-06 PROCEDURE — 99999 PR PBB SHADOW E&M-EST. PATIENT-LVL III: CPT | Mod: PBBFAC,,, | Performed by: STUDENT IN AN ORGANIZED HEALTH CARE EDUCATION/TRAINING PROGRAM

## 2024-02-06 PROCEDURE — 1160F RVW MEDS BY RX/DR IN RCRD: CPT | Mod: CPTII,S$GLB,, | Performed by: STUDENT IN AN ORGANIZED HEALTH CARE EDUCATION/TRAINING PROGRAM

## 2024-02-06 PROCEDURE — 99024 POSTOP FOLLOW-UP VISIT: CPT | Mod: S$GLB,,, | Performed by: STUDENT IN AN ORGANIZED HEALTH CARE EDUCATION/TRAINING PROGRAM

## 2024-02-06 RX ORDER — HYDROCODONE BITARTRATE AND ACETAMINOPHEN 5; 325 MG/1; MG/1
1 TABLET ORAL EVERY 8 HOURS PRN
Qty: 21 TABLET | Refills: 0 | Status: SHIPPED | OUTPATIENT
Start: 2024-02-06 | End: 2024-02-13

## 2024-02-06 NOTE — PROGRESS NOTES
Patient returns today s/p R foot: OPF with exostectomy of the plantar heel spur. DOS (11/17/23). Patient had post op complications requiring admission and washout on 12/19/23 and has been on 6 weeks of antibiotics.     Pain to heel is improving. Incision is fully healed. Return to normal shoe gear now.     D/c HH.    F/u 4-6 weeks for final check up.    Дмитрий Pratt DPM

## 2024-02-22 ENCOUNTER — OFFICE VISIT (OUTPATIENT)
Dept: UROLOGY | Facility: CLINIC | Age: 59
End: 2024-02-22
Payer: MEDICARE

## 2024-02-22 VITALS — BODY MASS INDEX: 19.79 KG/M2 | WEIGHT: 138.25 LBS | HEIGHT: 70 IN

## 2024-02-22 DIAGNOSIS — N52.01 ERECTILE DYSFUNCTION DUE TO ARTERIAL INSUFFICIENCY: ICD-10-CM

## 2024-02-22 DIAGNOSIS — Z09 FOLLOW-UP EXAM: Primary | ICD-10-CM

## 2024-02-22 DIAGNOSIS — B37.9 YEAST INFECTION: ICD-10-CM

## 2024-02-22 LAB
BILIRUBIN, UA POC OHS: ABNORMAL
BLOOD, UA POC OHS: NEGATIVE
CLARITY, UA POC OHS: CLEAR
COLOR, UA POC OHS: ABNORMAL
GLUCOSE, UA POC OHS: NEGATIVE
KETONES, UA POC OHS: NEGATIVE
LEUKOCYTES, UA POC OHS: NEGATIVE
NITRITE, UA POC OHS: NEGATIVE
PH, UA POC OHS: 6
PROTEIN, UA POC OHS: NEGATIVE
SPECIFIC GRAVITY, UA POC OHS: 1.02
UROBILINOGEN, UA POC OHS: 2

## 2024-02-22 PROCEDURE — 81003 URINALYSIS AUTO W/O SCOPE: CPT | Mod: QW,S$GLB,,

## 2024-02-22 PROCEDURE — 99999 PR PBB SHADOW E&M-EST. PATIENT-LVL IV: CPT | Mod: PBBFAC,,,

## 2024-02-22 PROCEDURE — 1159F MED LIST DOCD IN RCRD: CPT | Mod: CPTII,S$GLB,,

## 2024-02-22 PROCEDURE — 1160F RVW MEDS BY RX/DR IN RCRD: CPT | Mod: CPTII,S$GLB,,

## 2024-02-22 PROCEDURE — 3008F BODY MASS INDEX DOCD: CPT | Mod: CPTII,S$GLB,,

## 2024-02-22 PROCEDURE — 99214 OFFICE O/P EST MOD 30 MIN: CPT | Mod: S$GLB,,,

## 2024-02-22 RX ORDER — TADALAFIL 20 MG/1
20 TABLET ORAL DAILY
Qty: 10 TABLET | Refills: 11 | Status: SHIPPED | OUTPATIENT
Start: 2024-02-22 | End: 2025-02-21

## 2024-02-22 RX ORDER — CLOTRIMAZOLE AND BETAMETHASONE DIPROPIONATE 10; .64 MG/G; MG/G
CREAM TOPICAL 2 TIMES DAILY
Qty: 15 G | Refills: 1 | Status: SHIPPED | OUTPATIENT
Start: 2024-02-22 | End: 2024-02-29

## 2024-02-22 RX ORDER — CLONAZEPAM 0.5 MG/1
0.5 TABLET ORAL DAILY PRN
COMMUNITY
Start: 2024-02-14

## 2024-02-22 NOTE — PROGRESS NOTES
[facility] Note  Staff: YA Gutiérrez    PCP: MD Moreno    Chief Complaint: Medication Refills    Subjective:        HPI: Ryan Crane is a 58 y.o. male presents today for medication refill.  He states he needs refills of Cialis 20 mg.  He states this works well for him.  He denies any new or worsening urinary complaints such as dysuria, hematuria, fever, flank pain, abdominal pain, incontinence, and straining to urinate.  He denies constipation.  He does complain of a yeast infection to his penis and is requesting medication.    Questions asked the pt during ov today:  Urgency:  Yes, but drinks almost 2 L of Coke daily, urge incontinence?  No  Dysuria: No  Gross Hematuria:No  Straining:No, Hesistancy:No, Intermittency:No}, Weak stream:No  ED:Yes - Cialis works well for him    Last PSA Screening:   Lab Results   Component Value Date    PSA 1.4 05/02/2023    PSADIAG 2.0 08/29/2019       History of Kidney Stones?:  No    Constipation issues?:  No    REVIEW OF SYSTEMS:  Review of Systems   Constitutional: Negative.  Negative for chills and fever.   HENT: Negative.     Eyes: Negative.    Respiratory: Negative.     Cardiovascular: Negative.    Gastrointestinal: Negative.  Negative for abdominal pain, constipation, diarrhea, nausea and vomiting.   Genitourinary: Negative.  Negative for dysuria, flank pain, frequency, hematuria and urgency.   Musculoskeletal: Negative.  Negative for back pain.   Skin: Negative.    Neurological: Negative.    Endo/Heme/Allergies: Negative.    Psychiatric/Behavioral: Negative.         PMHx:  Past Medical History:   Diagnosis Date    Aortic valve stenosis 02/28/2022    Arachnoid cyst of posterior cranial fossa 01/13/2022    Benign prostatic hyperplasia without lower urinary tract symptoms 05/25/2023    Bilateral carotid bruits 06/22/2021    Bipolar disorder 01/16/2022    CAD S/P percutaneous coronary angioplasty     3 vessel disease    Calculus of  gallbladder without cholecystitis without obstruction 01/11/2023    Cancer     Candidal intertrigo 11/11/2019    Cataract     Chronic heart failure with preserved ejection fraction 03/29/2023    Chronic schizophrenia     Colon polyps     Diabetic polyneuropathy associated with diabetes mellitus due to underlying condition 11/21/2019    Dysarthria as late effect of cerebellar cerebrovascular accident (CVA) 03/24/2023    Equinus deformity of both feet 11/21/2019    Erectile dysfunction due to arterial insufficiency 05/25/2023    Flaccid hemiplegia of left nondominant side as late effect of cerebral infarction 03/17/2023    Flat foot 11/21/2019    Gastritis     Gastroesophageal reflux disease without esophagitis 07/13/2023    Gastrointestinal hemorrhage 12/13/2019    Post polypectomy bleeding    General anesthetics causing adverse effect in therapeutic use     Hammer toes of both feet 11/21/2019    Hx of diabetic foot ulcer 11/21/2019    Hypertension     Hyponatremia 01/11/2022    ELAINE (iron deficiency anemia) 12/10/2019    Intractable chronic migraine without aura and without status migrainosus 12/30/2021    Microcytic anemia 10/09/2019    Moderate aortic regurgitation 03/29/2023    Moderate mitral regurgitation 03/29/2023    Moderate mitral stenosis 03/29/2023    Moderate to severe aortic stenosis 02/28/2022    Myocardial infarct, old     Nicotine dependence, unspecified, uncomplicated 01/16/2022    Onychomycosis due to dermatophyte 11/21/2019    Orchitis 11/09/2019    PIPER on CPAP     Peripheral visual field defect, bilateral 01/13/2022    PVD (peripheral vascular disease) 11/21/2019    Seizures 2008    Stage 2 moderate COPD by GOLD classification     PFTs w/ FEV1 (73%) (9/2022)    Stroke 2005    Thoracic aortic atherosclerosis 10/17/2022    CT chest    Thyroid disease     Previously on pharmacologic Tx    Tinea pedis of right foot 05/21/2019    Tobacco use disorder     Type 2 diabetes mellitus with diabetic  peripheral angiopathy without gangrene     A1c 5.1% (3/2023)    Vitamin B12 deficiency 03/24/2023       PSHx:  Past Surgical History:   Procedure Laterality Date    ANGIOGRAM, CORONARY, WITH LEFT HEART CATHETERIZATION  08/25/2022    Procedure: Angiogram, Coronary, with Left Heart Cath;  Surgeon: Mai Deleon MD;  Location: [facility] CATH;  Service: Cardiology;;    APPENDECTOMY      BONE BIOPSY Right 12/19/2023    Procedure: Biopsy-Bone;  Surgeon: мДитрий Pratt DPM;  Location: [facility] OR;  Service: Podiatry;  Laterality: Right;    BONE EXOSTOSIS EXCISION Right 11/17/2023    Procedure: EXCISION, EXOSTOSIS;  Surgeon: Дмитрий Pratt DPM;  Location: [facility] OR;  Service: Podiatry;  Laterality: Right;    BONE MARROW ASPIRATION Left 08/21/2020    Procedure: ASPIRATION, BONE MARROW;  Surgeon: Lex Kathleen MD;  Location: [facility];  Service: Oncology;  Laterality: Left;    CHOLECYSTECTOMY      CLOSURE OF WOUND Right 09/09/2019    Procedure: CLOSURE, WOUND;  Surgeon: Li Kathleen DPM;  Location: [facility] OR;  Service: Podiatry;  Laterality: Right;    COLONOSCOPY N/A 12/10/2019    Procedure: COLONOSCOPY;  Surgeon: Ryan Lucas MD;  Location: [facility];  Service: Endoscopy;  Laterality: N/A;    COLONOSCOPY N/A 12/13/2019    Procedure: COLONOSCOPY;  Surgeon: Ryan Lucas MD;  Location: [facility];  Service: Endoscopy;  Laterality: N/A;    CORONARY STENT PLACEMENT      ESOPHAGOGASTRODUODENOSCOPY N/A 12/10/2019    Procedure: EGD (ESOPHAGOGASTRODUODENOSCOPY);  Surgeon: Ryan Lucas MD;  Location: [facility];  Service: Endoscopy;  Laterality: N/A;    ESOPHAGOGASTRODUODENOSCOPY N/A 11/03/2022    Procedure: EGD (ESOPHAGOGASTRODUODENOSCOPY);  Surgeon: Ryan Lucas MD;  Location: [facility];  Service: Endoscopy;  Laterality: N/A;    INCISION AND DRAINAGE FOOT Right 12/19/2023    Procedure: INCISION AND DRAINAGE, FOOT- RIGHT;  Surgeon: Дмитрий Pratt DPM;  Location: [facility] OR;  Service: Podiatry;  Laterality:  Right;    INJECTION OF ANESTHETIC AGENT AROUND MEDIAL BRANCH NERVES INNERVATING LUMBAR FACET JOINT Bilateral 7/25/2023    Procedure: Block-nerve-medial branch-lumbar;  Surgeon: Napoleon Grimaldo MD;  [facility];  Service: Pain Management;  Laterality: Bilateral;  L3,4,5 MBB    INJECTION OF ANESTHETIC AGENT AROUND MEDIAL BRANCH NERVES INNERVATING LUMBAR FACET JOINT Bilateral 8/25/2023    Procedure: Block-nerve-medial branch-lumbar;  Surgeon: Napoleon Grimaldo MD;  [facility];  Service: Anesthesiology;  Laterality: Bilateral;  L3,4,5 MBB #2    LAPAROSCOPIC CHOLECYSTECTOMY N/A 01/11/2023    Procedure: CHOLECYSTECTOMY, LAPAROSCOPIC;  Surgeon: Laith Davis MD;  Location: [facility] OR;  Service: General;  Laterality: N/A;    LEFT HEART CATHETERIZATION Left 08/25/2022    Procedure: Left heart cath;  Surgeon: Mai Deleon MD;  Location: [facility] CATH;  Service: Cardiology;  Laterality: Left;    RADIOFREQUENCY ABLATION OF LUMBAR MEDIAL BRANCH NERVE AT SINGLE LEVEL Bilateral 10/31/2023    Procedure: Radiofrequency Ablation, Nerve, Spinal, Lumbar, Medial Branch, 1 Level;  Surgeon: Napoleon Grimaldo MD;  Location: [facility] OR;  Service: Anesthesiology;  Laterality: Bilateral;  L3,4,5 RFA    right heel surgery      SHOULDER ARTHROSCOPY Left     UPPER GASTROINTESTINAL ENDOSCOPY         Fam Hx:   malignancies: No    kidney stones: No     Soc Hx:  Lives in [place]    Allergies:  Alcohol, Alcohol antiseptic pads, and Cephalexin    Medications: reviewed     Objective:   There were no vitals filed for this visit.    Physical Exam  Constitutional:       Appearance: Normal appearance.   HENT:      Head: Normocephalic.   Eyes:      Conjunctiva/sclera: Conjunctivae normal.   Pulmonary:      Effort: Pulmonary effort is normal.   Abdominal:      General: There is no distension.      Palpations: Abdomen is soft.      Tenderness: There is no abdominal tenderness.   Musculoskeletal:         General: Normal range of motion.      Cervical  back: Normal range of motion.   Skin:     General: Skin is warm.   Neurological:      Mental Status: He is alert and oriented to person, place, and time.   Psychiatric:         Mood and Affect: Mood normal.         Behavior: Behavior normal.           LABS REVIEW:  UA today:  Color:Clear, Yellow  Spec. Grav.  1.020  PH  6.0  Negative for leukocytes, nitrates, protein, glucose, ketones, and blood.  Small bili and positive urobili    Assessment:       1. Follow-up exam    2. Erectile dysfunction due to arterial insufficiency    3. Yeast infection          Plan:     Cialis 20mg refills prescribed  Pt requesting medication for yeast infection- lotrisone prescribed  Discussed conservative measures to control urgency and frequency including avoiding bladder irritants, timed voiding, not postponing voiding, and bowel regimen (as distended bowel has extrinsic compressive effect on bladder. Discussed bladder irritants include coffe (even decaf), tea, alcohol, soda, spicy foods, acidic juices (orange, tomato), vinegar, and artificial sweeteners.    F/u Annually    [facility]: Active    YA Gutiérrez

## 2024-03-04 ENCOUNTER — CLINICAL SUPPORT (OUTPATIENT)
Dept: FAMILY MEDICINE | Facility: CLINIC | Age: 59
End: 2024-03-04
Payer: MEDICARE

## 2024-03-04 VITALS — BODY MASS INDEX: 19.53 KG/M2 | WEIGHT: 136.13 LBS

## 2024-03-04 DIAGNOSIS — E53.8 VITAMIN B12 DEFICIENCY: Primary | ICD-10-CM

## 2024-03-04 PROCEDURE — 96372 THER/PROPH/DIAG INJ SC/IM: CPT | Mod: S$GLB,,, | Performed by: STUDENT IN AN ORGANIZED HEALTH CARE EDUCATION/TRAINING PROGRAM

## 2024-03-04 PROCEDURE — 99999 PR PBB SHADOW E&M-EST. PATIENT-LVL I: CPT | Mod: PBBFAC,,,

## 2024-03-04 PROCEDURE — 99499 UNLISTED E&M SERVICE: CPT | Mod: S$GLB,,, | Performed by: STUDENT IN AN ORGANIZED HEALTH CARE EDUCATION/TRAINING PROGRAM

## 2024-03-04 RX ADMIN — CYANOCOBALAMIN 1000 MCG: 1000 INJECTION, SOLUTION INTRAMUSCULAR; SUBCUTANEOUS at 08:03

## 2024-03-04 NOTE — PROGRESS NOTES
Pt here for cyanocabalamin injection(see MAR). Confirmed name and . Cyanocabalamin 1000mcg given right delt. Pt tolerated well. Confirmed next dose and appt.

## 2024-03-05 ENCOUNTER — OFFICE VISIT (OUTPATIENT)
Dept: PODIATRY | Facility: CLINIC | Age: 59
End: 2024-03-05
Payer: MEDICARE

## 2024-03-05 VITALS — WEIGHT: 136 LBS | BODY MASS INDEX: 19.47 KG/M2 | HEIGHT: 70 IN

## 2024-03-05 DIAGNOSIS — L85.3 XEROSIS OF SKIN: Primary | ICD-10-CM

## 2024-03-05 DIAGNOSIS — E08.42 DIABETIC POLYNEUROPATHY ASSOCIATED WITH DIABETES MELLITUS DUE TO UNDERLYING CONDITION: ICD-10-CM

## 2024-03-05 DIAGNOSIS — E11.49 TYPE II DIABETES MELLITUS WITH NEUROLOGICAL MANIFESTATIONS: ICD-10-CM

## 2024-03-05 DIAGNOSIS — I73.9 PVD (PERIPHERAL VASCULAR DISEASE): ICD-10-CM

## 2024-03-05 PROCEDURE — 99999 PR PBB SHADOW E&M-EST. PATIENT-LVL III: CPT | Mod: PBBFAC,,, | Performed by: STUDENT IN AN ORGANIZED HEALTH CARE EDUCATION/TRAINING PROGRAM

## 2024-03-05 PROCEDURE — 11055 PARING/CUTG B9 HYPRKER LES 1: CPT | Mod: Q9,S$GLB,, | Performed by: STUDENT IN AN ORGANIZED HEALTH CARE EDUCATION/TRAINING PROGRAM

## 2024-03-05 PROCEDURE — 1160F RVW MEDS BY RX/DR IN RCRD: CPT | Mod: CPTII,S$GLB,, | Performed by: STUDENT IN AN ORGANIZED HEALTH CARE EDUCATION/TRAINING PROGRAM

## 2024-03-05 PROCEDURE — 99213 OFFICE O/P EST LOW 20 MIN: CPT | Mod: 25,S$GLB,, | Performed by: STUDENT IN AN ORGANIZED HEALTH CARE EDUCATION/TRAINING PROGRAM

## 2024-03-05 PROCEDURE — 3008F BODY MASS INDEX DOCD: CPT | Mod: CPTII,S$GLB,, | Performed by: STUDENT IN AN ORGANIZED HEALTH CARE EDUCATION/TRAINING PROGRAM

## 2024-03-05 PROCEDURE — 11721 DEBRIDE NAIL 6 OR MORE: CPT | Mod: 59,Q9,S$GLB, | Performed by: STUDENT IN AN ORGANIZED HEALTH CARE EDUCATION/TRAINING PROGRAM

## 2024-03-05 PROCEDURE — 1159F MED LIST DOCD IN RCRD: CPT | Mod: CPTII,S$GLB,, | Performed by: STUDENT IN AN ORGANIZED HEALTH CARE EDUCATION/TRAINING PROGRAM

## 2024-03-05 RX ORDER — AMMONIUM LACTATE 12 G/100G
2 CREAM TOPICAL 2 TIMES DAILY
Qty: 385 G | Refills: 11 | Status: SHIPPED | OUTPATIENT
Start: 2024-03-05 | End: 2025-03-05

## 2024-03-05 NOTE — PROGRESS NOTES
Subjective:      Patient ID: Ryan Crane is a 58 y.o. male.    Chief Complaint: Follow-up (Post op wound, right foot)      HPI:  Ryan is a 58 y.o. male who presents to the clinic for evaluation and treatment of high risk feet. Ryan has a past medical history of Aortic valve stenosis (02/28/2022), Arachnoid cyst of posterior cranial fossa (01/13/2022), Benign prostatic hyperplasia without lower urinary tract symptoms (05/25/2023), Bilateral carotid bruits (06/22/2021), Bipolar disorder (01/16/2022), CAD S/P percutaneous coronary angioplasty, Calculus of gallbladder without cholecystitis without obstruction (01/11/2023), Cancer, Candidal intertrigo (11/11/2019), Cataract, Chronic heart failure with preserved ejection fraction (03/29/2023), Chronic schizophrenia, Colon polyps, Diabetic polyneuropathy associated with diabetes mellitus due to underlying condition (11/21/2019), Dysarthria as late effect of cerebellar cerebrovascular accident (CVA) (03/24/2023), Equinus deformity of both feet (11/21/2019), Erectile dysfunction due to arterial insufficiency (05/25/2023), Flaccid hemiplegia of left nondominant side as late effect of cerebral infarction (03/17/2023), Flat foot (11/21/2019), Gastritis, Gastroesophageal reflux disease without esophagitis (07/13/2023), Gastrointestinal hemorrhage (12/13/2019), General anesthetics causing adverse effect in therapeutic use, Hammer toes of both feet (11/21/2019), diabetic foot ulcer (11/21/2019), Hypertension, Hyponatremia (01/11/2022), ELAINE (iron deficiency anemia) (12/10/2019), Intractable chronic migraine without aura and without status migrainosus (12/30/2021), Microcytic anemia (10/09/2019), Moderate aortic regurgitation (03/29/2023), Moderate mitral regurgitation (03/29/2023), Moderate mitral stenosis (03/29/2023), Moderate to severe aortic stenosis (02/28/2022), Myocardial infarct, old, Nicotine dependence, unspecified, uncomplicated (01/16/2022), Onychomycosis  due to dermatophyte (11/21/2019), Orchitis (11/09/2019), PIPER on CPAP, Peripheral visual field defect, bilateral (01/13/2022), PVD (peripheral vascular disease) (11/21/2019), Seizures (2008), Stage 2 moderate COPD by GOLD classification, Stroke (2005), Thoracic aortic atherosclerosis (10/17/2022), Thyroid disease, Tinea pedis of right foot (05/21/2019), Tobacco use disorder, Type 2 diabetes mellitus with diabetic peripheral angiopathy without gangrene, and Vitamin B12 deficiency (03/24/2023). The patient's chief complaint is long, thick toenails and right heel pain. This patient has documented high risk feet requiring routine maintenance secondary to diabetes mellitis and those secondary complications of diabetes, as mentioned..  He reports having right heel pain, which he rates as 3/10 on the pain scale but denies having a wound.  He also informs of losing 30 lbs since his last visit without trying to lose weight and is concerned about it.  He denies trying to self-treat his heel pain or trim his toenails himself.  Patient denies any other pedal complaints at this time.    03/05/2024  Mr. Crane returns today for DM foot exam and RFC. Doing well overall. Still has some pain to the R heel but that is better than prior to the surgery.    PCP: Ana Mayer MD    Date Last Seen by PCP:  11/8/2023    Current shoe gear:  Affected Foot: Casual shoes     Unaffected Foot: Casual shoes    Review of Systems   Constitutional: Negative for appetite change, fever, chills, fatigue.  Positive for unexpected weight change.   Respiratory: Negative for cough, wheezing, shortness of breath.  Cardiovascular: Negative for chest pain, claudication, cyanosis.  Positive for leg swelling.  Musculoskeletal: Negative for back pain, arthritis, joint pain, joint swelling, myalgias, stiffness.   Skin: Negative for rash, itching, suspicious lesion, poor wound healing, unusual hair distribution.  Positive for nail bed changes,  discoloration,.  Neurological: Negative for loss of balance, sensory change, paresthesias, numbness.  Positive for pain.  Hematological: Negative for adenopathy, bleeding, bruising easily.   Psychiatric/Behavioral: The patient is not nervous/anxious.  Negative for altered mental status.    Hemoglobin A1C   Date Value Ref Range Status   07/13/2023 4.9 4.0 - 5.6 % Final     Comment:     ADA Screening Guidelines:  5.7-6.4%  Consistent with prediabetes  >or=6.5%  Consistent with diabetes    High levels of fetal hemoglobin interfere with the HbA1C  assay. Heterozygous hemoglobin variants (HbS, HgC, etc)do  not significantly interfere with this assay.   However, presence of multiple variants may affect accuracy.     03/05/2023 5.1 0.0 - 5.6 % Final     Comment:     Reference Interval:  5.0 - 5.6 Normal   5.7 - 6.4 High Risk   > 6.5 Diabetic      Hgb A1c results are standardized based on the (NGSP) National   Glycohemoglobin Standardization Program.      Hemoglobin A1C levels are related to mean serum/plasma glucose   during the preceding 2-3 months.        02/01/2023 5.0 0.0 - 5.6 % Final     Comment:     Reference Interval:  5.0 - 5.6 Normal   5.7 - 6.4 High Risk   > 6.5 Diabetic      Hgb A1c results are standardized based on the (NGSP) National   Glycohemoglobin Standardization Program.      Hemoglobin A1C levels are related to mean serum/plasma glucose   during the preceding 2-3 months.            Past Medical History:   Diagnosis Date    Aortic valve stenosis 02/28/2022    Arachnoid cyst of posterior cranial fossa 01/13/2022    Benign prostatic hyperplasia without lower urinary tract symptoms 05/25/2023    Bilateral carotid bruits 06/22/2021    Bipolar disorder 01/16/2022    CAD S/P percutaneous coronary angioplasty     3 vessel disease    Calculus of gallbladder without cholecystitis without obstruction 01/11/2023    Cancer     Candidal intertrigo 11/11/2019    Cataract     Chronic heart failure with preserved  ejection fraction 03/29/2023    Chronic schizophrenia     Colon polyps     Diabetic polyneuropathy associated with diabetes mellitus due to underlying condition 11/21/2019    Dysarthria as late effect of cerebellar cerebrovascular accident (CVA) 03/24/2023    Equinus deformity of both feet 11/21/2019    Erectile dysfunction due to arterial insufficiency 05/25/2023    Flaccid hemiplegia of left nondominant side as late effect of cerebral infarction 03/17/2023    Flat foot 11/21/2019    Gastritis     Gastroesophageal reflux disease without esophagitis 07/13/2023    Gastrointestinal hemorrhage 12/13/2019    Post polypectomy bleeding    General anesthetics causing adverse effect in therapeutic use     Hammer toes of both feet 11/21/2019    Hx of diabetic foot ulcer 11/21/2019    Hypertension     Hyponatremia 01/11/2022    ELAINE (iron deficiency anemia) 12/10/2019    Intractable chronic migraine without aura and without status migrainosus 12/30/2021    Microcytic anemia 10/09/2019    Moderate aortic regurgitation 03/29/2023    Moderate mitral regurgitation 03/29/2023    Moderate mitral stenosis 03/29/2023    Moderate to severe aortic stenosis 02/28/2022    Myocardial infarct, old     Nicotine dependence, unspecified, uncomplicated 01/16/2022    Onychomycosis due to dermatophyte 11/21/2019    Orchitis 11/09/2019    PIPER on CPAP     Peripheral visual field defect, bilateral 01/13/2022    PVD (peripheral vascular disease) 11/21/2019    Seizures 2008    Stage 2 moderate COPD by GOLD classification     PFTs w/ FEV1 (73%) (9/2022)    Stroke 2005    Thoracic aortic atherosclerosis 10/17/2022    CT chest    Thyroid disease     Previously on pharmacologic Tx    Tinea pedis of right foot 05/21/2019    Tobacco use disorder     Type 2 diabetes mellitus with diabetic peripheral angiopathy without gangrene     A1c 5.1% (3/2023)    Vitamin B12 deficiency 03/24/2023     Past Surgical History:   Procedure Laterality Date    ANGIOGRAM,  CORONARY, WITH LEFT HEART CATHETERIZATION  08/25/2022    Procedure: Angiogram, Coronary, with Left Heart Cath;  Surgeon: Mai Deleon MD;  Location: [facility] CATH;  Service: Cardiology;;    APPENDECTOMY      BONE BIOPSY Right 12/19/2023    Procedure: Biopsy-Bone;  Surgeon: Дмитрий Pratt DPM;  Location: [facility] OR;  Service: Podiatry;  Laterality: Right;    BONE EXOSTOSIS EXCISION Right 11/17/2023    Procedure: EXCISION, EXOSTOSIS;  Surgeon: Дмитрий Pratt DPM;  Location: [facility] OR;  Service: Podiatry;  Laterality: Right;    BONE MARROW ASPIRATION Left 08/21/2020    Procedure: ASPIRATION, BONE MARROW;  Surgeon: Lex Kathleen MD;  Location: [facility];  Service: Oncology;  Laterality: Left;    CHOLECYSTECTOMY      CLOSURE OF WOUND Right 09/09/2019    Procedure: CLOSURE, WOUND;  Surgeon: Li Kathleen DPM;  Location: [facility];  Service: Podiatry;  Laterality: Right;    COLONOSCOPY N/A 12/10/2019    Procedure: COLONOSCOPY;  Surgeon: Ryan Lucas MD;  Location: [facility];  Service: Endoscopy;  Laterality: N/A;    COLONOSCOPY N/A 12/13/2019    Procedure: COLONOSCOPY;  Surgeon: Ryan Lucas MD;  Location: [facility];  Service: Endoscopy;  Laterality: N/A;    CORONARY STENT PLACEMENT      ESOPHAGOGASTRODUODENOSCOPY N/A 12/10/2019    Procedure: EGD (ESOPHAGOGASTRODUODENOSCOPY);  Surgeon: Ryan Lucas MD;  Location: [facility];  Service: Endoscopy;  Laterality: N/A;    ESOPHAGOGASTRODUODENOSCOPY N/A 11/03/2022    Procedure: EGD (ESOPHAGOGASTRODUODENOSCOPY);  Surgeon: Ryan Lucas MD;  Location: [facility];  Service: Endoscopy;  Laterality: N/A;    INCISION AND DRAINAGE FOOT Right 12/19/2023    Procedure: INCISION AND DRAINAGE, FOOT- RIGHT;  Surgeon: Дмитрий Pratt DPM;  Location: [facility];  Service: Podiatry;  Laterality: Right;    INJECTION OF ANESTHETIC AGENT AROUND MEDIAL BRANCH NERVES INNERVATING LUMBAR FACET JOINT Bilateral 7/25/2023    Procedure: Block-nerve-medial branch-lumbar;  Surgeon:  Napoleon Grimaldo MD;  Location: [facility] OR;  Service: Pain Management;  Laterality: Bilateral;  L3,4,5 MBB    INJECTION OF ANESTHETIC AGENT AROUND MEDIAL BRANCH NERVES INNERVATING LUMBAR FACET JOINT Bilateral 8/25/2023    Procedure: Block-nerve-medial branch-lumbar;  Surgeon: Napoleon Grimaldo MD;  Location: [facility] OR;  Service: Anesthesiology;  Laterality: Bilateral;  L3,4,5 MBB #2    LAPAROSCOPIC CHOLECYSTECTOMY N/A 01/11/2023    Procedure: CHOLECYSTECTOMY, LAPAROSCOPIC;  Surgeon: Laith Davis MD;  Location: [facility] OR;  Service: General;  Laterality: N/A;    LEFT HEART CATHETERIZATION Left 08/25/2022    Procedure: Left heart cath;  Surgeon: Mai Deleon MD;  Location: [facility] CATH;  Service: Cardiology;  Laterality: Left;    RADIOFREQUENCY ABLATION OF LUMBAR MEDIAL BRANCH NERVE AT SINGLE LEVEL Bilateral 10/31/2023    Procedure: Radiofrequency Ablation, Nerve, Spinal, Lumbar, Medial Branch, 1 Level;  Surgeon: Napoleon Grimadlo MD;  Location: [facility];  Service: Anesthesiology;  Laterality: Bilateral;  L3,4,5 RFA    right heel surgery      SHOULDER ARTHROSCOPY Left     UPPER GASTROINTESTINAL ENDOSCOPY       Family History   Problem Relation Age of Onset    Melanoma Mother     Diabetes Mother     Hypertension Mother     Stroke Father     Diabetes Father     Hypertension Father     Colon cancer Father         unsure of age of diagnosis    Cancer Father         colon cancer    Cervical cancer Sister     Cirrhosis Brother     Hypertension Brother     Lung cancer Maternal Grandmother     Prostate cancer Maternal Grandfather     Crohn's disease Neg Hx     Ulcerative colitis Neg Hx     Stomach cancer Neg Hx     Esophageal cancer Neg Hx     Retinal detachment Neg Hx     Strabismus Neg Hx     Macular degeneration Neg Hx     Glaucoma Neg Hx      Social History     Socioeconomic History    Marital status: Legally    Occupational History    Occupation: disabled (mental)     Comment: since 2000   Tobacco Use    Smoking status:  "Every Day     Current packs/day: 0.50     Average packs/day: 1.5 packs/day for 45.2 years (67.6 ttl pk-yrs)     Types: Cigarettes     Start date: 1979    Smokeless tobacco: Never    Tobacco comments:     Age Started 14    Substance and Sexual Activity    Alcohol use: No    Drug use: Yes     Types: Marijuana     Comment: ocasionally - once every 6 months    Sexual activity: Yes     Partners: Female     Social Determinants of Health     Financial Resource Strain: Low Risk  (12/18/2023)    Overall Financial Resource Strain (CARDIA)     Difficulty of Paying Living Expenses: Not hard at all   Food Insecurity: No Food Insecurity (12/18/2023)    Hunger Vital Sign     Worried About Running Out of Food in the Last Year: Never true     Ran Out of Food in the Last Year: Never true   Transportation Needs: No Transportation Needs (2/7/2024)    OASIS : Transportation     Lack of Transportation (Medical): No     Lack of Transportation (Non-Medical): No     Patient Unable or Declines to Respond: No   Physical Activity: Insufficiently Active (10/10/2022)    Exercise Vital Sign     Days of Exercise per Week: 2 days     Minutes of Exercise per Session: 10 min   Stress: Stress Concern Present (10/10/2022)    [employer] - Occupational Stress Questionnaire     Feeling of Stress : To some extent   Social Connections: Feeling Somewhat Isolated (2/7/2024)    OASIS : Social Isolation     Frequency of experiencing loneliness or isolation: Sometimes   Housing Stability: Low Risk  (12/18/2023)    Housing Stability Vital Sign     Unable to Pay for Housing in the Last Year: No     Number of Places Lived in the Last Year: 1     Unstable Housing in the Last Year: No           Objective:        Ht 5' 10" (1.778 m)   Wt 61.7 kg (136 lb 0.4 oz)   BMI 19.52 kg/m²     Physical Exam   Constitutional: Patient is oriented to person, place, and time. Patient appears well-developed and well-nourished.  Strong malodor " noted from patient due to lack of personal hygiene.  No acute distress.     Psychiatric: Patient has a normal mood and affect. Patient's speech is normal and behavior is normal. Judgment is normal. Cognition and memory are normal.     Bilateral pedal exam was performed today.  Vascular: Vascular: Pedal pulses palpable 2/4 DP & PT.  CFT is = 3 seconds to the hallux.  Skin temperature is warm to cool proximal tibia to distal toes without localized increase in calor noted.  No erythema and ecchymosis noted to the LE.  Nonpitting edema noted to the LE.  Hair growth present distally to the LE.     Musculoskeletal: Ankle and pedal joints ROM are decreased. Ankle joint dorsiflexion is restricted with the knee extended and flexed per Silf[place] exam.  Muscle strength is 5/5 for all LE muscle groups tested.  Flat foot type present.  Flexion contracture of lesser digits noted.    Neurological:  Epicritic sensation is slightly dimiished to the foot.  [place]addock STR is intact to the LE.  Tenderness to palpation of right plantar heel noted.     Dermatological: Toenails 1-5 are elongated and distally thickened, dystrophic, brittle, discolored, and mycotic with subungal debris present.  Webspaces 1-4 are dry and intact with debris present.  Skin turgor is increased.  Skin texture is dry and flaky. Minor hyperkeratotic skin at the R plantar heel.    Nursing note and vitals reviewed.        Assessment:       Encounter Diagnoses   Name Primary?    Xerosis of skin Yes    PVD (peripheral vascular disease)     Type II diabetes mellitus with neurological manifestations     Diabetic polyneuropathy associated with diabetes mellitus due to underlying condition                Plan:       Ryan was seen today for follow-up.    Diagnoses and all orders for this visit:    Xerosis of skin    PVD (peripheral vascular disease)    Type II diabetes mellitus with neurological manifestations    Diabetic polyneuropathy associated with diabetes  mellitus due to underlying condition    Other orders  -     ammonium lactate 12 % Crea; Apply 2 g topically 2 (two) times a day.            I counseled the patient on his conditions, their implications and medical management.    Patient was evaluated and risk assessed today (3/5/24). The patient is at low risk for developing pedal complications due to peripheral vascular disease.    Ammonium lactate for the skin xerosis.     F/u 3 months.    Routine Foot Care    Date/Time: 3/5/2024 11:00 AM    Performed by: Дмитрий Pratt DPM  Authorized by: Дмитрий Pratt DPM    Consent Done?:  Yes (Verbal)  Hyperkeratotic Skin Lesions?: Yes    Number of trimmed lesions:  1  Location(s):  Right Heel    Nail Care Type:  Debride  Location(s): All  (Left 1st Toe, Left 3rd Toe, Left 2nd Toe, Left 4th Toe, Left 5th Toe, Right 1st Toe, Right 2nd Toe, Right 3rd Toe, Right 4th Toe and Right 5th Toe)  Patient tolerance:  Patient tolerated the procedure well with no immediate complications        Дмитрий Pratt DPM

## 2024-03-07 DIAGNOSIS — J44.9 CHRONIC OBSTRUCTIVE PULMONARY DISEASE, UNSPECIFIED COPD TYPE: ICD-10-CM

## 2024-03-07 NOTE — TELEPHONE ENCOUNTER
Refill Routing Note   Medication(s) are not appropriate for processing by [facility] for the following reason(s):        No active prescription written by provider  ED/Hospital Visit since last OV with provider    [facility] action(s):  Defer               Appointments  past 12m or future 3m with PCP    Date Provider   Last Visit   11/8/2023 Ana Mayer MD   Next Visit   4/3/2024 Ana Mayer MD   ED visits in past 90 days: 0        Note composed:11:15 AM 03/07/2024

## 2024-03-07 NOTE — TELEPHONE ENCOUNTER
No care due was identified.  [facility] Embedded Care Due Messages. Reference number: [id number].   3/07/2024 8:57:07 AM CST

## 2024-03-08 DIAGNOSIS — Z86.73 HISTORY OF STROKE: ICD-10-CM

## 2024-03-08 DIAGNOSIS — I25.10 CAD IN NATIVE ARTERY: Chronic | ICD-10-CM

## 2024-03-08 DIAGNOSIS — I73.9 PVD (PERIPHERAL VASCULAR DISEASE): Chronic | ICD-10-CM

## 2024-03-08 DIAGNOSIS — J44.9 CHRONIC OBSTRUCTIVE PULMONARY DISEASE, UNSPECIFIED COPD TYPE: ICD-10-CM

## 2024-03-08 DIAGNOSIS — I70.0 THORACIC AORTIC ATHEROSCLEROSIS: Chronic | ICD-10-CM

## 2024-03-08 RX ORDER — ALBUTEROL SULFATE 90 UG/1
AEROSOL, METERED RESPIRATORY (INHALATION)
Qty: 54 G | Refills: 3 | OUTPATIENT
Start: 2024-03-08

## 2024-03-08 NOTE — TELEPHONE ENCOUNTER
----- Message from Rosalina Paulino sent at 3/8/2024 10:22 AM CST -----  Contact: [facility]  Type:  RX Refill Request    Who Called: [facility]    Refill or New Rx: refill    RX Name and Strength: 3 medications    albuterol (PROVENTIL/VENTOLIN HFA) 90 mcg/actuation inhaler  atorvastatin (LIPITOR) 40 MG tablet  Meclizine 12.5 MG    How is the patient currently taking it? (ex. 1XDay):as directed    Is this a 30 day or 90 day RX:90    Preferred Pharmacy with phone number:    [facility] - [place], [place] - [address]  [address] [address]  Phone: [phone] Fax: [phone]      Local or Mail Order:mail order    Ordering Provider: Moreno    Would the patient rather a call back or a response via [facility]? Call back    Best Call Back Number:[phone] (pt)    Additional Information: please refill above scripts  Thanks

## 2024-03-08 NOTE — TELEPHONE ENCOUNTER
No care due was identified.  [facility] Embedded Care Due Messages. Reference number: [id number].   3/08/2024 11:24:46 AM CST

## 2024-03-09 RX ORDER — ALBUTEROL SULFATE 90 UG/1
2 AEROSOL, METERED RESPIRATORY (INHALATION) EVERY 6 HOURS PRN
Qty: 18 G | Refills: 5 | Status: SHIPPED | OUTPATIENT
Start: 2024-03-09

## 2024-03-09 RX ORDER — ATORVASTATIN CALCIUM 40 MG/1
40 TABLET, FILM COATED ORAL DAILY
Qty: 90 TABLET | Refills: 3 | Status: SHIPPED | OUTPATIENT
Start: 2024-03-09 | End: 2024-05-11

## 2024-03-09 RX ORDER — MECLIZINE HCL 12.5 MG 12.5 MG/1
12.5 TABLET ORAL 3 TIMES DAILY PRN
Qty: 90 TABLET | Refills: 0 | Status: SHIPPED | OUTPATIENT
Start: 2024-03-09 | End: 2024-04-08

## 2024-03-18 ENCOUNTER — OFFICE VISIT (OUTPATIENT)
Dept: OPTOMETRY | Facility: CLINIC | Age: 59
End: 2024-03-18
Payer: MEDICARE

## 2024-03-18 DIAGNOSIS — H57.00 PUPIL DISORDER: ICD-10-CM

## 2024-03-18 DIAGNOSIS — E11.9 TYPE 2 DIABETES MELLITUS WITHOUT RETINOPATHY: Primary | ICD-10-CM

## 2024-03-18 DIAGNOSIS — H52.7 REFRACTIVE ERROR: ICD-10-CM

## 2024-03-18 DIAGNOSIS — H25.13 NUCLEAR SCLEROSIS OF BOTH EYES: ICD-10-CM

## 2024-03-18 PROCEDURE — 2023F DILAT RTA XM W/O RTNOPTHY: CPT | Mod: HCNC,CPTII,S$GLB, | Performed by: OPTOMETRIST

## 2024-03-18 PROCEDURE — 99999 PR PBB SHADOW E&M-EST. PATIENT-LVL III: CPT | Mod: PBBFAC,HCNC,, | Performed by: OPTOMETRIST

## 2024-03-18 PROCEDURE — 92015 DETERMINE REFRACTIVE STATE: CPT | Mod: HCNC,S$GLB,, | Performed by: OPTOMETRIST

## 2024-03-18 PROCEDURE — 1160F RVW MEDS BY RX/DR IN RCRD: CPT | Mod: HCNC,CPTII,S$GLB, | Performed by: OPTOMETRIST

## 2024-03-18 PROCEDURE — 92004 COMPRE OPH EXAM NEW PT 1/>: CPT | Mod: HCNC,S$GLB,, | Performed by: OPTOMETRIST

## 2024-03-18 PROCEDURE — 1159F MED LIST DOCD IN RCRD: CPT | Mod: HCNC,CPTII,S$GLB, | Performed by: OPTOMETRIST

## 2024-03-18 NOTE — PROGRESS NOTES
HPI    Pt here for annual Dm eye exam DLS- 04/27/22    Pt states his vision has been okay, using OTC readers.   DM has been controlled by diet. HTN is controlled on meds.   Pt had 3 strokes in March of 2023.   Denies F/F and GTTS.     Hemoglobin A1C       Date                     Value               Ref Range             Status                07/13/2023               4.9                 4.0 - 5.6 %           Final              Comment:    ADA Screening Guidelines:  5.7-6.4%  Consistent with   prediabetes  >or=6.5%  Consistent with diabetes    High levels of fetal   hemoglobin interfere with the HbA1C  assay. Heterozygous hemoglobin   variants (HbS, HgC, etc)do  not significantly interfere with this assay.     However, presence of multiple variants may affect accuracy.         03/05/2023               5.1                 0.0 - 5.6 %           Final              Comment:    Reference Interval:  5.0 - 5.6 Normal   5.7 - 6.4 High Risk   > 6.5   Diabetic      Hgb A1c results are standardized based on the (NGSP)   National   Glycohemoglobin Standardization Program.      Hemoglobin A1C   levels are related to mean serum/plasma glucose   during the preceding 2-3   months.            02/01/2023               5.0                 0.0 - 5.6 %           Final              Comment:    Reference Interval:  5.0 - 5.6 Normal   5.7 - 6.4 High Risk   > 6.5   Diabetic      Hgb A1c results are standardized based on the (NGSP)   National   Glycohemoglobin Standardization Program.      Hemoglobin A1C   levels are related to mean serum/plasma glucose   during the preceding 2-3   months.       ----------   Last edited by Spring Kruse on 3/18/2024  3:32 PM.            Assessment /Plan     For exam results, see Encounter Report.    Type 2 diabetes mellitus without retinopathy    Nuclear sclerosis of both eyes    Pupil disorder    Refractive error      No diabetic retinopathy, no csme. Return in 1 year for dilated eye exam.  2. Educated  pt on presence of cataracts and effects on vision. No surgery at this time. Recheck in one year.  3. Larger left pupil, unequal in dark, equal in light, history of stroke. Monitor condition. Patient to report any changes. RTC 1 year recheck.     4. New Spectacle Rx given, discussed different options for glasses. RTC 1 year routine eye exam.

## 2024-03-26 DIAGNOSIS — Z00.00 ENCOUNTER FOR MEDICARE ANNUAL WELLNESS EXAM: ICD-10-CM

## 2024-03-27 ENCOUNTER — OFFICE VISIT (OUTPATIENT)
Dept: NEUROLOGY | Facility: CLINIC | Age: 59
End: 2024-03-27
Payer: MEDICARE

## 2024-03-27 VITALS
HEART RATE: 77 BPM | HEIGHT: 70 IN | DIASTOLIC BLOOD PRESSURE: 69 MMHG | WEIGHT: 141.56 LBS | RESPIRATION RATE: 18 BRPM | BODY MASS INDEX: 20.27 KG/M2 | SYSTOLIC BLOOD PRESSURE: 110 MMHG

## 2024-03-27 DIAGNOSIS — Z86.73 HISTORY OF STROKE: Primary | ICD-10-CM

## 2024-03-27 DIAGNOSIS — G43.719 INTRACTABLE CHRONIC MIGRAINE WITHOUT AURA AND WITHOUT STATUS MIGRAINOSUS: ICD-10-CM

## 2024-03-27 PROCEDURE — 3074F SYST BP LT 130 MM HG: CPT | Mod: HCNC,CPTII,S$GLB, | Performed by: NURSE PRACTITIONER

## 2024-03-27 PROCEDURE — 1159F MED LIST DOCD IN RCRD: CPT | Mod: HCNC,CPTII,S$GLB, | Performed by: NURSE PRACTITIONER

## 2024-03-27 PROCEDURE — 3078F DIAST BP <80 MM HG: CPT | Mod: HCNC,CPTII,S$GLB, | Performed by: NURSE PRACTITIONER

## 2024-03-27 PROCEDURE — 99999 PR PBB SHADOW E&M-EST. PATIENT-LVL V: CPT | Mod: PBBFAC,HCNC,, | Performed by: NURSE PRACTITIONER

## 2024-03-27 PROCEDURE — 3008F BODY MASS INDEX DOCD: CPT | Mod: HCNC,CPTII,S$GLB, | Performed by: NURSE PRACTITIONER

## 2024-03-27 PROCEDURE — 1160F RVW MEDS BY RX/DR IN RCRD: CPT | Mod: HCNC,CPTII,S$GLB, | Performed by: NURSE PRACTITIONER

## 2024-03-27 PROCEDURE — 99213 OFFICE O/P EST LOW 20 MIN: CPT | Mod: HCNC,S$GLB,, | Performed by: NURSE PRACTITIONER

## 2024-03-27 RX ORDER — BUTALBITAL, ACETAMINOPHEN AND CAFFEINE 50; 325; 40 MG/1; MG/1; MG/1
TABLET ORAL
Qty: 10 TABLET | Refills: 0 | Status: SHIPPED | OUTPATIENT
Start: 2024-03-27 | End: 2024-05-03 | Stop reason: SDUPTHER

## 2024-03-27 RX ORDER — RIMEGEPANT SULFATE 75 MG/75MG
75 TABLET, ORALLY DISINTEGRATING ORAL EVERY OTHER DAY
Qty: 16 TABLET | Refills: 11 | Status: SHIPPED | OUTPATIENT
Start: 2024-03-27 | End: 2024-05-31 | Stop reason: ALTCHOICE

## 2024-03-27 RX ORDER — FREMANEZUMAB-VFRM 225 MG/1.5ML
225 INJECTION SUBCUTANEOUS
Qty: 1 EACH | Refills: 11 | Status: SHIPPED | OUTPATIENT
Start: 2024-03-27
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
## 2024-03-27 NOTE — PATIENT INSTRUCTIONS
Please call our clinic at [phone] or send a message on the [employer] portal if there are any changes to the plan described below, for example,if you are not contacted for the requested tests, referral(s) within one week, if you are unable to receive the medications prescribed, or if you feel you need to change the treatment course for any reason.     TESTING:  -- none     REFERRALS:  -- none     PREVENTION (use daily regardless of headache):  -- RESTART Ajovy once per month. Will try to get patient assistance though the pharmaceutical company     AS-NEEDED TREATMENT (use total no more than 10 days per month unless otherwise stated):  -- RESTART Nurtec once every other day. This dissolves under the tongue and is only taken once in 24 hour time frame.  -- limit naproxen and Goody's to 10 days per month or less  -- TRIAL Fioricet for migraine attacks

## 2024-04-03 ENCOUNTER — HOSPITAL ENCOUNTER (OUTPATIENT)
Dept: RADIOLOGY | Facility: HOSPITAL | Age: 59
Discharge: HOME | End: 2024-04-03
Attending: STUDENT IN AN ORGANIZED HEALTH CARE EDUCATION/TRAINING PROGRAM
Payer: MEDICARE

## 2024-04-03 ENCOUNTER — OFFICE VISIT (OUTPATIENT)
Dept: FAMILY MEDICINE | Facility: CLINIC | Age: 59
End: 2024-04-03
Payer: MEDICARE

## 2024-04-03 ENCOUNTER — TELEPHONE (OUTPATIENT)
Dept: PODIATRY | Facility: CLINIC | Age: 59
End: 2024-04-03
Payer: MEDICARE

## 2024-04-03 VITALS
OXYGEN SATURATION: 98 % | HEART RATE: 78 BPM | DIASTOLIC BLOOD PRESSURE: 60 MMHG | RESPIRATION RATE: 18 BRPM | WEIGHT: 140.13 LBS | BODY MASS INDEX: 20.1 KG/M2 | SYSTOLIC BLOOD PRESSURE: 118 MMHG

## 2024-04-03 DIAGNOSIS — D33.9: ICD-10-CM

## 2024-04-03 DIAGNOSIS — G89.18 POST-OP PAIN: ICD-10-CM

## 2024-04-03 DIAGNOSIS — Z98.61 CAD S/P PERCUTANEOUS CORONARY ANGIOPLASTY: Chronic | ICD-10-CM

## 2024-04-03 DIAGNOSIS — E53.8 VITAMIN B12 DEFICIENCY: Chronic | ICD-10-CM

## 2024-04-03 DIAGNOSIS — J44.9 STAGE 2 MODERATE COPD BY GOLD CLASSIFICATION: Chronic | ICD-10-CM

## 2024-04-03 DIAGNOSIS — R79.9 ABNORMAL FINDING OF BLOOD CHEMISTRY, UNSPECIFIED: ICD-10-CM

## 2024-04-03 DIAGNOSIS — E46 PROTEIN-CALORIE MALNUTRITION, UNSPECIFIED SEVERITY: ICD-10-CM

## 2024-04-03 DIAGNOSIS — F20.9 CHRONIC SCHIZOPHRENIA: Primary | Chronic | ICD-10-CM

## 2024-04-03 DIAGNOSIS — Z12.11 COLON CANCER SCREENING: ICD-10-CM

## 2024-04-03 DIAGNOSIS — E08.42 DIABETIC POLYNEUROPATHY ASSOCIATED WITH DIABETES MELLITUS DUE TO UNDERLYING CONDITION: Chronic | ICD-10-CM

## 2024-04-03 DIAGNOSIS — I10 ESSENTIAL HYPERTENSION: Chronic | ICD-10-CM

## 2024-04-03 DIAGNOSIS — I25.10 CAD S/P PERCUTANEOUS CORONARY ANGIOPLASTY: Chronic | ICD-10-CM

## 2024-04-03 DIAGNOSIS — I50.32 CHRONIC HEART FAILURE WITH PRESERVED EJECTION FRACTION: Chronic | ICD-10-CM

## 2024-04-03 PROBLEM — I69.354 HEMIPLEGIA AND HEMIPARESIS FOLLOWING CEREBRAL INFARCTION AFFECTING LEFT NON-DOMINANT SIDE: Chronic | Status: ACTIVE | Noted: 2024-04-03

## 2024-04-03 PROBLEM — I69.354 HEMIPLEGIA AND HEMIPARESIS FOLLOWING CEREBRAL INFARCTION AFFECTING LEFT NON-DOMINANT SIDE: Status: ACTIVE | Noted: 2024-04-03

## 2024-04-03 PROBLEM — M79.671 INTRACTABLE RIGHT HEEL PAIN: Status: RESOLVED | Noted: 2019-03-21 | Resolved: 2024-04-03

## 2024-04-03 PROCEDURE — 1159F MED LIST DOCD IN RCRD: CPT | Mod: CPTII,S$GLB,, | Performed by: STUDENT IN AN ORGANIZED HEALTH CARE EDUCATION/TRAINING PROGRAM

## 2024-04-03 PROCEDURE — 99999 PR PBB SHADOW E&M-EST. PATIENT-LVL V: CPT | Mod: PBBFAC,,, | Performed by: STUDENT IN AN ORGANIZED HEALTH CARE EDUCATION/TRAINING PROGRAM

## 2024-04-03 PROCEDURE — 96372 THER/PROPH/DIAG INJ SC/IM: CPT | Mod: S$GLB,,, | Performed by: STUDENT IN AN ORGANIZED HEALTH CARE EDUCATION/TRAINING PROGRAM

## 2024-04-03 PROCEDURE — 99214 OFFICE O/P EST MOD 30 MIN: CPT | Mod: 25,S$GLB,, | Performed by: STUDENT IN AN ORGANIZED HEALTH CARE EDUCATION/TRAINING PROGRAM

## 2024-04-03 PROCEDURE — 3078F DIAST BP <80 MM HG: CPT | Mod: CPTII,S$GLB,, | Performed by: STUDENT IN AN ORGANIZED HEALTH CARE EDUCATION/TRAINING PROGRAM

## 2024-04-03 PROCEDURE — 3074F SYST BP LT 130 MM HG: CPT | Mod: CPTII,S$GLB,, | Performed by: STUDENT IN AN ORGANIZED HEALTH CARE EDUCATION/TRAINING PROGRAM

## 2024-04-03 PROCEDURE — 73630 X-RAY EXAM OF FOOT: CPT | Mod: TC,PN,RT

## 2024-04-03 PROCEDURE — 73630 X-RAY EXAM OF FOOT: CPT | Mod: 26,RT,, | Performed by: RADIOLOGY

## 2024-04-03 PROCEDURE — 3008F BODY MASS INDEX DOCD: CPT | Mod: CPTII,S$GLB,, | Performed by: STUDENT IN AN ORGANIZED HEALTH CARE EDUCATION/TRAINING PROGRAM

## 2024-04-03 RX ORDER — BREXPIPRAZOLE 4 MG/1
4 TABLET ORAL DAILY
COMMUNITY
End: 2024-05-17 | Stop reason: SDUPTHER

## 2024-04-03 RX ORDER — HYDROXYZINE HYDROCHLORIDE 10 MG/1
10 TABLET, FILM COATED ORAL 3 TIMES DAILY PRN
COMMUNITY

## 2024-04-03 RX ADMIN — CYANOCOBALAMIN 1000 MCG: 1000 INJECTION, SOLUTION INTRAMUSCULAR; SUBCUTANEOUS at 02:04

## 2024-04-03 NOTE — PROGRESS NOTES
Plan:      Ryan was seen today for follow-up.    Diagnoses and all orders for this visit:    Chronic schizophrenia  -     Ambulatory referral/consult to Outpatient Case Management    Benign CNS tumor: Stable    Diabetic polyneuropathy associated with diabetes mellitus due to underlying condition  -     Hemoglobin A1C; Future  -     CBC Auto Differential; Future  -     Vitamin B12; Future  -     FOLATE; Future  -     VITAMIN B1; Future    Stage 2 moderate COPD by GOLD classification  -     CBC Auto Differential; Future    CAD S/P percutaneous coronary angioplasty  -     Lipid Panel; Future  -     Comprehensive Metabolic Panel; Future  -     Magnesium; Future  -     Phosphorus; Future    Chronic heart failure with preserved ejection fraction  -     Comprehensive Metabolic Panel; Future  -     Magnesium; Future  -     Phosphorus; Future    Essential hypertension  -     Comprehensive Metabolic Panel; Future  -     Magnesium; Future  -     Phosphorus; Future    Vitamin B12 deficiency  -     CBC Auto Differential; Future  -     Vitamin B12; Future  -     IRON AND TIBC; Future  -     FERRITIN; Future    Abnormal finding of blood chemistry, unspecified  -     FOLATE; Future  -     IRON AND TIBC; Future  -     FERRITIN; Future    Protein-calorie malnutrition, unspecified severity  -     FOLATE; Future    Colon cancer screening  -     Cologuard Screening (Multitarget Stool DNA); Future  -     Cologuard Screening (Multitarget Stool DNA)    Post-op pain  -     X-Ray Foot Complete Right; Future      Follow up in about 6 months (around 10/3/2024), or if symptoms worsen or fail to improve.    Ana Mayer MD  04/03/2024    Subjective:      Patient ID: Ryan Crane is a 58 y.o. male    Chief Complaint   Patient presents with    Follow-up     HPI  58 y.o. male with a PMHx as documented below presents to clinic today for the following:    Pt reports intermittent right foot pain following surgery - pt describes the pain  as a severe throbbing, described as 'feeling my heart in my foot.' Pt states the pain often comes on when walking, but it does not occur every time he walks. Symptoms occur every few days.    Pt wanting to 'get off' blood thinners (Plavix) - upcoming appointment w/ Cardiology. Pt understands not to stop Plavix unless directed by Cards.    Moving in with different daughter in a few weeks - still staying in [place].     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    Chronic schizophrenia:   - Lamictal 200 mg daily, Remeron 15 mg qhs, Geodon 160 mg qhs  - Rexulti 4 mg daily   - Klonopin 0.5 mg daily PRN, Atarax 10 mg TID PRN  - Previous Rx: Buspar (d/c 12/23?)  - Following w/ Psychiatry     Subacute thoracic and lumbar back pain:   - MRI lumbar spine w/o contrast (6/12/23): Multilevel degenerative changes of the lumbar spine without more than mild spinal canal/recess narrowing there is shallow disc bulging is noted at multiple levels. No lateralizing disc herniation. Mild to moderate right foraminal narrowing at L4-L5.  - MRI thoracic spine w/o contrast (6/12/23): Mild degenerative changes as discussed above.  The spinal canal and foramina are patent throughout.  No cord impingement or acute process  - S/p eval w/ Dr. Shirley Dowd w/ [facility] - recommending medial branch blocks and subsequent radiofrequency ablation as indicated of the L4-5 and L5-S1 facet joints bilaterally (8/25/23), considering future epidural steroid injections in the midthoracic region     Hx of CVA; hemiplegia and hemiparesis following CVA affecting non-dominant side:   - Right lacunar infarct (3/4/23) resulting in hemiplegia of left, nondominant side  - CT head w/o contrast showed changes consistent with subacute infarct of the left cerebellum and chronic infarct of the right occipital lobe  - MRI w/o contrast showed small area of interest diffusion is seen in the right corona radiata extending to the region of the  "superior aspect of the posterior limb internal capsule  - ASA 81 mg daily, Lipitor 40 mg daily  - S/p Neuro follow-up on 3/17/23 - okay to keep Lipitor 40 mg daily rather than 80 mg daily     CAD:   - ASA 81 mg daily, Plavix 75 mg daily, Lipitor 40 mg daily, Ranexa 500 mg BID  - Nitro 0.4 mg SL q5min PRN     HTN:   - Amlodipine 2.5 mg daily     HFpEF:   - TTE (3/4/23) w/ EF 60% and grade I left ventricular diastolic dysfunction     PVD:   - ASA 81 mg daily, Lipitor 40 mg daily  - BLE arterial US (3/7/23): "There appear to be waveform changes throughout the bilateral lower extremity arterial system, more evident in the distal right lower extremity arteries which may indicate some degree of vascular insufficiency or more proximal stenosis. However, no doubling of peak systolic velocities to suggest greater than 50% stenosis is sonographically demonstrated."     COPD, GOLD 2:   - PFTs w/ FEV1 (73%) (9/2022)  - Trelegy 100-62.5 mcg, 1 puff once daily  - Albuterol PRN     Allergic rhinitis:   - Claritin 10 mg daily PRN, Flonase daily PRN     Hx of DMT2 w/ diabetic polyneuropathy:   - Most recent Hgb A1c 4.9% (7/2023)  - Lyrica 50 mg TID      Migraine headaches:   - Ajovy 225 mg monthly injections (switched from Emgality 5/30/23), Nutrex PRN  - Previous Rx: Topamax 200 mg qhs (d/c 10/23?)     B12 deficiency:   - Vitamin B12 < 200 on 3/24/23  - B12 1000 mcg IM monthly     ED:   - Cialis 20 mg PRN     Hx of hyponatremia:   - Previous Rx: NaCl 1000 mg TID      GERD:   - Protonix 40 mg daily     ROS  Constitutional:  Negative for chills and fever.   Respiratory:  Negative for shortness of breath.    Cardiovascular:  Negative for chest pain.   Gastrointestinal:  Negative for abdominal pain, constipation, diarrhea, nausea and vomiting.     Current Outpatient Medications   Medication Instructions    AJOVY AUTOINJECTOR 225 mg, Subcutaneous, Every 28 days    albuterol (PROVENTIL/VENTOLIN HFA) 90 mcg/actuation inhaler 2 puffs, " Inhalation, Every 6 hours PRN    albuterol-ipratropium (DUO-NEB) 2.5 mg-0.5 mg/3 mL nebulizer solution 3 mLs, Nebulization, Every 6 hours PRN, Rescue    amLODIPine (NORVASC) 2.5 mg, Oral, Daily    ammonium lactate 2 g, Topical (Top), 2 times daily    aspirin (ECOTRIN) 81 mg, Oral, Daily    atorvastatin (LIPITOR) 40 mg, Oral, Daily    brexpiprazole (REXULTI) 2 mg, Oral, Daily    butalbital-acetaminophen-caffeine -40 mg (FIORICET, ESGIC) -40 mg per tablet Take 1 tablet by mouth as needed for migraine. No more than 10 tab per month.    clonazePAM (KLONOPIN) 0.5 mg, Oral, Daily PRN    clopidogreL (PLAVIX) 75 mg, Oral, Daily    cyanocobalamin 1,000 mcg/mL injection 1 ml sq daily x 5 days, then 1 ml sq weekly x 4 weeks then q monthly -  will need to go to PCP    fluticasone-umeclidin-vilanter (TRELEGY ELLIPTA) 100-62.5-25 mcg DsDv INHALE 1 PUFF INTO THE LUNGS ONE TIME DAILY    hydrOXYzine HCL (ATARAX) 10 mg, Oral, 3 times daily PRN    lamoTRIgine (LAMICTAL) 200 mg, Oral, Daily    meclizine (ANTIVERT) 12.5 mg, Oral, 3 times daily PRN    mirtazapine (REMERON) 15 mg, Oral, Nightly    nicotine polacrilex 4 MG Lozg Take up to 8 pieces daily as needed    nitroGLYCERIN (NITROSTAT) 0.4 mg, Sublingual, Every 5 min PRN    NURTEC 75 mg, Oral, Every other day, Place ODT tablet on the tongue; alternatively the ODT tablet may be placed under the tongue    ondansetron (ZOFRAN-ODT) 4 MG TbDL 1 tablet, Oral, Every 8 hours PRN    pantoprazole (PROTONIX) 40 mg, Oral, Daily    pregabalin (LYRICA) 50 mg, Oral, 3 times daily    ranolazine (RANEXA) 500 mg, Oral, 2 times daily    REXULTI 4 mg, Oral, Daily    tadalafiL (CIALIS) 20 mg, Oral, Daily    topiramate (TOPAMAX) 200 mg, Oral, Nightly    ziprasidone (GEODON) 160 mg, Oral, Nightly      Past Medical History:   Diagnosis Date    Aortic valve stenosis 02/28/2022    Arachnoid cyst of posterior cranial fossa 01/13/2022    Benign prostatic hyperplasia without lower urinary tract  symptoms 05/25/2023    Bilateral carotid bruits 06/22/2021    Bipolar disorder 01/16/2022    CAD S/P percutaneous coronary angioplasty     3 vessel disease    Calculus of gallbladder without cholecystitis without obstruction 01/11/2023    Cancer     Candidal intertrigo 11/11/2019    Cataract     Chronic heart failure with preserved ejection fraction 03/29/2023    Chronic schizophrenia     Colon polyps     Diabetic polyneuropathy associated with diabetes mellitus due to underlying condition 11/21/2019    Dysarthria as late effect of cerebellar cerebrovascular accident (CVA) 03/24/2023    Equinus deformity of both feet 11/21/2019    Erectile dysfunction due to arterial insufficiency 05/25/2023    Flaccid hemiplegia of left nondominant side as late effect of cerebral infarction 03/17/2023    Flat foot 11/21/2019    Gastritis     Gastroesophageal reflux disease without esophagitis 07/13/2023    Gastrointestinal hemorrhage 12/13/2019    Post polypectomy bleeding    General anesthetics causing adverse effect in therapeutic use     Hammer toes of both feet 11/21/2019    Hx of diabetic foot ulcer 11/21/2019    Hypertension     Hyponatremia 01/11/2022    ELAINE (iron deficiency anemia) 12/10/2019    Intractable chronic migraine without aura and without status migrainosus 12/30/2021    Microcytic anemia 10/09/2019    Moderate aortic regurgitation 03/29/2023    Moderate mitral regurgitation 03/29/2023    Moderate mitral stenosis 03/29/2023    Moderate to severe aortic stenosis 02/28/2022    Myocardial infarct, old     Nicotine dependence, unspecified, uncomplicated 01/16/2022    Onychomycosis due to dermatophyte 11/21/2019    Orchitis 11/09/2019    PIPER on CPAP     Peripheral visual field defect, bilateral 01/13/2022    PVD (peripheral vascular disease) 11/21/2019    Seizures 2008    Stage 2 moderate COPD by GOLD classification     PFTs w/ FEV1 (73%) (9/2022)    Stroke 2005    Thoracic aortic atherosclerosis 10/17/2022    CT chest     Thyroid disease     Previously on pharmacologic Tx    Tinea pedis of right foot 05/21/2019    Tobacco use disorder     Type 2 diabetes mellitus with diabetic peripheral angiopathy without gangrene     A1c 5.1% (3/2023)    Vitamin B12 deficiency 03/24/2023      Objective:      Vitals:    04/03/24 1421   BP: 118/60   BP Location: Right arm   Patient Position: Sitting   Pulse: 78   Resp: 18   SpO2: 98%   Weight: 63.6 kg (140 lb 1.6 oz)     Body mass index is 20.1 kg/m².    Physical Exam   Constitutional:       General: No acute distress.  HENT:      Head: Normocephalic and atraumatic.   Pulmonary:      Effort: Pulmonary effort is normal. No respiratory distress.   Neurological:      General: No focal deficit present.      Mental Status: Alert and oriented to person, place, and time. Mental status is at baseline.    Assessment:       1. Chronic schizophrenia    2. Benign CNS tumor    3. Diabetic polyneuropathy associated with diabetes mellitus due to underlying condition    4. Stage 2 moderate COPD by GOLD classification    5. CAD S/P percutaneous coronary angioplasty    6. Chronic heart failure with preserved ejection fraction    7. Essential hypertension    8. Vitamin B12 deficiency    9. Abnormal finding of blood chemistry, unspecified    10. Protein-calorie malnutrition, unspecified severity    11. Colon cancer screening    12. Post-op pain        Ana Mayer MD  [facility]  Office: [phone]   Fax: [phone]  04/03/2024      Disclaimer: This note was partly generated using dictation software which may occasionally result in transcription errors.    Total time spent on this encounter includes face to face time and non-face to face time preparing to see the patient (eg, review of tests), obtaining and/or reviewing separately obtained history, documenting clinical information in the electronic or other health record, independently interpreting results, and communicating  results to the patient/family/caregiver, or care coordinator.

## 2024-04-03 NOTE — TELEPHONE ENCOUNTER
Spoke with pt regarding post op foot pain, scheduled appointment for 4/9. Pt verbalized understanding.

## 2024-04-03 NOTE — TELEPHONE ENCOUNTER
----- Message from Gloria Faulkner sent at 4/3/2024  9:56 AM CDT -----  Type:  Needs Medical Advice    Who Called: pt    Symptoms (please be specific): unable to always put pressure on foot     How long has patient had these symptoms:  4 days off and on    Pharmacy name and phone #:    [id number] - [place] - [address] [facility] [address] [facility]  [address]  [place] [address]  Phone: [phone] Fax: [phone]    Would the patient rather a call back or a response via [facility]? Call back    Best Call Back Number: [phone]      Additional Information: is asking to speak with a nurse      Please call Back to advise. Thanks!

## 2024-04-08 RX ORDER — MECLIZINE HCL 12.5 MG 12.5 MG/1
12.5 TABLET ORAL 3 TIMES DAILY PRN
Qty: 90 TABLET | Refills: 11 | Status: SHIPPED | OUTPATIENT
Start: 2024-04-08 | End: 2024-05-06 | Stop reason: SDUPTHER

## 2024-04-08 NOTE — TELEPHONE ENCOUNTER
No care due was identified.  [facility] Embedded Care Due Messages. Reference number: [id number].   4/08/2024 10:27:44 AM CDT

## 2024-04-08 NOTE — TELEPHONE ENCOUNTER
Refill Routing Note   Medication(s) are not appropriate for processing by [facility] for the following reason(s):        Outside of protocol    [facility] action(s):  Route             Appointments  past 12m or future 3m with PCP    Date Provider   Last Visit   4/3/2024 Ana Mayer MD   Next Visit   Visit date not found Ana Mayer MD   ED visits in past 90 days: 0        Note composed:10:51 AM 04/08/2024

## 2024-04-09 ENCOUNTER — OFFICE VISIT (OUTPATIENT)
Dept: PODIATRY | Facility: CLINIC | Age: 59
End: 2024-04-09
Payer: MEDICARE

## 2024-04-09 ENCOUNTER — HOSPITAL ENCOUNTER (OUTPATIENT)
Dept: RADIOLOGY | Facility: HOSPITAL | Age: 59
Discharge: HOME | End: 2024-04-09
Attending: STUDENT IN AN ORGANIZED HEALTH CARE EDUCATION/TRAINING PROGRAM
Payer: MEDICARE

## 2024-04-09 VITALS — HEIGHT: 70 IN | WEIGHT: 140.19 LBS | BODY MASS INDEX: 20.07 KG/M2

## 2024-04-09 DIAGNOSIS — G89.18 POST-OP PAIN: Primary | ICD-10-CM

## 2024-04-09 DIAGNOSIS — G89.18 POST-OP PAIN: ICD-10-CM

## 2024-04-09 PROCEDURE — 99999 PR PBB SHADOW E&M-EST. PATIENT-LVL II: CPT | Mod: PBBFAC,,, | Performed by: STUDENT IN AN ORGANIZED HEALTH CARE EDUCATION/TRAINING PROGRAM

## 2024-04-09 PROCEDURE — 73630 X-RAY EXAM OF FOOT: CPT | Mod: TC,PO,RT

## 2024-04-09 PROCEDURE — 3044F HG A1C LEVEL LT 7.0%: CPT | Mod: CPTII,S$GLB,, | Performed by: STUDENT IN AN ORGANIZED HEALTH CARE EDUCATION/TRAINING PROGRAM

## 2024-04-09 PROCEDURE — 3008F BODY MASS INDEX DOCD: CPT | Mod: CPTII,S$GLB,, | Performed by: STUDENT IN AN ORGANIZED HEALTH CARE EDUCATION/TRAINING PROGRAM

## 2024-04-09 PROCEDURE — 99213 OFFICE O/P EST LOW 20 MIN: CPT | Mod: S$GLB,,, | Performed by: STUDENT IN AN ORGANIZED HEALTH CARE EDUCATION/TRAINING PROGRAM

## 2024-04-09 PROCEDURE — 73630 X-RAY EXAM OF FOOT: CPT | Mod: 26,RT,, | Performed by: RADIOLOGY

## 2024-04-09 PROCEDURE — 1159F MED LIST DOCD IN RCRD: CPT | Mod: CPTII,S$GLB,, | Performed by: STUDENT IN AN ORGANIZED HEALTH CARE EDUCATION/TRAINING PROGRAM

## 2024-04-09 PROCEDURE — 1160F RVW MEDS BY RX/DR IN RCRD: CPT | Mod: CPTII,S$GLB,, | Performed by: STUDENT IN AN ORGANIZED HEALTH CARE EDUCATION/TRAINING PROGRAM

## 2024-04-09 RX ORDER — METHYLPREDNISOLONE 4 MG/1
TABLET ORAL
Qty: 21 EACH | Refills: 0 | Status: SHIPPED | OUTPATIENT
Start: 2024-04-09 | End: 2024-04-24

## 2024-04-09 NOTE — PROGRESS NOTES
Subjective:      Patient ID: Ryan Crane is a 58 y.o. male.    Chief Complaint: Foot Pain and Heel Pain      HPI:  Ryan is a 58 y.o. male who presents to the clinic for evaluation and treatment of high risk feet. Ryan has a past medical history of Aortic valve stenosis (02/28/2022), Arachnoid cyst of posterior cranial fossa (01/13/2022), Benign prostatic hyperplasia without lower urinary tract symptoms (05/25/2023), Bilateral carotid bruits (06/22/2021), Bipolar disorder (01/16/2022), CAD S/P percutaneous coronary angioplasty, Calculus of gallbladder without cholecystitis without obstruction (01/11/2023), Cancer, Candidal intertrigo (11/11/2019), Cataract, Chronic heart failure with preserved ejection fraction (03/29/2023), Chronic schizophrenia, Colon polyps, Diabetic polyneuropathy associated with diabetes mellitus due to underlying condition (11/21/2019), Dysarthria as late effect of cerebellar cerebrovascular accident (CVA) (03/24/2023), Equinus deformity of both feet (11/21/2019), Erectile dysfunction due to arterial insufficiency (05/25/2023), Flaccid hemiplegia of left nondominant side as late effect of cerebral infarction (03/17/2023), Flat foot (11/21/2019), Gastritis, Gastroesophageal reflux disease without esophagitis (07/13/2023), Gastrointestinal hemorrhage (12/13/2019), General anesthetics causing adverse effect in therapeutic use, Hammer toes of both feet (11/21/2019), diabetic foot ulcer (11/21/2019), Hypertension, Hyponatremia (01/11/2022), ELAINE (iron deficiency anemia) (12/10/2019), Intractable chronic migraine without aura and without status migrainosus (12/30/2021), Microcytic anemia (10/09/2019), Moderate aortic regurgitation (03/29/2023), Moderate mitral regurgitation (03/29/2023), Moderate mitral stenosis (03/29/2023), Moderate to severe aortic stenosis (02/28/2022), Myocardial infarct, old, Nicotine dependence, unspecified, uncomplicated (01/16/2022), Onychomycosis due to  dermatophyte (11/21/2019), Orchitis (11/09/2019), PIPER on CPAP, Peripheral visual field defect, bilateral (01/13/2022), PVD (peripheral vascular disease) (11/21/2019), Seizures (2008), Stage 2 moderate COPD by GOLD classification, Stroke (2005), Thoracic aortic atherosclerosis (10/17/2022), Thyroid disease, Tinea pedis of right foot (05/21/2019), Tobacco use disorder, Type 2 diabetes mellitus with diabetic peripheral angiopathy without gangrene, and Vitamin B12 deficiency (03/24/2023). The patient's chief complaint is long, thick toenails and right heel pain. This patient has documented high risk feet requiring routine maintenance secondary to diabetes mellitis and those secondary complications of diabetes, as mentioned..  He reports having right heel pain, which he rates as 3/10 on the pain scale but denies having a wound.  He also informs of losing 30 lbs since his last visit without trying to lose weight and is concerned about it.  He denies trying to self-treat his heel pain or trim his toenails himself.  Patient denies any other pedal complaints at this time.    04/09/2024  Mr. Crane returns today for R foot pain to the heel. He notes the pain can be severe but is not always present. He notes 3-4 episodes per week when walking that can last for 30 minutes but up to two hours. The pain will eventually resolve without intervention. This has been happening for the last 5-6 weeks.     PCP: Ana Mayer MD    Date Last Seen by PCP:  4/3/2024    Current shoe gear:  Affected Foot: Casual shoes     Unaffected Foot: Casual shoes    Review of Systems   Constitutional: Negative for appetite change, fever, chills, fatigue.  Positive for unexpected weight change.   Respiratory: Negative for cough, wheezing, shortness of breath.  Cardiovascular: Negative for chest pain, claudication, cyanosis.  Positive for leg swelling.  Musculoskeletal: Negative for back pain, arthritis, joint pain, joint swelling, myalgias,  stiffness.   Skin: Negative for rash, itching, suspicious lesion, poor wound healing, unusual hair distribution.  Positive for nail bed changes, discoloration,.  Neurological: Negative for loss of balance, sensory change, paresthesias, numbness.  Positive for pain.  Hematological: Negative for adenopathy, bleeding, bruising easily.   Psychiatric/Behavioral: The patient is not nervous/anxious.  Negative for altered mental status.    Hemoglobin A1C   Date Value Ref Range Status   04/03/2024 4.9 4.0 - 5.6 % Final     Comment:     ADA Screening Guidelines:  5.7-6.4%  Consistent with prediabetes  >or=6.5%  Consistent with diabetes    High levels of fetal hemoglobin interfere with the HbA1C  assay. Heterozygous hemoglobin variants (HbS, HgC, etc)do  not significantly interfere with this assay.   However, presence of multiple variants may affect accuracy.     07/13/2023 4.9 4.0 - 5.6 % Final     Comment:     ADA Screening Guidelines:  5.7-6.4%  Consistent with prediabetes  >or=6.5%  Consistent with diabetes    High levels of fetal hemoglobin interfere with the HbA1C  assay. Heterozygous hemoglobin variants (HbS, HgC, etc)do  not significantly interfere with this assay.   However, presence of multiple variants may affect accuracy.     03/05/2023 5.1 0.0 - 5.6 % Final     Comment:     Reference Interval:  5.0 - 5.6 Normal   5.7 - 6.4 High Risk   > 6.5 Diabetic      Hgb A1c results are standardized based on the (NGSP) National   Glycohemoglobin Standardization Program.      Hemoglobin A1C levels are related to mean serum/plasma glucose   during the preceding 2-3 months.            Past Medical History:   Diagnosis Date    Aortic valve stenosis 02/28/2022    Arachnoid cyst of posterior cranial fossa 01/13/2022    Benign prostatic hyperplasia without lower urinary tract symptoms 05/25/2023    Bilateral carotid bruits 06/22/2021    Bipolar disorder 01/16/2022    CAD S/P percutaneous coronary angioplasty     3 vessel disease     Calculus of gallbladder without cholecystitis without obstruction 01/11/2023    Cancer     Candidal intertrigo 11/11/2019    Cataract     Chronic heart failure with preserved ejection fraction 03/29/2023    Chronic schizophrenia     Colon polyps     Diabetic polyneuropathy associated with diabetes mellitus due to underlying condition 11/21/2019    Dysarthria as late effect of cerebellar cerebrovascular accident (CVA) 03/24/2023    Equinus deformity of both feet 11/21/2019    Erectile dysfunction due to arterial insufficiency 05/25/2023    Flaccid hemiplegia of left nondominant side as late effect of cerebral infarction 03/17/2023    Flat foot 11/21/2019    Gastritis     Gastroesophageal reflux disease without esophagitis 07/13/2023    Gastrointestinal hemorrhage 12/13/2019    Post polypectomy bleeding    General anesthetics causing adverse effect in therapeutic use     Hammer toes of both feet 11/21/2019    Hx of diabetic foot ulcer 11/21/2019    Hypertension     Hyponatremia 01/11/2022    ELAINE (iron deficiency anemia) 12/10/2019    Intractable chronic migraine without aura and without status migrainosus 12/30/2021    Microcytic anemia 10/09/2019    Moderate aortic regurgitation 03/29/2023    Moderate mitral regurgitation 03/29/2023    Moderate mitral stenosis 03/29/2023    Moderate to severe aortic stenosis 02/28/2022    Myocardial infarct, old     Nicotine dependence, unspecified, uncomplicated 01/16/2022    Onychomycosis due to dermatophyte 11/21/2019    Orchitis 11/09/2019    PIPER on CPAP     Peripheral visual field defect, bilateral 01/13/2022    PVD (peripheral vascular disease) 11/21/2019    Seizures 2008    Stage 2 moderate COPD by GOLD classification     PFTs w/ FEV1 (73%) (9/2022)    Stroke 2005    Thoracic aortic atherosclerosis 10/17/2022    CT chest    Thyroid disease     Previously on pharmacologic Tx    Tinea pedis of right foot 05/21/2019    Tobacco use disorder     Type 2 diabetes mellitus with  diabetic peripheral angiopathy without gangrene     A1c 5.1% (3/2023)    Vitamin B12 deficiency 03/24/2023     Past Surgical History:   Procedure Laterality Date    ANGIOGRAM, CORONARY, WITH LEFT HEART CATHETERIZATION  08/25/2022    Procedure: Angiogram, Coronary, with Left Heart Cath;  Surgeon: Mai Deleon MD;  Location: [facility] CATH;  Service: Cardiology;;    APPENDECTOMY      BONE BIOPSY Right 12/19/2023    Procedure: Biopsy-Bone;  Surgeon: Дмитрий Pratt DPM;  Location: [facility] OR;  Service: Podiatry;  Laterality: Right;    BONE EXOSTOSIS EXCISION Right 11/17/2023    Procedure: EXCISION, EXOSTOSIS;  Surgeon: Дмитрий Pratt DPM;  Location: [facility] OR;  Service: Podiatry;  Laterality: Right;    BONE MARROW ASPIRATION Left 08/21/2020    Procedure: ASPIRATION, BONE MARROW;  Surgeon: Lex Kathleen MD;  Location: [facility];  Service: Oncology;  Laterality: Left;    CHOLECYSTECTOMY      CLOSURE OF WOUND Right 09/09/2019    Procedure: CLOSURE, WOUND;  Surgeon: Li Kathleen DPM;  Location: [facility];  Service: Podiatry;  Laterality: Right;    COLONOSCOPY N/A 12/10/2019    Procedure: COLONOSCOPY;  Surgeon: Ryan Lucas MD;  Location: [facility];  Service: Endoscopy;  Laterality: N/A;    COLONOSCOPY N/A 12/13/2019    Procedure: COLONOSCOPY;  Surgeon: Ryan Lucas MD;  Location: [facility];  Service: Endoscopy;  Laterality: N/A;    CORONARY STENT PLACEMENT      ESOPHAGOGASTRODUODENOSCOPY N/A 12/10/2019    Procedure: EGD (ESOPHAGOGASTRODUODENOSCOPY);  Surgeon: Ryan Lucas MD;  Location: [facility];  Service: Endoscopy;  Laterality: N/A;    ESOPHAGOGASTRODUODENOSCOPY N/A 11/03/2022    Procedure: EGD (ESOPHAGOGASTRODUODENOSCOPY);  Surgeon: Ryan Lucas MD;  Location: [facility];  Service: Endoscopy;  Laterality: N/A;    INCISION AND DRAINAGE FOOT Right 12/19/2023    Procedure: INCISION AND DRAINAGE, FOOT- RIGHT;  Surgeon: Дмитрий Pratt DPM;  Location: [facility] OR;  Service: Podiatry;  Laterality:  Right;    INJECTION OF ANESTHETIC AGENT AROUND MEDIAL BRANCH NERVES INNERVATING LUMBAR FACET JOINT Bilateral 07/25/2023    Procedure: Block-nerve-medial branch-lumbar;  Surgeon: Napoleon Grimaldo MD;  [facility];  Service: Pain Management;  Laterality: Bilateral;  L3,4,5 MBB    INJECTION OF ANESTHETIC AGENT AROUND MEDIAL BRANCH NERVES INNERVATING LUMBAR FACET JOINT Bilateral 08/25/2023    Procedure: Block-nerve-medial branch-lumbar;  Surgeon: Napoleon Grimaldo MD;  [facility];  Service: Anesthesiology;  Laterality: Bilateral;  L3,4,5 MBB #2    LAPAROSCOPIC CHOLECYSTECTOMY N/A 01/11/2023    Procedure: CHOLECYSTECTOMY, LAPAROSCOPIC;  Surgeon: Laith Davis MD;  Location: [facility] OR;  Service: General;  Laterality: N/A;    LEFT HEART CATHETERIZATION Left 08/25/2022    Procedure: Left heart cath;  Surgeon: Mai Deleon MD;  [facility];  Service: Cardiology;  Laterality: Left;    RADIOFREQUENCY ABLATION OF LUMBAR MEDIAL BRANCH NERVE AT SINGLE LEVEL Bilateral 10/31/2023    Procedure: Radiofrequency Ablation, Nerve, Spinal, Lumbar, Medial Branch, 1 Level;  Surgeon: Napoleon Grimaldo MD;  Location: [facility] OR;  Service: Anesthesiology;  Laterality: Bilateral;  L3,4,5 RFA    right heel surgery      SHOULDER ARTHROSCOPY Left     UPPER GASTROINTESTINAL ENDOSCOPY       Family History   Problem Relation Age of Onset    Melanoma Mother     Diabetes Mother     Hypertension Mother     Stroke Father     Diabetes Father     Hypertension Father     Colon cancer Father         unsure of age of diagnosis    Cancer Father         colon cancer    Cervical cancer Sister     Cirrhosis Brother     Hypertension Brother     Lung cancer Maternal Grandmother     Prostate cancer Maternal Grandfather     Crohn's disease Neg Hx     Ulcerative colitis Neg Hx     Stomach cancer Neg Hx     Esophageal cancer Neg Hx     Retinal detachment Neg Hx     Strabismus Neg Hx     Macular degeneration Neg Hx     Glaucoma Neg Hx      Social  "History     Socioeconomic History    Marital status: Legally    Occupational History    Occupation: disabled (mental)     Comment: since 2000   Tobacco Use    Smoking status: Every Day     Current packs/day: 0.50     Average packs/day: 1.5 packs/day for 45.3 years (67.6 ttl pk-yrs)     Types: Cigarettes     Start date: 1979    Smokeless tobacco: Never    Tobacco comments:     Age Started 14    Substance and Sexual Activity    Alcohol use: No    Drug use: Yes     Types: Marijuana     Comment: ocasionally - once every 6 months    Sexual activity: Yes     Partners: Female     Social Determinants of Health     Financial Resource Strain: Low Risk  (12/18/2023)    Overall Financial Resource Strain (CARDIA)     Difficulty of Paying Living Expenses: Not hard at all   Food Insecurity: No Food Insecurity (12/18/2023)    Hunger Vital Sign     Worried About Running Out of Food in the Last Year: Never true     Ran Out of Food in the Last Year: Never true   Transportation Needs: No Transportation Needs (2/7/2024)    OASIS : Transportation     Lack of Transportation (Medical): No     Lack of Transportation (Non-Medical): No     Patient Unable or Declines to Respond: No   Physical Activity: Insufficiently Active (10/10/2022)    Exercise Vital Sign     Days of Exercise per Week: 2 days     Minutes of Exercise per Session: 10 min   Stress: Stress Concern Present (10/10/2022)    [employer] - Occupational Stress Questionnaire     Feeling of Stress : To some extent   Social Connections: Feeling Somewhat Isolated (2/7/2024)    OASIS : Social Isolation     Frequency of experiencing loneliness or isolation: Sometimes   Housing Stability: Low Risk  (12/18/2023)    Housing Stability Vital Sign     Unable to Pay for Housing in the Last Year: No     Number of Places Lived in the Last Year: 1     Unstable Housing in the Last Year: No           Objective:        Ht 5' 10" (1.778 m)   Wt 63.6 kg " (140 lb 3.4 oz)   BMI 20.12 kg/m²     Physical Exam   Constitutional: Patient is oriented to person, place, and time. Patient appears well-developed and well-nourished.  Strong malodor noted from patient due to lack of personal hygiene.  No acute distress.     Psychiatric: Patient has a normal mood and affect. Patient's speech is normal and behavior is normal. Judgment is normal. Cognition and memory are normal.     Bilateral pedal exam was performed today.  Vascular: Vascular: Pedal pulses palpable 2/4 DP & PT.  CFT is = 3 seconds to the hallux.  Skin temperature is warm to cool proximal tibia to distal toes without localized increase in calor noted.  No erythema and ecchymosis noted to the LE.  Nonpitting edema noted to the LE.  Hair growth present distally to the LE.     Musculoskeletal: Ankle and pedal joints ROM are decreased. Ankle joint dorsiflexion is restricted with the knee extended and flexed per Silf[place] exam.  Muscle strength is 5/5 for all LE muscle groups tested.  Flat foot type present.  Flexion contracture of lesser digits noted. Tenderness to palpation of the plantar R heel    Neurological:  Epicritic sensation is slightly dimiished to the foot.  [place]ock STR is intact to the LE.  Tenderness to palpation of right plantar heel noted.     Dermatological: Toenails 1-5 are elongated and distally thickened, dystrophic, brittle, discolored, and mycotic with subungal debris present.  Webspaces 1-4 are dry and intact with debris present.  Skin turgor is increased.  Skin texture is dry and flaky. Minor hyperkeratotic skin at the R plantar heel.    Nursing note and vitals reviewed.        Assessment:       Encounter Diagnosis   Name Primary?    Post-op pain Yes               Plan:       Ryan was seen today for foot pain and heel pain.    Diagnoses and all orders for this visit:    Post-op pain  -     X-Ray Foot Complete Right; Future    Other orders  -     methylPREDNISolone (MEDROL DOSEPACK) 4 mg  tablet; use as directed      I counseled the patient on his conditions, their implications and medical management.    X-ray to rule out any osseous process.     Start stretching protocol.    Patient would like to avoid foot injections so MDP ordered for today.    F/u 1 month      Дмитрий Pratt DPM

## 2024-04-11 ENCOUNTER — TELEPHONE (OUTPATIENT)
Dept: FAMILY MEDICINE | Facility: CLINIC | Age: 59
End: 2024-04-11
Payer: MEDICARE

## 2024-04-11 NOTE — TELEPHONE ENCOUNTER
Pt was admitted to [facility] 2 days ago with a stroke. Pt being discharged today .Hosp follow up appt sched 4/19 , pt aware .--lp

## 2024-04-11 NOTE — TELEPHONE ENCOUNTER
----- Message from Moody Eduardo sent at 4/11/2024  3:12 PM CDT -----  Contact: SELF  Type: Sooner Appointment Request        Caller is requesting a sooner appointment. Caller declined first available appointment listed below. Caller will not accept being placed on the waitlist and is requesting a message be sent to doctor.        Name of Caller: [facility] Call Back Number: [phone] (home)   Additional Information: plz call the pt schedule a hospital follow  up for 1-2 weeks. Thanks

## 2024-04-12 ENCOUNTER — TELEPHONE (OUTPATIENT)
Dept: SMOKING CESSATION | Facility: CLINIC | Age: 59
End: 2024-04-12
Payer: MEDICARE

## 2024-04-15 ENCOUNTER — PATIENT OUTREACH (OUTPATIENT)
Dept: ADMINISTRATIVE | Facility: CLINIC | Age: 59
End: 2024-04-15
Payer: MEDICARE

## 2024-04-15 RX ORDER — EPINEPHRINE 0.3 MG/.3ML
0.3 INJECTION SUBCUTANEOUS
COMMUNITY
End: 2024-05-06 | Stop reason: SDUPTHER

## 2024-04-15 RX ORDER — SODIUM CHLORIDE 1 G/1
1 TABLET ORAL 3 TIMES DAILY
COMMUNITY

## 2024-04-15 RX ORDER — METOPROLOL SUCCINATE 50 MG/1
50 TABLET, EXTENDED RELEASE ORAL DAILY
COMMUNITY

## 2024-04-15 NOTE — PROGRESS NOTES
C3 nurse spoke with Ryna Crane's daughter, Dominique,  for a TCC post hospital discharge follow up call. The patient has a scheduled HOSFU appointment with Ana Mayer MD (PCP) on 4/19/24 @ 1:30pm.

## 2024-04-15 NOTE — TELEPHONE ENCOUNTER
No care due was identified.  Health [facility] Embedded Care Due Messages. Reference number: [id number].   4/15/2024 10:24:40 AM CDT

## 2024-04-16 RX ORDER — AMLODIPINE BESYLATE 2.5 MG/1
2.5 TABLET ORAL DAILY
Qty: 90 TABLET | Refills: 3 | Status: SHIPPED | OUTPATIENT
Start: 2024-04-16 | End: 2024-06-03

## 2024-04-16 NOTE — TELEPHONE ENCOUNTER
Ryan Crane  is requesting a refill authorization.  Brief Assessment and Rationale for Refill:  Approve     Medication Therapy Plan:         Pharmacist review requested: Yes   Extended chart review required: Yes   Comments:     Note composed:5:03 AM 04/16/2024

## 2024-04-16 NOTE — TELEPHONE ENCOUNTER
Refill Routing Note   Medication(s) are not appropriate for processing by [facility] for the following reason(s):     DDI not previously overridden by current provider--after initial override, the Refill Center will be able to continue overrides      Drug-disease interaction: amLODIPine and Moderate to severe aortic stenosis     [facility] action(s):  Defer           Pharmacist review requested: Yes     Appointments  past 12m or future 3m with PCP    Date Provider   Last Visit   4/3/2024 nAa Mayer MD   Next Visit   4/19/2024 Ana Mayer MD   ED visits in past 90 days: 0        Note composed:11:31 PM 04/15/2024

## 2024-04-17 ENCOUNTER — CLINICAL SUPPORT (OUTPATIENT)
Dept: FAMILY MEDICINE | Facility: CLINIC | Age: 59
End: 2024-04-17
Payer: MEDICARE

## 2024-04-17 DIAGNOSIS — E53.8 VITAMIN B12 DEFICIENCY: Primary | Chronic | ICD-10-CM

## 2024-04-17 PROCEDURE — 96372 THER/PROPH/DIAG INJ SC/IM: CPT | Mod: S$GLB,,, | Performed by: STUDENT IN AN ORGANIZED HEALTH CARE EDUCATION/TRAINING PROGRAM

## 2024-04-17 RX ADMIN — CYANOCOBALAMIN 1000 MCG: 1000 INJECTION, SOLUTION INTRAMUSCULAR; SUBCUTANEOUS at 09:04

## 2024-04-22 ENCOUNTER — TELEPHONE (OUTPATIENT)
Dept: NEUROLOGY | Facility: CLINIC | Age: 59
End: 2024-04-22
Payer: MEDICARE

## 2024-04-22 PROCEDURE — 96372 THER/PROPH/DIAG INJ SC/IM: CPT | Mod: S$GLB,,, | Performed by: STUDENT IN AN ORGANIZED HEALTH CARE EDUCATION/TRAINING PROGRAM

## 2024-04-22 RX ADMIN — CYANOCOBALAMIN 1000 MCG: 1000 INJECTION, SOLUTION INTRAMUSCULAR; SUBCUTANEOUS at 11:04

## 2024-04-22 NOTE — TELEPHONE ENCOUNTER
----- Message from Deanne Villalobos sent at 4/22/2024  4:30 PM CDT -----  Regarding: appt  Contact: DEANNE MICHAELS [[id number]]  Type:  Sooner Appointment Request    Caller is requesting a sooner appointment.      Name of Caller:  Deanne    When is the first available appointment?  6/20    Symptoms:  Hosp f/[id number] CVA-4/11    Would the patient rather a call back or a response via [facility]? Call    Best Call Back Number:  [phone]     Additional Information:  Was seen last 3/23. Please call to advise.

## 2024-04-23 NOTE — TELEPHONE ENCOUNTER
Spoke with patient and scheduled hospital follow up for stroke with Alyse Wilson for 4/30/24 at 2 pm. [facility]. Patient will try to get MRI and CT images on disc and bring to appointment

## 2024-04-24 ENCOUNTER — OFFICE VISIT (OUTPATIENT)
Dept: FAMILY MEDICINE | Facility: CLINIC | Age: 59
End: 2024-04-24
Payer: MEDICARE

## 2024-04-24 VITALS
BODY MASS INDEX: 20.33 KG/M2 | OXYGEN SATURATION: 100 % | HEIGHT: 70 IN | SYSTOLIC BLOOD PRESSURE: 108 MMHG | WEIGHT: 142 LBS | HEART RATE: 75 BPM | DIASTOLIC BLOOD PRESSURE: 80 MMHG

## 2024-04-24 DIAGNOSIS — H91.93 BILATERAL HEARING LOSS, UNSPECIFIED HEARING LOSS TYPE: ICD-10-CM

## 2024-04-24 DIAGNOSIS — I69.354 HEMIPLEGIA AND HEMIPARESIS FOLLOWING CEREBRAL INFARCTION AFFECTING LEFT NON-DOMINANT SIDE: ICD-10-CM

## 2024-04-24 DIAGNOSIS — Z09 HOSPITAL DISCHARGE FOLLOW-UP: Primary | ICD-10-CM

## 2024-04-24 PROCEDURE — 3079F DIAST BP 80-89 MM HG: CPT | Mod: CPTII,S$GLB,, | Performed by: STUDENT IN AN ORGANIZED HEALTH CARE EDUCATION/TRAINING PROGRAM

## 2024-04-24 PROCEDURE — 3044F HG A1C LEVEL LT 7.0%: CPT | Mod: CPTII,S$GLB,, | Performed by: STUDENT IN AN ORGANIZED HEALTH CARE EDUCATION/TRAINING PROGRAM

## 2024-04-24 PROCEDURE — 1159F MED LIST DOCD IN RCRD: CPT | Mod: CPTII,S$GLB,, | Performed by: STUDENT IN AN ORGANIZED HEALTH CARE EDUCATION/TRAINING PROGRAM

## 2024-04-24 PROCEDURE — 3074F SYST BP LT 130 MM HG: CPT | Mod: CPTII,S$GLB,, | Performed by: STUDENT IN AN ORGANIZED HEALTH CARE EDUCATION/TRAINING PROGRAM

## 2024-04-24 PROCEDURE — 1160F RVW MEDS BY RX/DR IN RCRD: CPT | Mod: CPTII,S$GLB,, | Performed by: STUDENT IN AN ORGANIZED HEALTH CARE EDUCATION/TRAINING PROGRAM

## 2024-04-24 PROCEDURE — 3008F BODY MASS INDEX DOCD: CPT | Mod: CPTII,S$GLB,, | Performed by: STUDENT IN AN ORGANIZED HEALTH CARE EDUCATION/TRAINING PROGRAM

## 2024-04-24 PROCEDURE — 99999 PR PBB SHADOW E&M-EST. PATIENT-LVL V: CPT | Mod: PBBFAC,,, | Performed by: STUDENT IN AN ORGANIZED HEALTH CARE EDUCATION/TRAINING PROGRAM

## 2024-04-24 PROCEDURE — 99214 OFFICE O/P EST MOD 30 MIN: CPT | Mod: S$GLB,,, | Performed by: STUDENT IN AN ORGANIZED HEALTH CARE EDUCATION/TRAINING PROGRAM

## 2024-04-24 RX ORDER — NAPROXEN SODIUM 220 MG/1
81 TABLET, FILM COATED ORAL DAILY
COMMUNITY

## 2024-04-24 NOTE — PROGRESS NOTES
Plan:      Ryan was seen today for hospital follow up.    Diagnoses and all orders for this visit:    Hospital discharge follow-up: Neurology follow-up 4/30/24; Cardiology follow-up 5/16/24. Continue ASA/Plavix/statin. F/u imaging results as noted below.    Bilateral hearing loss, unspecified hearing loss type  -     Ambulatory referral/consult to Audiology; Future  -     Ambulatory referral/consult to ENT; Future    Hemiplegia and hemiparesis following cerebral infarction affecting left non-dominant side: Mild residual left-sided weakness. Stable.    Recommended shingles vaccine - must get at local pharmacy due to insurance.     Follow up in about 3 months (around 7/24/2024), or if symptoms worsen or fail to improve.    Ana Mayer MD  04/24/2024    Subjective:      Patient ID: Ryan Crane is a 58 y.o. male    Chief Complaint   Patient presents with    Hospital Follow Up     [facility]  58 y.o. male with a PMHx as documented below presents to clinic today for the following:    S/p hospitalization at [place] for suspected stroke 2/2 lethargy and generalized weakness; Tx included TPA with obs stay in ICU. Discharged with outpatient follow-up. No persistent symptoms at today's visit.     Neurology follow-up 4/30/24; Cardiology follow-up 5/16/24.     CTA head/neck w/ contrast (4/10/24)  1. No large vessel occlusion.  2. Less than 50% luminal narrowing of the proximal ICAs with moderate carotid bulb atherosclerosis.  3. Moderate right M1 segment stenosis.  4. Small left supraclinoid ICA aneurysm. Follow-up with neuro interventional radiology.  5. Biapical emphysematous change with mildly enlarged right hilar lymph nodes.    TTE (ICU @ [place], 4//10/24):   - EF 65-75%.   - Dense thickening and calcification of the entire mitral apparatus  - Aortic valve calcification  - Moderate concentric left ventricular hypertrophy.   - Moderate to severe mitral stenosis.   - Moderate to severe aortic  regurgitation.   - Severe valvular aortic stenosis.   ** The study was technically difficult.  ________________________________________________    Chronic schizophrenia:   - Lamictal 200 mg daily, Remeron 15 mg qhs, Geodon 160 mg qhs  - Rexulti 4 mg daily   - Klonopin 0.5 mg daily PRN, Atarax 10 mg TID PRN  - Previous Rx: Buspar (d/c 12/23?)  - Following w/ Psychiatry     Subacute thoracic and lumbar back pain:   - MRI lumbar spine w/o contrast (6/12/23): Multilevel degenerative changes of the lumbar spine without more than mild spinal canal/recess narrowing there is shallow disc bulging is noted at multiple levels. No lateralizing disc herniation. Mild to moderate right foraminal narrowing at L4-L5.  - MRI thoracic spine w/o contrast (6/12/23): Mild degenerative changes as discussed above.  The spinal canal and foramina are patent throughout.  No cord impingement or acute process  - S/p eval w/ Dr. Shirley Dowd [facility] - recommending medial branch blocks and subsequent radiofrequency ablation as indicated of the L4-5 and L5-S1 facet joints bilaterally (8/25/23), considering future epidural steroid injections in the midthoracic region     Hx of CVA; hemiplegia and hemiparesis following CVA affecting non-dominant side:   - Right lacunar infarct (3/4/23) resulting in hemiplegia of left, nondominant side  - CT head w/o contrast showed changes consistent with subacute infarct of the left cerebellum and chronic infarct of the right occipital lobe  - MRI w/o contrast showed small area of interest diffusion is seen in the right corona radiata extending to the region of the superior aspect of the posterior limb internal capsule  - ASA 81 mg daily, Lipitor 40 mg daily  - S/p Neuro follow-up on 3/17/23 - okay to keep Lipitor 40 mg daily rather than 80 mg daily     CAD:   - ASA 81 mg daily, Plavix 75 mg daily, Lipitor 40 mg daily, Ranexa 500 mg BID  - Nitro 0.4 mg SL q5min PRN     HTN:   - Amlodipine 2.5 mg  "daily     HFpEF:   - TTE (3/4/23) w/ EF 60% and grade I left ventricular diastolic dysfunction     PVD:   - ASA 81 mg daily, Lipitor 40 mg daily  - BLE arterial US (3/7/23): "There appear to be waveform changes throughout the bilateral lower extremity arterial system, more evident in the distal right lower extremity arteries which may indicate some degree of vascular insufficiency or more proximal stenosis. However, no doubling of peak systolic velocities to suggest greater than 50% stenosis is sonographically demonstrated."     COPD, GOLD 2:   - PFTs w/ FEV1 (73%) (9/2022)  - Trelegy 100-62.5 mcg, 1 puff once daily  - Albuterol PRN     Allergic rhinitis:   - Claritin 10 mg daily PRN, Flonase daily PRN     Hx of DMT2 w/ diabetic polyneuropathy:   - Most recent Hgb A1c 4.9% (4/40/24)  - Lyrica 50 mg TID      Migraine headaches:   - Ajovy 225 mg monthly injections (switched from Emgality 5/30/23), Nutrex PRN  - Previous Rx: Topamax 200 mg qhs (d/c 10/23?)     B12 deficiency:   - Vitamin B12 < 200 on 3/24/23  - B12 1000 mcg IM monthly     ED:   - Cialis 20 mg PRN     Hx of hyponatremia:   - Previous Rx: NaCl 1000 mg TID      GERD:   - Protonix 40 mg daily     ROS  Constitutional:  Negative for chills and fever.   Respiratory:  Negative for shortness of breath.    Cardiovascular:  Negative for chest pain.   Gastrointestinal:  Negative for abdominal pain, constipation, diarrhea, nausea and vomiting.     Current Outpatient Medications   Medication Instructions    AJOVY AUTOINJECTOR 225 mg, Subcutaneous, Every 28 days    albuterol (PROVENTIL/VENTOLIN HFA) 90 mcg/actuation inhaler 2 puffs, Inhalation, Every 6 hours PRN    albuterol-ipratropium (DUO-NEB) 2.5 mg-0.5 mg/3 mL nebulizer solution 3 mLs, Nebulization, Every 6 hours PRN, Rescue    amLODIPine (NORVASC) 2.5 mg, Oral, Daily    ammonium lactate 2 g, Topical (Top), 2 times daily    aspirin (ECOTRIN) 81 mg, Oral, Daily    aspirin 81 mg, Oral, Daily    atorvastatin " (LIPITOR) 40 mg, Oral, Daily    brexpiprazole (REXULTI) 2 mg, Oral, Daily    butalbital-acetaminophen-caffeine -40 mg (FIORICET, ESGIC) -40 mg per tablet Take 1 tablet by mouth as needed for migraine. No more than 10 tab per month.    clonazePAM (KLONOPIN) 0.5 mg, Oral, Daily PRN    clopidogreL (PLAVIX) 75 mg, Oral, Daily    cyanocobalamin 1,000 mcg/mL injection 1 ml sq daily x 5 days, then 1 ml sq weekly x 4 weeks then q monthly -  will need to go to PCP    EPINEPHrine (EPIPEN) 0.3 mg, As needed (PRN)    fluticasone-umeclidin-vilanter (TRELEGY ELLIPTA) 100-62.5-25 mcg DsDv INHALE 1 PUFF INTO THE LUNGS ONE TIME DAILY    hydrOXYzine HCL (ATARAX) 10 mg, Oral, 3 times daily PRN    lamoTRIgine (LAMICTAL) 200 mg, Oral, Daily    meclizine (ANTIVERT) 12.5 mg, Oral, 3 times daily PRN    metoprolol succinate (TOPROL-XL) 50 mg, Daily    mirtazapine (REMERON) 15 mg, Oral, Nightly    nicotine polacrilex 4 MG Lozg Take up to 8 pieces daily as needed    nitroGLYCERIN (NITROSTAT) 0.4 mg, Sublingual, Every 5 min PRN    NURTEC 75 mg, Oral, Every other day, Place ODT tablet on the tongue; alternatively the ODT tablet may be placed under the tongue    ondansetron (ZOFRAN-ODT) 4 MG TbDL 1 tablet, Every 8 hours PRN    pantoprazole (PROTONIX) 40 mg, Oral, Daily    pregabalin (LYRICA) 50 mg, Oral, 3 times daily    ranolazine (RANEXA) 500 mg, Oral, 2 times daily    REXULTI 4 mg, Oral, Daily    sodium chloride 1,000 mg TbSO oral tablet 1 tablet, 3 times daily    tadalafiL (CIALIS) 20 mg, Oral, Daily    topiramate (TOPAMAX) 200 mg, Oral, Nightly    ziprasidone (GEODON) 160 mg, Oral, Nightly      Past Medical History:   Diagnosis Date    Aortic valve stenosis 02/28/2022    Arachnoid cyst of posterior cranial fossa 01/13/2022    Benign prostatic hyperplasia without lower urinary tract symptoms 05/25/2023    Bilateral carotid bruits 06/22/2021    Bipolar disorder 01/16/2022    CAD S/P percutaneous coronary angioplasty     3 vessel  disease    Calculus of gallbladder without cholecystitis without obstruction 01/11/2023    Cancer     Candidal intertrigo 11/11/2019    Cataract     Chronic heart failure with preserved ejection fraction 03/29/2023    Chronic schizophrenia     Colon polyps     Diabetic polyneuropathy associated with diabetes mellitus due to underlying condition 11/21/2019    Dysarthria as late effect of cerebellar cerebrovascular accident (CVA) 03/24/2023    Equinus deformity of both feet 11/21/2019    Erectile dysfunction due to arterial insufficiency 05/25/2023    Flaccid hemiplegia of left nondominant side as late effect of cerebral infarction 03/17/2023    Flat foot 11/21/2019    Gastritis     Gastroesophageal reflux disease without esophagitis 07/13/2023    Gastrointestinal hemorrhage 12/13/2019    Post polypectomy bleeding    General anesthetics causing adverse effect in therapeutic use     Hammer toes of both feet 11/21/2019    Hx of diabetic foot ulcer 11/21/2019    Hypertension     Hyponatremia 01/11/2022    ELAINE (iron deficiency anemia) 12/10/2019    Intractable chronic migraine without aura and without status migrainosus 12/30/2021    Microcytic anemia 10/09/2019    Moderate aortic regurgitation 03/29/2023    Moderate mitral regurgitation 03/29/2023    Moderate mitral stenosis 03/29/2023    Moderate to severe aortic stenosis 02/28/2022    Myocardial infarct, old     Nicotine dependence, unspecified, uncomplicated 01/16/2022    Onychomycosis due to dermatophyte 11/21/2019    Orchitis 11/09/2019    PIPER on CPAP     Peripheral visual field defect, bilateral 01/13/2022    PVD (peripheral vascular disease) 11/21/2019    Seizures 2008    Stage 2 moderate COPD by GOLD classification     PFTs w/ FEV1 (73%) (9/2022)    Stroke 2005    Thoracic aortic atherosclerosis 10/17/2022    CT chest    Thyroid disease     Previously on pharmacologic Tx    Tinea pedis of right foot 05/21/2019    Tobacco use disorder     Type 2 diabetes mellitus  "with diabetic peripheral angiopathy without gangrene     A1c 5.1% (3/2023)    Vitamin B12 deficiency 03/24/2023      Objective:      Vitals:    04/24/24 0922   BP: 108/80   BP Location: Right arm   Patient Position: Sitting   Pulse: 75   SpO2: 100%   Weight: 64.4 kg (141 lb 15.6 oz)   Height: 5' 10" (1.778 m)     Body mass index is 20.37 kg/m².    Physical Exam   Constitutional:       General: No acute distress.  HENT:      Head: Normocephalic and atraumatic.   Pulmonary:      Effort: Pulmonary effort is normal. No respiratory distress.   Neurological:      General: No focal deficit present.      Mental Status: Alert and oriented to person, place, and time. Mental status is at baseline.    Assessment:       1. Hospital discharge follow-up    2. Bilateral hearing loss, unspecified hearing loss type    3. Hemiplegia and hemiparesis following cerebral infarction affecting left non-dominant side        Ana Mayer MD  [facility]  Office: [phone]   Fax: [phone]  04/24/2024      Disclaimer: This note was partly generated using dictation software which may occasionally result in transcription errors.    Total time spent on this encounter includes face to face time and non-face to face time preparing to see the patient (eg, review of tests), obtaining and/or reviewing separately obtained history, documenting clinical information in the electronic or other health record, independently interpreting results, and communicating results to the patient/family/caregiver, or care coordinator.    "

## 2024-04-29 ENCOUNTER — TELEPHONE (OUTPATIENT)
Dept: NEUROLOGY | Facility: CLINIC | Age: 59
End: 2024-04-29
Payer: MEDICARE

## 2024-04-29 ENCOUNTER — TELEPHONE (OUTPATIENT)
Dept: FAMILY MEDICINE | Facility: CLINIC | Age: 59
End: 2024-04-29
Payer: MEDICARE

## 2024-04-29 NOTE — TELEPHONE ENCOUNTER
----- Message from Mckenna Liz sent at 4/29/2024  9:58 AM CDT -----  Type: Needs Medical Advice  Who Called:  pt  Symptoms (please be specific):    How long has patient had these symptoms:    Pharmacy name and phone #:    Best Call Back Number: [phone]    Additional Information: Pt is calling to reschedule his B12 shot

## 2024-04-29 NOTE — TELEPHONE ENCOUNTER
----- Message from Shellie Bryan sent at 4/29/2024 10:53 AM CDT -----  Contact: patient  Type:  Needs Medical Advice    Who Called: patient     Would the patient rather a call back or a response via [facility]? Call     Best Call Back Number: [phone] (home)      Additional Information: patient would like to speak with the nurse in regards to rescheduling appointment.     Please call to advise

## 2024-04-29 NOTE — TELEPHONE ENCOUNTER
Called pt to get him rescheduled for his b12 shot. Pt asked if we can do it 3 days after the 1st. I told him that's a Saturday, but I could do it on Monday 5/6. Pt said that's fine so we scheduled him for that day. Pt said thanks and hung up.

## 2024-05-03 DIAGNOSIS — G43.719 INTRACTABLE CHRONIC MIGRAINE WITHOUT AURA AND WITHOUT STATUS MIGRAINOSUS: ICD-10-CM

## 2024-05-03 RX ORDER — BUTALBITAL, ACETAMINOPHEN AND CAFFEINE 50; 325; 40 MG/1; MG/1; MG/1
TABLET ORAL
Qty: 10 TABLET | Refills: 0 | Status: SHIPPED | OUTPATIENT
Start: 2024-05-03 | End: 2024-05-31 | Stop reason: SDUPTHER

## 2024-05-06 ENCOUNTER — PATIENT OUTREACH (OUTPATIENT)
Dept: ADMINISTRATIVE | Facility: OTHER | Age: 59
End: 2024-05-06
Payer: MEDICARE

## 2024-05-06 ENCOUNTER — CLINICAL SUPPORT (OUTPATIENT)
Dept: FAMILY MEDICINE | Facility: CLINIC | Age: 59
End: 2024-05-06
Payer: MEDICARE

## 2024-05-06 DIAGNOSIS — E53.8 VITAMIN B12 DEFICIENCY: Primary | ICD-10-CM

## 2024-05-06 PROCEDURE — 96372 THER/PROPH/DIAG INJ SC/IM: CPT | Mod: S$GLB,,, | Performed by: STUDENT IN AN ORGANIZED HEALTH CARE EDUCATION/TRAINING PROGRAM

## 2024-05-06 RX ADMIN — CYANOCOBALAMIN 1000 MCG: 1000 INJECTION, SOLUTION INTRAMUSCULAR; SUBCUTANEOUS at 09:05

## 2024-05-06 NOTE — TELEPHONE ENCOUNTER
Please approve for ondansetron (ZOFRAN-ODT) 4 MG TbDL   Last OV 04/24/24  Last refill date 10/03/23  Last labs 04/10/24  Next appt 07/24/24    Please approve for meclizine (ANTIVERT) 12.5 mg tablet   Last OV 04/24/24  Last refill date 04/08/24  Last labs 04/10/24  Next appt 07/24/24    Please approve for EPINEPHrine (EPIPEN) 0.3 mg/0.3 mL AtIn   Last OV 04/24/24  Last refill date 04/15/24  Last labs 04/10/24  Next appt 07/24/24    Pt wants this all sent to the [facility] in [address] @ [address]

## 2024-05-06 NOTE — PROGRESS NOTES
Confirmed name and . B-12 given left delt. Pt tolerated well. Advised to remain in clinic x 15 min. Pt agreed.

## 2024-05-06 NOTE — TELEPHONE ENCOUNTER
No care due was identified.  [facility] Embedded Care Due Messages. Reference number: [id number].   5/06/2024 10:10:46 AM CDT

## 2024-05-07 ENCOUNTER — TELEPHONE (OUTPATIENT)
Dept: NEUROLOGY | Facility: CLINIC | Age: 59
End: 2024-05-07

## 2024-05-07 ENCOUNTER — OFFICE VISIT (OUTPATIENT)
Dept: NEUROLOGY | Facility: CLINIC | Age: 59
End: 2024-05-07
Payer: MEDICARE

## 2024-05-07 VITALS
DIASTOLIC BLOOD PRESSURE: 67 MMHG | HEIGHT: 70 IN | BODY MASS INDEX: 21.4 KG/M2 | HEART RATE: 72 BPM | SYSTOLIC BLOOD PRESSURE: 112 MMHG | WEIGHT: 149.5 LBS

## 2024-05-07 DIAGNOSIS — F17.210 CIGARETTE NICOTINE DEPENDENCE WITHOUT COMPLICATION: ICD-10-CM

## 2024-05-07 DIAGNOSIS — Z86.73 HISTORY OF STROKE: Chronic | ICD-10-CM

## 2024-05-07 DIAGNOSIS — R41.3 MEMORY LOSS: Primary | ICD-10-CM

## 2024-05-07 PROCEDURE — 3078F DIAST BP <80 MM HG: CPT | Mod: CPTII,S$GLB,, | Performed by: NURSE PRACTITIONER

## 2024-05-07 PROCEDURE — 3074F SYST BP LT 130 MM HG: CPT | Mod: CPTII,S$GLB,, | Performed by: NURSE PRACTITIONER

## 2024-05-07 PROCEDURE — 99215 OFFICE O/P EST HI 40 MIN: CPT | Mod: S$GLB,,, | Performed by: NURSE PRACTITIONER

## 2024-05-07 PROCEDURE — 99999 PR PBB SHADOW E&M-EST. PATIENT-LVL V: CPT | Mod: PBBFAC,,, | Performed by: NURSE PRACTITIONER

## 2024-05-07 PROCEDURE — 1159F MED LIST DOCD IN RCRD: CPT | Mod: CPTII,S$GLB,, | Performed by: NURSE PRACTITIONER

## 2024-05-07 PROCEDURE — 3044F HG A1C LEVEL LT 7.0%: CPT | Mod: CPTII,S$GLB,, | Performed by: NURSE PRACTITIONER

## 2024-05-07 PROCEDURE — 3008F BODY MASS INDEX DOCD: CPT | Mod: CPTII,S$GLB,, | Performed by: NURSE PRACTITIONER

## 2024-05-07 NOTE — PROGRESS NOTES
"NEUROLOGY  Outpatient Follow Up Visit     [facility]  [address], [place] [address]  [phone] (office) / [phone] (fax)    Patient Name:  Ryan Crane  :  1965  MR #:  [id number]  Acct #:  [id number]    Date of  Visit: 2024    Other Physicians:  Ana Mayer MD (Primary Care Physician); No ref. provider found (Referring)      CHIEF COMPLAINT: Cerebrovascular Accident      INTERVAL HISTORY   24:  Ryan Crane is a 58 y.o. R-handed male seen in hospital follow up for CVA.     I last saw him for a similar visit in 3/2023. He sustained a R subcortical CVA with residual LUE weakness.     Medical history is otherwise significant for CAD, tobacco abuse, COPD, HTN, PIPER, PVD, bipolar disorder and schizophrenia.     Here alone today.     Admitted to [facility]  with concern for recurrent CVA. Recall that he had CVA with LSW in the past, but main residual is fine motor incoordination in the LUE. He started feeling "funny" during the day. Later that night, noted LSW involving the face, arm and leg. His speech was slurred. He didn't have any sensory loss. Treated with TNK per telestroke (NIHSS was 1). Symptoms improved in 1-2 days. At the time, was compliant with DAPT and statin therapies. No change in his therapy was recommended per neurology.     In the days prior, he denies any changes in medications. His BP was normal. No infectious symptoms. Was a bit more stressed than typical -- moving between his family member's houses, daughter going through a bad divorce. No sleep disturbances.     Doing well since dc. No recurrent stroke symptoms. Back to baseline L hand weakness.     On B12 injections q2 weeks.     No longer using cigarettes, but using nicotine vape.     He wants to get a tattoo soon and questions if he can stop the Plavix. He is going to see his cardiologist soon to talk about this also.      Prior history:  3/17/23:  Ryan Crane is a " "58 y.o. R-handed male seen in hospital follow up for CVA.     The patient is new to me today, but was evaluated by Dr. Molina during a recent admission to [facility].     Medical history is otherwise significant for CAD, tobacco abuse, COPD, HTN, PIPER, PVD, bipolar disorder and schizophrenia.     He is a poor historian. Here alone today.     He went to the ED because he "felt funny." He thought that he was having recurrent symptoms r/t hypoNa. He took a "dizzy pill" and went to sleep. He woke up to smoke and kept dropping the cigarette from the L hand. Symptoms continued, so he went to the ED for evaluation the following day.     He went to [facility] [place] and was d/c yesterday. He has no issues with the LLE. He has regained some strength in the LUE but still has significant difficulty with  and fine motor movements. He denies numbness. He hasn't had any falls since getting home. Has help from family. He started therapy with  today.     Reports that he was taking ASA 81 mg and Lipitor 40 mg at time of event. PCP had just started the Lipitor in February. He had started the ASA in February as well after being admitted to  for CP. At d/c, statin dose increased to 80 mg and DAPT was initiated. He denies any bleeding complications from DAPT.     He had a stroke in 2005. He didn't have significant deficits from this. He states that he was not started on any medications, like aspirin, after that. This was when he was living in [place].     Just found out today that his biological father had 13 strokes.     He hasn't smoked since 3/3 because he has been hospitalized. He has tried to quit smoking in the past and hasn't been able to. He has cut down from 3 ppd to 10 cigarettes per day. He isn't interested in the cessation program, has already tried this.     He doesn't drink.     He has PIPER and states that he uses CPAP nightly.     He used to be diabetic, but this was when he weighted nearly 400 lbs. Recent A1C normal.     ST " "Neurology Consultation 3/5/23:  " Ryan Crane is a 57 y.o. year old RH male who has a history of long term smoking and prior strokes presents with another stroke. Discussed with patient about stroke risk factors again. He does endorse cutting down for 3 packs a day to 10 cigs a day. Did inform him of new stroke and he will continue to work on those risk factors."    Allergies:  Review of patient's allergies indicates:   Allergen Reactions    Alcohol Anaphylaxis     Pt states all types of alcohol    Alcohol antiseptic pads Anaphylaxis    Cephalexin Anaphylaxis and Other (See Comments)       Current Medications:  Current Outpatient Medications   Medication Sig Dispense Refill    albuterol (PROVENTIL/VENTOLIN HFA) 90 mcg/actuation inhaler Inhale 2 puffs into the lungs every 6 (six) hours as needed for Shortness of Breath or Wheezing. 18 g 5    albuterol-ipratropium (DUO-NEB) 2.5 mg-0.5 mg/3 mL nebulizer solution Take 3 mLs by nebulization every 6 (six) hours as needed for Wheezing. Rescue 75 mL 0    amLODIPine (NORVASC) 2.5 MG tablet Take 1 tablet (2.5 mg total) by mouth once daily. 90 tablet 3    ammonium lactate 12 % Crea Apply 2 g topically 2 (two) times a day. 385 g 11    aspirin 81 MG Chew Take 81 mg by mouth once daily.      atorvastatin (LIPITOR) 40 MG tablet Take 1 tablet (40 mg total) by mouth once daily. 90 tablet 3    brexpiprazole (REXULTI) 2 mg Tab Take 2 mg by mouth once daily. Indications: schizophrenia      butalbital-acetaminophen-caffeine -40 mg (FIORICET, ESGIC) -40 mg per tablet Take 1 tablet by mouth as needed for migraine. No more than 10 tablets per month. 10 tablet 0    clonazePAM (KLONOPIN) 0.5 MG tablet Take 0.5 mg by mouth daily as needed.      clopidogreL (PLAVIX) 75 mg tablet Take 1 tablet (75 mg total) by mouth once daily. 30 tablet 11    cyanocobalamin 1,000 mcg/mL injection 1 ml sq daily x 5 days, then 1 ml sq weekly x 4 weeks then q monthly -  will need to go to PCP " (Patient taking differently: Inject 1,000 mcg into the muscle every 14 (fourteen) days. 1 ml sq daily x 5 days, then 1 ml sq weekly x 4 weeks then q monthly -  will need to go to PCP) 9 mL 0    fluticasone-umeclidin-vilanter (TRELEGY ELLIPTA) 100-62.5-25 mcg DsDv INHALE 1 PUFF INTO THE LUNGS ONE TIME DAILY 60 each 11    fremanezumab-vfrm (AJOVY AUTOINJECTOR) 225 mg/1.5 mL autoinjector Inject 1.5 mLs (225 mg total) into the skin every 28 days. 1 each 11    hydrOXYzine HCL (ATARAX) 10 MG Tab Take 10 mg by mouth 3 (three) times daily as needed.      lamoTRIgine (LAMICTAL) 200 MG tablet Take 200 mg by mouth once daily.      metoprolol succinate (TOPROL-XL) 50 MG 24 hr tablet Take 50 mg by mouth once daily.      mirtazapine (REMERON) 15 MG tablet Take 15 mg by mouth every evening.      pantoprazole (PROTONIX) 40 MG tablet Take 1 tablet (40 mg total) by mouth once daily. 90 tablet 3    pregabalin (LYRICA) 50 MG capsule Take 1 capsule (50 mg total) by mouth 3 (three) times daily. 270 capsule 1    ranolazine (RANEXA) 500 MG Tb12 Take 1 tablet (500 mg total) by mouth 2 (two) times daily. 180 tablet 3    REXULTI 4 mg Tab Take 4 mg by mouth Daily.      rimegepant (NURTEC) 75 mg odt Take 1 tablet (75 mg total) by mouth every other day. Place ODT tablet on the tongue; alternatively the ODT tablet may be placed under the tongue 16 tablet 11    tadalafiL (CIALIS) 20 MG Tab Take 1 tablet (20 mg total) by mouth once daily. 10 tablet 11    topiramate (TOPAMAX) 200 MG Tab Take 1 tablet (200 mg total) by mouth every evening. 90 tablet 3    ziprasidone (GEODON) 80 MG capsule Take 160 mg by mouth every evening.      EPINEPHrine (EPIPEN) 0.3 mg/0.3 mL AtIn Inject 0.3 mLs (0.3 mg total) into the muscle as needed. 1 each 0    meclizine (ANTIVERT) 12.5 mg tablet Take 1 tablet (12.5 mg total) by mouth 3 (three) times daily as needed for Dizziness. 90 tablet 0    ondansetron (ZOFRAN-ODT) 4 MG TbDL Take 1 tablet (4 mg total) by mouth every 8  (eight) hours as needed (nausea). 30 tablet 0    sodium chloride 1,000 mg TbSO oral tablet Take 1 tablet by mouth 3 (three) times daily. (Patient not taking: Reported on 4/15/2024)       Current Facility-Administered Medications   Medication Dose Route Frequency Provider Last Rate Last Admin    cyanocobalamin injection 1,000 mcg  1,000 mcg Intramuscular Q14 Days Ana Mayer MD   1,000 mcg at 05/06/24 0913     Facility-Administered Medications Ordered in Other Visits   Medication Dose Route Frequency Provider Last Rate Last Admin    diphenhydrAMINE injection 12.5 mg  12.5 mg Intravenous Q6H PRN Irvin Frank MD        electrolyte-S (ISOLYTE)   Intravenous Continuous Irvin Frank MD        lactated ringers infusion   Intravenous Continuous Napoleon Grimaldo MD        lactated ringers infusion   Intravenous Continuous Napoleon Grimaldo MD 25 mL/hr at 08/25/23 0948 New Bag at 08/25/23 0948    lactated ringers infusion   Intravenous Continuous Napoleon Grimaldo MD   Stopped at 10/31/23 1255    lactated ringers infusion   Intravenous Continuous Irvin Frank MD 10 mL/hr at 11/17/23 0745 New Bag at 11/17/23 0745    LIDOcaine (PF) 10 mg/ml (1%) injection 10 mg  1 mL Intradermal Once Napoleon Grimaldo MD        LIDOcaine (PF) 10 mg/ml (1%) injection 10 mg  1 mL Intradermal Once Napoleon Grimaldo MD        LIDOcaine (PF) 10 mg/ml (1%) injection 10 mg  1 mL Intradermal Once Napoleon Grimaldo MD        LIDOcaine (PF) 10 mg/ml (1%) injection 10 mg  1 mL Intradermal Once Irvin Frank MD           Past Medical History:  Past Medical History:   Diagnosis Date    Aortic valve stenosis 02/28/2022    Arachnoid cyst of posterior cranial fossa 01/13/2022    Benign prostatic hyperplasia without lower urinary tract symptoms 05/25/2023    Bilateral carotid bruits 06/22/2021    Bipolar disorder 01/16/2022    CAD S/P percutaneous coronary angioplasty     3 vessel disease    Calculus of gallbladder without cholecystitis without obstruction  01/11/2023    Cancer     Candidal intertrigo 11/11/2019    Cataract     Chronic heart failure with preserved ejection fraction 03/29/2023    Chronic schizophrenia     Colon polyps     Diabetic polyneuropathy associated with diabetes mellitus due to underlying condition 11/21/2019    Dysarthria as late effect of cerebellar cerebrovascular accident (CVA) 03/24/2023    Equinus deformity of both feet 11/21/2019    Erectile dysfunction due to arterial insufficiency 05/25/2023    Flaccid hemiplegia of left nondominant side as late effect of cerebral infarction 03/17/2023    Flat foot 11/21/2019    Gastritis     Gastroesophageal reflux disease without esophagitis 07/13/2023    Gastrointestinal hemorrhage 12/13/2019    Post polypectomy bleeding    General anesthetics causing adverse effect in therapeutic use     Hammer toes of both feet 11/21/2019    Hx of diabetic foot ulcer 11/21/2019    Hypertension     Hyponatremia 01/11/2022    ELAINE (iron deficiency anemia) 12/10/2019    Intractable chronic migraine without aura and without status migrainosus 12/30/2021    Microcytic anemia 10/09/2019    Moderate aortic regurgitation 03/29/2023    Moderate mitral regurgitation 03/29/2023    Moderate mitral stenosis 03/29/2023    Moderate to severe aortic stenosis 02/28/2022    Myocardial infarct, old     Nicotine dependence, unspecified, uncomplicated 01/16/2022    Onychomycosis due to dermatophyte 11/21/2019    Orchitis 11/09/2019    PIPER on CPAP     Peripheral visual field defect, bilateral 01/13/2022    PVD (peripheral vascular disease) 11/21/2019    Seizures 2008    Stage 2 moderate COPD by GOLD classification     PFTs w/ FEV1 (73%) (9/2022)    Stroke 2005    Thoracic aortic atherosclerosis 10/17/2022    CT chest    Thyroid disease     Previously on pharmacologic Tx    Tinea pedis of right foot 05/21/2019    Tobacco use disorder     Type 2 diabetes mellitus with diabetic peripheral angiopathy without gangrene     A1c 5.1% (3/2023)     Vitamin B12 deficiency 03/24/2023       Past Surgical History:  Past Surgical History:   Procedure Laterality Date    ANGIOGRAM, CORONARY, WITH LEFT HEART CATHETERIZATION  08/25/2022    Procedure: Angiogram, Coronary, with Left Heart Cath;  Surgeon: Mai Deleon MD;  Location: [facility] CATH;  Service: Cardiology;;    APPENDECTOMY      BONE BIOPSY Right 12/19/2023    Procedure: Biopsy-Bone;  Surgeon: Дмитрий Pratt DPM;  Location: [facility] OR;  Service: Podiatry;  Laterality: Right;    BONE EXOSTOSIS EXCISION Right 11/17/2023    Procedure: EXCISION, EXOSTOSIS;  Surgeon: Дмитрий Pratt DPM;  Location: [facility] OR;  Service: Podiatry;  Laterality: Right;    BONE MARROW ASPIRATION Left 08/21/2020    Procedure: ASPIRATION, BONE MARROW;  Surgeon: Lex Kathleen MD;  Location: [facility];  Service: Oncology;  Laterality: Left;    CHOLECYSTECTOMY      CLOSURE OF WOUND Right 09/09/2019    Procedure: CLOSURE, WOUND;  Surgeon: Li Kathleen DPM;  Location: [facility];  Service: Podiatry;  Laterality: Right;    COLONOSCOPY N/A 12/10/2019    Procedure: COLONOSCOPY;  Surgeon: Ryan Lucas MD;  Location: [facility];  Service: Endoscopy;  Laterality: N/A;    COLONOSCOPY N/A 12/13/2019    Procedure: COLONOSCOPY;  Surgeon: Ryan Lucas MD;  Location: [facility];  Service: Endoscopy;  Laterality: N/A;    CORONARY STENT PLACEMENT      ESOPHAGOGASTRODUODENOSCOPY N/A 12/10/2019    Procedure: EGD (ESOPHAGOGASTRODUODENOSCOPY);  Surgeon: Ryan Lucas MD;  Location: [facility];  Service: Endoscopy;  Laterality: N/A;    ESOPHAGOGASTRODUODENOSCOPY N/A 11/03/2022    Procedure: EGD (ESOPHAGOGASTRODUODENOSCOPY);  Surgeon: Ryan Lucas MD;  Location: [facility];  Service: Endoscopy;  Laterality: N/A;    INCISION AND DRAINAGE FOOT Right 12/19/2023    Procedure: INCISION AND DRAINAGE, FOOT- RIGHT;  Surgeon: Дмитрий Pratt DPM;  Location: [facility];  Service: Podiatry;  Laterality: Right;    INJECTION OF ANESTHETIC AGENT AROUND  MEDIAL BRANCH NERVES INNERVATING LUMBAR FACET JOINT Bilateral 07/25/2023    Procedure: Block-nerve-medial branch-lumbar;  Surgeon: Napoleon Grimaldo MD;  Location: [facility] OR;  Service: Pain Management;  Laterality: Bilateral;  L3,4,5 MBB    INJECTION OF ANESTHETIC AGENT AROUND MEDIAL BRANCH NERVES INNERVATING LUMBAR FACET JOINT Bilateral 08/25/2023    Procedure: Block-nerve-medial branch-lumbar;  Surgeon: Napoleon Grimaldo MD;  Location: [facility] OR;  Service: Anesthesiology;  Laterality: Bilateral;  L3,4,5 MBB #2    LAPAROSCOPIC CHOLECYSTECTOMY N/A 01/11/2023    Procedure: CHOLECYSTECTOMY, LAPAROSCOPIC;  Surgeon: Laith Davis MD;  Location: [facility] OR;  Service: General;  Laterality: N/A;    LEFT HEART CATHETERIZATION Left 08/25/2022    Procedure: Left heart cath;  Surgeon: Mai Deleon MD;  Location: [facility] CATH;  Service: Cardiology;  Laterality: Left;    RADIOFREQUENCY ABLATION OF LUMBAR MEDIAL BRANCH NERVE AT SINGLE LEVEL Bilateral 10/31/2023    Procedure: Radiofrequency Ablation, Nerve, Spinal, Lumbar, Medial Branch, 1 Level;  Surgeon: Napoleon Grimaldo MD;  Location: [facility] OR;  Service: Anesthesiology;  Laterality: Bilateral;  L3,4,5 RFA    right heel surgery      SHOULDER ARTHROSCOPY Left     UPPER GASTROINTESTINAL ENDOSCOPY         Family History:  family history includes Cancer in his father; Cervical cancer in his sister; Cirrhosis in his brother; Colon cancer in his father; Diabetes in his father and mother; Hypertension in his brother, father, and mother; Lung cancer in his maternal grandmother; Melanoma in his mother; Prostate cancer in his maternal grandfather; Stroke in his father.    Social History:   reports that he has been smoking cigarettes. He started smoking about 45 years ago. He has a 67.7 pack-year smoking history. He has never used smokeless tobacco. He reports current drug use. Drug: Marijuana. He reports that he does not drink alcohol.      PHYSICAL EXAM:  /67 (BP Location: Right arm,  "Patient Position: Sitting, BP Method: Small (Automatic))   Pulse 72   Ht 5' 10" (1.778 m)   Wt 67.8 kg (149 lb 7.6 oz)   BMI 21.45 kg/m²     General: appears older than stated age. NAD.  Pulmonary: Normal effort and rate.   Musculoskeletal: No obvious joint deformities, moves all extremities well.  Extremities: No clubbing, cyanosis or edema.     Neurological exam:  Mental status: Awake and alert.  Oriented to person, place, time and situation. Recent memory questionable in history taking.   Speech/Language: Fluent and appropriate. No dysarthria or aphasia on conversation. Able to follow complex commands.   Cranial nerves (II-XII): Visual fields full. Pupils equal round and reactive to light, extraocular movements intact, no ptosis, no nystagmus. Facial sensation and symmetry intact bilaterally. Hearing grossly intact. Palate deferred. Shoulder shrug normal bilaterally. Normal tongue protrusion.   Motor: 5 out of 5 strength throughout the R extremities and LLE. 5-/5 in L IO. No drift.   Sensation: Intact to light touch and temperature throughout.   DTR: Mute at the biceps. Mute at the R knee, 1+ L knee.   Coordination: Finger-nose-finger testing intact bilaterally. No tremor.   Gait: Fluid without AD    DIAGNOSTIC DATA:  I have personally reviewed provider notes, labs and imaging made available to me today.     Neuro note Dr. Navarro from [facility] indicates:  "CT head reviewed with evidence of old lacunar infarcts in the left caudate head, right cerebellar hemisphere and right centrum semi ovale in addition to old right PCA infarct.   CTA reviewed with evidence of minimal right M2 stenosis in addition to proximal right ICA atheroma."     Imaging:  CTA H&N 4/9/2024 - report only  CTA head:   The ACAs, MCAs, and PCAs are patent. There is moderate right M1 segment stenosis There is moderate intracranial ICA atherosclerosis. There is a left supraclinoid ICA aneurysm measuring about 3 mm x 3 mm (image 98, series 4].     The " vertebrobasilar system is patent.     CTA neck:   There is a 2 great vessel arch with moderate atherosclerosis. The internal, external, and common carotid arteries are patent with moderate atherosclerosis of the bulbs and proximal ICAs.     The vertebral arteries are patent with mild narrowing of the origins. There is cervical spine DJD.     There are apical emphysematous changes with mildly enlarged right hilar lymph nodes.     Evaluation of the internal carotid arteries for determining clinically significant stenosis was performed by comparing the diameters of the proximal and distal internal carotid arteries.     Impression:     1. No large vessel occlusion.   2. Less than 50% luminal narrowing of the proximal ICAs with moderate carotid bulb atherosclerosis.   3. Moderate right M1 segment stenosis.   4. Small left supraclinoid ICA aneurysm. Follow-up with neuro interventional radiology.   5. Biapical emphysematous change with mildly enlarged right hilar lymph nodes.     MRI brain wo 4/2024 - report only  FINDINGS:   CSF space in the posterior fossa may relate to an arachnoid cyst.  No restricted effusion to suggest infarct.     A focus of FLAIR hyperintensity in left occipital lobe measures 2.1 x 1.5 cm is a nonspecific finding may relate to old infarct versus other infectious, demyelinating, inflammatory process or other.  Recommend clinical correlation and follow-up.  No   restricted diffusion segment to suggest infarct.  There is mild subcortical, juxtacortical and periventricular FLAIR hyperintensities may be related to chronic microvascular ischemic changes but is a nonspecific finding.       IMPRESSION:   A focus of FLAIR hyperintensity in left occipital lobe measures 2.1 x 1.5 cm is a nonspecific finding may relate to old infarct versus other infectious, demyelinating, inflammatory process.  Recommend clinical correlation and follow-up.  No restricted   diffusion segment to suggest infarct.     Nonspecific  few juxtacortical, periventricular and neri-Ventricular FLAIR hyperintensities may relate to chronic small vessels ischemic changes or demyelinating process.  Recommend clinical correlation and follow-up.     CTH 4/9/24: report only  Findings:   There is mild brain atrophy with an old right occipital infarct. There is edmundo cisterna magna . There are old lacunar infarcts. There is no evidence of hemorrhage, acute infarction, mass lesion, or hydrocephalus.     The orbits, mastoid air cells, and paranasal sinuses are unremarkable.     Impression:   No acute intracranial hemorrhage.     CTA stroke 3/4/23:  FINDINGS:  The off take of the common carotids appears normal.  The origin of the vertebral arteries appears normal.  There is no significant stenosis in the carotid bifurcations bilaterally.  There is flow throughout both vertebral arteries.  The internal carotids appear normal.  There is calcification in the carotid siphons without a significant stenosis demonstrated.  The A1 segments appear normal.  The M1 segment appears normal.  The anterior cerebral, middle cerebral vessels appear normal.  The posterior cerebral vessels appear normal.  The basilar artery appears normal.  An aneurysm is not seen.     Impression:  No acute abnormalities are demonstrated.    MRI brain wo 3/4/23:    Impression:  1. Small area of interest diffusion is seen in the right corona radiata extending to the region of the superior aspect of the posterior limb internal capsule, compatible with acute or subacute ischemia/infarction.  2. Additional findings and details as above.    Personally reviewed -- encephalomalacia in the R occipital lobe, chronic microangiopathy, SA cyst dorsal to cerebellar vermis w/o mass effect     Cardiac:  EKG 4/2024:  NSR    TTE 4/2024:  Interpretation Summary   The study was technically difficult.   Ejection Fraction = 65-75%.   Dense thickening and calcification of the entire mitral apparatus.   Aortic Valve  calcified.   Left ventricular systolic function is normal.   There is moderate concentric left ventricular hypertrophy.   There is moderate to severe mitral stenosis.   Moderate to severe aortic regurgitation.   Severe valvular aortic stenosis.   The study was technically difficult.       TTE 3/5/23:  The left ventricle is normal in size with concentric remodeling and normal systolic function.  Moderate left atrial enlargement.  Grade I left ventricular diastolic dysfunction.  The estimated PA systolic pressure is 27 mmHg.  Normal right ventricular size with normal right ventricular systolic function.  Normal central venous pressure (3 mmHg).  The estimated ejection fraction is 60%.  Mild right atrial enlargement.  There is moderate-to-severe aortic valve stenosis.  Aortic valve area is 1.54 cm2; peak velocity is 4.00 m/s; mean gradient is 33 mmHg.  Mild-to-moderate mitral regurgitation.  The mean diastolic gradient across the mitral valve is 8 mmHg at a heart rate of bpm.  There is moderate mitral stenosis.  Mild-to-moderate aortic regurgitation.  There is no evidence of intracardiac shunting.    Holter 48 hr 3/2022:  Dominant rhythm is sinus. No complex ventricular or atrial ectopy. No high-grade SA or AV shala block. Periods of sinus tachycardia.    Labs:  CBC:   Lab Results   Component Value Date    WBC 6.91 04/03/2024    HGB 12.6 (L) 04/03/2024    HCT 37.4 (L) 04/03/2024     04/03/2024    MCV 90 04/03/2024    RDW 16.6 (H) 04/03/2024     BMP:   Lab Results   Component Value Date     04/10/2024    K 3.9 04/10/2024     (H) 04/03/2024    CO2 20 (L) 04/10/2024    BUN 13 04/10/2024    CREATININE 0.78 (L) 04/10/2024    GLU 90 04/03/2024    CALCIUM 9.1 04/10/2024    MG 2.0 04/03/2024    PHOS 3.9 04/03/2024     LFTS;   Lab Results   Component Value Date    PROT 6.0 (L) 04/10/2024    ALBUMIN 3.8 04/10/2024    BILITOT 0.4 04/10/2024    AST 11 (L) 04/10/2024    ALKPHOS 66 04/10/2024    ALT 8 04/10/2024      COAGS:   Lab Results   Component Value Date    INR 1.1 04/05/2023     FLP:   Lab Results   Component Value Date    CHOL 113 04/10/2024    HDL 50 04/10/2024    LDLCALC 52 04/10/2024    TRIG 57 04/10/2024    CHOLHDL 2.3 04/10/2024     Lab Results   Component Value Date    HGBA1C 4.9 04/10/2024     Lab Results   Component Value Date    TSH 1.140 03/05/2023     Lab Results   Component Value Date    XCCBDAZC03 >2000 (H) 04/03/2024     Lab Results   Component Value Date    FOLATE 6.4 04/03/2024       TSH NL 4/2024  Component      Latest Ref Rng 3/24/2023   Platelet Response Plavix      194 - 418  (L)       Legend:  (L) Low    ASSESSMENT & PLAN:  Ryan Crane is a 58 y.o. R-handed male seen in hospital follow up for CVA.     Problem List Items Addressed This Visit          Neuro    History of stroke (Chronic)    Overview     Reports initial CVA in 2005 without significant clinical deficit, [place]  3/2023 - R subcortical lacune with resultant LSW  4/2024 - recurrent LSW, dysarthria, treated at [facility] with TNK -- MRI neg          Current Assessment & Plan     Diagnostic testing from recent hospitalization  at [facility] reviewed with patient  - MRI brain read as acutely negative. There seems to be some discrepancy in the report, as radiology indicates L occipital FLAIR hyperintensity (he has a chronic RIGHT occipital stroke on prior imaging).   - Vascular imaging notable for moderate R M1 stenosis, < 50% stenosis of the proximal ICAs bilaterally. Neurology note indicates possible R ICA atheroma not mentioned by radiology.   - TTE with pEF. Valvular abnormalities as previously noted. LAE. No shunt on prior    - EKG NSR  - A1C & LDL at goal   - prior platelet responsiveness testing to Plavix was +       Continue DAPT indefinitely in light of intracranial atherosclerosis and prior history   Question if recent episode could have been stroke recrudescence vs. TNK aborted CVA  LDL at goal on  statin   BP at goal today  Had only 48 Holter in the past -- would be reasonable to pursue longer duration monitoring given his history. Upcoming cardiology f/u   I have asked him to obtain images on disc from [facility] to review in light of discrepancies above in reports from OSH   Dietary / lifestyle changes discussed - encouraged nicotine cessation   CVA education / ED precautions discussed                  Other    Cigarette nicotine dependence without complication     Other Visit Diagnoses       Memory loss    -  Primary              Follow up: PRN    I spent a total of 45 minutes on the day of the visit.    This includes face to face time with the patient, as well as non-face to face time preparing for and completing the visit (review of prior diagnostic testing and clinical notes, obtaining or reviewing history, documenting clinical information in the EMR, independently interpreting and communicating results to the patient/family and coordinating ongoing care).               I appreciate the opportunity to participate in the care of this patient. Please feel free to contact me with any questions or concerns.       Alyse Wilson, ACNPC-AG  [facility]  [address] [facility] [address] [place] [address]

## 2024-05-07 NOTE — PATIENT INSTRUCTIONS
Continue the same medications to prevent strokes   - aspirin 81 mg daily, Plavix 75 mg daily   - you should continue both of these given your history of strokes, as well as evidence of narrowing in some of the arteries in your brain     Continue same cholesterol medication -- your cholesterol looks great  BP is at goal     Any time that antiplatelets or blood thinning agents are held, there is a risk for recurrence of stroke. There is strong evidence to suggest that the highest risk for recurrence of stroke or TIA is within the first 9 months after the initial event. Any non-urgent procedure should, therefore, be postponed until at least 9 months after a neurological event for the best outcomes.     I recommend that you have a longer duration heart monitor to evaluate for a cardiac cause of stroke. Last time, it was only monitored for 2 days. Discuss with Dr. Aranda.     I recommend that you continue to try to minimize your nicotine use, even through the vape as nicotine exposure increases your risk for future strokes.     Please try to obtain a disc with the MRI pictures on it from [place] for me to review. You can drop it off here and we will upload it into the [facility] chart.     Follow up with me as needed.     Please call our clinic at [phone] or send a message on the [facility] portal if there are any changes to the plan discussed today. For example, if you are not contacted for the requested tests, referral(s) within one week, if you are unable to receive the medications prescribed, or if you feel you need to change the treatment course for any reason.

## 2024-05-07 NOTE — PROGRESS NOTES
LPN spoke to patient/caregiver as per OPCM referral for: transportation    Does the patient consent to completing the assessment/enrollment: Yes  Does the patient consent for LPN to speak to a caregiver? No    Health Insurance Coverage:     Does the patient have adequate health insurance coverage? Yes  Education provided: Yes    PCP Follow-Up Appointments:    Does the patient have a primary care provider? yes - Ana Mayer MD  Date of last PCP appointment? 4/24/24  Next PCP appointment:  7/24/24  Was patient provided with education surrounding PCP services/creating a medical home? yes -       Specialist Appointments:     Does the patient have a pending specialist referral? yes - ENT  Does the patient have an upcoming specialist appointment? yes - podiatry, neurology, cardio, ent  Is the patient pregnant? N/A  Does the patient have a mental health provider? yes -        Home Medications:     Reviewed medication list with patient? No  Is the patient able to afford their medications? Yes        Recent lab results:  Blood Sugar:    Provided education: No  Blood Pressure:   Provided education: No        Social Determinants of Health (SDOH)    Patient's identified areas of need:    Food/utility  Education/Resources provided:          Home Health/DME:    Current Home Health: No  Patient has all healthcare equipment/supplies indicated: yes  Current DME:       Additional Documentation:   Spoke to patient based on OPCM referral. He now gets transportation to appts from [facility] no issues. Discussed importance of attending f/u appts and taking meds as prescribed. Could use resources for food/utility help, will mail. States sees mental health provider. Denies SI/HI. Has upcoming ENT appt for hearing loss.       Follow up:   Patient agrees to scheduled follow up call.

## 2024-05-08 ENCOUNTER — TELEPHONE (OUTPATIENT)
Dept: FAMILY MEDICINE | Facility: CLINIC | Age: 59
End: 2024-05-08
Payer: MEDICARE

## 2024-05-08 ENCOUNTER — TELEPHONE (OUTPATIENT)
Dept: SMOKING CESSATION | Facility: CLINIC | Age: 59
End: 2024-05-08
Payer: MEDICARE

## 2024-05-08 RX ORDER — ONDANSETRON 4 MG/1
4 TABLET, ORALLY DISINTEGRATING ORAL EVERY 8 HOURS PRN
Qty: 30 TABLET | Refills: 0 | Status: SHIPPED | OUTPATIENT
Start: 2024-05-08

## 2024-05-08 RX ORDER — MECLIZINE HCL 12.5 MG 12.5 MG/1
12.5 TABLET ORAL 3 TIMES DAILY PRN
Qty: 90 TABLET | Refills: 0 | Status: SHIPPED | OUTPATIENT
Start: 2024-05-08 | End: 2024-06-07

## 2024-05-08 RX ORDER — EPINEPHRINE 0.3 MG/.3ML
1 INJECTION SUBCUTANEOUS
Qty: 1 EACH | Refills: 0 | Status: SHIPPED | OUTPATIENT
Start: 2024-05-08

## 2024-05-08 NOTE — ASSESSMENT & PLAN NOTE
Diagnostic testing from recent hospitalization  at [facility] reviewed with patient  - MRI brain read as acutely negative. There seems to be some discrepancy in the report, as radiology indicates L occipital FLAIR hyperintensity (he has a chronic RIGHT occipital stroke on prior imaging).   - Vascular imaging notable for moderate R M1 stenosis, < 50% stenosis of the proximal ICAs bilaterally. Neurology note indicates possible R ICA atheroma not mentioned by radiology.   - TTE with pEF. Valvular abnormalities as previously noted. LAE. No shunt on prior    - EKG NSR  - A1C & LDL at goal   - prior platelet responsiveness testing to Plavix was +       Continue DAPT indefinitely in light of intracranial atherosclerosis and prior history   Question if recent episode could have been stroke recrudescence vs. TNK aborted CVA  LDL at goal on statin   BP at goal today  Had only 48 Holter in the past -- would be reasonable to pursue longer duration monitoring given his history. Upcoming cardiology f/u   I have asked him to obtain images on disc from [facility] to review in light of discrepancies above in reports from OSH   Dietary / lifestyle changes discussed - encouraged nicotine cessation   CVA education / ED precautions discussed

## 2024-05-09 ENCOUNTER — OFFICE VISIT (OUTPATIENT)
Dept: PODIATRY | Facility: CLINIC | Age: 59
End: 2024-05-09
Payer: MEDICARE

## 2024-05-09 VITALS — WEIGHT: 149.5 LBS | HEIGHT: 70 IN | BODY MASS INDEX: 21.4 KG/M2

## 2024-05-09 DIAGNOSIS — E11.49 TYPE II DIABETES MELLITUS WITH NEUROLOGICAL MANIFESTATIONS: Primary | ICD-10-CM

## 2024-05-09 DIAGNOSIS — M79.671 INTRACTABLE RIGHT HEEL PAIN: ICD-10-CM

## 2024-05-09 PROCEDURE — 99999 PR PBB SHADOW E&M-EST. PATIENT-LVL IV: CPT | Mod: PBBFAC,,, | Performed by: STUDENT IN AN ORGANIZED HEALTH CARE EDUCATION/TRAINING PROGRAM

## 2024-05-09 PROCEDURE — 1160F RVW MEDS BY RX/DR IN RCRD: CPT | Mod: CPTII,S$GLB,, | Performed by: STUDENT IN AN ORGANIZED HEALTH CARE EDUCATION/TRAINING PROGRAM

## 2024-05-09 PROCEDURE — 3008F BODY MASS INDEX DOCD: CPT | Mod: CPTII,S$GLB,, | Performed by: STUDENT IN AN ORGANIZED HEALTH CARE EDUCATION/TRAINING PROGRAM

## 2024-05-09 PROCEDURE — 1159F MED LIST DOCD IN RCRD: CPT | Mod: CPTII,S$GLB,, | Performed by: STUDENT IN AN ORGANIZED HEALTH CARE EDUCATION/TRAINING PROGRAM

## 2024-05-09 PROCEDURE — 3044F HG A1C LEVEL LT 7.0%: CPT | Mod: CPTII,S$GLB,, | Performed by: STUDENT IN AN ORGANIZED HEALTH CARE EDUCATION/TRAINING PROGRAM

## 2024-05-09 PROCEDURE — 99213 OFFICE O/P EST LOW 20 MIN: CPT | Mod: S$GLB,,, | Performed by: STUDENT IN AN ORGANIZED HEALTH CARE EDUCATION/TRAINING PROGRAM

## 2024-05-09 NOTE — TELEPHONE ENCOUNTER
----- Message from Milan Emerson sent at 5/8/2024  4:15 PM CDT -----  Contact: pt  Type: Needs Medical Advice  Who Called:  pt  Best Call Back Number: [phone]    Additional Information: Pt is calling the office been having diarrhea for the past 4 days trying to see if  Can call something into pharmacy. Please call back and advise.   [id number] - [place] - [phone] AT [facility] [address] [place] [id number] [facility]  [place] [address]  Phone: [phone] Fax: [phone]

## 2024-05-09 NOTE — TELEPHONE ENCOUNTER
Pt complaining of loose stools for last few days. No relief from OTC meds. Please advise if able to send in RX- denies any n/v

## 2024-05-09 NOTE — PROGRESS NOTES
Subjective:      Patient ID: Ryan Crane is a 58 y.o. male.    Chief Complaint: Foot Pain (1 month foot pain f/u)      HPI:  Ryan is a 58 y.o. male who presents to the clinic for evaluation and treatment of high risk feet. Ryan has a past medical history of Aortic valve stenosis (02/28/2022), Arachnoid cyst of posterior cranial fossa (01/13/2022), Benign prostatic hyperplasia without lower urinary tract symptoms (05/25/2023), Bilateral carotid bruits (06/22/2021), Bipolar disorder (01/16/2022), CAD S/P percutaneous coronary angioplasty, Calculus of gallbladder without cholecystitis without obstruction (01/11/2023), Cancer, Candidal intertrigo (11/11/2019), Cataract, Chronic heart failure with preserved ejection fraction (03/29/2023), Chronic schizophrenia, Colon polyps, Diabetic polyneuropathy associated with diabetes mellitus due to underlying condition (11/21/2019), Dysarthria as late effect of cerebellar cerebrovascular accident (CVA) (03/24/2023), Equinus deformity of both feet (11/21/2019), Erectile dysfunction due to arterial insufficiency (05/25/2023), Flaccid hemiplegia of left nondominant side as late effect of cerebral infarction (03/17/2023), Flat foot (11/21/2019), Gastritis, Gastroesophageal reflux disease without esophagitis (07/13/2023), Gastrointestinal hemorrhage (12/13/2019), General anesthetics causing adverse effect in therapeutic use, Hammer toes of both feet (11/21/2019), diabetic foot ulcer (11/21/2019), Hypertension, Hyponatremia (01/11/2022), ELAINE (iron deficiency anemia) (12/10/2019), Intractable chronic migraine without aura and without status migrainosus (12/30/2021), Microcytic anemia (10/09/2019), Moderate aortic regurgitation (03/29/2023), Moderate mitral regurgitation (03/29/2023), Moderate mitral stenosis (03/29/2023), Moderate to severe aortic stenosis (02/28/2022), Myocardial infarct, old, Nicotine dependence, unspecified, uncomplicated (01/16/2022), Onychomycosis due  to dermatophyte (11/21/2019), Orchitis (11/09/2019), PIPER on CPAP, Peripheral visual field defect, bilateral (01/13/2022), PVD (peripheral vascular disease) (11/21/2019), Seizures (2008), Stage 2 moderate COPD by GOLD classification, Stroke (2005), Thoracic aortic atherosclerosis (10/17/2022), Thyroid disease, Tinea pedis of right foot (05/21/2019), Tobacco use disorder, Type 2 diabetes mellitus with diabetic peripheral angiopathy without gangrene, and Vitamin B12 deficiency (03/24/2023). The patient's chief complaint is long, thick toenails and right heel pain. This patient has documented high risk feet requiring routine maintenance secondary to diabetes mellitis and those secondary complications of diabetes, as mentioned..  He reports having right heel pain, which he rates as 3/10 on the pain scale but denies having a wound.  He also informs of losing 30 lbs since his last visit without trying to lose weight and is concerned about it.  He denies trying to self-treat his heel pain or trim his toenails himself.  Patient denies any other pedal complaints at this time.    05/09/2024  Mr. Crane returns today for R foot pain to the heel. He notes the pain can be severe but is not always present. He notes 3-4 episodes per week when walking that can last for 30 minutes but up to two hours. The pain will eventually resolve without intervention. This has been happening for the last 5-6 weeks.     PCP: Ana Mayer MD    Date Last Seen by PCP:  4/24/2024    Current shoe gear:  Affected Foot: Casual shoes     Unaffected Foot: Casual shoes    Review of Systems   Constitutional: Negative for appetite change, fever, chills, fatigue.  Positive for unexpected weight change.   Respiratory: Negative for cough, wheezing, shortness of breath.  Cardiovascular: Negative for chest pain, claudication, cyanosis.  Positive for leg swelling.  Musculoskeletal: Negative for back pain, arthritis, joint pain, joint swelling, myalgias,  stiffness.   Skin: Negative for rash, itching, suspicious lesion, poor wound healing, unusual hair distribution.  Positive for nail bed changes, discoloration,.  Neurological: Negative for loss of balance, sensory change, paresthesias, numbness.  Positive for pain.  Hematological: Negative for adenopathy, bleeding, bruising easily.   Psychiatric/Behavioral: The patient is not nervous/anxious.  Negative for altered mental status.    Hemoglobin A1C   Date Value Ref Range Status   04/10/2024 4.9 4.6 - 6.2 % Final   04/03/2024 4.9 4.0 - 5.6 % Final     Comment:     ADA Screening Guidelines:  5.7-6.4%  Consistent with prediabetes  >or=6.5%  Consistent with diabetes    High levels of fetal hemoglobin interfere with the HbA1C  assay. Heterozygous hemoglobin variants (HbS, HgC, etc)do  not significantly interfere with this assay.   However, presence of multiple variants may affect accuracy.     07/13/2023 4.9 4.0 - 5.6 % Final     Comment:     ADA Screening Guidelines:  5.7-6.4%  Consistent with prediabetes  >or=6.5%  Consistent with diabetes    High levels of fetal hemoglobin interfere with the HbA1C  assay. Heterozygous hemoglobin variants (HbS, HgC, etc)do  not significantly interfere with this assay.   However, presence of multiple variants may affect accuracy.     03/05/2023 5.1 0.0 - 5.6 % Final     Comment:     Reference Interval:  5.0 - 5.6 Normal   5.7 - 6.4 High Risk   > 6.5 Diabetic      Hgb A1c results are standardized based on the (NGSP) National   Glycohemoglobin Standardization Program.      Hemoglobin A1C levels are related to mean serum/plasma glucose   during the preceding 2-3 months.            Past Medical History:   Diagnosis Date    Aortic valve stenosis 02/28/2022    Arachnoid cyst of posterior cranial fossa 01/13/2022    Benign prostatic hyperplasia without lower urinary tract symptoms 05/25/2023    Bilateral carotid bruits 06/22/2021    Bipolar disorder 01/16/2022    CAD S/P percutaneous coronary  angioplasty     3 vessel disease    Calculus of gallbladder without cholecystitis without obstruction 01/11/2023    Cancer     Candidal intertrigo 11/11/2019    Cataract     Chronic heart failure with preserved ejection fraction 03/29/2023    Chronic schizophrenia     Colon polyps     Diabetic polyneuropathy associated with diabetes mellitus due to underlying condition 11/21/2019    Dysarthria as late effect of cerebellar cerebrovascular accident (CVA) 03/24/2023    Equinus deformity of both feet 11/21/2019    Erectile dysfunction due to arterial insufficiency 05/25/2023    Flaccid hemiplegia of left nondominant side as late effect of cerebral infarction 03/17/2023    Flat foot 11/21/2019    Gastritis     Gastroesophageal reflux disease without esophagitis 07/13/2023    Gastrointestinal hemorrhage 12/13/2019    Post polypectomy bleeding    General anesthetics causing adverse effect in therapeutic use     Hammer toes of both feet 11/21/2019    Hx of diabetic foot ulcer 11/21/2019    Hypertension     Hyponatremia 01/11/2022    ELAINE (iron deficiency anemia) 12/10/2019    Intractable chronic migraine without aura and without status migrainosus 12/30/2021    Microcytic anemia 10/09/2019    Moderate aortic regurgitation 03/29/2023    Moderate mitral regurgitation 03/29/2023    Moderate mitral stenosis 03/29/2023    Moderate to severe aortic stenosis 02/28/2022    Myocardial infarct, old     Nicotine dependence, unspecified, uncomplicated 01/16/2022    Onychomycosis due to dermatophyte 11/21/2019    Orchitis 11/09/2019    PIPER on CPAP     Peripheral visual field defect, bilateral 01/13/2022    PVD (peripheral vascular disease) 11/21/2019    Seizures 2008    Stage 2 moderate COPD by GOLD classification     PFTs w/ FEV1 (73%) (9/2022)    Stroke 2005    Thoracic aortic atherosclerosis 10/17/2022    CT chest    Thyroid disease     Previously on pharmacologic Tx    Tinea pedis of right foot 05/21/2019    Tobacco use disorder      Type 2 diabetes mellitus with diabetic peripheral angiopathy without gangrene     A1c 5.1% (3/2023)    Vitamin B12 deficiency 03/24/2023     Past Surgical History:   Procedure Laterality Date    ANGIOGRAM, CORONARY, WITH LEFT HEART CATHETERIZATION  08/25/2022    Procedure: Angiogram, Coronary, with Left Heart Cath;  Surgeon: Mai Deleon MD;  Location: [facility] CATH;  Service: Cardiology;;    APPENDECTOMY      BONE BIOPSY Right 12/19/2023    Procedure: Biopsy-Bone;  Surgeon: Дмитрий Pratt DPM;  Location: [facility] OR;  Service: Podiatry;  Laterality: Right;    BONE EXOSTOSIS EXCISION Right 11/17/2023    Procedure: EXCISION, EXOSTOSIS;  Surgeon: Дмитрий Pratt DPM;  Location: [facility] OR;  Service: Podiatry;  Laterality: Right;    BONE MARROW ASPIRATION Left 08/21/2020    Procedure: ASPIRATION, BONE MARROW;  Surgeon: Lex Kathleen MD;  Location: [facility];  Service: Oncology;  Laterality: Left;    CHOLECYSTECTOMY      CLOSURE OF WOUND Right 09/09/2019    Procedure: CLOSURE, WOUND;  Surgeon: Li Kathleen DPM;  Location: [facility] OR;  Service: Podiatry;  Laterality: Right;    COLONOSCOPY N/A 12/10/2019    Procedure: COLONOSCOPY;  Surgeon: Ryan Lucas MD;  Location: [facility];  Service: Endoscopy;  Laterality: N/A;    COLONOSCOPY N/A 12/13/2019    Procedure: COLONOSCOPY;  Surgeon: Ryan Lucas MD;  Location: [facility];  Service: Endoscopy;  Laterality: N/A;    CORONARY STENT PLACEMENT      ESOPHAGOGASTRODUODENOSCOPY N/A 12/10/2019    Procedure: EGD (ESOPHAGOGASTRODUODENOSCOPY);  Surgeon: Ryan Lucas MD;  Location: [facility];  Service: Endoscopy;  Laterality: N/A;    ESOPHAGOGASTRODUODENOSCOPY N/A 11/03/2022    Procedure: EGD (ESOPHAGOGASTRODUODENOSCOPY);  Surgeon: Ryan Lucas MD;  Location: [facility];  Service: Endoscopy;  Laterality: N/A;    INCISION AND DRAINAGE FOOT Right 12/19/2023    Procedure: INCISION AND DRAINAGE, FOOT- RIGHT;  Surgeon: Дмитрий Pratt DPM;  Location: [facility];   Service: Podiatry;  Laterality: Right;    INJECTION OF ANESTHETIC AGENT AROUND MEDIAL BRANCH NERVES INNERVATING LUMBAR FACET JOINT Bilateral 07/25/2023    Procedure: Block-nerve-medial branch-lumbar;  Surgeon: Napoleon Grimaldo MD;  Location: [facility] OR;  Service: Pain Management;  Laterality: Bilateral;  L3,4,5 MBB    INJECTION OF ANESTHETIC AGENT AROUND MEDIAL BRANCH NERVES INNERVATING LUMBAR FACET JOINT Bilateral 08/25/2023    Procedure: Block-nerve-medial branch-lumbar;  Surgeon: Napoleon Grimaldo MD;  Location: [facility] OR;  Service: Anesthesiology;  Laterality: Bilateral;  L3,4,5 MBB #2    LAPAROSCOPIC CHOLECYSTECTOMY N/A 01/11/2023    Procedure: CHOLECYSTECTOMY, LAPAROSCOPIC;  Surgeon: Laith Davis MD;  Location: [facility] OR;  Service: General;  Laterality: N/A;    LEFT HEART CATHETERIZATION Left 08/25/2022    Procedure: Left heart cath;  Surgeon: Mai Deleon MD;  Location: [facility] CATH;  Service: Cardiology;  Laterality: Left;    RADIOFREQUENCY ABLATION OF LUMBAR MEDIAL BRANCH NERVE AT SINGLE LEVEL Bilateral 10/31/2023    Procedure: Radiofrequency Ablation, Nerve, Spinal, Lumbar, Medial Branch, 1 Level;  Surgeon: Napoleon Grimaldo MD;  Location: [facility] OR;  Service: Anesthesiology;  Laterality: Bilateral;  L3,4,5 RFA    right heel surgery      SHOULDER ARTHROSCOPY Left     UPPER GASTROINTESTINAL ENDOSCOPY       Family History   Problem Relation Name Age of Onset    Melanoma Mother      Diabetes Mother      Hypertension Mother      Stroke Father      Diabetes Father      Hypertension Father      Colon cancer Father          unsure of age of diagnosis    Cancer Father          colon cancer    Cervical cancer Sister      Cirrhosis Brother      Hypertension Brother      Lung cancer Maternal Grandmother      Prostate cancer Maternal Grandfather      Crohn's disease Neg Hx      Ulcerative colitis Neg Hx      Stomach cancer Neg Hx      Esophageal cancer Neg Hx      Retinal detachment Neg Hx      Strabismus Neg Hx      Macular  "degeneration Neg Hx      Glaucoma Neg Hx       Social History     Socioeconomic History    Marital status: Legally    Occupational History    Occupation: disabled (mental)     Comment: since 2000   Tobacco Use    Smoking status: Every Day     Current packs/day: 0.50     Average packs/day: 1.5 packs/day for 45.4 years (67.7 ttl pk-yrs)     Types: Cigarettes     Start date: 1979    Smokeless tobacco: Never    Tobacco comments:     Age Started 14    Substance and Sexual Activity    Alcohol use: No    Drug use: Yes     Types: Marijuana     Comment: ocasionally - once every 6 months    Sexual activity: Yes     Partners: Female     Social Determinants of Health     Financial Resource Strain: Medium Risk (5/7/2024)    Overall Financial Resource Strain (CARDIA)     Difficulty of Paying Living Expenses: Somewhat hard   Food Insecurity: Food Insecurity Present (5/7/2024)    Hunger Vital Sign     Worried About Running Out of Food in the Last Year: Sometimes true     Ran Out of Food in the Last Year: Sometimes true   Transportation Needs: No Transportation Needs (5/7/2024)    PRAPARE - Transportation     Lack of Transportation (Medical): No     Lack of Transportation (Non-Medical): No   Physical Activity: Insufficiently Active (5/7/2024)    Exercise Vital Sign     Days of Exercise per Week: 2 days     Minutes of Exercise per Session: 10 min   Stress: Stress Concern Present (5/7/2024)    [employer] - Occupational Stress Questionnaire     Feeling of Stress : To some extent   Housing Stability: Low Risk  (5/7/2024)    Housing Stability Vital Sign     Unable to Pay for Housing in the Last Year: No     Homeless in the Last Year: No           Objective:        Ht 5' 10" (1.778 m)   Wt 67.8 kg (149 lb 7.6 oz)   BMI 21.45 kg/m²     Physical Exam   Constitutional: Patient is oriented to person, place, and time. Patient appears well-developed and well-nourished.  Strong malodor noted from patient " due to lack of personal hygiene.  No acute distress.     Psychiatric: Patient has a normal mood and affect. Patient's speech is normal and behavior is normal. Judgment is normal. Cognition and memory are normal.     Bilateral pedal exam was performed today.  Vascular: Vascular: Pedal pulses palpable 2/4 DP & PT.  CFT is = 3 seconds to the hallux.  Skin temperature is warm to cool proximal tibia to distal toes without localized increase in calor noted.  No erythema and ecchymosis noted to the LE.  Nonpitting edema noted to the LE.  Hair growth present distally to the LE.     Musculoskeletal: Ankle and pedal joints ROM are decreased. Ankle joint dorsiflexion is restricted with the knee extended and flexed per Silf[place] exam.  Muscle strength is 5/5 for all LE muscle groups tested.  Flat foot type present.  Flexion contracture of lesser digits noted. Tenderness to palpation of the plantar R heel    Neurological:  Epicritic sensation is slightly dimiished to the foot.  [place]ock STR is intact to the LE.  Tenderness to palpation of right plantar heel noted.     Dermatological: Toenails 1-5 are elongated and distally thickened, dystrophic, brittle, discolored, and mycotic with subungal debris present.  Webspaces 1-4 are dry and intact with debris present.  Skin turgor is increased.  Skin texture is dry and flaky. Minor hyperkeratotic skin at the R plantar heel. Poros appear to have returned and are painful.    Nursing note and vitals reviewed.        Assessment:       Encounter Diagnoses   Name Primary?    Type II diabetes mellitus with neurological manifestations Yes    Intractable right heel pain                  Plan:       Ryan was seen today for foot pain.    Diagnoses and all orders for this visit:    Type II diabetes mellitus with neurological manifestations    Intractable right heel pain        I counseled the patient on his conditions, their implications and medical management.    Nothing is helping. I will  try just an offloading aperture pad today and see him in a couple of weeks to see how he's doing.       Дмитрий Pratt, DPM

## 2024-05-11 DIAGNOSIS — I70.0 THORACIC AORTIC ATHEROSCLEROSIS: Chronic | ICD-10-CM

## 2024-05-11 DIAGNOSIS — I25.10 CAD IN NATIVE ARTERY: Chronic | ICD-10-CM

## 2024-05-11 DIAGNOSIS — I73.9 PVD (PERIPHERAL VASCULAR DISEASE): Chronic | ICD-10-CM

## 2024-05-11 DIAGNOSIS — Z86.73 HISTORY OF STROKE: ICD-10-CM

## 2024-05-11 RX ORDER — ATORVASTATIN CALCIUM 40 MG/1
40 TABLET, FILM COATED ORAL
Qty: 90 TABLET | Refills: 3 | Status: SHIPPED | OUTPATIENT
Start: 2024-05-11

## 2024-05-11 NOTE — TELEPHONE ENCOUNTER
Refill Decision Note   Ryan Crane  is requesting a refill authorization.  Brief Assessment and Rationale for Refill:  Approve     Medication Therapy Plan:         Comments:     Note composed:10:37 AM 05/11/2024

## 2024-05-11 NOTE — TELEPHONE ENCOUNTER
No care due was identified.  [facility] Embedded Care Due Messages. Reference number: [id number].   5/11/2024 3:39:46 AM CDT

## 2024-05-16 ENCOUNTER — CLINICAL SUPPORT (OUTPATIENT)
Dept: SMOKING CESSATION | Facility: CLINIC | Age: 59
End: 2024-05-16
Payer: COMMERCIAL

## 2024-05-16 DIAGNOSIS — F17.200 TOBACCO USE DISORDER: Primary | ICD-10-CM

## 2024-05-16 PROCEDURE — 99999 PR PBB SHADOW E&M-EST. PATIENT-LVL III: CPT | Mod: PBBFAC,,,

## 2024-05-16 PROCEDURE — 99404 PREV MED CNSL INDIV APPRX 60: CPT | Mod: S$GLB,,, | Performed by: GENERAL PRACTICE

## 2024-05-16 RX ORDER — IBUPROFEN 200 MG
TABLET ORAL
Qty: 28 PATCH | Refills: 0 | Status: SHIPPED | OUTPATIENT
Start: 2024-05-16 | End: 2024-06-05 | Stop reason: DRUGHIGH

## 2024-05-16 RX ORDER — ASPIRIN/CALCIUM CARB/MAGNESIUM 325 MG
TABLET ORAL
Qty: 72 LOZENGE | Refills: 0 | Status: SHIPPED | OUTPATIENT
Start: 2024-05-16 | End: 2024-06-05 | Stop reason: SDUPTHER

## 2024-05-16 NOTE — Clinical Note
Patient will be participating in weekly tobacco cessation meetings and will begin the prescribed tobacco cessation medication regime of the 2/21 mg patches and 4 mg lozenges. Patient has used patches and lozenges before.

## 2024-05-17 ENCOUNTER — OFFICE VISIT (OUTPATIENT)
Dept: FAMILY MEDICINE | Facility: CLINIC | Age: 59
End: 2024-05-17
Payer: MEDICARE

## 2024-05-17 VITALS
SYSTOLIC BLOOD PRESSURE: 102 MMHG | BODY MASS INDEX: 21.57 KG/M2 | DIASTOLIC BLOOD PRESSURE: 60 MMHG | HEIGHT: 70 IN | WEIGHT: 150.69 LBS | HEART RATE: 88 BPM | OXYGEN SATURATION: 99 %

## 2024-05-17 DIAGNOSIS — E53.8 VITAMIN B12 DEFICIENCY: Chronic | ICD-10-CM

## 2024-05-17 DIAGNOSIS — R68.89 FORGETFULNESS: ICD-10-CM

## 2024-05-17 DIAGNOSIS — R19.7 DIARRHEA OF PRESUMED INFECTIOUS ORIGIN: Primary | ICD-10-CM

## 2024-05-17 DIAGNOSIS — Z81.8 FAMILY HISTORY OF DEMENTIA: ICD-10-CM

## 2024-05-17 PROCEDURE — 96372 THER/PROPH/DIAG INJ SC/IM: CPT | Mod: S$GLB,,, | Performed by: STUDENT IN AN ORGANIZED HEALTH CARE EDUCATION/TRAINING PROGRAM

## 2024-05-17 PROCEDURE — 3008F BODY MASS INDEX DOCD: CPT | Mod: CPTII,S$GLB,, | Performed by: STUDENT IN AN ORGANIZED HEALTH CARE EDUCATION/TRAINING PROGRAM

## 2024-05-17 PROCEDURE — 3078F DIAST BP <80 MM HG: CPT | Mod: CPTII,S$GLB,, | Performed by: STUDENT IN AN ORGANIZED HEALTH CARE EDUCATION/TRAINING PROGRAM

## 2024-05-17 PROCEDURE — 99999 PR PBB SHADOW E&M-EST. PATIENT-LVL V: CPT | Mod: PBBFAC,,, | Performed by: STUDENT IN AN ORGANIZED HEALTH CARE EDUCATION/TRAINING PROGRAM

## 2024-05-17 PROCEDURE — 3044F HG A1C LEVEL LT 7.0%: CPT | Mod: CPTII,S$GLB,, | Performed by: STUDENT IN AN ORGANIZED HEALTH CARE EDUCATION/TRAINING PROGRAM

## 2024-05-17 PROCEDURE — 99214 OFFICE O/P EST MOD 30 MIN: CPT | Mod: 25,S$GLB,, | Performed by: STUDENT IN AN ORGANIZED HEALTH CARE EDUCATION/TRAINING PROGRAM

## 2024-05-17 PROCEDURE — 3074F SYST BP LT 130 MM HG: CPT | Mod: CPTII,S$GLB,, | Performed by: STUDENT IN AN ORGANIZED HEALTH CARE EDUCATION/TRAINING PROGRAM

## 2024-05-17 PROCEDURE — 1159F MED LIST DOCD IN RCRD: CPT | Mod: CPTII,S$GLB,, | Performed by: STUDENT IN AN ORGANIZED HEALTH CARE EDUCATION/TRAINING PROGRAM

## 2024-05-17 PROCEDURE — 1160F RVW MEDS BY RX/DR IN RCRD: CPT | Mod: CPTII,S$GLB,, | Performed by: STUDENT IN AN ORGANIZED HEALTH CARE EDUCATION/TRAINING PROGRAM

## 2024-05-17 RX ORDER — CYANOCOBALAMIN 1000 UG/ML
1000 INJECTION, SOLUTION INTRAMUSCULAR; SUBCUTANEOUS
Status: COMPLETED | OUTPATIENT
Start: 2024-05-17 | End: 2024-05-31

## 2024-05-17 RX ADMIN — CYANOCOBALAMIN 1000 MCG: 1000 INJECTION, SOLUTION INTRAMUSCULAR; SUBCUTANEOUS at 01:05

## 2024-05-17 NOTE — PROGRESS NOTES
Plan:      Ryan was seen today for diarrhea.    Diagnoses and all orders for this visit:    Diarrhea of presumed infectious origin  -     H. pylori antigen, stool; Future  -     Pancreatic elastase, fecal; Future  -     Fecal fat, qualitative; Future  -     WBC, Stool; Future  -     Rotavirus antigen, stool; Future  -     Adenovirus Molecular Detection, PCR, Non-Blood Stool; Future  -     Calprotectin, Stool; Future  -     Giardia / Cryptosporidum, EIA; Future  -     Stool Exam-Ova,Cysts,Parasites; Future  -     Clostridium difficile EIA; Future  -     Stool culture; Future    Family history of dementia  -     Ambulatory referral/consult to Adult Neuropsychology; Future    Forgetfulness  -     Ambulatory referral/consult to Adult Neuropsychology; Future    Vitamin B12 deficiency  -     cyanocobalamin injection 1,000 mcg      Follow up if symptoms worsen or fail to improve.    Ana Mayer MD  05/17/2024    Subjective:      Patient ID: Ryan Crane is a 58 y.o. male    Chief Complaint   Patient presents with    Diarrhea     Diarrhea and vomiting x 3 weeks, also has questions/concerns about dementia     HPI  58 y.o. male with a PMHx as documented below presents to clinic today for the following:    Pt reports 3+ week history of diarrhea + intermittent episodes of N/V. Pt describes stool as 'water, but slightly yellow in color'. Denies abnormally bad smell, abdominal cramping, fever, melena, BRBPR. He has tried multiple doses of imodium and pepto without any relief. Pt w/ recent hospitalization and IV antibiotic treatment.     Requests referral to neuropsych 2/2 recent forgetfulness occurring in the setting of family Hx of dementia.    ROS  Constitutional:  Negative for chills and fever.   Respiratory:  Negative for shortness of breath.    Cardiovascular:  Negative for chest pain.   Gastrointestinal:  Negative for abdominal pain, constipation, diarrhea, nausea and vomiting.     Current Outpatient  Medications   Medication Instructions    AJOVY AUTOINJECTOR 225 mg, Subcutaneous, Every 28 days    albuterol (PROVENTIL/VENTOLIN HFA) 90 mcg/actuation inhaler 2 puffs, Inhalation, Every 6 hours PRN    albuterol-ipratropium (DUO-NEB) 2.5 mg-0.5 mg/3 mL nebulizer solution 3 mLs, Nebulization, Every 6 hours PRN, Rescue    amLODIPine (NORVASC) 2.5 mg, Oral, Daily    ammonium lactate 2 g, Topical (Top), 2 times daily    aspirin 81 mg, Oral, Daily    atorvastatin (LIPITOR) 40 mg, Oral    brexpiprazole (REXULTI) 2 mg, Oral, Daily    butalbital-acetaminophen-caffeine -40 mg (FIORICET, ESGIC) -40 mg per tablet Take 1 tablet by mouth as needed for migraine. No more than 10 tablets per month.    clonazePAM (KLONOPIN) 0.5 mg, Oral, Daily PRN    clopidogreL (PLAVIX) 75 mg, Oral, Daily    cyanocobalamin 1,000 mcg/mL injection 1 ml sq daily x 5 days, then 1 ml sq weekly x 4 weeks then q monthly -  will need to go to PCP    EPINEPHrine (EPIPEN) 0.3 mg, Intramuscular, As needed (PRN)    fluticasone-umeclidin-vilanter (TRELEGY ELLIPTA) 100-62.5-25 mcg DsDv INHALE 1 PUFF INTO THE LUNGS ONE TIME DAILY    hydrOXYzine HCL (ATARAX) 10 mg, Oral, 3 times daily PRN    lamoTRIgine (LAMICTAL) 200 mg, Oral, Daily    meclizine (ANTIVERT) 12.5 mg, Oral, 3 times daily PRN    metoprolol succinate (TOPROL-XL) 50 mg, Daily    mirtazapine (REMERON) 15 mg, Oral, Nightly    nicotine (NICODERM CQ) 21 mg/24 hr Apply 2 (21 mg) patches every morning    nicotine polacrilex 4 MG Lozg Take up to 10 pcs daily as needed    NURTEC 75 mg, Oral, Every other day, Place ODT tablet on the tongue; alternatively the ODT tablet may be placed under the tongue    ondansetron (ZOFRAN-ODT) 4 mg, Oral, Every 8 hours PRN    pantoprazole (PROTONIX) 40 mg, Oral, Daily    pregabalin (LYRICA) 50 mg, Oral, 3 times daily    ranolazine (RANEXA) 500 mg, Oral, 2 times daily    sodium chloride 1,000 mg TbSO oral tablet 1 tablet, 3 times daily    tadalafiL (CIALIS) 20 mg,  Oral, Daily    topiramate (TOPAMAX) 200 mg, Oral, Nightly    ziprasidone (GEODON) 160 mg, Oral, Nightly      Past Medical History:   Diagnosis Date    Aortic valve stenosis 02/28/2022    Arachnoid cyst of posterior cranial fossa 01/13/2022    Benign prostatic hyperplasia without lower urinary tract symptoms 05/25/2023    Bilateral carotid bruits 06/22/2021    Bipolar disorder 01/16/2022    CAD S/P percutaneous coronary angioplasty     3 vessel disease    Calculus of gallbladder without cholecystitis without obstruction 01/11/2023    Cancer     Candidal intertrigo 11/11/2019    Cataract     Chronic heart failure with preserved ejection fraction 03/29/2023    Chronic schizophrenia     Colon polyps     Diabetic polyneuropathy associated with diabetes mellitus due to underlying condition 11/21/2019    Dysarthria as late effect of cerebellar cerebrovascular accident (CVA) 03/24/2023    Equinus deformity of both feet 11/21/2019    Erectile dysfunction due to arterial insufficiency 05/25/2023    Flaccid hemiplegia of left nondominant side as late effect of cerebral infarction 03/17/2023    Flat foot 11/21/2019    Gastritis     Gastroesophageal reflux disease without esophagitis 07/13/2023    Gastrointestinal hemorrhage 12/13/2019    Post polypectomy bleeding    General anesthetics causing adverse effect in therapeutic use     Hammer toes of both feet 11/21/2019    Hx of diabetic foot ulcer 11/21/2019    Hypertension     Hyponatremia 01/11/2022    ELAINE (iron deficiency anemia) 12/10/2019    Intractable chronic migraine without aura and without status migrainosus 12/30/2021    Microcytic anemia 10/09/2019    Moderate aortic regurgitation 03/29/2023    Moderate mitral regurgitation 03/29/2023    Moderate mitral stenosis 03/29/2023    Moderate to severe aortic stenosis 02/28/2022    Myocardial infarct, old     Nicotine dependence, unspecified, uncomplicated 01/16/2022    Onychomycosis due to dermatophyte 11/21/2019    Orchitis  "11/09/2019    PIPER on CPAP     Peripheral visual field defect, bilateral 01/13/2022    PVD (peripheral vascular disease) 11/21/2019    Seizures 2008    Stage 2 moderate COPD by GOLD classification     PFTs w/ FEV1 (73%) (9/2022)    Stroke 2005    Thoracic aortic atherosclerosis 10/17/2022    CT chest    Thyroid disease     Previously on pharmacologic Tx    Tinea pedis of right foot 05/21/2019    Tobacco use disorder     Type 2 diabetes mellitus with diabetic peripheral angiopathy without gangrene     A1c 5.1% (3/2023)    Vitamin B12 deficiency 03/24/2023      Objective:      Vitals:    05/17/24 1258   BP: 102/60   BP Location: Left arm   Pulse: 88   SpO2: 99%   Weight: 68.3 kg (150 lb 11 oz)   Height: 5' 10" (1.778 m)     Body mass index is 21.62 kg/m².    Physical Exam   Constitutional:       General: No acute distress.  HENT:      Head: Normocephalic and atraumatic.   Pulmonary:      Effort: Pulmonary effort is normal. No respiratory distress.   Neurological:      General: No focal deficit present.      Mental Status: Alert and oriented to person, place, and time. Mental status is at baseline.    Assessment:       1. Diarrhea of presumed infectious origin    2. Family history of dementia    3. Forgetfulness    4. Vitamin B12 deficiency        Ana Mayer MD  [facility]  Office: [phone]   Fax: [phone]  05/17/2024      Disclaimer: This note was partly generated using dictation software which may occasionally result in transcription errors.    Total time spent on this encounter includes face to face time and non-face to face time preparing to see the patient (eg, review of tests), obtaining and/or reviewing separately obtained history, documenting clinical information in the electronic or other health record, independently interpreting results, and communicating results to the patient/family/caregiver, or care coordinator.      Visit today included increased complexity " associated with the care of the episodic problem (see above) addressed and managing the longitudinal care of the patient due to the serious and/or complex managed problem(s) (see above).

## 2024-05-20 ENCOUNTER — TELEPHONE (OUTPATIENT)
Dept: FAMILY MEDICINE | Facility: CLINIC | Age: 59
End: 2024-05-20
Payer: MEDICARE

## 2024-05-20 ENCOUNTER — PATIENT OUTREACH (OUTPATIENT)
Dept: ADMINISTRATIVE | Facility: OTHER | Age: 59
End: 2024-05-20
Payer: MEDICARE

## 2024-05-20 NOTE — TELEPHONE ENCOUNTER
----- Message from Dora Nicole MA sent at 5/17/2024  4:42 PM CDT -----  Contact: pt    ----- Message -----  From: Rosalina Paulino  Sent: 5/17/2024   3:27 PM CDT  To: [facility] Staff    Type:  Needs Medical Advice    Who Called: pt    Would the patient rather a call back or a response via [facility]? Call back    Best Call Back Number: [phone]    Additional Information: wants to know if office will call about the dementia test    Please call to advise  Thanks

## 2024-05-20 NOTE — PROGRESS NOTES
CHW - Follow Up    This LPN completed a follow up visit with patient via telephone today.  Pt/Caregiver reported:   Community Health Worker provided: Spoke to patient. He states he did receive information in the mail but hasn't tried yet. Phone did cut out, I will call him in a few days to ensure no other needs.   Follow up required:   Follow-up Outreach - Due: 5/22/2024

## 2024-05-21 ENCOUNTER — TELEPHONE (OUTPATIENT)
Dept: CARDIOLOGY | Facility: CLINIC | Age: 59
End: 2024-05-21
Payer: MEDICARE

## 2024-05-21 NOTE — TELEPHONE ENCOUNTER
----- Message from Angie Johns sent at 5/21/2024 11:14 AM CDT -----  Regarding: Call back  Type:  Needs Medical Advice    Who Called: Pt    Would the patient rather a call back or a response via [facility]? Call back    Best Call Back Number: [phone]    Additional Information: Pt is requesting a call back , pt is in the hospital and would like to have  speak to his sister Ms. Alonzo [phone] ( pt sts he does not understand what is happening). Thank you

## 2024-05-21 NOTE — TELEPHONE ENCOUNTER
Spoke to sister and advised our providers do not have privileges at [facility] and could not comment on pt condition there. Explained an transfer to [facility] would have to be initiated by physicians at [facility]. She stated understanding and stated she would talk to staff there

## 2024-05-22 ENCOUNTER — TELEPHONE (OUTPATIENT)
Dept: CARDIOLOGY | Facility: CLINIC | Age: 59
End: 2024-05-22
Payer: MEDICARE

## 2024-05-22 NOTE — TELEPHONE ENCOUNTER
Spoke with pt. I informed him that our doctors do not have privileges at [facility] and to speak with the staff there. Pt VU. Pt wants sooner appointment with Dr. Aranda. I informed pt that I will send a message to Dr. Aranda. Follow-up appointment will be made after pt is discharged from [facility].

## 2024-05-23 ENCOUNTER — PATIENT OUTREACH (OUTPATIENT)
Dept: ADMINISTRATIVE | Facility: OTHER | Age: 59
End: 2024-05-23
Payer: MEDICARE

## 2024-05-23 ENCOUNTER — TELEPHONE (OUTPATIENT)
Dept: FAMILY MEDICINE | Facility: CLINIC | Age: 59
End: 2024-05-23
Payer: MEDICARE

## 2024-05-23 NOTE — TELEPHONE ENCOUNTER
----- Message from Gilda Wood sent at 5/23/2024 11:19 AM CDT -----  Regarding: hospital f/u  Contact: geetha  Type:  Sooner Apoointment Request    Caller is requesting a sooner appointment.  Caller declined first available appointment listed below.  Caller will not accept being placed on the waitlist and is requesting a message be sent to doctor.  Name of Caller:Geetha with [facility]  When is the first available appointment?7/5/24    Would the patient rather a call back or a response via [facility]? Call back  Best Call Back Number:[phone]    Additional Information: sts the pt needs a hospital f/u appt

## 2024-05-27 ENCOUNTER — TELEPHONE (OUTPATIENT)
Dept: CARDIOLOGY | Facility: CLINIC | Age: 59
End: 2024-05-27
Payer: MEDICARE

## 2024-05-27 ENCOUNTER — TELEPHONE (OUTPATIENT)
Dept: PODIATRY | Facility: CLINIC | Age: 59
End: 2024-05-27
Payer: MEDICARE

## 2024-05-27 NOTE — TELEPHONE ENCOUNTER
----- Message from Ngoc Catalan sent at 5/27/2024 10:00 AM CDT -----  Regarding: Needs return call  Type: Needs Medical Advice  Who Called:  Pt    Best Call Back Number: [phone]    Additional Information: Pt states he is in the hospital right now and was needing to talk to the office to reschedule his post op appt please amirah

## 2024-05-27 NOTE — TELEPHONE ENCOUNTER
----- Message from Gloria Faulkner sent at 5/27/2024  4:08 PM CDT -----  Type:  Needs Medical Advice    Who Called: pt    Symptoms (please be specific): heart issues     How long has patient had these symptoms:  na    Pharmacy name and phone #:    [id number] - [place] - [address] [facility] [address] [facility] [address] [facility]  [address]  [place] [address]  Phone: [phone] Fax: [phone]    Would the patient rather a call back or a response via [facility]? Call back    Best Call Back Number: [phone]      Additional Information: stint put in last week, has chest pains. Pt is calling to let the office know that he is back in the hospital. Dr in hospital told him it has something to do with the stint that was put in last week. Pt is in [facility], was admitted 5/26      Please call Back to advise. Thanks!

## 2024-05-28 ENCOUNTER — TELEPHONE (OUTPATIENT)
Dept: FAMILY MEDICINE | Facility: CLINIC | Age: 59
End: 2024-05-28
Payer: MEDICARE

## 2024-05-28 NOTE — TELEPHONE ENCOUNTER
Called pt and got him an appt scheduled. Pt was fine wioth date and time. Pt said thanks and hung up

## 2024-05-28 NOTE — TELEPHONE ENCOUNTER
----- Message from Milan Emerson sent at 5/28/2024 10:20 AM CDT -----  Type: Needs Medical Advice  Who Called:  pt  Best Call Back Number: [phone]    Additional Information: Pt is calling the office needs a hospital follow up trying to see if pt can do follow up appt Friday at appt for 9:45.please call back and advise.

## 2024-05-31 ENCOUNTER — PATIENT OUTREACH (OUTPATIENT)
Dept: ADMINISTRATIVE | Facility: CLINIC | Age: 59
End: 2024-05-31
Payer: MEDICARE

## 2024-05-31 ENCOUNTER — CLINICAL SUPPORT (OUTPATIENT)
Dept: FAMILY MEDICINE | Facility: CLINIC | Age: 59
End: 2024-05-31
Payer: MEDICARE

## 2024-05-31 DIAGNOSIS — E53.8 B12 DEFICIENCY: Primary | ICD-10-CM

## 2024-05-31 DIAGNOSIS — G43.719 INTRACTABLE CHRONIC MIGRAINE WITHOUT AURA AND WITHOUT STATUS MIGRAINOSUS: ICD-10-CM

## 2024-05-31 PROCEDURE — 96372 THER/PROPH/DIAG INJ SC/IM: CPT | Mod: S$GLB,,, | Performed by: STUDENT IN AN ORGANIZED HEALTH CARE EDUCATION/TRAINING PROGRAM

## 2024-05-31 RX ORDER — BUTALBITAL, ACETAMINOPHEN AND CAFFEINE 50; 325; 40 MG/1; MG/1; MG/1
TABLET ORAL
Qty: 10 TABLET | Refills: 0 | Status: SHIPPED | OUTPATIENT
Start: 2024-05-31

## 2024-05-31 RX ORDER — ONDANSETRON 4 MG/1
8 TABLET, FILM COATED ORAL EVERY 8 HOURS PRN
COMMUNITY

## 2024-05-31 RX ADMIN — CYANOCOBALAMIN 1000 MCG: 1000 INJECTION, SOLUTION INTRAMUSCULAR; SUBCUTANEOUS at 09:05

## 2024-05-31 NOTE — PROGRESS NOTES
C3 nurse spoke with Ryan Crane  for a TCC post hospital discharge follow up call. The patient has a scheduled HOSFU appointment with SHILA Pruitt on 06/03/2024 @ 0900.    Message sent to MD staff.

## 2024-06-03 ENCOUNTER — OFFICE VISIT (OUTPATIENT)
Dept: FAMILY MEDICINE | Facility: CLINIC | Age: 59
End: 2024-06-03
Payer: MEDICARE

## 2024-06-03 VITALS
RESPIRATION RATE: 18 BRPM | DIASTOLIC BLOOD PRESSURE: 62 MMHG | WEIGHT: 145.06 LBS | HEART RATE: 98 BPM | BODY MASS INDEX: 20.77 KG/M2 | SYSTOLIC BLOOD PRESSURE: 98 MMHG | HEIGHT: 70 IN

## 2024-06-03 DIAGNOSIS — J44.9 STAGE 2 MODERATE COPD BY GOLD CLASSIFICATION: ICD-10-CM

## 2024-06-03 DIAGNOSIS — I25.10 CAD S/P PERCUTANEOUS CORONARY ANGIOPLASTY: Chronic | ICD-10-CM

## 2024-06-03 DIAGNOSIS — Z98.61 CAD S/P PERCUTANEOUS CORONARY ANGIOPLASTY: Chronic | ICD-10-CM

## 2024-06-03 DIAGNOSIS — I10 ESSENTIAL HYPERTENSION: Chronic | ICD-10-CM

## 2024-06-03 DIAGNOSIS — Z09 HOSPITAL DISCHARGE FOLLOW-UP: Primary | ICD-10-CM

## 2024-06-03 DIAGNOSIS — I50.32 CHRONIC HEART FAILURE WITH PRESERVED EJECTION FRACTION: ICD-10-CM

## 2024-06-03 DIAGNOSIS — E87.6 LOW SERUM POTASSIUM: ICD-10-CM

## 2024-06-03 PROCEDURE — 3078F DIAST BP <80 MM HG: CPT | Mod: CPTII,S$GLB,, | Performed by: NURSE PRACTITIONER

## 2024-06-03 PROCEDURE — 3074F SYST BP LT 130 MM HG: CPT | Mod: CPTII,S$GLB,, | Performed by: NURSE PRACTITIONER

## 2024-06-03 PROCEDURE — 99214 OFFICE O/P EST MOD 30 MIN: CPT | Mod: S$GLB,,, | Performed by: NURSE PRACTITIONER

## 2024-06-03 PROCEDURE — 3044F HG A1C LEVEL LT 7.0%: CPT | Mod: CPTII,S$GLB,, | Performed by: NURSE PRACTITIONER

## 2024-06-03 PROCEDURE — 1159F MED LIST DOCD IN RCRD: CPT | Mod: CPTII,S$GLB,, | Performed by: NURSE PRACTITIONER

## 2024-06-03 PROCEDURE — 1160F RVW MEDS BY RX/DR IN RCRD: CPT | Mod: CPTII,S$GLB,, | Performed by: NURSE PRACTITIONER

## 2024-06-03 PROCEDURE — 3008F BODY MASS INDEX DOCD: CPT | Mod: CPTII,S$GLB,, | Performed by: NURSE PRACTITIONER

## 2024-06-03 PROCEDURE — 99999 PR PBB SHADOW E&M-EST. PATIENT-LVL V: CPT | Mod: PBBFAC,,, | Performed by: NURSE PRACTITIONER

## 2024-06-03 RX ORDER — OFLOXACIN 3 MG/ML
SOLUTION AURICULAR (OTIC)
COMMUNITY
Start: 2024-05-23

## 2024-06-03 RX ORDER — NITROGLYCERIN 0.4 MG/1
0.4 TABLET SUBLINGUAL EVERY 5 MIN PRN
COMMUNITY
Start: 2024-05-23

## 2024-06-03 NOTE — PROGRESS NOTES
THIS DOCUMENT WAS MADE IN PART WITH VOICE RECOGNITION SOFTWARE.  OCCASIONALLY THIS SOFTWARE WILL MISINTERPRET WORDS OR PHRASES.     Assessment and Plan:    Hospital discharge follow-up  Comments:  Stable  Keep follow up with Cardiology and pulmonology    CAD S/P percutaneous coronary angioplasty    Chronic heart failure with preserved ejection fraction  Comments:  Keep follow up with Cardiology  Stable    Stage 2 moderate COPD by GOLD classification  Comments:  Stable  Continue regimen    Essential hypertension  Comments:  Stable  Orders:  -     COMPREHENSIVE METABOLIC PANEL; Future; Expected date: 06/03/2024    Low serum potassium  -     COMPREHENSIVE METABOLIC PANEL; Future; Expected date: 06/03/2024             Visit summary:  Hospital course and diagnostic studies reviewed in detail with patient during today's visit.    Patient advised to keep follow up with specialists.      Emergent conditions/ER precautions discussed.        Follow up for Follow-up with PCP- already scheduled. 7/24/24  ______________________________________________________________________  Subjective:    Chief Complaint:  Hospital follow up    HPI:  Ryan is a 58 y.o. year old male with a past medical history noted below, here for hospital follow up.  Patient was admitted to [facility] after presenting to the ER complaining of chest pain on 5/19/24.  Patient was admitted and on 5/22/24- he has cardiac catheterization with LAD stent placement.  Patient reports that he returned to the ER at [facility] twice since being discharged due to chest pains.  Patient reports his amlodipine was discontinued while in the hospital.      He reports he is feeling better, still having some chest tightness.      Pt sees [facility] cardiologist, Dr. Sky  6/12/24  He has f/u with Dr. Pollard on 6/27/24      See hospital course below:    Hospital Course and Treatment:   Admission Information       Chest Pain, CAD, PAD, hx of CVA  -Patient presenting with  chest pain with shortness of breath which started just prior to arrival in ED  -Significant history of CAD, PAD, and prior CVA; reports having angiogram done about 1 year ago but unable to find records on chart review  -Chest x-ray shows no acute findings.  -Cardiac enzymes negative for ACS x 3.  -Ranexa, Toprol-XL, Lasix, Plavix, Lipitor  -Lipid panel LDL 40.  -5/22/24 [facility] with ulcerated and dissected proximal LAD plaque successfully stented with good results.     Hypertension.  -Blood pressure on softer side. Monitor.    Chronic CHF  -May 20, 24 echo with EF between 65-75%. Severe mitral stenosis. Severe mitral annular calcifications. Moderate to severe high flow high gradient aortic stenosis. Moderate to severe aortic regurgitation.    Nicotine Abuse, COPD  -No evidence of COPD excerebration   -No oxygen requirement  -Yinka Arita     Left Ear Infection  -Ofloxacin 0.3% 5 drops left ear twice daily    Normocytic Anemia   -H/H 9.6/29 on presentation. 5/23/24 Hgb 9.8.   -Folate and vitamin B12 WNL.    Schizophrenia, Mood Disorder  -Geodon 160 mg nightly  -Topamax 200 mg daily  -Lamictal 200 mg daily          Medications:  Current Outpatient Medications on File Prior to Visit   Medication Sig Dispense Refill    albuterol (PROVENTIL/VENTOLIN HFA) 90 mcg/actuation inhaler Inhale 2 puffs into the lungs every 6 (six) hours as needed for Shortness of Breath or Wheezing. 18 g 5    albuterol-ipratropium (DUO-NEB) 2.5 mg-0.5 mg/3 mL nebulizer solution Take 3 mLs by nebulization every 6 (six) hours as needed for Wheezing. Rescue 75 mL 0    ammonium lactate 12 % Crea Apply 2 g topically 2 (two) times a day. 385 g 11    aspirin 81 MG Chew Take 81 mg by mouth once daily.      atorvastatin (LIPITOR) 40 MG tablet TAKE 1 TABLET(40 MG) BY MOUTH EVERY DAY 90 tablet 3    brexpiprazole (REXULTI) 2 mg Tab Take 2 mg by mouth once daily. Indications: schizophrenia      butalbital-acetaminophen-caffeine -40 mg (FIORICET,  ESGIC) -40 mg per tablet Take 1 tablet by mouth as needed for migraine. No more than 10 tablets per month. 10 tablet 0    clonazePAM (KLONOPIN) 0.5 MG tablet Take 0.5 mg by mouth daily as needed.      clopidogreL (PLAVIX) 75 mg tablet Take 1 tablet (75 mg total) by mouth once daily. 30 tablet 11    cyanocobalamin 1,000 mcg/mL injection 1 ml sq daily x 5 days, then 1 ml sq weekly x 4 weeks then q monthly -  will need to go to PCP (Patient taking differently: Inject 1,000 mcg into the muscle every 14 (fourteen) days. 1 ml sq daily x 5 days, then 1 ml sq weekly x 4 weeks then q monthly -  will need to go to PCP) 9 mL 0    EPINEPHrine (EPIPEN) 0.3 mg/0.3 mL AtIn Inject 0.3 mLs (0.3 mg total) into the muscle as needed. 1 each 0    fluticasone-umeclidin-vilanter (TRELEGY ELLIPTA) 100-62.5-25 mcg DsDv INHALE 1 PUFF INTO THE LUNGS ONE TIME DAILY 60 each 11    fremanezumab-vfrm (AJOVY AUTOINJECTOR) 225 mg/1.5 mL autoinjector Inject 1.5 mLs (225 mg total) into the skin every 28 days. 1 each 11    hydrOXYzine HCL (ATARAX) 10 MG Tab Take 10 mg by mouth 3 (three) times daily as needed.      lamoTRIgine (LAMICTAL) 200 MG tablet Take 200 mg by mouth once daily.      meclizine (ANTIVERT) 12.5 mg tablet Take 1 tablet (12.5 mg total) by mouth 3 (three) times daily as needed for Dizziness. 90 tablet 0    mirtazapine (REMERON) 15 MG tablet Take 15 mg by mouth every evening.      nicotine (NICODERM CQ) 21 mg/24 hr Apply 2 (21 mg) patches every morning 28 patch 0    nicotine polacrilex 4 MG Lozg Take up to 10 pcs daily as needed 72 lozenge 0    nitroGLYCERIN (NITROSTAT) 0.4 MG SL tablet Place 0.4 mg under the tongue every 5 (five) minutes as needed.      ofloxacin (FLOXIN) 0.3 % otic solution SMARTSI Drop(s) Left Ear Twice Daily      ondansetron (ZOFRAN) 4 MG tablet Take 8 mg by mouth every 8 (eight) hours as needed.      ondansetron (ZOFRAN-ODT) 4 MG TbDL Take 1 tablet (4 mg total) by mouth every 8 (eight) hours as needed  (nausea). 30 tablet 0    pantoprazole (PROTONIX) 40 MG tablet Take 1 tablet (40 mg total) by mouth once daily. 90 tablet 3    pregabalin (LYRICA) 50 MG capsule Take 1 capsule (50 mg total) by mouth 3 (three) times daily. 270 capsule 1    ranolazine (RANEXA) 500 MG Tb12 Take 1 tablet (500 mg total) by mouth 2 (two) times daily. 180 tablet 3    tadalafiL (CIALIS) 20 MG Tab Take 1 tablet (20 mg total) by mouth once daily. 10 tablet 11    topiramate (TOPAMAX) 200 MG Tab Take 1 tablet (200 mg total) by mouth every evening. 90 tablet 3    ubrogepant (UBRELVY) 100 mg tablet Take 1 tablet by mouth as needed for migraine. May repeat in 2 hours if needed. Max 2 tab per day 8 tablet 11    ziprasidone (GEODON) 80 MG capsule Take 160 mg by mouth every evening.      [DISCONTINUED] amLODIPine (NORVASC) 2.5 MG tablet Take 1 tablet (2.5 mg total) by mouth once daily. 90 tablet 3    metoprolol succinate (TOPROL-XL) 50 MG 24 hr tablet Take 50 mg by mouth once daily. (Patient not taking: Reported on 6/3/2024)      sodium chloride 1,000 mg TbSO oral tablet Take 1 tablet by mouth 3 (three) times daily. (Patient not taking: Reported on 5/17/2024)       Current Facility-Administered Medications on File Prior to Visit   Medication Dose Route Frequency Provider Last Rate Last Admin    cyanocobalamin injection 1,000 mcg  1,000 mcg Intramuscular Q14 Days Ana Mayer MD   1,000 mcg at 05/17/24 1327    diphenhydrAMINE injection 12.5 mg  12.5 mg Intravenous Q6H PRN Irvin Frank MD        electrolyte-S (ISOLYTE)   Intravenous Continuous Irvin Frank MD        lactated ringers infusion   Intravenous Continuous Napoleon Grimaldo MD        lactated ringers infusion   Intravenous Continuous Napoleon Grimaldo MD 25 mL/hr at 08/25/23 0948 New Bag at 08/25/23 0948    lactated ringers infusion   Intravenous Continuous Napoleon Grimaldo MD   Stopped at 10/31/23 1255    lactated ringers infusion   Intravenous Continuous Irvin Frank MD 10 mL/hr at  11/17/23 0745 New Bag at 11/17/23 0745    LIDOcaine (PF) 10 mg/ml (1%) injection 10 mg  1 mL Intradermal Once Napoleon Grimaldo MD        LIDOcaine (PF) 10 mg/ml (1%) injection 10 mg  1 mL Intradermal Once Napoleon Grimaldo MD        LIDOcaine (PF) 10 mg/ml (1%) injection 10 mg  1 mL Intradermal Once Napoleon Grimaldo MD        LIDOcaine (PF) 10 mg/ml (1%) injection 10 mg  1 mL Intradermal Once Irvin Frank MD           Review of Systems:  Review of Systems   Constitutional:  Positive for fatigue. Negative for fever.   Respiratory:  Positive for chest tightness. Negative for cough and shortness of breath.    Cardiovascular:  Negative for chest pain, palpitations and leg swelling.   Gastrointestinal:  Negative for abdominal pain, diarrhea, nausea and vomiting.   All other systems reviewed and are negative.      Past Medical History:  Past Medical History:   Diagnosis Date    Aortic valve stenosis 02/28/2022    Arachnoid cyst of posterior cranial fossa 01/13/2022    Benign prostatic hyperplasia without lower urinary tract symptoms 05/25/2023    Bilateral carotid bruits 06/22/2021    Bipolar disorder 01/16/2022    CAD S/P percutaneous coronary angioplasty     3 vessel disease    Calculus of gallbladder without cholecystitis without obstruction 01/11/2023    Cancer     Candidal intertrigo 11/11/2019    Cataract     Chronic heart failure with preserved ejection fraction 03/29/2023    Chronic schizophrenia     Colon polyps     Diabetic polyneuropathy associated with diabetes mellitus due to underlying condition 11/21/2019    Dysarthria as late effect of cerebellar cerebrovascular accident (CVA) 03/24/2023    Equinus deformity of both feet 11/21/2019    Erectile dysfunction due to arterial insufficiency 05/25/2023    Flaccid hemiplegia of left nondominant side as late effect of cerebral infarction 03/17/2023    Flat foot 11/21/2019    Gastritis     Gastroesophageal reflux disease without esophagitis 07/13/2023    Gastrointestinal  "hemorrhage 12/13/2019    Post polypectomy bleeding    General anesthetics causing adverse effect in therapeutic use     Hammer toes of both feet 11/21/2019    Hx of diabetic foot ulcer 11/21/2019    Hypertension     Hyponatremia 01/11/2022    ELAINE (iron deficiency anemia) 12/10/2019    Intractable chronic migraine without aura and without status migrainosus 12/30/2021    Microcytic anemia 10/09/2019    Moderate aortic regurgitation 03/29/2023    Moderate mitral regurgitation 03/29/2023    Moderate mitral stenosis 03/29/2023    Moderate to severe aortic stenosis 02/28/2022    Myocardial infarct, old     Nicotine dependence, unspecified, uncomplicated 01/16/2022    Onychomycosis due to dermatophyte 11/21/2019    Orchitis 11/09/2019    PIPER on CPAP     Peripheral visual field defect, bilateral 01/13/2022    PVD (peripheral vascular disease) 11/21/2019    Seizures 2008    Stage 2 moderate COPD by GOLD classification     PFTs w/ FEV1 (73%) (9/2022)    Stroke 2005    Thoracic aortic atherosclerosis 10/17/2022    CT chest    Thyroid disease     Previously on pharmacologic Tx    Tinea pedis of right foot 05/21/2019    Tobacco use disorder     Type 2 diabetes mellitus with diabetic peripheral angiopathy without gangrene     A1c 5.1% (3/2023)    Vitamin B12 deficiency 03/24/2023       Objective:    Vitals:  Vitals:    06/03/24 0842   BP: 98/62   Pulse: 98   Resp: 18   Weight: 65.8 kg (145 lb 1 oz)   Height: 5' 10" (1.778 m)   PainSc:   3   PainLoc: Foot       Physical Exam  Vitals and nursing note reviewed.   Constitutional:       General: He is not in acute distress.     Appearance: Normal appearance.   HENT:      Head: Normocephalic.      Right Ear: Tympanic membrane normal.      Left Ear: Tympanic membrane normal.      Nose: Nose normal. No rhinorrhea.      Mouth/Throat:      Mouth: Mucous membranes are moist.      Pharynx: Oropharynx is clear.   Eyes:      Conjunctiva/sclera: Conjunctivae normal.      Pupils: Pupils are " equal, round, and reactive to light.   Cardiovascular:      Rate and Rhythm: Normal rate and regular rhythm.      Heart sounds: Normal heart sounds.   Pulmonary:      Effort: Pulmonary effort is normal.      Breath sounds: Examination of the left-upper field reveals wheezing. Wheezing (Mild Expiratory) present.   Abdominal:      General: Bowel sounds are normal.      Palpations: Abdomen is soft.      Tenderness: There is no abdominal tenderness.   Musculoskeletal:         General: Normal range of motion.      Cervical back: Neck supple.      Right lower leg: No edema.      Left lower leg: No edema.   Lymphadenopathy:      Cervical: No cervical adenopathy.   Skin:     General: Skin is warm and dry.      Capillary Refill: Capillary refill takes less than 2 seconds.      Findings: No rash.   Neurological:      General: No focal deficit present.      Mental Status: He is alert and oriented to person, place, and time.   Psychiatric:         Mood and Affect: Affect is flat.         Behavior: Behavior normal.         Data:       Medical history reviewed, Medications reconciled.        I spent a total of 30 minutes on the day of the visit.  This includes face to face time and non-face to face time preparing to see the patient (eg, review of tests), obtaining and/or reviewing separately obtained history, documenting clinical information in the electronic or other health record, independently interpreting results and communicating results to the patient/family/caregiver, or care coordinator.       YA Nair  Family Medicine

## 2024-06-05 ENCOUNTER — TELEPHONE (OUTPATIENT)
Dept: PODIATRY | Facility: CLINIC | Age: 59
End: 2024-06-05
Payer: MEDICARE

## 2024-06-05 ENCOUNTER — CLINICAL SUPPORT (OUTPATIENT)
Dept: SMOKING CESSATION | Facility: CLINIC | Age: 59
End: 2024-06-05
Payer: COMMERCIAL

## 2024-06-05 ENCOUNTER — OFFICE VISIT (OUTPATIENT)
Dept: PODIATRY | Facility: CLINIC | Age: 59
End: 2024-06-05
Payer: MEDICARE

## 2024-06-05 VITALS — HEIGHT: 70 IN | WEIGHT: 145.06 LBS | BODY MASS INDEX: 20.77 KG/M2

## 2024-06-05 DIAGNOSIS — M79.671 INTRACTABLE RIGHT HEEL PAIN: Primary | ICD-10-CM

## 2024-06-05 DIAGNOSIS — I73.9 PVD (PERIPHERAL VASCULAR DISEASE): ICD-10-CM

## 2024-06-05 DIAGNOSIS — F17.200 TOBACCO USE DISORDER: Primary | ICD-10-CM

## 2024-06-05 DIAGNOSIS — E08.42 DIABETIC POLYNEUROPATHY ASSOCIATED WITH DIABETES MELLITUS DUE TO UNDERLYING CONDITION: ICD-10-CM

## 2024-06-05 DIAGNOSIS — E11.49 TYPE II DIABETES MELLITUS WITH NEUROLOGICAL MANIFESTATIONS: ICD-10-CM

## 2024-06-05 PROCEDURE — 1159F MED LIST DOCD IN RCRD: CPT | Mod: CPTII,S$GLB,, | Performed by: STUDENT IN AN ORGANIZED HEALTH CARE EDUCATION/TRAINING PROGRAM

## 2024-06-05 PROCEDURE — 99999 PR PBB SHADOW E&M-EST. PATIENT-LVL III: CPT | Mod: PBBFAC,,,

## 2024-06-05 PROCEDURE — 3008F BODY MASS INDEX DOCD: CPT | Mod: CPTII,S$GLB,, | Performed by: STUDENT IN AN ORGANIZED HEALTH CARE EDUCATION/TRAINING PROGRAM

## 2024-06-05 PROCEDURE — 99213 OFFICE O/P EST LOW 20 MIN: CPT | Mod: S$GLB,,, | Performed by: STUDENT IN AN ORGANIZED HEALTH CARE EDUCATION/TRAINING PROGRAM

## 2024-06-05 PROCEDURE — 3044F HG A1C LEVEL LT 7.0%: CPT | Mod: CPTII,S$GLB,, | Performed by: STUDENT IN AN ORGANIZED HEALTH CARE EDUCATION/TRAINING PROGRAM

## 2024-06-05 PROCEDURE — 99404 PREV MED CNSL INDIV APPRX 60: CPT | Mod: S$GLB,,, | Performed by: GENERAL PRACTICE

## 2024-06-05 PROCEDURE — 1160F RVW MEDS BY RX/DR IN RCRD: CPT | Mod: CPTII,S$GLB,, | Performed by: STUDENT IN AN ORGANIZED HEALTH CARE EDUCATION/TRAINING PROGRAM

## 2024-06-05 PROCEDURE — 99999 PR PBB SHADOW E&M-EST. PATIENT-LVL IV: CPT | Mod: PBBFAC,,, | Performed by: STUDENT IN AN ORGANIZED HEALTH CARE EDUCATION/TRAINING PROGRAM

## 2024-06-05 RX ORDER — ASPIRIN/CALCIUM CARB/MAGNESIUM 325 MG
TABLET ORAL
Qty: 72 LOZENGE | Refills: 0 | Status: SHIPPED | OUTPATIENT
Start: 2024-06-05

## 2024-06-05 RX ORDER — IBUPROFEN 200 MG
1 TABLET ORAL DAILY
Qty: 14 PATCH | Refills: 0 | Status: SHIPPED | OUTPATIENT
Start: 2024-06-05

## 2024-06-05 NOTE — PROGRESS NOTES
Individual Follow-Up Form    6/5/2024    Quit Date:     Clinical Status of Patient: Outpatient    Length of Service: 60 minutes    Continuing Medication: yes  Patches    Other Medications: lozenges     Target Symptoms: Withdrawal and medication side effects. The following were  rated moderate (3) to severe (4) on TCRS:  Moderate (3): none  Severe (4): none    Comments: Patient is currently vaping much less as a pod is lasting him over a week. Patient will continue with reduction and we discussed session 1 material including triggers and setting goals. We also discussed health as patient is having heart issues. Patient will begin group next week.    Diagnosis: F17.200    Next Visit: 1 week

## 2024-06-05 NOTE — TELEPHONE ENCOUNTER
Let pt know I will let Dr. Pratt know about insurance not covering and get back with him. Pt verbalized understanding.

## 2024-06-05 NOTE — TELEPHONE ENCOUNTER
----- Message from Fidelina Lozoya sent at 6/5/2024  2:22 PM CDT -----  Regarding: advice  Contact: patient  Type: Needs Medical Advice  Who Called:  patient  Symptoms (please be specific):    How long has patient had these symptoms:    Pharmacy name and phone #:    Best Call Back Number: [phone] (home)     Additional Information: Patient states his insurance will not cover his diabetic shoes.  Please call patient to advise.  Thanks!

## 2024-06-05 NOTE — PROGRESS NOTES
Subjective:      Patient ID: Ryan Crane is a 58 y.o. male.    Chief Complaint: Heel Pain      HPI:  Ryan is a 58 y.o. male who presents to the clinic for evaluation and treatment of high risk feet. Ryan has a past medical history of Aortic valve stenosis (02/28/2022), Arachnoid cyst of posterior cranial fossa (01/13/2022), Benign prostatic hyperplasia without lower urinary tract symptoms (05/25/2023), Bilateral carotid bruits (06/22/2021), Bipolar disorder (01/16/2022), CAD S/P percutaneous coronary angioplasty, Calculus of gallbladder without cholecystitis without obstruction (01/11/2023), Cancer, Candidal intertrigo (11/11/2019), Cataract, Chronic heart failure with preserved ejection fraction (03/29/2023), Chronic schizophrenia, Colon polyps, Diabetic polyneuropathy associated with diabetes mellitus due to underlying condition (11/21/2019), Dysarthria as late effect of cerebellar cerebrovascular accident (CVA) (03/24/2023), Equinus deformity of both feet (11/21/2019), Erectile dysfunction due to arterial insufficiency (05/25/2023), Flaccid hemiplegia of left nondominant side as late effect of cerebral infarction (03/17/2023), Flat foot (11/21/2019), Gastritis, Gastroesophageal reflux disease without esophagitis (07/13/2023), Gastrointestinal hemorrhage (12/13/2019), General anesthetics causing adverse effect in therapeutic use, Hammer toes of both feet (11/21/2019), diabetic foot ulcer (11/21/2019), Hypertension, Hyponatremia (01/11/2022), ELAINE (iron deficiency anemia) (12/10/2019), Intractable chronic migraine without aura and without status migrainosus (12/30/2021), Microcytic anemia (10/09/2019), Moderate aortic regurgitation (03/29/2023), Moderate mitral regurgitation (03/29/2023), Moderate mitral stenosis (03/29/2023), Moderate to severe aortic stenosis (02/28/2022), Myocardial infarct, old, Nicotine dependence, unspecified, uncomplicated (01/16/2022), Onychomycosis due to dermatophyte  (11/21/2019), Orchitis (11/09/2019), PIPER on CPAP, Peripheral visual field defect, bilateral (01/13/2022), PVD (peripheral vascular disease) (11/21/2019), Seizures (2008), Stage 2 moderate COPD by GOLD classification, Stroke (2005), Thoracic aortic atherosclerosis (10/17/2022), Thyroid disease, Tinea pedis of right foot (05/21/2019), Tobacco use disorder, Type 2 diabetes mellitus with diabetic peripheral angiopathy without gangrene, and Vitamin B12 deficiency (03/24/2023). The patient's chief complaint is long, thick toenails and right heel pain. This patient has documented high risk feet requiring routine maintenance secondary to diabetes mellitis and those secondary complications of diabetes, as mentioned..  He reports having right heel pain, which he rates as 3/10 on the pain scale but denies having a wound.  He also informs of losing 30 lbs since his last visit without trying to lose weight and is concerned about it.  He denies trying to self-treat his heel pain or trim his toenails himself.  Patient denies any other pedal complaints at this time.    06/05/2024  Mr. Crane returns today for continued foot pain. No improvement at all.     PCP: Ana Mayer MD    Date Last Seen by PCP:  5/17/2024    Current shoe gear:  Affected Foot: Casual shoes     Unaffected Foot: Casual shoes    Review of Systems   Constitutional: Negative for appetite change, fever, chills, fatigue.  Positive for unexpected weight change.   Respiratory: Negative for cough, wheezing, shortness of breath.  Cardiovascular: Negative for chest pain, claudication, cyanosis.  Positive for leg swelling.  Musculoskeletal: Negative for back pain, arthritis, joint pain, joint swelling, myalgias, stiffness.   Skin: Negative for rash, itching, suspicious lesion, poor wound healing, unusual hair distribution.  Positive for nail bed changes, discoloration,.  Neurological: Negative for loss of balance, sensory change, paresthesias, numbness.  Positive  for pain.  Hematological: Negative for adenopathy, bleeding, bruising easily.   Psychiatric/Behavioral: The patient is not nervous/anxious.  Negative for altered mental status.    Hemoglobin A1C   Date Value Ref Range Status   04/10/2024 4.9 4.6 - 6.2 % Final   04/03/2024 4.9 4.0 - 5.6 % Final     Comment:     ADA Screening Guidelines:  5.7-6.4%  Consistent with prediabetes  >or=6.5%  Consistent with diabetes    High levels of fetal hemoglobin interfere with the HbA1C  assay. Heterozygous hemoglobin variants (HbS, HgC, etc)do  not significantly interfere with this assay.   However, presence of multiple variants may affect accuracy.     07/13/2023 4.9 4.0 - 5.6 % Final     Comment:     ADA Screening Guidelines:  5.7-6.4%  Consistent with prediabetes  >or=6.5%  Consistent with diabetes    High levels of fetal hemoglobin interfere with the HbA1C  assay. Heterozygous hemoglobin variants (HbS, HgC, etc)do  not significantly interfere with this assay.   However, presence of multiple variants may affect accuracy.     03/05/2023 5.1 0.0 - 5.6 % Final     Comment:     Reference Interval:  5.0 - 5.6 Normal   5.7 - 6.4 High Risk   > 6.5 Diabetic      Hgb A1c results are standardized based on the (NGSP) National   Glycohemoglobin Standardization Program.      Hemoglobin A1C levels are related to mean serum/plasma glucose   during the preceding 2-3 months.            Past Medical History:   Diagnosis Date    Aortic valve stenosis 02/28/2022    Arachnoid cyst of posterior cranial fossa 01/13/2022    Benign prostatic hyperplasia without lower urinary tract symptoms 05/25/2023    Bilateral carotid bruits 06/22/2021    Bipolar disorder 01/16/2022    CAD S/P percutaneous coronary angioplasty     3 vessel disease    Calculus of gallbladder without cholecystitis without obstruction 01/11/2023    Cancer     Candidal intertrigo 11/11/2019    Cataract     Chronic heart failure with preserved ejection fraction 03/29/2023    Chronic  schizophrenia     Colon polyps     Diabetic polyneuropathy associated with diabetes mellitus due to underlying condition 11/21/2019    Dysarthria as late effect of cerebellar cerebrovascular accident (CVA) 03/24/2023    Equinus deformity of both feet 11/21/2019    Erectile dysfunction due to arterial insufficiency 05/25/2023    Flaccid hemiplegia of left nondominant side as late effect of cerebral infarction 03/17/2023    Flat foot 11/21/2019    Gastritis     Gastroesophageal reflux disease without esophagitis 07/13/2023    Gastrointestinal hemorrhage 12/13/2019    Post polypectomy bleeding    General anesthetics causing adverse effect in therapeutic use     Hammer toes of both feet 11/21/2019    Hx of diabetic foot ulcer 11/21/2019    Hypertension     Hyponatremia 01/11/2022    ELAINE (iron deficiency anemia) 12/10/2019    Intractable chronic migraine without aura and without status migrainosus 12/30/2021    Microcytic anemia 10/09/2019    Moderate aortic regurgitation 03/29/2023    Moderate mitral regurgitation 03/29/2023    Moderate mitral stenosis 03/29/2023    Moderate to severe aortic stenosis 02/28/2022    Myocardial infarct, old     Nicotine dependence, unspecified, uncomplicated 01/16/2022    Onychomycosis due to dermatophyte 11/21/2019    Orchitis 11/09/2019    PIPER on CPAP     Peripheral visual field defect, bilateral 01/13/2022    PVD (peripheral vascular disease) 11/21/2019    Seizures 2008    Stage 2 moderate COPD by GOLD classification     PFTs w/ FEV1 (73%) (9/2022)    Stroke 2005    Thoracic aortic atherosclerosis 10/17/2022    CT chest    Thyroid disease     Previously on pharmacologic Tx    Tinea pedis of right foot 05/21/2019    Tobacco use disorder     Type 2 diabetes mellitus with diabetic peripheral angiopathy without gangrene     A1c 5.1% (3/2023)    Vitamin B12 deficiency 03/24/2023     Past Surgical History:   Procedure Laterality Date    ANGIOGRAM, CORONARY, WITH LEFT HEART CATHETERIZATION   08/25/2022    Procedure: Angiogram, Coronary, with Left Heart Cath;  Surgeon: Mai Deleon MD;  Location: [facility] CATH;  Service: Cardiology;;    APPENDECTOMY      BONE BIOPSY Right 12/19/2023    Procedure: Biopsy-Bone;  Surgeon: Дмитрий Pratt DPM;  Location: [facility] OR;  Service: Podiatry;  Laterality: Right;    BONE EXOSTOSIS EXCISION Right 11/17/2023    Procedure: EXCISION, EXOSTOSIS;  Surgeon: Дмитрий Pratt DPM;  Location: [facility] OR;  Service: Podiatry;  Laterality: Right;    BONE MARROW ASPIRATION Left 08/21/2020    Procedure: ASPIRATION, BONE MARROW;  Surgeon: Lex Kathleen MD;  Location: [facility] OR;  Service: Oncology;  Laterality: Left;    CHOLECYSTECTOMY      CLOSURE OF WOUND Right 09/09/2019    Procedure: CLOSURE, WOUND;  Surgeon: Li Kathleen DPM;  Location: [facility];  Service: Podiatry;  Laterality: Right;    COLONOSCOPY N/A 12/10/2019    Procedure: COLONOSCOPY;  Surgeon: Ryan Lucas MD;  Location: [facility];  Service: Endoscopy;  Laterality: N/A;    COLONOSCOPY N/A 12/13/2019    Procedure: COLONOSCOPY;  Surgeon: Ryan Lucas MD;  Location: [facility];  Service: Endoscopy;  Laterality: N/A;    CORONARY STENT PLACEMENT      ESOPHAGOGASTRODUODENOSCOPY N/A 12/10/2019    Procedure: EGD (ESOPHAGOGASTRODUODENOSCOPY);  Surgeon: Ryan Lucas MD;  Location: [facility];  Service: Endoscopy;  Laterality: N/A;    ESOPHAGOGASTRODUODENOSCOPY N/A 11/03/2022    Procedure: EGD (ESOPHAGOGASTRODUODENOSCOPY);  Surgeon: Ryan Lucas MD;  Location: [facility];  Service: Endoscopy;  Laterality: N/A;    INCISION AND DRAINAGE FOOT Right 12/19/2023    Procedure: INCISION AND DRAINAGE, FOOT- RIGHT;  Surgeon: Дмитрий Pratt DPM;  Location: [facility];  Service: Podiatry;  Laterality: Right;    INJECTION OF ANESTHETIC AGENT AROUND MEDIAL BRANCH NERVES INNERVATING LUMBAR FACET JOINT Bilateral 07/25/2023    Procedure: Block-nerve-medial branch-lumbar;  Surgeon: Napoleon Grimaldo MD;  Location: [facility] OR;   Service: Pain Management;  Laterality: Bilateral;  L3,4,5 MBB    INJECTION OF ANESTHETIC AGENT AROUND MEDIAL BRANCH NERVES INNERVATING LUMBAR FACET JOINT Bilateral 08/25/2023    Procedure: Block-nerve-medial branch-lumbar;  Surgeon: Napoleon Grimaldo MD;  [facility];  Service: Anesthesiology;  Laterality: Bilateral;  L3,4,5 MBB #2    LAPAROSCOPIC CHOLECYSTECTOMY N/A 01/11/2023    Procedure: CHOLECYSTECTOMY, LAPAROSCOPIC;  Surgeon: Laith Davis MD;  Location: [facility] OR;  Service: General;  Laterality: N/A;    LEFT HEART CATHETERIZATION Left 08/25/2022    Procedure: Left heart cath;  Surgeon: Mai Deleon MD;  Location: [facility] CATH;  Service: Cardiology;  Laterality: Left;    RADIOFREQUENCY ABLATION OF LUMBAR MEDIAL BRANCH NERVE AT SINGLE LEVEL Bilateral 10/31/2023    Procedure: Radiofrequency Ablation, Nerve, Spinal, Lumbar, Medial Branch, 1 Level;  Surgeon: Napoleon Grimaldo MD;  Location: [facility] OR;  Service: Anesthesiology;  Laterality: Bilateral;  L3,4,5 RFA    right heel surgery      SHOULDER ARTHROSCOPY Left     UPPER GASTROINTESTINAL ENDOSCOPY       Family History   Problem Relation Name Age of Onset    Melanoma Mother      Diabetes Mother      Hypertension Mother      Stroke Father      Diabetes Father      Hypertension Father      Colon cancer Father          unsure of age of diagnosis    Cancer Father          colon cancer    Cervical cancer Sister      Cirrhosis Brother      Hypertension Brother      Lung cancer Maternal Grandmother      Prostate cancer Maternal Grandfather      Crohn's disease Neg Hx      Ulcerative colitis Neg Hx      Stomach cancer Neg Hx      Esophageal cancer Neg Hx      Retinal detachment Neg Hx      Strabismus Neg Hx      Macular degeneration Neg Hx      Glaucoma Neg Hx       Social History     Socioeconomic History    Marital status: Legally    Occupational History    Occupation: disabled (mental)     Comment: since 2000   Tobacco Use    Smoking status: Every Day      "Types: Vaping with nicotine    Smokeless tobacco: Never    Tobacco comments:     Age Started 14    Substance and Sexual Activity    Alcohol use: No    Drug use: Yes     Types: Marijuana     Comment: ocasionally - once every 6 months    Sexual activity: Yes     Partners: Female     Social Determinants of Health     Financial Resource Strain: Medium Risk (5/7/2024)    Overall Financial Resource Strain (CARDIA)     Difficulty of Paying Living Expenses: Somewhat hard   Food Insecurity: Unknown (5/27/2024)    Received from [facility]    Hunger Vital Sign     Ran Out of Food in the Last Year: Never true   Recent Concern: Food Insecurity - Food Insecurity Present (5/7/2024)    Hunger Vital Sign     Worried About Running Out of Food in the Last Year: Sometimes true     Ran Out of Food in the Last Year: Sometimes true   Transportation Needs: Unknown (5/27/2024)    Received from [facility]    PRAPARE - Transportation     Lack of Transportation (Medical): No   Physical Activity: Insufficiently Active (5/7/2024)    Exercise Vital Sign     Days of Exercise per Week: 2 days     Minutes of Exercise per Session: 10 min   Stress: Stress Concern Present (5/7/2024)    [employer] [place] [employer] - Occupational Stress Questionnaire     Feeling of Stress : To some extent   Housing Stability: Low Risk  (5/7/2024)    Housing Stability Vital Sign     Unable to Pay for Housing in the Last Year: No     Homeless in the Last Year: No           Objective:        Ht 5' 10" (1.778 m)   Wt 65.8 kg (145 lb 1 oz)   BMI 20.81 kg/m²     Physical Exam   Constitutional: Patient is oriented to person, place, and time. Patient appears well-developed and well-nourished.  Strong malodor noted from patient due to lack of personal hygiene.  No acute distress.     Psychiatric: Patient has a normal mood and affect. Patient's speech is normal and behavior is normal. Judgment is normal. Cognition and memory are normal. "     Bilateral pedal exam was performed today.  Vascular: Vascular: Pedal pulses palpable 2/4 DP & PT.  CFT is = 3 seconds to the hallux.  Skin temperature is warm to cool proximal tibia to distal toes without localized increase in calor noted.  No erythema and ecchymosis noted to the LE.  Nonpitting edema noted to the LE.  Hair growth present distally to the LE.     Musculoskeletal: Ankle and pedal joints ROM are decreased. Ankle joint dorsiflexion is restricted with the knee extended and flexed per Silf[place]kiold exam.  Muscle strength is 5/5 for all LE muscle groups tested.  Flat foot type present.  Flexion contracture of lesser digits noted. Tenderness to palpation of the plantar R heel    Neurological:  Epicritic sensation is slightly dimiished to the foot.  [place]ock STR is intact to the LE.  Tenderness to palpation of right plantar heel noted.     Dermatological: Toenails 1-5 are elongated and distally thickened, dystrophic, brittle, discolored, and mycotic with subungal debris present.  Webspaces 1-4 are dry and intact with debris present.  Skin turgor is increased.  Skin texture is dry and flaky. Minor hyperkeratotic skin at the R plantar heel. Poros appear to have returned and are painful.    Nursing note and vitals reviewed.        Assessment:       Encounter Diagnoses   Name Primary?    Intractable right heel pain Yes    PVD (peripheral vascular disease)     Diabetic polyneuropathy associated with diabetes mellitus due to underlying condition     Type II diabetes mellitus with neurological manifestations                  Plan:       Ryan was seen today for heel pain.    Diagnoses and all orders for this visit:    Intractable right heel pain  -     DIABETIC SHOES FOR HOME USE    PVD (peripheral vascular disease)  -     DIABETIC SHOES FOR HOME USE    Diabetic polyneuropathy associated with diabetes mellitus due to underlying condition  -     DIABETIC SHOES FOR HOME USE    Type II diabetes mellitus with  neurological manifestations  -     DIABETIC SHOES FOR HOME USE        I counseled the patient on his conditions, their implications and medical management.    He continues to have pain and nothing seems to help. There is no significant callus or prominence in the area so I will recommend that he get a pair of DM shoes with offloading inserts.    F/u 2 months.    Дмитрий Pratt DPM

## 2024-06-05 NOTE — Clinical Note
Patient is currently vaping much less as a pod is lasting him over a week. Patient will continue with reduction and we discussed session 1 material including triggers and setting goals. We also discussed health as patient is having heart issues. Patient will begin group next week.

## 2024-06-10 ENCOUNTER — TELEPHONE (OUTPATIENT)
Dept: SMOKING CESSATION | Facility: CLINIC | Age: 59
End: 2024-06-10
Payer: MEDICARE

## 2024-06-10 ENCOUNTER — TELEPHONE (OUTPATIENT)
Dept: FAMILY MEDICINE | Facility: CLINIC | Age: 59
End: 2024-06-10
Payer: MEDICARE

## 2024-06-10 ENCOUNTER — TELEPHONE (OUTPATIENT)
Dept: PODIATRY | Facility: CLINIC | Age: 59
End: 2024-06-10
Payer: MEDICARE

## 2024-06-10 NOTE — TELEPHONE ENCOUNTER
Spoke with patient regarding up coming appointment scheduled with assigned counselor.  Patient stated that he recently suffered from a stroke and is currently hospitalized.  Patient is unable to attend appointment tomorrow.  Counselor will relay message to assigned counselor, Alejandra Mclaughlin.

## 2024-06-10 NOTE — TELEPHONE ENCOUNTER
----- Message from Manfred Quan sent at 6/8/2024 10:41 AM CDT -----  Type: Needs Medical Advice  Who Called:  pt  Symptoms (please be specific):  pt said he need to speak to the office--said he need something for pain--his pain level is a 9 right now--and he haven't walked on his foot in 2 day--please call and advise  Pharmacy name and phone #:    [id number] - [place] - [address] AT [facility] [address]  [place]  [address]  [place] [address]  Phone: [phone] Fax: [phone]  Best Call Back Number: [phone] (home)     Additional Information: thank you

## 2024-06-10 NOTE — TELEPHONE ENCOUNTER
----- Message from Ishan Goff MA sent at 6/10/2024 11:12 AM CDT -----  Contact: jennifer@ [phone]  Pt called                  Pt is requesting a call back to get a hospital follow up scheduled within this week or next week if possible. Pt will be discharge tomorrow 06/11/24.

## 2024-06-10 NOTE — TELEPHONE ENCOUNTER
I called patient but had to leave a message. Patient left message that he needs to cancel group tomorrow.

## 2024-06-12 ENCOUNTER — TELEPHONE (OUTPATIENT)
Dept: FAMILY MEDICINE | Facility: CLINIC | Age: 59
End: 2024-06-12
Payer: MEDICARE

## 2024-06-12 NOTE — TELEPHONE ENCOUNTER
Left vm letting pt know I cancelled nurse visit on 6/14. Can reschedule once he is d/c from hospital

## 2024-06-12 NOTE — TELEPHONE ENCOUNTER
----- Message from Juan Miguel Tabor sent at 6/12/2024  1:27 PM CDT -----  Regarding: Reschedule Appt  Type:  Reschedule Appointment Request    Caller is requesting to reschedule appointment.      Name of Caller:Pt    Date of previous appointment?6/14    Symptoms:b12 shot    Would the patient rather a call back or a response via [facility]? Call back    Best Call Back Number:[phone]      Additional Information: Pt currently admitted needs to have shot rescheduled.   Please advise -- Thank you

## 2024-06-18 ENCOUNTER — CLINICAL SUPPORT (OUTPATIENT)
Dept: SMOKING CESSATION | Facility: CLINIC | Age: 59
End: 2024-06-18
Payer: COMMERCIAL

## 2024-06-18 ENCOUNTER — LAB VISIT (OUTPATIENT)
Dept: LAB | Facility: HOSPITAL | Age: 59
End: 2024-06-18
Attending: NURSE PRACTITIONER
Payer: MEDICARE

## 2024-06-18 DIAGNOSIS — G43.719 INTRACTABLE CHRONIC MIGRAINE WITHOUT AURA AND WITHOUT STATUS MIGRAINOSUS: ICD-10-CM

## 2024-06-18 DIAGNOSIS — F17.200 TOBACCO USE DISORDER: Primary | ICD-10-CM

## 2024-06-18 DIAGNOSIS — E87.6 LOW SERUM POTASSIUM: ICD-10-CM

## 2024-06-18 DIAGNOSIS — I10 ESSENTIAL HYPERTENSION: Chronic | ICD-10-CM

## 2024-06-18 LAB
ALBUMIN SERPL BCP-MCNC: 3.4 G/DL (ref 3.5–5.2)
ALP SERPL-CCNC: 88 U/L (ref 55–135)
ALT SERPL W/O P-5'-P-CCNC: 10 U/L (ref 10–44)
ANION GAP SERPL CALC-SCNC: 7 MMOL/L (ref 8–16)
AST SERPL-CCNC: 12 U/L (ref 10–40)
BILIRUB SERPL-MCNC: 0.3 MG/DL (ref 0.1–1)
BUN SERPL-MCNC: 8 MG/DL (ref 6–20)
CALCIUM SERPL-MCNC: 8.9 MG/DL (ref 8.7–10.5)
CHLORIDE SERPL-SCNC: 111 MMOL/L (ref 95–110)
CO2 SERPL-SCNC: 26 MMOL/L (ref 23–29)
CREAT SERPL-MCNC: 0.8 MG/DL (ref 0.5–1.4)
EST. GFR  (NO RACE VARIABLE): >60 ML/MIN/1.73 M^2
GLUCOSE SERPL-MCNC: 90 MG/DL (ref 70–110)
POTASSIUM SERPL-SCNC: 3.9 MMOL/L (ref 3.5–5.1)
PROT SERPL-MCNC: 6.3 G/DL (ref 6–8.4)
SODIUM SERPL-SCNC: 144 MMOL/L (ref 136–145)

## 2024-06-18 PROCEDURE — 36415 COLL VENOUS BLD VENIPUNCTURE: CPT | Mod: HCNC,PO | Performed by: NURSE PRACTITIONER

## 2024-06-18 PROCEDURE — 90853 GROUP PSYCHOTHERAPY: CPT | Mod: S$GLB,,, | Performed by: GENERAL PRACTICE

## 2024-06-18 PROCEDURE — 99999 PR PBB SHADOW E&M-EST. PATIENT-LVL III: CPT | Mod: PBBFAC,,,

## 2024-06-18 PROCEDURE — 80053 COMPREHEN METABOLIC PANEL: CPT | Mod: HCNC | Performed by: NURSE PRACTITIONER

## 2024-06-18 RX ORDER — BUTALBITAL, ACETAMINOPHEN AND CAFFEINE 50; 325; 40 MG/1; MG/1; MG/1
TABLET ORAL
Qty: 10 TABLET | Refills: 0 | OUTPATIENT
Start: 2024-06-18

## 2024-06-18 NOTE — PROGRESS NOTES
Smoking Cessation Group Session #2    Site: [facility]  Date:  6/18/2024  Clinical Status of Patient: Outpatient   Length of Service and Code: 90 minutes - 23329   Number in Attendance: 6  CO Monitor Score: 4 ppm  Group Activities/Focus of Group:  Sharing last weeks challenges, triggers, and coping activities to remain quit and/ or keep making progress toward cessation, completion of TCRS (Tobacco Cessation Rating Scale) learned addiction model, personal reasons for quitting, medications, goals, quit date.    Specific session focus: Completion of TCRS (Tobacco Cessation Rating Scale) reviewed strategies, cues, and triggers. Introduced the negative impact of tobacco on health, the health advantages of discontinuing the use of tobacco, time line improved health changes after a quit, withdrawal issues to expect from nicotine and habit, and ways to achieve the goal of a quit.      Target symptoms:  withdrawal and medication side effects             The following were rated moderate (3) to severe (4) on TCRS:       Moderate 3: none     Severe 4:   none  Patient's Response to Intervention: Patient is currently vaping 1 pod every 4 days and using patches and lozenges with no negative side effects at this time.   Progress Toward Goals and Other Mental Status Changes: The patient denies any abnormal behavioral or mental changes at this time.         Diagnosis: Z17.200  Plan: The patient will continue with  therapy sessions and medication monitoring by CTTS. Prescribed medication management will be by physician.  Return to Clinic: 1 week    Quit Date:    Planned Quit Date:

## 2024-06-18 NOTE — Clinical Note
Patient is currently vaping 1 pod every 4 days and using patches and lozenges with no negative side effects at this time.

## 2024-06-19 ENCOUNTER — TELEPHONE (OUTPATIENT)
Dept: FAMILY MEDICINE | Facility: CLINIC | Age: 59
End: 2024-06-19
Payer: MEDICARE

## 2024-06-19 ENCOUNTER — TELEPHONE (OUTPATIENT)
Dept: ADMINISTRATIVE | Facility: CLINIC | Age: 59
End: 2024-06-19
Payer: MEDICARE

## 2024-06-19 ENCOUNTER — TELEPHONE (OUTPATIENT)
Dept: NEUROLOGY | Facility: CLINIC | Age: 59
End: 2024-06-19
Payer: MEDICARE

## 2024-06-19 NOTE — TELEPHONE ENCOUNTER
----- Message from Gilda Wood sent at 6/19/2024 10:04 AM CDT -----  Regarding: neck surgery  Contact: pt  Type:  Needs Medical Advice    Who Called: pt    Would the patient rather a call back or a response via [facility]? Call back  Best Call Back Number: [phone]    Additional Information: sts he wants to speak to a nurse about neck surgery

## 2024-06-19 NOTE — TELEPHONE ENCOUNTER
----- Message from Katherine Lemus sent at 6/19/2024  8:23 AM CDT -----  Contact: PT  Type: Needs Medical Advice         Who Called: PT    Best Call Back Number:[phone]    Additional Information: Requesting a call back regarding Pt is having numbness in his neck after sugary and asking for the office to call him.       Please Advise- Thank you

## 2024-06-19 NOTE — TELEPHONE ENCOUNTER
Called pt, no answer; left message informing pt I was calling to remind pt of his in office EAWV on 6/20/24 and to return my call if he had any questions; left my name and number.

## 2024-06-19 NOTE — TELEPHONE ENCOUNTER
Spoke w/ pt. He states he spoke w/ Dr Grant's nurse and they advised him that it was norm to have this numbness(see previous msg) and can last up to 6 wk.

## 2024-06-19 NOTE — TELEPHONE ENCOUNTER
----- Message from Juan Miguel Tabor sent at 6/19/2024 12:15 PM CDT -----  Regarding: Appt request  Type:  Appointment Request    Caller is requesting an appointment.     Name of Caller:  Pt    Symptoms:  f/u    Would the patient rather a call back or a response via [facility]? Call back    Best Call Back Number:  [phone]      Additional Information:  [employer] does not populate any provider in the office.   Please advise -- Thank you

## 2024-06-19 NOTE — TELEPHONE ENCOUNTER
Spoke w/ pt. He states that he had R carotid surg on 6/14/24 at [facility]. States that he is having numbness on R side of neck since yest and wants to know if this is normal. Pt does not know the name of MD who did the surg. Advised that he go to ED at [facility] to have this eval's and they can contact the MD, if need be. Pt agreed.

## 2024-06-20 ENCOUNTER — TELEPHONE (OUTPATIENT)
Dept: FAMILY MEDICINE | Facility: CLINIC | Age: 59
End: 2024-06-20
Payer: MEDICARE

## 2024-06-20 NOTE — TELEPHONE ENCOUNTER
----- Message from Mena Avila sent at 6/19/2024  4:42 PM CDT -----  Type:  RX Refill Request    Who Called:  pt  Refill or New Rx:  new  RX Name and Strength:  N/A  How is the patient currently taking it? (ex. 1XDay):  as directed  Is this a 30 day or 90 day RX:  N/A  Preferred Pharmacy with phone number:    [id number] - [place] - [address] AT [facility] [address] [facility]  [address]  [facility] [address]  Phone: [phone] Fax: [phone]  Best Call Back Number:  [phone]  Additional Information:  pt is calling in regards to needing something called in for diarrhea. Pt says he has had an Rx for it before but does not remember the name of it. Please call back and advise. Thanks!

## 2024-06-20 NOTE — TELEPHONE ENCOUNTER
Spoke to pt, he is still in hospital, but advised pt to not take this medication until he sees either Ms Donna MOREL or Dr Mayer. Verbalized understanding

## 2024-06-22 NOTE — TELEPHONE ENCOUNTER
Pt admitted at outside hospital at time of message - will need to schedule hospital follow-up to address.     Ana Mayer MD

## 2024-06-24 ENCOUNTER — OFFICE VISIT (OUTPATIENT)
Dept: FAMILY MEDICINE | Facility: CLINIC | Age: 59
End: 2024-06-24
Payer: MEDICARE

## 2024-06-24 ENCOUNTER — LAB VISIT (OUTPATIENT)
Dept: LAB | Facility: HOSPITAL | Age: 59
End: 2024-06-24
Attending: NURSE PRACTITIONER
Payer: MEDICARE

## 2024-06-24 VITALS
BODY MASS INDEX: 21.64 KG/M2 | WEIGHT: 151.13 LBS | SYSTOLIC BLOOD PRESSURE: 100 MMHG | DIASTOLIC BLOOD PRESSURE: 62 MMHG | HEART RATE: 76 BPM | HEIGHT: 70 IN | OXYGEN SATURATION: 99 %

## 2024-06-24 DIAGNOSIS — Z09 HOSPITAL DISCHARGE FOLLOW-UP: Primary | ICD-10-CM

## 2024-06-24 DIAGNOSIS — Z09 HOSPITAL DISCHARGE FOLLOW-UP: ICD-10-CM

## 2024-06-24 DIAGNOSIS — I69.354 HEMIPLEGIA AND HEMIPARESIS FOLLOWING CEREBRAL INFARCTION AFFECTING LEFT NON-DOMINANT SIDE: Chronic | ICD-10-CM

## 2024-06-24 DIAGNOSIS — J44.9 STAGE 2 MODERATE COPD BY GOLD CLASSIFICATION: Chronic | ICD-10-CM

## 2024-06-24 DIAGNOSIS — I25.10 CORONARY ARTERY DISEASE, UNSPECIFIED VESSEL OR LESION TYPE, UNSPECIFIED WHETHER ANGINA PRESENT, UNSPECIFIED WHETHER NATIVE OR TRANSPLANTED HEART: ICD-10-CM

## 2024-06-24 DIAGNOSIS — Z86.73 STATUS POST CVA: ICD-10-CM

## 2024-06-24 DIAGNOSIS — I50.32 CHRONIC HEART FAILURE WITH PRESERVED EJECTION FRACTION: ICD-10-CM

## 2024-06-24 DIAGNOSIS — F41.9 ANXIETY: Chronic | ICD-10-CM

## 2024-06-24 DIAGNOSIS — I10 ESSENTIAL HYPERTENSION: ICD-10-CM

## 2024-06-24 DIAGNOSIS — Z98.890 S/P CAROTID ENDARTERECTOMY: ICD-10-CM

## 2024-06-24 LAB
ALBUMIN SERPL BCP-MCNC: 3.7 G/DL (ref 3.5–5.2)
ALP SERPL-CCNC: 84 U/L (ref 55–135)
ALT SERPL W/O P-5'-P-CCNC: 10 U/L (ref 10–44)
ANION GAP SERPL CALC-SCNC: 8 MMOL/L (ref 8–16)
AST SERPL-CCNC: 12 U/L (ref 10–40)
BASOPHILS # BLD AUTO: 0.08 K/UL (ref 0–0.2)
BASOPHILS NFR BLD: 1.2 % (ref 0–1.9)
BILIRUB SERPL-MCNC: 0.3 MG/DL (ref 0.1–1)
BUN SERPL-MCNC: 12 MG/DL (ref 6–20)
CALCIUM SERPL-MCNC: 9.4 MG/DL (ref 8.7–10.5)
CHLORIDE SERPL-SCNC: 105 MMOL/L (ref 95–110)
CO2 SERPL-SCNC: 26 MMOL/L (ref 23–29)
CREAT SERPL-MCNC: 0.9 MG/DL (ref 0.5–1.4)
DIFFERENTIAL METHOD BLD: ABNORMAL
EOSINOPHIL # BLD AUTO: 0.2 K/UL (ref 0–0.5)
EOSINOPHIL NFR BLD: 2.8 % (ref 0–8)
ERYTHROCYTE [DISTWIDTH] IN BLOOD BY AUTOMATED COUNT: 17 % (ref 11.5–14.5)
EST. GFR  (NO RACE VARIABLE): >60 ML/MIN/1.73 M^2
GLUCOSE SERPL-MCNC: 93 MG/DL (ref 70–110)
HCT VFR BLD AUTO: 32 % (ref 40–54)
HGB BLD-MCNC: 10.4 G/DL (ref 14–18)
IMM GRANULOCYTES # BLD AUTO: 0.01 K/UL (ref 0–0.04)
IMM GRANULOCYTES NFR BLD AUTO: 0.1 % (ref 0–0.5)
LYMPHOCYTES # BLD AUTO: 2.4 K/UL (ref 1–4.8)
LYMPHOCYTES NFR BLD: 35.7 % (ref 18–48)
MCH RBC QN AUTO: 28.3 PG (ref 27–31)
MCHC RBC AUTO-ENTMCNC: 32.5 G/DL (ref 32–36)
MCV RBC AUTO: 87 FL (ref 82–98)
MONOCYTES # BLD AUTO: 0.7 K/UL (ref 0.3–1)
MONOCYTES NFR BLD: 10.5 % (ref 4–15)
NEUTROPHILS # BLD AUTO: 3.4 K/UL (ref 1.8–7.7)
NEUTROPHILS NFR BLD: 49.7 % (ref 38–73)
NRBC BLD-RTO: 0 /100 WBC
PLATELET # BLD AUTO: 329 K/UL (ref 150–450)
PMV BLD AUTO: 11.3 FL (ref 9.2–12.9)
POTASSIUM SERPL-SCNC: 4.9 MMOL/L (ref 3.5–5.1)
PROT SERPL-MCNC: 6.7 G/DL (ref 6–8.4)
RBC # BLD AUTO: 3.68 M/UL (ref 4.6–6.2)
SODIUM SERPL-SCNC: 139 MMOL/L (ref 136–145)
WBC # BLD AUTO: 6.84 K/UL (ref 3.9–12.7)

## 2024-06-24 PROCEDURE — 3074F SYST BP LT 130 MM HG: CPT | Mod: HCNC,CPTII,S$GLB, | Performed by: NURSE PRACTITIONER

## 2024-06-24 PROCEDURE — 3078F DIAST BP <80 MM HG: CPT | Mod: HCNC,CPTII,S$GLB, | Performed by: NURSE PRACTITIONER

## 2024-06-24 PROCEDURE — 1160F RVW MEDS BY RX/DR IN RCRD: CPT | Mod: HCNC,CPTII,S$GLB, | Performed by: NURSE PRACTITIONER

## 2024-06-24 PROCEDURE — 99999 PR PBB SHADOW E&M-EST. PATIENT-LVL V: CPT | Mod: PBBFAC,HCNC,, | Performed by: NURSE PRACTITIONER

## 2024-06-24 PROCEDURE — 80053 COMPREHEN METABOLIC PANEL: CPT | Mod: HCNC | Performed by: NURSE PRACTITIONER

## 2024-06-24 PROCEDURE — 85025 COMPLETE CBC W/AUTO DIFF WBC: CPT | Mod: HCNC | Performed by: NURSE PRACTITIONER

## 2024-06-24 PROCEDURE — 3044F HG A1C LEVEL LT 7.0%: CPT | Mod: HCNC,CPTII,S$GLB, | Performed by: NURSE PRACTITIONER

## 2024-06-24 PROCEDURE — 96372 THER/PROPH/DIAG INJ SC/IM: CPT | Mod: HCNC,S$GLB,, | Performed by: NURSE PRACTITIONER

## 2024-06-24 PROCEDURE — 36415 COLL VENOUS BLD VENIPUNCTURE: CPT | Mod: HCNC,PN | Performed by: NURSE PRACTITIONER

## 2024-06-24 PROCEDURE — 3008F BODY MASS INDEX DOCD: CPT | Mod: HCNC,CPTII,S$GLB, | Performed by: NURSE PRACTITIONER

## 2024-06-24 PROCEDURE — 1159F MED LIST DOCD IN RCRD: CPT | Mod: HCNC,CPTII,S$GLB, | Performed by: NURSE PRACTITIONER

## 2024-06-24 PROCEDURE — 99214 OFFICE O/P EST MOD 30 MIN: CPT | Mod: 25,HCNC,S$GLB, | Performed by: NURSE PRACTITIONER

## 2024-06-24 RX ORDER — ASPIRIN AND DIPYRIDAMOLE 25; 200 MG/1; MG/1
1 CAPSULE, EXTENDED RELEASE ORAL 2 TIMES DAILY
COMMUNITY
Start: 2024-06-24

## 2024-06-24 RX ORDER — ATORVASTATIN CALCIUM 80 MG/1
80 TABLET, FILM COATED ORAL DAILY
COMMUNITY

## 2024-06-24 RX ADMIN — CYANOCOBALAMIN 1000 MCG: 1000 INJECTION, SOLUTION INTRAMUSCULAR; SUBCUTANEOUS at 02:06

## 2024-06-24 NOTE — PROGRESS NOTES
THIS DOCUMENT WAS MADE IN PART WITH VOICE RECOGNITION SOFTWARE.  OCCASIONALLY THIS SOFTWARE WILL MISINTERPRET WORDS OR PHRASES.     Assessment and Plan:    Hospital discharge follow-up  Comments:  Stable  Condition improving  Orders:  -     Comprehensive Metabolic Panel; Future; Expected date: 06/24/2024  -     CBC Auto Differential; Future; Expected date: 06/24/2024    Coronary artery disease, unspecified vessel or lesion type, unspecified whether angina present, unspecified whether native or transplanted heart  Comments:  Keep follow up with Cardiology    S/P carotid endarterectomy    Stage 2 moderate COPD by GOLD classification  Comments:  Stable  Continue regimen  Keep follow up pulmonologist  Orders:  -     CBC Auto Differential; Future; Expected date: 06/24/2024    Chronic heart failure with preserved ejection fraction    Essential hypertension  Comments:  Controlled  Orders:  -     Comprehensive Metabolic Panel; Future; Expected date: 06/24/2024    Hemiplegia and hemiparesis following cerebral infarction affecting left non-dominant side  Comments:  Improving    Status post CVA    Anxiety  Comments:  Controlled  Continue regimen  Follow up with Psychiatry             Visit summary:  Hospital course and diagnostic studies reviewed in detail with patient during today's visit.    Repeat labs ordered.    Patient advised to keep follow up with specialists.    Advised on diagnosis, medications and symptomatic treatment.    Emergent conditions/ER precautions discussed.        Follow up in about 1 month (around 7/24/2024) for Follow-up with PCP- already scheduled.   ______________________________________________________________________  Subjective:    Chief Complaint:  Hospital follow up    HPI:  Ryan is a 58 y.o. year old male with a past medical history noted below, here for hospital follow up.  Patient was admitted to [facility] on June 19th after presenting to the ER complaining of dizziness and gait  instability.  This was the patient's 2nd admission this month.  Patient was also admitted on June 9th and underwent right carotid endartectomy.  He reports dizziness has resolved.  He reports right carotid endartectomy site- healing well.        F/u with Cardiologist- Dr. Meliza Hernandes   He has f/u with neurologist-  Dr. Bennett-      Cardiovascular f/u : 7/5/24- Dr. Pascual    [facility] Neuro- Dr. Navarro 9/11/24    See hospital courses  below:    June 19th:   Acute CVA  S/p carotid endartectomy  - Patient presents to the ED for c/o dizziness and gait instablity, history of right posterior circulation stroke and recent right carotid endarterectomy on 6/14/2024  - CT head shows no acute/subacute ischemia.  - CTA head negative for large vessel occlusion.  - MRI shows acute infarcts in the pontine and basal ganglia.  - Teleneurology consulted in the ED, TNK not recommended  - Neurology consult  - PT/OT/ST eval and treat  - Neuro checks  - Cardiac monitor  - Stroke order set  - Echo on 5/20/2024 shows EF 65-75%. There is severe mitral stenosis. There is severe mitral annular calcification. Moderate to severe high-flow/high-gradient aortic stenosis. Moderate to severe aortic regurgitation.      COPD  - No evidence of acute exacerbation  - Continue home inhalers     Normocytic anemia, stable  - No overt evidence of bleeding, monitor labs     Coronary artery disease  - Continue home meds     Schizophrenia  Anxiety  - Continue home medications        Discharge orders:  Per neurology recommendations, the patient will be discharged with a prescription to include addition of Aggrenox to his current medication regimen as well as an increased dosage of atorvastatin.  He is advised to follow-up with neurology service as an outpatient in 3 months.  Prescriptions for medications were sent electronically to the patient's preferred pharmacy.  There was no additional therapy recommended by PT/OT/ST placed on their evaluations.    June 9th:    IA  RICHARD stenosis with ulcerated plaque  S/p right CEA  -TNK not given, outside window, placed on dual antiplatelet  -CT head- Chronic focal ischemia in the medial right occipital lobe and right cerebellum. Basal ganglia and right centrum semiovale lacunar infarcts.   -CTA head and neck-chronic appearing right corona radiata infarct, no hemorrhage, ~ 50% right ICA stenosis  -MRI brain- No acute infarct or intracranial abnormality  -EKG NSR.  No arrhythmias on telemetry, Remained sinus bradycardia  -Recent echo with negative bubble study on file  -Troponins negative  -Lipid panel well controlled  -Hemoglobin A1c 5.1  -Neurology consulted: Continue aspirin, statin. Plavix and AOC discontinued.  Follow-up outpatient neurology in one week.  -PT/OT evals: no need for ongoing therapy post DC  -Underwent right CEA with Dr. Pascual on 6/14.  Intraoperative findings of right ICA stenosis with ulcerated plaque  -Postoperatively, patient was hypotensive requiring low-dose levophed.  It was weaned off on day of discharge with adequate BP.  -UOP remains adequate  -Patient was discharged with outpatient follow-up in 3 weeks with CT surgery.  He was continued on home Plavix, aspirin, statin     Bradycardia (improving)  -Persistent bradycardia during admission with HR slowly improving.  High 50s, low 60s.  -EKG with SR  -Continued to monitor  -Remained asymptomatic without further lightheadedness, dizziness, syncopal episodes     Hypokalemia  -Replaced and monitored     CAD  -s/p stent LAD 05/22, asymptomatic  -Continued daily ASA. Eliquis discontinued per CT surgery.  Restarted home Plavix on discharge  -Medical management with aggressive risk factor modifications   -Continued high-dose statin     Normocytic anemia  -H&H 9.6/29.3 on admission  -Folate and vitamin B12 WNL. 05/20/24  -Retic/Iron studies low. Continued Fe supp with Vit C  -Recent TSH WNL  -Stool occult blood positive. Monitored. OP follow up with PCP     Aortic  stenosis  -Follow-up cardiology OP  -Avoid volume depletion and use of nitrates      COPD/Tobacco abuse  -Referred to smoking cessation program  -On nicotine patch 21 mg  -Resumed home medications-albuterol/Proventil/Trelegy     Bipolar/schizophrenia  -Resumed home medication  Medications:  Current Outpatient Medications on File Prior to Visit   Medication Sig Dispense Refill    albuterol (PROVENTIL/VENTOLIN HFA) 90 mcg/actuation inhaler Inhale 2 puffs into the lungs every 6 (six) hours as needed for Shortness of Breath or Wheezing. 18 g 5    ammonium lactate 12 % Crea Apply 2 g topically 2 (two) times a day. 385 g 11    aspirin 81 MG Chew Take 81 mg by mouth once daily.      brexpiprazole (REXULTI) 2 mg Tab Take 2 mg by mouth once daily. Indications: schizophrenia      butalbital-acetaminophen-caffeine -40 mg (FIORICET, ESGIC) -40 mg per tablet Take 1 tablet by mouth as needed for migraine. No more than 10 tablets per month. 10 tablet 0    clonazePAM (KLONOPIN) 0.5 MG tablet Take 0.5 mg by mouth daily as needed.      clopidogreL (PLAVIX) 75 mg tablet Take 1 tablet (75 mg total) by mouth once daily. 30 tablet 11    cyanocobalamin 1,000 mcg/mL injection 1 ml sq daily x 5 days, then 1 ml sq weekly x 4 weeks then q monthly -  will need to go to PCP (Patient taking differently: Inject 1,000 mcg into the muscle every 14 (fourteen) days. 1 ml sq daily x 5 days, then 1 ml sq weekly x 4 weeks then q monthly -  will need to go to PCP) 9 mL 0    dipyridamole-aspirin 200-25 mg (AGGRENOX)  mg CM12 Take 1 capsule by mouth 2 (two) times daily.      fluticasone-umeclidin-vilanter (TRELEGY ELLIPTA) 100-62.5-25 mcg DsDv INHALE 1 PUFF INTO THE LUNGS ONE TIME DAILY 60 each 11    fremanezumab-vfrm (AJOVY AUTOINJECTOR) 225 mg/1.5 mL autoinjector Inject 1.5 mLs (225 mg total) into the skin every 28 days. 1 each 11    hydrOXYzine HCL (ATARAX) 10 MG Tab Take 10 mg by mouth 3 (three) times daily as needed.      lamoTRIgine  (LAMICTAL) 200 MG tablet Take 200 mg by mouth once daily.      metoprolol succinate (TOPROL-XL) 50 MG 24 hr tablet Take 50 mg by mouth once daily.      mirtazapine (REMERON) 15 MG tablet Take 15 mg by mouth every evening.      nicotine (NICODERM CQ) 21 mg/24 hr Place 1 patch onto the skin once daily. 14 patch 0    nicotine polacrilex 4 MG Lozg Take up to 10 pieces daily as needed 72 lozenge 0    ofloxacin (FLOXIN) 0.3 % otic solution SMARTSI Drop(s) Left Ear Twice Daily      ranolazine (RANEXA) 500 MG Tb12 Take 1 tablet (500 mg total) by mouth 2 (two) times daily. 180 tablet 3    tadalafiL (CIALIS) 20 MG Tab Take 1 tablet (20 mg total) by mouth once daily. 10 tablet 11    topiramate (TOPAMAX) 200 MG Tab Take 1 tablet (200 mg total) by mouth every evening. 90 tablet 3    ubrogepant (UBRELVY) 100 mg tablet Take 1 tablet by mouth as needed for migraine. May repeat in 2 hours if needed. Max 2 tab per day 8 tablet 11    ziprasidone (GEODON) 80 MG capsule Take 160 mg by mouth every evening.      [DISCONTINUED] atorvastatin (LIPITOR) 40 MG tablet TAKE 1 TABLET(40 MG) BY MOUTH EVERY DAY 90 tablet 3    albuterol-ipratropium (DUO-NEB) 2.5 mg-0.5 mg/3 mL nebulizer solution Take 3 mLs by nebulization every 6 (six) hours as needed for Wheezing. Rescue (Patient not taking: Reported on 2024) 75 mL 0    atorvastatin (LIPITOR) 80 MG tablet Take 80 mg by mouth once daily.      EPINEPHrine (EPIPEN) 0.3 mg/0.3 mL AtIn Inject 0.3 mLs (0.3 mg total) into the muscle as needed. (Patient not taking: Reported on 2024) 1 each 0    meclizine (ANTIVERT) 12.5 mg tablet Take 1 tablet (12.5 mg total) by mouth 3 (three) times daily as needed for Dizziness. (Patient not taking: Reported on 2024) 90 tablet 0    nitroGLYCERIN (NITROSTAT) 0.4 MG SL tablet Place 0.4 mg under the tongue every 5 (five) minutes as needed. (Patient not taking: Reported on 2024)      ondansetron (ZOFRAN) 4 MG tablet Take 8 mg by mouth every 8 (eight)  hours as needed. (Patient not taking: Reported on 6/24/2024)      ondansetron (ZOFRAN-ODT) 4 MG TbDL Take 1 tablet (4 mg total) by mouth every 8 (eight) hours as needed (nausea). (Patient not taking: Reported on 6/24/2024) 30 tablet 0    pantoprazole (PROTONIX) 40 MG tablet Take 1 tablet (40 mg total) by mouth once daily. 90 tablet 3    pregabalin (LYRICA) 50 MG capsule Take 1 capsule (50 mg total) by mouth 3 (three) times daily. 270 capsule 1    [DISCONTINUED] sodium chloride 1,000 mg TbSO oral tablet Take 1 tablet by mouth 3 (three) times daily.       Current Facility-Administered Medications on File Prior to Visit   Medication Dose Route Frequency Provider Last Rate Last Admin    cyanocobalamin injection 1,000 mcg  1,000 mcg Intramuscular Q14 Days Ana Mayer MD   1,000 mcg at 06/24/24 1445    diphenhydrAMINE injection 12.5 mg  12.5 mg Intravenous Q6H PRN Irvin Frank MD        electrolyte-S (ISOLYTE)   Intravenous Continuous Irvin Frank MD        lactated ringers infusion   Intravenous Continuous Napoleon Grimaldo MD        lactated ringers infusion   Intravenous Continuous Napoleon Grimaldo MD 25 mL/hr at 08/25/23 0948 New Bag at 08/25/23 0948    lactated ringers infusion   Intravenous Continuous Napoleon Grimaldo MD   Stopped at 10/31/23 1255    lactated ringers infusion   Intravenous Continuous Irvin Frank MD 10 mL/hr at 11/17/23 0745 New Bag at 11/17/23 0745    LIDOcaine (PF) 10 mg/ml (1%) injection 10 mg  1 mL Intradermal Once Napoleon Grimaldo MD        LIDOcaine (PF) 10 mg/ml (1%) injection 10 mg  1 mL Intradermal Once Napoloen Grimaldo MD        LIDOcaine (PF) 10 mg/ml (1%) injection 10 mg  1 mL Intradermal Once Napoleon Grimaldo MD        LIDOcaine (PF) 10 mg/ml (1%) injection 10 mg  1 mL Intradermal Once Irvin Frank MD        [DISCONTINUED] GENERIC EXTERNAL MEDICATION     Provider, Generic External Data        [DISCONTINUED] GENERIC EXTERNAL MEDICATION     Provider, Generic External Data           Review  of Systems:  Review of Systems   Constitutional:  Negative for chills, fatigue and fever.   HENT:  Negative for congestion and rhinorrhea.    Respiratory:  Negative for cough and shortness of breath.    Cardiovascular:  Negative for chest pain, palpitations and leg swelling.   Gastrointestinal:  Negative for diarrhea, nausea and vomiting.   Musculoskeletal:  Negative for gait problem.   Skin:  Positive for wound (right neck). Negative for rash.   Neurological:  Negative for dizziness, weakness, light-headedness and headaches.       Past Medical History:  Past Medical History:   Diagnosis Date    Aortic valve stenosis 02/28/2022    Arachnoid cyst of posterior cranial fossa 01/13/2022    Benign prostatic hyperplasia without lower urinary tract symptoms 05/25/2023    Bilateral carotid bruits 06/22/2021    Bipolar disorder 01/16/2022    CAD S/P percutaneous coronary angioplasty     3 vessel disease    Calculus of gallbladder without cholecystitis without obstruction 01/11/2023    Cancer     Candidal intertrigo 11/11/2019    Cataract     Chronic heart failure with preserved ejection fraction 03/29/2023    Chronic schizophrenia     Colon polyps     Diabetic polyneuropathy associated with diabetes mellitus due to underlying condition 11/21/2019    Dysarthria as late effect of cerebellar cerebrovascular accident (CVA) 03/24/2023    Equinus deformity of both feet 11/21/2019    Erectile dysfunction due to arterial insufficiency 05/25/2023    Flaccid hemiplegia of left nondominant side as late effect of cerebral infarction 03/17/2023    Flat foot 11/21/2019    Gastritis     Gastroesophageal reflux disease without esophagitis 07/13/2023    Gastrointestinal hemorrhage 12/13/2019    Post polypectomy bleeding    General anesthetics causing adverse effect in therapeutic use     Hammer toes of both feet 11/21/2019    Hx of diabetic foot ulcer 11/21/2019    Hypertension     Hyponatremia 01/11/2022    ELAINE (iron deficiency anemia)  "12/10/2019    Intractable chronic migraine without aura and without status migrainosus 12/30/2021    Microcytic anemia 10/09/2019    Moderate aortic regurgitation 03/29/2023    Moderate mitral regurgitation 03/29/2023    Moderate mitral stenosis 03/29/2023    Moderate to severe aortic stenosis 02/28/2022    Myocardial infarct, old     Nicotine dependence, unspecified, uncomplicated 01/16/2022    Onychomycosis due to dermatophyte 11/21/2019    Orchitis 11/09/2019    PIPER on CPAP     Peripheral visual field defect, bilateral 01/13/2022    PVD (peripheral vascular disease) 11/21/2019    Seizures 2008    Stage 2 moderate COPD by GOLD classification     PFTs w/ FEV1 (73%) (9/2022)    Stroke 2005    Thoracic aortic atherosclerosis 10/17/2022    CT chest    Thyroid disease     Previously on pharmacologic Tx    Tinea pedis of right foot 05/21/2019    Tobacco use disorder     Type 2 diabetes mellitus with diabetic peripheral angiopathy without gangrene     A1c 5.1% (3/2023)    Vitamin B12 deficiency 03/24/2023       Objective:    Vitals:  Vitals:    06/24/24 1309   BP: 100/62   Pulse: 76   SpO2: 99%   Weight: 68.6 kg (151 lb 2 oz)   Height: 5' 10" (1.778 m)   PainSc:   3       Physical Exam  Vitals and nursing note reviewed.   Constitutional:       General: He is not in acute distress.  HENT:      Head: Normocephalic and atraumatic.      Nose: Nose normal.      Mouth/Throat:      Mouth: Mucous membranes are moist.      Pharynx: Oropharynx is clear.   Eyes:      General: No scleral icterus.     Conjunctiva/sclera: Conjunctivae normal.      Pupils: Pupils are equal, round, and reactive to light.   Neck:      Comments: Incision site to right side of neck, well approximated, without erythema, exudates or tenderness-healing well  Cardiovascular:      Rate and Rhythm: Normal rate and regular rhythm.   Pulmonary:      Effort: Pulmonary effort is normal. No respiratory distress.      Breath sounds: Decreased breath sounds (Slightly " diminished in bases bilaterally) present. No wheezing.   Musculoskeletal:      Cervical back: Neck supple. No tenderness.      Right lower leg: No edema.      Left lower leg: No edema.   Skin:     General: Skin is warm and dry.   Neurological:      Mental Status: He is alert and oriented to person, place, and time.   Psychiatric:         Mood and Affect: Mood normal.         Behavior: Behavior normal.         Thought Content: Thought content normal.         Data:     CMP  Sodium   Date Value Ref Range Status   06/20/2024 142 136 - 144 mmol/L Final   06/18/2024 144 136 - 145 mmol/L Final     Potassium   Date Value Ref Range Status   06/20/2024 3.4 (L) 3.6 - 5.1 mmol/L Final   06/18/2024 3.9 3.5 - 5.1 mmol/L Final     Chloride   Date Value Ref Range Status   06/18/2024 111 (H) 95 - 110 mmol/L Final     CO2   Date Value Ref Range Status   06/20/2024 27 22 - 32 mmol/L Final   06/18/2024 26 23 - 29 mmol/L Final     Glucose   Date Value Ref Range Status   06/18/2024 90 70 - 110 mg/dL Final     BUN   Date Value Ref Range Status   06/18/2024 8 6 - 20 mg/dL Final     Blood Urea Nitrogen   Date Value Ref Range Status   06/20/2024 10 8 - 20 mg/dL Final     Creatinine   Date Value Ref Range Status   06/20/2024 0.82 (L) 0.90 - 1.30 mg/dL Final   06/18/2024 0.8 0.5 - 1.4 mg/dL Final     Calcium   Date Value Ref Range Status   06/20/2024 8.2 (L) 8.9 - 10.3 mg/dL Final   06/18/2024 8.9 8.7 - 10.5 mg/dL Final     Total Protein   Date Value Ref Range Status   06/20/2024 5.0 (L) 6.1 - 7.9 g/dL Final   06/18/2024 6.3 6.0 - 8.4 g/dL Final     Albumin   Date Value Ref Range Status   06/20/2024 3.0 (L) 3.5 - 4.8 g/dL Final   06/18/2024 3.4 (L) 3.5 - 5.2 g/dL Final     Total Bilirubin   Date Value Ref Range Status   06/20/2024 0.2 (L) 0.4 - 2.0 mg/dL Final   06/18/2024 0.3 0.1 - 1.0 mg/dL Final     Comment:     For infants and newborns, interpretation of results should be based  on gestational age, weight and in agreement with  clinical  observations.    Premature Infant recommended reference ranges:  Up to 24 hours.............<8.0 mg/dL  Up to 48 hours............<12.0 mg/dL  3-5 days..................<15.0 mg/dL  6-29 days.................<15.0 mg/dL       Alkaline Phosphatase   Date Value Ref Range Status   06/20/2024 64 28 - 116 U/L Final   06/18/2024 88 55 - 135 U/L Final     AST   Date Value Ref Range Status   06/20/2024 9 (L) 12 - 40 U/L Final   06/18/2024 12 10 - 40 U/L Final     ALT   Date Value Ref Range Status   06/20/2024 6 5 - 56 U/L Final   06/18/2024 10 10 - 44 U/L Final     Anion Gap   Date Value Ref Range Status   06/20/2024 1 (L) 7 - 16 mmol/L Final   06/18/2024 7 (L) 8 - 16 mmol/L Final     eGFR   Date Value Ref Range Status   06/18/2024 >60.0 >60 mL/min/1.73 m^2 Final      Lab Results   Component Value Date    WBC 6.91 04/03/2024    HGB 12.6 (L) 04/03/2024    HCT 37.4 (L) 04/03/2024    MCV 90 04/03/2024     04/03/2024        Medical history reviewed, Medications reconciled.              ANNABELLE NairP-C  Family Medicine

## 2024-06-25 ENCOUNTER — TELEPHONE (OUTPATIENT)
Dept: FAMILY MEDICINE | Facility: CLINIC | Age: 59
End: 2024-06-25
Payer: MEDICARE

## 2024-06-25 ENCOUNTER — OFFICE VISIT (OUTPATIENT)
Dept: CARDIOLOGY | Facility: CLINIC | Age: 59
End: 2024-06-25
Payer: MEDICARE

## 2024-06-25 ENCOUNTER — CLINICAL SUPPORT (OUTPATIENT)
Dept: SMOKING CESSATION | Facility: CLINIC | Age: 59
End: 2024-06-25
Payer: COMMERCIAL

## 2024-06-25 VITALS
DIASTOLIC BLOOD PRESSURE: 56 MMHG | HEIGHT: 70 IN | BODY MASS INDEX: 21.84 KG/M2 | WEIGHT: 152.56 LBS | SYSTOLIC BLOOD PRESSURE: 87 MMHG | HEART RATE: 82 BPM

## 2024-06-25 DIAGNOSIS — F17.200 TOBACCO USE DISORDER: ICD-10-CM

## 2024-06-25 DIAGNOSIS — I35.0 MODERATE TO SEVERE AORTIC STENOSIS: Chronic | ICD-10-CM

## 2024-06-25 DIAGNOSIS — F17.210 CIGARETTE NICOTINE DEPENDENCE WITHOUT COMPLICATION: ICD-10-CM

## 2024-06-25 DIAGNOSIS — I05.0 MODERATE MITRAL STENOSIS: Chronic | ICD-10-CM

## 2024-06-25 DIAGNOSIS — G47.33 OSA ON CPAP: Chronic | ICD-10-CM

## 2024-06-25 DIAGNOSIS — F17.200 TOBACCO USE DISORDER: Primary | ICD-10-CM

## 2024-06-25 DIAGNOSIS — I10 ESSENTIAL HYPERTENSION: Chronic | ICD-10-CM

## 2024-06-25 DIAGNOSIS — Z86.73 HISTORY OF STROKE: Chronic | ICD-10-CM

## 2024-06-25 DIAGNOSIS — I69.354 HEMIPLEGIA AND HEMIPARESIS FOLLOWING CEREBRAL INFARCTION AFFECTING LEFT NON-DOMINANT SIDE: Chronic | ICD-10-CM

## 2024-06-25 DIAGNOSIS — E08.42 DIABETIC POLYNEUROPATHY ASSOCIATED WITH DIABETES MELLITUS DUE TO UNDERLYING CONDITION: Primary | Chronic | ICD-10-CM

## 2024-06-25 DIAGNOSIS — H53.453 PERIPHERAL VISUAL FIELD DEFECT, BILATERAL: ICD-10-CM

## 2024-06-25 DIAGNOSIS — F20.9 CHRONIC SCHIZOPHRENIA: Chronic | ICD-10-CM

## 2024-06-25 DIAGNOSIS — I73.9 PVD (PERIPHERAL VASCULAR DISEASE): Chronic | ICD-10-CM

## 2024-06-25 PROCEDURE — 99999 PR PBB SHADOW E&M-EST. PATIENT-LVL III: CPT | Mod: PBBFAC,,,

## 2024-06-25 PROCEDURE — 90853 GROUP PSYCHOTHERAPY: CPT | Mod: S$GLB,,, | Performed by: GENERAL PRACTICE

## 2024-06-25 PROCEDURE — 99999 PR PBB SHADOW E&M-EST. PATIENT-LVL V: CPT | Mod: PBBFAC,HCNC,, | Performed by: INTERNAL MEDICINE

## 2024-06-25 RX ORDER — IBUPROFEN 200 MG
1 TABLET ORAL DAILY
Qty: 14 PATCH | Refills: 0 | Status: CANCELLED | OUTPATIENT
Start: 2024-06-25

## 2024-06-25 RX ORDER — IBUPROFEN 200 MG
1 TABLET ORAL DAILY
Qty: 14 PATCH | Refills: 0 | Status: SHIPPED | OUTPATIENT
Start: 2024-06-25

## 2024-06-25 RX ORDER — ASPIRIN/CALCIUM CARB/MAGNESIUM 325 MG
TABLET ORAL
Qty: 72 LOZENGE | Refills: 0 | Status: SHIPPED | OUTPATIENT
Start: 2024-06-25

## 2024-06-25 RX ORDER — ASPIRIN/CALCIUM CARB/MAGNESIUM 325 MG
TABLET ORAL
Qty: 72 LOZENGE | Refills: 0 | Status: CANCELLED | OUTPATIENT
Start: 2024-06-25

## 2024-06-25 NOTE — PROGRESS NOTES
Smoking Cessation Group Session #3    Site: [facility]  Date:  6/25/2024  Clinical Status of Patient: Outpatient   Length of Service and Code: 90 minutes - [id number]   Number in Attendance: 6  CO Monitor Score: 2 ppm  Group Activities/Focus of Group:  Sharing last weeks challenges, triggers, and coping activities to remain quit and/ or keep making progress toward cessation, completion of TCRS (Tobacco Cessation Rating Scale) learned addiction model, personal reasons for quitting, medications, goals, quit date.    Specific session focus: completion of TCRS (Tobacco Cessation Rating Scale) reviewed strategies, controlling environment, cues, triggers, new goals set. Introduced high risk situations with preparation interventions, caffeine similarities with withdrawal issues of habit and nicotine, Alcohol, Understanding urges, cravings, stress and relaxation. Open discussion with intervention discussion.      Target symptoms:  withdrawal and medication side effects             The following were rated moderate (3) to severe (4) on TCRS:       Moderate 3: none     Severe 4:   none  Patient's Response to Intervention: Patient is currently vaping less than last week and using the patches and lozenges with no negative side effects at this time.  Progress Toward Goals and Other Mental Status Changes: The patient denies any abnormal behavioral or mental changes at this time.     Diagnosis: Z17.200  Plan: The patient will continue with  therapy sessions and medication monitoring by CTTS. Prescribed medication management will be by physician.  Return to Clinic: 1 week    Quit Date:    Planned Quit Date:

## 2024-06-25 NOTE — TELEPHONE ENCOUNTER
Returned pt call to schedule for a b12. Patient did not answer, left vm. Will attempt to contact again.

## 2024-06-25 NOTE — TELEPHONE ENCOUNTER
----- Message from La Nena Mack sent at 6/25/2024  3:24 PM CDT -----  Contact: pt [phone]  Type: Needs Medical Advice  Who Called:  Pt     Best Call Back Number: [phone]    Additional Information: Pt calling to schedule nurse appt for b12 inje. Pls call back and advise

## 2024-06-25 NOTE — Clinical Note
Patient is currently vaping less than last week and using the patches and lozenges with no negative side effects at this time.

## 2024-06-25 NOTE — PROGRESS NOTES
Subjective:    Patient ID:  Ryan Crane is a 58 y.o. male patient here for evaluation Follow-up      History of Present Illness:  Follow-up.  PCI stent ulcerative plaque proximal LAD 05/21/2024.  NSTEMI.  No high-grade disease involving the remainder of the coronary vasculature.  Remains on Plavix aspirin.    History of CVA, status post right carotid endarterectomy 06/14/2024.  Brain MRI performed 06/19/2024 with mild parenchymal atrophy, right pontine and right colostrum foci of restricted diffusion compatible with the acute infarctions.    CTA of the head and neck performed 06/09/2024, right neck postsurgical changes including a right saccular aneurysm previously arising from the right carotid bifurcation.  Stable aneurysm arising from the left supraclinoid internal carotid artery.  Bilateral common carotid arteries are patent without significant stenosis, interval repair of a right carotid bifurcation , bilateral internal carotid arteries are patent without significant stenosis.  Moderate left and right stenosis of the origins of the left cervical vertebral arteries.    Valvular heart disease, echo performed 03/05/2023 with normal EF, moderate to severe aortic stenosis, moderate mitral stenosis.  Moderate aortic regurgitation, mild moderate mitral regurgitation    Review of patient's allergies indicates:   Allergen Reactions    Alcohol Anaphylaxis     Pt states all types of alcohol    Alcohol antiseptic pads Anaphylaxis    Cephalexin Anaphylaxis and Other (See Comments)       Past Medical History:   Diagnosis Date    Aortic valve stenosis 02/28/2022    Arachnoid cyst of posterior cranial fossa 01/13/2022    Benign prostatic hyperplasia without lower urinary tract symptoms 05/25/2023    Bilateral carotid bruits 06/22/2021    Bipolar disorder 01/16/2022    CAD S/P percutaneous coronary angioplasty     3 vessel disease    Calculus of gallbladder without cholecystitis without obstruction 01/11/2023    Cancer      Candidal intertrigo 11/11/2019    Cataract     Chronic heart failure with preserved ejection fraction 03/29/2023    Chronic schizophrenia     Colon polyps     Diabetic polyneuropathy associated with diabetes mellitus due to underlying condition 11/21/2019    Dysarthria as late effect of cerebellar cerebrovascular accident (CVA) 03/24/2023    Equinus deformity of both feet 11/21/2019    Erectile dysfunction due to arterial insufficiency 05/25/2023    Flaccid hemiplegia of left nondominant side as late effect of cerebral infarction 03/17/2023    Flat foot 11/21/2019    Gastritis     Gastroesophageal reflux disease without esophagitis 07/13/2023    Gastrointestinal hemorrhage 12/13/2019    Post polypectomy bleeding    General anesthetics causing adverse effect in therapeutic use     Hammer toes of both feet 11/21/2019    Hx of diabetic foot ulcer 11/21/2019    Hypertension     Hyponatremia 01/11/2022    ELAINE (iron deficiency anemia) 12/10/2019    Intractable chronic migraine without aura and without status migrainosus 12/30/2021    Microcytic anemia 10/09/2019    Moderate aortic regurgitation 03/29/2023    Moderate mitral regurgitation 03/29/2023    Moderate mitral stenosis 03/29/2023    Moderate to severe aortic stenosis 02/28/2022    Myocardial infarct, old     Nicotine dependence, unspecified, uncomplicated 01/16/2022    Onychomycosis due to dermatophyte 11/21/2019    Orchitis 11/09/2019    PIPER on CPAP     Peripheral visual field defect, bilateral 01/13/2022    PVD (peripheral vascular disease) 11/21/2019    Seizures 2008    Stage 2 moderate COPD by GOLD classification     PFTs w/ FEV1 (73%) (9/2022)    Stroke 2005    Thoracic aortic atherosclerosis 10/17/2022    CT chest    Thyroid disease     Previously on pharmacologic Tx    Tinea pedis of right foot 05/21/2019    Tobacco use disorder     Type 2 diabetes mellitus with diabetic peripheral angiopathy without gangrene     A1c 5.1% (3/2023)    Vitamin B12  deficiency 03/24/2023     Past Surgical History:   Procedure Laterality Date    ANGIOGRAM, CORONARY, WITH LEFT HEART CATHETERIZATION  08/25/2022    Procedure: Angiogram, Coronary, with Left Heart Cath;  Surgeon: Mai Deleon MD;  Location: [facility] CATH;  Service: Cardiology;;    APPENDECTOMY      BONE BIOPSY Right 12/19/2023    Procedure: Biopsy-Bone;  Surgeon: Дмитрий Pratt DPM;  Location: [facility] OR;  Service: Podiatry;  Laterality: Right;    BONE EXOSTOSIS EXCISION Right 11/17/2023    Procedure: EXCISION, EXOSTOSIS;  Surgeon: Дмитрий Pratt DPM;  Location: [facility] OR;  Service: Podiatry;  Laterality: Right;    BONE MARROW ASPIRATION Left 08/21/2020    Procedure: ASPIRATION, BONE MARROW;  Surgeon: Lex Kathleen MD;  Location: [facility];  Service: Oncology;  Laterality: Left;    CHOLECYSTECTOMY      CLOSURE OF WOUND Right 09/09/2019    Procedure: CLOSURE, WOUND;  Surgeon: Li Kathleen DPM;  Location: [facility] OR;  Service: Podiatry;  Laterality: Right;    COLONOSCOPY N/A 12/10/2019    Procedure: COLONOSCOPY;  Surgeon: Ryan Lucas MD;  Location: [facility];  Service: Endoscopy;  Laterality: N/A;    COLONOSCOPY N/A 12/13/2019    Procedure: COLONOSCOPY;  Surgeon: Ryan Lucas MD;  Location: [facility];  Service: Endoscopy;  Laterality: N/A;    CORONARY STENT PLACEMENT      ESOPHAGOGASTRODUODENOSCOPY N/A 12/10/2019    Procedure: EGD (ESOPHAGOGASTRODUODENOSCOPY);  Surgeon: Ryan Lucas MD;  Location: [facility];  Service: Endoscopy;  Laterality: N/A;    ESOPHAGOGASTRODUODENOSCOPY N/A 11/03/2022    Procedure: EGD (ESOPHAGOGASTRODUODENOSCOPY);  Surgeon: Ryan Lucas MD;  Location: [facility];  Service: Endoscopy;  Laterality: N/A;    INCISION AND DRAINAGE FOOT Right 12/19/2023    Procedure: INCISION AND DRAINAGE, FOOT- RIGHT;  Surgeon: Дмитрий Pratt DPM;  Location: [facility] OR;  Service: Podiatry;  Laterality: Right;    INJECTION OF ANESTHETIC AGENT AROUND MEDIAL BRANCH NERVES INNERVATING LUMBAR  FACET JOINT Bilateral 07/25/2023    Procedure: Block-nerve-medial branch-lumbar;  Surgeon: Napoleon Grimaldo MD;  Location: [facility] OR;  Service: Pain Management;  Laterality: Bilateral;  L3,4,5 MBB    INJECTION OF ANESTHETIC AGENT AROUND MEDIAL BRANCH NERVES INNERVATING LUMBAR FACET JOINT Bilateral 08/25/2023    Procedure: Block-nerve-medial branch-lumbar;  Surgeon: Napoleon Grimaldo MD;  Location: [facility] OR;  Service: Anesthesiology;  Laterality: Bilateral;  L3,4,5 MBB #2    LAPAROSCOPIC CHOLECYSTECTOMY N/A 01/11/2023    Procedure: CHOLECYSTECTOMY, LAPAROSCOPIC;  Surgeon: Laith Davis MD;  Location: [facility] OR;  Service: General;  Laterality: N/A;    LEFT HEART CATHETERIZATION Left 08/25/2022    Procedure: Left heart cath;  Surgeon: Mai Deleon MD;  Location: [facility] CATH;  Service: Cardiology;  Laterality: Left;    RADIOFREQUENCY ABLATION OF LUMBAR MEDIAL BRANCH NERVE AT SINGLE LEVEL Bilateral 10/31/2023    Procedure: Radiofrequency Ablation, Nerve, Spinal, Lumbar, Medial Branch, 1 Level;  Surgeon: Napoleon Grimaldo MD;  Location: [facility] OR;  Service: Anesthesiology;  Laterality: Bilateral;  L3,4,5 RFA    right heel surgery      SHOULDER ARTHROSCOPY Left     UPPER GASTROINTESTINAL ENDOSCOPY       Social History     Tobacco Use    Smoking status: Every Day     Types: Vaping with nicotine     Passive exposure: Current    Smokeless tobacco: Never    Tobacco comments:     Age Started 14    Substance Use Topics    Alcohol use: No    Drug use: Yes     Types: Marijuana     Comment: ocasionally - once every 6 months        Review of Systems:    As noted in HPI in addition      REVIEW OF SYSTEMS  Review of Systems   Constitutional: Negative for decreased appetite, diaphoresis, night sweats, weight gain and weight loss.   HENT:  Negative for nosebleeds and odynophagia.    Eyes:  Negative for double vision and photophobia.   Cardiovascular:  Negative for chest pain, claudication, cyanosis, dyspnea on exertion, irregular heartbeat,  leg swelling, near-syncope, orthopnea, palpitations, paroxysmal nocturnal dyspnea and syncope.   Respiratory:  Negative for cough, hemoptysis, shortness of breath and wheezing.    Hematologic/Lymphatic: Negative for adenopathy.   Skin:  Negative for flushing, skin cancer and suspicious lesions.   Musculoskeletal:  Negative for gout, myalgias and neck pain.   Gastrointestinal:  Negative for abdominal pain, heartburn, hematemesis and hematochezia.   Genitourinary:  Negative for bladder incontinence, hesitancy and nocturia.   Neurological:  Negative for focal weakness, headaches, light-headedness and paresthesias.   Psychiatric/Behavioral:  Negative for memory loss and substance abuse.               Objective:        Vitals:    06/25/24 0825   BP: (!) 87/56   Pulse: 82       Lab Results   Component Value Date    WBC 6.84 06/24/2024    HGB 10.4 (L) 06/24/2024    HCT 32.0 (L) 06/24/2024     06/24/2024    CHOL 77 06/20/2024    TRIG 60 06/20/2024    HDL 34 06/20/2024    ALT 10 06/24/2024    AST 12 06/24/2024     06/24/2024    K 4.9 06/24/2024     06/24/2024    CREATININE 0.9 06/24/2024    BUN 12 06/24/2024    CO2 26 06/24/2024    TSH 1.140 03/05/2023    PSA 1.4 05/02/2023    INR 1.1 04/05/2023    HGBA1C 5.0 06/20/2024        ECHOCARDIOGRAM RESULTS  Results for orders placed during the hospital encounter of 03/04/23    Echo    Interpretation Summary  · The left ventricle is normal in size with concentric remodeling and normal systolic function.  · Moderate left atrial enlargement.  · Grade I left ventricular diastolic dysfunction.  · The estimated PA systolic pressure is 27 mmHg.  · Normal right ventricular size with normal right ventricular systolic function.  · Normal central venous pressure (3 mmHg).  · The estimated ejection fraction is 60%.  · Mild right atrial enlargement.  · There is moderate-to-severe aortic valve stenosis.  · Aortic valve area is 1.54 cm2; peak velocity is 4.00 m/s; mean gradient  is 33 mmHg.  · Mild-to-moderate mitral regurgitation.  · The mean diastolic gradient across the mitral valve is 8 mmHg at a heart rate of bpm.  · There is moderate mitral stenosis.  · Mild-to-moderate aortic regurgitation.  · There is no evidence of intracardiac shunting.    Results for orders placed during the hospital encounter of 08/25/22    Cardiac catheterization    Conclusion  · The left ventricular end diastolic pressure was elevated.  · The pre-procedure left ventricular end diastolic pressure was 21.  · Mild disease in the circumflex artery with patent stent in the LAD  · The Prox RCA lesion was 50% stenosed, very small non dominant RCA  · Medical treatment.    The procedure log was documented by Documenter: RT Krishna and verified by Mai Deleon MD.    Date: 8/26/2022  Time: 8:36 AM          CURRENT/PREVIOUS VISIT EKG  Results for orders placed or performed during the hospital encounter of 12/17/23   EKG 12-lead    Collection Time: 12/23/23  5:56 PM    Narrative    Test Reason : R07.89,    Vent. Rate : 114 BPM     Atrial Rate : 114 BPM     P-R Int : 138 ms          QRS Dur : 088 ms      QT Int : 350 ms       P-R-T Axes : 070 010 063 degrees     QTc Int : 482 ms    Poor data quality  Sinus tachycardia  Minimal voltage criteria for LVH, may be normal variant  Left atrial enlargement  Abnormal ECG      Confirmed by Gosia VILLEGAS, Martin HENSON ([id number]) on 1/2/2024 4:07:15 PM    Referred By: [email or web address]   SELF           Confirmed By:Martin Elise MD     No valid procedures specified.   Results for orders placed during the hospital encounter of 01/31/23    Nuclear Stress - Cardiology Interpreted    Interpretation Summary    Normal myocardial perfusion scan. There is no evidence of myocardial ischemia or infarction.    The gated perfusion images showed an ejection fraction of 70% post stress.    There is normal wall motion post stress.    LV cavity size is normal at stress.    The ECG portion of the study is  uninterpretable.    The patient reported no chest pain during the stress test.    There were no arrhythmias during stress.    This is a low risk study.    No valid procedures specified.    PHYSICAL EXAM  GENERAL:   Oriented x3, no apparent distress alert and oriented.   HEENT: Normocephalic. Pupils normal and conjunctivae normal.  Mucous membranes normal, no cyanosis or icterus, trachea central,no pallor or icterus is noted..   NECK: No JVD. No bruit..   THYROID: Thyroid not enlarged. No nodules present..   CARDIAC:   distant S1-S2.  Grade 2-3/6 crescendo decrescendo murmur base, apex.   LUNGS: Clear to auscultation. No wheezing or rhonchi..   ABDOMEN: Soft no masses or organomegaly.  No abdomen pulsations or bruits.  Normal bowel sounds. No pulsations and no masses felt, No guarding or rebound.   URINARY: No valero catheter   EXTREMITIES: No cyanosis, clubbing or edema noted at this time., no calf tenderness bilaterally.   PERIPHERAL VASCULAR SYSTEM: Good palpable distal pulses.  2+ femoral, popliteal and decreased pedal pulses.  No bruits    CENTRAL NERVOUS SYSTEM: No focal motor or sensory deficits noted.   SKIN: Skin without lesions, moist, well perfused.   MUSCLE STRENGTH & TONE: No noteable weakness, atrophy or abnormal movement    I HAVE REVIEWED :    The vital signs, nurses notes, and all the pertinent radiology and labs.         Current Outpatient Medications   Medication Instructions    AJOVY AUTOINJECTOR 225 mg, Subcutaneous, Every 28 days    albuterol (PROVENTIL/VENTOLIN HFA) 90 mcg/actuation inhaler 2 puffs, Inhalation, Every 6 hours PRN    albuterol-ipratropium (DUO-NEB) 2.5 mg-0.5 mg/3 mL nebulizer solution 3 mLs, Nebulization, Every 6 hours PRN, Rescue    ammonium lactate 2 g, Topical (Top), 2 times daily    aspirin 81 mg, Oral, Daily    atorvastatin (LIPITOR) 80 mg, Oral, Daily    brexpiprazole (REXULTI) 2 mg, Oral, Daily    butalbital-acetaminophen-caffeine -40 mg (FIORICET, ESGIC) -40  mg per tablet Take 1 tablet by mouth as needed for migraine. No more than 10 tablets per month.    clonazePAM (KLONOPIN) 0.5 mg, Oral, Daily PRN    clopidogreL (PLAVIX) 75 mg, Oral, Daily    cyanocobalamin 1,000 mcg/mL injection 1 ml sq daily x 5 days, then 1 ml sq weekly x 4 weeks then q monthly -  will need to go to PCP    dipyridamole-aspirin 200-25 mg (AGGRENOX)  mg CM12 1 capsule, Oral, 2 times daily    EPINEPHrine (EPIPEN) 0.3 mg, Intramuscular, As needed (PRN)    fluticasone-umeclidin-vilanter (TRELEGY ELLIPTA) 100-62.5-25 mcg DsDv INHALE 1 PUFF INTO THE LUNGS ONE TIME DAILY    hydrOXYzine HCL (ATARAX) 10 mg, Oral, 3 times daily PRN    lamoTRIgine (LAMICTAL) 200 mg, Oral, Daily    meclizine (ANTIVERT) 12.5 mg, Oral, 3 times daily PRN    metoprolol succinate (TOPROL-XL) 50 mg, Oral, Daily    mirtazapine (REMERON) 15 mg, Oral, Nightly    nicotine (NICODERM CQ) 21 mg/24 hr 1 patch, Transdermal, Daily    nicotine polacrilex 4 MG Lozg Take up to 10 pieces daily as needed    nitroGLYCERIN (NITROSTAT) 0.4 mg, Sublingual, Every 5 min PRN    ofloxacin (FLOXIN) 0.3 % otic solution SMARTSI Drop(s) Left Ear Twice Daily    ondansetron (ZOFRAN) 8 mg, Every 8 hours PRN    ondansetron (ZOFRAN-ODT) 4 mg, Oral, Every 8 hours PRN    pantoprazole (PROTONIX) 40 mg, Oral, Daily    pregabalin (LYRICA) 50 mg, Oral, 3 times daily    ranolazine (RANEXA) 500 mg, Oral, 2 times daily    tadalafiL (CIALIS) 20 mg, Oral, Daily    topiramate (TOPAMAX) 200 mg, Oral, Nightly    ubrogepant (UBRELVY) 100 mg tablet Take 1 tablet by mouth as needed for migraine. May repeat in 2 hours if needed. Max 2 tab per day    ziprasidone (GEODON) 160 mg, Oral, Nightly          Assessment:   Coronary disease.  PCI stent ostial proximal LAD, 2024 EF normal.  Moderate severe AS, mild AI, moderate MS with mild MR.    CVA, residual mild  left hemiparesis with right carotid endarterectomy 2024.  Stable recent brain MRI, head CTA.    COPD,  vape    Plan:   Vaping cessation.  Continue dual antiplatelet therapy  Low systolic blood pressures, currently off all blood pressure medications.  Relatively asymptomatic.    Follow up again in 3 months.  Re-evaluate valvular heart disease, repeat echo        No follow-ups on file.

## 2024-07-02 ENCOUNTER — TELEPHONE (OUTPATIENT)
Dept: SMOKING CESSATION | Facility: CLINIC | Age: 59
End: 2024-07-02
Payer: MEDICARE

## 2024-07-08 ENCOUNTER — OFFICE VISIT (OUTPATIENT)
Dept: NEUROLOGY | Facility: CLINIC | Age: 59
End: 2024-07-08
Payer: MEDICARE

## 2024-07-08 ENCOUNTER — TELEPHONE (OUTPATIENT)
Dept: NEUROLOGY | Facility: CLINIC | Age: 59
End: 2024-07-08

## 2024-07-08 VITALS
HEART RATE: 80 BPM | RESPIRATION RATE: 18 BRPM | HEIGHT: 70 IN | DIASTOLIC BLOOD PRESSURE: 67 MMHG | WEIGHT: 149.94 LBS | SYSTOLIC BLOOD PRESSURE: 111 MMHG | BODY MASS INDEX: 21.47 KG/M2

## 2024-07-08 DIAGNOSIS — I63.9 RECURRENT STROKES: ICD-10-CM

## 2024-07-08 DIAGNOSIS — R42 DIZZINESS: ICD-10-CM

## 2024-07-08 DIAGNOSIS — I35.0 MODERATE TO SEVERE AORTIC STENOSIS: Chronic | ICD-10-CM

## 2024-07-08 DIAGNOSIS — I63.50 RIGHT PONTINE STROKE: Primary | ICD-10-CM

## 2024-07-08 PROCEDURE — 99215 OFFICE O/P EST HI 40 MIN: CPT | Mod: HCNC,S$GLB,, | Performed by: NURSE PRACTITIONER

## 2024-07-08 PROCEDURE — 3044F HG A1C LEVEL LT 7.0%: CPT | Mod: HCNC,CPTII,S$GLB, | Performed by: NURSE PRACTITIONER

## 2024-07-08 PROCEDURE — 3074F SYST BP LT 130 MM HG: CPT | Mod: HCNC,CPTII,S$GLB, | Performed by: NURSE PRACTITIONER

## 2024-07-08 PROCEDURE — G2211 COMPLEX E/M VISIT ADD ON: HCPCS | Mod: HCNC,S$GLB,, | Performed by: NURSE PRACTITIONER

## 2024-07-08 PROCEDURE — 3078F DIAST BP <80 MM HG: CPT | Mod: HCNC,CPTII,S$GLB, | Performed by: NURSE PRACTITIONER

## 2024-07-08 PROCEDURE — 99999 PR PBB SHADOW E&M-EST. PATIENT-LVL IV: CPT | Mod: PBBFAC,HCNC,, | Performed by: NURSE PRACTITIONER

## 2024-07-08 PROCEDURE — 1159F MED LIST DOCD IN RCRD: CPT | Mod: HCNC,CPTII,S$GLB, | Performed by: NURSE PRACTITIONER

## 2024-07-08 PROCEDURE — 3008F BODY MASS INDEX DOCD: CPT | Mod: HCNC,CPTII,S$GLB, | Performed by: NURSE PRACTITIONER

## 2024-07-08 NOTE — PROGRESS NOTES
NEUROLOGY  Outpatient Follow Up Visit     [facility]  [address] [facility], [place] [address]  [phone] (office) / [phone] (fax)    Patient Name:  Ryan Crane  :  1965  MR #:  [id number]  Acct #:  [id number]    Date of  Visit: 2024    Other Physicians:  Ana Mayer MD (Primary Care Physician); No ref. provider found (Referring)      CHIEF COMPLAINT: Hospital Follow Up ([facility])      INTERVAL HISTORY   24:  Ryan Crane is a 58 y.o. R-handed male seen in hospital follow up for CVA.     I last saw him in 2024.     Medical history is otherwise significant for CAD, tobacco abuse, COPD, HTN, PIPER, PVD, bipolar disorder and schizophrenia.     Here alone today.     Several admissions to [facility] since last visit. In May, had [facility] with LAD stent. TTE showed severe valvular disease as well. On , to ED with LSW and dysarthria. Outside of lytic window. Was on DAPT due to recent stents. No acute findings on MRI. CTA showed ~ 50% R ICA stenosis with ulcerated plaque vs aneurysm. Vascular surgery was consulted and he underwent R CEA. Dc on , then back to the ED  with vertigo. CTA was negative, but MRI showed acute punctate strokes in the R joseph & lenticulate nucleus . Inpatient neurology added Aggrenox to DAPT and increased atorvastatin to 80 mg. Most recent admission was just a few days ago for CP. Reports that he needs to have valve replacement surgery next.     Compliant with med changes. On ASA, Plavix and Aggrenox. No bleeding complications or SE. On 80 mg Lipitor now. Recall that he had prior Plavix responsiveness testing in 3/2023, which indicated normal effect.     Still having episodes of dizziness. Describes as room spinning. Occurs without warning or trigger. Not positional. Not improving post stroke, maybe more often. Home BP running low 90s/60s. Now off of anti HTN medications since post CEA hypotension requiring Levophed. Taking  "meclizine about 2x per week. Drinks ~ 80 oz water per day. Now also on ranolazine. Has not been able to f/u with his regular cardiologist with all of this.     Still using nicotine vape. In smoking cessation program here.     Was not referred for any outpatient therapies or HH from hospital.     Prior history:  5/7/24:  Ryan Crane is a 58 y.o. R-handed male seen in hospital follow up for CVA.     I last saw him for a similar visit in 3/2023. He sustained a R subcortical CVA with residual LUE weakness.     Medical history is otherwise significant for CAD, tobacco abuse, COPD, HTN, PIPER, PVD, bipolar disorder and schizophrenia.     Here alone today.     Admitted to [facility] 4/9 with concern for recurrent CVA. Recall that he had CVA with LSW in the past, but main residual is fine motor incoordination in the LUE. He started feeling "funny" during the day. Later that night, noted LSW involving the face, arm and leg. His speech was slurred. He didn't have any sensory loss. Treated with TNK per telestroke (NIHSS was 1). Symptoms improved in 1-2 days. At the time, was compliant with DAPT and statin therapies. No change in his therapy was recommended per neurology.     In the days prior, he denies any changes in medications. His BP was normal. No infectious symptoms. Was a bit more stressed than typical -- moving between his family member's houses, daughter going through a bad divorce. No sleep disturbances.     Doing well since dc. No recurrent stroke symptoms. Back to baseline L hand weakness.     On B12 injections q2 weeks.     No longer using cigarettes, but using nicotine vape.     He wants to get a tattoo soon and questions if he can stop the Plavix. He is going to see his cardiologist soon to talk about this also.      3/17/23:  Ryan Crane is a 58 y.o. R-handed male seen in hospital follow up for CVA.     The patient is new to me today, but was evaluated by Dr. Molina during a recent admission to [facility]. " "    Medical history is otherwise significant for CAD, tobacco abuse, COPD, HTN, PIPER, PVD, bipolar disorder and schizophrenia.     He is a poor historian. Here alone today.     He went to the ED because he "felt funny." He thought that he was having recurrent symptoms r/t hypoNa. He took a "dizzy pill" and went to sleep. He woke up to smoke and kept dropping the cigarette from the L hand. Symptoms continued, so he went to the ED for evaluation the following day.     He went to [facility] [place] and was d/c yesterday. He has no issues with the LLE. He has regained some strength in the LUE but still has significant difficulty with  and fine motor movements. He denies numbness. He hasn't had any falls since getting home. Has help from family. He started therapy with  today.     Reports that he was taking ASA 81 mg and Lipitor 40 mg at time of event. PCP had just started the Lipitor in February. He had started the ASA in February as well after being admitted to  for CP. At d/c, statin dose increased to 80 mg and DAPT was initiated. He denies any bleeding complications from DAPT.     He had a stroke in 2005. He didn't have significant deficits from this. He states that he was not started on any medications, like aspirin, after that. This was when he was living in [place].     Just found out today that his biological father had 13 strokes.     He hasn't smoked since 3/3 because he has been hospitalized. He has tried to quit smoking in the past and hasn't been able to. He has cut down from 3 ppd to 10 cigarettes per day. He isn't interested in the cessation program, has already tried this.     He doesn't drink.     He has PIPER and states that he uses CPAP nightly.     He used to be diabetic, but this was when he weighted nearly 400 lbs. Recent A1C normal.     [facility] Neurology Consultation 3/5/23:  " Ryan Crane is a 57 y.o. year old  male who has a history of long term smoking and prior strokes presents with another " "stroke. Discussed with patient about stroke risk factors again. He does endorse cutting down for 3 packs a day to 10 cigs a day. Did inform him of new stroke and he will continue to work on those risk factors."    Allergies:  Review of patient's allergies indicates:   Allergen Reactions    Alcohol Anaphylaxis     Pt states all types of alcohol    Alcohol antiseptic pads Anaphylaxis    Cephalexin Anaphylaxis and Other (See Comments)       Current Medications:  Current Outpatient Medications   Medication Sig Dispense Refill    albuterol (PROVENTIL/VENTOLIN HFA) 90 mcg/actuation inhaler Inhale 2 puffs into the lungs every 6 (six) hours as needed for Shortness of Breath or Wheezing. 18 g 5    albuterol-ipratropium (DUO-NEB) 2.5 mg-0.5 mg/3 mL nebulizer solution Take 3 mLs by nebulization every 6 (six) hours as needed for Wheezing. Rescue 75 mL 0    ammonium lactate 12 % Crea Apply 2 g topically 2 (two) times a day. 385 g 11    aspirin 81 MG Chew Take 81 mg by mouth once daily.      atorvastatin (LIPITOR) 80 MG tablet Take 80 mg by mouth once daily.      brexpiprazole (REXULTI) 2 mg Tab Take 2 mg by mouth once daily. Indications: schizophrenia      butalbital-acetaminophen-caffeine -40 mg (FIORICET, ESGIC) -40 mg per tablet Take 1 tablet by mouth as needed for migraine. No more than 10 tablets per month. 10 tablet 0    clonazePAM (KLONOPIN) 0.5 MG tablet Take 0.5 mg by mouth daily as needed.      clopidogreL (PLAVIX) 75 mg tablet Take 1 tablet (75 mg total) by mouth once daily. 30 tablet 11    cyanocobalamin 1,000 mcg/mL injection 1 ml sq daily x 5 days, then 1 ml sq weekly x 4 weeks then q monthly -  will need to go to PCP (Patient taking differently: Inject 1,000 mcg into the muscle every 14 (fourteen) days. 1 ml sq daily x 5 days, then 1 ml sq weekly x 4 weeks then q monthly -  will need to go to PCP) 9 mL 0    dipyridamole-aspirin 200-25 mg (AGGRENOX)  mg CM12 Take 1 capsule by mouth 2 (two) times " daily.      EPINEPHrine (EPIPEN) 0.3 mg/0.3 mL AtIn Inject 0.3 mLs (0.3 mg total) into the muscle as needed. 1 each 0    fluticasone-umeclidin-vilanter (TRELEGY ELLIPTA) 100-62.5-25 mcg DsDv INHALE 1 PUFF INTO THE LUNGS ONE TIME DAILY 60 each 11    fremanezumab-vfrm (AJOVY AUTOINJECTOR) 225 mg/1.5 mL autoinjector Inject 1.5 mLs (225 mg total) into the skin every 28 days. 1 each 11    hydrOXYzine HCL (ATARAX) 10 MG Tab Take 10 mg by mouth 3 (three) times daily as needed.      lamoTRIgine (LAMICTAL) 200 MG tablet Take 200 mg by mouth once daily.      meclizine (ANTIVERT) 12.5 mg tablet Take 1 tablet (12.5 mg total) by mouth 3 (three) times daily as needed for Dizziness. 90 tablet 0    mirtazapine (REMERON) 15 MG tablet Take 15 mg by mouth every evening.      nicotine (NICODERM CQ) 21 mg/24 hr Place 1 patch onto the skin once daily. 14 patch 0    nicotine polacrilex 4 MG Lozg Take up to 10 pieces daily as needed 72 lozenge 0    nitroGLYCERIN (NITROSTAT) 0.4 MG SL tablet Place 0.4 mg under the tongue every 5 (five) minutes as needed.      ondansetron (ZOFRAN) 4 MG tablet Take 8 mg by mouth every 8 (eight) hours as needed.      ondansetron (ZOFRAN-ODT) 4 MG TbDL Take 1 tablet (4 mg total) by mouth every 8 (eight) hours as needed (nausea). 30 tablet 0    pantoprazole (PROTONIX) 40 MG tablet Take 1 tablet (40 mg total) by mouth once daily. 90 tablet 3    pregabalin (LYRICA) 50 MG capsule Take 1 capsule (50 mg total) by mouth 3 (three) times daily. 270 capsule 1    ranolazine (RANEXA) 500 MG Tb12 Take 1 tablet (500 mg total) by mouth 2 (two) times daily. 180 tablet 3    tadalafiL (CIALIS) 20 MG Tab Take 1 tablet (20 mg total) by mouth once daily. 10 tablet 11    topiramate (TOPAMAX) 200 MG Tab Take 1 tablet (200 mg total) by mouth every evening. 90 tablet 3    ubrogepant (UBRELVY) 100 mg tablet Take 1 tablet by mouth as needed for migraine. May repeat in 2 hours if needed. Max 2 tab per day 8 tablet 11    ziprasidone  (GEODON) 80 MG capsule Take 160 mg by mouth every evening.       Current Facility-Administered Medications   Medication Dose Route Frequency Provider Last Rate Last Admin    cyanocobalamin injection 1,000 mcg  1,000 mcg Intramuscular Q14 Days Ana Mayer MD   1,000 mcg at 06/24/24 1445     Facility-Administered Medications Ordered in Other Visits   Medication Dose Route Frequency Provider Last Rate Last Admin    diphenhydrAMINE injection 12.5 mg  12.5 mg Intravenous Q6H PRN Irvin Frank MD        electrolyte-S (ISOLYTE)   Intravenous Continuous Irvin Frank MD        lactated ringers infusion   Intravenous Continuous Napoleon Grimaldo MD        lactated ringers infusion   Intravenous Continuous Napoleon Grimaldo MD 25 mL/hr at 08/25/23 0948 New Bag at 08/25/23 0948    lactated ringers infusion   Intravenous Continuous Napoleon Grimaldo MD   Stopped at 10/31/23 1255    lactated ringers infusion   Intravenous Continuous Irvin Frank MD 10 mL/hr at 11/17/23 0745 New Bag at 11/17/23 0745    LIDOcaine (PF) 10 mg/ml (1%) injection 10 mg  1 mL Intradermal Once Napoleon Grimaldo MD        LIDOcaine (PF) 10 mg/ml (1%) injection 10 mg  1 mL Intradermal Once Napoelon Grimaldo MD        LIDOcaine (PF) 10 mg/ml (1%) injection 10 mg  1 mL Intradermal Once Napoleon Grimaldo MD        LIDOcaine (PF) 10 mg/ml (1%) injection 10 mg  1 mL Intradermal Once Irvin Frank MD           Past Medical History:  Past Medical History:   Diagnosis Date    Aortic valve stenosis 02/28/2022    Arachnoid cyst of posterior cranial fossa 01/13/2022    Benign prostatic hyperplasia without lower urinary tract symptoms 05/25/2023    Bilateral carotid bruits 06/22/2021    Bipolar disorder 01/16/2022    CAD S/P percutaneous coronary angioplasty     3 vessel disease    Calculus of gallbladder without cholecystitis without obstruction 01/11/2023    Cancer     Candidal intertrigo 11/11/2019    Cataract     Chronic heart failure with preserved ejection fraction  03/29/2023    Chronic schizophrenia     Colon polyps     Diabetic polyneuropathy associated with diabetes mellitus due to underlying condition 11/21/2019    Dysarthria as late effect of cerebellar cerebrovascular accident (CVA) 03/24/2023    Equinus deformity of both feet 11/21/2019    Erectile dysfunction due to arterial insufficiency 05/25/2023    Flaccid hemiplegia of left nondominant side as late effect of cerebral infarction 03/17/2023    Flat foot 11/21/2019    Gastritis     Gastroesophageal reflux disease without esophagitis 07/13/2023    Gastrointestinal hemorrhage 12/13/2019    Post polypectomy bleeding    General anesthetics causing adverse effect in therapeutic use     Hammer toes of both feet 11/21/2019    Hx of diabetic foot ulcer 11/21/2019    Hypertension     Hyponatremia 01/11/2022    ELAINE (iron deficiency anemia) 12/10/2019    Intractable chronic migraine without aura and without status migrainosus 12/30/2021    Microcytic anemia 10/09/2019    Moderate aortic regurgitation 03/29/2023    Moderate mitral regurgitation 03/29/2023    Moderate mitral stenosis 03/29/2023    Moderate to severe aortic stenosis 02/28/2022    Myocardial infarct, old     Nicotine dependence, unspecified, uncomplicated 01/16/2022    Onychomycosis due to dermatophyte 11/21/2019    Orchitis 11/09/2019    PIPER on CPAP     Peripheral visual field defect, bilateral 01/13/2022    PVD (peripheral vascular disease) 11/21/2019    Seizures 2008    Stage 2 moderate COPD by GOLD classification     PFTs w/ FEV1 (73%) (9/2022)    Stroke 2005    Thoracic aortic atherosclerosis 10/17/2022    CT chest    Thyroid disease     Previously on pharmacologic Tx    Tinea pedis of right foot 05/21/2019    Tobacco use disorder     Type 2 diabetes mellitus with diabetic peripheral angiopathy without gangrene     A1c 5.1% (3/2023)    Vitamin B12 deficiency 03/24/2023       Past Surgical History:  Past Surgical History:   Procedure Laterality Date     ANGIOGRAM, CORONARY, WITH LEFT HEART CATHETERIZATION  08/25/2022    Procedure: Angiogram, Coronary, with Left Heart Cath;  Surgeon: Mai Deleon MD;  Location: [facility] CATH;  Service: Cardiology;;    APPENDECTOMY      BONE BIOPSY Right 12/19/2023    Procedure: Biopsy-Bone;  Surgeon: Дмитрий Pratt DPM;  Location: [facility] OR;  Service: Podiatry;  Laterality: Right;    BONE EXOSTOSIS EXCISION Right 11/17/2023    Procedure: EXCISION, EXOSTOSIS;  Surgeon: Дмитрий Pratt DPM;  Location: [facility] OR;  Service: Podiatry;  Laterality: Right;    BONE MARROW ASPIRATION Left 08/21/2020    Procedure: ASPIRATION, BONE MARROW;  Surgeon: Lex Kathleen MD;  Location: [facility];  Service: Oncology;  Laterality: Left;    CAROTID ENDARTERECTOMY Right     CHOLECYSTECTOMY      CLOSURE OF WOUND Right 09/09/2019    Procedure: CLOSURE, WOUND;  Surgeon: Li Kathleen DPM;  Location: [facility] OR;  Service: Podiatry;  Laterality: Right;    COLONOSCOPY N/A 12/10/2019    Procedure: COLONOSCOPY;  Surgeon: Ryan Lucas MD;  Location: [facility];  Service: Endoscopy;  Laterality: N/A;    COLONOSCOPY N/A 12/13/2019    Procedure: COLONOSCOPY;  Surgeon: Ryan Lucas MD;  Location: [facility];  Service: Endoscopy;  Laterality: N/A;    CORONARY STENT PLACEMENT      ESOPHAGOGASTRODUODENOSCOPY N/A 12/10/2019    Procedure: EGD (ESOPHAGOGASTRODUODENOSCOPY);  Surgeon: Ryan Lucas MD;  Location: [facility];  Service: Endoscopy;  Laterality: N/A;    ESOPHAGOGASTRODUODENOSCOPY N/A 11/03/2022    Procedure: EGD (ESOPHAGOGASTRODUODENOSCOPY);  Surgeon: Ryan Lucas MD;  Location: [facility];  Service: Endoscopy;  Laterality: N/A;    INCISION AND DRAINAGE FOOT Right 12/19/2023    Procedure: INCISION AND DRAINAGE, FOOT- RIGHT;  Surgeon: Дмитрий Pratt DPM;  Location: [facility];  Service: Podiatry;  Laterality: Right;    INJECTION OF ANESTHETIC AGENT AROUND MEDIAL BRANCH NERVES INNERVATING LUMBAR FACET JOINT Bilateral 07/25/2023    Procedure:  Block-nerve-medial branch-lumbar;  Surgeon: Napoleon Grimaldo MD;  Location: [facility] OR;  Service: Pain Management;  Laterality: Bilateral;  L3,4,5 MBB    INJECTION OF ANESTHETIC AGENT AROUND MEDIAL BRANCH NERVES INNERVATING LUMBAR FACET JOINT Bilateral 08/25/2023    Procedure: Block-nerve-medial branch-lumbar;  Surgeon: Napoleon Grimaldo MD;  Location: [facility] OR;  Service: Anesthesiology;  Laterality: Bilateral;  L3,4,5 MBB #2    LAPAROSCOPIC CHOLECYSTECTOMY N/A 01/11/2023    Procedure: CHOLECYSTECTOMY, LAPAROSCOPIC;  Surgeon: Laith Davis MD;  Location: [facility] OR;  Service: General;  Laterality: N/A;    LEFT HEART CATHETERIZATION Left 08/25/2022    Procedure: Left heart cath;  Surgeon: Mai Deleon MD;  Location: [facility] CATH;  Service: Cardiology;  Laterality: Left;    RADIOFREQUENCY ABLATION OF LUMBAR MEDIAL BRANCH NERVE AT SINGLE LEVEL Bilateral 10/31/2023    Procedure: Radiofrequency Ablation, Nerve, Spinal, Lumbar, Medial Branch, 1 Level;  Surgeon: Napoleon Grimaldo MD;  Location: [facility] OR;  Service: Anesthesiology;  Laterality: Bilateral;  L3,4,5 RFA    right heel surgery      SHOULDER ARTHROSCOPY Left     UPPER GASTROINTESTINAL ENDOSCOPY         Family History:  family history includes Cancer in his father; Cervical cancer in his sister; Cirrhosis in his brother; Colon cancer in his father; Diabetes in his father and mother; Hypertension in his brother, father, and mother; Lung cancer in his maternal grandmother; Melanoma in his mother; Prostate cancer in his maternal grandfather; Stroke in his father.    Social History:   reports that he has been smoking vaping with nicotine. He has been exposed to tobacco smoke. He has never used smokeless tobacco. He reports that he does not currently use drugs after having used the following drugs: Marijuana. He reports that he does not drink alcohol.      PHYSICAL EXAM:  /67 (BP Location: Right arm, Patient Position: Sitting, BP Method: Medium (Automatic))   Pulse 80    "Resp 18   Ht 5' 10" (1.778 m)   Wt 68 kg (149 lb 14.6 oz)   BMI 21.51 kg/m²     General: appears older than stated age. NAD.  Pulmonary: Normal effort and rate.   Musculoskeletal: No obvious joint deformities, moves all extremities well.  Extremities: No clubbing, cyanosis or edema.     Neurological exam:  Mental status: Awake and alert.  Oriented to person, place, time and situation. Able to provide decent history congruent with medical records.   Speech/Language: Fluent and appropriate. No dysarthria or aphasia on conversation. Able to follow complex commands.   Cranial nerves (II-XII): Visual fields full. Pupils equal round and reactive to light, extraocular movements intact, no ptosis, no nystagmus. Facial sensation and symmetry intact bilaterally. Hearing grossly intact. Palate deferred. Shoulder shrug normal bilaterally. Normal tongue protrusion.   Motor: 5 out of 5 strength throughout the R extremities. Subtle L hand / IO weakness and L IP weakness. No extremity drift.   Sensation: Intact to light touch and temperature throughout.   DTR: Mute at the biceps. Mute at the R knee, 1+ L knee.   Coordination: Finger-nose-finger & heel to shin testing intact bilaterally. No tremor.   Gait: Fluid without AD    DIAGNOSTIC DATA:  I have personally reviewed provider notes, labs and imaging made available to me today.     Imaging:  MRI brain wo 6/29/24: OSH report only  Acutely negative    CTA H&N 6/29/24: OSH report only  IMPRESSION:    1.  No acute arterial abnormality.    2.  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   3. Stable aneurysm arising from the left supraclinoid internal carotid artery.     MRI brain wo 6/27/24: OSH report only   IMPRESSION:   The lacunar infarctions in the right joseph and basal ganglia no longer demonstrate restricted diffusion.     MRI brain 6/19/24: report only   1.  Right pontine and right claustrum foci " of restricted diffusion compatible with acute infarctions.    2.  Bilateral basal nuclei, centrum semiovale, and right cerebellar chronic lacunar infarctions.   3. Mild parenchymal atrophy and chronic microvascular ischemia.     CTA H&N 6/19/24: report only   IMPRESSION:    1.  No acute arterial abnormality.    2.  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   3. Stable aneurysm arising from the left supraclinoid internal carotid artery.     CTA H&N 6/9/24: report only  IMPRESSION:    1.  No large vessel occlusion.    2.  Approximately 50% stenosis of the right proximal internal carotid artery.   3. Moderate left, mild right stenosis at the origin of the cervical vertebral arteries.   4. Unchanged posteriorly directed saccular aneurysm arising from the carotid bifurcation. This may also represent ulcerated atherosclerotic plaque.   5. Unchanged 3 mm posteriorly directed aneurysm arising from the left supraclinoid ICA.     MRI brain wo 6/9/24: report only   IMPRESSION:    1.  No acute infarct or intracranial abnormality.    2.  Focal encephalomalacia in the right medial occipital lobe, right cerebellum, and left parietal lobe. Scattered old lacunar infarcts.   3. Mild sequelae of chronic microvascular ischemic disease.     CTA H&N 4/9/2024 - report only  Impression:   1. No large vessel occlusion.   2. Less than 50% luminal narrowing of the proximal ICAs with moderate carotid bulb atherosclerosis.   3. Moderate right M1 segment stenosis.   4. Small left supraclinoid ICA aneurysm. Follow-up with neuro interventional radiology.   5. Biapical emphysematous change with mildly enlarged right hilar lymph nodes.     MRI brain wo 4/2024 - report only  IMPRESSION:   A focus of FLAIR hyperintensity in left occipital lobe measures 2.1 x 1.5 cm is a nonspecific finding may relate to old infarct versus other infectious, demyelinating, inflammatory  process.  Recommend clinical correlation and follow-up.  No restricted   diffusion segment to suggest infarct.     Nonspecific few juxtacortical, periventricular and neri-Ventricular FLAIR hyperintensities may relate to chronic small vessels ischemic changes or demyelinating process.  Recommend clinical correlation and follow-up.     CTA stroke 3/4/23:  FINDINGS:  The off take of the common carotids appears normal.  The origin of the vertebral arteries appears normal.  There is no significant stenosis in the carotid bifurcations bilaterally.  There is flow throughout both vertebral arteries.  The internal carotids appear normal.  There is calcification in the carotid siphons without a significant stenosis demonstrated.  The A1 segments appear normal.  The M1 segment appears normal.  The anterior cerebral, middle cerebral vessels appear normal.  The posterior cerebral vessels appear normal.  The basilar artery appears normal.  An aneurysm is not seen.     Impression:  No acute abnormalities are demonstrated.    MRI brain wo 3/4/23:    Impression:  1. Small area of interest diffusion is seen in the right corona radiata extending to the region of the superior aspect of the posterior limb internal capsule, compatible with acute or subacute ischemia/infarction.  2. Additional findings and details as above.    Personally reviewed -- encephalomalacia in the R occipital lobe, chronic microangiopathy, SA cyst dorsal to cerebellar vermis w/o mass effect     Cardiac:  EKG 4/2024:  NSR    TTE 7/2024  Interpretation Summary   Ejection Fraction = 65-75%.   Left ventricular systolic function is normal.   There is moderate concentric left ventricular hypertrophy.   Abnormal Diastolic Function   The right ventricular systolic function is normal.   There is mild mitral regurgitation.   There is severe mitral stenosis.   There is severe mitral annular calcification.   Aortic Valve calcified.   Moderate aortic regurgitation.   Moderate  to severe valvular aortic stenosis.   SVI: 60.11 mL/m2/beat       TTE 3/5/23:  The left ventricle is normal in size with concentric remodeling and normal systolic function.  Moderate left atrial enlargement.  Grade I left ventricular diastolic dysfunction.  The estimated PA systolic pressure is 27 mmHg.  Normal right ventricular size with normal right ventricular systolic function.  Normal central venous pressure (3 mmHg).  The estimated ejection fraction is 60%.  Mild right atrial enlargement.  There is moderate-to-severe aortic valve stenosis.  Aortic valve area is 1.54 cm2; peak velocity is 4.00 m/s; mean gradient is 33 mmHg.  Mild-to-moderate mitral regurgitation.  The mean diastolic gradient across the mitral valve is 8 mmHg at a heart rate of bpm.  There is moderate mitral stenosis.  Mild-to-moderate aortic regurgitation.  There is no evidence of intracardiac shunting.    Holter 48 hr 3/2022:  Dominant rhythm is sinus. No complex ventricular or atrial ectopy. No high-grade SA or AV shala block. Periods of sinus tachycardia.    Labs:  CBC:   Lab Results   Component Value Date    WBC 6.84 06/24/2024    HGB 10.4 (L) 06/24/2024    HCT 32.0 (L) 06/24/2024     06/24/2024    MCV 87 06/24/2024    RDW 17.0 (H) 06/24/2024     BMP:   Lab Results   Component Value Date     07/07/2024    K 3.7 07/07/2024     06/24/2024    CO2 22 07/07/2024    BUN 11 07/07/2024    CREATININE 0.80 (L) 07/07/2024    GLU 93 06/24/2024    CALCIUM 8.7 (L) 07/07/2024    MG 2.0 04/03/2024    PHOS 3.9 04/03/2024     LFTS;   Lab Results   Component Value Date    PROT 6.0 (L) 06/30/2024    ALBUMIN 3.9 06/30/2024    BILITOT 0.3 (L) 06/30/2024    AST 14 06/30/2024    ALKPHOS 88 06/30/2024    ALT 13 06/30/2024     COAGS:   Lab Results   Component Value Date    INR 1.1 04/05/2023     FLP:   Lab Results   Component Value Date    CHOL 77 06/20/2024    HDL 34 06/20/2024    LDLCALC 31 06/20/2024    TRIG 60 06/20/2024    CHOLHDL 2.3  06/20/2024     Lab Results   Component Value Date    HGBA1C 5.0 06/20/2024     Lab Results   Component Value Date    TSH 1.140 03/05/2023     Lab Results   Component Value Date    ELYZAUXL22 >2000 (H) 04/03/2024     Lab Results   Component Value Date    FOLATE 6.4 04/03/2024       TSH NL 4/2024  Component      Latest Ref Rng 3/24/2023   Platelet Response Plavix      194 - 418  (L)       Legend:  (L) Low    ASSESSMENT & PLAN:  Ryan Crane is a 58 y.o. R-handed male seen in hospital follow up for CVA.     Problem List Items Addressed This Visit          Neuro    Recurrent strokes    Overview     Reports initial CVA in 2005 without significant clinical deficit, [place]  3/2023 - R subcortical lacune with resultant LSW  4/2024 - recurrent LSW, dysarthria, treated at [facility] with TNK -- MRI neg   6/2024 - R hemispheric TIA symptoms, outside of lytic window, s/p R CEA @ [facility]  6/2024 - 6 days post op CEA, acute ischemic strokes in the R joseph and lenticulate nucleus         Current Assessment & Plan     Available diagnostics from [facility] reviewed with pt   Most recent vascular imaging post R CEA without LVO, stable known aneurysms R carotid bifurcation, L supraclinoid. Prior imaging showed moderate R M1 stenosis.   TTE in the past negative for shunt. He has pEF, but severe mitral stenosis with annular calcification and mod to severe aortic stenosis. He tells me that he is to have valve surgery soon at [facility].   No known history of arrhythmia.   A1C & LDL at goal   Prior platelet responsiveness testing to Plavix was normal     Plan:  Continue current Rx at this time: ASA 81, Plavix 75, Aggrenox BID  Will refer him to vascular neurology for stroke prevention med recs in light of recurrent strokes -- I have stressed the importance of obtaining imaging on disc from [facility] in preparation for appt   Reviewed his vascular RF  Goal normotension -- needs f/u with cardiology, encouraged adequate  hydration   Will order HH today for therapy and assistance in BP monitoring, etc   Encouraged continued efforts to dc nicotine use   CVA education / ED precautions discussed             Cardiac/Vascular    Moderate to severe aortic stenosis (Chronic)    Overview     Moderate to severe          Other Visit Diagnoses       Right pontine stroke    -  Primary    Dizziness                    Follow up: PRN here for memory loss / referred to see vascular neurology for recurrent strokes     I spent a total of 45 minutes on the day of the visit.    This includes face to face time with the patient, as well as non-face to face time preparing for and completing the visit (review of prior diagnostic testing and clinical notes, obtaining or reviewing history, documenting clinical information in the EMR, independently interpreting and communicating results to the patient/family and coordinating ongoing care).     Today's visit is associated with current or anticipated ongoing medical care related to recurrent strokes. He is also concerned about memory loss. We will follow up on this after more pressing issues are addressed.        I appreciate the opportunity to participate in the care of this patient. Please feel free to contact me with any questions or concerns.       Alyse Wilson, ACNPC-AG  [facility]  [address] [facility] [address] [place] [address]

## 2024-07-08 NOTE — PATIENT INSTRUCTIONS
"You have several stroke risk factors -- nicotine use, family history, heart disease, etc     For now, continue the aspirin, Plavix and Aggrenox to prevent more strokes. Also continue the high dose cholesterol medication called atorvastatin.   Given several strokes, I am referring you to see one of our vascular neurolgoists (stroke specialist) at main campus [facility].     Do your best to continue to decrease nicotine use, as this increases risk for more strokes     Need to schedule follow up with Dr. Aranda   Continue home BP monitoring. Goal is normal BP - < 130/80  We want to avoid low BP as well  Be sure to stay well hydrated with water or other fluids with electrolytes like Body [place] or Gatorade     Will order home health for you -- company will be called [facility]   They will contact you within the next 24 hours    YOU NEED TO GET A DISC FROM [facility] WITH THE RECENT BRAIN IMAGING TO BRING WITH YOU TO YOUR NEUROLOGY APPOINTMENT    - MRI BRAIN, CTA HEAD AND NECK FROM RECENT ADMISSIONS IN JUNE     STROKE EDUCATION:    During a stroke, blood stops flowing to part of the brain. This can damage areas in the brain that control the rest of the body. Symptoms after a stroke depend on which part of the brain has been affected. A TIA is often called a "mini stroke" and can be a warning sign for future strokes.     Stroke Risk Factors:  - Previous stroke  - High blood pressure  - High cholesterol  - Cigarette/cigar smoking  - Diabetes  - Carotid or other artery disease  - Atrial fibrillation/flutter  - Obesity  - Blood clotting disorders  - Excessive alcohol use  - Street drug use  - Family history of stroke    Call 911 right away if you have any of the following symptoms of stroke:  Weakness, tingling, or loss of feeling on one side of your face or body  Sudden double vision or trouble seeing in one or both eyes  Sudden trouble talking or slurred speech  Trouble understanding others  Sudden, severe " headache  Dizziness, loss of balance, or a sense of falling  Blackouts or seizures     F.A.S.T. is an easy way to remember the signs of stroke. When you see these signs, you know that you need to call 911 FAST!   F is for face drooping. One side of the face is drooping or numb. When the person smiles, the smile is uneven.   A is for arm weakness. One arm is weak or numb. When the person lifts both arms at the same time, one arm may drift downward.   S is for speech difficulty. You may notice slurred speech or trouble speaking. The person can't repeat a simple sentence correctly when asked.   T is for time to call 911. If someone shows any of these symptoms, even if they go away, call 911 immediately. Make note of the time the symptoms first appeared.

## 2024-07-08 NOTE — ASSESSMENT & PLAN NOTE
Available diagnostics from [facility] reviewed with pt   Most recent vascular imaging post R CEA without LVO, stable known aneurysms R carotid bifurcation, L supraclinoid. Prior imaging showed moderate R M1 stenosis.   TTE in the past negative for shunt. He has pEF, but severe mitral stenosis with annular calcification and mod to severe aortic stenosis. He tells me that he is to have valve surgery soon at [facility].   No known history of arrhythmia.   A1C & LDL at goal   Prior platelet responsiveness testing to Plavix was normal     Plan:  Continue current Rx at this time: ASA 81, Plavix 75, Aggrenox BID  Will refer him to vascular neurology for stroke prevention med recs in light of recurrent strokes -- I have stressed the importance of obtaining imaging on disc from [facility] in preparation for appt   Reviewed his vascular RF  Goal normotension -- needs f/u with cardiology, encouraged adequate hydration   Will order HH today for therapy and assistance in BP monitoring, etc   Encouraged continued efforts to dc nicotine use   CVA education / ED precautions discussed    Doxycycline Pregnancy And Lactation Text: This medication is Pregnancy Category D and not consider safe during pregnancy. It is also excreted in breast milk but is considered safe for shorter treatment courses.

## 2024-07-09 ENCOUNTER — CLINICAL SUPPORT (OUTPATIENT)
Dept: SMOKING CESSATION | Facility: CLINIC | Age: 59
End: 2024-07-09
Payer: COMMERCIAL

## 2024-07-09 DIAGNOSIS — F17.200 TOBACCO USE DISORDER: Primary | ICD-10-CM

## 2024-07-09 PROCEDURE — 99999 PR PBB SHADOW E&M-EST. PATIENT-LVL III: CPT | Mod: PBBFAC,,,

## 2024-07-09 PROCEDURE — 90853 GROUP PSYCHOTHERAPY: CPT | Mod: S$GLB,,, | Performed by: GENERAL PRACTICE

## 2024-07-09 RX ORDER — IBUPROFEN 200 MG
1 TABLET ORAL DAILY
Qty: 14 PATCH | Refills: 0 | Status: SHIPPED | OUTPATIENT
Start: 2024-07-09

## 2024-07-09 NOTE — PROGRESS NOTES
Smoking Cessation Group Session #5    Site: [facility]  Date:  7/9/2024  Clinical Status of Patient: Outpatient   Length of Service and Code: 90 minutes - [id number]   Number in Attendance: 4  CO Monitor Score: 4 ppm  Group Activities/Focus of Group:  Sharing last weeks challenges, triggers, and coping activities to remain quit and/ or keep making progress toward cessation, completion of TCRS (Tobacco Cessation Rating Scale) learned addiction model, personal reasons for quitting, medications, goals, quit date.    Specific session focus: Completion of TCRS (Tobacco Cessation Rating Scale) reviewed strategies, habitual behavior, high risks situations, understanding urges and cravings, stress and relaxation with open discussion and additional interventions, Introduced lapses, relapses, understanding them and analyzing the situation of a lapse, conflict issues that may be linked to a lapse.       Target symptoms:  withdrawal and medication side effects             The following were rated moderate (3) to severe (4) on TCRS:       Moderate 3: desire or crave;discussed with patient     Severe 4:   none  Patient's Response to Intervention: Patient is vape free and is using the 21 mg patch and lozenges with no negative side effects at this time.   Progress Toward Goals and Other Mental Status Changes: The patient denies any abnormal behavioral or mental changes at this time.       Diagnosis: Z17.200  Plan: The patient will continue with  therapy sessions and medication monitoring by CTTS. Prescribed medication management will be by physician.  Return to Clinic: 1 week    Quit Date: 7/7/24   Planned Quit Date:

## 2024-07-09 NOTE — Clinical Note
Patient is vape free and is using the 21 mg patch and lozenges with no negative side effects at this time.

## 2024-07-10 ENCOUNTER — PATIENT MESSAGE (OUTPATIENT)
Dept: NEUROLOGY | Facility: CLINIC | Age: 59
End: 2024-07-10
Payer: MEDICARE

## 2024-07-16 ENCOUNTER — TELEPHONE (OUTPATIENT)
Dept: SMOKING CESSATION | Facility: CLINIC | Age: 59
End: 2024-07-16
Payer: MEDICARE

## 2024-07-19 ENCOUNTER — TELEPHONE (OUTPATIENT)
Dept: ADMINISTRATIVE | Facility: CLINIC | Age: 59
End: 2024-07-19
Payer: MEDICARE

## 2024-07-19 NOTE — TELEPHONE ENCOUNTER
Called pt; no answer; could not leave a message due to voice mail not being set up; I was calling to confirm pt's in office [facility] appt on 7/22/24.

## 2024-08-06 ENCOUNTER — CLINICAL SUPPORT (OUTPATIENT)
Dept: SMOKING CESSATION | Facility: CLINIC | Age: 59
End: 2024-08-06
Payer: COMMERCIAL

## 2024-08-06 DIAGNOSIS — F17.200 TOBACCO USE DISORDER: Primary | ICD-10-CM

## 2024-08-06 PROCEDURE — 90853 GROUP PSYCHOTHERAPY: CPT | Mod: S$GLB,,, | Performed by: GENERAL PRACTICE

## 2024-08-06 PROCEDURE — 99999 PR PBB SHADOW E&M-EST. PATIENT-LVL III: CPT | Mod: PBBFAC,,,

## 2024-08-06 RX ORDER — IBUPROFEN 200 MG
1 TABLET ORAL DAILY
Qty: 14 PATCH | Refills: 0 | Status: SHIPPED | OUTPATIENT
Start: 2024-08-06

## 2024-08-07 ENCOUNTER — TELEPHONE (OUTPATIENT)
Dept: SMOKING CESSATION | Facility: CLINIC | Age: 59
End: 2024-08-07
Payer: MEDICARE

## 2024-08-21 ENCOUNTER — OFFICE VISIT (OUTPATIENT)
Dept: PODIATRY | Facility: CLINIC | Age: 59
End: 2024-08-21
Payer: MEDICARE

## 2024-08-21 ENCOUNTER — OFFICE VISIT (OUTPATIENT)
Dept: OTOLARYNGOLOGY | Facility: CLINIC | Age: 59
End: 2024-08-21
Payer: MEDICARE

## 2024-08-21 ENCOUNTER — TELEPHONE (OUTPATIENT)
Dept: SMOKING CESSATION | Facility: CLINIC | Age: 59
End: 2024-08-21
Payer: MEDICARE

## 2024-08-21 ENCOUNTER — CLINICAL SUPPORT (OUTPATIENT)
Dept: AUDIOLOGY | Facility: CLINIC | Age: 59
End: 2024-08-21
Payer: MEDICARE

## 2024-08-21 VITALS — WEIGHT: 149.94 LBS | HEIGHT: 70 IN | BODY MASS INDEX: 21.47 KG/M2

## 2024-08-21 VITALS — BODY MASS INDEX: 21.47 KG/M2 | HEIGHT: 70 IN | WEIGHT: 149.94 LBS

## 2024-08-21 DIAGNOSIS — H90.3 ASYMMETRICAL SENSORINEURAL HEARING LOSS: ICD-10-CM

## 2024-08-21 DIAGNOSIS — E11.49 TYPE II DIABETES MELLITUS WITH NEUROLOGICAL MANIFESTATIONS: ICD-10-CM

## 2024-08-21 DIAGNOSIS — E08.42 DIABETIC POLYNEUROPATHY ASSOCIATED WITH DIABETES MELLITUS DUE TO UNDERLYING CONDITION: ICD-10-CM

## 2024-08-21 DIAGNOSIS — H90.3 SENSORINEURAL HEARING LOSS (SNHL) OF BOTH EARS: Primary | ICD-10-CM

## 2024-08-21 DIAGNOSIS — F17.200 TOBACCO USE DISORDER: ICD-10-CM

## 2024-08-21 DIAGNOSIS — R42 VERTIGO: ICD-10-CM

## 2024-08-21 DIAGNOSIS — I73.9 PVD (PERIPHERAL VASCULAR DISEASE): ICD-10-CM

## 2024-08-21 DIAGNOSIS — M79.671 INTRACTABLE RIGHT HEEL PAIN: Primary | ICD-10-CM

## 2024-08-21 DIAGNOSIS — B35.1 ONYCHOMYCOSIS DUE TO DERMATOPHYTE: ICD-10-CM

## 2024-08-21 PROCEDURE — 3008F BODY MASS INDEX DOCD: CPT | Mod: HCNC,CPTII,S$GLB, | Performed by: STUDENT IN AN ORGANIZED HEALTH CARE EDUCATION/TRAINING PROGRAM

## 2024-08-21 PROCEDURE — 11721 DEBRIDE NAIL 6 OR MORE: CPT | Mod: Q8,HCNC,S$GLB, | Performed by: STUDENT IN AN ORGANIZED HEALTH CARE EDUCATION/TRAINING PROGRAM

## 2024-08-21 PROCEDURE — 99204 OFFICE O/P NEW MOD 45 MIN: CPT | Mod: HCNC,S$GLB,, | Performed by: STUDENT IN AN ORGANIZED HEALTH CARE EDUCATION/TRAINING PROGRAM

## 2024-08-21 PROCEDURE — 1160F RVW MEDS BY RX/DR IN RCRD: CPT | Mod: HCNC,CPTII,S$GLB, | Performed by: STUDENT IN AN ORGANIZED HEALTH CARE EDUCATION/TRAINING PROGRAM

## 2024-08-21 PROCEDURE — 99999 PR PBB SHADOW E&M-EST. PATIENT-LVL II: CPT | Mod: PBBFAC,HCNC,, | Performed by: AUDIOLOGIST-HEARING AID FITTER

## 2024-08-21 PROCEDURE — 3044F HG A1C LEVEL LT 7.0%: CPT | Mod: HCNC,CPTII,S$GLB, | Performed by: STUDENT IN AN ORGANIZED HEALTH CARE EDUCATION/TRAINING PROGRAM

## 2024-08-21 PROCEDURE — 92557 COMPREHENSIVE HEARING TEST: CPT | Mod: HCNC,S$GLB,, | Performed by: AUDIOLOGIST-HEARING AID FITTER

## 2024-08-21 PROCEDURE — 99999 PR PBB SHADOW E&M-EST. PATIENT-LVL IV: CPT | Mod: PBBFAC,HCNC,, | Performed by: STUDENT IN AN ORGANIZED HEALTH CARE EDUCATION/TRAINING PROGRAM

## 2024-08-21 PROCEDURE — 92567 TYMPANOMETRY: CPT | Mod: HCNC,S$GLB,, | Performed by: AUDIOLOGIST-HEARING AID FITTER

## 2024-08-21 PROCEDURE — 99213 OFFICE O/P EST LOW 20 MIN: CPT | Mod: 25,HCNC,S$GLB, | Performed by: STUDENT IN AN ORGANIZED HEALTH CARE EDUCATION/TRAINING PROGRAM

## 2024-08-21 PROCEDURE — 1159F MED LIST DOCD IN RCRD: CPT | Mod: HCNC,CPTII,S$GLB, | Performed by: STUDENT IN AN ORGANIZED HEALTH CARE EDUCATION/TRAINING PROGRAM

## 2024-08-21 RX ORDER — IBUPROFEN 200 MG
1 TABLET ORAL DAILY
Qty: 14 PATCH | Refills: 0 | Status: SHIPPED | OUTPATIENT
Start: 2024-08-21

## 2024-08-21 NOTE — PROGRESS NOTES
Ryan Crane was seen on 08/21/2024 for an audiological evaluation. Pt was alone during today's visit. Pertinent complaints today include hearing loss. Pt denies tinnitus, history of loud noise exposure and early onset of genetic family history of hearing loss. Otoscopy revealed moderate cerumen in both ears. The tympanic membrane was visualized AU prior to proceeding with the hearing testing. He had hearing aids from [employer] but lost them.    Results reveal a bilateral mild sloping to severe sensorineural hearing loss with some nonconsecutive points of asymmetry.    Speech Reception Thresholds were  10 dBHL for the right ear and 5 dBHL for the left ear. Pt had very poor PTA/SRT agreement even after repeated instruction.   Word recognition scores were good for the right ear and good for the left ear.   Tympanograms were Type A for the right ear and Type A for the left ear.    Audiogram results were reviewed in detail with patient and all questions were answered. Results will be reviewed by the referring provider at the completion of this note. All complaints were addressed during this visit to the patient's satisfaction. Recommend further medical evaluation with an ENT provider for the asymmetry, binaural amplification pending medical clearance, repeat hearing testing prior to fitting with amplification to confirm pure tone thresholds and in one year due to asymmetry and bilateral hearing protection with either muffs or in-ear protection in loud noises. Plan of care was discussed in detail with the patient, who agreed with the plan as above.
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
## 2024-08-21 NOTE — PROGRESS NOTES
Subjective:      Patient ID: Ryan Crane is a 58 y.o. male.    Chief Complaint: Foot Pain      HPI:  Ryan is a 58 y.o. male who presents to the clinic for evaluation and treatment of high risk feet. Ryan has a past medical history of Aortic valve stenosis (02/28/2022), Arachnoid cyst of posterior cranial fossa (01/13/2022), Benign prostatic hyperplasia without lower urinary tract symptoms (05/25/2023), Bilateral carotid bruits (06/22/2021), Bipolar disorder (01/16/2022), CAD S/P percutaneous coronary angioplasty, Calculus of gallbladder without cholecystitis without obstruction (01/11/2023), Cancer, Candidal intertrigo (11/11/2019), Cataract, Chronic heart failure with preserved ejection fraction (03/29/2023), Chronic schizophrenia, Colon polyps, Diabetic polyneuropathy associated with diabetes mellitus due to underlying condition (11/21/2019), Dysarthria as late effect of cerebellar cerebrovascular accident (CVA) (03/24/2023), Equinus deformity of both feet (11/21/2019), Erectile dysfunction due to arterial insufficiency (05/25/2023), Flaccid hemiplegia of left nondominant side as late effect of cerebral infarction (03/17/2023), Flat foot (11/21/2019), Gastritis, Gastroesophageal reflux disease without esophagitis (07/13/2023), Gastrointestinal hemorrhage (12/13/2019), General anesthetics causing adverse effect in therapeutic use, Hammer toes of both feet (11/21/2019), diabetic foot ulcer (11/21/2019), Hypertension, Hyponatremia (01/11/2022), ELAINE (iron deficiency anemia) (12/10/2019), Intractable chronic migraine without aura and without status migrainosus (12/30/2021), Microcytic anemia (10/09/2019), Moderate aortic regurgitation (03/29/2023), Moderate mitral regurgitation (03/29/2023), Moderate mitral stenosis (03/29/2023), Moderate to severe aortic stenosis (02/28/2022), Myocardial infarct, old, Nicotine dependence, unspecified, uncomplicated (01/16/2022), Onychomycosis due to dermatophyte  (11/21/2019), Orchitis (11/09/2019), PIPER on CPAP, Peripheral visual field defect, bilateral (01/13/2022), PVD (peripheral vascular disease) (11/21/2019), Seizures (2008), Stage 2 moderate COPD by GOLD classification, Stroke (2005), Thoracic aortic atherosclerosis (10/17/2022), Thyroid disease, Tinea pedis of right foot (05/21/2019), Tobacco use disorder, Type 2 diabetes mellitus with diabetic peripheral angiopathy without gangrene, and Vitamin B12 deficiency (03/24/2023). The patient's chief complaint is long, thick toenails and right heel pain. This patient has documented high risk feet requiring routine maintenance secondary to diabetes mellitis and those secondary complications of diabetes, as mentioned..  He reports having right heel pain, which he rates as 3/10 on the pain scale but denies having a wound.  He also informs of losing 30 lbs since his last visit without trying to lose weight and is concerned about it.  He denies trying to self-treat his heel pain or trim his toenails himself.  Patient denies any other pedal complaints at this time.    08/21/2024  Mr. Crane returns today for routine foot care. He does note continued pain to the R heel that strikes when walking 3-4 times per week. The pain is severe to the point of inability to weightbear.     PCP: Ana Mayer MD    Date Last Seen by PCP:  5/17/2024    Current shoe gear:  Affected Foot: Casual shoes     Unaffected Foot: Casual shoes    Review of Systems   Constitutional: Negative for appetite change, fever, chills, fatigue.  Positive for unexpected weight change.   Respiratory: Negative for cough, wheezing, shortness of breath.  Cardiovascular: Negative for chest pain, claudication, cyanosis.  Positive for leg swelling.  Musculoskeletal: Negative for back pain, arthritis, joint pain, joint swelling, myalgias, stiffness.   Skin: Negative for rash, itching, suspicious lesion, poor wound healing, unusual hair distribution.  Positive for nail  bed changes, discoloration,.  Neurological: Negative for loss of balance, sensory change, paresthesias, numbness.  Positive for pain.  Hematological: Negative for adenopathy, bleeding, bruising easily.   Psychiatric/Behavioral: The patient is not nervous/anxious.  Negative for altered mental status.    Hemoglobin A1C   Date Value Ref Range Status   07/18/2024 4.9 4.6 - 6.2 % Final   06/20/2024 5.0 4.6 - 6.2 % Final   06/10/2024 5.1 4.6 - 6.2 % Final   04/03/2024 4.9 4.0 - 5.6 % Final     Comment:     ADA Screening Guidelines:  5.7-6.4%  Consistent with prediabetes  >or=6.5%  Consistent with diabetes    High levels of fetal hemoglobin interfere with the HbA1C  assay. Heterozygous hemoglobin variants (HbS, HgC, etc)do  not significantly interfere with this assay.   However, presence of multiple variants may affect accuracy.     07/13/2023 4.9 4.0 - 5.6 % Final     Comment:     ADA Screening Guidelines:  5.7-6.4%  Consistent with prediabetes  >or=6.5%  Consistent with diabetes    High levels of fetal hemoglobin interfere with the HbA1C  assay. Heterozygous hemoglobin variants (HbS, HgC, etc)do  not significantly interfere with this assay.   However, presence of multiple variants may affect accuracy.     03/05/2023 5.1 0.0 - 5.6 % Final     Comment:     Reference Interval:  5.0 - 5.6 Normal   5.7 - 6.4 High Risk   > 6.5 Diabetic      Hgb A1c results are standardized based on the (NGSP) National   Glycohemoglobin Standardization Program.      Hemoglobin A1C levels are related to mean serum/plasma glucose   during the preceding 2-3 months.            Past Medical History:   Diagnosis Date    Aortic valve stenosis 02/28/2022    Arachnoid cyst of posterior cranial fossa 01/13/2022    Benign prostatic hyperplasia without lower urinary tract symptoms 05/25/2023    Bilateral carotid bruits 06/22/2021    Bipolar disorder 01/16/2022    CAD S/P percutaneous coronary angioplasty     3 vessel disease    Calculus of gallbladder  without cholecystitis without obstruction 01/11/2023    Cancer     Candidal intertrigo 11/11/2019    Cataract     Chronic heart failure with preserved ejection fraction 03/29/2023    Chronic schizophrenia     Colon polyps     Diabetic polyneuropathy associated with diabetes mellitus due to underlying condition 11/21/2019    Dysarthria as late effect of cerebellar cerebrovascular accident (CVA) 03/24/2023    Equinus deformity of both feet 11/21/2019    Erectile dysfunction due to arterial insufficiency 05/25/2023    Flaccid hemiplegia of left nondominant side as late effect of cerebral infarction 03/17/2023    Flat foot 11/21/2019    Gastritis     Gastroesophageal reflux disease without esophagitis 07/13/2023    Gastrointestinal hemorrhage 12/13/2019    Post polypectomy bleeding    General anesthetics causing adverse effect in therapeutic use     Hammer toes of both feet 11/21/2019    Hx of diabetic foot ulcer 11/21/2019    Hypertension     Hyponatremia 01/11/2022    ELAINE (iron deficiency anemia) 12/10/2019    Intractable chronic migraine without aura and without status migrainosus 12/30/2021    Microcytic anemia 10/09/2019    Moderate aortic regurgitation 03/29/2023    Moderate mitral regurgitation 03/29/2023    Moderate mitral stenosis 03/29/2023    Moderate to severe aortic stenosis 02/28/2022    Myocardial infarct, old     Nicotine dependence, unspecified, uncomplicated 01/16/2022    Onychomycosis due to dermatophyte 11/21/2019    Orchitis 11/09/2019    PIPER on CPAP     Peripheral visual field defect, bilateral 01/13/2022    PVD (peripheral vascular disease) 11/21/2019    Seizures 2008    Stage 2 moderate COPD by GOLD classification     PFTs w/ FEV1 (73%) (9/2022)    Stroke 2005    Thoracic aortic atherosclerosis 10/17/2022    CT chest    Thyroid disease     Previously on pharmacologic Tx    Tinea pedis of right foot 05/21/2019    Tobacco use disorder     Type 2 diabetes mellitus with diabetic peripheral angiopathy  without gangrene     A1c 5.1% (3/2023)    Vitamin B12 deficiency 03/24/2023     Past Surgical History:   Procedure Laterality Date    ANGIOGRAM, CORONARY, WITH LEFT HEART CATHETERIZATION  08/25/2022    Procedure: Angiogram, Coronary, with Left Heart Cath;  Surgeon: Mai Deleon MD;  Location: [facility] CATH;  Service: Cardiology;;    APPENDECTOMY      BONE BIOPSY Right 12/19/2023    Procedure: Biopsy-Bone;  Surgeon: Дмитрий Pratt DPM;  Location: [facility] OR;  Service: Podiatry;  Laterality: Right;    BONE EXOSTOSIS EXCISION Right 11/17/2023    Procedure: EXCISION, EXOSTOSIS;  Surgeon: Дмитрий Pratt DPM;  Location: [facility] OR;  Service: Podiatry;  Laterality: Right;    BONE MARROW ASPIRATION Left 08/21/2020    Procedure: ASPIRATION, BONE MARROW;  Surgeon: Lex Kathleen MD;  Location: [facility];  Service: Oncology;  Laterality: Left;    CAROTID ENDARTERECTOMY Right     CHOLECYSTECTOMY      CLOSURE OF WOUND Right 09/09/2019    Procedure: CLOSURE, WOUND;  Surgeon: Li Kathleen DPM;  Location: [facility] OR;  Service: Podiatry;  Laterality: Right;    COLONOSCOPY N/A 12/10/2019    Procedure: COLONOSCOPY;  Surgeon: Ryan Lucas MD;  Location: [facility];  Service: Endoscopy;  Laterality: N/A;    COLONOSCOPY N/A 12/13/2019    Procedure: COLONOSCOPY;  Surgeon: Ryan Lucas MD;  Location: [facility];  Service: Endoscopy;  Laterality: N/A;    CORONARY STENT PLACEMENT      ESOPHAGOGASTRODUODENOSCOPY N/A 12/10/2019    Procedure: EGD (ESOPHAGOGASTRODUODENOSCOPY);  Surgeon: Ryan Lucas MD;  Location: [facility];  Service: Endoscopy;  Laterality: N/A;    ESOPHAGOGASTRODUODENOSCOPY N/A 11/03/2022    Procedure: EGD (ESOPHAGOGASTRODUODENOSCOPY);  Surgeon: Ryan Lucas MD;  Location: [facility];  Service: Endoscopy;  Laterality: N/A;    INCISION AND DRAINAGE FOOT Right 12/19/2023    Procedure: INCISION AND DRAINAGE, FOOT- RIGHT;  Surgeon: Дмитрий Pratt DPM;  Location: [facility] OR;  Service: Podiatry;  Laterality:  Right;    INJECTION OF ANESTHETIC AGENT AROUND MEDIAL BRANCH NERVES INNERVATING LUMBAR FACET JOINT Bilateral 07/25/2023    Procedure: Block-nerve-medial branch-lumbar;  Surgeon: Napoleon Grimaldo MD;  [facility];  Service: Pain Management;  Laterality: Bilateral;  L3,4,5 MBB    INJECTION OF ANESTHETIC AGENT AROUND MEDIAL BRANCH NERVES INNERVATING LUMBAR FACET JOINT Bilateral 08/25/2023    Procedure: Block-nerve-medial branch-lumbar;  Surgeon: Napoleon Grimaldo MD;  [facility];  Service: Anesthesiology;  Laterality: Bilateral;  L3,4,5 MBB #2    LAPAROSCOPIC CHOLECYSTECTOMY N/A 01/11/2023    Procedure: CHOLECYSTECTOMY, LAPAROSCOPIC;  Surgeon: Laith Davis MD;  Location: [facility] OR;  Service: General;  Laterality: N/A;    LEFT HEART CATHETERIZATION Left 08/25/2022    Procedure: Left heart cath;  Surgeon: Mai Deleon MD;  [facility];  Service: Cardiology;  Laterality: Left;    RADIOFREQUENCY ABLATION OF LUMBAR MEDIAL BRANCH NERVE AT SINGLE LEVEL Bilateral 10/31/2023    Procedure: Radiofrequency Ablation, Nerve, Spinal, Lumbar, Medial Branch, 1 Level;  Surgeon: Napoleon Grimaldo MD;  Location: [facility] OR;  Service: Anesthesiology;  Laterality: Bilateral;  L3,4,5 RFA    right heel surgery      SHOULDER ARTHROSCOPY Left     UPPER GASTROINTESTINAL ENDOSCOPY       Family History   Problem Relation Name Age of Onset    Melanoma Mother      Diabetes Mother      Hypertension Mother      Stroke Father      Diabetes Father      Hypertension Father      Colon cancer Father          unsure of age of diagnosis    Cancer Father          colon cancer    Cervical cancer Sister      Cirrhosis Brother      Hypertension Brother      Lung cancer Maternal Grandmother      Prostate cancer Maternal Grandfather      Crohn's disease Neg Hx      Ulcerative colitis Neg Hx      Stomach cancer Neg Hx      Esophageal cancer Neg Hx      Retinal detachment Neg Hx      Strabismus Neg Hx      Macular degeneration Neg Hx      Glaucoma  "Neg Hx       Social History     Socioeconomic History    Marital status: Legally    Occupational History    Occupation: disabled (mental)     Comment: since 2000   Tobacco Use    Smoking status: Former     Types: Vaping with nicotine     Passive exposure: Current    Smokeless tobacco: Never    Tobacco comments:     Age Started 14    Substance and Sexual Activity    Alcohol use: No    Drug use: Not Currently     Types: Marijuana     Comment: ocasionally - once every 6 months    Sexual activity: Yes     Partners: Female     Social Determinants of Health     Financial Resource Strain: Medium Risk (5/7/2024)    Overall Financial Resource Strain (CARDIA)     Difficulty of Paying Living Expenses: Somewhat hard   Food Insecurity: Unknown (7/18/2024)    Received from [facility]    Hunger Vital Sign     Ran Out of Food in the Last Year: Never true   Recent Concern: Food Insecurity - Food Insecurity Present (5/7/2024)    Hunger Vital Sign     Worried About Running Out of Food in the Last Year: Sometimes true     Ran Out of Food in the Last Year: Sometimes true   Transportation Needs: Unknown (7/18/2024)    Received from [facility]    PRAPARE - Transportation     Lack of Transportation (Medical): No   Physical Activity: Insufficiently Active (5/7/2024)    Exercise Vital Sign     Days of Exercise per Week: 2 days     Minutes of Exercise per Session: 10 min   Stress: Stress Concern Present (5/7/2024)    [employer] - Occupational Stress Questionnaire     Feeling of Stress : To some extent   Housing Stability: Low Risk  (5/7/2024)    Housing Stability Vital Sign     Unable to Pay for Housing in the Last Year: No     Homeless in the Last Year: No           Objective:        Ht 5' 10" (1.778 m)   Wt 68 kg (149 lb 14.6 oz)   BMI 21.51 kg/m²     Physical Exam   Constitutional: Patient is oriented to person, place, and time. Patient appears well-developed and " well-nourished.  Strong malodor noted from patient due to lack of personal hygiene.  No acute distress.     Psychiatric: Patient has a normal mood and affect. Patient's speech is normal and behavior is normal. Judgment is normal. Cognition and memory are normal.     Bilateral pedal exam was performed today.  Vascular: Vascular: Pedal pulses palpable 2/4 DP & PT.  CFT is = 3 seconds to the hallux.  Skin temperature is warm to cool proximal tibia to distal toes without localized increase in calor noted.  No erythema and ecchymosis noted to the LE.  Nonpitting edema noted to the LE.  Hair growth present distally to the LE.     Musculoskeletal: Ankle and pedal joints ROM are decreased. Ankle joint dorsiflexion is restricted with the knee extended and flexed per Silf[place] exam.  Muscle strength is 5/5 for all LE muscle groups tested.  Flat foot type present.  Flexion contracture of lesser digits noted. Tenderness to palpation of the plantar R heel    Neurological:  Epicritic sensation is slightly dimiished to the foot.  [place] STR is intact to the LE.  Tenderness to palpation of right plantar heel noted.     Dermatological: Toenails 1-5 are elongated and distally thickened, dystrophic, brittle, discolored, and mycotic with subungal debris present.  Webspaces 1-4 are dry and intact with debris present.  Skin turgor is increased.  Skin texture is dry and flaky. Minor hyperkeratotic skin at the R plantar heel. Poros appear to have returned and are painful.    Nursing note and vitals reviewed.        Assessment:       Encounter Diagnoses   Name Primary?    Intractable right heel pain Yes    PVD (peripheral vascular disease)     Diabetic polyneuropathy associated with diabetes mellitus due to underlying condition     Type II diabetes mellitus with neurological manifestations     Onychomycosis due to dermatophyte                    Plan:       Ryan was seen today for foot pain.    Diagnoses and all orders for this  visit:    Intractable right heel pain    PVD (peripheral vascular disease)    Diabetic polyneuropathy associated with diabetes mellitus due to underlying condition    Type II diabetes mellitus with neurological manifestations    Onychomycosis due to dermatophyte    Other orders  -     Routine Foot Care          I counseled the patient on his conditions, their implications and medical management.    Increase lyrica to 50 mg third does or increase to 75mg per dose.    RFC provided.    F/u 3 months.    Routine Foot Care    Date/Time: 8/21/2024 8:20 AM    Performed by: мДитрий Pratt DPM  Authorized by: Дмитрий Pratt DPM    Consent Done?:  Yes (Verbal)  Hyperkeratotic Skin Lesions?: No      Nail Care Type:  Debride  Location(s): All  (Left 1st Toe, Left 3rd Toe, Left 2nd Toe, Left 4th Toe, Left 5th Toe, Right 1st Toe, Right 2nd Toe, Right 3rd Toe, Right 4th Toe and Right 5th Toe)  Patient tolerance:  Patient tolerated the procedure well with no immediate complications        Дмитрий Pratt DPM

## 2024-08-30 ENCOUNTER — TELEPHONE (OUTPATIENT)
Dept: CARDIOLOGY | Facility: HOSPITAL | Age: 59
End: 2024-08-30
Payer: MEDICARE

## 2024-09-03 ENCOUNTER — TELEPHONE (OUTPATIENT)
Dept: CARDIOLOGY | Facility: HOSPITAL | Age: 59
End: 2024-09-03
Payer: MEDICARE

## 2024-09-03 ENCOUNTER — OFFICE VISIT (OUTPATIENT)
Dept: FAMILY MEDICINE | Facility: CLINIC | Age: 59
End: 2024-09-03
Payer: MEDICARE

## 2024-09-03 ENCOUNTER — PATIENT MESSAGE (OUTPATIENT)
Dept: CARDIOLOGY | Facility: HOSPITAL | Age: 59
End: 2024-09-03
Payer: MEDICARE

## 2024-09-03 VITALS
WEIGHT: 156.31 LBS | BODY MASS INDEX: 22.38 KG/M2 | HEIGHT: 70 IN | RESPIRATION RATE: 16 BRPM | OXYGEN SATURATION: 98 % | DIASTOLIC BLOOD PRESSURE: 60 MMHG | SYSTOLIC BLOOD PRESSURE: 110 MMHG | HEART RATE: 90 BPM | TEMPERATURE: 98 F

## 2024-09-03 DIAGNOSIS — I25.10 CORONARY ARTERY DISEASE, UNSPECIFIED VESSEL OR LESION TYPE, UNSPECIFIED WHETHER ANGINA PRESENT, UNSPECIFIED WHETHER NATIVE OR TRANSPLANTED HEART: ICD-10-CM

## 2024-09-03 DIAGNOSIS — F33.9 DEPRESSION, RECURRENT: Chronic | ICD-10-CM

## 2024-09-03 DIAGNOSIS — I50.32 CHRONIC HEART FAILURE WITH PRESERVED EJECTION FRACTION: Chronic | ICD-10-CM

## 2024-09-03 DIAGNOSIS — J44.9 STAGE 2 MODERATE COPD BY GOLD CLASSIFICATION: Chronic | ICD-10-CM

## 2024-09-03 DIAGNOSIS — Z09 HOSPITAL DISCHARGE FOLLOW-UP: ICD-10-CM

## 2024-09-03 DIAGNOSIS — I69.354 HEMIPLEGIA AND HEMIPARESIS FOLLOWING CEREBRAL INFARCTION AFFECTING LEFT NON-DOMINANT SIDE: Chronic | ICD-10-CM

## 2024-09-03 DIAGNOSIS — I63.9 RECURRENT STROKES: ICD-10-CM

## 2024-09-03 DIAGNOSIS — Z13.31 POSITIVE DEPRESSION SCREENING: ICD-10-CM

## 2024-09-03 DIAGNOSIS — E53.8 B12 DEFICIENCY: ICD-10-CM

## 2024-09-03 DIAGNOSIS — G43.719 INTRACTABLE CHRONIC MIGRAINE WITHOUT AURA AND WITHOUT STATUS MIGRAINOSUS: Chronic | ICD-10-CM

## 2024-09-03 DIAGNOSIS — I10 ESSENTIAL HYPERTENSION: ICD-10-CM

## 2024-09-03 DIAGNOSIS — Z95.818 STATUS POST PLACEMENT OF IMPLANTABLE LOOP RECORDER: Primary | ICD-10-CM

## 2024-09-03 PROCEDURE — 1159F MED LIST DOCD IN RCRD: CPT | Mod: HCNC,CPTII,S$GLB, | Performed by: NURSE PRACTITIONER

## 2024-09-03 PROCEDURE — 99214 OFFICE O/P EST MOD 30 MIN: CPT | Mod: HCNC,25,S$GLB, | Performed by: NURSE PRACTITIONER

## 2024-09-03 PROCEDURE — 3074F SYST BP LT 130 MM HG: CPT | Mod: HCNC,CPTII,S$GLB, | Performed by: NURSE PRACTITIONER

## 2024-09-03 PROCEDURE — 3044F HG A1C LEVEL LT 7.0%: CPT | Mod: HCNC,CPTII,S$GLB, | Performed by: NURSE PRACTITIONER

## 2024-09-03 PROCEDURE — 3078F DIAST BP <80 MM HG: CPT | Mod: HCNC,CPTII,S$GLB, | Performed by: NURSE PRACTITIONER

## 2024-09-03 PROCEDURE — 96372 THER/PROPH/DIAG INJ SC/IM: CPT | Mod: HCNC,S$GLB,, | Performed by: NURSE PRACTITIONER

## 2024-09-03 PROCEDURE — 3008F BODY MASS INDEX DOCD: CPT | Mod: HCNC,CPTII,S$GLB, | Performed by: NURSE PRACTITIONER

## 2024-09-03 PROCEDURE — 1160F RVW MEDS BY RX/DR IN RCRD: CPT | Mod: HCNC,CPTII,S$GLB, | Performed by: NURSE PRACTITIONER

## 2024-09-03 PROCEDURE — 99999 PR PBB SHADOW E&M-EST. PATIENT-LVL V: CPT | Mod: PBBFAC,HCNC,, | Performed by: NURSE PRACTITIONER

## 2024-09-03 RX ORDER — HYDROXYZINE HYDROCHLORIDE 50 MG/1
50 TABLET, FILM COATED ORAL DAILY
COMMUNITY

## 2024-09-03 RX ORDER — ZIPRASIDONE HYDROCHLORIDE 80 MG/1
80 CAPSULE ORAL DAILY
COMMUNITY

## 2024-09-03 RX ORDER — CYANOCOBALAMIN 1000 UG/ML
1000 INJECTION, SOLUTION INTRAMUSCULAR; SUBCUTANEOUS
Status: SHIPPED | OUTPATIENT
Start: 2024-09-03 | End: 2024-11-26

## 2024-09-03 RX ORDER — BREXPIPRAZOLE 4 MG/1
4 TABLET ORAL DAILY
COMMUNITY

## 2024-09-03 RX ADMIN — CYANOCOBALAMIN 1000 MCG: 1000 INJECTION, SOLUTION INTRAMUSCULAR; SUBCUTANEOUS at 10:09

## 2024-09-03 NOTE — PROGRESS NOTES
THIS DOCUMENT WAS MADE IN PART WITH VOICE RECOGNITION SOFTWARE.  OCCASIONALLY THIS SOFTWARE WILL MISINTERPRET WORDS OR PHRASES.     Assessment and Plan:    Status post placement of implantable loop recorder  Comments:  stable   keep follow up with Cardiology    Hospital discharge follow-up    Intractable chronic migraine without aura and without status migrainosus  Comments:  controlled   continue regimen   keep follow up with Neurology    Essential hypertension  Comments:  controlled    Hemiplegia and hemiparesis following cerebral infarction affecting left non-dominant side  Comments:  improving   schedule follow up with Neurology    Coronary artery disease, unspecified vessel or lesion type, unspecified whether angina present, unspecified whether native or transplanted heart  Comments:  stable   continue atorvastatin,  Plavix   keep follow up with Cardiology    Chronic heart failure with preserved ejection fraction  Comments:  stable   keep follow up with Cardiology    Stage 2 moderate COPD by GOLD classification  Comments:  stable   denies exacerbations   continue regimen   keep follow up pulmonologist    B12 deficiency  -     cyanocobalamin injection 1,000 mcg    Recurrent strokes    Depression, recurrent  Comments:  Patient reported s/s improving-  taking meds as prescribed  continue regimen as prescribed.  keep follow up with Psychiatry    Positive depression screening  Comments:  I have reviewed the positive depression score with the patient and found that no additional intervention is needed at this time.             Visit summary:    Hospital course and diagnostic studies reviewed in detail with patient during today's visit.    Patient advised to keep follow up with specialists.    Advised on diagnosis, medications and symptomatic treatment.    Emergent conditions/ER precautions discussed.      Follow up in 6 months (on 3/3/2025), or if symptoms worsen or fail to improve, for Follow-up with PCP.    ______________________________________________________________________  Subjective:    Chief Complaint:  Hospital follow up    HPI:  Ryan is a 59 y.o. year old male with a past medical history noted below, here for hospital follow up.  Patient was admitted to [facility] on August 23rd after presenting to the ER complaining of  headaches/migraine and left-sided weakness..  CT and MRI imaging showed no acute new infarcts.  Laboratory workup was normal including B12/folate/lipids.  Patient was continued on aspirin Plavix and Lipitor and given Fioricet for headache.  Neurology was consulted voiced suspicion for A-fib in the setting of multiple CVAs _  Cardiology was consulted for loop recorder placement.  The patient was discharged to follow up with Cardiology,  neurology and  PCP.       9/11/24 f/u with Cardiologist- Dr Irvin    9/11/24 f/u with Neurologist- Dr. Navarro       Patient reports he is feeling much better,  left-sided weakness has improved,  no longer having a headache.         See hospital course below:    Hospital Course and Treatment:   Admission Information         Date & Time  8/23/2024 Provider  Giacomo Mccord MD Department  [facility] Dept. Phone  [phone]                Patient came in with headaches/migraine and left-sided weakness with history of prior CVAs, CAD with stents, schizophrenia, history of vitamin deficiencies, likely iron deficiency anemia.  Prior echocardiogram showed normal EF with severely dilated left atrium.  CT and MRI imaging showed no acute new infarcts.  Laboratory workup was normal including B12/folate/lipids.  Patient was continued on aspirin Plavix and Lipitor and given Fioricet for headache.  Was also given his antipsychotic and antidepressant medications from home.  Neurology was consulted and provided the recommendation that patient be seen by cardiology for potential loop recorder placement due to having suspicion for A-fib in the  setting of multiple CVAs.  Cardiology saw the patient and placed a loop recorder on 8/24/2024.  Per both cardiology and neurology patient was stable for discharge home due to having no acute emergent pathology.  Will follow-up with cardiology in 1 week for a site visit and with neurology in 1 month for posthospital follow-up.  Also advised the patient follow-up with his primary care provider in 1 month as well.     Patient was discharged on his home medications as well as aspirin/ticagrelor/atorvastatin.  No other medication changes were made.        Medications:  Current Outpatient Medications on File Prior to Visit   Medication Sig Dispense Refill    albuterol (PROVENTIL/VENTOLIN HFA) 90 mcg/actuation inhaler Inhale 2 puffs into the lungs every 6 (six) hours as needed for Shortness of Breath or Wheezing. 18 g 5    albuterol-ipratropium (DUO-NEB) 2.5 mg-0.5 mg/3 mL nebulizer solution Take 3 mLs by nebulization every 6 (six) hours as needed for Wheezing. Rescue 75 mL 0    ammonium lactate 12 % Crea Apply 2 g topically 2 (two) times a day. 385 g 11    aspirin 81 MG Chew Take 81 mg by mouth once daily.      atorvastatin (LIPITOR) 80 MG tablet Take 80 mg by mouth once daily.      brexpiprazole (REXULTI) 4 mg Tab Take 4 mg by mouth Daily.      butalbital-acetaminophen-caffeine -40 mg (FIORICET, ESGIC) -40 mg per tablet Take 1 tablet by mouth as needed for migraine. No more than 10 tablets per month. 10 tablet 0    clonazePAM (KLONOPIN) 0.5 MG tablet Take 0.5 mg by mouth daily as needed.      clopidogreL (PLAVIX) 75 mg tablet Take 1 tablet (75 mg total) by mouth once daily. 30 tablet 11    cyanocobalamin 1,000 mcg/mL injection 1 ml sq daily x 5 days, then 1 ml sq weekly x 4 weeks then q monthly -  will need to go to PCP (Patient taking differently: Inject 1,000 mcg into the muscle every 14 (fourteen) days. 1 ml sq daily x 5 days, then 1 ml sq weekly x 4 weeks then q monthly -  will need to go to PCP) 9 mL 0     dipyridamole-aspirin 200-25 mg (AGGRENOX)  mg CM12 Take 1 capsule by mouth 2 (two) times daily.      EPINEPHrine (EPIPEN) 0.3 mg/0.3 mL AtIn Inject 0.3 mLs (0.3 mg total) into the muscle as needed. 1 each 0    fluticasone-umeclidin-vilanter (TRELEGY ELLIPTA) 100-62.5-25 mcg DsDv INHALE 1 PUFF INTO THE LUNGS ONE TIME DAILY 60 each 11    fremanezumab-vfrm (AJOVY AUTOINJECTOR) 225 mg/1.5 mL autoinjector Inject 1.5 mLs (225 mg total) into the skin every 28 days. 1 each 11    hydrOXYzine (ATARAX) 50 MG tablet Take 50 mg by mouth Daily.      lamoTRIgine (LAMICTAL) 200 MG tablet Take 200 mg by mouth once daily.      meclizine (ANTIVERT) 12.5 mg tablet Take 1 tablet (12.5 mg total) by mouth 3 (three) times daily as needed for Dizziness. 90 tablet 0    mirtazapine (REMERON) 15 MG tablet Take 15 mg by mouth every evening.      nicotine (NICODERM CQ) 14 mg/24 hr Place 1 patch onto the skin once daily. 14 patch 0    nicotine polacrilex 4 MG Lozg Take up to 10 pieces daily as needed 72 lozenge 0    nitroGLYCERIN (NITROSTAT) 0.4 MG SL tablet Place 0.4 mg under the tongue every 5 (five) minutes as needed.      ondansetron (ZOFRAN) 4 MG tablet Take 8 mg by mouth every 8 (eight) hours as needed.      ondansetron (ZOFRAN-ODT) 4 MG TbDL Take 1 tablet (4 mg total) by mouth every 8 (eight) hours as needed (nausea). 30 tablet 0    pantoprazole (PROTONIX) 40 MG tablet Take 1 tablet (40 mg total) by mouth once daily. 90 tablet 3    pregabalin (LYRICA) 50 MG capsule Take 1 capsule (50 mg total) by mouth 3 (three) times daily. 270 capsule 1    ranolazine (RANEXA) 500 MG Tb12 Take 1 tablet (500 mg total) by mouth 2 (two) times daily. 180 tablet 3    tadalafiL (CIALIS) 20 MG Tab Take 1 tablet (20 mg total) by mouth once daily. 10 tablet 11    topiramate (TOPAMAX) 200 MG Tab Take 1 tablet (200 mg total) by mouth every evening. 90 tablet 3    ubrogepant (UBRELVY) 100 mg tablet Take 1 tablet by mouth as needed for migraine. May repeat in  2 hours if needed. Max 2 tab per day 8 tablet 11    ziprasidone (GEODON) 80 MG capsule Take 80 mg by mouth once daily.      [DISCONTINUED] brexpiprazole (REXULTI) 2 mg Tab Take 2 mg by mouth once daily. Indications: schizophrenia (Patient not taking: Reported on 9/3/2024)      [DISCONTINUED] hydrOXYzine HCL (ATARAX) 10 MG Tab Take 10 mg by mouth 3 (three) times daily as needed. (Patient not taking: Reported on 9/3/2024)      [DISCONTINUED] ziprasidone (GEODON) 80 MG capsule Take 160 mg by mouth every evening. (Patient not taking: Reported on 9/3/2024)       Current Facility-Administered Medications on File Prior to Visit   Medication Dose Route Frequency Provider Last Rate Last Admin    cyanocobalamin injection 1,000 mcg  1,000 mcg Intramuscular Q14 Days Ana Mayer MD   1,000 mcg at 06/24/24 1445    diphenhydrAMINE injection 12.5 mg  12.5 mg Intravenous Q6H PRN Irvin Frank MD        electrolyte-S (ISOLYTE)   Intravenous Continuous Irvin Frank MD        lactated ringers infusion   Intravenous Continuous Napoleon Grimaldo MD        lactated ringers infusion   Intravenous Continuous Napoleon Grimaldo MD 25 mL/hr at 08/25/23 0948 New Bag at 08/25/23 0948    lactated ringers infusion   Intravenous Continuous Napoleon Grimaldo MD   Stopped at 10/31/23 1255    lactated ringers infusion   Intravenous Continuous Irvin Frank MD 10 mL/hr at 11/17/23 0745 New Bag at 11/17/23 0745    LIDOcaine (PF) 10 mg/ml (1%) injection 10 mg  1 mL Intradermal Once Napoleon Grimaldo MD        LIDOcaine (PF) 10 mg/ml (1%) injection 10 mg  1 mL Intradermal Once Napoleon Grimaldo MD        LIDOcaine (PF) 10 mg/ml (1%) injection 10 mg  1 mL Intradermal Once Napoleon Grimaldo MD        LIDOcaine (PF) 10 mg/ml (1%) injection 10 mg  1 mL Intradermal Once Irvin Frank MD           Review of Systems:  Review of Systems   Constitutional:  Positive for fatigue. Negative for chills and fever.   HENT:  Negative for congestion and rhinorrhea.    Eyes:   Negative for visual disturbance.   Respiratory:  Negative for cough and shortness of breath.    Cardiovascular:  Negative for chest pain, palpitations and leg swelling.   Gastrointestinal:  Negative for diarrhea, nausea and vomiting.   Skin:  Negative for rash.   Neurological:  Negative for dizziness, weakness, light-headedness and headaches.   Psychiatric/Behavioral:  Negative for decreased concentration and dysphoric mood.        Past Medical History:  Past Medical History:   Diagnosis Date    Aortic valve stenosis 02/28/2022    Arachnoid cyst of posterior cranial fossa 01/13/2022    Benign prostatic hyperplasia without lower urinary tract symptoms 05/25/2023    Bilateral carotid bruits 06/22/2021    Bipolar disorder 01/16/2022    CAD S/P percutaneous coronary angioplasty     3 vessel disease    Calculus of gallbladder without cholecystitis without obstruction 01/11/2023    Cancer     Candidal intertrigo 11/11/2019    Cataract     Chronic heart failure with preserved ejection fraction 03/29/2023    Chronic schizophrenia     Colon polyps     Diabetic polyneuropathy associated with diabetes mellitus due to underlying condition 11/21/2019    Dysarthria as late effect of cerebellar cerebrovascular accident (CVA) 03/24/2023    Equinus deformity of both feet 11/21/2019    Erectile dysfunction due to arterial insufficiency 05/25/2023    Flaccid hemiplegia of left nondominant side as late effect of cerebral infarction 03/17/2023    Flat foot 11/21/2019    Gastritis     Gastroesophageal reflux disease without esophagitis 07/13/2023    Gastrointestinal hemorrhage 12/13/2019    Post polypectomy bleeding    General anesthetics causing adverse effect in therapeutic use     Hammer toes of both feet 11/21/2019    Hx of diabetic foot ulcer 11/21/2019    Hypertension     Hyponatremia 01/11/2022    ELAINE (iron deficiency anemia) 12/10/2019    Intractable chronic migraine without aura and without status migrainosus 12/30/2021     "Microcytic anemia 10/09/2019    Moderate aortic regurgitation 03/29/2023    Moderate mitral regurgitation 03/29/2023    Moderate mitral stenosis 03/29/2023    Moderate to severe aortic stenosis 02/28/2022    Myocardial infarct, old     Nicotine dependence, unspecified, uncomplicated 01/16/2022    Onychomycosis due to dermatophyte 11/21/2019    Orchitis 11/09/2019    PIPER on CPAP     Peripheral visual field defect, bilateral 01/13/2022    PVD (peripheral vascular disease) 11/21/2019    Seizures 2008    Stage 2 moderate COPD by GOLD classification     PFTs w/ FEV1 (73%) (9/2022)    Stroke 2005    Thoracic aortic atherosclerosis 10/17/2022    CT chest    Thyroid disease     Previously on pharmacologic Tx    Tinea pedis of right foot 05/21/2019    Tobacco use disorder     Type 2 diabetes mellitus with diabetic peripheral angiopathy without gangrene     A1c 5.1% (3/2023)    Vitamin B12 deficiency 03/24/2023       Objective:    Vitals:  Vitals:    09/03/24 1016   BP: 110/60   Pulse: 90   Resp: 16   Temp: 97.7 °F (36.5 °C)   TempSrc: Oral   SpO2: 98%   Weight: 70.9 kg (156 lb 4.9 oz)   Height: 5' 10" (1.778 m)   PainSc: 0-No pain       Physical Exam  Vitals and nursing note reviewed.   Constitutional:       General: He is not in acute distress.  HENT:      Head: Normocephalic and atraumatic.      Nose: Nose normal.      Mouth/Throat:      Mouth: Mucous membranes are moist.      Pharynx: Oropharynx is clear.   Eyes:      General: No scleral icterus.     Conjunctiva/sclera: Conjunctivae normal.   Cardiovascular:      Rate and Rhythm: Normal rate and regular rhythm.   Pulmonary:      Effort: Pulmonary effort is normal. No respiratory distress.      Breath sounds: Normal breath sounds.   Musculoskeletal:      Right lower leg: No edema.      Left lower leg: No edema.   Skin:     General: Skin is warm and dry.   Neurological:      Mental Status: He is alert and oriented to person, place, and time.   Psychiatric:         Mood and " Affect: Mood normal. Affect is flat.         Behavior: Behavior normal.         Thought Content: Thought content normal. Thought content does not include suicidal ideation. Thought content does not include suicidal plan.         Data:  CMP  Sodium   Date Value Ref Range Status   08/24/2024 143 136 - 144 mmol/L Final   06/24/2024 139 136 - 145 mmol/L Final     Potassium   Date Value Ref Range Status   08/24/2024 3.7 3.6 - 5.1 mmol/L Final   06/24/2024 4.9 3.5 - 5.1 mmol/L Final     Chloride   Date Value Ref Range Status   06/24/2024 105 95 - 110 mmol/L Final     CO2   Date Value Ref Range Status   08/24/2024 23 22 - 32 mmol/L Final   06/24/2024 26 23 - 29 mmol/L Final     Glucose   Date Value Ref Range Status   06/24/2024 93 70 - 110 mg/dL Final     BUN   Date Value Ref Range Status   06/24/2024 12 6 - 20 mg/dL Final     Blood Urea Nitrogen   Date Value Ref Range Status   08/24/2024 11 8 - 20 mg/dL Final     Creatinine   Date Value Ref Range Status   08/24/2024 0.81 (L) 0.90 - 1.30 mg/dL Final   06/24/2024 0.9 0.5 - 1.4 mg/dL Final     Calcium   Date Value Ref Range Status   08/24/2024 9.3 8.9 - 10.3 mg/dL Final   06/24/2024 9.4 8.7 - 10.5 mg/dL Final     Total Protein   Date Value Ref Range Status   08/24/2024 6.0 (L) 6.1 - 7.9 g/dL Final   06/24/2024 6.7 6.0 - 8.4 g/dL Final     Albumin   Date Value Ref Range Status   08/24/2024 3.7 3.5 - 4.8 g/dL Final   06/24/2024 3.7 3.5 - 5.2 g/dL Final     Total Bilirubin   Date Value Ref Range Status   08/24/2024 0.4 0.4 - 2.0 mg/dL Final   06/24/2024 0.3 0.1 - 1.0 mg/dL Final     Comment:     For infants and newborns, interpretation of results should be based  on gestational age, weight and in agreement with clinical  observations.    Premature Infant recommended reference ranges:  Up to 24 hours.............<8.0 mg/dL  Up to 48 hours............<12.0 mg/dL  3-5 days..................<15.0 mg/dL  6-29 days.................<15.0 mg/dL       Alkaline Phosphatase   Date Value  Ref Range Status   08/24/2024 79 28 - 116 U/L Final   06/24/2024 84 55 - 135 U/L Final     AST   Date Value Ref Range Status   08/24/2024 13 12 - 40 U/L Final   06/24/2024 12 10 - 40 U/L Final     ALT   Date Value Ref Range Status   08/24/2024 9 5 - 56 U/L Final   06/24/2024 10 10 - 44 U/L Final     Anion Gap   Date Value Ref Range Status   08/24/2024 7 7 - 16 mmol/L Final   06/24/2024 8 8 - 16 mmol/L Final     eGFR   Date Value Ref Range Status   06/24/2024 >60.0 >60 mL/min/1.73 m^2 Final      Lab Results   Component Value Date    WBC 6.84 06/24/2024    HGB 10.4 (L) 06/24/2024    HCT 32.0 (L) 06/24/2024    MCV 87 06/24/2024     06/24/2024           Medical history reviewed, Medications reconciled.              ANNABELLE NairP-C  Family Medicine

## 2024-09-04 ENCOUNTER — TELEPHONE (OUTPATIENT)
Dept: SMOKING CESSATION | Facility: CLINIC | Age: 59
End: 2024-09-04
Payer: MEDICARE

## 2024-09-05 ENCOUNTER — TELEPHONE (OUTPATIENT)
Dept: CARDIOLOGY | Facility: HOSPITAL | Age: 59
End: 2024-09-05
Payer: MEDICARE

## 2024-09-09 ENCOUNTER — TELEPHONE (OUTPATIENT)
Dept: CARDIOLOGY | Facility: HOSPITAL | Age: 59
End: 2024-09-09
Payer: MEDICARE

## 2024-09-16 ENCOUNTER — TELEPHONE (OUTPATIENT)
Dept: CARDIOLOGY | Facility: HOSPITAL | Age: 59
End: 2024-09-16
Payer: MEDICARE

## 2024-09-16 ENCOUNTER — TELEPHONE (OUTPATIENT)
Dept: FAMILY MEDICINE | Facility: CLINIC | Age: 59
End: 2024-09-16
Payer: MEDICARE

## 2024-09-16 NOTE — TELEPHONE ENCOUNTER
Pt. Returned call. ILR placed @ [place] 8/24/24.Discussed ILR site/ signs and symptoms of infection. Pt. States site looks fine. Discussed home monitoring, Deepti, billed monthly reports, symptom activator. Provided callback info if he has any questions or concerns. Pt. Voiced understanding.

## 2024-09-16 NOTE — TELEPHONE ENCOUNTER
----- Message from Gely Mirza sent at 9/16/2024 10:33 AM CDT -----  Contact: self  Type:  Needs Medical Advice    Who Called: self  Symptoms (please be specific): needs to speak to nurse about scheduling a b12 shot.      Would the patient rather a call back or a response via [facility]? call  Best Call Back Number: [phone]    Additional Information: please advise and thank you.

## 2024-09-18 DIAGNOSIS — G43.719 INTRACTABLE CHRONIC MIGRAINE WITHOUT AURA AND WITHOUT STATUS MIGRAINOSUS: ICD-10-CM

## 2024-09-18 RX ORDER — BUTALBITAL, ACETAMINOPHEN AND CAFFEINE 50; 325; 40 MG/1; MG/1; MG/1
TABLET ORAL
Qty: 10 TABLET | Refills: 0 | Status: SHIPPED | OUTPATIENT
Start: 2024-09-18

## 2024-09-19 ENCOUNTER — CLINICAL SUPPORT (OUTPATIENT)
Dept: FAMILY MEDICINE | Facility: CLINIC | Age: 59
End: 2024-09-19
Payer: MEDICARE

## 2024-09-19 DIAGNOSIS — E53.8 B12 DEFICIENCY: Primary | ICD-10-CM

## 2024-09-19 RX ADMIN — CYANOCOBALAMIN 1000 MCG: 1000 INJECTION, SOLUTION INTRAMUSCULAR; SUBCUTANEOUS at 10:09

## 2024-09-19 NOTE — PROGRESS NOTES
Pt here for cyanocabalamin injection(see MAR). Confirmed name and . Cyanocabalamin 1000mcg given Right glut. Pt tolerated well. Confirmed next dose and appt.      
90

## 2024-09-20 ENCOUNTER — TELEPHONE (OUTPATIENT)
Dept: FAMILY MEDICINE | Facility: CLINIC | Age: 59
End: 2024-09-20
Payer: MEDICARE

## 2024-09-20 DIAGNOSIS — E08.42 DIABETIC POLYNEUROPATHY ASSOCIATED WITH DIABETES MELLITUS DUE TO UNDERLYING CONDITION: Chronic | ICD-10-CM

## 2024-09-20 RX ORDER — CLOPIDOGREL BISULFATE 75 MG/1
75 TABLET ORAL DAILY
Qty: 30 TABLET | Refills: 11 | Status: SHIPPED | OUTPATIENT
Start: 2024-09-20 | End: 2025-09-20

## 2024-09-20 RX ORDER — ATORVASTATIN CALCIUM 80 MG/1
80 TABLET, FILM COATED ORAL DAILY
Qty: 30 TABLET | Refills: 0 | Status: SHIPPED | OUTPATIENT
Start: 2024-09-20

## 2024-09-20 RX ORDER — PREGABALIN 50 MG/1
50 CAPSULE ORAL 3 TIMES DAILY
Qty: 270 CAPSULE | Refills: 1 | Status: SHIPPED | OUTPATIENT
Start: 2024-09-20

## 2024-09-20 NOTE — TELEPHONE ENCOUNTER
Please approve for atorvastatin (LIPITOR) 80 MG tablet   Last OV 09/03/24  Last refill date 06/24/24  Last labs 08/24/24  Next appt 03/03/25

## 2024-09-20 NOTE — TELEPHONE ENCOUNTER
No care due was identified.  [facility] Embedded Care Due Messages. Reference number: [id number].   9/20/2024 10:41:12 AM CDT

## 2024-09-20 NOTE — TELEPHONE ENCOUNTER
----- Message from La Nena Mack sent at 9/20/2024  9:51 AM CDT -----  Contact: pt [phone]  Type:  RX Refill Request    Who Called:  Pt   Refill or New Rx:  refill  RX Name and Strength:  pregabalin (LYRICA) 50 MG capsule/ clopidogreL (PLAVIX) 75 mg tablet   How is the patient currently taking it? (ex. 1XDay):  -  Is this a 30 day or 90 day RX:  90  Preferred Pharmacy with phone number:    [id number] - [place] - [address] AT [facility] [address] [facility]  [address] [address]  Phone: [phone] Fax: [phone]      Local or Mail Order:  [facility]   Ordering Provider:  -  Best Call Back Number:  [phone]    Additional Information:  Vamshi call back

## 2024-09-20 NOTE — TELEPHONE ENCOUNTER
No care due was identified.  [facility] Embedded Care Due Messages. Reference number: [id number].   9/20/2024 1:27:03 PM CDT

## 2024-09-20 NOTE — TELEPHONE ENCOUNTER
----- Message from Marie Ag sent at 9/20/2024 12:12 PM CDT -----  Regarding: Refil Request  Who Called:DEANNE MICHAELS [[id number]]       New Prescription or Refill : Refill      RX Name and Strength:  atorbastapin 80 mg tab       30 day or 90 day RX:90 day       Local or Mail Order : Local          [facility]erred Pharm[id number] [address] [facility] [address]      Would the patient rather a call back or a response via [facility]?no        Best Call Back Number:  [phone]        Additional Information:.PT wants a new script for his cholesterol meds but he can't remember the name of it please assist

## 2024-09-20 NOTE — TELEPHONE ENCOUNTER
Please approve for clopidogreL (PLAVIX) 75 mg tablet   Last OV 09/03/24  Last refill date 03/08/23  Last labs 08/24/24  Next appt 03/03/25    Please approve for pregabalin (LYRICA) 50 MG capsule   Last refill date 08/14/23

## 2024-09-23 ENCOUNTER — CLINICAL SUPPORT (OUTPATIENT)
Dept: CARDIOLOGY | Facility: HOSPITAL | Age: 59
End: 2024-09-23

## 2024-09-23 ENCOUNTER — HOSPITAL ENCOUNTER (OUTPATIENT)
Dept: CARDIOLOGY | Facility: HOSPITAL | Age: 59
Discharge: HOME | End: 2024-09-23
Attending: INTERNAL MEDICINE
Payer: MEDICARE

## 2024-09-23 DIAGNOSIS — I49.8 OTHER SPECIFIED CARDIAC ARRHYTHMIAS: ICD-10-CM

## 2024-09-23 PROCEDURE — 93298 REM INTERROG DEV EVAL SCRMS: CPT | Mod: 26,HCNC,, | Performed by: INTERNAL MEDICINE

## 2024-09-23 PROCEDURE — 93298 REM INTERROG DEV EVAL SCRMS: CPT | Mod: HCNC,PO | Performed by: INTERNAL MEDICINE

## 2024-10-03 ENCOUNTER — HOSPITAL ENCOUNTER (OUTPATIENT)
Dept: CARDIOLOGY | Facility: HOSPITAL | Age: 59
Discharge: HOME | End: 2024-10-03
Attending: INTERNAL MEDICINE
Payer: MEDICARE

## 2024-10-03 VITALS — HEART RATE: 70 BPM | WEIGHT: 156 LBS | HEIGHT: 70 IN | BODY MASS INDEX: 22.33 KG/M2

## 2024-10-03 DIAGNOSIS — F20.9 CHRONIC SCHIZOPHRENIA: Chronic | ICD-10-CM

## 2024-10-03 DIAGNOSIS — I10 ESSENTIAL HYPERTENSION: Chronic | ICD-10-CM

## 2024-10-03 DIAGNOSIS — H53.453 PERIPHERAL VISUAL FIELD DEFECT, BILATERAL: ICD-10-CM

## 2024-10-03 DIAGNOSIS — I35.0 MODERATE TO SEVERE AORTIC STENOSIS: Chronic | ICD-10-CM

## 2024-10-03 DIAGNOSIS — E08.42 DIABETIC POLYNEUROPATHY ASSOCIATED WITH DIABETES MELLITUS DUE TO UNDERLYING CONDITION: Chronic | ICD-10-CM

## 2024-10-03 DIAGNOSIS — I73.9 PVD (PERIPHERAL VASCULAR DISEASE): Chronic | ICD-10-CM

## 2024-10-03 DIAGNOSIS — I69.354 HEMIPLEGIA AND HEMIPARESIS FOLLOWING CEREBRAL INFARCTION AFFECTING LEFT NON-DOMINANT SIDE: Chronic | ICD-10-CM

## 2024-10-03 DIAGNOSIS — I05.0 MODERATE MITRAL STENOSIS: Chronic | ICD-10-CM

## 2024-10-03 DIAGNOSIS — Z86.73 HISTORY OF STROKE: Chronic | ICD-10-CM

## 2024-10-03 PROCEDURE — 93306 TTE W/DOPPLER COMPLETE: CPT | Mod: HCNC,PO

## 2024-10-04 ENCOUNTER — HOSPITAL ENCOUNTER (OUTPATIENT)
Dept: RADIOLOGY | Facility: HOSPITAL | Age: 59
Discharge: HOME | End: 2024-10-04
Attending: INTERNAL MEDICINE
Payer: MEDICARE

## 2024-10-04 DIAGNOSIS — Z87.891 PERSONAL HISTORY OF TOBACCO USE, PRESENTING HAZARDS TO HEALTH: ICD-10-CM

## 2024-10-04 LAB
ASCENDING AORTA: 2.67 CM
AV INDEX (PROSTH): 0.45
AV MEAN GRADIENT: 30.2 MMHG
AV PEAK GRADIENT: 57.8 MMHG
AV REGURGITATION PRESSURE HALF TIME: 354.8 MS
AV VALVE AREA BY VELOCITY RATIO: 1.7 CM²
AV VALVE AREA: 1.3 CM²
AV VELOCITY RATIO: 0.61
BSA FOR ECHO PROCEDURE: 1.87 M2
CV ECHO LV RWT: 0.44 CM
DOP CALC AO PEAK VEL: 3.8 M/S
DOP CALC AO VTI: 79.4 CM
DOP CALC LVOT AREA: 2.8 CM2
DOP CALC LVOT DIAMETER: 1.9 CM
DOP CALC LVOT PEAK VEL: 2.3 M/S
DOP CALC LVOT STROKE VOLUME: 100.6 CM3
DOP CALC MV VTI: 64.8 CM
DOP CALCLVOT PEAK VEL VTI: 35.5 CM
E WAVE DECELERATION TIME: 334.92 MSEC
E/A RATIO: 0.71
E/E' RATIO: 34.22 M/S
ECHO LV POSTERIOR WALL: 1.1 CM (ref 0.6–1.1)
FRACTIONAL SHORTENING: 46 % (ref 28–44)
INTERVENTRICULAR SEPTUM: 1.2 CM (ref 0.6–1.1)
IVRT: 154.14 MSEC
LEFT ATRIUM AREA SYSTOLIC (APICAL 2 CHAMBER): 19.39 CM2
LEFT ATRIUM AREA SYSTOLIC (APICAL 4 CHAMBER): 25.01 CM2
LEFT ATRIUM SIZE: 3.97 CM
LEFT ATRIUM VOLUME INDEX MOD: 47.3 ML/M2
LEFT ATRIUM VOLUME MOD: 88.94 ML
LEFT INTERNAL DIMENSION IN SYSTOLE: 2.7 CM (ref 2.1–4)
LEFT VENTRICLE DIASTOLIC VOLUME INDEX: 61.73 ML/M2
LEFT VENTRICLE DIASTOLIC VOLUME: 116.05 ML
LEFT VENTRICLE END SYSTOLIC VOLUME APICAL 2 CHAMBER: 68.1 ML
LEFT VENTRICLE END SYSTOLIC VOLUME APICAL 4 CHAMBER: 85.71 ML
LEFT VENTRICLE MASS INDEX: 117.2 G/M2
LEFT VENTRICLE SYSTOLIC VOLUME INDEX: 14.5 ML/M2
LEFT VENTRICLE SYSTOLIC VOLUME: 27.31 ML
LEFT VENTRICULAR INTERNAL DIMENSION IN DIASTOLE: 5 CM (ref 3.5–6)
LEFT VENTRICULAR MASS: 220.3 G
LV LATERAL E/E' RATIO: 38.5 M/S
LV SEPTAL E/E' RATIO: 30.8 M/S
LVED V (TEICH): 116.05 ML
LVES V (TEICH): 27.31 ML
LVOT MG: 8.83 MMHG
LVOT MV: 1.37 CM/S
MV MEAN GRADIENT: 11 MMHG
MV PEAK A VEL: 2.17 M/S
MV PEAK E VEL: 1.54 M/S
MV PEAK GRADIENT: 21 MMHG
MV STENOSIS PRESSURE HALF TIME: 92.69 MS
MV VALVE AREA BY CONTINUITY EQUATION: 1.55 CM2
MV VALVE AREA P 1/2 METHOD: 2.37 CM2
PISA AR MAX VEL: 4.35 M/S
PISA TR MAX VEL: 3.3 M/S
PULM VEIN S/D RATIO: 0.79
PV PEAK D VEL: 0.47 M/S
PV PEAK S VEL: 0.37 M/S
RA PRESSURE ESTIMATED: 3 MMHG
RA VOL SYS: 25.93 ML
RIGHT ATRIAL AREA: 11.4 CM2
RIGHT ATRIUM VOLUME AREA LENGTH APICAL 4 CHAMBER: 23.96 ML
RIGHT VENTRICLE DIASTOLIC LENGTH: 3.3 CM
RIGHT VENTRICLE DIASTOLIC MID DIMENSION: 3.7 CM
RIGHT VENTRICULAR END-DIASTOLIC DIMENSION: 3.34 CM
RIGHT VENTRICULAR LENGTH IN DIASTOLE (APICAL 4-CHAMBER VIEW): 3.29 CM
RV MID DIAMA: 3.72 CM
RV TB RVSP: 6 MMHG
RV TISSUE DOPPLER FREE WALL SYSTOLIC VELOCITY 1 (APICAL 4 CHAMBER VIEW): 22.25 CM/S
SINUS: 3.31 CM
STJ: 2.67 CM
TDI LATERAL: 0.04 M/S
TDI SEPTAL: 0.05 M/S
TDI: 0.05 M/S
TR MAX PG: 44 MMHG
TRICUSPID ANNULAR PLANE SYSTOLIC EXCURSION: 2.88 CM
TV REST PULMONARY ARTERY PRESSURE: 47 MMHG
Z-SCORE OF LEFT VENTRICULAR DIMENSION IN END DIASTOLE: -0.49
Z-SCORE OF LEFT VENTRICULAR DIMENSION IN END SYSTOLE: -1.44

## 2024-10-04 PROCEDURE — 71271 CT THORAX LUNG CANCER SCR C-: CPT | Mod: TC,HCNC,PO

## 2024-10-04 PROCEDURE — 71271 CT THORAX LUNG CANCER SCR C-: CPT | Mod: 26,HCNC,, | Performed by: RADIOLOGY

## 2024-10-07 ENCOUNTER — OFFICE VISIT (OUTPATIENT)
Dept: CARDIOLOGY | Facility: CLINIC | Age: 59
End: 2024-10-07
Attending: INTERNAL MEDICINE
Payer: MEDICARE

## 2024-10-07 VITALS
DIASTOLIC BLOOD PRESSURE: 73 MMHG | BODY MASS INDEX: 23.29 KG/M2 | SYSTOLIC BLOOD PRESSURE: 119 MMHG | HEART RATE: 71 BPM | WEIGHT: 162.69 LBS | HEIGHT: 70 IN

## 2024-10-07 DIAGNOSIS — G47.33 OSA ON CPAP: Chronic | ICD-10-CM

## 2024-10-07 DIAGNOSIS — I10 ESSENTIAL HYPERTENSION: Chronic | ICD-10-CM

## 2024-10-07 DIAGNOSIS — F17.210 CIGARETTE NICOTINE DEPENDENCE WITHOUT COMPLICATION: ICD-10-CM

## 2024-10-07 DIAGNOSIS — Z98.61 CAD S/P PERCUTANEOUS CORONARY ANGIOPLASTY: Chronic | ICD-10-CM

## 2024-10-07 DIAGNOSIS — I25.10 CAD S/P PERCUTANEOUS CORONARY ANGIOPLASTY: Chronic | ICD-10-CM

## 2024-10-07 DIAGNOSIS — J44.9 STAGE 2 MODERATE COPD BY GOLD CLASSIFICATION: Chronic | ICD-10-CM

## 2024-10-07 DIAGNOSIS — I35.0 AORTIC STENOSIS, MODERATE: ICD-10-CM

## 2024-10-07 DIAGNOSIS — I05.0 MODERATE MITRAL STENOSIS: Chronic | ICD-10-CM

## 2024-10-07 DIAGNOSIS — I70.0 THORACIC AORTIC ATHEROSCLEROSIS: Chronic | ICD-10-CM

## 2024-10-07 DIAGNOSIS — I34.0 MODERATE MITRAL REGURGITATION: Primary | ICD-10-CM

## 2024-10-07 DIAGNOSIS — I69.354 HEMIPLEGIA AND HEMIPARESIS FOLLOWING CEREBRAL INFARCTION AFFECTING LEFT NON-DOMINANT SIDE: Chronic | ICD-10-CM

## 2024-10-07 DIAGNOSIS — H53.453 PERIPHERAL VISUAL FIELD DEFECT, BILATERAL: ICD-10-CM

## 2024-10-07 DIAGNOSIS — I05.0 MODERATE MITRAL STENOSIS: ICD-10-CM

## 2024-10-07 DIAGNOSIS — E08.42 DIABETIC POLYNEUROPATHY ASSOCIATED WITH DIABETES MELLITUS DUE TO UNDERLYING CONDITION: Primary | Chronic | ICD-10-CM

## 2024-10-07 PROBLEM — I63.9 RECURRENT STROKES: Status: RESOLVED | Noted: 2023-03-04 | Resolved: 2024-10-07

## 2024-10-07 PROCEDURE — 99999 PR PBB SHADOW E&M-EST. PATIENT-LVL V: CPT | Mod: PBBFAC,HCNC,, | Performed by: INTERNAL MEDICINE

## 2024-10-07 NOTE — PROGRESS NOTES
Subjective:    Patient ID:  Ryan Crane is a 59 y.o. male patient here for evaluation Follow-up (CVA)      History of Present Illness:  Follow-up visit.  Complex past cardiovascular history.  PCI stent , 08/25/2022.  5/22/2024.  Findings at last left heart catheterization revealed proximal LAD with ulcerated and dissected 30% stenosis, culprit lesion , distal disease in 1st diagonal, nonobstructive , left circumflex, right coronary artery with nonobstructive CAD successful PCI stent.  NSTEMI.  No high-grade disease involving the remainder of the coronary vasculature.  Remains on dual antiplatelet therapy, most recently change to Brilinta.     Valvular heart disease with moderate severe AS, moderate MR.  Preserved ejection fraction.    Peripheral arterial disease, history right CEA, minimal residual neurologic deficit.  Brain MRI 10/2023 without acute change.  Ongoing tobacco products use, hypertension dyslipidemia, diabetes mellitus.        Review of patient's allergies indicates:   Allergen Reactions    Alcohol Anaphylaxis     Pt states all types of alcohol    Alcohol antiseptic pads Anaphylaxis    Cephalexin Anaphylaxis and Other (See Comments)     Cardiac arrest       Past Medical History:   Diagnosis Date    Aortic valve stenosis 02/28/2022    Arachnoid cyst of posterior cranial fossa 01/13/2022    Benign prostatic hyperplasia without lower urinary tract symptoms 05/25/2023    Bilateral carotid bruits 06/22/2021    Bipolar disorder 01/16/2022    CAD S/P percutaneous coronary angioplasty     3 vessel disease    Calculus of gallbladder without cholecystitis without obstruction 01/11/2023    Cancer     Candidal intertrigo 11/11/2019    Cataract     Chronic heart failure with preserved ejection fraction 03/29/2023    Chronic schizophrenia     Colon polyps     Diabetic polyneuropathy associated with diabetes mellitus due to underlying condition 11/21/2019    Dysarthria as late effect of cerebellar  cerebrovascular accident (CVA) 03/24/2023    Equinus deformity of both feet 11/21/2019    Erectile dysfunction due to arterial insufficiency 05/25/2023    Flaccid hemiplegia of left nondominant side as late effect of cerebral infarction 03/17/2023    Flat foot 11/21/2019    Gastritis     Gastroesophageal reflux disease without esophagitis 07/13/2023    Gastrointestinal hemorrhage 12/13/2019    Post polypectomy bleeding    General anesthetics causing adverse effect in therapeutic use     Hammer toes of both feet 11/21/2019    Hx of diabetic foot ulcer 11/21/2019    Hypertension     Hyponatremia 01/11/2022    ELAINE (iron deficiency anemia) 12/10/2019    Intractable chronic migraine without aura and without status migrainosus 12/30/2021    Microcytic anemia 10/09/2019    Moderate aortic regurgitation 03/29/2023    Moderate mitral regurgitation 03/29/2023    Moderate mitral stenosis 03/29/2023    Moderate to severe aortic stenosis 02/28/2022    Myocardial infarct, old     Nicotine dependence, unspecified, uncomplicated 01/16/2022    Onychomycosis due to dermatophyte 11/21/2019    Orchitis 11/09/2019    PIPER on CPAP     Peripheral visual field defect, bilateral 01/13/2022    PVD (peripheral vascular disease) 11/21/2019    Seizures 2008    Stage 2 moderate COPD by GOLD classification     PFTs w/ FEV1 (73%) (9/2022)    Stroke 2005    Thoracic aortic atherosclerosis 10/17/2022    CT chest    Thyroid disease     Previously on pharmacologic Tx    Tinea pedis of right foot 05/21/2019    Tobacco use disorder     Type 2 diabetes mellitus with diabetic peripheral angiopathy without gangrene     A1c 5.1% (3/2023)    Vitamin B12 deficiency 03/24/2023     Past Surgical History:   Procedure Laterality Date    ANGIOGRAM, CORONARY, WITH LEFT HEART CATHETERIZATION  08/25/2022    Procedure: Angiogram, Coronary, with Left Heart Cath;  Surgeon: Mai Deleon MD;  Location: [facility] CATH;  Service: Cardiology;;    APPENDECTOMY      BONE BIOPSY  Right 12/19/2023    Procedure: Biopsy-Bone;  Surgeon: Дмитрий Pratt DPM;  Location: [facility] OR;  Service: Podiatry;  Laterality: Right;    BONE EXOSTOSIS EXCISION Right 11/17/2023    Procedure: EXCISION, EXOSTOSIS;  Surgeon: Дмитрий Pratt DPM;  Location: [facility] OR;  Service: Podiatry;  Laterality: Right;    BONE MARROW ASPIRATION Left 08/21/2020    Procedure: ASPIRATION, BONE MARROW;  Surgeon: Lex Kathleen MD;  Location: [facility] OR;  Service: Oncology;  Laterality: Left;    CAROTID ENDARTERECTOMY Right     CHOLECYSTECTOMY      CLOSURE OF WOUND Right 09/09/2019    Procedure: CLOSURE, WOUND;  Surgeon: Li Kathleen DPM;  Location: [facility] OR;  Service: Podiatry;  Laterality: Right;    COLONOSCOPY N/A 12/10/2019    Procedure: COLONOSCOPY;  Surgeon: Ryan Lucas MD;  Location: [facility];  Service: Endoscopy;  Laterality: N/A;    COLONOSCOPY N/A 12/13/2019    Procedure: COLONOSCOPY;  Surgeon: Ryan Lucas MD;  Location: [facility];  Service: Endoscopy;  Laterality: N/A;    CORONARY STENT PLACEMENT      ESOPHAGOGASTRODUODENOSCOPY N/A 12/10/2019    Procedure: EGD (ESOPHAGOGASTRODUODENOSCOPY);  Surgeon: Ryan Lucas MD;  Location: [facility];  Service: Endoscopy;  Laterality: N/A;    ESOPHAGOGASTRODUODENOSCOPY N/A 11/03/2022    Procedure: EGD (ESOPHAGOGASTRODUODENOSCOPY);  Surgeon: Ryan Lucas MD;  Location: [facility] ENDO;  Service: Endoscopy;  Laterality: N/A;    INCISION AND DRAINAGE FOOT Right 12/19/2023    Procedure: INCISION AND DRAINAGE, FOOT- RIGHT;  Surgeon: Дмитрий Pratt DPM;  Location: [facility] OR;  Service: Podiatry;  Laterality: Right;    INJECTION OF ANESTHETIC AGENT AROUND MEDIAL BRANCH NERVES INNERVATING LUMBAR FACET JOINT Bilateral 07/25/2023    Procedure: Block-nerve-medial branch-lumbar;  Surgeon: Napoleon Grimaldo MD;  Location: [facility] OR;  Service: Pain Management;  Laterality: Bilateral;  L3,4,5 MBB    INJECTION OF ANESTHETIC AGENT AROUND MEDIAL BRANCH NERVES INNERVATING LUMBAR  FACET JOINT Bilateral 08/25/2023    Procedure: Block-nerve-medial branch-lumbar;  Surgeon: Napoleon Grimaldo MD;  Location: [facility] OR;  Service: Anesthesiology;  Laterality: Bilateral;  L3,4,5 MBB #2    INSERTION OF IMPLANTABLE LOOP RECORDER  08/24/2024    LAPAROSCOPIC CHOLECYSTECTOMY N/A 01/11/2023    Procedure: CHOLECYSTECTOMY, LAPAROSCOPIC;  Surgeon: Laith Davis MD;  Location: [facility] OR;  Service: General;  Laterality: N/A;    LEFT HEART CATHETERIZATION Left 08/25/2022    Procedure: Left heart cath;  Surgeon: Mai Deleon MD;  Location: [facility] CATH;  Service: Cardiology;  Laterality: Left;    RADIOFREQUENCY ABLATION OF LUMBAR MEDIAL BRANCH NERVE AT SINGLE LEVEL Bilateral 10/31/2023    Procedure: Radiofrequency Ablation, Nerve, Spinal, Lumbar, Medial Branch, 1 Level;  Surgeon: Napoleon Grimaldo MD;  Location: [facility] OR;  Service: Anesthesiology;  Laterality: Bilateral;  L3,4,5 RFA    right heel surgery      SHOULDER ARTHROSCOPY Left     UPPER GASTROINTESTINAL ENDOSCOPY       Social History     Tobacco Use    Smoking status: Former     Types: Vaping with nicotine     Passive exposure: Current    Smokeless tobacco: Never    Tobacco comments:     Age Started 14    Substance Use Topics    Alcohol use: No    Drug use: Not Currently     Types: Marijuana     Comment: ocasionally - once every 6 months        Review of Systems:    As noted in HPI in addition      REVIEW OF SYSTEMS  Review of Systems   Constitutional: Negative for decreased appetite, diaphoresis, night sweats, weight gain and weight loss.   HENT:  Negative for nosebleeds and odynophagia.    Eyes:  Negative for double vision and photophobia.   Cardiovascular:  Negative for chest pain, claudication, cyanosis, dyspnea on exertion, irregular heartbeat, leg swelling, near-syncope, orthopnea, palpitations, paroxysmal nocturnal dyspnea and syncope.   Respiratory:  Positive for shortness of breath. Negative for cough, hemoptysis and wheezing.    Hematologic/Lymphatic:  Negative for adenopathy.   Skin:  Negative for flushing, skin cancer and suspicious lesions.   Musculoskeletal:  Negative for gout, myalgias and neck pain.   Gastrointestinal:  Negative for abdominal pain, heartburn, hematemesis and hematochezia.   Genitourinary:  Negative for bladder incontinence, hesitancy and nocturia.   Neurological:  Positive for brief paralysis, focal weakness, headaches and light-headedness. Negative for paresthesias.   Psychiatric/Behavioral:  Negative for memory loss and substance abuse.               Objective:        Vitals:    10/07/24 1030   BP: 119/73   Pulse: 71       Lab Results   Component Value Date    WBC 6.84 06/24/2024    HGB 10.4 (L) 06/24/2024    HCT 32.0 (L) 06/24/2024     06/24/2024    CHOL 92 09/29/2024    TRIG 30 09/29/2024    HDL 41 09/29/2024    ALT 8 09/30/2024    AST 9 (L) 09/30/2024     09/30/2024    K 3.4 (L) 09/30/2024     06/24/2024    CREATININE 0.86 (L) 09/30/2024    BUN 7 (L) 09/30/2024    CO2 23 09/30/2024    TSH 1.140 03/05/2023    PSA 1.4 05/02/2023    INR 1.1 04/05/2023    HGBA1C 5.5 09/29/2024        ECHOCARDIOGRAM RESULTS  Results for orders placed during the hospital encounter of 10/03/24    Echo    Interpretation Summary    Left Ventricle: The left ventricle is normal in size. Mildly increased wall thickness. There is normal systolic function with a visually estimated ejection fraction of 65 - 70%. Grade I diastolic dysfunction.    Right Ventricle: Normal right ventricular cavity size. Wall thickness is normal. Systolic function is normal.    Left Atrium: Left atrium is severely dilated.    Aortic Valve: There is moderate aortic valve sclerosis. Moderately restricted motion. There is moderate to severe stenosis. Aortic valve area by VTI is 1.3 cm². Aortic valve peak velocity is 3.8 m/s. Mean gradient is 30.2 mmHg. The dimensionless index is 0.45. There is mild to moderate aortic regurgitation.    Mitral Valve: There is moderate  bileaflet sclerosis. Moderately thickened leaflets. There is moderate stenosis. The mean pressure gradient across the mitral valve is 11 mmHg at a heart rate of  bpm. There is mild regurgitation.    Pulmonary Artery: There is mild to moderate pulmonary hypertension. The estimated pulmonary artery systolic pressure is 47 mmHg.    IVC/SVC: Normal venous pressure at 3 mmHg.    No results found for this or any previous visit.          CURRENT/PREVIOUS VISIT EKG  Results for orders placed or performed during the hospital encounter of 12/17/23   EKG 12-lead    Collection Time: 12/23/23  5:56 PM    Narrative    Test Reason : R07.89,    Vent. Rate : 114 BPM     Atrial Rate : 114 BPM     P-R Int : 138 ms          QRS Dur : 088 ms      QT Int : 350 ms       P-R-T Axes : 070 010 063 degrees     QTc Int : 482 ms    Poor data quality  Sinus tachycardia  Minimal voltage criteria for LVH, may be normal variant  Left atrial enlargement  Abnormal ECG      Confirmed by Gosia VILLEGAS, Martin HENSON ([id number]) on 1/2/2024 4:07:15 PM    Referred By: [email or web address]   SELF           Confirmed By:Martin Elise MD     No valid procedures specified.   Results for orders placed during the hospital encounter of 01/31/23    Nuclear Stress - Cardiology Interpreted    Interpretation Summary    Normal myocardial perfusion scan. There is no evidence of myocardial ischemia or infarction.    The gated perfusion images showed an ejection fraction of 70% post stress.    There is normal wall motion post stress.    LV cavity size is normal at stress.    The ECG portion of the study is uninterpretable.    The patient reported no chest pain during the stress test.    There were no arrhythmias during stress.    This is a low risk study.    No valid procedures specified.    PHYSICAL EXAM  GENERAL: well built, well nourished, well-developed in no apparent distress alert and oriented.   HEENT: Normocephalic. Pupils normal and conjunctivae normal.  Mucous membranes normal, no  cyanosis or icterus, trachea central,no pallor or icterus is noted..   NECK: No JVD. No bruit..   THYROID: Thyroid not enlarged. No nodules present..   CARDIAC:  Distant S1-S2.  Grade 3/6 crescendo decrescendo aortic area.  Soft diastolic murmur.  [place]  LUNGS: Clear to auscultation. No wheezing or rhonchi..   ABDOMEN: Soft no masses or organomegaly.  No abdomen pulsations or bruits.  Normal bowel sounds. No pulsations and no masses felt, No guarding or rebound.   URINARY: No valero catheter   EXTREMITIES: No cyanosis, clubbing or edema noted at this time., no calf tenderness bilaterally.   PERIPHERAL VASCULAR SYSTEM: Good palpable distal pulses.  2+ femoral, popliteal and pedal pulses.  No bruits    CENTRAL NERVOUS SYSTEM: No focal motor or sensory deficits noted.   SKIN: Skin without lesions, moist, well perfused.   MUSCLE STRENGTH & TONE: No noteable weakness, atrophy or abnormal movement    I HAVE REVIEWED :    The vital signs, nurses notes, and all the pertinent radiology and labs.         Current Outpatient Medications   Medication Instructions    AJOVY AUTOINJECTOR 225 mg, Subcutaneous, Every 28 days    albuterol (PROVENTIL/VENTOLIN HFA) 90 mcg/actuation inhaler 2 puffs, Inhalation, Every 6 hours PRN    albuterol-ipratropium (DUO-NEB) 2.5 mg-0.5 mg/3 mL nebulizer solution 3 mLs, Nebulization, Every 6 hours PRN, Rescue    ammonium lactate 2 g, Topical (Top), 2 times daily    aspirin 81 mg, Daily    atorvastatin (LIPITOR) 80 mg, Oral, Daily    butalbital-acetaminophen-caffeine -40 mg (FIORICET, ESGIC) -40 mg per tablet Take 1 tablet by mouth as needed for migraine. No more than 10 tablets per month.    clonazePAM (KLONOPIN) 0.5 mg, Daily PRN    clopidogreL (PLAVIX) 75 mg, Oral, Daily    cyanocobalamin 1,000 mcg/mL injection 1 ml sq daily x 5 days, then 1 ml sq weekly x 4 weeks then q monthly -  will need to go to PCP    dipyridamole-aspirin 200-25 mg (AGGRENOX)  mg CM12 1 capsule, 2 times daily     EPINEPHrine (EPIPEN) 0.3 mg, Intramuscular, As needed (PRN)    fluticasone-umeclidin-vilanter (TRELEGY ELLIPTA) 100-62.5-25 mcg DsDv INHALE 1 PUFF INTO THE LUNGS ONE TIME DAILY    hydrOXYzine (ATARAX) 50 mg, Daily    lamoTRIgine (LAMICTAL) 200 mg, Daily    meclizine (ANTIVERT) 12.5 mg, Oral, 3 times daily PRN    mirtazapine (REMERON) 15 mg, Nightly    nicotine (NICODERM CQ) 14 mg/24 hr 1 patch, Transdermal, Daily    nicotine polacrilex 4 MG Lozg Take up to 10 pieces daily as needed    nitroGLYCERIN (NITROSTAT) 0.4 mg, Every 5 min PRN    ondansetron (ZOFRAN) 8 mg, Every 8 hours PRN    ondansetron (ZOFRAN-ODT) 4 mg, Oral, Every 8 hours PRN    pantoprazole (PROTONIX) 40 mg, Oral, Daily    pregabalin (LYRICA) 50 mg, Oral, 3 times daily    ranolazine (RANEXA) 500 mg, Oral, 2 times daily    REXULTI 4 mg, Daily    tadalafiL (CIALIS) 20 mg, Oral, Daily    topiramate (TOPAMAX) 200 mg, Oral, Nightly    ubrogepant (UBRELVY) 100 mg tablet Take 1 tablet by mouth as needed for migraine. May repeat in 2 hours if needed. Max 2 tab per day    ziprasidone (GEODON) 80 mg, Daily          Assessment:   Coronary artery disease.  PCI stent 2022.  05/2024.  LAD.  Culprit lesion.    Right CEA, 6//2024.  multiple past strokes but no major neurologic sequelae.  CTA of the head and neck performed 06/09/2024, right saccular aneurysm previous arising from right carotid bifurcation, stable appearance.  Moderate stenosis involving the origins of the right and left vertebral arteries.    Valvular heart disease with moderate severe AS, moderate MS.  EF normal.  Hypertension, dyslipidemia, ongoing tobacco use.      Plan:     Refer to [facility] to further evaluate valvular heart disease and treatment.  Continue dual antiplatelet therapy.    Presence of ILR recently placed at [place].  We will defer to or arrhythmic Clinic.    Follow up after appointment with [facility] No follow-ups on file.

## 2024-10-08 ENCOUNTER — CLINICAL SUPPORT (OUTPATIENT)
Dept: FAMILY MEDICINE | Facility: CLINIC | Age: 59
End: 2024-10-08
Payer: MEDICARE

## 2024-10-08 ENCOUNTER — TELEPHONE (OUTPATIENT)
Dept: CARDIOLOGY | Facility: CLINIC | Age: 59
End: 2024-10-08
Payer: MEDICARE

## 2024-10-08 DIAGNOSIS — E08.42 DIABETIC POLYNEUROPATHY ASSOCIATED WITH DIABETES MELLITUS DUE TO UNDERLYING CONDITION: Chronic | ICD-10-CM

## 2024-10-08 DIAGNOSIS — E53.8 B12 DEFICIENCY: Primary | ICD-10-CM

## 2024-10-08 PROCEDURE — 96372 THER/PROPH/DIAG INJ SC/IM: CPT | Mod: HCNC,S$GLB,, | Performed by: NURSE PRACTITIONER

## 2024-10-08 RX ADMIN — CYANOCOBALAMIN 1000 MCG: 1000 INJECTION, SOLUTION INTRAMUSCULAR; SUBCUTANEOUS at 09:10

## 2024-10-08 NOTE — TELEPHONE ENCOUNTER
No care due was identified.  [facility] Embedded Care Due Messages. Reference number: [id number].   10/08/2024 9:58:20 AM CDT

## 2024-10-08 NOTE — TELEPHONE ENCOUNTER
Refill Routing Note   Medication(s) are not appropriate for processing by [facility] for the following reason(s):        Outside of protocol    [facility] action(s):  Route               Appointments  past 12m or future 3m with PCP    Date Provider   Last Visit   5/17/2024 Ana Mayer MD   Next Visit   3/3/2025 Ana Mayer MD   ED visits in past 90 days: 0        Note composed:6:02 PM 10/08/2024

## 2024-10-08 NOTE — TELEPHONE ENCOUNTER
----- Message from Gloria sent at 10/8/2024  9:44 AM CDT -----  Type:  RX Refill Request    Who Called: pt    Refill or New Rx:refill    RX Name and Strength:pregabalin (LYRICA) 50 MG capsule    How is the patient currently taking it? (ex. 1XDay):as directed    Is this a 30 day or 90 day RX:90    Preferred Pharmacy with phone number:   [id number] - [place] - [address] AT [place] [address] & [address] [facility]  [address]  [place] [address]  Phone: [phone] Fax: [phone]        Local or Mail Order:local    Ordering Provider:Moreno    Would the patient rather a call back or a response via [facility]? Call back    Best Call Back Number:[phone]      Additional Information: pt says he is out of his medication      Please call Back to advise. Thanks!

## 2024-10-08 NOTE — TELEPHONE ENCOUNTER
----- Message from Cory sent at 10/8/2024 11:32 AM CDT -----  Contact: Self  Type: Needs Medical Advice    Who Called:  Patient    Best Call Back Number: [phone]  Additional Information: Patient is requesting a call back regarding a valve specialist for a surgery that he needs to have

## 2024-10-08 NOTE — PROGRESS NOTES
Pt here for cyanocabalamin injection(see MAR). Confirmed name and . Cyanocabalamin 1000mcg given Right glut. Pt tolerated well. Confirmed next dose and appt.

## 2024-10-09 ENCOUNTER — LAB VISIT (OUTPATIENT)
Dept: LAB | Facility: HOSPITAL | Age: 59
End: 2024-10-09
Attending: STUDENT IN AN ORGANIZED HEALTH CARE EDUCATION/TRAINING PROGRAM
Payer: MEDICARE

## 2024-10-09 ENCOUNTER — TELEPHONE (OUTPATIENT)
Dept: NEUROLOGY | Facility: CLINIC | Age: 59
End: 2024-10-09
Payer: MEDICARE

## 2024-10-09 ENCOUNTER — OFFICE VISIT (OUTPATIENT)
Dept: FAMILY MEDICINE | Facility: CLINIC | Age: 59
End: 2024-10-09
Payer: MEDICARE

## 2024-10-09 VITALS
SYSTOLIC BLOOD PRESSURE: 118 MMHG | HEART RATE: 80 BPM | OXYGEN SATURATION: 99 % | HEIGHT: 70 IN | BODY MASS INDEX: 23.34 KG/M2 | DIASTOLIC BLOOD PRESSURE: 62 MMHG

## 2024-10-09 DIAGNOSIS — I05.0 MODERATE MITRAL STENOSIS: Chronic | ICD-10-CM

## 2024-10-09 DIAGNOSIS — Z09 HOSPITAL DISCHARGE FOLLOW-UP: ICD-10-CM

## 2024-10-09 DIAGNOSIS — E08.42 DIABETIC POLYNEUROPATHY ASSOCIATED WITH DIABETES MELLITUS DUE TO UNDERLYING CONDITION: ICD-10-CM

## 2024-10-09 DIAGNOSIS — I73.9 PVD (PERIPHERAL VASCULAR DISEASE): Chronic | ICD-10-CM

## 2024-10-09 DIAGNOSIS — J44.9 STAGE 2 MODERATE COPD BY GOLD CLASSIFICATION: ICD-10-CM

## 2024-10-09 DIAGNOSIS — F20.9 CHRONIC SCHIZOPHRENIA: ICD-10-CM

## 2024-10-09 DIAGNOSIS — I25.10 CORONARY ARTERY DISEASE, UNSPECIFIED VESSEL OR LESION TYPE, UNSPECIFIED WHETHER ANGINA PRESENT, UNSPECIFIED WHETHER NATIVE OR TRANSPLANTED HEART: ICD-10-CM

## 2024-10-09 DIAGNOSIS — I10 ESSENTIAL HYPERTENSION: ICD-10-CM

## 2024-10-09 DIAGNOSIS — Z23 IMMUNIZATION DUE: ICD-10-CM

## 2024-10-09 DIAGNOSIS — I50.32 CHRONIC HEART FAILURE WITH PRESERVED EJECTION FRACTION: ICD-10-CM

## 2024-10-09 DIAGNOSIS — G43.719 INTRACTABLE CHRONIC MIGRAINE WITHOUT AURA AND WITHOUT STATUS MIGRAINOSUS: Primary | ICD-10-CM

## 2024-10-09 LAB
ALBUMIN SERPL BCP-MCNC: 3.7 G/DL (ref 3.5–5.2)
ALP SERPL-CCNC: 101 U/L (ref 55–135)
ALT SERPL W/O P-5'-P-CCNC: 9 U/L (ref 10–44)
ANION GAP SERPL CALC-SCNC: 11 MMOL/L (ref 8–16)
AST SERPL-CCNC: 19 U/L (ref 10–40)
BILIRUB SERPL-MCNC: 0.6 MG/DL (ref 0.1–1)
BUN SERPL-MCNC: 8 MG/DL (ref 6–20)
CALCIUM SERPL-MCNC: 9 MG/DL (ref 8.7–10.5)
CHLORIDE SERPL-SCNC: 107 MMOL/L (ref 95–110)
CO2 SERPL-SCNC: 23 MMOL/L (ref 23–29)
CREAT SERPL-MCNC: 0.8 MG/DL (ref 0.5–1.4)
EST. GFR  (NO RACE VARIABLE): >60 ML/MIN/1.73 M^2
GLUCOSE SERPL-MCNC: 91 MG/DL (ref 70–110)
POTASSIUM SERPL-SCNC: 3.9 MMOL/L (ref 3.5–5.1)
PROT SERPL-MCNC: 7.1 G/DL (ref 6–8.4)
SODIUM SERPL-SCNC: 141 MMOL/L (ref 136–145)

## 2024-10-09 PROCEDURE — 36415 COLL VENOUS BLD VENIPUNCTURE: CPT | Mod: HCNC,PN | Performed by: NURSE PRACTITIONER

## 2024-10-09 PROCEDURE — 80053 COMPREHEN METABOLIC PANEL: CPT | Mod: HCNC | Performed by: NURSE PRACTITIONER

## 2024-10-09 PROCEDURE — 99999 PR PBB SHADOW E&M-EST. PATIENT-LVL V: CPT | Mod: PBBFAC,HCNC,, | Performed by: NURSE PRACTITIONER

## 2024-10-09 RX ORDER — PREGABALIN 50 MG/1
50 CAPSULE ORAL 3 TIMES DAILY
Qty: 270 CAPSULE | Refills: 0 | Status: SHIPPED | OUTPATIENT
Start: 2024-10-09

## 2024-10-09 NOTE — TELEPHONE ENCOUNTER
----- Message from Juan Miguel sent at 10/9/2024 12:12 PM CDT -----  Regarding: Hosp F/U  Type:  Hosp F/U Appointment Request    Caller is requesting a HOSP F/U appointment    Name of Caller:Pt    When is the first available appointment? None in the time frame needed    Date of Discharge.end of Sept    When is appt needed?3 to 4 weeks    Symptoms:Stroke like symptoms    Would the patient rather a call back or a response via [facility]? Call back    Best Call Back Number:[phone]      Additional Information: [employer] would not produce providers in [place].     Please advise -- Thank you

## 2024-10-09 NOTE — PROGRESS NOTES
THIS DOCUMENT WAS MADE IN PART WITH VOICE RECOGNITION SOFTWARE.  OCCASIONALLY THIS SOFTWARE WILL MISINTERPRET WORDS OR PHRASES.     Assessment and Plan:    Intractable chronic migraine without aura and without status migrainosus  Comments:  Improving  Continue regimen  Keep follow up with Neurology    Immunization due  -     influenza (Flulaval, Fluzone, Fluarix) 45 mcg/0.5 mL IM vaccine (> or = 6 mo) 0.5 mL    Hospital discharge follow-up    Diabetic polyneuropathy associated with diabetes mellitus due to underlying condition  Comments:  Recent A1c 5.1  Stable  Continue Lyrica as needed  Orders:  -     Comprehensive Metabolic Panel; Future; Expected date: 10/09/2024    Stage 2 moderate COPD by GOLD classification  Comments:  Controlled  Continue regimen  Keep follow up with pulmonologist    Coronary artery disease, unspecified vessel or lesion type, unspecified whether angina present, unspecified whether native or transplanted heart  Comments:  Stable  Continue regimen  Keep follow up with Cardiology    Chronic heart failure with preserved ejection fraction  Comments:  Stable  Keep follow up Cardiology    Essential hypertension  Comments:  Controlled, without medication    PVD (peripheral vascular disease)  Comments:  Stable    Moderate mitral stenosis  Comments:  Scheduled with cardiovascular surgeon on  10/15/24    Chronic schizophrenia  Comments:  Stable  Continue regimen  See Psychiatry regularly             Visit summary:    Hospital course and diagnostic studies reviewed in detail with patient during today's visit.    Patient advised to keep follow up with specialists.    Advised on diagnosis, medications and symptomatic treatment.    Emergent conditions/ER precautions discussed.        Follow up in about 4 weeks (around 11/6/2024) for Follow-up with PCP- Dr. Mayer.   ______________________________________________________________________  Subjective:    Chief Complaint:  Hospital follow up    HPI:  Ryan  is a 59 y.o. year old male with a past medical history CVA, nicotine abuse, COPD, schizophrenia, hypertension, chronic HFpEF, PVD, here for hospital follow up.  Patient was admitted to [facility] on 9/30/24 after presenting to the ER complaining of stroke-like symptoms, facial droop and headache.  Symptoms resolved while in hospital.  He reports he is still getting some migraines-  taking Ubrelvy 100mg which is helping.  Patient is requesting refill on his Lyrica- takes for neuropathy.  He reports episodes of burning pain to right foot -He reports Tylenol isn't helping.       Advised to follow up with PCP, Cardiology and Neurology upon discharge.    He has appointment with Dr. Navarro scheduled on 3/3/25- plans on scheduling sooner     He seen Dr. Pollard for follow up on 10/7/25- patient has complex cardiovascular history. PCI stent , 08/25/2022.  5/22/2024.  Findings at last left heart catheterization revealed proximal LAD with ulcerated and dissected 30% stenosis, culprit lesion , distal disease in 1st diagonal, nonobstructive , left circumflex, right coronary artery with nonobstructive CAD successful PCI stent.  NSTEMI.  No high-grade disease involving the remainder of the coronary vasculature.  Remains on dual antiplatelet therapy, most recently change to Brilinta.      Valvular heart disease with moderate severe AS, moderate MR.  Preserved ejection fraction.     Peripheral arterial disease, history right CEA, minimal residual neurologic deficit.  Brain MRI 10/2023 without acute change.  Ongoing tobacco products use, hypertension dyslipidemia, diabetes mellitus.      Referred to cardiovascular for further evaluation of valvular heart disease and treatment-   He has appointment on October 15th with Dr. Rolle        See hospital course below:      Hospital Course and Treatment:   Admission Information         Date & Time  9/28/2024 Provider  Ezequiel Leger MD Department  [facility] Observation  Unit Dept. Phone  [phone]             Stroke-Like Symptoms  Hx of CVA  Complex Migraine  -Patient with history of CVA presenting with sudden onset facial droop, left-sided weakness, dizziness, lightheadedness, and headache; symptoms slightly improved since onset  -Neurology consulted and advised against TNK  -Patient already takes aspirin and Brilinta which will be continued  -CT brain showed no acute findings and CTA head and neck showed no large vessel occlusion  -MRI without any infarct identified  -Initial troponin negative and EKG without significant ST elevation  -Continue cardiac monitoring  -Recent echo reviewed  -Suspicion for TIA versus complex migraine  -Continue neurochecks  -Fall precautions  -PT/OT/ST consulted  -Advised against lifestyle changes including smoking cessation  -Neurology consulted, appreciate recommendations, dc on Topamax and follow up with neurology     Chest pain  -H.o stent placement in the past as recent as early this year  -Chronic and intermittently stabbing in nature, no pressure  -EKG and serial troponin without abnormalities     COPD, Nicotine Abuse  -Discussed lifestyle changes  -No oxygen requirement on presentation  -As needed nicotine patches ordered  -May resume home inhalers  -As needed albuterol ordered     Schizophrenia  -May resume home meds     CAD, PVD  -May resume home meds     HTN, Chronic HFpEF  -Not significantly fluid overloaded on presentation  -As needed antihypertensives ordered for extremely elevated blood pressures  -Routine vitals  -Monitor intake and output  -Daily labs     I discussed with the patient about the disease process and treatment prior to discharge.      This discharge summary took >30 minutes to complete.      Patient's Condition On Discharge:   Stable        Medications:  Current Outpatient Medications on File Prior to Visit   Medication Sig Dispense Refill    albuterol (PROVENTIL/VENTOLIN HFA) 90 mcg/actuation inhaler Inhale 2 puffs  into the lungs every 6 (six) hours as needed for Shortness of Breath or Wheezing. 18 g 5    albuterol-ipratropium (DUO-NEB) 2.5 mg-0.5 mg/3 mL nebulizer solution Take 3 mLs by nebulization every 6 (six) hours as needed for Wheezing. Rescue 75 mL 0    ammonium lactate 12 % Crea Apply 2 g topically 2 (two) times a day. 385 g 11    aspirin 81 MG Chew Take 81 mg by mouth once daily.      atorvastatin (LIPITOR) 80 MG tablet Take 1 tablet (80 mg total) by mouth once daily. 30 tablet 0    brexpiprazole (REXULTI) 4 mg Tab Take 4 mg by mouth Daily.      butalbital-acetaminophen-caffeine -40 mg (FIORICET, ESGIC) -40 mg per tablet Take 1 tablet by mouth as needed for migraine. No more than 10 tablets per month. 10 tablet 0    clonazePAM (KLONOPIN) 0.5 MG tablet Take 0.5 mg by mouth daily as needed.      clopidogreL (PLAVIX) 75 mg tablet Take 1 tablet (75 mg total) by mouth once daily. 30 tablet 11    cyanocobalamin 1,000 mcg/mL injection 1 ml sq daily x 5 days, then 1 ml sq weekly x 4 weeks then q monthly -  will need to go to PCP 9 mL 0    dipyridamole-aspirin 200-25 mg (AGGRENOX)  mg CM12 Take 1 capsule by mouth 2 (two) times daily.      EPINEPHrine (EPIPEN) 0.3 mg/0.3 mL AtIn Inject 0.3 mLs (0.3 mg total) into the muscle as needed. 1 each 0    fluticasone-umeclidin-vilanter (TRELEGY ELLIPTA) 100-62.5-25 mcg DsDv INHALE 1 PUFF INTO THE LUNGS ONE TIME DAILY 60 each 11    fremanezumab-vfrm (AJOVY AUTOINJECTOR) 225 mg/1.5 mL autoinjector Inject 1.5 mLs (225 mg total) into the skin every 28 days. 1 each 11    hydrOXYzine (ATARAX) 50 MG tablet Take 50 mg by mouth Daily.      lamoTRIgine (LAMICTAL) 200 MG tablet Take 200 mg by mouth once daily.      meclizine (ANTIVERT) 12.5 mg tablet Take 1 tablet (12.5 mg total) by mouth 3 (three) times daily as needed for Dizziness. 90 tablet 0    mirtazapine (REMERON) 15 MG tablet Take 15 mg by mouth every evening.      nicotine (NICODERM CQ) 14 mg/24 hr Place 1 patch onto  the skin once daily. 14 patch 0    nicotine polacrilex 4 MG Lozg Take up to 10 pieces daily as needed 72 lozenge 0    nitroGLYCERIN (NITROSTAT) 0.4 MG SL tablet Place 0.4 mg under the tongue every 5 (five) minutes as needed.      ondansetron (ZOFRAN-ODT) 4 MG TbDL Take 1 tablet (4 mg total) by mouth every 8 (eight) hours as needed (nausea). 30 tablet 0    pantoprazole (PROTONIX) 40 MG tablet Take 1 tablet (40 mg total) by mouth once daily. 90 tablet 3    pregabalin (LYRICA) 50 MG capsule Take 1 capsule (50 mg total) by mouth 3 (three) times daily. 270 capsule 0    ranolazine (RANEXA) 500 MG Tb12 Take 1 tablet (500 mg total) by mouth 2 (two) times daily. 180 tablet 3    tadalafiL (CIALIS) 20 MG Tab Take 1 tablet (20 mg total) by mouth once daily. 10 tablet 11    topiramate (TOPAMAX) 200 MG Tab Take 1 tablet (200 mg total) by mouth every evening. 90 tablet 3    ubrogepant (UBRELVY) 100 mg tablet Take 1 tablet by mouth as needed for migraine. May repeat in 2 hours if needed. Max 2 tab per day 8 tablet 11    ziprasidone (GEODON) 80 MG capsule Take 80 mg by mouth once daily.      [DISCONTINUED] ondansetron (ZOFRAN) 4 MG tablet Take 8 mg by mouth every 8 (eight) hours as needed.       Current Facility-Administered Medications on File Prior to Visit   Medication Dose Route Frequency Provider Last Rate Last Admin    cyanocobalamin injection 1,000 mcg  1,000 mcg Intramuscular Q14 Days Ana Mayer MD   1,000 mcg at 06/24/24 1445    cyanocobalamin injection 1,000 mcg  1,000 mcg Intramuscular Q14 Days    1,000 mcg at 10/08/24 0910    diphenhydrAMINE injection 12.5 mg  12.5 mg Intravenous Q6H PRN Irvin Frank MD        electrolyte-S (ISOLYTE)   Intravenous Continuous Irvin Frank MD        lactated ringers infusion   Intravenous Continuous Napoleon Grimaldo MD        lactated ringers infusion   Intravenous Continuous Napoleon Grimaldo MD 25 mL/hr at 08/25/23 0948 New Bag at 08/25/23 0948    lactated ringers infusion    Intravenous Continuous Napoleon Grimaldo MD   Stopped at 10/31/23 1255    lactated ringers infusion   Intravenous Continuous Irvin Frank MD 10 mL/hr at 11/17/23 0745 New Bag at 11/17/23 0745    LIDOcaine (PF) 10 mg/ml (1%) injection 10 mg  1 mL Intradermal Once Napoleon Grimaldo MD        LIDOcaine (PF) 10 mg/ml (1%) injection 10 mg  1 mL Intradermal Once Napoleon Grimaldo MD        LIDOcaine (PF) 10 mg/ml (1%) injection 10 mg  1 mL Intradermal Once Napoleon Grimaldo MD        LIDOcaine (PF) 10 mg/ml (1%) injection 10 mg  1 mL Intradermal Once Irvin Frank MD           Review of Systems:  Review of Systems   Constitutional:  Positive for fatigue. Negative for chills and fever.   HENT:  Negative for congestion and rhinorrhea.    Respiratory:  Negative for cough and shortness of breath.    Cardiovascular:  Negative for chest pain, palpitations and leg swelling.   Gastrointestinal:  Negative for diarrhea, nausea and vomiting.   Musculoskeletal:  Positive for arthralgias.   Skin:  Negative for rash.   Neurological:  Positive for headaches. Negative for dizziness, tremors, seizures, syncope, speech difficulty, weakness and light-headedness.       Past Medical History:  Past Medical History:   Diagnosis Date    Aortic valve stenosis 02/28/2022    Arachnoid cyst of posterior cranial fossa 01/13/2022    Benign prostatic hyperplasia without lower urinary tract symptoms 05/25/2023    Bilateral carotid bruits 06/22/2021    Bipolar disorder 01/16/2022    CAD S/P percutaneous coronary angioplasty     3 vessel disease    Calculus of gallbladder without cholecystitis without obstruction 01/11/2023    Cancer     Candidal intertrigo 11/11/2019    Cataract     Chronic heart failure with preserved ejection fraction 03/29/2023    Chronic schizophrenia     Colon polyps     Diabetic polyneuropathy associated with diabetes mellitus due to underlying condition 11/21/2019    Dysarthria as late effect of cerebellar cerebrovascular accident (CVA)  "03/24/2023    Equinus deformity of both feet 11/21/2019    Erectile dysfunction due to arterial insufficiency 05/25/2023    Flaccid hemiplegia of left nondominant side as late effect of cerebral infarction 03/17/2023    Flat foot 11/21/2019    Gastritis     Gastroesophageal reflux disease without esophagitis 07/13/2023    Gastrointestinal hemorrhage 12/13/2019    Post polypectomy bleeding    General anesthetics causing adverse effect in therapeutic use     Hammer toes of both feet 11/21/2019    Hx of diabetic foot ulcer 11/21/2019    Hypertension     Hyponatremia 01/11/2022    ELAINE (iron deficiency anemia) 12/10/2019    Intractable chronic migraine without aura and without status migrainosus 12/30/2021    Microcytic anemia 10/09/2019    Moderate aortic regurgitation 03/29/2023    Moderate mitral regurgitation 03/29/2023    Moderate mitral stenosis 03/29/2023    Moderate to severe aortic stenosis 02/28/2022    Myocardial infarct, old     Nicotine dependence, unspecified, uncomplicated 01/16/2022    Onychomycosis due to dermatophyte 11/21/2019    Orchitis 11/09/2019    PIPER on CPAP     Peripheral visual field defect, bilateral 01/13/2022    PVD (peripheral vascular disease) 11/21/2019    Seizures 2008    Stage 2 moderate COPD by GOLD classification     PFTs w/ FEV1 (73%) (9/2022)    Stroke 2005    Thoracic aortic atherosclerosis 10/17/2022    CT chest    Thyroid disease     Previously on pharmacologic Tx    Tinea pedis of right foot 05/21/2019    Tobacco use disorder     Type 2 diabetes mellitus with diabetic peripheral angiopathy without gangrene     A1c 5.1% (3/2023)    Vitamin B12 deficiency 03/24/2023       Objective:    Vitals:  Vitals:    10/09/24 0906   BP: 118/62   Pulse: 80   SpO2: 99%   Height: 5' 10" (1.778 m)   PainSc:   7   PainLoc: Foot       Physical Exam  Vitals and nursing note reviewed.   Constitutional:       General: He is not in acute distress.  HENT:      Head: Normocephalic and atraumatic.      " Nose: Nose normal.      Mouth/Throat:      Mouth: Mucous membranes are moist.      Pharynx: Oropharynx is clear.   Eyes:      General: No scleral icterus.     Conjunctiva/sclera: Conjunctivae normal.   Cardiovascular:      Rate and Rhythm: Normal rate and regular rhythm.   Pulmonary:      Effort: Pulmonary effort is normal. No respiratory distress.      Breath sounds: Normal breath sounds.   Musculoskeletal:      Right lower leg: No edema.      Left lower leg: No edema.   Skin:     General: Skin is warm and dry.   Neurological:      Mental Status: He is alert and oriented to person, place, and time.   Psychiatric:         Mood and Affect: Mood normal. Affect is flat.         Behavior: Behavior normal.         Thought Content: Thought content normal. Thought content does not include suicidal ideation. Thought content does not include suicidal plan.         Data:  CMP  Sodium   Date Value Ref Range Status   09/30/2024 144 136 - 144 mmol/L Final   06/24/2024 139 136 - 145 mmol/L Final     Potassium   Date Value Ref Range Status   09/30/2024 3.4 (L) 3.6 - 5.1 mmol/L Final   06/24/2024 4.9 3.5 - 5.1 mmol/L Final     Chloride   Date Value Ref Range Status   06/24/2024 105 95 - 110 mmol/L Final     CO2   Date Value Ref Range Status   09/30/2024 23 22 - 32 mmol/L Final   06/24/2024 26 23 - 29 mmol/L Final     Glucose   Date Value Ref Range Status   06/24/2024 93 70 - 110 mg/dL Final     BUN   Date Value Ref Range Status   06/24/2024 12 6 - 20 mg/dL Final     Blood Urea Nitrogen   Date Value Ref Range Status   09/30/2024 7 (L) 8 - 20 mg/dL Final     Creatinine   Date Value Ref Range Status   09/30/2024 0.86 (L) 0.90 - 1.30 mg/dL Final   06/24/2024 0.9 0.5 - 1.4 mg/dL Final     Calcium   Date Value Ref Range Status   09/30/2024 8.3 (L) 8.9 - 10.3 mg/dL Final   06/24/2024 9.4 8.7 - 10.5 mg/dL Final     Total Protein   Date Value Ref Range Status   09/30/2024 6 (L) 6.1 - 7.9 g/dL Final   06/24/2024 6.7 6.0 - 8.4 g/dL Final      Albumin   Date Value Ref Range Status   09/30/2024 3.4 (L) 3.5 - 4.8 g/dL Final   06/24/2024 3.7 3.5 - 5.2 g/dL Final     Total Bilirubin   Date Value Ref Range Status   09/30/2024 0.3 (L) 0.4 - 2.0 mg/dL Final   06/24/2024 0.3 0.1 - 1.0 mg/dL Final     Comment:     For infants and newborns, interpretation of results should be based  on gestational age, weight and in agreement with clinical  observations.    Premature Infant recommended reference ranges:  Up to 24 hours.............<8.0 mg/dL  Up to 48 hours............<12.0 mg/dL  3-5 days..................<15.0 mg/dL  6-29 days.................<15.0 mg/dL       Alkaline Phosphatase   Date Value Ref Range Status   09/30/2024 78 28 - 116 U/L Final   06/24/2024 84 55 - 135 U/L Final     AST   Date Value Ref Range Status   09/30/2024 9 (L) 12 - 40 U/L Final   06/24/2024 12 10 - 40 U/L Final     ALT   Date Value Ref Range Status   09/30/2024 8 5 - 56 U/L Final   06/24/2024 10 10 - 44 U/L Final     Anion Gap   Date Value Ref Range Status   09/30/2024 5 (L) 7 - 16 mmol/L Final   06/24/2024 8 8 - 16 mmol/L Final     eGFR   Date Value Ref Range Status   06/24/2024 >60.0 >60 mL/min/1.73 m^2 Final         Medical history reviewed, Medications reconciled.              ANNABELLE NairP-C  Family Medicine

## 2024-10-14 ENCOUNTER — TELEPHONE (OUTPATIENT)
Dept: CARDIOTHORACIC SURGERY | Facility: CLINIC | Age: 59
End: 2024-10-14
Payer: MEDICARE

## 2024-10-14 NOTE — TELEPHONE ENCOUNTER
Spoke with pt and confirmed 10/15 appt with Dr. Rolle. Pt verbalized understanding of appt time and location. Pt denies any questions at this time.

## 2024-10-15 ENCOUNTER — OFFICE VISIT (OUTPATIENT)
Dept: CARDIOTHORACIC SURGERY | Facility: CLINIC | Age: 59
End: 2024-10-15
Payer: MEDICARE

## 2024-10-15 VITALS
BODY MASS INDEX: 23.35 KG/M2 | SYSTOLIC BLOOD PRESSURE: 113 MMHG | HEIGHT: 70 IN | WEIGHT: 163.13 LBS | HEART RATE: 84 BPM | OXYGEN SATURATION: 100 % | DIASTOLIC BLOOD PRESSURE: 55 MMHG

## 2024-10-15 DIAGNOSIS — I35.0 MODERATE TO SEVERE AORTIC STENOSIS: Primary | Chronic | ICD-10-CM

## 2024-10-15 DIAGNOSIS — I05.0 MODERATE MITRAL STENOSIS: ICD-10-CM

## 2024-10-15 PROCEDURE — 3044F HG A1C LEVEL LT 7.0%: CPT | Mod: HCNC,CPTII,S$GLB, | Performed by: THORACIC SURGERY (CARDIOTHORACIC VASCULAR SURGERY)

## 2024-10-15 PROCEDURE — 3074F SYST BP LT 130 MM HG: CPT | Mod: HCNC,CPTII,S$GLB, | Performed by: THORACIC SURGERY (CARDIOTHORACIC VASCULAR SURGERY)

## 2024-10-15 PROCEDURE — 1159F MED LIST DOCD IN RCRD: CPT | Mod: HCNC,CPTII,S$GLB, | Performed by: THORACIC SURGERY (CARDIOTHORACIC VASCULAR SURGERY)

## 2024-10-15 PROCEDURE — 3078F DIAST BP <80 MM HG: CPT | Mod: HCNC,CPTII,S$GLB, | Performed by: THORACIC SURGERY (CARDIOTHORACIC VASCULAR SURGERY)

## 2024-10-15 PROCEDURE — 99204 OFFICE O/P NEW MOD 45 MIN: CPT | Mod: HCNC,S$GLB,, | Performed by: THORACIC SURGERY (CARDIOTHORACIC VASCULAR SURGERY)

## 2024-10-15 PROCEDURE — 3008F BODY MASS INDEX DOCD: CPT | Mod: HCNC,CPTII,S$GLB, | Performed by: THORACIC SURGERY (CARDIOTHORACIC VASCULAR SURGERY)

## 2024-10-15 PROCEDURE — 99999 PR PBB SHADOW E&M-EST. PATIENT-LVL V: CPT | Mod: PBBFAC,HCNC,, | Performed by: THORACIC SURGERY (CARDIOTHORACIC VASCULAR SURGERY)

## 2024-10-15 NOTE — PROGRESS NOTES
Subjective:      Patient ID: Ryan Crane is a 59 y.o. male.    Chief Complaint: No chief complaint on file.      HPI:  Ryan Crane is a 59 y.o. male who presents to an initial surgical evaluation for AS and MS. Medical conditions include CVA, nicotine abuse, COPD, DM, schizophrenia, CAD s/p PCI stents , 08/25/2022 and 5/22/2024, Right CEA, 6//2024, hypertension, HLD, chronic HFpEF. Recently admitted in September for TIA. He has multiple strokes in the past, per patient 10 strokes with residual left sided weakness. Echo showed preserve LVEF, moderate severe AS, moderate MS. He endorses chronic sob that is stable and Intermittent chest pain at rest that last about 5 minutes. No presyncope or syncope. He vapes daily. Quit smoking in January 2024, prior smoked 1ppd x 45 years. No illicit drug use. Independently performs ADLS with sob.       Family and social history reviewed    Review of patient's allergies indicates:   Allergen Reactions    Alcohol Anaphylaxis     Pt states all types of alcohol    Alcohol antiseptic pads Anaphylaxis    Cephalexin Anaphylaxis and Other (See Comments)     Cardiac arrest     Past Medical History:   Diagnosis Date    Aortic valve stenosis 02/28/2022    Arachnoid cyst of posterior cranial fossa 01/13/2022    Benign prostatic hyperplasia without lower urinary tract symptoms 05/25/2023    Bilateral carotid bruits 06/22/2021    Bipolar disorder 01/16/2022    CAD S/P percutaneous coronary angioplasty     3 vessel disease    Calculus of gallbladder without cholecystitis without obstruction 01/11/2023    Cancer     Candidal intertrigo 11/11/2019    Cataract     Chronic heart failure with preserved ejection fraction 03/29/2023    Chronic schizophrenia     Colon polyps     Diabetic polyneuropathy associated with diabetes mellitus due to underlying condition 11/21/2019    Dysarthria as late effect of cerebellar cerebrovascular accident (CVA) 03/24/2023    Equinus deformity of both  feet 11/21/2019    Erectile dysfunction due to arterial insufficiency 05/25/2023    Flaccid hemiplegia of left nondominant side as late effect of cerebral infarction 03/17/2023    Flat foot 11/21/2019    Gastritis     Gastroesophageal reflux disease without esophagitis 07/13/2023    Gastrointestinal hemorrhage 12/13/2019    Post polypectomy bleeding    General anesthetics causing adverse effect in therapeutic use     Hammer toes of both feet 11/21/2019    Hx of diabetic foot ulcer 11/21/2019    Hypertension     Hyponatremia 01/11/2022    ELAINE (iron deficiency anemia) 12/10/2019    Intractable chronic migraine without aura and without status migrainosus 12/30/2021    Microcytic anemia 10/09/2019    Moderate aortic regurgitation 03/29/2023    Moderate mitral regurgitation 03/29/2023    Moderate mitral stenosis 03/29/2023    Moderate to severe aortic stenosis 02/28/2022    Myocardial infarct, old     Nicotine dependence, unspecified, uncomplicated 01/16/2022    Onychomycosis due to dermatophyte 11/21/2019    Orchitis 11/09/2019    PIPER on CPAP     Peripheral visual field defect, bilateral 01/13/2022    PVD (peripheral vascular disease) 11/21/2019    Seizures 2008    Stage 2 moderate COPD by GOLD classification     PFTs w/ FEV1 (73%) (9/2022)    Stroke 2005    Thoracic aortic atherosclerosis 10/17/2022    CT chest    Thyroid disease     Previously on pharmacologic Tx    Tinea pedis of right foot 05/21/2019    Tobacco use disorder     Type 2 diabetes mellitus with diabetic peripheral angiopathy without gangrene     A1c 5.1% (3/2023)    Vitamin B12 deficiency 03/24/2023     Past Surgical History:   Procedure Laterality Date    ANGIOGRAM, CORONARY, WITH LEFT HEART CATHETERIZATION  08/25/2022    Procedure: Angiogram, Coronary, with Left Heart Cath;  Surgeon: Mai Deleon MD;  Location: [facility] CATH;  Service: Cardiology;;    APPENDECTOMY      BONE BIOPSY Right 12/19/2023    Procedure: Biopsy-Bone;  Surgeon: Дмитрий Pratt,  KEEGAN;  Location: [facility] OR;  Service: Podiatry;  Laterality: Right;    BONE EXOSTOSIS EXCISION Right 11/17/2023    Procedure: EXCISION, EXOSTOSIS;  Surgeon: Дмитрий Pratt DPM;  Location: [facility] OR;  Service: Podiatry;  Laterality: Right;    BONE MARROW ASPIRATION Left 08/21/2020    Procedure: ASPIRATION, BONE MARROW;  Surgeon: Lex Kathleen MD;  Location: [facility] OR;  Service: Oncology;  Laterality: Left;    CAROTID ENDARTERECTOMY Right     CHOLECYSTECTOMY      CLOSURE OF WOUND Right 09/09/2019    Procedure: CLOSURE, WOUND;  Surgeon: Li Kathleen DPM;  Location: [facility] OR;  Service: Podiatry;  Laterality: Right;    COLONOSCOPY N/A 12/10/2019    Procedure: COLONOSCOPY;  Surgeon: Ryan Lucas MD;  Location: [facility];  Service: Endoscopy;  Laterality: N/A;    COLONOSCOPY N/A 12/13/2019    Procedure: COLONOSCOPY;  Surgeon: Ryan Lucas MD;  Location: [facility];  Service: Endoscopy;  Laterality: N/A;    CORONARY STENT PLACEMENT      ESOPHAGOGASTRODUODENOSCOPY N/A 12/10/2019    Procedure: EGD (ESOPHAGOGASTRODUODENOSCOPY);  Surgeon: Ryan Lucas MD;  Location: [facility] ENDO;  Service: Endoscopy;  Laterality: N/A;    ESOPHAGOGASTRODUODENOSCOPY N/A 11/03/2022    Procedure: EGD (ESOPHAGOGASTRODUODENOSCOPY);  Surgeon: Ryan Lucas MD;  Location: [facility];  Service: Endoscopy;  Laterality: N/A;    INCISION AND DRAINAGE FOOT Right 12/19/2023    Procedure: INCISION AND DRAINAGE, FOOT- RIGHT;  Surgeon: Дмитрий Pratt DPM;  Location: [facility] OR;  Service: Podiatry;  Laterality: Right;    INJECTION OF ANESTHETIC AGENT AROUND MEDIAL BRANCH NERVES INNERVATING LUMBAR FACET JOINT Bilateral 07/25/2023    Procedure: Block-nerve-medial branch-lumbar;  Surgeon: Napoleon Grimaldo MD;  Location: [facility] OR;  Service: Pain Management;  Laterality: Bilateral;  L3,4,5 MBB    INJECTION OF ANESTHETIC AGENT AROUND MEDIAL BRANCH NERVES INNERVATING LUMBAR FACET JOINT Bilateral 08/25/2023    Procedure: Block-nerve-medial  branch-lumbar;  Surgeon: Napoleon Grimaldo MD;  Location: [facility] OR;  Service: Anesthesiology;  Laterality: Bilateral;  L3,4,5 MBB #2    INSERTION OF IMPLANTABLE LOOP RECORDER  08/24/2024    LAPAROSCOPIC CHOLECYSTECTOMY N/A 01/11/2023    Procedure: CHOLECYSTECTOMY, LAPAROSCOPIC;  Surgeon: Laith Davis MD;  Location: [facility] OR;  Service: General;  Laterality: N/A;    LEFT HEART CATHETERIZATION Left 08/25/2022    Procedure: Left heart cath;  Surgeon: Mai Deleon MD;  Location: [facility] CATH;  Service: Cardiology;  Laterality: Left;    RADIOFREQUENCY ABLATION OF LUMBAR MEDIAL BRANCH NERVE AT SINGLE LEVEL Bilateral 10/31/2023    Procedure: Radiofrequency Ablation, Nerve, Spinal, Lumbar, Medial Branch, 1 Level;  Surgeon: Napoleon Grimaldo MD;  Location: [facility] OR;  Service: Anesthesiology;  Laterality: Bilateral;  L3,4,5 RFA    right heel surgery      SHOULDER ARTHROSCOPY Left     UPPER GASTROINTESTINAL ENDOSCOPY       Family History       Problem Relation (Age of Onset)    Cancer Father    Cervical cancer Sister    Cirrhosis Brother    Colon cancer Father    Diabetes Mother, Father    Hypertension Mother, Father, Brother    Lung cancer Maternal Grandmother    Melanoma Mother    Prostate cancer Maternal Grandfather    Stroke Father          Social History     Socioeconomic History    Marital status: Legally    Occupational History    Occupation: disabled (mental)     Comment: since 2000   Tobacco Use    Smoking status: Former     Types: Vaping with nicotine     Passive exposure: Current    Smokeless tobacco: Never    Tobacco comments:     Age Started 14    Substance and Sexual Activity    Alcohol use: No    Drug use: Not Currently     Types: Marijuana     Comment: ocasionally - once every 6 months    Sexual activity: Yes     Partners: Female     Social Drivers of Health     Financial Resource Strain: Medium Risk (5/7/2024)    Overall Financial Resource Strain (CARDIA)     Difficulty of Paying Living Expenses:  Somewhat hard   Food Insecurity: Unknown (9/29/2024)    Received from [facility]    Hunger Vital Sign     Ran Out of Food in the Last Year: Never true   Transportation Needs: Unknown (9/29/2024)    Received from [facility]    PRAPARE - Transportation     Lack of Transportation (Medical): No   Physical Activity: Insufficiently Active (5/7/2024)    Exercise Vital Sign     Days of Exercise per Week: 2 days     Minutes of Exercise per Session: 10 min   Stress: Stress Concern Present (5/7/2024)    [employer] - Occupational Stress Questionnaire     Feeling of Stress : To some extent   Housing Stability: Unknown (9/29/2024)    Received from [facility]    Housing Stability Vital Sign     Homeless in the Last Year: No       Current medications Reviewed  Current Outpatient Medications on File Prior to Visit   Medication Sig Dispense Refill    albuterol (PROVENTIL/VENTOLIN HFA) 90 mcg/actuation inhaler Inhale 2 puffs into the lungs every 6 (six) hours as needed for Shortness of Breath or Wheezing. 18 g 5    albuterol-ipratropium (DUO-NEB) 2.5 mg-0.5 mg/3 mL nebulizer solution Take 3 mLs by nebulization every 6 (six) hours as needed for Wheezing. Rescue 75 mL 0    ammonium lactate 12 % Crea Apply 2 g topically 2 (two) times a day. 385 g 11    aspirin 81 MG Chew Take 81 mg by mouth once daily.      atorvastatin (LIPITOR) 80 MG tablet Take 1 tablet (80 mg total) by mouth once daily. 30 tablet 0    brexpiprazole (REXULTI) 4 mg Tab Take 4 mg by mouth Daily.      butalbital-acetaminophen-caffeine -40 mg (FIORICET, ESGIC) -40 mg per tablet Take 1 tablet by mouth as needed for migraine. No more than 10 tablets per month. 10 tablet 0    clonazePAM (KLONOPIN) 0.5 MG tablet Take 0.5 mg by mouth daily as needed.      clopidogreL (PLAVIX) 75 mg tablet Take 1 tablet (75 mg total) by mouth once daily. 30 tablet 11    cyanocobalamin 1,000 mcg/mL injection 1 ml  sq daily x 5 days, then 1 ml sq weekly x 4 weeks then q monthly -  will need to go to PCP 9 mL 0    dipyridamole-aspirin 200-25 mg (AGGRENOX)  mg CM12 Take 1 capsule by mouth 2 (two) times daily.      EPINEPHrine (EPIPEN) 0.3 mg/0.3 mL AtIn Inject 0.3 mLs (0.3 mg total) into the muscle as needed. 1 each 0    fluticasone-umeclidin-vilanter (TRELEGY ELLIPTA) 100-62.5-25 mcg DsDv INHALE 1 PUFF INTO THE LUNGS ONE TIME DAILY 60 each 11    fremanezumab-vfrm (AJOVY AUTOINJECTOR) 225 mg/1.5 mL autoinjector Inject 1.5 mLs (225 mg total) into the skin every 28 days. 1 each 11    hydrOXYzine (ATARAX) 50 MG tablet Take 50 mg by mouth Daily.      lamoTRIgine (LAMICTAL) 200 MG tablet Take 200 mg by mouth once daily.      meclizine (ANTIVERT) 12.5 mg tablet Take 1 tablet (12.5 mg total) by mouth 3 (three) times daily as needed for Dizziness. 90 tablet 0    mirtazapine (REMERON) 15 MG tablet Take 15 mg by mouth every evening.      nicotine (NICODERM CQ) 14 mg/24 hr Place 1 patch onto the skin once daily. 14 patch 0    nicotine polacrilex 4 MG Lozg Take up to 10 pieces daily as needed 72 lozenge 0    nitroGLYCERIN (NITROSTAT) 0.4 MG SL tablet Place 0.4 mg under the tongue every 5 (five) minutes as needed.      ondansetron (ZOFRAN-ODT) 4 MG TbDL Take 1 tablet (4 mg total) by mouth every 8 (eight) hours as needed (nausea). 30 tablet 0    pantoprazole (PROTONIX) 40 MG tablet Take 1 tablet (40 mg total) by mouth once daily. 90 tablet 3    pregabalin (LYRICA) 50 MG capsule Take 1 capsule (50 mg total) by mouth 3 (three) times daily. 270 capsule 0    ranolazine (RANEXA) 500 MG Tb12 Take 1 tablet (500 mg total) by mouth 2 (two) times daily. 180 tablet 3    tadalafiL (CIALIS) 20 MG Tab Take 1 tablet (20 mg total) by mouth once daily. 10 tablet 11    topiramate (TOPAMAX) 200 MG Tab Take 1 tablet (200 mg total) by mouth every evening. 90 tablet 3    ubrogepant (UBRELVY) 100 mg tablet Take 1 tablet by mouth as needed for migraine. May  repeat in 2 hours if needed. Max 2 tab per day 8 tablet 11    ziprasidone (GEODON) 80 MG capsule Take 80 mg by mouth once daily.       Current Facility-Administered Medications on File Prior to Visit   Medication Dose Route Frequency Provider Last Rate Last Admin    cyanocobalamin injection 1,000 mcg  1,000 mcg Intramuscular Q14 Days Ana Mayer MD   1,000 mcg at 06/24/24 1445    cyanocobalamin injection 1,000 mcg  1,000 mcg Intramuscular Q14 Days    1,000 mcg at 10/08/24 0910    diphenhydrAMINE injection 12.5 mg  12.5 mg Intravenous Q6H PRN Irvin Frank MD        electrolyte-S (ISOLYTE)   Intravenous Continuous Irvin Frank MD        lactated ringers infusion   Intravenous Continuous Napoleon Grimaldo MD        lactated ringers infusion   Intravenous Continuous Napoleon Grimaldo MD 25 mL/hr at 08/25/23 0948 New Bag at 08/25/23 0948    lactated ringers infusion   Intravenous Continuous Napoleon Grimaldo MD   Stopped at 10/31/23 1255    lactated ringers infusion   Intravenous Continuous Irvin Frank MD 10 mL/hr at 11/17/23 0745 New Bag at 11/17/23 0745    LIDOcaine (PF) 10 mg/ml (1%) injection 10 mg  1 mL Intradermal Once Napoleon Grimaldo MD        LIDOcaine (PF) 10 mg/ml (1%) injection 10 mg  1 mL Intradermal Once Napoleon Grimaldo MD        LIDOcaine (PF) 10 mg/ml (1%) injection 10 mg  1 mL Intradermal Once Napoleon Grimaldo MD        LIDOcaine (PF) 10 mg/ml (1%) injection 10 mg  1 mL Intradermal Once Irvin Frank MD        [DISCONTINUED] GENERIC EXTERNAL MEDICATION     Provider, Generic External Data           Review of Systems   Constitutional:  Negative for activity change, appetite change, fatigue and fever.   HENT:  Negative for nosebleeds.    Respiratory:  Positive for shortness of breath. Negative for cough.    Cardiovascular:  Positive for chest pain. Negative for palpitations and leg swelling.   Gastrointestinal:  Negative for abdominal distention, abdominal pain and nausea.   Genitourinary:  Negative for  frequency.   Musculoskeletal:  Negative for arthralgias and myalgias.   Skin:  Negative for rash.   Neurological:  Negative for dizziness and numbness.   Hematological:  Does not bruise/bleed easily.     Objective:   Physical Exam  Constitutional:       Appearance: Normal appearance.   HENT:      Head: Normocephalic and atraumatic.   Eyes:      Extraocular Movements: Extraocular movements intact.   Cardiovascular:      Rate and Rhythm: Normal rate.   Pulmonary:      Effort: Pulmonary effort is normal.   Abdominal:      General: Abdomen is flat.   Musculoskeletal:         General: Normal range of motion.      Cervical back: Normal range of motion.   Skin:     General: Skin is warm and dry.      Capillary Refill: Capillary refill takes less than 2 seconds.      Coloration: Skin is not pale.   Neurological:      General: No focal deficit present.      Mental Status: He is alert.         Diagnostic Results: reviewed   TTE 10/3/24    Left Ventricle: The left ventricle is normal in size. Mildly increased wall thickness. There is normal systolic function with a visually estimated ejection fraction of 65 - 70%. Grade I diastolic dysfunction.    Right Ventricle: Normal right ventricular cavity size. Wall thickness is normal. Systolic function is normal.    Left Atrium: Left atrium is severely dilated.    Aortic Valve: There is moderate aortic valve sclerosis. Moderately restricted motion. There is moderate to severe stenosis. Aortic valve area by VTI is 1.3 cm². Aortic valve peak velocity is 3.8 m/s. Mean gradient is 30.2 mmHg. The dimensionless index is 0.45. There is mild to moderate aortic regurgitation.    Mitral Valve: There is moderate bileaflet sclerosis. Moderately thickened leaflets. There is moderate stenosis. The mean pressure gradient across the mitral valve is 11 mmHg at a heart rate of  bpm. There is mild regurgitation.    Pulmonary Artery: There is mild to moderate pulmonary hypertension. The estimated  pulmonary artery systolic pressure is 47 mmHg.    IVC/SVC: Normal venous pressure at 3 mmHg.     Assessment:   AS  MS   Plan:     CTS Attending Note:    I have personally taken the history and examined this patient and agree with the LA NENA's note as stated above.  59-year-old gentleman who appears older than his stated age found to have aortic and mitral stenosis after complaining of dyspnea on exertion.  I reviewed his CT scan of the chest.  He has significant lung disease which may explain his symptoms in part.  Additionally also has severe mitral annular calcification as seen on the CT scan.  Given his comorbid conditions, including multiple previous strokes and lung disease, I believe that the risk would be greater than the benefit for surgical correction of his moderate mitral and aortic stenosis.  Recommend medical management.

## 2024-10-22 ENCOUNTER — OFFICE VISIT (OUTPATIENT)
Dept: NEUROLOGY | Facility: CLINIC | Age: 59
End: 2024-10-22
Payer: MEDICARE

## 2024-10-22 ENCOUNTER — CLINICAL SUPPORT (OUTPATIENT)
Dept: FAMILY MEDICINE | Facility: CLINIC | Age: 59
End: 2024-10-22
Payer: MEDICARE

## 2024-10-22 VITALS
HEART RATE: 74 BPM | SYSTOLIC BLOOD PRESSURE: 114 MMHG | HEIGHT: 70 IN | WEIGHT: 164 LBS | BODY MASS INDEX: 23.48 KG/M2 | DIASTOLIC BLOOD PRESSURE: 69 MMHG | TEMPERATURE: 98 F | RESPIRATION RATE: 17 BRPM

## 2024-10-22 DIAGNOSIS — E53.8 B12 DEFICIENCY: Primary | ICD-10-CM

## 2024-10-22 DIAGNOSIS — G43.719 INTRACTABLE CHRONIC MIGRAINE WITHOUT AURA AND WITHOUT STATUS MIGRAINOSUS: ICD-10-CM

## 2024-10-22 PROCEDURE — 99999 PR PBB SHADOW E&M-EST. PATIENT-LVL V: CPT | Mod: PBBFAC,HCNC,, | Performed by: NURSE PRACTITIONER

## 2024-10-22 PROCEDURE — 3008F BODY MASS INDEX DOCD: CPT | Mod: HCNC,CPTII,S$GLB, | Performed by: NURSE PRACTITIONER

## 2024-10-22 PROCEDURE — 3074F SYST BP LT 130 MM HG: CPT | Mod: HCNC,CPTII,S$GLB, | Performed by: NURSE PRACTITIONER

## 2024-10-22 PROCEDURE — 99213 OFFICE O/P EST LOW 20 MIN: CPT | Mod: HCNC,S$GLB,, | Performed by: NURSE PRACTITIONER

## 2024-10-22 PROCEDURE — 1159F MED LIST DOCD IN RCRD: CPT | Mod: HCNC,CPTII,S$GLB, | Performed by: NURSE PRACTITIONER

## 2024-10-22 PROCEDURE — 3078F DIAST BP <80 MM HG: CPT | Mod: HCNC,CPTII,S$GLB, | Performed by: NURSE PRACTITIONER

## 2024-10-22 PROCEDURE — 3044F HG A1C LEVEL LT 7.0%: CPT | Mod: HCNC,CPTII,S$GLB, | Performed by: NURSE PRACTITIONER

## 2024-10-22 PROCEDURE — 1160F RVW MEDS BY RX/DR IN RCRD: CPT | Mod: HCNC,CPTII,S$GLB, | Performed by: NURSE PRACTITIONER

## 2024-10-22 RX ORDER — BUTALBITAL, ACETAMINOPHEN AND CAFFEINE 50; 325; 40 MG/1; MG/1; MG/1
TABLET ORAL
Qty: 10 TABLET | Refills: 0 | Status: SHIPPED | OUTPATIENT
Start: 2024-10-22

## 2024-10-22 RX ADMIN — CYANOCOBALAMIN 1000 MCG: 1000 INJECTION, SOLUTION INTRAMUSCULAR; SUBCUTANEOUS at 08:10

## 2024-10-22 NOTE — PROGRESS NOTES
Pt here for cyanocabalamin injection(see MAR). Confirmed name and . Cyanocabalamin 1000mcg given left glut. Pt tolerated well. Confirmed next dose and appt.

## 2024-10-22 NOTE — PATIENT INSTRUCTIONS
Please call our clinic at [phone] or send a message on the [employer] portal if there are any changes to the plan described below, for example,if you are not contacted for the requested tests, referral(s) within one week, if you are unable to receive the medications prescribed, or if you feel you need to change the treatment course for any reason.     TESTING:  -- none     REFERRALS:  -- none     PREVENTION (use daily regardless of headache):  -- continue Ajovy once per month.     AS-NEEDED TREATMENT (use total no more than 10 days per month unless otherwise stated):  -- continue Ubrelvy  -- ok to continue Fioricet for migraine attacks
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
## 2024-10-24 ENCOUNTER — HOSPITAL ENCOUNTER (OUTPATIENT)
Dept: CARDIOLOGY | Facility: HOSPITAL | Age: 59
Discharge: HOME | End: 2024-10-24
Attending: INTERNAL MEDICINE
Payer: MEDICARE

## 2024-10-24 ENCOUNTER — CLINICAL SUPPORT (OUTPATIENT)
Dept: CARDIOLOGY | Facility: HOSPITAL | Age: 59
End: 2024-10-24

## 2024-10-24 DIAGNOSIS — I49.8 OTHER SPECIFIED CARDIAC ARRHYTHMIAS: ICD-10-CM

## 2024-10-24 PROCEDURE — 93298 REM INTERROG DEV EVAL SCRMS: CPT | Mod: 26,HCNC,, | Performed by: INTERNAL MEDICINE

## 2024-10-24 PROCEDURE — 93298 REM INTERROG DEV EVAL SCRMS: CPT | Mod: HCNC,PO | Performed by: INTERNAL MEDICINE

## 2024-10-29 RX ORDER — NITROGLYCERIN 0.4 MG/1
0.4 TABLET SUBLINGUAL EVERY 5 MIN PRN
Qty: 30 TABLET | Refills: 0 | Status: SHIPPED | OUTPATIENT
Start: 2024-10-29 | End: 2024-11-28

## 2024-11-01 ENCOUNTER — TELEPHONE (OUTPATIENT)
Dept: NEUROLOGY | Facility: CLINIC | Age: 59
End: 2024-11-01
Payer: MEDICARE

## 2024-11-05 ENCOUNTER — CLINICAL SUPPORT (OUTPATIENT)
Dept: FAMILY MEDICINE | Facility: CLINIC | Age: 59
End: 2024-11-05
Payer: MEDICARE

## 2024-11-05 DIAGNOSIS — E53.8 B12 DEFICIENCY: Primary | ICD-10-CM

## 2024-11-05 RX ADMIN — CYANOCOBALAMIN 1000 MCG: 1000 INJECTION, SOLUTION INTRAMUSCULAR; SUBCUTANEOUS at 09:11

## 2024-11-18 ENCOUNTER — CLINICAL SUPPORT (OUTPATIENT)
Dept: SMOKING CESSATION | Facility: CLINIC | Age: 59
End: 2024-11-18
Payer: COMMERCIAL

## 2024-11-18 DIAGNOSIS — F17.200 NICOTINE DEPENDENCE: Primary | ICD-10-CM

## 2024-11-18 PROCEDURE — 99407 BEHAV CHNG SMOKING > 10 MIN: CPT | Mod: S$GLB,,,

## 2024-11-18 PROCEDURE — 99999 PR PBB SHADOW E&M-EST. PATIENT-LVL I: CPT | Mod: PBBFAC,,,

## 2024-11-18 NOTE — PROGRESS NOTES
Spoke with patient today in regard to smoking cessation progress for 6 month phone follow up on quit 5.  Patient is tobacco/vape free at this time, but states that he is having strong urges and cravings to smoke.  Patient has scheduled an appointment to return to the program for Quit attempt #6.  Informed patient of benefit period, future follow ups, and contact information if any further help or support is needed.  Will complete smart form on Quit attempt #5.

## 2024-11-19 ENCOUNTER — CLINICAL SUPPORT (OUTPATIENT)
Dept: FAMILY MEDICINE | Facility: CLINIC | Age: 59
End: 2024-11-19
Payer: MEDICARE

## 2024-11-19 DIAGNOSIS — E53.8 VITAMIN B12 DEFICIENCY: Primary | ICD-10-CM

## 2024-11-19 RX ADMIN — CYANOCOBALAMIN 1000 MCG: 1000 INJECTION, SOLUTION INTRAMUSCULAR; SUBCUTANEOUS at 09:11

## 2024-11-21 ENCOUNTER — OFFICE VISIT (OUTPATIENT)
Dept: PODIATRY | Facility: CLINIC | Age: 59
End: 2024-11-21
Payer: MEDICARE

## 2024-11-21 VITALS — BODY MASS INDEX: 23.99 KG/M2 | HEIGHT: 70 IN | WEIGHT: 167.56 LBS

## 2024-11-21 DIAGNOSIS — I73.9 PVD (PERIPHERAL VASCULAR DISEASE): ICD-10-CM

## 2024-11-21 DIAGNOSIS — E11.49 TYPE II DIABETES MELLITUS WITH NEUROLOGICAL MANIFESTATIONS: Primary | ICD-10-CM

## 2024-11-21 DIAGNOSIS — G43.719 INTRACTABLE CHRONIC MIGRAINE WITHOUT AURA AND WITHOUT STATUS MIGRAINOSUS: ICD-10-CM

## 2024-11-21 DIAGNOSIS — E08.42 DIABETIC POLYNEUROPATHY ASSOCIATED WITH DIABETES MELLITUS DUE TO UNDERLYING CONDITION: ICD-10-CM

## 2024-11-21 PROCEDURE — 99999 PR PBB SHADOW E&M-EST. PATIENT-LVL IV: CPT | Mod: PBBFAC,HCNC,, | Performed by: STUDENT IN AN ORGANIZED HEALTH CARE EDUCATION/TRAINING PROGRAM

## 2024-11-21 RX ORDER — BUTALBITAL, ACETAMINOPHEN AND CAFFEINE 50; 325; 40 MG/1; MG/1; MG/1
TABLET ORAL
Qty: 10 TABLET | Refills: 0 | Status: SHIPPED | OUTPATIENT
Start: 2024-11-21

## 2024-11-21 NOTE — PROGRESS NOTES
Subjective:      Patient ID: Ryan Crane is a 59 y.o. male.    Chief Complaint: Foot Pain      HPI:  Ryan is a 59 y.o. male who presents to the clinic for evaluation and treatment of high risk feet. Ryan has a past medical history of Aortic valve stenosis (02/28/2022), Arachnoid cyst of posterior cranial fossa (01/13/2022), Benign prostatic hyperplasia without lower urinary tract symptoms (05/25/2023), Bilateral carotid bruits (06/22/2021), Bipolar disorder (01/16/2022), CAD S/P percutaneous coronary angioplasty, Calculus of gallbladder without cholecystitis without obstruction (01/11/2023), Cancer, Candidal intertrigo (11/11/2019), Cataract, Chronic heart failure with preserved ejection fraction (03/29/2023), Chronic schizophrenia, Colon polyps, Diabetic polyneuropathy associated with diabetes mellitus due to underlying condition (11/21/2019), Dysarthria as late effect of cerebellar cerebrovascular accident (CVA) (03/24/2023), Equinus deformity of both feet (11/21/2019), Erectile dysfunction due to arterial insufficiency (05/25/2023), Flaccid hemiplegia of left nondominant side as late effect of cerebral infarction (03/17/2023), Flat foot (11/21/2019), Gastritis, Gastroesophageal reflux disease without esophagitis (07/13/2023), Gastrointestinal hemorrhage (12/13/2019), General anesthetics causing adverse effect in therapeutic use, Hammer toes of both feet (11/21/2019), diabetic foot ulcer (11/21/2019), Hypertension, Hyponatremia (01/11/2022), ELAINE (iron deficiency anemia) (12/10/2019), Intractable chronic migraine without aura and without status migrainosus (12/30/2021), Microcytic anemia (10/09/2019), Moderate aortic regurgitation (03/29/2023), Moderate mitral regurgitation (03/29/2023), Moderate mitral stenosis (03/29/2023), Moderate to severe aortic stenosis (02/28/2022), Myocardial infarct, old, Nicotine dependence, unspecified, uncomplicated (01/16/2022), Onychomycosis due to dermatophyte  (11/21/2019), Orchitis (11/09/2019), PIPER on CPAP, Peripheral visual field defect, bilateral (01/13/2022), PVD (peripheral vascular disease) (11/21/2019), Seizures (2008), Stage 2 moderate COPD by GOLD classification, Stroke (2005), Thoracic aortic atherosclerosis (10/17/2022), Thyroid disease, Tinea pedis of right foot (05/21/2019), Tobacco use disorder, Type 2 diabetes mellitus with diabetic peripheral angiopathy without gangrene, and Vitamin B12 deficiency (03/24/2023). The patient's chief complaint is long, thick toenails and right heel pain. This patient has documented high risk feet requiring routine maintenance secondary to diabetes mellitis and those secondary complications of diabetes, as mentioned..  He reports having right heel pain, which he rates as 3/10 on the pain scale but denies having a wound.  He also informs of losing 30 lbs since his last visit without trying to lose weight and is concerned about it.  He denies trying to self-treat his heel pain or trim his toenails himself.  Patient denies any other pedal complaints at this time.    11/21/2024  Mr. Crane returns today for routine foot care. He does note some the same heel pain.     PCP: Ana Mayer MD    Date Last Seen by PCP:  5/17/2024    Current shoe gear:  Affected Foot: Casual shoes     Unaffected Foot: Casual shoes    Review of Systems   Constitutional: Negative for appetite change, fever, chills, fatigue.  Positive for unexpected weight change.   Respiratory: Negative for cough, wheezing, shortness of breath.  Cardiovascular: Negative for chest pain, claudication, cyanosis.  Positive for leg swelling.  Musculoskeletal: Negative for back pain, arthritis, joint pain, joint swelling, myalgias, stiffness.   Skin: Negative for rash, itching, suspicious lesion, poor wound healing, unusual hair distribution.  Positive for nail bed changes, discoloration,.  Neurological: Negative for loss of balance, sensory change, paresthesias,  numbness.  Positive for pain.  Hematological: Negative for adenopathy, bleeding, bruising easily.   Psychiatric/Behavioral: The patient is not nervous/anxious.  Negative for altered mental status.    Hemoglobin A1C   Date Value Ref Range Status   09/29/2024 5.5 4.6 - 6.2 % Final   08/24/2024 5.1 4.6 - 6.2 % Final   07/18/2024 4.9 4.6 - 6.2 % Final   04/03/2024 4.9 4.0 - 5.6 % Final     Comment:     ADA Screening Guidelines:  5.7-6.4%  Consistent with prediabetes  >or=6.5%  Consistent with diabetes    High levels of fetal hemoglobin interfere with the HbA1C  assay. Heterozygous hemoglobin variants (HbS, HgC, etc)do  not significantly interfere with this assay.   However, presence of multiple variants may affect accuracy.     07/13/2023 4.9 4.0 - 5.6 % Final     Comment:     ADA Screening Guidelines:  5.7-6.4%  Consistent with prediabetes  >or=6.5%  Consistent with diabetes    High levels of fetal hemoglobin interfere with the HbA1C  assay. Heterozygous hemoglobin variants (HbS, HgC, etc)do  not significantly interfere with this assay.   However, presence of multiple variants may affect accuracy.     03/05/2023 5.1 0.0 - 5.6 % Final     Comment:     Reference Interval:  5.0 - 5.6 Normal   5.7 - 6.4 High Risk   > 6.5 Diabetic      Hgb A1c results are standardized based on the (NGSP) National   Glycohemoglobin Standardization Program.      Hemoglobin A1C levels are related to mean serum/plasma glucose   during the preceding 2-3 months.            Past Medical History:   Diagnosis Date    Aortic valve stenosis 02/28/2022    Arachnoid cyst of posterior cranial fossa 01/13/2022    Benign prostatic hyperplasia without lower urinary tract symptoms 05/25/2023    Bilateral carotid bruits 06/22/2021    Bipolar disorder 01/16/2022    CAD S/P percutaneous coronary angioplasty     3 vessel disease    Calculus of gallbladder without cholecystitis without obstruction 01/11/2023    Cancer     Candidal intertrigo 11/11/2019     Cataract     Chronic heart failure with preserved ejection fraction 03/29/2023    Chronic schizophrenia     Colon polyps     Diabetic polyneuropathy associated with diabetes mellitus due to underlying condition 11/21/2019    Dysarthria as late effect of cerebellar cerebrovascular accident (CVA) 03/24/2023    Equinus deformity of both feet 11/21/2019    Erectile dysfunction due to arterial insufficiency 05/25/2023    Flaccid hemiplegia of left nondominant side as late effect of cerebral infarction 03/17/2023    Flat foot 11/21/2019    Gastritis     Gastroesophageal reflux disease without esophagitis 07/13/2023    Gastrointestinal hemorrhage 12/13/2019    Post polypectomy bleeding    General anesthetics causing adverse effect in therapeutic use     Hammer toes of both feet 11/21/2019    Hx of diabetic foot ulcer 11/21/2019    Hypertension     Hyponatremia 01/11/2022    ELAINE (iron deficiency anemia) 12/10/2019    Intractable chronic migraine without aura and without status migrainosus 12/30/2021    Microcytic anemia 10/09/2019    Moderate aortic regurgitation 03/29/2023    Moderate mitral regurgitation 03/29/2023    Moderate mitral stenosis 03/29/2023    Moderate to severe aortic stenosis 02/28/2022    Myocardial infarct, old     Nicotine dependence, unspecified, uncomplicated 01/16/2022    Onychomycosis due to dermatophyte 11/21/2019    Orchitis 11/09/2019    PIPER on CPAP     Peripheral visual field defect, bilateral 01/13/2022    PVD (peripheral vascular disease) 11/21/2019    Seizures 2008    Stage 2 moderate COPD by GOLD classification     PFTs w/ FEV1 (73%) (9/2022)    Stroke 2005    Thoracic aortic atherosclerosis 10/17/2022    CT chest    Thyroid disease     Previously on pharmacologic Tx    Tinea pedis of right foot 05/21/2019    Tobacco use disorder     Type 2 diabetes mellitus with diabetic peripheral angiopathy without gangrene     A1c 5.1% (3/2023)    Vitamin B12  deficiency 03/24/2023     Past Surgical History:   Procedure Laterality Date    ANGIOGRAM, CORONARY, WITH LEFT HEART CATHETERIZATION  08/25/2022    Procedure: Angiogram, Coronary, with Left Heart Cath;  Surgeon: Mai Deleon MD;  Location: [facility] CATH;  Service: Cardiology;;    APPENDECTOMY      BONE BIOPSY Right 12/19/2023    Procedure: Biopsy-Bone;  Surgeon: Дмитрий Pratt DPM;  Location: [facility] OR;  Service: Podiatry;  Laterality: Right;    BONE EXOSTOSIS EXCISION Right 11/17/2023    Procedure: EXCISION, EXOSTOSIS;  Surgeon: Дмитрий Pratt DPM;  Location: [facility] OR;  Service: Podiatry;  Laterality: Right;    BONE MARROW ASPIRATION Left 08/21/2020    Procedure: ASPIRATION, BONE MARROW;  Surgeon: Lex Kathleen MD;  Location: [facility];  Service: Oncology;  Laterality: Left;    CAROTID ENDARTERECTOMY Right     CHOLECYSTECTOMY      CLOSURE OF WOUND Right 09/09/2019    Procedure: CLOSURE, WOUND;  Surgeon: Li Kathleen DPM;  Location: [facility] OR;  Service: Podiatry;  Laterality: Right;    COLONOSCOPY N/A 12/10/2019    Procedure: COLONOSCOPY;  Surgeon: Ryan Lucas MD;  Location: [facility];  Service: Endoscopy;  Laterality: N/A;    COLONOSCOPY N/A 12/13/2019    Procedure: COLONOSCOPY;  Surgeon: Ryan Lucas MD;  Location: [facility];  Service: Endoscopy;  Laterality: N/A;    CORONARY STENT PLACEMENT      ESOPHAGOGASTRODUODENOSCOPY N/A 12/10/2019    Procedure: EGD (ESOPHAGOGASTRODUODENOSCOPY);  Surgeon: Ryan Lucas MD;  Location: [facility];  Service: Endoscopy;  Laterality: N/A;    ESOPHAGOGASTRODUODENOSCOPY N/A 11/03/2022    Procedure: EGD (ESOPHAGOGASTRODUODENOSCOPY);  Surgeon: Ryan Lucas MD;  Location: [facility];  Service: Endoscopy;  Laterality: N/A;    INCISION AND DRAINAGE FOOT Right 12/19/2023    Procedure: INCISION AND DRAINAGE, FOOT- RIGHT;  Surgeon: Дмитрий Pratt DPM;  Location: [facility] OR;  Service: Podiatry;  Laterality: Right;    INJECTION OF ANESTHETIC AGENT  AROUND MEDIAL BRANCH NERVES INNERVATING LUMBAR FACET JOINT Bilateral 07/25/2023    Procedure: Block-nerve-medial branch-lumbar;  Surgeon: Napoleon Grimaldo MD;  Location: [facility] OR;  Service: Pain Management;  Laterality: Bilateral;  L3,4,5 MBB    INJECTION OF ANESTHETIC AGENT AROUND MEDIAL BRANCH NERVES INNERVATING LUMBAR FACET JOINT Bilateral 08/25/2023    Procedure: Block-nerve-medial branch-lumbar;  Surgeon: Napoleon Grimaldo MD;  Location: [facility] OR;  Service: Anesthesiology;  Laterality: Bilateral;  L3,4,5 MBB #2    INSERTION OF IMPLANTABLE LOOP RECORDER  08/24/2024    LAPAROSCOPIC CHOLECYSTECTOMY N/A 01/11/2023    Procedure: CHOLECYSTECTOMY, LAPAROSCOPIC;  Surgeon: Laith Davis MD;  Location: [facility] OR;  Service: General;  Laterality: N/A;    LEFT HEART CATHETERIZATION Left 08/25/2022    Procedure: Left heart cath;  Surgeon: Mai Deleon MD;  Location: [facility] CATH;  Service: Cardiology;  Laterality: Left;    RADIOFREQUENCY ABLATION OF LUMBAR MEDIAL BRANCH NERVE AT SINGLE LEVEL Bilateral 10/31/2023    Procedure: Radiofrequency Ablation, Nerve, Spinal, Lumbar, Medial Branch, 1 Level;  Surgeon: Napoleon Grimaldo MD;  Location: [facility] OR;  Service: Anesthesiology;  Laterality: Bilateral;  L3,4,5 RFA    right heel surgery      SHOULDER ARTHROSCOPY Left     UPPER GASTROINTESTINAL ENDOSCOPY       Family History   Problem Relation Name Age of Onset    Melanoma Mother      Diabetes Mother      Hypertension Mother      Stroke Father      Diabetes Father      Hypertension Father      Colon cancer Father          unsure of age of diagnosis    Cancer Father          colon cancer    Cervical cancer Sister      Cirrhosis Brother      Hypertension Brother      Lung cancer Maternal Grandmother      Prostate cancer Maternal Grandfather      Crohn's disease Neg Hx      Ulcerative colitis Neg Hx      Stomach cancer Neg Hx      Esophageal cancer Neg Hx      Retinal detachment Neg Hx      Strabismus Neg Hx       "Macular degeneration Neg Hx      Glaucoma Neg Hx       Social History     Socioeconomic History    Marital status: Legally    Occupational History    Occupation: disabled (mental)     Comment: since 2000   Tobacco Use    Smoking status: Former     Types: Vaping with nicotine     Passive exposure: Current    Smokeless tobacco: Never    Tobacco comments:     Age Started 14    Substance and Sexual Activity    Alcohol use: No    Drug use: Not Currently     Types: Marijuana     Comment: ocasionally - once every 6 months    Sexual activity: Yes     Partners: Female     Social Drivers of Health     Financial Resource Strain: Medium Risk (5/7/2024)    Overall Financial Resource Strain (CARDIA)     Difficulty of Paying Living Expenses: Somewhat hard   Food Insecurity: Unknown (9/29/2024)    Received from [facility]    Hunger Vital Sign     Ran Out of Food in the Last Year: Never true   Transportation Needs: Unknown (9/29/2024)    Received from [facility]    PRAPARE - Transportation     Lack of Transportation (Medical): No   Physical Activity: Insufficiently Active (5/7/2024)    Exercise Vital Sign     Days of Exercise per Week: 2 days     Minutes of Exercise per Session: 10 min   Stress: Stress Concern Present (5/7/2024)    [employer] - Occupational Stress Questionnaire     Feeling of Stress : To some extent   Housing Stability: Unknown (9/29/2024)    Received from [facility]    Housing Stability Vital Sign     Homeless in the Last Year: No           Objective:        Ht 5' 10" (1.778 m)   Wt 76 kg (167 lb 8.8 oz)   BMI 24.04 kg/m²     Physical Exam   Constitutional: Patient is oriented to person, place, and time. Patient appears well-developed and well-nourished.  Strong malodor noted from patient due to lack of personal hygiene.  No acute distress.     Psychiatric: Patient has a normal mood and affect. Patient's speech is " normal and behavior is normal. Judgment is normal. Cognition and memory are normal.     Bilateral pedal exam was performed today.  Vascular: Vascular: Pedal pulses palpable 2/4 DP & PT.  CFT is = 3 seconds to the hallux.  Skin temperature is warm to cool proximal tibia to distal toes without localized increase in calor noted.  No erythema and ecchymosis noted to the LE.  Nonpitting edema noted to the LE.  Hair growth present distally to the LE.     Musculoskeletal: Ankle and pedal joints ROM are decreased. Ankle joint dorsiflexion is restricted with the knee extended and flexed per Silf[place]kiold exam.  Muscle strength is 5/5 for all LE muscle groups tested.  Flat foot type present.  Flexion contracture of lesser digits noted. Tenderness to palpation of the plantar R heel    Neurological:  Epicritic sensation is slightly dimiished to the foot.  Chaddock STR is intact to the LE.  Tenderness to palpation of right plantar heel noted.     Dermatological: Toenails 1-5 are elongated and distally thickened, dystrophic, brittle, discolored, and mycotic with subungal debris present.  Webspaces 1-4 are dry and intact with debris present.  Skin turgor is increased.  Skin texture is dry and flaky. Minor hyperkeratotic skin at the R plantar heel. Poros appear to have returned and are painful.    Nursing note and vitals reviewed.        Assessment:       No diagnosis found.                  Plan:       There are no diagnoses linked to this encounter.        I counseled the patient on his conditions, their implications and medical management.    I recommend Mr. Crane seek a second opinion with the new oncoming provider. MRI may be of benefit for evaluation of what might be the causing his heel pain.     RFC provided.    F/u 3 months.    Routine Foot Care    Date/Time: 11/21/2024 8:20 AM    Performed by: Дмитрий Pratt DPM  Authorized by: Дмитрий Pratt DPM    Consent Done?:  Yes (Verbal)  Hyperkeratotic Skin Lesions?: No       Nail Care Type:  Debride  Location(s): All  (Left 1st Toe, Left 3rd Toe, Left 2nd Toe, Left 4th Toe, Left 5th Toe, Right 1st Toe, Right 2nd Toe, Right 3rd Toe, Right 4th Toe and Right 5th Toe)  Patient tolerance:  Patient tolerated the procedure well with no immediate complications        Дмитрий Pratt DPM

## 2024-11-22 ENCOUNTER — TELEPHONE (OUTPATIENT)
Dept: GASTROENTEROLOGY | Facility: CLINIC | Age: 59
End: 2024-11-22
Payer: MEDICARE

## 2024-11-22 ENCOUNTER — PATIENT MESSAGE (OUTPATIENT)
Dept: PODIATRY | Facility: CLINIC | Age: 59
End: 2024-11-22
Payer: MEDICARE

## 2024-11-22 DIAGNOSIS — R13.19 ESOPHAGEAL DYSPHAGIA: ICD-10-CM

## 2024-11-22 DIAGNOSIS — Z87.19 HISTORY OF GASTRITIS: ICD-10-CM

## 2024-11-22 RX ORDER — PANTOPRAZOLE SODIUM 40 MG/1
40 TABLET, DELAYED RELEASE ORAL DAILY
Qty: 90 TABLET | Refills: 0 | Status: SHIPPED | OUTPATIENT
Start: 2024-11-22 | End: 2025-02-20

## 2024-11-22 NOTE — TELEPHONE ENCOUNTER
----- Message from Mena sent at 11/22/2024 11:24 AM CST -----  Type:  RX Refill Request    Who Called:  pt  Refill or New Rx:  refill  RX Name and Strength:  pantoprazole (PROTONIX) 40 MG tablet  How is the patient currently taking it? (ex. 1XDay):  as directed  Is this a 30 day or 90 day RX:  90  Preferred Pharmacy with phone number:    [id number] - [place] - [address] AT [place] [address] [id number]  [address]  [place] [address]  Phone: [phone] Fax: [phone]  Best Call Back Number:  [phone]  Additional Information:  pt is calling in regards to getting his Rx filled. Please call back and advise. Thanks!

## 2024-11-22 NOTE — TELEPHONE ENCOUNTER
Returned pt phone call, pt is wanting a refill request for Protonix. Advised pt I will send provider a message.   Scheduled f/u appointment for annual office visit.

## 2024-11-24 ENCOUNTER — HOSPITAL ENCOUNTER (OUTPATIENT)
Dept: CARDIOLOGY | Facility: HOSPITAL | Age: 59
Discharge: HOME | End: 2024-11-24
Attending: INTERNAL MEDICINE
Payer: MEDICARE

## 2024-11-24 ENCOUNTER — CLINICAL SUPPORT (OUTPATIENT)
Dept: CARDIOLOGY | Facility: HOSPITAL | Age: 59
End: 2024-11-24

## 2024-11-24 DIAGNOSIS — I49.8 OTHER SPECIFIED CARDIAC ARRHYTHMIAS: ICD-10-CM

## 2024-12-02 ENCOUNTER — CLINICAL SUPPORT (OUTPATIENT)
Dept: SMOKING CESSATION | Facility: CLINIC | Age: 59
End: 2024-12-02
Payer: COMMERCIAL

## 2024-12-02 ENCOUNTER — OFFICE VISIT (OUTPATIENT)
Dept: NEUROLOGY | Facility: CLINIC | Age: 59
End: 2024-12-02
Payer: MEDICARE

## 2024-12-02 VITALS
SYSTOLIC BLOOD PRESSURE: 114 MMHG | HEART RATE: 102 BPM | BODY MASS INDEX: 24.61 KG/M2 | DIASTOLIC BLOOD PRESSURE: 61 MMHG | RESPIRATION RATE: 18 BRPM | WEIGHT: 171.88 LBS | HEIGHT: 70 IN

## 2024-12-02 DIAGNOSIS — I63.9 RECURRENT STROKES: Primary | ICD-10-CM

## 2024-12-02 DIAGNOSIS — F17.210 CIGARETTE NICOTINE DEPENDENCE WITHOUT COMPLICATION: ICD-10-CM

## 2024-12-02 DIAGNOSIS — F17.200 NICOTINE DEPENDENCE: Primary | ICD-10-CM

## 2024-12-02 PROCEDURE — 99999 PR PBB SHADOW E&M-EST. PATIENT-LVL III: CPT | Mod: PBBFAC,,,

## 2024-12-02 PROCEDURE — 3044F HG A1C LEVEL LT 7.0%: CPT | Mod: HCNC,CPTII,S$GLB, | Performed by: NURSE PRACTITIONER

## 2024-12-02 PROCEDURE — 99404 PREV MED CNSL INDIV APPRX 60: CPT | Mod: S$GLB,,, | Performed by: GENERAL PRACTICE

## 2024-12-02 PROCEDURE — 3074F SYST BP LT 130 MM HG: CPT | Mod: HCNC,CPTII,S$GLB, | Performed by: NURSE PRACTITIONER

## 2024-12-02 PROCEDURE — 3008F BODY MASS INDEX DOCD: CPT | Mod: HCNC,CPTII,S$GLB, | Performed by: NURSE PRACTITIONER

## 2024-12-02 PROCEDURE — 99214 OFFICE O/P EST MOD 30 MIN: CPT | Mod: HCNC,S$GLB,, | Performed by: NURSE PRACTITIONER

## 2024-12-02 PROCEDURE — 3078F DIAST BP <80 MM HG: CPT | Mod: HCNC,CPTII,S$GLB, | Performed by: NURSE PRACTITIONER

## 2024-12-02 PROCEDURE — 99999 PR PBB SHADOW E&M-EST. PATIENT-LVL III: CPT | Mod: PBBFAC,HCNC,, | Performed by: NURSE PRACTITIONER

## 2024-12-02 RX ORDER — TRAZODONE HYDROCHLORIDE 50 MG/1
50 TABLET ORAL NIGHTLY
COMMUNITY

## 2024-12-02 RX ORDER — DIPHENHYDRAMINE HCL 25 MG
CAPSULE ORAL
Qty: 100 EACH | Refills: 0 | Status: SHIPPED | OUTPATIENT
Start: 2024-12-02

## 2024-12-02 RX ORDER — ZOLPIDEM TARTRATE 10 MG/1
5 TABLET ORAL NIGHTLY PRN
COMMUNITY

## 2024-12-02 NOTE — PROGRESS NOTES
NEUROLOGY  Outpatient Follow Up Visit     [facility]  [address] [facility], [place] [address]  [phone] (office) / [phone] (fax)    Patient Name:  Ryan Crane  :  1965  MR #:  [id number]  Acct #:  [id number]    Date of  Visit: 2024    Other Physicians:  Ana Mayer MD (Primary Care Physician); No ref. provider found (Referring)      CHIEF COMPLAINT: Stroke      INTERVAL HISTORY   24:  Ryan Crane is a 58 y.o. R-handed male seen in hospital follow up for CVA.     Medical history is otherwise significant for CAD, tobacco abuse, COPD, HTN, PIPER, PVD, bipolar disorder, schizophrenia, R carotid stenosis s/p CEA 2024, mitral stenosis, aortic stenosis    Here alone today.     I last saw him in 2024. He was on DAPT and Aggrenox, as well as 80 mg Lipitor for CVA prevention. I referred him to see vascular neurology in light of his recurrent strokes.     Admitted to [facility] several times since last visit.   24, 24, 24 with concern for CVA. Imaging acutely negative from brief chart review. Concern raised for TIA vs stroke recrudescence in the setting of hypotension.   Plavix was changed to Brilinta at some point. He indicates that he didn't ever get a prescription for Brilinta and continues on Plavix, in addition to the ASA. He is not taking Aggrenox.    He had an ILR placed. He keeps close f/u with his cardiologist. -60-70s at home on average. He remains off of any anti HTN agents.     He has been referred to CVT surgery at [facility]. Medical mgmt of his valvular disease was recommended over surgical intervention. He has been referred to Dr. Grant for another opinion regarding management.     Most recent ED visit was  w/dizziness leading to fall. MRI was acutely negative. He hasn't had any recurrent dizziness since. He did see ENT for suspected vertigo and was told that he needs hearing aids. He is starting therapies with  next week.      He is 3 months cigarette free!     Prior history:  7/8/24:  Ryan Crane is a 58 y.o. R-handed male seen in hospital follow up for CVA.     I last saw him in 5/2024.     Medical history is otherwise significant for CAD, tobacco abuse, COPD, HTN, PIPER, PVD, bipolar disorder and schizophrenia.     Here alone today.     Several admissions to [facility] since last visit. In May, had [facility] with LAD stent. TTE showed severe valvular disease as well. On 6/9, to ED with LSW and dysarthria. Outside of lytic window. Was on DAPT due to recent stents. No acute findings on MRI. CTA showed ~ 50% R ICA stenosis with ulcerated plaque vs aneurysm. Vascular surgery was consulted and he underwent R CEA. Dc on 6/17, then back to the ED 6/19 with vertigo. CTA was negative, but MRI showed acute punctate strokes in the R joseph & lenticulate nucleus . Inpatient neurology added Aggrenox to DAPT and increased atorvastatin to 80 mg. Most recent admission was just a few days ago for CP. Reports that he needs to have valve replacement surgery next.     Compliant with med changes. On ASA, Plavix and Aggrenox. No bleeding complications or SE. On 80 mg Lipitor now. Recall that he had prior Plavix responsiveness testing in 3/2023, which indicated normal effect.     Still having episodes of dizziness. Describes as room spinning. Occurs without warning or trigger. Not positional. Not improving post stroke, maybe more often. Home BP running low 90s/60s. Now off of anti HTN medications since post CEA hypotension requiring Levophed. Taking meclizine about 2x per week. Drinks ~ 80 oz water per day. Now also on ranolazine. Has not been able to f/u with his regular cardiologist with all of this.     Still using nicotine vape. In smoking cessation program here.     Was not referred for any outpatient therapies or HH from hospital.     5/7/24:  Ryan Crane is a 58 y.o. R-handed male seen in hospital follow up for CVA.     I last saw him for a  "similar visit in 3/2023. He sustained a R subcortical CVA with residual LUE weakness.     Medical history is otherwise significant for CAD, tobacco abuse, COPD, HTN, PIPER, PVD, bipolar disorder and schizophrenia.     Here alone today.     Admitted to [facility] 4/9 with concern for recurrent CVA. Recall that he had CVA with LSW in the past, but main residual is fine motor incoordination in the LUE. He started feeling "funny" during the day. Later that night, noted LSW involving the face, arm and leg. His speech was slurred. He didn't have any sensory loss. Treated with TNK per telestroke (NIHSS was 1). Symptoms improved in 1-2 days. At the time, was compliant with DAPT and statin therapies. No change in his therapy was recommended per neurology.     In the days prior, he denies any changes in medications. His BP was normal. No infectious symptoms. Was a bit more stressed than typical -- moving between his family member's houses, daughter going through a bad divorce. No sleep disturbances.     Doing well since dc. No recurrent stroke symptoms. Back to baseline L hand weakness.     On B12 injections q2 weeks.     No longer using cigarettes, but using nicotine vape.     He wants to get a tattoo soon and questions if he can stop the Plavix. He is going to see his cardiologist soon to talk about this also.      3/17/23:  Ryan Crane is a 59 y.o. R-handed male seen in hospital follow up for CVA.     The patient is new to me today, but was evaluated by Dr. Molina during a recent admission to [facility].     Medical history is otherwise significant for CAD, tobacco abuse, COPD, HTN, PIPER, PVD, bipolar disorder and schizophrenia.     He is a poor historian. Here alone today.     He went to the ED because he "felt funny." He thought that he was having recurrent symptoms r/t hypoNa. He took a "dizzy pill" and went to sleep. He woke up to smoke and kept dropping the cigarette from the L hand. Symptoms continued, so he went to the ED " "for evaluation the following day.     He went to [facility] and was d/c yesterday. He has no issues with the LLE. He has regained some strength in the LUE but still has significant difficulty with  and fine motor movements. He denies numbness. He hasn't had any falls since getting home. Has help from family. He started therapy with HH today.     Reports that he was taking ASA 81 mg and Lipitor 40 mg at time of event. PCP had just started the Lipitor in February. He had started the ASA in February as well after being admitted to  for CP. At d/c, statin dose increased to 80 mg and DAPT was initiated. He denies any bleeding complications from DAPT.     He had a stroke in 2005. He didn't have significant deficits from this. He states that he was not started on any medications, like aspirin, after that. This was when he was living in [place].     Just found out today that his biological father had 13 strokes.     He hasn't smoked since 3/3 because he has been hospitalized. He has tried to quit smoking in the past and hasn't been able to. He has cut down from 3 ppd to 10 cigarettes per day. He isn't interested in the cessation program, has already tried this.     He doesn't drink.     He has PIPER and states that he uses CPAP nightly.     He used to be diabetic, but this was when he weighted nearly 400 lbs. Recent A1C normal.     [facility] Neurology Consultation 3/5/23:  " Ryan Crane is a 57 y.o. year old  male who has a history of long term smoking and prior strokes presents with another stroke. Discussed with patient about stroke risk factors again. He does endorse cutting down for 3 packs a day to 10 cigs a day. Did inform him of new stroke and he will continue to work on those risk factors."    Allergies:  Review of patient's allergies indicates:   Allergen Reactions    Alcohol Anaphylaxis     Pt states all types of alcohol    Alcohol antiseptic pads Anaphylaxis    Cephalexin Anaphylaxis and Other (See " Comments)     Cardiac arrest       Current Medications:  Current Outpatient Medications   Medication Sig Dispense Refill    albuterol (PROVENTIL/VENTOLIN HFA) 90 mcg/actuation inhaler Inhale 2 puffs into the lungs every 6 (six) hours as needed for Shortness of Breath or Wheezing. 54 g 5    albuterol-ipratropium (DUO-NEB) 2.5 mg-0.5 mg/3 mL nebulizer solution Take 3 mLs by nebulization every 6 (six) hours as needed for Wheezing. Rescue 150 mL 5    ammonium lactate 12 % Crea Apply 2 g topically 2 (two) times a day. 385 g 11    aspirin 81 MG Chew Take 81 mg by mouth once daily.      atorvastatin (LIPITOR) 80 MG tablet Take 1 tablet (80 mg total) by mouth once daily. 30 tablet 0    brexpiprazole (REXULTI) 4 mg Tab Take 4 mg by mouth Daily.      butalbital-acetaminophen-caffeine -40 mg (FIORICET, ESGIC) -40 mg per tablet Take 1 tablet by mouth as needed for migraine. No more than 10 tablets per month. 10 tablet 0    clopidogreL (PLAVIX) 75 mg tablet Take 1 tablet (75 mg total) by mouth once daily. 30 tablet 11    cyanocobalamin 1,000 mcg/mL injection 1 ml sq daily x 5 days, then 1 ml sq weekly x 4 weeks then q monthly -  will need to go to PCP 9 mL 0    EPINEPHrine (EPIPEN) 0.3 mg/0.3 mL AtIn Inject 0.3 mLs (0.3 mg total) into the muscle as needed. 1 each 0    fluticasone-umeclidin-vilanter (TRELEGY ELLIPTA) 200-62.5-25 mcg inhaler Inhale 1 puff into the lungs once daily. 180 each 5    fremanezumab-vfrm (AJOVY AUTOINJECTOR) 225 mg/1.5 mL autoinjector Inject 1.5 mLs (225 mg total) into the skin every 28 days. 1 each 11    lamoTRIgine (LAMICTAL) 200 MG tablet Take 200 mg by mouth once daily.      nitroGLYCERIN (NITROSTAT) 0.4 MG SL tablet Place 1 tablet (0.4 mg total) under the tongue every 5 (five) minutes as needed for Chest pain. 30 tablet 0    pregabalin (LYRICA) 50 MG capsule Take 1 capsule (50 mg total) by mouth 3 (three) times daily. 270 capsule 0    promethazine (PHENERGAN) 25 MG tablet Take 1 tablet  (25 mg total) by mouth every 8 (eight) hours as needed for Nausea. 20 tablet 1    ranolazine (RANEXA) 500 MG Tb12 Take 1 tablet (500 mg total) by mouth 2 (two) times daily. 180 tablet 3    tadalafiL (CIALIS) 20 MG Tab Take 1 tablet (20 mg total) by mouth once daily. 10 tablet 11    traZODone (DESYREL) 50 MG tablet Take 50 mg by mouth every evening.      ubrogepant (UBRELVY) 100 mg tablet Take 1 tablet by mouth as needed for migraine. May repeat in 2 hours if needed. Max 2 tab per day 8 tablet 11    topiramate (TOPAMAX) 200 MG Tab Take 1 tablet (200 mg total) by mouth every evening. 90 tablet 3    zolpidem (AMBIEN) 10 mg Tab Take 5 mg by mouth nightly as needed. (Patient not taking: Reported on 12/2/2024)       Current Facility-Administered Medications   Medication Dose Route Frequency Provider Last Rate Last Admin    cyanocobalamin injection 1,000 mcg  1,000 mcg Intramuscular Q14 Days Ana Mayer MD   1,000 mcg at 06/24/24 1445     Facility-Administered Medications Ordered in Other Visits   Medication Dose Route Frequency Provider Last Rate Last Admin    diphenhydrAMINE injection 12.5 mg  12.5 mg Intravenous Q6H PRN Irvin Frank MD        electrolyte-S (ISOLYTE)   Intravenous Continuous Irvin Frank MD        lactated ringers infusion   Intravenous Continuous Napoleon Grimaldo MD        lactated ringers infusion   Intravenous Continuous Napoleon Grimaldo MD 25 mL/hr at 08/25/23 0948 New Bag at 08/25/23 0948    lactated ringers infusion   Intravenous Continuous Napoleon Grimaldo MD   Stopped at 10/31/23 1255    lactated ringers infusion   Intravenous Continuous Irvin Frank MD 10 mL/hr at 11/17/23 0745 New Bag at 11/17/23 0745    LIDOcaine (PF) 10 mg/ml (1%) injection 10 mg  1 mL Intradermal Once Napoleon Grimaldo MD        LIDOcaine (PF) 10 mg/ml (1%) injection 10 mg  1 mL Intradermal Once Napoleon Grimaldo MD        LIDOcaine (PF) 10 mg/ml (1%) injection 10 mg  1 mL Intradermal Once Napoleon Grimaldo MD LIDOcaine  (PF) 10 mg/ml (1%) injection 10 mg  1 mL Intradermal Once Irvin Frank MD           Past Medical History:  Past Medical History:   Diagnosis Date    Aortic valve stenosis 02/28/2022    Arachnoid cyst of posterior cranial fossa 01/13/2022    Benign prostatic hyperplasia without lower urinary tract symptoms 05/25/2023    Bilateral carotid bruits 06/22/2021    Bipolar disorder 01/16/2022    CAD S/P percutaneous coronary angioplasty     3 vessel disease    Calculus of gallbladder without cholecystitis without obstruction 01/11/2023    Cancer     Candidal intertrigo 11/11/2019    Cataract     Chronic heart failure with preserved ejection fraction 03/29/2023    Chronic schizophrenia     Colon polyps     Diabetic polyneuropathy associated with diabetes mellitus due to underlying condition 11/21/2019    Dysarthria as late effect of cerebellar cerebrovascular accident (CVA) 03/24/2023    Equinus deformity of both feet 11/21/2019    Erectile dysfunction due to arterial insufficiency 05/25/2023    Flaccid hemiplegia of left nondominant side as late effect of cerebral infarction 03/17/2023    Flat foot 11/21/2019    Gastritis     Gastroesophageal reflux disease without esophagitis 07/13/2023    Gastrointestinal hemorrhage 12/13/2019    Post polypectomy bleeding    General anesthetics causing adverse effect in therapeutic use     Hammer toes of both feet 11/21/2019    Hx of diabetic foot ulcer 11/21/2019    Hypertension     Hyponatremia 01/11/2022    ELAINE (iron deficiency anemia) 12/10/2019    Intractable chronic migraine without aura and without status migrainosus 12/30/2021    Microcytic anemia 10/09/2019    Moderate aortic regurgitation 03/29/2023    Moderate mitral regurgitation 03/29/2023    Moderate mitral stenosis 03/29/2023    Moderate to severe aortic stenosis 02/28/2022    Myocardial infarct, old     Nicotine dependence, unspecified, uncomplicated 01/16/2022    Onychomycosis due to dermatophyte 11/21/2019     Orchitis 11/09/2019    PIPER on CPAP     Peripheral visual field defect, bilateral 01/13/2022    PVD (peripheral vascular disease) 11/21/2019    Seizures 2008    Stage 2 moderate COPD by GOLD classification     PFTs w/ FEV1 (73%) (9/2022)    Stroke 2005    Thoracic aortic atherosclerosis 10/17/2022    CT chest    Thyroid disease     Previously on pharmacologic Tx    Tinea pedis of right foot 05/21/2019    Tobacco use disorder     Type 2 diabetes mellitus with diabetic peripheral angiopathy without gangrene     A1c 5.1% (3/2023)    Vitamin B12 deficiency 03/24/2023       Past Surgical History:  Past Surgical History:   Procedure Laterality Date    ANGIOGRAM, CORONARY, WITH LEFT HEART CATHETERIZATION  08/25/2022    Procedure: Angiogram, Coronary, with Left Heart Cath;  Surgeon: Mai Deleon MD;  Location: [facility] CATH;  Service: Cardiology;;    APPENDECTOMY      BONE BIOPSY Right 12/19/2023    Procedure: Biopsy-Bone;  Surgeon: Дмитрий Pratt DPM;  Location: [facility] OR;  Service: Podiatry;  Laterality: Right;    BONE EXOSTOSIS EXCISION Right 11/17/2023    Procedure: EXCISION, EXOSTOSIS;  Surgeon: Дмитрий Pratt DPM;  Location: [facility] OR;  Service: Podiatry;  Laterality: Right;    BONE MARROW ASPIRATION Left 08/21/2020    Procedure: ASPIRATION, BONE MARROW;  Surgeon: Lex Kathleen MD;  Location: [facility] OR;  Service: Oncology;  Laterality: Left;    CAROTID ENDARTERECTOMY Right     CHOLECYSTECTOMY      CLOSURE OF WOUND Right 09/09/2019    Procedure: CLOSURE, WOUND;  Surgeon: Li Kathleen DPM;  Location: [facility] OR;  Service: Podiatry;  Laterality: Right;    COLONOSCOPY N/A 12/10/2019    Procedure: COLONOSCOPY;  Surgeon: Ryan Lucas MD;  Location: [facility] ENDO;  Service: Endoscopy;  Laterality: N/A;    COLONOSCOPY N/A 12/13/2019    Procedure: COLONOSCOPY;  Surgeon: Ryan Lucas MD;  Location: [facility] ENDO;  Service: Endoscopy;  Laterality: N/A;    CORONARY STENT PLACEMENT      ESOPHAGOGASTRODUODENOSCOPY N/A  12/10/2019    Procedure: EGD (ESOPHAGOGASTRODUODENOSCOPY);  Surgeon: Ryan Lucas MD;  Location: [facility] ENDO;  Service: Endoscopy;  Laterality: N/A;    ESOPHAGOGASTRODUODENOSCOPY N/A 11/03/2022    Procedure: EGD (ESOPHAGOGASTRODUODENOSCOPY);  Surgeon: Ryan Lucas MD;  Location: [facility] ENDO;  Service: Endoscopy;  Laterality: N/A;    INCISION AND DRAINAGE FOOT Right 12/19/2023    Procedure: INCISION AND DRAINAGE, FOOT- RIGHT;  Surgeon: Дмитрий Pratt DPM;  Location: [facility] OR;  Service: Podiatry;  Laterality: Right;    INJECTION OF ANESTHETIC AGENT AROUND MEDIAL BRANCH NERVES INNERVATING LUMBAR FACET JOINT Bilateral 07/25/2023    Procedure: Block-nerve-medial branch-lumbar;  Surgeon: Napoleon Grimaldo MD;  Location: [facility];  Service: Pain Management;  Laterality: Bilateral;  L3,4,5 MBB    INJECTION OF ANESTHETIC AGENT AROUND MEDIAL BRANCH NERVES INNERVATING LUMBAR FACET JOINT Bilateral 08/25/2023    Procedure: Block-nerve-medial branch-lumbar;  Surgeon: Napoleon Grimaldo MD;  Location: [facility] OR;  Service: Anesthesiology;  Laterality: Bilateral;  L3,4,5 MBB #2    INSERTION OF IMPLANTABLE LOOP RECORDER  08/24/2024    LAPAROSCOPIC CHOLECYSTECTOMY N/A 01/11/2023    Procedure: CHOLECYSTECTOMY, LAPAROSCOPIC;  Surgeon: Laith Davis MD;  Location: [facility];  Service: General;  Laterality: N/A;    LEFT HEART CATHETERIZATION Left 08/25/2022    Procedure: Left heart cath;  Surgeon: Mai Deleon MD;  Location: [facility] CATH;  Service: Cardiology;  Laterality: Left;    RADIOFREQUENCY ABLATION OF LUMBAR MEDIAL BRANCH NERVE AT SINGLE LEVEL Bilateral 10/31/2023    Procedure: Radiofrequency Ablation, Nerve, Spinal, Lumbar, Medial Branch, 1 Level;  Surgeon: Napoleon Grimaldo MD;  Location: [facility] OR;  Service: Anesthesiology;  Laterality: Bilateral;  L3,4,5 RFA    right heel surgery      SHOULDER ARTHROSCOPY Left     UPPER GASTROINTESTINAL ENDOSCOPY         Family History:  family history includes Cancer in his father; Cervical  "cancer in his sister; Cirrhosis in his brother; Colon cancer in his father; Diabetes in his father and mother; Hypertension in his brother, father, and mother; Lung cancer in his maternal grandmother; Melanoma in his mother; Prostate cancer in his maternal grandfather; Stroke in his father.    Social History:   reports that he has quit smoking. His smoking use included vaping with nicotine. He has been exposed to tobacco smoke. He has never used smokeless tobacco. He reports that he does not currently use drugs after having used the following drugs: Marijuana. He reports that he does not drink alcohol.      PHYSICAL EXAM:  /61 (BP Location: Right arm, Patient Position: Sitting)   Pulse 102   Resp 18   Ht 5' 10" (1.778 m)   Wt 78 kg (171 lb 13.6 oz)   BMI 24.66 kg/m²     General: appears older than stated age. NAD.  Pulmonary: Normal effort and rate.   Musculoskeletal: No obvious joint deformities, moves all extremities well.  Extremities: No clubbing, cyanosis or edema.     Neurological exam:  Mental status: Awake and alert.  Oriented to person, place, time and situation. Able to provide decent history congruent with medical records.   Speech/Language: Fluent and appropriate. No dysarthria or aphasia on conversation. Able to follow complex commands.   Cranial nerves (II-XII): Visual fields full. Pupils equal round and reactive to light, extraocular movements intact, no ptosis, no nystagmus. Facial sensation and symmetry intact bilaterally. Hearing grossly intact. Palate deferred. Shoulder shrug normal bilaterally. Normal tongue protrusion.   Motor: 5 out of 5 strength throughout the R extremities. Subtle L hand / IO weakness. No extremity drift.   Sensation: Intact to light touch and temperature throughout.   DTR: Mute at the biceps. Mute at the R knee, 1+ L knee.   Coordination: Finger-nose-finger & heel to shin testing intact bilaterally. No tremor.   Gait: Fluid without AD    DIAGNOSTIC DATA:  I have " personally reviewed provider notes, labs and imaging made available to me today.     Imaging:  CTA H&N 11/26/24: OSH report only  IMPRESSION:   1.       No flow-limiting stenosis or evidence of arterial dissection on CTA of the head and neck.     MRI brain wo 11/26/24: OSH report only  FINDINGS:   No acute ischemia identified   No mass effect or midline shift.   Chronic findings similar prior.   Ventricles stable caliber.     IMPRESSION:   Stable exam with no acute finding     MRI brain wo 6/29/24: OSH report only  Acutely negative    CTA H&N 6/29/24: OSH report only  IMPRESSION:    1.  No acute arterial abnormality.    2.  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   3. Stable aneurysm arising from the left supraclinoid internal carotid artery.     MRI brain wo 6/27/24: OSH report only   IMPRESSION:   The lacunar infarctions in the right joseph and basal ganglia no longer demonstrate restricted diffusion.     MRI brain 6/19/24: report only   1.  Right pontine and right claustrum foci of restricted diffusion compatible with acute infarctions.    2.  Bilateral basal nuclei, centrum semiovale, and right cerebellar chronic lacunar infarctions.   3. Mild parenchymal atrophy and chronic microvascular ischemia.     CTA H&N 6/19/24: report only   IMPRESSION:    1.  No acute arterial abnormality.    2.  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   3. Stable aneurysm arising from the left supraclinoid internal carotid artery.     CTA H&N 6/9/24: report only  IMPRESSION:    1.  No large vessel occlusion.    2.  Approximately 50% stenosis of the right proximal internal carotid artery.   3. Moderate left, mild right stenosis at the origin of the cervical vertebral arteries.   4. Unchanged posteriorly directed saccular aneurysm arising from the carotid bifurcation. This may  also represent ulcerated atherosclerotic plaque.   5. Unchanged 3 mm posteriorly directed aneurysm arising from the left supraclinoid ICA.     MRI brain wo 6/9/24: report only   IMPRESSION:    1.  No acute infarct or intracranial abnormality.    2.  Focal encephalomalacia in the right medial occipital lobe, right cerebellum, and left parietal lobe. Scattered old lacunar infarcts.   3. Mild sequelae of chronic microvascular ischemic disease.     CTA H&N 4/9/2024 - report only  Impression:   1. No large vessel occlusion.   2. Less than 50% luminal narrowing of the proximal ICAs with moderate carotid bulb atherosclerosis.   3. Moderate right M1 segment stenosis.   4. Small left supraclinoid ICA aneurysm. Follow-up with neuro interventional radiology.   5. Biapical emphysematous change with mildly enlarged right hilar lymph nodes.     MRI brain wo 4/2024 - report only  IMPRESSION:   A focus of FLAIR hyperintensity in left occipital lobe measures 2.1 x 1.5 cm is a nonspecific finding may relate to old infarct versus other infectious, demyelinating, inflammatory process.  Recommend clinical correlation and follow-up.  No restricted   diffusion segment to suggest infarct.     Nonspecific few juxtacortical, periventricular and neri-Ventricular FLAIR hyperintensities may relate to chronic small vessels ischemic changes or demyelinating process.  Recommend clinical correlation and follow-up.     CTA stroke 3/4/23:  FINDINGS:  The off take of the common carotids appears normal.  The origin of the vertebral arteries appears normal.  There is no significant stenosis in the carotid bifurcations bilaterally.  There is flow throughout both vertebral arteries.  The internal carotids appear normal.  There is calcification in the carotid siphons without a significant stenosis demonstrated.  The A1 segments appear normal.  The M1 segment appears normal.  The anterior cerebral, middle cerebral vessels appear normal.  The posterior  cerebral vessels appear normal.  The basilar artery appears normal.  An aneurysm is not seen.     Impression:  No acute abnormalities are demonstrated.    MRI brain wo 3/4/23:    Impression:  1. Small area of interest diffusion is seen in the right corona radiata extending to the region of the superior aspect of the posterior limb internal capsule, compatible with acute or subacute ischemia/infarction.  2. Additional findings and details as above.    Personally reviewed -- encephalomalacia in the R occipital lobe, chronic microangiopathy, SA cyst dorsal to cerebellar vermis w/o mass effect     Cardiac:  EKG 4/2024:  NSR    TTE 7/2024  Interpretation Summary   Ejection Fraction = 65-75%.   Left ventricular systolic function is normal.   There is moderate concentric left ventricular hypertrophy.   Abnormal Diastolic Function   The right ventricular systolic function is normal.   There is mild mitral regurgitation.   There is severe mitral stenosis.   There is severe mitral annular calcification.   Aortic Valve calcified.   Moderate aortic regurgitation.   Moderate to severe valvular aortic stenosis.   SVI: 60.11 mL/m2/beat       TTE 3/5/23:  The left ventricle is normal in size with concentric remodeling and normal systolic function.  Moderate left atrial enlargement.  Grade I left ventricular diastolic dysfunction.  The estimated PA systolic pressure is 27 mmHg.  Normal right ventricular size with normal right ventricular systolic function.  Normal central venous pressure (3 mmHg).  The estimated ejection fraction is 60%.  Mild right atrial enlargement.  There is moderate-to-severe aortic valve stenosis.  Aortic valve area is 1.54 cm2; peak velocity is 4.00 m/s; mean gradient is 33 mmHg.  Mild-to-moderate mitral regurgitation.  The mean diastolic gradient across the mitral valve is 8 mmHg at a heart rate of bpm.  There is moderate mitral stenosis.  Mild-to-moderate aortic regurgitation.  There is no evidence of  intracardiac shunting.    Holter 48 hr 3/2022:  Dominant rhythm is sinus. No complex ventricular or atrial ectopy. No high-grade SA or AV shala block. Periods of sinus tachycardia.    Labs:  CBC:   Lab Results   Component Value Date    WBC 6.84 06/24/2024    HGB 10.4 (L) 06/24/2024    HCT 32.0 (L) 06/24/2024     06/24/2024    MCV 87 06/24/2024    RDW 17.0 (H) 06/24/2024     BMP:   Lab Results   Component Value Date     11/27/2024    K 3.9 11/27/2024     10/09/2024    CO2 26 11/27/2024    BUN 11 11/27/2024    CREATININE 0.91 11/27/2024    GLU 91 10/09/2024    CALCIUM 8.9 11/27/2024    MG 2.0 04/03/2024    PHOS 3.9 04/03/2024     LFTS;   Lab Results   Component Value Date    PROT 7.1 10/09/2024    ALBUMIN 3.7 10/09/2024    BILITOT 0.6 10/09/2024    AST 19 10/09/2024    ALKPHOS 101 10/09/2024    ALT 9 (L) 10/09/2024     COAGS:   Lab Results   Component Value Date    INR 1.1 04/05/2023     FLP:   Lab Results   Component Value Date    CHOL 99 11/27/2024    HDL 45 11/27/2024    LDLCALC 43 11/27/2024    TRIG 53 11/27/2024    CHOLHDL 2.2 11/27/2024     Lab Results   Component Value Date    HGBA1C 5.5 09/29/2024     Lab Results   Component Value Date    TSH 1.140 03/05/2023     Lab Results   Component Value Date    KZWJGLUD17 >2000 (H) 04/03/2024     Lab Results   Component Value Date    FOLATE 6.4 04/03/2024       TSH NL 4/2024  Component      Latest Ref Rng 3/24/2023   Platelet Response Plavix      194 - 418  (L)       Legend:  (L) Low    ASSESSMENT & PLAN:  Ryan Crane is a 59 y.o. R-handed male seen in hospital follow up for CVA.     Problem List Items Addressed This Visit          Neuro    Recurrent strokes - Primary    Overview     Reports initial CVA in 2005 without significant clinical deficit[place]  3/2023 - R subcortical lacune with resultant LSW  4/2024 - recurrent LSW, dysarthria, treated at [facility] with TNK -- MRI neg   6/2024 - R hemispheric TIA  symptoms, outside of lytic window, s/p R CEA @ [facility]  6/2024 - 6 days post op CEA, acute ischemic strokes in the R joseph and lenticulate nucleus         Current Assessment & Plan     Per EMR review, no additional strokes since last visit in 7/2024. Multiple admissions to [facility] with concern for TIA vs CVA recrudescence in the setting of hypotension   Most recent brain / vascular imaging reports reviewed -- acutely negative as noted   Continue same DAPT with ASA and Plavix. Prior Plavix responsiveness testing appropriate. Will defer need for change in therapy to vascular neurology team.   LDL is at goal on current therapy  BP at goal today. Reviewed goal of normotension with him.   He now has an ILR In place to monitor for arrhythmia   He has since quit smoking!!   CVA education provided             Other    Cigarette nicotine dependence without complication             Follow up: PRN here --- referred to see vascular neurology for recurrent strokes     I spent a total of 32 minutes on the day of the visit.    This includes face to face time with the patient, as well as non-face to face time preparing for and completing the visit (review of prior diagnostic testing and clinical notes, obtaining or reviewing history, documenting clinical information in the EMR, independently interpreting and communicating results to the patient/family and coordinating ongoing care).     Today's visit is associated with current or anticipated ongoing medical care related to recurrent strokes. He is also concerned about memory loss. We will follow up on this after more pressing issues are addressed.        I appreciate the opportunity to participate in the care of this patient. Please feel free to contact me with any questions or concerns.       Alyse Wilson, ACNPC-AG  [facility]  [address] [facility] [address] [place] [address]

## 2024-12-02 NOTE — ASSESSMENT & PLAN NOTE
Per EMR review, no additional strokes since last visit in 7/2024. Multiple admissions to [facility] with concern for TIA vs CVA recrudescence in the setting of hypotension   Most recent brain / vascular imaging reports reviewed -- acutely negative as noted   Continue same DAPT with ASA and Plavix. Prior Plavix responsiveness testing appropriate. Will defer need for change in therapy to vascular neurology team.   LDL is at goal on current therapy  BP at goal today. Reviewed goal of normotension with him.   He now has an ILR In place to monitor for arrhythmia   He has since quit smoking!!   CVA education provided

## 2024-12-02 NOTE — Clinical Note
Patient will be participating in weekly tobacco cessation meetings and will begin the prescribed tobacco cessation medication regime of the 4 mg gum. Patient has used patches, gum and lozenges before.

## 2024-12-02 NOTE — PATIENT INSTRUCTIONS
"Given your complex history, I still would recommend that we have you see the vascular neurology team at the main campus of [facility] to see if they recommend any additional testing or medications to prevent future strokes.     For now, continue taking the same aspirin and Plavix daily, as well as the cholesterol medication.     Goal BP is normal -- < 130/80   When your BP gets low, this can cause dizziness and also can cause stroke "recrudescence" - when you have recurrent stroke like symptoms     Congrats on quitting smoking!!!     STROKE EDUCATION:    During a stroke, blood stops flowing to part of the brain. This can damage areas in the brain that control the rest of the body. Symptoms after a stroke depend on which part of the brain has been affected. A TIA is often called a "mini stroke" and can be a warning sign for future strokes.     Stroke Risk Factors:  - Previous stroke  - High blood pressure  - High cholesterol  - Cigarette/cigar smoking  - Diabetes  - Carotid or other artery disease  - Atrial fibrillation/flutter  - Obesity  - Blood clotting disorders  - Excessive alcohol use  - Street drug use  - Family history of stroke    Call 911 right away if you have any of the following symptoms of stroke:  Weakness, tingling, or loss of feeling on one side of your face or body  Sudden double vision or trouble seeing in one or both eyes  Sudden trouble talking or slurred speech  Trouble understanding others  Sudden, severe headache  Dizziness, loss of balance, or a sense of falling  Blackouts or seizures     F.A.S.T. is an easy way to remember the signs of stroke. When you see these signs, you know that you need to call 911 FAST!   F is for face drooping. One side of the face is drooping or numb. When the person smiles, the smile is uneven.   A is for arm weakness. One arm is weak or numb. When the person lifts both arms at the same time, one arm may drift downward.   S is for speech difficulty. You may notice " slurred speech or trouble speaking. The person can't repeat a simple sentence correctly when asked.   T is for time to call 911. If someone shows any of these symptoms, even if they go away, call 911 immediately. Make note of the time the symptoms first appeared.        Please call our clinic at [phone] or send a message on the [employer] portal if there are any changes to the plan discussed today. For example, if you are not contacted for the requested tests, referral(s) within one week, if you are unable to receive the medications prescribed, or if you feel you need to change the treatment course for any reason.

## 2024-12-03 ENCOUNTER — PATIENT MESSAGE (OUTPATIENT)
Dept: NEUROLOGY | Facility: CLINIC | Age: 59
End: 2024-12-03
Payer: MEDICARE

## 2024-12-03 ENCOUNTER — CLINICAL SUPPORT (OUTPATIENT)
Dept: FAMILY MEDICINE | Facility: CLINIC | Age: 59
End: 2024-12-03
Payer: MEDICARE

## 2024-12-03 DIAGNOSIS — Z23 IMMUNIZATION DUE: Primary | ICD-10-CM

## 2024-12-03 PROCEDURE — G0180 MD CERTIFICATION HHA PATIENT: HCPCS | Mod: ,,, | Performed by: STUDENT IN AN ORGANIZED HEALTH CARE EDUCATION/TRAINING PROGRAM

## 2024-12-03 RX ADMIN — CYANOCOBALAMIN 1000 MCG: 1000 INJECTION, SOLUTION INTRAMUSCULAR; SUBCUTANEOUS at 11:12

## 2024-12-06 ENCOUNTER — TELEPHONE (OUTPATIENT)
Dept: NEUROLOGY | Facility: CLINIC | Age: 59
End: 2024-12-06
Payer: MEDICARE

## 2024-12-06 NOTE — TELEPHONE ENCOUNTER
----- Message from Mitali sent at 12/6/2024  3:39 PM CST -----  Contact: [phone]  Type:  Needs Medical Advice    Who Called: Mr Davis     Would the patient rather a call back or a response via [facility]? Call     Best Call Back Number: [phone]  Additional Information: Patient want to reschedule his appt for 12/17

## 2024-12-09 ENCOUNTER — OFFICE VISIT (OUTPATIENT)
Dept: FAMILY MEDICINE | Facility: CLINIC | Age: 59
End: 2024-12-09
Payer: MEDICARE

## 2024-12-09 VITALS
SYSTOLIC BLOOD PRESSURE: 136 MMHG | OXYGEN SATURATION: 98 % | WEIGHT: 172.81 LBS | HEART RATE: 90 BPM | DIASTOLIC BLOOD PRESSURE: 72 MMHG | BODY MASS INDEX: 24.74 KG/M2 | HEIGHT: 70 IN

## 2024-12-09 DIAGNOSIS — Z09 HOSPITAL DISCHARGE FOLLOW-UP: Primary | ICD-10-CM

## 2024-12-09 DIAGNOSIS — R19.7 DIARRHEA, UNSPECIFIED TYPE: ICD-10-CM

## 2024-12-09 DIAGNOSIS — H60.8X3 CHRONIC ECZEMATOUS OTITIS EXTERNA OF BOTH EARS: ICD-10-CM

## 2024-12-09 PROCEDURE — 3008F BODY MASS INDEX DOCD: CPT | Mod: HCNC,CPTII,S$GLB, | Performed by: STUDENT IN AN ORGANIZED HEALTH CARE EDUCATION/TRAINING PROGRAM

## 2024-12-09 PROCEDURE — 3078F DIAST BP <80 MM HG: CPT | Mod: HCNC,CPTII,S$GLB, | Performed by: STUDENT IN AN ORGANIZED HEALTH CARE EDUCATION/TRAINING PROGRAM

## 2024-12-09 PROCEDURE — 3044F HG A1C LEVEL LT 7.0%: CPT | Mod: HCNC,CPTII,S$GLB, | Performed by: STUDENT IN AN ORGANIZED HEALTH CARE EDUCATION/TRAINING PROGRAM

## 2024-12-09 PROCEDURE — 3075F SYST BP GE 130 - 139MM HG: CPT | Mod: HCNC,CPTII,S$GLB, | Performed by: STUDENT IN AN ORGANIZED HEALTH CARE EDUCATION/TRAINING PROGRAM

## 2024-12-09 PROCEDURE — 1159F MED LIST DOCD IN RCRD: CPT | Mod: HCNC,CPTII,S$GLB, | Performed by: STUDENT IN AN ORGANIZED HEALTH CARE EDUCATION/TRAINING PROGRAM

## 2024-12-09 PROCEDURE — 99999 PR PBB SHADOW E&M-EST. PATIENT-LVL V: CPT | Mod: PBBFAC,HCNC,, | Performed by: STUDENT IN AN ORGANIZED HEALTH CARE EDUCATION/TRAINING PROGRAM

## 2024-12-09 PROCEDURE — 1160F RVW MEDS BY RX/DR IN RCRD: CPT | Mod: HCNC,CPTII,S$GLB, | Performed by: STUDENT IN AN ORGANIZED HEALTH CARE EDUCATION/TRAINING PROGRAM

## 2024-12-09 PROCEDURE — 99214 OFFICE O/P EST MOD 30 MIN: CPT | Mod: HCNC,S$GLB,, | Performed by: STUDENT IN AN ORGANIZED HEALTH CARE EDUCATION/TRAINING PROGRAM

## 2024-12-09 RX ORDER — ACETIC ACID 20.65 MG/ML
4 SOLUTION AURICULAR (OTIC) 3 TIMES DAILY
Qty: 15 ML | Refills: 2 | Status: SHIPPED | OUTPATIENT
Start: 2024-12-09 | End: 2024-12-13

## 2024-12-09 RX ORDER — LOPERAMIDE HYDROCHLORIDE 2 MG/1
2 CAPSULE ORAL 4 TIMES DAILY PRN
Qty: 10 CAPSULE | Refills: 0 | Status: SHIPPED | OUTPATIENT
Start: 2024-12-09 | End: 2024-12-19

## 2024-12-09 NOTE — PROGRESS NOTES
Plan:      Ryan was seen today for follow-up.    Diagnoses and all orders for this visit:    Hospital discharge follow-up: Reviewed most recent hospitalizations as noted below.     Chronic eczematous otitis externa of both ears  -     acetic acid (VOSOL) 2 % otic solution; Place 4 drops into both ears 3 (three) times daily. for 10 days    Diarrhea, unspecified type  -     loperamide (IMODIUM) 2 mg capsule; Take 1 capsule (2 mg total) by mouth 4 (four) times daily as needed for Diarrhea. Take only as needed to avoid potential side effect of constipation.      Follow up in about 4 weeks (around 1/6/2025), or if symptoms worsen or fail to improve.    Ana Mayer MD  12/09/2024    Subjective:      Patient ID: Ryan Crane is a 59 y.o. male    Chief Complaint   Patient presents with    Follow-up     HPI  59 y.o. male with a PMHx as documented below presents to clinic today for the following:    Multiple hospitalizations since last clinic visit for TIA vs CVA vs stroke recrudescence. He continues to follow w/ Cardiology and Neurology as an outpatient. Today, pt endorses residual left-sided weakness (baseline beyond recent hospitalizations), but overall no new focal neurologic symptoms.     Concerns today include 'itchy' ears and few days of loose stool. No associated fever, chills, BRBPR, melena.  _______________________________________    S/p hospitalization at [facility] for suspected stroke 2/2 lethargy and generalized weakness; Tx included TPA with obs stay in ICU. Discharged with outpatient follow-up.      CTA head/neck w/ contrast (4/10/24)  1. No large vessel occlusion.  2. Less than 50% luminal narrowing of the proximal ICAs with moderate carotid bulb atherosclerosis.  3. Moderate right M1 segment stenosis.  4. Small left supraclinoid ICA aneurysm. Follow-up with neuro interventional radiology.  5. Biapical emphysematous change with mildly enlarged right hilar lymph nodes.     TTE (ICU @ [facility],  4//10/24):   - EF 65-75%.   - Dense thickening and calcification of the entire mitral apparatus  - Aortic valve calcification  - Moderate concentric left ventricular hypertrophy.   - Moderate to severe mitral stenosis.   - Moderate to severe aortic regurgitation.   - Severe valvular aortic stenosis.   ** The study was technically difficult.  ________________________________________________     Chronic schizophrenia:   - Lamictal 200 mg daily, Remeron 15 mg qhs, Geodon 160 mg qhs  - Rexulti 4 mg daily   - Klonopin 0.5 mg daily PRN, Atarax 10 mg TID PRN  - Previous Rx: Buspar (d/c 12/23?)  - Following w/ Psychiatry     Subacute thoracic and lumbar back pain:   - MRI lumbar spine w/o contrast (6/12/23): Multilevel degenerative changes of the lumbar spine without more than mild spinal canal/recess narrowing there is shallow disc bulging is noted at multiple levels. No lateralizing disc herniation. Mild to moderate right foraminal narrowing at L4-L5.  - MRI thoracic spine w/o contrast (6/12/23): Mild degenerative changes as discussed above.  The spinal canal and foramina are patent throughout.  No cord impingement or acute process  - S/p eval w/ Dr. Shirley Dowd w/ [facility] - recommending medial branch blocks and subsequent radiofrequency ablation as indicated of the L4-5 and L5-S1 facet joints bilaterally (8/25/23), considering future epidural steroid injections in the midthoracic region     Hx of CVA; hemiplegia and hemiparesis following CVA affecting non-dominant side:   - Right lacunar infarct (3/4/23) resulting in hemiplegia of left, nondominant side  - CT head w/o contrast showed changes consistent with subacute infarct of the left cerebellum and chronic infarct of the right occipital lobe  - MRI w/o contrast showed small area of interest diffusion is seen in the right corona radiata extending to the region of the superior aspect of the posterior limb internal capsule  - ASA 81 mg daily, Lipitor 40 mg  "daily  - S/p Neuro follow-up on 3/17/23 - okay to keep Lipitor 40 mg daily rather than 80 mg daily     CAD:   - ASA 81 mg daily, Plavix 75 mg daily, Lipitor 40 mg daily, Ranexa 500 mg BID  - Nitro 0.4 mg SL q5min PRN     HTN:   - Amlodipine 2.5 mg daily     HFpEF:   - TTE (3/4/23) w/ EF 60% and grade I left ventricular diastolic dysfunction     PVD:   - ASA 81 mg daily, Lipitor 40 mg daily  - BLE arterial US (3/7/23): "There appear to be waveform changes throughout the bilateral lower extremity arterial system, more evident in the distal right lower extremity arteries which may indicate some degree of vascular insufficiency or more proximal stenosis. However, no doubling of peak systolic velocities to suggest greater than 50% stenosis is sonographically demonstrated."     COPD, GOLD 2:   - PFTs w/ FEV1 (73%) (9/2022)  - Trelegy 100-62.5 mcg, 1 puff once daily  - Albuterol PRN     Allergic rhinitis:   - Claritin 10 mg daily PRN, Flonase daily PRN     Hx of DMT2 w/ diabetic polyneuropathy:   - Most recent Hgb A1c 4.9% (4/40/24)  - Lyrica 50 mg TID      Migraine headaches:   - Ajovy 225 mg monthly injections (switched from Emgality 5/30/23), Nutrex PRN  - Previous Rx: Topamax 200 mg qhs (d/c 10/23?)     B12 deficiency:   - Vitamin B12 < 200 on 3/24/23  - B12 1000 mcg IM monthly     ED:   - Cialis 20 mg PRN     Hx of hyponatremia:   - Previous Rx: NaCl 1000 mg TID      GERD:   - Protonix 40 mg daily     ROS  Constitutional:  Negative for chills and fever.   Respiratory:  Negative for shortness of breath.    Cardiovascular:  Negative for chest pain.   Gastrointestinal:  Negative for abdominal pain, constipation, nausea and vomiting.     Current Outpatient Medications   Medication Instructions    acetic acid (VOSOL) 2 % otic solution 4 drops, Both Ears, 3 times daily    AJOVY AUTOINJECTOR 225 mg, Subcutaneous, Every 28 days    albuterol (PROVENTIL/VENTOLIN HFA) 90 mcg/actuation inhaler 2 puffs, Inhalation, Every 6 hours " PRN    albuterol-ipratropium (DUO-NEB) 2.5 mg-0.5 mg/3 mL nebulizer solution 3 mLs, Nebulization, Every 6 hours PRN, Rescue    ammonium lactate 2 g, Topical (Top), 2 times daily    aspirin 81 mg, Daily    atorvastatin (LIPITOR) 80 mg, Oral, Daily    butalbital-acetaminophen-caffeine -40 mg (FIORICET, ESGIC) -40 mg per tablet Take 1 tablet by mouth as needed for migraine. No more than 10 tablets per month.    clopidogreL (PLAVIX) 75 mg, Oral, Daily    cyanocobalamin 1,000 mcg/mL injection 1 ml sq daily x 5 days, then 1 ml sq weekly x 4 weeks then q monthly -  will need to go to PCP    EPINEPHrine (EPIPEN) 0.3 mg, Intramuscular, As needed (PRN)    fluticasone-umeclidin-vilanter (TRELEGY ELLIPTA) 200-62.5-25 mcg inhaler 1 puff, Inhalation, Daily    lamoTRIgine (LAMICTAL) 200 mg, Daily    loperamide (IMODIUM) 2 mg, Oral, 4 times daily PRN, Take only as needed to avoid potential side effect of constipation.    nicotine polacrilex (NICORETTE) 4 MG Gum Take up to 6 pieces daily as needed    nitroGLYCERIN (NITROSTAT) 0.4 mg, Sublingual, Every 5 min PRN    pregabalin (LYRICA) 50 mg, Oral, 3 times daily    promethazine (PHENERGAN) 25 mg, Oral, Every 8 hours PRN    ranolazine (RANEXA) 500 mg, Oral, 2 times daily    REXULTI 4 mg, Daily    tadalafiL (CIALIS) 20 mg, Oral, Daily    topiramate (TOPAMAX) 200 mg, Oral, Nightly    traZODone (DESYREL) 50 mg, Nightly    ubrogepant (UBRELVY) 100 mg tablet Take 1 tablet by mouth as needed for migraine. May repeat in 2 hours if needed. Max 2 tab per day    zolpidem (AMBIEN) 5 mg, Nightly PRN      Past Medical History:   Diagnosis Date    Aortic valve stenosis 02/28/2022    Arachnoid cyst of posterior cranial fossa 01/13/2022    Benign prostatic hyperplasia without lower urinary tract symptoms 05/25/2023    Bilateral carotid bruits 06/22/2021    Bipolar disorder 01/16/2022    CAD S/P percutaneous coronary angioplasty     3 vessel disease    Calculus of gallbladder without  cholecystitis without obstruction 01/11/2023    Cancer     Candidal intertrigo 11/11/2019    Cataract     Chronic heart failure with preserved ejection fraction 03/29/2023    Chronic schizophrenia     Colon polyps     Diabetic polyneuropathy associated with diabetes mellitus due to underlying condition 11/21/2019    Dysarthria as late effect of cerebellar cerebrovascular accident (CVA) 03/24/2023    Equinus deformity of both feet 11/21/2019    Erectile dysfunction due to arterial insufficiency 05/25/2023    Flaccid hemiplegia of left nondominant side as late effect of cerebral infarction 03/17/2023    Flat foot 11/21/2019    Gastritis     Gastroesophageal reflux disease without esophagitis 07/13/2023    Gastrointestinal hemorrhage 12/13/2019    Post polypectomy bleeding    General anesthetics causing adverse effect in therapeutic use     Hammer toes of both feet 11/21/2019    Hx of diabetic foot ulcer 11/21/2019    Hypertension     Hyponatremia 01/11/2022    ELAINE (iron deficiency anemia) 12/10/2019    Intractable chronic migraine without aura and without status migrainosus 12/30/2021    Microcytic anemia 10/09/2019    Moderate aortic regurgitation 03/29/2023    Moderate mitral regurgitation 03/29/2023    Moderate mitral stenosis 03/29/2023    Moderate to severe aortic stenosis 02/28/2022    Myocardial infarct, old     Nicotine dependence, unspecified, uncomplicated 01/16/2022    Onychomycosis due to dermatophyte 11/21/2019    Orchitis 11/09/2019    PIPER on CPAP     Peripheral visual field defect, bilateral 01/13/2022    PVD (peripheral vascular disease) 11/21/2019    Seizures 2008    Stage 2 moderate COPD by GOLD classification     PFTs w/ FEV1 (73%) (9/2022)    Stroke 2005    Thoracic aortic atherosclerosis 10/17/2022    CT chest    Thyroid disease     Previously on pharmacologic Tx    Tinea pedis of right foot 05/21/2019    Tobacco use disorder     Type 2 diabetes mellitus with diabetic peripheral angiopathy without  "gangrene     A1c 5.1% (3/2023)    Vitamin B12 deficiency 03/24/2023      Objective:      Vitals:    12/09/24 1245   BP: 136/72   Pulse: 90   SpO2: 98%   Weight: 78.4 kg (172 lb 13.5 oz)   Height: 5' 10" (1.778 m)     Body mass index is 24.8 kg/m².    Physical Exam   Constitutional:       General: No acute distress.  HENT:      Head: Normocephalic and atraumatic.   Pulmonary:      Effort: Pulmonary effort is normal. No respiratory distress.   Neurological:      General: No focal deficit present.      Mental Status: Alert and oriented to person, place, and time. Mental status is at baseline.    Assessment:       1. Hospital discharge follow-up    2. Chronic eczematous otitis externa of both ears    3. Diarrhea, unspecified type        Ana Mayer MD  [facility]  Office: [phone]   Fax: [phone]  12/09/2024      Disclaimer: This note was partly generated using dictation software which may occasionally result in transcription errors.    Total time spent on this encounter includes face to face time and non-face to face time preparing to see the patient (eg, review of tests), obtaining and/or reviewing separately obtained history, documenting clinical information in the electronic or other health record, independently interpreting results, and communicating results to the patient/family/caregiver, or care coordinator.    "

## 2024-12-10 ENCOUNTER — OFFICE VISIT (OUTPATIENT)
Dept: UROLOGY | Facility: CLINIC | Age: 59
End: 2024-12-10
Payer: MEDICARE

## 2024-12-10 ENCOUNTER — LAB VISIT (OUTPATIENT)
Dept: LAB | Facility: HOSPITAL | Age: 59
End: 2024-12-10
Payer: MEDICARE

## 2024-12-10 VITALS — WEIGHT: 173.5 LBS | HEIGHT: 70 IN | BODY MASS INDEX: 24.84 KG/M2

## 2024-12-10 DIAGNOSIS — Z12.5 PROSTATE CANCER SCREENING: ICD-10-CM

## 2024-12-10 DIAGNOSIS — Z12.5 PROSTATE CANCER SCREENING: Primary | ICD-10-CM

## 2024-12-10 LAB — COMPLEXED PSA SERPL-MCNC: 1.5 NG/ML (ref 0–4)

## 2024-12-10 PROCEDURE — 3044F HG A1C LEVEL LT 7.0%: CPT | Mod: HCNC,CPTII,S$GLB,

## 2024-12-10 PROCEDURE — 99999 PR PBB SHADOW E&M-EST. PATIENT-LVL IV: CPT | Mod: PBBFAC,HCNC,,

## 2024-12-10 PROCEDURE — 84153 ASSAY OF PSA TOTAL: CPT | Mod: HCNC

## 2024-12-10 PROCEDURE — 1160F RVW MEDS BY RX/DR IN RCRD: CPT | Mod: HCNC,CPTII,S$GLB,

## 2024-12-10 PROCEDURE — 1159F MED LIST DOCD IN RCRD: CPT | Mod: HCNC,CPTII,S$GLB,

## 2024-12-10 PROCEDURE — 36415 COLL VENOUS BLD VENIPUNCTURE: CPT | Mod: HCNC,PO

## 2024-12-10 PROCEDURE — 99213 OFFICE O/P EST LOW 20 MIN: CPT | Mod: HCNC,S$GLB,,

## 2024-12-10 PROCEDURE — 3008F BODY MASS INDEX DOCD: CPT | Mod: HCNC,CPTII,S$GLB,

## 2024-12-10 NOTE — PROGRESS NOTES
[facility] Note  Staff: YA Gutiérrez    PCP: MD Moreno    Chief Complaint: Annual    Subjective:        HPI: Ryan Crane is a 59 y.o. male presents today for annual exam.  He denies any new or worsening urinary complaints such as dysuria, hematuria, fever, flank pain, abdominal pain, and difficulty urinating.  He currently is using Cialis 20 mg as needed for ED.  He is due to update his PSA.    Questions asked the pt during ov today:  Urgency: no, urge incontinence? no  Dysuria: No  Gross Hematuria:No  Straining:No, Hesistancy:No, Intermittency:No}, Weak stream:No  ED:Yes     Last PSA Screening:   Lab Results   Component Value Date    PSA 1.4 05/02/2023    PSADIAG 2.0 08/29/2019         REVIEW OF SYSTEMS:  Review of Systems   Constitutional: Negative.  Negative for chills and fever.   Gastrointestinal: Negative.  Negative for abdominal pain, constipation, diarrhea, nausea and vomiting.   Genitourinary: Negative.  Negative for dysuria, flank pain, frequency, hematuria and urgency.   Musculoskeletal:  Positive for falls.       PMHx:  Past Medical History:   Diagnosis Date    Aortic valve stenosis 02/28/2022    Arachnoid cyst of posterior cranial fossa 01/13/2022    Benign prostatic hyperplasia without lower urinary tract symptoms 05/25/2023    Bilateral carotid bruits 06/22/2021    Bipolar disorder 01/16/2022    CAD S/P percutaneous coronary angioplasty     3 vessel disease    Calculus of gallbladder without cholecystitis without obstruction 01/11/2023    Cancer     Candidal intertrigo 11/11/2019    Cataract     Chronic heart failure with preserved ejection fraction 03/29/2023    Chronic schizophrenia     Colon polyps     Diabetic polyneuropathy associated with diabetes mellitus due to underlying condition 11/21/2019    Dysarthria as late effect of cerebellar cerebrovascular accident (CVA) 03/24/2023    Equinus deformity of both feet 11/21/2019    Erectile dysfunction due to  arterial insufficiency 05/25/2023    Flaccid hemiplegia of left nondominant side as late effect of cerebral infarction 03/17/2023    Flat foot 11/21/2019    Gastritis     Gastroesophageal reflux disease without esophagitis 07/13/2023    Gastrointestinal hemorrhage 12/13/2019    Post polypectomy bleeding    General anesthetics causing adverse effect in therapeutic use     Hammer toes of both feet 11/21/2019    Hx of diabetic foot ulcer 11/21/2019    Hypertension     Hyponatremia 01/11/2022    ELAINE (iron deficiency anemia) 12/10/2019    Intractable chronic migraine without aura and without status migrainosus 12/30/2021    Microcytic anemia 10/09/2019    Moderate aortic regurgitation 03/29/2023    Moderate mitral regurgitation 03/29/2023    Moderate mitral stenosis 03/29/2023    Moderate to severe aortic stenosis 02/28/2022    Myocardial infarct, old     Nicotine dependence, unspecified, uncomplicated 01/16/2022    Onychomycosis due to dermatophyte 11/21/2019    Orchitis 11/09/2019    PIPER on CPAP     Peripheral visual field defect, bilateral 01/13/2022    PVD (peripheral vascular disease) 11/21/2019    Seizures 2008    Stage 2 moderate COPD by GOLD classification     PFTs w/ FEV1 (73%) (9/2022)    Stroke 2005    Thoracic aortic atherosclerosis 10/17/2022    CT chest    Thyroid disease     Previously on pharmacologic Tx    Tinea pedis of right foot 05/21/2019    Tobacco use disorder     Type 2 diabetes mellitus with diabetic peripheral angiopathy without gangrene     A1c 5.1% (3/2023)    Vitamin B12 deficiency 03/24/2023       PSHx:  Past Surgical History:   Procedure Laterality Date    ANGIOGRAM, CORONARY, WITH LEFT HEART CATHETERIZATION  08/25/2022    Procedure: Angiogram, Coronary, with Left Heart Cath;  Surgeon: Mai Deleon MD;  Location: [facility] CATH;  Service: Cardiology;;    APPENDECTOMY      BONE BIOPSY Right 12/19/2023    Procedure: Biopsy-Bone;  Surgeon: Дмитрий Pratt DPM;  Location: [facility] OR;  Service:  Podiatry;  Laterality: Right;    BONE EXOSTOSIS EXCISION Right 11/17/2023    Procedure: EXCISION, EXOSTOSIS;  Surgeon: Дмитрий Pratt DPM;  Location: [facility] OR;  Service: Podiatry;  Laterality: Right;    BONE MARROW ASPIRATION Left 08/21/2020    Procedure: ASPIRATION, BONE MARROW;  Surgeon: Lex Kathleen MD;  Location: [facility];  Service: Oncology;  Laterality: Left;    CAROTID ENDARTERECTOMY Right     CHOLECYSTECTOMY      CLOSURE OF WOUND Right 09/09/2019    Procedure: CLOSURE, WOUND;  Surgeon: Li Kathleen DPM;  Location: [facility] OR;  Service: Podiatry;  Laterality: Right;    COLONOSCOPY N/A 12/10/2019    Procedure: COLONOSCOPY;  Surgeon: Ryan Lucas MD;  Location: [facility];  Service: Endoscopy;  Laterality: N/A;    COLONOSCOPY N/A 12/13/2019    Procedure: COLONOSCOPY;  Surgeon: Ryan Lucas MD;  Location: [facility];  Service: Endoscopy;  Laterality: N/A;    CORONARY STENT PLACEMENT      ESOPHAGOGASTRODUODENOSCOPY N/A 12/10/2019    Procedure: EGD (ESOPHAGOGASTRODUODENOSCOPY);  Surgeon: Ryan Lucas MD;  Location: [facility];  Service: Endoscopy;  Laterality: N/A;    ESOPHAGOGASTRODUODENOSCOPY N/A 11/03/2022    Procedure: EGD (ESOPHAGOGASTRODUODENOSCOPY);  Surgeon: Ryan Lucas MD;  Location: [facility];  Service: Endoscopy;  Laterality: N/A;    INCISION AND DRAINAGE FOOT Right 12/19/2023    Procedure: INCISION AND DRAINAGE, FOOT- RIGHT;  Surgeon: Дмитрий Pratt DPM;  Location: [facility];  Service: Podiatry;  Laterality: Right;    INJECTION OF ANESTHETIC AGENT AROUND MEDIAL BRANCH NERVES INNERVATING LUMBAR FACET JOINT Bilateral 07/25/2023    Procedure: Block-nerve-medial branch-lumbar;  Surgeon: Napoleon Grimaldo MD;  Location: [facility] OR;  Service: Pain Management;  Laterality: Bilateral;  L3,4,5 MBB    INJECTION OF ANESTHETIC AGENT AROUND MEDIAL BRANCH NERVES INNERVATING LUMBAR FACET JOINT Bilateral 08/25/2023    Procedure: Block-nerve-medial branch-lumbar;  Surgeon: Napoleon Grimaldo MD;   Location: [facility] OR;  Service: Anesthesiology;  Laterality: Bilateral;  L3,4,5 MBB #2    INSERTION OF IMPLANTABLE LOOP RECORDER  08/24/2024    LAPAROSCOPIC CHOLECYSTECTOMY N/A 01/11/2023    Procedure: CHOLECYSTECTOMY, LAPAROSCOPIC;  Surgeon: Laith Davis MD;  Location: [facility] OR;  Service: General;  Laterality: N/A;    LEFT HEART CATHETERIZATION Left 08/25/2022    Procedure: Left heart cath;  Surgeon: Mai Deleon MD;  Location: [facility] CATH;  Service: Cardiology;  Laterality: Left;    RADIOFREQUENCY ABLATION OF LUMBAR MEDIAL BRANCH NERVE AT SINGLE LEVEL Bilateral 10/31/2023    Procedure: Radiofrequency Ablation, Nerve, Spinal, Lumbar, Medial Branch, 1 Level;  Surgeon: Napoleon Grimaldo MD;  Location: [facility];  Service: Anesthesiology;  Laterality: Bilateral;  L3,4,5 RFA    right heel surgery      SHOULDER ARTHROSCOPY Left     UPPER GASTROINTESTINAL ENDOSCOPY         Fam Hx:   malignancies: No    kidney stones: No     Soc Hx:  Lives in [place]    Allergies:  Alcohol, Alcohol antiseptic pads, and Cephalexin    Medications: reviewed     Objective:   There were no vitals filed for this visit.    Physical Exam  Constitutional:       Appearance: Normal appearance.   Pulmonary:      Effort: Pulmonary effort is normal.   Abdominal:      General: There is no distension.      Palpations: Abdomen is soft.      Tenderness: There is no abdominal tenderness.   Musculoskeletal:         General: Normal range of motion.      Cervical back: Normal range of motion.   Skin:     General: Skin is warm.   Neurological:      Mental Status: He is oriented to person, place, and time.   Psychiatric:         Behavior: Behavior normal.          EXAM performed by me in office today:  Inspection of anus and perineum normal  VELMA: 40g prostate gland without nodules, masses, tenderness. SV not palpable. Normal sphincter tone.       Assessment:       1. Prostate cancer screening          Plan:     Update PSA, ordered    F/u  annually    [facility]:  Active    YA Gutiérrez

## 2024-12-11 ENCOUNTER — TELEPHONE (OUTPATIENT)
Dept: UROLOGY | Facility: CLINIC | Age: 59
End: 2024-12-11
Payer: MEDICARE

## 2024-12-11 NOTE — TELEPHONE ENCOUNTER
----- Message from Naida Corral NP sent at 12/11/2024  9:11 AM CST -----  PSA looks great at 1.5- continue with yearly exams

## 2024-12-13 ENCOUNTER — OFFICE VISIT (OUTPATIENT)
Dept: GASTROENTEROLOGY | Facility: CLINIC | Age: 59
End: 2024-12-13
Payer: MEDICARE

## 2024-12-13 VITALS — HEIGHT: 70 IN | WEIGHT: 172.81 LBS | BODY MASS INDEX: 24.74 KG/M2

## 2024-12-13 DIAGNOSIS — R13.19 ESOPHAGEAL DYSPHAGIA: ICD-10-CM

## 2024-12-13 DIAGNOSIS — Z86.0100 HISTORY OF COLON POLYPS: ICD-10-CM

## 2024-12-13 DIAGNOSIS — Z51.81 ENCOUNTER FOR MONITORING LONG-TERM PROTON PUMP INHIBITOR THERAPY: Primary | ICD-10-CM

## 2024-12-13 DIAGNOSIS — K21.9 GASTROESOPHAGEAL REFLUX DISEASE WITHOUT ESOPHAGITIS: ICD-10-CM

## 2024-12-13 DIAGNOSIS — K31.A0 GASTRIC INTESTINAL METAPLASIA: ICD-10-CM

## 2024-12-13 DIAGNOSIS — Z79.899 ENCOUNTER FOR MONITORING LONG-TERM PROTON PUMP INHIBITOR THERAPY: Primary | ICD-10-CM

## 2024-12-13 PROCEDURE — 99999 PR PBB SHADOW E&M-EST. PATIENT-LVL V: CPT | Mod: PBBFAC,HCNC,, | Performed by: NURSE PRACTITIONER

## 2024-12-13 RX ORDER — PANTOPRAZOLE SODIUM 40 MG/1
40 TABLET, DELAYED RELEASE ORAL DAILY
Qty: 90 TABLET | Refills: 3 | Status: SHIPPED | OUTPATIENT
Start: 2024-12-13 | End: 2025-12-13

## 2024-12-13 NOTE — PROGRESS NOTES
Subjective:       Patient ID: Ryan Crane is a 59 y.o. male, Body mass index is 24.8 kg/m².    Chief Complaint: Follow-up      Established patient of Dr. Lucas & myself.    Gastroesophageal Reflux  He complains of dysphagia. He reports no abdominal pain, no belching, no chest pain, no choking, no coughing, no early satiety, no globus sensation, no heartburn (Denies heartburn/dyspepsia taking PPI), no hoarse voice, no nausea, no sore throat, no stridor, no tooth decay, no water brash or no wheezing. This is a chronic problem. The current episode started more than 1 year ago. The problem occurs occasionally. The problem has been unchanged. The symptoms are aggravated by certain foods (dysphagia to solids; denies trouble swallowing liquids). Associated symptoms include anemia. Pertinent negatives include no melena or weight loss. Risk factors include smoking/tobacco exposure (Former smoker). He has tried a PPI (Currently: Protonix 40 mg once daily- helps) for the symptoms. Past procedures include an EGD.     Review of Systems   Constitutional:  Negative for appetite change, fever, unexpected weight change and weight loss.   HENT:  Positive for trouble swallowing. Negative for hoarse voice and sore throat.    Respiratory:  Negative for cough, choking, shortness of breath and wheezing.    Cardiovascular:  Negative for chest pain.   Gastrointestinal:  Positive for dysphagia. Negative for abdominal distention, abdominal pain, anal bleeding, blood in stool, constipation, diarrhea, heartburn (Denies heartburn/dyspepsia taking PPI), melena, nausea, rectal pain and vomiting.   Genitourinary:  Negative for difficulty urinating and dysuria.   Musculoskeletal:  Negative for gait problem.   Skin:  Negative for rash.   Neurological:  Negative for speech difficulty.   Psychiatric/Behavioral:  Negative for confusion.        Past Medical History:   Diagnosis Date    Aortic valve stenosis 02/28/2022    Arachnoid cyst of  posterior cranial fossa 01/13/2022    Benign prostatic hyperplasia without lower urinary tract symptoms 05/25/2023    Bilateral carotid bruits 06/22/2021    Bipolar disorder 01/16/2022    CAD S/P percutaneous coronary angioplasty     3 vessel disease    Calculus of gallbladder without cholecystitis without obstruction 01/11/2023    Cancer     Candidal intertrigo 11/11/2019    Cataract     Chronic heart failure with preserved ejection fraction 03/29/2023    Chronic schizophrenia     Colon polyps     Diabetic polyneuropathy associated with diabetes mellitus due to underlying condition 11/21/2019    Dysarthria as late effect of cerebellar cerebrovascular accident (CVA) 03/24/2023    Equinus deformity of both feet 11/21/2019    Erectile dysfunction due to arterial insufficiency 05/25/2023    Flaccid hemiplegia of left nondominant side as late effect of cerebral infarction 03/17/2023    Flat foot 11/21/2019    Gastritis     Gastroesophageal reflux disease without esophagitis 07/13/2023    Gastrointestinal hemorrhage 12/13/2019    Post polypectomy bleeding    General anesthetics causing adverse effect in therapeutic use     Hammer toes of both feet 11/21/2019    Hx of diabetic foot ulcer 11/21/2019    Hypertension     Hyponatremia 01/11/2022    ELAINE (iron deficiency anemia) 12/10/2019    Intractable chronic migraine without aura and without status migrainosus 12/30/2021    Microcytic anemia 10/09/2019    Moderate aortic regurgitation 03/29/2023    Moderate mitral regurgitation 03/29/2023    Moderate mitral stenosis 03/29/2023    Moderate to severe aortic stenosis 02/28/2022    Myocardial infarct, old     Nicotine dependence, unspecified, uncomplicated 01/16/2022    Onychomycosis due to dermatophyte 11/21/2019    Orchitis 11/09/2019    PIPER on CPAP     Peripheral visual field defect, bilateral 01/13/2022    PVD (peripheral vascular disease) 11/21/2019    Seizures 2008    Stage 2 moderate COPD by GOLD classification     PFTs  w/ FEV1 (73%) (9/2022)    Stroke 2005    Thoracic aortic atherosclerosis 10/17/2022    CT chest    Thyroid disease     Previously on pharmacologic Tx    Tinea pedis of right foot 05/21/2019    Tobacco use disorder     Type 2 diabetes mellitus with diabetic peripheral angiopathy without gangrene     A1c 5.1% (3/2023)    Vitamin B12 deficiency 03/24/2023      Past Surgical History:   Procedure Laterality Date    ANGIOGRAM, CORONARY, WITH LEFT HEART CATHETERIZATION  08/25/2022    Procedure: Angiogram, Coronary, with Left Heart Cath;  Surgeon: Mai Deleon MD;  Location: [facility] CATH;  Service: Cardiology;;    APPENDECTOMY      BONE BIOPSY Right 12/19/2023    Procedure: Biopsy-Bone;  Surgeon: Дмитрий Pratt DPM;  Location: [facility] OR;  Service: Podiatry;  Laterality: Right;    BONE EXOSTOSIS EXCISION Right 11/17/2023    Procedure: EXCISION, EXOSTOSIS;  Surgeon: Дмитрий Pratt DPM;  Location: [facility] OR;  Service: Podiatry;  Laterality: Right;    BONE MARROW ASPIRATION Left 08/21/2020    Procedure: ASPIRATION, BONE MARROW;  Surgeon: Lex Kathleen MD;  Location: [facility] OR;  Service: Oncology;  Laterality: Left;    CAROTID ENDARTERECTOMY Right     CHOLECYSTECTOMY      CLOSURE OF WOUND Right 09/09/2019    Procedure: CLOSURE, WOUND;  Surgeon: Li Kathleen DPM;  Location: [facility] OR;  Service: Podiatry;  Laterality: Right;    COLONOSCOPY N/A 12/10/2019    Procedure: COLONOSCOPY;  Surgeon: Ryan Lucas MD;  Location: [facility];  Service: Endoscopy;  Laterality: N/A;    COLONOSCOPY N/A 12/13/2019    Procedure: COLONOSCOPY;  Surgeon: Ryan Lucas MD;  Location: [facility];  Service: Endoscopy;  Laterality: N/A;    CORONARY STENT PLACEMENT      ESOPHAGOGASTRODUODENOSCOPY N/A 12/10/2019    Procedure: EGD (ESOPHAGOGASTRODUODENOSCOPY);  Surgeon: Ryan Lucas MD;  Location: [facility];  Service: Endoscopy;  Laterality: N/A;    ESOPHAGOGASTRODUODENOSCOPY N/A 11/03/2022    Procedure: EGD (ESOPHAGOGASTRODUODENOSCOPY);   Surgeon: Ryan Lucas MD;  Location: [facility] ENDO;  Service: Endoscopy;  Laterality: N/A;    INCISION AND DRAINAGE FOOT Right 12/19/2023    Procedure: INCISION AND DRAINAGE, FOOT- RIGHT;  Surgeon: Дмитрий Pratt DPM;  Location: [facility] OR;  Service: Podiatry;  Laterality: Right;    INJECTION OF ANESTHETIC AGENT AROUND MEDIAL BRANCH NERVES INNERVATING LUMBAR FACET JOINT Bilateral 07/25/2023    Procedure: Block-nerve-medial branch-lumbar;  Surgeon: Napoleon Grimaldo MD;  Location: [facility];  Service: Pain Management;  Laterality: Bilateral;  L3,4,5 MBB    INJECTION OF ANESTHETIC AGENT AROUND MEDIAL BRANCH NERVES INNERVATING LUMBAR FACET JOINT Bilateral 08/25/2023    Procedure: Block-nerve-medial branch-lumbar;  Surgeon: Napoleon Grimaldo MD;  Location: [facility];  Service: Anesthesiology;  Laterality: Bilateral;  L3,4,5 MBB #2    INSERTION OF IMPLANTABLE LOOP RECORDER  08/24/2024    LAPAROSCOPIC CHOLECYSTECTOMY N/A 01/11/2023    Procedure: CHOLECYSTECTOMY, LAPAROSCOPIC;  Surgeon: Laith Davis MD;  Location: [facility] OR;  Service: General;  Laterality: N/A;    LEFT HEART CATHETERIZATION Left 08/25/2022    Procedure: Left heart cath;  Surgeon: Mai Deleon MD;  Location: [facility] CATH;  Service: Cardiology;  Laterality: Left;    RADIOFREQUENCY ABLATION OF LUMBAR MEDIAL BRANCH NERVE AT SINGLE LEVEL Bilateral 10/31/2023    Procedure: Radiofrequency Ablation, Nerve, Spinal, Lumbar, Medial Branch, 1 Level;  Surgeon: Napoleon Grimaldo MD;  Location: [facility];  Service: Anesthesiology;  Laterality: Bilateral;  L3,4,5 RFA    right heel surgery      SHOULDER ARTHROSCOPY Left     UPPER GASTROINTESTINAL ENDOSCOPY        Family History   Problem Relation Name Age of Onset    Melanoma Mother      Diabetes Mother      Hypertension Mother      Stroke Father      Diabetes Father      Hypertension Father      Colon cancer Father          unsure of age of diagnosis    Cancer Father          colon cancer    Cervical cancer Sister       Cirrhosis Brother      Hypertension Brother      Lung cancer Maternal Grandmother      Prostate cancer Maternal Grandfather      Crohn's disease Neg Hx      Ulcerative colitis Neg Hx      Stomach cancer Neg Hx      Esophageal cancer Neg Hx      Retinal detachment Neg Hx      Strabismus Neg Hx      Macular degeneration Neg Hx      Glaucoma Neg Hx        Wt Readings from Last 10 Encounters:   12/13/24 78.4 kg (172 lb 13.5 oz)   12/10/24 78.7 kg (173 lb 8 oz)   12/09/24 78.4 kg (172 lb 13.5 oz)   12/02/24 78 kg (171 lb 13.6 oz)   11/21/24 76 kg (167 lb 8.8 oz)   10/30/24 76 kg (167 lb 9.6 oz)   10/22/24 74.4 kg (164 lb 0.4 oz)   10/15/24 74 kg (163 lb 2.3 oz)   10/07/24 73.8 kg (162 lb 11.2 oz)   10/03/24 70.8 kg (156 lb)     Lab Results   Component Value Date    WBC 6.84 06/24/2024    HGB 10.4 (L) 06/24/2024    HCT 32.0 (L) 06/24/2024    MCV 87 06/24/2024     06/24/2024     CMP  Sodium   Date Value Ref Range Status   11/27/2024 141 136 - 144 mmol/L Final   10/09/2024 141 136 - 145 mmol/L Final     Potassium   Date Value Ref Range Status   11/27/2024 3.9 3.6 - 5.1 mmol/L Final   10/09/2024 3.9 3.5 - 5.1 mmol/L Final     Chloride   Date Value Ref Range Status   10/09/2024 107 95 - 110 mmol/L Final     CO2   Date Value Ref Range Status   11/27/2024 26 22 - 32 mmol/L Final   10/09/2024 23 23 - 29 mmol/L Final     Glucose   Date Value Ref Range Status   10/09/2024 91 70 - 110 mg/dL Final     BUN   Date Value Ref Range Status   10/09/2024 8 6 - 20 mg/dL Final     Blood Urea Nitrogen   Date Value Ref Range Status   11/27/2024 11 8 - 20 mg/dL Final     Creatinine   Date Value Ref Range Status   11/27/2024 0.91 0.90 - 1.30 mg/dL Final   10/09/2024 0.8 0.5 - 1.4 mg/dL Final     Calcium   Date Value Ref Range Status   11/27/2024 8.9 8.9 - 10.3 mg/dL Final   10/09/2024 9.0 8.7 - 10.5 mg/dL Final     Total Protein   Date Value Ref Range Status   10/09/2024 7.1 6.0 - 8.4 g/dL Final     Albumin   Date Value Ref Range Status    10/09/2024 3.7 3.5 - 5.2 g/dL Final     Total Bilirubin   Date Value Ref Range Status   10/09/2024 0.6 0.1 - 1.0 mg/dL Final     Comment:     For infants and newborns, interpretation of results should be based  on gestational age, weight and in agreement with clinical  observations.    Premature Infant recommended reference ranges:  Up to 24 hours.............<8.0 mg/dL  Up to 48 hours............<12.0 mg/dL  3-5 days..................<15.0 mg/dL  6-29 days.................<15.0 mg/dL       Alkaline Phosphatase   Date Value Ref Range Status   10/09/2024 101 55 - 135 U/L Final     AST   Date Value Ref Range Status   10/09/2024 19 10 - 40 U/L Final     ALT   Date Value Ref Range Status   10/09/2024 9 (L) 10 - 44 U/L Final     Anion Gap   Date Value Ref Range Status   11/27/2024 7 7 - 16 mmol/L Final   10/09/2024 11 8 - 16 mmol/L Final     eGFR if    Date Value Ref Range Status   06/16/2022 >60 >60 mL/min/1.73 m^2 Final     eGFR if non    Date Value Ref Range Status   06/16/2022 >60 >60 mL/min/1.73 m^2 Final     Comment:     Calculation used to obtain the estimated glomerular filtration  rate (eGFR) is the CKD-EPI equation.          Lab Results   Component Value Date    LIPASE 30 10/18/2022     Lab Results   Component Value Date    LIPASERES 61 04/21/2023             Reviewed prior medical records including endoscopy history (see surgical history).     Objective:      Physical Exam  Constitutional:       General: He is not in acute distress.     Appearance: He is well-developed.   HENT:      Head: Normocephalic.      Right Ear: Hearing normal.      Left Ear: Hearing normal.      Nose: Nose normal.      Mouth/Throat:      Mouth: No oral lesions.      Pharynx: Uvula midline.   Eyes:      General: Lids are normal.      Conjunctiva/sclera: Conjunctivae normal.      Pupils: Pupils are equal, round, and reactive to light.   Neck:      Trachea: Trachea normal.   Cardiovascular:      Rate and  Rhythm: Normal rate and regular rhythm.      Heart sounds: Normal heart sounds. No murmur heard.  Pulmonary:      Effort: Pulmonary effort is normal. No respiratory distress.      Breath sounds: Normal breath sounds. No stridor. No wheezing.   Abdominal:      General: Bowel sounds are normal. There is no distension.      Palpations: Abdomen is soft. There is no mass.      Tenderness: There is no abdominal tenderness. There is no guarding or rebound.   Musculoskeletal:         General: Normal range of motion.      Cervical back: Normal range of motion.   Skin:     General: Skin is warm and dry.      Findings: No rash.      Comments: Non jaundiced   Neurological:      Mental Status: He is alert and oriented to person, place, and time.   Psychiatric:         Speech: Speech normal.         Behavior: Behavior normal. Behavior is cooperative.           Assessment:       1. Encounter for monitoring long-term proton pump inhibitor therapy    2. Gastroesophageal reflux disease without esophagitis    3. Gastric intestinal metaplasia    4. Esophageal dysphagia    5. History of colon polyps           Plan:   All diagnoses and orders for this visit:    Encounter for monitoring long-term proton pump inhibitor therapy, Gastroesophageal reflux disease without esophagitis & Gastric intestinal metaplasia  - Refill and continue: pantoprazole (PROTONIX) 40 MG tablet; Take 1 tablet (40 mg total) by mouth once daily.  Dispense: 90 tablet; Refill: 3  - Take PPI in the morning 30 minutes before breakfast  - Recommend to avoid large meals, avoid eating within 3 hours of bedtime, elevate head of bed if nocturnal symptoms are present, smoking cessation (if current smoker), & weight loss (if overweight).   - Recommend minimize/avoid high-fat foods, chocolate, caffeine, citrus, alcohol, & tomato products.  - Advised to avoid/limit use of NSAID's, since they can cause GI upset, bleeding, and/or ulcers. If needed, take with food.      Esophageal  dysphagia   - Discussed EGD with possible esophageal dilation if indicated but patient declined (he will notify office when ready to schedule)  - Educated patient to eat smaller more frequent meals and to eat slowly and advised to eat a soft diet.    History of colon polyps   - Discussed colonoscopy but patient declined    If no improvement in symptoms or symptoms worsen, call/follow-up at clinic or go to ER

## 2024-12-17 ENCOUNTER — CLINICAL SUPPORT (OUTPATIENT)
Dept: FAMILY MEDICINE | Facility: CLINIC | Age: 59
End: 2024-12-17
Payer: MEDICARE

## 2024-12-17 DIAGNOSIS — E53.8 B12 DEFICIENCY: Primary | ICD-10-CM

## 2024-12-17 PROCEDURE — 96372 THER/PROPH/DIAG INJ SC/IM: CPT | Mod: HCNC,S$GLB,, | Performed by: STUDENT IN AN ORGANIZED HEALTH CARE EDUCATION/TRAINING PROGRAM

## 2024-12-17 RX ADMIN — CYANOCOBALAMIN 1000 MCG: 1000 INJECTION, SOLUTION INTRAMUSCULAR; SUBCUTANEOUS at 10:12

## 2024-12-25 ENCOUNTER — HOSPITAL ENCOUNTER (OUTPATIENT)
Dept: CARDIOLOGY | Facility: HOSPITAL | Age: 59
Discharge: HOME | End: 2024-12-25
Attending: INTERNAL MEDICINE
Payer: MEDICARE

## 2024-12-25 ENCOUNTER — CLINICAL SUPPORT (OUTPATIENT)
Dept: CARDIOLOGY | Facility: HOSPITAL | Age: 59
End: 2024-12-25

## 2024-12-25 DIAGNOSIS — I49.8 OTHER SPECIFIED CARDIAC ARRHYTHMIAS: ICD-10-CM

## 2024-12-25 PROCEDURE — 93298 REM INTERROG DEV EVAL SCRMS: CPT | Mod: HCNC,PO | Performed by: INTERNAL MEDICINE

## 2024-12-25 PROCEDURE — 93298 REM INTERROG DEV EVAL SCRMS: CPT | Mod: 26,HCNC,, | Performed by: INTERNAL MEDICINE

## 2024-12-30 ENCOUNTER — TELEPHONE (OUTPATIENT)
Dept: FAMILY MEDICINE | Facility: CLINIC | Age: 59
End: 2024-12-30
Payer: MEDICARE

## 2024-12-30 NOTE — TELEPHONE ENCOUNTER
----- Message from Luis M sent at 12/30/2024 10:18 AM CST -----  Regarding: hospital f/u  Contact: patient  Type:  Sooner Apoointment Request    Caller is requesting a sooner appointment.  Caller declined first available appointment listed below.  Caller will not accept being placed on the waitlist and is requesting a message be sent to doctor.  Name of Caller:patient  When is the first available appointment?02/01/2025  Symptoms:hospital f/u  Would the patient rather a call back or a response via [facility]?   [facility] Call Back Number:[phone]  Additional Information:

## 2025-01-02 ENCOUNTER — CLINICAL SUPPORT (OUTPATIENT)
Dept: FAMILY MEDICINE | Facility: CLINIC | Age: 60
End: 2025-01-02
Payer: MEDICARE

## 2025-01-02 ENCOUNTER — TELEPHONE (OUTPATIENT)
Dept: FAMILY MEDICINE | Facility: CLINIC | Age: 60
End: 2025-01-02

## 2025-01-02 DIAGNOSIS — E53.8 B12 DEFICIENCY: Primary | ICD-10-CM

## 2025-01-02 RX ADMIN — CYANOCOBALAMIN 1000 MCG: 1000 INJECTION, SOLUTION INTRAMUSCULAR; SUBCUTANEOUS at 08:01

## 2025-01-02 NOTE — TELEPHONE ENCOUNTER
Pt is sched for B 12 injection today. B12 order needs to be updated . Order pended . Pls check to make sure order is correct .--lp

## 2025-01-03 ENCOUNTER — OFFICE VISIT (OUTPATIENT)
Dept: CARDIOLOGY | Facility: CLINIC | Age: 60
End: 2025-01-03
Payer: MEDICARE

## 2025-01-03 VITALS
HEART RATE: 86 BPM | HEIGHT: 70 IN | DIASTOLIC BLOOD PRESSURE: 64 MMHG | SYSTOLIC BLOOD PRESSURE: 114 MMHG | WEIGHT: 176.56 LBS | BODY MASS INDEX: 25.28 KG/M2

## 2025-01-03 DIAGNOSIS — I25.10 CAD S/P PERCUTANEOUS CORONARY ANGIOPLASTY: Chronic | ICD-10-CM

## 2025-01-03 DIAGNOSIS — I50.32 CHRONIC HEART FAILURE WITH PRESERVED EJECTION FRACTION: Primary | Chronic | ICD-10-CM

## 2025-01-03 DIAGNOSIS — Z98.61 CAD S/P PERCUTANEOUS CORONARY ANGIOPLASTY: Chronic | ICD-10-CM

## 2025-01-03 DIAGNOSIS — I35.0 MODERATE TO SEVERE AORTIC STENOSIS: Chronic | ICD-10-CM

## 2025-01-03 DIAGNOSIS — G43.719 INTRACTABLE CHRONIC MIGRAINE WITHOUT AURA AND WITHOUT STATUS MIGRAINOSUS: ICD-10-CM

## 2025-01-03 DIAGNOSIS — I10 ESSENTIAL HYPERTENSION: Chronic | ICD-10-CM

## 2025-01-03 DIAGNOSIS — E78.5 HYPERLIPIDEMIA, UNSPECIFIED HYPERLIPIDEMIA TYPE: ICD-10-CM

## 2025-01-03 DIAGNOSIS — I05.0 MODERATE MITRAL STENOSIS: Chronic | ICD-10-CM

## 2025-01-03 DIAGNOSIS — I73.9 PVD (PERIPHERAL VASCULAR DISEASE): Chronic | ICD-10-CM

## 2025-01-03 PROCEDURE — 99999 PR PBB SHADOW E&M-EST. PATIENT-LVL IV: CPT | Mod: PBBFAC,,,

## 2025-01-03 RX ORDER — CYANOCOBALAMIN 1000 UG/ML
1000 INJECTION, SOLUTION INTRAMUSCULAR; SUBCUTANEOUS
Status: SHIPPED | OUTPATIENT
Start: 2025-01-03 | End: 2025-08-15

## 2025-01-03 RX ORDER — BUTALBITAL, ACETAMINOPHEN AND CAFFEINE 50; 325; 40 MG/1; MG/1; MG/1
TABLET ORAL
Qty: 10 TABLET | Refills: 0 | Status: SHIPPED | OUTPATIENT
Start: 2025-01-03

## 2025-01-03 NOTE — PROGRESS NOTES
[facility]  Date: 1/3/25    Patient: Ryan Crane, 1965, [id number]  Primary Care Provider: Ana Mayer MD     Chief Complaint: Hospital follow up     Subjective:       Ryan Crane is a 59 y.o. male who presents for hospital follow up. They follow with Dr. Aranda.    CMP, lipid panel stable. At last office visit, patient with evidence of significant valvular disease for which he was referred to CT surgery at [facility] for further management. Multiple hospitalizations since then for dizziness/falls- underwent LUIS MIGUEL with mod AR/AS and mod MS and [facility] 11/2024 which demonstrated nonobstructive CAD.     Since then, patient without any active cardiac complaints. Notes stable KELLEY without significant change since last visit. He is scheduled to undergo aortic valve replacement on 2/3/25 with Dr. Pascual. Average SBP 110s-120s at home. Weight up-trending. Denies chest discomfort, palpitations, dizziness, syncope, orthopnea, PND, edema.    Focused Past History includes:  Coronary artery disease s/p PCI 2022, 5/2024  [facility] 11/2024: patent stents in prox-mid LAD  Chronic heart failure with preserved ejection fraction  Valvular disease  LUIS MIGUEL 12/2024: LVEF 60-65%, mild LAE, mod AS, mod AR, mod MS, trace MR, trace TR  Peripheral vascular disease s/p R CEA 6/2024  Carotid US 8/2024: Bilateral carotid atheromatous plaquing. There is less than 50% diameter reduction.   Hypertension  Hyperlipidemia   Recurrent strokes  Type 2 diabetes mellitus   PIPER  COPD  Former smoker     Current Outpatient Medications   Medication Sig    albuterol (PROVENTIL/VENTOLIN HFA) 90 mcg/actuation inhaler Inhale 2 puffs into the lungs every 6 (six) hours as needed for Shortness of Breath or Wheezing.    albuterol-ipratropium (DUO-NEB) 2.5 mg-0.5 mg/3 mL nebulizer solution Take 3 mLs by nebulization every 6 (six) hours as needed for Wheezing. Rescue    ammonium lactate 12 % Crea Apply 2 g topically 2 (two) times a  day.    aspirin 81 MG Chew Take 81 mg by mouth once daily.    atorvastatin (LIPITOR) 80 MG tablet Take 1 tablet (80 mg total) by mouth once daily.    brexpiprazole (REXULTI) 4 mg Tab Take 4 mg by mouth Daily.    butalbital-acetaminophen-caffeine -40 mg (FIORICET, ESGIC) -40 mg per tablet Take 1 tablet by mouth as needed for migraine. No more than 10 tablets per month.    clopidogreL (PLAVIX) 75 mg tablet Take 1 tablet (75 mg total) by mouth once daily.    cyanocobalamin 1,000 mcg/mL injection 1 ml sq daily x 5 days, then 1 ml sq weekly x 4 weeks then q monthly -  will need to go to PCP    EPINEPHrine (EPIPEN) 0.3 mg/0.3 mL AtIn Inject 0.3 mLs (0.3 mg total) into the muscle as needed.    fluticasone-umeclidin-vilanter (TRELEGY ELLIPTA) 200-62.5-25 mcg inhaler Inhale 1 puff into the lungs once daily.    fremanezumab-vfrm (AJOVY AUTOINJECTOR) 225 mg/1.5 mL autoinjector Inject 1.5 mLs (225 mg total) into the skin every 28 days.    lamoTRIgine (LAMICTAL) 200 MG tablet Take 200 mg by mouth once daily.    nicotine polacrilex (NICORETTE) 4 MG Gum Take up to 6 pieces daily as needed    pantoprazole (PROTONIX) 40 MG tablet Take 1 tablet (40 mg total) by mouth once daily.    pregabalin (LYRICA) 50 MG capsule Take 1 capsule (50 mg total) by mouth 3 (three) times daily.    promethazine (PHENERGAN) 25 MG tablet Take 1 tablet (25 mg total) by mouth every 8 (eight) hours as needed for Nausea.    tadalafiL (CIALIS) 20 MG Tab Take 1 tablet (20 mg total) by mouth once daily.    topiramate (TOPAMAX) 200 MG Tab Take 1 tablet (200 mg total) by mouth every evening.    traZODone (DESYREL) 50 MG tablet Take 50 mg by mouth every evening.    ubrogepant (UBRELVY) 100 mg tablet Take 1 tablet by mouth as needed for migraine. May repeat in 2 hours if needed. Max 2 tab per day    zolpidem (AMBIEN) 10 mg Tab Take 5 mg by mouth nightly as needed.    nitroGLYCERIN (NITROSTAT) 0.4 MG SL tablet Place 1 tablet (0.4 mg total) under the tongue  every 5 (five) minutes as needed for Chest pain.    ranolazine (RANEXA) 500 MG Tb12 Take 1 tablet (500 mg total) by mouth 2 (two) times daily.     Current Facility-Administered Medications   Medication    cyanocobalamin injection 1,000 mcg     Facility-Administered Medications Ordered in Other Visits   Medication    diphenhydrAMINE injection 12.5 mg    electrolyte-S (ISOLYTE)    lactated ringers infusion    lactated ringers infusion    lactated ringers infusion    lactated ringers infusion    LIDOcaine (PF) 10 mg/ml (1%) injection 10 mg    LIDOcaine (PF) 10 mg/ml (1%) injection 10 mg    LIDOcaine (PF) 10 mg/ml (1%) injection 10 mg    LIDOcaine (PF) 10 mg/ml (1%) injection 10 mg            Objective       Review of Systems  Constitutional: negative for fevers, night sweats, and weight loss  Eyes: negative for visual disturbance, diplopia  Respiratory: negative for cough, hemoptysis, sputum, and wheezing  Cardiovascular: see HPI  Gastrointestinal: negative for abdominal pain, bright red blood per rectum, change in bowel habits, dysphagia, melena, and reflux symptoms  Genitourinary:negative for dysuria, frequency, and hematuria  Hematologic/lymphatic: negative for bleeding, easy bruising, and lymphadenopathy  Musculoskeletal:negative for arthralgias, back pain, and myalgias  Neurological: negative for gait problems, paresthesia, speech problems, vertigo, and weakness  Behavioral/Psych: negative for excessive alcohol consumption, illegal drug usage, and sleep disturbance    -------------------------------------    Aortic valve stenosis    Arachnoid cyst of posterior cranial fossa    Benign prostatic hyperplasia without lower urinary tract symptoms    Bilateral carotid bruits    Bipolar disorder    CAD S/P percutaneous coronary angioplasty    3 vessel disease    Calculus of gallbladder without cholecystitis without obstruction    Cancer    Candidal intertrigo    Cataract    Chronic heart failure with preserved ejection  fraction    Chronic schizophrenia    Colon polyps    Diabetic polyneuropathy associated with diabetes mellitus due to underlying condition    Dysarthria as late effect of cerebellar cerebrovascular accident (CVA)    Equinus deformity of both feet    Erectile dysfunction due to arterial insufficiency    Flaccid hemiplegia of left nondominant side as late effect of cerebral infarction    Flat foot    Gastritis    Gastroesophageal reflux disease without esophagitis    Gastrointestinal hemorrhage    Post polypectomy bleeding    General anesthetics causing adverse effect in therapeutic use    Hammer toes of both feet    Hx of diabetic foot ulcer    Hypertension    Hyponatremia    ELAINE (iron deficiency anemia)    Intractable chronic migraine without aura and without status migrainosus    Microcytic anemia    Moderate aortic regurgitation    Moderate mitral regurgitation    Moderate mitral stenosis    Moderate to severe aortic stenosis    Myocardial infarct, old    Nicotine dependence, unspecified, uncomplicated    Onychomycosis due to dermatophyte    Orchitis    PIPER on CPAP    Peripheral visual field defect, bilateral    PVD (peripheral vascular disease)    Seizures    Stage 2 moderate COPD by GOLD classification    PFTs w/ FEV1 (73%) (9/2022)    Stroke    Thoracic aortic atherosclerosis    CT chest    Thyroid disease    Previously on pharmacologic Tx    Tinea pedis of right foot    Tobacco use disorder    Type 2 diabetes mellitus with diabetic peripheral angiopathy without gangrene    A1c 5.1% (3/2023)    Vitamin B12 deficiency     ----------------------------    Angiogram, coronary, with left heart catheterization    Procedure: Angiogram, Coronary, with Left Heart Cath;  Surgeon: Mai Deleon MD;  Location: [facility] CATH;  Service: Cardiology;;    Appendectomy    Bone biopsy    Procedure: Biopsy-Bone;  Surgeon: Дмитрий Pratt DPM;  Location: [facility] OR;  Service: Podiatry;  Laterality: Right;    Bone exostosis excision     Procedure: EXCISION, EXOSTOSIS;  Surgeon: Дмитрий Pratt DPM;  Location: [facility] OR;  Service: Podiatry;  Laterality: Right;    Bone marrow aspiration    Procedure: ASPIRATION, BONE MARROW;  Surgeon: Lex Kathleen MD;  Location: [facility] OR;  Service: Oncology;  Laterality: Left;    Carotid endarterectomy    Cholecystectomy    Closure of wound    Procedure: CLOSURE, WOUND;  Surgeon: Li Kathleen DPM;  Location: [facility] OR;  Service: Podiatry;  Laterality: Right;    Colonoscopy    Procedure: COLONOSCOPY;  Surgeon: Ryan Lucas MD;  Location: [facility];  Service: Endoscopy;  Laterality: N/A;    Colonoscopy    Procedure: COLONOSCOPY;  Surgeon: Ryan Lucas MD;  Location: [facility];  Service: Endoscopy;  Laterality: N/A;    Coronary stent placement    Esophagogastroduodenoscopy    Procedure: EGD (ESOPHAGOGASTRODUODENOSCOPY);  Surgeon: Ryan Lucas MD;  Location: [facility] ENDO;  Service: Endoscopy;  Laterality: N/A;    Esophagogastroduodenoscopy    Procedure: EGD (ESOPHAGOGASTRODUODENOSCOPY);  Surgeon: Ryan Lucas MD;  Location: [facility];  Service: Endoscopy;  Laterality: N/A;    Incision and drainage foot    Procedure: INCISION AND DRAINAGE, FOOT- RIGHT;  Surgeon: Дмитрий Pratt DPM;  Location: [facility];  Service: Podiatry;  Laterality: Right;    Injection of anesthetic agent around medial branch nerves innervating lumbar facet joint    Procedure: Block-nerve-medial branch-lumbar;  Surgeon: Napoleon Grimaldo MD;  Location: [facility] OR;  Service: Pain Management;  Laterality: Bilateral;  L3,4,5 MBB    Injection of anesthetic agent around medial branch nerves innervating lumbar facet joint    Procedure: Block-nerve-medial branch-lumbar;  Surgeon: Napoleon Grimaldo MD;  Location: [facility] OR;  Service: Anesthesiology;  Laterality: Bilateral;  L3,4,5 MBB #2    Insertion of implantable loop recorder    Laparoscopic cholecystectomy    Procedure: CHOLECYSTECTOMY, LAPAROSCOPIC;  Surgeon: Laith Davis MD;   "Location: [facility] OR;  Service: General;  Laterality: N/A;    Left heart catheterization    Procedure: Left heart cath;  Surgeon: Mai Deleon MD;  Location: [facility] CATH;  Service: Cardiology;  Laterality: Left;    Radiofrequency ablation of lumbar medial branch nerve at single level    Procedure: Radiofrequency Ablation, Nerve, Spinal, Lumbar, Medial Branch, 1 Level;  Surgeon: Napoleon Grimaldo MD;  Location: [facility] OR;  Service: Anesthesiology;  Laterality: Bilateral;  L3,4,5 RFA    Right heel surgery    Shoulder arthroscopy    Upper gastrointestinal endoscopy        Family History   Problem Relation Name Age of Onset    Melanoma Mother      Diabetes Mother      Hypertension Mother      Stroke Father      Diabetes Father      Hypertension Father      Colon cancer Father          unsure of age of diagnosis    Cancer Father          colon cancer    Cervical cancer Sister      Cirrhosis Brother      Hypertension Brother      Lung cancer Maternal Grandmother      Prostate cancer Maternal Grandfather      Crohn's disease Neg Hx      Ulcerative colitis Neg Hx      Stomach cancer Neg Hx      Esophageal cancer Neg Hx      Retinal detachment Neg Hx      Strabismus Neg Hx      Macular degeneration Neg Hx      Glaucoma Neg Hx       Social History     Tobacco Use    Smoking status: Former     Passive exposure: Never    Smokeless tobacco: Never    Tobacco comments:     Age Started 14    Substance Use Topics    Alcohol use: No    Drug use: Not Currently     Types: Marijuana     Comment: ocasionally - once every 6 months       Physical Exam  /64 (BP Location: Right arm, Patient Position: Sitting)   Pulse 86   Ht 5' 10" (1.778 m)   Wt 80.1 kg (176 lb 9.4 oz)   BMI 25.34 kg/m²   Body surface area is 1.99 meters squared.  Body mass index is 25.34 kg/m².    General appearance: alert, appears stated age, cooperative, and no distress  Head: Normocephalic, without obvious abnormality, atraumatic  Neck: no carotid bruit, no JVD, and " supple, symmetrical, trachea midline  Lungs: clear to auscultation bilaterally  Heart: regular rate and rhythm; S1, S2 normal, no click, rub or gallop; murmur present  Abdomen: soft, non-tender, no distended  Extremities: extremities atraumatic, no pitting edema  Skin: warm, no cyanosis, no pathologic ecchymosis in exposed portions  Neurologic: Grossly normal. A&O x3      Lab Review   Lab Results   Component Value Date    WBC 6.84 06/24/2024    HGB 10.4 (L) 06/24/2024    HCT 32.0 (L) 06/24/2024    MCV 87 06/24/2024     06/24/2024         BMP  Lab Results   Component Value Date     12/28/2024    K 4.3 12/28/2024     10/09/2024    CO2 24 12/28/2024    BUN 10 12/28/2024    CREATININE 0.9 12/28/2024    CALCIUM 8.7 (L) 12/28/2024    ANIONGAP 5 (L) 12/28/2024    ESTGFRAFRICA >60 06/16/2022    EGFRNONAA >60 06/16/2022       Lab Results   Component Value Date    ALBUMIN 3.5 12/28/2024       Lab Results   Component Value Date    ALT 10 12/28/2024    AST 18 12/28/2024    ALKPHOS 80 12/28/2024    BILITOT 0.4 12/28/2024       Lab Results   Component Value Date    TSH 1.140 03/05/2023       Lab Results   Component Value Date    CHOL 96 12/26/2024    CHOL 99 11/27/2024    CHOL 92 09/29/2024     Lab Results   Component Value Date    HDL 47 12/26/2024    HDL 45 11/27/2024    HDL 41 09/29/2024     Lab Results   Component Value Date    LDLCALC 40 12/26/2024    LDLCALC 43 11/27/2024    LDLCALC 45 09/29/2024     Lab Results   Component Value Date    TRIG 47 12/26/2024    TRIG 53 11/27/2024    TRIG 30 09/29/2024     Lab Results   Component Value Date    CHOLHDL 2 12/26/2024    CHOLHDL 2.2 11/27/2024    CHOLHDL 2.2 09/29/2024                Assessment & Plan:     This is a 59 y.o. male with chronic HFpEF, CAD s/p PCI, valvular disease, PVD, HTN, HLD, history of recurrent CVA, type 2 DM, PIPER, COPD, former smoker. Multiple hospitalizations since last visit for dizziness/falls- underwent LUIS MIGUEL with mod AR/AS and mod MS and  [facility] 11/2024 which demonstrated nonobstructive CAD. Since then, patient doing well without active cardiac complaints. No further episodes of dizziness. Scheduled to undergo valve replacement surgery with Dr. Pascual on 2/3/25.    1. Chronic heart failure with preserved ejection fraction (Primary)  - Euvolemic on exam   - Salt and free water restriction     2. Coronary artery disease s/p PCI 2022, 5/2024  - No anginal equivalents   - Continue ASA 81 mg qd   - Continue atorvastatin 80 mg qd   - Continue clopidogrel 75 mg qd  - Continue ranolzaine 500 mg BID    3. Moderate to severe aortic stenosis  - LUIS MIGUEL 11/2024: mod AR/AS, mod MR  - Continue to follow CT surgery for management of valvular disease     4. Moderate mitral stenosis  - See above     5. Peripheral vascular disease s/p R CEA 6/2024  - Asymptomatic   - Continue ASA 81 mg qd   - Continue atorvastatin 80 mg qd     6. Essential hypertension  - Antihypertensives on hold secondary to episodes of dizziness   - Well controlled off antihypertensives  - Continue to monitor      7. Hyperlipidemia   - LDL 40, HDL 47  - Continue ASA 81 mg qd   - Continue atorvastatin 80 mg qd     Emphasized the importance of modifying lifestyle related risk factors including smoking cessation, exercise, diet most resembling a Mediterranean diet.    Please follow up in 3 months or sooner if needed.      Jerilyn Becerra PA-C  [facility]  Office: [phone]

## 2025-01-06 ENCOUNTER — CLINICAL SUPPORT (OUTPATIENT)
Dept: SMOKING CESSATION | Facility: CLINIC | Age: 60
End: 2025-01-06
Payer: COMMERCIAL

## 2025-01-06 DIAGNOSIS — F17.200 NICOTINE DEPENDENCE: Primary | ICD-10-CM

## 2025-01-06 PROCEDURE — 99999 PR PBB SHADOW E&M-EST. PATIENT-LVL III: CPT | Mod: PBBFAC,,,

## 2025-01-06 PROCEDURE — 99404 PREV MED CNSL INDIV APPRX 60: CPT | Mod: S$GLB,,, | Performed by: GENERAL PRACTICE

## 2025-01-06 RX ORDER — DIPHENHYDRAMINE HCL 25 MG
CAPSULE ORAL
Qty: 100 EACH | Refills: 0 | Status: SHIPPED | OUTPATIENT
Start: 2025-01-06 | End: 2025-01-06 | Stop reason: SDUPTHER

## 2025-01-06 RX ORDER — DIPHENHYDRAMINE HCL 25 MG
CAPSULE ORAL
Qty: 100 EACH | Refills: 0 | Status: SHIPPED | OUTPATIENT
Start: 2025-01-06

## 2025-01-06 NOTE — Clinical Note
Patient remains tobacco free and using the 4 mg gum with no negative side effects at this time. Patient's exhaled CO level was 2 ppm and patient needs refills at this time. We discussed nicotine dependence and session 1 material including triggers, urges and new habits. Patient has a follow up in 2 weeks.

## 2025-01-06 NOTE — PROGRESS NOTES
Individual Follow-Up Form    1/6/2025    Quit Date: 10/2/24    Clinical Status of Patient: Outpatient    Length of Service: 60 minutes    Continuing Medication: yes  Nicotine gum    Other Medications:      Target Symptoms: Withdrawal and medication side effects. The following were  rated moderate (3) to severe (4) on TCRS:  Moderate (3): none  Severe (4): none    Comments: Patient remains tobacco free and using the 4 mg gum with no negative side effects at this time. Patient's exhaled CO level was 2 ppm and patient needs refills at this time. We discussed nicotine dependence and session 1 material including triggers, urges and new habits. Patient has a follow up in 2 weeks.    Diagnosis: F17.200    Next Visit: 2 weeks

## 2025-01-13 ENCOUNTER — OFFICE VISIT (OUTPATIENT)
Dept: NEUROLOGY | Facility: CLINIC | Age: 60
End: 2025-01-13
Payer: MEDICARE

## 2025-01-13 VITALS
BODY MASS INDEX: 25.28 KG/M2 | HEART RATE: 75 BPM | SYSTOLIC BLOOD PRESSURE: 104 MMHG | WEIGHT: 176.56 LBS | HEIGHT: 70 IN | DIASTOLIC BLOOD PRESSURE: 67 MMHG

## 2025-01-13 DIAGNOSIS — E78.5 HYPERLIPIDEMIA LDL GOAL <70: ICD-10-CM

## 2025-01-13 DIAGNOSIS — Z86.79 HISTORY OF CAROTID STENOSIS: ICD-10-CM

## 2025-01-13 DIAGNOSIS — I69.354 HEMIPLEGIA AND HEMIPARESIS FOLLOWING CEREBRAL INFARCTION AFFECTING LEFT NON-DOMINANT SIDE: Chronic | ICD-10-CM

## 2025-01-13 DIAGNOSIS — I63.89 OTHER CEREBRAL INFARCTION: ICD-10-CM

## 2025-01-13 DIAGNOSIS — Z86.73 H/O ISCHEMIC MULTIFOCAL MULTIPLE VASCULAR TERRITORIES STROKE: Primary | ICD-10-CM

## 2025-01-13 PROCEDURE — 3008F BODY MASS INDEX DOCD: CPT | Mod: CPTII,S$GLB,, | Performed by: NURSE PRACTITIONER

## 2025-01-13 PROCEDURE — 3072F LOW RISK FOR RETINOPATHY: CPT | Mod: CPTII,S$GLB,, | Performed by: NURSE PRACTITIONER

## 2025-01-13 PROCEDURE — 3078F DIAST BP <80 MM HG: CPT | Mod: CPTII,S$GLB,, | Performed by: NURSE PRACTITIONER

## 2025-01-13 PROCEDURE — 3074F SYST BP LT 130 MM HG: CPT | Mod: CPTII,S$GLB,, | Performed by: NURSE PRACTITIONER

## 2025-01-13 PROCEDURE — 99214 OFFICE O/P EST MOD 30 MIN: CPT | Mod: S$GLB,,, | Performed by: NURSE PRACTITIONER

## 2025-01-13 PROCEDURE — 1160F RVW MEDS BY RX/DR IN RCRD: CPT | Mod: CPTII,S$GLB,, | Performed by: NURSE PRACTITIONER

## 2025-01-13 PROCEDURE — 99999 PR PBB SHADOW E&M-EST. PATIENT-LVL V: CPT | Mod: PBBFAC,,, | Performed by: NURSE PRACTITIONER

## 2025-01-13 PROCEDURE — 1159F MED LIST DOCD IN RCRD: CPT | Mod: CPTII,S$GLB,, | Performed by: NURSE PRACTITIONER

## 2025-01-13 NOTE — PROGRESS NOTES
[facility] VISIT      SUBJECTIVE:    History for Present Illness: Ryan Crane is a 59 y.o.  male  with PMHx of stroke, CAD, tobacco abuse, COPD, HLD, PIPER, PVD, bipolar disorder, schizophrenia and right carotid stenosis (s/p CEA on 06/20/2024) who presents to me today as a referral from Alyse Wilson NP.    Relevant HPI:  Per patient history of multiple strokes the 1st in 2023.  Patient was last hospitalized at OSH on 12/25/2024 with persistent dizziness and frequent falls.  Imaging not available for review at time of visit.  However, radiology interpretation of CTA and MRI indicate no large vessel occlusion or acute intracranial abnormalities.    Interval history:    At the time of today's visit, the patient denies new or worsening focal neurologic symptoms concerning for new stroke or TIA. He is doing well overall  .Patient reports chronic left-sided weakness 2/2 stroke in March of 2023. He denies associated vertigo, double vision, language or visual disturbances.  He lives with his daughter and is independent with ADLs.  He is not currently participating in outpatient therapy.  Patient states he stopped smoking a few months ago.  He is adherent with home medications including aspirin and statin      Past Medical History:   Diagnosis Date    Aortic valve stenosis 02/28/2022    Arachnoid cyst of posterior cranial fossa 01/13/2022    Benign prostatic hyperplasia without lower urinary tract symptoms 05/25/2023    Bilateral carotid bruits 06/22/2021    Bipolar disorder 01/16/2022    CAD S/P percutaneous coronary angioplasty     3 vessel disease    Calculus of gallbladder without cholecystitis without obstruction 01/11/2023    Cancer     Candidal intertrigo 11/11/2019    Cataract     Chronic heart failure with preserved ejection fraction 03/29/2023    Chronic schizophrenia     Colon polyps     Diabetic polyneuropathy associated with diabetes mellitus due to underlying condition  11/21/2019    Dysarthria as late effect of cerebellar cerebrovascular accident (CVA) 03/24/2023    Equinus deformity of both feet 11/21/2019    Erectile dysfunction due to arterial insufficiency 05/25/2023    Flaccid hemiplegia of left nondominant side as late effect of cerebral infarction 03/17/2023    Flat foot 11/21/2019    Gastritis     Gastroesophageal reflux disease without esophagitis 07/13/2023    Gastrointestinal hemorrhage 12/13/2019    Post polypectomy bleeding    General anesthetics causing adverse effect in therapeutic use     Hammer toes of both feet 11/21/2019    Hx of diabetic foot ulcer 11/21/2019    Hypertension     Hyponatremia 01/11/2022    ELAINE (iron deficiency anemia) 12/10/2019    Intractable chronic migraine without aura and without status migrainosus 12/30/2021    Microcytic anemia 10/09/2019    Moderate aortic regurgitation 03/29/2023    Moderate mitral regurgitation 03/29/2023    Moderate mitral stenosis 03/29/2023    Moderate to severe aortic stenosis 02/28/2022    Myocardial infarct, old     Nicotine dependence, unspecified, uncomplicated 01/16/2022    Onychomycosis due to dermatophyte 11/21/2019    Orchitis 11/09/2019    PIPER on CPAP     Peripheral visual field defect, bilateral 01/13/2022    PVD (peripheral vascular disease) 11/21/2019    Seizures 2008    Stage 2 moderate COPD by GOLD classification     PFTs w/ FEV1 (73%) (9/2022)    Stroke 2005    Thoracic aortic atherosclerosis 10/17/2022    CT chest    Thyroid disease     Previously on pharmacologic Tx    Tinea pedis of right foot 05/21/2019    Tobacco use disorder     Type 2 diabetes mellitus with diabetic peripheral angiopathy without gangrene     A1c 5.1% (3/2023)    Vitamin B12 deficiency 03/24/2023     Past Surgical History:   Procedure Laterality Date    ANGIOGRAM, CORONARY, WITH LEFT HEART CATHETERIZATION  08/25/2022    Procedure: Angiogram, Coronary, with Left Heart Cath;  Surgeon: Mai Deleon MD;  Location: [facility] CATH;   Service: Cardiology;;    APPENDECTOMY      BONE BIOPSY Right 12/19/2023    Procedure: Biopsy-Bone;  Surgeon: Дмитрий Pratt DPM;  Location: [facility] OR;  Service: Podiatry;  Laterality: Right;    BONE EXOSTOSIS EXCISION Right 11/17/2023    Procedure: EXCISION, EXOSTOSIS;  Surgeon: Дмитрий Pratt DPM;  Location: [facility] OR;  Service: Podiatry;  Laterality: Right;    BONE MARROW ASPIRATION Left 08/21/2020    Procedure: ASPIRATION, BONE MARROW;  Surgeon: Lex Kathleen MD;  Location: [facility] OR;  Service: Oncology;  Laterality: Left;    CAROTID ENDARTERECTOMY Right     CHOLECYSTECTOMY      CLOSURE OF WOUND Right 09/09/2019    Procedure: CLOSURE, WOUND;  Surgeon: Li Kathleen DPM;  Location: [facility] OR;  Service: Podiatry;  Laterality: Right;    COLONOSCOPY N/A 12/10/2019    Procedure: COLONOSCOPY;  Surgeon: Ryan Lucas MD;  Location: [facility];  Service: Endoscopy;  Laterality: N/A;    COLONOSCOPY N/A 12/13/2019    Procedure: COLONOSCOPY;  Surgeon: Ryan Lucas MD;  Location: [facility];  Service: Endoscopy;  Laterality: N/A;    CORONARY STENT PLACEMENT      ESOPHAGOGASTRODUODENOSCOPY N/A 12/10/2019    Procedure: EGD (ESOPHAGOGASTRODUODENOSCOPY);  Surgeon: Ryan Lucas MD;  Location: [facility];  Service: Endoscopy;  Laterality: N/A;    ESOPHAGOGASTRODUODENOSCOPY N/A 11/03/2022    Procedure: EGD (ESOPHAGOGASTRODUODENOSCOPY);  Surgeon: Ryan Lucas MD;  Location: [facility];  Service: Endoscopy;  Laterality: N/A;    INCISION AND DRAINAGE FOOT Right 12/19/2023    Procedure: INCISION AND DRAINAGE, FOOT- RIGHT;  Surgeon: Дмитрий Pratt DPM;  Location: [facility];  Service: Podiatry;  Laterality: Right;    INJECTION OF ANESTHETIC AGENT AROUND MEDIAL BRANCH NERVES INNERVATING LUMBAR FACET JOINT Bilateral 07/25/2023    Procedure: Block-nerve-medial branch-lumbar;  Surgeon: Napoleon Grimaldo MD;  Location: [facility] OR;  Service: Pain Management;  Laterality: Bilateral;  L3,4,5 MBB    INJECTION OF ANESTHETIC  AGENT AROUND MEDIAL BRANCH NERVES INNERVATING LUMBAR FACET JOINT Bilateral 08/25/2023    Procedure: Block-nerve-medial branch-lumbar;  Surgeon: Napoleon Grimaldo MD;  Location: [facility] OR;  Service: Anesthesiology;  Laterality: Bilateral;  L3,4,5 MBB #2    INSERTION OF IMPLANTABLE LOOP RECORDER  08/24/2024    LAPAROSCOPIC CHOLECYSTECTOMY N/A 01/11/2023    Procedure: CHOLECYSTECTOMY, LAPAROSCOPIC;  Surgeon: Laith Davis MD;  Location: [facility] OR;  Service: General;  Laterality: N/A;    LEFT HEART CATHETERIZATION Left 08/25/2022    Procedure: Left heart cath;  Surgeon: Mai Deleon MD;  Location: [facility] CATH;  Service: Cardiology;  Laterality: Left;    RADIOFREQUENCY ABLATION OF LUMBAR MEDIAL BRANCH NERVE AT SINGLE LEVEL Bilateral 10/31/2023    Procedure: Radiofrequency Ablation, Nerve, Spinal, Lumbar, Medial Branch, 1 Level;  Surgeon: Napoleon Grimaldo MD;  Location: [facility] OR;  Service: Anesthesiology;  Laterality: Bilateral;  L3,4,5 RFA    right heel surgery      SHOULDER ARTHROSCOPY Left     UPPER GASTROINTESTINAL ENDOSCOPY       Family History   Problem Relation Name Age of Onset    Melanoma Mother      Diabetes Mother      Hypertension Mother      Stroke Father      Diabetes Father      Hypertension Father      Colon cancer Father          unsure of age of diagnosis    Cancer Father          colon cancer    Cervical cancer Sister      Cirrhosis Brother      Hypertension Brother      Lung cancer Maternal Grandmother      Prostate cancer Maternal Grandfather      Crohn's disease Neg Hx      Ulcerative colitis Neg Hx      Stomach cancer Neg Hx      Esophageal cancer Neg Hx      Retinal detachment Neg Hx      Strabismus Neg Hx      Macular degeneration Neg Hx      Glaucoma Neg Hx          Current Outpatient Medications:     albuterol (PROVENTIL/VENTOLIN HFA) 90 mcg/actuation inhaler, Inhale 2 puffs into the lungs every 6 (six) hours as needed for Shortness of Breath or Wheezing., Disp: 54 g, Rfl: 5     albuterol-ipratropium (DUO-NEB) 2.5 mg-0.5 mg/3 mL nebulizer solution, Take 3 mLs by nebulization every 6 (six) hours as needed for Wheezing. Rescue, Disp: 150 mL, Rfl: 5    ammonium lactate 12 % Crea, Apply 2 g topically 2 (two) times a day., Disp: 385 g, Rfl: 11    aspirin 81 MG Chew, Take 81 mg by mouth once daily., Disp: , Rfl:     atorvastatin (LIPITOR) 80 MG tablet, Take 1 tablet (80 mg total) by mouth once daily., Disp: 30 tablet, Rfl: 0    brexpiprazole (REXULTI) 4 mg Tab, Take 4 mg by mouth Daily., Disp: , Rfl:     butalbital-acetaminophen-caffeine -40 mg (FIORICET, ESGIC) -40 mg per tablet, Take 1 tablet by mouth as needed for migraine. No more than 10 tablets per month., Disp: 10 tablet, Rfl: 0    clopidogreL (PLAVIX) 75 mg tablet, Take 1 tablet (75 mg total) by mouth once daily., Disp: 30 tablet, Rfl: 11    cyanocobalamin 1,000 mcg/mL injection, 1 ml sq daily x 5 days, then 1 ml sq weekly x 4 weeks then q monthly -  will need to go to PCP, Disp: 9 mL, Rfl: 0    EPINEPHrine (EPIPEN) 0.3 mg/0.3 mL AtIn, Inject 0.3 mLs (0.3 mg total) into the muscle as needed., Disp: 1 each, Rfl: 0    fluticasone-umeclidin-vilanter (TRELEGY ELLIPTA) 200-62.5-25 mcg inhaler, Inhale 1 puff into the lungs once daily., Disp: 180 each, Rfl: 5    fremanezumab-vfrm (AJOVY AUTOINJECTOR) 225 mg/1.5 mL autoinjector, Inject 1.5 mLs (225 mg total) into the skin every 28 days., Disp: 1 each, Rfl: 11    lamoTRIgine (LAMICTAL) 200 MG tablet, Take 200 mg by mouth once daily., Disp: , Rfl:     nicotine polacrilex (NICORETTE) 4 MG Gum, Take up to 6 pieces daily as needed, Disp: 100 each, Rfl: 0    pantoprazole (PROTONIX) 40 MG tablet, Take 1 tablet (40 mg total) by mouth once daily., Disp: 90 tablet, Rfl: 3    pregabalin (LYRICA) 50 MG capsule, Take 1 capsule (50 mg total) by mouth 3 (three) times daily., Disp: 270 capsule, Rfl: 0    promethazine (PHENERGAN) 25 MG tablet, Take 1 tablet (25 mg total) by mouth every 8 (eight) hours  as needed for Nausea., Disp: 20 tablet, Rfl: 1    tadalafiL (CIALIS) 20 MG Tab, Take 1 tablet (20 mg total) by mouth once daily., Disp: 10 tablet, Rfl: 11    topiramate (TOPAMAX) 200 MG Tab, Take 1 tablet (200 mg total) by mouth every evening., Disp: 90 tablet, Rfl: 3    ubrogepant (UBRELVY) 100 mg tablet, Take 1 tablet by mouth as needed for migraine. May repeat in 2 hours if needed. Max 2 tab per day, Disp: 8 tablet, Rfl: 11    nitroGLYCERIN (NITROSTAT) 0.4 MG SL tablet, Place 1 tablet (0.4 mg total) under the tongue every 5 (five) minutes as needed for Chest pain., Disp: 30 tablet, Rfl: 0    ranolazine (RANEXA) 500 MG Tb12, Take 1 tablet (500 mg total) by mouth 2 (two) times daily., Disp: 180 tablet, Rfl: 3    traZODone (DESYREL) 50 MG tablet, Take 50 mg by mouth every evening., Disp: , Rfl:     zolpidem (AMBIEN) 10 mg Tab, Take 5 mg by mouth nightly as needed., Disp: , Rfl:     Current Facility-Administered Medications:     cyanocobalamin injection 1,000 mcg, 1,000 mcg, Intramuscular, Q14 Days, Ana Mayer MD, 1,000 mcg at 01/02/25 0842    cyanocobalamin injection 1,000 mcg, 1,000 mcg, Intramuscular, Q14 Days,     Facility-Administered Medications Ordered in Other Visits:     diphenhydrAMINE injection 12.5 mg, 12.5 mg, Intravenous, Q6H PRN, Irvin Frank MD    electrolyte-S (ISOLYTE), , Intravenous, Continuous, Irvin Frank MD    lactated ringers infusion, , Intravenous, Continuous, Napoleon Grimaldo MD    lactated ringers infusion, , Intravenous, Continuous, Napoleon Grimaldo MD, Last Rate: 25 mL/hr at 08/25/23 0948, New Bag at 08/25/23 0948    lactated ringers infusion, , Intravenous, Continuous, Napoleon Grimaldo MD, Stopped at 10/31/23 1255    lactated ringers infusion, , Intravenous, Continuous, Irvin Frank MD, Last Rate: 10 mL/hr at 11/17/23 0745, New Bag at 11/17/23 0745    LIDOcaine (PF) 10 mg/ml (1%) injection 10 mg, 1 mL, Intradermal, Once, Napoleon Grimaldo MD    LIDOcaine (PF) 10 mg/ml (1%)  "injection 10 mg, 1 mL, Intradermal, Once, Napoleon Grimaldo MD    LIDOcaine (PF) 10 mg/ml (1%) injection 10 mg, 1 mL, Intradermal, Once, Napoleon Grimaldo MD    LIDOcaine (PF) 10 mg/ml (1%) injection 10 mg, 1 mL, Intradermal, Once, Irvin Frank MD     Review of Systems:  Review of systems is negative except for the symptoms mentioned in HPI    Physical exam:    /67 (BP Location: Right arm, Patient Position: Sitting)   Pulse 75   Ht 5' 10" (1.778 m)   Wt 80.1 kg (176 lb 9.4 oz)   BMI 25.34 kg/m²     General: Well-developed, well-groomed. No apparent distress  HENT: Normocephalic, atraumatic.    Cardiovascular: Regular rate and rhythm  Chest: No audible wheezes, stridor, ronchi appreciated.  Musculoskeletal: No peripheral edema     Neurologic Exam: The patient is awake, alert and oriented to person, place, time and situation. Attentive, vigilant during exam. Language is fluent. Naming & repetition intact, +2-step commands.  Fund of knowledge is appropriate. Well organized thoughts.     Cranial nerves:   CN II: Visual fields are full to confrontation. Pupils are 4 mm and briskly reactive to light.  CN III, IV, VI: EOMI, no nystagmus, no ptosis  CN V: Facial sensation is intact in all 3 divisions bilaterally.  CN VII: Face is symmetric with normal eye closure and smile.  CN VIII: Hearing is normal bilaterally  CN IX, X: Palate elevates symmetrically. Phonation is normal.  CN XI: Head turning and shoulder shrug are intact  CN XII: Tongue is midline with normal movements and no atrophy.     Motor examination of all extremities demonstrates normal bulk and tone in all four limbs. There are no atrophy or fasciculations.        Left Right     Left Right   Deltoid 5/5 5/5   Hip Flexion 5/5 5/5   Biceps 5/5 5/5   Hip Extension 5/5 5/5   Triceps 5/5 5/5   Knee Flexion 5/5 5/5   Wrist Ext 5/5 5/5   Knee Extension 5/5 5/5   Finger Abd 5/5 5/5   Ankle dorsiflex 5/5 5/5           Ankle plantar flex 5/5 5/5     Sensory " examination is normal light touch in BUE and BLE.  Romberg is negative.  Deep tendon reflexes No clonus. 2+ Bl     Gait: Posture is normal. Gait is steady with normal steps, base, arm swing, and turning. Tandem gait is normal.      Coordination:  Rapid alternating movements and fine finger movements are intact.         Relevant Labwork:  Recent Labs   Lab 12/26/24  0438   Hemoglobin A1C 5.3   LDL Calculated 40   HDL 47   Triglycerides 47   Cholesterol 96       Diagnostic Results:  Imaging was independently reviewed by myself, along with the associated radiology report    Brain Imaging   MRI of the brain OSH 12/26/2024:  Per OSH radiology read  No acute intracranial abnormalities  Right occipital encephalomalacia likely related to remote infarct.  Several additional remote lacunar infarcts in the basal ganglia, right joseph and right cerebellum  Vessel Imaging   CTA head and neck OSH 12/25/2024:  Per OSH radiology read   No large vessel occlusion, critical stenosis or aneurysm identified  Cardiac Imaging     Assessment:  1. H/O ischemic multifocal multiple vascular territories stroke  CTA Head and Neck (xpd)      2. Other cerebral infarction  MRI Brain Without Contrast      3. Hemiplegia and hemiparesis following cerebral infarction affecting left non-dominant side        4. Hyperlipidemia LDL goal <70        5. History of carotid stenosis          Ryan Crane  is a 59 y.o.  male  seen today in clinic for follow-up assessment and recommendations. The following recommendations and plan were discussed in depth with the patient who voiced understanding and was in agreement.  Plan:  History of multi focal multi vascular territory strokes  -OSH imaging unavailable for review at time of visit  --In-depth discussion with patient regarding diagnosis ,imaging findings per radiology read  & stroke risk factors  -No current clinical indication for anticoagulation at this time.   -Plan for aggressive risk factor  modification and maximum medical management  -- Antithrombotics/ Anticoagulation: Aspirin 81 mg daily,  indefinitely   - Stroke Risk Factors:  PIPER, HLD, tobacco dependency  - Lipid Management: Target is LDL < 70mg/dL. Continue atorvastatin 40 mg daily. Monitoring for liver dysfunction and myopathy is suggested for statins. To be addressed by PCP  -Hypertension: Long term goal is normotension w/ target BP of less than 130/80 mmHg.  Continue home medications and follow up with PCP for surveillance and chronic management  - Rehab efforts:  None  - Diagnostics ordered/pending:  Patient with a history of multifocal multi vascular territory strokes over the last several years with ongoing episodes of transient neurologic symptoms.  Unable to obtain OSH records to assess for underlying etiology.  Plan for MRI of the brain to assess brain parenchyma.  Plan for CTA head and neck to assess intracranial vasculature  Tobacco dependency: Counseled on smoking cessation.  Tobacco dependency will increase your future stroke risk and elevated your BP.  Patient encouraged to continue working towards cessation    History of carotid stenosis s/p CEA  Continue to follow up outpatient with vascular surgery for surveillance and chronic management     Patient/Family teaching provided during this visit  -Identifying the signs and symptoms of stroke; emergency action and ER attention  -Risk factor control  -Optimization for secondary stroke prevention including compliance with current medications   -We discussed the need to optimize lifestyle choices   -heart healthy diet (Mediterranean,MIND  or dash) to include a variety of brain friendly foods to help optimize cognitive health and longevity  -increased cardiovascular exercise; goal of 150 minutes of moderate-intense per week  -Discussed that medication/lifestyle modifications are preventative,and nothing is absolute.     Questions and concerns were answered to the patient's stated verbal  satisfaction. The patient verbalizes understanding and agreement with the above stated treatment plan.      I will plan on having Ryan Crane return to clinic once we have MRI and CTA. The patient can contact my office with any questions or concerns they may have as they arise in the interim.       Collaborating Physician, Dr Harrison, was available during today's encounter. Any change to plan along with cosign to appear in the EMR    ALVARO Garcia  Department of Vascular Neurology  [facility]  [phone]    This note is dictated on M*Modal Fluency Direct word recognition program. There are word recognition mistakes that are occasionally missed on review

## 2025-01-16 ENCOUNTER — CLINICAL SUPPORT (OUTPATIENT)
Dept: FAMILY MEDICINE | Facility: CLINIC | Age: 60
End: 2025-01-16
Payer: MEDICARE

## 2025-01-16 DIAGNOSIS — E53.8 VITAMIN B 12 DEFICIENCY: Primary | ICD-10-CM

## 2025-01-16 PROCEDURE — 96372 THER/PROPH/DIAG INJ SC/IM: CPT | Mod: S$GLB,,, | Performed by: STUDENT IN AN ORGANIZED HEALTH CARE EDUCATION/TRAINING PROGRAM

## 2025-01-16 RX ADMIN — CYANOCOBALAMIN 1000 MCG: 1000 INJECTION, SOLUTION INTRAMUSCULAR; SUBCUTANEOUS at 09:01

## 2025-01-22 ENCOUNTER — CLINICAL SUPPORT (OUTPATIENT)
Dept: SMOKING CESSATION | Facility: CLINIC | Age: 60
End: 2025-01-22
Payer: COMMERCIAL

## 2025-01-22 DIAGNOSIS — F17.200 NICOTINE DEPENDENCE: Primary | ICD-10-CM

## 2025-01-22 PROCEDURE — 99999 PR PBB SHADOW E&M-EST. PATIENT-LVL III: CPT | Mod: PBBFAC,,,

## 2025-01-22 PROCEDURE — 99402 PREV MED CNSL INDIV APPRX 30: CPT | Mod: S$GLB,,, | Performed by: GENERAL PRACTICE

## 2025-01-22 RX ORDER — DIPHENHYDRAMINE HCL 25 MG
CAPSULE ORAL
Qty: 100 EACH | Refills: 0 | Status: SHIPPED | OUTPATIENT
Start: 2025-01-22

## 2025-01-22 NOTE — PROGRESS NOTES
Individual Follow-Up Form    1/22/2025    Quit Date: 10/2/24    Clinical Status of Patient: Outpatient    Length of Service: 30 minutes    Continuing Medication: yes  Nicotine gum    Other Medications:      Target Symptoms: Withdrawal and medication side effects. The following were  rated moderate (3) to severe (4) on TCRS:  Moderate (3): none  Severe (4): none    Comments: Patient remains tobacco free and using the gum with no negative side effects at this time. Patient needs refill of the gum and we discussed session 2 material including health as he is having surgery next week. Patient has a follow up in 3 weeks.    Diagnosis: F17.200    Next Visit: 2 weeks

## 2025-01-22 NOTE — Clinical Note
Patient remains tobacco free and using the gum with no negative side effects at this time. Patient needs refill of the gum and we discussed session 2 material including health as he is having surgery next week. Patient has a follow up in 3 weeks.

## 2025-01-24 ENCOUNTER — EXTERNAL HOME HEALTH (OUTPATIENT)
Dept: HOME HEALTH SERVICES | Facility: HOSPITAL | Age: 60
End: 2025-01-24
Payer: MEDICARE

## 2025-01-25 ENCOUNTER — CLINICAL SUPPORT (OUTPATIENT)
Dept: CARDIOLOGY | Facility: HOSPITAL | Age: 60
End: 2025-01-25

## 2025-01-25 ENCOUNTER — HOSPITAL ENCOUNTER (OUTPATIENT)
Dept: CARDIOLOGY | Facility: HOSPITAL | Age: 60
Discharge: HOME | End: 2025-01-25
Attending: INTERNAL MEDICINE
Payer: MEDICARE

## 2025-01-25 DIAGNOSIS — I49.8 OTHER SPECIFIED CARDIAC ARRHYTHMIAS: ICD-10-CM

## 2025-01-25 PROCEDURE — 93298 REM INTERROG DEV EVAL SCRMS: CPT | Mod: HCNC,PO | Performed by: INTERNAL MEDICINE

## 2025-01-25 PROCEDURE — 93298 REM INTERROG DEV EVAL SCRMS: CPT | Mod: 26,HCNC,, | Performed by: INTERNAL MEDICINE

## 2025-01-28 ENCOUNTER — HOSPITAL ENCOUNTER (OUTPATIENT)
Dept: RADIOLOGY | Facility: HOSPITAL | Age: 60
Discharge: HOME | End: 2025-01-28
Attending: NURSE PRACTITIONER
Payer: MEDICARE

## 2025-01-28 ENCOUNTER — OFFICE VISIT (OUTPATIENT)
Dept: FAMILY MEDICINE | Facility: CLINIC | Age: 60
End: 2025-01-28
Payer: MEDICARE

## 2025-01-28 ENCOUNTER — TELEPHONE (OUTPATIENT)
Dept: SMOKING CESSATION | Facility: CLINIC | Age: 60
End: 2025-01-28
Payer: MEDICARE

## 2025-01-28 VITALS
BODY MASS INDEX: 25 KG/M2 | WEIGHT: 174.63 LBS | HEIGHT: 70 IN | OXYGEN SATURATION: 98 % | SYSTOLIC BLOOD PRESSURE: 122 MMHG | DIASTOLIC BLOOD PRESSURE: 64 MMHG | HEART RATE: 86 BPM

## 2025-01-28 DIAGNOSIS — M25.512 CHRONIC LEFT SHOULDER PAIN: ICD-10-CM

## 2025-01-28 DIAGNOSIS — I69.354 HEMIPLEGIA AND HEMIPARESIS FOLLOWING CEREBRAL INFARCTION AFFECTING LEFT NON-DOMINANT SIDE: Primary | ICD-10-CM

## 2025-01-28 DIAGNOSIS — G89.29 CHRONIC LEFT SHOULDER PAIN: ICD-10-CM

## 2025-01-28 DIAGNOSIS — I10 ESSENTIAL HYPERTENSION: ICD-10-CM

## 2025-01-28 DIAGNOSIS — J44.9 STAGE 2 MODERATE COPD BY GOLD CLASSIFICATION: ICD-10-CM

## 2025-01-28 DIAGNOSIS — Z86.73 PERSONAL HISTORY OF TRANSIENT CEREBRAL ISCHEMIA: Primary | ICD-10-CM

## 2025-01-28 DIAGNOSIS — R42 VERTIGO: ICD-10-CM

## 2025-01-28 DIAGNOSIS — D33.9: ICD-10-CM

## 2025-01-28 DIAGNOSIS — Z09 HOSPITAL DISCHARGE FOLLOW-UP: ICD-10-CM

## 2025-01-28 DIAGNOSIS — I35.0 MODERATE TO SEVERE AORTIC STENOSIS: Chronic | ICD-10-CM

## 2025-01-28 DIAGNOSIS — F20.9 CHRONIC SCHIZOPHRENIA: ICD-10-CM

## 2025-01-28 PROCEDURE — 1160F RVW MEDS BY RX/DR IN RCRD: CPT | Mod: CPTII,S$GLB,, | Performed by: NURSE PRACTITIONER

## 2025-01-28 PROCEDURE — 3078F DIAST BP <80 MM HG: CPT | Mod: CPTII,S$GLB,, | Performed by: NURSE PRACTITIONER

## 2025-01-28 PROCEDURE — 1159F MED LIST DOCD IN RCRD: CPT | Mod: CPTII,S$GLB,, | Performed by: NURSE PRACTITIONER

## 2025-01-28 PROCEDURE — 73030 X-RAY EXAM OF SHOULDER: CPT | Mod: TC,PN,LT

## 2025-01-28 PROCEDURE — 99214 OFFICE O/P EST MOD 30 MIN: CPT | Mod: S$GLB,,, | Performed by: NURSE PRACTITIONER

## 2025-01-28 PROCEDURE — 3008F BODY MASS INDEX DOCD: CPT | Mod: CPTII,S$GLB,, | Performed by: NURSE PRACTITIONER

## 2025-01-28 PROCEDURE — 3074F SYST BP LT 130 MM HG: CPT | Mod: CPTII,S$GLB,, | Performed by: NURSE PRACTITIONER

## 2025-01-28 PROCEDURE — 73030 X-RAY EXAM OF SHOULDER: CPT | Mod: 26,LT,, | Performed by: RADIOLOGY

## 2025-01-28 PROCEDURE — 99999 PR PBB SHADOW E&M-EST. PATIENT-LVL V: CPT | Mod: PBBFAC,,, | Performed by: NURSE PRACTITIONER

## 2025-01-28 PROCEDURE — 3072F LOW RISK FOR RETINOPATHY: CPT | Mod: CPTII,S$GLB,, | Performed by: NURSE PRACTITIONER

## 2025-01-28 NOTE — PROGRESS NOTES
Left shoulder x-ray reviewed.  No evidence of fracture or dislocation.  Evidence of mild arthritic findings noted on exam.  Consider physical therapy if patient continues to have shoulder pain.  Continue with symptomatic treatment as discussed during clinic appointment.  Please inform patient of results and recommendations.

## 2025-01-28 NOTE — PROGRESS NOTES
THIS DOCUMENT WAS MADE IN PART WITH VOICE RECOGNITION SOFTWARE.  OCCASIONALLY THIS SOFTWARE WILL MISINTERPRET WORDS OR PHRASES.     Assessment and Plan:    Hemiplegia and hemiparesis following cerebral infarction affecting left non-dominant side  Comments:  Stable  Keep follow up  with neurology    Vertigo    Hospital discharge follow-up    Chronic left shoulder pain  Comments:  Symptomatic treatment discussed  Continue Lyrica, may try diclofenac gel  Warm compresses  Consider PT  Orders:  -     X-Ray Shoulder 2 or More Views Left; Future; Expected date: 01/28/2025    Essential hypertension  Comments:  Controlled without medication    Moderate to severe aortic stenosis  Comments:  Keep follow up Cardiology    Benign CNS tumor  Comments:  Stable  Monitor by Neurology    Chronic schizophrenia  Comments:  Stable  Continue regimen  Keep follow up with Psychiatry    Stage 2 moderate COPD by GOLD classification  Comments:  Stable  Denies exacerbations  Uses trilogy  Continue to monitor             Visit summary:  Hospital course and diagnostic studies reviewed in detail with patient during today's visit.    Patient advised to keep follow up with specialists.    Emergent conditions/ER precautions discussed.        Follow up in about 15 days (around 2/12/2025) for Follow-up with PCP- Dr. Mayer- previously scheduled.   ______________________________________________________________________  Subjective:    Chief Complaint:  Hospital follow up    HPI:  Ryan is a 59 y.o. year old male with a PMH of CAD, COPD, tobacco abuse, HTN, CVA, schizophrenia, and bipolar 1  here for hospital follow up.  Most recently patient was admitted to [facility] on December 25th after presenting to the ER complaining of dizziness and frequent falls.     He continues to follow w/ Cardiology and Neurology as an outpatient.  Patient reports residual left-sided weakness (baseline beyond recent hospitalizations)  but overall no new focal neurologic  symptoms.     He reports that he is currently not taking Plavix- scheduled to have valve replacement next Monday at [place].     Patient currently complaining of episode of aching pain and stiffness to left shoulder.  He takes Lyrica which helps with pain.  He reports episodes of pain becoming more frequent.     Hospital course reviewed below:         Discharge diagnosis:     Dizziness with frequent falls/possible BPPV  - frequent admissions for the same complaint in the past 6 months. All imaging acutely negative.   -All BP medications stopped due to relative hypotension thought to be causing persistent dizziness.   -He is taking plavix, asa, for stroke prevention, will resume  -He had an ILR placed and follows with his cardiologist  -He did see ENT for suspected vertigo and was told that he needs hearing aids.   -Follows with [facility] neurology outpatient who has referred him to vascular neurology for his recurrent bernard/symptoms  -All imaging again acutely negative on this admission  -CT brain without contrast on 12/25/2024 = no acute adverse findings  - CT angiogram head and neck with contrast on 12/25/2024 =No large vessel occlusion, critical stenosis or aneurysm identified   - MRI brain without contrast on 12/26/2024 = no acute intracranial abnormality.Right occipital encephalomalacia likely related to remote infarct.  Several additional remote lacunar infarcts of the basal ganglia, right joseph, and right cerebellum.  -Chest x-ray on 12/25/2024 = stable chest exam with congestive findings.  - PT OT  -Neuro consulted = follow recommendations     Severe mitral stenosis/moderate valvular aortic stenosis and regurg  - Echo on 12/26/2044 = EF 60 to 65%.  Dense thickening and calcification of entire mitral apparatus.  Aortic valve calcified.  Moderate aortic regurg.  Moderate valvular aortic stenosis.  A vegetation on the mitral valve cannot be excluded.  Severe mitral stenosis.  Right ventricular is mildly  dilated.  Left atrium is severely dilated.  Mild mitral regurg.  Mild tricuspid regurg  - LUIS MIGUEL on 12/28/2024 per cardiology = no evidence of valvular vegetation. Severe mitral annular calcification, severe subvalvular mitral valve calcification, moderate mitral stenosis, moderate aortic stenosis, moderate aortic regurgitation.    -Patient is in the process of getting a possible valve replacement in January 2025 with Dr. Pascual  -Cardiology consulted = follow recommendation     Reactive leukocytosis  - POA  - Afebrile  - Monitor leukocytosis = resolved  - Urinalysis on 12/26/2024 = within normal limits.  Negative for UTI  - No overt sign of infection identified     Coronary artery disease  - Stable.  Patient denies chest pain  - Continue aspirin Plavix and statin     Essential hypertension  - Monitor blood pressure = stable range without BP medications  - His BP medications have been stopped due to relative hypotension thought to be causing persistent dizziness issues  - Adjust BP medication as needed     Hyperlipidemia  - Statin daily     History of CVA  - Continue antiplatelets and statins     History of right carotid stenosis  - Status post CEA on 6/2024     COPD, without exacerbation  - Resume home Trelegy  - Cover with nebs as needed     Bipolar disorder/schizophrenia  - Stable  - Resume home medications     Tobacco abuse  - Patient reports he recently quit smoking  - Abstinence reinforced     Discharge home with home health  Diet = cardiac  Activity ad anusha.  Follow-up with cardiology, CV surgery and PCP x 1 week  Total discharge time = 35 minutes              Medications:  Current Outpatient Medications on File Prior to Visit   Medication Sig Dispense Refill    albuterol (PROVENTIL/VENTOLIN HFA) 90 mcg/actuation inhaler Inhale 2 puffs into the lungs every 6 (six) hours as needed for Shortness of Breath or Wheezing. 54 g 5    albuterol-ipratropium (DUO-NEB) 2.5 mg-0.5 mg/3 mL nebulizer solution Take 3 mLs by  nebulization every 6 (six) hours as needed for Wheezing. Rescue 150 mL 5    ammonium lactate 12 % Crea Apply 2 g topically 2 (two) times a day. 385 g 11    aspirin 81 MG Chew Take 81 mg by mouth once daily.      atorvastatin (LIPITOR) 80 MG tablet Take 1 tablet (80 mg total) by mouth once daily. 30 tablet 0    brexpiprazole (REXULTI) 4 mg Tab Take 4 mg by mouth Daily.      butalbital-acetaminophen-caffeine -40 mg (FIORICET, ESGIC) -40 mg per tablet Take 1 tablet by mouth as needed for migraine. No more than 10 tablets per month. 10 tablet 0    clopidogreL (PLAVIX) 75 mg tablet Take 1 tablet (75 mg total) by mouth once daily. 30 tablet 11    cyanocobalamin 1,000 mcg/mL injection 1 ml sq daily x 5 days, then 1 ml sq weekly x 4 weeks then q monthly -  will need to go to PCP 9 mL 0    EPINEPHrine (EPIPEN) 0.3 mg/0.3 mL AtIn Inject 0.3 mLs (0.3 mg total) into the muscle as needed. 1 each 0    fluticasone-umeclidin-vilanter (TRELEGY ELLIPTA) 200-62.5-25 mcg inhaler Inhale 1 puff into the lungs once daily. 180 each 5    fremanezumab-vfrm (AJOVY AUTOINJECTOR) 225 mg/1.5 mL autoinjector Inject 1.5 mLs (225 mg total) into the skin every 28 days. 1 each 11    lamoTRIgine (LAMICTAL) 200 MG tablet Take 200 mg by mouth once daily.      nicotine polacrilex (NICORETTE) 4 MG Gum Take up to 6 pieces daily as needed 100 each 0    nitroGLYCERIN (NITROSTAT) 0.4 MG SL tablet Place 1 tablet (0.4 mg total) under the tongue every 5 (five) minutes as needed for Chest pain. 30 tablet 0    pantoprazole (PROTONIX) 40 MG tablet Take 1 tablet (40 mg total) by mouth once daily. 90 tablet 3    pregabalin (LYRICA) 50 MG capsule Take 1 capsule (50 mg total) by mouth 3 (three) times daily. 270 capsule 0    promethazine (PHENERGAN) 25 MG tablet Take 1 tablet (25 mg total) by mouth every 8 (eight) hours as needed for Nausea. 20 tablet 1    ubrogepant (UBRELVY) 100 mg tablet Take 1 tablet by mouth as needed for migraine. May repeat in 2 hours  if needed. Max 2 tab per day 8 tablet 11    ranolazine (RANEXA) 500 MG Tb12 Take 1 tablet (500 mg total) by mouth 2 (two) times daily. (Patient not taking: Reported on 1/28/2025) 180 tablet 3    tadalafiL (CIALIS) 20 MG Tab Take 1 tablet (20 mg total) by mouth once daily. (Patient not taking: Reported on 1/28/2025) 10 tablet 11    topiramate (TOPAMAX) 200 MG Tab Take 1 tablet (200 mg total) by mouth every evening. (Patient not taking: Reported on 1/28/2025) 90 tablet 3    [DISCONTINUED] traZODone (DESYREL) 50 MG tablet Take 50 mg by mouth every evening. (Patient not taking: Reported on 1/28/2025)      [DISCONTINUED] zolpidem (AMBIEN) 10 mg Tab Take 5 mg by mouth nightly as needed. (Patient not taking: Reported on 1/28/2025)       Current Facility-Administered Medications on File Prior to Visit   Medication Dose Route Frequency Provider Last Rate Last Admin    cyanocobalamin injection 1,000 mcg  1,000 mcg Intramuscular Q14 Days Ana Mayer MD   1,000 mcg at 01/16/25 0904    cyanocobalamin injection 1,000 mcg  1,000 mcg Intramuscular Q14 Days         diphenhydrAMINE injection 12.5 mg  12.5 mg Intravenous Q6H PRN Irvin Frank MD        electrolyte-S (ISOLYTE)   Intravenous Continuous Irvin Frank MD        lactated ringers infusion   Intravenous Continuous Napoleon Grimaldo MD        lactated ringers infusion   Intravenous Continuous Napoleon Grimaldo MD 25 mL/hr at 08/25/23 0948 New Bag at 08/25/23 0948    lactated ringers infusion   Intravenous Continuous Napoleon Grimaldo MD   Stopped at 10/31/23 1255    lactated ringers infusion   Intravenous Continuous Irvin Frank MD 10 mL/hr at 11/17/23 0745 New Bag at 11/17/23 0745    LIDOcaine (PF) 10 mg/ml (1%) injection 10 mg  1 mL Intradermal Once Napoleon Grimaldo MD        LIDOcaine (PF) 10 mg/ml (1%) injection 10 mg  1 mL Intradermal Once Napoleon Grimaldo MD        LIDOcaine (PF) 10 mg/ml (1%) injection 10 mg  1 mL Intradermal Once Napoleon Grimaldo MD        LIDOcaine (PF) 10  mg/ml (1%) injection 10 mg  1 mL Intradermal Once Irvin Frank MD           Review of Systems:  Review of Systems   Constitutional:  Negative for chills, fatigue and fever.   HENT:  Negative for congestion and rhinorrhea.    Respiratory:  Negative for cough and shortness of breath.    Cardiovascular:  Negative for chest pain, palpitations and leg swelling.   Gastrointestinal:  Negative for diarrhea, nausea and vomiting.   Musculoskeletal:  Positive for arthralgias. Negative for gait problem.   Skin:  Negative for rash.   Neurological:  Positive for weakness. Negative for dizziness, light-headedness and headaches.       Past Medical History:  Past Medical History:   Diagnosis Date    Aortic valve stenosis 02/28/2022    Arachnoid cyst of posterior cranial fossa 01/13/2022    Benign prostatic hyperplasia without lower urinary tract symptoms 05/25/2023    Bilateral carotid bruits 06/22/2021    Bipolar disorder 01/16/2022    CAD S/P percutaneous coronary angioplasty     3 vessel disease    Calculus of gallbladder without cholecystitis without obstruction 01/11/2023    Cancer     Candidal intertrigo 11/11/2019    Cataract     Chronic heart failure with preserved ejection fraction 03/29/2023    Chronic schizophrenia     Colon polyps     Diabetic polyneuropathy associated with diabetes mellitus due to underlying condition 11/21/2019    Dysarthria as late effect of cerebellar cerebrovascular accident (CVA) 03/24/2023    Equinus deformity of both feet 11/21/2019    Erectile dysfunction due to arterial insufficiency 05/25/2023    Flaccid hemiplegia of left nondominant side as late effect of cerebral infarction 03/17/2023    Flat foot 11/21/2019    Gastritis     Gastroesophageal reflux disease without esophagitis 07/13/2023    Gastrointestinal hemorrhage 12/13/2019    Post polypectomy bleeding    General anesthetics causing adverse effect in therapeutic use     Hammer toes of both feet 11/21/2019    Hx of diabetic foot  "ulcer 11/21/2019    Hypertension     Hyponatremia 01/11/2022    ELAINE (iron deficiency anemia) 12/10/2019    Intractable chronic migraine without aura and without status migrainosus 12/30/2021    Microcytic anemia 10/09/2019    Moderate aortic regurgitation 03/29/2023    Moderate mitral regurgitation 03/29/2023    Moderate mitral stenosis 03/29/2023    Moderate to severe aortic stenosis 02/28/2022    Myocardial infarct, old     Nicotine dependence, unspecified, uncomplicated 01/16/2022    Onychomycosis due to dermatophyte 11/21/2019    Orchitis 11/09/2019    PIPER on CPAP     Peripheral visual field defect, bilateral 01/13/2022    PVD (peripheral vascular disease) 11/21/2019    Seizures 2008    Stage 2 moderate COPD by GOLD classification     PFTs w/ FEV1 (73%) (9/2022)    Stroke 2005    Thoracic aortic atherosclerosis 10/17/2022    CT chest    Thyroid disease     Previously on pharmacologic Tx    Tinea pedis of right foot 05/21/2019    Tobacco use disorder     Type 2 diabetes mellitus with diabetic peripheral angiopathy without gangrene     A1c 5.1% (3/2023)    Vitamin B12 deficiency 03/24/2023       Objective:    Vitals:  Vitals:    01/28/25 1343   BP: 122/64   Pulse: 86   SpO2: 98%   Weight: 79.2 kg (174 lb 9.7 oz)   Height: 5' 10" (1.778 m)   PainSc: 0-No pain       Physical Exam  Vitals and nursing note reviewed.   Constitutional:       General: He is not in acute distress.  HENT:      Head: Normocephalic and atraumatic.   Eyes:      General: No scleral icterus.     Conjunctiva/sclera: Conjunctivae normal.   Cardiovascular:      Rate and Rhythm: Normal rate and regular rhythm.   Pulmonary:      Effort: Pulmonary effort is normal. No respiratory distress.      Breath sounds: Normal breath sounds.   Musculoskeletal:      Left shoulder: Tenderness present. No bony tenderness. Normal range of motion.      Right lower leg: No edema.      Left lower leg: No edema.   Skin:     General: Skin is warm and dry. "   Neurological:      Mental Status: He is alert and oriented to person, place, and time.   Psychiatric:         Mood and Affect: Mood normal.         Behavior: Behavior normal.         Thought Content: Thought content normal.         Data:  CMP  Sodium   Date Value Ref Range Status   12/28/2024 140 136 - 144 mmol/L Final   10/09/2024 141 136 - 145 mmol/L Final     Potassium   Date Value Ref Range Status   12/28/2024 4.3 3.6 - 5.1 mmol/L Final   10/09/2024 3.9 3.5 - 5.1 mmol/L Final     Chloride   Date Value Ref Range Status   10/09/2024 107 95 - 110 mmol/L Final     CO2   Date Value Ref Range Status   12/28/2024 24 22 - 32 mmol/L Final   10/09/2024 23 23 - 29 mmol/L Final     Glucose   Date Value Ref Range Status   10/09/2024 91 70 - 110 mg/dL Final     BUN   Date Value Ref Range Status   10/09/2024 8 6 - 20 mg/dL Final     Blood Urea Nitrogen   Date Value Ref Range Status   12/28/2024 10 8 - 20 mg/dL Final     Creatinine   Date Value Ref Range Status   12/28/2024 0.9 0.90 - 1.30 mg/dL Final   10/09/2024 0.8 0.5 - 1.4 mg/dL Final     Calcium   Date Value Ref Range Status   12/28/2024 8.7 (L) 8.9 - 10.3 mg/dL Final   10/09/2024 9.0 8.7 - 10.5 mg/dL Final     Total Protein   Date Value Ref Range Status   12/28/2024 6 (L) 6.1 - 7.9 g/dL Final   10/09/2024 7.1 6.0 - 8.4 g/dL Final     Albumin   Date Value Ref Range Status   12/28/2024 3.5 3.5 - 4.8 g/dL Final   10/09/2024 3.7 3.5 - 5.2 g/dL Final     Total Bilirubin   Date Value Ref Range Status   12/28/2024 0.4 0.4 - 2.0 mg/dL Final   10/09/2024 0.6 0.1 - 1.0 mg/dL Final     Comment:     For infants and newborns, interpretation of results should be based  on gestational age, weight and in agreement with clinical  observations.    Premature Infant recommended reference ranges:  Up to 24 hours.............<8.0 mg/dL  Up to 48 hours............<12.0 mg/dL  3-5 days..................<15.0 mg/dL  6-29 days.................<15.0 mg/dL       Alkaline Phosphatase   Date Value  Ref Range Status   12/28/2024 80 28 - 116 U/L Final   10/09/2024 101 55 - 135 U/L Final     AST   Date Value Ref Range Status   12/28/2024 18 12 - 40 U/L Final   10/09/2024 19 10 - 40 U/L Final     ALT   Date Value Ref Range Status   12/28/2024 10 5 - 56 U/L Final   10/09/2024 9 (L) 10 - 44 U/L Final     Anion Gap   Date Value Ref Range Status   12/28/2024 5 (L) 7 - 16 mmol/L Final   10/09/2024 11 8 - 16 mmol/L Final     eGFR   Date Value Ref Range Status   10/09/2024 >60.0 >60 mL/min/1.73 m^2 Final     Medical history reviewed, Medications reconciled.              Donna Pruitt FNP-C  Family Medicine

## 2025-01-28 NOTE — TELEPHONE ENCOUNTER
I called patient as I received message to call but he did not need anything. Patient has a follow up in 2 weeks.

## 2025-01-29 ENCOUNTER — TELEPHONE (OUTPATIENT)
Dept: CARDIOLOGY | Facility: HOSPITAL | Age: 60
End: 2025-01-29
Payer: MEDICARE

## 2025-01-29 ENCOUNTER — HOSPITAL ENCOUNTER (OUTPATIENT)
Dept: RADIOLOGY | Facility: HOSPITAL | Age: 60
Discharge: HOME | End: 2025-01-29
Attending: NURSE PRACTITIONER
Payer: MEDICARE

## 2025-01-29 ENCOUNTER — TELEPHONE (OUTPATIENT)
Dept: PODIATRY | Facility: CLINIC | Age: 60
End: 2025-01-29
Payer: MEDICARE

## 2025-01-29 DIAGNOSIS — Z86.73 H/O ISCHEMIC MULTIFOCAL MULTIPLE VASCULAR TERRITORIES STROKE: ICD-10-CM

## 2025-01-29 DIAGNOSIS — I63.89 OTHER CEREBRAL INFARCTION: ICD-10-CM

## 2025-01-29 PROCEDURE — 70551 MRI BRAIN STEM W/O DYE: CPT | Mod: TC,PO

## 2025-01-29 PROCEDURE — 70498 CT ANGIOGRAPHY NECK: CPT | Mod: TC,PO

## 2025-01-29 PROCEDURE — 25500020 PHARM REV CODE 255: Mod: PO | Performed by: NURSE PRACTITIONER

## 2025-01-29 RX ADMIN — IOHEXOL 100 ML: 350 INJECTION, SOLUTION INTRAVENOUS at 11:01

## 2025-01-29 NOTE — TELEPHONE ENCOUNTER
Spoke with pt and let pt know I would be calling to schedule his nail care appointment when the new provider starts. Pt verbalized understanding.

## 2025-01-29 NOTE — TELEPHONE ENCOUNTER
----- Message from Nurse Collins sent at 1/29/2025  2:42 PM CST -----  Contact: self    ----- Message -----  From: Ana Musa  Sent: 1/29/2025   2:27 PM CST  To: Manoj White Staff    Type: Needs Medical Advice  Who Called:  the patient     Best Call Back Number: [phone]  Additional Information: pt had an mri and office needs to check his loop recorder

## 2025-01-29 NOTE — TELEPHONE ENCOUNTER
----- Message from Lorene sent at 1/29/2025  2:51 PM CST -----  Type:  Sooner Appointment Request    Caller is requesting a sooner appointment.  Caller declined first available appointment listed below.  Caller will not accept being placed on the waitlist and is requesting a message be sent to doctor.    Name of Caller:  pt   When is the first available appointment?  March 12   Symptoms:  nail f/gonzalez dr rivera   Would the patient rather a call back or a response via [facility]? Call   Best Call Back Number:  [phone]      Additional Information:  please advise

## 2025-01-30 ENCOUNTER — DOCUMENT SCAN (OUTPATIENT)
Dept: HOME HEALTH SERVICES | Facility: HOSPITAL | Age: 60
End: 2025-01-30
Payer: MEDICARE

## 2025-01-30 ENCOUNTER — CLINICAL SUPPORT (OUTPATIENT)
Dept: FAMILY MEDICINE | Facility: CLINIC | Age: 60
End: 2025-01-30
Payer: MEDICARE

## 2025-01-30 DIAGNOSIS — E53.8 B12 DEFICIENCY: Primary | ICD-10-CM

## 2025-01-30 PROCEDURE — 96372 THER/PROPH/DIAG INJ SC/IM: CPT | Mod: HCNC,S$GLB,, | Performed by: STUDENT IN AN ORGANIZED HEALTH CARE EDUCATION/TRAINING PROGRAM

## 2025-01-30 RX ADMIN — CYANOCOBALAMIN 1000 MCG: 1000 INJECTION, SOLUTION INTRAMUSCULAR; SUBCUTANEOUS at 08:01

## 2025-01-31 ENCOUNTER — TELEPHONE (OUTPATIENT)
Dept: FAMILY MEDICINE | Facility: CLINIC | Age: 60
End: 2025-01-31
Payer: MEDICARE

## 2025-01-31 NOTE — TELEPHONE ENCOUNTER
Spoke with patient and went over test results about shoulder and that patient is having open heart surgery on monday and will to reschedule PT

## 2025-02-03 ENCOUNTER — TELEPHONE (OUTPATIENT)
Dept: FAMILY MEDICINE | Facility: CLINIC | Age: 60
End: 2025-02-03

## 2025-02-03 ENCOUNTER — DOCUMENT SCAN (OUTPATIENT)
Dept: HOME HEALTH SERVICES | Facility: HOSPITAL | Age: 60
End: 2025-02-03
Payer: COMMERCIAL

## 2025-02-03 ENCOUNTER — TELEPHONE (OUTPATIENT)
Dept: PODIATRY | Facility: CLINIC | Age: 60
End: 2025-02-03
Payer: COMMERCIAL

## 2025-02-03 DIAGNOSIS — G43.719 INTRACTABLE CHRONIC MIGRAINE WITHOUT AURA AND WITHOUT STATUS MIGRAINOSUS: ICD-10-CM

## 2025-02-03 RX ORDER — BUTALBITAL, ACETAMINOPHEN AND CAFFEINE 50; 325; 40 MG/1; MG/1; MG/1
TABLET ORAL
Qty: 10 TABLET | Refills: 0 | Status: SHIPPED | OUTPATIENT
Start: 2025-02-03 | End: 2025-02-26 | Stop reason: SDUPTHER

## 2025-02-03 NOTE — TELEPHONE ENCOUNTER
----- Message from Rosalina sent at 2/3/2025  8:59 AM CST -----  Contact: pt  Type:  Needs Medical Advice    Who Called: pt    Would the patient rather a call back or a response via [facility]? Call back    Best Call Back Number: [phone]    Additional Information: pt needs orders for PT for shoulder    Please call to advise    Thanks\

## 2025-02-03 NOTE — TELEPHONE ENCOUNTER
----- Message from [employer] sent at 2/3/2025 10:50 AM CST -----  Regarding: Needs sooner appt  Type:  Sooner Appointment Request    Caller is requesting a sooner appointment.  Caller declined first available appointment listed below.  Caller will not accept being placed on the waitlist and is requesting a message be sent to doctor.    Name of Caller:  Pt  When is the first available appointment?  March 12  Symptoms:  diabetic nail care  Would the patient rather a call back or a response via [facility]? Call back  Best Call Back Number:  [phone]    Additional Information:  Pt was hoping to get a sooner appt in this office but also needs to have an appt scheduled at a time his transportation is available between 8-5 please call to [place]bin

## 2025-02-03 NOTE — TELEPHONE ENCOUNTER
Spoke with pt and let them know Dr. Gordon does not do nail care and I have him placed on list for when new provider starts to get him scheduled. Pt verbalized understanding.

## 2025-02-03 NOTE — TELEPHONE ENCOUNTER
Will reorder PT for shoulder once pt cleared following open heart surgery which he states is scheduled this week.

## 2025-02-06 DIAGNOSIS — Z98.61 CAD S/P PERCUTANEOUS CORONARY ANGIOPLASTY: Primary | Chronic | ICD-10-CM

## 2025-02-06 DIAGNOSIS — I25.10 CAD S/P PERCUTANEOUS CORONARY ANGIOPLASTY: Primary | Chronic | ICD-10-CM

## 2025-02-06 NOTE — TELEPHONE ENCOUNTER
----- Message from Danae sent at 2/6/2025 10:58 AM CST -----  Contact: self  Pt is req a call back he is not sure if this is the medicine he needs to have you do a PA for ranolazine (RANEXA) 500 MG Tb12.  Please call back [phone] and thanks     [id number] - [place] - [id number]  [place]  [id number]  [place] [id number]  Phone: [phone] Fax: [phone]

## 2025-02-07 RX ORDER — RANOLAZINE 500 MG/1
500 TABLET, EXTENDED RELEASE ORAL 2 TIMES DAILY
Qty: 180 TABLET | Refills: 3 | Status: SHIPPED | OUTPATIENT
Start: 2025-02-07 | End: 2026-02-07

## 2025-02-11 ENCOUNTER — CLINICAL SUPPORT (OUTPATIENT)
Dept: SMOKING CESSATION | Facility: CLINIC | Age: 60
End: 2025-02-11
Payer: COMMERCIAL

## 2025-02-11 DIAGNOSIS — F17.200 NICOTINE DEPENDENCE: Primary | ICD-10-CM

## 2025-02-11 PROCEDURE — 99999 PR PBB SHADOW E&M-EST. PATIENT-LVL III: CPT | Mod: PBBFAC,,,

## 2025-02-11 PROCEDURE — 99404 PREV MED CNSL INDIV APPRX 60: CPT | Mod: S$GLB,,, | Performed by: GENERAL PRACTICE

## 2025-02-11 RX ORDER — MICONAZOLE NITRATE 2 %
CREAM (GRAM) TOPICAL
Qty: 100 EACH | Refills: 0 | Status: SHIPPED | OUTPATIENT
Start: 2025-02-11

## 2025-02-11 NOTE — Clinical Note
Patient remains tobacco free and using the gum with no negative side effects at this time. Patient exhaled CO was 11 ppm (0-9 non-smoker). Patient states he is breathing second hand smoke from his family members as they smoke indoors. We discussed the importance of having them smoke outdoors. Patient states he will discuss this with them as he is having heart surgery in 2 weeks. It also causes him to crave and he is determined to remain tobacco free. Patient needs refill of the gum and has a follow up in 2 weeks. This will likely be his last follow up in clinic due to surgery.

## 2025-02-11 NOTE — PROGRESS NOTES
Individual Follow-Up Form    2/11/2025    Quit Date: 10/2/24    Clinical Status of Patient: Outpatient    Length of Service: 60 minutes    Continuing Medication: yes  Nicotine gum    Other Medications:      Target Symptoms: Withdrawal and medication side effects. The following were  rated moderate (3) to severe (4) on TCRS:  Moderate (3): none  Severe (4): none    Comments: Patient remains tobacco free and using the gum with no negative side effects at this time. Patient exhaled CO was 11 ppm (0-9 non-smoker). Patient states he is breathing second hand smoke from his family members as they smoke indoors. We discussed the importance of having them smoke outdoors. Patient states he will discuss this with them as he is having heart surgery in 2 weeks. It also causes him to crave and he is determined to remain tobacco free. Patient needs refill of the gum and has a follow up in 2 weeks. This will likely be his last follow up in clinic due to surgery.     Diagnosis: F17.200    Next Visit: 2 weeks

## 2025-02-12 ENCOUNTER — OFFICE VISIT (OUTPATIENT)
Dept: FAMILY MEDICINE | Facility: CLINIC | Age: 60
End: 2025-02-12
Payer: MEDICARE

## 2025-02-12 VITALS
RESPIRATION RATE: 18 BRPM | HEIGHT: 70 IN | SYSTOLIC BLOOD PRESSURE: 116 MMHG | DIASTOLIC BLOOD PRESSURE: 60 MMHG | HEART RATE: 98 BPM | WEIGHT: 177.69 LBS | OXYGEN SATURATION: 98 % | BODY MASS INDEX: 25.44 KG/M2

## 2025-02-12 DIAGNOSIS — F51.01 PRIMARY INSOMNIA: Primary | ICD-10-CM

## 2025-02-12 DIAGNOSIS — F20.9 CHRONIC SCHIZOPHRENIA: Chronic | ICD-10-CM

## 2025-02-12 DIAGNOSIS — E08.42 DIABETIC POLYNEUROPATHY ASSOCIATED WITH DIABETES MELLITUS DUE TO UNDERLYING CONDITION: Chronic | ICD-10-CM

## 2025-02-12 DIAGNOSIS — E11.9 TYPE 2 DIABETES MELLITUS WITHOUT COMPLICATION: ICD-10-CM

## 2025-02-12 RX ORDER — HYDROXYZINE PAMOATE 50 MG/1
50 CAPSULE ORAL NIGHTLY
Qty: 90 CAPSULE | Refills: 1 | Status: SHIPPED | OUTPATIENT
Start: 2025-02-12 | End: 2025-08-11

## 2025-02-12 RX ORDER — PREGABALIN 75 MG/1
75 CAPSULE ORAL 3 TIMES DAILY
Qty: 90 CAPSULE | Refills: 2 | Status: SHIPPED | OUTPATIENT
Start: 2025-02-12 | End: 2025-05-13

## 2025-02-12 RX ORDER — NYSTATIN 100000 [USP'U]/G
POWDER TOPICAL
COMMUNITY
Start: 2025-02-03

## 2025-02-12 RX ORDER — LIDOCAINE 50 MG/G
1 PATCH TOPICAL
COMMUNITY
Start: 2025-02-09

## 2025-02-12 NOTE — PROGRESS NOTES
Plan:      Ryan was seen today for medication problem.    Diagnoses and all orders for this visit:    Primary insomnia  -     hydrOXYzine pamoate (VISTARIL) 50 MG Cap; Take 1 capsule (50 mg total) by mouth every evening.    Chronic schizophrenia  -     Ambulatory referral/consult to Psychiatry; Future    Diabetic polyneuropathy associated with diabetes mellitus due to underlying condition  -     pregabalin (LYRICA) 75 MG capsule; Take 1 capsule (75 mg total) by mouth 3 (three) times daily.      Follow up in about 4 weeks (around 3/12/2025), or if symptoms worsen or fail to improve.    Ana Mayer MD  02/12/2025    Subjective:      Patient ID: Ryan Crane is a 59 y.o. male    Chief Complaint   Patient presents with    Medication Problem     Patient is here to discuss medication changes.      HPI  59 y.o. male with a PMHx as documented below presents to clinic today for the following:    Patient presents today for medication management and follow-up    ANXIETY:  He reports experiencing 13 anxiety attacks since discontinuing Klonopin at his last appointment in late last month. He expresses concern about frequent medication changes.    PAIN:  He describes intermittent sharp leg pain affecting his ability to walk. Episodes typically last 5-10 minutes, with one instance lasting up to 20 minutes.    MEDICATIONS:  He currently takes Trileptal and Lyrica 50 mg. Multiple medications have been discontinued recently due to health reasons, including Lamictal, Geodon, Remeron, Rexulti, and Klonopin. He reports previous adverse reaction to Trazodone causing dizziness and inability to take Ambien. He denies ever taking Vistaril or any current sleep medications.    SURGICAL:  Aortic valve replacement delayed 2/2 rash - now rescheduled for 2/27/25.      _______________________________________     TTE (ICU @ [place], 4//10/24):   - EF 65-75%.   - Dense thickening and calcification of the entire mitral apparatus  -  Aortic valve calcification  - Moderate concentric left ventricular hypertrophy.   - Moderate to severe mitral stenosis.   - Moderate to severe aortic regurgitation.   - Severe valvular aortic stenosis.   ** The study was technically difficult.  ________________________________________________     Chronic schizophrenia:   - Trileptal (?), Rexulti 4 mg qhs   - Klonopin 0.5 mg daily PRN, Atarax 50 mg TID PRN  - Previous Rx: Buspar (d/c 12/23?), Geodon (d/c 12/14?), Remeron (?), Ambien (d/c 1/25), Lamictal (?)  - Following w/ Psychiatry (Paulina Mendez NP)     Subacute thoracic and lumbar back pain:   - MRI lumbar spine w/o contrast (6/12/23): Multilevel degenerative changes of the lumbar spine without more than mild spinal canal/recess narrowing there is shallow disc bulging is noted at multiple levels. No lateralizing disc herniation. Mild to moderate right foraminal narrowing at L4-L5.  - MRI thoracic spine w/o contrast (6/12/23): Mild degenerative changes as discussed above.  The spinal canal and foramina are patent throughout.  No cord impingement or acute process  - S/p eval w/ Dr. Shirley Dowd w/ [facility] - recommending medial branch blocks and subsequent radiofrequency ablation as indicated of the L4-5 and L5-S1 facet joints bilaterally (8/25/23), considering future epidural steroid injections in the midthoracic region     Hx of CVA; hemiplegia and hemiparesis following CVA affecting non-dominant side:   - Right lacunar infarct (3/4/23) resulting in hemiplegia of left, nondominant side  - CT head w/o contrast showed changes consistent with subacute infarct of the left cerebellum and chronic infarct of the right occipital lobe  - MRI w/o contrast showed small area of interest diffusion is seen in the right corona radiata extending to the region of the superior aspect of the posterior limb internal capsule  - ASA 81 mg daily, Lipitor 80 mg daily  - Multiple repeat strokes vs TIAs in 2024     CTA head/neck  "w/ contrast (4/10/24)  1. No large vessel occlusion.  2. Less than 50% luminal narrowing of the proximal ICAs with moderate carotid bulb atherosclerosis.  3. Moderate right M1 segment stenosis.  4. Small left supraclinoid ICA aneurysm. Follow-up with neuro interventional radiology.  5. Biapical emphysematous change with mildly enlarged right hilar lymph nodes.     CAD:   - ASA 81 mg daily, Plavix 75 mg daily, Lipitor 80 mg daily, Ranexa 500 mg BID  - Nitro 0.4 mg SL q5min PRN     HTN:   - Amlodipine 2.5 mg daily     HFpEF:   - TTE (3/4/23) w/ EF 60% and grade I left ventricular diastolic dysfunction     PVD:   - ASA 81 mg daily, Lipitor 80 mg daily  - BLE arterial US (3/7/23): "There appear to be waveform changes throughout the bilateral lower extremity arterial system, more evident in the distal right lower extremity arteries which may indicate some degree of vascular insufficiency or more proximal stenosis. However, no doubling of peak systolic velocities to suggest greater than 50% stenosis is sonographically demonstrated."     COPD, GOLD 2:   - PFTs w/ FEV1 (73%) (9/2022)  - Trelegy 200-62.5 mcg, 1 puff once daily  - Albuterol PRN     Allergic rhinitis:   - Claritin 10 mg daily PRN, Flonase daily PRN     Hx of DMT2 w/ diabetic polyneuropathy:   - Most recent Hgb A1c 4.9% (4/40/24)  - Lyrica 50 mg TID      Migraine headaches:   - Ajovy 225 mg monthly injections (switched from Emgality 5/30/23), Ubrelvy PRN, Fioricet PRN  - Previous Rx: Topamax 200 mg qhs (d/c 10/23?)     B12 deficiency:   - Vitamin B12 < 200 on 3/24/23  - B12 1000 mcg IM monthly     ED:   - Cialis 20 mg PRN (currently taking?)     Hx of hyponatremia:   - Previous Rx: NaCl 1000 mg TID      GERD:   - Protonix 40 mg daily     ROS  Constitutional:  Negative for chills and fever.   Respiratory:  Negative for shortness of breath.    Cardiovascular:  Negative for chest pain.   Gastrointestinal:  Negative for abdominal pain, constipation, diarrhea, " nausea and vomiting.     Current Outpatient Medications   Medication Instructions    AJOVY AUTOINJECTOR 225 mg, Subcutaneous, Every 28 days    albuterol (PROVENTIL/VENTOLIN HFA) 90 mcg/actuation inhaler 2 puffs, Inhalation, Every 6 hours PRN    albuterol-ipratropium (DUO-NEB) 2.5 mg-0.5 mg/3 mL nebulizer solution 3 mLs, Nebulization, Every 6 hours PRN, Rescue    ammonium lactate 2 g, Topical (Top), 2 times daily    aspirin 81 mg, Daily    atorvastatin (LIPITOR) 80 mg, Oral, Daily    butalbital-acetaminophen-caffeine -40 mg (FIORICET, ESGIC) -40 mg per tablet Take 1 tablet by mouth as needed for migraine. No more than 10 tablets per month.    clopidogreL (PLAVIX) 75 mg, Oral, Daily    cyanocobalamin 1,000 mcg/mL injection 1 ml sq daily x 5 days, then 1 ml sq weekly x 4 weeks then q monthly -  will need to go to PCP    EPINEPHrine (EPIPEN) 0.3 mg, Intramuscular, As needed (PRN)    fluticasone-umeclidin-vilanter (TRELEGY ELLIPTA) 200-62.5-25 mcg inhaler 1 puff, Inhalation, Daily    hydrOXYzine pamoate (VISTARIL) 50 mg, Oral, Nightly    lamoTRIgine (LAMICTAL) 200 mg, Daily    LIDOcaine (LIDODERM) 5 % 1 patch    nicotine, polacrilex, (NICORETTE) 2 mg Gum Take up to 8 pieces daily as needed    nitroGLYCERIN (NITROSTAT) 0.4 mg, Sublingual, Every 5 min PRN    nystatin (MYCOSTATIN) powder Apply topically.    pantoprazole (PROTONIX) 40 mg, Oral, Daily    pregabalin (LYRICA) 75 mg, Oral, 3 times daily    promethazine (PHENERGAN) 25 mg, Oral, Every 8 hours PRN    ranolazine (RANEXA) 500 mg, Oral, 2 times daily    REXULTI 4 mg, Daily    tadalafiL (CIALIS) 20 mg, Oral, Daily    topiramate (TOPAMAX) 200 mg, Oral, Nightly    ubrogepant (UBRELVY) 100 mg tablet Take 1 tablet by mouth as needed for migraine. May repeat in 2 hours if needed. Max 2 tab per day      Past Medical History:   Diagnosis Date    Aortic valve stenosis 02/28/2022    Arachnoid cyst of posterior cranial fossa 01/13/2022    Benign prostatic hyperplasia  without lower urinary tract symptoms 05/25/2023    Bilateral carotid bruits 06/22/2021    Bipolar disorder 01/16/2022    CAD S/P percutaneous coronary angioplasty     3 vessel disease    Calculus of gallbladder without cholecystitis without obstruction 01/11/2023    Cancer     Candidal intertrigo 11/11/2019    Cataract     Chronic heart failure with preserved ejection fraction 03/29/2023    Chronic schizophrenia     Colon polyps     Diabetic polyneuropathy associated with diabetes mellitus due to underlying condition 11/21/2019    Dysarthria as late effect of cerebellar cerebrovascular accident (CVA) 03/24/2023    Equinus deformity of both feet 11/21/2019    Erectile dysfunction due to arterial insufficiency 05/25/2023    Flaccid hemiplegia of left nondominant side as late effect of cerebral infarction 03/17/2023    Flat foot 11/21/2019    Gastritis     Gastroesophageal reflux disease without esophagitis 07/13/2023    Gastrointestinal hemorrhage 12/13/2019    Post polypectomy bleeding    General anesthetics causing adverse effect in therapeutic use     Hammer toes of both feet 11/21/2019    Hx of diabetic foot ulcer 11/21/2019    Hypertension     Hyponatremia 01/11/2022    ELAINE (iron deficiency anemia) 12/10/2019    Intractable chronic migraine without aura and without status migrainosus 12/30/2021    Microcytic anemia 10/09/2019    Moderate aortic regurgitation 03/29/2023    Moderate mitral regurgitation 03/29/2023    Moderate mitral stenosis 03/29/2023    Moderate to severe aortic stenosis 02/28/2022    Myocardial infarct, old     Nicotine dependence, unspecified, uncomplicated 01/16/2022    Onychomycosis due to dermatophyte 11/21/2019    Orchitis 11/09/2019    PIPER on CPAP     Peripheral visual field defect, bilateral 01/13/2022    PVD (peripheral vascular disease) 11/21/2019    Seizures 2008    Stage 2 moderate COPD by GOLD classification     PFTs w/ FEV1 (73%) (9/2022)    Stroke 2005    Thoracic aortic  "atherosclerosis 10/17/2022    CT chest    Thyroid disease     Previously on pharmacologic Tx    Tinea pedis of right foot 05/21/2019    Tobacco use disorder     Type 2 diabetes mellitus with diabetic peripheral angiopathy without gangrene     A1c 5.1% (3/2023)    Vitamin B12 deficiency 03/24/2023      Objective:      Vitals:    02/12/25 1352   BP: 116/60   BP Location: Left arm   Patient Position: Sitting   Pulse: 98   Resp: 18   SpO2: 98%   Weight: 80.6 kg (177 lb 11.1 oz)   Height: 5' 10" (1.778 m)     Body mass index is 25.5 kg/m².    Physical Exam   Constitutional:       General: No acute distress.  HENT:      Head: Normocephalic and atraumatic.   Pulmonary:      Effort: Pulmonary effort is normal. No respiratory distress.   Neurological:      General: No focal deficit present.      Mental Status: Alert and oriented to person, place, and time. Mental status is at baseline.    Assessment:       1. Primary insomnia    2. Chronic schizophrenia    3. Diabetic polyneuropathy associated with diabetes mellitus due to underlying condition        Ana Mayer MD  [facility]  Office: [phone]   Fax: [phone]  02/12/2025      Disclaimer: This note was partly generated using dictation software which may occasionally result in transcription errors.    Total time spend on encounter: 40-54 minutes. This includes face to face time and non-face to face time preparing to see the patient (eg, review of tests), obtaining and/or reviewing separately obtained history, documenting clinical information in the electronic or other health record, independently interpreting results and communicating results to the patient/family/caregiver, or care coordinator.      Visit today included increased complexity associated with the care of the episodic problem (see above) addressed and managing the longitudinal care of the patient due to the serious and/or complex managed problem(s) (see above).   "

## 2025-02-13 ENCOUNTER — CLINICAL SUPPORT (OUTPATIENT)
Dept: FAMILY MEDICINE | Facility: CLINIC | Age: 60
End: 2025-02-13
Payer: MEDICARE

## 2025-02-13 DIAGNOSIS — E53.8 VITAMIN B12 DEFICIENCY: Primary | ICD-10-CM

## 2025-02-13 RX ADMIN — CYANOCOBALAMIN 1000 MCG: 1000 INJECTION, SOLUTION INTRAMUSCULAR; SUBCUTANEOUS at 08:02

## 2025-02-14 ENCOUNTER — TELEPHONE (OUTPATIENT)
Dept: CARDIOLOGY | Facility: CLINIC | Age: 60
End: 2025-02-14
Payer: MEDICARE

## 2025-02-14 NOTE — TELEPHONE ENCOUNTER
----- Message from Aiotra sent at 2/14/2025  8:13 AM CST -----  Type:  Needs Medical Advice    Who Called: the patient  Symptoms (please be specific):  How long has patient had these symptoms:    Pharmacy name and phone #:    Would the patient rather a call back or a response via [facility]? call back/  Best Call Back Number: [phone]   Additional Information:Pt states he would like to speak to staff   Thanks  
Pt having open heart surgery and is not sure if he will be up to coming to his appointment in April. Offered to reschedule, but pt stated he will keep it and decide after surgery if he needs to reschedule it  
present

## 2025-02-18 ENCOUNTER — TELEPHONE (OUTPATIENT)
Dept: SMOKING CESSATION | Facility: CLINIC | Age: 60
End: 2025-02-18
Payer: MEDICARE

## 2025-02-18 DIAGNOSIS — F17.200 NICOTINE DEPENDENCE: Primary | ICD-10-CM

## 2025-02-18 RX ORDER — NICOTINE 7MG/24HR
1 PATCH, TRANSDERMAL 24 HOURS TRANSDERMAL DAILY
Qty: 14 PATCH | Refills: 0 | Status: SHIPPED | OUTPATIENT
Start: 2025-02-18

## 2025-02-18 NOTE — TELEPHONE ENCOUNTER
Patient called needing patches. I will order the 7 mg patches and patient has a follow up in 1 week.

## 2025-02-21 DIAGNOSIS — Z00.00 ENCOUNTER FOR MEDICARE ANNUAL WELLNESS EXAM: ICD-10-CM

## 2025-02-24 ENCOUNTER — CLINICAL SUPPORT (OUTPATIENT)
Dept: SMOKING CESSATION | Facility: CLINIC | Age: 60
End: 2025-02-24
Payer: COMMERCIAL

## 2025-02-24 DIAGNOSIS — F17.200 NICOTINE DEPENDENCE: Primary | ICD-10-CM

## 2025-02-24 PROCEDURE — 99404 PREV MED CNSL INDIV APPRX 60: CPT | Mod: S$GLB,,, | Performed by: GENERAL PRACTICE

## 2025-02-24 PROCEDURE — 99999 PR PBB SHADOW E&M-EST. PATIENT-LVL II: CPT | Mod: PBBFAC,,,

## 2025-02-24 NOTE — PROGRESS NOTES
Individual Follow-Up Form    2/24/2025    Quit Date: 10/2/25    Clinical Status of Patient: Outpatient    Length of Service: 60 minutes    Continuing Medication: yes  Nicotine gum    Other Medications:      Target Symptoms: Withdrawal and medication side effects. The following were  rated moderate (3) to severe (4) on TCRS:  Moderate (3): none  Severe (4): none    Comments: Patient remains tobacco free and using the 4 mg gum with no negative side effects at this time. Patient CO was 6 ppm. We discussed session 4 material including problems, stress and weight. Patient has a follow up in 2 weeks.    Diagnosis: F17.200    Next Visit: 2 weeks

## 2025-02-24 NOTE — Clinical Note
Patient remains tobacco free and using the 4 mg gum with no negative side effects at this time. Patient CO was 6 ppm. We discussed session 4 material including problems, stress and weight. Patient has a follow up in 2 weeks.

## 2025-02-25 ENCOUNTER — CLINICAL SUPPORT (OUTPATIENT)
Dept: CARDIOLOGY | Facility: HOSPITAL | Age: 60
End: 2025-02-25
Payer: COMMERCIAL

## 2025-02-25 ENCOUNTER — HOSPITAL ENCOUNTER (OUTPATIENT)
Dept: CARDIOLOGY | Facility: HOSPITAL | Age: 60
Discharge: HOME | End: 2025-02-25
Attending: INTERNAL MEDICINE
Payer: MEDICARE

## 2025-02-25 DIAGNOSIS — I49.8 OTHER SPECIFIED CARDIAC ARRHYTHMIAS: ICD-10-CM

## 2025-02-25 PROCEDURE — 93298 REM INTERROG DEV EVAL SCRMS: CPT | Mod: 26,HCNC,, | Performed by: INTERNAL MEDICINE

## 2025-02-25 PROCEDURE — 93298 REM INTERROG DEV EVAL SCRMS: CPT | Mod: HCNC,PO | Performed by: INTERNAL MEDICINE

## 2025-02-26 ENCOUNTER — TELEPHONE (OUTPATIENT)
Dept: FAMILY MEDICINE | Facility: CLINIC | Age: 60
End: 2025-02-26
Payer: COMMERCIAL

## 2025-02-26 DIAGNOSIS — G43.719 INTRACTABLE CHRONIC MIGRAINE WITHOUT AURA AND WITHOUT STATUS MIGRAINOSUS: ICD-10-CM

## 2025-02-26 LAB
OHS CV AF BURDEN PERCENT: < 1
OHS CV DC REMOTE DEVICE TYPE: NORMAL

## 2025-02-26 RX ORDER — BUTALBITAL, ACETAMINOPHEN AND CAFFEINE 50; 325; 40 MG/1; MG/1; MG/1
TABLET ORAL
Qty: 10 TABLET | Refills: 0 | Status: SHIPPED | OUTPATIENT
Start: 2025-02-26

## 2025-02-26 RX ORDER — BUTALBITAL, ACETAMINOPHEN AND CAFFEINE 50; 325; 40 MG/1; MG/1; MG/1
TABLET ORAL
Qty: 10 TABLET | Refills: 0 | Status: CANCELLED | OUTPATIENT
Start: 2025-02-26

## 2025-02-26 NOTE — TELEPHONE ENCOUNTER
----- Message from Beverly sent at 2/26/2025  8:38 AM CST -----  Contact: Patient  Type:  Appointment RequestCaller is requesting a sooner appointment.  Caller declined first available appointment listed below.  Caller will not accept being placed on the waitlist and is requesting a message be sent to doctor.Name of Caller:  PatientWhen is the first available appointment?  N/AWould the patient rather a call back or a response via [facility]?  Call [facility] Call Back Number:  [phone] Information:   States he would like to reschedule his Nurse Visit appointment scheduled for 9:00 this morning - please call - thank you

## 2025-03-05 ENCOUNTER — CLINICAL SUPPORT (OUTPATIENT)
Dept: SMOKING CESSATION | Facility: CLINIC | Age: 60
End: 2025-03-05
Payer: COMMERCIAL

## 2025-03-05 DIAGNOSIS — F17.200 NICOTINE DEPENDENCE: Primary | ICD-10-CM

## 2025-03-05 PROCEDURE — 99999 PR PBB SHADOW E&M-EST. PATIENT-LVL I: CPT | Mod: PBBFAC,,,

## 2025-03-05 PROCEDURE — 99407 BEHAV CHNG SMOKING > 10 MIN: CPT | Mod: S$GLB,,,

## 2025-03-05 NOTE — PROGRESS NOTES
Spoke with patient today in regard to smoking cessation progress for 3 month telephone follow up, he states tobacco free. Commended patient on the accomplishment thus far. Patient is currently in the program with a scheduled follow up visit. Informed patient of benefit period, future follow ups, and contact information if any further help or support is needed. Will resolve episode and complete smart form for Quit attempt #5 and 3 month follow up on Quit #6.

## 2025-03-12 ENCOUNTER — CLINICAL SUPPORT (OUTPATIENT)
Dept: SMOKING CESSATION | Facility: CLINIC | Age: 60
End: 2025-03-12
Payer: COMMERCIAL

## 2025-03-12 DIAGNOSIS — F17.200 NICOTINE DEPENDENCE: Primary | ICD-10-CM

## 2025-03-12 PROCEDURE — 99402 PREV MED CNSL INDIV APPRX 30: CPT | Mod: S$GLB,,, | Performed by: GENERAL PRACTICE

## 2025-03-12 PROCEDURE — 99999 PR PBB SHADOW E&M-EST. PATIENT-LVL III: CPT | Mod: PBBFAC,,,

## 2025-03-12 RX ORDER — NICOTINE 7MG/24HR
1 PATCH, TRANSDERMAL 24 HOURS TRANSDERMAL DAILY
Qty: 14 PATCH | Refills: 0 | Status: SHIPPED | OUTPATIENT
Start: 2025-03-12

## 2025-03-12 NOTE — Clinical Note
Patient remains tobacco free and is using the 7 mg patch and 2 mg gum with no negative side effects at this time. Patient request one last refill of the 7 mg patch and has completed the program. We discussed nicotine dependence and relapse prevention strategies. Patient states he will use the gum to maintain his quit. We discussed process of weaning off the gum. Patient will use strategies we discussed to maintain his quit. Patient had to cancel his in clinic follow up so we had a phone visit.

## 2025-03-12 NOTE — PROGRESS NOTES
Individual Follow-Up Form    3/12/2025    Quit Date: 10/2/24    Clinical Status of Patient: Outpatient    Length of Service: 30 minutes    Continuing Medication: yes  Patches    Other Medications: gum     Target Symptoms: Withdrawal and medication side effects. The following were  rated moderate (3) to severe (4) on TCRS:  Moderate (3): none  Severe (4): none    Comments: Patient remains tobacco free and is using the 7 mg patch and 2 mg gum with no negative side effects at this time. Patient request one last refill of the 7 mg patch and has completed the program. We discussed nicotine dependence and relapse prevention strategies. Patient states he will use the gum to maintain his quit. We discussed process of weaning off the gum. Patient will use strategies we discussed to maintain his quit. Patient had to cancel his in clinic follow up so we had a phone visit.     Diagnosis: F17.200    Next Visit:

## 2025-03-14 ENCOUNTER — TELEPHONE (OUTPATIENT)
Dept: FAMILY MEDICINE | Facility: CLINIC | Age: 60
End: 2025-03-14
Payer: COMMERCIAL

## 2025-03-14 ENCOUNTER — OFFICE VISIT (OUTPATIENT)
Dept: PODIATRY | Facility: CLINIC | Age: 60
End: 2025-03-14
Payer: COMMERCIAL

## 2025-03-14 VITALS — WEIGHT: 177.69 LBS | HEIGHT: 70 IN | BODY MASS INDEX: 25.44 KG/M2

## 2025-03-14 DIAGNOSIS — I73.9 PVD (PERIPHERAL VASCULAR DISEASE): ICD-10-CM

## 2025-03-14 DIAGNOSIS — F51.01 PRIMARY INSOMNIA: Primary | ICD-10-CM

## 2025-03-14 DIAGNOSIS — L60.0 INGROWN NAIL: Primary | ICD-10-CM

## 2025-03-14 PROCEDURE — 99999 PR PBB SHADOW E&M-EST. PATIENT-LVL II: CPT | Mod: PBBFAC,,, | Performed by: PODIATRIST

## 2025-03-14 RX ORDER — TRAZODONE HYDROCHLORIDE 50 MG/1
50 TABLET ORAL NIGHTLY
Qty: 30 TABLET | Refills: 11 | Status: SHIPPED | OUTPATIENT
Start: 2025-03-14 | End: 2026-03-14

## 2025-03-14 NOTE — PROGRESS NOTES
Subjective:     Patient ID: Ryan Crane is a 59 y.o. male.    Chief Complaint: Ingrown Toenail (Lt great toe)    Ryan is a 59 y.o. male with a past medical history of Aortic valve stenosis (02/28/2022), Arachnoid cyst of posterior cranial fossa (01/13/2022), Benign prostatic hyperplasia without lower urinary tract symptoms (05/25/2023), Bilateral carotid bruits (06/22/2021), Bipolar disorder (01/16/2022), CAD S/P percutaneous coronary angioplasty, Calculus of gallbladder without cholecystitis without obstruction (01/11/2023), Cancer, Candidal intertrigo (11/11/2019), Cataract, Chronic heart failure with preserved ejection fraction (03/29/2023), Chronic schizophrenia, Colon polyps, Diabetic polyneuropathy associated with diabetes mellitus due to underlying condition (11/21/2019), Dysarthria as late effect of cerebellar cerebrovascular accident (CVA) (03/24/2023), Equinus deformity of both feet (11/21/2019), Erectile dysfunction due to arterial insufficiency (05/25/2023), Flaccid hemiplegia of left nondominant side as late effect of cerebral infarction (03/17/2023), Flat foot (11/21/2019), Gastritis, Gastroesophageal reflux disease without esophagitis (07/13/2023), Gastrointestinal hemorrhage (12/13/2019), General anesthetics causing adverse effect in therapeutic use, Hammer toes of both feet (11/21/2019), diabetic foot ulcer (11/21/2019), Hypertension, Hyponatremia (01/11/2022), ELAINE (iron deficiency anemia) (12/10/2019), Intractable chronic migraine without aura and without status migrainosus (12/30/2021), Microcytic anemia (10/09/2019), Moderate aortic regurgitation (03/29/2023), Moderate mitral regurgitation (03/29/2023), Moderate mitral stenosis (03/29/2023), Moderate to severe aortic stenosis (02/28/2022), Myocardial infarct, old, Nicotine dependence, unspecified, uncomplicated (01/16/2022), Onychomycosis due to dermatophyte (11/21/2019), Orchitis (11/09/2019), PIPER on CPAP, Peripheral visual field  defect, bilateral (01/13/2022), PVD (peripheral vascular disease) (11/21/2019), Seizures (2008), Stage 2 moderate COPD by GOLD classification, Stroke (2005), Thoracic aortic atherosclerosis (10/17/2022), Thyroid disease, Tinea pedis of right foot (05/21/2019), Tobacco use disorder, Type 2 diabetes mellitus with diabetic peripheral angiopathy without gangrene, and Vitamin B12 deficiency (03/24/2023). The patient's chief complaint consists of pain from a Lt. Hallux ingrown toenail.  States this has been present for roughly 6 months.  Describes sharp pain with applied pressure from shoe gear.  Rates pain as a 0/10.  Symptoms are alleviated with removal of shoe gear.  Has not attempted to self treat.  Denies noticing drainage from the site.  Denies experiencing N/V/F/C/D.  Denies any additional pedal complaints.      Past Medical History:   Diagnosis Date    Aortic valve stenosis 02/28/2022    Arachnoid cyst of posterior cranial fossa 01/13/2022    Benign prostatic hyperplasia without lower urinary tract symptoms 05/25/2023    Bilateral carotid bruits 06/22/2021    Bipolar disorder 01/16/2022    CAD S/P percutaneous coronary angioplasty     3 vessel disease    Calculus of gallbladder without cholecystitis without obstruction 01/11/2023    Cancer     Candidal intertrigo 11/11/2019    Cataract     Chronic heart failure with preserved ejection fraction 03/29/2023    Chronic schizophrenia     Colon polyps     Diabetic polyneuropathy associated with diabetes mellitus due to underlying condition 11/21/2019    Dysarthria as late effect of cerebellar cerebrovascular accident (CVA) 03/24/2023    Equinus deformity of both feet 11/21/2019    Erectile dysfunction due to arterial insufficiency 05/25/2023    Flaccid hemiplegia of left nondominant side as late effect of cerebral infarction 03/17/2023    Flat foot 11/21/2019    Gastritis     Gastroesophageal reflux disease without esophagitis 07/13/2023    Gastrointestinal hemorrhage  12/13/2019    Post polypectomy bleeding    General anesthetics causing adverse effect in therapeutic use     Hammer toes of both feet 11/21/2019    Hx of diabetic foot ulcer 11/21/2019    Hypertension     Hyponatremia 01/11/2022    ELAINE (iron deficiency anemia) 12/10/2019    Intractable chronic migraine without aura and without status migrainosus 12/30/2021    Microcytic anemia 10/09/2019    Moderate aortic regurgitation 03/29/2023    Moderate mitral regurgitation 03/29/2023    Moderate mitral stenosis 03/29/2023    Moderate to severe aortic stenosis 02/28/2022    Myocardial infarct, old     Nicotine dependence, unspecified, uncomplicated 01/16/2022    Onychomycosis due to dermatophyte 11/21/2019    Orchitis 11/09/2019    PIPER on CPAP     Peripheral visual field defect, bilateral 01/13/2022    PVD (peripheral vascular disease) 11/21/2019    Seizures 2008    Stage 2 moderate COPD by GOLD classification     PFTs w/ FEV1 (73%) (9/2022)    Stroke 2005    Thoracic aortic atherosclerosis 10/17/2022    CT chest    Thyroid disease     Previously on pharmacologic Tx    Tinea pedis of right foot 05/21/2019    Tobacco use disorder     Type 2 diabetes mellitus with diabetic peripheral angiopathy without gangrene     A1c 5.1% (3/2023)    Vitamin B12 deficiency 03/24/2023       Past Surgical History:   Procedure Laterality Date    ANGIOGRAM, CORONARY, WITH LEFT HEART CATHETERIZATION  08/25/2022    Procedure: Angiogram, Coronary, with Left Heart Cath;  Surgeon: Mai Deleon MD;  Location: [facility] CATH;  Service: Cardiology;;    APPENDECTOMY      BONE BIOPSY Right 12/19/2023    Procedure: Biopsy-Bone;  Surgeon: Дмитрий Pratt DPM;  Location: [facility] OR;  Service: Podiatry;  Laterality: Right;    BONE EXOSTOSIS EXCISION Right 11/17/2023    Procedure: EXCISION, EXOSTOSIS;  Surgeon: Дмитрий Pratt DPM;  Location: [facility] OR;  Service: Podiatry;  Laterality: Right;    BONE MARROW ASPIRATION Left 08/21/2020    Procedure: ASPIRATION, BONE  MARROW;  Surgeon: Lex Kahtleen MD;  Location: [facility];  Service: Oncology;  Laterality: Left;    CAROTID ENDARTERECTOMY Right     CHOLECYSTECTOMY      CLOSURE OF WOUND Right 09/09/2019    Procedure: CLOSURE, WOUND;  Surgeon: Li Kathleen DPM;  Location: [facility] OR;  Service: Podiatry;  Laterality: Right;    COLONOSCOPY N/A 12/10/2019    Procedure: COLONOSCOPY;  Surgeon: Ryan Lucas MD;  Location: [facility] ENDO;  Service: Endoscopy;  Laterality: N/A;    COLONOSCOPY N/A 12/13/2019    Procedure: COLONOSCOPY;  Surgeon: Ryan Lucas MD;  Location: [facility];  Service: Endoscopy;  Laterality: N/A;    CORONARY STENT PLACEMENT      ESOPHAGOGASTRODUODENOSCOPY N/A 12/10/2019    Procedure: EGD (ESOPHAGOGASTRODUODENOSCOPY);  Surgeon: Ryan Lucas MD;  Location: [facility];  Service: Endoscopy;  Laterality: N/A;    ESOPHAGOGASTRODUODENOSCOPY N/A 11/03/2022    Procedure: EGD (ESOPHAGOGASTRODUODENOSCOPY);  Surgeon: Ryan Lucas MD;  Location: [facility];  Service: Endoscopy;  Laterality: N/A;    INCISION AND DRAINAGE FOOT Right 12/19/2023    Procedure: INCISION AND DRAINAGE, FOOT- RIGHT;  Surgeon: Дмитрий Pratt DPM;  Location: [facility];  Service: Podiatry;  Laterality: Right;    INJECTION OF ANESTHETIC AGENT AROUND MEDIAL BRANCH NERVES INNERVATING LUMBAR FACET JOINT Bilateral 07/25/2023    Procedure: Block-nerve-medial branch-lumbar;  Surgeon: Napoleon Grimaldo MD;  Location: [facility] OR;  Service: Pain Management;  Laterality: Bilateral;  L3,4,5 MBB    INJECTION OF ANESTHETIC AGENT AROUND MEDIAL BRANCH NERVES INNERVATING LUMBAR FACET JOINT Bilateral 08/25/2023    Procedure: Block-nerve-medial branch-lumbar;  Surgeon: Napoleon Grimaldo MD;  Location: [facility] OR;  Service: Anesthesiology;  Laterality: Bilateral;  L3,4,5 MBB #2    INSERTION OF IMPLANTABLE LOOP RECORDER  08/24/2024    LAPAROSCOPIC CHOLECYSTECTOMY N/A 01/11/2023    Procedure: CHOLECYSTECTOMY, LAPAROSCOPIC;  Surgeon: Laith Davis MD;  Location:  [facility] OR;  Service: General;  Laterality: N/A;    LEFT HEART CATHETERIZATION Left 08/25/2022    Procedure: Left heart cath;  Surgeon: Mai Deleon MD;  Location: [facility] CATH;  Service: Cardiology;  Laterality: Left;    RADIOFREQUENCY ABLATION OF LUMBAR MEDIAL BRANCH NERVE AT SINGLE LEVEL Bilateral 10/31/2023    Procedure: Radiofrequency Ablation, Nerve, Spinal, Lumbar, Medial Branch, 1 Level;  Surgeon: Napoleon Grimaldo MD;  Location: [facility] OR;  Service: Anesthesiology;  Laterality: Bilateral;  L3,4,5 RFA    right heel surgery      SHOULDER ARTHROSCOPY Left     UPPER GASTROINTESTINAL ENDOSCOPY         Family History   Problem Relation Name Age of Onset    Melanoma Mother      Diabetes Mother      Hypertension Mother      Stroke Father      Diabetes Father      Hypertension Father      Colon cancer Father          unsure of age of diagnosis    Cancer Father          colon cancer    Cervical cancer Sister      Cirrhosis Brother      Hypertension Brother      Lung cancer Maternal Grandmother      Prostate cancer Maternal Grandfather      Crohn's disease Neg Hx      Ulcerative colitis Neg Hx      Stomach cancer Neg Hx      Esophageal cancer Neg Hx      Retinal detachment Neg Hx      Strabismus Neg Hx      Macular degeneration Neg Hx      Glaucoma Neg Hx         Social History     Socioeconomic History    Marital status: Legally    Occupational History    Occupation: disabled (mental)     Comment: since 2000   Tobacco Use    Smoking status: Former     Passive exposure: Never    Smokeless tobacco: Never    Tobacco comments:     Age Started 14    Substance and Sexual Activity    Alcohol use: No    Drug use: Not Currently     Types: Marijuana     Comment: ocasionally - once every 6 months    Sexual activity: Yes     Partners: Female     Social Drivers of Health     Financial Resource Strain: Medium Risk (5/7/2024)    Overall Financial Resource Strain (CARDIA)     Difficulty of Paying Living Expenses: Somewhat hard    Food Insecurity: Unknown (12/26/2024)    Received from [facility]    Hunger Vital Sign     Ran Out of Food in the Last Year: Never true   Transportation Needs: Unknown (12/26/2024)    Received from [facility]    PRAPARE - Transportation     Lack of Transportation (Medical): No   Physical Activity: Insufficiently Active (5/7/2024)    Exercise Vital Sign     Days of Exercise per Week: 2 days     Minutes of Exercise per Session: 10 min   Stress: Stress Concern Present (5/7/2024)    [employer] - Occupational Stress Questionnaire     Feeling of Stress : To some extent   Housing Stability: Unknown (12/26/2024)    Received from [facility]    Housing Stability Vital Sign     Homeless in the Last Year: No       Current Medications[1]    Review of patient's allergies indicates:   Allergen Reactions    Alcohol Anaphylaxis     Pt states all types of alcohol    Alcohol antiseptic pads Anaphylaxis    Cephalexin Anaphylaxis and Other (See Comments)     Cardiac arrest        Hemoglobin A1C   Date Value Ref Range Status   12/26/2024 5.3 4.6 - 6.2 % Final   09/29/2024 5.5 4.6 - 6.2 % Final   08/24/2024 5.1 4.6 - 6.2 % Final   04/03/2024 4.9 4.0 - 5.6 % Final     Comment:     ADA Screening Guidelines:  5.7-6.4%  Consistent with prediabetes  >or=6.5%  Consistent with diabetes    High levels of fetal hemoglobin interfere with the HbA1C  assay. Heterozygous hemoglobin variants (HbS, HgC, etc)do  not significantly interfere with this assay.   However, presence of multiple variants may affect accuracy.     07/13/2023 4.9 4.0 - 5.6 % Final     Comment:     ADA Screening Guidelines:  5.7-6.4%  Consistent with prediabetes  >or=6.5%  Consistent with diabetes    High levels of fetal hemoglobin interfere with the HbA1C  assay. Heterozygous hemoglobin variants (HbS, HgC, etc)do  not significantly interfere with this assay.   However, presence of multiple variants may affect  accuracy.     03/05/2023 5.1 0.0 - 5.6 % Final     Comment:     Reference Interval:  5.0 - 5.6 Normal   5.7 - 6.4 High Risk   > 6.5 Diabetic      Hgb A1c results are standardized based on the (NGSP) National   Glycohemoglobin Standardization Program.      Hemoglobin A1C levels are related to mean serum/plasma glucose   during the preceding 2-3 months.            Review of Systems   Constitutional: Negative for chills and fever.   Skin:  Positive for color change and nail changes.   Musculoskeletal:  Negative for joint swelling, muscle cramps and muscle weakness.   Gastrointestinal:  Negative for nausea and vomiting.   Neurological:  Negative for numbness and paresthesias.   Psychiatric/Behavioral:  Negative for altered mental status.         Objective:     Physical Exam  Constitutional:       Appearance: Normal appearance. He is not ill-appearing.   Cardiovascular:      Pulses:           Dorsalis pedis pulses are 2+ on the right side and 2+ on the left side.        Posterior tibial pulses are 1+ on the right side and 1+ on the left side.      Comments: CFT is < 3 seconds bilateral.  Pedal hair growth is decreased bilateral.  No lower extremity edema noted bilateral.  Toes are warm to touch bilateral.    Musculoskeletal:         General: Tenderness present. No deformity or signs of injury.      Right lower leg: No edema.      Left lower leg: No edema.      Comments: Muscle strength 5/5 in all muscle groups bilateral.  No tenderness nor crepitation with ROM of foot/ankle joints bilateral.  Pain with palpation to medial and lateral borders of the Lt. Hallux toenail.     Skin:     Findings: No bruising, ecchymosis, erythema, signs of injury, laceration, lesion, petechiae, rash or wound.      Comments: Pedal skin has normal turgor, temperature, and texture bilateral.  Incurvation noted to both borders of bilateral hallux toenail.  No localized signs of infection noted.  Remaining toenails x 8 appear normotrophic.  Examination of the skin reveals no evidence of significant maceration, rashes, open lesions, suspicious appearing nevi or other concerning lesions.       Neurological:      Mental Status: He is alert.      Sensory: No sensory deficit.      Motor: No weakness or atrophy.      Comments: Light touch is intact bilateral.             Assessment:      Encounter Diagnoses   Name Primary?    PVD (peripheral vascular disease)     Ingrown nail Yes     Plan:     Ryan was seen today for ingrown toenail.    Diagnoses and all orders for this visit:    Ingrown nail    PVD (peripheral vascular disease)      I counseled the patient on his conditions, their implications and medical management.    Utilizing sterile toenail clippers I aggressively trimmed the offending medial and lateral nail borders of bilateral hallux approximately 3 mm from its edge and carried the nail plate incision down at an angle in order to wedge out the offending cryptotic portion of the nail plate. The offending border was then removed in toto. This was performed without incident.  Patient tolerated the procedure well and related significant relief.     Advised to apply ebanel lotion and a band aid to both nail grooves and nail edges.  He is to then cover with a band aid and apply daily before going to bed.  Recommend performing this treatment for one month.      Discussed performing a partial matrixectomy of both borders of bilateral hallux toenail if this fails to resolve symptoms.  However, will need to perform bilateral LE arterial ultrasounds on account of his past history of PVD.    RTC prn.    Laith Gordon DPM         [1]   Current Outpatient Medications   Medication Sig Dispense Refill    albuterol (PROVENTIL/VENTOLIN HFA) 90 mcg/actuation inhaler Inhale 2 puffs into the lungs every 6 (six) hours as needed for Shortness of Breath or Wheezing. 54 g 5    albuterol-ipratropium (DUO-NEB) 2.5 mg-0.5 mg/3 mL nebulizer solution Take 3 mLs by  nebulization every 6 (six) hours as needed for Wheezing. Rescue 150 mL 5    aspirin 81 MG Chew Take 81 mg by mouth once daily.      atorvastatin (LIPITOR) 80 MG tablet Take 1 tablet (80 mg total) by mouth once daily. 30 tablet 0    brexpiprazole (REXULTI) 4 mg Tab Take 4 mg by mouth Daily.      butalbital-acetaminophen-caffeine -40 mg (FIORICET, ESGIC) -40 mg per tablet Take 1 tablet by mouth as needed for migraine. No more than 10 tablets per month. 10 tablet 0    clopidogreL (PLAVIX) 75 mg tablet Take 1 tablet (75 mg total) by mouth once daily. (Patient not taking: Reported on 2/12/2025) 30 tablet 11    cyanocobalamin 1,000 mcg/mL injection 1 ml sq daily x 5 days, then 1 ml sq weekly x 4 weeks then q monthly -  will need to go to PCP 9 mL 0    EPINEPHrine (EPIPEN) 0.3 mg/0.3 mL AtIn Inject 0.3 mLs (0.3 mg total) into the muscle as needed. 1 each 0    fluticasone-umeclidin-vilanter (TRELEGY ELLIPTA) 200-62.5-25 mcg inhaler Inhale 1 puff into the lungs once daily. 180 each 5    fremanezumab-vfrm (AJOVY AUTOINJECTOR) 225 mg/1.5 mL autoinjector Inject 1.5 mLs (225 mg total) into the skin every 28 days. 1 each 11    hydrOXYzine pamoate (VISTARIL) 50 MG Cap Take 1 capsule (50 mg total) by mouth every evening. 90 capsule 1    lamoTRIgine (LAMICTAL) 200 MG tablet Take 200 mg by mouth once daily. (Patient not taking: Reported on 2/12/2025)      LIDOcaine (LIDODERM) 5 % Place 1 patch onto the skin.      nicotine (NICODERM CQ) 7 mg/24 hr Place 1 patch onto the skin once daily. 14 patch 0    nicotine, polacrilex, (NICORETTE) 2 mg Gum Take up to 8 pieces daily as needed 100 each 0    nitroGLYCERIN (NITROSTAT) 0.4 MG SL tablet Place 1 tablet (0.4 mg total) under the tongue every 5 (five) minutes as needed for Chest pain. 30 tablet 0    nystatin (MYCOSTATIN) powder Apply topically.      pantoprazole (PROTONIX) 40 MG tablet Take 1 tablet (40 mg total) by mouth once daily. 90 tablet 3    pregabalin (LYRICA) 75 MG  capsule Take 1 capsule (75 mg total) by mouth 3 (three) times daily. 90 capsule 2    promethazine (PHENERGAN) 25 MG tablet Take 1 tablet (25 mg total) by mouth every 8 (eight) hours as needed for Nausea. 20 tablet 1    ranolazine (RANEXA) 500 MG Tb12 Take 1 tablet (500 mg total) by mouth 2 (two) times daily. 180 tablet 3    tadalafiL (CIALIS) 20 MG Tab Take 1 tablet (20 mg total) by mouth once daily. 10 tablet 11    topiramate (TOPAMAX) 200 MG Tab Take 1 tablet (200 mg total) by mouth every evening. (Patient not taking: Reported on 2/12/2025) 90 tablet 3    ubrogepant (UBRELVY) 100 mg tablet Take 1 tablet by mouth as needed for migraine. May repeat in 2 hours if needed. Max 2 tab per day 8 tablet 11     Current Facility-Administered Medications   Medication Dose Route Frequency Provider Last Rate Last Admin    cyanocobalamin injection 1,000 mcg  1,000 mcg Intramuscular Q14 Days Ana Mayer MD   1,000 mcg at 01/30/25 0825    cyanocobalamin injection 1,000 mcg  1,000 mcg Intramuscular Q14 Days    1,000 mcg at 02/13/25 0843     Facility-Administered Medications Ordered in Other Visits   Medication Dose Route Frequency Provider Last Rate Last Admin    diphenhydrAMINE injection 12.5 mg  12.5 mg Intravenous Q6H PRN Irvin Frank MD        electrolyte-S (ISOLYTE)   Intravenous Continuous Irvin Frank MD        lactated ringers infusion   Intravenous Continuous Napoleon Grimaldo MD        lactated ringers infusion   Intravenous Continuous Napoleon Grimaldo MD 25 mL/hr at 08/25/23 0948 New Bag at 08/25/23 0948    lactated ringers infusion   Intravenous Continuous Napoleon Grimaldo MD   Stopped at 10/31/23 1255    lactated ringers infusion   Intravenous Continuous Irvin Frank MD 10 mL/hr at 11/17/23 0745 New Bag at 11/17/23 0745    LIDOcaine (PF) 10 mg/ml (1%) injection 10 mg  1 mL Intradermal Once Napoleon Grimaldo MD        LIDOcaine (PF) 10 mg/ml (1%) injection 10 mg  1 mL Intradermal Once Napoleon Grimaldo MD        LIDOcaine  (PF) 10 mg/ml (1%) injection 10 mg  1 mL Intradermal Once Napoleon Grimaldo MD        LIDOcaine (PF) 10 mg/ml (1%) injection 10 mg  1 mL Intradermal Once Irvin Frank MD

## 2025-03-14 NOTE — TELEPHONE ENCOUNTER
----- Message from Nell sent at 3/13/2025  1:37 PM CDT -----  Regarding: alternative rx for sleep  Contact: pt  Type:  RX Refill RequestWho Called: ptPreferred Pharmacy with phone number:[id number] - [place] - [id number] [facility] [address]Kpbff: [phone] Fax: [phone] or Mail Order:localOrdering Provider:Sergio the patient rather a call back or a response via [facility]? Call [facility] Call Back Number:[phone] Information: pt reports that the hydrOXYzine pamoate (VISTARIL) 50 MG Cap is not working.  Asking that an alternative be called into pharm.

## 2025-03-14 NOTE — TELEPHONE ENCOUNTER
Diagnoses and all orders for this visit:    Primary insomnia  -     traZODone (DESYREL) 50 MG tablet; Take 1 tablet (50 mg total) by mouth every evening.        Ana Mayer MD  [facility]  Office: [phone]   Fax: [phone]  03/14/2025

## 2025-03-17 ENCOUNTER — PATIENT MESSAGE (OUTPATIENT)
Dept: PSYCHIATRY | Facility: CLINIC | Age: 60
End: 2025-03-17
Payer: COMMERCIAL

## 2025-03-28 ENCOUNTER — HOSPITAL ENCOUNTER (OUTPATIENT)
Dept: CARDIOLOGY | Facility: HOSPITAL | Age: 60
Discharge: HOME | End: 2025-03-28
Attending: INTERNAL MEDICINE
Payer: COMMERCIAL

## 2025-03-28 ENCOUNTER — CLINICAL SUPPORT (OUTPATIENT)
Dept: CARDIOLOGY | Facility: HOSPITAL | Age: 60
End: 2025-03-28

## 2025-03-28 DIAGNOSIS — I49.8 OTHER SPECIFIED CARDIAC ARRHYTHMIAS: ICD-10-CM

## 2025-03-28 PROCEDURE — 93298 REM INTERROG DEV EVAL SCRMS: CPT | Mod: 26,,, | Performed by: INTERNAL MEDICINE

## 2025-03-28 PROCEDURE — 93298 REM INTERROG DEV EVAL SCRMS: CPT | Mod: PO | Performed by: INTERNAL MEDICINE

## 2025-03-31 ENCOUNTER — TELEPHONE (OUTPATIENT)
Dept: CARDIOLOGY | Facility: CLINIC | Age: 60
End: 2025-03-31
Payer: COMMERCIAL

## 2025-03-31 NOTE — TELEPHONE ENCOUNTER
Lvm for pt in regards to r/s appt on 4/3 due Dr. Aranda not being in clinic. Asked to call us back to discuss dates and times to r/s appt or reach out to us through the portal

## 2025-04-01 ENCOUNTER — TELEPHONE (OUTPATIENT)
Dept: CARDIOLOGY | Facility: CLINIC | Age: 60
End: 2025-04-01
Payer: COMMERCIAL

## 2025-04-01 NOTE — TELEPHONE ENCOUNTER
2nd attempt on contacting pt in regards to r/s appt on 4/3 due to Dr. Aranda not being in clinic. Asked to call us back to discuss dates and times or reach out to us through the portal.

## 2025-04-25 ENCOUNTER — OFFICE VISIT (OUTPATIENT)
Dept: FAMILY MEDICINE | Facility: CLINIC | Age: 60
End: 2025-04-25
Payer: COMMERCIAL

## 2025-04-25 ENCOUNTER — LAB VISIT (OUTPATIENT)
Dept: LAB | Facility: HOSPITAL | Age: 60
End: 2025-04-25
Payer: COMMERCIAL

## 2025-04-25 VITALS
DIASTOLIC BLOOD PRESSURE: 64 MMHG | SYSTOLIC BLOOD PRESSURE: 118 MMHG | HEART RATE: 86 BPM | BODY MASS INDEX: 21.64 KG/M2 | OXYGEN SATURATION: 98 % | WEIGHT: 150.81 LBS

## 2025-04-25 DIAGNOSIS — E53.8 VITAMIN B12 DEFICIENCY: Chronic | ICD-10-CM

## 2025-04-25 DIAGNOSIS — E78.5 HYPERLIPIDEMIA LDL GOAL <70: ICD-10-CM

## 2025-04-25 DIAGNOSIS — I10 ESSENTIAL HYPERTENSION: Chronic | ICD-10-CM

## 2025-04-25 DIAGNOSIS — Z86.39 HISTORY OF DIABETES MELLITUS, TYPE II: ICD-10-CM

## 2025-04-25 DIAGNOSIS — Z09 HOSPITAL DISCHARGE FOLLOW-UP: Primary | ICD-10-CM

## 2025-04-25 DIAGNOSIS — E53.8 VITAMIN B12 DEFICIENCY: ICD-10-CM

## 2025-04-25 DIAGNOSIS — Z09 HOSPITAL DISCHARGE FOLLOW-UP: ICD-10-CM

## 2025-04-25 LAB
ABSOLUTE EOSINOPHIL (OHS): 0.35 K/UL
ABSOLUTE MONOCYTE (OHS): 1.03 K/UL (ref 0.3–1)
ABSOLUTE NEUTROPHIL COUNT (OHS): 6.09 K/UL (ref 1.8–7.7)
ALBUMIN SERPL BCP-MCNC: 3.4 G/DL (ref 3.5–5.2)
ALP SERPL-CCNC: 152 UNIT/L (ref 40–150)
ALT SERPL W/O P-5'-P-CCNC: 10 UNIT/L (ref 10–44)
ANION GAP (OHS): 10 MMOL/L (ref 8–16)
AST SERPL-CCNC: 24 UNIT/L (ref 11–45)
BASOPHILS # BLD AUTO: 0.09 K/UL
BASOPHILS NFR BLD AUTO: 0.9 %
BILIRUB SERPL-MCNC: 0.5 MG/DL (ref 0.1–1)
BUN SERPL-MCNC: 15 MG/DL (ref 6–20)
CALCIUM SERPL-MCNC: 9 MG/DL (ref 8.7–10.5)
CHLORIDE SERPL-SCNC: 105 MMOL/L (ref 95–110)
CO2 SERPL-SCNC: 25 MMOL/L (ref 23–29)
CREAT SERPL-MCNC: 1 MG/DL (ref 0.5–1.4)
EAG (OHS): 105 MG/DL (ref 68–131)
ERYTHROCYTE [DISTWIDTH] IN BLOOD BY AUTOMATED COUNT: 20 % (ref 11.5–14.5)
GFR SERPLBLD CREATININE-BSD FMLA CKD-EPI: >60 ML/MIN/1.73/M2
GLUCOSE SERPL-MCNC: 96 MG/DL (ref 70–110)
HBA1C MFR BLD: 5.3 % (ref 4–5.6)
HCT VFR BLD AUTO: 36.6 % (ref 40–54)
HGB BLD-MCNC: 11.2 GM/DL (ref 14–18)
IMM GRANULOCYTES # BLD AUTO: 0.03 K/UL (ref 0–0.04)
IMM GRANULOCYTES NFR BLD AUTO: 0.3 % (ref 0–0.5)
LYMPHOCYTES # BLD AUTO: 2.47 K/UL (ref 1–4.8)
MCH RBC QN AUTO: 25.3 PG (ref 27–31)
MCHC RBC AUTO-ENTMCNC: 30.6 G/DL (ref 32–36)
MCV RBC AUTO: 83 FL (ref 82–98)
NUCLEATED RBC (/100WBC) (OHS): 0 /100 WBC
PLATELET # BLD AUTO: 279 K/UL (ref 150–450)
PMV BLD AUTO: 10.3 FL (ref 9.2–12.9)
POTASSIUM SERPL-SCNC: 3.7 MMOL/L (ref 3.5–5.1)
PROT SERPL-MCNC: 7 GM/DL (ref 6–8.4)
RBC # BLD AUTO: 4.43 M/UL (ref 4.6–6.2)
RELATIVE EOSINOPHIL (OHS): 3.5 %
RELATIVE LYMPHOCYTE (OHS): 24.6 % (ref 18–48)
RELATIVE MONOCYTE (OHS): 10.2 % (ref 4–15)
RELATIVE NEUTROPHIL (OHS): 60.5 % (ref 38–73)
SODIUM SERPL-SCNC: 140 MMOL/L (ref 136–145)
WBC # BLD AUTO: 10.06 K/UL (ref 3.9–12.7)

## 2025-04-25 PROCEDURE — 85025 COMPLETE CBC W/AUTO DIFF WBC: CPT

## 2025-04-25 PROCEDURE — 36415 COLL VENOUS BLD VENIPUNCTURE: CPT | Mod: PN

## 2025-04-25 PROCEDURE — 80053 COMPREHEN METABOLIC PANEL: CPT

## 2025-04-25 PROCEDURE — 82607 VITAMIN B-12: CPT

## 2025-04-25 PROCEDURE — 83036 HEMOGLOBIN GLYCOSYLATED A1C: CPT

## 2025-04-25 PROCEDURE — 99999 PR PBB SHADOW E&M-EST. PATIENT-LVL III: CPT | Mod: PBBFAC,,, | Performed by: STUDENT IN AN ORGANIZED HEALTH CARE EDUCATION/TRAINING PROGRAM

## 2025-04-25 NOTE — PROGRESS NOTES
Plan:      Ryan was seen today for follow-up.    Diagnoses and all orders for this visit:    Hospital discharge follow-up: Doing well. Reminded of importance of anticoagulation and keeping up with f/u appts w/ specialists.    -     Hemoglobin A1C; Future  -     Comprehensive Metabolic Panel; Future  -     CBC Auto Differential; Future    Essential hypertension  -     Comprehensive Metabolic Panel; Future    Hyperlipidemia LDL goal <70  -     Comprehensive Metabolic Panel; Future    Vitamin B12 deficiency  -     CBC Auto Differential; Future  -     Vitamin B12; Future    History of diabetes mellitus, type II  -     Hemoglobin A1C; Future  -     Comprehensive Metabolic Panel; Future      Follow up in about 3 months (around 7/25/2025), or if symptoms worsen or fail to improve.    Ana Mayer MD  04/25/2025    Subjective:      Patient ID: Ryan Crane is a 59 y.o. male    Chief Complaint   Patient presents with    Follow-up     HPI  59 y.o. male with a PMHx as documented below presents to clinic today for the following:    S/p CABG + aortic valve replacement, mitral valve replacement, and temporary pacemaker placement 3/24/35 at [place].  Pt reports being discharged from inpatient rehab on Easter Sunday. Now anticoagulated w/ warfarin in addition to DAPT. Reports trazodone and vistaril working well for sleep. Doing well - currently living at his daughter's house.   ________________________________________________     Chronic schizophrenia:   - Abilify 15 mg qhs, Lamictal 200 mg daily, Remeron 7.5 mg daily  - Previous Rx: Buspar, Geodon, Ambien, Rexulti 4 mg qhs, Trileptal, Klonopin, Atarax  - Following w/ Psychiatry (Paulina Mendez NP)     Subacute thoracic and lumbar back pain:   - MRI lumbar spine w/o contrast (6/12/23): Multilevel degenerative changes of the lumbar spine without more than mild spinal canal/recess narrowing there is shallow disc bulging is noted at multiple levels. No lateralizing  "disc herniation. Mild to moderate right foraminal narrowing at L4-L5.  - MRI thoracic spine w/o contrast (6/12/23): Mild degenerative changes as discussed above.  The spinal canal and foramina are patent throughout.  No cord impingement or acute process  - S/p misty w/ Dr. Shirley Dowd [facility]/ [facility] - recommending medial branch blocks and subsequent radiofrequency ablation as indicated of the L4-5 and L5-S1 facet joints bilaterally (8/25/23), considering future epidural steroid injections in the midthoracic region     Hx of CVA; hemiplegia and hemiparesis following CVA affecting non-dominant side:   - ASA 81 mg daily, Plavix 75 mg daily, Lipitor 80 mg daily    CAD:   - ASA 81 mg daily, Plavix 75 mg daily, Lipitor 80 mg daily, Ranexa 500 mg BID  - Nitro 0.4 mg SL q5min PRN     HTN:  - Amlodipine 2.5 mg daily, Toprol-XL 25 mg daily     HFpEF:   - Lasix 20 mg BID + KCL 20 mEq 4x/day  - TTE (3/4/23) w/ EF 60% and grade I left ventricular diastolic dysfunction     PVD:   - ASA 81 mg daily,  Plavix 75 mg daily, Lipitor 80 mg daily  - BLE arterial US (3/7/23): "There appear to be waveform changes throughout the bilateral lower extremity arterial system, more evident in the distal right lower extremity arteries which may indicate some degree of vascular insufficiency or more proximal stenosis. However, no doubling of peak systolic velocities to suggest greater than 50% stenosis is sonographically demonstrated."     COPD, GOLD 2:   - PFTs w/ FEV1 (73%) (9/2022)  - Trelegy 200-62.5 mcg, 1 puff once daily  - Albuterol PRN     Allergic rhinitis:   - Claritin 10 mg daily PRN, Flonase daily PRN     Hx of DMT2 w/ diabetic polyneuropathy:   - Most recent Hgb A1c 4.9% (4/40/24)  - Previous Rx: Lyrica 50 mg TID      Migraine headaches:   - Ajovy 225 mg monthly injections (switched from Emgality 5/30/23), Ubrelvy PRN, Fioricet PRN  - Previous Rx: Topamax 200 mg qhs    B12 deficiency:   - Vitamin B12 < 200 on 3/24/23  - B12 1000 " mcg IM monthly     ED:   - Not currently on any pharmacologic intervention  - Previous Rx: Cialis     Hx of hyponatremia:   - Previous Rx: NaCl 1000 mg TID      GERD:   - Protonix 40 mg daily     Insomnia:   - Trazodone 100 mg qhs, Vistaril 50 mg qhs  - Previous Rx: Ambien    ROS  Constitutional:  Negative for chills and fever.   Respiratory:  Negative for shortness of breath.    Cardiovascular:  Negative for chest pain.   Gastrointestinal:  Negative for abdominal pain, constipation, diarrhea, nausea and vomiting.     Current Outpatient Medications   Medication Instructions    AJOVY AUTOINJECTOR 225 mg, Subcutaneous, Every 28 days    albuterol (PROVENTIL/VENTOLIN HFA) 90 mcg/actuation inhaler 2 puffs, Inhalation, Every 6 hours PRN    albuterol-ipratropium (DUO-NEB) 2.5 mg-0.5 mg/3 mL nebulizer solution 3 mLs, Nebulization, Every 6 hours PRN, Rescue    aspirin 81 mg, Daily    atorvastatin (LIPITOR) 80 mg, Oral, Daily    butalbital-acetaminophen-caffeine -40 mg (FIORICET, ESGIC) -40 mg per tablet Take 1 tablet by mouth as needed for migraine. No more than 10 tablets per month.    clopidogreL (PLAVIX) 75 mg, Oral, Daily    cyanocobalamin 1,000 mcg/mL injection 1 ml sq daily x 5 days, then 1 ml sq weekly x 4 weeks then q monthly -  will need to go to PCP    EPINEPHrine (EPIPEN) 0.3 mg, Intramuscular, As needed (PRN)    fluticasone-umeclidin-vilanter (TRELEGY ELLIPTA) 200-62.5-25 mcg inhaler 1 puff, Inhalation, Daily    hydrOXYzine pamoate (VISTARIL) 50 mg, Oral, Nightly    lamoTRIgine (LAMICTAL) 200 mg, Daily    LIDOcaine (LIDODERM) 5 % 1 patch    nicotine (NICODERM CQ) 7 mg/24 hr 1 patch, Transdermal, Daily    nicotine, polacrilex, (NICORETTE) 2 mg Gum Take up to 8 pieces daily as needed    nitroGLYCERIN (NITROSTAT) 0.4 mg, Sublingual, Every 5 min PRN    nystatin (MYCOSTATIN) powder Apply topically.    pantoprazole (PROTONIX) 40 mg, Oral, Daily    pregabalin (LYRICA) 75 mg, Oral, 3 times daily     promethazine (PHENERGAN) 25 mg, Oral, Every 8 hours PRN    ranolazine (RANEXA) 500 mg, Oral, 2 times daily    REXULTI 4 mg, Daily    tadalafiL (CIALIS) 20 mg, Oral, Daily    topiramate (TOPAMAX) 200 mg, Oral, Nightly    traZODone (DESYREL) 50 mg, Oral, Nightly    ubrogepant (UBRELVY) 100 mg tablet Take 1 tablet by mouth as needed for migraine. May repeat in 2 hours if needed. Max 2 tab per day      Past Medical History:   Diagnosis Date    Aortic valve stenosis 02/28/2022    Arachnoid cyst of posterior cranial fossa 01/13/2022    Benign prostatic hyperplasia without lower urinary tract symptoms 05/25/2023    Bilateral carotid bruits 06/22/2021    Bipolar disorder 01/16/2022    CAD S/P percutaneous coronary angioplasty     3 vessel disease    Calculus of gallbladder without cholecystitis without obstruction 01/11/2023    Cancer     Candidal intertrigo 11/11/2019    Cataract     Chronic heart failure with preserved ejection fraction 03/29/2023    Chronic schizophrenia     Colon polyps     Diabetic polyneuropathy associated with diabetes mellitus due to underlying condition 11/21/2019    Dysarthria as late effect of cerebellar cerebrovascular accident (CVA) 03/24/2023    Equinus deformity of both feet 11/21/2019    Erectile dysfunction due to arterial insufficiency 05/25/2023    Flaccid hemiplegia of left nondominant side as late effect of cerebral infarction 03/17/2023    Flat foot 11/21/2019    Gastritis     Gastroesophageal reflux disease without esophagitis 07/13/2023    Gastrointestinal hemorrhage 12/13/2019    Post polypectomy bleeding    General anesthetics causing adverse effect in therapeutic use     Hammer toes of both feet 11/21/2019    Hx of diabetic foot ulcer 11/21/2019    Hypertension     Hyponatremia 01/11/2022    ELAINE (iron deficiency anemia) 12/10/2019    Intractable chronic migraine without aura and without status migrainosus 12/30/2021    Microcytic anemia 10/09/2019    Moderate aortic regurgitation  03/29/2023    Moderate mitral regurgitation 03/29/2023    Moderate mitral stenosis 03/29/2023    Moderate to severe aortic stenosis 02/28/2022    Myocardial infarct, old     Nicotine dependence, unspecified, uncomplicated 01/16/2022    Onychomycosis due to dermatophyte 11/21/2019    Orchitis 11/09/2019    PIPER on CPAP     Peripheral visual field defect, bilateral 01/13/2022    PVD (peripheral vascular disease) 11/21/2019    Seizures 2008    Stage 2 moderate COPD by GOLD classification     PFTs w/ FEV1 (73%) (9/2022)    Stroke 2005    Thoracic aortic atherosclerosis 10/17/2022    CT chest    Thyroid disease     Previously on pharmacologic Tx    Tinea pedis of right foot 05/21/2019    Tobacco use disorder     Type 2 diabetes mellitus with diabetic peripheral angiopathy without gangrene     A1c 5.1% (3/2023)    Vitamin B12 deficiency 03/24/2023        Objective:      Vitals:    04/25/25 1443   BP: 118/64   Patient Position: Sitting   Pulse: 86   SpO2: 98%   Weight: 68.4 kg (150 lb 12.7 oz)     Body mass index is 21.64 kg/m².    Physical Exam   Constitutional:       General: No acute distress.  HENT:      Head: Normocephalic and atraumatic.   Pulmonary:      Effort: Pulmonary effort is normal. No respiratory distress.   Neurological:      General: No focal deficit present.      Mental Status: Alert and oriented to person, place, and time. Mental status is at baseline.    Assessment:       1. Hospital discharge follow-up    2. Essential hypertension    3. Hyperlipidemia LDL goal <70    4. Vitamin B12 deficiency    5. History of diabetes mellitus, type II        Ana Mayer MD  [facility]  Office: [phone]   Fax: [phone]  04/25/2025      Disclaimer: This note was partly generated using dictation software which may occasionally result in transcription errors.    Total time spent on this encounter includes face to face time and non-face to face time preparing to see the patient  (eg, review of tests), obtaining and/or reviewing separately obtained history, documenting clinical information in the electronic or other health record, independently interpreting results, and communicating results to the patient/family/caregiver, or care coordinator.      Visit today included increased complexity associated with the care of the episodic problem (see above) addressed and managing the longitudinal care of the patient due to the serious and/or complex managed problem(s) (see above).

## 2025-04-26 LAB — VIT B12 SERPL-MCNC: 407 PG/ML (ref 210–950)

## 2025-04-28 ENCOUNTER — RESULTS FOLLOW-UP (OUTPATIENT)
Dept: FAMILY MEDICINE | Facility: CLINIC | Age: 60
End: 2025-04-28

## 2025-04-29 ENCOUNTER — HOSPITAL ENCOUNTER (OUTPATIENT)
Dept: CARDIOLOGY | Facility: HOSPITAL | Age: 60
Discharge: HOME | End: 2025-04-29
Attending: INTERNAL MEDICINE
Payer: COMMERCIAL

## 2025-04-29 ENCOUNTER — CLINICAL SUPPORT (OUTPATIENT)
Dept: CARDIOLOGY | Facility: HOSPITAL | Age: 60
End: 2025-04-29

## 2025-04-29 DIAGNOSIS — I49.8 OTHER SPECIFIED CARDIAC ARRHYTHMIAS: ICD-10-CM

## 2025-04-29 PROCEDURE — 93298 REM INTERROG DEV EVAL SCRMS: CPT | Mod: 26,,, | Performed by: INTERNAL MEDICINE

## 2025-04-29 PROCEDURE — 93298 REM INTERROG DEV EVAL SCRMS: CPT | Mod: PO | Performed by: INTERNAL MEDICINE

## 2025-05-02 ENCOUNTER — PATIENT MESSAGE (OUTPATIENT)
Dept: FAMILY MEDICINE | Facility: CLINIC | Age: 60
End: 2025-05-02
Payer: COMMERCIAL

## 2025-05-02 DIAGNOSIS — G43.719 INTRACTABLE CHRONIC MIGRAINE WITHOUT AURA AND WITHOUT STATUS MIGRAINOSUS: Chronic | ICD-10-CM

## 2025-05-02 DIAGNOSIS — F17.200 NICOTINE DEPENDENCE: ICD-10-CM

## 2025-05-02 DIAGNOSIS — E08.42 DIABETIC POLYNEUROPATHY ASSOCIATED WITH DIABETES MELLITUS DUE TO UNDERLYING CONDITION: Chronic | ICD-10-CM

## 2025-05-02 DIAGNOSIS — Z79.899 ENCOUNTER FOR MONITORING LONG-TERM PROTON PUMP INHIBITOR THERAPY: ICD-10-CM

## 2025-05-02 DIAGNOSIS — Z51.81 ENCOUNTER FOR MONITORING LONG-TERM PROTON PUMP INHIBITOR THERAPY: ICD-10-CM

## 2025-05-02 DIAGNOSIS — Z98.61 CAD S/P PERCUTANEOUS CORONARY ANGIOPLASTY: Chronic | ICD-10-CM

## 2025-05-02 DIAGNOSIS — F51.01 PRIMARY INSOMNIA: ICD-10-CM

## 2025-05-02 DIAGNOSIS — G43.719 INTRACTABLE CHRONIC MIGRAINE WITHOUT AURA AND WITHOUT STATUS MIGRAINOSUS: ICD-10-CM

## 2025-05-02 DIAGNOSIS — I25.10 CAD S/P PERCUTANEOUS CORONARY ANGIOPLASTY: Chronic | ICD-10-CM

## 2025-05-02 DIAGNOSIS — K21.9 GASTROESOPHAGEAL REFLUX DISEASE WITHOUT ESOPHAGITIS: ICD-10-CM

## 2025-05-02 RX ORDER — BUTALBITAL, ACETAMINOPHEN AND CAFFEINE 50; 325; 40 MG/1; MG/1; MG/1
TABLET ORAL
Qty: 10 TABLET | Refills: 0 | Status: SHIPPED | OUTPATIENT
Start: 2025-05-02

## 2025-05-02 RX ORDER — TRAZODONE HYDROCHLORIDE 50 MG/1
50 TABLET ORAL NIGHTLY
Qty: 90 TABLET | Refills: 3 | Status: SHIPPED | OUTPATIENT
Start: 2025-05-02 | End: 2026-05-02

## 2025-05-02 RX ORDER — UBROGEPANT 100 MG/1
TABLET ORAL
Qty: 8 TABLET | Refills: 11 | Status: SHIPPED | OUTPATIENT
Start: 2025-05-02

## 2025-05-02 RX ORDER — TOPIRAMATE 200 MG/1
200 TABLET, FILM COATED ORAL NIGHTLY
Qty: 90 TABLET | Refills: 3
Start: 2025-05-02

## 2025-05-02 RX ORDER — CLOPIDOGREL BISULFATE 75 MG/1
75 TABLET ORAL DAILY
Qty: 90 TABLET | Refills: 3 | Status: SHIPPED | OUTPATIENT
Start: 2025-05-02 | End: 2026-05-02

## 2025-05-02 RX ORDER — ATORVASTATIN CALCIUM 80 MG/1
80 TABLET, FILM COATED ORAL DAILY
Qty: 90 TABLET | Refills: 3 | Status: SHIPPED | OUTPATIENT
Start: 2025-05-02

## 2025-05-02 RX ORDER — FREMANEZUMAB-VFRM 225 MG/1.5ML
225 INJECTION SUBCUTANEOUS
Qty: 1 EACH | Refills: 11 | Status: SHIPPED | OUTPATIENT
Start: 2025-05-02

## 2025-05-02 RX ORDER — MICONAZOLE NITRATE 2 %
CREAM (GRAM) TOPICAL
Qty: 100 EACH | Refills: 0 | Status: CANCELLED | OUTPATIENT
Start: 2025-05-02

## 2025-05-02 RX ORDER — NITROGLYCERIN 0.4 MG/1
0.4 TABLET SUBLINGUAL EVERY 5 MIN PRN
Qty: 25 TABLET | Refills: 0 | Status: SHIPPED | OUTPATIENT
Start: 2025-05-02 | End: 2025-06-01

## 2025-05-02 RX ORDER — HYDROXYZINE PAMOATE 50 MG/1
50 CAPSULE ORAL NIGHTLY
Qty: 90 CAPSULE | Refills: 1 | Status: SHIPPED | OUTPATIENT
Start: 2025-05-02 | End: 2025-10-29

## 2025-05-02 RX ORDER — FREMANEZUMAB-VFRM 225 MG/1.5ML
225 INJECTION SUBCUTANEOUS
Qty: 1 EACH | Refills: 11 | Status: CANCELLED | OUTPATIENT
Start: 2025-05-02

## 2025-05-02 RX ORDER — EPINEPHRINE 0.3 MG/.3ML
1 INJECTION SUBCUTANEOUS
Qty: 2 EACH | Refills: 0 | Status: SHIPPED | OUTPATIENT
Start: 2025-05-02

## 2025-05-02 RX ORDER — NICOTINE 7MG/24HR
1 PATCH, TRANSDERMAL 24 HOURS TRANSDERMAL DAILY
Qty: 14 PATCH | Refills: 0 | Status: CANCELLED | OUTPATIENT
Start: 2025-05-02

## 2025-05-02 RX ORDER — LAMOTRIGINE 200 MG/1
200 TABLET ORAL DAILY
Qty: 90 TABLET | Refills: 0 | OUTPATIENT
Start: 2025-05-02

## 2025-05-02 RX ORDER — BUTALBITAL, ACETAMINOPHEN AND CAFFEINE 50; 325; 40 MG/1; MG/1; MG/1
TABLET ORAL
Qty: 10 TABLET | Refills: 0 | Status: CANCELLED | OUTPATIENT
Start: 2025-05-02

## 2025-05-02 RX ORDER — RANOLAZINE 500 MG/1
500 TABLET, EXTENDED RELEASE ORAL 2 TIMES DAILY
Qty: 180 TABLET | Refills: 3 | Status: SHIPPED | OUTPATIENT
Start: 2025-05-02 | End: 2026-05-02

## 2025-05-02 RX ORDER — PREGABALIN 75 MG/1
75 CAPSULE ORAL 3 TIMES DAILY
Qty: 90 CAPSULE | Refills: 2 | Status: SHIPPED | OUTPATIENT
Start: 2025-05-02 | End: 2025-07-31

## 2025-05-02 NOTE — TELEPHONE ENCOUNTER
Care Due:                  Date            Visit Type   Department     Provider  --------------------------------------------------------------------------------                                EP -                              PRIMARY      [facility]  Last Visit: 04-      CARE ([facility])   MEDICINE       Ana Mayer  Next Visit: None Scheduled  None         None Found                                                            Last  Test          Frequency    Reason                     Performed    Due Date  --------------------------------------------------------------------------------    Lipid Panel.  12 months..  atorvastatin.............  04- 03-    [facility] Embedded Care Due Messages. Reference number: [id number].   5/02/2025 2:51:04 PM CDT

## 2025-05-05 RX ORDER — PANTOPRAZOLE SODIUM 40 MG/1
40 TABLET, DELAYED RELEASE ORAL DAILY
Qty: 90 TABLET | Refills: 2 | Status: SHIPPED | OUTPATIENT
Start: 2025-05-05 | End: 2026-05-05

## 2025-05-30 ENCOUNTER — CLINICAL SUPPORT (OUTPATIENT)
Dept: CARDIOLOGY | Facility: HOSPITAL | Age: 60
End: 2025-05-30
Payer: COMMERCIAL

## 2025-05-30 ENCOUNTER — HOSPITAL ENCOUNTER (OUTPATIENT)
Dept: CARDIOLOGY | Facility: HOSPITAL | Age: 60
Discharge: HOME | End: 2025-05-30
Attending: INTERNAL MEDICINE
Payer: COMMERCIAL

## 2025-05-30 DIAGNOSIS — I49.8 OTHER SPECIFIED CARDIAC ARRHYTHMIAS: ICD-10-CM

## 2025-05-30 PROCEDURE — 93298 REM INTERROG DEV EVAL SCRMS: CPT | Mod: 26,,, | Performed by: INTERNAL MEDICINE

## 2025-05-30 PROCEDURE — 93298 REM INTERROG DEV EVAL SCRMS: CPT | Mod: PO | Performed by: INTERNAL MEDICINE

## 2025-06-04 ENCOUNTER — TELEPHONE (OUTPATIENT)
Dept: SMOKING CESSATION | Facility: CLINIC | Age: 60
End: 2025-06-04
Payer: COMMERCIAL

## 2025-06-04 ENCOUNTER — CLINICAL SUPPORT (OUTPATIENT)
Dept: SMOKING CESSATION | Facility: CLINIC | Age: 60
End: 2025-06-04
Payer: COMMERCIAL

## 2025-06-04 ENCOUNTER — TELEPHONE (OUTPATIENT)
Dept: CARDIOLOGY | Facility: CLINIC | Age: 60
End: 2025-06-04
Payer: COMMERCIAL

## 2025-06-04 DIAGNOSIS — F17.200 TOBACCO USE DISORDER: Primary | ICD-10-CM

## 2025-06-04 PROCEDURE — 99999 PR PBB SHADOW E&M-EST. PATIENT-LVL I: CPT | Mod: PBBFAC,,,

## 2025-06-04 PROCEDURE — 99407 BEHAV CHNG SMOKING > 10 MIN: CPT | Mod: S$GLB,,,

## 2025-06-05 PROCEDURE — G0180 MD CERTIFICATION HHA PATIENT: HCPCS | Mod: ,,, | Performed by: STUDENT IN AN ORGANIZED HEALTH CARE EDUCATION/TRAINING PROGRAM

## 2025-06-08 LAB
OHS CV AF BURDEN PERCENT: < 1
OHS CV DC REMOTE DEVICE TYPE: NORMAL
OHS CV ICD SHOCK: NO

## 2025-06-10 ENCOUNTER — TELEPHONE (OUTPATIENT)
Dept: SMOKING CESSATION | Facility: CLINIC | Age: 60
End: 2025-06-10
Payer: COMMERCIAL

## 2025-06-10 DIAGNOSIS — N52.01 ERECTILE DYSFUNCTION DUE TO ARTERIAL INSUFFICIENCY: ICD-10-CM

## 2025-06-10 RX ORDER — TADALAFIL 20 MG/1
20 TABLET ORAL DAILY
Qty: 10 TABLET | Refills: 11 | Status: CANCELLED | OUTPATIENT
Start: 2025-06-10 | End: 2026-06-10

## 2025-06-11 ENCOUNTER — ANTI-COAG VISIT (OUTPATIENT)
Dept: CARDIOLOGY | Facility: CLINIC | Age: 60
End: 2025-06-11
Payer: COMMERCIAL

## 2025-06-11 ENCOUNTER — CLINICAL SUPPORT (OUTPATIENT)
Dept: SMOKING CESSATION | Facility: CLINIC | Age: 60
End: 2025-06-11
Payer: COMMERCIAL

## 2025-06-11 ENCOUNTER — OFFICE VISIT (OUTPATIENT)
Dept: FAMILY MEDICINE | Facility: CLINIC | Age: 60
End: 2025-06-11
Payer: COMMERCIAL

## 2025-06-11 VITALS
HEART RATE: 78 BPM | SYSTOLIC BLOOD PRESSURE: 104 MMHG | WEIGHT: 164.13 LBS | DIASTOLIC BLOOD PRESSURE: 58 MMHG | BODY MASS INDEX: 23.5 KG/M2 | HEIGHT: 70 IN

## 2025-06-11 DIAGNOSIS — Z79.01 CURRENT USE OF LONG TERM ANTICOAGULATION: ICD-10-CM

## 2025-06-11 DIAGNOSIS — Z95.2 HX OF AORTIC VALVE REPLACEMENT: ICD-10-CM

## 2025-06-11 DIAGNOSIS — R47.9 DIFFICULTY WITH SPEECH: Primary | ICD-10-CM

## 2025-06-11 DIAGNOSIS — F17.200 TOBACCO USE DISORDER: Primary | ICD-10-CM

## 2025-06-11 DIAGNOSIS — Z86.73 HISTORY OF STROKE: ICD-10-CM

## 2025-06-11 DIAGNOSIS — Z79.01 CURRENT USE OF LONG TERM ANTICOAGULATION: Primary | ICD-10-CM

## 2025-06-11 PROCEDURE — 99999 PR PBB SHADOW E&M-EST. PATIENT-LVL IV: CPT | Mod: PBBFAC,,, | Performed by: STUDENT IN AN ORGANIZED HEALTH CARE EDUCATION/TRAINING PROGRAM

## 2025-06-11 PROCEDURE — 99999 PR PBB SHADOW E&M-EST. PATIENT-LVL III: CPT | Mod: PBBFAC,,, | Performed by: GENERAL PRACTICE

## 2025-06-11 PROCEDURE — 99402 PREV MED CNSL INDIV APPRX 30: CPT | Mod: S$GLB,,, | Performed by: GENERAL PRACTICE

## 2025-06-11 NOTE — Clinical Note
Patient remains tobacco free and in the hospital at this time. Patient states he still has urges and wants to stay in program for the support to maintain his quit. We discussed urges and cravings and strategies to maintain his quit. We discussed nicotine dependence and patient has a follow up in 2 weeks.

## 2025-06-11 NOTE — PROGRESS NOTES
Individual Follow-Up Form    6/11/2025    Quit Date: 10/2/24    Clinical Status of Patient: Outpatient    Length of Service: 30 minutes    Continuing Medication: no    Other Medications:      Target Symptoms: Withdrawal and medication side effects. The following were  rated moderate (3) to severe (4) on TCRS:  Moderate (3): none  Severe (4): none    Comments: Patient remains tobacco free and in the hospital at this time. Patient states he still has urges and wants to stay in program for the support to maintain his quit. We discussed urges and cravings and strategies to maintain his quit. We discussed nicotine dependence and patient has a follow up in 2 weeks.    Diagnosis: F17.200    Next Visit: 2 weeks

## 2025-06-11 NOTE — PROGRESS NOTES
58 y/o M w/ s/p aortic, mitral valve replacement, CABG, temporary pacemaker done 3/24/25.  Pt has hx CAD, PAD, ~ 9 TIA/CVA.      6/11/25-Messaged Dr. Mayer to get warfarin order.   6/12/25-Call office & LMFCB.   6/13/25-Messaged Dr. Mayer x 2.     Orders faxed and updated HH to drawn INR 6/16/25.

## 2025-06-11 NOTE — PROGRESS NOTES
Plan:       Ryan was seen today for hospital follow up.    Diagnoses and all orders for this visit:    Difficulty with speech  -     Ambulatory referral/consult to Home Health; Future    History of stroke  -     Ambulatory referral/consult to Home Health; Future    Hx of aortic valve replacement  -     Ambulatory referral/consult to Anticoagulation Monitoring; Future    Current use of long term anticoagulation  -     Ambulatory referral/consult to Anticoagulation Monitoring; Future      Pt reports compliance w/ medication regimen, but on chart review, it appears that home health has noted conflicting reports. Will need pt to RTC with all home medications for review. Pt agreeable to plan, expressed understanding, all questions answered.       Follow up in about 4 weeks (around 7/9/2025), or if symptoms worsen or fail to improve.    Ana Mayer MD  06/11/2025    Subjective:      Patient ID: Ryan Crane is a 59 y.o. male    Chief Complaint   Patient presents with    Hospital Follow Up     [facility]. D/c'd 6/3/25     HPI  59 y.o. male with a PMHx as documented below presents to clinic today for the following:    Hospital follow-up. Following w/ Home Health in outpatient setting. Pt states PT/OT signed off but that he still needed Speech Therapy.    Reports compliance w/  prescribed medications.     Unclear as to whether or not he was referred to anticoagulation clinic.     Current med list unclear - will need pt to bring medications to follow-up appt for review.  ________________________________________________    S/p CABG + aortic valve replacement, mitral valve replacement, and temporary pacemaker placement 3/24/35 at [place]. Now anticoagulated w/ warfarin in addition to DAPT. Currently living at his daughter's house.   ________________________________________________     Chronic schizophrenia:   - Abilify 15 mg qhs, Lamictal 200 mg daily, Remeron 7.5 mg daily  - Previous Rx: Buspar, Geodon, Ambien,  "Rexulti 4 mg qhs, Trileptal, Klonopin, Atarax  - Following w/ Psychiatry (Paulina Mendez NP)     Subacute thoracic and lumbar back pain:   - MRI lumbar spine w/o contrast (6/12/23): Multilevel degenerative changes of the lumbar spine without more than mild spinal canal/recess narrowing there is shallow disc bulging is noted at multiple levels. No lateralizing disc herniation. Mild to moderate right foraminal narrowing at L4-L5.  - MRI thoracic spine w/o contrast (6/12/23): Mild degenerative changes as discussed above.  The spinal canal and foramina are patent throughout.  No cord impingement or acute process  - S/p eval w/ Dr. Shirley Dowd w/ [facility] - recommending medial branch blocks and subsequent radiofrequency ablation as indicated of the L4-5 and L5-S1 facet joints bilaterally (8/25/23), considering future epidural steroid injections in the midthoracic region     Hx of CVA; hemiplegia and hemiparesis following CVA affecting non-dominant side:   - ASA 81 mg daily, Plavix 75 mg daily, Lipitor 80 mg daily    CAD:   - ASA 81 mg daily, Plavix 75 mg daily, Lipitor 80 mg daily, Ranexa 500 mg BID  - Nitro 0.4 mg SL q5min PRN     HTN:  - Amlodipine 2.5 mg daily, Toprol-XL 25 mg daily     HFpEF:   - Lasix 20 mg BID + KCL 20 mEq 4x/day  - TTE (3/4/23) w/ EF 60% and grade I left ventricular diastolic dysfunction     PVD:   - ASA 81 mg daily,  Plavix 75 mg daily, Lipitor 80 mg daily  - BLE arterial US (3/7/23): "There appear to be waveform changes throughout the bilateral lower extremity arterial system, more evident in the distal right lower extremity arteries which may indicate some degree of vascular insufficiency or more proximal stenosis. However, no doubling of peak systolic velocities to suggest greater than 50% stenosis is sonographically demonstrated."     COPD, GOLD 2:   - PFTs w/ FEV1 (73%) (9/2022)  - Trelegy 200-62.5 mcg, 1 puff once daily  - Albuterol PRN     Allergic rhinitis:   - Claritin 10 mg " daily PRN, Flonase daily PRN     Hx of DMT2 w/ diabetic polyneuropathy:   - Most recent Hgb A1c 4.9% (4/40/24)  - Previous Rx: Lyrica 50 mg TID      Migraine headaches:   - Ajovy 225 mg monthly injections (switched from Emgality 5/30/23), Ubrelvy PRN, Fioricet PRN  - Previous Rx: Topamax 200 mg qhs    B12 deficiency:   - Vitamin B12 < 200 on 3/24/23  - B12 1000 mcg IM monthly     ED:   - Not currently on any pharmacologic intervention  - Previous Rx: Cialis     Hx of hyponatremia:   - Previous Rx: NaCl 1000 mg TID      GERD:   - Protonix 40 mg daily     Insomnia:   - Trazodone 100 mg qhs, Vistaril 50 mg qhs  - Previous Rx: Ambien    ROS  Constitutional:  Negative for chills and fever.   Respiratory:  Negative for shortness of breath.    Cardiovascular:  Negative for chest pain.   Gastrointestinal:  Negative for abdominal pain, constipation, diarrhea, nausea and vomiting.     Current Outpatient Medications   Medication Instructions    AJOVY AUTOINJECTOR 225 mg, Subcutaneous, Every 28 days    albuterol (PROVENTIL/VENTOLIN HFA) 90 mcg/actuation inhaler 2 puffs, Inhalation, Every 6 hours PRN    albuterol-ipratropium (DUO-NEB) 2.5 mg-0.5 mg/3 mL nebulizer solution 3 mLs, Nebulization, Every 6 hours PRN, Rescue    aspirin 81 mg, Daily    atorvastatin (LIPITOR) 80 mg, Oral, Daily    butalbital-acetaminophen-caffeine -40 mg (FIORICET, ESGIC) -40 mg per tablet Take 1 tablet by mouth as needed for migraine. No more than 10 tablets per month.    clopidogreL (PLAVIX) 75 mg, Oral, Daily    cyanocobalamin 1,000 mcg/mL injection 1 ml sq daily x 5 days, then 1 ml sq weekly x 4 weeks then q monthly -  will need to go to PCP    EPINEPHrine (EPIPEN) 0.3 mg, Intramuscular, As needed (PRN)    fluticasone-umeclidin-vilanter (TRELEGY ELLIPTA) 200-62.5-25 mcg inhaler 1 puff, Inhalation, Daily    hydrOXYzine pamoate (VISTARIL) 50 mg, Oral, Nightly    lamoTRIgine (LAMICTAL) 200 mg, Daily    LIDOcaine (LIDODERM) 5 % 1 patch     nicotine (NICODERM CQ) 7 mg/24 hr 1 patch, Transdermal, Daily    nicotine, polacrilex, (NICORETTE) 2 mg Gum Take up to 8 pieces daily as needed    nitroGLYCERIN (NITROSTAT) 0.4 mg, Sublingual, Every 5 min PRN    nystatin (MYCOSTATIN) powder Apply topically.    pantoprazole (PROTONIX) 40 mg, Oral, Daily    pregabalin (LYRICA) 75 mg, Oral, 3 times daily    promethazine (PHENERGAN) 25 mg, Oral, Every 8 hours PRN    ranolazine (RANEXA) 500 mg, Oral, 2 times daily    REXULTI 4 mg, Daily    tadalafiL (CIALIS) 20 mg, Oral, Daily    topiramate (TOPAMAX) 200 mg, Oral, Nightly    traZODone (DESYREL) 50 mg, Oral, Nightly    ubrogepant (UBRELVY) 100 mg tablet Take 1 tablet by mouth as needed for migraine. May repeat in 2 hours if needed. Max 2 tab per day      Past Medical History:   Diagnosis Date    Aortic valve stenosis 02/28/2022    Arachnoid cyst of posterior cranial fossa 01/13/2022    Benign prostatic hyperplasia without lower urinary tract symptoms 05/25/2023    Bilateral carotid bruits 06/22/2021    Bipolar disorder 01/16/2022    CAD S/P percutaneous coronary angioplasty     3 vessel disease    Calculus of gallbladder without cholecystitis without obstruction 01/11/2023    Cancer     Candidal intertrigo 11/11/2019    Cataract     Chronic heart failure with preserved ejection fraction 03/29/2023    Chronic schizophrenia     Colon polyps     Diabetic polyneuropathy associated with diabetes mellitus due to underlying condition 11/21/2019    Dysarthria as late effect of cerebellar cerebrovascular accident (CVA) 03/24/2023    Equinus deformity of both feet 11/21/2019    Erectile dysfunction due to arterial insufficiency 05/25/2023    Flaccid hemiplegia of left nondominant side as late effect of cerebral infarction 03/17/2023    Flat foot 11/21/2019    Gastritis     Gastroesophageal reflux disease without esophagitis 07/13/2023    Gastrointestinal hemorrhage 12/13/2019    Post polypectomy bleeding    General anesthetics  "causing adverse effect in therapeutic use     Hammer toes of both feet 11/21/2019    Hx of diabetic foot ulcer 11/21/2019    Hypertension     Hyponatremia 01/11/2022    ELAINE (iron deficiency anemia) 12/10/2019    Intractable chronic migraine without aura and without status migrainosus 12/30/2021    Microcytic anemia 10/09/2019    Moderate aortic regurgitation 03/29/2023    Moderate mitral regurgitation 03/29/2023    Moderate mitral stenosis 03/29/2023    Moderate to severe aortic stenosis 02/28/2022    Myocardial infarct, old     Nicotine dependence, unspecified, uncomplicated 01/16/2022    Onychomycosis due to dermatophyte 11/21/2019    Orchitis 11/09/2019    PIPER on CPAP     Peripheral visual field defect, bilateral 01/13/2022    PVD (peripheral vascular disease) 11/21/2019    Seizures 2008    Stage 2 moderate COPD by GOLD classification     PFTs w/ FEV1 (73%) (9/2022)    Stroke 2005    Thoracic aortic atherosclerosis 10/17/2022    CT chest    Thyroid disease     Previously on pharmacologic Tx    Tinea pedis of right foot 05/21/2019    Tobacco use disorder     Type 2 diabetes mellitus with diabetic peripheral angiopathy without gangrene     A1c 5.1% (3/2023)    Vitamin B12 deficiency 03/24/2023      Objective:      Vitals:    06/11/25 1255   BP: (!) 104/58   BP Location: Right arm   Pulse: 78   Weight: 74.5 kg (164 lb 2.1 oz)   Height: 5' 10" (1.778 m)     Body mass index is 23.55 kg/m².    Physical Exam   Constitutional:       General: No acute distress.  HENT:      Head: Normocephalic and atraumatic.   Pulmonary:      Effort: Pulmonary effort is normal. No respiratory distress.   Neurological:      General: No focal deficit present.      Mental Status: Alert and oriented to person, place, and time. Mental status is at baseline.    Assessment:       1. Difficulty with speech    2. History of stroke    3. Hx of aortic valve replacement    4. Current use of long term anticoagulation          Ana Mayer " MD  [facility]  Office: [phone]   Fax: [phone]  06/11/2025      Disclaimer: This note was partly generated using dictation software which may occasionally result in transcription errors.    Total time spent on this encounter includes face to face time and non-face to face time preparing to see the patient (eg, review of tests), obtaining and/or reviewing separately obtained history, documenting clinical information in the electronic or other health record, independently interpreting results, and communicating results to the patient/family/caregiver, or care coordinator.      Visit today included increased complexity associated with the care of the episodic problem (see above) addressed and managing the longitudinal care of the patient due to the serious and/or complex managed problem(s) (see above).

## 2025-06-12 LAB — INR PPP: 1.1

## 2025-06-13 ENCOUNTER — PATIENT MESSAGE (OUTPATIENT)
Dept: CARDIOLOGY | Facility: CLINIC | Age: 60
End: 2025-06-13
Payer: COMMERCIAL

## 2025-06-13 ENCOUNTER — OFFICE VISIT (OUTPATIENT)
Dept: CARDIOLOGY | Facility: CLINIC | Age: 60
End: 2025-06-13
Payer: COMMERCIAL

## 2025-06-13 VITALS
HEART RATE: 103 BPM | DIASTOLIC BLOOD PRESSURE: 80 MMHG | BODY MASS INDEX: 23.07 KG/M2 | HEIGHT: 70 IN | SYSTOLIC BLOOD PRESSURE: 126 MMHG | WEIGHT: 161.19 LBS

## 2025-06-13 DIAGNOSIS — I10 ESSENTIAL HYPERTENSION: ICD-10-CM

## 2025-06-13 DIAGNOSIS — I25.10 CAD S/P PERCUTANEOUS CORONARY ANGIOPLASTY: ICD-10-CM

## 2025-06-13 DIAGNOSIS — I50.32 CHRONIC HEART FAILURE WITH PRESERVED EJECTION FRACTION: ICD-10-CM

## 2025-06-13 DIAGNOSIS — Z95.2 HX OF AORTIC VALVE REPLACEMENT: Primary | ICD-10-CM

## 2025-06-13 DIAGNOSIS — Z79.01 CURRENT USE OF LONG TERM ANTICOAGULATION: Primary | ICD-10-CM

## 2025-06-13 DIAGNOSIS — Z98.61 CAD S/P PERCUTANEOUS CORONARY ANGIOPLASTY: ICD-10-CM

## 2025-06-13 DIAGNOSIS — I63.9 RECURRENT STROKES: ICD-10-CM

## 2025-06-13 LAB
OHS QRS DURATION: 98 MS
OHS QTC CALCULATION: 482 MS

## 2025-06-13 PROCEDURE — 99999 PR PBB SHADOW E&M-EST. PATIENT-LVL III: CPT | Mod: PBBFAC,,,

## 2025-06-13 RX ORDER — WARFARIN SODIUM 5 MG/1
5 TABLET ORAL DAILY
Qty: 30 TABLET | Refills: 11 | Status: SHIPPED | OUTPATIENT
Start: 2025-06-13 | End: 2026-06-13

## 2025-06-13 NOTE — PROGRESS NOTES
Diagnoses and all orders for this visit:    Hx of aortic valve replacement  -     warfarin (COUMADIN) 5 MG tablet; Take 1 tablet (5 mg total) by mouth Daily. Dosing per [facility].        Ana Mayer MD  [facility]  Office: [phone]   Fax: [phone]  06/13/2025

## 2025-06-13 NOTE — PROGRESS NOTES
Patient verified he has coumadin as 5mg peach color tablet, he states he has 15 tablets currently at home, but has not picked up his actual Rx.    Patient education completed regarding vitamin K diet, when to contact Coumadin Clinic, and Coumadin must be taken after 5 pm. Diet plan is to eat high vitamin K foods per week 1-2x weekly. He does not drink alcohol. He takes a 81mg aspirin daily.    I spoke with [facility] ([place], phone: [phone] ) who confirmed INR was drawn yesterday with a result of 1.1. Also confirmed that [facility] advised pt to tale 10mg on 6/12; 5mg all other days and that [facility] has orders for an INR to be drawn on 6/19.

## 2025-06-13 NOTE — PROGRESS NOTES
Subjective:    Patient ID:  Ryan Crane is a 59 y.o. male patient here for evaluation Hospital Follow Up    History of Present Illness:     Pt of Dr. Aranda here today for a hospital follow up for CVA. CT head and MRI with no acute stroke, but chronic infarct and microvascular ischemic change, [place] [facility] [place] [facility]. Also with recent bioprosthetic aortic and mitral valve replacements in 3/2025. Was supposed to be on coumadin for 4 weeks after but did not start until about a week ago.   BP well controlled. Does complaint of some chest pain that is intermittent. Chronically SOB due to COPD.       Most Recent Echocardiogram Results  Results for orders placed during the hospital encounter of 10/03/24    Echo    Interpretation Summary    Left Ventricle: The left ventricle is normal in size. Mildly increased wall thickness. There is normal systolic function with a visually estimated ejection fraction of 65 - 70%. Grade I diastolic dysfunction.    Right Ventricle: Normal right ventricular cavity size. Wall thickness is normal. Systolic function is normal.    Left Atrium: Left atrium is severely dilated.    Aortic Valve: There is moderate aortic valve sclerosis. Moderately restricted motion. There is moderate to severe stenosis. Aortic valve area by VTI is 1.3 cm². Aortic valve peak velocity is 3.8 m/s. Mean gradient is 30.2 mmHg. The dimensionless index is 0.45. There is mild to moderate aortic regurgitation.    Mitral Valve: There is moderate bileaflet sclerosis. Moderately thickened leaflets. There is moderate stenosis. The mean pressure gradient across the mitral valve is 11 mmHg at a heart rate of  bpm. There is mild regurgitation.    Pulmonary Artery: There is mild to moderate pulmonary hypertension. The estimated pulmonary artery systolic pressure is 47 mmHg.    IVC/SVC: Normal venous pressure at 3 mmHg.      Most Recent Nuclear Stress Test Results  No results found for this or  any previous visit.      Most Recent Cardiac PET Stress Test Results  No results found for this or any previous visit.      Most Recent Cardiovascular Angiogram results  No results found for this or any previous visit.      Other Most Recent Cardiology Results  Results for orders placed in visit on 05/30/25    Cardiac device check - Remote      REVIEW OF SYSTEMS: As noted in HPI   CARDIOVASCULAR: No recent chest pain, palpitations, arm/neck/jaw pain, or edema.  RESPIRATORY: No recent fever, cough, SOB.  : No blood in the urine  GI: No reflux, nausea, vomiting, or blood in stool.   MUSCULOSKELETAL: No falls.   NEURO: No headaches, syncope, or dizziness.  EYES: No sudden changes in vision.     Past Medical History:   Diagnosis Date    Aortic valve stenosis 02/28/2022    Arachnoid cyst of posterior cranial fossa 01/13/2022    Benign prostatic hyperplasia without lower urinary tract symptoms 05/25/2023    Bilateral carotid bruits 06/22/2021    Bipolar disorder 01/16/2022    CAD S/P percutaneous coronary angioplasty     3 vessel disease    Calculus of gallbladder without cholecystitis without obstruction 01/11/2023    Cancer     Candidal intertrigo 11/11/2019    Cataract     Chronic heart failure with preserved ejection fraction 03/29/2023    Chronic schizophrenia     Colon polyps     Diabetic polyneuropathy associated with diabetes mellitus due to underlying condition 11/21/2019    Dysarthria as late effect of cerebellar cerebrovascular accident (CVA) 03/24/2023    Equinus deformity of both feet 11/21/2019    Erectile dysfunction due to arterial insufficiency 05/25/2023    Flaccid hemiplegia of left nondominant side as late effect of cerebral infarction 03/17/2023    Flat foot 11/21/2019    Gastritis     Gastroesophageal reflux disease without esophagitis 07/13/2023    Gastrointestinal hemorrhage 12/13/2019    Post polypectomy bleeding    General anesthetics causing adverse effect in therapeutic use     Hammer toes of  both feet 11/21/2019    Hx of diabetic foot ulcer 11/21/2019    Hypertension     Hyponatremia 01/11/2022    ELAINE (iron deficiency anemia) 12/10/2019    Intractable chronic migraine without aura and without status migrainosus 12/30/2021    Microcytic anemia 10/09/2019    Moderate aortic regurgitation 03/29/2023    Moderate mitral regurgitation 03/29/2023    Moderate mitral stenosis 03/29/2023    Moderate to severe aortic stenosis 02/28/2022    Myocardial infarct, old     Nicotine dependence, unspecified, uncomplicated 01/16/2022    Onychomycosis due to dermatophyte 11/21/2019    Orchitis 11/09/2019    PIPER on CPAP     Peripheral visual field defect, bilateral 01/13/2022    PVD (peripheral vascular disease) 11/21/2019    Seizures 2008    Stage 2 moderate COPD by GOLD classification     PFTs w/ FEV1 (73%) (9/2022)    Stroke 2005    Thoracic aortic atherosclerosis 10/17/2022    CT chest    Thyroid disease     Previously on pharmacologic Tx    Tinea pedis of right foot 05/21/2019    Tobacco use disorder     Type 2 diabetes mellitus with diabetic peripheral angiopathy without gangrene     A1c 5.1% (3/2023)    Vitamin B12 deficiency 03/24/2023     Past Surgical History:   Procedure Laterality Date    ANGIOGRAM, CORONARY, WITH LEFT HEART CATHETERIZATION  08/25/2022    Procedure: Angiogram, Coronary, with Left Heart Cath;  Surgeon: Mai Deleon MD;  Location: [facility] CATH;  Service: Cardiology;;    APPENDECTOMY      BONE BIOPSY Right 12/19/2023    Procedure: Biopsy-Bone;  Surgeon: Дмитрий Pratt DPM;  Location: [facility] OR;  Service: Podiatry;  Laterality: Right;    BONE EXOSTOSIS EXCISION Right 11/17/2023    Procedure: EXCISION, EXOSTOSIS;  Surgeon: Дмитрий Pratt DPM;  Location: [facility] OR;  Service: Podiatry;  Laterality: Right;    BONE MARROW ASPIRATION Left 08/21/2020    Procedure: ASPIRATION, BONE MARROW;  Surgeon: Lex Kathleen MD;  Location: [facility] OR;  Service: Oncology;  Laterality: Left;    CAROTID ENDARTERECTOMY Right      CHOLECYSTECTOMY      CLOSURE OF WOUND Right 09/09/2019    Procedure: CLOSURE, WOUND;  Surgeon: Li Kathleen DPM;  Location: [facility] OR;  Service: Podiatry;  Laterality: Right;    COLONOSCOPY N/A 12/10/2019    Procedure: COLONOSCOPY;  Surgeon: Ryan Lucas MD;  Location: [facility] ENDO;  Service: Endoscopy;  Laterality: N/A;    COLONOSCOPY N/A 12/13/2019    Procedure: COLONOSCOPY;  Surgeon: Ryan Lucas MD;  Location: [facility] ENDO;  Service: Endoscopy;  Laterality: N/A;    CORONARY STENT PLACEMENT      ESOPHAGOGASTRODUODENOSCOPY N/A 12/10/2019    Procedure: EGD (ESOPHAGOGASTRODUODENOSCOPY);  Surgeon: Ryan Lucas MD;  Location: [facility] ENDO;  Service: Endoscopy;  Laterality: N/A;    ESOPHAGOGASTRODUODENOSCOPY N/A 11/03/2022    Procedure: EGD (ESOPHAGOGASTRODUODENOSCOPY);  Surgeon: Ryan Lucas MD;  Location: [facility] ENDO;  Service: Endoscopy;  Laterality: N/A;    INCISION AND DRAINAGE FOOT Right 12/19/2023    Procedure: INCISION AND DRAINAGE, FOOT- RIGHT;  Surgeon: Дмитрий Pratt DPM;  Location: [facility] OR;  Service: Podiatry;  Laterality: Right;    INJECTION OF ANESTHETIC AGENT AROUND MEDIAL BRANCH NERVES INNERVATING LUMBAR FACET JOINT Bilateral 07/25/2023    Procedure: Block-nerve-medial branch-lumbar;  Surgeon: Napoleon Grimaldo MD;  Location: [facility] OR;  Service: Pain Management;  Laterality: Bilateral;  L3,4,5 MBB    INJECTION OF ANESTHETIC AGENT AROUND MEDIAL BRANCH NERVES INNERVATING LUMBAR FACET JOINT Bilateral 08/25/2023    Procedure: Block-nerve-medial branch-lumbar;  Surgeon: Napoleon Grimaldo MD;  [facility];  Service: Anesthesiology;  Laterality: Bilateral;  L3,4,5 MBB #2    INSERTION OF IMPLANTABLE LOOP RECORDER  08/24/2024    LAPAROSCOPIC CHOLECYSTECTOMY N/A 01/11/2023    Procedure: CHOLECYSTECTOMY, LAPAROSCOPIC;  Surgeon: Laith Davis MD;  Location: [facility];  Service: General;  Laterality: N/A;    LEFT HEART CATHETERIZATION Left 08/25/2022    Procedure: Left heart cath;   Surgeon: Mai Deleon MD;  Location: [facility] CATH;  Service: Cardiology;  Laterality: Left;    RADIOFREQUENCY ABLATION OF LUMBAR MEDIAL BRANCH NERVE AT SINGLE LEVEL Bilateral 10/31/2023    Procedure: Radiofrequency Ablation, Nerve, Spinal, Lumbar, Medial Branch, 1 Level;  Surgeon: Napoleon Grimaldo MD;  Location: [facility] OR;  Service: Anesthesiology;  Laterality: Bilateral;  L3,4,5 RFA    right heel surgery      SHOULDER ARTHROSCOPY Left     UPPER GASTROINTESTINAL ENDOSCOPY       Social History[1]      Objective      Vitals:    06/13/25 1049   BP: 126/80   Pulse: 103       LAST EKG  Results for orders placed or performed during the hospital encounter of 12/17/23   EKG 12-lead    Collection Time: 12/23/23  5:56 PM    Narrative    Test Reason : R07.89,    Vent. Rate : 114 BPM     Atrial Rate : 114 BPM     P-R Int : 138 ms          QRS Dur : 088 ms      QT Int : 350 ms       P-R-T Axes : 070 010 063 degrees     QTc Int : 482 ms    Poor data quality  Sinus tachycardia  Minimal voltage criteria for LVH, may be normal variant  Left atrial enlargement  Abnormal ECG      Confirmed by Gosia VILLEGAS, Martin HENSON ([id number]) on 1/2/2024 4:07:15 PM    Referred By: [email or web address]   SELF           Confirmed By:Martin Elise MD     LIPIDS - LAST 2   Lab Results   Component Value Date    CHOL 96 12/26/2024    CHOL 99 11/27/2024    HDL 47 12/26/2024    HDL 45 11/27/2024    LDLCALC 40 12/26/2024    LDLCALC 43 11/27/2024    TRIG 47 12/26/2024    TRIG 53 11/27/2024    CHOLHDL 2 12/26/2024    CHOLHDL 2.2 11/27/2024     CARDIAC PROFILE - LAST 2  Lab Results   Component Value Date    TROPONINI <0.012 04/21/2023    TROPONINI 0.016 04/21/2023      CBC - LAST 2  Lab Results   Component Value Date    WBC 10.06 04/25/2025    WBC 6.84 06/24/2024    HGB 11.2 (L) 04/25/2025    HGB 10.4 (L) 06/24/2024    HCT 36.6 (L) 04/25/2025    HCT 32.0 (L) 06/24/2024     04/25/2025     06/24/2024     Lab Results   Component Value Date    LABPT 14.1 04/05/2023    LABPT  14.3 03/05/2023    INR 1.1 04/05/2023    INR 1.1 03/05/2023    APTT 29.3 04/05/2023    APTT 38.9 (H) 03/05/2023     CHEMISTRY - LAST 2  Lab Results   Component Value Date     06/08/2025     06/03/2025    K 4.2 06/08/2025    K 4 06/03/2025    CO2 28 06/08/2025    CO2 24 06/03/2025    BUN 20 06/08/2025    BUN 10 06/03/2025    CREATININE 0.99 06/08/2025    CREATININE 0.91 06/03/2025    GLU 96 04/25/2025    GLU 91 10/09/2024    CALCIUM 9.3 06/08/2025    CALCIUM 8.7 (L) 06/03/2025    MG 2.0 04/03/2024    MG 2.5 04/05/2023    ALBUMIN 3.5 06/03/2025    ALBUMIN 3.3 (L) 06/02/2025    ALT 8 06/03/2025    ALT 8 06/02/2025    AST 12 06/03/2025    AST 12 06/02/2025      ENDOCRINE - LAST 2  Lab Results   Component Value Date    HGBA1C 5.2 06/01/2025    HGBA1C 5.3 04/25/2025    TSH 1.140 03/05/2023    TSH 1.563 08/04/2022        PHYSICAL EXAM  CONSTITUTIONAL: Well built, well nourished in no apparent distress  NECK: no carotid bruit, no JVD  LUNGS: CTA  CHEST WALL: no tenderness  HEART: regular rate and rhythm, S1, S2 normal, no murmur, click, rub or gallop   ABDOMEN: soft, non-tender; bowel sounds normal; no masses,  no organomegaly  EXTREMITIES: Extremities normal, no edema, no calf tenderness noted  NEURO: AAO X 3    All pertinent data including labs, imaging, EKGs, and studies listed above were reviewed.     Current Outpatient Medications   Medication Instructions    AJOVY AUTOINJECTOR 225 mg, Subcutaneous, Every 28 days    albuterol (PROVENTIL/VENTOLIN HFA) 90 mcg/actuation inhaler 2 puffs, Inhalation, Every 6 hours PRN    albuterol-ipratropium (DUO-NEB) 2.5 mg-0.5 mg/3 mL nebulizer solution 3 mLs, Nebulization, Every 6 hours PRN, Rescue    aspirin 81 mg, Daily    atorvastatin (LIPITOR) 80 mg, Oral, Daily    butalbital-acetaminophen-caffeine -40 mg (FIORICET, ESGIC) -40 mg per tablet Take 1 tablet by mouth as needed for migraine. No more than 10 tablets per month.    clopidogreL (PLAVIX) 75 mg, Oral,  Daily    cyanocobalamin 1,000 mcg/mL injection 1 ml sq daily x 5 days, then 1 ml sq weekly x 4 weeks then q monthly -  will need to go to PCP    EPINEPHrine (EPIPEN) 0.3 mg, Intramuscular, As needed (PRN)    fluticasone-umeclidin-vilanter (TRELEGY ELLIPTA) 200-62.5-25 mcg inhaler 1 puff, Inhalation, Daily    hydrOXYzine pamoate (VISTARIL) 50 mg, Oral, Nightly    lamoTRIgine (LAMICTAL) 200 mg, Daily    LIDOcaine (LIDODERM) 5 % 1 patch    nicotine (NICODERM CQ) 7 mg/24 hr 1 patch, Transdermal, Daily    nicotine, polacrilex, (NICORETTE) 2 mg Gum Take up to 8 pieces daily as needed    nitroGLYCERIN (NITROSTAT) 0.4 mg, Sublingual, Every 5 min PRN    nystatin (MYCOSTATIN) powder Apply topically.    pantoprazole (PROTONIX) 40 mg, Oral, Daily    pregabalin (LYRICA) 75 mg, Oral, 3 times daily    promethazine (PHENERGAN) 25 mg, Oral, Every 8 hours PRN    ranolazine (RANEXA) 500 mg, Oral, 2 times daily    REXULTI 4 mg, Daily    tadalafiL (CIALIS) 20 mg, Oral, Daily    topiramate (TOPAMAX) 200 mg, Oral, Nightly    traZODone (DESYREL) 50 mg, Oral, Nightly    ubrogepant (UBRELVY) 100 mg tablet Take 1 tablet by mouth as needed for migraine. May repeat in 2 hours if needed. Max 2 tab per day        Assessment & Plan   1. Current use of long term anticoagulation (Primary)  Coumadin as per CTS   Continue DAPT for now     2. CAD S/P percutaneous coronary angioplasty  [facility] 11/2024 with nonobstructive disease   Pt with some chest pain, will repeat nuclear stress test     3. Chronic heart failure with preserved ejection fraction  Euvolemic   Repeat echo prior to next follow up given recent valve surgery     4. Essential hypertension  BP well controlled     5. Recurrent strokes  Amb ref to neurology   ILR without any afib   On DAPT and coumadin     6. S/p AVR and MVR   Contact CTS about conitnued use of coumadin Dr. Ma in March       AS scheduled with Dr. Manoj Dietz PA-C   [facility]   Office:  [phone]         [1]   Social History  Tobacco Use    Smoking status: Former     Passive exposure: Never    Smokeless tobacco: Never    Tobacco comments:     Age Started 14    Substance Use Topics    Alcohol use: No    Drug use: Not Currently     Types: Marijuana     Comment: ocasionally - once every 6 months

## 2025-06-16 ENCOUNTER — TELEPHONE (OUTPATIENT)
Dept: FAMILY MEDICINE | Facility: CLINIC | Age: 60
End: 2025-06-16
Payer: COMMERCIAL

## 2025-06-16 NOTE — TELEPHONE ENCOUNTER
Copied from CRM #[id number]. Topic: Appointments - Appointment Confirmation  >> Jun 16, 2025 10:38 AM Maura wrote:  Type:  Needs Medical Advice    Who Called: vania   Symptoms (please be specific):    How long has patient had these symptoms:    Pharmacy name and phone #:    Would the patient rather a call back or a response via [facility]? [phone]  Best Call Back Number:   Additional Information: [facility] pt has an INR from [facility] for Thursday, and they have got an order from the provider they wanted to know which office will be following th INR

## 2025-06-16 NOTE — TELEPHONE ENCOUNTER
I was not aware he was already following / [facility] - he can stay there if that is his preference.

## 2025-06-16 NOTE — TELEPHONE ENCOUNTER
Pt lives in [place]. She has PT/INR orders and notes that these are from the [facility]. Woule you like them to cont following pt's Coumadin and lab orders, or would you prefer to follow and order pt's labs?

## 2025-06-19 ENCOUNTER — PATIENT MESSAGE (OUTPATIENT)
Dept: CARDIOLOGY | Facility: HOSPITAL | Age: 60
End: 2025-06-19
Payer: COMMERCIAL

## 2025-06-25 ENCOUNTER — PATIENT MESSAGE (OUTPATIENT)
Dept: ADMINISTRATIVE | Facility: CLINIC | Age: 60
End: 2025-06-25
Payer: COMMERCIAL

## 2025-06-25 ENCOUNTER — PATIENT OUTREACH (OUTPATIENT)
Dept: ADMINISTRATIVE | Facility: CLINIC | Age: 60
End: 2025-06-25
Payer: COMMERCIAL

## 2025-06-25 ENCOUNTER — TELEPHONE (OUTPATIENT)
Dept: SMOKING CESSATION | Facility: CLINIC | Age: 60
End: 2025-06-25
Payer: COMMERCIAL

## 2025-06-25 NOTE — PROGRESS NOTES
C3 nurse attempted to contact Ryan Crane  for a TCC post hospital discharge follow up call. No answer. LVM requesting a callback at [phone].    The patient does not have a scheduled HOSFU appointment. Message sent to PCP's staff to assist with HOSFU appointment scheduling.

## 2025-06-26 NOTE — PROGRESS NOTES
3rd attempt-C3 nurse attempted to contact Ryan Crane  for a TCC post hospital discharge follow up call. No answer.  The patient does not have a scheduled HOSFU appointment. Message previously sent to PCP's staff to assist with HOSFU appointment scheduling.

## 2025-07-08 ENCOUNTER — TELEPHONE (OUTPATIENT)
Dept: SMOKING CESSATION | Facility: CLINIC | Age: 60
End: 2025-07-08
Payer: COMMERCIAL

## 2025-07-11 DIAGNOSIS — G43.719 INTRACTABLE CHRONIC MIGRAINE WITHOUT AURA AND WITHOUT STATUS MIGRAINOSUS: Chronic | ICD-10-CM

## 2025-07-11 DIAGNOSIS — G43.719 INTRACTABLE CHRONIC MIGRAINE WITHOUT AURA AND WITHOUT STATUS MIGRAINOSUS: ICD-10-CM

## 2025-07-11 RX ORDER — UBROGEPANT 100 MG/1
TABLET ORAL
Qty: 8 TABLET | Refills: 11 | Status: CANCELLED | OUTPATIENT
Start: 2025-07-11

## 2025-07-11 RX ORDER — BUTALBITAL, ACETAMINOPHEN AND CAFFEINE 50; 325; 40 MG/1; MG/1; MG/1
TABLET ORAL
Qty: 10 TABLET | Refills: 0 | Status: SHIPPED | OUTPATIENT
Start: 2025-07-11

## 2025-07-21 ENCOUNTER — OFFICE VISIT (OUTPATIENT)
Dept: FAMILY MEDICINE | Facility: CLINIC | Age: 60
End: 2025-07-21
Payer: COMMERCIAL

## 2025-07-21 VITALS
HEIGHT: 70 IN | BODY MASS INDEX: 23.17 KG/M2 | DIASTOLIC BLOOD PRESSURE: 70 MMHG | HEART RATE: 90 BPM | WEIGHT: 161.81 LBS | SYSTOLIC BLOOD PRESSURE: 118 MMHG

## 2025-07-21 DIAGNOSIS — Z79.899 ENCOUNTER FOR MONITORING LONG-TERM PROTON PUMP INHIBITOR THERAPY: ICD-10-CM

## 2025-07-21 DIAGNOSIS — I25.10 CAD S/P PERCUTANEOUS CORONARY ANGIOPLASTY: Chronic | ICD-10-CM

## 2025-07-21 DIAGNOSIS — E08.42 DIABETIC POLYNEUROPATHY ASSOCIATED WITH DIABETES MELLITUS DUE TO UNDERLYING CONDITION: Chronic | ICD-10-CM

## 2025-07-21 DIAGNOSIS — J44.9 CHRONIC OBSTRUCTIVE PULMONARY DISEASE, UNSPECIFIED COPD TYPE: ICD-10-CM

## 2025-07-21 DIAGNOSIS — Z51.81 ENCOUNTER FOR MONITORING LONG-TERM PROTON PUMP INHIBITOR THERAPY: ICD-10-CM

## 2025-07-21 DIAGNOSIS — Z98.61 CAD S/P PERCUTANEOUS CORONARY ANGIOPLASTY: Chronic | ICD-10-CM

## 2025-07-21 DIAGNOSIS — K21.9 GASTROESOPHAGEAL REFLUX DISEASE WITHOUT ESOPHAGITIS: ICD-10-CM

## 2025-07-21 DIAGNOSIS — F51.01 PRIMARY INSOMNIA: ICD-10-CM

## 2025-07-21 PROCEDURE — G2211 COMPLEX E/M VISIT ADD ON: HCPCS | Mod: S$GLB,,, | Performed by: STUDENT IN AN ORGANIZED HEALTH CARE EDUCATION/TRAINING PROGRAM

## 2025-07-21 PROCEDURE — 1160F RVW MEDS BY RX/DR IN RCRD: CPT | Mod: CPTII,S$GLB,, | Performed by: STUDENT IN AN ORGANIZED HEALTH CARE EDUCATION/TRAINING PROGRAM

## 2025-07-21 PROCEDURE — 3072F LOW RISK FOR RETINOPATHY: CPT | Mod: CPTII,S$GLB,, | Performed by: STUDENT IN AN ORGANIZED HEALTH CARE EDUCATION/TRAINING PROGRAM

## 2025-07-21 PROCEDURE — 1159F MED LIST DOCD IN RCRD: CPT | Mod: CPTII,S$GLB,, | Performed by: STUDENT IN AN ORGANIZED HEALTH CARE EDUCATION/TRAINING PROGRAM

## 2025-07-21 PROCEDURE — 3044F HG A1C LEVEL LT 7.0%: CPT | Mod: CPTII,S$GLB,, | Performed by: STUDENT IN AN ORGANIZED HEALTH CARE EDUCATION/TRAINING PROGRAM

## 2025-07-21 PROCEDURE — 3008F BODY MASS INDEX DOCD: CPT | Mod: CPTII,S$GLB,, | Performed by: STUDENT IN AN ORGANIZED HEALTH CARE EDUCATION/TRAINING PROGRAM

## 2025-07-21 PROCEDURE — 3078F DIAST BP <80 MM HG: CPT | Mod: CPTII,S$GLB,, | Performed by: STUDENT IN AN ORGANIZED HEALTH CARE EDUCATION/TRAINING PROGRAM

## 2025-07-21 PROCEDURE — 99999 PR PBB SHADOW E&M-EST. PATIENT-LVL IV: CPT | Mod: PBBFAC,,, | Performed by: STUDENT IN AN ORGANIZED HEALTH CARE EDUCATION/TRAINING PROGRAM

## 2025-07-21 PROCEDURE — 99215 OFFICE O/P EST HI 40 MIN: CPT | Mod: S$GLB,,, | Performed by: STUDENT IN AN ORGANIZED HEALTH CARE EDUCATION/TRAINING PROGRAM

## 2025-07-21 PROCEDURE — 3074F SYST BP LT 130 MM HG: CPT | Mod: CPTII,S$GLB,, | Performed by: STUDENT IN AN ORGANIZED HEALTH CARE EDUCATION/TRAINING PROGRAM

## 2025-07-21 RX ORDER — RANOLAZINE 500 MG/1
500 TABLET, EXTENDED RELEASE ORAL 2 TIMES DAILY
Qty: 180 TABLET | Refills: 3 | Status: SHIPPED | OUTPATIENT
Start: 2025-07-21 | End: 2026-07-21

## 2025-07-21 RX ORDER — ATORVASTATIN CALCIUM 80 MG/1
80 TABLET, FILM COATED ORAL DAILY
Qty: 90 TABLET | Refills: 3 | Status: SHIPPED | OUTPATIENT
Start: 2025-07-21

## 2025-07-21 RX ORDER — PANTOPRAZOLE SODIUM 40 MG/1
40 TABLET, DELAYED RELEASE ORAL DAILY
Qty: 90 TABLET | Refills: 3 | Status: SHIPPED | OUTPATIENT
Start: 2025-07-21 | End: 2026-07-21

## 2025-07-21 RX ORDER — PREGABALIN 75 MG/1
75 CAPSULE ORAL 3 TIMES DAILY
Qty: 90 CAPSULE | Refills: 2 | Status: SHIPPED | OUTPATIENT
Start: 2025-07-21 | End: 2025-10-19

## 2025-07-21 RX ORDER — FLUTICASONE FUROATE, UMECLIDINIUM BROMIDE AND VILANTEROL TRIFENATATE 200; 62.5; 25 UG/1; UG/1; UG/1
1 POWDER RESPIRATORY (INHALATION) DAILY
Qty: 180 EACH | Refills: 1 | Status: SHIPPED | OUTPATIENT
Start: 2025-07-21

## 2025-07-21 RX ORDER — IPRATROPIUM BROMIDE AND ALBUTEROL SULFATE 2.5; .5 MG/3ML; MG/3ML
3 SOLUTION RESPIRATORY (INHALATION) EVERY 6 HOURS PRN
Qty: 180 ML | Refills: 1 | Status: SHIPPED | OUTPATIENT
Start: 2025-07-21

## 2025-07-21 RX ORDER — TRAZODONE HYDROCHLORIDE 50 MG/1
50 TABLET ORAL NIGHTLY
Qty: 90 TABLET | Refills: 3 | Status: SHIPPED | OUTPATIENT
Start: 2025-07-21 | End: 2026-07-21

## 2025-07-21 RX ORDER — ALBUTEROL SULFATE 90 UG/1
2 INHALANT RESPIRATORY (INHALATION) EVERY 6 HOURS PRN
Qty: 54 G | Refills: 1 | Status: SHIPPED | OUTPATIENT
Start: 2025-07-21

## 2025-07-21 RX ORDER — HYDROXYZINE PAMOATE 50 MG/1
50 CAPSULE ORAL NIGHTLY
Qty: 90 CAPSULE | Refills: 1 | Status: SHIPPED | OUTPATIENT
Start: 2025-07-21 | End: 2026-01-17

## 2025-07-21 RX ORDER — CLOPIDOGREL BISULFATE 75 MG/1
75 TABLET ORAL DAILY
Qty: 90 TABLET | Refills: 3 | Status: SHIPPED | OUTPATIENT
Start: 2025-07-21 | End: 2026-07-21

## 2025-07-21 NOTE — PROGRESS NOTES
Plan:      Ryan was seen today for follow-up.    Diagnoses and all orders for this visit:    Primary insomnia  -     hydrOXYzine pamoate (VISTARIL) 50 MG Cap; Take 1 capsule (50 mg total) by mouth every evening.  -     traZODone (DESYREL) 50 MG tablet; Take 1 tablet (50 mg total) by mouth every evening.    Chronic obstructive pulmonary disease, unspecified COPD type  -     fluticasone-umeclidin-vilanter (TRELEGY ELLIPTA) 200-62.5-25 mcg inhaler; Inhale 1 puff into the lungs once daily.  -     albuterol (PROVENTIL/VENTOLIN HFA) 90 mcg/actuation inhaler; Inhale 2 puffs into the lungs every 6 (six) hours as needed for Shortness of Breath or Wheezing.  -     albuterol-ipratropium (DUO-NEB) 2.5 mg-0.5 mg/3 mL nebulizer solution; Take 3 mLs by nebulization every 6 (six) hours as needed for Wheezing. Rescue    Encounter for monitoring long-term proton pump inhibitor therapy  -     pantoprazole (PROTONIX) 40 MG tablet; Take 1 tablet (40 mg total) by mouth once daily.    Gastroesophageal reflux disease without esophagitis  -     pantoprazole (PROTONIX) 40 MG tablet; Take 1 tablet (40 mg total) by mouth once daily.    Diabetic polyneuropathy associated with diabetes mellitus due to underlying condition  -     pregabalin (LYRICA) 75 MG capsule; Take 1 capsule (75 mg total) by mouth 3 (three) times daily.    CAD S/P percutaneous coronary angioplasty  -     atorvastatin (LIPITOR) 80 MG tablet; Take 1 tablet (80 mg total) by mouth once daily.  -     clopidogreL (PLAVIX) 75 mg tablet; Take 1 tablet (75 mg total) by mouth once daily.  -     ranolazine (RANEXA) 500 MG Tb12; Take 1 tablet (500 mg total) by mouth 2 (two) times daily.      Follow up if symptoms worsen or fail to improve.    Ana Mayer MD  07/21/2025    Subjective:      Patient ID: Ryan Crane is a 59 y.o. male    Chief Complaint   Patient presents with    Follow-up     [facility] for a stroke months ago     HPI  59 y.o. male with a PMHx as  documented below presents to clinic today for the following:    Hospital follow-up. S/p CABG + aortic valve replacement, mitral valve replacement, and temporary pacemaker placement 3/24/35 at [place]. Subsequent hospitalization for acute CVA on 5/31/25. S/p anticoagulation w/ warfarin in addition to DAPT x1 month (now on DAPT only). Currently living at his daughter's house.      Reports compliance w/  prescribed medications.      Current med list unclear - will need pt to bring medications to follow-up appt for review.  ________________________________________________     Chronic schizophrenia:   - Not currently on any medications? Abilify, Lamictal, Remeron last filled in March/April 2025  - Previous Rx: Buspar, Geodon, Ambien, Rexulti 4 mg qhs, Trileptal, Klonopin, Atarax, Abilify 15 mg qhs, Lamictal 200 mg daily, Remeron 7.5 mg daily  - Following w/ Psychiatry (Paulina Mendez NP)     Subacute thoracic and lumbar back pain:   - MRI lumbar spine w/o contrast (6/12/23): Multilevel degenerative changes of the lumbar spine without more than mild spinal canal/recess narrowing there is shallow disc bulging is noted at multiple levels. No lateralizing disc herniation. Mild to moderate right foraminal narrowing at L4-L5.  - MRI thoracic spine w/o contrast (6/12/23): Mild degenerative changes as discussed above.  The spinal canal and foramina are patent throughout.  No cord impingement or acute process  - S/p eval w/ Dr. Shirley Dowd [facility]/ [facility] - recommending medial branch blocks and subsequent radiofrequency ablation as indicated of the L4-5 and L5-S1 facet joints bilaterally (8/25/23), considering future epidural steroid injections in the midthoracic region  - Lyrica 75 mg TID     Hx of CVA; hemiplegia and hemiparesis following CVA affecting non-dominant side:   - ASA 81 mg daily, Plavix 75 mg daily, Lipitor 80 mg daily     CAD:   - ASA 81 mg daily, Plavix 75 mg daily, Lipitor 80 mg daily, Ranexa 500 mg  "BID  - Nitro 0.4 mg SL q5min PRN     HTN:  - Previous Rx: Amlodipine 2.5 mg daily, Toprol-XL 25 mg daily - no longer taking? Last filled April 2025     HFpEF:   - Lasix 20 mg BID + KCL 20 mEq 4x/day - no longer taking? Last filled April 2025  - TTE (3/4/23) w/ EF 60% and grade I left ventricular diastolic dysfunction     PVD:   - ASA 81 mg daily,  Plavix 75 mg daily, Lipitor 80 mg daily  - BLE arterial US (3/7/23): "There appear to be waveform changes throughout the bilateral lower extremity arterial system, more evident in the distal right lower extremity arteries which may indicate some degree of vascular insufficiency or more proximal stenosis. However, no doubling of peak systolic velocities to suggest greater than 50% stenosis is sonographically demonstrated."     COPD, GOLD 2:   - PFTs w/ FEV1 (73%) (9/2022)  - Trelegy 200-62.5 mcg, 1 puff once daily  - Albuterol PRN     Allergic rhinitis:   - Claritin 10 mg daily PRN, Flonase daily PRN     Hx of DMT2 w/ diabetic polyneuropathy:   - Most recent Hgb A1c 5.2% (6/1/25)  - Previous Rx: Lyrica 50 mg TID      Migraine headaches:   - Ajovy 225 mg monthly injections (switched from Emgality 5/30/23), Ubrelvy PRN, Fioricet PRN (max 10 per month)  - Previous Rx: Topamax 200 mg qhs     B12 deficiency:   - Vitamin B12 407 on 4/25/25  - B12 1000 mcg IM monthly     ED:   - Previous Rx: Cialis     Hx of hyponatremia:   - Previous Rx: NaCl 1000 mg TID      GERD:   - Protonix 40 mg daily      Insomnia:   - Trazodone 100 mg qhs, Vistaril 50 mg qhs  - Previous Rx: Ambien    ROS  Constitutional:  Negative for chills and fever.   Respiratory:  Negative for shortness of breath.    Cardiovascular:  Negative for chest pain.   Gastrointestinal:  Negative for abdominal pain, constipation, diarrhea, nausea and vomiting.     Current Outpatient Medications   Medication Instructions    AJOVY AUTOINJECTOR 225 mg, Subcutaneous, Every 28 days    albuterol (PROVENTIL/VENTOLIN HFA) 90 " mcg/actuation inhaler 2 puffs, Inhalation, Every 6 hours PRN    albuterol-ipratropium (DUO-NEB) 2.5 mg-0.5 mg/3 mL nebulizer solution 3 mLs, Nebulization, Every 6 hours PRN, Rescue    aspirin 81 mg, Daily    atorvastatin (LIPITOR) 80 mg, Oral, Daily    butalbital-acetaminophen-caffeine -40 mg (FIORICET, ESGIC) -40 mg per tablet Take 1 tablet by mouth as needed for migraine. No more than 10 tablets per month.    clopidogreL (PLAVIX) 75 mg, Oral, Daily    EPINEPHrine (EPIPEN) 0.3 mg, Intramuscular, As needed (PRN)    fluticasone-umeclidin-vilanter (TRELEGY ELLIPTA) 200-62.5-25 mcg inhaler 1 puff, Inhalation, Daily    hydrOXYzine pamoate (VISTARIL) 50 mg, Oral, Nightly    nicotine (NICODERM CQ) 7 mg/24 hr 1 patch, Transdermal, Daily    nicotine, polacrilex, (NICORETTE) 2 mg Gum Take up to 8 pieces daily as needed    nitroGLYCERIN (NITROSTAT) 0.4 mg, Sublingual, Every 5 min PRN    pantoprazole (PROTONIX) 40 mg, Oral, Daily    pregabalin (LYRICA) 75 mg, Oral, 3 times daily    promethazine (PHENERGAN) 25 mg, Oral, Every 8 hours PRN    ranolazine (RANEXA) 500 mg, Oral, 2 times daily    tadalafiL (CIALIS) 20 mg, Oral, Daily    topiramate (TOPAMAX) 200 mg, Oral, Nightly    traZODone (DESYREL) 50 mg, Oral, Nightly    ubrogepant (UBRELVY) 100 mg tablet Take 1 tablet by mouth as needed for migraine. May repeat in 2 hours if needed. Max 2 tab per day    warfarin (COUMADIN) 5 mg, Oral, Daily, Dosing per [facility].      Past Medical History:   Diagnosis Date    Aortic valve stenosis 02/28/2022    Arachnoid cyst of posterior cranial fossa 01/13/2022    Benign prostatic hyperplasia without lower urinary tract symptoms 05/25/2023    Bilateral carotid bruits 06/22/2021    Bipolar disorder 01/16/2022    CAD S/P percutaneous coronary angioplasty     3 vessel disease    Calculus of gallbladder without cholecystitis without obstruction 01/11/2023    Cancer     Candidal intertrigo 11/11/2019    Cataract     Chronic heart  failure with preserved ejection fraction 03/29/2023    Chronic schizophrenia     Colon polyps     Diabetic polyneuropathy associated with diabetes mellitus due to underlying condition 11/21/2019    Dysarthria as late effect of cerebellar cerebrovascular accident (CVA) 03/24/2023    Equinus deformity of both feet 11/21/2019    Erectile dysfunction due to arterial insufficiency 05/25/2023    Flaccid hemiplegia of left nondominant side as late effect of cerebral infarction 03/17/2023    Flat foot 11/21/2019    Gastritis     Gastroesophageal reflux disease without esophagitis 07/13/2023    Gastrointestinal hemorrhage 12/13/2019    Post polypectomy bleeding    General anesthetics causing adverse effect in therapeutic use     Hammer toes of both feet 11/21/2019    Hx of diabetic foot ulcer 11/21/2019    Hypertension     Hyponatremia 01/11/2022    ELAINE (iron deficiency anemia) 12/10/2019    Intractable chronic migraine without aura and without status migrainosus 12/30/2021    Microcytic anemia 10/09/2019    Moderate aortic regurgitation 03/29/2023    Moderate mitral regurgitation 03/29/2023    Moderate mitral stenosis 03/29/2023    Moderate to severe aortic stenosis 02/28/2022    Myocardial infarct, old     Nicotine dependence, unspecified, uncomplicated 01/16/2022    Onychomycosis due to dermatophyte 11/21/2019    Orchitis 11/09/2019    PIPER on CPAP     Peripheral visual field defect, bilateral 01/13/2022    PVD (peripheral vascular disease) 11/21/2019    Seizures 2008    Stage 2 moderate COPD by GOLD classification     PFTs w/ FEV1 (73%) (9/2022)    Stroke 2005    Thoracic aortic atherosclerosis 10/17/2022    CT chest    Thyroid disease     Previously on pharmacologic Tx    Tinea pedis of right foot 05/21/2019    Tobacco use disorder     Type 2 diabetes mellitus with diabetic peripheral angiopathy without gangrene     A1c 5.1% (3/2023)    Vitamin B12 deficiency 03/24/2023        Objective:      Vitals:    07/21/25 1342  "  BP: 118/70   BP Location: Left arm   Patient Position: Sitting   Pulse: 90   Weight: 73.4 kg (161 lb 13.1 oz)   Height: 5' 10" (1.778 m)     Body mass index is 23.22 kg/m².    Physical Exam   Constitutional:       General: No acute distress.  HENT:      Head: Normocephalic and atraumatic.   Pulmonary:      Effort: Pulmonary effort is normal. No respiratory distress.   Neurological:      General: No focal deficit present.      Mental Status: Alert and oriented to person, place, and time. Mental status is at baseline.    Assessment:       1. Primary insomnia    2. Chronic obstructive pulmonary disease, unspecified COPD type    3. Encounter for monitoring long-term proton pump inhibitor therapy    4. Gastroesophageal reflux disease without esophagitis    5. Diabetic polyneuropathy associated with diabetes mellitus due to underlying condition    6. CAD S/P percutaneous coronary angioplasty        Ana Mayer MD  [facility]  Office: [phone]   Fax: [phone]  07/21/2025      Disclaimer: This note was partly generated using dictation software which may occasionally result in transcription errors.    Total time spent on this encounter includes face to face time and non-face to face time preparing to see the patient (eg, review of tests), obtaining and/or reviewing separately obtained history, documenting clinical information in the electronic or other health record, independently interpreting results, and communicating results to the patient/family/caregiver, or care coordinator.      Visit today included increased complexity associated with the care of the episodic problem (see above) addressed and managing the longitudinal care of the patient due to the serious and/or complex managed problem(s) (see above).   "

## 2025-07-28 ENCOUNTER — PATIENT MESSAGE (OUTPATIENT)
Dept: ADMINISTRATIVE | Facility: HOSPITAL | Age: 60
End: 2025-07-28
Payer: COMMERCIAL

## 2025-08-06 ENCOUNTER — TELEPHONE (OUTPATIENT)
Dept: ADMINISTRATIVE | Facility: CLINIC | Age: 60
End: 2025-08-06
Payer: COMMERCIAL

## 2025-08-06 NOTE — TELEPHONE ENCOUNTER
Called pt; no answer; could not confirm or leave a message due to line kept ringing; I was calling to confirm pt's 8/11/25 home visit for eawv appt and to make sure the appt date still worked for pt.

## 2025-08-17 ENCOUNTER — EXTERNAL HOME HEALTH (OUTPATIENT)
Dept: HOME HEALTH SERVICES | Facility: HOSPITAL | Age: 60
End: 2025-08-17
Payer: COMMERCIAL

## (undated) DEVICE — PENCIL ROCKER SWITCH 10FT CORD

## (undated) DEVICE — GAUZE SPONGE 4X4 12PLY

## (undated) DEVICE — GLOVE SENSICARE PI ALOE 7.5

## (undated) DEVICE — SPONGE DERMACEA GAUZE 4X4

## (undated) DEVICE — SEE L#120831

## (undated) DEVICE — BLADE SURG CARBON STEEL SZ11

## (undated) DEVICE — Device

## (undated) DEVICE — BANDAGE MATRIX HK LOOP 4IN 5YD

## (undated) DEVICE — LINER GLOVE POWDERFREE 8

## (undated) DEVICE — SPONGE GAUZE 4X4 12 PLY STRL

## (undated) DEVICE — STOCKINET TUBULAR 1 PLY 6X60IN

## (undated) DEVICE — GLOVE BIOGEL ECLIPSE SZ 8.5

## (undated) DEVICE — PAD CAST SPECIALIST STRL 4

## (undated) DEVICE — ELECTRODE REM PLYHSV RETURN 9

## (undated) DEVICE — TOURNIQUET SB QC DP 18X4IN

## (undated) DEVICE — DRESSING XEROFORM FOIL PK 1X8

## (undated) DEVICE — NDL RF CRV SHRP 20G 10CMX10MM

## (undated) DEVICE — GLOVE SURGEONS ULTRA TOUCH 6.5

## (undated) DEVICE — TROCAR ENDOPATH XCEL 5MM 7.5CM

## (undated) DEVICE — SUT MONOCYRL 4-0 PS2 UND

## (undated) DEVICE — SUT PROLENE 4-0 MONO 18IN

## (undated) DEVICE — NEPTUNE 4 PORT MANIFOLD

## (undated) DEVICE — PAD GROUNDING DISPER ELECTRODE

## (undated) DEVICE — GLOVE SURG BIOGEL LATEX SZ 7.5

## (undated) DEVICE — BANDAGE ESMARK ELASTIC ST 4X9

## (undated) DEVICE — BANDAGE ESMARK ELASTIC ST 6X9

## (undated) DEVICE — DRAPE ABDOMINAL TIBURON 14X11

## (undated) DEVICE — BANDAGE ROLL COTTN 4.5INX4.1YD

## (undated) DEVICE — COVER OVERHEAD SURG LT BLUE

## (undated) DEVICE — TROCAR KII FIOS 5MM X 100MM

## (undated) DEVICE — SUT 0 VICRYL / UR6 (J603)

## (undated) DEVICE — SEE MEDLINE ITEM 146313

## (undated) DEVICE — NDL SPINAL 25GX3.5 SPINOCAN

## (undated) DEVICE — SUT ETHILON 3-0 PS2 18 BLK

## (undated) DEVICE — SYR 10CC LUER LOCK

## (undated) DEVICE — SEE MEDLINE ITEM 153151

## (undated) DEVICE — BLADE SURG #15 CARBON STEEL

## (undated) DEVICE — SYS LABEL CORRECT MED

## (undated) DEVICE — CHLORAPREP 10.5 ML APPLICATOR

## (undated) DEVICE — SUT VICRYL PLUS 4-0 P3 18IN

## (undated) DEVICE — SEE MEDLINE ITEM 157131

## (undated) DEVICE — STRAP OR TABLE 5IN X 72IN

## (undated) DEVICE — SEE MEDLINE ITEM 152622

## (undated) DEVICE — MARKER SKIN RULER STERILE

## (undated) DEVICE — TRAY VAG PREP

## (undated) DEVICE — SEE MEDLINE ITEM 157128

## (undated) DEVICE — GLOVE BIOGEL ECLIPSE SZ 7.5

## (undated) DEVICE — PAD PREP CUFFED NS 24X48IN

## (undated) DEVICE — APPLICATOR CHLORAPREP ORN 26ML

## (undated) DEVICE — SEE MEDLINE ITEM 146308

## (undated) DEVICE — TROCAR ENDOPATH XCEL 5X75MM

## (undated) DEVICE — KIT SAHARA DRAPE DRAW/LIFT

## (undated) DEVICE — KIT ANTIFOG

## (undated) DEVICE — NDL HYPO REG 25G X 1 1/2

## (undated) DEVICE — TROCAR ENDOPATH XCEL 11MM 10CM

## (undated) DEVICE — IRRIGATOR ENDOSCOPY DISP.

## (undated) DEVICE — SCISSOR 5MMX35CM DIRECT DRIVE

## (undated) DEVICE — APPLIER CLIP EPIX UNIV 5X34

## (undated) DEVICE — SYR SLIP TIP 10ML SHIELD

## (undated) DEVICE — SUT ETHILON 3-0 FS-1 30

## (undated) DEVICE — NDL INSUF ULTRA VERESS 120MM

## (undated) DEVICE — TEAR CROSS LRG 14MM X 7MM

## (undated) DEVICE — BANDAGE ESMARK LATEX FREE 4INX

## (undated) DEVICE — SUT MONOCRYL 3-0 PS-1

## (undated) DEVICE — DURAPREP SURG SCRUB 26ML

## (undated) DEVICE — SYR DISP LL 5CC

## (undated) DEVICE — NDL BLUNT W/O FILTER 18GX1.5IN

## (undated) DEVICE — SOL IRR STRL WATER 500ML

## (undated) DEVICE — GLOVE 7.0 PROTEXIS PI MICRO

## (undated) DEVICE — STOCKINET 4INX48

## (undated) DEVICE — BAG TISS RETRV MONARCH 10MM

## (undated) DEVICE — GLOVE SURG ULTRA TOUCH 7.5

## (undated) DEVICE — DRESSING XEROFORM 1X8IN

## (undated) DEVICE — DRAPE MINI C ARM STERILE

## (undated) DEVICE — ELECTRODE BLADE W/SLEEVE 2.75

## (undated) DEVICE — TOWEL OR DISP STRL BLUE 4/PK

## (undated) DEVICE — DRAPE MEDIUM SHEET 40X70IN

## (undated) DEVICE — TUBING HEATED INSUFFLATOR

## (undated) DEVICE — TUBING SUC UNIV W/CONN 12FT